# Patient Record
Sex: FEMALE | Race: WHITE | NOT HISPANIC OR LATINO | Employment: OTHER | URBAN - NONMETROPOLITAN AREA
[De-identification: names, ages, dates, MRNs, and addresses within clinical notes are randomized per-mention and may not be internally consistent; named-entity substitution may affect disease eponyms.]

---

## 2017-06-28 ENCOUNTER — HOSPITAL ENCOUNTER (OUTPATIENT)
Dept: GENERAL RADIOLOGY | Facility: HOSPITAL | Age: 57
Discharge: HOME | End: 2017-06-28
Admitting: NURSE PRACTITIONER

## 2017-06-28 ENCOUNTER — TRANSCRIBE ORDERS (OUTPATIENT)
Dept: GENERAL RADIOLOGY | Facility: HOSPITAL | Age: 57
End: 2017-06-28

## 2017-06-28 DIAGNOSIS — M25.552 LEFT HIP PAIN: Primary | ICD-10-CM

## 2017-06-28 PROCEDURE — 73502 X-RAY EXAM HIP UNI 2-3 VIEWS: CPT

## 2017-11-16 ENCOUNTER — TRANSCRIBE ORDERS (OUTPATIENT)
Dept: ADMINISTRATIVE | Facility: HOSPITAL | Age: 57
End: 2017-11-16

## 2017-11-16 DIAGNOSIS — M81.0 OSTEOPOROSIS, UNSPECIFIED OSTEOPOROSIS TYPE, UNSPECIFIED PATHOLOGICAL FRACTURE PRESENCE: Primary | ICD-10-CM

## 2017-11-16 DIAGNOSIS — Z12.31 ENCOUNTER FOR SCREENING MAMMOGRAM FOR MALIGNANT NEOPLASM OF BREAST: ICD-10-CM

## 2018-02-01 ENCOUNTER — HOSPITAL ENCOUNTER (EMERGENCY)
Facility: HOSPITAL | Age: 58
Discharge: HOME | End: 2018-02-01
Admitting: EMERGENCY MEDICINE

## 2018-02-01 VITALS
BODY MASS INDEX: 28.99 KG/M2 | RESPIRATION RATE: 16 BRPM | HEIGHT: 65 IN | OXYGEN SATURATION: 98 % | TEMPERATURE: 98.2 F | SYSTOLIC BLOOD PRESSURE: 135 MMHG | HEART RATE: 65 BPM | DIASTOLIC BLOOD PRESSURE: 88 MMHG | WEIGHT: 174 LBS

## 2018-02-01 DIAGNOSIS — I10 HYPERTENSION NOT AT GOAL: Primary | ICD-10-CM

## 2018-02-01 LAB
ALBUMIN SERPL-MCNC: 4.1 G/DL (ref 3.5–5)
ALBUMIN/GLOB SERPL: 1.4 G/DL (ref 1.1–2.5)
ALP SERPL-CCNC: 103 U/L (ref 24–120)
ALT SERPL W P-5'-P-CCNC: 23 U/L (ref 0–54)
ANION GAP SERPL CALCULATED.3IONS-SCNC: 13 MMOL/L (ref 4–13)
AST SERPL-CCNC: 38 U/L (ref 7–45)
BASOPHILS # BLD AUTO: 0.05 10*3/MM3 (ref 0–0.2)
BASOPHILS NFR BLD AUTO: 0.6 % (ref 0–2)
BILIRUB SERPL-MCNC: 1 MG/DL (ref 0.1–1)
BUN BLD-MCNC: 23 MG/DL (ref 5–21)
BUN/CREAT SERPL: 16 (ref 7–25)
CALCIUM SPEC-SCNC: 9 MG/DL (ref 8.4–10.4)
CHLORIDE SERPL-SCNC: 103 MMOL/L (ref 98–110)
CO2 SERPL-SCNC: 27 MMOL/L (ref 24–31)
CREAT BLD-MCNC: 1.44 MG/DL (ref 0.5–1.4)
DEPRECATED RDW RBC AUTO: 44.1 FL (ref 40–54)
EOSINOPHIL # BLD AUTO: 0.17 10*3/MM3 (ref 0–0.7)
EOSINOPHIL NFR BLD AUTO: 1.9 % (ref 0–4)
ERYTHROCYTE [DISTWIDTH] IN BLOOD BY AUTOMATED COUNT: 13.5 % (ref 12–15)
GFR SERPL CREATININE-BSD FRML MDRD: 38 ML/MIN/1.73
GLOBULIN UR ELPH-MCNC: 2.9 GM/DL
GLUCOSE BLD-MCNC: 81 MG/DL (ref 70–100)
HCT VFR BLD AUTO: 34.9 % (ref 37–47)
HGB BLD-MCNC: 11.4 G/DL (ref 12–16)
IMM GRANULOCYTES # BLD: 0.03 10*3/MM3 (ref 0–0.03)
IMM GRANULOCYTES NFR BLD: 0.3 % (ref 0–5)
LYMPHOCYTES # BLD AUTO: 2.2 10*3/MM3 (ref 0.72–4.86)
LYMPHOCYTES NFR BLD AUTO: 24.5 % (ref 15–45)
MCH RBC QN AUTO: 29.3 PG (ref 28–32)
MCHC RBC AUTO-ENTMCNC: 32.7 G/DL (ref 33–36)
MCV RBC AUTO: 89.7 FL (ref 82–98)
MONOCYTES # BLD AUTO: 0.61 10*3/MM3 (ref 0.19–1.3)
MONOCYTES NFR BLD AUTO: 6.8 % (ref 4–12)
NEUTROPHILS # BLD AUTO: 5.93 10*3/MM3 (ref 1.87–8.4)
NEUTROPHILS NFR BLD AUTO: 65.9 % (ref 39–78)
NRBC BLD MANUAL-RTO: 0 /100 WBC (ref 0–0)
PLATELET # BLD AUTO: 216 10*3/MM3 (ref 130–400)
PMV BLD AUTO: 10.8 FL (ref 6–12)
POTASSIUM BLD-SCNC: 3.1 MMOL/L (ref 3.5–5.3)
PROT SERPL-MCNC: 7 G/DL (ref 6.3–8.7)
RBC # BLD AUTO: 3.89 10*6/MM3 (ref 4.2–5.4)
SODIUM BLD-SCNC: 143 MMOL/L (ref 135–145)
WBC NRBC COR # BLD: 8.99 10*3/MM3 (ref 4.8–10.8)

## 2018-02-01 PROCEDURE — 96376 TX/PRO/DX INJ SAME DRUG ADON: CPT

## 2018-02-01 PROCEDURE — 96374 THER/PROPH/DIAG INJ IV PUSH: CPT

## 2018-02-01 PROCEDURE — 80053 COMPREHEN METABOLIC PANEL: CPT | Performed by: PHYSICIAN ASSISTANT

## 2018-02-01 PROCEDURE — 85025 COMPLETE CBC W/AUTO DIFF WBC: CPT | Performed by: PHYSICIAN ASSISTANT

## 2018-02-01 PROCEDURE — 99285 EMERGENCY DEPT VISIT HI MDM: CPT

## 2018-02-01 RX ORDER — CLONIDINE HYDROCHLORIDE 0.1 MG/1
0.1 TABLET ORAL ONCE
Status: COMPLETED | OUTPATIENT
Start: 2018-02-01 | End: 2018-02-01

## 2018-02-01 RX ORDER — ATENOLOL 50 MG/1
50 TABLET ORAL DAILY
COMMUNITY
End: 2019-09-09 | Stop reason: HOSPADM

## 2018-02-01 RX ORDER — LABETALOL HYDROCHLORIDE 5 MG/ML
10 INJECTION, SOLUTION INTRAVENOUS ONCE
Status: COMPLETED | OUTPATIENT
Start: 2018-02-01 | End: 2018-02-01

## 2018-02-01 RX ORDER — LABETALOL HYDROCHLORIDE 5 MG/ML
20 INJECTION, SOLUTION INTRAVENOUS ONCE
Status: COMPLETED | OUTPATIENT
Start: 2018-02-01 | End: 2018-02-01

## 2018-02-01 RX ADMIN — CLONIDINE HYDROCHLORIDE 0.1 MG: 0.1 TABLET ORAL at 20:20

## 2018-02-01 RX ADMIN — LABETALOL HYDROCHLORIDE 10 MG: 5 INJECTION, SOLUTION INTRAVENOUS at 19:09

## 2018-02-01 RX ADMIN — CLONIDINE HYDROCHLORIDE 0.1 MG: 0.1 TABLET ORAL at 19:50

## 2018-02-01 RX ADMIN — LABETALOL HYDROCHLORIDE 20 MG: 5 INJECTION, SOLUTION INTRAVENOUS at 20:56

## 2018-02-02 NOTE — ED PROVIDER NOTES
Subjective   History of Present Illness  57-year-old female presents with chief complaint of headache and left hip pain.  Patient reports the duration of her symptoms have been present for over one year.  She reports they come and go.  She sees a neurologist for her headaches as they are chronic, Dr. Hoyt.  She has an upcoming appointment with him.  At this time I am evaluating the patient, she reports she no longer has a headache but her blood pressure still elevated.  Denies chest pain nausea vomiting diarrhea fevers chills shortness of breath.  Review of Systems   All other systems reviewed and are negative.      Past Medical History:   Diagnosis Date   • Hypertension    • PTSD (post-traumatic stress disorder)        No Known Allergies    Past Surgical History:   Procedure Laterality Date   • EXTERNAL FIXATION WRIST FRACTURE     • LEG SURGERY     • TUBAL ABDOMINAL LIGATION         History reviewed. No pertinent family history.    Social History     Social History   • Marital status: Single     Spouse name: N/A   • Number of children: N/A   • Years of education: N/A     Social History Main Topics   • Smoking status: Current Every Day Smoker     Packs/day: 1.50   • Smokeless tobacco: None   • Alcohol use No   • Drug use: No   • Sexual activity: Defer     Other Topics Concern   • None     Social History Narrative   • None           Objective   Physical Exam   Constitutional: She is oriented to person, place, and time. She appears well-developed and well-nourished.   HENT:   Head: Normocephalic.   Eyes: EOM are normal. Pupils are equal, round, and reactive to light.   Neck: Normal range of motion.   Cardiovascular: Normal rate and regular rhythm.    Pulmonary/Chest: Effort normal.   Abdominal: Soft.   Musculoskeletal: Normal range of motion.   Lymphadenopathy:     She has no cervical adenopathy.   Neurological: She is alert and oriented to person, place, and time.   Skin: Skin is warm.   Psychiatric: She has a  normal mood and affect. Her behavior is normal.   Nursing note and vitals reviewed.      Procedures         ED Course  ED Course                  MDM  Number of Diagnoses or Management Options  Diagnosis management comments: We will discharge this patient is stable condition, her headache has improved and her blood pressure has improved.  She will return precautions       Amount and/or Complexity of Data Reviewed  Clinical lab tests: reviewed and ordered  Tests in the medicine section of CPT®: ordered and reviewed  Decide to obtain previous medical records or to obtain history from someone other than the patient: yes    Risk of Complications, Morbidity, and/or Mortality  Presenting problems: moderate  Diagnostic procedures: moderate  Management options: moderate    Patient Progress  Patient progress: stable      Final diagnoses:   Hypertension not at goal            Quinn Gilman PA-C  02/02/18 0025

## 2018-02-09 ENCOUNTER — HOSPITAL ENCOUNTER (OUTPATIENT)
Dept: MAMMOGRAPHY | Facility: HOSPITAL | Age: 58
Discharge: HOME | End: 2018-02-09
Attending: FAMILY MEDICINE | Admitting: FAMILY MEDICINE

## 2018-02-09 ENCOUNTER — HOSPITAL ENCOUNTER (OUTPATIENT)
Dept: BONE DENSITY | Facility: HOSPITAL | Age: 58
End: 2018-02-09
Attending: FAMILY MEDICINE

## 2018-02-09 DIAGNOSIS — Z12.31 ENCOUNTER FOR SCREENING MAMMOGRAM FOR MALIGNANT NEOPLASM OF BREAST: ICD-10-CM

## 2018-02-09 DIAGNOSIS — M81.0 OSTEOPOROSIS, UNSPECIFIED OSTEOPOROSIS TYPE, UNSPECIFIED PATHOLOGICAL FRACTURE PRESENCE: ICD-10-CM

## 2018-02-09 PROCEDURE — 77063 BREAST TOMOSYNTHESIS BI: CPT

## 2018-02-09 PROCEDURE — 77067 SCR MAMMO BI INCL CAD: CPT

## 2018-02-16 ENCOUNTER — TRANSCRIBE ORDERS (OUTPATIENT)
Dept: ADMINISTRATIVE | Facility: HOSPITAL | Age: 58
End: 2018-02-16

## 2018-02-16 DIAGNOSIS — R94.4 ABNORMAL RESULTS OF KIDNEY FUNCTION STUDIES: Primary | ICD-10-CM

## 2018-03-02 RX ORDER — AMITRIPTYLINE HYDROCHLORIDE 10 MG/1
10 TABLET, FILM COATED ORAL NIGHTLY
Status: ON HOLD | COMMUNITY
End: 2021-09-08

## 2018-03-02 RX ORDER — CETIRIZINE HYDROCHLORIDE, PSEUDOEPHEDRINE HYDROCHLORIDE 5; 120 MG/1; MG/1
1 TABLET, FILM COATED, EXTENDED RELEASE ORAL 2 TIMES DAILY
Status: ON HOLD | COMMUNITY
End: 2021-09-08

## 2018-03-02 RX ORDER — ERGOCALCIFEROL 1.25 MG/1
50000 CAPSULE ORAL WEEKLY
Status: ON HOLD | COMMUNITY
End: 2021-09-08

## 2018-03-02 RX ORDER — PROMETHAZINE HYDROCHLORIDE 25 MG/1
25 TABLET ORAL EVERY 6 HOURS PRN
Status: ON HOLD | COMMUNITY
End: 2021-09-08

## 2018-03-02 RX ORDER — CLONIDINE HYDROCHLORIDE 0.1 MG/1
0.1 TABLET ORAL 2 TIMES DAILY
Status: ON HOLD | COMMUNITY
End: 2021-09-08

## 2018-03-02 RX ORDER — ATENOLOL 50 MG/1
50 TABLET ORAL DAILY
Status: ON HOLD | COMMUNITY
End: 2021-09-08

## 2018-03-02 RX ORDER — AZELASTINE HYDROCHLORIDE 137 UG/1
SPRAY, METERED NASAL
Status: ON HOLD | COMMUNITY
End: 2021-09-08

## 2018-03-02 RX ORDER — FLUTICASONE PROPIONATE 50 MCG
2 SPRAY, SUSPENSION (ML) NASAL DAILY
COMMUNITY

## 2018-03-23 ENCOUNTER — TELEPHONE (OUTPATIENT)
Dept: GASTROENTEROLOGY | Age: 58
End: 2018-03-23

## 2018-04-05 ENCOUNTER — HOSPITAL ENCOUNTER (EMERGENCY)
Facility: HOSPITAL | Age: 58
Discharge: HOME | End: 2018-04-06
Attending: EMERGENCY MEDICINE | Admitting: EMERGENCY MEDICINE

## 2018-04-05 ENCOUNTER — APPOINTMENT (OUTPATIENT)
Dept: CT IMAGING | Facility: HOSPITAL | Age: 58
End: 2018-04-05

## 2018-04-05 ENCOUNTER — APPOINTMENT (OUTPATIENT)
Dept: GENERAL RADIOLOGY | Facility: HOSPITAL | Age: 58
End: 2018-04-05

## 2018-04-05 DIAGNOSIS — R19.7 DIARRHEA, UNSPECIFIED TYPE: ICD-10-CM

## 2018-04-05 DIAGNOSIS — I15.9 SECONDARY HYPERTENSION: Primary | ICD-10-CM

## 2018-04-05 PROCEDURE — 71046 X-RAY EXAM CHEST 2 VIEWS: CPT

## 2018-04-05 PROCEDURE — 70450 CT HEAD/BRAIN W/O DYE: CPT

## 2018-04-05 PROCEDURE — 99284 EMERGENCY DEPT VISIT MOD MDM: CPT

## 2018-04-05 PROCEDURE — 93005 ELECTROCARDIOGRAM TRACING: CPT | Performed by: EMERGENCY MEDICINE

## 2018-04-05 PROCEDURE — 93010 ELECTROCARDIOGRAM REPORT: CPT | Performed by: INTERNAL MEDICINE

## 2018-04-05 RX ORDER — CLONIDINE HYDROCHLORIDE 0.1 MG/1
0.1 TABLET ORAL DAILY PRN
COMMUNITY
End: 2020-11-03 | Stop reason: HOSPADM

## 2018-04-05 RX ORDER — CLONIDINE HYDROCHLORIDE 0.1 MG/1
0.2 TABLET ORAL ONCE
Status: COMPLETED | OUTPATIENT
Start: 2018-04-05 | End: 2018-04-05

## 2018-04-05 RX ADMIN — CLONIDINE HYDROCHLORIDE 0.2 MG: 0.1 TABLET ORAL at 23:13

## 2018-04-06 VITALS
BODY MASS INDEX: 29.99 KG/M2 | TEMPERATURE: 98.1 F | HEART RATE: 88 BPM | SYSTOLIC BLOOD PRESSURE: 168 MMHG | DIASTOLIC BLOOD PRESSURE: 106 MMHG | OXYGEN SATURATION: 98 % | WEIGHT: 180 LBS | HEIGHT: 65 IN | RESPIRATION RATE: 20 BRPM

## 2018-04-06 LAB
ANION GAP SERPL CALCULATED.3IONS-SCNC: 11 MMOL/L (ref 4–13)
APAP SERPL-MCNC: <10 MCG/ML (ref 10–30)
APTT PPP: 31 SECONDS (ref 24.1–34.8)
BASOPHILS # BLD AUTO: 0.04 10*3/MM3 (ref 0–0.2)
BASOPHILS NFR BLD AUTO: 0.6 % (ref 0–2)
BUN BLD-MCNC: 32 MG/DL (ref 5–21)
BUN/CREAT SERPL: 25.8 (ref 7–25)
CALCIUM SPEC-SCNC: 8.7 MG/DL (ref 8.4–10.4)
CHLORIDE SERPL-SCNC: 112 MMOL/L (ref 98–110)
CK MB SERPL-CCNC: 3.38 NG/ML (ref 0–5)
CO2 SERPL-SCNC: 25 MMOL/L (ref 24–31)
CREAT BLD-MCNC: 1.24 MG/DL (ref 0.5–1.4)
DEPRECATED RDW RBC AUTO: 44.4 FL (ref 40–54)
EOSINOPHIL # BLD AUTO: 0.21 10*3/MM3 (ref 0–0.7)
EOSINOPHIL NFR BLD AUTO: 3 % (ref 0–4)
ERYTHROCYTE [DISTWIDTH] IN BLOOD BY AUTOMATED COUNT: 13.6 % (ref 12–15)
ETHANOL UR QL: <0.01 %
GFR SERPL CREATININE-BSD FRML MDRD: 45 ML/MIN/1.73
GLUCOSE BLD-MCNC: 62 MG/DL (ref 70–100)
HCT VFR BLD AUTO: 33.7 % (ref 37–47)
HGB BLD-MCNC: 11 G/DL (ref 12–16)
IMM GRANULOCYTES # BLD: 0.02 10*3/MM3 (ref 0–0.03)
IMM GRANULOCYTES NFR BLD: 0.3 % (ref 0–5)
INR PPP: 0.95 (ref 0.91–1.09)
LYMPHOCYTES # BLD AUTO: 2.28 10*3/MM3 (ref 0.72–4.86)
LYMPHOCYTES NFR BLD AUTO: 32.3 % (ref 15–45)
MCH RBC QN AUTO: 29.6 PG (ref 28–32)
MCHC RBC AUTO-ENTMCNC: 32.6 G/DL (ref 33–36)
MCV RBC AUTO: 90.6 FL (ref 82–98)
MONOCYTES # BLD AUTO: 0.71 10*3/MM3 (ref 0.19–1.3)
MONOCYTES NFR BLD AUTO: 10.1 % (ref 4–12)
NEUTROPHILS # BLD AUTO: 3.8 10*3/MM3 (ref 1.87–8.4)
NEUTROPHILS NFR BLD AUTO: 53.7 % (ref 39–78)
NRBC BLD MANUAL-RTO: 0 /100 WBC (ref 0–0)
NT-PROBNP SERPL-MCNC: 1420 PG/ML (ref 0–900)
PLATELET # BLD AUTO: 174 10*3/MM3 (ref 130–400)
PMV BLD AUTO: 11.1 FL (ref 6–12)
POTASSIUM BLD-SCNC: 3.7 MMOL/L (ref 3.5–5.3)
PROTHROMBIN TIME: 13 SECONDS (ref 11.9–14.6)
RBC # BLD AUTO: 3.72 10*6/MM3 (ref 4.2–5.4)
SALICYLATES SERPL-MCNC: 6.4 MG/DL (ref 15–30)
SODIUM BLD-SCNC: 148 MMOL/L (ref 135–145)
TROPONIN I SERPL-MCNC: 0.01 NG/ML (ref 0–0.03)
WBC NRBC COR # BLD: 7.06 10*3/MM3 (ref 4.8–10.8)

## 2018-04-06 PROCEDURE — 85025 COMPLETE CBC W/AUTO DIFF WBC: CPT | Performed by: EMERGENCY MEDICINE

## 2018-04-06 PROCEDURE — 80307 DRUG TEST PRSMV CHEM ANLYZR: CPT | Performed by: EMERGENCY MEDICINE

## 2018-04-06 PROCEDURE — 85730 THROMBOPLASTIN TIME PARTIAL: CPT | Performed by: EMERGENCY MEDICINE

## 2018-04-06 PROCEDURE — 84484 ASSAY OF TROPONIN QUANT: CPT | Performed by: EMERGENCY MEDICINE

## 2018-04-06 PROCEDURE — 80048 BASIC METABOLIC PNL TOTAL CA: CPT | Performed by: EMERGENCY MEDICINE

## 2018-04-06 PROCEDURE — 83880 ASSAY OF NATRIURETIC PEPTIDE: CPT | Performed by: EMERGENCY MEDICINE

## 2018-04-06 PROCEDURE — 85610 PROTHROMBIN TIME: CPT | Performed by: EMERGENCY MEDICINE

## 2018-04-06 PROCEDURE — 82553 CREATINE MB FRACTION: CPT | Performed by: EMERGENCY MEDICINE

## 2018-04-06 NOTE — DISCHARGE INSTRUCTIONS
Ludivina,    Your blood pressure was rather high today. Please see your family doctor within 48 hours of discharge for repeat blood pressure and possible adjustment of your medications. Please return for worsening pain, headaches, numbness, tingling, loss of sensation, chest pain or other issues.

## 2018-04-06 NOTE — ED PROVIDER NOTES
"Subjective   58 y/o female presents for multiple issues. She notes diarrhea for 2-3 days, headache which has since resolved for 2-3 weeks and \"spots\" on her body. Patient while alert and oriented is hard to keep up with as she changes topics frequently, she points to spots on her body showing me them stating \"this wasn't here before.\" Most of the time I do not see what she is pointing at. She states her tongue is bleeding but on examination it is not. She states she has some spots under her tongue but, again, on examination I do not see any. She was difficult to actually even see as she was often out of the room when I went to see her. She denies all pain to me at this time, denies medication changes (did not take her bp medication prior to coming with repeat currently at 175). She denies blood per rectum, blood per urine, fevers, chills, cp, sob, nausea, vomiting, diarrhea, falls or trauma. She admits to history of etoh usage but denies this currently. She notes the diarrhea is non bloody and denies recent abx usage, admissions to the hosptial, trips, travels or other issues. She arrives in [facility].             Review of Systems   All other systems reviewed and are negative.      Past Medical History:   Diagnosis Date   • Hypertension    • PTSD (post-traumatic stress disorder)        Allergies   Allergen Reactions   • Codeine Nausea And Vomiting       Past Surgical History:   Procedure Laterality Date   • EXTERNAL FIXATION WRIST FRACTURE     • LEG SURGERY     • TUBAL ABDOMINAL LIGATION         History reviewed. No pertinent family history.    Social History     Social History   • Marital status: Single     Social History Main Topics   • Smoking status: Current Every Day Smoker     Packs/day: 1.00   • Alcohol use No   • Drug use: No   • Sexual activity: Defer     Other Topics Concern   • Not on file           Objective   Physical Exam   Constitutional: She is oriented to person, place, and time. She appears " well-developed and well-nourished.   HENT:   Head: Normocephalic.   Nose: Nose normal.   Eyes: Conjunctivae and EOM are normal. Pupils are equal, round, and reactive to light.   Neck: Normal range of motion. Neck supple.   Cardiovascular: Normal rate, regular rhythm, normal heart sounds and intact distal pulses.    Pulmonary/Chest: Effort normal and breath sounds normal.   Abdominal: Soft. Bowel sounds are normal. She exhibits no distension and no mass. There is no tenderness. There is no rebound and no guarding. No hernia.   Musculoskeletal: Normal range of motion.   Neurological: She is alert and oriented to person, place, and time. She has normal reflexes.   Skin: Skin is warm and dry. Capillary refill takes less than 2 seconds.   Psychiatric: She has a normal mood and affect. Her behavior is normal.   Vitals reviewed.      Procedures         ED Course  ED Course   Comment By Time   Went to see patient at least two times, not in room either time.  Son Randlal MD 04/06 0115      Labs Reviewed   BASIC METABOLIC PANEL - Abnormal; Notable for the following:        Result Value    Glucose 62 (*)     BUN 32 (*)     Sodium 148 (*)     Chloride 112 (*)     eGFR Non African Amer 45 (*)     BUN/Creatinine Ratio 25.8 (*)     All other components within normal limits    Narrative:     GFR Normal >60  Chronic Kidney Disease <60  Kidney Failure <15   SALICYLATE LEVEL - Abnormal; Notable for the following:     Salicylate 6.4 (*)     All other components within normal limits   ACETAMINOPHEN LEVEL - Abnormal; Notable for the following:     Acetaminophen <10.0 (*)     All other components within normal limits   BNP (IN-HOUSE) - Abnormal; Notable for the following:     proBNP 1,420.0 (*)     All other components within normal limits   CBC WITH AUTO DIFFERENTIAL - Abnormal; Notable for the following:     RBC 3.72 (*)     Hemoglobin 11.0 (*)     Hematocrit 33.7 (*)     MCHC 32.6 (*)     All other components within normal  limits   PROTIME-INR - Normal   APTT - Normal   TROPONIN (IN-HOUSE) - Normal   CK MB - Normal    Narrative:     CKMB Index not indicated   ETHANOL - Normal    Narrative:     Not for legal purposes. Chain of Custody not followed.    URINE DRUG SCREEN   URINALYSIS W/ MICROSCOPIC IF INDICATED   CBC AND DIFFERENTIAL    Narrative:     The following orders were created for panel order CBC & Differential.  Procedure                               Abnormality         Status                     ---------                               -----------         ------                     CBC Auto Differential[[id number]]        Abnormal            Final result                 Please view results for these tests on the individual orders.     XR Chest 2 View    (Results Pending)   CT Head Without Contrast    (Results Pending)     Sodium trends from 144-148  BUN: has been 32 in 2016, no ag    hgb trend has been 9.9-14.6 and this apars to be her average    Patient able to eat and drink without issue.    I have observed her walking down the simms way in no distress multiple times. She is alert and oriented x4 at this time. I am unsure of the etiology or cause of her symptoms as I do not see these spots she is talking about. Her labs are unchanged and unrevealing. She has no history of bleeding or dark/starry stools. She denies current pain at this time. Given all the above in this well appearing patient, will dc to home with follow up with pmd. States she has immodium for her diarrhea.               MDM    Final diagnoses:   Secondary hypertension   Diarrhea, unspecified type            Son Randall MD  04/06/18 5075

## 2018-04-24 ENCOUNTER — HOSPITAL ENCOUNTER (OUTPATIENT)
Age: 58
Setting detail: OUTPATIENT SURGERY
Discharge: HOME | End: 2018-04-24
Attending: INTERNAL MEDICINE | Admitting: INTERNAL MEDICINE

## 2018-04-24 ENCOUNTER — ANESTHESIA (OUTPATIENT)
Dept: OPERATING ROOM | Age: 58
End: 2018-04-24

## 2018-04-24 ENCOUNTER — ANESTHESIA EVENT (OUTPATIENT)
Dept: OPERATING ROOM | Age: 58
End: 2018-04-24

## 2018-04-24 VITALS
BODY MASS INDEX: 28.93 KG/M2 | SYSTOLIC BLOOD PRESSURE: 190 MMHG | WEIGHT: 180 LBS | HEIGHT: 66 IN | TEMPERATURE: 99.5 F | RESPIRATION RATE: 20 BRPM | HEART RATE: 64 BPM | DIASTOLIC BLOOD PRESSURE: 124 MMHG

## 2018-04-24 RX ORDER — SODIUM CHLORIDE 9 MG/ML
INJECTION, SOLUTION INTRAVENOUS CONTINUOUS
Status: DISCONTINUED | OUTPATIENT
Start: 2018-04-24 | End: 2018-04-24 | Stop reason: HOSPADM

## 2018-04-24 RX ORDER — METOPROLOL TARTRATE 5 MG/5ML
INJECTION INTRAVENOUS PRN
Status: SHIPPED | OUTPATIENT
Start: 2018-04-24

## 2018-04-24 RX ORDER — MIDAZOLAM HYDROCHLORIDE 1 MG/ML
INJECTION INTRAMUSCULAR; INTRAVENOUS PRN
Status: SHIPPED | OUTPATIENT
Start: 2018-04-24

## 2018-04-24 RX ORDER — LIDOCAINE HYDROCHLORIDE 10 MG/ML
1 INJECTION, SOLUTION EPIDURAL; INFILTRATION; INTRACAUDAL; PERINEURAL
Status: DISCONTINUED | OUTPATIENT
Start: 2018-04-24 | End: 2018-04-24 | Stop reason: HOSPADM

## 2018-04-24 RX ADMIN — METOPROLOL TARTRATE 10 MG: 5 INJECTION INTRAVENOUS at 10:45

## 2018-04-24 RX ADMIN — METOPROLOL TARTRATE 15 MG: 5 INJECTION INTRAVENOUS at 10:57

## 2018-04-24 RX ADMIN — MIDAZOLAM HYDROCHLORIDE 2 MG: 1 INJECTION INTRAMUSCULAR; INTRAVENOUS at 10:36

## 2018-04-24 RX ADMIN — SODIUM CHLORIDE: 9 INJECTION, SOLUTION INTRAVENOUS at 10:35

## 2018-04-24 ASSESSMENT — PAIN - FUNCTIONAL ASSESSMENT: PAIN_FUNCTIONAL_ASSESSMENT: 0-10

## 2018-09-12 ENCOUNTER — HOSPITAL ENCOUNTER (OUTPATIENT)
Dept: ULTRASOUND IMAGING | Age: 58
Discharge: HOME | End: 2018-09-12
Payer: MEDICARE

## 2018-09-12 DIAGNOSIS — N18.9 CHRONIC RENAL IMPAIRMENT, UNSPECIFIED CKD STAGE: ICD-10-CM

## 2018-09-12 PROCEDURE — 76770 US EXAM ABDO BACK WALL COMP: CPT

## 2019-08-30 ENCOUNTER — HOSPITAL ENCOUNTER (INPATIENT)
Facility: HOSPITAL | Age: 59
LOS: 10 days | Discharge: HOME | End: 2019-09-09
Attending: FAMILY MEDICINE | Admitting: FAMILY MEDICINE

## 2019-08-30 ENCOUNTER — APPOINTMENT (OUTPATIENT)
Dept: GENERAL RADIOLOGY | Facility: HOSPITAL | Age: 59
End: 2019-08-30

## 2019-08-30 DIAGNOSIS — R06.00 DYSPNEA, UNSPECIFIED TYPE: ICD-10-CM

## 2019-08-30 DIAGNOSIS — I16.0 HYPERTENSIVE URGENCY: Primary | ICD-10-CM

## 2019-08-30 DIAGNOSIS — R77.8 ELEVATED TROPONIN: ICD-10-CM

## 2019-08-30 DIAGNOSIS — I50.9 NEW ONSET OF CONGESTIVE HEART FAILURE (HCC): ICD-10-CM

## 2019-08-30 LAB
ALBUMIN SERPL-MCNC: 3.7 G/DL (ref 3.5–5)
ALBUMIN/GLOB SERPL: 1.1 G/DL (ref 1.1–2.5)
ALP SERPL-CCNC: 125 U/L (ref 24–120)
ALT SERPL W P-5'-P-CCNC: 87 U/L (ref 0–54)
ANION GAP SERPL CALCULATED.3IONS-SCNC: 4 MMOL/L (ref 4–13)
AST SERPL-CCNC: 81 U/L (ref 7–45)
BACTERIA UR QL AUTO: ABNORMAL /HPF
BASOPHILS # BLD AUTO: 0.04 10*3/MM3 (ref 0–0.2)
BASOPHILS NFR BLD AUTO: 0.7 % (ref 0–1.5)
BILIRUB SERPL-MCNC: 0.6 MG/DL (ref 0.1–1)
BILIRUB UR QL STRIP: NEGATIVE
BUN BLD-MCNC: 41 MG/DL (ref 5–21)
BUN/CREAT SERPL: 25.9 (ref 7–25)
CALCIUM SPEC-SCNC: 8.6 MG/DL (ref 8.4–10.4)
CHLORIDE SERPL-SCNC: 101 MMOL/L (ref 98–110)
CLARITY UR: CLEAR
CO2 SERPL-SCNC: 32 MMOL/L (ref 24–31)
COLOR UR: YELLOW
CREAT BLD-MCNC: 1.58 MG/DL (ref 0.5–1.4)
DEPRECATED RDW RBC AUTO: 49.7 FL (ref 37–54)
EOSINOPHIL # BLD AUTO: 0.13 10*3/MM3 (ref 0–0.4)
EOSINOPHIL NFR BLD AUTO: 2.2 % (ref 0.3–6.2)
ERYTHROCYTE [DISTWIDTH] IN BLOOD BY AUTOMATED COUNT: 16.3 % (ref 12.3–15.4)
GFR SERPL CREATININE-BSD FRML MDRD: 33 ML/MIN/1.73
GLOBULIN UR ELPH-MCNC: 3.3 GM/DL
GLUCOSE BLD-MCNC: 91 MG/DL (ref 70–100)
GLUCOSE UR STRIP-MCNC: NEGATIVE MG/DL
HCT VFR BLD AUTO: 32.6 % (ref 34–46.6)
HGB BLD-MCNC: 9.9 G/DL (ref 12–15.9)
HGB UR QL STRIP.AUTO: NEGATIVE
HYALINE CASTS UR QL AUTO: ABNORMAL /LPF
IMM GRANULOCYTES # BLD AUTO: 0.03 10*3/MM3 (ref 0–0.05)
IMM GRANULOCYTES NFR BLD AUTO: 0.5 % (ref 0–0.5)
KETONES UR QL STRIP: NEGATIVE
LEUKOCYTE ESTERASE UR QL STRIP.AUTO: NEGATIVE
LYMPHOCYTES # BLD AUTO: 1.13 10*3/MM3 (ref 0.7–3.1)
LYMPHOCYTES NFR BLD AUTO: 18.9 % (ref 19.6–45.3)
MAGNESIUM SERPL-MCNC: 2.4 MG/DL (ref 1.4–2.2)
MCH RBC QN AUTO: 25.5 PG (ref 26.6–33)
MCHC RBC AUTO-ENTMCNC: 30.4 G/DL (ref 31.5–35.7)
MCV RBC AUTO: 84 FL (ref 79–97)
MONOCYTES # BLD AUTO: 0.41 10*3/MM3 (ref 0.1–0.9)
MONOCYTES NFR BLD AUTO: 6.9 % (ref 5–12)
NEUTROPHILS # BLD AUTO: 4.23 10*3/MM3 (ref 1.7–7)
NEUTROPHILS NFR BLD AUTO: 70.8 % (ref 42.7–76)
NITRITE UR QL STRIP: NEGATIVE
NRBC BLD AUTO-RTO: 0 /100 WBC (ref 0–0.2)
NT-PROBNP SERPL-MCNC: ABNORMAL PG/ML (ref 0–900)
PH UR STRIP.AUTO: 6.5 [PH] (ref 5–8)
PLATELET # BLD AUTO: 249 10*3/MM3 (ref 140–450)
PMV BLD AUTO: 10.6 FL (ref 6–12)
POTASSIUM BLD-SCNC: 4.1 MMOL/L (ref 3.5–5.3)
PROT SERPL-MCNC: 7 G/DL (ref 6.3–8.7)
PROT UR QL STRIP: ABNORMAL
RBC # BLD AUTO: 3.88 10*6/MM3 (ref 3.77–5.28)
RBC # UR: ABNORMAL /HPF
REF LAB TEST METHOD: ABNORMAL
SODIUM BLD-SCNC: 137 MMOL/L (ref 135–145)
SP GR UR STRIP: 1.01 (ref 1–1.03)
SQUAMOUS #/AREA URNS HPF: ABNORMAL /HPF
TROPONIN I SERPL-MCNC: 0.02 NG/ML (ref 0–0.03)
TSH SERPL DL<=0.05 MIU/L-ACNC: 1.16 UIU/ML (ref 0.47–4.68)
UROBILINOGEN UR QL STRIP: ABNORMAL
WBC NRBC COR # BLD: 5.97 10*3/MM3 (ref 3.4–10.8)
WBC UR QL AUTO: ABNORMAL /HPF

## 2019-08-30 PROCEDURE — 25010000002 FUROSEMIDE PER 20 MG: Performed by: PHYSICIAN ASSISTANT

## 2019-08-30 PROCEDURE — 83880 ASSAY OF NATRIURETIC PEPTIDE: CPT | Performed by: PHYSICIAN ASSISTANT

## 2019-08-30 PROCEDURE — 85025 COMPLETE CBC W/AUTO DIFF WBC: CPT | Performed by: PHYSICIAN ASSISTANT

## 2019-08-30 PROCEDURE — 84443 ASSAY THYROID STIM HORMONE: CPT | Performed by: PHYSICIAN ASSISTANT

## 2019-08-30 PROCEDURE — 99284 EMERGENCY DEPT VISIT MOD MDM: CPT

## 2019-08-30 PROCEDURE — 80053 COMPREHEN METABOLIC PANEL: CPT | Performed by: PHYSICIAN ASSISTANT

## 2019-08-30 PROCEDURE — 83735 ASSAY OF MAGNESIUM: CPT | Performed by: PHYSICIAN ASSISTANT

## 2019-08-30 PROCEDURE — 81001 URINALYSIS AUTO W/SCOPE: CPT | Performed by: PHYSICIAN ASSISTANT

## 2019-08-30 PROCEDURE — 93010 ELECTROCARDIOGRAM REPORT: CPT | Performed by: INTERNAL MEDICINE

## 2019-08-30 PROCEDURE — 71045 X-RAY EXAM CHEST 1 VIEW: CPT

## 2019-08-30 PROCEDURE — 84484 ASSAY OF TROPONIN QUANT: CPT | Performed by: PHYSICIAN ASSISTANT

## 2019-08-30 PROCEDURE — 93005 ELECTROCARDIOGRAM TRACING: CPT | Performed by: PHYSICIAN ASSISTANT

## 2019-08-30 RX ORDER — NITROGLYCERIN 0.4 MG/1
0.4 TABLET SUBLINGUAL
Status: COMPLETED | OUTPATIENT
Start: 2019-08-30 | End: 2019-08-30

## 2019-08-30 RX ORDER — ACETAMINOPHEN 500 MG
1000 TABLET ORAL ONCE
Status: COMPLETED | OUTPATIENT
Start: 2019-08-30 | End: 2019-08-30

## 2019-08-30 RX ORDER — FUROSEMIDE 10 MG/ML
40 INJECTION INTRAMUSCULAR; INTRAVENOUS ONCE
Status: COMPLETED | OUTPATIENT
Start: 2019-08-30 | End: 2019-08-30

## 2019-08-30 RX ORDER — ACETAMINOPHEN 325 MG/1
650 TABLET ORAL EVERY 4 HOURS PRN
Status: DISCONTINUED | OUTPATIENT
Start: 2019-08-30 | End: 2019-09-09 | Stop reason: HOSPADM

## 2019-08-30 RX ORDER — ONDANSETRON 2 MG/ML
4 INJECTION INTRAMUSCULAR; INTRAVENOUS EVERY 6 HOURS PRN
Status: DISCONTINUED | OUTPATIENT
Start: 2019-08-30 | End: 2019-09-09 | Stop reason: HOSPADM

## 2019-08-30 RX ORDER — SODIUM CHLORIDE 0.9 % (FLUSH) 0.9 %
10 SYRINGE (ML) INJECTION EVERY 12 HOURS SCHEDULED
Status: DISCONTINUED | OUTPATIENT
Start: 2019-08-30 | End: 2019-09-09 | Stop reason: HOSPADM

## 2019-08-30 RX ORDER — SODIUM CHLORIDE 0.9 % (FLUSH) 0.9 %
10 SYRINGE (ML) INJECTION AS NEEDED
Status: DISCONTINUED | OUTPATIENT
Start: 2019-08-30 | End: 2019-09-09 | Stop reason: HOSPADM

## 2019-08-30 RX ORDER — ACETAMINOPHEN 650 MG/1
650 SUPPOSITORY RECTAL EVERY 4 HOURS PRN
Status: DISCONTINUED | OUTPATIENT
Start: 2019-08-30 | End: 2019-09-09 | Stop reason: HOSPADM

## 2019-08-30 RX ORDER — NITROGLYCERIN 20 MG/100ML
5-200 INJECTION INTRAVENOUS
Status: DISCONTINUED | OUTPATIENT
Start: 2019-08-30 | End: 2019-08-30

## 2019-08-30 RX ORDER — ACETAMINOPHEN 160 MG/5ML
650 SOLUTION ORAL EVERY 4 HOURS PRN
Status: DISCONTINUED | OUTPATIENT
Start: 2019-08-30 | End: 2019-09-09 | Stop reason: HOSPADM

## 2019-08-30 RX ADMIN — SODIUM CHLORIDE 5 MG/HR: 9 INJECTION, SOLUTION INTRAVENOUS at 21:27

## 2019-08-30 RX ADMIN — SODIUM CHLORIDE, PRESERVATIVE FREE 10 ML: 5 INJECTION INTRAVENOUS at 23:15

## 2019-08-30 RX ADMIN — ACETAMINOPHEN 1000 MG: 500 TABLET, FILM COATED ORAL at 20:09

## 2019-08-30 RX ADMIN — FUROSEMIDE 40 MG: 10 INJECTION, SOLUTION INTRAMUSCULAR; INTRAVENOUS at 18:31

## 2019-08-30 RX ADMIN — NITROGLYCERIN 0.4 MG: 0.4 TABLET SUBLINGUAL at 18:38

## 2019-08-30 RX ADMIN — NITROGLYCERIN 0.4 MG: 0.4 TABLET SUBLINGUAL at 19:17

## 2019-08-30 RX ADMIN — NITROGLYCERIN 5 MCG/MIN: 20 INJECTION INTRAVENOUS at 20:10

## 2019-08-30 RX ADMIN — NITROGLYCERIN 0.4 MG: 0.4 TABLET SUBLINGUAL at 19:14

## 2019-08-31 ENCOUNTER — APPOINTMENT (OUTPATIENT)
Dept: CARDIOLOGY | Facility: HOSPITAL | Age: 59
End: 2019-08-31

## 2019-08-31 PROBLEM — R77.8 ELEVATED TROPONIN: Status: ACTIVE | Noted: 2019-08-31

## 2019-08-31 PROBLEM — R79.89 ELEVATED TROPONIN: Status: ACTIVE | Noted: 2019-08-31

## 2019-08-31 PROBLEM — R79.89 ELEVATED SERUM CREATININE: Status: ACTIVE | Noted: 2019-08-31

## 2019-08-31 PROBLEM — D50.9 IRON DEFICIENCY ANEMIA: Status: ACTIVE | Noted: 2019-08-31

## 2019-08-31 PROBLEM — I50.9 ACUTE CONGESTIVE HEART FAILURE (HCC): Status: ACTIVE | Noted: 2019-08-31

## 2019-08-31 PROBLEM — F17.210 CIGARETTE SMOKER: Status: ACTIVE | Noted: 2019-08-31

## 2019-08-31 LAB
ALBUMIN SERPL-MCNC: 3.5 G/DL (ref 3.5–5)
ALBUMIN/GLOB SERPL: 1.2 G/DL (ref 1.1–2.5)
ALP SERPL-CCNC: 111 U/L (ref 24–120)
ALT SERPL W P-5'-P-CCNC: 74 U/L (ref 0–54)
ANION GAP SERPL CALCULATED.3IONS-SCNC: 8 MMOL/L (ref 4–13)
AST SERPL-CCNC: 55 U/L (ref 7–45)
AV VENA CONTRACTA: 0.3 CM
BASOPHILS # BLD AUTO: 0.03 10*3/MM3 (ref 0–0.2)
BASOPHILS NFR BLD AUTO: 0.5 % (ref 0–1.5)
BH CV ECHO MEAS - AI P1/2T: 490 MSEC
BH CV ECHO MEAS - AO MAX PG (FULL): 13.1 MMHG
BH CV ECHO MEAS - AO MAX PG: 26.6 MMHG
BH CV ECHO MEAS - AO MEAN PG (FULL): 4 MMHG
BH CV ECHO MEAS - AO MEAN PG: 11 MMHG
BH CV ECHO MEAS - AO ROOT AREA (BSA CORRECTED): 1.5
BH CV ECHO MEAS - AO ROOT AREA: 6.2 CM^2
BH CV ECHO MEAS - AO ROOT DIAM: 2.8 CM
BH CV ECHO MEAS - AO V2 MAX: 258 CM/SEC
BH CV ECHO MEAS - AO V2 MEAN: 146.5 CM/SEC
BH CV ECHO MEAS - AO V2 VTI: 47.6 CM
BH CV ECHO MEAS - AVA(I,A): 1.9 CM^2
BH CV ECHO MEAS - AVA(I,D): 1.9 CM^2
BH CV ECHO MEAS - AVA(V,A): 2 CM^2
BH CV ECHO MEAS - AVA(V,D): 2 CM^2
BH CV ECHO MEAS - BSA(HAYCOCK): 2 M^2
BH CV ECHO MEAS - BSA: 1.9 M^2
BH CV ECHO MEAS - BZI_BMI: 29.4 KILOGRAMS/M^2
BH CV ECHO MEAS - BZI_METRIC_HEIGHT: 167.6 CM
BH CV ECHO MEAS - BZI_METRIC_WEIGHT: 82.6 KG
BH CV ECHO MEAS - EDV(CUBED): 98.6 ML
BH CV ECHO MEAS - EDV(MOD-SP4): 61.1 ML
BH CV ECHO MEAS - EDV(TEICH): 98.3 ML
BH CV ECHO MEAS - EF(CUBED): 76.3 %
BH CV ECHO MEAS - EF(MOD-SP4): 63.8 %
BH CV ECHO MEAS - EF(TEICH): 68.3 %
BH CV ECHO MEAS - ESV(CUBED): 23.4 ML
BH CV ECHO MEAS - ESV(MOD-SP4): 22.1 ML
BH CV ECHO MEAS - ESV(TEICH): 31.1 ML
BH CV ECHO MEAS - FS: 38.1 %
BH CV ECHO MEAS - IVS/LVPW: 0.9
BH CV ECHO MEAS - IVSD: 1.2 CM
BH CV ECHO MEAS - LA DIMENSION: 4 CM
BH CV ECHO MEAS - LA/AO: 1.4
BH CV ECHO MEAS - LAT PEAK E' VEL: 5 CM/SEC
BH CV ECHO MEAS - LV DIASTOLIC VOL/BSA (35-75): 31.8 ML/M^2
BH CV ECHO MEAS - LV MASS(C)D: 158.7 GRAMS
BH CV ECHO MEAS - LV MASS(C)DI: 82.6 GRAMS/M^2
BH CV ECHO MEAS - LV MAX PG: 13.5 MMHG
BH CV ECHO MEAS - LV MEAN PG: 7 MMHG
BH CV ECHO MEAS - LV SYSTOLIC VOL/BSA (12-30): 11.5 ML/M^2
BH CV ECHO MEAS - LV V1 MAX: 184 CM/SEC
BH CV ECHO MEAS - LV V1 MEAN: 122 CM/SEC
BH CV ECHO MEAS - LV V1 VTI: 32.5 CM
BH CV ECHO MEAS - LVIDD: 4.6 CM
BH CV ECHO MEAS - LVIDS: 2.9 CM
BH CV ECHO MEAS - LVLD AP4: 6.2 CM
BH CV ECHO MEAS - LVLS AP4: 5.5 CM
BH CV ECHO MEAS - LVOT AREA (M): 2.8 CM^2
BH CV ECHO MEAS - LVOT AREA: 2.8 CM^2
BH CV ECHO MEAS - LVOT DIAM: 1.9 CM
BH CV ECHO MEAS - LVPWD: 1.3 CM
BH CV ECHO MEAS - MED PEAK E' VEL: 4.57 CM/SEC
BH CV ECHO MEAS - MR MAX PG: 89.3 MMHG
BH CV ECHO MEAS - MR MAX VEL: 470.8 CM/SEC
BH CV ECHO MEAS - MR MEAN PG: 56.5 MMHG
BH CV ECHO MEAS - MR MEAN VEL: 349.5 CM/SEC
BH CV ECHO MEAS - MR VTI: 157.5 CM
BH CV ECHO MEAS - MV A MAX VEL: 109 CM/SEC
BH CV ECHO MEAS - MV DEC TIME: 0.35 SEC
BH CV ECHO MEAS - MV E MAX VEL: 126 CM/SEC
BH CV ECHO MEAS - MV E/A: 1.2
BH CV ECHO MEAS - RAP SYSTOLE: 10 MMHG
BH CV ECHO MEAS - RVSP: 46 MMHG
BH CV ECHO MEAS - SI(AO): 152.5 ML/M^2
BH CV ECHO MEAS - SI(CUBED): 39.1 ML/M^2
BH CV ECHO MEAS - SI(LVOT): 48 ML/M^2
BH CV ECHO MEAS - SI(MOD-SP4): 20.3 ML/M^2
BH CV ECHO MEAS - SI(TEICH): 35 ML/M^2
BH CV ECHO MEAS - SV(AO): 293.1 ML
BH CV ECHO MEAS - SV(CUBED): 75.2 ML
BH CV ECHO MEAS - SV(LVOT): 92.1 ML
BH CV ECHO MEAS - SV(MOD-SP4): 39 ML
BH CV ECHO MEAS - SV(TEICH): 67.2 ML
BH CV ECHO MEAS - TR MAX VEL: 300 CM/SEC
BH CV ECHO MEASUREMENTS AVERAGE E/E' RATIO: 26.33
BILIRUB SERPL-MCNC: 0.5 MG/DL (ref 0.1–1)
BUN BLD-MCNC: 38 MG/DL (ref 5–21)
BUN/CREAT SERPL: 24.2 (ref 7–25)
CALCIUM SPEC-SCNC: 8.6 MG/DL (ref 8.4–10.4)
CHLORIDE SERPL-SCNC: 102 MMOL/L (ref 98–110)
CO2 SERPL-SCNC: 32 MMOL/L (ref 24–31)
CREAT BLD-MCNC: 1.57 MG/DL (ref 0.5–1.4)
DEPRECATED RDW RBC AUTO: 49.8 FL (ref 37–54)
EOSINOPHIL # BLD AUTO: 0.2 10*3/MM3 (ref 0–0.4)
EOSINOPHIL NFR BLD AUTO: 3.3 % (ref 0.3–6.2)
ERYTHROCYTE [DISTWIDTH] IN BLOOD BY AUTOMATED COUNT: 16.3 % (ref 12.3–15.4)
FERRITIN SERPL-MCNC: 19.3 NG/ML (ref 11.1–264)
FOLATE SERPL-MCNC: 14.8 NG/ML (ref 4.78–24.2)
GFR SERPL CREATININE-BSD FRML MDRD: 34 ML/MIN/1.73
GLOBULIN UR ELPH-MCNC: 3 GM/DL
GLUCOSE BLD-MCNC: 94 MG/DL (ref 70–100)
HBA1C MFR BLD: 5.7 % (ref 4.8–5.9)
HCT VFR BLD AUTO: 31.7 % (ref 34–46.6)
HGB BLD-MCNC: 9.6 G/DL (ref 12–15.9)
IMM GRANULOCYTES # BLD AUTO: 0.02 10*3/MM3 (ref 0–0.05)
IMM GRANULOCYTES NFR BLD AUTO: 0.3 % (ref 0–0.5)
IRON 24H UR-MRATE: 29 MCG/DL (ref 42–180)
IRON SATN MFR SERPL: 8 % (ref 20–45)
LEFT ATRIUM VOLUME INDEX: 35 ML/M2
LEFT ATRIUM VOLUME: 73.8 CM3
LYMPHOCYTES # BLD AUTO: 1.14 10*3/MM3 (ref 0.7–3.1)
LYMPHOCYTES NFR BLD AUTO: 18.6 % (ref 19.6–45.3)
MAGNESIUM SERPL-MCNC: 2.1 MG/DL (ref 1.4–2.2)
MAXIMAL PREDICTED HEART RATE: 161 BPM
MCH RBC QN AUTO: 25.1 PG (ref 26.6–33)
MCHC RBC AUTO-ENTMCNC: 30.3 G/DL (ref 31.5–35.7)
MCV RBC AUTO: 83 FL (ref 79–97)
MONOCYTES # BLD AUTO: 0.41 10*3/MM3 (ref 0.1–0.9)
MONOCYTES NFR BLD AUTO: 6.7 % (ref 5–12)
NEUTROPHILS # BLD AUTO: 4.34 10*3/MM3 (ref 1.7–7)
NEUTROPHILS NFR BLD AUTO: 70.6 % (ref 42.7–76)
NRBC BLD AUTO-RTO: 0 /100 WBC (ref 0–0.2)
PLATELET # BLD AUTO: 238 10*3/MM3 (ref 140–450)
PMV BLD AUTO: 11.2 FL (ref 6–12)
POTASSIUM BLD-SCNC: 3.4 MMOL/L (ref 3.5–5.3)
PROT SERPL-MCNC: 6.5 G/DL (ref 6.3–8.7)
RBC # BLD AUTO: 3.82 10*6/MM3 (ref 3.77–5.28)
SODIUM BLD-SCNC: 142 MMOL/L (ref 135–145)
STRESS TARGET HR: 137 BPM
T4 FREE SERPL-MCNC: 1.02 NG/DL (ref 0.78–2.19)
TIBC SERPL-MCNC: 350 MCG/DL (ref 225–420)
TROPONIN I SERPL-MCNC: 0.03 NG/ML (ref 0–0.03)
TROPONIN I SERPL-MCNC: 0.04 NG/ML (ref 0–0.03)
TSH SERPL DL<=0.05 MIU/L-ACNC: 1.05 UIU/ML (ref 0.47–4.68)
VIT B12 BLD-MCNC: 427 PG/ML (ref 239–931)
WBC NRBC COR # BLD: 6.14 10*3/MM3 (ref 3.4–10.8)

## 2019-08-31 PROCEDURE — 85025 COMPLETE CBC W/AUTO DIFF WBC: CPT | Performed by: INTERNAL MEDICINE

## 2019-08-31 PROCEDURE — 84484 ASSAY OF TROPONIN QUANT: CPT | Performed by: INTERNAL MEDICINE

## 2019-08-31 PROCEDURE — 82728 ASSAY OF FERRITIN: CPT | Performed by: INTERNAL MEDICINE

## 2019-08-31 PROCEDURE — 94760 N-INVAS EAR/PLS OXIMETRY 1: CPT

## 2019-08-31 PROCEDURE — 83735 ASSAY OF MAGNESIUM: CPT | Performed by: INTERNAL MEDICINE

## 2019-08-31 PROCEDURE — 93306 TTE W/DOPPLER COMPLETE: CPT

## 2019-08-31 PROCEDURE — 25010000002 IRON SUCROSE PER 1 MG: Performed by: INTERNAL MEDICINE

## 2019-08-31 PROCEDURE — 94799 UNLISTED PULMONARY SVC/PX: CPT

## 2019-08-31 PROCEDURE — 84439 ASSAY OF FREE THYROXINE: CPT | Performed by: INTERNAL MEDICINE

## 2019-08-31 PROCEDURE — 82607 VITAMIN B-12: CPT | Performed by: INTERNAL MEDICINE

## 2019-08-31 PROCEDURE — 80053 COMPREHEN METABOLIC PANEL: CPT | Performed by: INTERNAL MEDICINE

## 2019-08-31 PROCEDURE — 99223 1ST HOSP IP/OBS HIGH 75: CPT | Performed by: INTERNAL MEDICINE

## 2019-08-31 PROCEDURE — 83550 IRON BINDING TEST: CPT | Performed by: INTERNAL MEDICINE

## 2019-08-31 PROCEDURE — 93306 TTE W/DOPPLER COMPLETE: CPT | Performed by: INTERNAL MEDICINE

## 2019-08-31 PROCEDURE — 83540 ASSAY OF IRON: CPT | Performed by: INTERNAL MEDICINE

## 2019-08-31 PROCEDURE — 84443 ASSAY THYROID STIM HORMONE: CPT | Performed by: INTERNAL MEDICINE

## 2019-08-31 PROCEDURE — 82746 ASSAY OF FOLIC ACID SERUM: CPT | Performed by: INTERNAL MEDICINE

## 2019-08-31 PROCEDURE — 25010000002 FUROSEMIDE PER 20 MG: Performed by: INTERNAL MEDICINE

## 2019-08-31 PROCEDURE — 83036 HEMOGLOBIN GLYCOSYLATED A1C: CPT | Performed by: INTERNAL MEDICINE

## 2019-08-31 RX ORDER — CARVEDILOL 6.25 MG/1
6.25 TABLET ORAL 2 TIMES DAILY WITH MEALS
Status: DISCONTINUED | OUTPATIENT
Start: 2019-08-31 | End: 2019-09-01

## 2019-08-31 RX ORDER — BUTALBITAL, ACETAMINOPHEN AND CAFFEINE 50; 325; 40 MG/1; MG/1; MG/1
1 TABLET ORAL EVERY 6 HOURS PRN
Status: DISCONTINUED | OUTPATIENT
Start: 2019-08-31 | End: 2019-09-09 | Stop reason: HOSPADM

## 2019-08-31 RX ORDER — NICOTINE 21 MG/24HR
1 PATCH, TRANSDERMAL 24 HOURS TRANSDERMAL NIGHTLY
Status: DISCONTINUED | OUTPATIENT
Start: 2019-08-31 | End: 2019-09-09 | Stop reason: HOSPADM

## 2019-08-31 RX ORDER — ASPIRIN 81 MG/1
81 TABLET ORAL DAILY
Status: DISCONTINUED | OUTPATIENT
Start: 2019-08-31 | End: 2019-09-09 | Stop reason: HOSPADM

## 2019-08-31 RX ORDER — CLONIDINE HYDROCHLORIDE 0.1 MG/1
0.1 TABLET ORAL EVERY 6 HOURS PRN
Status: DISCONTINUED | OUTPATIENT
Start: 2019-08-31 | End: 2019-09-09 | Stop reason: HOSPADM

## 2019-08-31 RX ORDER — FUROSEMIDE 10 MG/ML
20 INJECTION INTRAMUSCULAR; INTRAVENOUS ONCE
Status: COMPLETED | OUTPATIENT
Start: 2019-08-31 | End: 2019-08-31

## 2019-08-31 RX ORDER — NICOTINE 21 MG/24HR
1 PATCH, TRANSDERMAL 24 HOURS TRANSDERMAL
Status: DISCONTINUED | OUTPATIENT
Start: 2019-09-01 | End: 2019-08-31

## 2019-08-31 RX ORDER — LISINOPRIL 10 MG/1
10 TABLET ORAL
Status: DISCONTINUED | OUTPATIENT
Start: 2019-08-31 | End: 2019-09-02

## 2019-08-31 RX ADMIN — SODIUM CHLORIDE 5 MG/HR: 9 INJECTION, SOLUTION INTRAVENOUS at 03:36

## 2019-08-31 RX ADMIN — CARVEDILOL 6.25 MG: 6.25 TABLET, FILM COATED ORAL at 17:21

## 2019-08-31 RX ADMIN — CARVEDILOL 6.25 MG: 6.25 TABLET, FILM COATED ORAL at 11:28

## 2019-08-31 RX ADMIN — SODIUM CHLORIDE 7.5 MG/HR: 9 INJECTION, SOLUTION INTRAVENOUS at 08:39

## 2019-08-31 RX ADMIN — CLONIDINE HYDROCHLORIDE 0.1 MG: 0.1 TABLET ORAL at 19:42

## 2019-08-31 RX ADMIN — SODIUM CHLORIDE, PRESERVATIVE FREE 10 ML: 5 INJECTION INTRAVENOUS at 08:23

## 2019-08-31 RX ADMIN — LISINOPRIL 10 MG: 10 TABLET ORAL at 18:26

## 2019-08-31 RX ADMIN — BUTALBITAL, ACETAMINOPHEN, AND CAFFEINE 1 TABLET: 50; 325; 40 TABLET ORAL at 11:17

## 2019-08-31 RX ADMIN — SODIUM CHLORIDE 5 MG/HR: 9 INJECTION, SOLUTION INTRAVENOUS at 17:20

## 2019-08-31 RX ADMIN — BUTALBITAL, ACETAMINOPHEN, AND CAFFEINE 1 TABLET: 50; 325; 40 TABLET ORAL at 19:44

## 2019-08-31 RX ADMIN — FUROSEMIDE 20 MG: 10 INJECTION, SOLUTION INTRAMUSCULAR; INTRAVENOUS at 12:02

## 2019-08-31 RX ADMIN — NICOTINE 1 PATCH: 14 PATCH TRANSDERMAL at 22:33

## 2019-08-31 RX ADMIN — IRON SUCROSE 200 MG: 20 INJECTION, SOLUTION INTRAVENOUS at 12:02

## 2019-08-31 RX ADMIN — ASPIRIN 81 MG: 81 TABLET ORAL at 11:28

## 2019-09-01 LAB
ANION GAP SERPL CALCULATED.3IONS-SCNC: 7 MMOL/L (ref 4–13)
ARTICHOKE IGE QN: 122 MG/DL (ref 0–99)
BASOPHILS # BLD AUTO: 0.07 10*3/MM3 (ref 0–0.2)
BASOPHILS NFR BLD AUTO: 1 % (ref 0–1.5)
BUN BLD-MCNC: 32 MG/DL (ref 5–21)
BUN/CREAT SERPL: 23.7 (ref 7–25)
CALCIUM SPEC-SCNC: 9.1 MG/DL (ref 8.4–10.4)
CHLORIDE SERPL-SCNC: 102 MMOL/L (ref 98–110)
CHOLEST SERPL-MCNC: 178 MG/DL (ref 130–200)
CO2 SERPL-SCNC: 32 MMOL/L (ref 24–31)
CREAT BLD-MCNC: 1.35 MG/DL (ref 0.5–1.4)
DEPRECATED RDW RBC AUTO: 49.1 FL (ref 37–54)
EOSINOPHIL # BLD AUTO: 0.22 10*3/MM3 (ref 0–0.4)
EOSINOPHIL NFR BLD AUTO: 3.1 % (ref 0.3–6.2)
ERYTHROCYTE [DISTWIDTH] IN BLOOD BY AUTOMATED COUNT: 16.4 % (ref 12.3–15.4)
GFR SERPL CREATININE-BSD FRML MDRD: 40 ML/MIN/1.73
GLUCOSE BLD-MCNC: 92 MG/DL (ref 70–100)
HCT VFR BLD AUTO: 33.7 % (ref 34–46.6)
HDLC SERPL-MCNC: 51 MG/DL
HGB BLD-MCNC: 10.4 G/DL (ref 12–15.9)
IMM GRANULOCYTES # BLD AUTO: 0.03 10*3/MM3 (ref 0–0.05)
IMM GRANULOCYTES NFR BLD AUTO: 0.4 % (ref 0–0.5)
LDLC/HDLC SERPL: 2.22 {RATIO}
LYMPHOCYTES # BLD AUTO: 1.39 10*3/MM3 (ref 0.7–3.1)
LYMPHOCYTES NFR BLD AUTO: 19.3 % (ref 19.6–45.3)
MCH RBC QN AUTO: 25.6 PG (ref 26.6–33)
MCHC RBC AUTO-ENTMCNC: 30.9 G/DL (ref 31.5–35.7)
MCV RBC AUTO: 83 FL (ref 79–97)
MONOCYTES # BLD AUTO: 0.55 10*3/MM3 (ref 0.1–0.9)
MONOCYTES NFR BLD AUTO: 7.6 % (ref 5–12)
NEUTROPHILS # BLD AUTO: 4.95 10*3/MM3 (ref 1.7–7)
NEUTROPHILS NFR BLD AUTO: 68.6 % (ref 42.7–76)
NRBC BLD AUTO-RTO: 0 /100 WBC (ref 0–0.2)
PLATELET # BLD AUTO: 289 10*3/MM3 (ref 140–450)
PMV BLD AUTO: 11.2 FL (ref 6–12)
POTASSIUM BLD-SCNC: 4.1 MMOL/L (ref 3.5–5.3)
RBC # BLD AUTO: 4.06 10*6/MM3 (ref 3.77–5.28)
SODIUM BLD-SCNC: 141 MMOL/L (ref 135–145)
TRIGL SERPL-MCNC: 70 MG/DL (ref 0–149)
WBC NRBC COR # BLD: 7.21 10*3/MM3 (ref 3.4–10.8)

## 2019-09-01 PROCEDURE — 94799 UNLISTED PULMONARY SVC/PX: CPT

## 2019-09-01 PROCEDURE — 85025 COMPLETE CBC W/AUTO DIFF WBC: CPT | Performed by: INTERNAL MEDICINE

## 2019-09-01 PROCEDURE — 94760 N-INVAS EAR/PLS OXIMETRY 1: CPT

## 2019-09-01 PROCEDURE — 80061 LIPID PANEL: CPT | Performed by: INTERNAL MEDICINE

## 2019-09-01 PROCEDURE — 25010000002 IRON SUCROSE PER 1 MG: Performed by: INTERNAL MEDICINE

## 2019-09-01 PROCEDURE — 80048 BASIC METABOLIC PNL TOTAL CA: CPT | Performed by: INTERNAL MEDICINE

## 2019-09-01 PROCEDURE — 99232 SBSQ HOSP IP/OBS MODERATE 35: CPT | Performed by: NURSE PRACTITIONER

## 2019-09-01 RX ORDER — LABETALOL HYDROCHLORIDE 5 MG/ML
20 INJECTION, SOLUTION INTRAVENOUS
Status: DISCONTINUED | OUTPATIENT
Start: 2019-09-01 | End: 2019-09-09 | Stop reason: HOSPADM

## 2019-09-01 RX ORDER — CARVEDILOL 6.25 MG/1
12.5 TABLET ORAL 2 TIMES DAILY WITH MEALS
Status: DISCONTINUED | OUTPATIENT
Start: 2019-09-01 | End: 2019-09-09 | Stop reason: HOSPADM

## 2019-09-01 RX ADMIN — ASPIRIN 81 MG: 81 TABLET ORAL at 09:27

## 2019-09-01 RX ADMIN — LISINOPRIL 10 MG: 10 TABLET ORAL at 09:26

## 2019-09-01 RX ADMIN — SODIUM CHLORIDE, PRESERVATIVE FREE 10 ML: 5 INJECTION INTRAVENOUS at 21:14

## 2019-09-01 RX ADMIN — SODIUM CHLORIDE, PRESERVATIVE FREE 10 ML: 5 INJECTION INTRAVENOUS at 09:27

## 2019-09-01 RX ADMIN — CARVEDILOL 12.5 MG: 6.25 TABLET, FILM COATED ORAL at 16:44

## 2019-09-01 RX ADMIN — CLONIDINE HYDROCHLORIDE 0.1 MG: 0.1 TABLET ORAL at 00:16

## 2019-09-01 RX ADMIN — CLONIDINE HYDROCHLORIDE 0.1 MG: 0.1 TABLET ORAL at 23:28

## 2019-09-01 RX ADMIN — CLONIDINE HYDROCHLORIDE 0.1 MG: 0.1 TABLET ORAL at 07:59

## 2019-09-01 RX ADMIN — NICOTINE 1 PATCH: 14 PATCH TRANSDERMAL at 21:11

## 2019-09-01 RX ADMIN — IRON SUCROSE 200 MG: 20 INJECTION, SOLUTION INTRAVENOUS at 11:46

## 2019-09-01 RX ADMIN — BUTALBITAL, ACETAMINOPHEN, AND CAFFEINE 1 TABLET: 50; 325; 40 TABLET ORAL at 16:45

## 2019-09-01 RX ADMIN — BUTALBITAL, ACETAMINOPHEN, AND CAFFEINE 1 TABLET: 50; 325; 40 TABLET ORAL at 23:28

## 2019-09-01 RX ADMIN — CLONIDINE HYDROCHLORIDE 0.1 MG: 0.1 TABLET ORAL at 16:45

## 2019-09-01 RX ADMIN — CARVEDILOL 12.5 MG: 6.25 TABLET, FILM COATED ORAL at 09:27

## 2019-09-01 RX ADMIN — LABETALOL HYDROCHLORIDE 20 MG: 5 INJECTION, SOLUTION INTRAVENOUS at 04:06

## 2019-09-01 RX ADMIN — BUTALBITAL, ACETAMINOPHEN, AND CAFFEINE 1 TABLET: 50; 325; 40 TABLET ORAL at 07:59

## 2019-09-01 NOTE — PLAN OF CARE
Problem: Patient Care Overview  Goal: Plan of Care Review  Outcome: Ongoing (interventions implemented as appropriate)   09/01/19 1620   Coping/Psychosocial   Plan of Care Reviewed With patient   Plan of Care Review   Progress improving   OTHER   Outcome Summary VSS MOST OF SHIFT. BP ELEVATED EARLIER THIS SHIFT @ 180 SYSTOLIC, PRN CATAPRES GIVEN. IV VENOFER GIVEN. COREG DOSE INCREASED, PATIENT HAS HAD BETTER BP CONTROL SINCE THEN.  POSSIBLE HOME TOMORROW. SAFETY MAINTAINED, NO FALLS. WILL CONT TO MONITOR.

## 2019-09-01 NOTE — PROGRESS NOTES
"[facility] -  Progress Note     LOS: 2 days   Patient Care Team:  Orville Weston MD as PCP - General (Family Medicine)  Juanpablo Rea MD as Consulting Physician (Gastroenterology)    Chief Complaint: F/U Elevated Troponin, HTN    Subjective   Resting. \"My BP still running high.\" No complaints of chest pain.       REVIEW OF SYSTEMS:  Constitutional: Denies fever or chills. Denies change in energy level or malaise.  HEENT: Headache when BP up. Denies difficulty swallowing or sore throat.  Respiratory: Denies cough or hoarseness. Denies shortness of breath.   Cardiovascular: Denies chest pain or pressure. Denies palpitations. Denies presyncope/syncope. Denies orthopnea/PND. Denies lower extremity edema.   Gastrointestinal: Denies abdominal pain. Denies nausea/vomiting. Denies change in bowel habits or history of recent GI tract blood loss.   Genitourinary: Denies urinary urgency or frequency. Denies dysuria, hematuria.  Musculoskeletal: Denies pain or swelling in joints.  Neurological: Denies paresthesias. Denies headache. Denies seizure or stroke symptoms.  Behavioral/Psych: Denies problems with anxiety or depression.    All other systems are negative except where stated above.      Objective     Vital Sign Min/Max for last 24 hours  Temp  Min: 97.7 °F (36.5 °C)  Max: 98.1 °F (36.7 °C)   BP  Min: 128/74  Max: 184/98   Pulse  Min: 58  Max: 74   Resp  Min: 16  Max: 18   SpO2  Min: 90 %  Max: 97 %   No Data Recorded   Weight  Min: 81.8 kg (180 lb 6.4 oz)  Max: 81.8 kg (180 lb 6.4 oz)         08/31/19 2120   Weight: 81.8 kg (180 lb 6.4 oz)         Intake/Output Summary (Last 24 hours) at 9/1/2019 1033  Last data filed at 9/1/2019 0926  Gross per 24 hour   Intake 978.55 ml   Output 1925 ml   Net -946.45 ml         Physical Exam:  General Appearance:    Alert, cooperative, in no acute distress   HEENT:    Normocephalic. Atraumatic. MARIPOSA.   Neck:   Supple, trachea midline, no JVD   Lungs:   "   Clear, diminished throughout to auscultation, no wheezes or rhonchi, respirations regular, even and unlabored    Heart:    Regular rhythm and normal rate, normal S1 and S2, no murmur, no gallop, no rub, no click   Abdomen:     Normal bowel sounds, no masses, no organomegaly, soft non-tender, non-distended, no guarding, no rebound tenderness   Extremities:   Moves all extremities well, no edema, no cyanosis, no redness   Pulses:   Pulses palpable and equal bilaterally   Skin:   No bleeding, bruising or rash   Neurologic:   Cranial nerves 2 - 12 grossly intact            Results Review:   Lab Results (last 72 hours)     Procedure Component Value Units Date/Time    Lipid Panel [[id number]]  (Abnormal) Collected:  09/01/19 0356    Specimen:  Blood Updated:  09/01/19 0513     Total Cholesterol 178 mg/dL      Triglycerides 70 mg/dL      HDL Cholesterol 51 mg/dL      LDL Cholesterol  122 mg/dL      LDL/HDL Ratio 2.22    Basic Metabolic Panel [[id number]]  (Abnormal) Collected:  09/01/19 0356    Specimen:  Blood Updated:  09/01/19 0502     Glucose 92 mg/dL      BUN 32 mg/dL      Creatinine 1.35 mg/dL      Sodium 141 mmol/L      Potassium 4.1 mmol/L      Chloride 102 mmol/L      CO2 32.0 mmol/L      Calcium 9.1 mg/dL      eGFR Non African Amer 40 mL/min/1.73      BUN/Creatinine Ratio 23.7     Anion Gap 7.0 mmol/L     Narrative:       GFR Normal >60  Chronic Kidney Disease <60  Kidney Failure <15    CBC & Differential [[id number]] Collected:  09/01/19 0356    Specimen:  Blood Updated:  09/01/19 0441    Narrative:       The following orders were created for panel order CBC & Differential.  Procedure                               Abnormality         Status                     ---------                               -----------         ------                     CBC Auto Differential[[id number]]        Abnormal            Final result                 Please view results for these tests on the individual orders.    CBC Auto  Differential [[id number]]  (Abnormal) Collected:  09/01/19 0356    Specimen:  Blood Updated:  09/01/19 0441     WBC 7.21 10*3/mm3      RBC 4.06 10*6/mm3      Hemoglobin 10.4 g/dL      Hematocrit 33.7 %      MCV 83.0 fL      MCH 25.6 pg      MCHC 30.9 g/dL      RDW 16.4 %      RDW-SD 49.1 fl      MPV 11.2 fL      Platelets 289 10*3/mm3      Neutrophil % 68.6 %      Lymphocyte % 19.3 %      Monocyte % 7.6 %      Eosinophil % 3.1 %      Basophil % 1.0 %      Immature Grans % 0.4 %      Neutrophils, Absolute 4.95 10*3/mm3      Lymphocytes, Absolute 1.39 10*3/mm3      Monocytes, Absolute 0.55 10*3/mm3      Eosinophils, Absolute 0.22 10*3/mm3      Basophils, Absolute 0.07 10*3/mm3      Immature Grans, Absolute 0.03 10*3/mm3      nRBC 0.0 /100 WBC     Troponin [[id number]]  (Normal) Collected:  08/31/19 1106    Specimen:  Blood Updated:  08/31/19 1137     Troponin I 0.026 ng/mL     Hemoglobin A1c [[id number]]  (Normal) Collected:  08/31/19 0258    Specimen:  Blood Updated:  08/31/19 0711     Hemoglobin A1C 5.7 %     Narrative:       Less than 6.0           Non-Diabetic Range  6.0-7.0                 ADA Therapeutic Target  Greater than 7.0        Action Suggested    Vitamin B12 [[id number]]  (Normal) Collected:  08/31/19 0258    Specimen:  Blood Updated:  08/31/19 0437     Vitamin B-12 427 pg/mL     Folate [[id number]]  (Normal) Collected:  08/31/19 0258    Specimen:  Blood Updated:  08/31/19 0437     Folate 14.80 ng/mL     Ferritin [[id number]]  (Normal) Collected:  08/31/19 0258    Specimen:  Blood Updated:  08/31/19 0406     Ferritin 19.30 ng/mL     TSH [[id number]]  (Normal) Collected:  08/31/19 0258    Specimen:  Blood Updated:  08/31/19 0402     TSH 1.050 uIU/mL     T4, Free [[id number]]  (Normal) Collected:  08/31/19 0258    Specimen:  Blood Updated:  08/31/19 0349     Free T4 1.02 ng/dL     Troponin [[id number]]  (Abnormal) Collected:  08/31/19 0258    Specimen:  Blood Updated:  08/31/19 0343     Troponin I 0.037 ng/mL      Iron Profile [[id number]]  (Abnormal) Collected:  08/31/19 0258    Specimen:  Blood Updated:  08/31/19 0341     Iron 29 mcg/dL      TIBC 350 mcg/dL      Iron Saturation 8 %     Comprehensive Metabolic Panel [[id number]]  (Abnormal) Collected:  08/31/19 0258    Specimen:  Blood Updated:  08/31/19 0332     Glucose 94 mg/dL      BUN 38 mg/dL      Creatinine 1.57 mg/dL      Sodium 142 mmol/L      Potassium 3.4 mmol/L      Chloride 102 mmol/L      CO2 32.0 mmol/L      Calcium 8.6 mg/dL      Total Protein 6.5 g/dL      Albumin 3.50 g/dL      ALT (SGPT) 74 U/L      AST (SGOT) 55 U/L      Alkaline Phosphatase 111 U/L      Total Bilirubin 0.5 mg/dL      eGFR Non African Amer 34 mL/min/1.73      Globulin 3.0 gm/dL      A/G Ratio 1.2 g/dL      BUN/Creatinine Ratio 24.2     Anion Gap 8.0 mmol/L     Narrative:       GFR Normal >60  Chronic Kidney Disease <60  Kidney Failure <15    Magnesium [[id number]]  (Normal) Collected:  08/31/19 0258    Specimen:  Blood Updated:  08/31/19 0332     Magnesium 2.1 mg/dL     CBC Auto Differential [[id number]]  (Abnormal) Collected:  08/31/19 0258    Specimen:  Blood Updated:  08/31/19 0318     WBC 6.14 10*3/mm3      RBC 3.82 10*6/mm3      Hemoglobin 9.6 g/dL      Hematocrit 31.7 %      MCV 83.0 fL      MCH 25.1 pg      MCHC 30.3 g/dL      RDW 16.3 %      RDW-SD 49.8 fl      MPV 11.2 fL      Platelets 238 10*3/mm3      Neutrophil % 70.6 %      Lymphocyte % 18.6 %      Monocyte % 6.7 %      Eosinophil % 3.3 %      Basophil % 0.5 %      Immature Grans % 0.3 %      Neutrophils, Absolute 4.34 10*3/mm3      Lymphocytes, Absolute 1.14 10*3/mm3      Monocytes, Absolute 0.41 10*3/mm3      Eosinophils, Absolute 0.20 10*3/mm3      Basophils, Absolute 0.03 10*3/mm3      Immature Grans, Absolute 0.02 10*3/mm3      nRBC 0.0 /100 WBC     TSH [[id number]]  (Normal) Collected:  08/30/19 1819    Specimen:  Blood Updated:  08/30/19 1912     TSH 1.160 uIU/mL     Urinalysis, Microscopic Only - Urine, Clean Catch  [[id number]]  (Abnormal) Collected:  08/30/19 1859    Specimen:  Urine, Clean Catch Updated:  08/30/19 1911     RBC, UA 3-5 /HPF      WBC, UA 0-2 /HPF      Bacteria, UA None Seen /HPF      Squamous Epithelial Cells, UA 0-2 /HPF      Hyaline Casts, UA None Seen /LPF      Methodology Automated Microscopy    Urinalysis With Culture If Indicated - Urine, Clean Catch [[id number]]  (Abnormal) Collected:  08/30/19 1859    Specimen:  Urine, Clean Catch Updated:  08/30/19 1911     Color, UA Yellow     Appearance, UA Clear     pH, UA 6.5     Specific Gravity, UA 1.014     Glucose, UA Negative     Ketones, UA Negative     Bilirubin, UA Negative     Blood, UA Negative     Protein, UA 30 mg/dL (1+)     Leuk Esterase, UA Negative     Nitrite, UA Negative     Urobilinogen, UA 0.2 E.U./dL    BNP [[id number]]  (Abnormal) Collected:  08/30/19 1819    Specimen:  Blood Updated:  08/30/19 1853     proBNP 11,300.0 pg/mL     Troponin [[id number]]  (Normal) Collected:  08/30/19 1819    Specimen:  Blood Updated:  08/30/19 1853     Troponin I 0.022 ng/mL     Comprehensive Metabolic Panel [[id number]]  (Abnormal) Collected:  08/30/19 1819    Specimen:  Blood Updated:  08/30/19 1843     Glucose 91 mg/dL      BUN 41 mg/dL      Creatinine 1.58 mg/dL      Sodium 137 mmol/L      Potassium 4.1 mmol/L      Chloride 101 mmol/L      CO2 32.0 mmol/L      Calcium 8.6 mg/dL      Total Protein 7.0 g/dL      Albumin 3.70 g/dL      ALT (SGPT) 87 U/L      AST (SGOT) 81 U/L      Alkaline Phosphatase 125 U/L      Total Bilirubin 0.6 mg/dL      eGFR Non African Amer 33 mL/min/1.73      Globulin 3.3 gm/dL      A/G Ratio 1.1 g/dL      BUN/Creatinine Ratio 25.9     Anion Gap 4.0 mmol/L     Narrative:       GFR Normal >60  Chronic Kidney Disease <60  Kidney Failure <15    Magnesium [[id number]]  (Abnormal) Collected:  08/30/19 1819    Specimen:  Blood Updated:  08/30/19 1843     Magnesium 2.4 mg/dL     CBC & Differential [[id number]] Collected:  08/30/19 6731    Specimen:   Blood Updated:  08/30/19 1834    Narrative:       The following orders were created for panel order CBC & Differential.  Procedure                               Abnormality         Status                     ---------                               -----------         ------                     CBC Auto Differential[[id number]]        Abnormal            Final result                 Please view results for these tests on the individual orders.    CBC Auto Differential [[id number]]  (Abnormal) Collected:  08/30/19 1819    Specimen:  Blood Updated:  08/30/19 1834     WBC 5.97 10*3/mm3      RBC 3.88 10*6/mm3      Hemoglobin 9.9 g/dL      Hematocrit 32.6 %      MCV 84.0 fL      MCH 25.5 pg      MCHC 30.4 g/dL      RDW 16.3 %      RDW-SD 49.7 fl      MPV 10.6 fL      Platelets 249 10*3/mm3      Neutrophil % 70.8 %      Lymphocyte % 18.9 %      Monocyte % 6.9 %      Eosinophil % 2.2 %      Basophil % 0.7 %      Immature Grans % 0.5 %      Neutrophils, Absolute 4.23 10*3/mm3      Lymphocytes, Absolute 1.13 10*3/mm3      Monocytes, Absolute 0.41 10*3/mm3      Eosinophils, Absolute 0.13 10*3/mm3      Basophils, Absolute 0.04 10*3/mm3      Immature Grans, Absolute 0.03 10*3/mm3      nRBC 0.0 /100 WBC               2D Echocardiogram:  · Left ventricular systolic function is normal.  · Left ventricular wall thickness is consistent with mild concentric hypertrophy.  · Left ventricular diastolic dysfunction (grade II) consistent with pseudonormalization.  · Mild to moderate aortic valve regurgitation is present.  · Left atrial cavity size is moderately dilated.  · Normal size and function of the right ventricle.  · Estimated right ventricular systolic pressure from tricuspid regurgitation is moderately elevated (45-55 mmHg).          Medication Review: yes  Current Facility-Administered Medications   Medication Dose Route Frequency Provider Last Rate Last Dose   • acetaminophen (TYLENOL) tablet 650 mg  650 mg Oral Q4H PRN Magnus  Hakan Fitzgerald MD        Or   • acetaminophen (TYLENOL) 160 MG/5ML solution 650 mg  650 mg Oral Q4H PRN Hakan Agudelo MD        Or   • acetaminophen (TYLENOL) suppository 650 mg  650 mg Rectal Q4H PRN Hakan Agudelo MD       • aspirin EC tablet 81 mg  81 mg Oral Daily Hakan Agudelo MD   81 mg at 09/01/19 0927   • butalbital-acetaminophen-caffeine (FIORICET, ESGIC) -40 MG per tablet 1 tablet  1 tablet Oral Q6H PRN Hakan Agudelo MD   1 tablet at 09/01/19 0759   • carvedilol (COREG) tablet 12.5 mg  12.5 mg Oral BID With Meals Hakan Agudelo MD   12.5 mg at 09/01/19 0927   • CloNIDine (CATAPRES) tablet 0.1 mg  0.1 mg Oral Q6H PRN Hakan Agudelo MD   0.1 mg at 09/01/19 0759   • iron sucrose (VENOFER) 200 mg in sodium chloride 0.9 % 100 mL IVPB  200 mg Intravenous Q24H Hakan Agudelo MD   200 mg at 08/31/19 1202   • labetalol (NORMODYNE,TRANDATE) injection 20 mg  20 mg Intravenous Q2H PRN Hakan Agudelo MD   20 mg at 09/01/19 0406   • lisinopril (PRINIVIL,ZESTRIL) tablet 10 mg  10 mg Oral Q24H Hakan Agudelo MD   10 mg at 09/01/19 0926   • nicotine (NICODERM CQ) 14 MG/24HR patch 1 patch  1 patch Transdermal Nightly Hakan Agudelo MD   1 patch at 08/31/19 2233   • ondansetron (ZOFRAN) injection 4 mg  4 mg Intravenous Q6H PRN Hakan Agudelo MD       • sodium chloride 0.9 % flush 10 mL  10 mL Intravenous Q12H Hakan Agudelo MD   10 mL at 09/01/19 0927   • sodium chloride 0.9 % flush 10 mL  10 mL Intravenous PRN Hakan Agudelo MD             Assessment/Plan     Hypertensive urgency with Underlying Essential HTN - 2' to Non-compliance with Medications. May need renal US to R/O stenosis.     Acute congestive heart failure (CMS/HCC), Likely Diastolic 2' to Uncontrolled HTN - Stable. On BB, ACE-I, Diuretic    Elevated troponin, Likely Leak 2' to Hypertensive Urgency - Has Family CAD. Will need Outpatient Stress Testing    Elevated  serum creatinine, Likely MATYT 2' to Hypertensive Urgency - Normal CR     Iron deficiency anemia - Receiving Venofer    Cigarette smoker - Encouraged to Stop. Nicoderm Patch    Continue to monitor BP. Recommend outpatient stress test when BP controlled.       Soniya Chacon, APRN  09/01/19  10:33 AM

## 2019-09-01 NOTE — PROGRESS NOTES
Discharge Planning Assessment  [facility]     Patient Name: Ludivina Ramirez  MRN: [id number]  Today's Date: 9/1/2019    Admit Date: 8/30/2019    Discharge Needs Assessment     Row Name 09/01/19 1221       Living Environment    Lives With  other (see comments)    Name(s) of Who Lives With Patient  Mya, nidominique     Current Living Arrangements  home/apartment/condo    Primary Care Provided by  self    Provides Primary Care For  no one    Family Caregiver if Needed  other (see comments)    Family Caregiver Names  Niece    Quality of Family Relationships  helpful;involved;supportive    Able to Return to Prior Arrangements  yes       Resource/Environmental Concerns    Transportation Concerns  car, none       Transition Planning    Patient/Family Anticipates Transition to  home    Patient/Family Anticipated Services at Transition  none    Transportation Anticipated  family or friend will provide       Discharge Needs Assessment    Readmission Within the Last 30 Days  no previous admission in last 30 days    Concerns to be Addressed  no discharge needs identified;denies needs/concerns at this time    Equipment Currently Used at Home  none    Anticipated Changes Related to Illness  none    Equipment Needed After Discharge  none    Discharge Coordination/Progress  Pt has RX coverage and a PCP. Pt lives with her niece and plans on discharging home there as well when medically stable. Pt and Mya have no concerns or needs at this time. SW will follow and assist with any discharge needs that may arise.         Discharge Plan    No documentation.       Destination      No service coordination in this encounter.      Durable Medical Equipment      No service coordination in this encounter.      Dialysis/Infusion      No service coordination in this encounter.      Home Medical Care      No service coordination in this encounter.      Therapy      No service coordination in this encounter.      Community Resources      No service  coordination in this encounter.          Demographic Summary    No documentation.       Functional Status    No documentation.       Psychosocial    No documentation.       Abuse/Neglect    No documentation.       Legal    No documentation.       Substance Abuse    No documentation.       Patient Forms    No documentation.           Arabella León

## 2019-09-01 NOTE — NURSING NOTE
Called [facility] to see if they were ready for patient. Notified they were ready for me to bring her up. Report given to Arianna PRESTON on floor.

## 2019-09-01 NOTE — PROGRESS NOTES
Chief Complaint: Shortness of breath      Interval History:     She states her breathing is better.  Told nursing she is still short of breath with activity.  Not having any chest pain.  Blood pressure still very elevated earlier this morning.    Review of Systems:   General ROS: negative for - chills or fever  Respiratory ROS: no cough, shortness of breath, or wheezing  Cardiovascular ROS: no chest pain or dyspnea on exertion  Gastrointestinal ROS: negative for - abdominal pain, diarrhea or nausea/vomiting       Vital Signs  Temp:  [97.7 °F (36.5 °C)-98.1 °F (36.7 °C)] 98.1 °F (36.7 °C)  Heart Rate:  [58-74] 69  Resp:  [16-18] 16  BP: (123-184)/() 147/57    Intake/Output Summary (Last 24 hours) at 9/1/2019 1006  Last data filed at 9/1/2019 0926  Gross per 24 hour   Intake 978.55 ml   Output 1925 ml   Net -946.45 ml       Physical Exam:     General Appearance:    Alert, cooperative, in no acute distress   Head:    N/A   Throat:   N/A   Neck:   N/A   Lungs:     Clear to auscultation,respirations regular, even and                  unlabored    Heart:    Regular rhythm and normal rate, normal S1 and S2, no            murmur, no gallop, no rub   Abdomen:     Normal bowel sounds, no masses, no organomegaly, soft        non-tender, non-distended, no guarding, no rebound                tenderness   Extremities:   No edema, no cyanosis, no clubbing   Pulses:   N/A   Skin:   N/A   Lymph nodes:   N/A   Neurologic:   N/A          Lab Review:       Lab Results (last 24 hours)     Procedure Component Value Units Date/Time    Lipid Panel [[id number]]  (Abnormal) Collected:  09/01/19 0356    Specimen:  Blood Updated:  09/01/19 0513     Total Cholesterol 178 mg/dL      Triglycerides 70 mg/dL      HDL Cholesterol 51 mg/dL      LDL Cholesterol  122 mg/dL      LDL/HDL Ratio 2.22    Basic Metabolic Panel [[id number]]  (Abnormal) Collected:  09/01/19 0356    Specimen:  Blood Updated:  09/01/19 0502     Glucose 92 mg/dL      BUN  32 mg/dL      Creatinine 1.35 mg/dL      Sodium 141 mmol/L      Potassium 4.1 mmol/L      Chloride 102 mmol/L      CO2 32.0 mmol/L      Calcium 9.1 mg/dL      eGFR Non African Amer 40 mL/min/1.73      BUN/Creatinine Ratio 23.7     Anion Gap 7.0 mmol/L     Narrative:       GFR Normal >60  Chronic Kidney Disease <60  Kidney Failure <15    CBC & Differential [[id number]] Collected:  09/01/19 0356    Specimen:  Blood Updated:  09/01/19 0441    Narrative:       The following orders were created for panel order CBC & Differential.  Procedure                               Abnormality         Status                     ---------                               -----------         ------                     CBC Auto Differential[[id number]]        Abnormal            Final result                 Please view results for these tests on the individual orders.    CBC Auto Differential [[id number]]  (Abnormal) Collected:  09/01/19 0356    Specimen:  Blood Updated:  09/01/19 0441     WBC 7.21 10*3/mm3      RBC 4.06 10*6/mm3      Hemoglobin 10.4 g/dL      Hematocrit 33.7 %      MCV 83.0 fL      MCH 25.6 pg      MCHC 30.9 g/dL      RDW 16.4 %      RDW-SD 49.1 fl      MPV 11.2 fL      Platelets 289 10*3/mm3      Neutrophil % 68.6 %      Lymphocyte % 19.3 %      Monocyte % 7.6 %      Eosinophil % 3.1 %      Basophil % 1.0 %      Immature Grans % 0.4 %      Neutrophils, Absolute 4.95 10*3/mm3      Lymphocytes, Absolute 1.39 10*3/mm3      Monocytes, Absolute 0.55 10*3/mm3      Eosinophils, Absolute 0.22 10*3/mm3      Basophils, Absolute 0.07 10*3/mm3      Immature Grans, Absolute 0.03 10*3/mm3      nRBC 0.0 /100 WBC     Troponin [[id number]]  (Normal) Collected:  08/31/19 1106    Specimen:  Blood Updated:  08/31/19 1137     Troponin I 0.026 ng/mL         Cultures:         Radiology Review:  Imaging Results (last 24 hours)     ** No results found for the last 24 hours. **                   ASSESSMENT:      Hypertensive urgency    Acute  congestive heart failure (CMS/HCC)    Elevated troponin    Iron deficiency anemia    Elevated serum creatinine    Cigarette smoker      PLAN:    1.  Increase dose of carvedilol  2.  IV iron  3.  Possible discharge tomorrow if blood pressure controlled after her third dose of IV iron.  4.  Discussed the need for colonoscopy to evaluate iron deficiency anemia.  She states that she had a colonoscopy scheduled recently but blood pressure was too elevated to have the procedure and it was canceled.  This will need to be rescheduled.      Hakan Agudelo MD  09/01/19  10:06 AM        Part of this note may be an electronic transcription/translation of spoken language to printed text using the Dragon Dictation System.

## 2019-09-01 NOTE — PLAN OF CARE
Problem: Patient Care Overview  Goal: Plan of Care Review  Outcome: Ongoing (interventions implemented as appropriate)   09/01/19 0311   Coping/Psychosocial   Plan of Care Reviewed With patient   Plan of Care Review   Progress no change   OTHER   Outcome Summary BP elevated, better controlled after 2nd dose of clonidine. Received pt from ICU around 2130. Good UOP. Continue to diurese and monitor BP.      Goal: Individualization and Mutuality  Outcome: Ongoing (interventions implemented as appropriate)    Goal: Discharge Needs Assessment  Outcome: Ongoing (interventions implemented as appropriate)    Goal: Interprofessional Rounds/Family Conf  Outcome: Ongoing (interventions implemented as appropriate)      Problem: Pain, Chronic (Adult)  Goal: Identify Related Risk Factors and Signs and Symptoms  Outcome: Ongoing (interventions implemented as appropriate)    Goal: Acceptable Pain/Comfort Level and Functional Ability  Outcome: Ongoing (interventions implemented as appropriate)      Problem: Hypertensive Disease/Crisis (Arterial) (Adult)  Goal: Signs and Symptoms of Listed Potential Problems Will be Absent, Minimized or Managed (Hypertensive Disease/Crisis)  Outcome: Ongoing (interventions implemented as appropriate)      Problem: Anemia (Adult)  Goal: Identify Related Risk Factors and Signs and Symptoms  Outcome: Ongoing (interventions implemented as appropriate)    Goal: Symptom Improvement  Outcome: Ongoing (interventions implemented as appropriate)

## 2019-09-02 PROCEDURE — 25010000002 IRON SUCROSE PER 1 MG: Performed by: INTERNAL MEDICINE

## 2019-09-02 PROCEDURE — 94799 UNLISTED PULMONARY SVC/PX: CPT

## 2019-09-02 PROCEDURE — 99232 SBSQ HOSP IP/OBS MODERATE 35: CPT | Performed by: INTERNAL MEDICINE

## 2019-09-02 RX ORDER — CLONIDINE HYDROCHLORIDE 0.1 MG/1
0.1 TABLET ORAL ONCE
Status: COMPLETED | OUTPATIENT
Start: 2019-09-02 | End: 2019-09-02

## 2019-09-02 RX ORDER — LISINOPRIL 20 MG/1
20 TABLET ORAL
Status: DISCONTINUED | OUTPATIENT
Start: 2019-09-02 | End: 2019-09-03

## 2019-09-02 RX ORDER — HYDROCHLOROTHIAZIDE 25 MG/1
25 TABLET ORAL DAILY
Status: DISCONTINUED | OUTPATIENT
Start: 2019-09-02 | End: 2019-09-09 | Stop reason: HOSPADM

## 2019-09-02 RX ADMIN — LABETALOL HYDROCHLORIDE 20 MG: 5 INJECTION, SOLUTION INTRAVENOUS at 19:37

## 2019-09-02 RX ADMIN — SODIUM CHLORIDE 7.5 MG/HR: 9 INJECTION, SOLUTION INTRAVENOUS at 11:49

## 2019-09-02 RX ADMIN — SODIUM CHLORIDE 5 MG/HR: 9 INJECTION, SOLUTION INTRAVENOUS at 05:45

## 2019-09-02 RX ADMIN — ASPIRIN 81 MG: 81 TABLET ORAL at 09:06

## 2019-09-02 RX ADMIN — IRON SUCROSE 200 MG: 20 INJECTION, SOLUTION INTRAVENOUS at 12:24

## 2019-09-02 RX ADMIN — CARVEDILOL 12.5 MG: 6.25 TABLET, FILM COATED ORAL at 18:30

## 2019-09-02 RX ADMIN — BUTALBITAL, ACETAMINOPHEN, AND CAFFEINE 1 TABLET: 50; 325; 40 TABLET ORAL at 18:48

## 2019-09-02 RX ADMIN — SODIUM CHLORIDE, PRESERVATIVE FREE 10 ML: 5 INJECTION INTRAVENOUS at 21:53

## 2019-09-02 RX ADMIN — BUTALBITAL, ACETAMINOPHEN, AND CAFFEINE 1 TABLET: 50; 325; 40 TABLET ORAL at 13:25

## 2019-09-02 RX ADMIN — HYDROCHLOROTHIAZIDE 25 MG: 25 TABLET ORAL at 03:22

## 2019-09-02 RX ADMIN — LISINOPRIL 20 MG: 20 TABLET ORAL at 03:24

## 2019-09-02 RX ADMIN — CLONIDINE HYDROCHLORIDE 0.1 MG: 0.1 TABLET ORAL at 21:53

## 2019-09-02 RX ADMIN — CLONIDINE HYDROCHLORIDE 0.1 MG: 0.1 TABLET ORAL at 03:22

## 2019-09-02 RX ADMIN — BUTALBITAL, ACETAMINOPHEN, AND CAFFEINE 1 TABLET: 50; 325; 40 TABLET ORAL at 06:49

## 2019-09-02 RX ADMIN — LABETALOL HYDROCHLORIDE 20 MG: 5 INJECTION, SOLUTION INTRAVENOUS at 00:39

## 2019-09-02 RX ADMIN — CARVEDILOL 12.5 MG: 6.25 TABLET, FILM COATED ORAL at 08:35

## 2019-09-02 RX ADMIN — SODIUM CHLORIDE 10 MG/HR: 9 INJECTION, SOLUTION INTRAVENOUS at 09:03

## 2019-09-02 RX ADMIN — NICOTINE 1 PATCH: 14 PATCH TRANSDERMAL at 22:47

## 2019-09-02 RX ADMIN — SODIUM CHLORIDE 5 MG/HR: 9 INJECTION, SOLUTION INTRAVENOUS at 12:24

## 2019-09-02 NOTE — NURSING NOTE
Iv to right ac dc'd after flushed.  Patient complained of pain to site.  Iv restarted to left forearm.

## 2019-09-02 NOTE — PLAN OF CARE
Problem: Patient Care Overview  Goal: Plan of Care Review  Outcome: Ongoing (interventions implemented as appropriate)   09/02/19 6275   OTHER   Outcome Summary Received patient to room ccu 5. Alert and oriented. Cardene drip started. Will continue to monitor.

## 2019-09-02 NOTE — PLAN OF CARE
Problem: Patient Care Overview  Goal: Plan of Care Review  Outcome: Ongoing (interventions implemented as appropriate)   09/02/19 0417   Coping/Psychosocial   Plan of Care Reviewed With patient   Plan of Care Review   Progress no change   OTHER   Outcome Summary Patient c/o headache pain with increased BP. Increased lisinopril to 20 mg, given early. Started HCTZ 25 mg pt, given early. Gave extra dose of clonidine 0.1 mg po. PRN doses of metoprolol and clonidine did not appear to work on BP. Dr. Agudelo called and new orders received. Will continue to monitor.      Goal: Individualization and Mutuality  Outcome: Ongoing (interventions implemented as appropriate)    Goal: Discharge Needs Assessment  Outcome: Ongoing (interventions implemented as appropriate)    Goal: Interprofessional Rounds/Family Conf  Outcome: Ongoing (interventions implemented as appropriate)      Problem: Pain, Chronic (Adult)  Goal: Acceptable Pain/Comfort Level and Functional Ability  Outcome: Ongoing (interventions implemented as appropriate)      Problem: Hypertensive Disease/Crisis (Arterial) (Adult)  Goal: Signs and Symptoms of Listed Potential Problems Will be Absent, Minimized or Managed (Hypertensive Disease/Crisis)  Outcome: Ongoing (interventions implemented as appropriate)      Problem: Anemia (Adult)  Goal: Identify Related Risk Factors and Signs and Symptoms  Outcome: Ongoing (interventions implemented as appropriate)

## 2019-09-02 NOTE — PROGRESS NOTES
[facility] -  Progress Note     LOS: 3 days   Patient Care Team:  Orville Weston MD as PCP - General (Family Medicine)  Juanpablo Rea MD as Consulting Physician (Gastroenterology)    Chief Complaint: Follow-up hypertension    Subjective     Interval History: Patient's blood pressure spiked back up to 190/110 last night, with associated headache, so she was moved back to ICU and placed back on Cardene infusion.  Pressure currently on Cardene drip is well controlled, however she is complaining of headache that she states is unchanged.  She states she has headaches every day and typically takes Excedrin every day at home.  Otherwise, no new symptoms.  No associated chest discomfort at any point in time.  Does still have mild exertional dyspnea that is chronic and unchanged.    Review of Systems:  Review of Systems   Constitution: Negative for malaise/fatigue.   Cardiovascular: Positive for dyspnea on exertion. Negative for chest pain, claudication, leg swelling, near-syncope, orthopnea, palpitations, paroxysmal nocturnal dyspnea and syncope.   Respiratory: Negative for shortness of breath.    Hematologic/Lymphatic: Does not bruise/bleed easily.   Neurological: Positive for headaches.       Vital Sign Min/Max for last 24 hours  Temp  Min: 97.6 °F (36.4 °C)  Max: 98.5 °F (36.9 °C)   BP  Min: 141/93  Max: 193/110   Pulse  Min: 60  Max: 79   Resp  Min: 16  Max: 18   SpO2  Min: 92 %  Max: 99 %   No Data Recorded   Weight  Min: 81.9 kg (180 lb 8 oz)  Max: 81.9 kg (180 lb 8 oz)         09/01/19 1957   Weight: 81.9 kg (180 lb 8 oz)       Physical Exam:   Physical Exam   Constitutional: No distress.   Neck: No JVD present.   Cardiovascular: Normal rate, regular rhythm, S1 normal, S2 normal, intact distal pulses and normal pulses.   Murmur heard.   Harsh midsystolic murmur is present with a grade of 3/6 at the upper right sternal border radiating to the neck.  Pulmonary/Chest: Effort normal  and breath sounds normal.   Abdominal: Soft. There is no tenderness.   Neurological: She is alert and oriented to person, place, and time.   Skin: Skin is warm and dry.       Medication Review: yes  Current Facility-Administered Medications   Medication Dose Route Frequency Provider Last Rate Last Dose   • acetaminophen (TYLENOL) tablet 650 mg  650 mg Oral Q4H PRN Hakan Agudelo MD        Or   • acetaminophen (TYLENOL) 160 MG/5ML solution 650 mg  650 mg Oral Q4H PRN Hakan Agudelo MD        Or   • acetaminophen (TYLENOL) suppository 650 mg  650 mg Rectal Q4H PRN Hakan Agudelo MD       • aspirin EC tablet 81 mg  81 mg Oral Daily Hakan Agudelo MD   81 mg at 09/02/19 0906   • butalbital-acetaminophen-caffeine (FIORICET, ESGIC) -40 MG per tablet 1 tablet  1 tablet Oral Q6H PRN Hakan Agudelo MD   1 tablet at 09/02/19 0649   • carvedilol (COREG) tablet 12.5 mg  12.5 mg Oral BID With Meals Hakan Agudelo MD   12.5 mg at 09/02/19 0835   • CloNIDine (CATAPRES) tablet 0.1 mg  0.1 mg Oral Q6H PRN Hakan Agudelo MD   0.1 mg at 09/01/19 2328   • hydrochlorothiazide (HYDRODIURIL) tablet 25 mg  25 mg Oral Daily Hakan Agduelo MD   25 mg at 09/02/19 0322   • iron sucrose (VENOFER) 200 mg in sodium chloride 0.9 % 100 mL IVPB  200 mg Intravenous Q24H Hkaan Agudelo MD   200 mg at 09/01/19 1146   • labetalol (NORMODYNE,TRANDATE) injection 20 mg  20 mg Intravenous Q2H PRN Hakan Agudelo MD   20 mg at 09/02/19 0039   • lisinopril (PRINIVIL,ZESTRIL) tablet 20 mg  20 mg Oral Q24H Hakan Agudelo MD   20 mg at 09/02/19 0324   • niCARdipine (CARDENE) 25 mg/250 mL (0.1 mg/mL) 0.9% NS infusion  5-15 mg/hr Intravenous Titrated Hakan Agudelo  mL/hr at 09/02/19 0903 10 mg/hr at 09/02/19 0903   • nicotine (NICODERM CQ) 14 MG/24HR patch 1 patch  1 patch Transdermal Nightly Hakan Agudelo MD   1 patch at 09/01/19 2111   • ondansetron (ZOFRAN)  injection 4 mg  4 mg Intravenous Q6H PRN Hakan Agudelo MD       • sodium chloride 0.9 % flush 10 mL  10 mL Intravenous Q12H Hakan Agudelo MD   10 mL at 09/01/19 2114   • sodium chloride 0.9 % flush 10 mL  10 mL Intravenous PRN Hakan Agudelo MD             Results Review:   I have reviewed all laboratory data, with pertinent findings: , HDL 51.  Hemoglobin 10.4.  Creatinine 1.35.    I have reviewed telemetry, which shows no arrhythmias    Results for orders placed during the hospital encounter of 08/30/19   Transthoracic Echo Complete With Contrast if Necessary Per Protocol    Narrative · Left ventricular systolic function is normal.  · Left ventricular wall thickness is consistent with mild concentric   hypertrophy.  · Left ventricular diastolic dysfunction (grade II) consistent with   pseudonormalization.  · Mild to moderate aortic valve regurgitation is present.  · Left atrial cavity size is moderately dilated.  · Normal size and function of the right ventricle.  · Estimated right ventricular systolic pressure from tricuspid   regurgitation is moderately elevated (45-55 mmHg).            Assessment/Plan       Hypertensive urgency -still requiring Cardene infusion as oral antihypertensives have just been initiated and have yet to reach steady state    Acute congestive heart failure (CMS/HCC) -resolved    Elevated troponin -due to hypertensive urgency    Iron deficiency anemia    Elevated serum creatinine -improved    Cigarette smoker    Recommendations and plans  -Since patient had essentially been on no antihypertensives at home, and is now on moderate dose lisinopril, moderate dose Coreg, and hydrochlorthiazide, I would not recommend any up-titration of these oral agents at this time, for fear that once he treats that he states she may become hypotensive.  Continue Cardene infusion as needed to keep pressure down in the meantime.  Expect each drug to near steady state by  tomorrow.  -No plans for inpatient ischemic evaluation; will plan for outpatient stress test 2 to 4 weeks after discharge  - headache does not appear tied to blood pressure as she is still having the same headache this morning while normotensive.  Perhaps could be from medication overuse that she states she takes Excedrin every day.  Will defer any further work-up, consultation, or testing regarding this to primary team.    Enrico De La Fuente MD  09/02/19  9:28 AM

## 2019-09-02 NOTE — PLAN OF CARE
Problem: Patient Care Overview  Goal: Plan of Care Review  Outcome: Ongoing (interventions implemented as appropriate)    Goal: Individualization and Mutuality  Outcome: Ongoing (interventions implemented as appropriate)    Goal: Discharge Needs Assessment  Outcome: Ongoing (interventions implemented as appropriate)    Goal: Interprofessional Rounds/Family Conf  Outcome: Ongoing (interventions implemented as appropriate)      Problem: Pain, Chronic (Adult)  Goal: Identify Related Risk Factors and Signs and Symptoms  Outcome: Ongoing (interventions implemented as appropriate)    Goal: Acceptable Pain/Comfort Level and Functional Ability  Outcome: Ongoing (interventions implemented as appropriate)      Problem: Hypertensive Disease/Crisis (Arterial) (Adult)  Goal: Signs and Symptoms of Listed Potential Problems Will be Absent, Minimized or Managed (Hypertensive Disease/Crisis)  Outcome: Ongoing (interventions implemented as appropriate)      Problem: Anemia (Adult)  Goal: Identify Related Risk Factors and Signs and Symptoms  Outcome: Ongoing (interventions implemented as appropriate)    Goal: Symptom Improvement  Outcome: Ongoing (interventions implemented as appropriate)

## 2019-09-02 NOTE — PAYOR COMM NOTE
"[id number]  ADMIT INPT 8-30-19  UR  [phone]  CARDENE Ludivina Hare (59 y.o. Female)     Date of Birth Social Security Number Address Home Phone MRN    1960  [address] [place] [address] [phone] [id number]    Yarsanism Marital Status          Mormonism        Admission Date Admission Type Admitting Provider Attending Provider Department, Room/Bed    8/30/19 Emergency Orville Weston MD Martin, Aribbe Allen, MD [facility], C005/1    Discharge Date Discharge Disposition Discharge Destination                       Attending Provider:  Orville Weston MD    Allergies:  Codeine    Isolation:  None   Infection:  None   Code Status:  CPR    Ht:  166.4 cm (65.5\")   Wt:  81.9 kg (180 lb 8 oz)    Admission Cmt:  None   Principal Problem:  Hypertensive urgency [I16.0]                 Active Insurance as of 8/30/2019     Primary Coverage     Payor Plan Insurance Group Employer/Plan Group    ANTHEM MEDICARE REPLACEMENT ANTHEM MEDICARE ADVANTAGE [id number]     Payor Plan Address Payor Plan Phone Number Payor Plan Fax Number Effective Dates    [address] [phone]  1/1/2017 - None Entered    [facility] [id number]       Subscriber Name Subscriber Birth Date Member ID       LUDIVINA CLEMONS 1960 [id number]                 Emergency Contacts      (Rel.) Home Phone Work Phone Mobile Phone    YOGI AMAYA (Relative) -- -- [phone]               History & Physical      Hakan Agudelo MD at 8/31/2019 10:46 AM              Date of Admission: 8/30/2019  Primary Care Physician: Orville Weston MD    Subjective     Chief Complaint: Shortness of breath    History of Present Illness  Patient is a 59-year-old female with history of hypertension and smoking.  She presents to the ER with 2 days of shortness of breath.  She states shortness of breath was worse with lying down and better with setting up.  She did not have any " "cough.  She did have wheezing.  No fevers or chills.  She denies chest pain or chest pressure.  She noted a little bit of leg swelling.  She reports no prior history of cardiac disease.  She is never been diagnosed with any lung disease.  In the ER chest x-ray showed enlarged cardiac silhouette and BNP level was elevated.  She was given a dose of IV Lasix with good urine output overnight.  Breathing is much better today.  She denies current chest pain.        Review of Systems   No fever or chills.  No unintentional weight loss.  No chest pain.  No productive cough.  Otherwise complete ROS reviewed and negative except as mentioned in the HPI.      Past Medical History:   Past Medical History:   Diagnosis Date   • Hypertension    • PTSD (post-traumatic stress disorder)        Past Surgical History:  Past Surgical History:   Procedure Laterality Date   • EXTERNAL FIXATION WRIST FRACTURE     • LEG SURGERY     • TUBAL ABDOMINAL LIGATION         Social History:  reports that she has been smoking.  She has been smoking about 0.50 packs per day. She has never used smokeless tobacco. She reports that she does not drink alcohol or use drugs.    Family History: family history is not on file.       Allergies:  Allergies   Allergen Reactions   • Codeine Nausea And Vomiting       Medications:  Prior to Admission medications    Medication Sig Start Date End Date Taking? Authorizing Provider   atenolol (TENORMIN) 50 MG tablet Take 50 mg by mouth Daily.   Yes ProviderEdwin MD   CloNIDine (CATAPRES) 0.1 MG tablet Take 0.1 mg by mouth As Needed for High Blood Pressure.   Yes Edwin Wise MD       Objective     Vital Signs: BP (!) 134/106   Pulse 69   Temp 98 °F (36.7 °C) (Oral)   Resp 16   Ht 167.6 cm (66\")   Wt 82.6 kg (182 lb 3.2 oz)   SpO2 91%   BMI 29.41 kg/m²    Physical Exam   Constitutional: She is oriented to person, place, and time. She appears well-developed and well-nourished. No distress.   HENT: "   Head: Normocephalic and atraumatic.   Mouth/Throat: No oropharyngeal exudate.   Eyes: Pupils are equal, round, and reactive to light. No scleral icterus.   Neck: Normal range of motion. Neck supple.   Cardiovascular: Normal rate, regular rhythm and normal heart sounds.   No murmur heard.  Pulmonary/Chest: Effort normal. No respiratory distress. She has wheezes. She has rales.   Abdominal: Soft. Bowel sounds are normal. She exhibits no distension. There is no tenderness. There is no guarding.   Musculoskeletal: Normal range of motion. She exhibits no edema.   Lymphadenopathy:     She has no cervical adenopathy.   Neurological: She is alert and oriented to person, place, and time. No cranial nerve deficit.   Skin: Skin is warm and dry. No erythema.   Psychiatric: She has a normal mood and affect. Her behavior is normal.   Vitals reviewed.          Results Reviewed:  Lab Results (last 24 hours)     Procedure Component Value Units Date/Time    Hemoglobin A1c [[id number]]  (Normal) Collected:  08/31/19 0258    Specimen:  Blood Updated:  08/31/19 0711     Hemoglobin A1C 5.7 %     Narrative:       Less than 6.0           Non-Diabetic Range  6.0-7.0                 ADA Therapeutic Target  Greater than 7.0        Action Suggested    Vitamin B12 [[id number]]  (Normal) Collected:  08/31/19 0258    Specimen:  Blood Updated:  08/31/19 0437     Vitamin B-12 427 pg/mL     Folate [[id number]]  (Normal) Collected:  08/31/19 0258    Specimen:  Blood Updated:  08/31/19 0437     Folate 14.80 ng/mL     Ferritin [[id number]]  (Normal) Collected:  08/31/19 0258    Specimen:  Blood Updated:  08/31/19 0406     Ferritin 19.30 ng/mL     TSH [[id number]]  (Normal) Collected:  08/31/19 0258    Specimen:  Blood Updated:  08/31/19 0402     TSH 1.050 uIU/mL     T4, Free [[id number]]  (Normal) Collected:  08/31/19 0258    Specimen:  Blood Updated:  08/31/19 0349     Free T4 1.02 ng/dL     Troponin [[id number]]  (Abnormal) Collected:  08/31/19 0258     Specimen:  Blood Updated:  08/31/19 0343     Troponin I 0.037 ng/mL     Iron Profile [[id number]]  (Abnormal) Collected:  08/31/19 0258    Specimen:  Blood Updated:  08/31/19 0341     Iron 29 mcg/dL      TIBC 350 mcg/dL      Iron Saturation 8 %     Comprehensive Metabolic Panel [[id number]]  (Abnormal) Collected:  08/31/19 0258    Specimen:  Blood Updated:  08/31/19 0332     Glucose 94 mg/dL      BUN 38 mg/dL      Creatinine 1.57 mg/dL      Sodium 142 mmol/L      Potassium 3.4 mmol/L      Chloride 102 mmol/L      CO2 32.0 mmol/L      Calcium 8.6 mg/dL      Total Protein 6.5 g/dL      Albumin 3.50 g/dL      ALT (SGPT) 74 U/L      AST (SGOT) 55 U/L      Alkaline Phosphatase 111 U/L      Total Bilirubin 0.5 mg/dL      eGFR Non African Amer 34 mL/min/1.73      Globulin 3.0 gm/dL      A/G Ratio 1.2 g/dL      BUN/Creatinine Ratio 24.2     Anion Gap 8.0 mmol/L     Narrative:       GFR Normal >60  Chronic Kidney Disease <60  Kidney Failure <15    Magnesium [[id number]]  (Normal) Collected:  08/31/19 0258    Specimen:  Blood Updated:  08/31/19 0332     Magnesium 2.1 mg/dL     CBC Auto Differential [[id number]]  (Abnormal) Collected:  08/31/19 0258    Specimen:  Blood Updated:  08/31/19 0318     WBC 6.14 10*3/mm3      RBC 3.82 10*6/mm3      Hemoglobin 9.6 g/dL      Hematocrit 31.7 %      MCV 83.0 fL      MCH 25.1 pg      MCHC 30.3 g/dL      RDW 16.3 %      RDW-SD 49.8 fl      MPV 11.2 fL      Platelets 238 10*3/mm3      Neutrophil % 70.6 %      Lymphocyte % 18.6 %      Monocyte % 6.7 %      Eosinophil % 3.3 %      Basophil % 0.5 %      Immature Grans % 0.3 %      Neutrophils, Absolute 4.34 10*3/mm3      Lymphocytes, Absolute 1.14 10*3/mm3      Monocytes, Absolute 0.41 10*3/mm3      Eosinophils, Absolute 0.20 10*3/mm3      Basophils, Absolute 0.03 10*3/mm3      Immature Grans, Absolute 0.02 10*3/mm3      nRBC 0.0 /100 WBC     TSH [[id number]]  (Normal) Collected:  08/30/19 1819    Specimen:  Blood Updated:  08/30/19 1912     TSH  1.160 uIU/mL     Urinalysis, Microscopic Only - Urine, Clean Catch [[id number]]  (Abnormal) Collected:  08/30/19 1859    Specimen:  Urine, Clean Catch Updated:  08/30/19 1911     RBC, UA 3-5 /HPF      WBC, UA 0-2 /HPF      Bacteria, UA None Seen /HPF      Squamous Epithelial Cells, UA 0-2 /HPF      Hyaline Casts, UA None Seen /LPF      Methodology Automated Microscopy    Urinalysis With Culture If Indicated - Urine, Clean Catch [[id number]]  (Abnormal) Collected:  08/30/19 1859    Specimen:  Urine, Clean Catch Updated:  08/30/19 1911     Color, UA Yellow     Appearance, UA Clear     pH, UA 6.5     Specific Gravity, UA 1.014     Glucose, UA Negative     Ketones, UA Negative     Bilirubin, UA Negative     Blood, UA Negative     Protein, UA 30 mg/dL (1+)     Leuk Esterase, UA Negative     Nitrite, UA Negative     Urobilinogen, UA 0.2 E.U./dL    BNP [[id number]]  (Abnormal) Collected:  08/30/19 1819    Specimen:  Blood Updated:  08/30/19 1853     proBNP 11,300.0 pg/mL     Troponin [[id number]]  (Normal) Collected:  08/30/19 1819    Specimen:  Blood Updated:  08/30/19 1853     Troponin I 0.022 ng/mL     Comprehensive Metabolic Panel [[id number]]  (Abnormal) Collected:  08/30/19 1819    Specimen:  Blood Updated:  08/30/19 1843     Glucose 91 mg/dL      BUN 41 mg/dL      Creatinine 1.58 mg/dL      Sodium 137 mmol/L      Potassium 4.1 mmol/L      Chloride 101 mmol/L      CO2 32.0 mmol/L      Calcium 8.6 mg/dL      Total Protein 7.0 g/dL      Albumin 3.70 g/dL      ALT (SGPT) 87 U/L      AST (SGOT) 81 U/L      Alkaline Phosphatase 125 U/L      Total Bilirubin 0.6 mg/dL      eGFR Non African Amer 33 mL/min/1.73      Globulin 3.3 gm/dL      A/G Ratio 1.1 g/dL      BUN/Creatinine Ratio 25.9     Anion Gap 4.0 mmol/L     Narrative:       GFR Normal >60  Chronic Kidney Disease <60  Kidney Failure <15    Magnesium [[id number]]  (Abnormal) Collected:  08/30/19 1819    Specimen:  Blood Updated:  08/30/19 1843     Magnesium 2.4 mg/dL      CBC & Differential [[id number]] Collected:  08/30/19 1819    Specimen:  Blood Updated:  08/30/19 1834    Narrative:       The following orders were created for panel order CBC & Differential.  Procedure                               Abnormality         Status                     ---------                               -----------         ------                     CBC Auto Differential[[id number]]        Abnormal            Final result                 Please view results for these tests on the individual orders.    CBC Auto Differential [[id number]]  (Abnormal) Collected:  08/30/19 1819    Specimen:  Blood Updated:  08/30/19 1834     WBC 5.97 10*3/mm3      RBC 3.88 10*6/mm3      Hemoglobin 9.9 g/dL      Hematocrit 32.6 %      MCV 84.0 fL      MCH 25.5 pg      MCHC 30.4 g/dL      RDW 16.3 %      RDW-SD 49.7 fl      MPV 10.6 fL      Platelets 249 10*3/mm3      Neutrophil % 70.8 %      Lymphocyte % 18.9 %      Monocyte % 6.9 %      Eosinophil % 2.2 %      Basophil % 0.7 %      Immature Grans % 0.5 %      Neutrophils, Absolute 4.23 10*3/mm3      Lymphocytes, Absolute 1.13 10*3/mm3      Monocytes, Absolute 0.41 10*3/mm3      Eosinophils, Absolute 0.13 10*3/mm3      Basophils, Absolute 0.04 10*3/mm3      Immature Grans, Absolute 0.03 10*3/mm3      nRBC 0.0 /100 WBC         Imaging Results (last 24 hours)     Procedure Component Value Units Date/Time    XR Chest 1 View [[id number]] Collected:  08/30/19 1917     Updated:  08/30/19 1921    Narrative:       XR CHEST 1 VW- 8/30/2019 7:11 PM CDT     HISTORY: Shortness of air       COMPARISON: 04/05/2018.     FINDINGS:   There is a stable nodule in the RIGHT upper lobe. Mild atelectatic  changes are seen in the LEFT lung base. The cardiac silhouette is  enlarged compared to the previous study, but this may be due to  technique.      The osseous structures and surrounding soft tissues demonstrate no acute  abnormality.       Impression:       1. Enlarged cardiac silhouette since the  "previous exam. Findings could  be due to technique, pericardial effusion, or interval enlargement of  the heart.  This report was finalized on 08/30/2019 19:18 by Dr. Maximino Curiel MD.          I have personally reviewed and interpreted the radiology studies and ECG obtained at time of admission.     Assessment / Plan     Assessment & Plan  Active Hospital Problems    Diagnosis   • **Hypertensive urgency   • Acute congestive heart failure (CMS/HCC)   • Elevated troponin   • Iron deficiency anemia   • Elevated serum creatinine   • Cigarette smoker     She has acute congestive heart failure with markedly uncontrolled hypertension.  She is better after IV diuresis.  Chest x-ray shows an enlarged cardiac silhouette which raises the question of possible pericardial effusion.  Her second troponin levels mildly elevated.  She also has significant iron deficiency anemia.    1.  Repeat troponin  2.  Stat echo  3.  Cardiology consultation  4.  Start oral carvedilol and wean nifedipine as able  5.  Repeat IV Lasix now  6.  Venofer IV for 3 days  7.  Check stool for occult blood      Hakan Agudelo MD   08/31/19   10:46 AM    Electronically signed by Hakan Agudelo MD at 8/31/2019 10:49 AM          Emergency Department Notes      Quinn Gilman PA-C at 8/30/2019  9:45 PM     Attestation signed by Sachin Quiles MD at 8/31/2019 12:00 AM          For this patient encounter, I reviewed the NP or PA documentation, treatment plan, and medical decision making. Sachin Quiles MD 8/31/2019 12:00 AM                  Subjective   History of Present Illness  59-year-old female presents with a chief complaint of shortness of breath and dyspnea on exertion worse with lying flat as well for the past 48 hours.  The patient reports she has also had uncontrolled hypertension for months because she \"forgot to take her medication.\"  She has not gotten a refill on her medication either.  The patient reports no chest " pain fever nausea vomiting or diarrhea.  No lower extremity edema.  Review of Systems   All other systems reviewed and are negative.      Past Medical History:   Diagnosis Date   • Hypertension    • PTSD (post-traumatic stress disorder)        Allergies   Allergen Reactions   • Codeine Nausea And Vomiting       Past Surgical History:   Procedure Laterality Date   • EXTERNAL FIXATION WRIST FRACTURE     • LEG SURGERY     • TUBAL ABDOMINAL LIGATION         History reviewed. No pertinent family history.    Social History     Socioeconomic History   • Marital status:      Spouse name: Not on file   • Number of children: Not on file   • Years of education: Not on file   • Highest education level: Not on file   Tobacco Use   • Smoking status: Current Every Day Smoker     Packs/day: 0.50   • Smokeless tobacco: Never Used   Substance and Sexual Activity   • Alcohol use: No   • Drug use: No   • Sexual activity: Defer           Objective   Physical Exam   Constitutional: She is oriented to person, place, and time. She appears distressed.   HENT:   Head: Normocephalic and atraumatic.   Eyes: EOM are normal. Pupils are equal, round, and reactive to light.   Neck: Normal range of motion. Neck supple.   JVD 3 cm   Cardiovascular: Normal rate and regular rhythm.   Pulmonary/Chest: Effort normal. She has wheezes. She has rales.   Abdominal: Soft. Bowel sounds are normal.   Musculoskeletal: Normal range of motion.        Right lower leg: Normal.        Left lower leg: Normal.   Neurological: She is alert and oriented to person, place, and time.   Skin: Skin is warm and dry. Capillary refill takes less than 2 seconds.   Psychiatric: She has a normal mood and affect. Her behavior is normal.   Nursing note and vitals reviewed.      Procedures          ED Course      Labs Reviewed   COMPREHENSIVE METABOLIC PANEL - Abnormal; Notable for the following components:       Result Value    BUN 41 (*)     Creatinine 1.58 (*)     CO2 32.0  (*)     ALT (SGPT) 87 (*)     AST (SGOT) 81 (*)     Alkaline Phosphatase 125 (*)     eGFR Non African Amer 33 (*)     BUN/Creatinine Ratio 25.9 (*)     All other components within normal limits    Narrative:     GFR Normal >60  Chronic Kidney Disease <60  Kidney Failure <15   URINALYSIS W/ CULTURE IF INDICATED - Abnormal; Notable for the following components:    Protein, UA 30 mg/dL (1+) (*)     All other components within normal limits   BNP (IN-HOUSE) - Abnormal; Notable for the following components:    proBNP 11,300.0 (*)     All other components within normal limits   MAGNESIUM - Abnormal; Notable for the following components:    Magnesium 2.4 (*)     All other components within normal limits   CBC WITH AUTO DIFFERENTIAL - Abnormal; Notable for the following components:    Hemoglobin 9.9 (*)     Hematocrit 32.6 (*)     MCH 25.5 (*)     MCHC 30.4 (*)     RDW 16.3 (*)     Lymphocyte % 18.9 (*)     All other components within normal limits   URINALYSIS, MICROSCOPIC ONLY - Abnormal; Notable for the following components:    RBC, UA 3-5 (*)     WBC, UA 0-2 (*)     All other components within normal limits   TROPONIN (IN-HOUSE) - Normal   TSH - Normal   CBC AND DIFFERENTIAL    Narrative:     The following orders were created for panel order CBC & Differential.  Procedure                               Abnormality         Status                     ---------                               -----------         ------                     CBC Auto Differential[[id number]]        Abnormal            Final result                 Please view results for these tests on the individual orders.     XR Chest 1 View   Final Result   1. Enlarged cardiac silhouette since the previous exam. Findings could   be due to technique, pericardial effusion, or interval enlargement of   the heart.   This report was finalized on 08/30/2019 19:18 by Dr. Maximino Curiel MD.                    MDM  Number of Diagnoses or Management Options  Diagnosis  "management comments: Likely new onset chf, hypertensive urgency requiring cardene drip after failed nitro drip, admit to ccu        Amount and/or Complexity of Data Reviewed  Clinical lab tests: ordered and reviewed  Tests in the radiology section of CPT®:  ordered and reviewed  Tests in the medicine section of CPT®:  reviewed and ordered  Decide to obtain previous medical records or to obtain history from someone other than the patient: yes    Risk of Complications, Morbidity, and/or Mortality  Presenting problems: moderate  Diagnostic procedures: moderate  Management options: moderate    Patient Progress  Patient progress: stable        Final diagnoses:   Hypertensive urgency   Dyspnea, unspecified type   New onset of congestive heart failure (CMS/[facility])            Quinn Gilman PA-C  08/30/19 [id number]      Electronically signed by Sachin Quiles MD at 8/31/2019 12:00 AM       Hospital Medications (all)       Dose Frequency Start End    acetaminophen (TYLENOL) 160 MG/5ML solution 650 mg 650 mg Every 4 Hours PRN 8/30/2019     Sig - Route: Take 20.3 mL by mouth Every 4 (Four) Hours As Needed for Mild Pain . - Oral    Linked Group 1:  \"Or\" Linked Group Details        acetaminophen (TYLENOL) suppository 650 mg 650 mg Every 4 Hours PRN 8/30/2019     Sig - Route: Insert 1 suppository into the rectum Every 4 (Four) Hours As Needed for Mild Pain . - Rectal    Linked Group 1:  \"Or\" Linked Group Details        acetaminophen (TYLENOL) tablet 1,000 mg 1,000 mg Once 8/30/2019 8/30/2019    Sig - Route: Take 2 tablets by mouth 1 (One) Time. - Oral    Cosign for Ordering: Accepted by Sachin Quiles MD on 8/31/2019 12:01 AM    acetaminophen (TYLENOL) tablet 650 mg 650 mg Every 4 Hours PRN 8/30/2019     Sig - Route: Take 2 tablets by mouth Every 4 (Four) Hours As Needed for Mild Pain . - Oral    Linked Group 1:  \"Or\" Linked Group Details        aspirin EC tablet 81 mg 81 mg Daily 8/31/2019     Sig - Route: Take 1 " tablet by mouth Daily. - Oral    butalbital-acetaminophen-caffeine (FIORICET, ESGIC) -40 MG per tablet 1 tablet 1 tablet Every 6 Hours PRN 8/31/2019     Sig - Route: Take 1 tablet by mouth Every 6 (Six) Hours As Needed for Headache. - Oral    carvedilol (COREG) tablet 12.5 mg 12.5 mg 2 Times Daily With Meals 9/1/2019     Sig - Route: Take 2 tablets by mouth 2 (Two) Times a Day With Meals. - Oral    CloNIDine (CATAPRES) tablet 0.1 mg 0.1 mg Every 6 Hours PRN 8/31/2019     Sig - Route: Take 1 tablet by mouth Every 6 (Six) Hours As Needed for High Blood Pressure (For SBP over 160). - Oral    CloNIDine (CATAPRES) tablet 0.1 mg 0.1 mg Once 9/2/2019 9/2/2019    Sig - Route: Take 1 tablet by mouth 1 (One) Time. - Oral    furosemide (LASIX) injection 20 mg 20 mg Once 8/31/2019 8/31/2019    Sig - Route: Infuse 2 mL into a venous catheter 1 (One) Time. - Intravenous    furosemide (LASIX) injection 40 mg 40 mg Once 8/30/2019 8/30/2019    Sig - Route: Infuse 4 mL into a venous catheter 1 (One) Time. - Intravenous    Cosign for Ordering: Accepted by Devin Qureshi Jr., MD on 8/30/2019  6:34 PM    hydrochlorothiazide (HYDRODIURIL) tablet 25 mg 25 mg Daily 9/2/2019     Sig - Route: Take 1 tablet by mouth Daily. - Oral    iron sucrose (VENOFER) 200 mg in sodium chloride 0.9 % 100 mL IVPB 200 mg Every 24 Hours 8/31/2019 9/3/2019    Sig - Route: Infuse 200 mg into a venous catheter Daily. - Intravenous    labetalol (NORMODYNE,TRANDATE) injection 20 mg 20 mg Every 2 Hours PRN 9/1/2019     Sig - Route: Infuse 4 mL into a venous catheter Every 2 (Two) Hours As Needed for High Blood Pressure. - Intravenous    lisinopril (PRINIVIL,ZESTRIL) tablet 20 mg 20 mg Every 24 Hours Scheduled 9/2/2019     Sig - Route: Take 1 tablet by mouth Daily. - Oral    niCARdipine (CARDENE) 25 mg/250 mL (0.1 mg/mL) 0.9% NS infusion 5-15 mg/hr Titrated 9/2/2019     Sig - Route: Infuse 5-15 mg/hr into a venous catheter Dose Adjusted By Provider As  Needed. - Intravenous    nicotine (NICODERM CQ) 14 MG/24HR patch 1 patch 1 patch Nightly 8/31/2019     Sig - Route: Place 1 patch on the skin as directed by provider Every Night. - Transdermal    nitroglycerin (NITROSTAT) SL tablet 0.4 mg 0.4 mg Every 5 Minutes PRN 8/30/2019 8/30/2019    Sig - Route: Place 1 tablet under the tongue Every 5 (Five) Minutes As Needed for Chest Pain. - Sublingual    Cosign for Ordering: Accepted by Devin Qureshi Jr., MD on 8/30/2019  6:34 PM    ondansetron (ZOFRAN) injection 4 mg 4 mg Every 6 Hours PRN 8/30/2019     Sig - Route: Infuse 2 mL into a venous catheter Every 6 (Six) Hours As Needed for Nausea or Vomiting. - Intravenous    sodium chloride 0.9 % flush 10 mL 10 mL Every 12 Hours Scheduled 8/30/2019     Sig - Route: Infuse 10 mL into a venous catheter Every 12 (Twelve) Hours. - Intravenous    sodium chloride 0.9 % flush 10 mL 10 mL As Needed 8/30/2019     Sig - Route: Infuse 10 mL into a venous catheter As Needed for Line Care. - Intravenous    carvedilol (COREG) tablet 6.25 mg (Discontinued) 6.25 mg 2 Times Daily With Meals 8/31/2019 9/1/2019    Sig - Route: Take 1 tablet by mouth 2 (Two) Times a Day With Meals. - Oral    lisinopril (PRINIVIL,ZESTRIL) tablet 10 mg (Discontinued) 10 mg Every 24 Hours Scheduled 8/31/2019 9/2/2019    Sig - Route: Take 1 tablet by mouth Daily. - Oral    niCARdipine (CARDENE) 25 mg/250 mL (0.1 mg/mL) 0.9% NS infusion (Discontinued) 5-15 mg/hr Titrated 8/30/2019 8/31/2019    Sig - Route: Infuse 5-15 mg/hr into a venous catheter Dose Adjusted By Provider As Needed. - Intravenous    Cosign for Ordering: Accepted by aSchin Quiles MD on 8/31/2019 12:01 AM    nicotine (NICODERM CQ) 14 MG/24HR patch 1 patch (Discontinued) 1 patch Every 24 Hours Scheduled 9/1/2019 8/31/2019    Sig - Route: Place 1 patch on the skin as directed by provider Daily. - Transdermal    nitroglycerin 50 mg/250 mL (0.2 mg/mL) infusion (Discontinued) 5-200 mcg/min  "Titrated 8/30/2019 8/30/2019    Sig - Route: Infuse 5-200 mcg/min into a venous catheter Dose Adjusted By Provider As Needed. - Intravenous    Cosign for Ordering: Accepted by Sachin Quiles MD on 8/31/2019 12:01 AM          Ventilator/Non-Invasive Ventilation Settings (From admission, onward)    None           Physician Progress Notes (last 7 days) (Notes from 8/26/2019  8:02 AM through 9/2/2019  8:02 AM)      Soniya Chacon APRN at 9/1/2019 10:33 AM     Attestation signed by Enrico De La Fuente MD at 9/1/2019  6:43 PM    I have reviewed the documentation above and agree.                    [facility] -  Progress Note     LOS: 2 days   Patient Care Team:  Orville Weston MD as PCP - General (Family Medicine)  Juanpablo Rea MD as Consulting Physician (Gastroenterology)    Chief Complaint: F/U Elevated Troponin, HTN    Subjective   Resting. \"My BP still running high.\" No complaints of chest pain.       REVIEW OF SYSTEMS:  Constitutional: Denies fever or chills. Denies change in energy level or malaise.  HEENT: Headache when BP up. Denies difficulty swallowing or sore throat.  Respiratory: Denies cough or hoarseness. Denies shortness of breath.   Cardiovascular: Denies chest pain or pressure. Denies palpitations. Denies presyncope/syncope. Denies orthopnea/PND. Denies lower extremity edema.   Gastrointestinal: Denies abdominal pain. Denies nausea/vomiting. Denies change in bowel habits or history of recent GI tract blood loss.   Genitourinary: Denies urinary urgency or frequency. Denies dysuria, hematuria.  Musculoskeletal: Denies pain or swelling in joints.  Neurological: Denies paresthesias. Denies headache. Denies seizure or stroke symptoms.  Behavioral/Psych: Denies problems with anxiety or depression.    All other systems are negative except where stated above.      Objective     Vital Sign Min/Max for last 24 hours  Temp  Min: 97.7 °F (36.5 °C)  Max: 98.1 °F (36.7 °C) "   BP  Min: 128/74  Max: 184/98   Pulse  Min: 58  Max: 74   Resp  Min: 16  Max: 18   SpO2  Min: 90 %  Max: 97 %   No Data Recorded   Weight  Min: 81.8 kg (180 lb 6.4 oz)  Max: 81.8 kg (180 lb 6.4 oz)         08/31/19 2120   Weight: 81.8 kg (180 lb 6.4 oz)         Intake/Output Summary (Last 24 hours) at 9/1/2019 1033  Last data filed at 9/1/2019 0926  Gross per 24 hour   Intake 978.55 ml   Output 1925 ml   Net -946.45 ml         Physical Exam:  General Appearance:    Alert, cooperative, in no acute distress   HEENT:    Normocephalic . Atraumatic. MARIPOSA.   Neck:    Supple, trachea midline, no JVD   Lungs:     Clear, diminished throughout to auscultation, no wheezes or rhonchi, respirations regular, even and unlabored    Heart:    Regular rhythm and normal rate, normal S1 and S2, no murmur, no gallop, no rub, no click   Abdomen:     Normal bowel sounds, no masses, no organomegaly, soft non-tender, non-distended, no guarding, no rebound tenderness   Extremities:   Moves all extremities well, no edema, no cyanosis, no redness   Pulses:   Pulses palpable and equal bilaterally   Skin:   No bleeding, bruising or rash   Neurologic:   Cranial nerves 2 - 12 grossly intact            Results Review:   Lab Results (last 72 hours)     Procedure Component Value Units Date/Time    Lipid Panel [[id number]]  (Abnormal) Collected:  09/01/19 0356    Specimen:  Blood Updated:  09/01/19 0513     Total Cholesterol 178 mg/dL      Triglycerides 70 mg/dL      HDL Cholesterol 51 mg/dL      LDL Cholesterol  122 mg/dL      LDL/HDL Ratio 2.22    Basic Metabolic Panel [[id number]]  (Abnormal) Collected:  09/01/19 0356    Specimen:  Blood Updated:  09/01/19 0502     Glucose 92 mg/dL      BUN 32 mg/dL      Creatinine 1.35 mg/dL      Sodium 141 mmol/L      Potassium 4.1 mmol/L      Chloride 102 mmol/L      CO2 32.0 mmol/L      Calcium 9.1 mg/dL      eGFR Non African Amer 40 mL/min/1.73      BUN/Creatinine Ratio 23.7     Anion Gap 7.0 mmol/L      Narrative:       GFR Normal >60  Chronic Kidney Disease <60  Kidney Failure <15    CBC & Differential [[id number]] Collected:  09/01/19 0356    Specimen:  Blood Updated:  09/01/19 0441    Narrative:       The following orders were created for panel order CBC & Differential.  Procedure                               Abnormality         Status                     ---------                               -----------         ------                     CBC Auto Differential[[id number]]        Abnormal            Final result                 Please view results for these tests on the individual orders.    CBC Auto Differential [[id number]]  (Abnormal) Collected:  09/01/19 0356    Specimen:  Blood Updated:  09/01/19 0441     WBC 7.21 10*3/mm3      RBC 4.06 10*6/mm3      Hemoglobin 10.4 g/dL      Hematocrit 33.7 %      MCV 83.0 fL      MCH 25.6 pg      MCHC 30.9 g/dL      RDW 16.4 %      RDW-SD 49.1 fl      MPV 11.2 fL      Platelets 289 10*3/mm3      Neutrophil % 68.6 %      Lymphocyte % 19.3 %      Monocyte % 7.6 %      Eosinophil % 3.1 %      Basophil % 1.0 %      Immature Grans % 0.4 %      Neutrophils, Absolute 4.95 10*3/mm3      Lymphocytes, Absolute 1.39 10*3/mm3      Monocytes, Absolute 0.55 10*3/mm3      Eosinophils, Absolute 0.22 10*3/mm3      Basophils, Absolute 0.07 10*3/mm3      Immature Grans, Absolute 0.03 10*3/mm3      nRBC 0.0 /100 WBC     Troponin [[id number]]  (Normal) Collected:  08/31/19 1106    Specimen:  Blood Updated:  08/31/19 1137     Troponin I 0.026 ng/mL     Hemoglobin A1c [[id number]]  (Normal) Collected:  08/31/19 0258    Specimen:  Blood Updated:  08/31/19 0711     Hemoglobin A1C 5.7 %     Narrative:       Less than 6.0           Non-Diabetic Range  6.0-7.0                 ADA Therapeutic Target  Greater than 7.0        Action Suggested    Vitamin B12 [[id number]]  (Normal) Collected:  08/31/19 0258    Specimen:  Blood Updated:  08/31/19 0437     Vitamin B-12 427 pg/mL     Folate [[id number]]   (Normal) Collected:  08/31/19 0258    Specimen:  Blood Updated:  08/31/19 0437     Folate 14.80 ng/mL     Ferritin [[id number]]  (Normal) Collected:  08/31/19 0258    Specimen:  Blood Updated:  08/31/19 0406     Ferritin 19.30 ng/mL     TSH [[id number]]  (Normal) Collected:  08/31/19 0258    Specimen:  Blood Updated:  08/31/19 0402     TSH 1.050 uIU/mL     T4, Free [[id number]]  (Normal) Collected:  08/31/19 0258    Specimen:  Blood Updated:  08/31/19 0349     Free T4 1.02 ng/dL     Troponin [[id number]]  (Abnormal) Collected:  08/31/19 0258    Specimen:  Blood Updated:  08/31/19 0343     Troponin I 0.037 ng/mL     Iron Profile [[id number]]  (Abnormal) Collected:  08/31/19 0258    Specimen:  Blood Updated:  08/31/19 0341     Iron 29 mcg/dL      TIBC 350 mcg/dL      Iron Saturation 8 %     Comprehensive Metabolic Panel [[id number]]  (Abnormal) Collected:  08/31/19 0258    Specimen:  Blood Updated:  08/31/19 0332     Glucose 94 mg/dL      BUN 38 mg/dL      Creatinine 1.57 mg/dL      Sodium 142 mmol/L      Potassium 3.4 mmol/L      Chloride 102 mmol/L      CO2 32.0 mmol/L      Calcium 8.6 mg/dL      Total Protein 6.5 g/dL      Albumin 3.50 g/dL      ALT (SGPT) 74 U/L      AST (SGOT) 55 U/L      Alkaline Phosphatase 111 U/L      Total Bilirubin 0.5 mg/dL      eGFR Non African Amer 34 mL/min/1.73      Globulin 3.0 gm/dL      A/G Ratio 1.2 g/dL      BUN/Creatinine Ratio 24.2     Anion Gap 8.0 mmol/L     Narrative:       GFR Normal >60  Chronic Kidney Disease <60  Kidney Failure <15    Magnesium [[id number]]  (Normal) Collected:  08/31/19 0258    Specimen:  Blood Updated:  08/31/19 0332     Magnesium 2.1 mg/dL     CBC Auto Differential [[id number]]  (Abnormal) Collected:  08/31/19 0258    Specimen:  Blood Updated:  08/31/19 0318     WBC 6.14 10*3/mm3      RBC 3.82 10*6/mm3      Hemoglobin 9.6 g/dL      Hematocrit 31.7 %      MCV 83.0 fL      MCH 25.1 pg      MCHC 30.3 g/dL      RDW 16.3 %      RDW-SD 49.8 fl      MPV 11.2 fL       Platelets 238 10*3/mm3      Neutrophil % 70.6 %      Lymphocyte % 18.6 %      Monocyte % 6.7 %      Eosinophil % 3.3 %      Basophil % 0.5 %      Immature Grans % 0.3 %      Neutrophils, Absolute 4.34 10*3/mm3      Lymphocytes, Absolute 1.14 10*3/mm3      Monocytes, Absolute 0.41 10*3/mm3      Eosinophils, Absolute 0.20 10*3/mm3      Basophils, Absolute 0.03 10*3/mm3      Immature Grans, Absolute 0.02 10*3/mm3      nRBC 0.0 /100 WBC     TSH [[id number]]  (Normal) Collected:  08/30/19 1819    Specimen:  Blood Updated:  08/30/19 1912     TSH 1.160 uIU/mL     Urinalysis, Microscopic Only - Urine, Clean Catch [[id number]]  (Abnormal) Collected:  08/30/19 1859    Specimen:  Urine, Clean Catch Updated:  08/30/19 1911     RBC, UA 3-5 /HPF      WBC, UA 0-2 /HPF      Bacteria, UA None Seen /HPF      Squamous Epithelial Cells, UA 0-2 /HPF      Hyaline Casts, UA None Seen /LPF      Methodology Automated Microscopy    Urinalysis With Culture If Indicated - Urine, Clean Catch [[id number]]  (Abnormal) Collected:  08/30/19 1859    Specimen:  Urine, Clean Catch Updated:  08/30/19 1911     Color, UA Yellow     Appearance, UA Clear     pH, UA 6.5     Specific Gravity, UA 1.014     Glucose, UA Negative     Ketones, UA Negative     Bilirubin, UA Negative     Blood, UA Negative     Protein, UA 30 mg/dL (1+)     Leuk Esterase, UA Negative     Nitrite, UA Negative     Urobilinogen, UA 0.2 E.U./dL    BNP [[id number]]  (Abnormal) Collected:  08/30/19 1819    Specimen:  Blood Updated:  08/30/19 1853     proBNP 11,300.0 pg/mL     Troponin [[id number]]  (Normal) Collected:  08/30/19 1819    Specimen:  Blood Updated:  08/30/19 1853     Troponin I 0.022 ng/mL     Comprehensive Metabolic Panel [[id number]]  (Abnormal) Collected:  08/30/19 1819    Specimen:  Blood Updated:  08/30/19 1843     Glucose 91 mg/dL      BUN 41 mg/dL      Creatinine 1.58 mg/dL      Sodium 137 mmol/L      Potassium 4.1 mmol/L      Chloride 101 mmol/L      CO2 32.0 mmol/L       Calcium 8.6 mg/dL      Total Protein 7.0 g/dL      Albumin 3.70 g/dL      ALT (SGPT) 87 U/L      AST (SGOT) 81 U/L      Alkaline Phosphatase 125 U/L      Total Bilirubin 0.6 mg/dL      eGFR Non African Amer 33 mL/min/1.73      Globulin 3.3 gm/dL      A/G Ratio 1.1 g/dL      BUN/Creatinine Ratio 25.9     Anion Gap 4.0 mmol/L     Narrative:       GFR Normal >60  Chronic Kidney Disease <60  Kidney Failure <15    Magnesium [[id number]]  (Abnormal) Collected:  08/30/19 1819    Specimen:  Blood Updated:  08/30/19 1843     Magnesium 2.4 mg/dL     CBC & Differential [[id number]] Collected:  08/30/19 1819    Specimen:  Blood Updated:  08/30/19 1834    Narrative:       The following orders were created for panel order CBC & Differential.  Procedure                               Abnormality         Status                     ---------                               -----------         ------                     CBC Auto Differential[[id number]]        Abnormal            Final result                 Please view results for these tests on the individual orders.    CBC Auto Differential [[id number]]  (Abnormal) Collected:  08/30/19 1819    Specimen:  Blood Updated:  08/30/19 1834     WBC 5.97 10*3/mm3      RBC 3.88 10*6/mm3      Hemoglobin 9.9 g/dL      Hematocrit 32.6 %      MCV 84.0 fL      MCH 25.5 pg      MCHC 30.4 g/dL      RDW 16.3 %      RDW-SD 49.7 fl      MPV 10.6 fL      Platelets 249 10*3/mm3      Neutrophil % 70.8 %      Lymphocyte % 18.9 %      Monocyte % 6.9 %      Eosinophil % 2.2 %      Basophil % 0.7 %      Immature Grans % 0.5 %      Neutrophils, Absolute 4.23 10*3/mm3      Lymphocytes, Absolute 1.13 10*3/mm3      Monocytes, Absolute 0.41 10*3/mm3      Eosinophils, Absolute 0.13 10*3/mm3      Basophils, Absolute 0.04 10*3/mm3      Immature Grans, Absolute 0.03 10*3/mm3      nRBC 0.0 /100 WBC               2D Echocardiogram:  · Left ventricular systolic function is normal.  · Left ventricular wall thickness is  consistent with mild concentric hypertrophy.  · Left ventricular diastolic dysfunction (grade II) consistent with pseudonormalization.  · Mild to moderate aortic valve regurgitation is present.  · Left atrial cavity size is moderately dilated.  · Normal size and function of the right ventricle.  · Estimated right ventricular systolic pressure from tricuspid regurgitation is moderately elevated (45-55 mmHg).          Medication Review: yes  Current Facility-Administered Medications   Medication Dose Route Frequency Provider Last Rate Last Dose   • acetaminophen (TYLENOL) tablet 650 mg  650 mg Oral Q4H PRN Hakan Agudelo MD        Or   • acetaminophen (TYLENOL) 160 MG/5ML solution 650 mg  650 mg Oral Q4H PRN Hakan Agudelo MD        Or   • acetaminophen (TYLENOL) suppository 650 mg  650 mg Rectal Q4H PRN Hakan Agudelo MD       • aspirin EC tablet 81 mg  81 mg Oral Daily Hakan Agudelo MD   81 mg at 09/01/19 0927   • butalbital-acetaminophen-caffeine (FIORICET, ESGIC) -40 MG per tablet 1 tablet  1 tablet Oral Q6H PRN Hakan Agudelo MD   1 tablet at 09/01/19 0759   • carvedilol (COREG) tablet 12.5 mg  12.5 mg Oral BID With Meals Hakan Agudelo MD   12.5 mg at 09/01/19 0927   • CloNIDine (CATAPRES) tablet 0.1 mg  0.1 mg Oral Q6H PRN Hakan Agudelo MD   0.1 mg at 09/01/19 0759   • iron sucrose (VENOFER) 200 mg in sodium chloride 0.9 % 100 mL IVPB  200 mg Intravenous Q24H Hakan Agudelo MD   200 mg at 08/31/19 1202   • labetalol (NORMODYNE,TRANDATE) injection 20 mg  20 mg Intravenous Q2H PRN Hakan Agudelo MD   20 mg at 09/01/19 0406   • lisinopril (PRINIVIL,ZESTRIL) tablet 10 mg  10 mg Oral Q24H Hakan Agudelo MD   10 mg at 09/01/19 0926   • nicotine (NICODERM CQ) 14 MG/24HR patch 1 patch  1 patch Transdermal Nightly Hakan Agudelo MD   1 patch at 08/31/19 6054   • ondansetron (ZOFRAN) injection 4 mg  4 mg Intravenous Q6H PRN Hakan Agudelo  MD Amilcar       • sodium chloride 0.9 % flush 10 mL  10 mL Intravenous Q12H Hakan Agudelo MD   10 mL at 09/01/19 0927   • sodium chloride 0.9 % flush 10 mL  10 mL Intravenous PRN Hakan Agudelo MD             Assessment/Plan     Hypertensive urgency with Underlying Essential HTN - 2' to Non-compliance with Medications. May need renal US to R/O stenosis.     Acute congestive heart failure (CMS/HCC), Likely Diastolic 2' to Uncontrolled HTN - Stable. On BB, ACE-I, Diuretic    Elevated troponin, Likely Leak 2' to Hypertensive Urgency - Has Family CAD. Will need Outpatient Stress Testing    Elevated serum creatinine, Likely MATTY 2' to Hypertensive Urgency - Normal CR     Iron deficiency anemia - Receiving Venofer    Cigarette smoker - Encouraged to Stop. Nicoderm Patch    Continue to monitor BP. Recommend outpatient stress test when BP controlled.       Soniya Chacon, ISAIAH  09/01/19  10:33 AM              Electronically signed by Enrico De La Fuente MD at 9/1/2019  6:43 PM     Hakan Agudelo MD at 9/1/2019 10:06 AM              Chief Complaint: Shortness of breath      Interval History:     She states her breathing is better.  Told nursing she is still short of breath with activity.  Not having any chest pain.  Blood pressure still very elevated earlier this morning.    Review of Systems:   General ROS: negative for - chills or fever  Respiratory ROS: no cough, shortness of breath, or wheezing  Cardiovascular ROS: no chest pain or dyspnea on exertion  Gastrointestinal ROS: negative for - abdominal pain, diarrhea or nausea/vomiting       Vital Signs  Temp:  [97.7 °F (36.5 °C)-98.1 °F (36.7 °C)] 98.1 °F (36.7 °C)  Heart Rate:  [58-74] 69  Resp:  [16-18] 16  BP: (123-184)/() 147/57    Intake/Output Summary (Last 24 hours) at 9/1/2019 1006  Last data filed at 9/1/2019 0926  Gross per 24 hour   Intake 978.55 ml   Output 1925 ml   Net -946.45 ml       Physical Exam:     General Appearance:    Alert,  cooperative, in no acute distress   Head:    N/A   Throat:   N/A   Neck:   N/A   Lungs:     Clear to auscultation,respirations regular, even and                  unlabored    Heart:    Regular rhythm and normal rate, normal S1 and S2, no            murmur, no gallop, no rub   Abdomen:     Normal bowel sounds, no masses, no organomegaly, soft        non-tender, non-distended, no guarding, no rebound                tenderness   Extremities:   No edema, no cyanosis, no clubbing   Pulses:   N/A   Skin:   N/A   Lymph nodes:   N/A   Neurologic:   N/A          Lab Review:       Lab Results (last 24 hours)     Procedure Component Value Units Date/Time    Lipid Panel [[id number]]  (Abnormal) Collected:  09/01/19 0356    Specimen:  Blood Updated:  09/01/19 0513     Total Cholesterol 178 mg/dL      Triglycerides 70 mg/dL      HDL Cholesterol 51 mg/dL      LDL Cholesterol  122 mg/dL      LDL/HDL Ratio 2.22    Basic Metabolic Panel [[id number]]  (Abnormal) Collected:  09/01/19 0356    Specimen:  Blood Updated:  09/01/19 0502     Glucose 92 mg/dL      BUN 32 mg/dL      Creatinine 1.35 mg/dL      Sodium 141 mmol/L      Potassium 4.1 mmol/L      Chloride 102 mmol/L      CO2 32.0 mmol/L      Calcium 9.1 mg/dL      eGFR Non African Amer 40 mL/min/1.73      BUN/Creatinine Ratio 23.7     Anion Gap 7.0 mmol/L     Narrative:       GFR Normal >60  Chronic Kidney Disease <60  Kidney Failure <15    CBC & Differential [[id number]] Collected:  09/01/19 0356    Specimen:  Blood Updated:  09/01/19 0441    Narrative:       The following orders were created for panel order CBC & Differential.  Procedure                               Abnormality         Status                     ---------                               -----------         ------                     CBC Auto Differential[[id number]]        Abnormal            Final result                 Please view results for these tests on the individual orders.    CBC Auto Differential [[id number]]   (Abnormal) Collected:  09/01/19 0356    Specimen:  Blood Updated:  09/01/19 0441     WBC 7.21 10*3/mm3      RBC 4.06 10*6/mm3      Hemoglobin 10.4 g/dL      Hematocrit 33.7 %      MCV 83.0 fL      MCH 25.6 pg      MCHC 30.9 g/dL      RDW 16.4 %      RDW-SD 49.1 fl      MPV 11.2 fL      Platelets 289 10*3/mm3      Neutrophil % 68.6 %      Lymphocyte % 19.3 %      Monocyte % 7.6 %      Eosinophil % 3.1 %      Basophil % 1.0 %      Immature Grans % 0.4 %      Neutrophils, Absolute 4.95 10*3/mm3      Lymphocytes, Absolute 1.39 10*3/mm3      Monocytes, Absolute 0.55 10*3/mm3      Eosinophils, Absolute 0.22 10*3/mm3      Basophils, Absolute 0.07 10*3/mm3      Immature Grans, Absolute 0.03 10*3/mm3      nRBC 0.0 /100 WBC     Troponin [[id number]]  (Normal) Collected:  08/31/19 1106    Specimen:  Blood Updated:  08/31/19 1137     Troponin I 0.026 ng/mL         Cultures:         Radiology Review:  Imaging Results (last 24 hours)     ** No results found for the last 24 hours. **                   ASSESSMENT:      Hypertensive urgency    Acute congestive heart failure (CMS/HCC)    Elevated troponin    Iron deficiency anemia    Elevated serum creatinine    Cigarette smoker      PLAN:    1.  Increase dose of carvedilol  2.  IV iron  3.  Possible discharge tomorrow if blood pressure controlled after her third dose of IV iron.  4.  Discussed the need for colonoscopy to evaluate iron deficiency anemia.  She states that she had a colonoscopy scheduled recently but blood pressure was too elevated to have the procedure and it was canceled.  This will need to be rescheduled.      Hakan Agudelo MD  09/01/19  10:06 AM        Part of this note may be an electronic transcription/translation of spoken language to printed text using the Dragon Dictation System.    Electronically signed by Hakan Agudelo MD at 9/1/2019 10:07 AM          Consult Notes (last 7 days) (Notes from 08/26/19 through 09/02/19)      Soniya Chacon APRN  at 8/31/2019 12:17 PM      Consult Orders    1. Inpatient Cardiology Consult [[id number]] ordered by Hakan Agudelo MD at 08/31/19 1043           Attestation signed by Enrico De La Fuente MD at 8/31/2019  6:38 PM      I have seen and examined the patient. I have discussed the findings with the patient, their family and ISAIAH Noe.  I agree with her findings as documented above.    CC:  soa  Reason for consultation:  elevated troponin, chf  Subjective:  Patient is a 58yo smoker with uncontrolled HTN who presented to the ER with worsening shortness of breath.  Been describing exertional dyspnea with associated orthopnea for several days.  Aggravating factor: Uncontrolled blood pressure (had run out of and hypertensive several weeks ago).  She reports that her dyspnea would resolve within several minutes of rest, without any associated nausea or diaphoresis during this time..  No associated chest pain.  However, on a separate note, she has described a couple of episodes of exertional chest discomfort with associated nausea, but none as of late.  Shortness of breath has resolved back to baseline now after receiving IV Lasix.  She has no current complaints.  She is still requiring Cardene drip to maintain blood pressure control for the time being, his new oral medications are being initiated.    PFSH, ROS: as per ISAIAH Bee    Objective:  Vitals reviewed    GEN: NAD  LUNGS: normal effort, diffuse wheezes  CV: rrr, +II/VI USB systolic murmur  EXT: warm, no edema    DATA REVIEWED:   I have reviewed all laboratory data, with pertinent findings: TrI .022->.037->.026.  ProBNP 11,300.  Cr 1.57.  K 3.4, ALT 74, AST 55.  FeSat 8%    I have personally visualized the chest x-ray, with findings (my interpretation): diffuse increase in interstitial markings    I have reviewed telemetry, which shows no arrhythmias    I have visualized all the electrocardiograms performed, with my interpretations to follow: Sinus  bradycardia with first-degree AV block, left atrial enlargement, LVH    Results for orders placed during the hospital encounter of 08/30/19   Transthoracic Echo Complete With Contrast if Necessary Per Protocol    Narrative · Left ventricular systolic function is normal.  · Left ventricular wall thickness is consistent with mild concentric   hypertrophy.  · Left ventricular diastolic dysfunction (grade II) consistent with   pseudonormalization.  · Mild to moderate aortic valve regurgitation is present.  · Left atrial cavity size is moderately dilated.  · Normal size and function of the right ventricle.  · Estimated right ventricular systolic pressure from tricuspid   regurgitation is moderately elevated (45-55 mmHg).            Assessment and plans/recommendations:  I agree with the problems listed and plans provided by ISAIAH Noe.  This problem list and plans complement hers:    1) hypertensive urgency: Due to medication noncompliance.  Led to episode of acute diastolic heart failure.  -Continue Cardene infusion with initiation of oral antihypertensives  -Okay to transfer to floor from my standpoint once is safely off Cardene infusion    2) acute diastolic congestive heart failure: Symptoms now resolved after IV Lasix.  Precipitated by poorly controlled blood pressure.  Will not likely require daily diuretics at discharge so long as blood pressure remains well controlled.  We will need to  regarding daily weight assessment, sodium restriction, and compliance with an hypertensive medications.    3) atypical chest pain: None currently but does have multiple risk factors for CAD.  We will plan on outpatient stress echocardiogram within 1 to 2 weeks of discharge.    4) tobacco abuse: Encouraged cessation.    Thank you for the consultation.  Cardiology will gladly to continue to follow along with you.  I anticipate she may be ready for discharge home tomorrow.      Enrico De La Fuente MD                       [facility] CONSULT NOTE    Referring Provider: Orvilel Weston MD    Reason for Consultation: Elevated Troponin    Chief complaint: Sent from [facility] [place] due to HTN      History of present illness:   Ms. Ludivina Ramirez is a 59 y.o. female with history of HTN who presented to [facility] [place] today for complaints of shortness of breath. She was found to have significantly elevated BP and was referred to [facility] ER for further evaluation. Patient admitted to running out of her medications 2 weeks ago and has not had them filled.     Work-up in the ER included CXR that demonstrated enlarged cardiac silhouette. proBNP was elevated and repeat troponin was slightly elevated. Labs indicated MATTY and anemia. Iron panel revealed iron deficient anemia. Her blood pressure was recorded as 212/125 - she was started on Cardene drip and admitted to the ICU. She was also given IV diuretic and has responded well.     Cardiology consulted for further evaluation 2' to abnormal CXR, elevated troponin and proBNP.       History  Past Medical History:   Diagnosis Date   • Hypertension    • PTSD (post-traumatic stress disorder)    ,   Past Surgical History:   Procedure Laterality Date   • EXTERNAL FIXATION WRIST FRACTURE     • LEG SURGERY     • TUBAL ABDOMINAL LIGATION     ,   Family History   Problem Relation Age of Onset   • Coronary artery disease Mother    • Coronary artery disease Father    ,   Social History     Tobacco Use   • Smoking status: Current Every Day Smoker     Packs/day: 0.50   • Smokeless tobacco: Never Used   Substance Use Topics   • Alcohol use: No   • Drug use: No   ,     Medications  Current Facility-Administered Medications   Medication Dose Route Frequency Provider Last Rate Last Dose   • acetaminophen (TYLENOL) tablet 650 mg  650 mg Oral Q4H PRN Hakan Agudelo MD        Or   • acetaminophen (TYLENOL) 160 MG/5ML solution 650 mg  650 mg Oral Q4H PRN Hakan Agudelo MD         Or   • acetaminophen (TYLENOL) suppository 650 mg  650 mg Rectal Q4H PRN Hakan Agudelo MD       • aspirin EC tablet 81 mg  81 mg Oral Daily Hakan Agudelo MD   81 mg at 08/31/19 1128   • butalbital-acetaminophen-caffeine (FIORICET, ESGIC) -40 MG per tablet 1 tablet  1 tablet Oral Q6H PRN Hakan Agudelo MD   1 tablet at 08/31/19 1117   • carvedilol (COREG) tablet 6.25 mg  6.25 mg Oral BID With Meals Hakan Agudelo MD   6.25 mg at 08/31/19 1128   • iron sucrose (VENOFER) 200 mg in sodium chloride 0.9 % 100 mL IVPB  200 mg Intravenous Q24H Hakan Agudelo MD   200 mg at 08/31/19 1202   • niCARdipine (CARDENE) 25 mg/250 mL (0.1 mg/mL) 0.9% NS infusion  5-15 mg/hr Intravenous Titrated Quinn Gilman PA-C 50 mL/hr at 08/31/19 1033 5 mg/hr at 08/31/19 1033   • ondansetron (ZOFRAN) injection 4 mg  4 mg Intravenous Q6H PRN Hakan Agudelo MD       • sodium chloride 0.9 % flush 10 mL  10 mL Intravenous Q12H Hakan Agudelo MD   10 mL at 08/31/19 0823   • sodium chloride 0.9 % flush 10 mL  10 mL Intravenous PRN Hakan Agudelo MD           Allergies:  Codeine    REVIEW OF SYSTEMS:  Constitutional: Denies fever or chills. Denies change in energy level or malaise.  HEENT: Denies headaches or visual disturbances. Denies difficulty swallowing or sore throat.  Respiratory: Progressive shortness of breath.   Cardiovascular: Denies chest pain or pressure. Denies palpitations. Denies presyncope/syncope. Denies orthopnea/PND. Denies lower extremity edema.   Gastrointestinal: Denies abdominal pain. Denies nausea/vomiting. Denies change in bowel habits or history of recent GI tract blood loss.   Genitourinary: Denies urinary urgency or frequency. Denies dysuria, hematuria.  Musculoskeletal: Denies pain or swelling in joints.  Neurological: Denies paresthesias. Denies headache. Denies seizure or stroke symptoms.  Behavioral/Psych: Denies problems with anxiety or  "depression.      All other systems are negative except where stated above.        Objective     Physical Exam:    BP (!) 134/106   Pulse 69   Temp 98 °F (36.7 °C) (Oral)   Resp 16   Ht 167.6 cm (66\")   Wt 82.6 kg (182 lb 3.2 oz)   SpO2 91%   BMI 29.41 kg/m²         General Appearance:    Alert, cooperative, in no acute distress   Head:    Normocephalic, without obvious abnormality, atraumatic   Eyes:            Lids and lashes normal, conjunctivae and sclerae normal, no icterus, no pallor, corneas clear, PERRLA   Ears:    Ears appear intact with no abnormalities noted   Throat:   No oral lesions, no thrush, oral mucosa moist   Neck:   No adenopathy, supple, trachea midline, no thyromegaly, no carotid bruit, no JVD   Lungs:     Clear, diminished throughout with expiratory wheezes to auscultation, respirations regular, even and unlabored    Heart:    Regular rhythm and normal rate, normal S1 and S2, no murmur, no gallop, no rub, no click   Breast Exam:    Deferred   Abdomen:     Normal bowel sounds, no masses, no organomegaly, soft non-tender, non-distended, no guarding, no rebound tenderness   Extremities:   Moves all extremities well, no edema, no cyanosis, no redness   Pulses:   Pulses palpable and equal bilaterally   Skin:   No bleeding, bruising or rash   Lymph nodes:   No palpable adenopathy   Neurologic:   Cranial nerves 2 - 12 grossly intact               Results Review:   I reviewed the patient's new clinical results.    Lab Results (last 72 hours)     Procedure Component Value Units Date/Time    Troponin [[id number]]  (Normal) Collected:  08/31/19 1106    Specimen:  Blood Updated:  08/31/19 1137     Troponin I 0.026 ng/mL     Hemoglobin A1c [[id number]]  (Normal) Collected:  08/31/19 0258    Specimen:  Blood Updated:  08/31/19 0711     Hemoglobin A1C 5.7 %     Narrative:       Less than 6.0           Non-Diabetic Range  6.0-7.0                 ADA Therapeutic Target  Greater than 7.0        Action " Suggested    Vitamin B12 [[id number]]  (Normal) Collected:  08/31/19 0258    Specimen:  Blood Updated:  08/31/19 0437     Vitamin B-12 427 pg/mL     Folate [[id number]]  (Normal) Collected:  08/31/19 0258    Specimen:  Blood Updated:  08/31/19 0437     Folate 14.80 ng/mL     Ferritin [[id number]]  (Normal) Collected:  08/31/19 0258    Specimen:  Blood Updated:  08/31/19 0406     Ferritin 19.30 ng/mL     TSH [[id number]]  (Normal) Collected:  08/31/19 0258    Specimen:  Blood Updated:  08/31/19 0402     TSH 1.050 uIU/mL     T4, Free [[id number]]  (Normal) Collected:  08/31/19 0258    Specimen:  Blood Updated:  08/31/19 0349     Free T4 1.02 ng/dL     Troponin [[id number]]  (Abnormal) Collected:  08/31/19 0258    Specimen:  Blood Updated:  08/31/19 0343     Troponin I 0.037 ng/mL     Iron Profile [[id number]]  (Abnormal) Collected:  08/31/19 0258    Specimen:  Blood Updated:  08/31/19 0341     Iron 29 mcg/dL      TIBC 350 mcg/dL      Iron Saturation 8 %     Comprehensive Metabolic Panel [[id number]]  (Abnormal) Collected:  08/31/19 0258    Specimen:  Blood Updated:  08/31/19 0332     Glucose 94 mg/dL      BUN 38 mg/dL      Creatinine 1.57 mg/dL      Sodium 142 mmol/L      Potassium 3.4 mmol/L      Chloride 102 mmol/L      CO2 32.0 mmol/L      Calcium 8.6 mg/dL      Total Protein 6.5 g/dL      Albumin 3.50 g/dL      ALT (SGPT) 74 U/L      AST (SGOT) 55 U/L      Alkaline Phosphatase 111 U/L      Total Bilirubin 0.5 mg/dL      eGFR Non African Amer 34 mL/min/1.73      Globulin 3.0 gm/dL      A/G Ratio 1.2 g/dL      BUN/Creatinine Ratio 24.2     Anion Gap 8.0 mmol/L     Narrative:       GFR Normal >60  Chronic Kidney Disease <60  Kidney Failure <15    Magnesium [[id number]]  (Normal) Collected:  08/31/19 0258    Specimen:  Blood Updated:  08/31/19 0332     Magnesium 2.1 mg/dL     CBC Auto Differential [[id number]]  (Abnormal) Collected:  08/31/19 0258    Specimen:  Blood Updated:  08/31/19 0318     WBC 6.14 10*3/mm3      RBC 3.82  10*6/mm3      Hemoglobin 9.6 g/dL      Hematocrit 31.7 %      MCV 83.0 fL      MCH 25.1 pg      MCHC 30.3 g/dL      RDW 16.3 %      RDW-SD 49.8 fl      MPV 11.2 fL      Platelets 238 10*3/mm3      Neutrophil % 70.6 %      Lymphocyte % 18.6 %      Monocyte % 6.7 %      Eosinophil % 3.3 %      Basophil % 0.5 %      Immature Grans % 0.3 %      Neutrophils, Absolute 4.34 10*3/mm3      Lymphocytes, Absolute 1.14 10*3/mm3      Monocytes, Absolute 0.41 10*3/mm3      Eosinophils, Absolute 0.20 10*3/mm3      Basophils, Absolute 0.03 10*3/mm3      Immature Grans, Absolute 0.02 10*3/mm3      nRBC 0.0 /100 WBC     TSH [[id number]]  (Normal) Collected:  08/30/19 1819    Specimen:  Blood Updated:  08/30/19 1912     TSH 1.160 uIU/mL     Urinalysis, Microscopic Only - Urine, Clean Catch [[id number]]  (Abnormal) Collected:  08/30/19 1859    Specimen:  Urine, Clean Catch Updated:  08/30/19 1911     RBC, UA 3-5 /HPF      WBC, UA 0-2 /HPF      Bacteria, UA None Seen /HPF      Squamous Epithelial Cells, UA 0-2 /HPF      Hyaline Casts, UA None Seen /LPF      Methodology Automated Microscopy    Urinalysis With Culture If Indicated - Urine, Clean Catch [[id number]]  (Abnormal) Collected:  08/30/19 1859    Specimen:  Urine, Clean Catch Updated:  08/30/19 1911     Color, UA Yellow     Appearance, UA Clear     pH, UA 6.5     Specific Gravity, UA 1.014     Glucose, UA Negative     Ketones, UA Negative     Bilirubin, UA Negative     Blood, UA Negative     Protein, UA 30 mg/dL (1+)     Leuk Esterase, UA Negative     Nitrite, UA Negative     Urobilinogen, UA 0.2 E.U./dL    BNP [[id number]]  (Abnormal) Collected:  08/30/19 1819    Specimen:  Blood Updated:  08/30/19 1853     proBNP 11,300.0 pg/mL     Troponin [[id number]]  (Normal) Collected:  08/30/19 1819    Specimen:  Blood Updated:  08/30/19 1853     Troponin I 0.022 ng/mL     Comprehensive Metabolic Panel [[id number]]  (Abnormal) Collected:  08/30/19 1819    Specimen:  Blood Updated:  08/30/19 1843      Glucose 91 mg/dL      BUN 41 mg/dL      Creatinine 1.58 mg/dL      Sodium 137 mmol/L      Potassium 4.1 mmol/L      Chloride 101 mmol/L      CO2 32.0 mmol/L      Calcium 8.6 mg/dL      Total Protein 7.0 g/dL      Albumin 3.70 g/dL      ALT (SGPT) 87 U/L      AST (SGOT) 81 U/L      Alkaline Phosphatase 125 U/L      Total Bilirubin 0.6 mg/dL      eGFR Non African Amer 33 mL/min/1.73      Globulin 3.3 gm/dL      A/G Ratio 1.1 g/dL      BUN/Creatinine Ratio 25.9     Anion Gap 4.0 mmol/L     Narrative:       GFR Normal >60  Chronic Kidney Disease <60  Kidney Failure <15    Magnesium [[id number]]  (Abnormal) Collected:  08/30/19 1819    Specimen:  Blood Updated:  08/30/19 1843     Magnesium 2.4 mg/dL     CBC & Differential [[id number]] Collected:  08/30/19 1819    Specimen:  Blood Updated:  08/30/19 1834    Narrative:       The following orders were created for panel order CBC & Differential.  Procedure                               Abnormality         Status                     ---------                               -----------         ------                     CBC Auto Differential[[id number]]        Abnormal            Final result                 Please view results for these tests on the individual orders.    CBC Auto Differential [[id number]]  (Abnormal) Collected:  08/30/19 1819    Specimen:  Blood Updated:  08/30/19 1834     WBC 5.97 10*3/mm3      RBC 3.88 10*6/mm3      Hemoglobin 9.9 g/dL      Hematocrit 32.6 %      MCV 84.0 fL      MCH 25.5 pg      MCHC 30.4 g/dL      RDW 16.3 %      RDW-SD 49.7 fl      MPV 10.6 fL      Platelets 249 10*3/mm3      Neutrophil % 70.8 %      Lymphocyte % 18.9 %      Monocyte % 6.9 %      Eosinophil % 2.2 %      Basophil % 0.7 %      Immature Grans % 0.5 %      Neutrophils, Absolute 4.23 10*3/mm3      Lymphocytes, Absolute 1.13 10*3/mm3      Monocytes, Absolute 0.41 10*3/mm3      Eosinophils, Absolute 0.13 10*3/mm3      Basophils, Absolute 0.04 10*3/mm3      Immature Grans,  Absolute 0.03 10*3/mm3      nRBC 0.0 /100 WBC             Imaging Results (last 72 hours)     Procedure Component Value Units Date/Time    XR Chest 1 View [[id number]] Collected:  08/30/19 1917     Updated:  08/30/19 1921    Narrative:       XR CHEST 1 VW- 8/30/2019 7:11 PM CDT     HISTORY: Shortness of air       COMPARISON: 04/05/2018.     FINDINGS:   There is a stable nodule in the RIGHT upper lobe. Mild atelectatic  changes are seen in the LEFT lung base. The cardiac silhouette is  enlarged compared to the previous study, but this may be due to  technique.      The osseous structures and surrounding soft tissues demonstrate no acute  abnormality.       Impression:       1. Enlarged cardiac silhouette since the previous exam. Findings could  be due to technique, pericardial effusion, or interval enlargement of  the heart.  This report was finalized on 08/30/2019 19:18 by Dr. Maximino Curiel MD.                Assessment/Plan       Hypertensive urgency with Underlying Essential HTN - Improved with Cardene. May need renal US to R/O stenosis.     Acute congestive heart failure (CMS/HCC), Likely Diastolic 2' to Uncontrolled HTN - check 2D Echocardiogram    Elevated troponin, Likely Leak 2' to Hypertensive Urgency - Has Family CAD. Will need Outpatient Stress Testing    Elevated serum creatinine - Unknown CKD    Iron deficiency anemia - Receiving Venofer    Cigarette smoker - Encouraged to Stop      Check 2D Echocardiogram. Adjust antihypertensive medications. Outpatient stress testing, unless warrants inpatient evaluation.     Further orders per Dr. De La Fuente.    Thank you for asking us to follow this patient with you. Please note that this documentation resulted in a face-to-face encounter provided by me.       ISAIAH Daly        Electronically signed by Enrico De La Fuente MD at 8/31/2019  6:38 PM

## 2019-09-02 NOTE — PROGRESS NOTES
"Progress Note  Ludivina Ramirez  9/2/2019 12:35 PM  Subjective:   Admit Date:   8/30/2019      CC/ADMIT DX:       Interval History:   Reviewed overnight events and nursing notes. She has no c/o CP or SOA. No HA currently.   I have reviewed all labs/diagnostics from the last 24hrs.       ROS:   I have done a 10 point ROS and all are negative, except what is mentioned in the HPI.    Diet Regular; Cardiac    Medications:     niCARdipine 5-15 mg/hr Last Rate: 5 mg/hr (09/02/19 1224)       aspirin 81 mg Oral Daily   carvedilol 12.5 mg Oral BID With Meals   hydrochlorothiazide 25 mg Oral Daily   lisinopril 20 mg Oral Q24H   nicotine 1 patch Transdermal Nightly   sodium chloride 10 mL Intravenous Q12H           Objective:   Vitals: /89   Pulse 67   Temp 97.8 °F (36.6 °C) (Oral)   Resp 16   Ht 166.4 cm (65.5\")   Wt 81.9 kg (180 lb 8 oz)   SpO2 93%   BMI 29.58 kg/m²    Intake/Output Summary (Last 24 hours) at 9/2/2019 1235  Last data filed at 9/2/2019 1224  Gross per 24 hour   Intake 580 ml   Output --   Net 580 ml     General appearance: alert and cooperative with exam  Lungs: clear to auscultation bilaterally  Heart: regular rate and rhythm, S1, S2 normal, no murmur, click, rub or gallop  Abdomen: soft, non-tender; bowel sounds normal; no masses,  no organomegaly  Extremities: extremities normal, atraumatic, no cyanosis or edema  Neurologic:  No obvious focal neurologic deficits.    Assessment and Plan:     Hypertensive urgency    Acute congestive heart failure (CMS/HCC)    Elevated troponin    Iron deficiency anemia    Elevated serum creatinine    Cigarette smoker      Plan:  1.  Continue present medication(s)   2.  Follow with Cardiology  3.  Continue Cardene gtt as needed  4.  Increase activity      Discharge planning:   her home     Reviewed treatment plans with the patient and/or family.             Electronically signed by Orville Weston MD on 9/2/2019 at 12:35 PM  "

## 2019-09-02 NOTE — NURSING NOTE
23:24 bp 180/107  23:28 Clonidine 0.1 mg po  0032 191/106  0039 labetalol 20 IV  0145 184/100  Called Dr. Agudelo.    Received orders to give---   HCTZ daily (to start now)   clonidine 0.1 mg po (now)   Increase lisinopril to 20 mg daily (to start now).

## 2019-09-03 ENCOUNTER — APPOINTMENT (OUTPATIENT)
Dept: ULTRASOUND IMAGING | Facility: HOSPITAL | Age: 59
End: 2019-09-03

## 2019-09-03 LAB
ANION GAP SERPL CALCULATED.3IONS-SCNC: 8 MMOL/L (ref 4–13)
BUN BLD-MCNC: 29 MG/DL (ref 5–21)
BUN/CREAT SERPL: 22.1 (ref 7–25)
CALCIUM SPEC-SCNC: 9.1 MG/DL (ref 8.4–10.4)
CHLORIDE SERPL-SCNC: 106 MMOL/L (ref 98–110)
CO2 SERPL-SCNC: 28 MMOL/L (ref 24–31)
CREAT BLD-MCNC: 1.31 MG/DL (ref 0.5–1.4)
GFR SERPL CREATININE-BSD FRML MDRD: 42 ML/MIN/1.73
GLUCOSE BLD-MCNC: 92 MG/DL (ref 70–100)
POTASSIUM BLD-SCNC: 4.1 MMOL/L (ref 3.5–5.3)
SODIUM BLD-SCNC: 142 MMOL/L (ref 135–145)

## 2019-09-03 PROCEDURE — 93970 EXTREMITY STUDY: CPT | Performed by: SURGERY

## 2019-09-03 PROCEDURE — 80307 DRUG TEST PRSMV CHEM ANLYZR: CPT | Performed by: INTERNAL MEDICINE

## 2019-09-03 PROCEDURE — 99232 SBSQ HOSP IP/OBS MODERATE 35: CPT | Performed by: NURSE PRACTITIONER

## 2019-09-03 PROCEDURE — 94799 UNLISTED PULMONARY SVC/PX: CPT

## 2019-09-03 PROCEDURE — 80048 BASIC METABOLIC PNL TOTAL CA: CPT | Performed by: FAMILY MEDICINE

## 2019-09-03 PROCEDURE — 93970 EXTREMITY STUDY: CPT

## 2019-09-03 PROCEDURE — 76775 US EXAM ABDO BACK WALL LIM: CPT

## 2019-09-03 RX ORDER — FLUTICASONE PROPIONATE 50 MCG
2 SPRAY, SUSPENSION (ML) NASAL EVERY MORNING
COMMUNITY
End: 2022-10-26 | Stop reason: SDUPTHER

## 2019-09-03 RX ORDER — CETIRIZINE HYDROCHLORIDE 10 MG/1
5 TABLET ORAL DAILY
Status: DISCONTINUED | OUTPATIENT
Start: 2019-09-03 | End: 2019-09-09 | Stop reason: HOSPADM

## 2019-09-03 RX ORDER — LISINOPRIL 20 MG/1
20 TABLET ORAL EVERY 12 HOURS SCHEDULED
Status: DISCONTINUED | OUTPATIENT
Start: 2019-09-03 | End: 2019-09-08

## 2019-09-03 RX ORDER — FLUTICASONE PROPIONATE 50 MCG
2 SPRAY, SUSPENSION (ML) NASAL DAILY
Status: DISCONTINUED | OUTPATIENT
Start: 2019-09-03 | End: 2019-09-09 | Stop reason: HOSPADM

## 2019-09-03 RX ORDER — ACETAMINOPHEN, ASPIRIN AND CAFFEINE 250; 250; 65 MG/1; MG/1; MG/1
1 TABLET, FILM COATED ORAL EVERY 8 HOURS PRN
COMMUNITY
End: 2019-09-26 | Stop reason: HOSPADM

## 2019-09-03 RX ORDER — AZELASTINE 1 MG/ML
2 SPRAY, METERED NASAL 2 TIMES DAILY
COMMUNITY
End: 2022-10-26 | Stop reason: SDUPTHER

## 2019-09-03 RX ADMIN — SODIUM CHLORIDE 5 MG/HR: 9 INJECTION, SOLUTION INTRAVENOUS at 10:55

## 2019-09-03 RX ADMIN — CARVEDILOL 12.5 MG: 6.25 TABLET, FILM COATED ORAL at 17:17

## 2019-09-03 RX ADMIN — BUTALBITAL, ACETAMINOPHEN, AND CAFFEINE 1 TABLET: 50; 325; 40 TABLET ORAL at 00:44

## 2019-09-03 RX ADMIN — SODIUM CHLORIDE 7.5 MG/HR: 9 INJECTION, SOLUTION INTRAVENOUS at 21:04

## 2019-09-03 RX ADMIN — SODIUM CHLORIDE 5 MG/HR: 9 INJECTION, SOLUTION INTRAVENOUS at 00:39

## 2019-09-03 RX ADMIN — CETIRIZINE HYDROCHLORIDE 5 MG: 5 TABLET ORAL at 15:10

## 2019-09-03 RX ADMIN — NICOTINE 1 PATCH: 14 PATCH TRANSDERMAL at 20:19

## 2019-09-03 RX ADMIN — SODIUM CHLORIDE, PRESERVATIVE FREE 10 ML: 5 INJECTION INTRAVENOUS at 09:04

## 2019-09-03 RX ADMIN — CARVEDILOL 12.5 MG: 6.25 TABLET, FILM COATED ORAL at 09:03

## 2019-09-03 RX ADMIN — ACETAMINOPHEN 650 MG: 325 TABLET, FILM COATED ORAL at 03:37

## 2019-09-03 RX ADMIN — ACETAMINOPHEN 650 MG: 325 TABLET, FILM COATED ORAL at 20:30

## 2019-09-03 RX ADMIN — FLUTICASONE PROPIONATE 2 SPRAY: 50 SPRAY, METERED NASAL at 15:10

## 2019-09-03 RX ADMIN — HYDROCHLOROTHIAZIDE 25 MG: 25 TABLET ORAL at 09:03

## 2019-09-03 RX ADMIN — LISINOPRIL 20 MG: 20 TABLET ORAL at 09:04

## 2019-09-03 RX ADMIN — BUTALBITAL, ACETAMINOPHEN, AND CAFFEINE 1 TABLET: 50; 325; 40 TABLET ORAL at 09:03

## 2019-09-03 RX ADMIN — LISINOPRIL 20 MG: 20 TABLET ORAL at 20:17

## 2019-09-03 RX ADMIN — BUTALBITAL, ACETAMINOPHEN, AND CAFFEINE 1 TABLET: 50; 325; 40 TABLET ORAL at 15:58

## 2019-09-03 RX ADMIN — SODIUM CHLORIDE, PRESERVATIVE FREE 10 ML: 5 INJECTION INTRAVENOUS at 20:19

## 2019-09-03 RX ADMIN — ASPIRIN 81 MG: 81 TABLET ORAL at 09:03

## 2019-09-03 RX ADMIN — SODIUM CHLORIDE 5 MG/HR: 9 INJECTION, SOLUTION INTRAVENOUS at 15:08

## 2019-09-03 RX ADMIN — SODIUM CHLORIDE 5 MG/HR: 9 INJECTION, SOLUTION INTRAVENOUS at 05:25

## 2019-09-03 NOTE — PROGRESS NOTES
[facility] -  Progress Note     LOS: 4 days   Patient Care Team:  Orville Weston MD as PCP - General (Family Medicine)  Juanpablo Rea MD as Consulting Physician (Gastroenterology)    Chief Complaint:hypertension    Subjective     Interval History:   Patient was started back on cardene last night due to -200s. She received Clonidine and labetolol last night due to hypertension. She has received her morning BP medications and remains on Cardene at 5mg with SBP in the 150s. She complains of a sinus headache and a hypertension headache today. She denies chest pain, shortness of breath and palpitations.     Review of Systems:   Review of Systems   Constitutional: Negative for diaphoresis, fatigue and unexpected weight change.   Respiratory: Negative for chest tightness, shortness of breath and wheezing.    Cardiovascular: Negative for chest pain, palpitations and leg swelling.   Gastrointestinal: Positive for nausea. Negative for diarrhea and vomiting.   Neurological: Positive for headaches. Negative for dizziness, syncope and light-headedness.       Objective     Vital Sign Min/Max for last 24 hours  Temp  Min: 97.8 °F (36.6 °C)  Max: 98.4 °F (36.9 °C)   BP  Min: 106/64  Max: 204/120   Pulse  Min: 60  Max: 80   Resp  Min: 16  Max: 22   SpO2  Min: 90 %  Max: 98 %   No Data Recorded   No Data Recorded         09/01/19 1957   Weight: 81.9 kg (180 lb 8 oz)         Intake/Output Summary (Last 24 hours) at 9/3/2019 1121  Last data filed at 9/3/2019 1100  Gross per 24 hour   Intake 2355 ml   Output 4200 ml   Net -1845 ml         Physical Exam:  Physical Exam   Constitutional: She is oriented to person, place, and time. She appears well-developed and well-nourished. No distress.   HENT:   Head: Normocephalic.   Mouth/Throat: No oropharyngeal exudate.   Eyes: Conjunctivae and EOM are normal. Pupils are equal, round, and reactive to light. No scleral icterus.   Neck: Normal range of  motion. Neck supple.   Cardiovascular: Normal rate, regular rhythm and intact distal pulses.   Murmur heard.  Pulmonary/Chest: Effort normal and breath sounds normal. No respiratory distress. She has no wheezes. She has no rales. She exhibits no tenderness.   Abdominal: Soft. Bowel sounds are normal. She exhibits no distension. There is no tenderness.   Musculoskeletal: Normal range of motion. She exhibits no edema.   Neurological: She is alert and oriented to person, place, and time. She has normal reflexes.   Skin: Skin is warm and dry. No rash noted. She is not diaphoretic. No erythema. No pallor.   Psychiatric: She has a normal mood and affect. Her behavior is normal.   Vitals reviewed.       Results Review:   Lab Results (last 72 hours)     Procedure Component Value Units Date/Time    Basic Metabolic Panel [[id number]]  (Abnormal) Collected:  09/03/19 0309    Specimen:  Blood Updated:  09/03/19 0337     Glucose 92 mg/dL      BUN 29 mg/dL      Creatinine 1.31 mg/dL      Sodium 142 mmol/L      Potassium 4.1 mmol/L      Chloride 106 mmol/L      CO2 28.0 mmol/L      Calcium 9.1 mg/dL      eGFR Non African Amer 42 mL/min/1.73      BUN/Creatinine Ratio 22.1     Anion Gap 8.0 mmol/L     Narrative:       GFR Normal >60  Chronic Kidney Disease <60  Kidney Failure <15    Lipid Panel [[id number]]  (Abnormal) Collected:  09/01/19 0356    Specimen:  Blood Updated:  09/01/19 0513     Total Cholesterol 178 mg/dL      Triglycerides 70 mg/dL      HDL Cholesterol 51 mg/dL      LDL Cholesterol  122 mg/dL      LDL/HDL Ratio 2.22    Basic Metabolic Panel [[id number]]  (Abnormal) Collected:  09/01/19 0356    Specimen:  Blood Updated:  09/01/19 0502     Glucose 92 mg/dL      BUN 32 mg/dL      Creatinine 1.35 mg/dL      Sodium 141 mmol/L      Potassium 4.1 mmol/L      Chloride 102 mmol/L      CO2 32.0 mmol/L      Calcium 9.1 mg/dL      eGFR Non African Amer 40 mL/min/1.73      BUN/Creatinine Ratio 23.7     Anion Gap 7.0 mmol/L      Narrative:       GFR Normal >60  Chronic Kidney Disease <60  Kidney Failure <15    CBC & Differential [[id number]] Collected:  09/01/19 0356    Specimen:  Blood Updated:  09/01/19 0441    Narrative:       The following orders were created for panel order CBC & Differential.  Procedure                               Abnormality         Status                     ---------                               -----------         ------                     CBC Auto Differential[[id number]]        Abnormal            Final result                 Please view results for these tests on the individual orders.    CBC Auto Differential [[id number]]  (Abnormal) Collected:  09/01/19 0356    Specimen:  Blood Updated:  09/01/19 0441     WBC 7.21 10*3/mm3      RBC 4.06 10*6/mm3      Hemoglobin 10.4 g/dL      Hematocrit 33.7 %      MCV 83.0 fL      MCH 25.6 pg      MCHC 30.9 g/dL      RDW 16.4 %      RDW-SD 49.1 fl      MPV 11.2 fL      Platelets 289 10*3/mm3      Neutrophil % 68.6 %      Lymphocyte % 19.3 %      Monocyte % 7.6 %      Eosinophil % 3.1 %      Basophil % 1.0 %      Immature Grans % 0.4 %      Neutrophils, Absolute 4.95 10*3/mm3      Lymphocytes, Absolute 1.39 10*3/mm3      Monocytes, Absolute 0.55 10*3/mm3      Eosinophils, Absolute 0.22 10*3/mm3      Basophils, Absolute 0.07 10*3/mm3      Immature Grans, Absolute 0.03 10*3/mm3      nRBC 0.0 /100 WBC     Troponin [[id number]]  (Normal) Collected:  08/31/19 1106    Specimen:  Blood Updated:  08/31/19 1137     Troponin I 0.026 ng/mL           Medication Review: yes  Current Facility-Administered Medications   Medication Dose Route Frequency Provider Last Rate Last Dose   • acetaminophen (TYLENOL) tablet 650 mg  650 mg Oral Q4H PRN Hakan Agudelo MD   650 mg at 09/03/19 0337    Or   • acetaminophen (TYLENOL) 160 MG/5ML solution 650 mg  650 mg Oral Q4H PRN Hakan Agudelo MD        Or   • acetaminophen (TYLENOL) suppository 650 mg  650 mg Rectal Q4H PRN Hakan Agudelo  MD Amilcar       • aspirin EC tablet 81 mg  81 mg Oral Daily Hakan Agudelo MD   81 mg at 09/03/19 0903   • butalbital-acetaminophen-caffeine (FIORICET, ESGIC) -40 MG per tablet 1 tablet  1 tablet Oral Q6H PRN Hakan Agudelo MD   1 tablet at 09/03/19 0903   • carvedilol (COREG) tablet 12.5 mg  12.5 mg Oral BID With Meals Hakan Agudelo MD   12.5 mg at 09/03/19 0903   • CloNIDine (CATAPRES) tablet 0.1 mg  0.1 mg Oral Q6H PRN Hakan Agudelo MD   0.1 mg at 09/02/19 2153   • hydrochlorothiazide (HYDRODIURIL) tablet 25 mg  25 mg Oral Daily Hakan Agudelo MD   25 mg at 09/03/19 0903   • labetalol (NORMODYNE,TRANDATE) injection 20 mg  20 mg Intravenous Q2H PRN Hakan Agudelo MD   20 mg at 09/02/19 1937   • lisinopril (PRINIVIL,ZESTRIL) tablet 20 mg  20 mg Oral Q24H Hakan Agudelo MD   20 mg at 09/03/19 0904   • niCARdipine (CARDENE) 25 mg/250 mL (0.1 mg/mL) 0.9% NS infusion  5-15 mg/hr Intravenous Titrated Hakan Agudelo MD 50 mL/hr at 09/03/19 1055 5 mg/hr at 09/03/19 1055   • nicotine (NICODERM CQ) 14 MG/24HR patch 1 patch  1 patch Transdermal Nightly Hakan Agudelo MD   1 patch at 09/02/19 2247   • ondansetron (ZOFRAN) injection 4 mg  4 mg Intravenous Q6H PRN Hakan Agudelo MD       • sodium chloride 0.9 % flush 10 mL  10 mL Intravenous Q12H Hakan Agudelo MD   10 mL at 09/03/19 0904   • sodium chloride 0.9 % flush 10 mL  10 mL Intravenous PRN Hakan Agudelo MD         Assessment/Plan       Hypertensive urgency    Acute congestive heart failure (CMS/HCC)    Elevated troponin    Iron deficiency anemia    Elevated serum creatinine    Cigarette smoker    Plan:  -BP remains elevated today. Increase Lisinopril to 20mg BID. Starting tonight. Continue PRN Cardene.  -kidney function improved today. Will order Bilateral renal US.   -ischemic workup outpatient 2-4 weeks post-discharge.     Further recommendations per Dr. De La Fuente.     Mary BRIONES  ISAIAH Rodriguez  09/03/19  11:21 AM

## 2019-09-03 NOTE — PLAN OF CARE
Problem: Patient Care Overview  Goal: Plan of Care Review  Outcome: Ongoing (interventions implemented as appropriate)   09/03/19 7751   Coping/Psychosocial   Plan of Care Reviewed With patient   OTHER   Outcome Summary pt started back on cardene last night due to -200s, catapres given x1, labetolol given x1. Pt c/o pain in her arm where an iv infiltrated, and also c/o headache.      Goal: Individualization and Mutuality  Outcome: Ongoing (interventions implemented as appropriate)    Goal: Discharge Needs Assessment  Outcome: Ongoing (interventions implemented as appropriate)    Goal: Interprofessional Rounds/Family Conf  Outcome: Ongoing (interventions implemented as appropriate)      Problem: Pain, Chronic (Adult)  Goal: Identify Related Risk Factors and Signs and Symptoms  Outcome: Ongoing (interventions implemented as appropriate)    Goal: Acceptable Pain/Comfort Level and Functional Ability  Outcome: Ongoing (interventions implemented as appropriate)      Problem: Hypertensive Disease/Crisis (Arterial) (Adult)  Goal: Signs and Symptoms of Listed Potential Problems Will be Absent, Minimized or Managed (Hypertensive Disease/Crisis)  Outcome: Ongoing (interventions implemented as appropriate)      Problem: Anemia (Adult)  Goal: Identify Related Risk Factors and Signs and Symptoms  Outcome: Ongoing (interventions implemented as appropriate)    Goal: Symptom Improvement  Outcome: Ongoing (interventions implemented as appropriate)

## 2019-09-03 NOTE — PROGRESS NOTES
"Progress Note  Ludivina Ramirez  9/3/2019 1:00 PM  Subjective:   Admit Date:   8/30/2019      CC/ADMIT DX:       Interval History:   Reviewed overnight events and nursing notes. She has c/o sinus congestion and PND. No cough or SOA. No CP. She has pain in her right UE where her IV was located.   I have reviewed all labs/diagnostics from the last 24hrs.       ROS:   I have done a 10 point ROS and all are negative, except what is mentioned in the HPI.    Diet Regular; Cardiac    Medications:       niCARdipine 5-15 mg/hr Last Rate: 5 mg/hr (09/03/19 1055)       aspirin 81 mg Oral Daily   carvedilol 12.5 mg Oral BID With Meals   cetirizine 5 mg Oral Daily   fluticasone 2 spray Each Nare Daily   hydrochlorothiazide 25 mg Oral Daily   lisinopril 20 mg Oral Q12H   nicotine 1 patch Transdermal Nightly   sodium chloride 10 mL Intravenous Q12H           Objective:   Vitals: /86   Pulse 68   Temp 98.2 °F (36.8 °C) (Oral)   Resp 16   Ht 166.4 cm (65.5\")   Wt 81.9 kg (180 lb 8 oz)   SpO2 93%   BMI 29.58 kg/m²      Intake/Output Summary (Last 24 hours) at 9/3/2019 1300  Last data filed at 9/3/2019 1100  Gross per 24 hour   Intake 2015 ml   Output 4200 ml   Net -2185 ml     General appearance: alert and cooperative with exam  Lungs: clear to auscultation bilaterally  Heart: RRR  Abdomen: soft, non-tender; bowel sounds normal; no masses,  no organomegaly  Extremities: extremities normal, atraumatic, no cyanosis or edema, right UE, has redness and some edema at IV site, some pain with palpation  Neurologic:  No obvious focal neurologic deficits.    Assessment and Plan:     Hypertensive urgency    Acute congestive heart failure (CMS/HCC)    Elevated troponin    Iron deficiency anemia    Elevated serum creatinine    Cigarette smoker    Right UE Pain at IV Site    Plan:  1.  Continue present medication(s)   2.  Follow with Cardiology  3.  Continue Cardene gtt as needed  4.  Increase activity  5.  Treat sinus congestion  6.  " Venous US of right UE, supportive care      Discharge planning:   her home     Reviewed treatment plans with the patient and/or family.             Electronically signed by Orville Weston MD on 9/3/2019 at 1:00 PM

## 2019-09-04 PROBLEM — I50.9 ACUTE CONGESTIVE HEART FAILURE: Status: RESOLVED | Noted: 2019-08-31 | Resolved: 2019-09-04

## 2019-09-04 PROBLEM — I35.1 NONRHEUMATIC AORTIC VALVE INSUFFICIENCY: Status: ACTIVE | Noted: 2019-09-04

## 2019-09-04 LAB
AMPHET+METHAMPHET UR QL: NEGATIVE
BARBITURATES UR QL SCN: POSITIVE
BENZODIAZ UR QL SCN: NEGATIVE
CANNABINOIDS SERPL QL: NEGATIVE
COCAINE UR QL: NEGATIVE
METHADONE UR QL SCN: NEGATIVE
OPIATES UR QL: NEGATIVE
PCP UR QL SCN: NEGATIVE

## 2019-09-04 PROCEDURE — 99232 SBSQ HOSP IP/OBS MODERATE 35: CPT | Performed by: INTERNAL MEDICINE

## 2019-09-04 PROCEDURE — 94799 UNLISTED PULMONARY SVC/PX: CPT

## 2019-09-04 RX ADMIN — SODIUM CHLORIDE, PRESERVATIVE FREE 10 ML: 5 INJECTION INTRAVENOUS at 20:55

## 2019-09-04 RX ADMIN — BUTALBITAL, ACETAMINOPHEN, AND CAFFEINE 1 TABLET: 50; 325; 40 TABLET ORAL at 08:18

## 2019-09-04 RX ADMIN — LISINOPRIL 20 MG: 20 TABLET ORAL at 20:54

## 2019-09-04 RX ADMIN — NICOTINE 1 PATCH: 14 PATCH TRANSDERMAL at 20:53

## 2019-09-04 RX ADMIN — SODIUM CHLORIDE 7.5 MG/HR: 9 INJECTION, SOLUTION INTRAVENOUS at 00:37

## 2019-09-04 RX ADMIN — CLONIDINE HYDROCHLORIDE 0.1 MG: 0.1 TABLET ORAL at 15:19

## 2019-09-04 RX ADMIN — SODIUM CHLORIDE 5 MG/HR: 9 INJECTION, SOLUTION INTRAVENOUS at 23:15

## 2019-09-04 RX ADMIN — CETIRIZINE HYDROCHLORIDE 5 MG: 5 TABLET ORAL at 08:14

## 2019-09-04 RX ADMIN — LABETALOL HYDROCHLORIDE 20 MG: 5 INJECTION, SOLUTION INTRAVENOUS at 15:19

## 2019-09-04 RX ADMIN — ASPIRIN 81 MG: 81 TABLET ORAL at 08:14

## 2019-09-04 RX ADMIN — CARVEDILOL 12.5 MG: 6.25 TABLET, FILM COATED ORAL at 08:14

## 2019-09-04 RX ADMIN — BUTALBITAL, ACETAMINOPHEN, AND CAFFEINE 1 TABLET: 50; 325; 40 TABLET ORAL at 02:54

## 2019-09-04 RX ADMIN — HYDROCHLOROTHIAZIDE 25 MG: 25 TABLET ORAL at 08:14

## 2019-09-04 RX ADMIN — LISINOPRIL 20 MG: 20 TABLET ORAL at 08:14

## 2019-09-04 RX ADMIN — BUTALBITAL, ACETAMINOPHEN, AND CAFFEINE 1 TABLET: 50; 325; 40 TABLET ORAL at 15:16

## 2019-09-04 RX ADMIN — SODIUM CHLORIDE, PRESERVATIVE FREE 10 ML: 5 INJECTION INTRAVENOUS at 08:14

## 2019-09-04 RX ADMIN — SODIUM CHLORIDE 7.5 MG/HR: 9 INJECTION, SOLUTION INTRAVENOUS at 04:19

## 2019-09-04 RX ADMIN — CLONIDINE HYDROCHLORIDE 0.1 MG: 0.1 TABLET ORAL at 06:28

## 2019-09-04 RX ADMIN — FLUTICASONE PROPIONATE 2 SPRAY: 50 SPRAY, METERED NASAL at 08:14

## 2019-09-04 RX ADMIN — SODIUM CHLORIDE 5 MG/HR: 9 INJECTION, SOLUTION INTRAVENOUS at 08:20

## 2019-09-04 RX ADMIN — CARVEDILOL 12.5 MG: 6.25 TABLET, FILM COATED ORAL at 16:38

## 2019-09-04 NOTE — PROGRESS NOTES
[facility] -  Progress Note     LOS: 5 days   Patient Care Team:  Orville Weston MD as PCP - General (Family Medicine)  Juanpablo Rea MD as Consulting Physician (Gastroenterology)    Chief Complaint: Follow-up hypertensive crisis    Subjective     Interval History: Patient successfully weaned off Cardene infusion this morning.  So far, blood pressures remain controlled.  She is denying any angina reports some exertional dyspnea when ambulating from the bed to the chair.  No associated palpitations.    Review of Systems:  Review of Systems   Constitution: Negative for malaise/fatigue.   Cardiovascular: Negative for chest pain, claudication, dyspnea on exertion, leg swelling, near-syncope, orthopnea, palpitations, paroxysmal nocturnal dyspnea and syncope.   Respiratory: Negative for shortness of breath.    Hematologic/Lymphatic: Does not bruise/bleed easily.   Musculoskeletal:        Pain in the right antecubital fossa       Vital Sign Min/Max for last 24 hours  Temp  Min: 97.9 °F (36.6 °C)  Max: 98.6 °F (37 °C)   BP  Min: 114/69  Max: 179/105   Pulse  Min: 62  Max: 85   Resp  Min: 16  Max: 23   SpO2  Min: 90 %  Max: 98 %   No Data Recorded   Weight  Min: 82.1 kg (181 lb 1.6 oz)  Max: 82.1 kg (181 lb 1.6 oz)         09/04/19  0300   Weight: 82.1 kg (181 lb 1.6 oz)       Physical Exam:   Physical Exam   Constitutional: No distress.   Neck: No JVD present.   Cardiovascular: Normal rate, regular rhythm, S1 normal, S2 normal, normal heart sounds, intact distal pulses and normal pulses.   Pulmonary/Chest: Effort normal and breath sounds normal.   Abdominal: Soft. There is no tenderness.   Neurological: She is alert and oriented to person, place, and time.   Skin: Skin is warm and dry.       Medication Review: yes  Current Facility-Administered Medications   Medication Dose Route Frequency Provider Last Rate Last Dose   • acetaminophen (TYLENOL) tablet 650 mg  650 mg Oral Q4H PRN Magnus  Hakan Fitzgerald MD   650 mg at 09/03/19 2030    Or   • acetaminophen (TYLENOL) 160 MG/5ML solution 650 mg  650 mg Oral Q4H PRN Hakan Agudelo MD        Or   • acetaminophen (TYLENOL) suppository 650 mg  650 mg Rectal Q4H PRN Hakan Agudelo MD       • aspirin EC tablet 81 mg  81 mg Oral Daily Hakan Agudelo MD   81 mg at 09/04/19 0814   • butalbital-acetaminophen-caffeine (FIORICET, ESGIC) -40 MG per tablet 1 tablet  1 tablet Oral Q6H PRN Hakan Agudelo MD   1 tablet at 09/04/19 0818   • carvedilol (COREG) tablet 12.5 mg  12.5 mg Oral BID With Meals Hakan Agudelo MD   12.5 mg at 09/04/19 0814   • cetirizine (zyrTEC) tablet 5 mg  5 mg Oral Daily Orville Weston MD   5 mg at 09/04/19 0814   • CloNIDine (CATAPRES) tablet 0.1 mg  0.1 mg Oral Q6H PRN Hakan Agudelo MD   0.1 mg at 09/04/19 0628   • fluticasone (FLONASE) 50 MCG/ACT nasal spray 2 spray  2 spray Each Nare Daily Orville Weston MD   2 spray at 09/04/19 0814   • hydrochlorothiazide (HYDRODIURIL) tablet 25 mg  25 mg Oral Daily Hakan Agudelo MD   25 mg at 09/04/19 0814   • labetalol (NORMODYNE,TRANDATE) injection 20 mg  20 mg Intravenous Q2H PRN Hakan Agudelo MD   20 mg at 09/02/19 1937   • lisinopril (PRINIVIL,ZESTRIL) tablet 20 mg  20 mg Oral Q12H Mary Rodriguez APRN   20 mg at 09/04/19 0814   • niCARdipine (CARDENE) 25 mg/250 mL (0.1 mg/mL) 0.9% NS infusion  5-15 mg/hr Intravenous Titrated Hakan Agudelo MD   Stopped at 09/04/19 0923   • nicotine (NICODERM CQ) 14 MG/24HR patch 1 patch  1 patch Transdermal Nightly Hakan Agudelo MD   1 patch at 09/03/19 2019   • ondansetron (ZOFRAN) injection 4 mg  4 mg Intravenous Q6H PRN Hakan Agudelo MD       • sodium chloride 0.9 % flush 10 mL  10 mL Intravenous Q12H Hakan Agudelo MD   10 mL at 09/04/19 0814   • sodium chloride 0.9 % flush 10 mL  10 mL Intravenous PRN Hakan Agudelo MD             Results Review:    I have reviewed all laboratory data, with pertinent findings: UDS positive for barbiturates (is receiving Fioricet).    I have reviewed telemetry, which shows normal sinus rhythm with no arrhythmias    Results for orders placed during the hospital encounter of 08/30/19   Transthoracic Echo Complete With Contrast if Necessary Per Protocol    Narrative · Left ventricular systolic function is normal.  · Left ventricular wall thickness is consistent with mild concentric   hypertrophy.  · Left ventricular diastolic dysfunction (grade II) consistent with   pseudonormalization.  · Mild to moderate aortic valve regurgitation is present.  · Left atrial cavity size is moderately dilated.  · Normal size and function of the right ventricle.  · Estimated right ventricular systolic pressure from tricuspid   regurgitation is moderately elevated (45-55 mmHg).            Assessment/Plan       Hypertensive urgency -improved, now that new oral agents have had time to reach steady state.  Off Cardene infusion.  Continue Coreg 12.5 twice daily, HCTZ 25, and lisinopril 20 twice daily.  Would caution against any other additions or titrations for now.    Elevated troponin -secondary to the above.  We will plan for outpatient stress echocardiogram in 2 to 4 weeks once blood pressure has stabilized.    Iron deficiency anemia    Elevated serum creatinine -improved, back to normal.    Cigarette smoker    Nonrheumatic aortic valve insufficiency -mild to moderate.  Will follow with echocardiogram every other year.    Recommendations and plans: Appears okay to transfer to floor from my standpoint if blood pressure remains well controlled off Cardene infusion for another 2 hours.    Cardiology will sign off at this time.  I will place an order for an outpatient stress echocardiogram in 2 to 4 weeks, and have patient f/u in our [facility] clinic in 4 weeks.    Enrico De La Fuente MD  09/04/19  11:18 AM

## 2019-09-04 NOTE — PLAN OF CARE
Problem: Patient Care Overview  Goal: Plan of Care Review  Outcome: Ongoing (interventions implemented as appropriate)    Goal: Individualization and Mutuality  Outcome: Ongoing (interventions implemented as appropriate)    Goal: Discharge Needs Assessment  Outcome: Ongoing (interventions implemented as appropriate)    Goal: Interprofessional Rounds/Family Conf  Outcome: Ongoing (interventions implemented as appropriate)      Problem: Pain, Chronic (Adult)  Goal: Acceptable Pain/Comfort Level and Functional Ability  Outcome: Ongoing (interventions implemented as appropriate)      Problem: Hypertensive Disease/Crisis (Arterial) (Adult)  Goal: Signs and Symptoms of Listed Potential Problems Will be Absent, Minimized or Managed (Hypertensive Disease/Crisis)  Outcome: Ongoing (interventions implemented as appropriate)      Problem: Anemia (Adult)  Goal: Identify Related Risk Factors and Signs and Symptoms  Outcome: Ongoing (interventions implemented as appropriate)    Goal: Symptom Improvement  Outcome: Ongoing (interventions implemented as appropriate)

## 2019-09-04 NOTE — PLAN OF CARE
Problem: Patient Care Overview  Goal: Plan of Care Review  Outcome: Ongoing (interventions implemented as appropriate)   09/04/19 0540   Coping/Psychosocial   Plan of Care Reviewed With patient   Plan of Care Review   Progress improving   OTHER   Outcome Summary Pt. remains on Cardene gtt at 7.5 to maintain SBP <170. C/o pain througout night from headache and in right forearm from IV infiltration. Managed forearm pain with ice, tylenol, and elevation. States she has headaches every night and morning, given Fioricet that relieved it. Refuses to wear SCDs, educated pt. on importance of wearing them. Adequate urine output throughout shift, no BM. Stable on room air.       Problem: Pain, Chronic (Adult)  Goal: Identify Related Risk Factors and Signs and Symptoms  Outcome: Outcome(s) achieved Date Met: 09/04/19    Goal: Acceptable Pain/Comfort Level and Functional Ability  Outcome: Ongoing (interventions implemented as appropriate)      Problem: Hypertensive Disease/Crisis (Arterial) (Adult)  Goal: Signs and Symptoms of Listed Potential Problems Will be Absent, Minimized or Managed (Hypertensive Disease/Crisis)  Outcome: Ongoing (interventions implemented as appropriate)      Problem: Anemia (Adult)  Goal: Identify Related Risk Factors and Signs and Symptoms  Outcome: Ongoing (interventions implemented as appropriate)    Goal: Symptom Improvement  Outcome: Ongoing (interventions implemented as appropriate)

## 2019-09-04 NOTE — PROGRESS NOTES
"Progress Note  Ludivina Ramirez  9/4/2019 10:22 AM  Subjective:   Admit Date:   8/30/2019      CC/ADMIT DX:       Interval History:   Reviewed overnight events and nursing notes. She has had no CP or SOA. No new issues with chronic HA.  I have reviewed all labs/diagnostics from the last 24hrs.       ROS:   I have done a 10 point ROS and all are negative, except what is mentioned in the HPI.    Diet Regular; Cardiac    Medications:       niCARdipine 5-15 mg/hr Last Rate: Stopped (09/04/19 0923)       aspirin 81 mg Oral Daily   carvedilol 12.5 mg Oral BID With Meals   cetirizine 5 mg Oral Daily   fluticasone 2 spray Each Nare Daily   hydrochlorothiazide 25 mg Oral Daily   lisinopril 20 mg Oral Q12H   nicotine 1 patch Transdermal Nightly   sodium chloride 10 mL Intravenous Q12H           Objective:   Vitals: /62   Pulse 62   Temp 97.9 °F (36.6 °C) (Axillary)   Resp 19   Ht 166.4 cm (65.5\")   Wt 82.1 kg (181 lb 1.6 oz)   SpO2 92%   BMI 29.68 kg/m²      Intake/Output Summary (Last 24 hours) at 9/4/2019 1022  Last data filed at 9/4/2019 0800  Gross per 24 hour   Intake 3046.4 ml   Output 2100 ml   Net 946.4 ml     General appearance: alert and cooperative with exam  Lungs: clear to auscultation bilaterally  Heart: RRR  Abdomen: soft, non-tender; bowel sounds normal; no masses,  no organomegaly  Extremities: extremities normal, atraumatic, no cyanosis or edema, right UE, has redness and some edema at IV site, some pain with palpation  Neurologic:  No obvious focal neurologic deficits.    Assessment and Plan:     Hypertensive urgency    Acute congestive heart failure (CMS/HCC)    Elevated troponin    Iron deficiency anemia    Elevated serum creatinine    Cigarette smoker    Right UE Pain at IV Site    Plan:  1.  Continue present medication(s)   2.  Follow with Cardiology  3.  Hopefully to floor today  4.  Supportive treatment for UE pain      Discharge planning:   her home     Reviewed treatment plans with the " patient and/or family.             Electronically signed by Orville Weston MD on 9/4/2019 at 10:22 AM

## 2019-09-04 NOTE — PROGRESS NOTES
Continued Stay Note   [facility]     Patient Name: Ludivina Ramirez  MRN: [id number]  Today's Date: 9/4/2019    Admit Date: 8/30/2019    Discharge Plan     Row Name 09/04/19 0902       Plan    Plan  Home    Patient/Family in Agreement with Plan  yes    Plan Comments  Patient now in CCU.  Discharge plan remains the same: Pt has RX coverage and a PCP. Pt lives with her niece and plans on discharging home there as well when medically stable. SW will follow and assist with any discharge needs that may arise.         Discharge Codes    No documentation.             DERRICK Sandoval

## 2019-09-05 PROCEDURE — 94760 N-INVAS EAR/PLS OXIMETRY 1: CPT

## 2019-09-05 PROCEDURE — 94799 UNLISTED PULMONARY SVC/PX: CPT

## 2019-09-05 RX ADMIN — SODIUM CHLORIDE, PRESERVATIVE FREE 10 ML: 5 INJECTION INTRAVENOUS at 20:12

## 2019-09-05 RX ADMIN — FLUTICASONE PROPIONATE 2 SPRAY: 50 SPRAY, METERED NASAL at 08:34

## 2019-09-05 RX ADMIN — CLONIDINE HYDROCHLORIDE 0.1 MG: 0.1 TABLET ORAL at 16:18

## 2019-09-05 RX ADMIN — CLONIDINE HYDROCHLORIDE 0.1 MG: 0.1 TABLET ORAL at 02:52

## 2019-09-05 RX ADMIN — LISINOPRIL 20 MG: 20 TABLET ORAL at 20:11

## 2019-09-05 RX ADMIN — BUTALBITAL, ACETAMINOPHEN, AND CAFFEINE 1 TABLET: 50; 325; 40 TABLET ORAL at 20:11

## 2019-09-05 RX ADMIN — ASPIRIN 81 MG: 81 TABLET ORAL at 08:34

## 2019-09-05 RX ADMIN — CARVEDILOL 12.5 MG: 6.25 TABLET, FILM COATED ORAL at 08:34

## 2019-09-05 RX ADMIN — LISINOPRIL 20 MG: 20 TABLET ORAL at 08:34

## 2019-09-05 RX ADMIN — SODIUM CHLORIDE 5 MG/HR: 9 INJECTION, SOLUTION INTRAVENOUS at 04:06

## 2019-09-05 RX ADMIN — CLONIDINE HYDROCHLORIDE 0.1 MG: 0.1 TABLET ORAL at 23:07

## 2019-09-05 RX ADMIN — CARVEDILOL 12.5 MG: 6.25 TABLET, FILM COATED ORAL at 17:49

## 2019-09-05 RX ADMIN — HYDROCHLOROTHIAZIDE 25 MG: 25 TABLET ORAL at 08:34

## 2019-09-05 RX ADMIN — NICOTINE 1 PATCH: 14 PATCH TRANSDERMAL at 20:09

## 2019-09-05 RX ADMIN — SODIUM CHLORIDE, PRESERVATIVE FREE 10 ML: 5 INJECTION INTRAVENOUS at 08:34

## 2019-09-05 RX ADMIN — CETIRIZINE HYDROCHLORIDE 5 MG: 5 TABLET ORAL at 08:34

## 2019-09-05 RX ADMIN — BUTALBITAL, ACETAMINOPHEN, AND CAFFEINE 1 TABLET: 50; 325; 40 TABLET ORAL at 06:22

## 2019-09-05 NOTE — PROGRESS NOTES
"Progress Note  Ludivina Ramirez  9/5/2019 4:22 PM  Subjective:   Admit Date:   8/30/2019      CC/ADMIT DX:       Interval History:   Reviewed overnight events and nursing notes. She has no c/o HA or dizziness. No CP or SOA.   I have reviewed all labs/diagnostics from the last 24hrs.       ROS:   I have done a 10 point ROS and all are negative, except what is mentioned in the HPI.    Diet Regular; Cardiac    Medications:          aspirin 81 mg Oral Daily   carvedilol 12.5 mg Oral BID With Meals   cetirizine 5 mg Oral Daily   fluticasone 2 spray Each Nare Daily   hydrochlorothiazide 25 mg Oral Daily   lisinopril 20 mg Oral Q12H   nicotine 1 patch Transdermal Nightly   sodium chloride 10 mL Intravenous Q12H           Objective:   Vitals: /82 (BP Location: Left arm, Patient Position: Lying)   Pulse 69   Temp 98.5 °F (36.9 °C)   Resp 18   Ht 165.1 cm (65\")   Wt 81.6 kg (180 lb)   SpO2 96%   BMI 29.95 kg/m²      Intake/Output Summary (Last 24 hours) at 9/5/2019 1622  Last data filed at 9/5/2019 1610  Gross per 24 hour   Intake 1389.6 ml   Output 1750 ml   Net -360.4 ml     General appearance: alert and cooperative with exam  Lungs: clear to auscultation bilaterally  Heart: RRR  Abdomen: soft, non-tender; bowel sounds normal; no masses,  no organomegaly  Extremities: extremities normal, atraumatic, no cyanosis or edema, right UE, has redness and some edema at IV site, some pain with palpation  Neurologic:  No obvious focal neurologic deficits.    Assessment and Plan:     Hypertensive urgency    Elevated troponin    Iron deficiency anemia    Elevated serum creatinine    Cigarette smoker    Nonrheumatic aortic valve insufficiency    Right UE Pain at IV Site    Plan:  1.  Continue present medication(s)   2.  Follow with Cardiology  3.  [facility]  4.  Supportive treatment for UE pain  5.  D/C planning    Discharge planning:   her home     Reviewed treatment plans with the patient and/or family. "             Electronically signed by Orville Weston MD on 9/5/2019 at 4:22 PM

## 2019-09-05 NOTE — PLAN OF CARE
Problem: Patient Care Overview  Goal: Plan of Care Review  Outcome: Ongoing (interventions implemented as appropriate)   09/05/19 0614   Coping/Psychosocial   Plan of Care Reviewed With patient   Plan of Care Review   Progress improving   OTHER   Outcome Summary Cardene gtt turned back on at 1900 due to SBP >170. Remained on 5mcg throughout night due to persistent elevated BP. Pt. given PRN Clonidine one time as well. No c/o of a headache.        Problem: Pain, Chronic (Adult)  Goal: Acceptable Pain/Comfort Level and Functional Ability  Outcome: Ongoing (interventions implemented as appropriate)      Problem: Hypertensive Disease/Crisis (Arterial) (Adult)  Goal: Signs and Symptoms of Listed Potential Problems Will be Absent, Minimized or Managed (Hypertensive Disease/Crisis)  Outcome: Ongoing (interventions implemented as appropriate)      Problem: Anemia (Adult)  Goal: Identify Related Risk Factors and Signs and Symptoms  Outcome: Outcome(s) achieved Date Met: 09/05/19    Goal: Symptom Improvement  Outcome: Ongoing (interventions implemented as appropriate)

## 2019-09-06 PROCEDURE — 94760 N-INVAS EAR/PLS OXIMETRY 1: CPT

## 2019-09-06 PROCEDURE — 94799 UNLISTED PULMONARY SVC/PX: CPT

## 2019-09-06 RX ORDER — HYDRALAZINE HYDROCHLORIDE 25 MG/1
25 TABLET, FILM COATED ORAL EVERY 8 HOURS SCHEDULED
Status: DISCONTINUED | OUTPATIENT
Start: 2019-09-06 | End: 2019-09-07

## 2019-09-06 RX ADMIN — CETIRIZINE HYDROCHLORIDE 5 MG: 5 TABLET ORAL at 08:18

## 2019-09-06 RX ADMIN — BUTALBITAL, ACETAMINOPHEN, AND CAFFEINE 1 TABLET: 50; 325; 40 TABLET ORAL at 08:07

## 2019-09-06 RX ADMIN — NICOTINE 1 PATCH: 14 PATCH TRANSDERMAL at 21:05

## 2019-09-06 RX ADMIN — BUTALBITAL, ACETAMINOPHEN, AND CAFFEINE 1 TABLET: 50; 325; 40 TABLET ORAL at 16:57

## 2019-09-06 RX ADMIN — SODIUM CHLORIDE, PRESERVATIVE FREE 10 ML: 5 INJECTION INTRAVENOUS at 08:19

## 2019-09-06 RX ADMIN — LISINOPRIL 20 MG: 20 TABLET ORAL at 08:19

## 2019-09-06 RX ADMIN — CARVEDILOL 12.5 MG: 6.25 TABLET, FILM COATED ORAL at 18:20

## 2019-09-06 RX ADMIN — ASPIRIN 81 MG: 81 TABLET ORAL at 08:18

## 2019-09-06 RX ADMIN — HYDRALAZINE HYDROCHLORIDE 25 MG: 25 TABLET, FILM COATED ORAL at 21:04

## 2019-09-06 RX ADMIN — LABETALOL HYDROCHLORIDE 20 MG: 5 INJECTION, SOLUTION INTRAVENOUS at 00:13

## 2019-09-06 RX ADMIN — FLUTICASONE PROPIONATE 2 SPRAY: 50 SPRAY, METERED NASAL at 08:19

## 2019-09-06 RX ADMIN — CLONIDINE HYDROCHLORIDE 0.1 MG: 0.1 TABLET ORAL at 08:07

## 2019-09-06 RX ADMIN — HYDRALAZINE HYDROCHLORIDE 25 MG: 25 TABLET, FILM COATED ORAL at 14:30

## 2019-09-06 RX ADMIN — CARVEDILOL 12.5 MG: 6.25 TABLET, FILM COATED ORAL at 08:18

## 2019-09-06 RX ADMIN — LISINOPRIL 20 MG: 20 TABLET ORAL at 21:04

## 2019-09-06 RX ADMIN — CLONIDINE HYDROCHLORIDE 0.1 MG: 0.1 TABLET ORAL at 14:30

## 2019-09-06 RX ADMIN — HYDROCHLOROTHIAZIDE 25 MG: 25 TABLET ORAL at 08:19

## 2019-09-06 NOTE — PROGRESS NOTES
PHARMACY   Continued Stay Note   [place]     Patient Name: Ludivina Ramirez  MRN: [id number]  Today's Date: 9/6/2019    Admit Date: 8/30/2019    Went to discuss medications and medication-related concerns patient may have. Discussed current anti-hypertensive medications and mechanisms of action and pathways of effect.    Patient's primary concerns: multiple medications - discussed how each is working differently. She asks if there is another option (discussed CCB, patient unsure if she has ever taken a CCB before)    Patient secondary concern: headaches. She desires a recommendation for a new medication that can help control her headaches as she was routinely using Excedrin at home. Discussed the ingredients of these and how there are many types of headache combo-meds on market, some contain Aspirin and caffeine which may not be appropriate. Patient asks about Triptan class (ex. Imitrex) and if something like this would be appropriate for her moving forward. Discussed other headache options and provided slip of paper with name of agents for patient to discuss with physician.     Additional pharmacy counseling is available and may be indicated based on discharge medication regimen. Please contact pharmacy or place consult order for follow-up visit if needed. Thank you.       Current Facility-Administered Medications:   •  acetaminophen (TYLENOL) tablet 650 mg, 650 mg, Oral, Q4H PRN, 650 mg at 09/03/19 2030 **OR** acetaminophen (TYLENOL) 160 MG/5ML solution 650 mg, 650 mg, Oral, Q4H PRN **OR** acetaminophen (TYLENOL) suppository 650 mg, 650 mg, Rectal, Q4H PRN, Hakan Agudelo MD  •  aspirin EC tablet 81 mg, 81 mg, Oral, Daily, Hakan Agudelo MD, 81 mg at 09/06/19 0818  •  butalbital-acetaminophen-caffeine (FIORICET, ESGIC) -40 MG per tablet 1 tablet, 1 tablet, Oral, Q6H PRN, Hakan Agudelo MD, 1 tablet at 09/06/19 1657  •  carvedilol (COREG) tablet 12.5 mg, 12.5 mg, Oral, BID With Meals,  Hakan Agudelo MD, 12.5 mg at 09/06/19 0818  •  cetirizine (zyrTEC) tablet 5 mg, 5 mg, Oral, Daily, Orville Weston MD, 5 mg at 09/06/19 0818  •  CloNIDine (CATAPRES) tablet 0.1 mg, 0.1 mg, Oral, Q6H PRN, Hakan Agudelo MD, 0.1 mg at 09/06/19 1430  •  fluticasone (FLONASE) 50 MCG/ACT nasal spray 2 spray, 2 spray, Each Nare, Daily, Orville Weston MD, 2 spray at 09/06/19 0819  •  hydrALAZINE (APRESOLINE) tablet 25 mg, 25 mg, Oral, Q8H, Orville Weston MD, 25 mg at 09/06/19 1430  •  hydrochlorothiazide (HYDRODIURIL) tablet 25 mg, 25 mg, Oral, Daily, Hakan Agudelo MD, 25 mg at 09/06/19 0819  •  labetalol (NORMODYNE,TRANDATE) injection 20 mg, 20 mg, Intravenous, Q2H PRN, Hakan Agudelo MD, 20 mg at 09/06/19 0013  •  lisinopril (PRINIVIL,ZESTRIL) tablet 20 mg, 20 mg, Oral, Q12H, Mary Rodriguez, APRN, 20 mg at 09/06/19 0819  •  nicotine (NICODERM CQ) 14 MG/24HR patch 1 patch, 1 patch, Transdermal, Nightly, Hakan Agudelo MD, 1 patch at 09/05/19 2009  •  ondansetron (ZOFRAN) injection 4 mg, 4 mg, Intravenous, Q6H PRN, Hakan Agudelo MD  •  sodium chloride 0.9 % flush 10 mL, 10 mL, Intravenous, Q12H, Hakan Agudelo MD, 10 mL at 09/06/19 0819  •  sodium chloride 0.9 % flush 10 mL, 10 mL, Intravenous, PRN, Hakan Agudelo MD Shawn A Kinsey, PharmD  5:59 PM  09/06/19

## 2019-09-06 NOTE — PROGRESS NOTES
Continued Stay Note   [facility]     Patient Name: Ludivina Ramirez  MRN: [id number]  Today's Date: 9/6/2019    Admit Date: 8/30/2019    Discharge Plan     Row Name 09/06/19 1228       Plan    Plan  HOME    Patient/Family in Agreement with Plan  yes    Plan Comments  REVIEWED CHART; NOTED PT'S IMPROVEMENT AND PLAN REMAINS TO D/C HOME WITH NO ANTICIPATED NEEDS.  WILL CONT. TO FOLLOW FOR ANY NEEDS THAT MAY ARISE.         Discharge Codes    No documentation.             YULIANA Clements

## 2019-09-06 NOTE — PROGRESS NOTES
"Progress Note  Ludivina Ramirez  9/6/2019 2:10 PM  Subjective:   Admit Date:   8/30/2019      CC/ADMIT DX:       Interval History:   Reviewed overnight events and nursing notes. She denies CP or SOA. No abdominal pain. No weakness.   I have reviewed all labs/diagnostics from the last 24hrs.       ROS:   I have done a 10 point ROS and all are negative, except what is mentioned in the HPI.    Diet Regular; Cardiac    Medications:          aspirin 81 mg Oral Daily   carvedilol 12.5 mg Oral BID With Meals   cetirizine 5 mg Oral Daily   fluticasone 2 spray Each Nare Daily   hydrALAZINE 25 mg Oral Q8H   hydrochlorothiazide 25 mg Oral Daily   lisinopril 20 mg Oral Q12H   nicotine 1 patch Transdermal Nightly   sodium chloride 10 mL Intravenous Q12H           Objective:   Vitals: /91 (BP Location: Left arm, Patient Position: Lying)   Pulse 63   Temp 98 °F (36.7 °C) (Oral)   Resp 18   Ht 165.1 cm (65\")   Wt 81.6 kg (180 lb)   SpO2 96%   BMI 29.95 kg/m²      Intake/Output Summary (Last 24 hours) at 9/6/2019 1410  Last data filed at 9/6/2019 0342  Gross per 24 hour   Intake --   Output 700 ml   Net -700 ml     General appearance: alert and cooperative with exam  Lungs: clear to auscultation bilaterally  Heart: RRR  Abdomen: soft, non-tender; bowel sounds normal; no masses,  no organomegaly  Extremities: extremities normal, atraumatic, no cyanosis or edema, right UE, has redness and some edema at IV site, some pain with palpation  Neurologic:  No obvious focal neurologic deficits.    Assessment and Plan:     Hypertensive urgency    Elevated troponin    Iron deficiency anemia    Elevated serum creatinine    Cigarette smoker    Nonrheumatic aortic valve insufficiency    Right UE Pain at IV Site    Plan:  1.  Continue present medication(s)   2.  Start Hydralazine for better BP control  3.  Supportive treatment for UE pain  4.  D/C when BP is stable    Discharge planning:   her home     Reviewed treatment plans with the " patient and/or family.             Electronically signed by Orville Weston MD on 9/6/2019 at 2:10 PM

## 2019-09-06 NOTE — NURSING NOTE
[facility] Note      Patient: Ludivina Ramirez  MRN: [id number] : 1960         Problem List:   Patient Active Problem List    Diagnosis   • *Hypertensive urgency [I16.0]   • Nonrheumatic aortic valve insufficiency [I35.1]   • Elevated troponin [R74.8]   • Iron deficiency anemia [D50.9]   • Elevated serum creatinine [R79.89]   • Cigarette smoker [F17.210]         Reason for Visit: Patient is an 59 y.o. female, being seen by [facility] for wounds on left arm.      Patient presents with wounds on left elbow.  Two areas are scabbed over and one area is a pustule with redness that extends about 2 cm from wound.  With light palpation, pustule drained yellow pus.  Cleaned with NS and applied antibiotic ointment. Patient states she is may be going home today.  Instructed patient to keep an eye on it and if she has any issues with it to go to the outpatient wound care center.        Recommendations:  Clean with NS, apply antibiotic ointment and cover with gauze daily.       )Kate Stout RN 2019

## 2019-09-06 NOTE — PLAN OF CARE
"Problem: Patient Care Overview  Goal: Plan of Care Review  Outcome: Ongoing (interventions implemented as appropriate)   09/06/19 1184   Coping/Psychosocial   Plan of Care Reviewed With patient   Plan of Care Review   Progress no change   OTHER   Outcome Summary Patient still with bouts of hypertention. Treated with scheduled and PRN BP meds. SBP currently 150's down from 170's. Patient has a wound nurse consult for a scab and another small spot to the left elbow for what patient reports as a \"bug bite that has caused bleeding from the bone marrow.\" The elbow is not draining and is KAYLEEN. RCA is edematous and tender from infiltrated IV site. PRN vasotec, clonidine, and Fioricet for a headache given. Patient wants to go home today. Cont. to monitor. Call for concerns.          "

## 2019-09-07 PROCEDURE — 94760 N-INVAS EAR/PLS OXIMETRY 1: CPT

## 2019-09-07 PROCEDURE — 94799 UNLISTED PULMONARY SVC/PX: CPT

## 2019-09-07 RX ORDER — HYDRALAZINE HYDROCHLORIDE 50 MG/1
50 TABLET, FILM COATED ORAL EVERY 8 HOURS SCHEDULED
Status: DISCONTINUED | OUTPATIENT
Start: 2019-09-07 | End: 2019-09-08

## 2019-09-07 RX ADMIN — ASPIRIN 81 MG: 81 TABLET ORAL at 08:41

## 2019-09-07 RX ADMIN — HYDROCHLOROTHIAZIDE 25 MG: 25 TABLET ORAL at 08:41

## 2019-09-07 RX ADMIN — CARVEDILOL 12.5 MG: 6.25 TABLET, FILM COATED ORAL at 08:41

## 2019-09-07 RX ADMIN — LISINOPRIL 20 MG: 20 TABLET ORAL at 20:18

## 2019-09-07 RX ADMIN — CLONIDINE HYDROCHLORIDE 0.1 MG: 0.1 TABLET ORAL at 17:00

## 2019-09-07 RX ADMIN — CLONIDINE HYDROCHLORIDE 0.1 MG: 0.1 TABLET ORAL at 23:25

## 2019-09-07 RX ADMIN — NICOTINE 1 PATCH: 14 PATCH TRANSDERMAL at 20:19

## 2019-09-07 RX ADMIN — HYDRALAZINE HYDROCHLORIDE 25 MG: 25 TABLET, FILM COATED ORAL at 05:36

## 2019-09-07 RX ADMIN — LISINOPRIL 20 MG: 20 TABLET ORAL at 08:41

## 2019-09-07 RX ADMIN — HYDRALAZINE HYDROCHLORIDE 25 MG: 25 TABLET, FILM COATED ORAL at 14:18

## 2019-09-07 RX ADMIN — BUTALBITAL, ACETAMINOPHEN, AND CAFFEINE 1 TABLET: 50; 325; 40 TABLET ORAL at 03:58

## 2019-09-07 RX ADMIN — CLONIDINE HYDROCHLORIDE 0.1 MG: 0.1 TABLET ORAL at 03:59

## 2019-09-07 RX ADMIN — FLUTICASONE PROPIONATE 2 SPRAY: 50 SPRAY, METERED NASAL at 08:41

## 2019-09-07 RX ADMIN — HYDRALAZINE HYDROCHLORIDE 50 MG: 50 TABLET ORAL at 20:19

## 2019-09-07 RX ADMIN — CARVEDILOL 12.5 MG: 6.25 TABLET, FILM COATED ORAL at 17:00

## 2019-09-07 RX ADMIN — BUTALBITAL, ACETAMINOPHEN, AND CAFFEINE 1 TABLET: 50; 325; 40 TABLET ORAL at 14:18

## 2019-09-07 RX ADMIN — CETIRIZINE HYDROCHLORIDE 5 MG: 5 TABLET ORAL at 08:41

## 2019-09-07 RX ADMIN — BUTALBITAL, ACETAMINOPHEN, AND CAFFEINE 1 TABLET: 50; 325; 40 TABLET ORAL at 20:18

## 2019-09-07 NOTE — PLAN OF CARE
Problem: Patient Care Overview  Goal: Plan of Care Review  Outcome: Ongoing (interventions implemented as appropriate)   09/07/19 2687   Coping/Psychosocial   Plan of Care Reviewed With patient   Plan of Care Review   Progress improving   OTHER   Outcome Summary Patient with multiple c/o headache. PRN med given. Patient with bouts of hyptertention. Scheduled and PRN BP meds give. RCA is still red and tender. Patient anxious to go home. Cont. to monitor. Call for concerns.

## 2019-09-07 NOTE — PLAN OF CARE
Problem: Patient Care Overview  Goal: Plan of Care Review  Outcome: Ongoing (interventions implemented as appropriate)   09/07/19 1625   Coping/Psychosocial   Plan of Care Reviewed With patient   Plan of Care Review   Progress improving   OTHER   Outcome Summary VSS. BP WNL THIS SHIFT. PT ANXIOUS TO GO HOME, REQUESTING CALLS TO BE MADE TO PHYSICIAN MUTLIPLE TIMES TODAY. S/SB 58-72 ON TELE. PRN FIORECET GIVEN X1. WAITING FOR MD TO SEE FOR POSSIBLE D/C. CONTINUE TO MONITOR.     Goal: Individualization and Mutuality  Outcome: Ongoing (interventions implemented as appropriate)   09/07/19 1625   Individualization   Patient Specific Goals (Include Timeframe) BP WNL, HOME SOON   Patient Specific Interventions SAFETY PRECAUTIONS   Mutuality/Individual Preferences   What Anxieties, Fears, Concerns, or Questions Do You Have About Your Care? NONE       Problem: Pain, Chronic (Adult)  Goal: Acceptable Pain/Comfort Level and Functional Ability  Outcome: Ongoing (interventions implemented as appropriate)   09/07/19 1625   Pain, Chronic (Adult)   Acceptable Pain/Comfort Level and Functional Ability making progress toward outcome       Problem: Hypertensive Disease/Crisis (Arterial) (Adult)  Goal: Signs and Symptoms of Listed Potential Problems Will be Absent, Minimized or Managed (Hypertensive Disease/Crisis)  Outcome: Ongoing (interventions implemented as appropriate)   09/07/19 1625   Goal/Outcome Evaluation   Problems Assessed (Hypertensive Disease/Crisis (Arterial)) all   Problems Present (Hypertensive Disease) situational response       Problem: Anemia (Adult)  Goal: Symptom Improvement  Outcome: Ongoing (interventions implemented as appropriate)   09/07/19 1625   Anemia (Adult)   Symptom Improvement making progress toward outcome

## 2019-09-08 RX ORDER — LISINOPRIL 20 MG/1
40 TABLET ORAL EVERY EVENING
Status: DISCONTINUED | OUTPATIENT
Start: 2019-09-08 | End: 2019-09-09 | Stop reason: HOSPADM

## 2019-09-08 RX ORDER — SUMATRIPTAN 50 MG/1
50 TABLET, FILM COATED ORAL
Status: DISCONTINUED | OUTPATIENT
Start: 2019-09-08 | End: 2019-09-09 | Stop reason: HOSPADM

## 2019-09-08 RX ADMIN — BUTALBITAL, ACETAMINOPHEN, AND CAFFEINE 1 TABLET: 50; 325; 40 TABLET ORAL at 21:16

## 2019-09-08 RX ADMIN — LABETALOL HYDROCHLORIDE 20 MG: 5 INJECTION, SOLUTION INTRAVENOUS at 12:51

## 2019-09-08 RX ADMIN — ASPIRIN 81 MG: 81 TABLET ORAL at 08:33

## 2019-09-08 RX ADMIN — LISINOPRIL 40 MG: 20 TABLET ORAL at 17:21

## 2019-09-08 RX ADMIN — BUTALBITAL, ACETAMINOPHEN, AND CAFFEINE 1 TABLET: 50; 325; 40 TABLET ORAL at 04:10

## 2019-09-08 RX ADMIN — SUMATRIPTAN SUCCINATE 50 MG: 50 TABLET ORAL at 11:23

## 2019-09-08 RX ADMIN — CETIRIZINE HYDROCHLORIDE 5 MG: 5 TABLET ORAL at 08:33

## 2019-09-08 RX ADMIN — LISINOPRIL 20 MG: 20 TABLET ORAL at 08:33

## 2019-09-08 RX ADMIN — CLONIDINE HYDROCHLORIDE 0.1 MG: 0.1 TABLET ORAL at 12:01

## 2019-09-08 RX ADMIN — NICOTINE 1 PATCH: 14 PATCH TRANSDERMAL at 21:09

## 2019-09-08 RX ADMIN — CARVEDILOL 12.5 MG: 6.25 TABLET, FILM COATED ORAL at 08:33

## 2019-09-08 RX ADMIN — BUTALBITAL, ACETAMINOPHEN, AND CAFFEINE 1 TABLET: 50; 325; 40 TABLET ORAL at 12:02

## 2019-09-08 RX ADMIN — LABETALOL HYDROCHLORIDE 20 MG: 5 INJECTION, SOLUTION INTRAVENOUS at 04:11

## 2019-09-08 RX ADMIN — HYDRALAZINE HYDROCHLORIDE 75 MG: 50 TABLET ORAL at 21:06

## 2019-09-08 RX ADMIN — CARVEDILOL 12.5 MG: 6.25 TABLET, FILM COATED ORAL at 17:21

## 2019-09-08 RX ADMIN — SODIUM CHLORIDE, PRESERVATIVE FREE 10 ML: 5 INJECTION INTRAVENOUS at 21:08

## 2019-09-08 RX ADMIN — HYDROCHLOROTHIAZIDE 25 MG: 25 TABLET ORAL at 08:33

## 2019-09-08 RX ADMIN — HYDRALAZINE HYDROCHLORIDE 50 MG: 50 TABLET ORAL at 06:04

## 2019-09-08 RX ADMIN — HYDRALAZINE HYDROCHLORIDE 75 MG: 50 TABLET ORAL at 13:04

## 2019-09-08 NOTE — PROGRESS NOTES
Medicine Progress Note 9/8/2019    Patient:  Ludivina Ramirez  YOB: 1960  MRN: [id number]     Acct: [id number]   Admit date: 8/30/2019    Patient Seen, Chart, Consults notes, Labs, Radiology studies reviewed.    Subjective:   No acute issues overnight.  No chest pain/shortness of breath.  No fever or chills.  Continues with episodes of elevated BP asso with HA.  No CP/SOB/vision changes.      Medications:   Scheduled Meds:    aspirin 81 mg Oral Daily   carvedilol 12.5 mg Oral BID With Meals   cetirizine 5 mg Oral Daily   fluticasone 2 spray Each Nare Daily   hydrALAZINE 50 mg Oral Q8H   hydrochlorothiazide 25 mg Oral Daily   lisinopril 20 mg Oral Q12H   nicotine 1 patch Transdermal Nightly   sodium chloride 10 mL Intravenous Q12H     Continuous Infusions:   PRN Meds:     acetaminophen 650 mg Q4H PRN   Or     acetaminophen 650 mg Q4H PRN   Or     acetaminophen 650 mg Q4H PRN   butalbital-acetaminophen-caffeine 1 tablet Q6H PRN   CloNIDine 0.1 mg Q6H PRN   labetalol 20 mg Q2H PRN   ondansetron 4 mg Q6H PRN   sodium chloride 10 mL PRN         Objective:   Vitals:   Patient Vitals for the past 24 hrs:   BP Temp Temp src Pulse Resp SpO2 Weight   09/08/19 0700 148/85 97.6 °F (36.4 °C) Oral 63 18 98 % --   09/08/19 0344 165/89 98 °F (36.7 °C) Oral 67 18 97 % --   09/07/19 2349 -- -- -- -- -- 96 % --   09/07/19 2320 (!) 173/107 97.9 °F (36.6 °C) Oral 73 18 96 % --   09/07/19 1948 151/83 97.3 °F (36.3 °C) Oral 64 18 92 % 82.4 kg (181 lb 9.6 oz)   09/07/19 1736 148/90 -- -- -- -- -- --   09/07/19 1644 168/98 98.2 °F (36.8 °C) Oral 70 18 96 % --   09/07/19 1100 129/88 97.9 °F (36.6 °C) Oral 54 18 98 % --     GEN: Awake, alert, no acute distress.  RESP: Full and symmetric respirations.  Chest clear to auscultation bilaterally, no wheezes, rales, or rhonchi.  CARD: Regular rate and rhythm.  No murmurs, rubs or gallops.  ABD: Normoactive bowel sounds.  Abdomen soft, nontender, and nondistended.  EXT: No cyanosis,  clubbing, or edema.      24 hour intake/output:    Intake/Output Summary (Last 24 hours) at 9/8/2019 0943  Last data filed at 9/7/2019 1833  Gross per 24 hour   Intake 480 ml   Output 1000 ml   Net -520 ml     Last 3 weights:  Wt Readings from Last 3 Encounters:   09/07/19 82.4 kg (181 lb 9.6 oz)   08/30/19 84.8 kg (187 lb)   04/05/18 81.6 kg (180 lb)       Labs:        Invalid input(s): NEUTOPHILPCT,  EOSPCT  Results from last 7 days   Lab Units 09/03/19  0309   SODIUM mmol/L 142   POTASSIUM mmol/L 4.1   CHLORIDE mmol/L 106   CO2 mmol/L 28.0   BUN mg/dL 29*   CREATININE mg/dL 1.31   CALCIUM mg/dL 9.1   GLUCOSE mg/dL 92         Lab Results   Component Value Date    GLUCOSE 92 09/03/2019    GLUCOSE 92 09/01/2019    GLUCOSE 94 08/31/2019    GLUCOSE 91 08/30/2019    GLUCOSE 62 (L) 04/06/2018    GLUCOSE 81 02/01/2018         Assessment/Plan:   Principal Problem: Hypertensive urgency  Active Hospital Problems    Diagnosis  POA   • **Hypertensive urgency [I16.0]  Yes   • Nonrheumatic aortic valve insufficiency [I35.1]  Unknown   • Elevated troponin [R74.8]  No   • Iron deficiency anemia [D50.9]  Yes   • Elevated serum creatinine [R79.89]  Yes   • Cigarette smoker [F17.210]  Yes      Resolved Hospital Problems    Diagnosis Date Resolved POA   • Acute congestive heart failure (CMS/HCC) [I50.9] 09/04/2019 Yes       Increase hydralazine.  Move lisinopril to 40mg in evening.  Continue other antihypertensives.  Sumatriptan for HA.  Encourage tobacco cessation.  Continue nicotine patch.  Chronic medical conditions otherwise stable.  Continue current medications & management.  Plan dc home once BP stable.    Electronically signed by Chele Sofia MD on 9/8/2019 at 9:43 AM

## 2019-09-08 NOTE — PLAN OF CARE
Problem: Patient Care Overview  Goal: Plan of Care Review  Outcome: Ongoing (interventions implemented as appropriate)   09/08/19 0337   Coping/Psychosocial   Plan of Care Reviewed With patient   Plan of Care Review   Progress no change   OTHER   Outcome Summary Bp elevated 173/107, increased hydralyzine and gave prn clonidine, c/o headache medicated per mar, if BP remains stable for 24 hours patient may be DC to home, will continue to monitor for changes.       Problem: Hypertensive Disease/Crisis (Arterial) (Adult)  Goal: Signs and Symptoms of Listed Potential Problems Will be Absent, Minimized or Managed (Hypertensive Disease/Crisis)  Outcome: Ongoing (interventions implemented as appropriate)   09/08/19 0337   Goal/Outcome Evaluation   Problems Assessed (Hypertensive Disease/Crisis (Arterial)) all   Problems Present (Hypertensive Disease) situational response

## 2019-09-08 NOTE — PROGRESS NOTES
Medicine Progress Note 9/7/2019    Patient:  Ludivina Ramirez  YOB: 1960  MRN: [id number]     Acct: [id number]   Admit date: 8/30/2019    Patient Seen, Chart, Consults notes, Labs, Radiology studies reviewed.    Subjective:   Patient incredibly eager for discharge.  I have been paged several times away from ICU/CCU to be notified that patient is ready for discharge.  However, patient has continued to have uncontrolled HTN.  Discussed with patient that we cannot let her go home with BP of 190s/110s.  Has HAs.  No CP/SOB/vision changes.      Medications:   Scheduled Meds:  aspirin 81 mg Oral Daily   carvedilol 12.5 mg Oral BID With Meals   cetirizine 5 mg Oral Daily   fluticasone 2 spray Each Nare Daily   hydrALAZINE 25 mg Oral Q8H   hydrochlorothiazide 25 mg Oral Daily   lisinopril 20 mg Oral Q12H   nicotine 1 patch Transdermal Nightly   sodium chloride 10 mL Intravenous Q12H     Continuous Infusions:   PRN Meds:   acetaminophen 650 mg Q4H PRN   Or     acetaminophen 650 mg Q4H PRN   Or     acetaminophen 650 mg Q4H PRN   butalbital-acetaminophen-caffeine 1 tablet Q6H PRN   CloNIDine 0.1 mg Q6H PRN   labetalol 20 mg Q2H PRN   ondansetron 4 mg Q6H PRN   sodium chloride 10 mL PRN         Objective:   Vitals:   Patient Vitals for the past 24 hrs:   BP Temp Temp src Pulse Resp SpO2 Weight   09/07/19 1736 148/90 -- -- -- -- -- --   09/07/19 1644 168/98 98.2 °F (36.8 °C) Oral 70 18 96 % --   09/07/19 1100 129/88 97.9 °F (36.6 °C) Oral 54 18 98 % --   09/07/19 0700 152/90 97.6 °F (36.4 °C) Oral 64 18 97 % --   09/07/19 0634 151/97 -- -- 63 -- -- --   09/07/19 0528 174/88 -- -- 64 -- -- --   09/07/19 0346 (!) 190/111 98.7 °F (37.1 °C) Oral 70 18 97 % --   09/06/19 2300 153/84 98.7 °F (37.1 °C) Oral 71 18 97 % --   09/06/19 2100 -- -- -- 68 -- 97 % --   09/06/19 2037 158/77 98.7 °F (37.1 °C) Oral 73 18 96 % 80.7 kg (178 lb)     GEN: Awake, alert, no acute distress.  RESP: Full and symmetric respirations.  Chest  clear to auscultation bilaterally, no wheezes, rales, or rhonchi.  CARD: Regular rate and rhythm.  No murmurs, rubs or gallops.  ABD: Normoactive bowel sounds.  Abdomen soft, nontender, and nondistended.  EXT: No cyanosis, clubbing, or edema.      24 hour intake/output:    Intake/Output Summary (Last 24 hours) at 9/7/2019 1948  Last data filed at 9/7/2019 1833  Gross per 24 hour   Intake 840 ml   Output 1000 ml   Net -160 ml     Last 3 weights:  Wt Readings from Last 3 Encounters:   09/06/19 80.7 kg (178 lb)   08/30/19 84.8 kg (187 lb)   04/05/18 81.6 kg (180 lb)       Labs:  Results from last 7 days   Lab Units 09/01/19  0356   WBC 10*3/mm3 7.21   HEMOGLOBIN g/dL 10.4*   HEMATOCRIT % 33.7*   PLATELETS 10*3/mm3 289     Results from last 7 days   Lab Units 09/03/19  0309 09/01/19  0356   SODIUM mmol/L 142 141   POTASSIUM mmol/L 4.1 4.1   CHLORIDE mmol/L 106 102   CO2 mmol/L 28.0 32.0*   BUN mg/dL 29* 32*   CREATININE mg/dL 1.31 1.35   CALCIUM mg/dL 9.1 9.1   GLUCOSE mg/dL 92 92         Lab Results   Component Value Date    GLUCOSE 92 09/03/2019    GLUCOSE 92 09/01/2019    GLUCOSE 94 08/31/2019    GLUCOSE 91 08/30/2019    GLUCOSE 62 (L) 04/06/2018    GLUCOSE 81 02/01/2018         Assessment/Plan:   Principal Problem: Hypertensive urgency  Active Hospital Problems    Diagnosis  POA   • **Hypertensive urgency [I16.0]  Yes   • Nonrheumatic aortic valve insufficiency [I35.1]  Unknown   • Elevated troponin [R74.8]  No   • Iron deficiency anemia [D50.9]  Yes   • Elevated serum creatinine [R79.89]  Yes   • Cigarette smoker [F17.210]  Yes      Resolved Hospital Problems    Diagnosis Date Resolved POA   • Acute congestive heart failure (CMS/HCC) [I50.9] 09/04/2019 Yes       Increase hydralazine.  Continue other antihypertensives.  Stressed that may be dc when BP is stable.  Encourage tobacco cessation.  Continue nicotine patch.  Chronic medical conditions otherwise stable.  Continue current medications &  management.      Electronically signed by Chele Sofia MD on 9/7/2019 at 7:48 PM

## 2019-09-08 NOTE — PLAN OF CARE
Problem: Patient Care Overview  Goal: Plan of Care Review  Outcome: Ongoing (interventions implemented as appropriate)   09/08/19 1613   Coping/Psychosocial   Plan of Care Reviewed With patient   Plan of Care Review   Progress no change   OTHER   Outcome Summary BP STILL ELEVATED AT TIMES. PRN CLONIDINE/LABETALOL GIVEN X1. HYDRALAZINE/LISINOPRIL DOSES ADJUSTED. PT CONTINUES TO C/O HA, PRN IMITREX ADDED. NSR 60-72 ON TELE. CONTINUE TO MONITOR.       Problem: Pain, Chronic (Adult)  Goal: Identify Related Risk Factors and Signs and Symptoms  Outcome: Ongoing (interventions implemented as appropriate)   09/04/19 0540   Pain, Chronic (Adult)   Related Risk Factors (Chronic Pain) pain control inadequate   Signs and Symptoms (Chronic Pain) verbalization of pain/discomfort for a prolonged time period;verbalization of pain descriptors     Goal: Acceptable Pain/Comfort Level and Functional Ability  Outcome: Ongoing (interventions implemented as appropriate)   09/08/19 1613   Pain, Chronic (Adult)   Acceptable Pain/Comfort Level and Functional Ability making progress toward outcome       Problem: Hypertensive Disease/Crisis (Arterial) (Adult)  Goal: Signs and Symptoms of Listed Potential Problems Will be Absent, Minimized or Managed (Hypertensive Disease/Crisis)  Outcome: Ongoing (interventions implemented as appropriate)   09/08/19 1613   Goal/Outcome Evaluation   Problems Assessed (Hypertensive Disease/Crisis (Arterial)) all   Problems Present (Hypertensive Disease) severe hypertension/hypertensive crisis;situational response

## 2019-09-09 VITALS
BODY MASS INDEX: 29.7 KG/M2 | RESPIRATION RATE: 18 BRPM | WEIGHT: 178.25 LBS | DIASTOLIC BLOOD PRESSURE: 89 MMHG | HEIGHT: 65 IN | HEART RATE: 70 BPM | SYSTOLIC BLOOD PRESSURE: 147 MMHG | OXYGEN SATURATION: 97 % | TEMPERATURE: 98.1 F

## 2019-09-09 RX ORDER — HYDROCHLOROTHIAZIDE 25 MG/1
25 TABLET ORAL DAILY
Qty: 30 TABLET | Refills: 3 | Status: SHIPPED | OUTPATIENT
Start: 2019-09-10 | End: 2019-09-26 | Stop reason: HOSPADM

## 2019-09-09 RX ORDER — CARVEDILOL 12.5 MG/1
12.5 TABLET ORAL 2 TIMES DAILY WITH MEALS
Qty: 60 TABLET | Refills: 3 | Status: SHIPPED | OUTPATIENT
Start: 2019-09-09 | End: 2019-09-26 | Stop reason: HOSPADM

## 2019-09-09 RX ORDER — HYDRALAZINE HYDROCHLORIDE 25 MG/1
75 TABLET, FILM COATED ORAL EVERY 8 HOURS SCHEDULED
Qty: 270 TABLET | Refills: 3 | Status: ON HOLD | OUTPATIENT
Start: 2019-09-09 | End: 2021-07-23

## 2019-09-09 RX ORDER — LISINOPRIL 40 MG/1
40 TABLET ORAL EVERY EVENING
Qty: 30 TABLET | Refills: 3 | Status: SHIPPED | OUTPATIENT
Start: 2019-09-09 | End: 2020-10-17 | Stop reason: HOSPADM

## 2019-09-09 RX ADMIN — HYDROCHLOROTHIAZIDE 25 MG: 25 TABLET ORAL at 08:52

## 2019-09-09 RX ADMIN — CARVEDILOL 12.5 MG: 6.25 TABLET, FILM COATED ORAL at 08:51

## 2019-09-09 RX ADMIN — HYDRALAZINE HYDROCHLORIDE 75 MG: 50 TABLET ORAL at 06:17

## 2019-09-09 RX ADMIN — CETIRIZINE HYDROCHLORIDE 5 MG: 5 TABLET ORAL at 08:51

## 2019-09-09 RX ADMIN — BUTALBITAL, ACETAMINOPHEN, AND CAFFEINE 1 TABLET: 50; 325; 40 TABLET ORAL at 06:21

## 2019-09-09 RX ADMIN — FLUTICASONE PROPIONATE 2 SPRAY: 50 SPRAY, METERED NASAL at 08:53

## 2019-09-09 RX ADMIN — ASPIRIN 81 MG: 81 TABLET ORAL at 08:51

## 2019-09-09 RX ADMIN — SODIUM CHLORIDE, PRESERVATIVE FREE 10 ML: 5 INJECTION INTRAVENOUS at 08:52

## 2019-09-09 NOTE — PROGRESS NOTES
Continued Stay Note   [facility]     Patient Name: Ludivina Ramirez  MRN: [id number]  Today's Date: 9/9/2019    Admit Date: 8/30/2019    Discharge Plan     Row Name 09/09/19 0903       Plan    Plan Comments  Chart reviewed-- Pt still plans on discharging home when medically stable. SW will follow and assist with discharge needs.         Discharge Codes    No documentation.             Arabella León

## 2019-09-09 NOTE — PLAN OF CARE
Problem: Patient Care Overview  Goal: Plan of Care Review  Outcome: Ongoing (interventions implemented as appropriate)   09/09/19 8932   Coping/Psychosocial   Plan of Care Reviewed With patient   OTHER   Outcome Summary has not required PRN B/P meds tonight. for possible discharge today. NSR 65-73 on tele. medicated once with fioricet for H/A. states cant tolerate imitrex. no falls.

## 2019-09-10 ENCOUNTER — READMISSION MANAGEMENT (OUTPATIENT)
Dept: CALL CENTER | Facility: HOSPITAL | Age: 59
End: 2019-09-10

## 2019-09-10 NOTE — PAYOR COMM NOTE
"DC HOME 9-9-19    [id number]   [phone]  Ludivina Clemons (59 y.o. Female)     Date of Birth Social Security Number Address Home Phone MRN    1960  [address] [facility] [address] [phone] [id number]    [place] Marital Status          Hoah[employer]ism        Admission Date Admission Type Admitting Provider Attending Provider Department, Room/Bed    8/30/19 Emergency Orville Weston MD  [facility] 4B, 447/1    Discharge Date Discharge Disposition Discharge Destination        9/9/2019 Home or Self Care              Attending Provider:  (none)   Allergies:  Codeine    Isolation:  None   Infection:  None   Code Status:  Prior    Ht:  165.1 cm (65\")   Wt:  80.9 kg (178 lb 4 oz)    Admission Cmt:  None   Principal Problem:  Hypertensive urgency [I16.0]                 Active Insurance as of 8/30/2019     Primary Coverage     Payor Plan Insurance Group Employer/Plan Group    ANTH MEDICARE REPLACEMENT ANTH MEDICARE ADVANTAGE [id number]     Payor Plan Address Payor Plan Phone Number Payor Plan Fax Number Effective Dates    [address] [phone]  1/1/2017 - None Entered    [facility] [id number]       Subscriber Name Subscriber Birth Date Member ID       LUDIVINA CLEMONS 1960 [id number]                 Emergency Contacts      (Rel.) Home Phone Work Phone Mobile Phone    DAKOTAYOGI MARSHALL (Relative) -- -- [phone]            Discharge Summary     No notes of this type exist for this encounter.        "

## 2019-09-10 NOTE — OUTREACH NOTE
Prep Survey      Responses   Facility patient discharged from?  [facility]   Is patient eligible?  Yes   Discharge diagnosis  **Hypertensive urgency, Acute congestive heart failure    Does the patient have one of the following disease processes/diagnoses(primary or secondary)?  CHF   Does the patient have Home health ordered?  No   Is there a DME ordered?  No   Prep survey completed?  Yes          Haylie Townsend RN

## 2019-09-11 ENCOUNTER — READMISSION MANAGEMENT (OUTPATIENT)
Dept: CALL CENTER | Facility: HOSPITAL | Age: 59
End: 2019-09-11

## 2019-09-11 NOTE — OUTREACH NOTE
CHF Week 1 Survey      Responses   Facility patient discharged from?  [facility]   Does the patient have one of the following disease processes/diagnoses(primary or secondary)?  CHF   Is there a successful TCM telephone encounter documented?  No   CHF Week 1 attempt successful?  No   Unsuccessful attempts  Attempt 1          Mehreen Cain RN

## 2019-09-12 ENCOUNTER — READMISSION MANAGEMENT (OUTPATIENT)
Dept: CALL CENTER | Facility: HOSPITAL | Age: 59
End: 2019-09-12

## 2019-09-16 ENCOUNTER — HOSPITAL ENCOUNTER (OUTPATIENT)
Dept: GENERAL RADIOLOGY | Age: 59
Discharge: HOME | End: 2019-09-16
Payer: MEDICARE

## 2019-09-16 DIAGNOSIS — M25.522 LEFT ELBOW PAIN: ICD-10-CM

## 2019-09-16 PROCEDURE — 73070 X-RAY EXAM OF ELBOW: CPT

## 2019-09-18 ENCOUNTER — READMISSION MANAGEMENT (OUTPATIENT)
Dept: CALL CENTER | Facility: HOSPITAL | Age: 59
End: 2019-09-18

## 2019-09-19 ENCOUNTER — READMISSION MANAGEMENT (OUTPATIENT)
Dept: CALL CENTER | Facility: HOSPITAL | Age: 59
End: 2019-09-19

## 2019-09-19 NOTE — OUTREACH NOTE
CHF Week 2 Survey      Responses   Facility patient discharged from?  [facility]   Does the patient have one of the following disease processes/diagnoses(primary or secondary)?  CHF   Week 2 attempt successful?  Yes   Call start time  1330   Call end time  1338   Discharge diagnosis  Hypertensive urgency, Acute congestive heart failure    Is patient permission given to speak with other caregiver?  No   Meds reviewed with patient/caregiver?  Yes   Is the patient having any side effects they believe may be caused by any medication additions or changes?  No   Does the patient have all medications ordered at discharge?  Yes   Is the patient taking all medications as directed (includes completed medication regime)?  Yes   Does the patient have a primary care provider?   Yes   Has the patient kept scheduled appointments due by today?  Yes   Has home health visited the patient within 72 hours of discharge?  N/A   Psychosocial issues?  No   Did the patient receive a copy of their discharge instructions?  Yes   Nursing interventions  Reviewed instructions with patient   What is the patient's perception of their health status since discharge?  Same [Patient continues to be hypertensive. ]   Nursing interventions  Nurse provided patient education   Is the patient weighing daily?  No   Does the patient have scales?  Yes   Daily weight interventions  Education provided on importance of daily weight   Is the patient able to teach back Heart Failure diet management?  Yes   Is the patient able to teach back Heart Failure Zones?  -- [Unable to do zone teaching today. ]   Is the patient able to teach back signs and symptoms of worsening condition? (i.e. weight gain, shortness of air, etc.)  Yes   Is the patient/caregiver able to teach back the hierarchy of who to call/visit for symptoms/problems? PCP, Specialist, Home health nurse, Urgent Care, ED, 911  Yes   CHF Week 2 call completed?  Yes          Shannon Knox RN

## 2019-09-21 ENCOUNTER — APPOINTMENT (OUTPATIENT)
Dept: GENERAL RADIOLOGY | Facility: HOSPITAL | Age: 59
End: 2019-09-21

## 2019-09-21 ENCOUNTER — APPOINTMENT (OUTPATIENT)
Dept: CT IMAGING | Facility: HOSPITAL | Age: 59
End: 2019-09-21

## 2019-09-21 ENCOUNTER — HOSPITAL ENCOUNTER (INPATIENT)
Facility: HOSPITAL | Age: 59
LOS: 5 days | Discharge: HOME | End: 2019-09-26
Attending: FAMILY MEDICINE | Admitting: FAMILY MEDICINE

## 2019-09-21 DIAGNOSIS — I35.1 NONRHEUMATIC AORTIC VALVE INSUFFICIENCY: ICD-10-CM

## 2019-09-21 DIAGNOSIS — F17.210 CIGARETTE SMOKER: ICD-10-CM

## 2019-09-21 DIAGNOSIS — K62.5 RECTAL BLEEDING: ICD-10-CM

## 2019-09-21 DIAGNOSIS — R77.8 ELEVATED TROPONIN: ICD-10-CM

## 2019-09-21 DIAGNOSIS — K52.9 COLITIS: ICD-10-CM

## 2019-09-21 DIAGNOSIS — N17.9 ACUTE RENAL FAILURE, UNSPECIFIED ACUTE RENAL FAILURE TYPE (HCC): Primary | ICD-10-CM

## 2019-09-21 DIAGNOSIS — R94.31 ABNORMAL EKG: ICD-10-CM

## 2019-09-21 PROBLEM — K92.2 ACUTE GI BLEEDING: Status: ACTIVE | Noted: 2019-09-21

## 2019-09-21 PROBLEM — K55.9 COLITIS, ISCHEMIC (HCC): Status: ACTIVE | Noted: 2019-09-21

## 2019-09-21 PROBLEM — E87.20 METABOLIC ACIDOSIS: Status: ACTIVE | Noted: 2019-09-21

## 2019-09-21 PROBLEM — I50.31 DIASTOLIC CHF, ACUTE: Status: ACTIVE | Noted: 2019-09-21

## 2019-09-21 PROBLEM — I95.9 SYMPTOMATIC HYPOTENSION: Status: ACTIVE | Noted: 2019-09-21

## 2019-09-21 LAB
ALBUMIN SERPL-MCNC: 4.3 G/DL (ref 3.5–5.2)
ALBUMIN/GLOB SERPL: 1.2 G/DL
ALP SERPL-CCNC: 131 U/L (ref 39–117)
ALT SERPL W P-5'-P-CCNC: 26 U/L (ref 1–33)
AMPHET+METHAMPHET UR QL: POSITIVE
AMPHETAMINES UR QL: POSITIVE
ANION GAP SERPL CALCULATED.3IONS-SCNC: 19 MMOL/L (ref 5–15)
APAP SERPL-MCNC: <5 MCG/ML (ref 10–30)
ARTERIAL PATENCY WRIST A: ABNORMAL
AST SERPL-CCNC: 45 U/L (ref 1–32)
ATMOSPHERIC PRESS: 753 MMHG
BACTERIA UR QL AUTO: ABNORMAL /HPF
BARBITURATES UR QL SCN: NEGATIVE
BASE EXCESS BLDA CALC-SCNC: -7.2 MMOL/L (ref 0–2)
BASOPHILS # BLD AUTO: 0.05 10*3/MM3 (ref 0–0.2)
BASOPHILS NFR BLD AUTO: 0.4 % (ref 0–1.5)
BDY SITE: ABNORMAL
BENZODIAZ UR QL SCN: NEGATIVE
BILIRUB SERPL-MCNC: 0.2 MG/DL (ref 0.2–1.2)
BILIRUB UR QL STRIP: NEGATIVE
BODY TEMPERATURE: 37 C
BUN BLD-MCNC: 85 MG/DL (ref 6–20)
BUN/CREAT SERPL: 13.8 (ref 7–25)
BUPRENORPHINE SERPL-MCNC: NEGATIVE NG/ML
CALCIUM SPEC-SCNC: 8.2 MG/DL (ref 8.6–10.5)
CANNABINOIDS SERPL QL: NEGATIVE
CHLORIDE SERPL-SCNC: 101 MMOL/L (ref 98–107)
CK SERPL-CCNC: 1851 U/L (ref 20–180)
CLARITY UR: ABNORMAL
CO2 SERPL-SCNC: 20 MMOL/L (ref 22–29)
COCAINE UR QL: POSITIVE
COLOR UR: YELLOW
CREAT BLD-MCNC: 6.18 MG/DL (ref 0.57–1)
D-LACTATE SERPL-SCNC: 1.1 MMOL/L (ref 0.5–2)
DEPRECATED RDW RBC AUTO: 59.7 FL (ref 37–54)
DEVELOPER EXPIRATION DATE: ABNORMAL
DEVELOPER LOT NUMBER: 149
EOSINOPHIL # BLD AUTO: 0.14 10*3/MM3 (ref 0–0.4)
EOSINOPHIL NFR BLD AUTO: 1.2 % (ref 0.3–6.2)
ERYTHROCYTE [DISTWIDTH] IN BLOOD BY AUTOMATED COUNT: 18.6 % (ref 12.3–15.4)
ETHANOL UR QL: <0.01 %
EXPIRATION DATE: ABNORMAL
FECAL OCCULT BLOOD SCREEN, POC: POSITIVE
GFR SERPL CREATININE-BSD FRML MDRD: 7 ML/MIN/1.73
GFR SERPL CREATININE-BSD FRML MDRD: ABNORMAL ML/MIN/{1.73_M2}
GLOBULIN UR ELPH-MCNC: 3.6 GM/DL
GLUCOSE BLD-MCNC: 127 MG/DL (ref 65–99)
GLUCOSE UR STRIP-MCNC: NEGATIVE MG/DL
HCO3 BLDA-SCNC: 19.2 MMOL/L (ref 20–26)
HCT VFR BLD AUTO: 31.8 % (ref 34–46.6)
HCT VFR BLD AUTO: 35.7 % (ref 34–46.6)
HGB BLD-MCNC: 10 G/DL (ref 12–15.9)
HGB BLD-MCNC: 11 G/DL (ref 12–15.9)
HGB UR QL STRIP.AUTO: ABNORMAL
HOLD SPECIMEN: NORMAL
HOLD SPECIMEN: NORMAL
HOROWITZ INDEX BLD+IHG-RTO: 21 %
HYALINE CASTS UR QL AUTO: ABNORMAL /LPF
IMM GRANULOCYTES # BLD AUTO: 0.06 10*3/MM3 (ref 0–0.05)
IMM GRANULOCYTES NFR BLD AUTO: 0.5 % (ref 0–0.5)
INR PPP: 1.08 (ref 0.91–1.09)
KETONES UR QL STRIP: NEGATIVE
LEUKOCYTE ESTERASE UR QL STRIP.AUTO: NEGATIVE
LYMPHOCYTES # BLD AUTO: 0.96 10*3/MM3 (ref 0.7–3.1)
LYMPHOCYTES NFR BLD AUTO: 7.9 % (ref 19.6–45.3)
Lab: 149
Lab: ABNORMAL
MAGNESIUM SERPL-MCNC: 2.6 MG/DL (ref 1.6–2.6)
MCH RBC QN AUTO: 26.9 PG (ref 26.6–33)
MCHC RBC AUTO-ENTMCNC: 30.8 G/DL (ref 31.5–35.7)
MCV RBC AUTO: 87.3 FL (ref 79–97)
METHADONE UR QL SCN: NEGATIVE
MODALITY: ABNORMAL
MONOCYTES # BLD AUTO: 0.77 10*3/MM3 (ref 0.1–0.9)
MONOCYTES NFR BLD AUTO: 6.4 % (ref 5–12)
NEGATIVE CONTROL: NEGATIVE
NEUTROPHILS # BLD AUTO: 10.13 10*3/MM3 (ref 1.7–7)
NEUTROPHILS NFR BLD AUTO: 83.6 % (ref 42.7–76)
NITRITE UR QL STRIP: NEGATIVE
NRBC BLD AUTO-RTO: 0 /100 WBC (ref 0–0.2)
NT-PROBNP SERPL-MCNC: 5390 PG/ML (ref 5–900)
OPIATES UR QL: NEGATIVE
OSMOLALITY UR: 405 MOSM/KG (ref 601–850)
OXYCODONE UR QL SCN: NEGATIVE
PCO2 BLDA: 41.1 MM HG (ref 35–45)
PCP UR QL SCN: NEGATIVE
PH BLDA: 7.28 PH UNITS (ref 7.35–7.45)
PH UR STRIP.AUTO: <=5 [PH] (ref 5–8)
PHOSPHATE SERPL-MCNC: 8.2 MG/DL (ref 2.5–4.5)
PLATELET # BLD AUTO: 241 10*3/MM3 (ref 140–450)
PMV BLD AUTO: 10.7 FL (ref 6–12)
PO2 BLDA: 75.5 MM HG (ref 83–108)
POSITIVE CONTROL: POSITIVE
POTASSIUM BLD-SCNC: 4.5 MMOL/L (ref 3.5–5.2)
PROPOXYPH UR QL: NEGATIVE
PROT SERPL-MCNC: 7.9 G/DL (ref 6–8.5)
PROT UR QL STRIP: ABNORMAL
PROTHROMBIN TIME: 14.4 SECONDS (ref 11.9–14.6)
RBC # BLD AUTO: 4.09 10*6/MM3 (ref 3.77–5.28)
RBC # UR: ABNORMAL /HPF
REF LAB TEST METHOD: ABNORMAL
SALICYLATES SERPL-MCNC: 0.4 MG/DL
SAO2 % BLDCOA: 95 % (ref 94–99)
SODIUM BLD-SCNC: 140 MMOL/L (ref 136–145)
SODIUM UR-SCNC: 68 MMOL/L
SP GR UR STRIP: 1.02 (ref 1–1.03)
SQUAMOUS #/AREA URNS HPF: ABNORMAL /HPF
TRICYCLICS UR QL SCN: NEGATIVE
TROPONIN T SERPL-MCNC: 0.08 NG/ML (ref 0–0.03)
TROPONIN T SERPL-MCNC: 0.08 NG/ML (ref 0–0.03)
TROPONIN T SERPL-MCNC: 0.1 NG/ML (ref 0–0.03)
UROBILINOGEN UR QL STRIP: ABNORMAL
VENTILATOR MODE: ABNORMAL
WBC NRBC COR # BLD: 12.11 10*3/MM3 (ref 3.4–10.8)
WBC UR QL AUTO: ABNORMAL /HPF
WHOLE BLOOD HOLD SPECIMEN: NORMAL
WHOLE BLOOD HOLD SPECIMEN: NORMAL

## 2019-09-21 PROCEDURE — 85018 HEMOGLOBIN: CPT | Performed by: PHYSICIAN ASSISTANT

## 2019-09-21 PROCEDURE — 82550 ASSAY OF CK (CPK): CPT | Performed by: PHYSICIAN ASSISTANT

## 2019-09-21 PROCEDURE — 80053 COMPREHEN METABOLIC PANEL: CPT

## 2019-09-21 PROCEDURE — 74176 CT ABD & PELVIS W/O CONTRAST: CPT

## 2019-09-21 PROCEDURE — 87205 SMEAR GRAM STAIN: CPT | Performed by: CLINICAL NURSE SPECIALIST

## 2019-09-21 PROCEDURE — 99285 EMERGENCY DEPT VISIT HI MDM: CPT

## 2019-09-21 PROCEDURE — 81001 URINALYSIS AUTO W/SCOPE: CPT | Performed by: PHYSICIAN ASSISTANT

## 2019-09-21 PROCEDURE — 80307 DRUG TEST PRSMV CHEM ANLYZR: CPT | Performed by: PHYSICIAN ASSISTANT

## 2019-09-21 PROCEDURE — 85025 COMPLETE CBC W/AUTO DIFF WBC: CPT

## 2019-09-21 PROCEDURE — 83880 ASSAY OF NATRIURETIC PEPTIDE: CPT | Performed by: PHYSICIAN ASSISTANT

## 2019-09-21 PROCEDURE — 83935 ASSAY OF URINE OSMOLALITY: CPT | Performed by: CLINICAL NURSE SPECIALIST

## 2019-09-21 PROCEDURE — 84300 ASSAY OF URINE SODIUM: CPT | Performed by: CLINICAL NURSE SPECIALIST

## 2019-09-21 PROCEDURE — P9612 CATHETERIZE FOR URINE SPEC: HCPCS

## 2019-09-21 PROCEDURE — 93005 ELECTROCARDIOGRAM TRACING: CPT | Performed by: EMERGENCY MEDICINE

## 2019-09-21 PROCEDURE — 85610 PROTHROMBIN TIME: CPT | Performed by: PHYSICIAN ASSISTANT

## 2019-09-21 PROCEDURE — 83735 ASSAY OF MAGNESIUM: CPT | Performed by: PHYSICIAN ASSISTANT

## 2019-09-21 PROCEDURE — 85014 HEMATOCRIT: CPT | Performed by: PHYSICIAN ASSISTANT

## 2019-09-21 PROCEDURE — 36600 WITHDRAWAL OF ARTERIAL BLOOD: CPT

## 2019-09-21 PROCEDURE — 82270 OCCULT BLOOD FECES: CPT | Performed by: PHYSICIAN ASSISTANT

## 2019-09-21 PROCEDURE — 83605 ASSAY OF LACTIC ACID: CPT | Performed by: PHYSICIAN ASSISTANT

## 2019-09-21 PROCEDURE — 84100 ASSAY OF PHOSPHORUS: CPT | Performed by: CLINICAL NURSE SPECIALIST

## 2019-09-21 PROCEDURE — 80306 DRUG TEST PRSMV INSTRMNT: CPT | Performed by: PHYSICIAN ASSISTANT

## 2019-09-21 PROCEDURE — 84484 ASSAY OF TROPONIN QUANT: CPT | Performed by: PHYSICIAN ASSISTANT

## 2019-09-21 PROCEDURE — 71045 X-RAY EXAM CHEST 1 VIEW: CPT

## 2019-09-21 PROCEDURE — 36415 COLL VENOUS BLD VENIPUNCTURE: CPT

## 2019-09-21 PROCEDURE — 25010000002 LEVOFLOXACIN PER 250 MG: Performed by: PHYSICIAN ASSISTANT

## 2019-09-21 PROCEDURE — 25010000002 THIAMINE PER 100 MG: Performed by: PHYSICIAN ASSISTANT

## 2019-09-21 PROCEDURE — 36415 COLL VENOUS BLD VENIPUNCTURE: CPT | Performed by: PHYSICIAN ASSISTANT

## 2019-09-21 PROCEDURE — 87040 BLOOD CULTURE FOR BACTERIA: CPT | Performed by: PHYSICIAN ASSISTANT

## 2019-09-21 PROCEDURE — 82803 BLOOD GASES ANY COMBINATION: CPT

## 2019-09-21 PROCEDURE — 82306 VITAMIN D 25 HYDROXY: CPT | Performed by: CLINICAL NURSE SPECIALIST

## 2019-09-21 PROCEDURE — 82570 ASSAY OF URINE CREATININE: CPT | Performed by: CLINICAL NURSE SPECIALIST

## 2019-09-21 PROCEDURE — 84484 ASSAY OF TROPONIN QUANT: CPT

## 2019-09-21 PROCEDURE — 93010 ELECTROCARDIOGRAM REPORT: CPT | Performed by: INTERNAL MEDICINE

## 2019-09-21 RX ORDER — LORAZEPAM 2 MG/ML
2 INJECTION INTRAMUSCULAR EVERY 4 HOURS PRN
Status: DISCONTINUED | OUTPATIENT
Start: 2019-09-21 | End: 2019-09-26 | Stop reason: HOSPADM

## 2019-09-21 RX ORDER — ASPIRIN 81 MG/1
324 TABLET, CHEWABLE ORAL ONCE
Status: DISCONTINUED | OUTPATIENT
Start: 2019-09-21 | End: 2019-09-21

## 2019-09-21 RX ORDER — PANTOPRAZOLE SODIUM 40 MG/10ML
80 INJECTION, POWDER, LYOPHILIZED, FOR SOLUTION INTRAVENOUS ONCE
Status: COMPLETED | OUTPATIENT
Start: 2019-09-21 | End: 2019-09-21

## 2019-09-21 RX ORDER — LEVOFLOXACIN 5 MG/ML
250 INJECTION, SOLUTION INTRAVENOUS
Status: DISCONTINUED | OUTPATIENT
Start: 2019-09-23 | End: 2019-09-23

## 2019-09-21 RX ORDER — SODIUM CHLORIDE 0.9 % (FLUSH) 0.9 %
10 SYRINGE (ML) INJECTION AS NEEDED
Status: DISCONTINUED | OUTPATIENT
Start: 2019-09-21 | End: 2019-09-26 | Stop reason: HOSPADM

## 2019-09-21 RX ORDER — ACETAMINOPHEN 325 MG/1
650 TABLET ORAL EVERY 8 HOURS PRN
Status: DISCONTINUED | OUTPATIENT
Start: 2019-09-21 | End: 2019-09-26 | Stop reason: HOSPADM

## 2019-09-21 RX ORDER — LEVOFLOXACIN 5 MG/ML
500 INJECTION, SOLUTION INTRAVENOUS ONCE
Status: COMPLETED | OUTPATIENT
Start: 2019-09-21 | End: 2019-09-21

## 2019-09-21 RX ADMIN — METRONIDAZOLE 500 MG: 500 INJECTION, SOLUTION INTRAVENOUS at 19:03

## 2019-09-21 RX ADMIN — PANTOPRAZOLE SODIUM 8 MG/HR: 40 INJECTION, POWDER, FOR SOLUTION INTRAVENOUS at 18:27

## 2019-09-21 RX ADMIN — THIAMINE HYDROCHLORIDE 100 MG: 100 INJECTION, SOLUTION INTRAMUSCULAR; INTRAVENOUS at 23:26

## 2019-09-21 RX ADMIN — LEVOFLOXACIN 500 MG: 500 INJECTION, SOLUTION INTRAVENOUS at 19:03

## 2019-09-21 RX ADMIN — SODIUM CHLORIDE 500 ML: 9 INJECTION, SOLUTION INTRAVENOUS at 18:26

## 2019-09-21 RX ADMIN — PANTOPRAZOLE SODIUM 80 MG: 40 INJECTION, POWDER, FOR SOLUTION INTRAVENOUS at 18:30

## 2019-09-22 ENCOUNTER — APPOINTMENT (OUTPATIENT)
Dept: GENERAL RADIOLOGY | Facility: HOSPITAL | Age: 59
End: 2019-09-22

## 2019-09-22 ENCOUNTER — READMISSION MANAGEMENT (OUTPATIENT)
Dept: CALL CENTER | Facility: HOSPITAL | Age: 59
End: 2019-09-22

## 2019-09-22 LAB
25(OH)D3 SERPL-MCNC: 29 NG/ML (ref 30–100)
ALBUMIN SERPL-MCNC: 3.3 G/DL (ref 3.5–5.2)
ALBUMIN/GLOB SERPL: 1.1 G/DL
ALP SERPL-CCNC: 105 U/L (ref 39–117)
ALT SERPL W P-5'-P-CCNC: 20 U/L (ref 1–33)
ANION GAP SERPL CALCULATED.3IONS-SCNC: 16 MMOL/L (ref 5–15)
ARTERIAL PATENCY WRIST A: POSITIVE
AST SERPL-CCNC: 32 U/L (ref 1–32)
ATMOSPHERIC PRESS: 753 MMHG
BASE EXCESS BLDA CALC-SCNC: -8.1 MMOL/L (ref 0–2)
BASOPHILS # BLD AUTO: 0.04 10*3/MM3 (ref 0–0.2)
BASOPHILS NFR BLD AUTO: 0.4 % (ref 0–1.5)
BDY SITE: ABNORMAL
BILIRUB SERPL-MCNC: 0.2 MG/DL (ref 0.2–1.2)
BODY TEMPERATURE: 37 C
BUN BLD-MCNC: 78 MG/DL (ref 6–20)
BUN/CREAT SERPL: 17.7 (ref 7–25)
CALCIUM SPEC-SCNC: 7.5 MG/DL (ref 8.6–10.5)
CHLORIDE SERPL-SCNC: 107 MMOL/L (ref 98–107)
CK SERPL-CCNC: 1215 U/L (ref 20–180)
CO2 SERPL-SCNC: 18 MMOL/L (ref 22–29)
CREAT BLD-MCNC: 4.4 MG/DL (ref 0.57–1)
CREAT UR-MCNC: 78 MG/DL
D-LACTATE SERPL-SCNC: 0.4 MMOL/L (ref 0.5–2)
DEPRECATED RDW RBC AUTO: 58.5 FL (ref 37–54)
EOSINOPHIL # BLD AUTO: 0.13 10*3/MM3 (ref 0–0.4)
EOSINOPHIL NFR BLD AUTO: 1.3 % (ref 0.3–6.2)
EOSINOPHIL SPEC QL MICRO: 0 % EOS/100 CELLS (ref 0–0)
ERYTHROCYTE [DISTWIDTH] IN BLOOD BY AUTOMATED COUNT: 18.6 % (ref 12.3–15.4)
ERYTHROCYTE [SEDIMENTATION RATE] IN BLOOD: 21 MM/HR (ref 0–20)
GAS FLOW AIRWAY: 2 LPM
GFR SERPL CREATININE-BSD FRML MDRD: 10 ML/MIN/1.73
GFR SERPL CREATININE-BSD FRML MDRD: ABNORMAL ML/MIN/{1.73_M2}
GLOBULIN UR ELPH-MCNC: 3.1 GM/DL
GLUCOSE BLD-MCNC: 99 MG/DL (ref 65–99)
HCO3 BLDA-SCNC: 18.5 MMOL/L (ref 20–26)
HCT VFR BLD AUTO: 30.4 % (ref 34–46.6)
HGB BLD-MCNC: 9.5 G/DL (ref 12–15.9)
IMM GRANULOCYTES # BLD AUTO: 0.04 10*3/MM3 (ref 0–0.05)
IMM GRANULOCYTES NFR BLD AUTO: 0.4 % (ref 0–0.5)
LYMPHOCYTES # BLD AUTO: 1.44 10*3/MM3 (ref 0.7–3.1)
LYMPHOCYTES NFR BLD AUTO: 13.9 % (ref 19.6–45.3)
Lab: ABNORMAL
MAGNESIUM SERPL-MCNC: 2.4 MG/DL (ref 1.6–2.6)
MCH RBC QN AUTO: 26.8 PG (ref 26.6–33)
MCHC RBC AUTO-ENTMCNC: 31.3 G/DL (ref 31.5–35.7)
MCV RBC AUTO: 85.9 FL (ref 79–97)
MODALITY: ABNORMAL
MONOCYTES # BLD AUTO: 0.85 10*3/MM3 (ref 0.1–0.9)
MONOCYTES NFR BLD AUTO: 8.2 % (ref 5–12)
NEUTROPHILS # BLD AUTO: 7.88 10*3/MM3 (ref 1.7–7)
NEUTROPHILS NFR BLD AUTO: 75.8 % (ref 42.7–76)
NRBC BLD AUTO-RTO: 0 /100 WBC (ref 0–0.2)
PCO2 BLDA: 41.1 MM HG (ref 35–45)
PH BLDA: 7.26 PH UNITS (ref 7.35–7.45)
PLATELET # BLD AUTO: 198 10*3/MM3 (ref 140–450)
PMV BLD AUTO: 10.5 FL (ref 6–12)
PO2 BLDA: 101 MM HG (ref 83–108)
POTASSIUM BLD-SCNC: 4.4 MMOL/L (ref 3.5–5.2)
PROT SERPL-MCNC: 6.4 G/DL (ref 6–8.5)
RBC # BLD AUTO: 3.54 10*6/MM3 (ref 3.77–5.28)
SAO2 % BLDCOA: 97.8 % (ref 94–99)
SODIUM BLD-SCNC: 141 MMOL/L (ref 136–145)
TROPONIN T SERPL-MCNC: 0.14 NG/ML (ref 0–0.03)
TROPONIN T SERPL-MCNC: 0.2 NG/ML (ref 0–0.03)
TROPONIN T SERPL-MCNC: 0.2 NG/ML (ref 0–0.03)
VENTILATOR MODE: ABNORMAL
WBC NRBC COR # BLD: 10.38 10*3/MM3 (ref 3.4–10.8)

## 2019-09-22 PROCEDURE — 99222 1ST HOSP IP/OBS MODERATE 55: CPT | Performed by: NURSE PRACTITIONER

## 2019-09-22 PROCEDURE — 25010000002 ENOXAPARIN PER 10 MG: Performed by: SPECIALIST

## 2019-09-22 PROCEDURE — 82803 BLOOD GASES ANY COMBINATION: CPT

## 2019-09-22 PROCEDURE — 85025 COMPLETE CBC W/AUTO DIFF WBC: CPT | Performed by: PHYSICIAN ASSISTANT

## 2019-09-22 PROCEDURE — 94799 UNLISTED PULMONARY SVC/PX: CPT

## 2019-09-22 PROCEDURE — 36600 WITHDRAWAL OF ARTERIAL BLOOD: CPT

## 2019-09-22 PROCEDURE — 99222 1ST HOSP IP/OBS MODERATE 55: CPT | Performed by: INTERNAL MEDICINE

## 2019-09-22 PROCEDURE — 83605 ASSAY OF LACTIC ACID: CPT | Performed by: PHYSICIAN ASSISTANT

## 2019-09-22 PROCEDURE — 72050 X-RAY EXAM NECK SPINE 4/5VWS: CPT

## 2019-09-22 PROCEDURE — 85651 RBC SED RATE NONAUTOMATED: CPT | Performed by: SPECIALIST

## 2019-09-22 PROCEDURE — 84484 ASSAY OF TROPONIN QUANT: CPT | Performed by: PHYSICIAN ASSISTANT

## 2019-09-22 PROCEDURE — 80053 COMPREHEN METABOLIC PANEL: CPT | Performed by: CLINICAL NURSE SPECIALIST

## 2019-09-22 PROCEDURE — 82550 ASSAY OF CK (CPK): CPT | Performed by: PHYSICIAN ASSISTANT

## 2019-09-22 PROCEDURE — 83735 ASSAY OF MAGNESIUM: CPT | Performed by: CLINICAL NURSE SPECIALIST

## 2019-09-22 RX ORDER — MELATONIN
1000 DAILY
Status: DISCONTINUED | OUTPATIENT
Start: 2019-09-22 | End: 2019-09-26 | Stop reason: HOSPADM

## 2019-09-22 RX ORDER — TIZANIDINE 4 MG/1
4 TABLET ORAL EVERY 8 HOURS PRN
Status: DISCONTINUED | OUTPATIENT
Start: 2019-09-22 | End: 2019-09-26 | Stop reason: HOSPADM

## 2019-09-22 RX ORDER — SODIUM CHLORIDE 9 MG/ML
50 INJECTION, SOLUTION INTRAVENOUS CONTINUOUS
Status: DISCONTINUED | OUTPATIENT
Start: 2019-09-22 | End: 2019-09-26

## 2019-09-22 RX ADMIN — METRONIDAZOLE 500 MG: 500 INJECTION, SOLUTION INTRAVENOUS at 17:17

## 2019-09-22 RX ADMIN — PANTOPRAZOLE SODIUM 8 MG/HR: 40 INJECTION, POWDER, FOR SOLUTION INTRAVENOUS at 17:17

## 2019-09-22 RX ADMIN — ACETAMINOPHEN 650 MG: 325 TABLET, FILM COATED ORAL at 02:14

## 2019-09-22 RX ADMIN — PANTOPRAZOLE SODIUM 8 MG/HR: 40 INJECTION, POWDER, FOR SOLUTION INTRAVENOUS at 12:25

## 2019-09-22 RX ADMIN — ENOXAPARIN SODIUM 30 MG: 30 INJECTION SUBCUTANEOUS at 17:17

## 2019-09-22 RX ADMIN — ACETAMINOPHEN 650 MG: 325 TABLET, FILM COATED ORAL at 14:08

## 2019-09-22 RX ADMIN — METRONIDAZOLE 500 MG: 500 INJECTION, SOLUTION INTRAVENOUS at 11:37

## 2019-09-22 RX ADMIN — SODIUM CHLORIDE 75 ML/HR: 9 INJECTION, SOLUTION INTRAVENOUS at 19:16

## 2019-09-22 RX ADMIN — PANTOPRAZOLE SODIUM 8 MG/HR: 40 INJECTION, POWDER, FOR SOLUTION INTRAVENOUS at 22:04

## 2019-09-22 RX ADMIN — ACETAMINOPHEN 650 MG: 325 TABLET, FILM COATED ORAL at 22:03

## 2019-09-22 RX ADMIN — METRONIDAZOLE 500 MG: 500 INJECTION, SOLUTION INTRAVENOUS at 02:14

## 2019-09-22 RX ADMIN — PANTOPRAZOLE SODIUM 8 MG/HR: 40 INJECTION, POWDER, FOR SOLUTION INTRAVENOUS at 02:32

## 2019-09-22 RX ADMIN — VITAMIN D 1000 UNITS: 25 TAB ORAL at 18:30

## 2019-09-22 RX ADMIN — PANTOPRAZOLE SODIUM 8 MG/HR: 40 INJECTION, POWDER, FOR SOLUTION INTRAVENOUS at 08:14

## 2019-09-22 NOTE — OUTREACH NOTE
CHF Week 3 Survey      Responses   Facility patient discharged from?  [facility]   Does the patient have one of the following disease processes/diagnoses(primary or secondary)?  CHF   Week 3 attempt successful?  No   Revoke  Readmitted          Gita Jaeger RN

## 2019-09-23 ENCOUNTER — APPOINTMENT (OUTPATIENT)
Dept: ULTRASOUND IMAGING | Facility: HOSPITAL | Age: 59
End: 2019-09-23

## 2019-09-23 LAB
ALBUMIN SERPL-MCNC: 3.4 G/DL (ref 3.5–5.2)
ALBUMIN/GLOB SERPL: 1.2 G/DL
ALP SERPL-CCNC: 94 U/L (ref 39–117)
ALT SERPL W P-5'-P-CCNC: 16 U/L (ref 1–33)
ANION GAP SERPL CALCULATED.3IONS-SCNC: 10 MMOL/L (ref 5–15)
AST SERPL-CCNC: 21 U/L (ref 1–32)
BILIRUB SERPL-MCNC: 0.3 MG/DL (ref 0.2–1.2)
BUN BLD-MCNC: 50 MG/DL (ref 6–20)
BUN/CREAT SERPL: 19 (ref 7–25)
CALCIUM SPEC-SCNC: 8 MG/DL (ref 8.6–10.5)
CHLORIDE SERPL-SCNC: 110 MMOL/L (ref 98–107)
CO2 SERPL-SCNC: 21 MMOL/L (ref 22–29)
CREAT BLD-MCNC: 2.63 MG/DL (ref 0.57–1)
DEPRECATED RDW RBC AUTO: 59.5 FL (ref 37–54)
ERYTHROCYTE [DISTWIDTH] IN BLOOD BY AUTOMATED COUNT: 18.6 % (ref 12.3–15.4)
GFR SERPL CREATININE-BSD FRML MDRD: 19 ML/MIN/1.73
GLOBULIN UR ELPH-MCNC: 2.8 GM/DL
GLUCOSE BLD-MCNC: 101 MG/DL (ref 65–99)
HCT VFR BLD AUTO: 28.5 % (ref 34–46.6)
HGB BLD-MCNC: 8.9 G/DL (ref 12–15.9)
MCH RBC QN AUTO: 27.1 PG (ref 26.6–33)
MCHC RBC AUTO-ENTMCNC: 31.2 G/DL (ref 31.5–35.7)
MCV RBC AUTO: 86.6 FL (ref 79–97)
PLATELET # BLD AUTO: 171 10*3/MM3 (ref 140–450)
PMV BLD AUTO: 10.6 FL (ref 6–12)
POTASSIUM BLD-SCNC: 4.3 MMOL/L (ref 3.5–5.2)
PROT SERPL-MCNC: 6.2 G/DL (ref 6–8.5)
PTH-INTACT SERPL-MCNC: 94.7 PG/ML (ref 15–65)
RBC # BLD AUTO: 3.29 10*6/MM3 (ref 3.77–5.28)
SODIUM BLD-SCNC: 141 MMOL/L (ref 136–145)
WBC NRBC COR # BLD: 7.08 10*3/MM3 (ref 3.4–10.8)

## 2019-09-23 PROCEDURE — 76775 US EXAM ABDO BACK WALL LIM: CPT

## 2019-09-23 PROCEDURE — 85027 COMPLETE CBC AUTOMATED: CPT | Performed by: SPECIALIST

## 2019-09-23 PROCEDURE — 99232 SBSQ HOSP IP/OBS MODERATE 35: CPT | Performed by: INTERNAL MEDICINE

## 2019-09-23 PROCEDURE — 25010000002 THIAMINE PER 100 MG: Performed by: PHYSICIAN ASSISTANT

## 2019-09-23 PROCEDURE — 93975 VASCULAR STUDY: CPT

## 2019-09-23 PROCEDURE — 83970 ASSAY OF PARATHORMONE: CPT | Performed by: INTERNAL MEDICINE

## 2019-09-23 PROCEDURE — 25010000002 LEVOFLOXACIN PER 250 MG: Performed by: FAMILY MEDICINE

## 2019-09-23 PROCEDURE — 25010000002 ENOXAPARIN PER 10 MG: Performed by: SPECIALIST

## 2019-09-23 PROCEDURE — 99232 SBSQ HOSP IP/OBS MODERATE 35: CPT | Performed by: NURSE PRACTITIONER

## 2019-09-23 PROCEDURE — 80053 COMPREHEN METABOLIC PANEL: CPT | Performed by: SPECIALIST

## 2019-09-23 PROCEDURE — 94799 UNLISTED PULMONARY SVC/PX: CPT

## 2019-09-23 PROCEDURE — 94760 N-INVAS EAR/PLS OXIMETRY 1: CPT

## 2019-09-23 RX ORDER — HYDRALAZINE HYDROCHLORIDE 25 MG/1
25 TABLET, FILM COATED ORAL EVERY 8 HOURS SCHEDULED
Status: DISCONTINUED | OUTPATIENT
Start: 2019-09-23 | End: 2019-09-23

## 2019-09-23 RX ORDER — ASPIRIN 81 MG/1
81 TABLET ORAL DAILY
COMMUNITY
End: 2019-09-26 | Stop reason: HOSPADM

## 2019-09-23 RX ORDER — CARVEDILOL 6.25 MG/1
12.5 TABLET ORAL 2 TIMES DAILY WITH MEALS
Status: DISCONTINUED | OUTPATIENT
Start: 2019-09-23 | End: 2019-09-25

## 2019-09-23 RX ORDER — PANTOPRAZOLE SODIUM 40 MG/1
40 TABLET, DELAYED RELEASE ORAL
Status: DISCONTINUED | OUTPATIENT
Start: 2019-09-24 | End: 2019-09-26 | Stop reason: HOSPADM

## 2019-09-23 RX ORDER — CLONIDINE HYDROCHLORIDE 0.1 MG/1
0.1 TABLET ORAL ONCE
Status: COMPLETED | OUTPATIENT
Start: 2019-09-23 | End: 2019-09-23

## 2019-09-23 RX ORDER — CEPHALEXIN 500 MG/1
500 CAPSULE ORAL 2 TIMES DAILY
COMMUNITY
End: 2019-09-26 | Stop reason: HOSPADM

## 2019-09-23 RX ORDER — DOCOSANOL 100 MG/G
CREAM TOPICAL
Status: DISCONTINUED | OUTPATIENT
Start: 2019-09-23 | End: 2019-09-26 | Stop reason: HOSPADM

## 2019-09-23 RX ORDER — LEVOFLOXACIN 5 MG/ML
250 INJECTION, SOLUTION INTRAVENOUS EVERY 24 HOURS
Status: COMPLETED | OUTPATIENT
Start: 2019-09-23 | End: 2019-09-23

## 2019-09-23 RX ORDER — CLONIDINE HYDROCHLORIDE 0.1 MG/1
0.1 TABLET ORAL EVERY 4 HOURS PRN
Status: DISCONTINUED | OUTPATIENT
Start: 2019-09-23 | End: 2019-09-26 | Stop reason: HOSPADM

## 2019-09-23 RX ORDER — METOPROLOL TARTRATE 5 MG/5ML
5 INJECTION INTRAVENOUS EVERY 6 HOURS PRN
Status: DISCONTINUED | OUTPATIENT
Start: 2019-09-23 | End: 2019-09-26 | Stop reason: HOSPADM

## 2019-09-23 RX ADMIN — DOCOSANOL: 100 CREAM TOPICAL at 14:15

## 2019-09-23 RX ADMIN — METRONIDAZOLE 500 MG: 500 INJECTION, SOLUTION INTRAVENOUS at 11:04

## 2019-09-23 RX ADMIN — CLONIDINE HYDROCHLORIDE 0.1 MG: 0.1 TABLET ORAL at 19:35

## 2019-09-23 RX ADMIN — VITAMIN D 1000 UNITS: 25 TAB ORAL at 12:28

## 2019-09-23 RX ADMIN — DOCOSANOL: 100 CREAM TOPICAL at 17:56

## 2019-09-23 RX ADMIN — TIZANIDINE 4 MG: 4 TABLET ORAL at 00:52

## 2019-09-23 RX ADMIN — THIAMINE HYDROCHLORIDE 100 MG: 100 INJECTION, SOLUTION INTRAMUSCULAR; INTRAVENOUS at 08:32

## 2019-09-23 RX ADMIN — LEVOFLOXACIN 250 MG: 5 INJECTION, SOLUTION INTRAVENOUS at 08:32

## 2019-09-23 RX ADMIN — METRONIDAZOLE 500 MG: 500 INJECTION, SOLUTION INTRAVENOUS at 02:44

## 2019-09-23 RX ADMIN — ACETAMINOPHEN 650 MG: 325 TABLET, FILM COATED ORAL at 05:12

## 2019-09-23 RX ADMIN — PANTOPRAZOLE SODIUM 8 MG/HR: 40 INJECTION, POWDER, FOR SOLUTION INTRAVENOUS at 13:26

## 2019-09-23 RX ADMIN — METOPROLOL TARTRATE 5 MG: 5 INJECTION, SOLUTION INTRAVENOUS at 10:41

## 2019-09-23 RX ADMIN — DOCOSANOL: 100 CREAM TOPICAL at 20:51

## 2019-09-23 RX ADMIN — ENOXAPARIN SODIUM 30 MG: 30 INJECTION SUBCUTANEOUS at 06:24

## 2019-09-23 RX ADMIN — HYDRALAZINE HYDROCHLORIDE 75 MG: 50 TABLET ORAL at 20:51

## 2019-09-23 RX ADMIN — CLONIDINE HYDROCHLORIDE 0.1 MG: 0.1 TABLET ORAL at 23:50

## 2019-09-23 RX ADMIN — HYDRALAZINE HYDROCHLORIDE 75 MG: 50 TABLET ORAL at 14:22

## 2019-09-23 RX ADMIN — CARVEDILOL 12.5 MG: 6.25 TABLET, FILM COATED ORAL at 12:28

## 2019-09-23 RX ADMIN — TIZANIDINE 4 MG: 4 TABLET ORAL at 23:21

## 2019-09-23 RX ADMIN — METRONIDAZOLE 500 MG: 500 INJECTION, SOLUTION INTRAVENOUS at 17:55

## 2019-09-23 RX ADMIN — CLONIDINE HYDROCHLORIDE 0.1 MG: 0.1 TABLET ORAL at 12:28

## 2019-09-23 RX ADMIN — CARVEDILOL 12.5 MG: 6.25 TABLET, FILM COATED ORAL at 17:55

## 2019-09-23 RX ADMIN — SODIUM CHLORIDE 100 ML/HR: 9 INJECTION, SOLUTION INTRAVENOUS at 23:07

## 2019-09-23 RX ADMIN — PANTOPRAZOLE SODIUM 8 MG/HR: 40 INJECTION, POWDER, FOR SOLUTION INTRAVENOUS at 08:32

## 2019-09-23 RX ADMIN — PANTOPRAZOLE SODIUM 8 MG/HR: 40 INJECTION, POWDER, FOR SOLUTION INTRAVENOUS at 02:44

## 2019-09-23 RX ADMIN — METOPROLOL TARTRATE 5 MG: 5 INJECTION, SOLUTION INTRAVENOUS at 20:21

## 2019-09-24 LAB
ANION GAP SERPL CALCULATED.3IONS-SCNC: 9 MMOL/L (ref 5–15)
BUN BLD-MCNC: 34 MG/DL (ref 6–20)
BUN/CREAT SERPL: 19.4 (ref 7–25)
CALCIUM SPEC-SCNC: 8.1 MG/DL (ref 8.6–10.5)
CHLORIDE SERPL-SCNC: 110 MMOL/L (ref 98–107)
CO2 SERPL-SCNC: 23 MMOL/L (ref 22–29)
CREAT BLD-MCNC: 1.75 MG/DL (ref 0.57–1)
GFR SERPL CREATININE-BSD FRML MDRD: 30 ML/MIN/1.73
GLUCOSE BLD-MCNC: 115 MG/DL (ref 65–99)
POTASSIUM BLD-SCNC: 4.6 MMOL/L (ref 3.5–5.2)
SODIUM BLD-SCNC: 142 MMOL/L (ref 136–145)
URATE SERPL-MCNC: 7.3 MG/DL (ref 2.4–5.7)

## 2019-09-24 PROCEDURE — 25010000002 THIAMINE PER 100 MG: Performed by: PHYSICIAN ASSISTANT

## 2019-09-24 PROCEDURE — 94799 UNLISTED PULMONARY SVC/PX: CPT

## 2019-09-24 PROCEDURE — 80048 BASIC METABOLIC PNL TOTAL CA: CPT | Performed by: INTERNAL MEDICINE

## 2019-09-24 PROCEDURE — 25010000002 ENOXAPARIN PER 10 MG: Performed by: FAMILY MEDICINE

## 2019-09-24 PROCEDURE — 84550 ASSAY OF BLOOD/URIC ACID: CPT | Performed by: INTERNAL MEDICINE

## 2019-09-24 PROCEDURE — 99232 SBSQ HOSP IP/OBS MODERATE 35: CPT | Performed by: NURSE PRACTITIONER

## 2019-09-24 RX ORDER — LISINOPRIL 20 MG/1
20 TABLET ORAL
Status: DISCONTINUED | OUTPATIENT
Start: 2019-09-24 | End: 2019-09-25

## 2019-09-24 RX ADMIN — CARVEDILOL 12.5 MG: 6.25 TABLET, FILM COATED ORAL at 17:23

## 2019-09-24 RX ADMIN — HYDRALAZINE HYDROCHLORIDE 75 MG: 50 TABLET ORAL at 20:02

## 2019-09-24 RX ADMIN — METOPROLOL TARTRATE 5 MG: 5 INJECTION, SOLUTION INTRAVENOUS at 11:21

## 2019-09-24 RX ADMIN — HYDRALAZINE HYDROCHLORIDE 75 MG: 50 TABLET ORAL at 05:12

## 2019-09-24 RX ADMIN — ACETAMINOPHEN 650 MG: 325 TABLET, FILM COATED ORAL at 20:02

## 2019-09-24 RX ADMIN — CLONIDINE HYDROCHLORIDE 0.1 MG: 0.1 TABLET ORAL at 04:10

## 2019-09-24 RX ADMIN — THIAMINE HYDROCHLORIDE 100 MG: 100 INJECTION, SOLUTION INTRAMUSCULAR; INTRAVENOUS at 08:19

## 2019-09-24 RX ADMIN — DOCOSANOL: 100 CREAM TOPICAL at 20:01

## 2019-09-24 RX ADMIN — ENOXAPARIN SODIUM 30 MG: 30 INJECTION SUBCUTANEOUS at 07:47

## 2019-09-24 RX ADMIN — CLONIDINE HYDROCHLORIDE 0.1 MG: 0.1 TABLET ORAL at 20:02

## 2019-09-24 RX ADMIN — SODIUM CHLORIDE 50 ML/HR: 9 INJECTION, SOLUTION INTRAVENOUS at 14:22

## 2019-09-24 RX ADMIN — VITAMIN D 1000 UNITS: 25 TAB ORAL at 08:19

## 2019-09-24 RX ADMIN — CARVEDILOL 12.5 MG: 6.25 TABLET, FILM COATED ORAL at 08:19

## 2019-09-24 RX ADMIN — METRONIDAZOLE 500 MG: 500 INJECTION, SOLUTION INTRAVENOUS at 17:23

## 2019-09-24 RX ADMIN — DOCOSANOL: 100 CREAM TOPICAL at 07:47

## 2019-09-24 RX ADMIN — DOCOSANOL: 100 CREAM TOPICAL at 13:10

## 2019-09-24 RX ADMIN — TIZANIDINE 4 MG: 4 TABLET ORAL at 20:02

## 2019-09-24 RX ADMIN — METRONIDAZOLE 500 MG: 500 INJECTION, SOLUTION INTRAVENOUS at 01:13

## 2019-09-24 RX ADMIN — CLONIDINE HYDROCHLORIDE 0.1 MG: 0.1 TABLET ORAL at 12:40

## 2019-09-24 RX ADMIN — LISINOPRIL 20 MG: 20 TABLET ORAL at 13:08

## 2019-09-24 RX ADMIN — CLONIDINE HYDROCHLORIDE 0.1 MG: 0.1 TABLET ORAL at 08:19

## 2019-09-24 RX ADMIN — PANTOPRAZOLE SODIUM 40 MG: 40 TABLET, DELAYED RELEASE ORAL at 05:13

## 2019-09-24 RX ADMIN — METRONIDAZOLE 500 MG: 500 INJECTION, SOLUTION INTRAVENOUS at 09:08

## 2019-09-24 RX ADMIN — HYDRALAZINE HYDROCHLORIDE 75 MG: 50 TABLET ORAL at 13:08

## 2019-09-24 RX ADMIN — DOCOSANOL: 100 CREAM TOPICAL at 17:23

## 2019-09-25 ENCOUNTER — APPOINTMENT (OUTPATIENT)
Dept: CARDIOLOGY | Facility: HOSPITAL | Age: 59
End: 2019-09-25

## 2019-09-25 LAB
ANION GAP SERPL CALCULATED.3IONS-SCNC: 10 MMOL/L (ref 5–15)
BUN BLD-MCNC: 22 MG/DL (ref 6–20)
BUN/CREAT SERPL: 16.3 (ref 7–25)
CALCIUM SPEC-SCNC: 8.3 MG/DL (ref 8.6–10.5)
CHLORIDE SERPL-SCNC: 110 MMOL/L (ref 98–107)
CO2 SERPL-SCNC: 23 MMOL/L (ref 22–29)
CREAT BLD-MCNC: 1.35 MG/DL (ref 0.57–1)
GFR SERPL CREATININE-BSD FRML MDRD: 40 ML/MIN/1.73
GLUCOSE BLD-MCNC: 106 MG/DL (ref 65–99)
POTASSIUM BLD-SCNC: 4.3 MMOL/L (ref 3.5–5.2)
SODIUM BLD-SCNC: 143 MMOL/L (ref 136–145)

## 2019-09-25 PROCEDURE — 94799 UNLISTED PULMONARY SVC/PX: CPT

## 2019-09-25 PROCEDURE — 99231 SBSQ HOSP IP/OBS SF/LOW 25: CPT | Performed by: NURSE PRACTITIONER

## 2019-09-25 PROCEDURE — 94760 N-INVAS EAR/PLS OXIMETRY 1: CPT

## 2019-09-25 PROCEDURE — 80048 BASIC METABOLIC PNL TOTAL CA: CPT | Performed by: INTERNAL MEDICINE

## 2019-09-25 RX ORDER — METRONIDAZOLE 500 MG/1
500 TABLET ORAL EVERY 8 HOURS SCHEDULED
Status: DISCONTINUED | OUTPATIENT
Start: 2019-09-25 | End: 2019-09-26 | Stop reason: HOSPADM

## 2019-09-25 RX ORDER — CARVEDILOL 25 MG/1
25 TABLET ORAL 2 TIMES DAILY WITH MEALS
Status: DISCONTINUED | OUTPATIENT
Start: 2019-09-26 | End: 2019-09-26 | Stop reason: HOSPADM

## 2019-09-25 RX ORDER — LISINOPRIL 20 MG/1
40 TABLET ORAL
Status: DISCONTINUED | OUTPATIENT
Start: 2019-09-26 | End: 2019-09-26 | Stop reason: HOSPADM

## 2019-09-25 RX ORDER — LISINOPRIL 20 MG/1
20 TABLET ORAL ONCE
Status: COMPLETED | OUTPATIENT
Start: 2019-09-25 | End: 2019-09-25

## 2019-09-25 RX ADMIN — LISINOPRIL 20 MG: 20 TABLET ORAL at 09:42

## 2019-09-25 RX ADMIN — METRONIDAZOLE 500 MG: 500 INJECTION, SOLUTION INTRAVENOUS at 10:31

## 2019-09-25 RX ADMIN — DOCOSANOL: 100 CREAM TOPICAL at 17:38

## 2019-09-25 RX ADMIN — HYDRALAZINE HYDROCHLORIDE 75 MG: 50 TABLET ORAL at 14:44

## 2019-09-25 RX ADMIN — CLONIDINE HYDROCHLORIDE 0.1 MG: 0.1 TABLET ORAL at 04:15

## 2019-09-25 RX ADMIN — VITAMIN D 1000 UNITS: 25 TAB ORAL at 09:42

## 2019-09-25 RX ADMIN — TIZANIDINE 4 MG: 4 TABLET ORAL at 16:37

## 2019-09-25 RX ADMIN — HYDRALAZINE HYDROCHLORIDE 75 MG: 50 TABLET ORAL at 05:34

## 2019-09-25 RX ADMIN — CLONIDINE HYDROCHLORIDE 0.1 MG: 0.1 TABLET ORAL at 17:41

## 2019-09-25 RX ADMIN — CARVEDILOL 12.5 MG: 6.25 TABLET, FILM COATED ORAL at 17:37

## 2019-09-25 RX ADMIN — CLONIDINE HYDROCHLORIDE 0.1 MG: 0.1 TABLET ORAL at 21:31

## 2019-09-25 RX ADMIN — SODIUM CHLORIDE, PRESERVATIVE FREE 10 ML: 5 INJECTION INTRAVENOUS at 20:56

## 2019-09-25 RX ADMIN — LISINOPRIL 20 MG: 20 TABLET ORAL at 16:37

## 2019-09-25 RX ADMIN — CARVEDILOL 12.5 MG: 6.25 TABLET, FILM COATED ORAL at 09:42

## 2019-09-25 RX ADMIN — PANTOPRAZOLE SODIUM 40 MG: 40 TABLET, DELAYED RELEASE ORAL at 05:35

## 2019-09-25 RX ADMIN — ACETAMINOPHEN 650 MG: 325 TABLET, FILM COATED ORAL at 11:50

## 2019-09-25 RX ADMIN — METRONIDAZOLE 500 MG: 500 INJECTION, SOLUTION INTRAVENOUS at 01:38

## 2019-09-25 RX ADMIN — DOCOSANOL: 100 CREAM TOPICAL at 14:45

## 2019-09-25 RX ADMIN — METOPROLOL TARTRATE 5 MG: 5 INJECTION, SOLUTION INTRAVENOUS at 04:22

## 2019-09-25 RX ADMIN — DOCOSANOL 1 APPLICATION: 100 CREAM TOPICAL at 06:00

## 2019-09-25 RX ADMIN — CLONIDINE HYDROCHLORIDE 0.1 MG: 0.1 TABLET ORAL at 11:50

## 2019-09-25 RX ADMIN — METRONIDAZOLE 500 MG: 500 TABLET ORAL at 20:56

## 2019-09-25 RX ADMIN — METOPROLOL TARTRATE 5 MG: 5 INJECTION, SOLUTION INTRAVENOUS at 16:47

## 2019-09-25 RX ADMIN — DOCOSANOL: 100 CREAM TOPICAL at 10:32

## 2019-09-25 RX ADMIN — HYDRALAZINE HYDROCHLORIDE 75 MG: 50 TABLET ORAL at 20:56

## 2019-09-26 VITALS
BODY MASS INDEX: 28.41 KG/M2 | RESPIRATION RATE: 18 BRPM | OXYGEN SATURATION: 96 % | SYSTOLIC BLOOD PRESSURE: 168 MMHG | DIASTOLIC BLOOD PRESSURE: 102 MMHG | HEIGHT: 67 IN | TEMPERATURE: 98.2 F | WEIGHT: 181 LBS | HEART RATE: 62 BPM

## 2019-09-26 LAB
ANION GAP SERPL CALCULATED.3IONS-SCNC: 9 MMOL/L (ref 5–15)
BACTERIA SPEC AEROBE CULT: NORMAL
BACTERIA SPEC AEROBE CULT: NORMAL
BUN BLD-MCNC: 20 MG/DL (ref 6–20)
BUN/CREAT SERPL: 13.3 (ref 7–25)
CALCIUM SPEC-SCNC: 8.8 MG/DL (ref 8.6–10.5)
CHLORIDE SERPL-SCNC: 108 MMOL/L (ref 98–107)
CO2 SERPL-SCNC: 26 MMOL/L (ref 22–29)
CREAT BLD-MCNC: 1.5 MG/DL (ref 0.57–1)
GFR SERPL CREATININE-BSD FRML MDRD: 36 ML/MIN/1.73
GLUCOSE BLD-MCNC: 101 MG/DL (ref 65–99)
POTASSIUM BLD-SCNC: 4.3 MMOL/L (ref 3.5–5.2)
SODIUM BLD-SCNC: 143 MMOL/L (ref 136–145)

## 2019-09-26 PROCEDURE — 80048 BASIC METABOLIC PNL TOTAL CA: CPT | Performed by: INTERNAL MEDICINE

## 2019-09-26 RX ORDER — AMLODIPINE BESYLATE 5 MG/1
5 TABLET ORAL 2 TIMES DAILY
Qty: 60 TABLET | Refills: 1 | Status: SHIPPED | OUTPATIENT
Start: 2019-09-26 | End: 2020-10-17 | Stop reason: HOSPADM

## 2019-09-26 RX ORDER — AMLODIPINE BESYLATE 5 MG/1
5 TABLET ORAL
Status: DISCONTINUED | OUTPATIENT
Start: 2019-09-26 | End: 2019-09-26

## 2019-09-26 RX ORDER — PANTOPRAZOLE SODIUM 40 MG/1
40 TABLET, DELAYED RELEASE ORAL DAILY
Qty: 30 TABLET | Refills: 1 | Status: SHIPPED | OUTPATIENT
Start: 2019-09-26 | End: 2022-06-10 | Stop reason: HOSPADM

## 2019-09-26 RX ORDER — AMLODIPINE BESYLATE 5 MG/1
5 TABLET ORAL 2 TIMES DAILY
Status: DISCONTINUED | OUTPATIENT
Start: 2019-09-26 | End: 2019-09-26 | Stop reason: HOSPADM

## 2019-09-26 RX ORDER — TIZANIDINE 4 MG/1
4 TABLET ORAL EVERY 8 HOURS PRN
Qty: 30 TABLET | Refills: 0 | Status: ON HOLD | OUTPATIENT
Start: 2019-09-26 | End: 2020-11-02

## 2019-09-26 RX ORDER — CARVEDILOL 25 MG/1
25 TABLET ORAL 2 TIMES DAILY WITH MEALS
Qty: 6 TABLET | Refills: 1 | Status: ON HOLD | OUTPATIENT
Start: 2019-09-26 | End: 2020-10-17 | Stop reason: SDUPTHER

## 2019-09-26 RX ORDER — METRONIDAZOLE 500 MG/1
500 TABLET ORAL EVERY 8 HOURS SCHEDULED
Qty: 1 TABLET | Refills: 0 | Status: SHIPPED | OUTPATIENT
Start: 2019-09-26 | End: 2019-09-27

## 2019-09-26 RX ADMIN — DOCOSANOL: 100 CREAM TOPICAL at 12:05

## 2019-09-26 RX ADMIN — CLONIDINE HYDROCHLORIDE 0.1 MG: 0.1 TABLET ORAL at 16:34

## 2019-09-26 RX ADMIN — CLONIDINE HYDROCHLORIDE 0.1 MG: 0.1 TABLET ORAL at 08:31

## 2019-09-26 RX ADMIN — HYDRALAZINE HYDROCHLORIDE 75 MG: 50 TABLET ORAL at 06:21

## 2019-09-26 RX ADMIN — HYDRALAZINE HYDROCHLORIDE 75 MG: 50 TABLET ORAL at 14:16

## 2019-09-26 RX ADMIN — METRONIDAZOLE 500 MG: 500 TABLET ORAL at 14:16

## 2019-09-26 RX ADMIN — LISINOPRIL 40 MG: 20 TABLET ORAL at 08:31

## 2019-09-26 RX ADMIN — METRONIDAZOLE 500 MG: 500 TABLET ORAL at 06:21

## 2019-09-26 RX ADMIN — CARVEDILOL 25 MG: 25 TABLET, FILM COATED ORAL at 08:31

## 2019-09-26 RX ADMIN — TIZANIDINE 4 MG: 4 TABLET ORAL at 00:12

## 2019-09-26 RX ADMIN — PANTOPRAZOLE SODIUM 40 MG: 40 TABLET, DELAYED RELEASE ORAL at 06:21

## 2019-09-26 RX ADMIN — DOCOSANOL: 100 CREAM TOPICAL at 16:35

## 2019-09-26 RX ADMIN — VITAMIN D 1000 UNITS: 25 TAB ORAL at 08:31

## 2019-09-26 RX ADMIN — DOCOSANOL: 100 CREAM TOPICAL at 14:16

## 2019-09-26 RX ADMIN — METOPROLOL TARTRATE 5 MG: 5 INJECTION, SOLUTION INTRAVENOUS at 00:12

## 2019-09-26 RX ADMIN — METOPROLOL TARTRATE 5 MG: 5 INJECTION, SOLUTION INTRAVENOUS at 08:34

## 2019-09-26 RX ADMIN — AMLODIPINE BESYLATE 5 MG: 5 TABLET ORAL at 12:02

## 2019-09-26 RX ADMIN — CARVEDILOL 25 MG: 25 TABLET, FILM COATED ORAL at 16:35

## 2019-09-26 RX ADMIN — METOPROLOL TARTRATE 5 MG: 5 INJECTION, SOLUTION INTRAVENOUS at 16:34

## 2019-09-27 ENCOUNTER — READMISSION MANAGEMENT (OUTPATIENT)
Dept: CALL CENTER | Facility: HOSPITAL | Age: 59
End: 2019-09-27

## 2019-09-27 NOTE — OUTREACH NOTE
Prep Survey      Responses   Facility patient discharged from?  [facility]   Is patient eligible?  Yes   Discharge diagnosis  Acute renal failure, acute GI Bleeding, Diastolic CHF, acute, symptomatic hypotension, ischemic colitis, metabolic acidosis, HTN, Polysubstance abuse   Does the patient have one of the following disease processes/diagnoses(primary or secondary)?  CHF   Does the patient have Home health ordered?  No   Is there a DME ordered?  No   Comments regarding appointments  See AVS   Prep survey completed?  Yes          Shannon Gallardo RN

## 2019-09-30 ENCOUNTER — READMISSION MANAGEMENT (OUTPATIENT)
Dept: CALL CENTER | Facility: HOSPITAL | Age: 59
End: 2019-09-30

## 2019-09-30 NOTE — OUTREACH NOTE
CHF Week 1 Survey      Responses   Facility patient discharged from?  [facility]   Does the patient have one of the following disease processes/diagnoses(primary or secondary)?  CHF   Is there a successful TCM telephone encounter documented?  No   CHF Week 1 attempt successful?  Yes   Call start time  1239   Revoke  Decline to participate   Call end time  1239   Discharge diagnosis  Acute renal failure, acute GI Bleeding, Diastolic CHF, acute, symptomatic hypotension, ischemic colitis, metabolic acidosis, HTN, Polysubstance abuse          Donna Jorge, RN

## 2019-10-18 ENCOUNTER — APPOINTMENT (OUTPATIENT)
Dept: CARDIOLOGY | Facility: HOSPITAL | Age: 59
End: 2019-10-18

## 2019-10-25 ENCOUNTER — HOSPITAL ENCOUNTER (OUTPATIENT)
Dept: CARDIOLOGY | Facility: HOSPITAL | Age: 59
Discharge: HOME | End: 2019-10-25
Admitting: INTERNAL MEDICINE

## 2019-10-25 VITALS
SYSTOLIC BLOOD PRESSURE: 176 MMHG | HEART RATE: 61 BPM | DIASTOLIC BLOOD PRESSURE: 109 MMHG | BODY MASS INDEX: 28.41 KG/M2 | WEIGHT: 181 LBS | HEIGHT: 67 IN

## 2019-10-25 DIAGNOSIS — R06.00 DYSPNEA, UNSPECIFIED TYPE: ICD-10-CM

## 2019-10-25 DIAGNOSIS — R77.8 ELEVATED TROPONIN: ICD-10-CM

## 2019-10-25 LAB
BH CV STRESS BP STAGE 1: NORMAL
BH CV STRESS BP STAGE 2: NORMAL
BH CV STRESS BP STAGE 3: NORMAL
BH CV STRESS DURATION MIN STAGE 1: 3
BH CV STRESS DURATION MIN STAGE 2: 3
BH CV STRESS DURATION MIN STAGE 3: 4
BH CV STRESS DURATION SEC STAGE 1: 0
BH CV STRESS DURATION SEC STAGE 2: 0
BH CV STRESS DURATION SEC STAGE 3: 46
BH CV STRESS GRADE STAGE 1: 10
BH CV STRESS GRADE STAGE 2: 12
BH CV STRESS GRADE STAGE 3: 14
BH CV STRESS HR STAGE 1: 97
BH CV STRESS HR STAGE 2: 104
BH CV STRESS HR STAGE 3: 120
BH CV STRESS METS STAGE 1: 5
BH CV STRESS METS STAGE 2: 7.5
BH CV STRESS METS STAGE 3: 10
BH CV STRESS PROTOCOL 1: NORMAL
BH CV STRESS RECOVERY BP: NORMAL MMHG
BH CV STRESS RECOVERY HR: 89 BPM
BH CV STRESS SPEED STAGE 1: 1.7
BH CV STRESS SPEED STAGE 2: 2.5
BH CV STRESS SPEED STAGE 3: 3.4
BH CV STRESS STAGE 1: 1
BH CV STRESS STAGE 2: 2
BH CV STRESS STAGE 3: 3
MAXIMAL PREDICTED HEART RATE: 161 BPM
PERCENT MAX PREDICTED HR: 74.53 %
STRESS BASELINE BP: NORMAL MMHG
STRESS BASELINE HR: 68 BPM
STRESS PERCENT HR: 88 %
STRESS POST ESTIMATED WORKLOAD: 10 METS
STRESS POST EXERCISE DUR MIN: 10 MIN
STRESS POST EXERCISE DUR SEC: 46 SEC
STRESS POST PEAK BP: NORMAL MMHG
STRESS POST PEAK HR: 120 BPM
STRESS TARGET HR: 137 BPM

## 2019-10-25 PROCEDURE — 93017 CV STRESS TEST TRACING ONLY: CPT

## 2019-10-25 PROCEDURE — 93352 ADMIN ECG CONTRAST AGENT: CPT | Performed by: INTERNAL MEDICINE

## 2019-10-25 PROCEDURE — 93350 STRESS TTE ONLY: CPT

## 2019-10-25 PROCEDURE — 93350 STRESS TTE ONLY: CPT | Performed by: INTERNAL MEDICINE

## 2019-10-25 PROCEDURE — 93018 CV STRESS TEST I&R ONLY: CPT | Performed by: INTERNAL MEDICINE

## 2019-10-25 PROCEDURE — 25010000002 PERFLUTREN 6.52 MG/ML SUSPENSION: Performed by: INTERNAL MEDICINE

## 2019-10-25 RX ADMIN — PERFLUTREN 8.48 MG: 6.52 INJECTION, SUSPENSION INTRAVENOUS at 13:51

## 2019-12-03 ENCOUNTER — OFFICE VISIT (OUTPATIENT)
Dept: GASTROENTEROLOGY | Facility: CLINIC | Age: 59
End: 2019-12-03

## 2019-12-03 ENCOUNTER — TELEPHONE (OUTPATIENT)
Dept: GASTROENTEROLOGY | Facility: CLINIC | Age: 59
End: 2019-12-03

## 2019-12-03 VITALS
OXYGEN SATURATION: 96 % | SYSTOLIC BLOOD PRESSURE: 160 MMHG | HEART RATE: 80 BPM | DIASTOLIC BLOOD PRESSURE: 98 MMHG | WEIGHT: 179 LBS | BODY MASS INDEX: 28.77 KG/M2 | HEIGHT: 66 IN | TEMPERATURE: 96.8 F

## 2019-12-03 DIAGNOSIS — I10 ESSENTIAL HYPERTENSION: ICD-10-CM

## 2019-12-03 DIAGNOSIS — Z83.71 FAMILY HX COLONIC POLYPS: Primary | ICD-10-CM

## 2019-12-03 DIAGNOSIS — K92.2 GASTROINTESTINAL HEMORRHAGE, UNSPECIFIED GASTROINTESTINAL HEMORRHAGE TYPE: ICD-10-CM

## 2019-12-03 PROBLEM — Z83.719 FAMILY HX COLONIC POLYPS: Status: ACTIVE | Noted: 2019-12-03

## 2019-12-03 PROCEDURE — 99213 OFFICE O/P EST LOW 20 MIN: CPT | Performed by: NURSE PRACTITIONER

## 2019-12-03 NOTE — PROGRESS NOTES
[facility] GASTROENTEROLOGY - OFFICE NOTE    12/3/2019    Ludivina Ramirez   1960    Primary Physician: Orville Weston MD    Chief Complaint   Patient presents with   • Colonoscopy   Follow-up recent hospitalization for bright red blood per rectum, abnormal CT scan abdomen and pelvis, anemia, heme positive stool, black stool,      HISTORY OF PRESENT ILLNESS:    Ludivina Ramirez is a 59 y.o. female presents for follow-up from recent hospitalization September 2019.  She had GI bleed, bright red blood per rectum, abnormal CT scan abdomen pelvis with wall thickening along the descending and proximal sigmoid, anemia, heme positive stool, and black stool..  At that time her history and clinical findings were most consistent with ischemic colitis secondary to hypotension that was resolving.  She also had black stool.  Had never had an upper endoscopy or colonoscopy.  She is doing much better.  No signs of active GI bleeding.  No abdominal pain.  No nausea vomiting.  No fevers or chills.    She will be seeing Dr. Weston next week with labs.  Her blood pressure has been better.      No history of colonoscopy or upper endoscopy.  Her mother and father both have history of colon polyps.  No family history of colon cancer.    Past Medical History:   Diagnosis Date   • Acute CHF (CMS/HCC)    • Acute renal failure (ARF) (CMS/HCC)    • Anemia    • Asthma     childhood   • Hypertension    • Ischemic colitis (CMS/HCC)    • Metabolic acidosis    • Nonrheumatic aortic (valve) insufficiency    • PTSD (post-traumatic stress disorder)        Past Surgical History:   Procedure Laterality Date   • EXTERNAL FIXATION WRIST FRACTURE     • LEG SURGERY     • TUBAL ABDOMINAL LIGATION         Outpatient Medications Marked as Taking for the 12/3/19 encounter (Office Visit) with Hina Sandoval APRN   Medication Sig Dispense Refill   • amLODIPine (NORVASC) 5 MG tablet Take 1 tablet by mouth 2 (Two) Times a Day. 60 tablet 1   • azelastine  (ASTELIN) 0.1 % nasal spray 1 spray into the nostril(s) as directed by provider 2 (Two) Times a Day. Use in each nostril as directed     • carvedilol (COREG) 25 MG tablet Take 1 tablet by mouth 2 (Two) Times a Day With Meals. 6 tablet 1   • cholecalciferol 1000 units tablet Take 1,000 Units by mouth Daily. 30 tablet 1   • CloNIDine (CATAPRES) 0.1 MG tablet Take 0.1 mg by mouth Daily As Needed for High Blood Pressure (BP >180/100).     • fluticasone (FLONASE) 50 MCG/ACT nasal spray 2 sprays into the nostril(s) as directed by provider Daily As Needed for Rhinitis or Allergies.     • hydrALAZINE (APRESOLINE) 25 MG tablet Take 3 tablets by mouth Every 8 (Eight) Hours. 270 tablet 3   • lisinopril (PRINIVIL,ZESTRIL) 40 MG tablet Take 1 tablet by mouth Every Evening. 30 tablet 3   • pantoprazole (PROTONIX) 40 MG EC tablet Take 1 tablet by mouth Daily. 30 tablet 1   • tiZANidine (ZANAFLEX) 4 MG tablet Take 1 tablet by mouth Every 8 (Eight) Hours As Needed for Muscle Spasms. 30 tablet 0       Allergies   Allergen Reactions   • Codeine Nausea And Vomiting   • Imitrex [Sumatriptan] Other (See Comments)     LOPEZ       Social History     Socioeconomic History   • Marital status: Single     Spouse name: Not on file   • Number of children: Not on file   • Years of education: Not on file   • Highest education level: Not on file   Tobacco Use   • Smoking status: Current Every Day Smoker     Packs/day: 0.50   • Smokeless tobacco: Never Used   Substance and Sexual Activity   • Alcohol use: No   • Drug use: No   • Sexual activity: Defer       Family History   Problem Relation Age of Onset   • Coronary artery disease Mother    • Coronary artery disease Father        Review of Systems   Constitutional: Negative for chills, fever and unexpected weight change.   Respiratory: Negative for cough, shortness of breath and wheezing.    Cardiovascular: Negative for chest pain and palpitations.   Gastrointestinal: Negative for abdominal distention,  "abdominal pain, anal bleeding, blood in stool, constipation, diarrhea, nausea and vomiting.        Vitals:    12/03/19 1415   BP: 160/98   Pulse: 80   Temp: 96.8 °F (36 °C)   SpO2: 96%   Weight: 81.2 kg (179 lb)   Height: 167.6 cm (66\")      Body mass index is 28.89 kg/m².    Physical Exam   Constitutional: No distress.   Cardiovascular: Normal rate, regular rhythm and normal heart sounds.   Pulmonary/Chest: Effort normal and breath sounds normal.   Abdominal: Soft. Bowel sounds are normal. She exhibits no distension. There is no tenderness.   Musculoskeletal: She exhibits no edema.   Neurological: She is alert.   Skin: Skin is warm and dry.   Vitals reviewed.      Results for orders placed or performed during the hospital encounter of 10/25/19   Adult Stress Echo W/ Cont or Stress Agent if Necessary Per Protocol   Result Value Ref Range    BH CV STRESS PROTOCOL 1 Saad     Stage 1 1     HR Stage 1 97     BP Stage 1 169/112     Duration Min Stage 1 3     Duration Sec Stage 1 0     Grade Stage 1 10     Speed Stage 1 1.7     BH CV STRESS METS STAGE 1 5     Stage 2 2     HR Stage 2 104     BP Stage 2 181/113     Duration Min Stage 2 3     Duration Sec Stage 2 0     Grade Stage 2 12     Speed Stage 2 2.5     BH CV STRESS METS STAGE 2 7.5     Stage 3 3     HR Stage 3 120     BP Stage 3 197/117     Duration Min Stage 3 4     Duration Sec Stage 3 46     Grade Stage 3 14     Speed Stage 3 3.4     BH CV STRESS METS STAGE 3 10.0     Baseline HR 68 bpm    Baseline /100 mmHg    Peak  bpm    Percent Max Pred HR 74.53 %    Percent Target HR 88 %    Peak /117 mmHg    Recovery HR 89 bpm    Recovery /104 mmHg    Target HR (85%) 137 bpm    Max. Pred. HR (100%) 161 bpm    Exercise duration (min) 10 min    Exercise duration (sec) 46 sec    Estimated workload 10.0 METS           ASSESSMENT AND PLAN    Assessment/Plan     Ludivina was seen today for colonoscopy.    Diagnoses and all orders for this visit:    Family " hx colonic polyps  -     Case Request; Standing  -     Case Request    Gastrointestinal hemorrhage, unspecified gastrointestinal hemorrhage type  Comments:  recent GI bleed and was hospitalized September 2018, history and clinical findings were consistent with ischemic colitis secondary to hypotension.    Essential hypertension    Other orders  -     Follow Anesthesia Guidelines / Standing Orders; Future  -     Implement Anesthesia Orders Day of Procedure; Standing  -     Obtain Informed Consent; Standing  -     Obtain Informed Consent; Future  -     Sod Picosulfate-Mag Ox-Cit Acd (CLENPIQ) 10-3.5-12 MG-GM -GM/160ML solution; Take 2 bottles by mouth 1 (One) Time for 1 dose. Take as directed        She has had no further signs of active GI bleed.  She is asymptomatic from the GI perspective.  While hospitalized September 2019 it was felt that she had ischemic colitis secondary to hypotension.  She also had some episodes of black stools as well and had been using NSAIDs.  We discussed having upper endoscopy and colonoscopy and she is agreeable.  Recommend continue Protonix daily.    She is to see her PCP next week and will have labs obtained at that time.  I have asked her to have copy of her lab results faxed to us as well.    COLONOSCOPY WITH ANESTHESIA (N/A)   All risks, benefits, alternatives, and indications of colonoscopy procedure have been discussed with the patient. Risks to include perforation of the colon requiring possible surgery or colostomy, risk of bleeding from biopsies or removal of colon tissue, possibility of missing a colon polyp or cancer, or adverse drug reaction.  Benefits to include the diagnosis and management of disease of the colon and rectum. Alternatives to include barium enema, radiographic evaluation, lab testing or no intervention. Pt verbalizes understanding and agrees.     Risk, benefits, and alternatives of endoscopy were explained in full.  They understand that there is a risk of  bleeding, perforation, and infection.  The risk of perforation goes up with esophageal dilation.  Other options to evaluate UGI complaints could involve barium swallow or UGI series, but these would be diagnostic tests only.  Patient was given time to ask questions.  I answered them to their satisfaction and they are agreeable to proceeding             Body mass index is 28.89 kg/m².    Patient's Body mass index is 28.89 kg/m². BMI is above normal parameters. Recommendations include: no follow up ,recommend weight loss.             ISAIAH Fishman    EMR Dragon/transcription disclaimer:  Much of this encounter note is electronic transcription/translation of spoken language to printed text.  The electronic translation of spoken language may be erroneous, or at times, nonsensical words or phrases may be inadvertently transcribed.  Although I have reviewed the note for such errors, some may still exist.

## 2019-12-03 NOTE — TELEPHONE ENCOUNTER
Can you call and let her know that we can do upper endoscopy at same time as colonoscopy.  We had discussed that when she was in the hospital and if she is agreeable then I will modify the case request to add the upper endoscopy.

## 2019-12-04 ENCOUNTER — PREP FOR SURGERY (OUTPATIENT)
Dept: OTHER | Facility: HOSPITAL | Age: 59
End: 2019-12-04

## 2019-12-20 ENCOUNTER — TELEPHONE (OUTPATIENT)
Dept: GASTROENTEROLOGY | Facility: CLINIC | Age: 59
End: 2019-12-20

## 2019-12-20 NOTE — TELEPHONE ENCOUNTER
LVM for PT to call me to get her rescheduled.  PT didn't show up for her procedures today.  Had LVM for PT yesterday reminding her of procedures.

## 2020-01-03 ENCOUNTER — TELEPHONE (OUTPATIENT)
Dept: GASTROENTEROLOGY | Facility: CLINIC | Age: 60
End: 2020-01-03

## 2020-10-12 ENCOUNTER — APPOINTMENT (OUTPATIENT)
Dept: NUCLEAR MEDICINE | Facility: HOSPITAL | Age: 60
End: 2020-10-12

## 2020-10-12 ENCOUNTER — HOSPITAL ENCOUNTER (INPATIENT)
Facility: HOSPITAL | Age: 60
LOS: 5 days | Discharge: HOME | End: 2020-10-17
Attending: EMERGENCY MEDICINE | Admitting: FAMILY MEDICINE

## 2020-10-12 ENCOUNTER — APPOINTMENT (OUTPATIENT)
Dept: GENERAL RADIOLOGY | Facility: HOSPITAL | Age: 60
End: 2020-10-12

## 2020-10-12 DIAGNOSIS — I10 HYPERTENSION, UNSPECIFIED TYPE: ICD-10-CM

## 2020-10-12 DIAGNOSIS — I50.9 CONGESTIVE HEART FAILURE, UNSPECIFIED HF CHRONICITY, UNSPECIFIED HEART FAILURE TYPE (HCC): Primary | ICD-10-CM

## 2020-10-12 DIAGNOSIS — N17.9 AKI (ACUTE KIDNEY INJURY) (HCC): ICD-10-CM

## 2020-10-12 LAB
ANION GAP SERPL CALCULATED.3IONS-SCNC: 12 MMOL/L (ref 5–15)
APTT PPP: 31.3 SECONDS (ref 24.1–35)
ATMOSPHERIC PRESS: 750 MMHG
BASE EXCESS BLDV CALC-SCNC: 2.7 MMOL/L (ref 0–2)
BASOPHILS # BLD AUTO: 0.07 10*3/MM3 (ref 0–0.2)
BASOPHILS NFR BLD AUTO: 0.9 % (ref 0–1.5)
BDY SITE: ABNORMAL
BODY TEMPERATURE: 37 C
BUN SERPL-MCNC: 35 MG/DL (ref 8–23)
BUN/CREAT SERPL: 15.7 (ref 7–25)
CALCIUM SPEC-SCNC: 8.5 MG/DL (ref 8.6–10.5)
CHLORIDE SERPL-SCNC: 104 MMOL/L (ref 98–107)
CK SERPL-CCNC: 53 U/L (ref 20–180)
CO2 SERPL-SCNC: 26 MMOL/L (ref 22–29)
CREAT SERPL-MCNC: 2.23 MG/DL (ref 0.57–1)
D DIMER PPP FEU-MCNC: 1.19 MG/L (FEU) (ref 0–0.5)
DEPRECATED RDW RBC AUTO: 47.9 FL (ref 37–54)
EOSINOPHIL # BLD AUTO: 0.17 10*3/MM3 (ref 0–0.4)
EOSINOPHIL NFR BLD AUTO: 2.1 % (ref 0.3–6.2)
ERYTHROCYTE [DISTWIDTH] IN BLOOD BY AUTOMATED COUNT: 15 % (ref 12.3–15.4)
GFR SERPL CREATININE-BSD FRML MDRD: 22 ML/MIN/1.73
GLUCOSE SERPL-MCNC: 147 MG/DL (ref 65–99)
HCO3 BLDV-SCNC: 26.4 MMOL/L (ref 22–28)
HCT VFR BLD AUTO: 30 % (ref 34–46.6)
HGB BLD-MCNC: 9.8 G/DL (ref 12–15.9)
HOLD SPECIMEN: NORMAL
IMM GRANULOCYTES # BLD AUTO: 0.04 10*3/MM3 (ref 0–0.05)
IMM GRANULOCYTES NFR BLD AUTO: 0.5 % (ref 0–0.5)
INR PPP: 1.06 (ref 0.91–1.09)
LYMPHOCYTES # BLD AUTO: 1.02 10*3/MM3 (ref 0.7–3.1)
LYMPHOCYTES NFR BLD AUTO: 12.5 % (ref 19.6–45.3)
Lab: ABNORMAL
MCH RBC QN AUTO: 28.9 PG (ref 26.6–33)
MCHC RBC AUTO-ENTMCNC: 32.7 G/DL (ref 31.5–35.7)
MCV RBC AUTO: 88.5 FL (ref 79–97)
MODALITY: ABNORMAL
MONOCYTES # BLD AUTO: 0.48 10*3/MM3 (ref 0.1–0.9)
MONOCYTES NFR BLD AUTO: 5.9 % (ref 5–12)
NEUTROPHILS NFR BLD AUTO: 6.36 10*3/MM3 (ref 1.7–7)
NEUTROPHILS NFR BLD AUTO: 78.1 % (ref 42.7–76)
NRBC BLD AUTO-RTO: 0 /100 WBC (ref 0–0.2)
NT-PROBNP SERPL-MCNC: ABNORMAL PG/ML (ref 0–900)
PCO2 BLDV: 36.3 MM HG (ref 41–51)
PH BLDV: 7.47 PH UNITS (ref 7.32–7.42)
PLATELET # BLD AUTO: 226 10*3/MM3 (ref 140–450)
PMV BLD AUTO: 10.9 FL (ref 6–12)
PO2 BLDV: 70 MM HG (ref 27–53)
POTASSIUM SERPL-SCNC: 3 MMOL/L (ref 3.5–5.2)
PROTHROMBIN TIME: 13.4 SECONDS (ref 11.9–14.6)
RBC # BLD AUTO: 3.39 10*6/MM3 (ref 3.77–5.28)
SAO2 % BLDCOV: 96 % (ref 45–75)
SARS-COV-2 RDRP RESP QL NAA+PROBE: NOT DETECTED
SODIUM SERPL-SCNC: 142 MMOL/L (ref 136–145)
TROPONIN T SERPL-MCNC: 0.05 NG/ML (ref 0–0.03)
VENTILATOR MODE: ABNORMAL
WBC # BLD AUTO: 8.14 10*3/MM3 (ref 3.4–10.8)
WHOLE BLOOD HOLD SPECIMEN: NORMAL
WHOLE BLOOD HOLD SPECIMEN: NORMAL

## 2020-10-12 PROCEDURE — 25010000002 METHYLPREDNISOLONE PER 125 MG: Performed by: EMERGENCY MEDICINE

## 2020-10-12 PROCEDURE — 80048 BASIC METABOLIC PNL TOTAL CA: CPT | Performed by: EMERGENCY MEDICINE

## 2020-10-12 PROCEDURE — 84484 ASSAY OF TROPONIN QUANT: CPT | Performed by: EMERGENCY MEDICINE

## 2020-10-12 PROCEDURE — 99284 EMERGENCY DEPT VISIT MOD MDM: CPT

## 2020-10-12 PROCEDURE — 0 TECHNETIUM ALBUMIN AGGREGATED: Performed by: EMERGENCY MEDICINE

## 2020-10-12 PROCEDURE — 25010000002 ENOXAPARIN PER 10 MG: Performed by: EMERGENCY MEDICINE

## 2020-10-12 PROCEDURE — 85379 FIBRIN DEGRADATION QUANT: CPT | Performed by: EMERGENCY MEDICINE

## 2020-10-12 PROCEDURE — 94640 AIRWAY INHALATION TREATMENT: CPT

## 2020-10-12 PROCEDURE — 87635 SARS-COV-2 COVID-19 AMP PRB: CPT | Performed by: EMERGENCY MEDICINE

## 2020-10-12 PROCEDURE — 85610 PROTHROMBIN TIME: CPT | Performed by: EMERGENCY MEDICINE

## 2020-10-12 PROCEDURE — 82803 BLOOD GASES ANY COMBINATION: CPT

## 2020-10-12 PROCEDURE — 71045 X-RAY EXAM CHEST 1 VIEW: CPT

## 2020-10-12 PROCEDURE — A9540 TC99M MAA: HCPCS | Performed by: EMERGENCY MEDICINE

## 2020-10-12 PROCEDURE — 85025 COMPLETE CBC W/AUTO DIFF WBC: CPT | Performed by: EMERGENCY MEDICINE

## 2020-10-12 PROCEDURE — 82550 ASSAY OF CK (CPK): CPT | Performed by: EMERGENCY MEDICINE

## 2020-10-12 PROCEDURE — 93005 ELECTROCARDIOGRAM TRACING: CPT

## 2020-10-12 PROCEDURE — 85730 THROMBOPLASTIN TIME PARTIAL: CPT | Performed by: EMERGENCY MEDICINE

## 2020-10-12 PROCEDURE — 93010 ELECTROCARDIOGRAM REPORT: CPT | Performed by: INTERNAL MEDICINE

## 2020-10-12 PROCEDURE — 83880 ASSAY OF NATRIURETIC PEPTIDE: CPT | Performed by: EMERGENCY MEDICINE

## 2020-10-12 PROCEDURE — C9803 HOPD COVID-19 SPEC COLLECT: HCPCS | Performed by: EMERGENCY MEDICINE

## 2020-10-12 PROCEDURE — 78580 LUNG PERFUSION IMAGING: CPT

## 2020-10-12 PROCEDURE — 25010000002 FUROSEMIDE PER 20 MG: Performed by: EMERGENCY MEDICINE

## 2020-10-12 PROCEDURE — 25010000002 HYDRALAZINE PER 20 MG: Performed by: EMERGENCY MEDICINE

## 2020-10-12 RX ORDER — CLONIDINE HYDROCHLORIDE 0.1 MG/1
0.1 TABLET ORAL ONCE
Status: COMPLETED | OUTPATIENT
Start: 2020-10-12 | End: 2020-10-12

## 2020-10-12 RX ORDER — HYDRALAZINE HYDROCHLORIDE 20 MG/ML
20 INJECTION INTRAMUSCULAR; INTRAVENOUS ONCE
Status: DISCONTINUED | OUTPATIENT
Start: 2020-10-12 | End: 2020-10-12

## 2020-10-12 RX ORDER — IPRATROPIUM BROMIDE AND ALBUTEROL SULFATE 2.5; .5 MG/3ML; MG/3ML
3 SOLUTION RESPIRATORY (INHALATION) ONCE
Status: COMPLETED | OUTPATIENT
Start: 2020-10-12 | End: 2020-10-12

## 2020-10-12 RX ORDER — POTASSIUM CHLORIDE 750 MG/1
40 CAPSULE, EXTENDED RELEASE ORAL ONCE
Status: COMPLETED | OUTPATIENT
Start: 2020-10-12 | End: 2020-10-12

## 2020-10-12 RX ORDER — HYDRALAZINE HYDROCHLORIDE 20 MG/ML
20 INJECTION INTRAMUSCULAR; INTRAVENOUS ONCE
Status: COMPLETED | OUTPATIENT
Start: 2020-10-12 | End: 2020-10-12

## 2020-10-12 RX ORDER — CLONIDINE HYDROCHLORIDE 0.1 MG/1
0.2 TABLET ORAL ONCE
Status: COMPLETED | OUTPATIENT
Start: 2020-10-12 | End: 2020-10-12

## 2020-10-12 RX ORDER — METHYLPREDNISOLONE SODIUM SUCCINATE 125 MG/2ML
125 INJECTION, POWDER, LYOPHILIZED, FOR SOLUTION INTRAMUSCULAR; INTRAVENOUS ONCE
Status: COMPLETED | OUTPATIENT
Start: 2020-10-12 | End: 2020-10-12

## 2020-10-12 RX ORDER — LABETALOL HYDROCHLORIDE 5 MG/ML
20 INJECTION, SOLUTION INTRAVENOUS ONCE
Status: COMPLETED | OUTPATIENT
Start: 2020-10-12 | End: 2020-10-12

## 2020-10-12 RX ORDER — FUROSEMIDE 10 MG/ML
60 INJECTION INTRAMUSCULAR; INTRAVENOUS ONCE
Status: COMPLETED | OUTPATIENT
Start: 2020-10-12 | End: 2020-10-12

## 2020-10-12 RX ORDER — NITROGLYCERIN 0.4 MG/1
0.4 TABLET SUBLINGUAL
Status: DISCONTINUED | OUTPATIENT
Start: 2020-10-12 | End: 2020-10-17 | Stop reason: HOSPADM

## 2020-10-12 RX ADMIN — NITROGLYCERIN 0.4 MG: 0.4 TABLET SUBLINGUAL at 21:20

## 2020-10-12 RX ADMIN — ENOXAPARIN SODIUM 80 MG: 80 INJECTION SUBCUTANEOUS at 23:06

## 2020-10-12 RX ADMIN — HYDRALAZINE HYDROCHLORIDE 20 MG: 20 INJECTION INTRAMUSCULAR; INTRAVENOUS at 20:28

## 2020-10-12 RX ADMIN — IPRATROPIUM BROMIDE AND ALBUTEROL SULFATE 3 ML: 2.5; .5 SOLUTION RESPIRATORY (INHALATION) at 20:36

## 2020-10-12 RX ADMIN — METHYLPREDNISOLONE SODIUM SUCCINATE 125 MG: 125 INJECTION, POWDER, FOR SOLUTION INTRAMUSCULAR; INTRAVENOUS at 20:25

## 2020-10-12 RX ADMIN — CLONIDINE HYDROCHLORIDE 0.1 MG: 0.1 TABLET ORAL at 20:25

## 2020-10-12 RX ADMIN — KIT FOR THE PREPARATION OF TECHNETIUM TC 99M ALBUMIN AGGREGATED 1 DOSE: 2.5 INJECTION, POWDER, FOR SOLUTION INTRAVENOUS at 21:53

## 2020-10-12 RX ADMIN — LABETALOL HYDROCHLORIDE 20 MG: 5 INJECTION, SOLUTION INTRAVENOUS at 23:01

## 2020-10-12 RX ADMIN — POTASSIUM CHLORIDE 40 MEQ: 750 CAPSULE, EXTENDED RELEASE ORAL at 23:07

## 2020-10-12 RX ADMIN — CLONIDINE HYDROCHLORIDE 0.2 MG: 0.1 TABLET ORAL at 23:08

## 2020-10-12 RX ADMIN — FUROSEMIDE 60 MG: 10 INJECTION, SOLUTION INTRAMUSCULAR; INTRAVENOUS at 21:02

## 2020-10-13 ENCOUNTER — APPOINTMENT (OUTPATIENT)
Dept: ULTRASOUND IMAGING | Facility: HOSPITAL | Age: 60
End: 2020-10-13

## 2020-10-13 ENCOUNTER — APPOINTMENT (OUTPATIENT)
Dept: CARDIOLOGY | Facility: HOSPITAL | Age: 60
End: 2020-10-13

## 2020-10-13 PROBLEM — R06.02 SHORTNESS OF BREATH: Status: ACTIVE | Noted: 2020-10-13

## 2020-10-13 PROBLEM — N18.30 CKD (CHRONIC KIDNEY DISEASE) STAGE 3, GFR 30-59 ML/MIN (HCC): Status: ACTIVE | Noted: 2020-10-13

## 2020-10-13 PROBLEM — N18.4 CHRONIC KIDNEY DISEASE, STAGE 4 (SEVERE): Status: ACTIVE | Noted: 2020-10-13

## 2020-10-13 PROBLEM — Z91.199 NONCOMPLIANCE: Status: ACTIVE | Noted: 2020-10-13

## 2020-10-13 LAB
AMPHET+METHAMPHET UR QL: NEGATIVE
AMPHETAMINES UR QL: NEGATIVE
ANION GAP SERPL CALCULATED.3IONS-SCNC: 14 MMOL/L (ref 5–15)
BACTERIA UR QL AUTO: ABNORMAL /HPF
BARBITURATES UR QL SCN: NEGATIVE
BENZODIAZ UR QL SCN: NEGATIVE
BH CV ECHO MEAS - AO MAX PG (FULL): 14.2 MMHG
BH CV ECHO MEAS - AO MAX PG: 19.4 MMHG
BH CV ECHO MEAS - AO MEAN PG (FULL): 7 MMHG
BH CV ECHO MEAS - AO MEAN PG: 11 MMHG
BH CV ECHO MEAS - AO ROOT AREA (BSA CORRECTED): 1.6
BH CV ECHO MEAS - AO ROOT AREA: 7.1 CM^2
BH CV ECHO MEAS - AO ROOT DIAM: 3 CM
BH CV ECHO MEAS - AO V2 MAX: 220 CM/SEC
BH CV ECHO MEAS - AO V2 MEAN: 152.3 CM/SEC
BH CV ECHO MEAS - AO V2 VTI: 42.9 CM
BH CV ECHO MEAS - AVA(I,A): 1.8 CM^2
BH CV ECHO MEAS - AVA(I,D): 1.8 CM^2
BH CV ECHO MEAS - AVA(V,A): 1.6 CM^2
BH CV ECHO MEAS - AVA(V,D): 1.6 CM^2
BH CV ECHO MEAS - BSA(HAYCOCK): 2 M^2
BH CV ECHO MEAS - BSA: 1.9 M^2
BH CV ECHO MEAS - BZI_BMI: 31.6 KILOGRAMS/M^2
BH CV ECHO MEAS - BZI_METRIC_HEIGHT: 165.1 CM
BH CV ECHO MEAS - BZI_METRIC_WEIGHT: 86.2 KG
BH CV ECHO MEAS - EDV(CUBED): 69.4 ML
BH CV ECHO MEAS - EDV(MOD-SP4): 66 ML
BH CV ECHO MEAS - EDV(TEICH): 74.7 ML
BH CV ECHO MEAS - EF(CUBED): 64.5 %
BH CV ECHO MEAS - EF(MOD-SP4): 60.2 %
BH CV ECHO MEAS - EF(TEICH): 56.5 %
BH CV ECHO MEAS - ESV(CUBED): 24.6 ML
BH CV ECHO MEAS - ESV(MOD-SP4): 26.3 ML
BH CV ECHO MEAS - ESV(TEICH): 32.5 ML
BH CV ECHO MEAS - FS: 29.2 %
BH CV ECHO MEAS - IVS/LVPW: 1.4
BH CV ECHO MEAS - IVSD: 16 CM
BH CV ECHO MEAS - LA DIMENSION: 4.1 CM
BH CV ECHO MEAS - LA/AO: 1.4
BH CV ECHO MEAS - LAT PEAK E' VEL: 5.2 CM/SEC
BH CV ECHO MEAS - LV DIASTOLIC VOL/BSA (35-75): 34.1 ML/M^2
BH CV ECHO MEAS - LV MASS(C)D: 334.6 GRAMS
BH CV ECHO MEAS - LV MASS(C)DI: 172.9 GRAMS/M^2
BH CV ECHO MEAS - LV MAX PG: 5.2 MMHG
BH CV ECHO MEAS - LV MEAN PG: 4 MMHG
BH CV ECHO MEAS - LV SYSTOLIC VOL/BSA (12-30): 13.6 ML/M^2
BH CV ECHO MEAS - LV V1 MAX: 114 CM/SEC
BH CV ECHO MEAS - LV V1 MEAN: 92.8 CM/SEC
BH CV ECHO MEAS - LV V1 VTI: 24.4 CM
BH CV ECHO MEAS - LVIDD: 4.1 CM
BH CV ECHO MEAS - LVIDS: 2.9 CM
BH CV ECHO MEAS - LVLD AP4: 7.2 CM
BH CV ECHO MEAS - LVLS AP4: 5.6 CM
BH CV ECHO MEAS - LVOT AREA (M): 3.1 CM^2
BH CV ECHO MEAS - LVOT AREA: 3.1 CM^2
BH CV ECHO MEAS - LVOT DIAM: 2 CM
BH CV ECHO MEAS - LVPWD: 1.6 CM
BH CV ECHO MEAS - MED PEAK E' VEL: 5.4 CM/SEC
BH CV ECHO MEAS - MR MAX PG: 90.6 MMHG
BH CV ECHO MEAS - MR MAX VEL: 476 CM/SEC
BH CV ECHO MEAS - MR MEAN PG: 67 MMHG
BH CV ECHO MEAS - MR MEAN VEL: 394 CM/SEC
BH CV ECHO MEAS - MR VTI: 171 CM
BH CV ECHO MEAS - MV A MAX VEL: 107 CM/SEC
BH CV ECHO MEAS - MV DEC SLOPE: 405.5 CM/SEC^2
BH CV ECHO MEAS - MV DEC TIME: 0.27 SEC
BH CV ECHO MEAS - MV E MAX VEL: 106 CM/SEC
BH CV ECHO MEAS - MV E/A: 0.99
BH CV ECHO MEAS - MV P1/2T MAX VEL: 147 CM/SEC
BH CV ECHO MEAS - MV P1/2T: 106.2 MSEC
BH CV ECHO MEAS - MVA P1/2T LCG: 1.5 CM^2
BH CV ECHO MEAS - MVA(P1/2T): 2.1 CM^2
BH CV ECHO MEAS - RAP SYSTOLE: 10 MMHG
BH CV ECHO MEAS - RVDD: 3.3 CM
BH CV ECHO MEAS - RVSP: 35.4 MMHG
BH CV ECHO MEAS - SI(AO): 156.6 ML/M^2
BH CV ECHO MEAS - SI(CUBED): 23.1 ML/M^2
BH CV ECHO MEAS - SI(LVOT): 39.6 ML/M^2
BH CV ECHO MEAS - SI(MOD-SP4): 20.5 ML/M^2
BH CV ECHO MEAS - SI(TEICH): 21.8 ML/M^2
BH CV ECHO MEAS - SV(AO): 303 ML
BH CV ECHO MEAS - SV(CUBED): 44.8 ML
BH CV ECHO MEAS - SV(LVOT): 76.7 ML
BH CV ECHO MEAS - SV(MOD-SP4): 39.7 ML
BH CV ECHO MEAS - SV(TEICH): 42.2 ML
BH CV ECHO MEAS - TR MAX VEL: 252 CM/SEC
BH CV ECHO MEASUREMENTS AVERAGE E/E' RATIO: 20
BILIRUB UR QL STRIP: NEGATIVE
BUN SERPL-MCNC: 45 MG/DL (ref 8–23)
BUN/CREAT SERPL: 17.7 (ref 7–25)
BUPRENORPHINE SERPL-MCNC: NEGATIVE NG/ML
CALCIUM SPEC-SCNC: 8.7 MG/DL (ref 8.6–10.5)
CANNABINOIDS SERPL QL: NEGATIVE
CHLORIDE SERPL-SCNC: 103 MMOL/L (ref 98–107)
CLARITY UR: CLEAR
CO2 SERPL-SCNC: 24 MMOL/L (ref 22–29)
COCAINE UR QL: NEGATIVE
COLOR UR: YELLOW
CREAT SERPL-MCNC: 2.54 MG/DL (ref 0.57–1)
GFR SERPL CREATININE-BSD FRML MDRD: 19 ML/MIN/1.73
GLUCOSE SERPL-MCNC: 233 MG/DL (ref 65–99)
GLUCOSE UR STRIP-MCNC: NEGATIVE MG/DL
HGB UR QL STRIP.AUTO: NEGATIVE
HYALINE CASTS UR QL AUTO: ABNORMAL /LPF
KETONES UR QL STRIP: NEGATIVE
LEFT ATRIUM VOLUME INDEX: 44.3 ML/M2
LEFT ATRIUM VOLUME: 85.9 CM3
LEUKOCYTE ESTERASE UR QL STRIP.AUTO: ABNORMAL
METHADONE UR QL SCN: NEGATIVE
NITRITE UR QL STRIP: NEGATIVE
OPIATES UR QL: NEGATIVE
OXYCODONE UR QL SCN: NEGATIVE
PCP UR QL SCN: NEGATIVE
PH UR STRIP.AUTO: 6 [PH] (ref 5–8)
POTASSIUM SERPL-SCNC: 3.4 MMOL/L (ref 3.5–5.2)
PROPOXYPH UR QL: NEGATIVE
PROT UR QL STRIP: ABNORMAL
RBC # UR: ABNORMAL /HPF
REF LAB TEST METHOD: ABNORMAL
SODIUM SERPL-SCNC: 141 MMOL/L (ref 136–145)
SP GR UR STRIP: 1.01 (ref 1–1.03)
SQUAMOUS #/AREA URNS HPF: ABNORMAL /HPF
TRICYCLICS UR QL SCN: NEGATIVE
TROPONIN T SERPL-MCNC: 0.04 NG/ML (ref 0–0.03)
TROPONIN T SERPL-MCNC: 0.05 NG/ML (ref 0–0.03)
UROBILINOGEN UR QL STRIP: ABNORMAL
WBC UR QL AUTO: ABNORMAL /HPF

## 2020-10-13 PROCEDURE — 93306 TTE W/DOPPLER COMPLETE: CPT

## 2020-10-13 PROCEDURE — 25010000002 FUROSEMIDE PER 20 MG: Performed by: NURSE PRACTITIONER

## 2020-10-13 PROCEDURE — 93356 MYOCRD STRAIN IMG SPCKL TRCK: CPT

## 2020-10-13 PROCEDURE — 93356 MYOCRD STRAIN IMG SPCKL TRCK: CPT | Performed by: INTERNAL MEDICINE

## 2020-10-13 PROCEDURE — 87088 URINE BACTERIA CULTURE: CPT | Performed by: FAMILY MEDICINE

## 2020-10-13 PROCEDURE — 99222 1ST HOSP IP/OBS MODERATE 55: CPT | Performed by: INTERNAL MEDICINE

## 2020-10-13 PROCEDURE — 93306 TTE W/DOPPLER COMPLETE: CPT | Performed by: INTERNAL MEDICINE

## 2020-10-13 PROCEDURE — 81001 URINALYSIS AUTO W/SCOPE: CPT | Performed by: FAMILY MEDICINE

## 2020-10-13 PROCEDURE — 87147 CULTURE TYPE IMMUNOLOGIC: CPT | Performed by: FAMILY MEDICINE

## 2020-10-13 PROCEDURE — 80048 BASIC METABOLIC PNL TOTAL CA: CPT | Performed by: FAMILY MEDICINE

## 2020-10-13 PROCEDURE — 76775 US EXAM ABDO BACK WALL LIM: CPT

## 2020-10-13 PROCEDURE — 87086 URINE CULTURE/COLONY COUNT: CPT | Performed by: FAMILY MEDICINE

## 2020-10-13 PROCEDURE — 25010000002 ENOXAPARIN PER 10 MG: Performed by: FAMILY MEDICINE

## 2020-10-13 PROCEDURE — 80306 DRUG TEST PRSMV INSTRMNT: CPT | Performed by: FAMILY MEDICINE

## 2020-10-13 PROCEDURE — 84484 ASSAY OF TROPONIN QUANT: CPT | Performed by: FAMILY MEDICINE

## 2020-10-13 RX ORDER — ACETAMINOPHEN 325 MG/1
650 TABLET ORAL EVERY 6 HOURS PRN
Status: DISCONTINUED | OUTPATIENT
Start: 2020-10-13 | End: 2020-10-17 | Stop reason: HOSPADM

## 2020-10-13 RX ORDER — SPIRONOLACTONE 25 MG/1
25 TABLET ORAL DAILY
Status: DISCONTINUED | OUTPATIENT
Start: 2020-10-13 | End: 2020-10-17 | Stop reason: HOSPADM

## 2020-10-13 RX ORDER — PANTOPRAZOLE SODIUM 40 MG/1
40 TABLET, DELAYED RELEASE ORAL
Status: DISCONTINUED | OUTPATIENT
Start: 2020-10-13 | End: 2020-10-17 | Stop reason: HOSPADM

## 2020-10-13 RX ORDER — CARVEDILOL 25 MG/1
25 TABLET ORAL 2 TIMES DAILY WITH MEALS
Status: DISCONTINUED | OUTPATIENT
Start: 2020-10-13 | End: 2020-10-17 | Stop reason: HOSPADM

## 2020-10-13 RX ORDER — HYDRALAZINE HYDROCHLORIDE 50 MG/1
50 TABLET, FILM COATED ORAL EVERY 8 HOURS SCHEDULED
Status: DISCONTINUED | OUTPATIENT
Start: 2020-10-13 | End: 2020-10-15

## 2020-10-13 RX ORDER — AMLODIPINE BESYLATE 5 MG/1
5 TABLET ORAL 2 TIMES DAILY
Status: DISCONTINUED | OUTPATIENT
Start: 2020-10-13 | End: 2020-10-17

## 2020-10-13 RX ORDER — CLONIDINE HYDROCHLORIDE 0.1 MG/1
0.1 TABLET ORAL EVERY 4 HOURS PRN
Status: DISCONTINUED | OUTPATIENT
Start: 2020-10-13 | End: 2020-10-17 | Stop reason: HOSPADM

## 2020-10-13 RX ORDER — AZELASTINE 1 MG/ML
1 SPRAY, METERED NASAL 2 TIMES DAILY
Status: DISCONTINUED | OUTPATIENT
Start: 2020-10-13 | End: 2020-10-17 | Stop reason: HOSPADM

## 2020-10-13 RX ORDER — FUROSEMIDE 10 MG/ML
40 INJECTION INTRAMUSCULAR; INTRAVENOUS ONCE
Status: COMPLETED | OUTPATIENT
Start: 2020-10-13 | End: 2020-10-13

## 2020-10-13 RX ORDER — TIZANIDINE 4 MG/1
4 TABLET ORAL EVERY 8 HOURS PRN
Status: DISCONTINUED | OUTPATIENT
Start: 2020-10-13 | End: 2020-10-17 | Stop reason: HOSPADM

## 2020-10-13 RX ORDER — FLUTICASONE PROPIONATE 50 MCG
2 SPRAY, SUSPENSION (ML) NASAL DAILY PRN
Status: DISCONTINUED | OUTPATIENT
Start: 2020-10-13 | End: 2020-10-17 | Stop reason: HOSPADM

## 2020-10-13 RX ADMIN — HYDRALAZINE HYDROCHLORIDE 50 MG: 50 TABLET ORAL at 22:04

## 2020-10-13 RX ADMIN — HYDRALAZINE HYDROCHLORIDE 50 MG: 50 TABLET ORAL at 09:05

## 2020-10-13 RX ADMIN — ENOXAPARIN SODIUM 90 MG: 100 INJECTION SUBCUTANEOUS at 22:04

## 2020-10-13 RX ADMIN — SPIRONOLACTONE 25 MG: 25 TABLET ORAL at 20:56

## 2020-10-13 RX ADMIN — CARVEDILOL 25 MG: 25 TABLET, FILM COATED ORAL at 17:15

## 2020-10-13 RX ADMIN — AMLODIPINE BESYLATE 5 MG: 5 TABLET ORAL at 09:05

## 2020-10-13 RX ADMIN — AZELASTINE HYDROCHLORIDE 1 SPRAY: 137 SPRAY, METERED NASAL at 20:57

## 2020-10-13 RX ADMIN — PANTOPRAZOLE SODIUM 40 MG: 40 TABLET, DELAYED RELEASE ORAL at 09:05

## 2020-10-13 RX ADMIN — ACETAMINOPHEN 650 MG: 325 TABLET, FILM COATED ORAL at 14:57

## 2020-10-13 RX ADMIN — ACETAMINOPHEN 650 MG: 325 TABLET, FILM COATED ORAL at 09:05

## 2020-10-13 RX ADMIN — HYDRALAZINE HYDROCHLORIDE 50 MG: 50 TABLET ORAL at 14:28

## 2020-10-13 RX ADMIN — CLONIDINE HYDROCHLORIDE 0.1 MG: 0.1 TABLET ORAL at 03:51

## 2020-10-13 RX ADMIN — CARVEDILOL 25 MG: 25 TABLET, FILM COATED ORAL at 09:05

## 2020-10-13 RX ADMIN — AZELASTINE HYDROCHLORIDE 1 SPRAY: 137 SPRAY, METERED NASAL at 09:05

## 2020-10-13 RX ADMIN — AMLODIPINE BESYLATE 5 MG: 5 TABLET ORAL at 20:56

## 2020-10-13 RX ADMIN — FUROSEMIDE 40 MG: 10 INJECTION, SOLUTION INTRAMUSCULAR; INTRAVENOUS at 11:41

## 2020-10-13 NOTE — PLAN OF CARE
Goal Outcome Evaluation:  Plan of Care Reviewed With: patient  Progress: improving  Outcome Summary: Pt arrived from ED with SOA. BP elevated, prn clonidine 0.1mg given x1. SOA has improved. Normal sinus 69-84 with first degree block on tele. Up ad vern. Safety maintained. Continue to monitor.

## 2020-10-13 NOTE — PAYOR COMM NOTE
"10/13/20 [facility] [phone]  [phone]    PATIENT ADMITTED ON 10/12/20 INPATIENT ADMISSION.        Ludivina Clemons (60 y.o. Female)     Date of Birth Social Security Number Address Home Phone MRN    1960  [address] [place] [address] [phone] [id number]    Zoroastrian Marital Status          Bapt[employer] Single       Admission Date Admission Type Admitting Provider Attending Provider Department, Room/Bed    10/12/20 Emergency Orville Weston MD Martin, Aribbe Allen, MD [facility] 4B, 411/1    Discharge Date Discharge Disposition Discharge Destination                       Attending Provider: Orville Weston MD    Allergies: Codeine, Imitrex [Sumatriptan]    Isolation: None   Infection: None   Code Status: Prior    Ht: 165.1 cm (65\")   Wt: 86.5 kg (190 lb 12.8 oz)    Admission Cmt: None   Principal Problem: Shortness of breath [R06.02]                 Active Insurance as of 10/12/2020     Primary Coverage     Payor Plan Insurance Group Employer/Plan Group    ANTHEM MEDICARE REPLACEMENT ANTHEM MEDICARE ADVANTAGE [id number]     Payor Plan Address Payor Plan Phone Number Payor Plan Fax Number Effective Dates    [address] [phone]  1/1/2017 - None Entered    [facility] [id number]       Subscriber Name Subscriber Birth Date Member ID       LUDIVINA CLMEONS 1960 [id number]                 Emergency Contacts      (Rel.) Home Phone Work Phone Mobile Phone    YOGI AMAYA (Relative) -- -- [phone]    kenzie thompson (Sister) [phone] -- [phone]        Son Randall MD   Physician   Emergency Medicine   ED Provider Notes   Addendum   Date of Service:  10/12/20 2031   Creation Time:  10/12/20 2031            Expand All Collapse All      Show:Clear all  [x]Manual[x]Template[]Copied    Added by:  [x]Son Randall MD    []Hover for details     Subjective      59 y/o female with history of CHF and HTN " arrives for evaluation of SOB, SHAW and cough for several weeks. She has been unable to get to the hospital thus did not come to the hospital until today. She admits to not taking her BP medications as she is supposed to stating all she has left is her clonidine. She also admits to smoking but cannot smoke for a 1 week secondary to SOB. She denies any CP, fevers, or chills. She denies ill contacts, sore throat, contact with COVID, nausea, vomiting, diarrhea or other issues. She arrives in [facility].              Family, social and past history reviewed as below, prior documentation of H and Ps and other documentation are reviewed:     Past Medical History:  No date: Acute CHF (CMS/HCC)  No date: Acute renal failure (ARF) (CMS/HCC)  No date: Anemia  No date: Asthma      Comment:  childhood  No date: Hypertension  No date: Ischemic colitis (CMS/HCC)  No date: Metabolic acidosis  No date: Nonrheumatic aortic (valve) insufficiency  No date: PTSD (post-traumatic stress disorder)     Past Surgical History:  No date: EXTERNAL FIXATION WRIST FRACTURE  No date: LEG SURGERY  No date: TUBAL ABDOMINAL LIGATION     History        Past Medical History:   Diagnosis Date   • Acute CHF (CMS/HCC)     • Acute renal failure (ARF) (CMS/HCC)     • Anemia     • Asthma       childhood   • Hypertension     • Ischemic colitis (CMS/HCC)     • Metabolic acidosis     • Nonrheumatic aortic (valve) insufficiency     • PTSD (post-traumatic stress disorder)             Objective      Physical Exam  Vitals signs and nursing note reviewed.   Constitutional:       Appearance: She is well-developed.   HENT:      Head: Normocephalic.      Mouth/Throat:      Mouth: Mucous membranes are moist.   Eyes:      Pupils: Pupils are equal, round, and reactive to light.   Neck:      Musculoskeletal: Normal range of motion and neck supple.   Cardiovascular:      Rate and Rhythm: Normal rate and regular rhythm.   Pulmonary:      Effort: Tachypnea and respiratory distress  present.      Breath sounds: Wheezing and rhonchi present.   Abdominal:      Palpations: Abdomen is soft. There is no hepatomegaly, splenomegaly or mass.      Tenderness: There is no abdominal tenderness.   Musculoskeletal: Normal range of motion.      Right lower leg: No edema.      Left lower leg: No edema.   Skin:     General: Skin is warm and dry.      Capillary Refill: Capillary refill takes less than 2 seconds.   Neurological:      General: No focal deficit present.      Mental Status: She is alert and oriented to person, place, and time.   Psychiatric:         Mood and Affect: Mood normal.         Behavior: Behavior normal.          Objective      Physical Exam  Vitals signs and nursing note reviewed.   Constitutional:       Appearance: She is well-developed.   HENT:      Head: Normocephalic.      Mouth/Throat:      Mouth: Mucous membranes are moist.   Eyes:      Pupils: Pupils are equal, round, and reactive to light.   Neck:      Musculoskeletal: Normal range of motion and neck supple.   Cardiovascular:      Rate and Rhythm: Normal rate and regular rhythm.   Pulmonary:      Effort: Tachypnea and respiratory distress present.      Breath sounds: Wheezing and rhonchi present.   Abdominal:      Palpations: Abdomen is soft. There is no hepatomegaly, splenomegaly or mass.      Tenderness: There is no abdominal tenderness.   Musculoskeletal: Normal range of motion.      Right lower leg: No edema.      Left lower leg: No edema.   Skin:     General: Skin is warm and dry.      Capillary Refill: Capillary refill takes less than 2 seconds.   Neurological:      General: No focal deficit present.      Mental Status: She is alert and oriented to person, place, and time.   Psychiatric:         Mood and Affect: Mood normal.         Behavior: Behavior normal.       MDM     Final diagnoses:   Congestive heart failure, unspecified HF chronicity, unspecified heart failure type (CMS/HCC)   MATTY (acute kidney injury) (CMS/HCC)      Hypertension, unspecified type              Son Randall MD  10/12/20 2248        Son Randall MD  10/12/20 2257        Son Randall MD  10/12/20 3556    Preston Wiley APRN   Nurse Practitioner   Cardiology   Consults   Cosign Needed   Date of Service:  10/13/20 0846   Creation Time:  10/13/20 0846         Consult Orders   Inpatient Cardiology Consult [[id number]] ordered by Orville Weston MD at 10/13/20 0746          Cosign Needed        Expand All Collapse All      Show:Clear all  [x]Manual[x]Template[]Copied    Added by:  [x]Preston Wiley APRN    []Jen for details  [facility] CONSULT NOTE     Referring Provider: Orville Weston MD     Reason for Consultation: volume overload, chest pain      Chief complaint:       Chief Complaint   Patient presents with   • Shortness of Breath                         History of present illness:  Ludivina Ramirez is a 60 y.o. female with history of poorly controlled hypertension, diastolic congestive heart failure, chronic kidney disease, ischemic colitis, aortic valve regurgitation, chronic anemia and PTSD. Patient presents to the ER yesterday for shortness of breath and chest pain. Patient presented after a 1 week history of worsening shortness of breath. It is difficult to follow patient's timeline but she reports she needs a new mattress and had rearranged furniture in such a way that she could not get into her bedroom. She reports her blood pressure medications were in her bedroom so she could not reach them. Shortly after stopping her medications she developed shortness of breath. Shortness of breath got worse to the point she felt she needed to come to the hospital a few days ago. However she did not do this because she was not able to get to her shower to clean up and she felt she needed to get cleaned up before she came to the hospital. Eventually yesterday she felt bed enough she came to the hospital.  "She was found to be volume overloaded, hypertensive, elevated troponin and elevated BNP. EKG showed evidence of LVH- similar to previous EKG with no acute changes. She was treated with clonidine and IV Lasix- with much improvement. She remains short of breath- but states she is no longer \"guppy breathing.\" Her chief complaint at this time was a headache- she is requesting pain medications. She was asleep and difficult to awake upon my entry into the room. In review patient was seen by Dr. De La Fuente and Dr. Ramirez in consultation roughly 1 year ago on to separate admissions for chest pain, elevated troponin and hypertension urgency. At that time patient was also positive for methamphetamine and cocaine. Patient had an outpatient stress test which was low risk. She had an echo at that time which showed normal LV function, mild LVH, mild-moderate AI, grade II diastolic dysfunction with pseudonormalization, LA moderately dilated, normal size and function of RV. It seems the circumstances were similar during last evaluation with missing blood pressure medications.      History  Medical History        Past Medical History:   Diagnosis Date   • Acute CHF (CMS/HCC)     • Acute renal failure (ARF) (CMS/HCC)     • Anemia     • Asthma       childhood   • Hypertension     • Ischemic colitis (CMS/HCC)     • Metabolic acidosis     • Nonrheumatic aortic (valve) insufficiency     • PTSD (post-traumatic stress disorder)        ,   Surgical History         Past Surgical History:   Procedure Laterality Date   • EXTERNAL FIXATION WRIST FRACTURE       • LEG SURGERY       • TUBAL ABDOMINAL LIGATION          ,         Family History   Problem Relation Age of Onset   • Coronary artery disease Mother     • Coronary             Past Medical History:   Diagnosis Date   • Acute CHF (CMS/HCC)     • Acute renal failure (ARF) (CMS/HCC)     • Anemia     • Asthma       childhood   • Hypertension     • Ischemic colitis (CMS/HCC)     • Metabolic " "acidosis     • Nonrheumatic aortic (valve) insufficiency     • PTSD (post-traumatic stress disorder)        ,                     Systems   Constitution: Positive for malaise/fatigue. Negative for chills, decreased appetite, fever, weight gain and weight loss.   HENT: Negative for nosebleeds.    Eyes: Negative for visual disturbance.   Cardiovascular: Positive for chest pain, dyspnea on exertion and orthopnea. Negative for leg swelling, near-syncope, palpitations, paroxysmal nocturnal dyspnea and syncope.   Respiratory: Positive for shortness of breath. Negative for cough, hemoptysis and snoring.    Endocrine: Negative for cold intolerance and heat intolerance.   Hematologic/Lymphatic: Negative for bleeding problem. Does not bruise/bleed easily.   Skin: Negative for rash.   Musculoskeletal: Negative for back pain and falls.   Gastrointestinal: Negative for abdominal pain, constipation, diarrhea, heartburn, melena, nausea and vomiting.   Genitourinary: Negative for hematuria.   Neurological: Negative for dizziness, headaches and light-headedness.   Psychiatric/Behavioral: Negative for altered mental status.   Allergic/Immunologic: Negative for persistent infections.    Physical Exam:  Patient Vitals for the past 24 hrs:    BP Temp Temp src Pulse Resp SpO2 Height Weight   10/13/20 0850 176/98 97.7 °F (36.5 °C) Oral 79 16 91 % -- --   10/13/20 0439 174/95 -- -- 88 -- -- -- --   10/13/20 0343 (!) 182/98 98 °F (36.7 °C) Oral 82 16 91 % -- --   10/13/20 0011 (!) 179/109 98.3 °F (36.8 °C) Oral 71 20 94 % 165.1 cm (65\") 86.5 kg (190 lb 12.8 oz)   10/12/20 2351 -- 98.1 °F (36.7 °C) Oral -- -- -- -- --   10/12/20 2345 (!) 170/104 -- -- 70 -- 93 % -- --   10/12/20 2330 (!) 178/118 -- -- 73 18 93 % -- --   10/12/20 2315 (!) 164/116 -- -- 67 18 92 % -- --   10/12/20 2300 (!) 203/125 -- -- 78 19 95 % -- --   10/12/20 5574 (!)           Constitutional:       Appearance: Healthy appearance. Not in distress.   Eyes:      Pupils: " Pupils are equal, round, and reactive to light.   HENT:      Head: Normocephalic and atraumatic.      Nose: Nose normal.    Mouth/Throat:      Dentition: Normal.   Neck:      Musculoskeletal: Normal range of motion.   Pulmonary:      Effort: Pulmonary effort is normal.      Breath sounds: Examination of the right-lower field reveals rhonchi. Examination of the left-lower field reveals rhonchi. Rhonchi present.   Chest:      Chest wall: Not tender to palpatation.   Cardiovascular:      PMI at left midclavicular line. Normal rate. Regular rhythm.      Murmurs: There is a diastolic murmur.   Pulses:     Intact distal pulses.   Edema:     Peripheral edema absent.   Abdominal:      General: Bowel sounds are normal.      Palpations: Abdomen is soft.   Musculoskeletal: Normal range of motion.   Skin:     General: Skin is warm and dry.   Neurological:      Mental Status: Alert, oriented to person, place, and time and oriented to person, place and time.   Psychiatric:         Attention and Perception: Attention normal.         Mood and Affect: Mood normal.         Speech: Speech is rapid and pressured.         [id number]]  (Abnormal) Collected: 10/12/20 2040      Specimen: Venous Blood Updated: 10/12/20 2048       Site OTHER       pH, Venous 7.471 pH Units         pCO2, Venous 36.3 mm Hg         Comment: 84 Value below reference range          pO2, Venous 70.0 mm Hg         Comment: 83 Value above reference range          HCO3, Venous 26.4 mmol/L         Base Excess, Venous 2.7 mmol/L         Comment: 83 Value above reference range          O2 Saturation, Venous 96.0 %         Comment: 83 Value above reference range          Temperature 37.0 C         Barometric Pressure for Blood Gas 750 mmHg         Modality Room Air       Ventilator Mode NA       Collected by DAVE PRESTON       Comment: Meter: [id number]     :  [id number]        BNP [[id number]]  (Abnormal) Collected: 10/12/20 1905     Specimen: Blood  Updated: 10/12/20 2029       proBNP 49,304.0 pg/mL      decreased if patient taking Biotin.         Troponin [[id number]]  (Abnormal) Collected: 10/12/20 1905     Specimen: Blood Updated: 10/12/20 2024       Troponin T 0.054 ng/mL       Narrative:       Troponin T Reference Range:      Updated: 10/12/20 2014        Creatine Kinase 53 U/L       Basic Metabolic Panel [[id number]]  (Abnormal) Collected: 10/12/20 1905     Specimen: Blood Updated: 10/12/20 2012       Glucose 147 mg/dL         BUN 35 mg/dL         Creatinine 2.23 mg/dL         Sodium 142 mmol/L         Potassium 3.0 mmol/L         Chloride 104 mmol/L         CO2 26.0 mmol/L         Calcium 8.5 mg/dL         eGFR Non African Amer 22 mL/min/1.73         BUN/Creatinine Ratio 15.7       Anion Gap 12.0 mmol/L       Narrative:       GFR Normal >60      31.3 seconds         D-dimer, Quantitative [[id number]]  (Abnormal) Collected: 10/12/20 1905     Specimen: Blood Updated: 10/12/20 2011       D-Dimer, Quantitative 1.19 mg/L (FEU)       Narrative:       Reference Range is 0-0.50 mg/L FEU. However, results <0.50 mg/L FEU tends to rule out DVT or PE. Results >0.50 mg/L FEU are not useful in predicting absence or presence of DVT or PE.        CBC & Differential [[id number]]  (Abnormal)       10*3/mm3            RBC 3.39 10*6/mm3         Hemoglobin 9.8 g/dL         Hematocrit 30.0 %         MCV 88.5 fL         MCH 28.9 pg         MCHC 32.7 g/dL         RDW 15.0 %         RDW-SD 47.9 fl         MPV 10.9 fL         Platelets 226 10*3/mm3         Neutrophil % 78.1 %         Lymphocyte % 12.5 %         Monocyte % 5.9 %         Eosinophil % 2.1 %         Basophil % 0.9 %         Immature Grans % 0.5 %         Neutrophils, Absolute 6.36 10*3/mm3         Lymphocytes, Absolute 1.02 10*3/mm3         Monocytes, Absolute 0.48 10*3/mm3         Eosinophils, Absolute 0.17 10*3/mm3         Basophils, Absolute 0.07 10*3/mm3         Immature Grans, Absolute 0.04 10*3/mm3         nRBC 0.0  /100 WBC            ECHOEFEST              Imaging Results (Last 72 Hours)      Procedure Component Value Units Date/Time     NM Lung Scan Perfusion Particulate [[id number]] Collected: 10/13/20 0700       Updated: 10/13/20 0705     Narrative:       EXAMINATION: Perfusion lung scan 10/12/2020     HISTORY: Shortness of breath     FINDINGS: Following intravenous injection of 4.5 mCi of technetium 99m  MAA intravenously multiple planar images were obtained of the thorax.  The study demonstrates focal photopenia in the perihilar distribution of  the right lung anteriorly as well as within the right posterior basilar  segment. There is normal perfusion to the left lung. There is no  correlate on plain film radiography.        Impression:       1.. Subsegmental defects within the right middle and lower lobe. This is  an indeterminate result with pulmonary thromboembolic disease not  excluded. If clinically feasible follow-up with CT angiography of the  chest would be recommended. There is normal perfusion to the left lung.  This report was finalized on 10/13/2020 07:02 by Dr. Ye Montaño MD.       Narrative:        Exam:   XR CHEST 1 VW-       Date:  10/12/2020      History:  Female, age  60 years;sob     COMPARISON:  Chest x-ray dated 9/21/2020     Findings :     Mild cardiomegaly. Central pulmonary venous congestion/developing  interstitial edema. Small left pleural effusion and trace right pleural  effusion. Calcified granuloma in the right upper lobe redemonstrated.  The bones show no acute pathology.          Impression:       Impression:     Findings are most concerning for fluid overload.     This report was finalized on 10/12/2020 20:31 by Dr. Janet Geller MD.                Shortness of breath    Hypertensive urgency    Elevated troponin    Iron deficiency anemia    Nonrheumatic aortic valve insufficiency    Congestive heart failure (CMS/HCC)    Noncompliance    1. Shortness of Breath- likely from volume  overload secondary to hypertensive urgency due to missed meds. However VQ scan is inconclusive - defer to Dr. Weston for further work up. Will order an additional dose of IV Lasix. Monitor I&O.   2. Hypertensive Urgency- home medications have been resumed by Dr. Weston- but none have been given at time of my evaluation- will monitor for improvement once medications are resumed. Likely cause of elevated troponin (though we only have one with no trend) and cause of pulmonary edema in setting of diastolic dysfunction and AI). Echo pending.   3. Elevated Troponin- likely related to number 2. Only one drawn thus far. Will trend. If flat trend could consider stress- also could monitor for return of symptoms with blood pressure and volume status improved.   4. MORELIA- chronic anemia  5. AI- moderate on last echo. Echo pending  6. Diastolic Congestive Heart Failure- BNP significantly elevated. Will repeat dose of Lasix. Echo pending. Discussed importance of compliance with medications- though I do suspect she does not grasp the correlation or importance of missing her medications that leads to these episodes.   7. Noncompliance- history of noncompliance and multi substance abuse (UDS pending)     Further orders per Dr. Schwarz      Thank you for asking us to follow this patient with you.         Electronically signed by ISAIAH Sanchez, 10/13/20, 8:47 AM CDT.                  Physician   Medicine   H&P   Signed   Date of Service:  10/13/20 1056   Creation Time:  10/13/20 1056            Signed        Expand All Collapse All      Show:Clear all  [x]Manual[x]Template[]Copied    Added by:  [x]Orville Weston MD    []eJn for details   History and Physical        CHIEF COMPLAINT:  SOA     Reason for Admission:  Volume Overload, MATTY     History Obtained From:  Patient, chart     HISTORY OF PRESENT ILLNESS:       The patient is a 60 y.o. female who was admitted through ER with 1 week h/o SOA. No CP. She denies any cough,  "congestion. No edema. No GI complaints. She has had PND and orthopnea. She has not been taking her BP medicine for > 1 week.      Past Medical History:    Medical History        Past Medical History:   Diagnosis Date   • Acute CHF (CMS/HCC)     • Acute renal failure (ARF) (CMS/HCC)     • Anemia     • Asthma       childhood   • Hypertension     • Ischemic colitis (CMS/HCC)     • Metabolic acidosis     • Nonrheumatic aortic (valve) insufficiency     • PTSD (post-traumatic stress disorder)          Past Surgical History:    Surgical History         Past Surgical History:   Procedure Laterality Date   • EXTERNAL FIXATION WRIST FRACTURE       • LEG SURGERY       • TUBAL ABDOMINAL LIGATION                              Vitals:  /98 (BP Location: Left arm, Patient Position: Lying)   Pulse 79   Temp 97.7 °F (36.5 °C) (Oral)   Resp 16   Ht 165.1 cm (65\")   Wt 86.5 kg (190 lb 12.8 oz)   SpO2 91%   BMI 31.75 kg/m²      PHYSICAL EXAM:  Gen: NAD, alert, pleasant  HEENT: WNL  Lymph: no LAD  Neck: no JVD or masses  Chest: coarse  CV: RRR  Abdomen: NT/ND  Extrem: no C/C/E  Neuro: non focal  Skin: no rashes  Joints: no redness  DATA:  I have reviewed the admission labs and imaging tests.     ASSESSMENT AND PLAN:       Volume Overload----follow with Cardiology, ECHO ordered  MATTY---Nephrology consult, follow lab  Abnormal V/Q scan---continue Lovenox, CTA when renal function improves  HTN---resume home meds and follow        Orville Weston MD  10:57 CDT 10/13/2020               ED to Hosp-Admission (Current) on 10/12/2020    Contains abnormal data Basic Metabolic Panel  Order: [id number]  Status:  Final result   Visible to patient:  No (not released)   Next appt:  None  Specimen Information: Blood        Component   Ref Range & Units 09:24   Glucose   65 - 99 mg/dL 233High     BUN   8 - 23 mg/dL 45High     Creatinine   0.57 - 1.00 mg/dL 2.54High     Sodium   136 - 145 mmol/L 141    Potassium   3.5 - 5.2 mmol/L 3.4Low   "   Chloride   98 - 107 mmol/L 103    CO2   22.0 - 29.0 mmol/L 24.0    Calcium   8.6 - 10.5 mg/dL 8.7    eGFR Non African Amer   >60 mL/min/1.73 19Low     BUN/Creatinine Ratio   7.0 - 25.0 17.7    Anion Gap   5.0 - 15.0 mmol/L 14.0           Time Action Taken User Additional Information   10/13/20 1100 Release Orville Weston MD (auto-released) From Order: [id number]               Current Facility-Administered Medications   Medication Dose Route Frequency Provider Last Rate Last Dose   • acetaminophen (TYLENOL) tablet 650 mg  650 mg Oral Q6H PRN Orville Weston MD   650 mg at 10/13/20 0905   • amLODIPine (NORVASC) tablet 5 mg  5 mg Oral BID Orville Weston MD   5 mg at 10/13/20 0905   • azelastine (ASTELIN) nasal spray 1 spray  1 spray Each Nare BID Orville Weston MD   1 spray at 10/13/20 0905   • carvedilol (COREG) tablet 25 mg  25 mg Oral BID With Meals Orville eWston MD   25 mg at 10/13/20 0905   • cloNIDine (CATAPRES) tablet 0.1 mg  0.1 mg Oral Q4H PRN Orville Weston MD   0.1 mg at 10/13/20 0351   • enoxaparin (LOVENOX) syringe 90 mg  1 mg/kg Subcutaneous Q24H Orville Weston MD       • fluticasone (FLONASE) 50 MCG/ACT nasal spray 2 spray  2 spray Nasal Daily PRN Orville Weston MD       • furosemide (LASIX) injection 40 mg  40 mg Intravenous Once Preston Wiley, ISAIAH       • hydrALAZINE (APRESOLINE) tablet 50 mg  50 mg Oral Q8H Orville Weston MD   50 mg at 10/13/20 0905   • nitroglycerin (NITROSTAT) ointment 1 inch  1 inch Topical Once Son Randall MD       • nitroglycerin (NITROSTAT) SL tablet 0.4 mg  0.4 mg Sublingual Q5 Min PRN Son Randall MD   0.4 mg at 10/12/20 2120   • pantoprazole (PROTONIX) EC tablet 40 mg  40 mg Oral Q AM Orville Weston MD   40 mg at 10/13/20 0905   • tiZANidine (ZANAFLEX) tablet 4 mg  4 mg Oral Q8H PRN Orville Weston MD

## 2020-10-13 NOTE — ED NOTES
PT ADAMANTLY DECLINES ANY FURTHER NITROGLYCERIN DUE TO THE HEADACHE.  I ATTEMPTED TO INFORM THE PATIENT OF THE BENEFITS AND PURPOSE REGARDING THE NTG FOR HER FLUID OVERLOAD AND HYPERTENSIVE STATE, BUT THE PATIENT CONTINUED TO REFUSE STAFF.  THE PATIENT IS VERY HATEFUL WHEN SPEAKING WITH STAFF.   MD JIMENEZ AWARE.      Kenrick Deleon, RN  10/12/20 2137       Kenrick Deleon, RN  10/12/20 2151       Kenrick Deleon, RN  10/12/20 2151

## 2020-10-13 NOTE — PLAN OF CARE
Goal Outcome Evaluation:  Plan of Care Reviewed With: patient  Progress: improving   Pt A&O. Voiding. No c/o chest pain this shift. Wheezing hear upon breathing in and out. UA and drug screen neg. Renal US and Echo done. VSS. Safety maintained.

## 2020-10-13 NOTE — ED NOTES
PT CONTINUES TO ADAMANTLY REFUSE NTG  IN THE OINTMENT FORM AT THIS TIME STATING THAT HER HEAD STILL HURTS DESPITE MEDICATIONS ADMINISTERED.      Kenrick Deleon RN  10/12/20 5221

## 2020-10-13 NOTE — CONSULTS
"[facility] CONSULT NOTE    Referring Provider: Orville Weston MD    Reason for Consultation: volume overload, chest pain     Chief complaint:   Chief Complaint   Patient presents with   • Shortness of Breath       Subjective .     History of present illness:  Ludivina Ramirez is a 60 y.o. female with history of poorly controlled hypertension, diastolic congestive heart failure, chronic kidney disease, ischemic colitis, aortic valve regurgitation, chronic anemia and PTSD. Patient presents to the ER yesterday for shortness of breath and chest pain. Patient presented after a 1 week history of worsening shortness of breath. It is difficult to follow patient's timeline but she reports she needs a new mattress and had rearranged furniture in such a way that she could not get into her bedroom. She reports her blood pressure medications were in her bedroom so she could not reach them. Shortly after stopping her medications she developed shortness of breath. Shortness of breath got worse to the point she felt she needed to come to the hospital a few days ago. However she did not do this because she was not able to get to her shower to clean up and she felt she needed to get cleaned up before she came to the hospital. Eventually yesterday she felt bed enough she came to the hospital. She was found to be volume overloaded, hypertensive, elevated troponin and elevated BNP. EKG showed evidence of LVH- similar to previous EKG with no acute changes. She was treated with clonidine and IV Lasix- with much improvement. She remains short of breath- but states she is no longer \"guppy breathing.\" Her chief complaint at this time was a headache- she is requesting pain medications. She was asleep and difficult to awake upon my entry into the room. In review patient was seen by Dr. De La Fuente and Dr. Ramirez in consultation roughly 1 year ago on to separate admissions for chest pain, elevated troponin and hypertension " urgency. At that time patient was also positive for methamphetamine and cocaine. Patient had an outpatient stress test which was low risk. She had an echo at that time which showed normal LV function, mild LVH, mild-moderate AI, grade II diastolic dysfunction with pseudonormalization, LA moderately dilated, normal size and function of RV. It seems the circumstances were similar during last evaluation with missing blood pressure medications.     History  Past Medical History:   Diagnosis Date   • Acute CHF (CMS/HCC)    • Acute renal failure (ARF) (CMS/HCC)    • Anemia    • Asthma     childhood   • Hypertension    • Ischemic colitis (CMS/HCC)    • Metabolic acidosis    • Nonrheumatic aortic (valve) insufficiency    • PTSD (post-traumatic stress disorder)    ,   Past Surgical History:   Procedure Laterality Date   • EXTERNAL FIXATION WRIST FRACTURE     • LEG SURGERY     • TUBAL ABDOMINAL LIGATION     ,   Family History   Problem Relation Age of Onset   • Coronary artery disease Mother    • Coronary artery disease Father    ,   Social History     Tobacco Use   • Smoking status: Current Every Day Smoker     Packs/day: 0.50   • Smokeless tobacco: Never Used   Substance Use Topics   • Alcohol use: No   • Drug use: No   ,     Medications    Prior to Admission medications    Medication Sig Start Date End Date Taking? Authorizing Provider   amLODIPine (NORVASC) 5 MG tablet Take 1 tablet by mouth 2 (Two) Times a Day. 9/26/19  Yes Orville Weston MD   azelastine (ASTELIN) 0.1 % nasal spray 1 spray into the nostril(s) as directed by provider 2 (Two) Times a Day. Use in each nostril as directed   Yes Edwin Wise MD   carvedilol (COREG) 25 MG tablet Take 1 tablet by mouth 2 (Two) Times a Day With Meals. 9/26/19  Yes Orville Weston MD   CloNIDine (CATAPRES) 0.1 MG tablet Take 0.1 mg by mouth Daily As Needed for High Blood Pressure (BP >180/100).   Yes Edwin Wise MD   fluticasone (FLONASE) 50  MCG/ACT nasal spray 2 sprays into the nostril(s) as directed by provider Daily As Needed for Rhinitis or Allergies.   Yes Provider, MD Edwin   hydrALAZINE (APRESOLINE) 25 MG tablet Take 3 tablets by mouth Every 8 (Eight) Hours. 9/9/19  Yes Orville Weston MD   lisinopril (PRINIVIL,ZESTRIL) 40 MG tablet Take 1 tablet by mouth Every Evening. 9/9/19  Yes Orville Weston MD   pantoprazole (PROTONIX) 40 MG EC tablet Take 1 tablet by mouth Daily. 9/26/19  Yes Orville Weston MD   cholecalciferol 1000 units tablet Take 1,000 Units by mouth Daily. 9/27/19   Orville Weston MD   tiZANidine (ZANAFLEX) 4 MG tablet Take 1 tablet by mouth Every 8 (Eight) Hours As Needed for Muscle Spasms. 9/26/19   Orville Weston MD       Current Facility-Administered Medications   Medication Dose Route Frequency Provider Last Rate Last Dose   • acetaminophen (TYLENOL) tablet 650 mg  650 mg Oral Q6H PRN Orville Weston MD       • amLODIPine (NORVASC) tablet 5 mg  5 mg Oral BID Orville Weston MD       • azelastine (ASTELIN) nasal spray 1 spray  1 spray Each Nare BID Orville Weston MD       • carvedilol (COREG) tablet 25 mg  25 mg Oral BID With Meals Orville Weston MD       • cloNIDine (CATAPRES) tablet 0.1 mg  0.1 mg Oral Q4H PRN Orville Weston MD   0.1 mg at 10/13/20 0351   • enoxaparin (LOVENOX) syringe 90 mg  1 mg/kg Subcutaneous Q24H Orville Weston MD       • fluticasone (FLONASE) 50 MCG/ACT nasal spray 2 spray  2 spray Nasal Daily PRN Orville Weston MD       • hydrALAZINE (APRESOLINE) tablet 50 mg  50 mg Oral Q8H Orville Weston MD       • nitroglycerin (NITROSTAT) ointment 1 inch  1 inch Topical Once Son Randall MD       • nitroglycerin (NITROSTAT) SL tablet 0.4 mg  0.4 mg Sublingual Q5 Min PRN Son Randall MD   0.4 mg at 10/12/20 2120   • pantoprazole (PROTONIX) EC tablet 40 mg  40 mg Oral Q AM Orville Weston MD       •  "tiZANidine (ZANAFLEX) tablet 4 mg  4 mg Oral Q8H PRN Orville Weston MD           Allergies:  Codeine and Imitrex [sumatriptan]    Review of Systems  Review of Systems   Constitution: Positive for malaise/fatigue. Negative for chills, decreased appetite, fever, weight gain and weight loss.   HENT: Negative for nosebleeds.    Eyes: Negative for visual disturbance.   Cardiovascular: Positive for chest pain, dyspnea on exertion and orthopnea. Negative for leg swelling, near-syncope, palpitations, paroxysmal nocturnal dyspnea and syncope.   Respiratory: Positive for shortness of breath. Negative for cough, hemoptysis and snoring.    Endocrine: Negative for cold intolerance and heat intolerance.   Hematologic/Lymphatic: Negative for bleeding problem. Does not bruise/bleed easily.   Skin: Negative for rash.   Musculoskeletal: Negative for back pain and falls.   Gastrointestinal: Negative for abdominal pain, constipation, diarrhea, heartburn, melena, nausea and vomiting.   Genitourinary: Negative for hematuria.   Neurological: Negative for dizziness, headaches and light-headedness.   Psychiatric/Behavioral: Negative for altered mental status.   Allergic/Immunologic: Negative for persistent infections.       Objective     Physical Exam:  Patient Vitals for the past 24 hrs:   BP Temp Temp src Pulse Resp SpO2 Height Weight   10/13/20 0850 176/98 97.7 °F (36.5 °C) Oral 79 16 91 % -- --   10/13/20 0439 174/95 -- -- 88 -- -- -- --   10/13/20 0343 (!) 182/98 98 °F (36.7 °C) Oral 82 16 91 % -- --   10/13/20 0011 (!) 179/109 98.3 °F (36.8 °C) Oral 71 20 94 % 165.1 cm (65\") 86.5 kg (190 lb 12.8 oz)   10/12/20 2351 -- 98.1 °F (36.7 °C) Oral -- -- -- -- --   10/12/20 2345 (!) 170/104 -- -- 70 -- 93 % -- --   10/12/20 2330 (!) 178/118 -- -- 73 18 93 % -- --   10/12/20 2315 (!) 164/116 -- -- 67 18 92 % -- --   10/12/20 2300 (!) 203/125 -- -- 78 19 95 % -- --   10/12/20 2241 (!) 227/128 -- -- 97 19 94 % -- 81 kg (178 lb 9.2 oz) " "  10/12/20 2237 -- -- -- 95 22 93 % -- --   10/12/20 2130 (!) 186/124 -- -- 94 20 93 % -- --   10/12/20 2122 179/92 -- -- 87 24 95 % -- --   10/12/20 2114 -- -- -- 81 -- 96 % -- --   10/12/20 2045 (!) 215/117 -- -- 87 18 96 % -- --   10/12/20 2042 -- -- -- -- 20 -- -- --   10/12/20 2036 -- -- -- 89 20 95 % -- --   10/12/20 2030 (!) 214/155 -- -- 78 26 92 % -- --   10/12/20 1945 (!) 201/143 -- -- 86 (!) 34 94 % -- --   10/12/20 1844 (!) 170/128 -- -- 88 -- -- 167.6 cm (66\") --   10/12/20 1840 (!) 167/122 97.7 °F (36.5 °C) Oral 83 (!) 36 94 % -- --     Constitutional:       Appearance: Healthy appearance. Not in distress.   Eyes:      Pupils: Pupils are equal, round, and reactive to light.   HENT:      Head: Normocephalic and atraumatic.      Nose: Nose normal.    Mouth/Throat:      Dentition: Normal.   Neck:      Musculoskeletal: Normal range of motion.   Pulmonary:      Effort: Pulmonary effort is normal.      Breath sounds: Examination of the right-lower field reveals rhonchi. Examination of the left-lower field reveals rhonchi. Rhonchi present.   Chest:      Chest wall: Not tender to palpatation.   Cardiovascular:      PMI at left midclavicular line. Normal rate. Regular rhythm.      Murmurs: There is a diastolic murmur.   Pulses:     Intact distal pulses.   Edema:     Peripheral edema absent.   Abdominal:      General: Bowel sounds are normal.      Palpations: Abdomen is soft.   Musculoskeletal: Normal range of motion.   Skin:     General: Skin is warm and dry.   Neurological:      Mental Status: Alert, oriented to person, place, and time and oriented to person, place and time.   Psychiatric:         Attention and Perception: Attention normal.         Mood and Affect: Mood normal.         Speech: Speech is rapid and pressured.         Results Review:   I reviewed the patient's new clinical results.    Lab Results (last 72 hours)     Procedure Component Value Units Date/Time    COVID PRE-OP / PRE-PROCEDURE " SCREENING ORDER (NO ISOLATION) - Swab, Nasal Cavity [[id number]]  (Normal) Collected: 10/12/20 2028    Specimen: Swab from Nasal Cavity Updated: 10/12/20 2104    Narrative:      The following orders were created for panel order COVID PRE-OP / PRE-PROCEDURE SCREENING ORDER (NO ISOLATION) - Swab, Nasal Cavity.  Procedure                               Abnormality         Status                     ---------                               -----------         ------                     COVID-19, ABBOTT IN-HOUS...[[id number]]  Normal              Final result                 Please view results for these tests on the individual orders.    COVID-19, ABBOTT IN-HOUSE,NP Swab (NO TRANSPORT MEDIA) 2 HR TAT - Swab, Nasal Cavity [[id number]]  (Normal) Collected: 10/12/20 2028    Specimen: Swab from Nasal Cavity Updated: 10/12/20 2104     COVID19 Not Detected    Narrative:      Fact sheet for providers: https://www.fda.gov/media/149715/download     Fact sheet for patients: https://www.fda.gov/media/630162/download    Blood Gas, Venous [[id number]]  (Abnormal) Collected: 10/12/20 2040    Specimen: Venous Blood Updated: 10/12/20 2048     Site OTHER     pH, Venous 7.471 pH Units      pCO2, Venous 36.3 mm Hg      Comment: 84 Value below reference range        pO2, Venous 70.0 mm Hg      Comment: 83 Value above reference range        HCO3, Venous 26.4 mmol/L      Base Excess, Venous 2.7 mmol/L      Comment: 83 Value above reference range        O2 Saturation, Venous 96.0 %      Comment: 83 Value above reference range        Temperature 37.0 C      Barometric Pressure for Blood Gas 750 mmHg      Modality Room Air     Ventilator Mode NA     Collected by DAVE PRESTON     Comment: Meter: [id number]     :  [id number]       BNP [[id number]]  (Abnormal) Collected: 10/12/20 1905    Specimen: Blood Updated: 10/12/20 2029     proBNP 49,304.0 pg/mL     Narrative:      Among patients with dyspnea, NT-proBNP is highly sensitive for the  detection of acute congestive heart failure. In addition NT-proBNP of <300 pg/ml effectively rules out acute congestive heart failure with 99% negative predictive value.    Results may be falsely decreased if patient taking Biotin.      Troponin [[id number]]  (Abnormal) Collected: 10/12/20 1905    Specimen: Blood Updated: 10/12/20 2024     Troponin T 0.054 ng/mL     Narrative:      Troponin T Reference Range:  <= 0.03 ng/mL-   Negative for AMI  >0.03 ng/mL-     Abnormal for myocardial necrosis.  Clinicians would have to utilize clinical acumen, EKG, Troponin and serial changes to determine if it is an Acute Myocardial Infarction or myocardial injury due to an underlying chronic condition.       Results may be falsely decreased if patient taking Biotin.      [place] Draw [[id number]] Collected: 10/12/20 1905    Specimen: Blood Updated: 10/12/20 2015    Narrative:      The following orders were created for panel order [place] Draw.  Procedure                               Abnormality         Status                     ---------                               -----------         ------                     Light Blue Top[[id number]]                                   Final result               Green Top (Gel)[[id number]]                                  Final result               Lavender Top[[id number]]                                     Final result               Red Top[[id number]]                                          Final result               [place] Blood Culture Rene...[[id number]]                      Final result               Gray Top - Ice[[id number]]                                   Final result                 Please view results for these tests on the individual orders.    Light Blue Top [[id number]] Collected: 10/12/20 1905    Specimen: Blood Updated: 10/12/20 2015     Extra Tube hold for add-on     Comment: Auto resulted       Green Top (Gel) [[id number]] Collected: 10/12/20 1905    Specimen: Blood Updated:  10/12/20 2015     Extra Tube Hold for add-ons.     Comment: Auto resulted.       Lavender Top [[id number]] Collected: 10/12/20 1905    Specimen: Blood Updated: 10/12/20 2015     Extra Tube hold for add-on     Comment: Auto resulted       Red Top [[id number]] Collected: 10/12/20 1905    Specimen: Blood Updated: 10/12/20 2015     Extra Tube Hold for add-ons.     Comment: Auto resulted.       [place] Blood Culture Bottle Set [[id number]] Collected: 10/12/20 1905    Specimen: Blood from Arm, Right Updated: 10/12/20 2015     Extra Tube Hold for add-ons.     Comment: Auto resulted.       Gray Top - Ice [[id number]] Collected: 10/12/20 1905    Specimen: Blood Updated: 10/12/20 2015     Extra Tube Hold for add-ons.     Comment: Auto resulted.       CK [[id number]]  (Normal) Collected: 10/12/20 1905    Specimen: Blood Updated: 10/12/20 2014     Creatine Kinase 53 U/L     Basic Metabolic Panel [[id number]]  (Abnormal) Collected: 10/12/20 1905    Specimen: Blood Updated: 10/12/20 2012     Glucose 147 mg/dL      BUN 35 mg/dL      Creatinine 2.23 mg/dL      Sodium 142 mmol/L      Potassium 3.0 mmol/L      Chloride 104 mmol/L      CO2 26.0 mmol/L      Calcium 8.5 mg/dL      eGFR Non African Amer 22 mL/min/1.73      BUN/Creatinine Ratio 15.7     Anion Gap 12.0 mmol/L     Narrative:      GFR Normal >60  Chronic Kidney Disease <60  Kidney Failure <15      Protime-INR [[id number]]  (Normal) Collected: 10/12/20 1905    Specimen: Blood Updated: 10/12/20 2011     Protime 13.4 Seconds      INR 1.06    aPTT [[id number]]  (Normal) Collected: 10/12/20 1905    Specimen: Blood Updated: 10/12/20 2011     PTT 31.3 seconds     D-dimer, Quantitative [[id number]]  (Abnormal) Collected: 10/12/20 1905    Specimen: Blood Updated: 10/12/20 2011     D-Dimer, Quantitative 1.19 mg/L (FEU)     Narrative:      Reference Range is 0-0.50 mg/L FEU. However, results <0.50 mg/L FEU tends to rule out DVT or PE. Results >0.50 mg/L FEU are not useful in predicting absence  or presence of DVT or PE.      CBC & Differential [[id number]]  (Abnormal) Collected: 10/12/20 1905    Specimen: Blood Updated: 10/12/20 2007    Narrative:      The following orders were created for panel order CBC & Differential.  Procedure                               Abnormality         Status                     ---------                               -----------         ------                     CBC Auto Differential[[id number]]        Abnormal            Final result                 Please view results for these tests on the individual orders.    CBC Auto Differential [[id number]]  (Abnormal) Collected: 10/12/20 1905    Specimen: Blood Updated: 10/12/20 2007     WBC 8.14 10*3/mm3      RBC 3.39 10*6/mm3      Hemoglobin 9.8 g/dL      Hematocrit 30.0 %      MCV 88.5 fL      MCH 28.9 pg      MCHC 32.7 g/dL      RDW 15.0 %      RDW-SD 47.9 fl      MPV 10.9 fL      Platelets 226 10*3/mm3      Neutrophil % 78.1 %      Lymphocyte % 12.5 %      Monocyte % 5.9 %      Eosinophil % 2.1 %      Basophil % 0.9 %      Immature Grans % 0.5 %      Neutrophils, Absolute 6.36 10*3/mm3      Lymphocytes, Absolute 1.02 10*3/mm3      Monocytes, Absolute 0.48 10*3/mm3      Eosinophils, Absolute 0.17 10*3/mm3      Basophils, Absolute 0.07 10*3/mm3      Immature Grans, Absolute 0.04 10*3/mm3      nRBC 0.0 /100 WBC           No results found for: ECHOEFEST    Imaging Results (Last 72 Hours)     Procedure Component Value Units Date/Time    NM Lung Scan Perfusion Particulate [[id number]] Collected: 10/13/20 0700     Updated: 10/13/20 0705    Narrative:      EXAMINATION: Perfusion lung scan 10/12/2020     HISTORY: Shortness of breath     FINDINGS: Following intravenous injection of 4.5 mCi of technetium 99m  MAA intravenously multiple planar images were obtained of the thorax.  The study demonstrates focal photopenia in the perihilar distribution of  the right lung anteriorly as well as within the right posterior basilar  segment. There is  normal perfusion to the left lung. There is no  correlate on plain film radiography.       Impression:      1.. Subsegmental defects within the right middle and lower lobe. This is  an indeterminate result with pulmonary thromboembolic disease not  excluded. If clinically feasible follow-up with CT angiography of the  chest would be recommended. There is normal perfusion to the left lung.  This report was finalized on 10/13/2020 07:02 by Dr. Ye Montaño MD.    XR Chest 1 View [[id number]] Collected: 10/12/20 2026     Updated: 10/12/20 2035    Narrative:      Exam:   XR CHEST 1 VW-       Date:  10/12/2020      History:  Female, age  60 years;sob     COMPARISON:  Chest x-ray dated 9/21/2020     Findings :     Mild cardiomegaly. Central pulmonary venous congestion/developing  interstitial edema. Small left pleural effusion and trace right pleural  effusion. Calcified granuloma in the right upper lobe redemonstrated.  The bones show no acute pathology.         Impression:      Impression:     Findings are most concerning for fluid overload.     This report was finalized on 10/12/2020 20:31 by Dr. Janet Geller MD.        Assessment/Plan       Shortness of breath    Hypertensive urgency    Elevated troponin    Iron deficiency anemia    Nonrheumatic aortic valve insufficiency    Congestive heart failure (CMS/HCC)    Noncompliance    1. Shortness of Breath- likely from volume overload secondary to hypertensive urgency due to missed meds. However VQ scan is inconclusive - defer to Dr. Weston for further work up. Will order an additional dose of IV Lasix. Monitor I&O.   2. Hypertensive Urgency- home medications have been resumed by Dr. Weston- but none have been given at time of my evaluation- will monitor for improvement once medications are resumed. Likely cause of elevated troponin (though we only have one with no trend) and cause of pulmonary edema in setting of diastolic dysfunction and AI). Echo pending.   3.  Elevated Troponin- likely related to number 2. Only one drawn thus far. Will trend. If flat trend could consider stress- also could monitor for return of symptoms with blood pressure and volume status improved.   4. MORELIA- chronic anemia  5. AI- moderate on last echo. Echo pending  6. Diastolic Congestive Heart Failure- BNP significantly elevated. Will repeat dose of Lasix. Echo pending. Discussed importance of compliance with medications- though I do suspect she does not grasp the correlation or importance of missing her medications that leads to these episodes.   7. Noncompliance- history of noncompliance and multi substance abuse (UDS pending)    Further orders per Dr. Schwarz     Thank you for asking us to follow this patient with you.       Electronically signed by ISAIAH Sanchez, 10/13/20, 8:47 AM CDT.

## 2020-10-13 NOTE — SIGNIFICANT NOTE
VBG ordered.  RN joseph blood from IV.  Ran blood and results state it is an Arterial sample.  Notified RN.  Karen Ordonez, RRT

## 2020-10-13 NOTE — H&P
History and Physical      CHIEF COMPLAINT:  SOA    Reason for Admission:  Volume Overload, MATTY    History Obtained From:  Patient, chart    HISTORY OF PRESENT ILLNESS:      The patient is a 60 y.o. female who was admitted through ER with 1 week h/o SOA. No CP. She denies any cough, congestion. No edema. No GI complaints. She has had PND and orthopnea. She has not been taking her BP medicine for > 1 week.     Past Medical History:    Past Medical History:   Diagnosis Date   • Acute CHF (CMS/HCC)    • Acute renal failure (ARF) (CMS/HCC)    • Anemia    • Asthma     childhood   • Hypertension    • Ischemic colitis (CMS/HCC)    • Metabolic acidosis    • Nonrheumatic aortic (valve) insufficiency    • PTSD (post-traumatic stress disorder)      Past Surgical History:    Past Surgical History:   Procedure Laterality Date   • EXTERNAL FIXATION WRIST FRACTURE     • LEG SURGERY     • TUBAL ABDOMINAL LIGATION           Medications Prior to Admission:    Medications Prior to Admission   Medication Sig Dispense Refill Last Dose   • amLODIPine (NORVASC) 5 MG tablet Take 1 tablet by mouth 2 (Two) Times a Day. 60 tablet 1 10/12/2020 at Unknown time   • azelastine (ASTELIN) 0.1 % nasal spray 1 spray into the nostril(s) as directed by provider 2 (Two) Times a Day. Use in each nostril as directed   10/12/2020 at Unknown time   • carvedilol (COREG) 25 MG tablet Take 1 tablet by mouth 2 (Two) Times a Day With Meals. 6 tablet 1 10/12/2020 at Unknown time   • CloNIDine (CATAPRES) 0.1 MG tablet Take 0.1 mg by mouth Daily As Needed for High Blood Pressure (BP >180/100).   10/12/2020 at Unknown time   • fluticasone (FLONASE) 50 MCG/ACT nasal spray 2 sprays into the nostril(s) as directed by provider Daily As Needed for Rhinitis or Allergies.   10/12/2020 at Unknown time   • hydrALAZINE (APRESOLINE) 25 MG tablet Take 3 tablets by mouth Every 8 (Eight) Hours. 270 tablet 3 10/12/2020 at Unknown time   • lisinopril (PRINIVIL,ZESTRIL) 40 MG tablet  "Take 1 tablet by mouth Every Evening. 30 tablet 3 10/12/2020 at Unknown time   • pantoprazole (PROTONIX) 40 MG EC tablet Take 1 tablet by mouth Daily. 30 tablet 1 10/12/2020 at Unknown time   • cholecalciferol 1000 units tablet Take 1,000 Units by mouth Daily. 30 tablet 1 Unknown at Unknown time   • tiZANidine (ZANAFLEX) 4 MG tablet Take 1 tablet by mouth Every 8 (Eight) Hours As Needed for Muscle Spasms. 30 tablet 0 Unknown at Unknown time       Allergies:  Codeine and Imitrex [sumatriptan]    Social History:   TOBACCO:   reports that she has been smoking. She has been smoking about 0.50 packs per day. She has never used smokeless tobacco.  ETOH:   reports no history of alcohol use.  DRUGS:   reports no history of drug use.  MARITAL STATUS:  Not   OCCUPATION:  Not working  Patient currently lives alone      Family History:   Family History   Problem Relation Age of Onset   • Coronary artery disease Mother    • Coronary artery disease Father      REVIEW OF SYSTEMS:  Constitutional: weakness  CV: neg  Pulmonary: SOA  GI: neg  : neg  Psych: neg  Neuro: neg  Skin: neg  MusculoSkeletal: neg  HEENT: neg  Joints: neg  Vitals:  /98 (BP Location: Left arm, Patient Position: Lying)   Pulse 79   Temp 97.7 °F (36.5 °C) (Oral)   Resp 16   Ht 165.1 cm (65\")   Wt 86.5 kg (190 lb 12.8 oz)   SpO2 91%   BMI 31.75 kg/m²     PHYSICAL EXAM:  Gen: NAD, alert, pleasant  HEENT: WNL  Lymph: no LAD  Neck: no JVD or masses  Chest: coarse  CV: RRR  Abdomen: NT/ND  Extrem: no C/C/E  Neuro: non focal  Skin: no rashes  Joints: no redness  DATA:  I have reviewed the admission labs and imaging tests.    ASSESSMENT AND PLAN:      Volume Overload----follow with Cardiology, ECHO ordered  MATTY---Nephrology consult, follow lab  Abnormal V/Q scan---continue Lovenox, CTA when renal function improves  HTN---resume home meds and follow      Orville Weston MD  10:57 CDT 10/13/2020  "

## 2020-10-13 NOTE — PROGRESS NOTES
Discharge Planning Assessment  [facility]     Patient Name: Ludivina Ramirez  MRN: [id number]  Today's Date: 10/13/2020    Admit Date: 10/12/2020    Discharge Needs Assessment     Row Name 10/13/20 1003       Living Environment    Lives With  alone    Current Living Arrangements  home/apartment/condo    Primary Care Provided by  self    Quality of Family Relationships  involved;helpful;supportive    Able to Return to Prior Arrangements  yes       Resource/Environmental Concerns    Resource/Environmental Concerns  none       Transition Planning    Patient/Family Anticipates Transition to  home    Patient/Family Anticipated Services at Transition  none    Transportation Anticipated  family or friend will provide       Discharge Needs Assessment    Readmission Within the Last 30 Days  no previous admission in last 30 days    Equipment Currently Used at Home  cane, straight;other (see comments) Pt has a scale for home usage to monitor CHF.    Concerns to be Addressed  no discharge needs identified    Current Discharge Risk  chronically ill;lives alone    Discharge Coordination/Progress  Pt has PCP and RX coverage.  Pt can afford medications.  Pt is open to home health if MD feels necessary.        Discharge Plan    No documentation.       Continued Care and Services - Admitted Since 10/12/2020    Coordination has not been started for this encounter.         Demographic Summary    No documentation.       Functional Status    No documentation.       Psychosocial    No documentation.       Abuse/Neglect    No documentation.       Legal    No documentation.       Substance Abuse    No documentation.       Patient Forms    No documentation.           JAMAL JoséW

## 2020-10-13 NOTE — ED PROVIDER NOTES
Subjective   61 y/o female with history of CHF and HTN arrives for evaluation of SOB, SHAW and cough for several weeks. She has been unable to get to the hospital thus did not come to the hospital until today. She admits to not taking her BP medications as she is supposed to stating all she has left is her clonidine. She also admits to smoking but cannot smoke for a 1 week secondary to SOB. She denies any CP, fevers, or chills. She denies ill contacts, sore throat, contact with COVID, nausea, vomiting, diarrhea or other issues. She arrives in [facility].           Family, social and past history reviewed as below, prior documentation of H and Ps and other documentation are reviewed:    Past Medical History:  No date: Acute CHF (CMS/[facility])  No date: Acute renal failure (ARF) (CMS/[facility])  No date: Anemia  No date: Asthma      Comment:  childhood  No date: Hypertension  No date: Ischemic colitis (CMS/[facility])  No date: Metabolic acidosis  No date: Nonrheumatic aortic (valve) insufficiency  No date: PTSD (post-traumatic stress disorder)    Past Surgical History:  No date: EXTERNAL FIXATION WRIST FRACTURE  No date: LEG SURGERY  No date: TUBAL ABDOMINAL LIGATION    Social History    Socioeconomic History      Marital status: Single      Spouse name: Not on file      Number of children: Not on file      Years of education: Not on file      Highest education level: Not on file    Tobacco Use      Smoking status: Current Every Day Smoker        Packs/day: 0.50      Smokeless tobacco: Never Used    Substance and Sexual Activity      Alcohol use: No      Drug use: No      Sexual activity: Defer      Family history: reviewed and noncontributory               Review of Systems   All other systems reviewed and are negative.      Past Medical History:   Diagnosis Date   • Acute CHF (CMS/HCC)    • Acute renal failure (ARF) (CMS/[facility])    • Anemia    • Asthma     childhood   • Hypertension    • Ischemic colitis (CMS/HCC)    • Metabolic acidosis    •  Nonrheumatic aortic (valve) insufficiency    • PTSD (post-traumatic stress disorder)        Allergies   Allergen Reactions   • Codeine Nausea And Vomiting   • Imitrex [Sumatriptan] Other (See Comments)     HA       Past Surgical History:   Procedure Laterality Date   • EXTERNAL FIXATION WRIST FRACTURE     • LEG SURGERY     • TUBAL ABDOMINAL LIGATION         Family History   Problem Relation Age of Onset   • Coronary artery disease Mother    • Coronary artery disease Father        Social History     Socioeconomic History   • Marital status: Single     Spouse name: Not on file   • Number of children: Not on file   • Years of education: Not on file   • Highest education level: Not on file   Tobacco Use   • Smoking status: Current Every Day Smoker     Packs/day: 0.50   • Smokeless tobacco: Never Used   Substance and Sexual Activity   • Alcohol use: No   • Drug use: No   • Sexual activity: Defer           Objective   Physical Exam  Vitals signs and nursing note reviewed.   Constitutional:       Appearance: She is well-developed.   HENT:      Head: Normocephalic.      Mouth/Throat:      Mouth: Mucous membranes are moist.   Eyes:      Pupils: Pupils are equal, round, and reactive to light.   Neck:      Musculoskeletal: Normal range of motion and neck supple.   Cardiovascular:      Rate and Rhythm: Normal rate and regular rhythm.   Pulmonary:      Effort: Tachypnea and respiratory distress present.      Breath sounds: Wheezing and rhonchi present.   Abdominal:      Palpations: Abdomen is soft. There is no hepatomegaly, splenomegaly or mass.      Tenderness: There is no abdominal tenderness.   Musculoskeletal: Normal range of motion.      Right lower leg: No edema.      Left lower leg: No edema.   Skin:     General: Skin is warm and dry.      Capillary Refill: Capillary refill takes less than 2 seconds.   Neurological:      General: No focal deficit present.      Mental Status: She is alert and oriented to person, place, and  time.   Psychiatric:         Mood and Affect: Mood normal.         Behavior: Behavior normal.         Procedures           ED Course    EKG shows new t-wave inversions which may be secondary to LVH    ED Course as of Oct 12 2320   Mon Oct 12, 2020   2207 Delay in disposition is related to waiting on VQ scan.     []   2247 Intermediate prob for PE will treat. She will need admission given fluid overload and MATTY.     []   2247 Troponin seems unchanged.     []   2255 BP went back up. She is refusing further nitro. We will try more BP medications if does not help will place on cardene drip.     []   2319 BP has come down to 164 will admit upstairs.     []      ED Course User Index  [] Son Randall MD            XR Chest 1 View   Final Result   Impression:       Findings are most concerning for fluid overload.       This report was finalized on 10/12/2020 20:31 by Dr. Janet Geller MD.      NM Lung Scan Perfusion Particulate    (Results Pending)     Labs Reviewed   BASIC METABOLIC PANEL - Abnormal; Notable for the following components:       Result Value    Glucose 147 (*)     BUN 35 (*)     Creatinine 2.23 (*)     Potassium 3.0 (*)     Calcium 8.5 (*)     eGFR Non  Amer 22 (*)     All other components within normal limits    Narrative:     GFR Normal >60  Chronic Kidney Disease <60  Kidney Failure <15     D-DIMER, QUANTITATIVE - Abnormal; Notable for the following components:    D-Dimer, Quantitative 1.19 (*)     All other components within normal limits    Narrative:     Reference Range is 0-0.50 mg/L FEU. However, results <0.50 mg/L FEU tends to rule out DVT or PE. Results >0.50 mg/L FEU are not useful in predicting absence or presence of DVT or PE.     BNP (IN-HOUSE) - Abnormal; Notable for the following components:    proBNP 49,304.0 (*)     All other components within normal limits    Narrative:     Among patients with dyspnea, NT-proBNP is highly sensitive for the detection of acute  congestive heart failure. In addition NT-proBNP of <300 pg/ml effectively rules out acute congestive heart failure with 99% negative predictive value.    Results may be falsely decreased if patient taking Biotin.     TROPONIN (IN-HOUSE) - Abnormal; Notable for the following components:    Troponin T 0.054 (*)     All other components within normal limits    Narrative:     Troponin T Reference Range:  <= 0.03 ng/mL-   Negative for AMI  >0.03 ng/mL-     Abnormal for myocardial necrosis.  Clinicians would have to utilize clinical acumen, EKG, Troponin and serial changes to determine if it is an Acute Myocardial Infarction or myocardial injury due to an underlying chronic condition.       Results may be falsely decreased if patient taking Biotin.     CBC WITH AUTO DIFFERENTIAL - Abnormal; Notable for the following components:    RBC 3.39 (*)     Hemoglobin 9.8 (*)     Hematocrit 30.0 (*)     Neutrophil % 78.1 (*)     Lymphocyte % 12.5 (*)     All other components within normal limits   BLOOD GAS, VENOUS - Abnormal; Notable for the following components:    pH, Venous 7.471 (*)     pCO2, Venous 36.3 (*)     pO2, Venous 70.0 (*)     Base Excess, Venous 2.7 (*)     O2 Saturation, Venous 96.0 (*)     All other components within normal limits   COVID-19ABBOTT IN-HOUSE,NP SWAB (NO TRANSPORT MEDIA) 2 HR TAT - Normal    Narrative:     Fact sheet for providers: https://www.fda.gov/media/973989/download     Fact sheet for patients: https://www.fda.gov/media/258847/download   PROTIME-INR - Normal   APTT - Normal   CK - Normal   COVID PRE-OP / PRE-PROCEDURE SCREENING ORDER (NO ISOLATION)    Narrative:     The following orders were created for panel order COVID PRE-OP / PRE-PROCEDURE SCREENING ORDER (NO ISOLATION) - Swab, Nasal Cavity.  Procedure                               Abnormality         Status                     ---------                               -----------         ------                     COVID-19, ABBOTT  IN-HOUS...[[id number]]  Normal              Final result                 Please view results for these tests on the individual orders.   RAINBOW DRAW    Narrative:     The following orders were created for panel order [place] Draw.  Procedure                               Abnormality         Status                     ---------                               -----------         ------                     Light Blue Top[[id number]]                                   Final result               Green Top (Gel)[[id number]]                                  Final result               Lavender Top[[id number]]                                     Final result               Red Top[[id number]]                                          Final result               [place] Blood Culture Rene...[[id number]]                      Final result               Gray Top - Ice[[id number]]                                   Final result                 Please view results for these tests on the individual orders.   GRAY TOP - ICE   BLOOD GAS, VENOUS   LIGHT BLUE TOP   GREEN TOP   LAVENDER TOP   RED TOP   RAINBOW BLOOD CULTURE BOTTLES - 1 SET   CBC AND DIFFERENTIAL    Narrative:     The following orders were created for panel order CBC & Differential.  Procedure                               Abnormality         Status                     ---------                               -----------         ------                     CBC Auto Differential[[id number]]        Abnormal            Final result                 Please view results for these tests on the individual orders.                                       MDM    Final diagnoses:   Congestive heart failure, unspecified HF chronicity, unspecified heart failure type (CMS/[facility])   MATTY (acute kidney injury) (CMS/[facility])   Hypertension, unspecified type            Son Randall MD  10/12/20 2248       Son Randall MD  10/12/20 2257       Son Randall MD  10/12/20 9050

## 2020-10-14 LAB
25(OH)D3 SERPL-MCNC: 33.5 NG/ML (ref 30–100)
ANION GAP SERPL CALCULATED.3IONS-SCNC: 9 MMOL/L (ref 5–15)
BACTERIA SPEC AEROBE CULT: ABNORMAL
BACTERIA SPEC AEROBE CULT: ABNORMAL
BUN SERPL-MCNC: 55 MG/DL (ref 8–23)
BUN/CREAT SERPL: 16.7 (ref 7–25)
CALCIUM SPEC-SCNC: 8.4 MG/DL (ref 8.6–10.5)
CHLORIDE SERPL-SCNC: 104 MMOL/L (ref 98–107)
CO2 SERPL-SCNC: 28 MMOL/L (ref 22–29)
CREAT SERPL-MCNC: 3.29 MG/DL (ref 0.57–1)
GFR SERPL CREATININE-BSD FRML MDRD: 14 ML/MIN/1.73
GFR SERPL CREATININE-BSD FRML MDRD: ABNORMAL ML/MIN/{1.73_M2}
GLUCOSE SERPL-MCNC: 119 MG/DL (ref 65–99)
MAGNESIUM SERPL-MCNC: 2.2 MG/DL (ref 1.6–2.4)
PHOSPHATE SERPL-MCNC: 5.5 MG/DL (ref 2.5–4.5)
POTASSIUM SERPL-SCNC: 3.4 MMOL/L (ref 3.5–5.2)
PTH-INTACT SERPL-MCNC: 189 PG/ML (ref 15–65)
SODIUM SERPL-SCNC: 141 MMOL/L (ref 136–145)
STREP GROUPING: ABNORMAL
WHOLE BLOOD HOLD SPECIMEN: NORMAL

## 2020-10-14 PROCEDURE — 25010000002 ENOXAPARIN PER 10 MG: Performed by: FAMILY MEDICINE

## 2020-10-14 PROCEDURE — 80048 BASIC METABOLIC PNL TOTAL CA: CPT | Performed by: FAMILY MEDICINE

## 2020-10-14 PROCEDURE — 99232 SBSQ HOSP IP/OBS MODERATE 35: CPT | Performed by: INTERNAL MEDICINE

## 2020-10-14 PROCEDURE — 84100 ASSAY OF PHOSPHORUS: CPT | Performed by: INTERNAL MEDICINE

## 2020-10-14 PROCEDURE — 83970 ASSAY OF PARATHORMONE: CPT | Performed by: INTERNAL MEDICINE

## 2020-10-14 PROCEDURE — 83735 ASSAY OF MAGNESIUM: CPT | Performed by: INTERNAL MEDICINE

## 2020-10-14 PROCEDURE — 82306 VITAMIN D 25 HYDROXY: CPT | Performed by: INTERNAL MEDICINE

## 2020-10-14 RX ADMIN — HYDRALAZINE HYDROCHLORIDE 50 MG: 50 TABLET ORAL at 22:49

## 2020-10-14 RX ADMIN — HYDRALAZINE HYDROCHLORIDE 50 MG: 50 TABLET ORAL at 13:47

## 2020-10-14 RX ADMIN — PANTOPRAZOLE SODIUM 40 MG: 40 TABLET, DELAYED RELEASE ORAL at 06:53

## 2020-10-14 RX ADMIN — AMLODIPINE BESYLATE 5 MG: 5 TABLET ORAL at 20:31

## 2020-10-14 RX ADMIN — HYDRALAZINE HYDROCHLORIDE 50 MG: 50 TABLET ORAL at 06:53

## 2020-10-14 RX ADMIN — ENOXAPARIN SODIUM 90 MG: 100 INJECTION SUBCUTANEOUS at 22:49

## 2020-10-14 RX ADMIN — AZELASTINE HYDROCHLORIDE 1 SPRAY: 137 SPRAY, METERED NASAL at 20:31

## 2020-10-14 RX ADMIN — SPIRONOLACTONE 25 MG: 25 TABLET ORAL at 08:32

## 2020-10-14 RX ADMIN — AMLODIPINE BESYLATE 5 MG: 5 TABLET ORAL at 08:32

## 2020-10-14 RX ADMIN — AZELASTINE HYDROCHLORIDE 1 SPRAY: 137 SPRAY, METERED NASAL at 08:34

## 2020-10-14 RX ADMIN — CARVEDILOL 25 MG: 25 TABLET, FILM COATED ORAL at 17:41

## 2020-10-14 NOTE — PLAN OF CARE
Goal Outcome Evaluation:  Plan of Care Reviewed With: patient  Progress: improving  Outcome Summary: Pt has had no c/o pain. Normal sinus 65-74, pt did drop to 26, however rate came right back up, asymptomatic. Room air. Up ad vern. Safety  maintained. Continue to monitor.

## 2020-10-14 NOTE — PROGRESS NOTES
"  Chief Complaint   Patient presents with   • Shortness of Breath     S: The patient says that overall she feels improved.  She has some generalized weakness and is still short of breath.  She also notes a cough intermittently but nothing significant.  No fevers or sputum production.  No chest discomfort.  She denies current orthopnea, PND, edema.  Her blood pressure has been better controlled over the last 24 hours however renal function is worsened.  She says that she is still making a reasonable amount of urine.    Medications: Reviewed    Review of Systems: All pertinent negative and positives as noted above.  Otherwise, all systems reviewed and found to be negative.    O:  /99 (BP Location: Left arm, Patient Position: Lying) Comment: reported to nurse  Pulse 72   Temp 97.7 °F (36.5 °C) (Oral)   Resp 18   Ht 165.1 cm (65\")   Wt 88.5 kg (195 lb)   SpO2 92%   BMI 32.45 kg/m²   Temp:  [97.3 °F (36.3 °C)-98.2 °F (36.8 °C)] 97.7 °F (36.5 °C)  Heart Rate:  [44-72] 72  Resp:  [14-18] 18  BP: (122-175)/(70-99) 175/99    Intake/Output Summary (Last 24 hours) at 10/14/2020 1019  Last data filed at 10/14/2020 0853  Gross per 24 hour   Intake 1560 ml   Output 775 ml   Net 785 ml     Gen: NAD, sitting up in bed, AAOx3  CV: RRR, no mrg  Pulm: CTAB (coughing during exam)  GI: s, nt, nd, +bs  Ext: no edema, warm and well perfused    Diagnostic Data:    Lab Results   Component Value Date    WBC 8.14 10/12/2020    HGB 9.8 (L) 10/12/2020    HCT 30.0 (L) 10/12/2020    MCV 88.5 10/12/2020     10/12/2020     Lab Results   Component Value Date    GLUCOSE 119 (H) 10/14/2020    CALCIUM 8.4 (L) 10/14/2020     10/14/2020    K 3.4 (L) 10/14/2020    CO2 28.0 10/14/2020     10/14/2020    BUN 55 (H) 10/14/2020    CREATININE 3.29 (H) 10/14/2020    EGFRIFAFRI  10/14/2020      Comment:      <15 Indicative of kidney failure.    EGFRIFNONA 14 (L) 10/14/2020    BCR 16.7 10/14/2020    ANIONGAP 9.0 10/14/2020 "     Echocardiogram on 10/13/2020: Normal left ventricular systolic function, moderate LVH, mild biatrial enlargement, trace mitral valve regurgitation and mild aortic valve regurgitation.    ASSESSMENT/PLAN:    1.  Poorly controlled essential hypertension/hypertensive urgency on arrival  2.  Heart failure with preserved ejection fraction, acute on chronic worsening upon arrival, likely related to problem #1  3.  Aortic insufficiency  4.  Iron deficiency anemia  5.  Acute renal failure  6.  Intermediate probability VQ scan  7.  Elevated troponin, not consistent with myocardial infarction, likely a type II elevation secondary to problems #1 and 2    -Blood pressure overall improved yesterday although today's initial blood pressure reading is elevated.  Continue current antihypertensives and continue to monitor response.  -Renal function has worsened today with creatinine increasing from 2.54-3.29 today.  She was started on Aldactone yesterday however I would not necessarily expect her creatinine to increase to such a level after only 1 dose.  Nephrology is also involved in the patient's care.  Await further recommendations from nephrology.  -The patient remains on therapeutic Lovenox for possible pulmonary embolism given the intermediate probability VQ scan on arrival.  -The patient appears to be euvolemic on examination.  She is no longer receiving IV diuretics.  -We will continue to follow.

## 2020-10-14 NOTE — CONSULTS
Nephrology ([facility]) Consult Note      Patient:  Ludivina Ramirez  YOB: 1960  Date of Service: 10/13/2020  MRN: [id number]   Acct: [id number]   Primary Care Physician: Orville Weston MD  Advance Directive:   There are no questions and answers to display.     Admit Date: 10/12/2020       Hospital Day: 1  Referring Provider: Orville Weston MD      Patient personally seen and examined.  Complete chart including Consults, Notes, Operative Reports, Labs, Cardiology, and Radiology studies reviewed as able.        Subjective:  Ludivina Ramirez is a 60 y.o. female  whom we were consulted for acute kidney injury.  Patient was never seen us in the office but we had evaluated her as an inpatient approximately 1 year ago.  At that time she had an episode of acute acute injury that resolved to a creatinine of about 1.5 at discharge.  More recently she had had a week of progressive shortness of air and orthopnea.  Her blood pressure been increased that she had not been taking her blood pressure medications and continue to use tobacco.  Upon evaluation was found to have pleural effusion and worsening heart function on echocardiogram.  She denied dysuria, hematuria.  She is a frequent NSAID user of Excedrin.    Allergies:  Codeine and Imitrex [sumatriptan]    Home Meds:  Medications Prior to Admission   Medication Sig Dispense Refill Last Dose   • amLODIPine (NORVASC) 5 MG tablet Take 1 tablet by mouth 2 (Two) Times a Day. 60 tablet 1 10/12/2020 at Unknown time   • azelastine (ASTELIN) 0.1 % nasal spray 1 spray into the nostril(s) as directed by provider 2 (Two) Times a Day. Use in each nostril as directed   10/12/2020 at Unknown time   • carvedilol (COREG) 25 MG tablet Take 1 tablet by mouth 2 (Two) Times a Day With Meals. 6 tablet 1 10/12/2020 at Unknown time   • CloNIDine (CATAPRES) 0.1 MG tablet Take 0.1 mg by mouth Daily As Needed for High Blood Pressure (BP >180/100).    10/12/2020 at Unknown time   • fluticasone (FLONASE) 50 MCG/ACT nasal spray 2 sprays into the nostril(s) as directed by provider Daily As Needed for Rhinitis or Allergies.   10/12/2020 at Unknown time   • hydrALAZINE (APRESOLINE) 25 MG tablet Take 3 tablets by mouth Every 8 (Eight) Hours. 270 tablet 3 10/12/2020 at Unknown time   • lisinopril (PRINIVIL,ZESTRIL) 40 MG tablet Take 1 tablet by mouth Every Evening. 30 tablet 3 10/12/2020 at Unknown time   • pantoprazole (PROTONIX) 40 MG EC tablet Take 1 tablet by mouth Daily. 30 tablet 1 10/12/2020 at Unknown time   • cholecalciferol 1000 units tablet Take 1,000 Units by mouth Daily. 30 tablet 1 Unknown at Unknown time   • tiZANidine (ZANAFLEX) 4 MG tablet Take 1 tablet by mouth Every 8 (Eight) Hours As Needed for Muscle Spasms. 30 tablet 0 Unknown at Unknown time       Medicines:  Current Facility-Administered Medications   Medication Dose Route Frequency Provider Last Rate Last Dose   • acetaminophen (TYLENOL) tablet 650 mg  650 mg Oral Q6H PRN Orville Weston MD   650 mg at 10/13/20 1457   • amLODIPine (NORVASC) tablet 5 mg  5 mg Oral BID Orville Weston MD   5 mg at 10/13/20 0905   • azelastine (ASTELIN) nasal spray 1 spray  1 spray Each Nare BID Orville Weston MD   1 spray at 10/13/20 0905   • carvedilol (COREG) tablet 25 mg  25 mg Oral BID With Meals Orville Weston MD   25 mg at 10/13/20 1715   • cloNIDine (CATAPRES) tablet 0.1 mg  0.1 mg Oral Q4H PRN Orville Weston MD   0.1 mg at 10/13/20 0351   • enoxaparin (LOVENOX) syringe 90 mg  1 mg/kg Subcutaneous Q24H Orville Weston MD       • fluticasone (FLONASE) 50 MCG/ACT nasal spray 2 spray  2 spray Nasal Daily PRN Orville Weston MD       • hydrALAZINE (APRESOLINE) tablet 50 mg  50 mg Oral Q8H Orville Weston MD   50 mg at 10/13/20 1428   • nitroglycerin (NITROSTAT) ointment 1 inch  1 inch Topical Once HourSon denny MD       • nitroglycerin (NITROSTAT) SL  tablet 0.4 mg  0.4 mg Sublingual Q5 Min PRN Son Randall MD   0.4 mg at 10/12/20 2120   • pantoprazole (PROTONIX) EC tablet 40 mg  40 mg Oral Q AM Orville Weston MD   40 mg at 10/13/20 0905   • spironolactone (ALDACTONE) tablet 25 mg  25 mg Oral Daily Varun Schwarz MD       • tiZANidine (ZANAFLEX) tablet 4 mg  4 mg Oral Q8H PRN Orville Weston MD           Past Medical History:  Past Medical History:   Diagnosis Date   • Acute CHF (CMS/HCC)    • Acute renal failure (ARF) (CMS/HCC)    • Anemia    • Asthma     childhood   • Hypertension    • Ischemic colitis (CMS/HCC)    • Metabolic acidosis    • Nonrheumatic aortic (valve) insufficiency    • PTSD (post-traumatic stress disorder)        Past Surgical History:  Past Surgical History:   Procedure Laterality Date   • EXTERNAL FIXATION WRIST FRACTURE     • LEG SURGERY     • TUBAL ABDOMINAL LIGATION         Family History  Family History   Problem Relation Age of Onset   • Coronary artery disease Mother    • Coronary artery disease Father        Social History  Social History     Socioeconomic History   • Marital status: Single     Spouse name: Not on file   • Number of children: Not on file   • Years of education: Not on file   • Highest education level: Not on file   Tobacco Use   • Smoking status: Current Every Day Smoker     Packs/day: 0.50   • Smokeless tobacco: Never Used   Substance and Sexual Activity   • Alcohol use: No   • Drug use: No   • Sexual activity: Defer         Review of Systems:  History obtained from chart review and the patient  General ROS: No fever or chills  Respiratory ROS: + cough, shortness of breath  Cardiovascular ROS: No chest pain or palpitations  Gastrointestinal ROS: No abdominal pain or melena  Genito-Urinary ROS: No dysuria or hematuria  14 point ROS reviewed with the patient and negative except as noted above and in the HPI unless unable to obtain.    Objective:  Patient Vitals for the past 24 hrs:   BP  "Temp Temp src Pulse Resp SpO2 Height Weight   10/13/20 1538 147/93 97.3 °F (36.3 °C) Oral 71 16 93 % -- --   10/13/20 1338 -- -- -- -- -- -- 165.1 cm (65\") 86.5 kg (190 lb 11.2 oz)   10/13/20 1214 122/70 98 °F (36.7 °C) Oral 67 16 94 % -- --   10/13/20 0850 176/98 97.7 °F (36.5 °C) Oral 79 16 91 % -- --   10/13/20 0439 174/95 -- -- 88 -- -- -- --   10/13/20 0343 (!) 182/98 98 °F (36.7 °C) Oral 82 16 91 % -- --   10/13/20 0011 (!) 179/109 98.3 °F (36.8 °C) Oral 71 20 94 % 165.1 cm (65\") 86.5 kg (190 lb 12.8 oz)   10/12/20 2351 -- 98.1 °F (36.7 °C) Oral -- -- -- -- --   10/12/20 2345 (!) 170/104 -- -- 70 -- 93 % -- --   10/12/20 2330 (!) 178/118 -- -- 73 18 93 % -- --   10/12/20 2315 (!) 164/116 -- -- 67 18 92 % -- --   10/12/20 2300 (!) 203/125 -- -- 78 19 95 % -- --   10/12/20 2241 (!) 227/128 -- -- 97 19 94 % -- 81 kg (178 lb 9.2 oz)   10/12/20 2237 -- -- -- 95 22 93 % -- --   10/12/20 2130 (!) 186/124 -- -- 94 20 93 % -- --   10/12/20 2122 179/92 -- -- 87 24 95 % -- --   10/12/20 2114 -- -- -- 81 -- 96 % -- --   10/12/20 2045 (!) 215/117 -- -- 87 18 96 % -- --   10/12/20 2042 -- -- -- -- 20 -- -- --   10/12/20 2036 -- -- -- 89 20 95 % -- --   10/12/20 2030 (!) 214/155 -- -- 78 26 92 % -- --   10/12/20 1945 (!) 201/143 -- -- 86 (!) 34 94 % -- --       Intake/Output Summary (Last 24 hours) at 10/13/2020 1917  Last data filed at 10/13/2020 1749  Gross per 24 hour   Intake 1200 ml   Output 400 ml   Net 800 ml     General: awake/alert   Chest:  clear to auscultation bilaterally without respiratory distress  CVS: regular rate and rhythm  Abdominal: soft, nontender, positive bowel sounds  Extremities: no cyanosis or edema  Skin: warm and dry without rash      Labs:  Results from last 7 days   Lab Units 10/12/20  1905   WBC 10*3/mm3 8.14   HEMOGLOBIN g/dL 9.8*   HEMATOCRIT % 30.0*   PLATELETS 10*3/mm3 226         Results from last 7 days   Lab Units 10/13/20  0924 10/12/20  1905   SODIUM mmol/L 141 142   POTASSIUM mmol/L " 3.4* 3.0*   CHLORIDE mmol/L 103 104   CO2 mmol/L 24.0 26.0   BUN mg/dL 45* 35*   CREATININE mg/dL 2.54* 2.23*   CALCIUM mg/dL 8.7 8.5*   GLUCOSE mg/dL 233* 147*       Radiology:   Imaging Results (Last 72 Hours)     Procedure Component Value Units Date/Time    US Renal Bilateral [[id number]] Collected: 10/13/20 1500     Updated: 10/13/20 1504    Narrative:      RENAL ULTRASOUND COMPLETE 10/13/2020 1:00 PM CDT     REASON FOR EXAM: MATTY; I50.9-Heart failure, unspecified; N17.9-Acute  kidney failure, unspecified; I10-Essential (primary) hypertension       COMPARISON: 9/23/2019       TECHNIQUE: Multiple longitudinal and transverse realtime sonographic  images of the kidneys and urinary bladder are obtained.      FINDINGS:      RIGHT KIDNEY: 9.8 cm. Normal in size, shape, contour and position. No  solid or cystic masses. The central echo complex is compact with no  evidence for hydronephrosis. No nephrolithiasis or abnormal perinephric  fluid collections . No hydroureter.      LEFT KIDNEY: 8.4 cm. Normal in size, shape, contour and position. No  solid or cystic masses. The central echo complex is compact with no  evidence for hydronephrosis. No nephrolithiasis or abnormal perinephric  fluid collections . No hydroureter.      PELVIS: The bladder is mildly distended with anechoic urine and  demonstrates no significant wall thickening or internal echogenicities.  There is no surrounding ascites.     Incidental note is made of a left-sided pleural effusion.       Impression:      1. The kidneys are normal in sonographic appearance with no discrete  mass or hydronephrosis.  2. Left-sided pleural effusion..        This report was finalized on 10/13/2020 15:01 by Dr. Ye Montaño MD.    NM Lung Scan Perfusion Particulate [[id number]] Collected: 10/13/20 0700     Updated: 10/13/20 0705    Narrative:      EXAMINATION: Perfusion lung scan 10/12/2020     HISTORY: Shortness of breath     FINDINGS: Following intravenous injection  of 4.5 mCi of technetium 99m  MAA intravenously multiple planar images were obtained of the thorax.  The study demonstrates focal photopenia in the perihilar distribution of  the right lung anteriorly as well as within the right posterior basilar  segment. There is normal perfusion to the left lung. There is no  correlate on plain film radiography.       Impression:      1.. Subsegmental defects within the right middle and lower lobe. This is  an indeterminate result with pulmonary thromboembolic disease not  excluded. If clinically feasible follow-up with CT angiography of the  chest would be recommended. There is normal perfusion to the left lung.  This report was finalized on 10/13/2020 07:02 by Dr. Ye Montaño MD.    XR Chest 1 View [[id number]] Collected: 10/12/20 2026     Updated: 10/12/20 2035    Narrative:      Exam:   XR CHEST 1 VW-       Date:  10/12/2020      History:  Female, age  60 years;sob     COMPARISON:  Chest x-ray dated 9/21/2020     Findings :     Mild cardiomegaly. Central pulmonary venous congestion/developing  interstitial edema. Small left pleural effusion and trace right pleural  effusion. Calcified granuloma in the right upper lobe redemonstrated.  The bones show no acute pathology.         Impression:      Impression:     Findings are most concerning for fluid overload.     This report was finalized on 10/12/2020 20:31 by Dr. Janet Geller MD.          Culture:  No results found for: BLOODCX, URINECX, WOUNDCX, MRSACX, RESPCX, STOOLCX      Assessment   Acute renal failure  Hypertension  Intermediate probability VQ scan  Acute diastolic congestive heart failure    Plan:  Lungs are presently clear on examination  Continue current diuretic regimen  Monitor labs done and stressed importance of compliance with medications  Monitor blood pressure trends as much improved over the last 12 hours  We will need outpatient follow-up 1 week at discharge    Thank you for the consult, we  appreciate the opportunity to provide care to your patients.  Feel free to contact me if I can be of any further assistance.      Don Negrete MD  10/13/2020  19:17 CDT

## 2020-10-14 NOTE — PAYOR COMM NOTE
"10/13 CLINICAL UPDATE   [id number]  [id number]    Ludivina Clemons (60 y.o. Female)     Date of Birth Social Security Number Address Home Phone MRN    1960  [id number] [facility] [phone] [id number]    Sabianist Marital Status          Latter day Single       Admission Date Admission Type Admitting Provider Attending Provider Department, Room/Bed    10/12/20 Emergency Orville Weston MD Martin, Aribbe Allen, MD [facility] 4B, 411/1    Discharge Date Discharge Disposition Discharge Destination                       Attending Provider: Orville Weston MD    Allergies: Codeine, Imitrex [Sumatriptan]    Isolation: None   Infection: None   Code Status: Prior    Ht: 165.1 cm (65\")   Wt: 88.5 kg (195 lb)    Admission Cmt: None   Principal Problem: Shortness of breath [R06.02]                 Active Insurance as of 10/12/2020     Primary Coverage     Payor Plan Insurance Group Employer/Plan Group    ANTH MEDICARE REPLACEMENT ANTH MEDICARE ADVANTAGE [id number]     Payor Plan Address Payor Plan Phone Number Payor Plan Fax Number Effective Dates    [address] [phone]  1/1/2017 - None Entered    [facility] [id number]       Subscriber Name Subscriber Birth Date Member ID       LUDIVINA CLEMONS 1960 [id number]                 Emergency Contacts      (Rel.) Home Phone Work Phone Mobile Phone    YOGI AMAYA (Relative) -- -- [phone]    kenzie thompson (Sister) [phone] -- [phone]              Physician Progress Notes (last 48 hours) (Notes from 10/12/20 0729 through 10/14/20 0729)    No notes of this type exist for this encounter.          Consult Notes (last 48 hours) (Notes from 10/12/20 0729 through 10/14/20 0729)      Don Negrete MD at 10/13/20 2236      Consult Orders    1. Inpatient Nephrology Consult [[id number]] ordered by Orville Weston MD at 10/13/20 0746               Nephrology ([facility] " [facility]) Consult Note      Patient:  Ludivina Ramirez  YOB: 1960  Date of Service: 10/13/2020  MRN: [id number]   Acct: [id number]   Primary Care Physician: Orville Weston MD  Advance Directive:   There are no questions and answers to display.     Admit Date: 10/12/2020       Hospital Day: 1  Referring Provider: Orville Weston MD      Patient personally seen and examined.  Complete chart including Consults, Notes, Operative Reports, Labs, Cardiology, and Radiology studies reviewed as able.        Subjective:  Ludivina Ramirez is a 60 y.o. female  whom we were consulted for acute kidney injury.  Patient was never seen us in the office but we had evaluated her as an inpatient approximately 1 year ago.  At that time she had an episode of acute acute injury that resolved to a creatinine of about 1.5 at discharge.  More recently she had had a week of progressive shortness of air and orthopnea.  Her blood pressure been increased that she had not been taking her blood pressure medications and continue to use tobacco.  Upon evaluation was found to have pleural effusion and worsening heart function on echocardiogram.  She denied dysuria, hematuria.  She is a frequent NSAID user of Excedrin.    Allergies:  Codeine and Imitrex [sumatriptan]    Home Meds:  Medications Prior to Admission   Medication Sig Dispense Refill Last Dose   • amLODIPine (NORVASC) 5 MG tablet Take 1 tablet by mouth 2 (Two) Times a Day. 60 tablet 1 10/12/2020 at Unknown time   • azelastine (ASTELIN) 0.1 % nasal spray 1 spray into the nostril(s) as directed by provider 2 (Two) Times a Day. Use in each nostril as directed   10/12/2020 at Unknown time   • carvedilol (COREG) 25 MG tablet Take 1 tablet by mouth 2 (Two) Times a Day With Meals. 6 tablet 1 10/12/2020 at Unknown time   • CloNIDine (CATAPRES) 0.1 MG tablet Take 0.1 mg by mouth Daily As Needed for High Blood Pressure (BP >180/100).   10/12/2020 at Unknown time   • fluticasone  (FLONASE) 50 MCG/ACT nasal spray 2 sprays into the nostril(s) as directed by provider Daily As Needed for Rhinitis or Allergies.   10/12/2020 at Unknown time   • hydrALAZINE (APRESOLINE) 25 MG tablet Take 3 tablets by mouth Every 8 (Eight) Hours. 270 tablet 3 10/12/2020 at Unknown time   • lisinopril (PRINIVIL,ZESTRIL) 40 MG tablet Take 1 tablet by mouth Every Evening. 30 tablet 3 10/12/2020 at Unknown time   • pantoprazole (PROTONIX) 40 MG EC tablet Take 1 tablet by mouth Daily. 30 tablet 1 10/12/2020 at Unknown time   • cholecalciferol 1000 units tablet Take 1,000 Units by mouth Daily. 30 tablet 1 Unknown at Unknown time   • tiZANidine (ZANAFLEX) 4 MG tablet Take 1 tablet by mouth Every 8 (Eight) Hours As Needed for Muscle Spasms. 30 tablet 0 Unknown at Unknown time       Medicines:  Current Facility-Administered Medications   Medication Dose Route Frequency Provider Last Rate Last Dose   • acetaminophen (TYLENOL) tablet 650 mg  650 mg Oral Q6H PRN Orville Weston MD   650 mg at 10/13/20 1457   • amLODIPine (NORVASC) tablet 5 mg  5 mg Oral BID Orville Weston MD   5 mg at 10/13/20 0905   • azelastine (ASTELIN) nasal spray 1 spray  1 spray Each Nare BID Orville Weston MD   1 spray at 10/13/20 0905   • carvedilol (COREG) tablet 25 mg  25 mg Oral BID With Meals Orville Weston MD   25 mg at 10/13/20 1715   • cloNIDine (CATAPRES) tablet 0.1 mg  0.1 mg Oral Q4H PRN Orville Weston MD   0.1 mg at 10/13/20 0351   • enoxaparin (LOVENOX) syringe 90 mg  1 mg/kg Subcutaneous Q24H Orville Weston MD       • fluticasone (FLONASE) 50 MCG/ACT nasal spray 2 spray  2 spray Nasal Daily PRN Orville Weston MD       • hydrALAZINE (APRESOLINE) tablet 50 mg  50 mg Oral Q8H Orville Weston MD   50 mg at 10/13/20 1428   • nitroglycerin (NITROSTAT) ointment 1 inch  1 inch Topical Once HourSon denny MD       • nitroglycerin (NITROSTAT) SL tablet 0.4 mg  0.4 mg Sublingual Q5 Min PRN  Son Randall MD   0.4 mg at 10/12/20 2120   • pantoprazole (PROTONIX) EC tablet 40 mg  40 mg Oral Q AM Orville Weston MD   40 mg at 10/13/20 0905   • spironolactone (ALDACTONE) tablet 25 mg  25 mg Oral Daily Varun Schwarz MD       • tiZANidine (ZANAFLEX) tablet 4 mg  4 mg Oral Q8H PRN Orville Weston MD           Past Medical History:  Past Medical History:   Diagnosis Date   • Acute CHF (CMS/HCC)    • Acute renal failure (ARF) (CMS/HCC)    • Anemia    • Asthma     childhood   • Hypertension    • Ischemic colitis (CMS/HCC)    • Metabolic acidosis    • Nonrheumatic aortic (valve) insufficiency    • PTSD (post-traumatic stress disorder)        Past Surgical History:  Past Surgical History:   Procedure Laterality Date   • EXTERNAL FIXATION WRIST FRACTURE     • LEG SURGERY     • TUBAL ABDOMINAL LIGATION         Family History  Family History   Problem Relation Age of Onset   • Coronary artery disease Mother    • Coronary artery disease Father        Social History  Social History     Socioeconomic History   • Marital status: Single     Spouse name: Not on file   • Number of children: Not on file   • Years of education: Not on file   • Highest education level: Not on file   Tobacco Use   • Smoking status: Current Every Day Smoker     Packs/day: 0.50   • Smokeless tobacco: Never Used   Substance and Sexual Activity   • Alcohol use: No   • Drug use: No   • Sexual activity: Defer         Review of Systems:  History obtained from chart review and the patient  General ROS: No fever or chills  Respiratory ROS: + cough, shortness of breath  Cardiovascular ROS: No chest pain or palpitations  Gastrointestinal ROS: No abdominal pain or melena  Genito-Urinary ROS: No dysuria or hematuria  14 point ROS reviewed with the patient and negative except as noted above and in the HPI unless unable to obtain.    Objective:  Patient Vitals for the past 24 hrs:   BP Temp Temp src Pulse Resp SpO2 Height Weight   "  10/13/20 1538 147/93 97.3 °F (36.3 °C) Oral 71 16 93 % -- --   10/13/20 1338 -- -- -- -- -- -- 165.1 cm (65\") 86.5 kg (190 lb 11.2 oz)   10/13/20 1214 122/70 98 °F (36.7 °C) Oral 67 16 94 % -- --   10/13/20 0850 176/98 97.7 °F (36.5 °C) Oral 79 16 91 % -- --   10/13/20 0439 174/95 -- -- 88 -- -- -- --   10/13/20 0343 (!) 182/98 98 °F (36.7 °C) Oral 82 16 91 % -- --   10/13/20 0011 (!) 179/109 98.3 °F (36.8 °C) Oral 71 20 94 % 165.1 cm (65\") 86.5 kg (190 lb 12.8 oz)   10/12/20 2351 -- 98.1 °F (36.7 °C) Oral -- -- -- -- --   10/12/20 2345 (!) 170/104 -- -- 70 -- 93 % -- --   10/12/20 2330 (!) 178/118 -- -- 73 18 93 % -- --   10/12/20 2315 (!) 164/116 -- -- 67 18 92 % -- --   10/12/20 2300 (!) 203/125 -- -- 78 19 95 % -- --   10/12/20 2241 (!) 227/128 -- -- 97 19 94 % -- 81 kg (178 lb 9.2 oz)   10/12/20 2237 -- -- -- 95 22 93 % -- --   10/12/20 2130 (!) 186/124 -- -- 94 20 93 % -- --   10/12/20 2122 179/92 -- -- 87 24 95 % -- --   10/12/20 2114 -- -- -- 81 -- 96 % -- --   10/12/20 2045 (!) 215/117 -- -- 87 18 96 % -- --   10/12/20 2042 -- -- -- -- 20 -- -- --   10/12/20 2036 -- -- -- 89 20 95 % -- --   10/12/20 2030 (!) 214/155 -- -- 78 26 92 % -- --   10/12/20 1945 (!) 201/143 -- -- 86 (!) 34 94 % -- --       Intake/Output Summary (Last 24 hours) at 10/13/2020 1917  Last data filed at 10/13/2020 1749  Gross per 24 hour   Intake 1200 ml   Output 400 ml   Net 800 ml     General: awake/alert   Chest:  clear to auscultation bilaterally without respiratory distress  CVS: regular rate and rhythm  Abdominal: soft, nontender, positive bowel sounds  Extremities: no cyanosis or edema  Skin: warm and dry without rash      Labs:  Results from last 7 days   Lab Units 10/12/20  1905   WBC 10*3/mm3 8.14   HEMOGLOBIN g/dL 9.8*   HEMATOCRIT % 30.0*   PLATELETS 10*3/mm3 226         Results from last 7 days   Lab Units 10/13/20  0924 10/12/20  1905   SODIUM mmol/L 141 142   POTASSIUM mmol/L 3.4* 3.0*   CHLORIDE mmol/L 103 104   CO2 " mmol/L 24.0 26.0   BUN mg/dL 45* 35*   CREATININE mg/dL 2.54* 2.23*   CALCIUM mg/dL 8.7 8.5*   GLUCOSE mg/dL 233* 147*       Radiology:   Imaging Results (Last 72 Hours)     Procedure Component Value Units Date/Time    US Renal Bilateral [[id number]] Collected: 10/13/20 1500     Updated: 10/13/20 1504    Narrative:      RENAL ULTRASOUND COMPLETE 10/13/2020 1:00 PM CDT     REASON FOR EXAM: MATTY; I50.9-Heart failure, unspecified; N17.9-Acute  kidney failure, unspecified; I10-Essential (primary) hypertension       COMPARISON: 9/23/2019       TECHNIQUE: Multiple longitudinal and transverse realtime sonographic  images of the kidneys and urinary bladder are obtained.      FINDINGS:      RIGHT KIDNEY: 9.8 cm. Normal in size, shape, contour and position. No  solid or cystic masses. The central echo complex is compact with no  evidence for hydronephrosis. No nephrolithiasis or abnormal perinephric  fluid collections . No hydroureter.      LEFT KIDNEY: 8.4 cm. Normal in size, shape, contour and position. No  solid or cystic masses. The central echo complex is compact with no  evidence for hydronephrosis. No nephrolithiasis or abnormal perinephric  fluid collections . No hydroureter.      PELVIS: The bladder is mildly distended with anechoic urine and  demonstrates no significant wall thickening or internal echogenicities.  There is no surrounding ascites.     Incidental note is made of a left-sided pleural effusion.       Impression:      1. The kidneys are normal in sonographic appearance with no discrete  mass or hydronephrosis.  2. Left-sided pleural effusion..        This report was finalized on 10/13/2020 15:01 by Dr. Ye Montaño MD.    NM Lung Scan Perfusion Particulate [[id number]] Collected: 10/13/20 0700     Updated: 10/13/20 0705    Narrative:      EXAMINATION: Perfusion lung scan 10/12/2020     HISTORY: Shortness of breath     FINDINGS: Following intravenous injection of 4.5 mCi of technetium 99m  MAA  intravenously multiple planar images were obtained of the thorax.  The study demonstrates focal photopenia in the perihilar distribution of  the right lung anteriorly as well as within the right posterior basilar  segment. There is normal perfusion to the left lung. There is no  correlate on plain film radiography.       Impression:      1.. Subsegmental defects within the right middle and lower lobe. This is  an indeterminate result with pulmonary thromboembolic disease not  excluded. If clinically feasible follow-up with CT angiography of the  chest would be recommended. There is normal perfusion to the left lung.  This report was finalized on 10/13/2020 07:02 by Dr. Ye Montaño MD.    XR Chest 1 View [[id number]] Collected: 10/12/20 2026     Updated: 10/12/20 2035    Narrative:      Exam:   XR CHEST 1 VW-       Date:  10/12/2020      History:  Female, age  60 years;sob     COMPARISON:  Chest x-ray dated 9/21/2020     Findings :     Mild cardiomegaly. Central pulmonary venous congestion/developing  interstitial edema. Small left pleural effusion and trace right pleural  effusion. Calcified granuloma in the right upper lobe redemonstrated.  The bones show no acute pathology.         Impression:      Impression:     Findings are most concerning for fluid overload.     This report was finalized on 10/12/2020 20:31 by Dr. Janet Geller MD.          Culture:  No results found for: BLOODCX, URINECX, WOUNDCX, MRSACX, RESPCX, STOOLCX      Assessment   Acute renal failure  Hypertension  Intermediate probability VQ scan  Acute diastolic congestive heart failure    Plan:  Lungs are presently clear on examination  Continue current diuretic regimen  Monitor labs done and stressed importance of compliance with medications  Monitor blood pressure trends as much improved over the last 12 hours  We will need outpatient follow-up 1 week at discharge    Thank you for the consult, we appreciate the opportunity to provide care  to your patients.  Feel free to contact me if I can be of any further assistance.      Don Negrete MD  10/13/2020  19:17 CDT      Electronically signed by Don Negrete MD at 10/13/20 2858     Preston Wiley APRN at 10/13/20 0819      Consult Orders    1. Inpatient Cardiology Consult [[id number]] ordered by Orville Weston MD at 10/13/20 0746          Attestation signed by Varun Schwarz MD at 10/13/20 1841 (Updated)    I have seen and examined the patient. I have discussed the findings with the patient and the mid-level providers.    Subjective:  Patient relates progressive weakness and soa. Is feeling much better since receiving IVP diuretic    Objective:    LUNGS: clear  CV: RRR. 3/6 ann at [facility]  EXT: no c,c,e    ECHO - normal lv and rv systolic functioin. Marked lv thickening with lvdd      A/P:   POORLY CONTROLLED HTN WITH HTN HEART DISEASE  HFpEF - improved volume status  IMPROVING BP - continue to adjust meds - target <130/80. Add aldactone  POSS PTE's by VQ - agree with lovenox    THANKS - WILL FOLLOW    Varun Schwarz MD                      [facility] CONSULT NOTE    Referring Provider: Orville Weston MD    Reason for Consultation: volume overload, chest pain     Chief complaint:   Chief Complaint   Patient presents with   • Shortness of Breath       Subjective .     History of present illness:  Ludivina Ramirez is a 60 y.o. female with history of poorly controlled hypertension, diastolic congestive heart failure, chronic kidney disease, ischemic colitis, aortic valve regurgitation, chronic anemia and PTSD. Patient presents to the ER yesterday for shortness of breath and chest pain. Patient presented after a 1 week history of worsening shortness of breath. It is difficult to follow patient's timeline but she reports she needs a new mattress and had rearranged furniture in such a way that she could not get into her bedroom. She  "reports her blood pressure medications were in her bedroom so she could not reach them. Shortly after stopping her medications she developed shortness of breath. Shortness of breath got worse to the point she felt she needed to come to the hospital a few days ago. However she did not do this because she was not able to get to her shower to clean up and she felt she needed to get cleaned up before she came to the hospital. Eventually yesterday she felt bed enough she came to the hospital. She was found to be volume overloaded, hypertensive, elevated troponin and elevated BNP. EKG showed evidence of LVH- similar to previous EKG with no acute changes. She was treated with clonidine and IV Lasix- with much improvement. She remains short of breath- but states she is no longer \"guppy breathing.\" Her chief complaint at this time was a headache- she is requesting pain medications. She was asleep and difficult to awake upon my entry into the room. In review patient was seen by Dr. De La Fuente and Dr. Ramirez in consultation roughly 1 year ago on to separate admissions for chest pain, elevated troponin and hypertension urgency. At that time patient was also positive for methamphetamine and cocaine. Patient had an outpatient stress test which was low risk. She had an echo at that time which showed normal LV function, mild LVH, mild-moderate AI, grade II diastolic dysfunction with pseudonormalization, LA moderately dilated, normal size and function of RV. It seems the circumstances were similar during last evaluation with missing blood pressure medications.     History  Past Medical History:   Diagnosis Date   • Acute CHF (CMS/HCC)    • Acute renal failure (ARF) (CMS/HCC)    • Anemia    • Asthma     childhood   • Hypertension    • Ischemic colitis (CMS/HCC)    • Metabolic acidosis    • Nonrheumatic aortic (valve) insufficiency    • PTSD (post-traumatic stress disorder)    ,   Past Surgical History:   Procedure Laterality Date   • " EXTERNAL FIXATION WRIST FRACTURE     • LEG SURGERY     • TUBAL ABDOMINAL LIGATION     ,   Family History   Problem Relation Age of Onset   • Coronary artery disease Mother    • Coronary artery disease Father    ,   Social History     Tobacco Use   • Smoking status: Current Every Day Smoker     Packs/day: 0.50   • Smokeless tobacco: Never Used   Substance Use Topics   • Alcohol use: No   • Drug use: No   ,     Medications    Prior to Admission medications    Medication Sig Start Date End Date Taking? Authorizing Provider   amLODIPine (NORVASC) 5 MG tablet Take 1 tablet by mouth 2 (Two) Times a Day. 9/26/19  Yes Orville Weston MD   azelastine (ASTELIN) 0.1 % nasal spray 1 spray into the nostril(s) as directed by provider 2 (Two) Times a Day. Use in each nostril as directed   Yes Edwin Wise MD   carvedilol (COREG) 25 MG tablet Take 1 tablet by mouth 2 (Two) Times a Day With Meals. 9/26/19  Yes Orville Weston MD   CloNIDine (CATAPRES) 0.1 MG tablet Take 0.1 mg by mouth Daily As Needed for High Blood Pressure (BP >180/100).   Yes Edwin Wise MD   fluticasone (FLONASE) 50 MCG/ACT nasal spray 2 sprays into the nostril(s) as directed by provider Daily As Needed for Rhinitis or Allergies.   Yes Edwin Wise MD   hydrALAZINE (APRESOLINE) 25 MG tablet Take 3 tablets by mouth Every 8 (Eight) Hours. 9/9/19  Yes Orville Weston MD   lisinopril (PRINIVIL,ZESTRIL) 40 MG tablet Take 1 tablet by mouth Every Evening. 9/9/19  Yes Orville Weston MD   pantoprazole (PROTONIX) 40 MG EC tablet Take 1 tablet by mouth Daily. 9/26/19  Yes Orville Weston MD   cholecalciferol 1000 units tablet Take 1,000 Units by mouth Daily. 9/27/19   Orville Weston MD   tiZANidine (ZANAFLEX) 4 MG tablet Take 1 tablet by mouth Every 8 (Eight) Hours As Needed for Muscle Spasms. 9/26/19   Orville Weston MD       Current Facility-Administered Medications   Medication Dose Route Frequency  Provider Last Rate Last Dose   • acetaminophen (TYLENOL) tablet 650 mg  650 mg Oral Q6H PRN Orville Weston MD       • amLODIPine (NORVASC) tablet 5 mg  5 mg Oral BID Orville Weston MD       • azelastine (ASTELIN) nasal spray 1 spray  1 spray Each Nare BID Orville Weston MD       • carvedilol (COREG) tablet 25 mg  25 mg Oral BID With Meals Orville Weston MD       • cloNIDine (CATAPRES) tablet 0.1 mg  0.1 mg Oral Q4H PRN Orville Weston MD   0.1 mg at 10/13/20 0351   • enoxaparin (LOVENOX) syringe 90 mg  1 mg/kg Subcutaneous Q24H Orville Weston MD       • fluticasone (FLONASE) 50 MCG/ACT nasal spray 2 spray  2 spray Nasal Daily PRN Orville Weston MD       • hydrALAZINE (APRESOLINE) tablet 50 mg  50 mg Oral Q8H Orville Weston MD       • nitroglycerin (NITROSTAT) ointment 1 inch  1 inch Topical Once Son Randall MD       • nitroglycerin (NITROSTAT) SL tablet 0.4 mg  0.4 mg Sublingual Q5 Min PRN Son Randall MD   0.4 mg at 10/12/20 2120   • pantoprazole (PROTONIX) EC tablet 40 mg  40 mg Oral Q AM Orville Weston MD       • tiZANidine (ZANAFLEX) tablet 4 mg  4 mg Oral Q8H PRN Orville Weston MD           Allergies:  Codeine and Imitrex [sumatriptan]    Review of Systems  Review of Systems   Constitution: Positive for malaise/fatigue. Negative for chills, decreased appetite, fever, weight gain and weight loss.   HENT: Negative for nosebleeds.    Eyes: Negative for visual disturbance.   Cardiovascular: Positive for chest pain, dyspnea on exertion and orthopnea. Negative for leg swelling, near-syncope, palpitations, paroxysmal nocturnal dyspnea and syncope.   Respiratory: Positive for shortness of breath. Negative for cough, hemoptysis and snoring.    Endocrine: Negative for cold intolerance and heat intolerance.   Hematologic/Lymphatic: Negative for bleeding problem. Does not bruise/bleed easily.   Skin: Negative for rash.  "  Musculoskeletal: Negative for back pain and falls.   Gastrointestinal: Negative for abdominal pain, constipation, diarrhea, heartburn, melena, nausea and vomiting.   Genitourinary: Negative for hematuria.   Neurological: Negative for dizziness, headaches and light-headedness.   Psychiatric/Behavioral: Negative for altered mental status.   Allergic/Immunologic: Negative for persistent infections.       Objective     Physical Exam:  Patient Vitals for the past 24 hrs:   BP Temp Temp src Pulse Resp SpO2 Height Weight   10/13/20 0850 176/98 97.7 °F (36.5 °C) Oral 79 16 91 % -- --   10/13/20 0439 174/95 -- -- 88 -- -- -- --   10/13/20 0343 (!) 182/98 98 °F (36.7 °C) Oral 82 16 91 % -- --   10/13/20 0011 (!) 179/109 98.3 °F (36.8 °C) Oral 71 20 94 % 165.1 cm (65\") 86.5 kg (190 lb 12.8 oz)   10/12/20 2351 -- 98.1 °F (36.7 °C) Oral -- -- -- -- --   10/12/20 2345 (!) 170/104 -- -- 70 -- 93 % -- --   10/12/20 2330 (!) 178/118 -- -- 73 18 93 % -- --   10/12/20 2315 (!) 164/116 -- -- 67 18 92 % -- --   10/12/20 2300 (!) 203/125 -- -- 78 19 95 % -- --   10/12/20 2241 (!) 227/128 -- -- 97 19 94 % -- 81 kg (178 lb 9.2 oz)   10/12/20 2237 -- -- -- 95 22 93 % -- --   10/12/20 2130 (!) 186/124 -- -- 94 20 93 % -- --   10/12/20 2122 179/92 -- -- 87 24 95 % -- --   10/12/20 2114 -- -- -- 81 -- 96 % -- --   10/12/20 2045 (!) 215/117 -- -- 87 18 96 % -- --   10/12/20 2042 -- -- -- -- 20 -- -- --   10/12/20 2036 -- -- -- 89 20 95 % -- --   10/12/20 2030 (!) 214/155 -- -- 78 26 92 % -- --   10/12/20 1945 (!) 201/143 -- -- 86 (!) 34 94 % -- --   10/12/20 1844 (!) 170/128 -- -- 88 -- -- 167.6 cm (66\") --   10/12/20 1840 (!) 167/122 97.7 °F (36.5 °C) Oral 83 (!) 36 94 % -- --     Constitutional:       Appearance: Healthy appearance. Not in distress.   Eyes:      Pupils: Pupils are equal, round, and reactive to light.   HENT:      Head: Normocephalic and atraumatic.      Nose: Nose normal.    Mouth/Throat:      Dentition: Normal.   Neck:     "  Musculoskeletal: Normal range of motion.   Pulmonary:      Effort: Pulmonary effort is normal.      Breath sounds: Examination of the right-lower field reveals rhonchi. Examination of the left-lower field reveals rhonchi. Rhonchi present.   Chest:      Chest wall: Not tender to palpatation.   Cardiovascular:      PMI at left midclavicular line. Normal rate. Regular rhythm.      Murmurs: There is a diastolic murmur.   Pulses:     Intact distal pulses.   Edema:     Peripheral edema absent.   Abdominal:      General: Bowel sounds are normal.      Palpations: Abdomen is soft.   Musculoskeletal: Normal range of motion.   Skin:     General: Skin is warm and dry.   Neurological:      Mental Status: Alert, oriented to person, place, and time and oriented to person, place and time.   Psychiatric:         Attention and Perception: Attention normal.         Mood and Affect: Mood normal.         Speech: Speech is rapid and pressured.         Results Review:   I reviewed the patient's new clinical results.    Lab Results (last 72 hours)     Procedure Component Value Units Date/Time    COVID PRE-OP / PRE-PROCEDURE SCREENING ORDER (NO ISOLATION) - Swab, Nasal Cavity [[id number]]  (Normal) Collected: 10/12/20 2028    Specimen: Swab from Nasal Cavity Updated: 10/12/20 2104    Narrative:      The following orders were created for panel order COVID PRE-OP / PRE-PROCEDURE SCREENING ORDER (NO ISOLATION) - Swab, Nasal Cavity.  Procedure                               Abnormality         Status                     ---------                               -----------         ------                     COVID-19 [facility] IN-HOUS...[[id number]]  Normal              Final result                 Please view results for these tests on the individual orders.    COVID-19 [facility] IN-HOUSE,NP Swab (NO TRANSPORT MEDIA) 2 HR TAT - Swab, Nasal Cavity [[id number]]  (Normal) Collected: 10/12/20 2028    Specimen: Swab from Nasal Cavity Updated: 10/12/20 2104      COVID19 Not Detected    Narrative:      Fact sheet for providers: https://www.fda.gov/media/101230/download     Fact sheet for patients: https://www.fda.gov/media/723189/download    Blood Gas, Venous [[id number]]  (Abnormal) Collected: 10/12/20 2040    Specimen: Venous Blood Updated: 10/12/20 2048     Site OTHER     pH, Venous 7.471 pH Units      pCO2, Venous 36.3 mm Hg      Comment: 84 Value below reference range        pO2, Venous 70.0 mm Hg      Comment: 83 Value above reference range        HCO3, Venous 26.4 mmol/L      Base Excess, Venous 2.7 mmol/L      Comment: 83 Value above reference range        O2 Saturation, Venous 96.0 %      Comment: 83 Value above reference range        Temperature 37.0 C      Barometric Pressure for Blood Gas 750 mmHg      Modality Room Air     Ventilator Mode NA     Collected by DAVE PRESTON     Comment: Meter: [id number]     :  [id number]       BNP [[id number]]  (Abnormal) Collected: 10/12/20 1905    Specimen: Blood Updated: 10/12/20 2029     proBNP 49,304.0 pg/mL     Narrative:      Among patients with dyspnea, NT-proBNP is highly sensitive for the detection of acute congestive heart failure. In addition NT-proBNP of <300 pg/ml effectively rules out acute congestive heart failure with 99% negative predictive value.    Results may be falsely decreased if patient taking Biotin.      Troponin [[id number]]  (Abnormal) Collected: 10/12/20 1905    Specimen: Blood Updated: 10/12/20 2024     Troponin T 0.054 ng/mL     Narrative:      Troponin T Reference Range:  <= 0.03 ng/mL-   Negative for AMI  >0.03 ng/mL-     Abnormal for myocardial necrosis.  Clinicians would have to utilize clinical acumen, EKG, Troponin and serial changes to determine if it is an Acute Myocardial Infarction or myocardial injury due to an underlying chronic condition.       Results may be falsely decreased if patient taking Biotin.      [place] Draw [[id number]] Collected: 10/12/20 1905    Specimen:  Blood Updated: 10/12/20 2015    Narrative:      The following orders were created for panel order [facility] Draw.  Procedure                               Abnormality         Status                     ---------                               -----------         ------                     Light Blue Top[[id number]]                                   Final result               Green Top (Gel)[[id number]]                                  Final result               Lavender Top[[id number]]                                     Final result               Red Top[[id number]]                                          Final result               [facility] Blood Culture Rene...[[id number]]                      Final result               Gray Top - Ice[[id number]]                                   Final result                 Please view results for these tests on the individual orders.    Light Blue Top [[id number]] Collected: 10/12/20 1905    Specimen: Blood Updated: 10/12/20 2015     Extra Tube hold for add-on     Comment: Auto resulted       Green Top (Gel) [[id number]] Collected: 10/12/20 1905    Specimen: Blood Updated: 10/12/20 2015     Extra Tube Hold for add-ons.     Comment: Auto resulted.       Lavender Top [[id number]] Collected: 10/12/20 1905    Specimen: Blood Updated: 10/12/20 2015     Extra Tube hold for add-on     Comment: Auto resulted       Red Top [[id number]] Collected: 10/12/20 1905    Specimen: Blood Updated: 10/12/20 2015     Extra Tube Hold for add-ons.     Comment: Auto resulted.       [facility] Blood Culture Bottle Set [[id number]] Collected: 10/12/20 1905    Specimen: Blood from Arm, Right Updated: 10/12/20 2015     Extra Tube Hold for add-ons.     Comment: Auto resulted.       Gray Top - Ice [[id number]] Collected: 10/12/20 1905    Specimen: Blood Updated: 10/12/20 2015     Extra Tube Hold for add-ons.     Comment: Auto resulted.       CK [[id number]]  (Normal) Collected: 10/12/20 1905    Specimen: Blood Updated: 10/12/20  2014     Creatine Kinase 53 U/L     Basic Metabolic Panel [[id number]]  (Abnormal) Collected: 10/12/20 1905    Specimen: Blood Updated: 10/12/20 2012     Glucose 147 mg/dL      BUN 35 mg/dL      Creatinine 2.23 mg/dL      Sodium 142 mmol/L      Potassium 3.0 mmol/L      Chloride 104 mmol/L      CO2 26.0 mmol/L      Calcium 8.5 mg/dL      eGFR Non African Amer 22 mL/min/1.73      BUN/Creatinine Ratio 15.7     Anion Gap 12.0 mmol/L     Narrative:      GFR Normal >60  Chronic Kidney Disease <60  Kidney Failure <15      Protime-INR [[id number]]  (Normal) Collected: 10/12/20 1905    Specimen: Blood Updated: 10/12/20 2011     Protime 13.4 Seconds      INR 1.06    aPTT [[id number]]  (Normal) Collected: 10/12/20 1905    Specimen: Blood Updated: 10/12/20 2011     PTT 31.3 seconds     D-dimer, Quantitative [[id number]]  (Abnormal) Collected: 10/12/20 1905    Specimen: Blood Updated: 10/12/20 2011     D-Dimer, Quantitative 1.19 mg/L (FEU)     Narrative:      Reference Range is 0-0.50 mg/L FEU. However, results <0.50 mg/L FEU tends to rule out DVT or PE. Results >0.50 mg/L FEU are not useful in predicting absence or presence of DVT or PE.      CBC & Differential [[id number]]  (Abnormal) Collected: 10/12/20 1905    Specimen: Blood Updated: 10/12/20 2007    Narrative:      The following orders were created for panel order CBC & Differential.  Procedure                               Abnormality         Status                     ---------                               -----------         ------                     CBC Auto Differential[[id number]]        Abnormal            Final result                 Please view results for these tests on the individual orders.    CBC Auto Differential [[id number]]  (Abnormal) Collected: 10/12/20 1905    Specimen: Blood Updated: 10/12/20 2007     WBC 8.14 10*3/mm3      RBC 3.39 10*6/mm3      Hemoglobin 9.8 g/dL      Hematocrit 30.0 %      MCV 88.5 fL      MCH 28.9 pg      MCHC 32.7 g/dL      RDW 15.0 %       RDW-SD 47.9 fl      MPV 10.9 fL      Platelets 226 10*3/mm3      Neutrophil % 78.1 %      Lymphocyte % 12.5 %      Monocyte % 5.9 %      Eosinophil % 2.1 %      Basophil % 0.9 %      Immature Grans % 0.5 %      Neutrophils, Absolute 6.36 10*3/mm3      Lymphocytes, Absolute 1.02 10*3/mm3      Monocytes, Absolute 0.48 10*3/mm3      Eosinophils, Absolute 0.17 10*3/mm3      Basophils, Absolute 0.07 10*3/mm3      Immature Grans, Absolute 0.04 10*3/mm3      nRBC 0.0 /100 WBC           No results found for: ECHOEFEST    Imaging Results (Last 72 Hours)     Procedure Component Value Units Date/Time    NM Lung Scan Perfusion Particulate [[id number]] Collected: 10/13/20 0700     Updated: 10/13/20 0705    Narrative:      EXAMINATION: Perfusion lung scan 10/12/2020     HISTORY: Shortness of breath     FINDINGS: Following intravenous injection of 4.5 mCi of technetium 99m  MAA intravenously multiple planar images were obtained of the thorax.  The study demonstrates focal photopenia in the perihilar distribution of  the right lung anteriorly as well as within the right posterior basilar  segment. There is normal perfusion to the left lung. There is no  correlate on plain film radiography.       Impression:      1.. Subsegmental defects within the right middle and lower lobe. This is  an indeterminate result with pulmonary thromboembolic disease not  excluded. If clinically feasible follow-up with CT angiography of the  chest would be recommended. There is normal perfusion to the left lung.  This report was finalized on 10/13/2020 07:02 by Dr. Ye Montaño MD.    XR Chest 1 View [[id number]] Collected: 10/12/20 2026     Updated: 10/12/20 2035    Narrative:      Exam:   XR CHEST 1 VW-       Date:  10/12/2020      History:  Female, age  60 years;sob     COMPARISON:  Chest x-ray dated 9/21/2020     Findings :     Mild cardiomegaly. Central pulmonary venous congestion/developing  interstitial edema. Small left pleural effusion  and trace right pleural  effusion. Calcified granuloma in the right upper lobe redemonstrated.  The bones show no acute pathology.         Impression:      Impression:     Findings are most concerning for fluid overload.     This report was finalized on 10/12/2020 20:31 by Dr. Janet Geller MD.        Assessment/Plan       Shortness of breath    Hypertensive urgency    Elevated troponin    Iron deficiency anemia    Nonrheumatic aortic valve insufficiency    Congestive heart failure (CMS/HCC)    Noncompliance    1. Shortness of Breath- likely from volume overload secondary to hypertensive urgency due to missed meds. However VQ scan is inconclusive - defer to Dr. Weston for further work up. Will order an additional dose of IV Lasix. Monitor I&O.   2. Hypertensive Urgency- home medications have been resumed by Dr. Weston- but none have been given at time of my evaluation- will monitor for improvement once medications are resumed. Likely cause of elevated troponin (though we only have one with no trend) and cause of pulmonary edema in setting of diastolic dysfunction and AI). Echo pending.   3. Elevated Troponin- likely related to number 2. Only one drawn thus far. Will trend. If flat trend could consider stress- also could monitor for return of symptoms with blood pressure and volume status improved.   4. MORELIA- chronic anemia  5. AI- moderate on last echo. Echo pending  6. Diastolic Congestive Heart Failure- BNP significantly elevated. Will repeat dose of Lasix. Echo pending. Discussed importance of compliance with medications- though I do suspect she does not grasp the correlation or importance of missing her medications that leads to these episodes.   7. Noncompliance- history of noncompliance and multi substance abuse (UDS pending)    Further orders per Dr. Schwarz     Thank you for asking us to follow this patient with you.       Electronically signed by ISAIAH Sanchez, 10/13/20, 8:47 AM CDT.      Electronically signed by Varun Schwarz MD at 10/13/20 1921

## 2020-10-14 NOTE — PROGRESS NOTES
"Progress Note  Ludivina Ramirez  10/14/2020 13:59 CDT  Subjective:   Admit Date:   10/12/2020      CC/ADMIT DX:       Interval History:   Reviewed overnight events and nursing notes.  No new complaints . Her SOA is improved. No CP.       I have reviewed all labs/diagnostics from the last 24hrs.       ROS:   I have done a 10 point ROS and all are negative, except what is mentioned in the HPI.    Diet Regular; Cardiac    Medications:      amLODIPine, 5 mg, Oral, BID  azelastine, 1 spray, Each Nare, BID  carvedilol, 25 mg, Oral, BID With Meals  enoxaparin, 1 mg/kg, Subcutaneous, Q24H  hydrALAZINE, 50 mg, Oral, Q8H  nitroglycerin, 1 inch, Topical, Once  pantoprazole, 40 mg, Oral, Q AM  spironolactone, 25 mg, Oral, Daily            Objective:   Vitals: /95 (BP Location: Left arm, Patient Position: Lying) Comment: reported to nurse  Pulse 64   Temp 98.3 °F (36.8 °C) (Oral)   Resp 18   Ht 165.1 cm (65\")   Wt 88.5 kg (195 lb)   SpO2 94%   BMI 32.45 kg/m²      Intake/Output Summary (Last 24 hours) at 10/14/2020 1359  Last data filed at 10/14/2020 1300  Gross per 24 hour   Intake 1800 ml   Output 975 ml   Net 825 ml     General appearance: alert and cooperative with exam  Lungs: clear to auscultation bilaterally  Heart: RRR  Abdomen: soft, non-tender; bowel sounds normal; no masses,  no organomegaly  Extremities: extremities normal, atraumatic, no cyanosis or edema  Neurologic:  No obvious focal neurologic deficits.    Assessment and Plan:     Shortness of breath    Hypertensive urgency    Elevated troponin    Iron deficiency anemia    Nonrheumatic aortic valve insufficiency    Congestive heart failure (CMS/HCC)    Noncompliance   MATTY    Plan:  1.  Continue present medication(s)   2.  Follow with Cardiology and Nephrology  3.  Follow labs closely  4.  Follow for recommendations on increase in renal function from Nephrology  5.  Follow BP      Discharge planning:   her home     Reviewed treatment plans with the " patient and/or family.             Electronically signed by Orville Weston MD on 10/14/2020 at 13:59 CDT

## 2020-10-14 NOTE — PLAN OF CARE
Goal Outcome Evaluation:  Plan of Care Reviewed With: patient  Progress: no change   Pt A&O. SOA more than yesterday and Cardiology, Nephrology, and Dr. Weston aware. Dr. Ramirez called in regards to HR dropping to 26 last night before giving coreg 25mg this pm and was ok w/ it bc pt was nonsymptomatic. Voided more today than last night. VSS. Safety maintained.

## 2020-10-15 LAB
ANION GAP SERPL CALCULATED.3IONS-SCNC: 11 MMOL/L (ref 5–15)
BUN SERPL-MCNC: 61 MG/DL (ref 8–23)
BUN/CREAT SERPL: 20.6 (ref 7–25)
CALCIUM SPEC-SCNC: 8.2 MG/DL (ref 8.6–10.5)
CHLORIDE SERPL-SCNC: 105 MMOL/L (ref 98–107)
CO2 SERPL-SCNC: 25 MMOL/L (ref 22–29)
CREAT SERPL-MCNC: 2.96 MG/DL (ref 0.57–1)
GFR SERPL CREATININE-BSD FRML MDRD: 16 ML/MIN/1.73
GLUCOSE SERPL-MCNC: 119 MG/DL (ref 65–99)
POTASSIUM SERPL-SCNC: 3.8 MMOL/L (ref 3.5–5.2)
SODIUM SERPL-SCNC: 141 MMOL/L (ref 136–145)
WHOLE BLOOD HOLD SPECIMEN: NORMAL

## 2020-10-15 PROCEDURE — 80048 BASIC METABOLIC PNL TOTAL CA: CPT | Performed by: FAMILY MEDICINE

## 2020-10-15 PROCEDURE — 99232 SBSQ HOSP IP/OBS MODERATE 35: CPT | Performed by: NURSE PRACTITIONER

## 2020-10-15 PROCEDURE — 25010000002 ENOXAPARIN PER 10 MG: Performed by: FAMILY MEDICINE

## 2020-10-15 RX ADMIN — ENOXAPARIN SODIUM 90 MG: 100 INJECTION SUBCUTANEOUS at 20:38

## 2020-10-15 RX ADMIN — CARVEDILOL 25 MG: 25 TABLET, FILM COATED ORAL at 08:37

## 2020-10-15 RX ADMIN — CLONIDINE HYDROCHLORIDE 0.1 MG: 0.1 TABLET ORAL at 08:45

## 2020-10-15 RX ADMIN — HYDRALAZINE HYDROCHLORIDE 75 MG: 50 TABLET ORAL at 15:07

## 2020-10-15 RX ADMIN — SPIRONOLACTONE 25 MG: 25 TABLET ORAL at 08:36

## 2020-10-15 RX ADMIN — HYDRALAZINE HYDROCHLORIDE 50 MG: 50 TABLET ORAL at 06:21

## 2020-10-15 RX ADMIN — PANTOPRAZOLE SODIUM 40 MG: 40 TABLET, DELAYED RELEASE ORAL at 06:21

## 2020-10-15 RX ADMIN — ACETAMINOPHEN 650 MG: 325 TABLET, FILM COATED ORAL at 00:15

## 2020-10-15 RX ADMIN — AMLODIPINE BESYLATE 5 MG: 5 TABLET ORAL at 20:37

## 2020-10-15 RX ADMIN — HYDRALAZINE HYDROCHLORIDE 75 MG: 50 TABLET ORAL at 20:38

## 2020-10-15 RX ADMIN — CARVEDILOL 25 MG: 25 TABLET, FILM COATED ORAL at 17:25

## 2020-10-15 RX ADMIN — ACETAMINOPHEN 650 MG: 325 TABLET, FILM COATED ORAL at 12:13

## 2020-10-15 RX ADMIN — AMLODIPINE BESYLATE 5 MG: 5 TABLET ORAL at 08:37

## 2020-10-15 RX ADMIN — AZELASTINE HYDROCHLORIDE 1 SPRAY: 137 SPRAY, METERED NASAL at 08:37

## 2020-10-15 RX ADMIN — AZELASTINE HYDROCHLORIDE 1 SPRAY: 137 SPRAY, METERED NASAL at 21:50

## 2020-10-15 NOTE — PROGRESS NOTES
[facility] -  Progress Note     LOS: 3 days   Patient Care Team:  Orville Weston MD as PCP - General (Family Medicine)  Juanpablo Rea MD as Consulting Physician (Gastroenterology)  Nickolas Alegria MD as Consulting Physician (Gastroenterology)    Chief Complaint: Shortness of breath    Subjective     Interval History: Blood pressure has improved since her admission however the patient's chief complaint of shortness of breath continues to bother her.  She denies any PND or orthopnea.  She denies any lower extremity swelling.  She denies any abdominal bloating.      Review of Systems:     Review of Systems   Constitutional: Positive for fatigue.   Respiratory: Positive for cough and shortness of breath. Negative for wheezing.    Cardiovascular: Negative for chest pain, palpitations and leg swelling.   Genitourinary: Negative for difficulty urinating.   Neurological: Positive for weakness.     Objective     Vital Sign Min/Max for last 24 hours  Temp  Min: 97.5 °F (36.4 °C)  Max: 98.5 °F (36.9 °C)   BP  Min: 147/87  Max: 183/103   Pulse  Min: 61  Max: 77   Resp  Min: 16  Max: 20   SpO2  Min: 91 %  Max: 98 %   No data recorded   Weight  Min: 89.9 kg (198 lb 4 oz)  Max: 89.9 kg (198 lb 4 oz)         10/14/20  2017   Weight: 89.9 kg (198 lb 4 oz)       Physical Exam:    Vitals signs reviewed.   Constitutional:       Appearance: Well-developed and well-groomed. Obese. Chronically ill-appearing.   Eyes:      Conjunctiva/sclera: Conjunctivae normal.      Pupils: Pupils are equal, round, and reactive to light.   Neck:      Musculoskeletal: Normal range of motion and neck supple.   Pulmonary:      Breath sounds: No wheezing. No rhonchi. No rales.   Cardiovascular:      Normal rate. Regular rhythm.      Murmurs: There is no murmur.      No gallop. No rub.   Edema:     Peripheral edema absent.   Abdominal:      General: Bowel sounds are normal.      Palpations: Abdomen is soft.    Musculoskeletal: Normal range of motion.   Skin:     General: Skin is warm and dry.   Neurological:      Mental Status: Alert and oriented to person, place and time.   Psychiatric:         Attention and Perception: Attention normal.         Mood and Affect: Mood normal.         Cognition and Memory: Cognition normal.       Results Review:   Lab Results (last 72 hours)     Procedure Component Value Units Date/Time    Basic Metabolic Panel [[id number]]  (Abnormal) Collected: 10/15/20 0331    Specimen: Blood Updated: 10/15/20 0413     Glucose 119 mg/dL      BUN 61 mg/dL      Creatinine 2.96 mg/dL      Sodium 141 mmol/L      Potassium 3.8 mmol/L      Chloride 105 mmol/L      CO2 25.0 mmol/L      Calcium 8.2 mg/dL      eGFR Non African Amer 16 mL/min/1.73      BUN/Creatinine Ratio 20.6     Anion Gap 11.0 mmol/L     Narrative:      GFR Normal >60  Chronic Kidney Disease <60  Kidney Failure <15      Vitamin D 25 Hydroxy [[id number]]  (Normal) Collected: 10/14/20 0457    Specimen: Blood Updated: 10/14/20 1216     25 Hydroxy, Vitamin D 33.5 ng/ml     Narrative:      Reference Range for Total Vitamin D 25(OH)     Deficiency <20.0 ng/mL   Insufficiency 21-29 ng/mL   Sufficiency  ng/mL  Toxicity >100 ng/ml    Results may be falsely increased if patient taking Biotin.      Urine Culture - Urine, Urine, Clean Catch [[id number]]  (Abnormal) Collected: 10/13/20 1458    Specimen: Urine, Clean Catch Updated: 10/14/20 1210     Urine Culture 50,000 CFU/mL Streptococcus agalactiae (Group B)     Comment: This organism is considered to be universally susceptible to penicillin.  No further antibiotic testing will be performed.        STREP GROUPING B     Urine Culture 50,000 CFU/mL Mixed Fernanda Isolated    Narrative:      Specimen contains mixed organisms of questionable pathogenicity which indicates contamination with commensal fernanda.       PTH, Intact [[id number]]  (Abnormal) Collected: 10/14/20 0457    Specimen: Blood Updated:  10/14/20 0558     PTH, Intact 189.0 pg/mL     Narrative:      Results may be falsely decreased if patient taking Biotin.      Basic Metabolic Panel [[id number]]  (Abnormal) Collected: 10/14/20 0457    Specimen: Blood Updated: 10/14/20 0547     Glucose 119 mg/dL      BUN 55 mg/dL      Creatinine 3.29 mg/dL      Sodium 141 mmol/L      Potassium 3.4 mmol/L      Chloride 104 mmol/L      CO2 28.0 mmol/L      Calcium 8.4 mg/dL      eGFR   Amer --     Comment: <15 Indicative of kidney failure.        eGFR Non African Amer 14 mL/min/1.73      Comment: <15 Indicative of kidney failure.        BUN/Creatinine Ratio 16.7     Anion Gap 9.0 mmol/L     Narrative:      GFR Normal >60  Chronic Kidney Disease <60  Kidney Failure <15      Magnesium [[id number]]  (Normal) Collected: 10/14/20 0457    Specimen: Blood Updated: 10/14/20 0547     Magnesium 2.2 mg/dL     Phosphorus [[id number]]  (Abnormal) Collected: 10/14/20 0457    Specimen: Blood Updated: 10/14/20 0547     Phosphorus 5.5 mg/dL     Troponin [[id number]]  (Abnormal) Collected: 10/13/20 1507    Specimen: Blood Updated: 10/13/20 1550     Troponin T 0.046 ng/mL     Narrative:      Troponin T Reference Range:  <= 0.03 ng/mL-   Negative for AMI  >0.03 ng/mL-     Abnormal for myocardial necrosis.  Clinicians would have to utilize clinical acumen, EKG, Troponin and serial changes to determine if it is an Acute Myocardial Infarction or myocardial injury due to an underlying chronic condition.       Results may be falsely decreased if patient taking Biotin.      Urine Drug Screen - Urine, Clean Catch [[id number]]  (Normal) Collected: 10/13/20 1458    Specimen: Urine, Clean Catch Updated: 10/13/20 1518     THC, Screen, Urine Negative     Phencyclidine (PCP), Urine Negative     Cocaine Screen, Urine Negative     Methamphetamine, Ur Negative     Opiate Screen Negative     Amphetamine Screen, Urine Negative     Benzodiazepine Screen, Urine Negative     Tricyclic Antidepressants Screen  Negative     Methadone Screen, Urine Negative     Barbiturates Screen, Urine Negative     Oxycodone Screen, Urine Negative     Propoxyphene Screen Negative     Buprenorphine, Screen, Urine Negative    Narrative:      Cutoff For Drugs Screened:    Amphetamines               500 ng/ml  Barbiturates               200 ng/ml  Benzodiazepines            150 ng/ml  Cocaine                    150 ng/ml  Methadone                  200 ng/ml  Opiates                    100 ng/ml  Phencyclidine               25 ng/ml  THC                            50 ng/ml  Methamphetamine            500 ng/ml  Tricyclic Antidepressants  300 ng/ml  Oxycodone                  100 ng/ml  Propoxyphene               300 ng/ml  Buprenorphine               10 ng/ml    The normal value for all drugs tested is negative. This report includes unconfirmed screening results, with the cutoff values listed, to be used for medical treatment purposes only.  Unconfirmed results must not be used for non-medical purposes such as employment or legal testing.  Clinical consideration should be applied to any drug of abuse test, particularly when unconfirmed results are used.      Urinalysis, Microscopic Only - Urine, Clean Catch [[id number]]  (Abnormal) Collected: 10/13/20 1458    Specimen: Urine, Clean Catch Updated: 10/13/20 1516     RBC, UA 0-2 /HPF      WBC, UA 21-30 /HPF      Bacteria, UA None Seen /HPF      Squamous Epithelial Cells, UA 3-6 /HPF      Hyaline Casts, UA 3-6 /LPF      Methodology Automated Microscopy    Urinalysis With Culture If Indicated - Urine, Clean Catch [[id number]]  (Abnormal) Collected: 10/13/20 1458    Specimen: Urine, Clean Catch Updated: 10/13/20 1516     Color, UA Yellow     Appearance, UA Clear     pH, UA 6.0     Specific Gravity, UA 1.009     Glucose, UA Negative     Ketones, UA Negative     Bilirubin, UA Negative     Blood, UA Negative     Protein, UA 30 mg/dL (1+)     Leuk Esterase, UA Moderate (2+)     Nitrite, UA Negative      Urobilinogen, UA 0.2 E.U./dL    Troponin [[id number]]  (Abnormal) Collected: 10/13/20 0924    Specimen: Blood Updated: 10/13/20 0955     Troponin T 0.035 ng/mL     Narrative:      Troponin T Reference Range:  <= 0.03 ng/mL-   Negative for AMI  >0.03 ng/mL-     Abnormal for myocardial necrosis.  Clinicians would have to utilize clinical acumen, EKG, Troponin and serial changes to determine if it is an Acute Myocardial Infarction or myocardial injury due to an underlying chronic condition.       Results may be falsely decreased if patient taking Biotin.      Basic Metabolic Panel [[id number]]  (Abnormal) Collected: 10/13/20 0924    Specimen: Blood Updated: 10/13/20 0954     Glucose 233 mg/dL      BUN 45 mg/dL      Creatinine 2.54 mg/dL      Sodium 141 mmol/L      Potassium 3.4 mmol/L      Chloride 103 mmol/L      CO2 24.0 mmol/L      Calcium 8.7 mg/dL      eGFR Non African Amer 19 mL/min/1.73      BUN/Creatinine Ratio 17.7     Anion Gap 14.0 mmol/L     Narrative:      GFR Normal >60  Chronic Kidney Disease <60  Kidney Failure <15      COVID PRE-OP / PRE-PROCEDURE SCREENING ORDER (NO ISOLATION) - Swab, Nasal Cavity [[id number]]  (Normal) Collected: 10/12/20 2028    Specimen: Swab from Nasal Cavity Updated: 10/12/20 2104    Narrative:      The following orders were created for panel order COVID PRE-OP / PRE-PROCEDURE SCREENING ORDER (NO ISOLATION) - Swab, Nasal Cavity.  Procedure                               Abnormality         Status                     ---------                               -----------         ------                     COVID-19, ABBOTT IN-HOUS...[[id number]]  Normal              Final result                 Please view results for these tests on the individual orders.    COVID-19, ABBOTT IN-HOUSE,NP Swab (NO TRANSPORT MEDIA) 2 HR TAT - Swab, Nasal Cavity [[id number]]  (Normal) Collected: 10/12/20 2028    Specimen: Swab from Nasal Cavity Updated: 10/12/20 2104     COVID19 Not Detected    Narrative:       Fact sheet for providers: https://www.fda.gov/media/078720/download     Fact sheet for patients: https://www.fda.gov/media/583463/download    Blood Gas, Venous [[id number]]  (Abnormal) Collected: 10/12/20 2040    Specimen: Venous Blood Updated: 10/12/20 2048     Site OTHER     pH, Venous 7.471 pH Units      pCO2, Venous 36.3 mm Hg      Comment: 84 Value below reference range        pO2, Venous 70.0 mm Hg      Comment: 83 Value above reference range        HCO3, Venous 26.4 mmol/L      Base Excess, Venous 2.7 mmol/L      Comment: 83 Value above reference range        O2 Saturation, Venous 96.0 %      Comment: 83 Value above reference range        Temperature 37.0 C      Barometric Pressure for Blood Gas 750 mmHg      Modality Room Air     Ventilator Mode NA     Collected by DAVE PRESTON     Comment: Meter: [id number]     :  [id number]       BNP [[id number]]  (Abnormal) Collected: 10/12/20 1905    Specimen: Blood Updated: 10/12/20 2029     proBNP 49,304.0 pg/mL     Narrative:      Among patients with dyspnea, NT-proBNP is highly sensitive for the detection of acute congestive heart failure. In addition NT-proBNP of <300 pg/ml effectively rules out acute congestive heart failure with 99% negative predictive value.    Results may be falsely decreased if patient taking Biotin.      Troponin [[id number]]  (Abnormal) Collected: 10/12/20 1905    Specimen: Blood Updated: 10/12/20 2024     Troponin T 0.054 ng/mL     Narrative:      Troponin T Reference Range:  <= 0.03 ng/mL-   Negative for AMI  >0.03 ng/mL-     Abnormal for myocardial necrosis.  Clinicians would have to utilize clinical acumen, EKG, Troponin and serial changes to determine if it is an Acute Myocardial Infarction or myocardial injury due to an underlying chronic condition.       Results may be falsely decreased if patient taking Biotin.      CK [[id number]]  (Normal) Collected: 10/12/20 1905    Specimen: Blood Updated: 10/12/20 2014     Creatine  Kinase 53 U/L     Basic Metabolic Panel [[id number]]  (Abnormal) Collected: 10/12/20 1905    Specimen: Blood Updated: 10/12/20 2012     Glucose 147 mg/dL      BUN 35 mg/dL      Creatinine 2.23 mg/dL      Sodium 142 mmol/L      Potassium 3.0 mmol/L      Chloride 104 mmol/L      CO2 26.0 mmol/L      Calcium 8.5 mg/dL      eGFR Non African Amer 22 mL/min/1.73      BUN/Creatinine Ratio 15.7     Anion Gap 12.0 mmol/L     Narrative:      GFR Normal >60  Chronic Kidney Disease <60  Kidney Failure <15      Protime-INR [[id number]]  (Normal) Collected: 10/12/20 1905    Specimen: Blood Updated: 10/12/20 2011     Protime 13.4 Seconds      INR 1.06    aPTT [[id number]]  (Normal) Collected: 10/12/20 1905    Specimen: Blood Updated: 10/12/20 2011     PTT 31.3 seconds     D-dimer, Quantitative [[id number]]  (Abnormal) Collected: 10/12/20 1905    Specimen: Blood Updated: 10/12/20 2011     D-Dimer, Quantitative 1.19 mg/L (FEU)     Narrative:      Reference Range is 0-0.50 mg/L FEU. However, results <0.50 mg/L FEU tends to rule out DVT or PE. Results >0.50 mg/L FEU are not useful in predicting absence or presence of DVT or PE.      CBC & Differential [[id number]]  (Abnormal) Collected: 10/12/20 1905    Specimen: Blood Updated: 10/12/20 2007    Narrative:      The following orders were created for panel order CBC & Differential.  Procedure                               Abnormality         Status                     ---------                               -----------         ------                     CBC Auto Differential[[id number]]        Abnormal            Final result                 Please view results for these tests on the individual orders.    CBC Auto Differential [[id number]]  (Abnormal) Collected: 10/12/20 1905    Specimen: Blood Updated: 10/12/20 2007     WBC 8.14 10*3/mm3      RBC 3.39 10*6/mm3      Hemoglobin 9.8 g/dL      Hematocrit 30.0 %      MCV 88.5 fL      MCH 28.9 pg      MCHC 32.7 g/dL      RDW 15.0 %      RDW-SD 47.9  fl      MPV 10.9 fL      Platelets 226 10*3/mm3      Neutrophil % 78.1 %      Lymphocyte % 12.5 %      Monocyte % 5.9 %      Eosinophil % 2.1 %      Basophil % 0.9 %      Immature Grans % 0.5 %      Neutrophils, Absolute 6.36 10*3/mm3      Lymphocytes, Absolute 1.02 10*3/mm3      Monocytes, Absolute 0.48 10*3/mm3      Eosinophils, Absolute 0.17 10*3/mm3      Basophils, Absolute 0.07 10*3/mm3      Immature Grans, Absolute 0.04 10*3/mm3      nRBC 0.0 /100 WBC             Medication Review: yes  Current Facility-Administered Medications   Medication Dose Route Frequency Provider Last Rate Last Dose   • acetaminophen (TYLENOL) tablet 650 mg  650 mg Oral Q6H PRN Orville Weston MD   650 mg at 10/15/20 0015   • amLODIPine (NORVASC) tablet 5 mg  5 mg Oral BID Orville Weston MD   5 mg at 10/15/20 0837   • azelastine (ASTELIN) nasal spray 1 spray  1 spray Each Nare BID Orville Weston MD   1 spray at 10/15/20 0837   • carvedilol (COREG) tablet 25 mg  25 mg Oral BID With Meals Orville Weston MD   25 mg at 10/15/20 0837   • cloNIDine (CATAPRES) tablet 0.1 mg  0.1 mg Oral Q4H PRN Orville Weston MD   0.1 mg at 10/15/20 0845   • enoxaparin (LOVENOX) syringe 90 mg  1 mg/kg Subcutaneous Q24H Orville Weston MD   90 mg at 10/14/20 2249   • fluticasone (FLONASE) 50 MCG/ACT nasal spray 2 spray  2 spray Nasal Daily PRN Orville Weston MD       • hydrALAZINE (APRESOLINE) tablet 50 mg  50 mg Oral Q8H Orville Weston MD   50 mg at 10/15/20 0621   • nitroglycerin (NITROSTAT) ointment 1 inch  1 inch Topical Once Son Randall MD       • nitroglycerin (NITROSTAT) SL tablet 0.4 mg  0.4 mg Sublingual Q5 Min PRN Son Randall MD   0.4 mg at 10/12/20 2120   • pantoprazole (PROTONIX) EC tablet 40 mg  40 mg Oral Q AM Orville Weston MD   40 mg at 10/15/20 0621   • spironolactone (ALDACTONE) tablet 25 mg  25 mg Oral Daily Varun Schwarz MD   25 mg at 10/15/20 0854    • tiZANidine (ZANAFLEX) tablet 4 mg  4 mg Oral Q8H PRN Orville Weston MD         Results for orders placed during the hospital encounter of 10/12/20   Adult Transthoracic Echo Complete W/ Cont if Necessary Per Protocol    Narrative · Left ventricular wall thickness is consistent with moderate concentric   hypertrophy.  · Mild aortic valve regurgitation is present.  · Estimated right ventricular systolic pressure from tricuspid   regurgitation is mildly elevated (35-45 mmHg).  · Mild mitral annular calcification is present.  · Left ventricular diastolic function is consistent with (grade Ia w/high   LAP) impaired relaxation.  · Trace mitral valve regurgitation is present.  · The left atrial cavity is mildly dilated.  · The right atrial cavity is mildly dilated.  · Left ventricular ejection fraction appears to be 56 - 60%. Left   ventricular systolic function is normal.     EVIDENCE FOR HYPERTENSIVE HEART DISEASE         Assessment/Plan     Assessment    1.  Hypertensive emergency  2.  Shortness of breath  3.  Elevated troponin: Not consistent with myocardial infarction and likely a type II elevation in the setting of hypertensive emergency and acute renal failure   4.  Elevated D-dimer: Intermediate probability VQ scan  5.  Acute renal insufficiency: Improving, nephrology is following  6.  Valvular heart disease: Mild AI and trace MR noted on echocardiogram  7.  Sleeping Bradycardia: asymptomatic, noted on tele  8.  Tobacco abuse      Plan    - Blood pressure is much improved since her arrival.  She is back on her home medications and has had some adjustments to those as well as the addition of spironolactone.  Increase hydralazine to 75 mg 3 times a day.  Continue use of as needed clonidine.  - Volume status stable  - recommend overnight oximetry due to her sleeping bradycardia and intermittent asymptomatic pauses noted on tele.   - advise smoking cessation  - elevated troponin not consistent with MI,  secondary to her renal function, CHF, and BP on presentation  - no further cardiac testing. Defer further work up of her shortness of breath and intermediate result VQ scan to primary    Cardiology will sign off. Please recall if needed.       Electronically signed by ISAIAH Sequeira, 10/15/20, 11:23 AM CDT.

## 2020-10-15 NOTE — PROGRESS NOTES
Discharge Planning Assessment  [facility]     Patient Name: Ludivina Ramirez  MRN: [id number]  Today's Date: 10/15/2020    Admit Date: 10/12/2020    Discharge Needs Assessment    No documentation.       Discharge Plan     Row Name 10/15/20 1525       Plan    Plan  Instructed on daily weight for CHF management. Patient wants PDHD and would be agreeable to home health with health monitor if MD will order. Consent obtained  for [facility] and referral faxed to [facility].        Continued Care and Services - Admitted Since 10/12/2020    Coordination has not been started for this encounter.         Demographic Summary    No documentation.       Functional Status    No documentation.       Psychosocial    No documentation.       Abuse/Neglect    No documentation.       Legal    No documentation.       Substance Abuse    No documentation.       Patient Forms    No documentation.           Merlina A Fletcher, RN

## 2020-10-15 NOTE — PLAN OF CARE
Problem: Adult Inpatient Plan of Care  Goal: Plan of Care Review  Outcome: Ongoing, Progressing  Flowsheets (Taken 10/15/2020 0437)  Progress: improving  Plan of Care Reviewed With: patient  Outcome Summary: Pt c/o headche, given prn tylenol with relief. Room air. SOA with exertion. Normal sinus 64-77 on tele, pt did drop to 24 but came right back up to 60-70s and asymptomatic. No fall. Safety maintained. Continue to monitor.

## 2020-10-15 NOTE — PROGRESS NOTES
Nephrology ([facility]) Progress Note      Patient:  Ludivina Ramirez  YOB: 1960  Date of Service: 10/14/2020  MRN: [id number]   Acct: [id number]   Primary Care Physician: Orville Weston MD  Advance Directive:   There are no questions and answers to display.     Admit Date: 10/12/2020       Hospital Day: 2  Referring Provider: Orville Weston MD      Patient personally seen and examined.  Complete chart including Consults, Notes, Operative Reports, Labs, Cardiology, and Radiology studies reviewed as able.        Subjective:  Ludivina Ramirez is a 60 y.o. female  whom we were consulted for acute kidney injury.  Patient was never seen us in the office but we had evaluated her as an inpatient approximately 1 year ago.  At that time she had an episode of acute acute injury that resolved to a creatinine of about 1.5 at discharge.  More recently she had had a week of progressive shortness of air and orthopnea.  Her blood pressure been increased that she had not been taking her blood pressure medications and continue to use tobacco.  Upon evaluation was found to have pleural effusion and worsening heart function on echocardiogram.  She denied dysuria, hematuria.  She is a frequent NSAID user of Excedrin.      Today, she reports more shortness of air but was not on any oxygen at time of examination.  Periods of bradycardia overnight noted and Coreg was held.  She denied current dysuria, hematuria, hemoptysis, rash, chest pain.    Allergies:  Codeine and Imitrex [sumatriptan]    Home Meds:  Medications Prior to Admission   Medication Sig Dispense Refill Last Dose   • amLODIPine (NORVASC) 5 MG tablet Take 1 tablet by mouth 2 (Two) Times a Day. 60 tablet 1 10/12/2020 at Unknown time   • azelastine (ASTELIN) 0.1 % nasal spray 1 spray into the nostril(s) as directed by provider 2 (Two) Times a Day. Use in each nostril as directed   10/12/2020 at Unknown time   • carvedilol (COREG) 25  MG tablet Take 1 tablet by mouth 2 (Two) Times a Day With Meals. 6 tablet 1 10/12/2020 at Unknown time   • CloNIDine (CATAPRES) 0.1 MG tablet Take 0.1 mg by mouth Daily As Needed for High Blood Pressure (BP >180/100).   10/12/2020 at Unknown time   • fluticasone (FLONASE) 50 MCG/ACT nasal spray 2 sprays into the nostril(s) as directed by provider Daily As Needed for Rhinitis or Allergies.   10/12/2020 at Unknown time   • hydrALAZINE (APRESOLINE) 25 MG tablet Take 3 tablets by mouth Every 8 (Eight) Hours. 270 tablet 3 10/12/2020 at Unknown time   • lisinopril (PRINIVIL,ZESTRIL) 40 MG tablet Take 1 tablet by mouth Every Evening. 30 tablet 3 10/12/2020 at Unknown time   • pantoprazole (PROTONIX) 40 MG EC tablet Take 1 tablet by mouth Daily. 30 tablet 1 10/12/2020 at Unknown time   • cholecalciferol 1000 units tablet Take 1,000 Units by mouth Daily. 30 tablet 1 Unknown at Unknown time   • tiZANidine (ZANAFLEX) 4 MG tablet Take 1 tablet by mouth Every 8 (Eight) Hours As Needed for Muscle Spasms. 30 tablet 0 Unknown at Unknown time       Medicines:  Current Facility-Administered Medications   Medication Dose Route Frequency Provider Last Rate Last Dose   • acetaminophen (TYLENOL) tablet 650 mg  650 mg Oral Q6H PRN Orville Weston MD   650 mg at 10/13/20 1457   • amLODIPine (NORVASC) tablet 5 mg  5 mg Oral BID Orville Weston MD   5 mg at 10/14/20 2031   • azelastine (ASTELIN) nasal spray 1 spray  1 spray Each Nare BID Orville Weston MD   1 spray at 10/14/20 2031   • carvedilol (COREG) tablet 25 mg  25 mg Oral BID With Meals Orville Weston MD   25 mg at 10/14/20 1741   • cloNIDine (CATAPRES) tablet 0.1 mg  0.1 mg Oral Q4H PRN Orville Weston MD   0.1 mg at 10/13/20 0351   • enoxaparin (LOVENOX) syringe 90 mg  1 mg/kg Subcutaneous Q24H Orville Weston MD   90 mg at 10/13/20 2202   • fluticasone (FLONASE) 50 MCG/ACT nasal spray 2 spray  2 spray Nasal Daily PRN Orville Weston MD        • hydrALAZINE (APRESOLINE) tablet 50 mg  50 mg Oral Q8H Orville Weston MD   50 mg at 10/14/20 1347   • nitroglycerin (NITROSTAT) ointment 1 inch  1 inch Topical Once Son Randall MD       • nitroglycerin (NITROSTAT) SL tablet 0.4 mg  0.4 mg Sublingual Q5 Min PRN Son Randall MD   0.4 mg at 10/12/20 2120   • pantoprazole (PROTONIX) EC tablet 40 mg  40 mg Oral Q AM Orville Weston MD   40 mg at 10/14/20 0653   • spironolactone (ALDACTONE) tablet 25 mg  25 mg Oral Daily Varun Schwarz MD   25 mg at 10/14/20 0832   • tiZANidine (ZANAFLEX) tablet 4 mg  4 mg Oral Q8H PRN Orville Weston MD           Past Medical History:  Past Medical History:   Diagnosis Date   • Acute CHF (CMS/HCC)    • Acute renal failure (ARF) (CMS/HCC)    • Anemia    • Asthma     childhood   • Hypertension    • Ischemic colitis (CMS/HCC)    • Metabolic acidosis    • Nonrheumatic aortic (valve) insufficiency    • PTSD (post-traumatic stress disorder)        Past Surgical History:  Past Surgical History:   Procedure Laterality Date   • EXTERNAL FIXATION WRIST FRACTURE     • LEG SURGERY     • TUBAL ABDOMINAL LIGATION         Family History  Family History   Problem Relation Age of Onset   • Coronary artery disease Mother    • Coronary artery disease Father        Social History  Social History     Socioeconomic History   • Marital status: Single     Spouse name: Not on file   • Number of children: Not on file   • Years of education: Not on file   • Highest education level: Not on file   Tobacco Use   • Smoking status: Current Every Day Smoker     Packs/day: 0.50   • Smokeless tobacco: Never Used   Substance and Sexual Activity   • Alcohol use: No   • Drug use: No   • Sexual activity: Defer         Review of Systems:  History obtained from chart review and the patient  General ROS: No fever or chills  Respiratory ROS: + cough, shortness of breath  Cardiovascular ROS: No chest pain or  palpitations  Gastrointestinal ROS: No abdominal pain or melena  Genito-Urinary ROS: No dysuria or hematuria    Objective:  Patient Vitals for the past 24 hrs:   BP Temp Temp src Pulse Resp SpO2 Weight   10/14/20 2017 156/95 97.6 °F (36.4 °C) Oral 61 18 97 % 89.9 kg (198 lb 4 oz)   10/14/20 1741 -- -- -- 77 -- -- --   10/14/20 1500 159/98 97.6 °F (36.4 °C) Oral 67 18 98 % --   10/14/20 1100 164/95 98.3 °F (36.8 °C) Oral 64 18 94 % --   10/14/20 0808 175/99 97.7 °F (36.5 °C) Oral 72 18 92 % --   10/14/20 0711 -- -- -- (!) 44 -- -- --   10/14/20 0333 157/94 98.2 °F (36.8 °C) Oral 67 16 93 % --   10/13/20 2341 134/83 97.7 °F (36.5 °C) Oral 70 14 93 % --       Intake/Output Summary (Last 24 hours) at 10/14/2020 2103  Last data filed at 10/14/2020 2017  Gross per 24 hour   Intake 1440 ml   Output 1175 ml   Net 265 ml     General: awake/alert   Chest:  Few basilar crackles  CVS: regular rate and rhythm  Abdominal: soft, nontender, positive bowel sounds  Extremities: no cyanosis or edema  Skin: warm and dry without rash      Labs:  Results from last 7 days   Lab Units 10/12/20  1905   WBC 10*3/mm3 8.14   HEMOGLOBIN g/dL 9.8*   HEMATOCRIT % 30.0*   PLATELETS 10*3/mm3 226         Results from last 7 days   Lab Units 10/14/20  0457 10/13/20  0924 10/12/20  1905   SODIUM mmol/L 141 141 142   POTASSIUM mmol/L 3.4* 3.4* 3.0*   CHLORIDE mmol/L 104 103 104   CO2 mmol/L 28.0 24.0 26.0   BUN mg/dL 55* 45* 35*   CREATININE mg/dL 3.29* 2.54* 2.23*   CALCIUM mg/dL 8.4* 8.7 8.5*   GLUCOSE mg/dL 119* 233* 147*       Radiology:   Imaging Results (Last 72 Hours)     Procedure Component Value Units Date/Time    US Renal Bilateral [[id number]] Collected: 10/13/20 1500     Updated: 10/13/20 1504    Narrative:      RENAL ULTRASOUND COMPLETE 10/13/2020 1:00 PM CDT     REASON FOR EXAM: MATTY; I50.9-Heart failure, unspecified; N17.9-Acute  kidney failure, unspecified; I10-Essential (primary) hypertension       COMPARISON: 9/23/2019       TECHNIQUE:  Multiple longitudinal and transverse realtime sonographic  images of the kidneys and urinary bladder are obtained.      FINDINGS:      RIGHT KIDNEY: 9.8 cm. Normal in size, shape, contour and position. No  solid or cystic masses. The central echo complex is compact with no  evidence for hydronephrosis. No nephrolithiasis or abnormal perinephric  fluid collections . No hydroureter.      LEFT KIDNEY: 8.4 cm. Normal in size, shape, contour and position. No  solid or cystic masses. The central echo complex is compact with no  evidence for hydronephrosis. No nephrolithiasis or abnormal perinephric  fluid collections . No hydroureter.      PELVIS: The bladder is mildly distended with anechoic urine and  demonstrates no significant wall thickening or internal echogenicities.  There is no surrounding ascites.     Incidental note is made of a left-sided pleural effusion.       Impression:      1. The kidneys are normal in sonographic appearance with no discrete  mass or hydronephrosis.  2. Left-sided pleural effusion..        This report was finalized on 10/13/2020 15:01 by Dr. Ye Montaño MD.    NM Lung Scan Perfusion Particulate [[id number]] Collected: 10/13/20 0700     Updated: 10/13/20 0705    Narrative:      EXAMINATION: Perfusion lung scan 10/12/2020     HISTORY: Shortness of breath     FINDINGS: Following intravenous injection of 4.5 mCi of technetium 99m  MAA intravenously multiple planar images were obtained of the thorax.  The study demonstrates focal photopenia in the perihilar distribution of  the right lung anteriorly as well as within the right posterior basilar  segment. There is normal perfusion to the left lung. There is no  correlate on plain film radiography.       Impression:      1.. Subsegmental defects within the right middle and lower lobe. This is  an indeterminate result with pulmonary thromboembolic disease not  excluded. If clinically feasible follow-up with CT angiography of the  chest would  be recommended. There is normal perfusion to the left lung.  This report was finalized on 10/13/2020 07:02 by Dr. Ye Montaño MD.    XR Chest 1 View [[id number]] Collected: 10/12/20 2026     Updated: 10/12/20 2035    Narrative:      Exam:   XR CHEST 1 VW-       Date:  10/12/2020      History:  Female, age  60 years;sob     COMPARISON:  Chest x-ray dated 9/21/2020     Findings :     Mild cardiomegaly. Central pulmonary venous congestion/developing  interstitial edema. Small left pleural effusion and trace right pleural  effusion. Calcified granuloma in the right upper lobe redemonstrated.  The bones show no acute pathology.         Impression:      Impression:     Findings are most concerning for fluid overload.     This report was finalized on 10/12/2020 20:31 by Dr. Janet Geller MD.          Culture:  Urine Culture   Date Value Ref Range Status   10/13/2020 50,000 CFU/mL Streptococcus agalactiae (Group B) (A)  Final     Comment:     This organism is considered to be universally susceptible to penicillin.  No further antibiotic testing will be performed.   10/13/2020 50,000 CFU/mL Mixed Duyen Isolated  Final         Assessment   Acute renal failure  Hypertension  Intermediate probability VQ scan  Acute diastolic congestive heart failure  Anemia with iron deficiency  Bradycardia  Secondary hyperparathyroidism    Plan:  Hold Coreg if needed for bradycardia  Holding loop diuretics  Can continue spironolactone at this point for blood pressure optimization  Continue to monitor  Vitamin D levels were adequate  If creatinine worsens tomorrow may give 500 mL bolus    Don Negrete MD  10/14/2020  21:03 CDT

## 2020-10-15 NOTE — PROGRESS NOTES
"Progress Note  Ludivina Ramirez  10/15/2020 13:32 CDT  Subjective:   Admit Date:   10/12/2020      CC/ADMIT DX:       Interval History:   Reviewed overnight events and nursing notes.  She has no c/o CP. Her SOA is stable.       I have reviewed all labs/diagnostics from the last 24hrs.       ROS:   I have done a 10 point ROS and all are negative, except what is mentioned in the HPI.    Diet Regular; Cardiac    Medications:      amLODIPine, 5 mg, Oral, BID  azelastine, 1 spray, Each Nare, BID  carvedilol, 25 mg, Oral, BID With Meals  enoxaparin, 1 mg/kg, Subcutaneous, Q24H  hydrALAZINE, 75 mg, Oral, Q8H  nitroglycerin, 1 inch, Topical, Once  pantoprazole, 40 mg, Oral, Q AM  spironolactone, 25 mg, Oral, Daily            Objective:   Vitals: /99 (BP Location: Left arm, Patient Position: Lying) Comment: Reported to Emiliana PRESTON   Pulse 64   Temp 97.7 °F (36.5 °C) (Oral)   Resp 20   Ht 165.1 cm (65\")   Wt 89.9 kg (198 lb 4 oz)   SpO2 94%   BMI 32.99 kg/m²      Intake/Output Summary (Last 24 hours) at 10/15/2020 1332  Last data filed at 10/15/2020 0914  Gross per 24 hour   Intake 1200 ml   Output 1550 ml   Net -350 ml     General appearance: alert and cooperative with exam  Lungs: clear to auscultation bilaterally  Heart: RRR  Abdomen: soft, non-tender; bowel sounds normal; no masses,  no organomegaly  Extremities: extremities normal, atraumatic, no cyanosis or edema  Neurologic:  No obvious focal neurologic deficits.    Assessment and Plan:     Shortness of breath    Hypertensive urgency    Elevated troponin    Iron deficiency anemia    Nonrheumatic aortic valve insufficiency    Congestive heart failure (CMS/HCC)    Noncompliance   MATTY    Plan:  1.  Continue present medication(s)   2.  Follow with Cardiology and Nephrology  3.  Labs are improved, follow  4.  Monitor BP  5.  Continue anticoagulant for inderm. V/Q. Ultimately would like to do CTA if renal function recovers.       Discharge planning:   her home "     Reviewed treatment plans with the patient and/or family.             Electronically signed by Orville Weston MD on 10/15/2020 at 13:32 CDT

## 2020-10-15 NOTE — ACP (ADVANCE CARE PLANNING)
Date of [facility] ACP interview: 10/15/2020  Location of interview: Pt's room  [facility] ACP Facilitator: Gay Oates RN  Referral Source: nurse  Present for facilitation: Patient    SUMMARY OF ADVANCE CARE PLANNING DISCUSSION:  Ludivina visited for consult for [facility] facilitation. We reviewed purpose and goals for advance care planning.    I reviewed with Ludivina qualities to consider when choosing a healthcare agent. Ludivina had selected  her healthcare agent. I encouraged Ludivina to discuss her preferences for future care with healthcare agents and others close to her.      Ludivina describes cultural, Buddhism, spiritual or personal practices/beliefs that are important to her or might help her choose the care wanted, or not wanted: none    Goals of medical care for severe, permanent brain injury were explored: Yes     Education materials were provided on: Advance Care Planning    Each section of the advance care/living will document was reviewed and discussed.    Advance care/living will document finished during this facilitation? yes    Time spent on preparation, facilitation and documentation was 31-60 minutes.    RECOMMENDATIONS/FOLLOW-UP:  Copy to HIM, copies provide to patient for surrogates and PCP    CONSULT/NOTE ROUTED  yes    Gay Oates, RN

## 2020-10-15 NOTE — PLAN OF CARE
Goal Outcome Evaluation:  Plan of Care Reviewed With: patient  Progress: improving  Outcome Summary: Patient denies pain.Blood Pressurse more stable as shift progressed today. Advance care/living will with  this shift.Up to bathroom by self.Safety maintained,vss, will follow.

## 2020-10-16 LAB
ANION GAP SERPL CALCULATED.3IONS-SCNC: 9 MMOL/L (ref 5–15)
BUN SERPL-MCNC: 56 MG/DL (ref 8–23)
BUN/CREAT SERPL: 21.8 (ref 7–25)
CALCIUM SPEC-SCNC: 8.2 MG/DL (ref 8.6–10.5)
CHLORIDE SERPL-SCNC: 106 MMOL/L (ref 98–107)
CO2 SERPL-SCNC: 26 MMOL/L (ref 22–29)
CREAT SERPL-MCNC: 2.57 MG/DL (ref 0.57–1)
GFR SERPL CREATININE-BSD FRML MDRD: 19 ML/MIN/1.73
GLUCOSE SERPL-MCNC: 119 MG/DL (ref 65–99)
POTASSIUM SERPL-SCNC: 4.1 MMOL/L (ref 3.5–5.2)
SODIUM SERPL-SCNC: 141 MMOL/L (ref 136–145)

## 2020-10-16 PROCEDURE — 25010000002 ENOXAPARIN PER 10 MG: Performed by: FAMILY MEDICINE

## 2020-10-16 PROCEDURE — 80048 BASIC METABOLIC PNL TOTAL CA: CPT | Performed by: FAMILY MEDICINE

## 2020-10-16 RX ADMIN — CARVEDILOL 25 MG: 25 TABLET, FILM COATED ORAL at 08:41

## 2020-10-16 RX ADMIN — HYDRALAZINE HYDROCHLORIDE 75 MG: 50 TABLET ORAL at 21:16

## 2020-10-16 RX ADMIN — HYDRALAZINE HYDROCHLORIDE 75 MG: 50 TABLET ORAL at 06:15

## 2020-10-16 RX ADMIN — CARVEDILOL 25 MG: 25 TABLET, FILM COATED ORAL at 17:11

## 2020-10-16 RX ADMIN — AMLODIPINE BESYLATE 5 MG: 5 TABLET ORAL at 08:41

## 2020-10-16 RX ADMIN — SPIRONOLACTONE 25 MG: 25 TABLET ORAL at 08:41

## 2020-10-16 RX ADMIN — AMLODIPINE BESYLATE 5 MG: 5 TABLET ORAL at 21:16

## 2020-10-16 RX ADMIN — ENOXAPARIN SODIUM 90 MG: 100 INJECTION SUBCUTANEOUS at 22:22

## 2020-10-16 RX ADMIN — AZELASTINE HYDROCHLORIDE 1 SPRAY: 137 SPRAY, METERED NASAL at 08:42

## 2020-10-16 RX ADMIN — AZELASTINE HYDROCHLORIDE 1 SPRAY: 137 SPRAY, METERED NASAL at 21:18

## 2020-10-16 RX ADMIN — HYDRALAZINE HYDROCHLORIDE 75 MG: 50 TABLET ORAL at 13:43

## 2020-10-16 RX ADMIN — ACETAMINOPHEN 650 MG: 325 TABLET, FILM COATED ORAL at 22:22

## 2020-10-16 RX ADMIN — PANTOPRAZOLE SODIUM 40 MG: 40 TABLET, DELAYED RELEASE ORAL at 06:15

## 2020-10-16 NOTE — PROGRESS NOTES
"Progress Note  Ludivina Ramirez  10/16/2020 13:56 CDT  Subjective:   Admit Date:   10/12/2020      CC/ADMIT DX:       Interval History:   Reviewed overnight events and nursing notes.  She has no new issues.       I have reviewed all labs/diagnostics from the last 24hrs.       ROS:   I have done a 10 point ROS and all are negative, except what is mentioned in the HPI.    Diet Regular; Cardiac    Medications:      amLODIPine, 5 mg, Oral, BID  azelastine, 1 spray, Each Nare, BID  carvedilol, 25 mg, Oral, BID With Meals  enoxaparin, 1 mg/kg, Subcutaneous, Q24H  hydrALAZINE, 75 mg, Oral, Q8H  nitroglycerin, 1 inch, Topical, Once  pantoprazole, 40 mg, Oral, Q AM  spironolactone, 25 mg, Oral, Daily            Objective:   Vitals: /91 (BP Location: Left arm, Patient Position: Lying)   Pulse 57   Temp 98 °F (36.7 °C) (Oral)   Resp 16   Ht 165.1 cm (65\")   Wt 89.5 kg (197 lb 6 oz)   SpO2 96%   BMI 32.84 kg/m²      Intake/Output Summary (Last 24 hours) at 10/16/2020 1356  Last data filed at 10/16/2020 1300  Gross per 24 hour   Intake 1560 ml   Output 1900 ml   Net -340 ml     General appearance: alert and cooperative with exam  Lungs: clear to auscultation bilaterally  Heart: RRR  Abdomen: soft, non-tender; bowel sounds normal; no masses,  no organomegaly  Extremities: extremities normal, atraumatic, no cyanosis or edema  Neurologic:  No obvious focal neurologic deficits.    Assessment and Plan:     Shortness of breath    Hypertensive urgency    Elevated troponin    Iron deficiency anemia    Nonrheumatic aortic valve insufficiency    Congestive heart failure (CMS/HCC)    Noncompliance   MATTY    Indeterminate Prob V/Q scan    Plan:  1.  Continue present medication(s)   2.  Follow with Cardiology and Nephrology  3.  Labs are improving, follow   4.  Monitor BP  5.  Continue anticoagulant (Lovenox) for indeterminate V/Q. Ultimately would like to do CTA if renal function recovers.       Discharge planning:   her home "     Reviewed treatment plans with the patient and/or family.             Electronically signed by Orville Weston MD on 10/16/2020 at 13:56 CDT

## 2020-10-16 NOTE — PROGRESS NOTES
Nephrology ([facility]) Progress Note      Patient:  Ludivina Ramirez  YOB: 1960  Date of Service: 10/16/2020  MRN: [id number]   Acct: [id number]   Primary Care Physician: Orville Weston MD  Advance Directive:   There are no questions and answers to display.     Admit Date: 10/12/2020       Hospital Day: 4  Referring Provider: Orville Weston MD      Patient personally seen and examined.  Complete chart including Consults, Notes, Operative Reports, Labs, Cardiology, and Radiology studies reviewed as able.        Subjective:  Ludivina Ramirez is a 60 y.o. female  whom we were consulted for acute kidney injury.  We have seen patient in hospital previously but she has never been seen on our office.  Estimated CKD 3b with baseline creatinine 1.3-1.7. she recently has had progressive dyspnea and orthopnea. Blood pressure has been elevated as she has not been taking her medications. She was found to have pleural effusion and worsening heart function. Denies dysuria or hematuria. Frequent NSAID use.    Today feeling better, no new overnight issues. Anxious to go home    Allergies:  Codeine and Imitrex [sumatriptan]    Home Meds:  Medications Prior to Admission   Medication Sig Dispense Refill Last Dose   • amLODIPine (NORVASC) 5 MG tablet Take 1 tablet by mouth 2 (Two) Times a Day. 60 tablet 1 10/12/2020 at Unknown time   • azelastine (ASTELIN) 0.1 % nasal spray 1 spray into the nostril(s) as directed by provider 2 (Two) Times a Day. Use in each nostril as directed   10/12/2020 at Unknown time   • carvedilol (COREG) 25 MG tablet Take 1 tablet by mouth 2 (Two) Times a Day With Meals. 6 tablet 1 10/12/2020 at Unknown time   • CloNIDine (CATAPRES) 0.1 MG tablet Take 0.1 mg by mouth Daily As Needed for High Blood Pressure (BP >180/100).   10/12/2020 at Unknown time   • fluticasone (FLONASE) 50 MCG/ACT nasal spray 2 sprays into the nostril(s) as directed by provider Daily As Needed  for Rhinitis or Allergies.   10/12/2020 at Unknown time   • hydrALAZINE (APRESOLINE) 25 MG tablet Take 3 tablets by mouth Every 8 (Eight) Hours. 270 tablet 3 10/12/2020 at Unknown time   • lisinopril (PRINIVIL,ZESTRIL) 40 MG tablet Take 1 tablet by mouth Every Evening. 30 tablet 3 10/12/2020 at Unknown time   • pantoprazole (PROTONIX) 40 MG EC tablet Take 1 tablet by mouth Daily. 30 tablet 1 10/12/2020 at Unknown time   • cholecalciferol 1000 units tablet Take 1,000 Units by mouth Daily. 30 tablet 1 Unknown at Unknown time   • tiZANidine (ZANAFLEX) 4 MG tablet Take 1 tablet by mouth Every 8 (Eight) Hours As Needed for Muscle Spasms. 30 tablet 0 Unknown at Unknown time       Medicines:  Current Facility-Administered Medications   Medication Dose Route Frequency Provider Last Rate Last Dose   • acetaminophen (TYLENOL) tablet 650 mg  650 mg Oral Q6H PRN Orville Weston MD   650 mg at 10/15/20 1213   • amLODIPine (NORVASC) tablet 5 mg  5 mg Oral BID Orville Weston MD   5 mg at 10/16/20 0841   • azelastine (ASTELIN) nasal spray 1 spray  1 spray Each Nare BID Orville Weston MD   1 spray at 10/16/20 0842   • carvedilol (COREG) tablet 25 mg  25 mg Oral BID With Meals Orville Weston MD   25 mg at 10/16/20 0841   • cloNIDine (CATAPRES) tablet 0.1 mg  0.1 mg Oral Q4H PRN Orville Weston MD   0.1 mg at 10/15/20 0845   • enoxaparin (LOVENOX) syringe 90 mg  1 mg/kg Subcutaneous Q24H Orville Weston MD   90 mg at 10/15/20 2038   • fluticasone (FLONASE) 50 MCG/ACT nasal spray 2 spray  2 spray Nasal Daily PRN Orville Weston MD       • hydrALAZINE (APRESOLINE) tablet 75 mg  75 mg Oral Q8H Brittani Santana, APRN   75 mg at 10/16/20 0615   • nitroglycerin (NITROSTAT) ointment 1 inch  1 inch Topical Once Son Randall MD       • nitroglycerin (NITROSTAT) SL tablet 0.4 mg  0.4 mg Sublingual Q5 Min PRN Son Randall MD   0.4 mg at 10/12/20 2120   • pantoprazole (PROTONIX) EC  tablet 40 mg  40 mg Oral Q AM Orville Weston MD   40 mg at 10/16/20 0615   • spironolactone (ALDACTONE) tablet 25 mg  25 mg Oral Daily Varun Schwarz MD   25 mg at 10/16/20 0841   • tiZANidine (ZANAFLEX) tablet 4 mg  4 mg Oral Q8H PRN Orville Weston MD           Past Medical History:  Past Medical History:   Diagnosis Date   • Acute CHF (CMS/HCC)    • Acute renal failure (ARF) (CMS/HCC)    • Anemia    • Asthma     childhood   • Hypertension    • Ischemic colitis (CMS/HCC)    • Metabolic acidosis    • Nonrheumatic aortic (valve) insufficiency    • PTSD (post-traumatic stress disorder)        Past Surgical History:  Past Surgical History:   Procedure Laterality Date   • EXTERNAL FIXATION WRIST FRACTURE     • LEG SURGERY     • TUBAL ABDOMINAL LIGATION         Family History  Family History   Problem Relation Age of Onset   • Coronary artery disease Mother    • Coronary artery disease Father        Social History  Social History     Socioeconomic History   • Marital status: Single     Spouse name: Not on file   • Number of children: Not on file   • Years of education: Not on file   • Highest education level: Not on file   Tobacco Use   • Smoking status: Current Every Day Smoker     Packs/day: 0.50   • Smokeless tobacco: Never Used   Substance and Sexual Activity   • Alcohol use: No   • Drug use: No   • Sexual activity: Defer         Review of Systems:  History obtained from chart review and the patient  General ROS: No fever or chills  Respiratory ROS: No cough, shortness of breath, wheezing  Cardiovascular ROS: No chest pain or palpitations  Gastrointestinal ROS: No abdominal pain or melena  Genito-Urinary ROS: No dysuria or hematuria  Neurological ROS: no headache or dizziness    Objective:  Patient Vitals for the past 24 hrs:   BP Temp Temp src Pulse Resp SpO2 Weight   10/16/20 0825 168/91 97.9 °F (36.6 °C) Oral 68 16 94 % --   10/16/20 0348 169/86 98.1 °F (36.7 °C) Oral 67 16 93 % --    10/15/20 2024 148/87 98.2 °F (36.8 °C) Oral 62 18 94 % 89.5 kg (197 lb 6 oz)   10/15/20 1625 153/94 97.5 °F (36.4 °C) Oral 67 20 94 % --       Intake/Output Summary (Last 24 hours) at 10/16/2020 1049  Last data filed at 10/16/2020 0825  Gross per 24 hour   Intake 1200 ml   Output 2200 ml   Net -1000 ml     General: awake/alert    Neck: supple, no JVD  Chest:  clear to auscultation bilaterally without respiratory distress  CVS: regular rate and rhythm  Abdominal: soft, nontender, positive bowel sounds  Extremities: no cyanosis or edema  Skin: warm and dry without rash  Neuro: no focal motor deficits    Labs:  Results from last 7 days   Lab Units 10/12/20  1905   WBC 10*3/mm3 8.14   HEMOGLOBIN g/dL 9.8*   HEMATOCRIT % 30.0*   PLATELETS 10*3/mm3 226         Results from last 7 days   Lab Units 10/16/20  0221 10/15/20  0331 10/14/20  0457   SODIUM mmol/L 141 141 141   POTASSIUM mmol/L 4.1 3.8 3.4*   CHLORIDE mmol/L 106 105 104   CO2 mmol/L 26.0 25.0 28.0   BUN mg/dL 56* 61* 55*   CREATININE mg/dL 2.57* 2.96* 3.29*   CALCIUM mg/dL 8.2* 8.2* 8.4*   GLUCOSE mg/dL 119* 119* 119*       Radiology:   Imaging Results (Last 72 Hours)     Procedure Component Value Units Date/Time    US Renal Bilateral [[id number]] Collected: 10/13/20 1500     Updated: 10/13/20 1504    Narrative:      RENAL ULTRASOUND COMPLETE 10/13/2020 1:00 PM CDT     REASON FOR EXAM: MATTY; I50.9-Heart failure, unspecified; N17.9-Acute  kidney failure, unspecified; I10-Essential (primary) hypertension       COMPARISON: 9/23/2019       TECHNIQUE: Multiple longitudinal and transverse realtime sonographic  images of the kidneys and urinary bladder are obtained.      FINDINGS:      RIGHT KIDNEY: 9.8 cm. Normal in size, shape, contour and position. No  solid or cystic masses. The central echo complex is compact with no  evidence for hydronephrosis. No nephrolithiasis or abnormal perinephric  fluid collections . No hydroureter.      LEFT KIDNEY: 8.4 cm. Normal in  size, shape, contour and position. No  solid or cystic masses. The central echo complex is compact with no  evidence for hydronephrosis. No nephrolithiasis or abnormal perinephric  fluid collections . No hydroureter.      PELVIS: The bladder is mildly distended with anechoic urine and  demonstrates no significant wall thickening or internal echogenicities.  There is no surrounding ascites.     Incidental note is made of a left-sided pleural effusion.       Impression:      1. The kidneys are normal in sonographic appearance with no discrete  mass or hydronephrosis.  2. Left-sided pleural effusion..        This report was finalized on 10/13/2020 15:01 by Dr. Ye Montaño MD.          Culture:  Urine Culture   Date Value Ref Range Status   10/13/2020 50,000 CFU/mL Streptococcus agalactiae (Group B) (A)  Final     Comment:     This organism is considered to be universally susceptible to penicillin.  No further antibiotic testing will be performed.   10/13/2020 50,000 CFU/mL Mixed Duyen Isolated  Final         Assessment   1.  Acute kidney injury--improving  2.  Baseline chronic kidney disease stage 3b  3.  Hypertension  4. Acute diastolic congestive heart failure  5.  Bradycardia  6.  Anemia with iron deficiency  7.  Intermediate probability VQ scan  8.  Secondary hyperparathyroidism    Plan:  1.  Renal function is improving  2.  Monitor labs      Quinn Rodarte, ISAIAH  10/16/2020  10:49 CDT

## 2020-10-16 NOTE — PLAN OF CARE
Problem: Adult Inpatient Plan of Care  Goal: Plan of Care Review  Outcome: Ongoing, Progressing  Flowsheets (Taken 10/16/2020 0458)  Progress: improving  Plan of Care Reviewed With: patient  Outcome Summary: Patient has no c/o pain. Up ad vern. NSR 62-68 on tele. Dropped as low as 22 per telemetry room. VSS. Safety maintained. Will continue to monitor.

## 2020-10-16 NOTE — PLAN OF CARE
Goal Outcome Evaluation:  Plan of Care Reviewed With: patient  Progress: improving  Outcome Summary: Patietn denies pain.Anxious to go home.Repeat labs in the morning.Blood pressures trending better.Up adlib to bathroom and room.Room air.Safety maintained, will follow.

## 2020-10-17 VITALS
HEART RATE: 65 BPM | WEIGHT: 196 LBS | OXYGEN SATURATION: 93 % | HEIGHT: 65 IN | DIASTOLIC BLOOD PRESSURE: 95 MMHG | BODY MASS INDEX: 32.65 KG/M2 | TEMPERATURE: 97.7 F | SYSTOLIC BLOOD PRESSURE: 176 MMHG | RESPIRATION RATE: 18 BRPM

## 2020-10-17 LAB
ANION GAP SERPL CALCULATED.3IONS-SCNC: 10 MMOL/L (ref 5–15)
BUN SERPL-MCNC: 47 MG/DL (ref 8–23)
BUN/CREAT SERPL: 20.5 (ref 7–25)
CALCIUM SPEC-SCNC: 8.4 MG/DL (ref 8.6–10.5)
CHLORIDE SERPL-SCNC: 106 MMOL/L (ref 98–107)
CO2 SERPL-SCNC: 24 MMOL/L (ref 22–29)
CREAT SERPL-MCNC: 2.29 MG/DL (ref 0.57–1)
GFR SERPL CREATININE-BSD FRML MDRD: 22 ML/MIN/1.73
GLUCOSE SERPL-MCNC: 161 MG/DL (ref 65–99)
POTASSIUM SERPL-SCNC: 4.4 MMOL/L (ref 3.5–5.2)
SODIUM SERPL-SCNC: 140 MMOL/L (ref 136–145)

## 2020-10-17 PROCEDURE — 80048 BASIC METABOLIC PNL TOTAL CA: CPT | Performed by: FAMILY MEDICINE

## 2020-10-17 RX ORDER — CARVEDILOL 25 MG/1
12.5 TABLET ORAL 2 TIMES DAILY WITH MEALS
Qty: 6 TABLET | Refills: 1 | Status: ON HOLD | OUTPATIENT
Start: 2020-10-17 | End: 2020-11-02

## 2020-10-17 RX ORDER — NIFEDIPINE 60 MG/1
60 TABLET, FILM COATED, EXTENDED RELEASE ORAL
Qty: 30 TABLET | Refills: 1 | Status: ON HOLD | OUTPATIENT
Start: 2020-10-17 | End: 2020-11-02

## 2020-10-17 RX ORDER — NIFEDIPINE 60 MG/1
60 TABLET, EXTENDED RELEASE ORAL
Status: DISCONTINUED | OUTPATIENT
Start: 2020-10-17 | End: 2020-10-17 | Stop reason: HOSPADM

## 2020-10-17 RX ORDER — SPIRONOLACTONE 25 MG/1
25 TABLET ORAL DAILY
Qty: 30 TABLET | Refills: 1 | Status: SHIPPED | OUTPATIENT
Start: 2020-10-17 | End: 2020-11-10 | Stop reason: HOSPADM

## 2020-10-17 RX ADMIN — SPIRONOLACTONE 25 MG: 25 TABLET ORAL at 08:47

## 2020-10-17 RX ADMIN — FLUTICASONE PROPIONATE 2 SPRAY: 50 SPRAY, METERED NASAL at 08:48

## 2020-10-17 RX ADMIN — PANTOPRAZOLE SODIUM 40 MG: 40 TABLET, DELAYED RELEASE ORAL at 05:53

## 2020-10-17 RX ADMIN — CLONIDINE HYDROCHLORIDE 0.1 MG: 0.1 TABLET ORAL at 03:47

## 2020-10-17 RX ADMIN — HYDRALAZINE HYDROCHLORIDE 75 MG: 50 TABLET ORAL at 06:03

## 2020-10-17 RX ADMIN — CARVEDILOL 25 MG: 25 TABLET, FILM COATED ORAL at 08:47

## 2020-10-17 RX ADMIN — NIFEDIPINE 60 MG: 60 TABLET, FILM COATED, EXTENDED RELEASE ORAL at 08:47

## 2020-10-17 NOTE — PLAN OF CARE
Goal Outcome Evaluation:  Plan of Care Reviewed With: patient  Progress: improving   Pt c/o headache during shift. Treated with PRN meds relief was achieved. BP systolic >180 prn med given with decreased BP. Pt anxious to go home. Pt heart rate drops to 20's cardiology is aware. Safety maintained. Continue to monitor.

## 2020-10-17 NOTE — PROGRESS NOTES
Nephrology ([facility]) Progress Note      Patient:  Ludivina Ramirez  YOB: 1960  Date of Service: 10/16/2020  MRN: [id number]   Acct: [id number]   Primary Care Physician: Orville Weston MD  Advance Directive:   There are no questions and answers to display.     Admit Date: 10/12/2020       Hospital Day: 4  Referring Provider: Orville Weston MD      Patient personally seen and examined.  Complete chart including Consults, Notes, Operative Reports, Labs, Cardiology, and Radiology studies reviewed as able.        Subjective:  Ludivina Ramirez is a 60 y.o. female  whom we were consulted for acute kidney injury.  Patient was never seen us in the office but we had evaluated her as an inpatient approximately 1 year ago.  At that time she had an episode of acute acute injury that resolved to a creatinine of about 1.5 at discharge.  More recently she had had a week of progressive shortness of air and orthopnea.  Her blood pressure been increased that she had not been taking her blood pressure medications and continue to use tobacco.  Upon evaluation was found to have pleural effusion and worsening heart function on echocardiogram.  She denied dysuria, hematuria.  She is a frequent NSAID user of Excedrin.      Today, she is beginning to feel better.  No f/c/n/v.  Tolerating diet.      Allergies:  Codeine and Imitrex [sumatriptan]    Home Meds:  Medications Prior to Admission   Medication Sig Dispense Refill Last Dose   • amLODIPine (NORVASC) 5 MG tablet Take 1 tablet by mouth 2 (Two) Times a Day. 60 tablet 1 10/12/2020 at Unknown time   • azelastine (ASTELIN) 0.1 % nasal spray 1 spray into the nostril(s) as directed by provider 2 (Two) Times a Day. Use in each nostril as directed   10/12/2020 at Unknown time   • carvedilol (COREG) 25 MG tablet Take 1 tablet by mouth 2 (Two) Times a Day With Meals. 6 tablet 1 10/12/2020 at Unknown time   • CloNIDine (CATAPRES) 0.1 MG tablet Take  0.1 mg by mouth Daily As Needed for High Blood Pressure (BP >180/100).   10/12/2020 at Unknown time   • fluticasone (FLONASE) 50 MCG/ACT nasal spray 2 sprays into the nostril(s) as directed by provider Daily As Needed for Rhinitis or Allergies.   10/12/2020 at Unknown time   • hydrALAZINE (APRESOLINE) 25 MG tablet Take 3 tablets by mouth Every 8 (Eight) Hours. 270 tablet 3 10/12/2020 at Unknown time   • lisinopril (PRINIVIL,ZESTRIL) 40 MG tablet Take 1 tablet by mouth Every Evening. 30 tablet 3 10/12/2020 at Unknown time   • pantoprazole (PROTONIX) 40 MG EC tablet Take 1 tablet by mouth Daily. 30 tablet 1 10/12/2020 at Unknown time   • cholecalciferol 1000 units tablet Take 1,000 Units by mouth Daily. 30 tablet 1 Unknown at Unknown time   • tiZANidine (ZANAFLEX) 4 MG tablet Take 1 tablet by mouth Every 8 (Eight) Hours As Needed for Muscle Spasms. 30 tablet 0 Unknown at Unknown time       Medicines:  Current Facility-Administered Medications   Medication Dose Route Frequency Provider Last Rate Last Dose   • acetaminophen (TYLENOL) tablet 650 mg  650 mg Oral Q6H PRN Orville Weston MD   650 mg at 10/15/20 1213   • amLODIPine (NORVASC) tablet 5 mg  5 mg Oral BID Orville Weston MD   5 mg at 10/16/20 2116   • azelastine (ASTELIN) nasal spray 1 spray  1 spray Each Nare BID Orville Weston MD   1 spray at 10/16/20 2118   • carvedilol (COREG) tablet 25 mg  25 mg Oral BID With Meals Orville Weston MD   25 mg at 10/16/20 1711   • cloNIDine (CATAPRES) tablet 0.1 mg  0.1 mg Oral Q4H PRN Orville Weston MD   0.1 mg at 10/15/20 0845   • enoxaparin (LOVENOX) syringe 90 mg  1 mg/kg Subcutaneous Q24H Orville Weston MD   90 mg at 10/15/20 2038   • fluticasone (FLONASE) 50 MCG/ACT nasal spray 2 spray  2 spray Nasal Daily PRN Orville Weston MD       • hydrALAZINE (APRESOLINE) tablet 75 mg  75 mg Oral Q8H Brittani Santana, APRN   75 mg at 10/16/20 2116   • nitroglycerin (NITROSTAT) ointment 1  inch  1 inch Topical Once Son Randall MD       • nitroglycerin (NITROSTAT) SL tablet 0.4 mg  0.4 mg Sublingual Q5 Min PRN Son Randall MD   0.4 mg at 10/12/20 2120   • pantoprazole (PROTONIX) EC tablet 40 mg  40 mg Oral Q AM Orville Weston MD   40 mg at 10/16/20 0615   • spironolactone (ALDACTONE) tablet 25 mg  25 mg Oral Daily Varun Schwarz MD   25 mg at 10/16/20 0841   • tiZANidine (ZANAFLEX) tablet 4 mg  4 mg Oral Q8H PRN Orville Weston MD           Past Medical History:  Past Medical History:   Diagnosis Date   • Acute CHF (CMS/HCC)    • Acute renal failure (ARF) (CMS/HCC)    • Anemia    • Asthma     childhood   • Hypertension    • Ischemic colitis (CMS/HCC)    • Metabolic acidosis    • Nonrheumatic aortic (valve) insufficiency    • PTSD (post-traumatic stress disorder)        Past Surgical History:  Past Surgical History:   Procedure Laterality Date   • EXTERNAL FIXATION WRIST FRACTURE     • LEG SURGERY     • TUBAL ABDOMINAL LIGATION         Family History  Family History   Problem Relation Age of Onset   • Coronary artery disease Mother    • Coronary artery disease Father        Social History  Social History     Socioeconomic History   • Marital status: Single     Spouse name: Not on file   • Number of children: Not on file   • Years of education: Not on file   • Highest education level: Not on file   Tobacco Use   • Smoking status: Current Every Day Smoker     Packs/day: 0.50   • Smokeless tobacco: Never Used   Substance and Sexual Activity   • Alcohol use: No   • Drug use: No   • Sexual activity: Defer         Review of Systems:  History obtained from chart review and the patient  General ROS: No fever or chills  Respiratory ROS: + cough, shortness of breath  Cardiovascular ROS: No chest pain or palpitations  Gastrointestinal ROS: No abdominal pain or melena  Genito-Urinary ROS: No dysuria or hematuria    Objective:  Patient Vitals for the past 24 hrs:   BP Temp  Temp src Pulse Resp SpO2   10/16/20 1947 176/98 98.5 °F (36.9 °C) Oral 64 18 96 %   10/16/20 1625 176/89 98.1 °F (36.7 °C) Oral 65 18 97 %   10/16/20 1100 139/91 98 °F (36.7 °C) Oral 57 16 96 %   10/16/20 0825 168/91 97.9 °F (36.6 °C) Oral 68 16 94 %   10/16/20 0348 169/86 98.1 °F (36.7 °C) Oral 67 16 93 %       Intake/Output Summary (Last 24 hours) at 10/16/2020 2152  Last data filed at 10/16/2020 1947  Gross per 24 hour   Intake 1200 ml   Output 1900 ml   Net -700 ml     General: awake/alert   Chest:  Few basilar crackles  CVS: regular rate and rhythm  Abdominal: soft, nontender, positive bowel sounds  Extremities: no cyanosis or edema  Skin: warm and dry without rash      Labs:  Results from last 7 days   Lab Units 10/12/20  1905   WBC 10*3/mm3 8.14   HEMOGLOBIN g/dL 9.8*   HEMATOCRIT % 30.0*   PLATELETS 10*3/mm3 226         Results from last 7 days   Lab Units 10/16/20  0221 10/15/20  0331 10/14/20  0457   SODIUM mmol/L 141 141 141   POTASSIUM mmol/L 4.1 3.8 3.4*   CHLORIDE mmol/L 106 105 104   CO2 mmol/L 26.0 25.0 28.0   BUN mg/dL 56* 61* 55*   CREATININE mg/dL 2.57* 2.96* 3.29*   CALCIUM mg/dL 8.2* 8.2* 8.4*   GLUCOSE mg/dL 119* 119* 119*       Radiology:   Imaging Results (Last 72 Hours)     ** No results found for the last 72 hours. **          Culture:  Urine Culture   Date Value Ref Range Status   10/13/2020 50,000 CFU/mL Streptococcus agalactiae (Group B) (A)  Final     Comment:     This organism is considered to be universally susceptible to penicillin.  No further antibiotic testing will be performed.   10/13/2020 50,000 CFU/mL Mixed Duyen Isolated  Final         Assessment   Acute renal failure  Hypertension  Intermediate probability VQ scan  Acute diastolic congestive heart failure  Anemia with iron deficiency  Bradycardia  Secondary hyperparathyroidism    Plan:  Hold Coreg if needed for bradycardia  Holding loop diuretics  Can continue spironolactone at this point for blood pressure  optimization  Continue to monitor  Vitamin D levels were adequate  Creat began to improve, continue to monitor    Don Negrete MD  10/16/2020  21:52 CDT

## 2020-10-17 NOTE — NURSING NOTE
Pt HR dropped to 20's throughout night. This AM it dropped to 20's for about a minute long. When waking the pt up and talking the HR goes back up.

## 2020-10-17 NOTE — DISCHARGE SUMMARY
Hospital Discharge Summary    Ludivina Ramirez  :  1960  MRN:  [id number]    Admit date:  10/12/2020  Discharge date:      Admitting Physician:    Orville Weston MD    Discharge Diagnoses:      Shortness of breath    Hypertensive urgency    Elevated troponin    Iron deficiency anemia    Nonrheumatic aortic valve insufficiency    Congestive heart failure (CMS/HCC)    Noncompliance      Hospital Course:   The patient was admitted for the above surgical/medical indication.  Please see admission H&P for further details concerning the admission.  The patient was seen daily and progress noted via daily updates in the progress notes.  The patient improved throughout her stay.  They reached maximum medical improvement and were considered stable for discharge home.  They understand the importance of follow-up concerning any abnormal lab values/x-rays.  All questions were answered to the best of my ability prior to their discharge home.    The patient is a 60-year-old  female who was admitted to Dr. Weston for shortness of breath.  On admission she complained of PND and orthopnea.  She stated she had not been taking her blood pressure medication for greater than 1 week.  She has had profound bradycardia during her hospitalization.  Cardiology was consulted as well as nephrology for her chronic kidney disease.  She was found to have an elevated troponin that is secondary to her renal disease.  Patient was not found to be having any acute coronary event.  Patient had medications adjusted to optimize blood pressure.  Patient is doing much better today and is eager to be discharged home.  Blood pressure still running a little on the high side.  Norvasc was discontinued and nifedipine was started.  Also her Coreg was decreased slightly due to bradycardia.  Patient is to have close outpatient follow-up with Dr. Weston next week as well as nephrology.    Discharge Medications:         Discharge Medications       New Medications      Instructions Start Date   NIFEdipine CC 60 MG 24 hr tablet  Commonly known as: ADALAT CC   60 mg, Oral, Every 24 Hours Scheduled      spironolactone 25 MG tablet  Commonly known as: ALDACTONE   25 mg, Oral, Daily         Changes to Medications      Instructions Start Date   carvedilol 25 MG tablet  Commonly known as: COREG  What changed: how much to take   12.5 mg, Oral, 2 Times Daily With Meals         Continue These Medications      Instructions Start Date   azelastine 0.1 % nasal spray  Commonly known as: ASTELIN   1 spray, Nasal, 2 Times Daily, Use in each nostril as directed      Cholecalciferol 25 MCG (1000 UT) tablet   1,000 Units, Oral, Daily      cloNIDine 0.1 MG tablet  Commonly known as: CATAPRES   0.1 mg, Oral, Daily PRN      fluticasone 50 MCG/ACT nasal spray  Commonly known as: FLONASE   2 sprays, Nasal, Daily PRN      hydrALAZINE 25 MG tablet  Commonly known as: APRESOLINE   75 mg, Oral, Every 8 Hours Scheduled      pantoprazole 40 MG EC tablet  Commonly known as: PROTONIX   40 mg, Oral, Daily      tiZANidine 4 MG tablet  Commonly known as: ZANAFLEX   4 mg, Oral, Every 8 Hours PRN         Stop These Medications    amLODIPine 5 MG tablet  Commonly known as: NORVASC     lisinopril 40 MG tablet  Commonly known as: PRINIVIL,ZESTRIL            Consults:   1.  Nephrology   2.  Cardiology     Significant Diagnostic Studies:    1.  VQ scan - low risk for PE  2.  Renal u/s     EKG: normal EKG, normal sinus rhythm, unchanged from previous tracings.      Treatments:   IV fluids, diuretics, antihypertensives     Disposition:   Stable  Follow up with Orville Weston MD in 1 week.    Signed:  ISAIAH Mcneill   10/17/2020, 07:43 CDT  Patient stable for discharge home.  Close follow-up with Dr. Weston in 1 week.    I have discussed the care of Ludivina Ramirez, including pertinent history and exam findings with the ARNP/PA.  I have seen and examined the patient and the key elements of  all parts of the encounter have been performed by me. I agree with the assessment and plan as outlined by the ARNP/PA. Please refer to my comments for complete documentation.     Electronically signed by Sudhir Levy MD on 10/17/2020 at 13:34 CDT

## 2020-10-18 ENCOUNTER — READMISSION MANAGEMENT (OUTPATIENT)
Dept: CALL CENTER | Facility: HOSPITAL | Age: 60
End: 2020-10-18

## 2020-10-18 NOTE — PAYOR COMM NOTE
"UT HOME 10-17-20  [id number]    Ludivina Clemons (60 y.o. Female)     Date of Birth Social Security Number Address Home Phone MRN    1960  [address] [facility] [phone] [id number]    Religious Marital Status          Muslim Single       Admission Date Admission Type Admitting Provider Attending Provider Department, Room/Bed    10/12/20 Emergency Orville Weston MD  [facility] 4B, 411/1    Discharge Date Discharge Disposition Discharge Destination        10/17/2020 Home or Self Care              Attending Provider: (none)   Allergies: Codeine, Imitrex [Sumatriptan]    Isolation: None   Infection: None   Code Status: Prior    Ht: 165.1 cm (65\")   Wt: 88.9 kg (196 lb)    Admission Cmt: None   Principal Problem: Shortness of breath [R06.02]                 Active Insurance as of 10/12/2020     Primary Coverage     Payor Plan Insurance Group Employer/Plan Group    ANTHEM MEDICARE REPLACEMENT ANTHEM MEDICARE ADVANTAGE [id number]     Payor Plan Address Payor Plan Phone Number Payor Plan Fax Number Effective Dates    [address] [phone]  2017 - None Entered    [facility] [id number]       Subscriber Name Subscriber Birth Date Member ID       LUDIVINA CLEMONS 1960 [id number]                 Emergency Contacts      (Rel.) Home Phone Work Phone Mobile Phone    YOGI AMAYA (Relative) -- -- [phone]    kenzie thompson (Sister) [phone] -- [phone]               Discharge Summary      Sudhir Levy MD at 10/17/20 0743          Hospital Discharge Summary    Ludivina Clemons  :  1960  MRN:  [id number]    Admit date:  10/12/2020  Discharge date:      Admitting Physician:    Orville Weston MD    Discharge Diagnoses:      Shortness of breath    Hypertensive urgency    Elevated troponin    Iron deficiency anemia    Nonrheumatic aortic valve insufficiency    Congestive heart failure (CMS/HCC)    Noncompliance      Hospital Course:   " The patient was admitted for the above surgical/medical indication.  Please see admission H&P for further details concerning the admission.  The patient was seen daily and progress noted via daily updates in the progress notes.  The patient improved throughout her stay.  They reached maximum medical improvement and were considered stable for discharge home.  They understand the importance of follow-up concerning any abnormal lab values/x-rays.  All questions were answered to the best of my ability prior to their discharge home.    The patient is a 60-year-old  female who was admitted to Dr. Weston for shortness of breath.  On admission she complained of PND and orthopnea.  She stated she had not been taking her blood pressure medication for greater than 1 week.  She has had profound bradycardia during her hospitalization.  Cardiology was consulted as well as nephrology for her chronic kidney disease.  She was found to have an elevated troponin that is secondary to her renal disease.  Patient was not found to be having any acute coronary event.  Patient had medications adjusted to optimize blood pressure.  Patient is doing much better today and is eager to be discharged home.  Blood pressure still running a little on the high side.  Norvasc was discontinued and nifedipine was started.  Also her Coreg was decreased slightly due to bradycardia.  Patient is to have close outpatient follow-up with Dr. Weston next week as well as nephrology.    Discharge Medications:         Discharge Medications      New Medications      Instructions Start Date   NIFEdipine CC 60 MG 24 hr tablet  Commonly known as: ADALAT CC   60 mg, Oral, Every 24 Hours Scheduled      spironolactone 25 MG tablet  Commonly known as: ALDACTONE   25 mg, Oral, Daily         Changes to Medications      Instructions Start Date   carvedilol 25 MG tablet  Commonly known as: COREG  What changed: how much to take   12.5 mg, Oral, 2 Times Daily With  Meals         Continue These Medications      Instructions Start Date   azelastine 0.1 % nasal spray  Commonly known as: ASTELIN   1 spray, Nasal, 2 Times Daily, Use in each nostril as directed      Cholecalciferol 25 MCG (1000 UT) tablet   1,000 Units, Oral, Daily      cloNIDine 0.1 MG tablet  Commonly known as: CATAPRES   0.1 mg, Oral, Daily PRN      fluticasone 50 MCG/ACT nasal spray  Commonly known as: FLONASE   2 sprays, Nasal, Daily PRN      hydrALAZINE 25 MG tablet  Commonly known as: APRESOLINE   75 mg, Oral, Every 8 Hours Scheduled      pantoprazole 40 MG EC tablet  Commonly known as: PROTONIX   40 mg, Oral, Daily      tiZANidine 4 MG tablet  Commonly known as: ZANAFLEX   4 mg, Oral, Every 8 Hours PRN         Stop These Medications    amLODIPine 5 MG tablet  Commonly known as: NORVASC     lisinopril 40 MG tablet  Commonly known as: PRINIVILZESTRIL            Consults:   1.  Nephrology   2.  Cardiology     Significant Diagnostic Studies:    1.  VQ scan - low risk for PE  2.  Renal u/s     EKG: normal EKG, normal sinus rhythm, unchanged from previous tracings.      Treatments:   IV fluids, diuretics, antihypertensives     Disposition:   Stable  Follow up with Orville Weston MD in 1 week.    Signed:  ISAIAH Mcneill   10/17/2020, 07:43 CDT  Patient stable for discharge home.  Close follow-up with Dr. Weston in 1 week.    I have discussed the care of Ludivina Ramirez, including pertinent history and exam findings with the ARNP/PA.  I have seen and examined the patient and the key elements of all parts of the encounter have been performed by me. I agree with the assessment and plan as outlined by the ARNP/PA. Please refer to my comments for complete documentation.     Electronically signed by Sudhir Levy MD on 10/17/2020 at 13:34 CDT      Electronically signed by Sudhir Levy MD at 10/17/20 2039

## 2020-10-18 NOTE — OUTREACH NOTE
Prep Survey      Responses   [facility] facility patient discharged from?  [place]   Is LACE score < 7 ?  No   Eligibility  Readm Mgmt   Discharge diagnosis  Shortness of breath, hypertensive urgency, elevated troponin, CHF   Does the patient have one of the following disease processes/diagnoses(primary or secondary)?  Other   Does the patient have Home health ordered?  No   Is there a DME ordered?  No   Comments regarding appointments  Needs f/u scheduled   Prep survey completed?  Yes          Luba Duran RN

## 2020-10-22 ENCOUNTER — READMISSION MANAGEMENT (OUTPATIENT)
Dept: CALL CENTER | Facility: HOSPITAL | Age: 60
End: 2020-10-22

## 2020-10-22 NOTE — OUTREACH NOTE
Medical Week 1 Survey      Responses   [facility] patient discharged from?  [place]   Does the patient have one of the following disease processes/diagnoses(primary or secondary)?  Other   Week 1 attempt successful?  Yes   Call start time  1459   Call end time  1506   Discharge diagnosis  Shortness of breath, hypertensive urgency, elevated troponin, CHF   Meds reviewed with patient/caregiver?  Yes   Is the patient having any side effects they believe may be caused by any medication additions or changes?  No   Does the patient have all medications ordered at discharge?  Yes   Is the patient taking all medications as directed (includes completed medication regime)?  Yes   Does the patient have a primary care provider?   Yes   Does the patient have an appointment with their PCP within 7 days of discharge?  Greater than 7 days   Comments regarding PCP  Honorio reports she has an appt with Dr Weston next week.    What is preventing the patient from scheduling follow up appointments within 7 days of discharge?  Earlier appointment not available   Nursing Interventions  Verified appointment date/time/provider, Educated patient on importance of making appointment, Advised patient to make appointment   Has the patient kept scheduled appointments due by today?  N/A   Has home health visited the patient within 72 hours of discharge?  N/A   Psychosocial issues?  No   Did the patient receive a copy of their discharge instructions?  Yes   Nursing interventions  Reviewed instructions with patient   What is the patient's perception of their health status since discharge?  Improving   Is the patient/caregiver able to teach back signs and symptoms related to disease process for when to call PCP?  Yes   Is the patient/caregiver able to teach back signs and symptoms related to disease process for when to call 911?  Yes   Is the patient/caregiver able to teach back the hierarchy of who to call/visit for symptoms/problems? PCP,  Specialist, Home health nurse, Urgent Care, ED, 911  Yes   Additional teach back comments  Encouraged patient to monitor daily weights. Heart failure zones reviewed with patient.    Week 1 call completed?  Yes          Chencho Nichols RN

## 2020-10-26 ENCOUNTER — APPOINTMENT (OUTPATIENT)
Dept: GENERAL RADIOLOGY | Facility: HOSPITAL | Age: 60
End: 2020-10-26

## 2020-10-26 ENCOUNTER — HOSPITAL ENCOUNTER (INPATIENT)
Facility: HOSPITAL | Age: 60
LOS: 8 days | Discharge: HOME | End: 2020-11-03
Attending: EMERGENCY MEDICINE | Admitting: FAMILY MEDICINE

## 2020-10-26 DIAGNOSIS — N18.4 CHRONIC KIDNEY DISEASE, STAGE 4 (SEVERE) (HCC): ICD-10-CM

## 2020-10-26 DIAGNOSIS — R77.8 TROPONIN I ABOVE REFERENCE RANGE: ICD-10-CM

## 2020-10-26 DIAGNOSIS — E87.20 METABOLIC ACIDOSIS: ICD-10-CM

## 2020-10-26 DIAGNOSIS — J96.01 ACUTE RESPIRATORY FAILURE WITH HYPOXIA (HCC): Primary | ICD-10-CM

## 2020-10-26 DIAGNOSIS — I50.41 ACUTE COMBINED SYSTOLIC AND DIASTOLIC CONGESTIVE HEART FAILURE (HCC): ICD-10-CM

## 2020-10-26 PROBLEM — I16.1 HYPERTENSIVE EMERGENCY: Status: ACTIVE | Noted: 2020-10-26

## 2020-10-26 LAB
A-A DO2: ABNORMAL
ABO GROUP BLD: NORMAL
ALBUMIN SERPL-MCNC: 3.9 G/DL (ref 3.5–5.2)
ALBUMIN/GLOB SERPL: 1.3 G/DL
ALP SERPL-CCNC: 116 U/L (ref 39–117)
ALT SERPL W P-5'-P-CCNC: 18 U/L (ref 1–33)
ANION GAP SERPL CALCULATED.3IONS-SCNC: 9 MMOL/L (ref 5–15)
ARTERIAL PATENCY WRIST A: ABNORMAL
ARTERIAL PATENCY WRIST A: POSITIVE
ARTERIAL PATENCY WRIST A: POSITIVE
AST SERPL-CCNC: 18 U/L (ref 1–32)
ATMOSPHERIC PRESS: 754 MMHG
ATMOSPHERIC PRESS: 755 MMHG
ATMOSPHERIC PRESS: 756 MMHG
B PARAPERT DNA SPEC QL NAA+PROBE: NOT DETECTED
B PERT DNA SPEC QL NAA+PROBE: NOT DETECTED
BACTERIA UR QL AUTO: ABNORMAL /HPF
BASE EXCESS BLDA CALC-SCNC: -2.5 MMOL/L (ref 0–2)
BASE EXCESS BLDA CALC-SCNC: -2.7 MMOL/L (ref 0–2)
BASE EXCESS BLDA CALC-SCNC: -5.4 MMOL/L (ref 0–2)
BASOPHILS # BLD AUTO: 0.1 10*3/MM3 (ref 0–0.2)
BASOPHILS NFR BLD AUTO: 0.7 % (ref 0–1.5)
BDY SITE: ABNORMAL
BILIRUB SERPL-MCNC: 0.2 MG/DL (ref 0–1.2)
BILIRUB UR QL STRIP: NEGATIVE
BLD GP AB SCN SERPL QL: NEGATIVE
BODY TEMPERATURE: 37 C
BUN SERPL-MCNC: 40 MG/DL (ref 8–23)
BUN/CREAT SERPL: 19.7 (ref 7–25)
C PNEUM DNA NPH QL NAA+NON-PROBE: NOT DETECTED
CALCIUM SPEC-SCNC: 8.8 MG/DL (ref 8.6–10.5)
CHLORIDE SERPL-SCNC: 108 MMOL/L (ref 98–107)
CLARITY UR: CLEAR
CO2 SERPL-SCNC: 24 MMOL/L (ref 22–29)
COHGB MFR BLD: 1.4 % (ref 0–5)
COLOR UR: YELLOW
CREAT SERPL-MCNC: 2.03 MG/DL (ref 0.57–1)
CRP SERPL-MCNC: 4.21 MG/DL (ref 0–0.5)
D DIMER PPP FEU-MCNC: 1.7 MG/L (FEU) (ref 0–0.5)
D-LACTATE SERPL-SCNC: 0.5 MMOL/L (ref 0.5–2)
DEPRECATED RDW RBC AUTO: 49.8 FL (ref 37–54)
EOSINOPHIL # BLD AUTO: 0.17 10*3/MM3 (ref 0–0.4)
EOSINOPHIL NFR BLD AUTO: 1.1 % (ref 0.3–6.2)
ERYTHROCYTE [DISTWIDTH] IN BLOOD BY AUTOMATED COUNT: 14.9 % (ref 12.3–15.4)
FERRITIN SERPL-MCNC: 67.13 NG/ML (ref 13–150)
FLUAV H1 2009 PAND RNA NPH QL NAA+PROBE: NOT DETECTED
FLUAV H1 HA GENE NPH QL NAA+PROBE: NOT DETECTED
FLUAV H3 RNA NPH QL NAA+PROBE: NOT DETECTED
FLUAV SUBTYP SPEC NAA+PROBE: NOT DETECTED
FLUBV RNA ISLT QL NAA+PROBE: NOT DETECTED
GAS FLOW AIRWAY: 15 LPM
GFR SERPL CREATININE-BSD FRML MDRD: 25 ML/MIN/1.73
GLOBULIN UR ELPH-MCNC: 3.1 GM/DL
GLUCOSE SERPL-MCNC: 121 MG/DL (ref 65–99)
GLUCOSE UR STRIP-MCNC: NEGATIVE MG/DL
HADV DNA SPEC NAA+PROBE: NOT DETECTED
HCO3 BLDA-SCNC: 25.3 MMOL/L (ref 20–26)
HCO3 BLDA-SCNC: 25.4 MMOL/L (ref 20–26)
HCO3 BLDA-SCNC: 26.9 MMOL/L (ref 20–26)
HCOV 229E RNA SPEC QL NAA+PROBE: NOT DETECTED
HCOV HKU1 RNA SPEC QL NAA+PROBE: NOT DETECTED
HCOV NL63 RNA SPEC QL NAA+PROBE: NOT DETECTED
HCOV OC43 RNA SPEC QL NAA+PROBE: NOT DETECTED
HCT VFR BLD AUTO: 32.4 % (ref 34–46.6)
HCT VFR BLD CALC: 31.5 % (ref 38–51)
HGB BLD-MCNC: 9.7 G/DL (ref 12–15.9)
HGB BLDA-MCNC: 10.3 G/DL (ref 12–16)
HGB UR QL STRIP.AUTO: NEGATIVE
HMPV RNA NPH QL NAA+NON-PROBE: NOT DETECTED
HPIV1 RNA SPEC QL NAA+PROBE: NOT DETECTED
HPIV2 RNA SPEC QL NAA+PROBE: NOT DETECTED
HPIV3 RNA NPH QL NAA+PROBE: NOT DETECTED
HPIV4 P GENE NPH QL NAA+PROBE: NOT DETECTED
HYALINE CASTS UR QL AUTO: ABNORMAL /LPF
IMM GRANULOCYTES # BLD AUTO: 0.06 10*3/MM3 (ref 0–0.05)
IMM GRANULOCYTES NFR BLD AUTO: 0.4 % (ref 0–0.5)
INHALED O2 CONCENTRATION: 100 %
INHALED O2 CONCENTRATION: 60 %
INHALED O2 CONCENTRATION: 80 %
INR PPP: 1.02 (ref 0.91–1.09)
KETONES UR QL STRIP: NEGATIVE
LDH SERPL-CCNC: 227 U/L (ref 135–214)
LEUKOCYTE ESTERASE UR QL STRIP.AUTO: NEGATIVE
LYMPHOCYTES # BLD AUTO: 0.8 10*3/MM3 (ref 0.7–3.1)
LYMPHOCYTES NFR BLD AUTO: 5.3 % (ref 19.6–45.3)
Lab: ABNORMAL
M PNEUMO IGG SER IA-ACNC: NOT DETECTED
MCH RBC QN AUTO: 27.1 PG (ref 26.6–33)
MCHC RBC AUTO-ENTMCNC: 29.9 G/DL (ref 31.5–35.7)
MCV RBC AUTO: 90.5 FL (ref 79–97)
METHGB BLD QL: 0.4 % (ref 0–3)
MODALITY: ABNORMAL
MONOCYTES # BLD AUTO: 0.85 10*3/MM3 (ref 0.1–0.9)
MONOCYTES NFR BLD AUTO: 5.6 % (ref 5–12)
NEUTROPHILS NFR BLD AUTO: 13.08 10*3/MM3 (ref 1.7–7)
NEUTROPHILS NFR BLD AUTO: 86.9 % (ref 42.7–76)
NITRITE UR QL STRIP: NEGATIVE
NOTE: ABNORMAL
NOTIFIED BY: ABNORMAL
NOTIFIED BY: ABNORMAL
NOTIFIED WHO: ABNORMAL
NOTIFIED WHO: ABNORMAL
NRBC BLD AUTO-RTO: 0 /100 WBC (ref 0–0.2)
NT-PROBNP SERPL-MCNC: ABNORMAL PG/ML (ref 0–900)
OXYHGB MFR BLDV: 96 % (ref 94–99)
PCO2 BLDA: 59.1 MM HG (ref 35–45)
PCO2 BLDA: 62 MM HG (ref 35–45)
PCO2 BLDA: >102 MM HG (ref 35–45)
PCO2 TEMP ADJ BLD: 59.1 MM HG (ref 35–45)
PCO2 TEMP ADJ BLD: 62 MM HG (ref 35–45)
PCO2 TEMP ADJ BLD: >102 MM HG (ref 35–45)
PEEP RESPIRATORY: 5 CM[H2O]
PEEP RESPIRATORY: 5 CM[H2O]
PH BLDA: 7.03 PH UNITS (ref 7.35–7.45)
PH BLDA: 7.22 PH UNITS (ref 7.35–7.45)
PH BLDA: 7.24 PH UNITS (ref 7.35–7.45)
PH UR STRIP.AUTO: 6.5 [PH] (ref 5–8)
PH, TEMP CORRECTED: 7.03 PH UNITS (ref 7.35–7.45)
PH, TEMP CORRECTED: 7.22 PH UNITS (ref 7.35–7.45)
PH, TEMP CORRECTED: 7.24 PH UNITS (ref 7.35–7.45)
PLATELET # BLD AUTO: 295 10*3/MM3 (ref 140–450)
PMV BLD AUTO: 10.6 FL (ref 6–12)
PO2 BLDA: 130 MM HG (ref 83–108)
PO2 BLDA: 132 MM HG (ref 83–108)
PO2 BLDA: 145 MM HG (ref 83–108)
PO2 TEMP ADJ BLD: 130 MM HG (ref 83–108)
PO2 TEMP ADJ BLD: 132 MM HG (ref 83–108)
PO2 TEMP ADJ BLD: 145 MM HG (ref 83–108)
POTASSIUM BLDA-SCNC: 4.5 MMOL/L (ref 3.5–5.2)
POTASSIUM SERPL-SCNC: 4.6 MMOL/L (ref 3.5–5.2)
PROCALCITONIN SERPL-MCNC: 0.06 NG/ML (ref 0–0.25)
PROT SERPL-MCNC: 7 G/DL (ref 6–8.5)
PROT UR QL STRIP: ABNORMAL
PROTHROMBIN TIME: 13 SECONDS (ref 11.9–14.6)
RBC # BLD AUTO: 3.58 10*6/MM3 (ref 3.77–5.28)
RBC # UR: ABNORMAL /HPF
REF LAB TEST METHOD: ABNORMAL
RH BLD: POSITIVE
RHINOVIRUS RNA SPEC NAA+PROBE: NOT DETECTED
RSV RNA NPH QL NAA+NON-PROBE: NOT DETECTED
S PNEUM AG SPEC QL LA: NEGATIVE
SAO2 % BLDCOA: 97.8 % (ref 94–99)
SAO2 % BLDCOA: 99.1 % (ref 94–99)
SAO2 % BLDCOA: 99.9 % (ref 94–99)
SARS-COV-2 RNA NPH QL NAA+NON-PROBE: NOT DETECTED
SET MECH RESP RATE: 12
SET MECH RESP RATE: 16
SODIUM BLDA-SCNC: 147 MMOL/L (ref 136–145)
SODIUM SERPL-SCNC: 141 MMOL/L (ref 136–145)
SP GR UR STRIP: 1.01 (ref 1–1.03)
SQUAMOUS #/AREA URNS HPF: ABNORMAL /HPF
T&S EXPIRATION DATE: NORMAL
TROPONIN T SERPL-MCNC: 0.35 NG/ML (ref 0–0.03)
TROPONIN T SERPL-MCNC: 0.36 NG/ML (ref 0–0.03)
UROBILINOGEN UR QL STRIP: ABNORMAL
VENTILATOR MODE: ABNORMAL
VENTILATOR MODE: AC
VENTILATOR MODE: AC
VT ON VENT VENT: 500 ML
VT ON VENT VENT: 500 ML
WBC # BLD AUTO: 15.06 10*3/MM3 (ref 3.4–10.8)
WBC UR QL AUTO: ABNORMAL /HPF

## 2020-10-26 PROCEDURE — 94799 UNLISTED PULMONARY SVC/PX: CPT

## 2020-10-26 PROCEDURE — 25010000002 PROPOFOL 10 MG/ML EMULSION: Performed by: EMERGENCY MEDICINE

## 2020-10-26 PROCEDURE — 0202U NFCT DS 22 TRGT SARS-COV-2: CPT | Performed by: NURSE PRACTITIONER

## 2020-10-26 PROCEDURE — 25010000002 MEROPENEM PER 100 MG: Performed by: NURSE PRACTITIONER

## 2020-10-26 PROCEDURE — 99285 EMERGENCY DEPT VISIT HI MDM: CPT

## 2020-10-26 PROCEDURE — 82805 BLOOD GASES W/O2 SATURATION: CPT

## 2020-10-26 PROCEDURE — 25010000002 MORPHINE SULFATE (PF) 2 MG/ML SOLUTION: Performed by: EMERGENCY MEDICINE

## 2020-10-26 PROCEDURE — 93005 ELECTROCARDIOGRAM TRACING: CPT | Performed by: EMERGENCY MEDICINE

## 2020-10-26 PROCEDURE — 94770: CPT

## 2020-10-26 PROCEDURE — 84484 ASSAY OF TROPONIN QUANT: CPT | Performed by: FAMILY MEDICINE

## 2020-10-26 PROCEDURE — 86900 BLOOD TYPING SEROLOGIC ABO: CPT | Performed by: NURSE PRACTITIONER

## 2020-10-26 PROCEDURE — 36600 WITHDRAWAL OF ARTERIAL BLOOD: CPT

## 2020-10-26 PROCEDURE — 83615 LACTATE (LD) (LDH) ENZYME: CPT | Performed by: NURSE PRACTITIONER

## 2020-10-26 PROCEDURE — 71045 X-RAY EXAM CHEST 1 VIEW: CPT

## 2020-10-26 PROCEDURE — 84145 PROCALCITONIN (PCT): CPT | Performed by: NURSE PRACTITIONER

## 2020-10-26 PROCEDURE — 25010000002 FUROSEMIDE PER 20 MG: Performed by: EMERGENCY MEDICINE

## 2020-10-26 PROCEDURE — 83605 ASSAY OF LACTIC ACID: CPT | Performed by: NURSE PRACTITIONER

## 2020-10-26 PROCEDURE — 94002 VENT MGMT INPAT INIT DAY: CPT

## 2020-10-26 PROCEDURE — 84484 ASSAY OF TROPONIN QUANT: CPT | Performed by: NURSE PRACTITIONER

## 2020-10-26 PROCEDURE — 99223 1ST HOSP IP/OBS HIGH 75: CPT | Performed by: INTERNAL MEDICINE

## 2020-10-26 PROCEDURE — 25010000002 ENOXAPARIN PER 10 MG: Performed by: FAMILY MEDICINE

## 2020-10-26 PROCEDURE — 82803 BLOOD GASES ANY COMBINATION: CPT

## 2020-10-26 PROCEDURE — 82375 ASSAY CARBOXYHB QUANT: CPT

## 2020-10-26 PROCEDURE — 86140 C-REACTIVE PROTEIN: CPT | Performed by: NURSE PRACTITIONER

## 2020-10-26 PROCEDURE — 74018 RADEX ABDOMEN 1 VIEW: CPT

## 2020-10-26 PROCEDURE — 25010000002 SUCCINYLCHOLINE PER 20 MG: Performed by: EMERGENCY MEDICINE

## 2020-10-26 PROCEDURE — 87899 AGENT NOS ASSAY W/OPTIC: CPT | Performed by: NURSE PRACTITIONER

## 2020-10-26 PROCEDURE — 87040 BLOOD CULTURE FOR BACTERIA: CPT | Performed by: EMERGENCY MEDICINE

## 2020-10-26 PROCEDURE — 25010000002 ONDANSETRON PER 1 MG: Performed by: EMERGENCY MEDICINE

## 2020-10-26 PROCEDURE — 25010000002 VANCOMYCIN PER 500 MG: Performed by: NURSE PRACTITIONER

## 2020-10-26 PROCEDURE — 25010000002 HYDRALAZINE PER 20 MG: Performed by: FAMILY MEDICINE

## 2020-10-26 PROCEDURE — 85379 FIBRIN DEGRADATION QUANT: CPT | Performed by: NURSE PRACTITIONER

## 2020-10-26 PROCEDURE — 25010000002 AZITHROMYCIN PER 500 MG: Performed by: EMERGENCY MEDICINE

## 2020-10-26 PROCEDURE — 86850 RBC ANTIBODY SCREEN: CPT | Performed by: NURSE PRACTITIONER

## 2020-10-26 PROCEDURE — 85610 PROTHROMBIN TIME: CPT | Performed by: NURSE PRACTITIONER

## 2020-10-26 PROCEDURE — 25010000002 METHYLPREDNISOLONE PER 40 MG: Performed by: INTERNAL MEDICINE

## 2020-10-26 PROCEDURE — 25010000002 MIDAZOLAM HCL (PF) 5 MG/5ML SOLUTION: Performed by: EMERGENCY MEDICINE

## 2020-10-26 PROCEDURE — 94640 AIRWAY INHALATION TREATMENT: CPT

## 2020-10-26 PROCEDURE — 80053 COMPREHEN METABOLIC PANEL: CPT | Performed by: NURSE PRACTITIONER

## 2020-10-26 PROCEDURE — 86901 BLOOD TYPING SEROLOGIC RH(D): CPT | Performed by: NURSE PRACTITIONER

## 2020-10-26 PROCEDURE — 25010000002 FUROSEMIDE PER 20 MG: Performed by: INTERNAL MEDICINE

## 2020-10-26 PROCEDURE — 0BH17EZ INSERTION OF ENDOTRACHEAL AIRWAY INTO TRACHEA, VIA NATURAL OR ARTIFICIAL OPENING: ICD-10-PCS | Performed by: EMERGENCY MEDICINE

## 2020-10-26 PROCEDURE — 25010000002 ENOXAPARIN PER 10 MG: Performed by: EMERGENCY MEDICINE

## 2020-10-26 PROCEDURE — 85025 COMPLETE CBC W/AUTO DIFF WBC: CPT | Performed by: NURSE PRACTITIONER

## 2020-10-26 PROCEDURE — 25010000002 CEFTRIAXONE PER 250 MG: Performed by: EMERGENCY MEDICINE

## 2020-10-26 PROCEDURE — 83050 HGB METHEMOGLOBIN QUAN: CPT

## 2020-10-26 PROCEDURE — 81001 URINALYSIS AUTO W/SCOPE: CPT | Performed by: NURSE PRACTITIONER

## 2020-10-26 PROCEDURE — 5A1955Z RESPIRATORY VENTILATION, GREATER THAN 96 CONSECUTIVE HOURS: ICD-10-PCS | Performed by: INTERNAL MEDICINE

## 2020-10-26 PROCEDURE — 82728 ASSAY OF FERRITIN: CPT | Performed by: NURSE PRACTITIONER

## 2020-10-26 PROCEDURE — 31500 INSERT EMERGENCY AIRWAY: CPT

## 2020-10-26 PROCEDURE — 83880 ASSAY OF NATRIURETIC PEPTIDE: CPT | Performed by: NURSE PRACTITIONER

## 2020-10-26 PROCEDURE — 25010000002 LEVOFLOXACIN PER 250 MG: Performed by: NURSE PRACTITIONER

## 2020-10-26 RX ORDER — LEVOFLOXACIN 5 MG/ML
750 INJECTION, SOLUTION INTRAVENOUS
Status: DISCONTINUED | OUTPATIENT
Start: 2020-10-26 | End: 2020-11-01

## 2020-10-26 RX ORDER — BUDESONIDE 0.25 MG/2ML
0.5 INHALANT ORAL
Status: DISCONTINUED | OUTPATIENT
Start: 2020-10-26 | End: 2020-11-03 | Stop reason: HOSPADM

## 2020-10-26 RX ORDER — ROCURONIUM BROMIDE 10 MG/ML
40 INJECTION, SOLUTION INTRAVENOUS ONCE
Status: COMPLETED | OUTPATIENT
Start: 2020-10-26 | End: 2020-10-26

## 2020-10-26 RX ORDER — SODIUM CHLORIDE 9 MG/ML
75 INJECTION, SOLUTION INTRAVENOUS CONTINUOUS
Status: DISCONTINUED | OUTPATIENT
Start: 2020-10-26 | End: 2020-10-29

## 2020-10-26 RX ORDER — FUROSEMIDE 10 MG/ML
40 INJECTION INTRAMUSCULAR; INTRAVENOUS ONCE
Status: COMPLETED | OUTPATIENT
Start: 2020-10-26 | End: 2020-10-26

## 2020-10-26 RX ORDER — FAMOTIDINE 10 MG/ML
20 INJECTION, SOLUTION INTRAVENOUS DAILY
Status: DISCONTINUED | OUTPATIENT
Start: 2020-10-27 | End: 2020-11-01

## 2020-10-26 RX ORDER — MIDAZOLAM HYDROCHLORIDE 1 MG/ML
2 INJECTION INTRAMUSCULAR; INTRAVENOUS ONCE
Status: DISCONTINUED | OUTPATIENT
Start: 2020-10-26 | End: 2020-10-26

## 2020-10-26 RX ORDER — LABETALOL HYDROCHLORIDE 5 MG/ML
10 INJECTION, SOLUTION INTRAVENOUS EVERY 6 HOURS PRN
Status: DISCONTINUED | OUTPATIENT
Start: 2020-10-26 | End: 2020-10-26

## 2020-10-26 RX ORDER — HYDRALAZINE HYDROCHLORIDE 20 MG/ML
10 INJECTION INTRAMUSCULAR; INTRAVENOUS EVERY 6 HOURS PRN
Status: DISCONTINUED | OUTPATIENT
Start: 2020-10-26 | End: 2020-11-03 | Stop reason: HOSPADM

## 2020-10-26 RX ORDER — METHYLPREDNISOLONE SODIUM SUCCINATE 40 MG/ML
40 INJECTION, POWDER, LYOPHILIZED, FOR SOLUTION INTRAMUSCULAR; INTRAVENOUS EVERY 8 HOURS SCHEDULED
Status: DISCONTINUED | OUTPATIENT
Start: 2020-10-26 | End: 2020-10-27

## 2020-10-26 RX ORDER — IPRATROPIUM BROMIDE AND ALBUTEROL SULFATE 2.5; .5 MG/3ML; MG/3ML
3 SOLUTION RESPIRATORY (INHALATION)
Status: DISCONTINUED | OUTPATIENT
Start: 2020-10-26 | End: 2020-11-03 | Stop reason: HOSPADM

## 2020-10-26 RX ORDER — DEXMEDETOMIDINE HYDROCHLORIDE 4 UG/ML
.2-1.5 INJECTION, SOLUTION INTRAVENOUS
Status: DISCONTINUED | OUTPATIENT
Start: 2020-10-26 | End: 2020-10-26 | Stop reason: SDUPTHER

## 2020-10-26 RX ORDER — FAMOTIDINE 10 MG/ML
20 INJECTION, SOLUTION INTRAVENOUS EVERY 12 HOURS SCHEDULED
Status: DISCONTINUED | OUTPATIENT
Start: 2020-10-26 | End: 2020-10-26

## 2020-10-26 RX ORDER — NITROGLYCERIN 20 MG/100ML
5-200 INJECTION INTRAVENOUS
Status: DISCONTINUED | OUTPATIENT
Start: 2020-10-26 | End: 2020-11-01

## 2020-10-26 RX ORDER — VANCOMYCIN HYDROCHLORIDE 1 G/200ML
1000 INJECTION, SOLUTION INTRAVENOUS EVERY 24 HOURS
Status: DISCONTINUED | OUTPATIENT
Start: 2020-10-26 | End: 2020-10-28

## 2020-10-26 RX ORDER — SUCCINYLCHOLINE CHLORIDE 20 MG/ML
200 INJECTION INTRAMUSCULAR; INTRAVENOUS ONCE
Status: COMPLETED | OUTPATIENT
Start: 2020-10-26 | End: 2020-10-26

## 2020-10-26 RX ORDER — MIDAZOLAM HYDROCHLORIDE 1 MG/ML
2 INJECTION, SOLUTION INTRAMUSCULAR; INTRAVENOUS ONCE
Status: COMPLETED | OUTPATIENT
Start: 2020-10-26 | End: 2020-10-26

## 2020-10-26 RX ORDER — ETOMIDATE 2 MG/ML
20 INJECTION INTRAVENOUS ONCE
Status: COMPLETED | OUTPATIENT
Start: 2020-10-26 | End: 2020-10-26

## 2020-10-26 RX ORDER — ONDANSETRON 2 MG/ML
4 INJECTION INTRAMUSCULAR; INTRAVENOUS ONCE
Status: COMPLETED | OUTPATIENT
Start: 2020-10-26 | End: 2020-10-26

## 2020-10-26 RX ORDER — MORPHINE SULFATE 2 MG/ML
2 INJECTION, SOLUTION INTRAMUSCULAR; INTRAVENOUS ONCE
Status: COMPLETED | OUTPATIENT
Start: 2020-10-26 | End: 2020-10-26

## 2020-10-26 RX ADMIN — IPRATROPIUM BROMIDE AND ALBUTEROL SULFATE 3 ML: 2.5; .5 SOLUTION RESPIRATORY (INHALATION) at 13:39

## 2020-10-26 RX ADMIN — DEXMEDETOMIDINE HYDROCHLORIDE 0.2 MCG/KG/HR: 100 INJECTION, SOLUTION INTRAVENOUS at 10:33

## 2020-10-26 RX ADMIN — PROPOFOL 50 MCG/KG/MIN: 10 INJECTION, EMULSION INTRAVENOUS at 17:00

## 2020-10-26 RX ADMIN — ONDANSETRON HYDROCHLORIDE 4 MG: 2 SOLUTION INTRAMUSCULAR; INTRAVENOUS at 09:06

## 2020-10-26 RX ADMIN — DEXMEDETOMIDINE HYDROCHLORIDE 0.7 MCG/KG/HR: 100 INJECTION, SOLUTION INTRAVENOUS at 20:30

## 2020-10-26 RX ADMIN — MIDAZOLAM HYDROCHLORIDE 2 MG: 1 INJECTION, SOLUTION INTRAMUSCULAR; INTRAVENOUS at 11:14

## 2020-10-26 RX ADMIN — FUROSEMIDE 40 MG: 10 INJECTION, SOLUTION INTRAMUSCULAR; INTRAVENOUS at 16:23

## 2020-10-26 RX ADMIN — SODIUM CHLORIDE 1000 ML: 9 INJECTION, SOLUTION INTRAVENOUS at 09:06

## 2020-10-26 RX ADMIN — PROPOFOL 50 MCG/KG/MIN: 10 INJECTION, EMULSION INTRAVENOUS at 20:35

## 2020-10-26 RX ADMIN — SODIUM CHLORIDE 40 ML/HR: 9 INJECTION, SOLUTION INTRAVENOUS at 16:25

## 2020-10-26 RX ADMIN — PROPOFOL 50 MCG/KG/MIN: 10 INJECTION, EMULSION INTRAVENOUS at 14:15

## 2020-10-26 RX ADMIN — PROPOFOL 5 MCG/KG/MIN: 10 INJECTION, EMULSION INTRAVENOUS at 10:43

## 2020-10-26 RX ADMIN — ENOXAPARIN SODIUM 90 MG: 100 INJECTION SUBCUTANEOUS at 09:50

## 2020-10-26 RX ADMIN — MORPHINE SULFATE 2 MG: 2 INJECTION, SOLUTION INTRAMUSCULAR; INTRAVENOUS at 09:06

## 2020-10-26 RX ADMIN — HYDRALAZINE HYDROCHLORIDE 10 MG: 20 INJECTION INTRAMUSCULAR; INTRAVENOUS at 23:57

## 2020-10-26 RX ADMIN — BUDESONIDE 0.5 MG: 0.25 INHALANT RESPIRATORY (INHALATION) at 19:44

## 2020-10-26 RX ADMIN — AZITHROMYCIN 500 MG: 500 INJECTION, POWDER, LYOPHILIZED, FOR SOLUTION INTRAVENOUS at 10:47

## 2020-10-26 RX ADMIN — ENOXAPARIN SODIUM 90 MG: 100 INJECTION SUBCUTANEOUS at 21:28

## 2020-10-26 RX ADMIN — ETOMIDATE 20 MG: 20 INJECTION, SOLUTION INTRAVENOUS at 10:18

## 2020-10-26 RX ADMIN — FAMOTIDINE 20 MG: 10 INJECTION, SOLUTION INTRAVENOUS at 13:42

## 2020-10-26 RX ADMIN — METHYLPREDNISOLONE SODIUM SUCCINATE 40 MG: 40 INJECTION, POWDER, FOR SOLUTION INTRAMUSCULAR; INTRAVENOUS at 13:42

## 2020-10-26 RX ADMIN — SUCCINYLCHOLINE CHLORIDE 200 MG: 20 INJECTION, SOLUTION INTRAMUSCULAR; INTRAVENOUS at 10:19

## 2020-10-26 RX ADMIN — DEXMEDETOMIDINE HYDROCHLORIDE 0.9 MCG/KG/HR: 100 INJECTION, SOLUTION INTRAVENOUS at 15:25

## 2020-10-26 RX ADMIN — MEROPENEM 1 G: 1 INJECTION, POWDER, FOR SOLUTION INTRAVENOUS at 14:53

## 2020-10-26 RX ADMIN — VANCOMYCIN HYDROCHLORIDE 1000 MG: 1 INJECTION, SOLUTION INTRAVENOUS at 15:03

## 2020-10-26 RX ADMIN — LEVOFLOXACIN 750 MG: 5 INJECTION, SOLUTION INTRAVENOUS at 14:56

## 2020-10-26 RX ADMIN — NITROGLYCERIN 10 MCG/MIN: 20 INJECTION INTRAVENOUS at 09:06

## 2020-10-26 RX ADMIN — ROCURONIUM BROMIDE 40 MG: 10 INJECTION INTRAVENOUS at 10:35

## 2020-10-26 RX ADMIN — CEFTRIAXONE SODIUM 1 G: 1 INJECTION, POWDER, FOR SOLUTION INTRAMUSCULAR; INTRAVENOUS at 10:29

## 2020-10-26 RX ADMIN — FUROSEMIDE 40 MG: 10 INJECTION, SOLUTION INTRAMUSCULAR; INTRAVENOUS at 09:06

## 2020-10-26 RX ADMIN — IPRATROPIUM BROMIDE AND ALBUTEROL SULFATE 3 ML: 2.5; .5 SOLUTION RESPIRATORY (INHALATION) at 19:44

## 2020-10-26 RX ADMIN — METHYLPREDNISOLONE SODIUM SUCCINATE 40 MG: 40 INJECTION, POWDER, FOR SOLUTION INTRAMUSCULAR; INTRAVENOUS at 21:27

## 2020-10-27 ENCOUNTER — READMISSION MANAGEMENT (OUTPATIENT)
Dept: CALL CENTER | Facility: HOSPITAL | Age: 60
End: 2020-10-27

## 2020-10-27 ENCOUNTER — APPOINTMENT (OUTPATIENT)
Dept: GENERAL RADIOLOGY | Facility: HOSPITAL | Age: 60
End: 2020-10-27

## 2020-10-27 LAB
ANION GAP SERPL CALCULATED.3IONS-SCNC: 10 MMOL/L (ref 5–15)
ARTERIAL PATENCY WRIST A: POSITIVE
ATMOSPHERIC PRESS: 755 MMHG
BASE EXCESS BLDA CALC-SCNC: -0.2 MMOL/L (ref 0–2)
BASOPHILS # BLD AUTO: 0 10*3/MM3 (ref 0–0.2)
BASOPHILS NFR BLD AUTO: 0 % (ref 0–1.5)
BDY SITE: ABNORMAL
BODY TEMPERATURE: 37 C
BUN SERPL-MCNC: 43 MG/DL (ref 8–23)
BUN/CREAT SERPL: 21.5 (ref 7–25)
CALCIUM SPEC-SCNC: 8.9 MG/DL (ref 8.6–10.5)
CHLORIDE SERPL-SCNC: 105 MMOL/L (ref 98–107)
CK SERPL-CCNC: 219 U/L (ref 20–180)
CO2 SERPL-SCNC: 25 MMOL/L (ref 22–29)
CREAT SERPL-MCNC: 2 MG/DL (ref 0.57–1)
DEPRECATED RDW RBC AUTO: 46.7 FL (ref 37–54)
EOSINOPHIL # BLD AUTO: 0 10*3/MM3 (ref 0–0.4)
EOSINOPHIL NFR BLD AUTO: 0 % (ref 0.3–6.2)
ERYTHROCYTE [DISTWIDTH] IN BLOOD BY AUTOMATED COUNT: 14.6 % (ref 12.3–15.4)
GFR SERPL CREATININE-BSD FRML MDRD: 25 ML/MIN/1.73
GLUCOSE SERPL-MCNC: 154 MG/DL (ref 65–99)
HCO3 BLDA-SCNC: 25.6 MMOL/L (ref 20–26)
HCT VFR BLD AUTO: 29.1 % (ref 34–46.6)
HGB BLD-MCNC: 9 G/DL (ref 12–15.9)
IMM GRANULOCYTES # BLD AUTO: 0.02 10*3/MM3 (ref 0–0.05)
IMM GRANULOCYTES NFR BLD AUTO: 0.3 % (ref 0–0.5)
INHALED O2 CONCENTRATION: 50 %
LYMPHOCYTES # BLD AUTO: 0.36 10*3/MM3 (ref 0.7–3.1)
LYMPHOCYTES NFR BLD AUTO: 5.7 % (ref 19.6–45.3)
Lab: ABNORMAL
MCH RBC QN AUTO: 27.3 PG (ref 26.6–33)
MCHC RBC AUTO-ENTMCNC: 30.9 G/DL (ref 31.5–35.7)
MCV RBC AUTO: 88.2 FL (ref 79–97)
MODALITY: ABNORMAL
MONOCYTES # BLD AUTO: 0.05 10*3/MM3 (ref 0.1–0.9)
MONOCYTES NFR BLD AUTO: 0.8 % (ref 5–12)
NEUTROPHILS NFR BLD AUTO: 5.94 10*3/MM3 (ref 1.7–7)
NEUTROPHILS NFR BLD AUTO: 93.2 % (ref 42.7–76)
NRBC BLD AUTO-RTO: 0 /100 WBC (ref 0–0.2)
PCO2 BLDA: 46 MM HG (ref 35–45)
PCO2 TEMP ADJ BLD: 46 MM HG (ref 35–45)
PEEP RESPIRATORY: 5 CM[H2O]
PH BLDA: 7.35 PH UNITS (ref 7.35–7.45)
PH, TEMP CORRECTED: 7.35 PH UNITS (ref 7.35–7.45)
PLATELET # BLD AUTO: 229 10*3/MM3 (ref 140–450)
PMV BLD AUTO: 11.5 FL (ref 6–12)
PO2 BLDA: 86.5 MM HG (ref 83–108)
PO2 TEMP ADJ BLD: 86.5 MM HG (ref 83–108)
POTASSIUM SERPL-SCNC: 4.1 MMOL/L (ref 3.5–5.2)
RBC # BLD AUTO: 3.3 10*6/MM3 (ref 3.77–5.28)
SAO2 % BLDCOA: 97.4 % (ref 94–99)
SET MECH RESP RATE: 16
SODIUM SERPL-SCNC: 140 MMOL/L (ref 136–145)
URATE SERPL-MCNC: 6.8 MG/DL (ref 2.4–5.7)
VENTILATOR MODE: AC
VT ON VENT VENT: 500 ML
WBC # BLD AUTO: 6.37 10*3/MM3 (ref 3.4–10.8)

## 2020-10-27 PROCEDURE — 94003 VENT MGMT INPAT SUBQ DAY: CPT

## 2020-10-27 PROCEDURE — 80048 BASIC METABOLIC PNL TOTAL CA: CPT | Performed by: INTERNAL MEDICINE

## 2020-10-27 PROCEDURE — 25010000002 METHYLPREDNISOLONE PER 40 MG: Performed by: NURSE PRACTITIONER

## 2020-10-27 PROCEDURE — 82803 BLOOD GASES ANY COMBINATION: CPT

## 2020-10-27 PROCEDURE — 87070 CULTURE OTHR SPECIMN AEROBIC: CPT | Performed by: FAMILY MEDICINE

## 2020-10-27 PROCEDURE — 25010000002 LORAZEPAM PER 2 MG: Performed by: INTERNAL MEDICINE

## 2020-10-27 PROCEDURE — 94799 UNLISTED PULMONARY SVC/PX: CPT

## 2020-10-27 PROCEDURE — 25010000002 ENOXAPARIN PER 10 MG: Performed by: FAMILY MEDICINE

## 2020-10-27 PROCEDURE — 25010000002 PROPOFOL 10 MG/ML EMULSION: Performed by: EMERGENCY MEDICINE

## 2020-10-27 PROCEDURE — 25010000002 HYDRALAZINE PER 20 MG: Performed by: FAMILY MEDICINE

## 2020-10-27 PROCEDURE — 36600 WITHDRAWAL OF ARTERIAL BLOOD: CPT

## 2020-10-27 PROCEDURE — 84550 ASSAY OF BLOOD/URIC ACID: CPT | Performed by: INTERNAL MEDICINE

## 2020-10-27 PROCEDURE — 94770: CPT

## 2020-10-27 PROCEDURE — 25010000002 METHYLPREDNISOLONE PER 40 MG: Performed by: INTERNAL MEDICINE

## 2020-10-27 PROCEDURE — 25010000002 MEROPENEM PER 100 MG: Performed by: NURSE PRACTITIONER

## 2020-10-27 PROCEDURE — 71045 X-RAY EXAM CHEST 1 VIEW: CPT

## 2020-10-27 PROCEDURE — 25010000002 VANCOMYCIN PER 500 MG: Performed by: NURSE PRACTITIONER

## 2020-10-27 PROCEDURE — 82550 ASSAY OF CK (CPK): CPT | Performed by: INTERNAL MEDICINE

## 2020-10-27 PROCEDURE — 99233 SBSQ HOSP IP/OBS HIGH 50: CPT | Performed by: INTERNAL MEDICINE

## 2020-10-27 PROCEDURE — 85025 COMPLETE CBC W/AUTO DIFF WBC: CPT | Performed by: NURSE PRACTITIONER

## 2020-10-27 PROCEDURE — 99232 SBSQ HOSP IP/OBS MODERATE 35: CPT | Performed by: INTERNAL MEDICINE

## 2020-10-27 PROCEDURE — 87205 SMEAR GRAM STAIN: CPT | Performed by: FAMILY MEDICINE

## 2020-10-27 RX ORDER — LORAZEPAM 2 MG/ML
1 INJECTION INTRAMUSCULAR EVERY 4 HOURS PRN
Status: DISCONTINUED | OUTPATIENT
Start: 2020-10-27 | End: 2020-11-02

## 2020-10-27 RX ORDER — METHYLPREDNISOLONE SODIUM SUCCINATE 40 MG/ML
40 INJECTION, POWDER, LYOPHILIZED, FOR SOLUTION INTRAMUSCULAR; INTRAVENOUS EVERY 12 HOURS
Status: DISCONTINUED | OUTPATIENT
Start: 2020-10-27 | End: 2020-10-29

## 2020-10-27 RX ADMIN — PROPOFOL 50 MCG/KG/MIN: 10 INJECTION, EMULSION INTRAVENOUS at 18:27

## 2020-10-27 RX ADMIN — PROPOFOL 50 MCG/KG/MIN: 10 INJECTION, EMULSION INTRAVENOUS at 12:34

## 2020-10-27 RX ADMIN — IPRATROPIUM BROMIDE AND ALBUTEROL SULFATE 3 ML: 2.5; .5 SOLUTION RESPIRATORY (INHALATION) at 19:31

## 2020-10-27 RX ADMIN — PROPOFOL 50 MCG/KG/MIN: 10 INJECTION, EMULSION INTRAVENOUS at 03:56

## 2020-10-27 RX ADMIN — METHYLPREDNISOLONE SODIUM SUCCINATE 40 MG: 40 INJECTION, POWDER, FOR SOLUTION INTRAMUSCULAR; INTRAVENOUS at 06:24

## 2020-10-27 RX ADMIN — PROPOFOL 50 MCG/KG/MIN: 10 INJECTION, EMULSION INTRAVENOUS at 22:04

## 2020-10-27 RX ADMIN — IPRATROPIUM BROMIDE AND ALBUTEROL SULFATE 3 ML: 2.5; .5 SOLUTION RESPIRATORY (INHALATION) at 14:27

## 2020-10-27 RX ADMIN — BUDESONIDE 0.5 MG: 0.25 INHALANT RESPIRATORY (INHALATION) at 19:31

## 2020-10-27 RX ADMIN — HYDRALAZINE HYDROCHLORIDE 10 MG: 20 INJECTION INTRAMUSCULAR; INTRAVENOUS at 09:25

## 2020-10-27 RX ADMIN — DEXMEDETOMIDINE HYDROCHLORIDE 0.7 MCG/KG/HR: 100 INJECTION, SOLUTION INTRAVENOUS at 03:11

## 2020-10-27 RX ADMIN — SODIUM CHLORIDE 40 ML/HR: 9 INJECTION, SOLUTION INTRAVENOUS at 17:44

## 2020-10-27 RX ADMIN — IPRATROPIUM BROMIDE AND ALBUTEROL SULFATE 3 ML: 2.5; .5 SOLUTION RESPIRATORY (INHALATION) at 10:24

## 2020-10-27 RX ADMIN — LORAZEPAM 1 MG: 2 INJECTION INTRAMUSCULAR; INTRAVENOUS at 12:32

## 2020-10-27 RX ADMIN — DEXMEDETOMIDINE HYDROCHLORIDE 0.7 MCG/KG/HR: 100 INJECTION, SOLUTION INTRAVENOUS at 15:58

## 2020-10-27 RX ADMIN — FAMOTIDINE 20 MG: 10 INJECTION INTRAVENOUS at 08:00

## 2020-10-27 RX ADMIN — IPRATROPIUM BROMIDE AND ALBUTEROL SULFATE 3 ML: 2.5; .5 SOLUTION RESPIRATORY (INHALATION) at 07:00

## 2020-10-27 RX ADMIN — PROPOFOL 50 MCG/KG/MIN: 10 INJECTION, EMULSION INTRAVENOUS at 15:04

## 2020-10-27 RX ADMIN — ENOXAPARIN SODIUM 90 MG: 100 INJECTION SUBCUTANEOUS at 08:00

## 2020-10-27 RX ADMIN — DEXMEDETOMIDINE HYDROCHLORIDE 0.9 MCG/KG/HR: 100 INJECTION, SOLUTION INTRAVENOUS at 23:22

## 2020-10-27 RX ADMIN — BUDESONIDE 0.5 MG: 0.25 INHALANT RESPIRATORY (INHALATION) at 07:00

## 2020-10-27 RX ADMIN — PROPOFOL 50 MCG/KG/MIN: 10 INJECTION, EMULSION INTRAVENOUS at 07:57

## 2020-10-27 RX ADMIN — METHYLPREDNISOLONE SODIUM SUCCINATE 40 MG: 40 INJECTION, POWDER, FOR SOLUTION INTRAMUSCULAR; INTRAVENOUS at 17:44

## 2020-10-27 RX ADMIN — MEROPENEM 1 G: 1 INJECTION, POWDER, FOR SOLUTION INTRAVENOUS at 13:54

## 2020-10-27 RX ADMIN — DEXMEDETOMIDINE HYDROCHLORIDE 0.7 MCG/KG/HR: 100 INJECTION, SOLUTION INTRAVENOUS at 10:18

## 2020-10-27 RX ADMIN — VANCOMYCIN HYDROCHLORIDE 1000 MG: 1 INJECTION, SOLUTION INTRAVENOUS at 15:58

## 2020-10-27 RX ADMIN — MEROPENEM 1 G: 1 INJECTION, POWDER, FOR SOLUTION INTRAVENOUS at 03:15

## 2020-10-27 RX ADMIN — PROPOFOL 50 MCG/KG/MIN: 10 INJECTION, EMULSION INTRAVENOUS at 00:18

## 2020-10-27 NOTE — OUTREACH NOTE
Medical Week 2 Survey      Responses   [facility] patient discharged from?  [place]   Does the patient have one of the following disease processes/diagnoses(primary or secondary)?  Other   Week 2 attempt successful?  No   Revoke  Readmitted          Lila Cooper RN

## 2020-10-28 LAB
AMPHET+METHAMPHET UR QL: NEGATIVE
AMPHETAMINES UR QL: NEGATIVE
ANION GAP SERPL CALCULATED.3IONS-SCNC: 11 MMOL/L (ref 5–15)
ARTERIAL PATENCY WRIST A: ABNORMAL
ATMOSPHERIC PRESS: 754 MMHG
BARBITURATES UR QL SCN: NEGATIVE
BASE EXCESS BLDA CALC-SCNC: -0.4 MMOL/L (ref 0–2)
BASOPHILS # BLD AUTO: 0.01 10*3/MM3 (ref 0–0.2)
BASOPHILS NFR BLD AUTO: 0.1 % (ref 0–1.5)
BDY SITE: ABNORMAL
BENZODIAZ UR QL SCN: POSITIVE
BODY TEMPERATURE: 37 C
BUN SERPL-MCNC: 51 MG/DL (ref 8–23)
BUN/CREAT SERPL: 21.1 (ref 7–25)
BUPRENORPHINE SERPL-MCNC: NEGATIVE NG/ML
CALCIUM SPEC-SCNC: 8.4 MG/DL (ref 8.6–10.5)
CANNABINOIDS SERPL QL: NEGATIVE
CHLORIDE SERPL-SCNC: 109 MMOL/L (ref 98–107)
CO2 SERPL-SCNC: 24 MMOL/L (ref 22–29)
COCAINE UR QL: NEGATIVE
CREAT SERPL-MCNC: 2.42 MG/DL (ref 0.57–1)
DEPRECATED RDW RBC AUTO: 49.2 FL (ref 37–54)
EOSINOPHIL # BLD AUTO: 0 10*3/MM3 (ref 0–0.4)
EOSINOPHIL NFR BLD AUTO: 0 % (ref 0.3–6.2)
ERYTHROCYTE [DISTWIDTH] IN BLOOD BY AUTOMATED COUNT: 15 % (ref 12.3–15.4)
GFR SERPL CREATININE-BSD FRML MDRD: 20 ML/MIN/1.73
GLUCOSE SERPL-MCNC: 148 MG/DL (ref 65–99)
HCO3 BLDA-SCNC: 24.8 MMOL/L (ref 20–26)
HCT VFR BLD AUTO: 28.3 % (ref 34–46.6)
HGB BLD-MCNC: 8.5 G/DL (ref 12–15.9)
IMM GRANULOCYTES # BLD AUTO: 0.06 10*3/MM3 (ref 0–0.05)
IMM GRANULOCYTES NFR BLD AUTO: 0.5 % (ref 0–0.5)
INHALED O2 CONCENTRATION: 30 %
LYMPHOCYTES # BLD AUTO: 0.66 10*3/MM3 (ref 0.7–3.1)
LYMPHOCYTES NFR BLD AUTO: 5.9 % (ref 19.6–45.3)
Lab: ABNORMAL
MCH RBC QN AUTO: 26.6 PG (ref 26.6–33)
MCHC RBC AUTO-ENTMCNC: 30 G/DL (ref 31.5–35.7)
MCV RBC AUTO: 88.4 FL (ref 79–97)
METHADONE UR QL SCN: NEGATIVE
MODALITY: ABNORMAL
MONOCYTES # BLD AUTO: 0.58 10*3/MM3 (ref 0.1–0.9)
MONOCYTES NFR BLD AUTO: 5.2 % (ref 5–12)
NEUTROPHILS NFR BLD AUTO: 88.3 % (ref 42.7–76)
NEUTROPHILS NFR BLD AUTO: 9.93 10*3/MM3 (ref 1.7–7)
NRBC BLD AUTO-RTO: 0 /100 WBC (ref 0–0.2)
OPIATES UR QL: POSITIVE
OXYCODONE UR QL SCN: NEGATIVE
PCO2 BLDA: 42.1 MM HG (ref 35–45)
PCO2 TEMP ADJ BLD: 42.1 MM HG (ref 35–45)
PCP UR QL SCN: NEGATIVE
PEEP RESPIRATORY: 5 CM[H2O]
PH BLDA: 7.38 PH UNITS (ref 7.35–7.45)
PH, TEMP CORRECTED: 7.38 PH UNITS (ref 7.35–7.45)
PLATELET # BLD AUTO: 236 10*3/MM3 (ref 140–450)
PMV BLD AUTO: 11.5 FL (ref 6–12)
PO2 BLDA: 101 MM HG (ref 83–108)
PO2 TEMP ADJ BLD: 101 MM HG (ref 83–108)
POTASSIUM SERPL-SCNC: 4.5 MMOL/L (ref 3.5–5.2)
PROPOXYPH UR QL: NEGATIVE
QT INTERVAL: 356 MS
QTC INTERVAL: 447 MS
RBC # BLD AUTO: 3.2 10*6/MM3 (ref 3.77–5.28)
SAO2 % BLDCOA: 98.4 % (ref 94–99)
SET MECH RESP RATE: 16
SODIUM SERPL-SCNC: 144 MMOL/L (ref 136–145)
TRICYCLICS UR QL SCN: NEGATIVE
VANCOMYCIN TROUGH SERPL-MCNC: 16.9 MCG/ML (ref 5–20)
VENTILATOR MODE: AC
VT ON VENT VENT: 500 ML
WBC # BLD AUTO: 11.24 10*3/MM3 (ref 3.4–10.8)

## 2020-10-28 PROCEDURE — 80202 ASSAY OF VANCOMYCIN: CPT | Performed by: FAMILY MEDICINE

## 2020-10-28 PROCEDURE — 94799 UNLISTED PULMONARY SVC/PX: CPT

## 2020-10-28 PROCEDURE — 25010000002 HYDRALAZINE PER 20 MG: Performed by: FAMILY MEDICINE

## 2020-10-28 PROCEDURE — 94770: CPT

## 2020-10-28 PROCEDURE — 99233 SBSQ HOSP IP/OBS HIGH 50: CPT | Performed by: INTERNAL MEDICINE

## 2020-10-28 PROCEDURE — 25010000002 ENOXAPARIN PER 10 MG: Performed by: FAMILY MEDICINE

## 2020-10-28 PROCEDURE — 80306 DRUG TEST PRSMV INSTRMNT: CPT | Performed by: INTERNAL MEDICINE

## 2020-10-28 PROCEDURE — 25010000002 VANCOMYCIN PER 500 MG: Performed by: NURSE PRACTITIONER

## 2020-10-28 PROCEDURE — 25010000002 PROPOFOL 10 MG/ML EMULSION: Performed by: EMERGENCY MEDICINE

## 2020-10-28 PROCEDURE — 36600 WITHDRAWAL OF ARTERIAL BLOOD: CPT

## 2020-10-28 PROCEDURE — 25010000002 METHYLPREDNISOLONE PER 40 MG: Performed by: NURSE PRACTITIONER

## 2020-10-28 PROCEDURE — 82803 BLOOD GASES ANY COMBINATION: CPT

## 2020-10-28 PROCEDURE — 25010000002 MEROPENEM PER 100 MG: Performed by: NURSE PRACTITIONER

## 2020-10-28 PROCEDURE — 25010000002 LEVOFLOXACIN PER 250 MG: Performed by: NURSE PRACTITIONER

## 2020-10-28 PROCEDURE — 85025 COMPLETE CBC W/AUTO DIFF WBC: CPT | Performed by: FAMILY MEDICINE

## 2020-10-28 PROCEDURE — 80048 BASIC METABOLIC PNL TOTAL CA: CPT | Performed by: NURSE PRACTITIONER

## 2020-10-28 RX ORDER — OLANZAPINE 5 MG/1
5 TABLET ORAL 2 TIMES DAILY
Status: DISCONTINUED | OUTPATIENT
Start: 2020-10-28 | End: 2020-11-01

## 2020-10-28 RX ORDER — CARVEDILOL 3.12 MG/1
3.12 TABLET ORAL 2 TIMES DAILY WITH MEALS
Status: DISCONTINUED | OUTPATIENT
Start: 2020-10-28 | End: 2020-10-29

## 2020-10-28 RX ADMIN — ENOXAPARIN SODIUM 30 MG: 30 INJECTION SUBCUTANEOUS at 09:06

## 2020-10-28 RX ADMIN — BUDESONIDE 0.5 MG: 0.25 INHALANT RESPIRATORY (INHALATION) at 06:55

## 2020-10-28 RX ADMIN — DEXMEDETOMIDINE HYDROCHLORIDE 0.9 MCG/KG/HR: 100 INJECTION, SOLUTION INTRAVENOUS at 10:16

## 2020-10-28 RX ADMIN — METHYLPREDNISOLONE SODIUM SUCCINATE 40 MG: 40 INJECTION, POWDER, FOR SOLUTION INTRAMUSCULAR; INTRAVENOUS at 05:42

## 2020-10-28 RX ADMIN — OLANZAPINE 5 MG: 5 TABLET, FILM COATED ORAL at 12:11

## 2020-10-28 RX ADMIN — MEROPENEM 1 G: 1 INJECTION, POWDER, FOR SOLUTION INTRAVENOUS at 03:20

## 2020-10-28 RX ADMIN — OLANZAPINE 5 MG: 5 TABLET, FILM COATED ORAL at 20:18

## 2020-10-28 RX ADMIN — PROPOFOL 50 MCG/KG/MIN: 10 INJECTION, EMULSION INTRAVENOUS at 01:31

## 2020-10-28 RX ADMIN — CARVEDILOL 3.12 MG: 3.12 TABLET, FILM COATED ORAL at 12:11

## 2020-10-28 RX ADMIN — PROPOFOL 50 MCG/KG/MIN: 10 INJECTION, EMULSION INTRAVENOUS at 05:42

## 2020-10-28 RX ADMIN — IPRATROPIUM BROMIDE AND ALBUTEROL SULFATE 3 ML: 2.5; .5 SOLUTION RESPIRATORY (INHALATION) at 06:55

## 2020-10-28 RX ADMIN — PROPOFOL 35 MCG/KG/MIN: 10 INJECTION, EMULSION INTRAVENOUS at 19:59

## 2020-10-28 RX ADMIN — SODIUM CHLORIDE 75 ML/HR: 9 INJECTION, SOLUTION INTRAVENOUS at 20:05

## 2020-10-28 RX ADMIN — METHYLPREDNISOLONE SODIUM SUCCINATE 40 MG: 40 INJECTION, POWDER, FOR SOLUTION INTRAMUSCULAR; INTRAVENOUS at 20:04

## 2020-10-28 RX ADMIN — CARVEDILOL 3.12 MG: 3.12 TABLET, FILM COATED ORAL at 18:11

## 2020-10-28 RX ADMIN — IPRATROPIUM BROMIDE AND ALBUTEROL SULFATE 3 ML: 2.5; .5 SOLUTION RESPIRATORY (INHALATION) at 10:23

## 2020-10-28 RX ADMIN — VANCOMYCIN HYDROCHLORIDE 1000 MG: 1 INJECTION, SOLUTION INTRAVENOUS at 15:27

## 2020-10-28 RX ADMIN — PROPOFOL 45 MCG/KG/MIN: 10 INJECTION, EMULSION INTRAVENOUS at 10:08

## 2020-10-28 RX ADMIN — PROPOFOL 35 MCG/KG/MIN: 10 INJECTION, EMULSION INTRAVENOUS at 14:05

## 2020-10-28 RX ADMIN — IPRATROPIUM BROMIDE AND ALBUTEROL SULFATE 3 ML: 2.5; .5 SOLUTION RESPIRATORY (INHALATION) at 21:26

## 2020-10-28 RX ADMIN — IPRATROPIUM BROMIDE AND ALBUTEROL SULFATE 3 ML: 2.5; .5 SOLUTION RESPIRATORY (INHALATION) at 14:19

## 2020-10-28 RX ADMIN — BUDESONIDE 0.5 MG: 0.25 INHALANT RESPIRATORY (INHALATION) at 21:26

## 2020-10-28 RX ADMIN — HYDRALAZINE HYDROCHLORIDE 10 MG: 20 INJECTION INTRAMUSCULAR; INTRAVENOUS at 05:42

## 2020-10-28 RX ADMIN — DEXMEDETOMIDINE HYDROCHLORIDE 0.9 MCG/KG/HR: 100 INJECTION, SOLUTION INTRAVENOUS at 15:29

## 2020-10-28 RX ADMIN — DEXMEDETOMIDINE HYDROCHLORIDE 0.9 MCG/KG/HR: 100 INJECTION, SOLUTION INTRAVENOUS at 04:51

## 2020-10-28 RX ADMIN — LEVOFLOXACIN 750 MG: 5 INJECTION, SOLUTION INTRAVENOUS at 14:21

## 2020-10-28 RX ADMIN — DEXMEDETOMIDINE HYDROCHLORIDE 0.9 MCG/KG/HR: 100 INJECTION, SOLUTION INTRAVENOUS at 20:05

## 2020-10-28 RX ADMIN — HYDRALAZINE HYDROCHLORIDE 10 MG: 20 INJECTION INTRAMUSCULAR; INTRAVENOUS at 20:54

## 2020-10-28 RX ADMIN — MEROPENEM 1 G: 1 INJECTION, POWDER, FOR SOLUTION INTRAVENOUS at 13:40

## 2020-10-28 RX ADMIN — FAMOTIDINE 20 MG: 10 INJECTION INTRAVENOUS at 08:57

## 2020-10-29 ENCOUNTER — APPOINTMENT (OUTPATIENT)
Dept: GENERAL RADIOLOGY | Facility: HOSPITAL | Age: 60
End: 2020-10-29

## 2020-10-29 LAB
ALBUMIN SERPL-MCNC: 3.4 G/DL (ref 3.5–5.2)
ALBUMIN/GLOB SERPL: 1.5 G/DL
ALP SERPL-CCNC: 83 U/L (ref 39–117)
ALT SERPL W P-5'-P-CCNC: 14 U/L (ref 1–33)
ANION GAP SERPL CALCULATED.3IONS-SCNC: 11 MMOL/L (ref 5–15)
ARTERIAL PATENCY WRIST A: POSITIVE
AST SERPL-CCNC: 17 U/L (ref 1–32)
ATMOSPHERIC PRESS: 739 MMHG
BACTERIA SPEC RESP CULT: NO GROWTH
BASE EXCESS BLDA CALC-SCNC: -2.3 MMOL/L (ref 0–2)
BASOPHILS # BLD AUTO: 0.01 10*3/MM3 (ref 0–0.2)
BASOPHILS NFR BLD AUTO: 0.1 % (ref 0–1.5)
BDY SITE: ABNORMAL
BILIRUB SERPL-MCNC: 0.3 MG/DL (ref 0–1.2)
BODY TEMPERATURE: 37 C
BUN SERPL-MCNC: 48 MG/DL (ref 8–23)
BUN/CREAT SERPL: 23.2 (ref 7–25)
CALCIUM SPEC-SCNC: 8.4 MG/DL (ref 8.6–10.5)
CHLORIDE SERPL-SCNC: 113 MMOL/L (ref 98–107)
CO2 SERPL-SCNC: 22 MMOL/L (ref 22–29)
CREAT SERPL-MCNC: 2.07 MG/DL (ref 0.57–1)
DEPRECATED RDW RBC AUTO: 51.7 FL (ref 37–54)
EOSINOPHIL # BLD AUTO: 0 10*3/MM3 (ref 0–0.4)
EOSINOPHIL NFR BLD AUTO: 0 % (ref 0.3–6.2)
ERYTHROCYTE [DISTWIDTH] IN BLOOD BY AUTOMATED COUNT: 15.6 % (ref 12.3–15.4)
GFR SERPL CREATININE-BSD FRML MDRD: 24 ML/MIN/1.73
GLOBULIN UR ELPH-MCNC: 2.2 GM/DL
GLUCOSE SERPL-MCNC: 135 MG/DL (ref 65–99)
GRAM STN SPEC: NORMAL
HCO3 BLDA-SCNC: 23.4 MMOL/L (ref 20–26)
HCT VFR BLD AUTO: 28.5 % (ref 34–46.6)
HGB BLD-MCNC: 8.6 G/DL (ref 12–15.9)
IMM GRANULOCYTES # BLD AUTO: 0.13 10*3/MM3 (ref 0–0.05)
IMM GRANULOCYTES NFR BLD AUTO: 1.4 % (ref 0–0.5)
INHALED O2 CONCENTRATION: 30 %
LYMPHOCYTES # BLD AUTO: 0.56 10*3/MM3 (ref 0.7–3.1)
LYMPHOCYTES NFR BLD AUTO: 6.2 % (ref 19.6–45.3)
Lab: ABNORMAL
MCH RBC QN AUTO: 27.4 PG (ref 26.6–33)
MCHC RBC AUTO-ENTMCNC: 30.2 G/DL (ref 31.5–35.7)
MCV RBC AUTO: 90.8 FL (ref 79–97)
MODALITY: ABNORMAL
MONOCYTES # BLD AUTO: 0.35 10*3/MM3 (ref 0.1–0.9)
MONOCYTES NFR BLD AUTO: 3.9 % (ref 5–12)
NEUTROPHILS NFR BLD AUTO: 7.93 10*3/MM3 (ref 1.7–7)
NEUTROPHILS NFR BLD AUTO: 88.4 % (ref 42.7–76)
NRBC BLD AUTO-RTO: 0 /100 WBC (ref 0–0.2)
NT-PROBNP SERPL-MCNC: 4203 PG/ML (ref 0–900)
PCO2 BLDA: 43.6 MM HG (ref 35–45)
PCO2 TEMP ADJ BLD: 43.6 MM HG (ref 35–45)
PEEP RESPIRATORY: 5 CM[H2O]
PH BLDA: 7.34 PH UNITS (ref 7.35–7.45)
PH, TEMP CORRECTED: 7.34 PH UNITS (ref 7.35–7.45)
PLATELET # BLD AUTO: 210 10*3/MM3 (ref 140–450)
PMV BLD AUTO: 11.7 FL (ref 6–12)
PO2 BLDA: 109 MM HG (ref 83–108)
PO2 TEMP ADJ BLD: 109 MM HG (ref 83–108)
POTASSIUM SERPL-SCNC: 4.9 MMOL/L (ref 3.5–5.2)
PROT SERPL-MCNC: 5.6 G/DL (ref 6–8.5)
RBC # BLD AUTO: 3.14 10*6/MM3 (ref 3.77–5.28)
SAO2 % BLDCOA: 98.5 % (ref 94–99)
SET MECH RESP RATE: 16
SODIUM SERPL-SCNC: 146 MMOL/L (ref 136–145)
VENTILATOR MODE: AC
VT ON VENT VENT: 500 ML
WBC # BLD AUTO: 8.98 10*3/MM3 (ref 3.4–10.8)

## 2020-10-29 PROCEDURE — 25010000002 FUROSEMIDE PER 20 MG: Performed by: INTERNAL MEDICINE

## 2020-10-29 PROCEDURE — 85025 COMPLETE CBC W/AUTO DIFF WBC: CPT | Performed by: NURSE PRACTITIONER

## 2020-10-29 PROCEDURE — 94799 UNLISTED PULMONARY SVC/PX: CPT

## 2020-10-29 PROCEDURE — 80053 COMPREHEN METABOLIC PANEL: CPT | Performed by: NURSE PRACTITIONER

## 2020-10-29 PROCEDURE — 25010000002 MEROPENEM PER 100 MG: Performed by: NURSE PRACTITIONER

## 2020-10-29 PROCEDURE — 25010000002 METHYLPREDNISOLONE PER 40 MG: Performed by: NURSE PRACTITIONER

## 2020-10-29 PROCEDURE — 83880 ASSAY OF NATRIURETIC PEPTIDE: CPT | Performed by: NURSE PRACTITIONER

## 2020-10-29 PROCEDURE — 94003 VENT MGMT INPAT SUBQ DAY: CPT

## 2020-10-29 PROCEDURE — 71045 X-RAY EXAM CHEST 1 VIEW: CPT

## 2020-10-29 PROCEDURE — 82803 BLOOD GASES ANY COMBINATION: CPT

## 2020-10-29 PROCEDURE — 36600 WITHDRAWAL OF ARTERIAL BLOOD: CPT

## 2020-10-29 PROCEDURE — 25010000002 ENOXAPARIN PER 10 MG: Performed by: FAMILY MEDICINE

## 2020-10-29 PROCEDURE — 25010000002 HYDRALAZINE PER 20 MG: Performed by: FAMILY MEDICINE

## 2020-10-29 PROCEDURE — 25010000002 PROPOFOL 10 MG/ML EMULSION: Performed by: EMERGENCY MEDICINE

## 2020-10-29 PROCEDURE — 94770: CPT

## 2020-10-29 PROCEDURE — 99233 SBSQ HOSP IP/OBS HIGH 50: CPT | Performed by: INTERNAL MEDICINE

## 2020-10-29 RX ORDER — SODIUM CHLORIDE 450 MG/100ML
50 INJECTION, SOLUTION INTRAVENOUS CONTINUOUS
Status: DISCONTINUED | OUTPATIENT
Start: 2020-10-29 | End: 2020-11-01

## 2020-10-29 RX ORDER — NIFEDIPINE 10 MG/1
10 CAPSULE ORAL EVERY 6 HOURS SCHEDULED
Status: DISCONTINUED | OUTPATIENT
Start: 2020-10-29 | End: 2020-11-01

## 2020-10-29 RX ORDER — CARVEDILOL 6.25 MG/1
12.5 TABLET ORAL 2 TIMES DAILY WITH MEALS
Status: DISCONTINUED | OUTPATIENT
Start: 2020-10-29 | End: 2020-11-01

## 2020-10-29 RX ORDER — METHYLPREDNISOLONE SODIUM SUCCINATE 40 MG/ML
20 INJECTION, POWDER, LYOPHILIZED, FOR SOLUTION INTRAMUSCULAR; INTRAVENOUS EVERY 12 HOURS
Status: DISCONTINUED | OUTPATIENT
Start: 2020-10-29 | End: 2020-11-01

## 2020-10-29 RX ORDER — HYDRALAZINE HYDROCHLORIDE 25 MG/1
25 TABLET, FILM COATED ORAL EVERY 8 HOURS SCHEDULED
Status: DISCONTINUED | OUTPATIENT
Start: 2020-10-29 | End: 2020-10-29

## 2020-10-29 RX ORDER — CARVEDILOL 6.25 MG/1
6.25 TABLET ORAL 2 TIMES DAILY WITH MEALS
Status: DISCONTINUED | OUTPATIENT
Start: 2020-10-29 | End: 2020-10-29

## 2020-10-29 RX ORDER — FUROSEMIDE 10 MG/ML
40 INJECTION INTRAMUSCULAR; INTRAVENOUS ONCE
Status: COMPLETED | OUTPATIENT
Start: 2020-10-29 | End: 2020-10-29

## 2020-10-29 RX ADMIN — IPRATROPIUM BROMIDE AND ALBUTEROL SULFATE 3 ML: 2.5; .5 SOLUTION RESPIRATORY (INHALATION) at 10:07

## 2020-10-29 RX ADMIN — BUDESONIDE 0.5 MG: 0.25 INHALANT RESPIRATORY (INHALATION) at 19:46

## 2020-10-29 RX ADMIN — DEXMEDETOMIDINE HYDROCHLORIDE 1.4 MCG/KG/HR: 100 INJECTION, SOLUTION INTRAVENOUS at 22:56

## 2020-10-29 RX ADMIN — IPRATROPIUM BROMIDE AND ALBUTEROL SULFATE 3 ML: 2.5; .5 SOLUTION RESPIRATORY (INHALATION) at 06:16

## 2020-10-29 RX ADMIN — HYDRALAZINE HYDROCHLORIDE 10 MG: 20 INJECTION INTRAMUSCULAR; INTRAVENOUS at 18:01

## 2020-10-29 RX ADMIN — ENOXAPARIN SODIUM 30 MG: 30 INJECTION SUBCUTANEOUS at 09:14

## 2020-10-29 RX ADMIN — HYDRALAZINE HYDROCHLORIDE 10 MG: 20 INJECTION INTRAMUSCULAR; INTRAVENOUS at 12:29

## 2020-10-29 RX ADMIN — OLANZAPINE 5 MG: 5 TABLET, FILM COATED ORAL at 09:13

## 2020-10-29 RX ADMIN — FUROSEMIDE 40 MG: 10 INJECTION, SOLUTION INTRAMUSCULAR; INTRAVENOUS at 15:58

## 2020-10-29 RX ADMIN — CARVEDILOL 6.25 MG: 3.12 TABLET, FILM COATED ORAL at 09:13

## 2020-10-29 RX ADMIN — DEXMEDETOMIDINE HYDROCHLORIDE 1.1 MCG/KG/HR: 100 INJECTION, SOLUTION INTRAVENOUS at 05:52

## 2020-10-29 RX ADMIN — SODIUM CHLORIDE 100 ML/HR: 4.5 INJECTION, SOLUTION INTRAVENOUS at 11:38

## 2020-10-29 RX ADMIN — MEROPENEM 1 G: 1 INJECTION, POWDER, FOR SOLUTION INTRAVENOUS at 14:09

## 2020-10-29 RX ADMIN — FAMOTIDINE 20 MG: 10 INJECTION INTRAVENOUS at 09:14

## 2020-10-29 RX ADMIN — DEXMEDETOMIDINE HYDROCHLORIDE 0.9 MCG/KG/HR: 100 INJECTION, SOLUTION INTRAVENOUS at 01:55

## 2020-10-29 RX ADMIN — DEXMEDETOMIDINE HYDROCHLORIDE 1.4 MCG/KG/HR: 100 INJECTION, SOLUTION INTRAVENOUS at 16:07

## 2020-10-29 RX ADMIN — PROPOFOL 30 MCG/KG/MIN: 10 INJECTION, EMULSION INTRAVENOUS at 22:09

## 2020-10-29 RX ADMIN — METHYLPREDNISOLONE SODIUM SUCCINATE 40 MG: 40 INJECTION, POWDER, FOR SOLUTION INTRAMUSCULAR; INTRAVENOUS at 06:51

## 2020-10-29 RX ADMIN — DEXMEDETOMIDINE HYDROCHLORIDE 1.4 MCG/KG/HR: 100 INJECTION, SOLUTION INTRAVENOUS at 09:48

## 2020-10-29 RX ADMIN — HYDRALAZINE HYDROCHLORIDE 10 MG: 20 INJECTION INTRAMUSCULAR; INTRAVENOUS at 06:54

## 2020-10-29 RX ADMIN — MEROPENEM 1 G: 1 INJECTION, POWDER, FOR SOLUTION INTRAVENOUS at 01:55

## 2020-10-29 RX ADMIN — METHYLPREDNISOLONE SODIUM SUCCINATE 20 MG: 40 INJECTION, POWDER, FOR SOLUTION INTRAMUSCULAR; INTRAVENOUS at 17:50

## 2020-10-29 RX ADMIN — IPRATROPIUM BROMIDE AND ALBUTEROL SULFATE 3 ML: 2.5; .5 SOLUTION RESPIRATORY (INHALATION) at 14:10

## 2020-10-29 RX ADMIN — OLANZAPINE 5 MG: 5 TABLET, FILM COATED ORAL at 20:14

## 2020-10-29 RX ADMIN — DEXMEDETOMIDINE HYDROCHLORIDE 1.4 MCG/KG/HR: 100 INJECTION, SOLUTION INTRAVENOUS at 13:06

## 2020-10-29 RX ADMIN — PROPOFOL 30 MCG/KG/MIN: 10 INJECTION, EMULSION INTRAVENOUS at 18:20

## 2020-10-29 RX ADMIN — SODIUM CHLORIDE 75 ML/HR: 9 INJECTION, SOLUTION INTRAVENOUS at 09:23

## 2020-10-29 RX ADMIN — IPRATROPIUM BROMIDE AND ALBUTEROL SULFATE 3 ML: 2.5; .5 SOLUTION RESPIRATORY (INHALATION) at 19:46

## 2020-10-29 RX ADMIN — BUDESONIDE 0.5 MG: 0.25 INHALANT RESPIRATORY (INHALATION) at 06:24

## 2020-10-29 RX ADMIN — NIFEDIPINE 10 MG: 10 CAPSULE ORAL at 20:10

## 2020-10-29 RX ADMIN — DEXMEDETOMIDINE HYDROCHLORIDE 1.4 MCG/KG/HR: 100 INJECTION, SOLUTION INTRAVENOUS at 19:56

## 2020-10-29 RX ADMIN — PROPOFOL 35 MCG/KG/MIN: 10 INJECTION, EMULSION INTRAVENOUS at 04:46

## 2020-10-29 RX ADMIN — PROPOFOL 35 MCG/KG/MIN: 10 INJECTION, EMULSION INTRAVENOUS at 01:05

## 2020-10-29 RX ADMIN — SODIUM CHLORIDE 100 ML/HR: 4.5 INJECTION, SOLUTION INTRAVENOUS at 21:12

## 2020-10-29 RX ADMIN — PROPOFOL 30 MCG/KG/MIN: 10 INJECTION, EMULSION INTRAVENOUS at 11:30

## 2020-10-30 ENCOUNTER — APPOINTMENT (OUTPATIENT)
Dept: GENERAL RADIOLOGY | Facility: HOSPITAL | Age: 60
End: 2020-10-30

## 2020-10-30 LAB
ALBUMIN SERPL-MCNC: 3.4 G/DL (ref 3.5–5.2)
ALBUMIN/GLOB SERPL: 1.4 G/DL
ALP SERPL-CCNC: 81 U/L (ref 39–117)
ALT SERPL W P-5'-P-CCNC: 13 U/L (ref 1–33)
ANION GAP SERPL CALCULATED.3IONS-SCNC: 11 MMOL/L (ref 5–15)
ARTERIAL PATENCY WRIST A: POSITIVE
ARTERIAL PATENCY WRIST A: POSITIVE
AST SERPL-CCNC: 16 U/L (ref 1–32)
ATMOSPHERIC PRESS: 754 MMHG
ATMOSPHERIC PRESS: 759 MMHG
BASE EXCESS BLDA CALC-SCNC: -1.4 MMOL/L (ref 0–2)
BASE EXCESS BLDA CALC-SCNC: 0.2 MMOL/L (ref 0–2)
BASOPHILS # BLD AUTO: 0.01 10*3/MM3 (ref 0–0.2)
BASOPHILS NFR BLD AUTO: 0.2 % (ref 0–1.5)
BDY SITE: ABNORMAL
BDY SITE: ABNORMAL
BILIRUB SERPL-MCNC: 0.3 MG/DL (ref 0–1.2)
BODY TEMPERATURE: 37 C
BODY TEMPERATURE: 37 C
BUN SERPL-MCNC: 45 MG/DL (ref 8–23)
BUN/CREAT SERPL: 25.3 (ref 7–25)
CALCIUM SPEC-SCNC: 8.7 MG/DL (ref 8.6–10.5)
CHLORIDE SERPL-SCNC: 110 MMOL/L (ref 98–107)
CO2 SERPL-SCNC: 24 MMOL/L (ref 22–29)
CREAT SERPL-MCNC: 1.78 MG/DL (ref 0.57–1)
DEPRECATED RDW RBC AUTO: 48.7 FL (ref 37–54)
EOSINOPHIL # BLD AUTO: 0 10*3/MM3 (ref 0–0.4)
EOSINOPHIL NFR BLD AUTO: 0 % (ref 0.3–6.2)
ERYTHROCYTE [DISTWIDTH] IN BLOOD BY AUTOMATED COUNT: 15.2 % (ref 12.3–15.4)
GFR SERPL CREATININE-BSD FRML MDRD: 29 ML/MIN/1.73
GLOBULIN UR ELPH-MCNC: 2.5 GM/DL
GLUCOSE SERPL-MCNC: 133 MG/DL (ref 65–99)
HCO3 BLDA-SCNC: 25.2 MMOL/L (ref 20–26)
HCO3 BLDA-SCNC: 25.4 MMOL/L (ref 20–26)
HCT VFR BLD AUTO: 31.2 % (ref 34–46.6)
HGB BLD-MCNC: 9.6 G/DL (ref 12–15.9)
IMM GRANULOCYTES # BLD AUTO: 0.07 10*3/MM3 (ref 0–0.05)
IMM GRANULOCYTES NFR BLD AUTO: 1.1 % (ref 0–0.5)
INHALED O2 CONCENTRATION: 30 %
INHALED O2 CONCENTRATION: 30 %
LYMPHOCYTES # BLD AUTO: 0.59 10*3/MM3 (ref 0.7–3.1)
LYMPHOCYTES NFR BLD AUTO: 9.2 % (ref 19.6–45.3)
Lab: ABNORMAL
Lab: ABNORMAL
MCH RBC QN AUTO: 26.9 PG (ref 26.6–33)
MCHC RBC AUTO-ENTMCNC: 30.8 G/DL (ref 31.5–35.7)
MCV RBC AUTO: 87.4 FL (ref 79–97)
MODALITY: ABNORMAL
MODALITY: ABNORMAL
MONOCYTES # BLD AUTO: 0.35 10*3/MM3 (ref 0.1–0.9)
MONOCYTES NFR BLD AUTO: 5.5 % (ref 5–12)
NEUTROPHILS NFR BLD AUTO: 5.38 10*3/MM3 (ref 1.7–7)
NEUTROPHILS NFR BLD AUTO: 84 % (ref 42.7–76)
NRBC BLD AUTO-RTO: 0 /100 WBC (ref 0–0.2)
PCO2 BLDA: 41.4 MM HG (ref 35–45)
PCO2 BLDA: 51.9 MM HG (ref 35–45)
PCO2 TEMP ADJ BLD: 41.4 MM HG (ref 35–45)
PCO2 TEMP ADJ BLD: 51.9 MM HG (ref 35–45)
PEEP RESPIRATORY: 5 CM[H2O]
PEEP RESPIRATORY: 5 CM[H2O]
PH BLDA: 7.3 PH UNITS (ref 7.35–7.45)
PH BLDA: 7.39 PH UNITS (ref 7.35–7.45)
PH, TEMP CORRECTED: 7.3 PH UNITS (ref 7.35–7.45)
PH, TEMP CORRECTED: 7.39 PH UNITS (ref 7.35–7.45)
PLATELET # BLD AUTO: 214 10*3/MM3 (ref 140–450)
PMV BLD AUTO: 11.2 FL (ref 6–12)
PO2 BLDA: 124 MM HG (ref 83–108)
PO2 BLDA: 89 MM HG (ref 83–108)
PO2 TEMP ADJ BLD: 124 MM HG (ref 83–108)
PO2 TEMP ADJ BLD: 89 MM HG (ref 83–108)
POTASSIUM SERPL-SCNC: 4 MMOL/L (ref 3.5–5.2)
PROT SERPL-MCNC: 5.9 G/DL (ref 6–8.5)
PSV: 5 CMH2O
RBC # BLD AUTO: 3.57 10*6/MM3 (ref 3.77–5.28)
SAO2 % BLDCOA: 96.3 % (ref 94–99)
SAO2 % BLDCOA: >100.1 % (ref 94–99)
SET MECH RESP RATE: 16
SODIUM SERPL-SCNC: 145 MMOL/L (ref 136–145)
VENTILATOR MODE: ABNORMAL
VENTILATOR MODE: AC
VT ON VENT VENT: 500 ML
WBC # BLD AUTO: 6.4 10*3/MM3 (ref 3.4–10.8)

## 2020-10-30 PROCEDURE — 25010000002 HYDRALAZINE PER 20 MG: Performed by: FAMILY MEDICINE

## 2020-10-30 PROCEDURE — 94640 AIRWAY INHALATION TREATMENT: CPT

## 2020-10-30 PROCEDURE — 25010000002 PROPOFOL 10 MG/ML EMULSION: Performed by: EMERGENCY MEDICINE

## 2020-10-30 PROCEDURE — 25010000002 METHYLPREDNISOLONE PER 40 MG: Performed by: NURSE PRACTITIONER

## 2020-10-30 PROCEDURE — 25010000002 LEVOFLOXACIN PER 250 MG: Performed by: NURSE PRACTITIONER

## 2020-10-30 PROCEDURE — 94799 UNLISTED PULMONARY SVC/PX: CPT

## 2020-10-30 PROCEDURE — 85025 COMPLETE CBC W/AUTO DIFF WBC: CPT | Performed by: INTERNAL MEDICINE

## 2020-10-30 PROCEDURE — 82803 BLOOD GASES ANY COMBINATION: CPT

## 2020-10-30 PROCEDURE — 94003 VENT MGMT INPAT SUBQ DAY: CPT

## 2020-10-30 PROCEDURE — 25010000002 LORAZEPAM PER 2 MG: Performed by: INTERNAL MEDICINE

## 2020-10-30 PROCEDURE — 94770: CPT

## 2020-10-30 PROCEDURE — 36600 WITHDRAWAL OF ARTERIAL BLOOD: CPT

## 2020-10-30 PROCEDURE — 25010000002 ENOXAPARIN PER 10 MG: Performed by: FAMILY MEDICINE

## 2020-10-30 PROCEDURE — 80053 COMPREHEN METABOLIC PANEL: CPT | Performed by: INTERNAL MEDICINE

## 2020-10-30 PROCEDURE — 71045 X-RAY EXAM CHEST 1 VIEW: CPT

## 2020-10-30 PROCEDURE — 99233 SBSQ HOSP IP/OBS HIGH 50: CPT | Performed by: INTERNAL MEDICINE

## 2020-10-30 PROCEDURE — 25010000002 MEROPENEM PER 100 MG: Performed by: NURSE PRACTITIONER

## 2020-10-30 RX ADMIN — HYDRALAZINE HYDROCHLORIDE 10 MG: 20 INJECTION INTRAMUSCULAR; INTRAVENOUS at 05:09

## 2020-10-30 RX ADMIN — NIFEDIPINE 10 MG: 10 CAPSULE ORAL at 17:13

## 2020-10-30 RX ADMIN — NIFEDIPINE 10 MG: 10 CAPSULE ORAL at 00:25

## 2020-10-30 RX ADMIN — DEXMEDETOMIDINE HYDROCHLORIDE 1.4 MCG/KG/HR: 100 INJECTION, SOLUTION INTRAVENOUS at 11:25

## 2020-10-30 RX ADMIN — BUDESONIDE 0.25 MG: 0.25 INHALANT RESPIRATORY (INHALATION) at 21:42

## 2020-10-30 RX ADMIN — OLANZAPINE 5 MG: 5 TABLET, FILM COATED ORAL at 08:01

## 2020-10-30 RX ADMIN — NIFEDIPINE 10 MG: 10 CAPSULE ORAL at 11:33

## 2020-10-30 RX ADMIN — METHYLPREDNISOLONE SODIUM SUCCINATE 20 MG: 40 INJECTION, POWDER, FOR SOLUTION INTRAMUSCULAR; INTRAVENOUS at 17:33

## 2020-10-30 RX ADMIN — PROPOFOL 20 MCG/KG/MIN: 10 INJECTION, EMULSION INTRAVENOUS at 19:19

## 2020-10-30 RX ADMIN — SODIUM CHLORIDE 100 ML/HR: 4.5 INJECTION, SOLUTION INTRAVENOUS at 06:40

## 2020-10-30 RX ADMIN — HYDRALAZINE HYDROCHLORIDE 10 MG: 20 INJECTION INTRAMUSCULAR; INTRAVENOUS at 10:51

## 2020-10-30 RX ADMIN — DEXMEDETOMIDINE HYDROCHLORIDE 1.4 MCG/KG/HR: 100 INJECTION, SOLUTION INTRAVENOUS at 08:22

## 2020-10-30 RX ADMIN — SODIUM CHLORIDE 100 ML/HR: 4.5 INJECTION, SOLUTION INTRAVENOUS at 15:23

## 2020-10-30 RX ADMIN — MEROPENEM 1 G: 1 INJECTION, POWDER, FOR SOLUTION INTRAVENOUS at 15:15

## 2020-10-30 RX ADMIN — NIFEDIPINE 10 MG: 10 CAPSULE ORAL at 23:51

## 2020-10-30 RX ADMIN — DEXMEDETOMIDINE HYDROCHLORIDE 1.4 MCG/KG/HR: 100 INJECTION, SOLUTION INTRAVENOUS at 04:39

## 2020-10-30 RX ADMIN — DEXMEDETOMIDINE HYDROCHLORIDE 1.4 MCG/KG/HR: 100 INJECTION, SOLUTION INTRAVENOUS at 19:15

## 2020-10-30 RX ADMIN — DEXMEDETOMIDINE HYDROCHLORIDE 1.4 MCG/KG/HR: 100 INJECTION, SOLUTION INTRAVENOUS at 01:48

## 2020-10-30 RX ADMIN — IPRATROPIUM BROMIDE AND ALBUTEROL SULFATE 3 ML: 2.5; .5 SOLUTION RESPIRATORY (INHALATION) at 07:03

## 2020-10-30 RX ADMIN — PROPOFOL 20 MCG/KG/MIN: 10 INJECTION, EMULSION INTRAVENOUS at 12:14

## 2020-10-30 RX ADMIN — IPRATROPIUM BROMIDE AND ALBUTEROL SULFATE 3 ML: 2.5; .5 SOLUTION RESPIRATORY (INHALATION) at 11:07

## 2020-10-30 RX ADMIN — LORAZEPAM 1 MG: 2 INJECTION INTRAMUSCULAR; INTRAVENOUS at 07:59

## 2020-10-30 RX ADMIN — DEXMEDETOMIDINE HYDROCHLORIDE 1.4 MCG/KG/HR: 100 INJECTION, SOLUTION INTRAVENOUS at 22:14

## 2020-10-30 RX ADMIN — ENOXAPARIN SODIUM 30 MG: 30 INJECTION SUBCUTANEOUS at 10:15

## 2020-10-30 RX ADMIN — LEVOFLOXACIN 750 MG: 5 INJECTION, SOLUTION INTRAVENOUS at 15:19

## 2020-10-30 RX ADMIN — METHYLPREDNISOLONE SODIUM SUCCINATE 20 MG: 40 INJECTION, POWDER, FOR SOLUTION INTRAMUSCULAR; INTRAVENOUS at 06:36

## 2020-10-30 RX ADMIN — IPRATROPIUM BROMIDE AND ALBUTEROL SULFATE 3 ML: 2.5; .5 SOLUTION RESPIRATORY (INHALATION) at 14:55

## 2020-10-30 RX ADMIN — BUDESONIDE 0.5 MG: 0.25 INHALANT RESPIRATORY (INHALATION) at 07:03

## 2020-10-30 RX ADMIN — MEROPENEM 1 G: 1 INJECTION, POWDER, FOR SOLUTION INTRAVENOUS at 02:51

## 2020-10-30 RX ADMIN — NIFEDIPINE 10 MG: 10 CAPSULE ORAL at 06:37

## 2020-10-30 RX ADMIN — IPRATROPIUM BROMIDE AND ALBUTEROL SULFATE 3 ML: 2.5; .5 SOLUTION RESPIRATORY (INHALATION) at 21:42

## 2020-10-30 RX ADMIN — FAMOTIDINE 20 MG: 10 INJECTION INTRAVENOUS at 08:01

## 2020-10-30 RX ADMIN — DEXMEDETOMIDINE HYDROCHLORIDE 1.4 MCG/KG/HR: 100 INJECTION, SOLUTION INTRAVENOUS at 16:00

## 2020-10-30 RX ADMIN — OLANZAPINE 5 MG: 5 TABLET, FILM COATED ORAL at 20:13

## 2020-10-30 RX ADMIN — PROPOFOL 30 MCG/KG/MIN: 10 INJECTION, EMULSION INTRAVENOUS at 02:52

## 2020-10-31 ENCOUNTER — APPOINTMENT (OUTPATIENT)
Dept: GENERAL RADIOLOGY | Facility: HOSPITAL | Age: 60
End: 2020-10-31

## 2020-10-31 LAB
ALBUMIN SERPL-MCNC: 3.1 G/DL (ref 3.5–5.2)
ALBUMIN/GLOB SERPL: 1.5 G/DL
ALP SERPL-CCNC: 77 U/L (ref 39–117)
ALT SERPL W P-5'-P-CCNC: 15 U/L (ref 1–33)
ANION GAP SERPL CALCULATED.3IONS-SCNC: 7 MMOL/L (ref 5–15)
ARTERIAL PATENCY WRIST A: POSITIVE
AST SERPL-CCNC: 15 U/L (ref 1–32)
ATMOSPHERIC PRESS: 758 MMHG
BACTERIA SPEC AEROBE CULT: NORMAL
BACTERIA SPEC AEROBE CULT: NORMAL
BASE EXCESS BLDA CALC-SCNC: -0.7 MMOL/L (ref 0–2)
BASOPHILS # BLD AUTO: 0.02 10*3/MM3 (ref 0–0.2)
BASOPHILS NFR BLD AUTO: 0.3 % (ref 0–1.5)
BDY SITE: ABNORMAL
BILIRUB SERPL-MCNC: 0.2 MG/DL (ref 0–1.2)
BODY TEMPERATURE: 37 C
BUN SERPL-MCNC: 41 MG/DL (ref 8–23)
BUN/CREAT SERPL: 25.6 (ref 7–25)
CALCIUM SPEC-SCNC: 8.3 MG/DL (ref 8.6–10.5)
CHLORIDE SERPL-SCNC: 113 MMOL/L (ref 98–107)
CO2 SERPL-SCNC: 23 MMOL/L (ref 22–29)
CREAT SERPL-MCNC: 1.6 MG/DL (ref 0.57–1)
DEPRECATED RDW RBC AUTO: 50.6 FL (ref 37–54)
EOSINOPHIL # BLD AUTO: 0.07 10*3/MM3 (ref 0–0.4)
EOSINOPHIL NFR BLD AUTO: 1.2 % (ref 0.3–6.2)
ERYTHROCYTE [DISTWIDTH] IN BLOOD BY AUTOMATED COUNT: 15.3 % (ref 12.3–15.4)
GFR SERPL CREATININE-BSD FRML MDRD: 33 ML/MIN/1.73
GLOBULIN UR ELPH-MCNC: 2.1 GM/DL
GLUCOSE SERPL-MCNC: 115 MG/DL (ref 65–99)
HCO3 BLDA-SCNC: 25.1 MMOL/L (ref 20–26)
HCT VFR BLD AUTO: 28.3 % (ref 34–46.6)
HGB BLD-MCNC: 8.5 G/DL (ref 12–15.9)
IMM GRANULOCYTES # BLD AUTO: 0.03 10*3/MM3 (ref 0–0.05)
IMM GRANULOCYTES NFR BLD AUTO: 0.5 % (ref 0–0.5)
INHALED O2 CONCENTRATION: 30 %
IRON 24H UR-MRATE: 19 MCG/DL (ref 37–145)
IRON SATN MFR SERPL: 7 % (ref 20–50)
LYMPHOCYTES # BLD AUTO: 0.59 10*3/MM3 (ref 0.7–3.1)
LYMPHOCYTES NFR BLD AUTO: 10 % (ref 19.6–45.3)
Lab: ABNORMAL
MCH RBC QN AUTO: 26.9 PG (ref 26.6–33)
MCHC RBC AUTO-ENTMCNC: 30 G/DL (ref 31.5–35.7)
MCV RBC AUTO: 89.6 FL (ref 79–97)
MODALITY: ABNORMAL
MONOCYTES # BLD AUTO: 0.38 10*3/MM3 (ref 0.1–0.9)
MONOCYTES NFR BLD AUTO: 6.4 % (ref 5–12)
NEUTROPHILS NFR BLD AUTO: 4.81 10*3/MM3 (ref 1.7–7)
NEUTROPHILS NFR BLD AUTO: 81.6 % (ref 42.7–76)
NRBC BLD AUTO-RTO: 0 /100 WBC (ref 0–0.2)
PCO2 BLDA: 45.7 MM HG (ref 35–45)
PCO2 TEMP ADJ BLD: 45.7 MM HG (ref 35–45)
PEEP RESPIRATORY: 5 CM[H2O]
PH BLDA: 7.35 PH UNITS (ref 7.35–7.45)
PH, TEMP CORRECTED: 7.35 PH UNITS (ref 7.35–7.45)
PLATELET # BLD AUTO: 198 10*3/MM3 (ref 140–450)
PMV BLD AUTO: 11.1 FL (ref 6–12)
PO2 BLDA: 124 MM HG (ref 83–108)
PO2 TEMP ADJ BLD: 124 MM HG (ref 83–108)
POTASSIUM SERPL-SCNC: 4.3 MMOL/L (ref 3.5–5.2)
PROT SERPL-MCNC: 5.2 G/DL (ref 6–8.5)
RBC # BLD AUTO: 3.16 10*6/MM3 (ref 3.77–5.28)
SAO2 % BLDCOA: 99.4 % (ref 94–99)
SET MECH RESP RATE: 12
SODIUM SERPL-SCNC: 143 MMOL/L (ref 136–145)
TIBC SERPL-MCNC: 271 MCG/DL (ref 298–536)
TRANSFERRIN SERPL-MCNC: 182 MG/DL (ref 200–360)
VENTILATOR MODE: AC
VT ON VENT VENT: 500 ML
WBC # BLD AUTO: 5.9 10*3/MM3 (ref 3.4–10.8)

## 2020-10-31 PROCEDURE — 94799 UNLISTED PULMONARY SVC/PX: CPT

## 2020-10-31 PROCEDURE — 25010000002 MEROPENEM PER 100 MG: Performed by: NURSE PRACTITIONER

## 2020-10-31 PROCEDURE — 85025 COMPLETE CBC W/AUTO DIFF WBC: CPT | Performed by: INTERNAL MEDICINE

## 2020-10-31 PROCEDURE — 94003 VENT MGMT INPAT SUBQ DAY: CPT

## 2020-10-31 PROCEDURE — 71045 X-RAY EXAM CHEST 1 VIEW: CPT

## 2020-10-31 PROCEDURE — 82803 BLOOD GASES ANY COMBINATION: CPT

## 2020-10-31 PROCEDURE — 25010000002 ENOXAPARIN PER 10 MG: Performed by: FAMILY MEDICINE

## 2020-10-31 PROCEDURE — 36600 WITHDRAWAL OF ARTERIAL BLOOD: CPT

## 2020-10-31 PROCEDURE — 80053 COMPREHEN METABOLIC PANEL: CPT | Performed by: INTERNAL MEDICINE

## 2020-10-31 PROCEDURE — 25010000002 HYDRALAZINE PER 20 MG: Performed by: FAMILY MEDICINE

## 2020-10-31 PROCEDURE — 25010000002 PROPOFOL 10 MG/ML EMULSION: Performed by: EMERGENCY MEDICINE

## 2020-10-31 PROCEDURE — 84466 ASSAY OF TRANSFERRIN: CPT | Performed by: INTERNAL MEDICINE

## 2020-10-31 PROCEDURE — 94770: CPT

## 2020-10-31 PROCEDURE — 25010000002 METHYLPREDNISOLONE PER 40 MG: Performed by: NURSE PRACTITIONER

## 2020-10-31 PROCEDURE — 83540 ASSAY OF IRON: CPT | Performed by: INTERNAL MEDICINE

## 2020-10-31 RX ORDER — ACETAMINOPHEN 325 MG/1
650 TABLET ORAL EVERY 4 HOURS PRN
Status: DISCONTINUED | OUTPATIENT
Start: 2020-10-31 | End: 2020-11-03 | Stop reason: HOSPADM

## 2020-10-31 RX ADMIN — ENOXAPARIN SODIUM 40 MG: 40 INJECTION SUBCUTANEOUS at 09:22

## 2020-10-31 RX ADMIN — PROPOFOL 30 MCG/KG/MIN: 10 INJECTION, EMULSION INTRAVENOUS at 01:29

## 2020-10-31 RX ADMIN — HYDRALAZINE HYDROCHLORIDE 10 MG: 20 INJECTION INTRAMUSCULAR; INTRAVENOUS at 08:07

## 2020-10-31 RX ADMIN — NIFEDIPINE 10 MG: 10 CAPSULE ORAL at 17:13

## 2020-10-31 RX ADMIN — OLANZAPINE 5 MG: 5 TABLET, FILM COATED ORAL at 20:43

## 2020-10-31 RX ADMIN — DEXMEDETOMIDINE HYDROCHLORIDE 1.4 MCG/KG/HR: 100 INJECTION, SOLUTION INTRAVENOUS at 10:16

## 2020-10-31 RX ADMIN — SODIUM CHLORIDE 100 ML/HR: 4.5 INJECTION, SOLUTION INTRAVENOUS at 13:06

## 2020-10-31 RX ADMIN — METHYLPREDNISOLONE SODIUM SUCCINATE 20 MG: 40 INJECTION, POWDER, FOR SOLUTION INTRAMUSCULAR; INTRAVENOUS at 18:04

## 2020-10-31 RX ADMIN — NIFEDIPINE 10 MG: 10 CAPSULE ORAL at 11:04

## 2020-10-31 RX ADMIN — BUDESONIDE 0.5 MG: 0.25 INHALANT RESPIRATORY (INHALATION) at 20:01

## 2020-10-31 RX ADMIN — IPRATROPIUM BROMIDE AND ALBUTEROL SULFATE 3 ML: 2.5; .5 SOLUTION RESPIRATORY (INHALATION) at 07:35

## 2020-10-31 RX ADMIN — IPRATROPIUM BROMIDE AND ALBUTEROL SULFATE 3 ML: 2.5; .5 SOLUTION RESPIRATORY (INHALATION) at 14:49

## 2020-10-31 RX ADMIN — CARVEDILOL 12.5 MG: 6.25 TABLET, FILM COATED ORAL at 17:15

## 2020-10-31 RX ADMIN — IPRATROPIUM BROMIDE AND ALBUTEROL SULFATE 3 ML: 2.5; .5 SOLUTION RESPIRATORY (INHALATION) at 10:53

## 2020-10-31 RX ADMIN — DEXMEDETOMIDINE HYDROCHLORIDE 1.4 MCG/KG/HR: 100 INJECTION, SOLUTION INTRAVENOUS at 06:58

## 2020-10-31 RX ADMIN — SODIUM CHLORIDE 100 ML/HR: 4.5 INJECTION, SOLUTION INTRAVENOUS at 02:29

## 2020-10-31 RX ADMIN — DEXMEDETOMIDINE HYDROCHLORIDE 1.4 MCG/KG/HR: 100 INJECTION, SOLUTION INTRAVENOUS at 01:29

## 2020-10-31 RX ADMIN — NIFEDIPINE 10 MG: 10 CAPSULE ORAL at 23:15

## 2020-10-31 RX ADMIN — METHYLPREDNISOLONE SODIUM SUCCINATE 20 MG: 40 INJECTION, POWDER, FOR SOLUTION INTRAMUSCULAR; INTRAVENOUS at 06:17

## 2020-10-31 RX ADMIN — IPRATROPIUM BROMIDE AND ALBUTEROL SULFATE 3 ML: 2.5; .5 SOLUTION RESPIRATORY (INHALATION) at 20:00

## 2020-10-31 RX ADMIN — OLANZAPINE 5 MG: 5 TABLET, FILM COATED ORAL at 08:04

## 2020-10-31 RX ADMIN — DEXMEDETOMIDINE HYDROCHLORIDE 1.4 MCG/KG/HR: 100 INJECTION, SOLUTION INTRAVENOUS at 04:06

## 2020-10-31 RX ADMIN — MEROPENEM 1 G: 1 INJECTION, POWDER, FOR SOLUTION INTRAVENOUS at 14:45

## 2020-10-31 RX ADMIN — BUDESONIDE 0.5 MG: 0.25 INHALANT RESPIRATORY (INHALATION) at 07:34

## 2020-10-31 RX ADMIN — PROPOFOL 25 MCG/KG/MIN: 10 INJECTION, EMULSION INTRAVENOUS at 06:16

## 2020-10-31 RX ADMIN — NIFEDIPINE 10 MG: 10 CAPSULE ORAL at 06:16

## 2020-10-31 RX ADMIN — MEROPENEM 1 G: 1 INJECTION, POWDER, FOR SOLUTION INTRAVENOUS at 01:33

## 2020-10-31 RX ADMIN — ACETAMINOPHEN 650 MG: 325 TABLET, FILM COATED ORAL at 23:15

## 2020-10-31 RX ADMIN — HYDRALAZINE HYDROCHLORIDE 10 MG: 20 INJECTION INTRAMUSCULAR; INTRAVENOUS at 22:12

## 2020-10-31 RX ADMIN — FAMOTIDINE 20 MG: 10 INJECTION INTRAVENOUS at 08:02

## 2020-11-01 ENCOUNTER — APPOINTMENT (OUTPATIENT)
Dept: GENERAL RADIOLOGY | Facility: HOSPITAL | Age: 60
End: 2020-11-01

## 2020-11-01 LAB
ALBUMIN SERPL-MCNC: 3.5 G/DL (ref 3.5–5.2)
ALBUMIN/GLOB SERPL: 1.4 G/DL
ALP SERPL-CCNC: 90 U/L (ref 39–117)
ALT SERPL W P-5'-P-CCNC: 19 U/L (ref 1–33)
ANION GAP SERPL CALCULATED.3IONS-SCNC: 10 MMOL/L (ref 5–15)
AST SERPL-CCNC: 26 U/L (ref 1–32)
BASOPHILS # BLD AUTO: 0.05 10*3/MM3 (ref 0–0.2)
BASOPHILS NFR BLD AUTO: 0.5 % (ref 0–1.5)
BILIRUB SERPL-MCNC: 0.3 MG/DL (ref 0–1.2)
BUN SERPL-MCNC: 42 MG/DL (ref 8–23)
BUN/CREAT SERPL: 25.9 (ref 7–25)
CALCIUM SPEC-SCNC: 8.5 MG/DL (ref 8.6–10.5)
CHLORIDE SERPL-SCNC: 111 MMOL/L (ref 98–107)
CO2 SERPL-SCNC: 22 MMOL/L (ref 22–29)
CREAT SERPL-MCNC: 1.62 MG/DL (ref 0.57–1)
DEPRECATED RDW RBC AUTO: 49.7 FL (ref 37–54)
EOSINOPHIL # BLD AUTO: 0.23 10*3/MM3 (ref 0–0.4)
EOSINOPHIL NFR BLD AUTO: 2.4 % (ref 0.3–6.2)
ERYTHROCYTE [DISTWIDTH] IN BLOOD BY AUTOMATED COUNT: 15.4 % (ref 12.3–15.4)
GFR SERPL CREATININE-BSD FRML MDRD: 32 ML/MIN/1.73
GLOBULIN UR ELPH-MCNC: 2.5 GM/DL
GLUCOSE SERPL-MCNC: 110 MG/DL (ref 65–99)
HCT VFR BLD AUTO: 31.6 % (ref 34–46.6)
HGB BLD-MCNC: 9.5 G/DL (ref 12–15.9)
IMM GRANULOCYTES # BLD AUTO: 0.17 10*3/MM3 (ref 0–0.05)
IMM GRANULOCYTES NFR BLD AUTO: 1.8 % (ref 0–0.5)
LYMPHOCYTES # BLD AUTO: 0.78 10*3/MM3 (ref 0.7–3.1)
LYMPHOCYTES NFR BLD AUTO: 8.1 % (ref 19.6–45.3)
MCH RBC QN AUTO: 26.5 PG (ref 26.6–33)
MCHC RBC AUTO-ENTMCNC: 30.1 G/DL (ref 31.5–35.7)
MCV RBC AUTO: 88.3 FL (ref 79–97)
MONOCYTES # BLD AUTO: 0.76 10*3/MM3 (ref 0.1–0.9)
MONOCYTES NFR BLD AUTO: 7.9 % (ref 5–12)
NEUTROPHILS NFR BLD AUTO: 7.63 10*3/MM3 (ref 1.7–7)
NEUTROPHILS NFR BLD AUTO: 79.3 % (ref 42.7–76)
NRBC BLD AUTO-RTO: 0 /100 WBC (ref 0–0.2)
PLATELET # BLD AUTO: 259 10*3/MM3 (ref 140–450)
PMV BLD AUTO: 11.4 FL (ref 6–12)
POTASSIUM SERPL-SCNC: 3.8 MMOL/L (ref 3.5–5.2)
PROT SERPL-MCNC: 6 G/DL (ref 6–8.5)
RBC # BLD AUTO: 3.58 10*6/MM3 (ref 3.77–5.28)
SODIUM SERPL-SCNC: 143 MMOL/L (ref 136–145)
WBC # BLD AUTO: 9.62 10*3/MM3 (ref 3.4–10.8)

## 2020-11-01 PROCEDURE — 80053 COMPREHEN METABOLIC PANEL: CPT | Performed by: FAMILY MEDICINE

## 2020-11-01 PROCEDURE — 25010000002 MEROPENEM PER 100 MG: Performed by: FAMILY MEDICINE

## 2020-11-01 PROCEDURE — 94799 UNLISTED PULMONARY SVC/PX: CPT

## 2020-11-01 PROCEDURE — 25010000002 ENOXAPARIN PER 10 MG: Performed by: FAMILY MEDICINE

## 2020-11-01 PROCEDURE — 25010000002 LORAZEPAM PER 2 MG: Performed by: FAMILY MEDICINE

## 2020-11-01 PROCEDURE — 63710000001 PREDNISONE PER 1 MG: Performed by: FAMILY MEDICINE

## 2020-11-01 PROCEDURE — 85025 COMPLETE CBC W/AUTO DIFF WBC: CPT | Performed by: FAMILY MEDICINE

## 2020-11-01 PROCEDURE — 25010000002 METHYLPREDNISOLONE PER 40 MG: Performed by: NURSE PRACTITIONER

## 2020-11-01 PROCEDURE — 25010000002 LORAZEPAM PER 2 MG: Performed by: INTERNAL MEDICINE

## 2020-11-01 PROCEDURE — 25010000002 MEROPENEM PER 100 MG: Performed by: NURSE PRACTITIONER

## 2020-11-01 RX ORDER — LEVOFLOXACIN 750 MG/1
750 TABLET ORAL EVERY OTHER DAY
Status: COMPLETED | OUTPATIENT
Start: 2020-11-01 | End: 2020-11-01

## 2020-11-01 RX ORDER — PANTOPRAZOLE SODIUM 40 MG/1
40 TABLET, DELAYED RELEASE ORAL
Status: DISCONTINUED | OUTPATIENT
Start: 2020-11-01 | End: 2020-11-03 | Stop reason: HOSPADM

## 2020-11-01 RX ORDER — NIFEDIPINE 10 MG/1
10 CAPSULE ORAL EVERY 6 HOURS SCHEDULED
Status: DISCONTINUED | OUTPATIENT
Start: 2020-11-01 | End: 2020-11-01

## 2020-11-01 RX ORDER — FLUTICASONE PROPIONATE 50 MCG
2 SPRAY, SUSPENSION (ML) NASAL DAILY PRN
Status: DISCONTINUED | OUTPATIENT
Start: 2020-11-01 | End: 2020-11-03 | Stop reason: HOSPADM

## 2020-11-01 RX ORDER — NIFEDIPINE 60 MG/1
60 TABLET, EXTENDED RELEASE ORAL
Status: DISCONTINUED | OUTPATIENT
Start: 2020-11-01 | End: 2020-11-01

## 2020-11-01 RX ORDER — SPIRONOLACTONE 25 MG/1
25 TABLET ORAL DAILY
Status: DISCONTINUED | OUTPATIENT
Start: 2020-11-01 | End: 2020-11-01

## 2020-11-01 RX ORDER — PREDNISONE 20 MG/1
20 TABLET ORAL
Status: DISCONTINUED | OUTPATIENT
Start: 2020-11-01 | End: 2020-11-03 | Stop reason: HOSPADM

## 2020-11-01 RX ORDER — CARVEDILOL 25 MG/1
25 TABLET ORAL 2 TIMES DAILY WITH MEALS
Status: DISCONTINUED | OUTPATIENT
Start: 2020-11-01 | End: 2020-11-03 | Stop reason: HOSPADM

## 2020-11-01 RX ORDER — NIFEDIPINE 60 MG/1
60 TABLET, EXTENDED RELEASE ORAL 2 TIMES DAILY
Status: DISCONTINUED | OUTPATIENT
Start: 2020-11-01 | End: 2020-11-03 | Stop reason: HOSPADM

## 2020-11-01 RX ORDER — FERROUS SULFATE 325(65) MG
325 TABLET ORAL 2 TIMES DAILY WITH MEALS
Status: DISCONTINUED | OUTPATIENT
Start: 2020-11-01 | End: 2020-11-03 | Stop reason: HOSPADM

## 2020-11-01 RX ORDER — LOSARTAN POTASSIUM 50 MG/1
50 TABLET ORAL
Status: DISCONTINUED | OUTPATIENT
Start: 2020-11-01 | End: 2020-11-03 | Stop reason: HOSPADM

## 2020-11-01 RX ORDER — OLANZAPINE 5 MG/1
5 TABLET ORAL 2 TIMES DAILY
Status: DISCONTINUED | OUTPATIENT
Start: 2020-11-01 | End: 2020-11-03 | Stop reason: HOSPADM

## 2020-11-01 RX ORDER — CLONIDINE HYDROCHLORIDE 0.1 MG/1
0.1 TABLET ORAL DAILY PRN
Status: DISCONTINUED | OUTPATIENT
Start: 2020-11-01 | End: 2020-11-02

## 2020-11-01 RX ADMIN — LORAZEPAM 1 MG: 2 INJECTION INTRAMUSCULAR; INTRAVENOUS at 04:35

## 2020-11-01 RX ADMIN — MEROPENEM 1 G: 1 INJECTION, POWDER, FOR SOLUTION INTRAVENOUS at 02:45

## 2020-11-01 RX ADMIN — LOSARTAN POTASSIUM 50 MG: 50 TABLET, FILM COATED ORAL at 14:20

## 2020-11-01 RX ADMIN — CLONIDINE HYDROCHLORIDE 0.1 MG: 0.1 TABLET ORAL at 11:05

## 2020-11-01 RX ADMIN — NIFEDIPINE 60 MG: 60 TABLET, FILM COATED, EXTENDED RELEASE ORAL at 21:11

## 2020-11-01 RX ADMIN — PREDNISONE 20 MG: 20 TABLET ORAL at 08:58

## 2020-11-01 RX ADMIN — SODIUM CHLORIDE 50 ML/HR: 4.5 INJECTION, SOLUTION INTRAVENOUS at 02:45

## 2020-11-01 RX ADMIN — OLANZAPINE 5 MG: 5 TABLET, FILM COATED ORAL at 08:58

## 2020-11-01 RX ADMIN — HYDRALAZINE HYDROCHLORIDE 75 MG: 50 TABLET ORAL at 08:58

## 2020-11-01 RX ADMIN — NIFEDIPINE 10 MG: 10 CAPSULE ORAL at 05:31

## 2020-11-01 RX ADMIN — LEVOFLOXACIN 750 MG: 750 TABLET, FILM COATED ORAL at 16:19

## 2020-11-01 RX ADMIN — IPRATROPIUM BROMIDE AND ALBUTEROL SULFATE 3 ML: 2.5; .5 SOLUTION RESPIRATORY (INHALATION) at 10:10

## 2020-11-01 RX ADMIN — HYDRALAZINE HYDROCHLORIDE 75 MG: 50 TABLET ORAL at 21:11

## 2020-11-01 RX ADMIN — NIFEDIPINE 60 MG: 60 TABLET, FILM COATED, EXTENDED RELEASE ORAL at 08:57

## 2020-11-01 RX ADMIN — ACETAMINOPHEN 650 MG: 325 TABLET, FILM COATED ORAL at 06:11

## 2020-11-01 RX ADMIN — ACETAMINOPHEN 650 MG: 325 TABLET, FILM COATED ORAL at 11:08

## 2020-11-01 RX ADMIN — BUDESONIDE 0.5 MG: 0.25 INHALANT RESPIRATORY (INHALATION) at 21:43

## 2020-11-01 RX ADMIN — IPRATROPIUM BROMIDE AND ALBUTEROL SULFATE 3 ML: 2.5; .5 SOLUTION RESPIRATORY (INHALATION) at 14:25

## 2020-11-01 RX ADMIN — LORAZEPAM 1 MG: 2 INJECTION INTRAMUSCULAR; INTRAVENOUS at 14:27

## 2020-11-01 RX ADMIN — LORAZEPAM 1 MG: 2 INJECTION INTRAMUSCULAR; INTRAVENOUS at 08:57

## 2020-11-01 RX ADMIN — LORAZEPAM 1 MG: 2 INJECTION INTRAMUSCULAR; INTRAVENOUS at 00:35

## 2020-11-01 RX ADMIN — ENOXAPARIN SODIUM 40 MG: 40 INJECTION SUBCUTANEOUS at 08:56

## 2020-11-01 RX ADMIN — METHYLPREDNISOLONE SODIUM SUCCINATE 20 MG: 40 INJECTION, POWDER, FOR SOLUTION INTRAMUSCULAR; INTRAVENOUS at 05:31

## 2020-11-01 RX ADMIN — IPRATROPIUM BROMIDE AND ALBUTEROL SULFATE 3 ML: 2.5; .5 SOLUTION RESPIRATORY (INHALATION) at 21:43

## 2020-11-01 RX ADMIN — FERROUS SULFATE TAB 325 MG (65 MG ELEMENTAL FE) 325 MG: 325 (65 FE) TAB at 17:12

## 2020-11-01 RX ADMIN — MEROPENEM 1 G: 1 INJECTION, POWDER, FOR SOLUTION INTRAVENOUS at 14:20

## 2020-11-01 RX ADMIN — PANTOPRAZOLE SODIUM 40 MG: 40 TABLET, DELAYED RELEASE ORAL at 08:57

## 2020-11-01 RX ADMIN — OLANZAPINE 5 MG: 5 TABLET, FILM COATED ORAL at 21:11

## 2020-11-01 RX ADMIN — CARVEDILOL 25 MG: 25 TABLET, FILM COATED ORAL at 17:12

## 2020-11-01 RX ADMIN — BUDESONIDE 0.5 MG: 0.25 INHALANT RESPIRATORY (INHALATION) at 06:47

## 2020-11-01 RX ADMIN — IPRATROPIUM BROMIDE AND ALBUTEROL SULFATE 3 ML: 2.5; .5 SOLUTION RESPIRATORY (INHALATION) at 06:47

## 2020-11-01 RX ADMIN — CARVEDILOL 25 MG: 25 TABLET, FILM COATED ORAL at 08:57

## 2020-11-01 RX ADMIN — SPIRONOLACTONE 25 MG: 25 TABLET ORAL at 08:58

## 2020-11-01 RX ADMIN — LORAZEPAM 1 MG: 2 INJECTION INTRAMUSCULAR; INTRAVENOUS at 21:11

## 2020-11-01 RX ADMIN — CLONIDINE HYDROCHLORIDE 0.1 MG: 0.1 TABLET ORAL at 23:45

## 2020-11-01 RX ADMIN — HYDRALAZINE HYDROCHLORIDE 75 MG: 50 TABLET ORAL at 14:19

## 2020-11-01 NOTE — PROGRESS NOTES
Nephrology ([facility]) Progress Note      Patient:  Ludivina Ramirez  YOB: 1960  Date of Service: 11/1/2020  MRN: [id number]   Acct: [id number]   Primary Care Physician: Orville Weston MD  Advance Directive:   Code Status and Medical Interventions:   Ordered at: 10/27/20 1728     Code Status:    CPR     Medical Interventions (Level of Support Prior to Arrest):    Full     Admit Date: 10/26/2020       Hospital Day: 6  Referring Provider: Orville Weston MD      Patient personally seen and examined.  Complete chart including Consults, Notes, Operative Reports, Labs, Cardiology, and Radiology studies reviewed as able.    Chief complaint: Abnormal labs.    Subjective:  Ludivina Ramirez is a 60 y.o. female  whom we were consulted for acute kidney injury.  Baseline chronic kidney disease stage 3b (creatinine 1.5 in September 2019).  Recent prior admission for hypertensive urgency and MATTY. Discharged on 10/17 with creatinine 2.29.  Presented this time to emergency department with acute onset dyspnea. Required intubation shortly after arrival to ER.  On admission her creatinine was 2.0 which is improved from her prior hospitalization but not yet back to her prior baseline.  Hospital course remarkable for worsening of renal function which is slowly improving now.  She is approaching to baseline serum creatinine slowly.  She is now successfully extubated as well.    She was initially admitted to ICU now back to regular floor.  She is hoping to go home soon.    Allergies:  Codeine and Imitrex [sumatriptan]    Home Meds:  Medications Prior to Admission   Medication Sig Dispense Refill Last Dose   • azelastine (ASTELIN) 0.1 % nasal spray 1 spray into the nostril(s) as directed by provider 2 (Two) Times a Day. Use in each nostril as directed      • carvedilol (COREG) 25 MG tablet Take 0.5 tablets by mouth 2 (Two) Times a Day With Meals. 6 tablet 1    • cholecalciferol 1000 units  tablet Take 1,000 Units by mouth Daily. 30 tablet 1    • CloNIDine (CATAPRES) 0.1 MG tablet Take 0.1 mg by mouth Daily As Needed for High Blood Pressure (BP >180/100).      • fluticasone (FLONASE) 50 MCG/ACT nasal spray 2 sprays into the nostril(s) as directed by provider Daily As Needed for Rhinitis or Allergies.      • hydrALAZINE (APRESOLINE) 25 MG tablet Take 3 tablets by mouth Every 8 (Eight) Hours. 270 tablet 3    • NIFEdipine XL (ADALAT CC) 60 MG 24 hr tablet Take 1 tablet by mouth Daily. 30 tablet 1    • pantoprazole (PROTONIX) 40 MG EC tablet Take 1 tablet by mouth Daily. 30 tablet 1    • spironolactone (ALDACTONE) 25 MG tablet Take 1 tablet by mouth Daily. 30 tablet 1    • tiZANidine (ZANAFLEX) 4 MG tablet Take 1 tablet by mouth Every 8 (Eight) Hours As Needed for Muscle Spasms. 30 tablet 0        Medicines:  Current Facility-Administered Medications   Medication Dose Route Frequency Provider Last Rate Last Dose   • acetaminophen (TYLENOL) tablet 650 mg  650 mg Oral Q4H PRN Ren Santo MD   650 mg at 11/01/20 1108   • budesonide (PULMICORT) nebulizer solution 0.5 mg  0.5 mg Nebulization BID - RT Ren Santo MD   0.5 mg at 11/01/20 0647   • carvedilol (COREG) tablet 25 mg  25 mg Oral BID With Meals Rne Santo MD   25 mg at 11/01/20 0857   • cloNIDine (CATAPRES) tablet 0.1 mg  0.1 mg Oral Daily PRN Ren Santo MD   0.1 mg at 11/01/20 1105   • enoxaparin (LOVENOX) syringe 40 mg  40 mg Subcutaneous Q24H Ren Santo MD   40 mg at 11/01/20 0856   • fluticasone (FLONASE) 50 MCG/ACT nasal spray 2 spray  2 spray Nasal Daily PRN Ren Santo MD       • hydrALAZINE (APRESOLINE) injection 10 mg  10 mg Intravenous Q6H PRN Ren Santo MD   10 mg at 10/31/20 2212   • hydrALAZINE (APRESOLINE) tablet 75 mg  75 mg Oral Q8H Ren Santo MD   75 mg at 11/01/20 0858   • ipratropium-albuterol (DUO-NEB) nebulizer solution 3 mL  3 mL Nebulization 4x  Daily - RT Ren Santo MD   3 mL at 11/01/20 1010   • levoFLOXacin (LEVAQUIN) 750 mg/150 mL D5W (premix) (LEVAQUIN) 750 mg  750 mg Intravenous Q48H Ren Santo MD   750 mg at 10/30/20 1519   • LORazepam (ATIVAN) injection 1 mg  1 mg Intravenous Q4H PRN Ren Santo MD   1 mg at 11/01/20 0857   • meropenem (MERREM) 1 g/100 mL 0.9% NS VTB (mbp)  1 g Intravenous Q12H Ren Santo MD 0 mL/hr at 10/29/20 0450 1 g at 11/01/20 0245   • NIFEdipine XL (PROCARDIA XL) 24 hr tablet 60 mg  60 mg Oral Q24H Ren Santo MD   60 mg at 11/01/20 0857   • OLANZapine (zyPREXA) tablet 5 mg  5 mg Oral BID Ren Santo MD   5 mg at 11/01/20 0858   • pantoprazole (PROTONIX) EC tablet 40 mg  40 mg Oral Q AM Ren Santo MD   40 mg at 11/01/20 0857   • Pharmacy to Dose LevoFLOXacin (LEVAQUIN)   Does not apply Continuous PRN Ren Santo MD       • predniSONE (DELTASONE) tablet 20 mg  20 mg Oral Daily With Breakfast Ren Santo MD   20 mg at 11/01/20 0858   • spironolactone (ALDACTONE) tablet 25 mg  25 mg Oral Daily Ren Santo MD   25 mg at 11/01/20 0858       Past Medical History:  Past Medical History:   Diagnosis Date   • Acute CHF (CMS/HCC)    • Acute renal failure (ARF) (CMS/HCC)    • Anemia    • Asthma     childhood   • Hypertension    • Ischemic colitis (CMS/HCC)    • Metabolic acidosis    • Nonrheumatic aortic (valve) insufficiency    • PTSD (post-traumatic stress disorder)        Past Surgical History:  Past Surgical History:   Procedure Laterality Date   • EXTERNAL FIXATION WRIST FRACTURE     • LEG SURGERY     • TUBAL ABDOMINAL LIGATION         Family History  Family History   Problem Relation Age of Onset   • Coronary artery disease Mother    • Coronary artery disease Father        Social History  Social History     Socioeconomic History   • Marital status: Single     Spouse name: Not on file   • Number of children: Not on file   • Years  "of education: Not on file   • Highest education level: Not on file   Tobacco Use   • Smoking status: Current Every Day Smoker     Packs/day: 0.50   • Smokeless tobacco: Never Used   Substance and Sexual Activity   • Alcohol use: No   • Drug use: No   • Sexual activity: Defer         Review of Systems:  General: Alert and awake/weak  Cardiac: Denies any chest pain chest pressure  Pulmonary: Denies any shortness of breath  Gastrointestinal: Denies any nausea vomiting  : Has good urine output.      Objective:  Patient Vitals for the past 24 hrs:   BP Temp Temp src Pulse Resp SpO2 Height Weight   11/01/20 1245 137/70 -- -- -- -- -- -- --   11/01/20 1056 (!) 198/103 -- -- -- -- -- -- --   11/01/20 1053 (!) 212/111 98.2 °F (36.8 °C) Oral 99 22 97 % -- --   11/01/20 1042 -- -- -- -- -- -- 165.1 cm (65\") 88.9 kg (196 lb)   11/01/20 1016 -- -- -- -- 27 -- -- --   11/01/20 1010 -- -- -- 93 22 95 % -- --   11/01/20 1000 (!) 187/103 -- -- 93 19 96 % -- --   11/01/20 0900 (!) 186/106 -- -- 99 20 95 % -- --   11/01/20 0800 (!) 194/119 98.1 °F (36.7 °C) Oral 99 21 94 % -- --   11/01/20 0700 (!) 177/101 -- -- 96 20 96 % -- --   11/01/20 0657 -- -- -- -- 22 -- -- --   11/01/20 0647 -- -- -- 95 13 95 % -- --   11/01/20 0531 173/88 -- -- 96 -- -- -- --   11/01/20 0530 173/88 -- -- 96 -- 96 % -- --   11/01/20 0500 178/96 -- -- 105 -- 94 % -- --   11/01/20 0430 166/80 -- -- 98 -- 95 % -- --   11/01/20 0400 150/91 98.6 °F (37 °C) Oral 94 22 96 % -- --   11/01/20 0330 139/94 -- -- 92 -- 96 % -- --   11/01/20 0300 109/89 -- -- 96 -- 94 % -- --   11/01/20 0245 -- -- -- 96 -- 97 % -- --   11/01/20 0230 154/85 -- -- 98 -- 96 % -- --   11/01/20 0200 147/80 -- -- 97 -- 97 % -- --   11/01/20 0130 157/84 -- -- 102 -- 97 % -- --   11/01/20 0115 -- -- -- 102 -- 97 % -- --   11/01/20 0100 154/93 -- -- 102 -- 98 % -- --   11/01/20 0045 -- -- -- 104 -- 97 % -- --   11/01/20 0030 134/78 -- -- 101 -- 96 % -- --   11/01/20 0015 -- -- -- 106 -- 95 % -- " --   11/01/20 0000 158/83 99 °F (37.2 °C) Oral 106 20 95 % -- --   10/31/20 2330 152/85 -- -- 107 -- (!) 72 % -- --   10/31/20 2315 156/78 -- -- 105 -- 100 % -- --   10/31/20 2300 159/81 -- -- 102 -- 98 % -- --   10/31/20 2245 155/85 -- -- 103 -- 94 % -- --   10/31/20 2230 159/97 -- -- 100 -- 93 % -- --   10/31/20 2215 146/90 -- -- 96 -- 97 % -- --   10/31/20 2212 166/82 -- -- 97 -- -- -- --   10/31/20 2145 165/92 -- -- 93 -- 100 % -- --   10/31/20 2130 168/83 -- -- 90 -- 100 % -- --   10/31/20 2115 155/87 -- -- 87 -- 100 % -- --   10/31/20 2100 175/81 -- -- 85 -- 100 % -- --   10/31/20 2045 162/79 -- -- 85 -- 100 % -- --   10/31/20 2030 -- -- -- 86 -- 98 % -- --   10/31/20 2015 -- -- -- 80 -- 98 % -- --   10/31/20 2008 -- -- -- 80 -- 100 % -- --   10/31/20 2000 151/87 98.3 °F (36.8 °C) Oral 81 24 97 % -- --   10/31/20 1945 162/99 -- -- 79 -- 97 % -- --   10/31/20 1930 141/87 -- -- 77 -- 97 % -- --   10/31/20 1915 142/90 -- -- 79 -- -- -- --   10/31/20 1800 153/89 -- -- 101 23 92 % -- --   10/31/20 1715 136/91 -- -- 87 -- -- -- --   10/31/20 1713 (!) 162/123 -- -- 88 -- -- -- --   10/31/20 1700 (!) 162/123 -- -- (!) 42 22 (!) 67 % -- --   10/31/20 1600 157/82 98.1 °F (36.7 °C) Infrared 92 -- 98 % -- --   10/31/20 1500 130/76 -- -- 84 24 100 % -- --   10/31/20 1457 -- -- -- -- 22 -- -- --   10/31/20 1449 -- -- -- 76 22 96 % -- --   10/31/20 1400 102/68 -- -- 79 22 100 % -- --       Intake/Output Summary (Last 24 hours) at 11/1/2020 1352  Last data filed at 11/1/2020 0800  Gross per 24 hour   Intake 1731 ml   Output 695 ml   Net 1036 ml     General: Alert and awake x3  HEENT: Normocephalic atraumatic head.  Neck: supple, no JVD  Chest:  clear to auscultation bilaterally without respiratory distress  CVS: sinus indira without ectopy  Abdominal: soft, nontender, positive bowel sounds  Extremities: no cyanosis or edema  Skin: warm and dry without rash      Labs:  Results from last 7 days   Lab Units 11/01/20  1777  10/31/20  0246 10/30/20  0206   WBC 10*3/mm3 9.62 5.90 6.40   HEMOGLOBIN g/dL 9.5* 8.5* 9.6*   HEMATOCRIT % 31.6* 28.3* 31.2*   PLATELETS 10*3/mm3 259 198 214         Results from last 7 days   Lab Units 11/01/20  0255 10/31/20  0246 10/30/20  0206   SODIUM mmol/L 143 143 145   POTASSIUM mmol/L 3.8 4.3 4.0   CHLORIDE mmol/L 111* 113* 110*   CO2 mmol/L 22.0 23.0 24.0   BUN mg/dL 42* 41* 45*   CREATININE mg/dL 1.62* 1.60* 1.78*   CALCIUM mg/dL 8.5* 8.3* 8.7   BILIRUBIN mg/dL 0.3 0.2 0.3   ALK PHOS U/L 90 77 81   ALT (SGPT) U/L 19 15 13   AST (SGOT) U/L 26 15 16   GLUCOSE mg/dL 110* 115* 133*       Radiology:   Imaging Results (Last 72 Hours)     Procedure Component Value Units Date/Time    XR Chest 1 View [[id number]] Collected: 10/31/20 0702     Updated: 10/31/20 0706    Narrative:      Exam: XR CHEST 1 VW-     Indication: vent; J96.01-Acute respiratory failure with hypoxia;  E87.2-Acidosis; I50.41-Acute combined systolic (congestive) and  diastolic (congestive) heart failure; R77.8-Other specified  abnormalities of plasma proteins     Comparison: Prior day     Findings:     Endotracheal tube is 1.5 cm above the sarah. Enteric tube terminates  below the diaphragm out of the field of view. Cardiac silhouette is  enlarged but stable. No change in LEFT lower lobe atelectasis. No new  airspace opacity. No large pleural effusion or visible pneumothorax.       Impression:      Impression:     No change in appearance of the chest.  This report was finalized on 10/31/2020 07:03 by Dr. Zac Schwarz MD.    XR Chest 1 View [[id number]] Collected: 10/30/20 0734     Updated: 10/30/20 0738    Narrative:      Frontal upright radiograph of the chest 10/30/2020 3:50 AM CDT     COMPARISON: October 29, 2020.     FINDINGS:   Right lung is clear. Left diaphragms indistinct consistent atelectasis  left lower lobe. Cardiac silhouettes upper limits of normal. Nasogastric  tube and endotracheal tube are satisfactorily position..      The  osseous structures and surrounding soft tissues demonstrate no acute  abnormality.       Impression:      1. Atelectasis left lower lobe..        This report was finalized on 10/30/2020 07:35 by Dr. Cj Cruz MD.          Culture:  No results found for: BLOODCX, URINECX, WOUNDCX, MRSACX, RESPCX, STOOLCX      Assessment   1.  Acute kidney injury, ATN--improving  2.  Stage IIIb chronic kidney disease baseline.  3.  Acute hypoxic respiratory failure--intubated  4.  Bilateral pneumonia  5.  Hypertension  6.  Anemia   7.  Hypernatremia--improving  8.  Hypertensive renal disease.  9.  Poorly controlled hypertension.    Plan:  1.  Discontinue IV fluid  2.  Adjust BP medication.  3.  P.o. ferrous sulfate.      Obed Lazo MD  11/1/2020  13:52 CST

## 2020-11-01 NOTE — PLAN OF CARE
Goal Outcome Evaluation:  Plan of Care Reviewed With: patient  Progress: improving  Outcome Summary: Pt transferred from CCU to 4B today. BP elevated. PRN Clonidine given. BP much improved after. IV Merrem given. Levaquin changed to PO. Pt on room air, sats WNL. Pt anxious to go home. Safety maintained, cont to monitor.

## 2020-11-01 NOTE — PROGRESS NOTES
Family Medicine Progress Note    Patient:  Ludivina Ramirez  YOB: 1960    MRN: [id number]     Acct: [id number]     Admit date: 10/26/2020    Patient Seen, Chart, Consults notes, Labs, Radiology studies reviewed.    Subjective: Day 6 of stay with respiratory failure and renal failure and most recent (in last 24 hours) has had successful extubation.  Patient reports feeling okay.  States she is looking forward to going home soon hopefully.  Denies current dyspnea.    Past, Family, Social History unchanged from admission.    Diet: Diet Regular    Medications:  Scheduled Meds:budesonide, 0.5 mg, Nebulization, BID - RT  carvedilol, 25 mg, Oral, BID With Meals  enoxaparin, 40 mg, Subcutaneous, Q24H  hydrALAZINE, 75 mg, Oral, Q8H  ipratropium-albuterol, 3 mL, Nebulization, 4x Daily - RT  levoFLOXacin, 750 mg, Intravenous, Q48H  meropenem, 1 g, Intravenous, Q12H  NIFEdipine CC, 60 mg, Oral, Q24H  OLANZapine, 5 mg, Oral, BID  pantoprazole, 40 mg, Oral, Daily  predniSONE, 20 mg, Oral, Daily With Breakfast  spironolactone, 25 mg, Oral, Daily      Continuous Infusions:dexmedetomidine, 0.2-1.5 mcg/kg/hr, Last Rate: Stopped (10/31/20 1235)  nitroglycerin, 5-200 mcg/min, Last Rate: Stopped (10/26/20 1248)  Pharmacy to Dose LevoFLOXacin (LEVAQUIN),   propofol, 5-50 mcg/kg/min, Last Rate: Stopped (10/31/20 1230)  sodium chloride, 50 mL/hr, Last Rate: 50 mL/hr (11/01/20 0245)      PRN Meds:•  acetaminophen  •  cloNIDine  •  fluticasone  •  hydrALAZINE  •  LORazepam  •  Pharmacy to Dose LevoFLOXacin (LEVAQUIN)    Objective:    Lab Results (last 24 hours)     Procedure Component Value Units Date/Time    CBC & Differential [[id number]]  (Abnormal) Collected: 11/01/20 0255    Specimen: Blood Updated: 11/01/20 6614    Narrative:      The following orders were created for panel order CBC & Differential.  Procedure                               Abnormality         Status                     ---------                                -----------         ------                     CBC Auto Differential[[id number]]        Abnormal            Final result                 Please view results for these tests on the individual orders.    CBC Auto Differential [[id number]]  (Abnormal) Collected: 11/01/20 0255    Specimen: Blood Updated: 11/01/20 0433     WBC 9.62 10*3/mm3      RBC 3.58 10*6/mm3      Hemoglobin 9.5 g/dL      Hematocrit 31.6 %      MCV 88.3 fL      MCH 26.5 pg      MCHC 30.1 g/dL      RDW 15.4 %      RDW-SD 49.7 fl      MPV 11.4 fL      Platelets 259 10*3/mm3      Neutrophil % 79.3 %      Lymphocyte % 8.1 %      Monocyte % 7.9 %      Eosinophil % 2.4 %      Basophil % 0.5 %      Immature Grans % 1.8 %      Neutrophils, Absolute 7.63 10*3/mm3      Lymphocytes, Absolute 0.78 10*3/mm3      Monocytes, Absolute 0.76 10*3/mm3      Eosinophils, Absolute 0.23 10*3/mm3      Basophils, Absolute 0.05 10*3/mm3      Immature Grans, Absolute 0.17 10*3/mm3      nRBC 0.0 /100 WBC     Comprehensive Metabolic Panel [[id number]]  (Abnormal) Collected: 11/01/20 0255    Specimen: Blood Updated: 11/01/20 0406     Glucose 110 mg/dL      BUN 42 mg/dL      Creatinine 1.62 mg/dL      Sodium 143 mmol/L      Potassium 3.8 mmol/L      Chloride 111 mmol/L      CO2 22.0 mmol/L      Calcium 8.5 mg/dL      Total Protein 6.0 g/dL      Albumin 3.50 g/dL      ALT (SGPT) 19 U/L      AST (SGOT) 26 U/L      Alkaline Phosphatase 90 U/L      Total Bilirubin 0.3 mg/dL      eGFR Non African Amer 32 mL/min/1.73      Globulin 2.5 gm/dL      A/G Ratio 1.4 g/dL      BUN/Creatinine Ratio 25.9     Anion Gap 10.0 mmol/L     Narrative:      GFR Normal >60  Chronic Kidney Disease <60  Kidney Failure <15      Iron Profile [[id number]]  (Abnormal) Collected: 10/31/20 0246    Specimen: Blood Updated: 10/31/20 1357     Iron 19 mcg/dL      Iron Saturation 7 %      Transferrin 182 mg/dL      TIBC 271 mcg/dL     Blood Culture - Blood, Arm, Right [[id number]] Collected: 10/26/20 0937    Specimen:  "Blood from Arm, Right Updated: 10/31/20 0945     Blood Culture No growth at 5 days    Blood Culture - Blood, Hand, Right [[id number]] Collected: 10/26/20 0920    Specimen: Blood from Hand, Right Updated: 10/31/20 0945     Blood Culture No growth at 5 days           Imaging Results (Last 72 Hours)     Procedure Component Value Units Date/Time    XR Chest 1 View [[id number]] Collected: 10/31/20 0702     Updated: 10/31/20 0706    Narrative:      Exam: XR CHEST 1 VW-     Indication: vent; J96.01-Acute respiratory failure with hypoxia;  E87.2-Acidosis; I50.41-Acute combined systolic (congestive) and  diastolic (congestive) heart failure; R77.8-Other specified  abnormalities of plasma proteins     Comparison: Prior day     Findings:     Endotracheal tube is 1.5 cm above the sarah. Enteric tube terminates  below the diaphragm out of the field of view. Cardiac silhouette is  enlarged but stable. No change in LEFT lower lobe atelectasis. No new  airspace opacity. No large pleural effusion or visible pneumothorax.       Impression:      Impression:     No change in appearance of the chest.  This report was finalized on 10/31/2020 07:03 by Dr. Zac Schwarz MD.    XR Chest 1 View [[id number]] Collected: 10/30/20 0734     Updated: 10/30/20 0738    Narrative:      Frontal upright radiograph of the chest 10/30/2020 3:50 AM CDT     COMPARISON: October 29, 2020.     FINDINGS:   Right lung is clear. Left diaphragms indistinct consistent atelectasis  left lower lobe. Cardiac silhouettes upper limits of normal. Nasogastric  tube and endotracheal tube are satisfactorily position..      The osseous structures and surrounding soft tissues demonstrate no acute  abnormality.       Impression:      1. Atelectasis left lower lobe..        This report was finalized on 10/30/2020 07:35 by Dr. Cj Cruz MD.           Physical Exam:    Vitals: /88   Pulse 95   Temp 98.6 °F (37 °C) (Oral)   Resp 22   Ht 157.5 cm (62\")   Wt 89.8 kg " (197 lb 15.6 oz)   SpO2 95%   BMI 36.21 kg/m²   24 hour intake/output:    Intake/Output Summary (Last 24 hours) at 11/1/2020 0744  Last data filed at 11/1/2020 0400  Gross per 24 hour   Intake 2732 ml   Output 1925 ml   Net 807 ml     Last 3 weights:  Wt Readings from Last 3 Encounters:   10/31/20 89.8 kg (197 lb 15.6 oz)   10/16/20 88.9 kg (196 lb)   12/03/19 81.2 kg (179 lb)       General Appearance alert, cooperative and Appears older than stated age  Head normocephalic, without obvious abnormality and atraumatic  Eyes lids and lashes normal, conjunctivae and sclerae normal and no icterus  Neck supple and trachea midline  Lungs respirations regular, respirations even and respirations unlabored, Diminished breath sounds throughout with scattered adventitious sounds  Heart regular rhythm & normal rate and normal S1, S2  Abdomen normal bowel sounds and soft non-tender  Extremities no cyanosis and no redness  Skin turgor normal and color normal  Neurologic Mental Status orientated to person, place, time and situation, Cranial Nerves cranial nerves 2 - 12 grossly intact as examined        Assessment:      Acute respiratory failure with hypoxia (CMS/HCC)    Iron deficiency anemia    Nonrheumatic aortic valve insufficiency    Acute renal failure (CMS/HCC)    Diastolic CHF, acute (CMS/HCC)    Colitis, ischemic (CMS/HCC)    Congestive heart failure (CMS/HCC)    Chronic kidney disease, stage 4 (severe) (CMS/HCC)    Noncompliance    Hypertensive emergency          Plan:  Currently still on medications ordered either IV or through the NG tube.  I will restart all of her home oral medications particular for blood pressure given it is been high.  Otherwise appears medically stable enough to move to the floor.  Finish complete course of antibiotics.  Hopefully on the way to home soon.    Due to the transfer orders and all other issues being addressed patient is medically stable however note today require greater than 30  minutes of time to coordinate transfer out of the ICU.    Electronically signed by Ren Santo MD on 11/1/2020 at 07:44 CST

## 2020-11-01 NOTE — PROGRESS NOTES
"      [facility]         Dr Schmitt Sayied   LOS: 6 days   Patient Care Team:  Orville Weston MD as PCP - General (Family Medicine)  Juanpablo Rea MD as Consulting Physician (Gastroenterology)  Nickolas Alegria MD as Consulting Physician (Gastroenterology)    Chief Complaint: Acute respiratory failure with bilateral lung infiltrates acute kidney injury    Interval History: Doing well no complaints currently on room air..    REVIEW OF SYSTEMS:   CARDIOVASCULAR: No chest pain, chest pressure or chest discomfort. No palpitations or edema.   RESPIRATORY: No shortness of breath, cough or sputum.   GASTROINTESTINAL: No anorexia, nausea, vomiting or diarrhea. No abdominal pain or blood.   HEMATOLOGIC: No bleeding or bruising.     Ventilator/Non-Invasive Ventilation Settings (From admission, onward)   Nasal cannula 2 L oxygen      Vital Signs  Temp:  [98.1 °F (36.7 °C)-99 °F (37.2 °C)] 98.2 °F (36.8 °C)  Heart Rate:  [] 99  Resp:  [13-27] 18  BP: (109-212)/() 137/70    Intake/Output Summary (Last 24 hours) at 11/1/2020 1457  Last data filed at 11/1/2020 0800  Gross per 24 hour   Intake 1631 ml   Output 695 ml   Net 936 ml     Flowsheet Rows      First Filed Value   Admission Height  165.1 cm (65\") Documented at 10/26/2020 0849   Admission Weight  88.9 kg (196 lb) Documented at 10/26/2020 0849          Physical Exam:  Patient is examined using the personal protective equipment as per guidelines from infection control for this particular patient as enacted.  Hand hygiene was performed before and after patient interaction.   General Appearance:   Extubated awake following simple commands  Neck midline trachea no thyromegaly   Lungs:    Diminished breath sounds rhonchi on the basis    Heart:    Regular rhythm and normal rate, normal S1 and S2, no            murmur, no gallop, no rub, no click   Chest Wall:    No abnormalities observed   Abdomen:     Normal bowel sounds, no masses, no " organomegaly, soft        non-tender, non-distended, no guarding, no rebound                tenderness   Extremities:   Moves all extremities well, trace to 1+ edema, no cyanosis, no             redness  CNS no focal neurological deficits normal sensory exam  Skin no rashes no nodules  Musculoskeletal no cyanosis no clubbing normal range of motion     Results Review:        Results from last 7 days   Lab Units 11/01/20  0255 10/31/20  0246 10/30/20  0206   SODIUM mmol/L 143 143 145   POTASSIUM mmol/L 3.8 4.3 4.0   CHLORIDE mmol/L 111* 113* 110*   CO2 mmol/L 22.0 23.0 24.0   BUN mg/dL 42* 41* 45*   CREATININE mg/dL 1.62* 1.60* 1.78*   GLUCOSE mg/dL 110* 115* 133*   CALCIUM mg/dL 8.5* 8.3* 8.7     Results from last 7 days   Lab Units 10/27/20  0314 10/26/20  1714 10/26/20  0859   CK TOTAL U/L 219*  --   --    TROPONIN T ng/mL  --  0.346* 0.357*     Results from last 7 days   Lab Units 11/01/20  0255 10/31/20  0246 10/30/20  0206   WBC 10*3/mm3 9.62 5.90 6.40   HEMOGLOBIN g/dL 9.5* 8.5* 9.6*   HEMATOCRIT % 31.6* 28.3* 31.2*   PLATELETS 10*3/mm3 259 198 214     Results from last 7 days   Lab Units 10/26/20  0859   INR  1.02                 Results from last 7 days   Lab Units 10/31/20  0327   PH, ARTERIAL pH units 7.347*   PO2 ART mm Hg 124.0*   PCO2, ARTERIAL mm Hg 45.7*   HCO3 ART mmol/L 25.1       I reviewed the patient's new clinical results.  I personally viewed and interpreted the patient's CXR        Medication Review:   budesonide, 0.5 mg, Nebulization, BID - RT  carvedilol, 25 mg, Oral, BID With Meals  enoxaparin, 40 mg, Subcutaneous, Q24H  ferrous sulfate, 325 mg, Oral, BID With Meals  hydrALAZINE, 75 mg, Oral, Q8H  ipratropium-albuterol, 3 mL, Nebulization, 4x Daily - RT  levoFLOXacin, 750 mg, Oral, Every Other Day  losartan, 50 mg, Oral, Q24H  meropenem, 1 g, Intravenous, Q12H  NIFEdipine CC, 60 mg, Oral, BID  OLANZapine, 5 mg, Oral, BID  pantoprazole, 40 mg, Oral, Q AM  predniSONE, 20 mg, Oral, Daily With  Breakfast             ASSESSMENT:   1. Acute on chronic hypoxemic and hypercapnic respiratory failure  2. Bilateral lung infiltrates  3. Renal impairment  4. Elevated BNP  5. Leukocytosis  6. Anemia    PLAN:  Currently on room air tolerating well.  She has bilateral pulmonary infiltrate abnormal chest x-ray.  Will recommend after completion of antibiotic follow-up with pulmonary as outpatient with CT chest in 6 to 8 weeks.  May switch to oral antibiotics  Creatinine stable and improving  May switch to oral antibiotics based on cultures  Nothing to add pulmonary wise we will sign off        Oskar Her MD  11/01/20  14:57 CST

## 2020-11-02 ENCOUNTER — APPOINTMENT (OUTPATIENT)
Dept: GENERAL RADIOLOGY | Facility: HOSPITAL | Age: 60
End: 2020-11-02

## 2020-11-02 LAB
ALBUMIN SERPL-MCNC: 3.3 G/DL (ref 3.5–5.2)
ALBUMIN/GLOB SERPL: 1.3 G/DL
ALP SERPL-CCNC: 80 U/L (ref 39–117)
ALT SERPL W P-5'-P-CCNC: 19 U/L (ref 1–33)
ANION GAP SERPL CALCULATED.3IONS-SCNC: 10 MMOL/L (ref 5–15)
AST SERPL-CCNC: 20 U/L (ref 1–32)
BASOPHILS # BLD AUTO: 0.05 10*3/MM3 (ref 0–0.2)
BASOPHILS NFR BLD AUTO: 0.6 % (ref 0–1.5)
BILIRUB SERPL-MCNC: 0.2 MG/DL (ref 0–1.2)
BUN SERPL-MCNC: 35 MG/DL (ref 8–23)
BUN/CREAT SERPL: 19 (ref 7–25)
CALCIUM SPEC-SCNC: 8.5 MG/DL (ref 8.6–10.5)
CHLORIDE SERPL-SCNC: 109 MMOL/L (ref 98–107)
CO2 SERPL-SCNC: 24 MMOL/L (ref 22–29)
CREAT SERPL-MCNC: 1.84 MG/DL (ref 0.57–1)
DEPRECATED RDW RBC AUTO: 47.7 FL (ref 37–54)
EOSINOPHIL # BLD AUTO: 0.19 10*3/MM3 (ref 0–0.4)
EOSINOPHIL NFR BLD AUTO: 2.4 % (ref 0.3–6.2)
ERYTHROCYTE [DISTWIDTH] IN BLOOD BY AUTOMATED COUNT: 15.1 % (ref 12.3–15.4)
GFR SERPL CREATININE-BSD FRML MDRD: 28 ML/MIN/1.73
GLOBULIN UR ELPH-MCNC: 2.5 GM/DL
GLUCOSE SERPL-MCNC: 108 MG/DL (ref 65–99)
HCT VFR BLD AUTO: 27.5 % (ref 34–46.6)
HGB BLD-MCNC: 8.6 G/DL (ref 12–15.9)
IMM GRANULOCYTES # BLD AUTO: 0.29 10*3/MM3 (ref 0–0.05)
IMM GRANULOCYTES NFR BLD AUTO: 3.6 % (ref 0–0.5)
LYMPHOCYTES # BLD AUTO: 1.6 10*3/MM3 (ref 0.7–3.1)
LYMPHOCYTES NFR BLD AUTO: 19.9 % (ref 19.6–45.3)
MCH RBC QN AUTO: 26.9 PG (ref 26.6–33)
MCHC RBC AUTO-ENTMCNC: 31.3 G/DL (ref 31.5–35.7)
MCV RBC AUTO: 85.9 FL (ref 79–97)
MONOCYTES # BLD AUTO: 1.02 10*3/MM3 (ref 0.1–0.9)
MONOCYTES NFR BLD AUTO: 12.7 % (ref 5–12)
NEUTROPHILS NFR BLD AUTO: 4.9 10*3/MM3 (ref 1.7–7)
NEUTROPHILS NFR BLD AUTO: 60.8 % (ref 42.7–76)
NRBC BLD AUTO-RTO: 0 /100 WBC (ref 0–0.2)
PLATELET # BLD AUTO: 226 10*3/MM3 (ref 140–450)
PMV BLD AUTO: 11.1 FL (ref 6–12)
POTASSIUM SERPL-SCNC: 4.2 MMOL/L (ref 3.5–5.2)
PROT SERPL-MCNC: 5.8 G/DL (ref 6–8.5)
RBC # BLD AUTO: 3.2 10*6/MM3 (ref 3.77–5.28)
SODIUM SERPL-SCNC: 143 MMOL/L (ref 136–145)
WBC # BLD AUTO: 8.05 10*3/MM3 (ref 3.4–10.8)

## 2020-11-02 PROCEDURE — 85025 COMPLETE CBC W/AUTO DIFF WBC: CPT | Performed by: INTERNAL MEDICINE

## 2020-11-02 PROCEDURE — 94799 UNLISTED PULMONARY SVC/PX: CPT

## 2020-11-02 PROCEDURE — 25010000002 ENOXAPARIN PER 10 MG: Performed by: FAMILY MEDICINE

## 2020-11-02 PROCEDURE — 25010000002 LORAZEPAM PER 2 MG: Performed by: FAMILY MEDICINE

## 2020-11-02 PROCEDURE — 63710000001 PREDNISONE PER 1 MG: Performed by: FAMILY MEDICINE

## 2020-11-02 PROCEDURE — 25010000002 MEROPENEM PER 100 MG: Performed by: FAMILY MEDICINE

## 2020-11-02 PROCEDURE — 80053 COMPREHEN METABOLIC PANEL: CPT | Performed by: INTERNAL MEDICINE

## 2020-11-02 RX ORDER — LEVOFLOXACIN 750 MG/1
750 TABLET ORAL EVERY OTHER DAY
Status: DISCONTINUED | OUTPATIENT
Start: 2020-11-03 | End: 2020-11-03 | Stop reason: HOSPADM

## 2020-11-02 RX ORDER — CLONIDINE HYDROCHLORIDE 0.1 MG/1
0.1 TABLET ORAL EVERY 6 HOURS PRN
Status: DISCONTINUED | OUTPATIENT
Start: 2020-11-02 | End: 2020-11-03 | Stop reason: HOSPADM

## 2020-11-02 RX ORDER — CARVEDILOL 25 MG/1
25 TABLET ORAL 2 TIMES DAILY WITH MEALS
COMMUNITY
End: 2021-07-25 | Stop reason: HOSPADM

## 2020-11-02 RX ORDER — NIFEDIPINE 30 MG/1
30 TABLET, EXTENDED RELEASE ORAL DAILY
COMMUNITY
End: 2020-11-03 | Stop reason: HOSPADM

## 2020-11-02 RX ORDER — SPIRONOLACTONE 25 MG/1
25 TABLET ORAL DAILY
Status: DISCONTINUED | OUTPATIENT
Start: 2020-11-03 | End: 2020-11-03 | Stop reason: HOSPADM

## 2020-11-02 RX ADMIN — PREDNISONE 20 MG: 20 TABLET ORAL at 08:14

## 2020-11-02 RX ADMIN — HYDRALAZINE HYDROCHLORIDE 75 MG: 50 TABLET ORAL at 20:39

## 2020-11-02 RX ADMIN — MEROPENEM 1 G: 1 INJECTION, POWDER, FOR SOLUTION INTRAVENOUS at 13:56

## 2020-11-02 RX ADMIN — IPRATROPIUM BROMIDE AND ALBUTEROL SULFATE 3 ML: 2.5; .5 SOLUTION RESPIRATORY (INHALATION) at 07:20

## 2020-11-02 RX ADMIN — CARVEDILOL 25 MG: 25 TABLET, FILM COATED ORAL at 17:37

## 2020-11-02 RX ADMIN — ACETAMINOPHEN 650 MG: 325 TABLET, FILM COATED ORAL at 17:39

## 2020-11-02 RX ADMIN — PANTOPRAZOLE SODIUM 40 MG: 40 TABLET, DELAYED RELEASE ORAL at 05:47

## 2020-11-02 RX ADMIN — IPRATROPIUM BROMIDE AND ALBUTEROL SULFATE 3 ML: 2.5; .5 SOLUTION RESPIRATORY (INHALATION) at 14:32

## 2020-11-02 RX ADMIN — NIFEDIPINE 60 MG: 60 TABLET, FILM COATED, EXTENDED RELEASE ORAL at 20:40

## 2020-11-02 RX ADMIN — MEROPENEM 1 G: 1 INJECTION, POWDER, FOR SOLUTION INTRAVENOUS at 02:11

## 2020-11-02 RX ADMIN — HYDRALAZINE HYDROCHLORIDE 75 MG: 50 TABLET ORAL at 05:47

## 2020-11-02 RX ADMIN — LORAZEPAM 1 MG: 2 INJECTION INTRAMUSCULAR; INTRAVENOUS at 02:33

## 2020-11-02 RX ADMIN — IPRATROPIUM BROMIDE AND ALBUTEROL SULFATE 3 ML: 2.5; .5 SOLUTION RESPIRATORY (INHALATION) at 19:35

## 2020-11-02 RX ADMIN — CLONIDINE HYDROCHLORIDE 0.1 MG: 0.1 TABLET ORAL at 22:40

## 2020-11-02 RX ADMIN — BUDESONIDE 0.25 MG: 0.25 INHALANT RESPIRATORY (INHALATION) at 19:35

## 2020-11-02 RX ADMIN — CARVEDILOL 25 MG: 25 TABLET, FILM COATED ORAL at 08:14

## 2020-11-02 RX ADMIN — FERROUS SULFATE TAB 325 MG (65 MG ELEMENTAL FE) 325 MG: 325 (65 FE) TAB at 08:14

## 2020-11-02 RX ADMIN — OLANZAPINE 5 MG: 5 TABLET, FILM COATED ORAL at 08:14

## 2020-11-02 RX ADMIN — LOSARTAN POTASSIUM 50 MG: 50 TABLET, FILM COATED ORAL at 08:14

## 2020-11-02 RX ADMIN — NIFEDIPINE 60 MG: 60 TABLET, FILM COATED, EXTENDED RELEASE ORAL at 08:14

## 2020-11-02 RX ADMIN — ENOXAPARIN SODIUM 40 MG: 40 INJECTION SUBCUTANEOUS at 08:14

## 2020-11-02 RX ADMIN — HYDRALAZINE HYDROCHLORIDE 75 MG: 50 TABLET ORAL at 13:56

## 2020-11-02 RX ADMIN — OLANZAPINE 5 MG: 5 TABLET, FILM COATED ORAL at 20:40

## 2020-11-02 RX ADMIN — BUDESONIDE 0.5 MG: 0.25 INHALANT RESPIRATORY (INHALATION) at 07:20

## 2020-11-02 RX ADMIN — FERROUS SULFATE TAB 325 MG (65 MG ELEMENTAL FE) 325 MG: 325 (65 FE) TAB at 17:37

## 2020-11-02 RX ADMIN — IPRATROPIUM BROMIDE AND ALBUTEROL SULFATE 3 ML: 2.5; .5 SOLUTION RESPIRATORY (INHALATION) at 10:37

## 2020-11-02 NOTE — PLAN OF CARE
Goal Outcome Evaluation:  Plan of Care Reviewed With: patient  Progress: improving  Outcome Summary: Patient is very anxious all day today. she wanted to go home, Md said to be safe to be discharge home tomorrow if bp will better controlled. BP this morning was elevated and was better this afternoon. Patient still on merem IV, and started on levaquin po. S/ST  per tele. Continue to monitor patient and labs.

## 2020-11-02 NOTE — PLAN OF CARE
Problem: Adult Inpatient Plan of Care  Goal: Plan of Care Review  Outcome: Ongoing, Progressing  Flowsheets (Taken 11/2/2020 7234)  Progress: improving  Outcome Summary: Pt was started on a PO diet on 10/31. Her appetite is good, 100% x1 meal. She is very anxious to discharge. Per nursing note, plan is for hopeful discharge tomorrow if BP is better controlled. Will continue to follow for nutrition needs.

## 2020-11-02 NOTE — PLAN OF CARE
Goal Outcome Evaluation:  Plan of Care Reviewed With: patient  Progress: improving  Outcome Summary: BP elevated overnight requiring PRN clonidine, IV abx given, @ 0330 pt found in hallway demanding to go home, states her phone will not charge and asked for her niece's phone number from the computer, no number was listed, pt adamant about being DC this morning as soon as possible, pt was told we had the number for her sister and son but she didn't want their number. Continue to monitor for changes.

## 2020-11-02 NOTE — PROGRESS NOTES
"Progress Note  Ludivina Ramirez  11/2/2020 11:30 CST  Subjective:   Admit Date:   10/26/2020      CC/ADMIT DX:       Interval History:   Reviewed overnight events and nursing notes.  She has no c/o CP or SOA. No GI issues.       I have reviewed all labs/diagnostics from the last 24hrs.       ROS:   I have done a 10 point ROS and all are negative, except what is mentioned in the HPI.    Diet Regular    Medications:      budesonide, 0.5 mg, Nebulization, BID - RT  carvedilol, 25 mg, Oral, BID With Meals  enoxaparin, 40 mg, Subcutaneous, Q24H  ferrous sulfate, 325 mg, Oral, BID With Meals  hydrALAZINE, 75 mg, Oral, Q8H  ipratropium-albuterol, 3 mL, Nebulization, 4x Daily - RT  [START ON 11/3/2020] levoFLOXacin, 750 mg, Oral, Every Other Day  losartan, 50 mg, Oral, Q24H  meropenem, 1 g, Intravenous, Q12H  NIFEdipine CC, 60 mg, Oral, BID  OLANZapine, 5 mg, Oral, BID  pantoprazole, 40 mg, Oral, Q AM  predniSONE, 20 mg, Oral, Daily With Breakfast            Objective:   Vitals: /88 (BP Location: Left arm, Patient Position: Lying)   Pulse 85   Temp 98.3 °F (36.8 °C) (Oral)   Resp 20   Ht 165.1 cm (65\")   Wt 88.9 kg (196 lb)   SpO2 96%   BMI 32.62 kg/m²      Intake/Output Summary (Last 24 hours) at 11/2/2020 1130  Last data filed at 11/2/2020 0739  Gross per 24 hour   Intake --   Output 1200 ml   Net -1200 ml     General appearance: on vent  Lungs: coarse  Heart: RRR  Abdomen: soft, non-tender; bowel sounds normal; no masses,  no organomegaly  Extremities: extremities normal, atraumatic, no cyanosis or edema  Neurologic:  No obvious focal neurologic deficits.    Assessment and Plan:     Acute respiratory failure with hypoxia (CMS/HCC)    Iron deficiency anemia    Nonrheumatic aortic valve insufficiency    Acute renal failure (CMS/HCC)    Diastolic CHF, acute (CMS/HCC)    Colitis, ischemic (CMS/HCC)    Congestive heart failure (CMS/HCC)    Chronic kidney disease, stage 4 (severe) (CMS/HCC)    Noncompliance    " Hypertensive emergency        Plan:  1.  Continue present medication(s)   2.  Follow with others  3.  Follow with labs  4.  Follow BP after resuming all PO meds      Discharge planning:   her home     Reviewed treatment plans with the patient and/or family.             Electronically signed by Orville Weston MD on 11/2/2020 at 11:30 CST

## 2020-11-02 NOTE — PROGRESS NOTES
Continued Stay Note  NGUYEN Espinoza     Patient Name: Ludivina Ramirez  MRN: [id number]  Today's Date: 11/2/2020    Admit Date: 10/26/2020    Discharge Plan     Row Name 11/02/20 1139       Plan    Plan  HOME    Patient/Family in Agreement with Plan  yes    Plan Comments  REVIEWED CHART; NOTED PT'S IMPROVEMENT.  PT. DENIES ANY IDENTIFIED D/C PLANNING NEEDS AT PRESENT.  WILL CONT. TO FOLLOW FOR ANY THAT MAY ARISE.        Discharge Codes    No documentation.             YULIANA Clements

## 2020-11-03 VITALS
BODY MASS INDEX: 32.65 KG/M2 | HEART RATE: 81 BPM | DIASTOLIC BLOOD PRESSURE: 87 MMHG | RESPIRATION RATE: 18 BRPM | SYSTOLIC BLOOD PRESSURE: 138 MMHG | WEIGHT: 196 LBS | HEIGHT: 65 IN | TEMPERATURE: 98.1 F | OXYGEN SATURATION: 96 %

## 2020-11-03 LAB
ANION GAP SERPL CALCULATED.3IONS-SCNC: 9 MMOL/L (ref 5–15)
BUN SERPL-MCNC: 35 MG/DL (ref 8–23)
BUN/CREAT SERPL: 20.3 (ref 7–25)
CALCIUM SPEC-SCNC: 8.8 MG/DL (ref 8.6–10.5)
CHLORIDE SERPL-SCNC: 109 MMOL/L (ref 98–107)
CO2 SERPL-SCNC: 25 MMOL/L (ref 22–29)
CREAT SERPL-MCNC: 1.72 MG/DL (ref 0.57–1)
GFR SERPL CREATININE-BSD FRML MDRD: 30 ML/MIN/1.73
GLUCOSE SERPL-MCNC: 101 MG/DL (ref 65–99)
POTASSIUM SERPL-SCNC: 4.2 MMOL/L (ref 3.5–5.2)
SODIUM SERPL-SCNC: 143 MMOL/L (ref 136–145)

## 2020-11-03 PROCEDURE — 63710000001 PREDNISONE PER 1 MG: Performed by: FAMILY MEDICINE

## 2020-11-03 PROCEDURE — 94799 UNLISTED PULMONARY SVC/PX: CPT

## 2020-11-03 PROCEDURE — 80048 BASIC METABOLIC PNL TOTAL CA: CPT | Performed by: FAMILY MEDICINE

## 2020-11-03 PROCEDURE — 25010000002 ENOXAPARIN PER 10 MG: Performed by: FAMILY MEDICINE

## 2020-11-03 RX ORDER — FERROUS SULFATE 325(65) MG
325 TABLET ORAL 2 TIMES DAILY WITH MEALS
Qty: 60 TABLET | Refills: 0 | Status: SHIPPED | OUTPATIENT
Start: 2020-11-03 | End: 2022-06-10 | Stop reason: HOSPADM

## 2020-11-03 RX ORDER — CLONIDINE HYDROCHLORIDE 0.1 MG/1
0.1 TABLET ORAL EVERY 6 HOURS PRN
Qty: 30 TABLET | Refills: 1 | Status: SHIPPED | OUTPATIENT
Start: 2020-11-03 | End: 2021-09-01 | Stop reason: HOSPADM

## 2020-11-03 RX ORDER — METHYLPREDNISOLONE 4 MG/1
TABLET ORAL
Qty: 1 EACH | Refills: 0 | Status: ON HOLD | OUTPATIENT
Start: 2020-11-03 | End: 2020-11-09

## 2020-11-03 RX ORDER — LOSARTAN POTASSIUM 50 MG/1
50 TABLET ORAL
Qty: 30 TABLET | Refills: 1 | Status: SHIPPED | OUTPATIENT
Start: 2020-11-04 | End: 2020-11-24 | Stop reason: SDUPTHER

## 2020-11-03 RX ORDER — NIFEDIPINE 60 MG/1
60 TABLET, FILM COATED, EXTENDED RELEASE ORAL 2 TIMES DAILY
Qty: 60 TABLET | Refills: 1 | Status: SHIPPED | OUTPATIENT
Start: 2020-11-03 | End: 2021-10-18 | Stop reason: SDUPTHER

## 2020-11-03 RX ORDER — LEVOFLOXACIN 750 MG/1
750 TABLET ORAL EVERY OTHER DAY
Qty: 1 TABLET | Refills: 0 | Status: SHIPPED | OUTPATIENT
Start: 2020-11-05 | End: 2020-11-10

## 2020-11-03 RX ORDER — OLANZAPINE 5 MG/1
5 TABLET ORAL 2 TIMES DAILY
Qty: 60 TABLET | Refills: 0 | Status: ON HOLD | OUTPATIENT
Start: 2020-11-03 | End: 2021-12-01 | Stop reason: SDDI

## 2020-11-03 RX ADMIN — LEVOFLOXACIN 750 MG: 750 TABLET, FILM COATED ORAL at 08:11

## 2020-11-03 RX ADMIN — FERROUS SULFATE TAB 325 MG (65 MG ELEMENTAL FE) 325 MG: 325 (65 FE) TAB at 08:11

## 2020-11-03 RX ADMIN — HYDRALAZINE HYDROCHLORIDE 75 MG: 50 TABLET ORAL at 06:33

## 2020-11-03 RX ADMIN — CARVEDILOL 25 MG: 25 TABLET, FILM COATED ORAL at 08:11

## 2020-11-03 RX ADMIN — NIFEDIPINE 60 MG: 60 TABLET, FILM COATED, EXTENDED RELEASE ORAL at 08:10

## 2020-11-03 RX ADMIN — IPRATROPIUM BROMIDE AND ALBUTEROL SULFATE 3 ML: 2.5; .5 SOLUTION RESPIRATORY (INHALATION) at 07:43

## 2020-11-03 RX ADMIN — PANTOPRAZOLE SODIUM 40 MG: 40 TABLET, DELAYED RELEASE ORAL at 06:33

## 2020-11-03 RX ADMIN — OLANZAPINE 5 MG: 5 TABLET, FILM COATED ORAL at 08:11

## 2020-11-03 RX ADMIN — CLONIDINE HYDROCHLORIDE 0.1 MG: 0.1 TABLET ORAL at 04:34

## 2020-11-03 RX ADMIN — BUDESONIDE 0.5 MG: 0.25 INHALANT RESPIRATORY (INHALATION) at 07:43

## 2020-11-03 RX ADMIN — SPIRONOLACTONE 25 MG: 25 TABLET ORAL at 08:11

## 2020-11-03 RX ADMIN — PREDNISONE 20 MG: 20 TABLET ORAL at 08:11

## 2020-11-03 RX ADMIN — ENOXAPARIN SODIUM 40 MG: 40 INJECTION SUBCUTANEOUS at 08:10

## 2020-11-03 RX ADMIN — LOSARTAN POTASSIUM 50 MG: 50 TABLET, FILM COATED ORAL at 08:11

## 2020-11-03 NOTE — DISCHARGE SUMMARY
Hospital Discharge Summary    Ludivina Ramirez  :  1960  MRN:  [id number]    Admit date:  10/26/2020  Discharge date:      Admitting Physician:    Orville Weston MD    Discharge Diagnoses:      Acute respiratory failure with hypoxia (CMS/[facility])    Iron deficiency anemia    Nonrheumatic aortic valve insufficiency    Acute renal failure (CMS/HCC)    Diastolic CHF, acute (CMS/[facility])    Colitis, ischemic (CMS/[facility])    Congestive heart failure (CMS/[facility])    Chronic kidney disease, stage 4 (severe) (CMS/[facility])    Noncompliance    Hypertensive emergency      Hospital Course:   She was admitted with ARF. SHe was treated in ICU with antibiotics, vent care. SHe was seen by other Specialists who helped with her care. She slowly improved, and was extubated. She has done well, on PO medicine, Her BP has been improved with resuming her PO medicine. SHe is eating and ambulating.     Discharge Medications:         Discharge Medications      New Medications      Instructions Start Date   ferrous sulfate 325 (65 FE) MG tablet   325 mg, Oral, 2 Times Daily With Meals      levoFLOXacin 750 MG tablet  Commonly known as: LEVAQUIN   750 mg, Oral, Every Other Day   Start Date: 2020     losartan 50 MG tablet  Commonly known as: COZAAR   50 mg, Oral, Every 24 Hours Scheduled   Start Date: 2020     methylPREDNISolone 4 MG dose pack  Commonly known as: MEDROL   Take as directed on package instructions.      OLANZapine 5 MG tablet  Commonly known as: zyPREXA   5 mg, Oral, 2 times daily         Changes to Medications      Instructions Start Date   cloNIDine 0.1 MG tablet  Commonly known as: CATAPRES  What changed: when to take this   0.1 mg, Oral, Every 6 Hours PRN      NIFEdipine CC 60 MG 24 hr tablet  Commonly known as: ADALAT CC  What changed:   · when to take this  · Another medication with the same name was removed. Continue taking this medication, and follow the directions you see here.   60 mg, Oral, 2 times  daily         Continue These Medications      Instructions Start Date   azelastine 0.1 % nasal spray  Commonly known as: ASTELIN   2 sprays, Nasal, 2 Times Daily, Use in each nostril as directed      carvedilol 25 MG tablet  Commonly known as: COREG   25 mg, Oral, 2 Times Daily With Meals      Cholecalciferol 25 MCG (1000 UT) tablet   1,000 Units, Oral, Daily      fluticasone 50 MCG/ACT nasal spray  Commonly known as: FLONASE   2 sprays, Nasal, Daily PRN      hydrALAZINE 25 MG tablet  Commonly known as: APRESOLINE   75 mg, Oral, Every 8 Hours Scheduled      pantoprazole 40 MG EC tablet  Commonly known as: PROTONIX   40 mg, Oral, Daily      spironolactone 25 MG tablet  Commonly known as: ALDACTONE   25 mg, Oral, Daily             Consults:  Pulm, Nephrology, Cardiology    Significant Diagnostic Studies:  See complete admission record      Disposition:   Home in stable condition  Follow up with Orville Weston MD in 1-2 weeks. F/U with Pulm in 6 weeks. F/U with Nephrology and Cardiology    Diet: as tolerated    Activity: as tolertated    Special Instructions: set up Home Health on d/c if pt would like, monitor and record BP on d/c and bring to f/u      The patient or family are to call or return if there are any problems, questions, concerns or change in her condition.     Signed:  Orville Weston MD MD  11/3/2020, 09:29 CST

## 2020-11-03 NOTE — PROGRESS NOTES
Nephrology ([facility]) Progress Note      Patient:  Ludivina Ramirez  YOB: 1960  Date of Service: 11/2/2020  MRN: [id number]   Acct: [id number]   Primary Care Physician: Orville Weston MD  Advance Directive:   Code Status and Medical Interventions:   Ordered at: 10/27/20 1728     Code Status:    CPR     Medical Interventions (Level of Support Prior to Arrest):    Full     Admit Date: 10/26/2020       Hospital Day: 7  Referring Provider: Orville Weston MD      Patient personally seen and examined.  Complete chart including Consults, Notes, Operative Reports, Labs, Cardiology, and Radiology studies reviewed as able.        Subjective:  Ludivina Ramirez is a 60 y.o. female  whom we were consulted for acute kidney injury.  Baseline chronic kidney disease stage 3b (creatinine 1.5 in September 2019).  Recent prior admission for hypertensive urgency and MATTY. Discharged on 10/17 with creatinine 2.29.  Presented this time to emergency department with acute onset dyspnea. Required intubation shortly after arrival to ER.  On admission her creatinine was 2.0 which is improved from her prior hospitalization but not yet back to her prior baseline.  Hospital course remarkable for worsening of renal function which is slowly improving now.  She is approaching to baseline serum creatinine slowly.  She is now successfully extubated as well.     Today, no new events.  Notes pruritis/dry skin.  Tolerating diet.  Hopeful for d/c. No f/c/n/v/d/cp/soa.       Allergies:  Codeine and Imitrex [sumatriptan]    Home Meds:  Medications Prior to Admission   Medication Sig Dispense Refill Last Dose   • azelastine (ASTELIN) 0.1 % nasal spray 2 sprays into the nostril(s) as directed by provider 2 (Two) Times a Day. Use in each nostril as directed    10/23/2020 at Unknown time   • carvedilol (COREG) 25 MG tablet Take 25 mg by mouth 2 (Two) Times a Day With Meals.   10/25/2020 at Unknown time   •  cholecalciferol 1000 units tablet Take 1,000 Units by mouth Daily. 30 tablet 1 10/25/2020 at Unknown time   • CloNIDine (CATAPRES) 0.1 MG tablet Take 0.1 mg by mouth Daily As Needed for High Blood Pressure (BP >180/100).   Past Month at Unknown time   • fluticasone (FLONASE) 50 MCG/ACT nasal spray 2 sprays into the nostril(s) as directed by provider Daily As Needed for Rhinitis or Allergies.   10/23/2020 at Unknown time   • hydrALAZINE (APRESOLINE) 25 MG tablet Take 3 tablets by mouth Every 8 (Eight) Hours. 270 tablet 3 10/25/2020 at Unknown time   • NIFEdipine XL (PROCARDIA XL) 30 MG 24 hr tablet Take 30 mg by mouth Daily.   10/25/2020 at Unknown time   • pantoprazole (PROTONIX) 40 MG EC tablet Take 1 tablet by mouth Daily. 30 tablet 1 10/25/2020 at Unknown time   • spironolactone (ALDACTONE) 25 MG tablet Take 1 tablet by mouth Daily. 30 tablet 1 10/25/2020 at Unknown time       Medicines:  Current Facility-Administered Medications   Medication Dose Route Frequency Provider Last Rate Last Dose   • acetaminophen (TYLENOL) tablet 650 mg  650 mg Oral Q4H PRN Ren Santo MD   650 mg at 11/02/20 1739   • budesonide (PULMICORT) nebulizer solution 0.5 mg  0.5 mg Nebulization BID - RT Ren Santo MD   0.25 mg at 11/02/20 1935   • carvedilol (COREG) tablet 25 mg  25 mg Oral BID With Meals Ren Santo MD   25 mg at 11/02/20 1737   • cloNIDine (CATAPRES) tablet 0.1 mg  0.1 mg Oral Q6H PRN Orville Weston MD       • enoxaparin (LOVENOX) syringe 40 mg  40 mg Subcutaneous Q24H Ren Santo MD   40 mg at 11/02/20 0814   • ferrous sulfate tablet 325 mg  325 mg Oral BID With Meals Obed Lazo MD   325 mg at 11/02/20 1737   • fluticasone (FLONASE) 50 MCG/ACT nasal spray 2 spray  2 spray Nasal Daily PRN Ren Santo MD       • hydrALAZINE (APRESOLINE) injection 10 mg  10 mg Intravenous Q6H PRN Ren Santo MD   10 mg at 10/31/20 2214   • hydrALAZINE (APRESOLINE) tablet  75 mg  75 mg Oral Q8H Ren Santo MD   75 mg at 11/02/20 2039   • ipratropium-albuterol (DUO-NEB) nebulizer solution 3 mL  3 mL Nebulization 4x Daily - RT Ren Santo MD   3 mL at 11/02/20 1935   • [START ON 11/3/2020] levoFLOXacin (LEVAQUIN) tablet 750 mg  750 mg Oral Every Other Day Orville Weston MD       • losartan (COZAAR) tablet 50 mg  50 mg Oral Q24H Obed Lazo MD   50 mg at 11/02/20 0814   • NIFEdipine XL (PROCARDIA XL) 24 hr tablet 60 mg  60 mg Oral BID Obed Lazo MD   60 mg at 11/02/20 2040   • OLANZapine (zyPREXA) tablet 5 mg  5 mg Oral BID Ren Santo MD   5 mg at 11/02/20 2040   • pantoprazole (PROTONIX) EC tablet 40 mg  40 mg Oral Q AM Ren Santo MD   40 mg at 11/02/20 0547   • predniSONE (DELTASONE) tablet 20 mg  20 mg Oral Daily With Breakfast Ren Santo MD   20 mg at 11/02/20 0814       Past Medical History:  Past Medical History:   Diagnosis Date   • Acute CHF (CMS/HCC)    • Acute renal failure (ARF) (CMS/HCC)    • Anemia    • Asthma     childhood   • Hypertension    • Ischemic colitis (CMS/HCC)    • Metabolic acidosis    • Nonrheumatic aortic (valve) insufficiency    • PTSD (post-traumatic stress disorder)        Past Surgical History:  Past Surgical History:   Procedure Laterality Date   • EXTERNAL FIXATION WRIST FRACTURE     • LEG SURGERY     • TUBAL ABDOMINAL LIGATION         Family History  Family History   Problem Relation Age of Onset   • Coronary artery disease Mother    • Coronary artery disease Father        Social History  Social History     Socioeconomic History   • Marital status: Single     Spouse name: Not on file   • Number of children: Not on file   • Years of education: Not on file   • Highest education level: Not on file   Tobacco Use   • Smoking status: Current Every Day Smoker     Packs/day: 0.50   • Smokeless tobacco: Never Used   Substance and Sexual Activity   • Alcohol use: No   • Drug use: No   • Sexual  activity: Defer         Review of Systems:  History obtained from chart review and the patient  General ROS: No fever or chills  Respiratory ROS: No cough, shortness of breath, wheezing  Cardiovascular ROS: No chest pain or palpitations  Gastrointestinal ROS: No abdominal pain or melena  Genito-Urinary ROS: No dysuria or hematuria    Objective:  Patient Vitals for the past 24 hrs:   BP Temp Temp src Pulse Resp SpO2   11/02/20 2150 176/98 -- -- -- -- --   11/02/20 2021 (!) 184/100 98.5 °F (36.9 °C) Oral 94 18 94 %   11/02/20 1943 -- -- -- 93 -- 95 %   11/02/20 1935 -- -- -- 92 20 95 %   11/02/20 1610 150/86 98.2 °F (36.8 °C) Oral 90 20 94 %   11/02/20 1441 -- -- -- 85 -- --   11/02/20 1432 -- -- -- 85 20 94 %   11/02/20 1132 159/85 98.3 °F (36.8 °C) Oral 83 20 96 %   11/02/20 1045 -- -- -- 85 -- --   11/02/20 1037 -- -- -- 86 20 96 %   11/02/20 0958 148/88 -- -- 81 -- --   11/02/20 0800 176/90 -- -- 87 -- --   11/02/20 0739 (!) 178/113 98.3 °F (36.8 °C) Oral 81 -- 94 %   11/02/20 0728 -- -- -- 85 -- --   11/02/20 0720 -- -- -- 83 -- 96 %   11/02/20 0247 157/85 -- -- 93 -- --   11/01/20 2340 (!) 181/93 98 °F (36.7 °C) Oral 81 21 96 %   11/01/20 2200 -- -- -- 82 -- --       Intake/Output Summary (Last 24 hours) at 11/2/2020 2154  Last data filed at 11/2/2020 1700  Gross per 24 hour   Intake 1440 ml   Output 2500 ml   Net -1060 ml     General: awake/alert   Chest:  clear to auscultation bilaterally without respiratory distress  CVS: regular rate and rhythm  Abdominal: soft, nontender, positive bowel sounds  Extremities: no cyanosis or edema  Skin: warm and dry without rash      Labs:  Results from last 7 days   Lab Units 11/02/20  0400 11/01/20 0255 10/31/20  0246   WBC 10*3/mm3 8.05 9.62 5.90   HEMOGLOBIN g/dL 8.6* 9.5* 8.5*   HEMATOCRIT % 27.5* 31.6* 28.3*   PLATELETS 10*3/mm3 226 259 198         Results from last 7 days   Lab Units 11/02/20  0400 11/01/20  0255 10/31/20  0246   SODIUM mmol/L 143 143 143   POTASSIUM  mmol/L 4.2 3.8 4.3   CHLORIDE mmol/L 109* 111* 113*   CO2 mmol/L 24.0 22.0 23.0   BUN mg/dL 35* 42* 41*   CREATININE mg/dL 1.84* 1.62* 1.60*   CALCIUM mg/dL 8.5* 8.5* 8.3*   BILIRUBIN mg/dL 0.2 0.3 0.2   ALK PHOS U/L 80 90 77   ALT (SGPT) U/L 19 19 15   AST (SGOT) U/L 20 26 15   GLUCOSE mg/dL 108* 110* 115*       Radiology:   Imaging Results (Last 72 Hours)     Procedure Component Value Units Date/Time    XR Chest 1 View [[id number]] Collected: 10/31/20 0702     Updated: 10/31/20 0706    Narrative:      Exam: XR CHEST 1 VW-     Indication: vent; J96.01-Acute respiratory failure with hypoxia;  E87.2-Acidosis; I50.41-Acute combined systolic (congestive) and  diastolic (congestive) heart failure; R77.8-Other specified  abnormalities of plasma proteins     Comparison: Prior day     Findings:     Endotracheal tube is 1.5 cm above the sarah. Enteric tube terminates  below the diaphragm out of the field of view. Cardiac silhouette is  enlarged but stable. No change in LEFT lower lobe atelectasis. No new  airspace opacity. No large pleural effusion or visible pneumothorax.       Impression:      Impression:     No change in appearance of the chest.  This report was finalized on 10/31/2020 07:03 by Dr. Zac Schwarz MD.          Culture:  Respiratory Culture   Date Value Ref Range Status   10/27/2020 No growth  Final         Assessment   Acute kidney injury / ATN  Stage IIIb chronic kidney disease  Acute hypoxic respiratory failure  Bilateral pneumonia  Hypertension  Anemia   Hypernatremia  Hypertensive renal disease  Poorly controlled hypertension     Plan:  Adjust bp meds  monitor labs      Don Negrete MD  11/2/2020  21:54 CST

## 2020-11-03 NOTE — PLAN OF CARE
Goal Outcome Evaluation:  Plan of Care Reviewed With: patient  Progress: no change  Outcome Summary: pt c/o mild aching in shoulders for short time at beginning of shift; otherwise pain free; BP elevated; prn meds given; other VSS; pt rested fair; safety maintained; will continue to monitor

## 2020-11-03 NOTE — NURSING NOTE
Discharge Planning Assessment  [facility]     Patient Name: Ludivina Ramirez  MRN: [id number]  Today's Date: 11/3/2020    Admit Date: 10/26/2020    Discharge Needs Assessment    No documentation.       Discharge Plan     Row Name 11/03/20 1028       Plan    Plan  Readmit with LACE score 14. [facility].        Continued Care and Services - Admitted Since 10/26/2020    Coordination has not been started for this encounter.       Expected Discharge Date and Time     Expected Discharge Date Expected Discharge Time    Nov 3, 2020         Demographic Summary    No documentation.       Functional Status    No documentation.       Psychosocial    No documentation.       Abuse/Neglect    No documentation.       Legal    No documentation.       Substance Abuse    No documentation.       Patient Forms    No documentation.           Merlina A Fletcher, RN

## 2020-11-04 ENCOUNTER — READMISSION MANAGEMENT (OUTPATIENT)
Dept: CALL CENTER | Facility: HOSPITAL | Age: 60
End: 2020-11-04

## 2020-11-04 NOTE — OUTREACH NOTE
Prep Survey      Responses   [facility] facility patient discharged from?  [facility]   Is LACE score < 7 ?  No   Eligibility  Readm Mgmt   Discharge diagnosis   Acute respiratory failure with hypoxia,   Diastolic CHF, acute    Does the patient have one of the following disease processes/diagnoses(primary or secondary)?  CHF   Does the patient have Home health ordered?  No   Is there a DME ordered?  No   Prep survey completed?  Yes          Haylie Townsend RN

## 2020-11-07 ENCOUNTER — APPOINTMENT (OUTPATIENT)
Dept: GENERAL RADIOLOGY | Facility: HOSPITAL | Age: 60
End: 2020-11-07

## 2020-11-07 ENCOUNTER — HOSPITAL ENCOUNTER (INPATIENT)
Facility: HOSPITAL | Age: 60
LOS: 2 days | Discharge: HOME | End: 2020-11-10
Attending: EMERGENCY MEDICINE | Admitting: INTERNAL MEDICINE

## 2020-11-07 ENCOUNTER — APPOINTMENT (OUTPATIENT)
Dept: CT IMAGING | Facility: HOSPITAL | Age: 60
End: 2020-11-07

## 2020-11-07 DIAGNOSIS — N18.30 STAGE 3 CHRONIC KIDNEY DISEASE, UNSPECIFIED WHETHER STAGE 3A OR 3B CKD (HCC): ICD-10-CM

## 2020-11-07 DIAGNOSIS — I21.02 ST ELEVATION MYOCARDIAL INFARCTION INVOLVING LEFT ANTERIOR DESCENDING (LAD) CORONARY ARTERY (HCC): ICD-10-CM

## 2020-11-07 DIAGNOSIS — I21.3 ST ELEVATION MYOCARDIAL INFARCTION (STEMI), UNSPECIFIED ARTERY (HCC): Primary | ICD-10-CM

## 2020-11-07 LAB
ALBUMIN SERPL-MCNC: 3.6 G/DL (ref 3.5–5.2)
ALBUMIN/GLOB SERPL: 1.3 G/DL
ALP SERPL-CCNC: 85 U/L (ref 39–117)
ALT SERPL W P-5'-P-CCNC: 23 U/L (ref 1–33)
ANION GAP SERPL CALCULATED.3IONS-SCNC: 10 MMOL/L (ref 5–15)
APTT PPP: 29.5 SECONDS (ref 24.1–35)
AST SERPL-CCNC: 70 U/L (ref 1–32)
BASOPHILS # BLD AUTO: 0.05 10*3/MM3 (ref 0–0.2)
BASOPHILS NFR BLD AUTO: 0.3 % (ref 0–1.5)
BILIRUB SERPL-MCNC: 0.3 MG/DL (ref 0–1.2)
BUN SERPL-MCNC: 21 MG/DL (ref 8–23)
BUN/CREAT SERPL: 11.7 (ref 7–25)
CALCIUM SPEC-SCNC: 8.3 MG/DL (ref 8.6–10.5)
CHLORIDE SERPL-SCNC: 107 MMOL/L (ref 98–107)
CO2 SERPL-SCNC: 24 MMOL/L (ref 22–29)
CREAT SERPL-MCNC: 1.8 MG/DL (ref 0.57–1)
D DIMER PPP FEU-MCNC: 0.91 MG/L (FEU) (ref 0–0.5)
D-LACTATE SERPL-SCNC: 1.6 MMOL/L (ref 0.5–2)
DEPRECATED RDW RBC AUTO: 50.1 FL (ref 37–54)
EOSINOPHIL # BLD AUTO: 0.09 10*3/MM3 (ref 0–0.4)
EOSINOPHIL NFR BLD AUTO: 0.5 % (ref 0.3–6.2)
ERYTHROCYTE [DISTWIDTH] IN BLOOD BY AUTOMATED COUNT: 15.9 % (ref 12.3–15.4)
GFR SERPL CREATININE-BSD FRML MDRD: 29 ML/MIN/1.73
GLOBULIN UR ELPH-MCNC: 2.8 GM/DL
GLUCOSE SERPL-MCNC: 152 MG/DL (ref 65–99)
HCT VFR BLD AUTO: 29.6 % (ref 34–46.6)
HGB BLD-MCNC: 9.3 G/DL (ref 12–15.9)
HOLD SPECIMEN: NORMAL
HOLD SPECIMEN: NORMAL
IMM GRANULOCYTES # BLD AUTO: 0.11 10*3/MM3 (ref 0–0.05)
IMM GRANULOCYTES NFR BLD AUTO: 0.7 % (ref 0–0.5)
INR PPP: 1.07 (ref 0.91–1.09)
LIPASE SERPL-CCNC: 61 U/L (ref 13–60)
LYMPHOCYTES # BLD AUTO: 0.65 10*3/MM3 (ref 0.7–3.1)
LYMPHOCYTES NFR BLD AUTO: 4 % (ref 19.6–45.3)
MCH RBC QN AUTO: 27.4 PG (ref 26.6–33)
MCHC RBC AUTO-ENTMCNC: 31.4 G/DL (ref 31.5–35.7)
MCV RBC AUTO: 87.1 FL (ref 79–97)
MONOCYTES # BLD AUTO: 0.58 10*3/MM3 (ref 0.1–0.9)
MONOCYTES NFR BLD AUTO: 3.5 % (ref 5–12)
NEUTROPHILS NFR BLD AUTO: 14.97 10*3/MM3 (ref 1.7–7)
NEUTROPHILS NFR BLD AUTO: 91 % (ref 42.7–76)
NRBC BLD AUTO-RTO: 0 /100 WBC (ref 0–0.2)
NT-PROBNP SERPL-MCNC: ABNORMAL PG/ML (ref 0–900)
PLATELET # BLD AUTO: 247 10*3/MM3 (ref 140–450)
PMV BLD AUTO: 10.9 FL (ref 6–12)
POTASSIUM SERPL-SCNC: 4.3 MMOL/L (ref 3.5–5.2)
PROCALCITONIN SERPL-MCNC: 0.07 NG/ML (ref 0–0.25)
PROT SERPL-MCNC: 6.4 G/DL (ref 6–8.5)
PROTHROMBIN TIME: 13.5 SECONDS (ref 11.9–14.6)
RBC # BLD AUTO: 3.4 10*6/MM3 (ref 3.77–5.28)
SARS-COV-2 RNA PNL SPEC NAA+PROBE: NOT DETECTED
SODIUM SERPL-SCNC: 141 MMOL/L (ref 136–145)
TROPONIN T SERPL-MCNC: 0.61 NG/ML (ref 0–0.03)
WBC # BLD AUTO: 16.45 10*3/MM3 (ref 3.4–10.8)
WHOLE BLOOD HOLD SPECIMEN: NORMAL
WHOLE BLOOD HOLD SPECIMEN: NORMAL

## 2020-11-07 PROCEDURE — 85610 PROTHROMBIN TIME: CPT | Performed by: EMERGENCY MEDICINE

## 2020-11-07 PROCEDURE — 83605 ASSAY OF LACTIC ACID: CPT | Performed by: EMERGENCY MEDICINE

## 2020-11-07 PROCEDURE — 25010000002 PIPERACILLIN SOD-TAZOBACTAM PER 1 G: Performed by: EMERGENCY MEDICINE

## 2020-11-07 PROCEDURE — 25010000002 FUROSEMIDE PER 20 MG: Performed by: EMERGENCY MEDICINE

## 2020-11-07 PROCEDURE — 85730 THROMBOPLASTIN TIME PARTIAL: CPT | Performed by: EMERGENCY MEDICINE

## 2020-11-07 PROCEDURE — 25010000002 ONDANSETRON PER 1 MG: Performed by: EMERGENCY MEDICINE

## 2020-11-07 PROCEDURE — 80053 COMPREHEN METABOLIC PANEL: CPT | Performed by: EMERGENCY MEDICINE

## 2020-11-07 PROCEDURE — 85025 COMPLETE CBC W/AUTO DIFF WBC: CPT | Performed by: EMERGENCY MEDICINE

## 2020-11-07 PROCEDURE — 94799 UNLISTED PULMONARY SVC/PX: CPT

## 2020-11-07 PROCEDURE — 93005 ELECTROCARDIOGRAM TRACING: CPT | Performed by: EMERGENCY MEDICINE

## 2020-11-07 PROCEDURE — 71250 CT THORAX DX C-: CPT

## 2020-11-07 PROCEDURE — 93010 ELECTROCARDIOGRAM REPORT: CPT | Performed by: INTERNAL MEDICINE

## 2020-11-07 PROCEDURE — 84484 ASSAY OF TROPONIN QUANT: CPT | Performed by: EMERGENCY MEDICINE

## 2020-11-07 PROCEDURE — 87635 SARS-COV-2 COVID-19 AMP PRB: CPT | Performed by: EMERGENCY MEDICINE

## 2020-11-07 PROCEDURE — 83690 ASSAY OF LIPASE: CPT | Performed by: EMERGENCY MEDICINE

## 2020-11-07 PROCEDURE — 36415 COLL VENOUS BLD VENIPUNCTURE: CPT

## 2020-11-07 PROCEDURE — 83880 ASSAY OF NATRIURETIC PEPTIDE: CPT | Performed by: EMERGENCY MEDICINE

## 2020-11-07 PROCEDURE — 25010000003 HYDROMORPHONE 1 MG/ML SOLUTION: Performed by: EMERGENCY MEDICINE

## 2020-11-07 PROCEDURE — 71045 X-RAY EXAM CHEST 1 VIEW: CPT

## 2020-11-07 PROCEDURE — 99285 EMERGENCY DEPT VISIT HI MDM: CPT

## 2020-11-07 PROCEDURE — 84145 PROCALCITONIN (PCT): CPT | Performed by: EMERGENCY MEDICINE

## 2020-11-07 PROCEDURE — 87040 BLOOD CULTURE FOR BACTERIA: CPT | Performed by: EMERGENCY MEDICINE

## 2020-11-07 PROCEDURE — 25010000002 METHYLPREDNISOLONE PER 125 MG: Performed by: EMERGENCY MEDICINE

## 2020-11-07 PROCEDURE — 93005 ELECTROCARDIOGRAM TRACING: CPT

## 2020-11-07 PROCEDURE — 85379 FIBRIN DEGRADATION QUANT: CPT | Performed by: EMERGENCY MEDICINE

## 2020-11-07 RX ORDER — ONDANSETRON 2 MG/ML
4 INJECTION INTRAMUSCULAR; INTRAVENOUS ONCE
Status: COMPLETED | OUTPATIENT
Start: 2020-11-07 | End: 2020-11-07

## 2020-11-07 RX ORDER — METHYLPREDNISOLONE SODIUM SUCCINATE 125 MG/2ML
125 INJECTION, POWDER, LYOPHILIZED, FOR SOLUTION INTRAMUSCULAR; INTRAVENOUS ONCE
Status: COMPLETED | OUTPATIENT
Start: 2020-11-07 | End: 2020-11-07

## 2020-11-07 RX ORDER — LIDOCAINE HYDROCHLORIDE 20 MG/ML
15 SOLUTION OROPHARYNGEAL ONCE
Status: COMPLETED | OUTPATIENT
Start: 2020-11-07 | End: 2020-11-07

## 2020-11-07 RX ORDER — FAMOTIDINE 10 MG/ML
20 INJECTION, SOLUTION INTRAVENOUS ONCE
Status: COMPLETED | OUTPATIENT
Start: 2020-11-07 | End: 2020-11-07

## 2020-11-07 RX ORDER — LORAZEPAM 2 MG/ML
1 INJECTION INTRAMUSCULAR ONCE
Status: DISCONTINUED | OUTPATIENT
Start: 2020-11-07 | End: 2020-11-07

## 2020-11-07 RX ORDER — FUROSEMIDE 10 MG/ML
40 INJECTION INTRAMUSCULAR; INTRAVENOUS ONCE
Status: COMPLETED | OUTPATIENT
Start: 2020-11-07 | End: 2020-11-07

## 2020-11-07 RX ORDER — ALUMINA, MAGNESIA, AND SIMETHICONE 2400; 2400; 240 MG/30ML; MG/30ML; MG/30ML
15 SUSPENSION ORAL ONCE
Status: COMPLETED | OUTPATIENT
Start: 2020-11-07 | End: 2020-11-07

## 2020-11-07 RX ORDER — IPRATROPIUM BROMIDE AND ALBUTEROL SULFATE 2.5; .5 MG/3ML; MG/3ML
3 SOLUTION RESPIRATORY (INHALATION) ONCE
Status: COMPLETED | OUTPATIENT
Start: 2020-11-07 | End: 2020-11-07

## 2020-11-07 RX ADMIN — FUROSEMIDE 40 MG: 10 INJECTION, SOLUTION INTRAMUSCULAR; INTRAVENOUS at 22:51

## 2020-11-07 RX ADMIN — ALUMINUM HYDROXIDE, MAGNESIUM HYDROXIDE, AND DIMETHICONE 15 ML: 400; 400; 40 SUSPENSION ORAL at 21:54

## 2020-11-07 RX ADMIN — PIPERACILLIN SODIUM AND TAZOBACTAM SODIUM 3.38 G: 3; .375 INJECTION, POWDER, LYOPHILIZED, FOR SOLUTION INTRAVENOUS at 22:53

## 2020-11-07 RX ADMIN — IPRATROPIUM BROMIDE AND ALBUTEROL SULFATE 3 ML: 2.5; .5 SOLUTION RESPIRATORY (INHALATION) at 22:27

## 2020-11-07 RX ADMIN — METHYLPREDNISOLONE SODIUM SUCCINATE 125 MG: 125 INJECTION, POWDER, FOR SOLUTION INTRAMUSCULAR; INTRAVENOUS at 22:21

## 2020-11-07 RX ADMIN — LIDOCAINE HYDROCHLORIDE 15 ML: 20 SOLUTION ORAL; TOPICAL at 21:54

## 2020-11-07 RX ADMIN — HYDROMORPHONE HYDROCHLORIDE 1 MG: 1 INJECTION, SOLUTION INTRAMUSCULAR; INTRAVENOUS; SUBCUTANEOUS at 23:34

## 2020-11-07 RX ADMIN — ONDANSETRON HYDROCHLORIDE 4 MG: 2 SOLUTION INTRAMUSCULAR; INTRAVENOUS at 23:36

## 2020-11-07 RX ADMIN — FAMOTIDINE 20 MG: 10 INJECTION INTRAVENOUS at 21:54

## 2020-11-08 ENCOUNTER — APPOINTMENT (OUTPATIENT)
Dept: NUCLEAR MEDICINE | Facility: HOSPITAL | Age: 60
End: 2020-11-08

## 2020-11-08 ENCOUNTER — APPOINTMENT (OUTPATIENT)
Dept: CARDIOLOGY | Facility: HOSPITAL | Age: 60
End: 2020-11-08

## 2020-11-08 PROBLEM — D50.9 IRON DEFICIENCY ANEMIA: Chronic | Status: ACTIVE | Noted: 2019-08-31

## 2020-11-08 PROBLEM — K92.2 ACUTE GI BLEEDING: Status: RESOLVED | Noted: 2019-09-21 | Resolved: 2020-11-08

## 2020-11-08 PROBLEM — J96.01 ACUTE RESPIRATORY FAILURE WITH HYPOXIA: Status: RESOLVED | Noted: 2020-10-26 | Resolved: 2020-11-08

## 2020-11-08 PROBLEM — I50.32 CHRONIC DIASTOLIC HEART FAILURE: Chronic | Status: ACTIVE | Noted: 2020-11-08

## 2020-11-08 PROBLEM — R79.89 ELEVATED BRAIN NATRIURETIC PEPTIDE (BNP) LEVEL: Status: ACTIVE | Noted: 2020-11-08

## 2020-11-08 PROBLEM — I21.3 ST ELEVATION MYOCARDIAL INFARCTION (STEMI) (HCC): Status: ACTIVE | Noted: 2020-11-08

## 2020-11-08 PROBLEM — Z87.01 HISTORY OF RECENT PNEUMONIA: Status: ACTIVE | Noted: 2020-11-08

## 2020-11-08 PROBLEM — I16.0 HYPERTENSIVE URGENCY: Status: RESOLVED | Noted: 2019-08-30 | Resolved: 2020-11-08

## 2020-11-08 PROBLEM — F17.210 CIGARETTE SMOKER: Chronic | Status: ACTIVE | Noted: 2019-08-31

## 2020-11-08 PROBLEM — I10 SEVERE UNCONTROLLED HYPERTENSION: Chronic | Status: ACTIVE | Noted: 2020-11-08

## 2020-11-08 PROBLEM — I16.1 HYPERTENSIVE EMERGENCY: Status: RESOLVED | Noted: 2020-10-26 | Resolved: 2020-11-08

## 2020-11-08 PROBLEM — I95.9 SYMPTOMATIC HYPOTENSION: Status: RESOLVED | Noted: 2019-09-21 | Resolved: 2020-11-08

## 2020-11-08 PROBLEM — N18.4 CHRONIC KIDNEY DISEASE, STAGE 4 (SEVERE): Chronic | Status: ACTIVE | Noted: 2020-10-13

## 2020-11-08 PROBLEM — E87.20 METABOLIC ACIDOSIS: Status: RESOLVED | Noted: 2019-09-21 | Resolved: 2020-11-08

## 2020-11-08 PROBLEM — N17.9 ACUTE RENAL FAILURE (HCC): Status: RESOLVED | Noted: 2019-09-21 | Resolved: 2020-11-08

## 2020-11-08 PROBLEM — I50.31 DIASTOLIC CHF, ACUTE (HCC): Status: RESOLVED | Noted: 2019-09-21 | Resolved: 2020-11-08

## 2020-11-08 LAB
AMPHET+METHAMPHET UR QL: POSITIVE
AMPHETAMINES UR QL: POSITIVE
ANION GAP SERPL CALCULATED.3IONS-SCNC: 8 MMOL/L (ref 5–15)
BARBITURATES UR QL SCN: NEGATIVE
BENZODIAZ UR QL SCN: NEGATIVE
BH CV ECHO MEAS - AO MAX PG (FULL): 6 MMHG
BH CV ECHO MEAS - AO MAX PG: 15.4 MMHG
BH CV ECHO MEAS - AO MEAN PG (FULL): 3 MMHG
BH CV ECHO MEAS - AO MEAN PG: 8 MMHG
BH CV ECHO MEAS - AO V2 MAX: 196 CM/SEC
BH CV ECHO MEAS - AO V2 MEAN: 126 CM/SEC
BH CV ECHO MEAS - AO V2 VTI: 31.6 CM
BH CV ECHO MEAS - BSA(HAYCOCK): 2 M^2
BH CV ECHO MEAS - BSA: 2 M^2
BH CV ECHO MEAS - BZI_BMI: 30.5 KILOGRAMS/M^2
BH CV ECHO MEAS - BZI_METRIC_HEIGHT: 167.6 CM
BH CV ECHO MEAS - BZI_METRIC_WEIGHT: 85.7 KG
BH CV ECHO MEAS - EDV(CUBED): 119.1 ML
BH CV ECHO MEAS - EDV(MOD-SP4): 172 ML
BH CV ECHO MEAS - EDV(TEICH): 113.9 ML
BH CV ECHO MEAS - EF(CUBED): 66.1 %
BH CV ECHO MEAS - EF(MOD-SP4): 55.9 %
BH CV ECHO MEAS - EF(TEICH): 57.5 %
BH CV ECHO MEAS - ESV(CUBED): 40.4 ML
BH CV ECHO MEAS - ESV(MOD-SP4): 75.9 ML
BH CV ECHO MEAS - ESV(TEICH): 48.5 ML
BH CV ECHO MEAS - FS: 30.3 %
BH CV ECHO MEAS - IVS/LVPW: 1.2
BH CV ECHO MEAS - IVSD: 1.3 CM
BH CV ECHO MEAS - LV DIASTOLIC VOL/BSA (35-75): 88.1 ML/M^2
BH CV ECHO MEAS - LV MASS(C)D: 234.6 GRAMS
BH CV ECHO MEAS - LV MASS(C)DI: 120.1 GRAMS/M^2
BH CV ECHO MEAS - LV MAX PG: 9.4 MMHG
BH CV ECHO MEAS - LV MEAN PG: 5 MMHG
BH CV ECHO MEAS - LV SYSTOLIC VOL/BSA (12-30): 38.9 ML/M^2
BH CV ECHO MEAS - LV V1 MAX: 153 CM/SEC
BH CV ECHO MEAS - LV V1 MEAN: 108 CM/SEC
BH CV ECHO MEAS - LV V1 VTI: 26.6 CM
BH CV ECHO MEAS - LVIDD: 4.9 CM
BH CV ECHO MEAS - LVIDS: 3.4 CM
BH CV ECHO MEAS - LVLD AP4: 9.6 CM
BH CV ECHO MEAS - LVLS AP4: 8 CM
BH CV ECHO MEAS - LVPWD: 1.1 CM
BH CV ECHO MEAS - RAP SYSTOLE: 5 MMHG
BH CV ECHO MEAS - RVSP: 61.9 MMHG
BH CV ECHO MEAS - SI(CUBED): 40.3 ML/M^2
BH CV ECHO MEAS - SI(MOD-SP4): 49.2 ML/M^2
BH CV ECHO MEAS - SI(TEICH): 33.5 ML/M^2
BH CV ECHO MEAS - SV(CUBED): 78.7 ML
BH CV ECHO MEAS - SV(MOD-SP4): 96.1 ML
BH CV ECHO MEAS - SV(TEICH): 65.4 ML
BH CV ECHO MEAS - TR MAX VEL: 377 CM/SEC
BUN SERPL-MCNC: 20 MG/DL (ref 8–23)
BUN/CREAT SERPL: 11.5 (ref 7–25)
BUPRENORPHINE SERPL-MCNC: NEGATIVE NG/ML
CALCIUM SPEC-SCNC: 8.2 MG/DL (ref 8.6–10.5)
CANNABINOIDS SERPL QL: NEGATIVE
CHLORIDE SERPL-SCNC: 106 MMOL/L (ref 98–107)
CHOLEST SERPL-MCNC: 196 MG/DL (ref 0–200)
CO2 SERPL-SCNC: 26 MMOL/L (ref 22–29)
COCAINE UR QL: POSITIVE
CREAT SERPL-MCNC: 1.74 MG/DL (ref 0.57–1)
DEPRECATED RDW RBC AUTO: 50.8 FL (ref 37–54)
ERYTHROCYTE [DISTWIDTH] IN BLOOD BY AUTOMATED COUNT: 15.8 % (ref 12.3–15.4)
GFR SERPL CREATININE-BSD FRML MDRD: 30 ML/MIN/1.73
GLUCOSE SERPL-MCNC: 215 MG/DL (ref 65–99)
HBA1C MFR BLD: 5.2 % (ref 4.8–5.6)
HCT VFR BLD AUTO: 31.5 % (ref 34–46.6)
HDLC SERPL-MCNC: 52 MG/DL (ref 40–60)
HGB BLD-MCNC: 9.7 G/DL (ref 12–15.9)
LDLC SERPL CALC-MCNC: 132 MG/DL (ref 0–100)
LDLC/HDLC SERPL: 2.51 {RATIO}
MAXIMAL PREDICTED HEART RATE: 160 BPM
MCH RBC QN AUTO: 27.4 PG (ref 26.6–33)
MCHC RBC AUTO-ENTMCNC: 30.8 G/DL (ref 31.5–35.7)
MCV RBC AUTO: 89 FL (ref 79–97)
METHADONE UR QL SCN: NEGATIVE
OPIATES UR QL: NEGATIVE
OXYCODONE UR QL SCN: NEGATIVE
PCP UR QL SCN: NEGATIVE
PLATELET # BLD AUTO: 221 10*3/MM3 (ref 140–450)
PMV BLD AUTO: 11 FL (ref 6–12)
POTASSIUM SERPL-SCNC: 4.1 MMOL/L (ref 3.5–5.2)
PROPOXYPH UR QL: NEGATIVE
QT INTERVAL: 314 MS
QT INTERVAL: 346 MS
QT INTERVAL: 350 MS
QT INTERVAL: 352 MS
QT INTERVAL: 388 MS
QTC INTERVAL: 394 MS
QTC INTERVAL: 408 MS
QTC INTERVAL: 415 MS
QTC INTERVAL: 421 MS
QTC INTERVAL: 423 MS
RBC # BLD AUTO: 3.54 10*6/MM3 (ref 3.77–5.28)
SODIUM SERPL-SCNC: 140 MMOL/L (ref 136–145)
STRESS TARGET HR: 136 BPM
TRICYCLICS UR QL SCN: NEGATIVE
TRIGL SERPL-MCNC: 68 MG/DL (ref 0–150)
TROPONIN T SERPL-MCNC: 0.52 NG/ML (ref 0–0.03)
TROPONIN T SERPL-MCNC: 15.56 NG/ML (ref 0–0.03)
TROPONIN T SERPL-MCNC: 29.64 NG/ML (ref 0–0.03)
VLDLC SERPL-MCNC: 12 MG/DL (ref 5–40)
WBC # BLD AUTO: 14.88 10*3/MM3 (ref 3.4–10.8)

## 2020-11-08 PROCEDURE — 83036 HEMOGLOBIN GLYCOSYLATED A1C: CPT | Performed by: INTERNAL MEDICINE

## 2020-11-08 PROCEDURE — C1725 CATH, TRANSLUMIN NON-LASER: HCPCS | Performed by: INTERNAL MEDICINE

## 2020-11-08 PROCEDURE — 93308 TTE F-UP OR LMTD: CPT

## 2020-11-08 PROCEDURE — 93308 TTE F-UP OR LMTD: CPT | Performed by: INTERNAL MEDICINE

## 2020-11-08 PROCEDURE — B2111ZZ FLUOROSCOPY OF MULTIPLE CORONARY ARTERIES USING LOW OSMOLAR CONTRAST: ICD-10-PCS | Performed by: INTERNAL MEDICINE

## 2020-11-08 PROCEDURE — 99223 1ST HOSP IP/OBS HIGH 75: CPT | Performed by: INTERNAL MEDICINE

## 2020-11-08 PROCEDURE — 25010000002 VANCOMYCIN: Performed by: EMERGENCY MEDICINE

## 2020-11-08 PROCEDURE — 99152 MOD SED SAME PHYS/QHP 5/>YRS: CPT | Performed by: INTERNAL MEDICINE

## 2020-11-08 PROCEDURE — 99153 MOD SED SAME PHYS/QHP EA: CPT | Performed by: INTERNAL MEDICINE

## 2020-11-08 PROCEDURE — C1887 CATHETER, GUIDING: HCPCS | Performed by: INTERNAL MEDICINE

## 2020-11-08 PROCEDURE — A9540 TC99M MAA: HCPCS | Performed by: INTERNAL MEDICINE

## 2020-11-08 PROCEDURE — 36415 COLL VENOUS BLD VENIPUNCTURE: CPT | Performed by: INTERNAL MEDICINE

## 2020-11-08 PROCEDURE — 93005 ELECTROCARDIOGRAM TRACING: CPT | Performed by: EMERGENCY MEDICINE

## 2020-11-08 PROCEDURE — 80061 LIPID PANEL: CPT | Performed by: INTERNAL MEDICINE

## 2020-11-08 PROCEDURE — 25010000002 HEPARIN (PORCINE): Performed by: INTERNAL MEDICINE

## 2020-11-08 PROCEDURE — C1769 GUIDE WIRE: HCPCS | Performed by: INTERNAL MEDICINE

## 2020-11-08 PROCEDURE — 80306 DRUG TEST PRSMV INSTRMNT: CPT | Performed by: NURSE PRACTITIONER

## 2020-11-08 PROCEDURE — 93325 DOPPLER ECHO COLOR FLOW MAPG: CPT

## 2020-11-08 PROCEDURE — 63710000001 METHYLPREDNISOLONE PER 4 MG: Performed by: INTERNAL MEDICINE

## 2020-11-08 PROCEDURE — C1894 INTRO/SHEATH, NON-LASER: HCPCS | Performed by: INTERNAL MEDICINE

## 2020-11-08 PROCEDURE — 93458 L HRT ARTERY/VENTRICLE ANGIO: CPT | Performed by: INTERNAL MEDICINE

## 2020-11-08 PROCEDURE — 25010000002 DIPHENHYDRAMINE PER 50 MG: Performed by: INTERNAL MEDICINE

## 2020-11-08 PROCEDURE — 93005 ELECTROCARDIOGRAM TRACING: CPT | Performed by: INTERNAL MEDICINE

## 2020-11-08 PROCEDURE — 93356 MYOCRD STRAIN IMG SPCKL TRCK: CPT | Performed by: INTERNAL MEDICINE

## 2020-11-08 PROCEDURE — 25010000002 IOPAMIDOL 61 % SOLUTION: Performed by: INTERNAL MEDICINE

## 2020-11-08 PROCEDURE — 80048 BASIC METABOLIC PNL TOTAL CA: CPT | Performed by: INTERNAL MEDICINE

## 2020-11-08 PROCEDURE — C1874 STENT, COATED/COV W/DEL SYS: HCPCS | Performed by: INTERNAL MEDICINE

## 2020-11-08 PROCEDURE — 84484 ASSAY OF TROPONIN QUANT: CPT | Performed by: EMERGENCY MEDICINE

## 2020-11-08 PROCEDURE — 4A023N7 MEASUREMENT OF CARDIAC SAMPLING AND PRESSURE, LEFT HEART, PERCUTANEOUS APPROACH: ICD-10-PCS | Performed by: INTERNAL MEDICINE

## 2020-11-08 PROCEDURE — 78580 LUNG PERFUSION IMAGING: CPT

## 2020-11-08 PROCEDURE — C1760 CLOSURE DEV, VASC: HCPCS | Performed by: INTERNAL MEDICINE

## 2020-11-08 PROCEDURE — 25010000002 ENOXAPARIN PER 10 MG: Performed by: EMERGENCY MEDICINE

## 2020-11-08 PROCEDURE — 027034Z DILATION OF CORONARY ARTERY, ONE ARTERY WITH DRUG-ELUTING INTRALUMINAL DEVICE, PERCUTANEOUS APPROACH: ICD-10-PCS | Performed by: INTERNAL MEDICINE

## 2020-11-08 PROCEDURE — 94799 UNLISTED PULMONARY SVC/PX: CPT

## 2020-11-08 PROCEDURE — 93325 DOPPLER ECHO COLOR FLOW MAPG: CPT | Performed by: INTERNAL MEDICINE

## 2020-11-08 PROCEDURE — 92941 PRQ TRLML REVSC TOT OCCL AMI: CPT | Performed by: INTERNAL MEDICINE

## 2020-11-08 PROCEDURE — C9606 PERC D-E COR REVASC W AMI S: HCPCS | Performed by: INTERNAL MEDICINE

## 2020-11-08 PROCEDURE — 85027 COMPLETE CBC AUTOMATED: CPT | Performed by: INTERNAL MEDICINE

## 2020-11-08 PROCEDURE — 93321 DOPPLER ECHO F-UP/LMTD STD: CPT

## 2020-11-08 PROCEDURE — 93356 MYOCRD STRAIN IMG SPCKL TRCK: CPT

## 2020-11-08 PROCEDURE — 0 TECHNETIUM ALBUMIN AGGREGATED: Performed by: INTERNAL MEDICINE

## 2020-11-08 PROCEDURE — 93321 DOPPLER ECHO F-UP/LMTD STD: CPT | Performed by: INTERNAL MEDICINE

## 2020-11-08 PROCEDURE — 25010000002 PERFLUTREN 6.52 MG/ML SUSPENSION: Performed by: INTERNAL MEDICINE

## 2020-11-08 PROCEDURE — 25010000002 BIVALIRUDIN TRIFLUOROACETATE 250 MG RECONSTITUTED SOLUTION: Performed by: INTERNAL MEDICINE

## 2020-11-08 DEVICE — XIENCE SIERRA™ EVEROLIMUS ELUTING CORONARY STENT SYSTEM 2.75 MM X 28 MM / RAPID-EXCHANGE
Type: IMPLANTABLE DEVICE | Status: FUNCTIONAL
Brand: XIENCE SIERRA™

## 2020-11-08 RX ORDER — NITROGLYCERIN 20 MG/100ML
10-50 INJECTION INTRAVENOUS
Status: DISCONTINUED | OUTPATIENT
Start: 2020-11-08 | End: 2020-11-09

## 2020-11-08 RX ORDER — DIPHENHYDRAMINE HYDROCHLORIDE 50 MG/ML
INJECTION INTRAMUSCULAR; INTRAVENOUS AS NEEDED
Status: DISCONTINUED | OUTPATIENT
Start: 2020-11-08 | End: 2020-11-08 | Stop reason: HOSPADM

## 2020-11-08 RX ORDER — LABETALOL HYDROCHLORIDE 5 MG/ML
INJECTION, SOLUTION INTRAVENOUS AS NEEDED
Status: DISCONTINUED | OUTPATIENT
Start: 2020-11-08 | End: 2020-11-08 | Stop reason: HOSPADM

## 2020-11-08 RX ORDER — METHYLPREDNISOLONE 4 MG/1
4 TABLET ORAL DAILY
Status: DISCONTINUED | OUTPATIENT
Start: 2020-11-08 | End: 2020-11-08

## 2020-11-08 RX ORDER — ASPIRIN 81 MG/1
324 TABLET, CHEWABLE ORAL ONCE
Status: COMPLETED | OUTPATIENT
Start: 2020-11-08 | End: 2020-11-08

## 2020-11-08 RX ORDER — ROSUVASTATIN CALCIUM 20 MG/1
20 TABLET, COATED ORAL NIGHTLY
Status: DISCONTINUED | OUTPATIENT
Start: 2020-11-08 | End: 2020-11-10 | Stop reason: HOSPADM

## 2020-11-08 RX ORDER — LOSARTAN POTASSIUM 50 MG/1
50 TABLET ORAL
Status: DISCONTINUED | OUTPATIENT
Start: 2020-11-08 | End: 2020-11-10 | Stop reason: HOSPADM

## 2020-11-08 RX ORDER — METHYLPREDNISOLONE 4 MG/1
4 TABLET ORAL
Status: COMPLETED | OUTPATIENT
Start: 2020-11-08 | End: 2020-11-08

## 2020-11-08 RX ORDER — NITROGLYCERIN 0.4 MG/1
0.4 TABLET SUBLINGUAL
Status: DISCONTINUED | OUTPATIENT
Start: 2020-11-08 | End: 2020-11-10 | Stop reason: HOSPADM

## 2020-11-08 RX ORDER — ACETAMINOPHEN 325 MG/1
650 TABLET ORAL EVERY 4 HOURS PRN
Status: DISCONTINUED | OUTPATIENT
Start: 2020-11-08 | End: 2020-11-10 | Stop reason: HOSPADM

## 2020-11-08 RX ORDER — CARVEDILOL 25 MG/1
25 TABLET ORAL 2 TIMES DAILY WITH MEALS
Status: DISCONTINUED | OUTPATIENT
Start: 2020-11-08 | End: 2020-11-10 | Stop reason: HOSPADM

## 2020-11-08 RX ORDER — SODIUM CHLORIDE 9 MG/ML
100 INJECTION, SOLUTION INTRAVENOUS CONTINUOUS
Status: DISCONTINUED | OUTPATIENT
Start: 2020-11-08 | End: 2020-11-09

## 2020-11-08 RX ORDER — FERROUS SULFATE 325(65) MG
325 TABLET ORAL 2 TIMES DAILY WITH MEALS
Status: DISCONTINUED | OUTPATIENT
Start: 2020-11-08 | End: 2020-11-10 | Stop reason: HOSPADM

## 2020-11-08 RX ORDER — FUROSEMIDE 10 MG/ML
60 INJECTION INTRAMUSCULAR; INTRAVENOUS ONCE
Status: DISCONTINUED | OUTPATIENT
Start: 2020-11-08 | End: 2020-11-08

## 2020-11-08 RX ORDER — OLANZAPINE 5 MG/1
5 TABLET ORAL 2 TIMES DAILY
Status: DISCONTINUED | OUTPATIENT
Start: 2020-11-08 | End: 2020-11-10 | Stop reason: HOSPADM

## 2020-11-08 RX ORDER — FLUTICASONE PROPIONATE 50 MCG
2 SPRAY, SUSPENSION (ML) NASAL DAILY PRN
Status: DISCONTINUED | OUTPATIENT
Start: 2020-11-08 | End: 2020-11-10 | Stop reason: HOSPADM

## 2020-11-08 RX ORDER — LEVOFLOXACIN 750 MG/1
750 TABLET ORAL EVERY OTHER DAY
Status: DISCONTINUED | OUTPATIENT
Start: 2020-11-09 | End: 2020-11-10 | Stop reason: HOSPADM

## 2020-11-08 RX ORDER — CLONIDINE HYDROCHLORIDE 0.1 MG/1
0.1 TABLET ORAL EVERY 6 HOURS PRN
Status: DISCONTINUED | OUTPATIENT
Start: 2020-11-08 | End: 2020-11-10 | Stop reason: HOSPADM

## 2020-11-08 RX ORDER — NIFEDIPINE 60 MG/1
60 TABLET, EXTENDED RELEASE ORAL 2 TIMES DAILY
Status: DISCONTINUED | OUTPATIENT
Start: 2020-11-08 | End: 2020-11-10 | Stop reason: HOSPADM

## 2020-11-08 RX ORDER — ASPIRIN 81 MG/1
81 TABLET ORAL DAILY
Status: DISCONTINUED | OUTPATIENT
Start: 2020-11-08 | End: 2020-11-10 | Stop reason: HOSPADM

## 2020-11-08 RX ORDER — PANTOPRAZOLE SODIUM 40 MG/1
40 TABLET, DELAYED RELEASE ORAL DAILY
Status: DISCONTINUED | OUTPATIENT
Start: 2020-11-08 | End: 2020-11-10 | Stop reason: HOSPADM

## 2020-11-08 RX ADMIN — VANCOMYCIN HYDROCHLORIDE 1750 MG: 1 INJECTION, POWDER, LYOPHILIZED, FOR SOLUTION INTRAVENOUS at 00:49

## 2020-11-08 RX ADMIN — NITROGLYCERIN 10 MCG/MIN: 20 INJECTION INTRAVENOUS at 00:44

## 2020-11-08 RX ADMIN — NIFEDIPINE 60 MG: 60 TABLET, FILM COATED, EXTENDED RELEASE ORAL at 09:11

## 2020-11-08 RX ADMIN — FERROUS SULFATE TAB 325 MG (65 MG ELEMENTAL FE) 325 MG: 325 (65 FE) TAB at 09:11

## 2020-11-08 RX ADMIN — PANTOPRAZOLE SODIUM 40 MG: 40 TABLET, DELAYED RELEASE ORAL at 09:11

## 2020-11-08 RX ADMIN — TICAGRELOR 90 MG: 90 TABLET ORAL at 21:19

## 2020-11-08 RX ADMIN — METHYLPREDNISOLONE 4 MG: 4 TABLET ORAL at 10:01

## 2020-11-08 RX ADMIN — ENOXAPARIN SODIUM 90 MG: 100 INJECTION SUBCUTANEOUS at 01:10

## 2020-11-08 RX ADMIN — LOSARTAN POTASSIUM 50 MG: 50 TABLET, FILM COATED ORAL at 10:01

## 2020-11-08 RX ADMIN — ROSUVASTATIN CALCIUM 20 MG: 20 TABLET, FILM COATED ORAL at 21:19

## 2020-11-08 RX ADMIN — CARVEDILOL 25 MG: 25 TABLET, FILM COATED ORAL at 17:00

## 2020-11-08 RX ADMIN — PERFLUTREN 8.48 MG: 6.52 INJECTION, SUSPENSION INTRAVENOUS at 09:29

## 2020-11-08 RX ADMIN — HYDRALAZINE HYDROCHLORIDE 75 MG: 50 TABLET ORAL at 05:25

## 2020-11-08 RX ADMIN — OLANZAPINE 5 MG: 5 TABLET, FILM COATED ORAL at 09:12

## 2020-11-08 RX ADMIN — OLANZAPINE 5 MG: 5 TABLET, FILM COATED ORAL at 21:19

## 2020-11-08 RX ADMIN — ASPIRIN 324 MG: 81 TABLET, CHEWABLE ORAL at 00:58

## 2020-11-08 RX ADMIN — ASPIRIN 81 MG: 81 TABLET ORAL at 09:12

## 2020-11-08 RX ADMIN — TICAGRELOR 90 MG: 90 TABLET ORAL at 15:26

## 2020-11-08 RX ADMIN — KIT FOR THE PREPARATION OF TECHNETIUM TC 99M ALBUMIN AGGREGATED 1 DOSE: 2.5 INJECTION, POWDER, FOR SOLUTION INTRAVENOUS at 09:30

## 2020-11-08 RX ADMIN — SODIUM CHLORIDE 100 ML/HR: 9 INJECTION, SOLUTION INTRAVENOUS at 03:22

## 2020-11-08 RX ADMIN — CARVEDILOL 25 MG: 25 TABLET, FILM COATED ORAL at 09:11

## 2020-11-08 RX ADMIN — FERROUS SULFATE TAB 325 MG (65 MG ELEMENTAL FE) 325 MG: 325 (65 FE) TAB at 17:00

## 2020-11-08 RX ADMIN — SODIUM CHLORIDE 100 ML/HR: 9 INJECTION, SOLUTION INTRAVENOUS at 05:26

## 2020-11-08 NOTE — PLAN OF CARE
Goal Outcome Evaluation:  Patient slept throughout day. Has been up to bathroom and ate during meals otherwise slept.  Vital signs stable, nsr.   Cath site clean dry intact, no signs of bleeding or hematoma.    Plan of Care Reviewed With: patient, son

## 2020-11-08 NOTE — SIGNIFICANT NOTE
Spoke with  regarding post-cath EKG upon arrival to the unit. Notified of remaining ST elevation in leads V3-V5, and ST segment appears wider in these leads. Pt reporting she is pain free. MD aware, states these changes can remain for several hours. No new orders. Will continue to monitor.

## 2020-11-08 NOTE — H&P
LOS: 0 days   Patient Care Team:  Orville Weston MD as PCP - General (Family Medicine)  Juanpablo Rea MD as Consulting Physician (Gastroenterology)  Nickolas Alegria MD as Consulting Physician (Gastroenterology)    Chief Complaint:      Jefferson Ramirez is a 60 y.o. female who presented to the emergency room with complaints of substernal chest pain  Chest pain is pressure-like  Chest pain waxes and wanes  Currently moderate in severity  Recent episode of pneumonia and acute respiratory failure  Has acute on chronic renal failure  Is on oral steroids  White count is elevated  Denies any fever or chills  Has nausea  Denies any diarrhea  No presyncope  No syncope  No orthopnea  No paroxysmal nocturnal dyspnea  EKG shows acute anterior lateral ST elevation consistent with ST elevation myocardial infarction  Due to persistence of chest pain and EKG changes will be taken emergently for coronary angiography  Covid test is negative    Telemetry: no malignant arrhythmia. No significant pauses.    Review of Systems   Constitutional: No chills   Has fatigue   No fever.   HENT: Negative.    Eyes: Negative.    Respiratory: Negative for cough,   No chest wall soreness,   Shortness of breath,   no wheezing, no stridor.    Cardiovascular: As above  Gastrointestinal: Negative for abdominal distention,  No abdominal pain,   No blood in stool,   No constipation,   No diarrhea,   No nausea   No vomiting.   Endocrine: Negative.    Genitourinary: Negative for difficulty urinating, dysuria, flank pain and hematuria.   Musculoskeletal: Negative.    Skin: Negative for rash and wound.   Allergic/Immunologic: Negative.    Neurological: Negative for dizziness, syncope, weakness,   No light-headedness  No  headaches.   Hematological: Does not bruise/bleed easily.   Psychiatric/Behavioral: Negative for agitation or behavioral problems,   No confusion,   the patient is  nervous/anxious.       History:   Past Medical  History:   Diagnosis Date   • Acute CHF (CMS/HCC)    • Acute renal failure (ARF) (CMS/HCC)    • Anemia    • Asthma     childhood   • Hypertension    • Ischemic colitis (CMS/HCC)    • Metabolic acidosis    • Nonrheumatic aortic (valve) insufficiency    • PTSD (post-traumatic stress disorder)      Past Surgical History:   Procedure Laterality Date   • EXTERNAL FIXATION WRIST FRACTURE     • LEG SURGERY     • TUBAL ABDOMINAL LIGATION       Social History     Socioeconomic History   • Marital status: Single     Spouse name: Not on file   • Number of children: Not on file   • Years of education: Not on file   • Highest education level: Not on file   Tobacco Use   • Smoking status: Current Every Day Smoker     Packs/day: 0.50   • Smokeless tobacco: Never Used   Substance and Sexual Activity   • Alcohol use: No   • Drug use: No   • Sexual activity: Defer     Family History   Problem Relation Age of Onset   • Coronary artery disease Mother    • Coronary artery disease Father        Labs:  WBC WBC   Date Value Ref Range Status   11/07/2020 16.45 (H) 3.40 - 10.80 10*3/mm3 Final      HGB Hemoglobin   Date Value Ref Range Status   11/07/2020 9.3 (L) 12.0 - 15.9 g/dL Final      HCT Hematocrit   Date Value Ref Range Status   11/07/2020 29.6 (L) 34.0 - 46.6 % Final      Platelets Platelets   Date Value Ref Range Status   11/07/2020 247 140 - 450 10*3/mm3 Final      MCV MCV   Date Value Ref Range Status   11/07/2020 87.1 79.0 - 97.0 fL Final        Results from last 7 days   Lab Units 11/07/20  2119 11/03/20  0441 11/02/20  0400 11/01/20  0255   SODIUM mmol/L 141 143 143 143   POTASSIUM mmol/L 4.3 4.2 4.2 3.8   CHLORIDE mmol/L 107 109* 109* 111*   CO2 mmol/L 24.0 25.0 24.0 22.0   BUN mg/dL 21 35* 35* 42*   CREATININE mg/dL 1.80* 1.72* 1.84* 1.62*   CALCIUM mg/dL 8.3* 8.8 8.5* 8.5*   BILIRUBIN mg/dL 0.3  --  0.2 0.3   ALK PHOS U/L 85  --  80 90   ALT (SGPT) U/L 23  --  19 19   AST (SGOT) U/L 70*  --  20 26   GLUCOSE mg/dL 152* 101*  108* 110*     Lab Results   Component Value Date    CKTOTAL 219 (H) 10/27/2020    CKMB 3.38 04/06/2018    TROPONINI 0.026 08/31/2019    TROPONINT 0.522 (C) 11/08/2020     PT/INR:    Protime   Date Value Ref Range Status   11/07/2020 13.5 11.9 - 14.6 Seconds Final   /  INR   Date Value Ref Range Status   11/07/2020 1.07 0.91 - 1.09 Final       Imaging Results (Last 72 Hours)     Procedure Component Value Units Date/Time    CT Chest Without Contrast [[id number]] Resulted: 11/07/20 2359     Updated: 11/08/20 0025    XR Chest 1 View [[id number]] Collected: 11/07/20 2159     Updated: 11/07/20 2203    Narrative:      EXAMINATION: XR CHEST 1 VW-     11/7/2020 9:55 PM     HISTORY: Chest pain.     1 view chest x-ray compared with 10/31/2020.     Cardiomegaly.  Aortic arch calcification.     Chronic interstitial lung disease.     Interstitial prominence is slightly increased.  There is no focal infiltrate.  No pneumothorax.     Summary:  1. Cardiomegaly and diffuse interstitial lung disease.     This report was finalized on 11/07/2020 22:00 by Dr. Felice Reddy MD.          Objective     Allergies   Allergen Reactions   • Codeine Nausea And Vomiting   • Imitrex [Sumatriptan] Other (See Comments)     HA       Medication Review: Performed  Current Facility-Administered Medications   Medication Dose Route Frequency Provider Last Rate Last Dose   • nitroglycerin (NITROSTAT) SL tablet 0.4 mg  0.4 mg Sublingual Q5 Min PRN Son Randall MD       • nitroglycerin (TRIDIL) 200 mcg/ml infusion  10-50 mcg/min Intravenous Titrated Son Randall MD 4.5 mL/hr at 11/08/20 0100 15 mcg/min at 11/08/20 0100     Current Outpatient Medications   Medication Sig Dispense Refill   • azelastine (ASTELIN) 0.1 % nasal spray 2 sprays into the nostril(s) as directed by provider 2 (Two) Times a Day. Use in each nostril as directed      • carvedilol (COREG) 25 MG tablet Take 25 mg by mouth 2 (Two) Times a Day With Meals.     • cholecalciferol  "1000 units tablet Take 1,000 Units by mouth Daily. 30 tablet 1   • cloNIDine (CATAPRES) 0.1 MG tablet Take 1 tablet by mouth Every 6 (Six) Hours As Needed for High Blood Pressure (BP >180/100). 30 tablet 1   • ferrous sulfate 325 (65 FE) MG tablet Take 1 tablet by mouth 2 (Two) Times a Day With Meals. 60 tablet 0   • fluticasone (FLONASE) 50 MCG/ACT nasal spray 2 sprays into the nostril(s) as directed by provider Daily As Needed for Rhinitis or Allergies.     • hydrALAZINE (APRESOLINE) 25 MG tablet Take 3 tablets by mouth Every 8 (Eight) Hours. 270 tablet 3   • losartan (COZAAR) 50 MG tablet Take 1 tablet by mouth Daily. 30 tablet 1   • methylPREDNISolone (MEDROL) 4 MG dose pack Take as directed on package instructions. 1 each 0   • NIFEdipine XL (ADALAT CC) 60 MG 24 hr tablet Take 1 tablet by mouth 2 (two) times a day. 60 tablet 1   • OLANZapine (zyPREXA) 5 MG tablet Take 1 tablet by mouth 2 (two) times a day. 60 tablet 0   • pantoprazole (PROTONIX) 40 MG EC tablet Take 1 tablet by mouth Daily. 30 tablet 1   • spironolactone (ALDACTONE) 25 MG tablet Take 1 tablet by mouth Daily. 30 tablet 1       Vital Sign Min/Max for last 24 hours  Temp  Min: 98.2 °F (36.8 °C)  Max: 98.2 °F (36.8 °C)   BP  Min: 148/96  Max: 170/101   Pulse  Min: 86  Max: 95   Resp  Min: 18  Max: 23   SpO2  Min: 91 %  Max: 95 %   No data recorded   Weight  Min: 88.9 kg (196 lb)  Max: 88.9 kg (196 lb)     Flowsheet Rows      First Filed Value   Admission Height  167.6 cm (66\") Documented at 11/07/2020 2119   Admission Weight  88.9 kg (196 lb) Documented at 11/07/2020 2119          Results for orders placed during the hospital encounter of 10/12/20   Adult Transthoracic Echo Complete W/ Cont if Necessary Per Protocol    Narrative · Left ventricular wall thickness is consistent with moderate concentric   hypertrophy.  · Mild aortic valve regurgitation is present.  · Estimated right ventricular systolic pressure from tricuspid   regurgitation is " mildly elevated (35-45 mmHg).  · Mild mitral annular calcification is present.  · Left ventricular diastolic function is consistent with (grade Ia w/high   LAP) impaired relaxation.  · Trace mitral valve regurgitation is present.  · The left atrial cavity is mildly dilated.  · The right atrial cavity is mildly dilated.  · Left ventricular ejection fraction appears to be 56 - 60%. Left   ventricular systolic function is normal.     EVIDENCE FOR HYPERTENSIVE HEART DISEASE         Physical Exam:    General Appearance: Awake, alert, in no acute distress  Eyes: Pupils equal and reactive    Ears: Appear intact with no abnormalities noted  Nose: Nares normal, no drainage  Neck: supple, trachea midline, no carotid bruit and no JVD  Back: no kyphosis present,    Lungs: respirations regular, respirations even and respirations unlabored  Heart: normal S1, S2, no significant murmurs   No gallops or rubs  no rub and no click  Abdomen: normal bowel sounds, no tenderness   Skin: no bleeding, bruising or rash  Extremities: no cyanosis  Psychiatric/Behavioral: Negative for agitation, behavioral problems, confusion, the patient does  appear to be nervous/anxious.       Results Review:   I reviewed the patient's new clinical results.  I reviewed the patient's new imaging results and agree with the interpretation.  I reviewed the patient's other test results and agree with the interpretation  I personally viewed and interpreted the patient's EKG/Telemetry data    Discussed with patient  Updated patient regarding any new or relevant abnormalities on review of records or any new findings on physical exam.   Mentioned to patient about purpose of visit and desirable health short and long term goals and objectives.     Reviewed available prior notes, consults, prior visits, laboratory findings, radiology and cardiology relevant reports.   Updated chart as applicable.   I have reviewed the patient's medical history in detail and updated the  computerized patient record as relevant.          Assessment/Plan     Acute anterior lateral ST elevation myocardial infarction  Chronic kidney disease  Recent acute renal failure  Hypertensive heart disease  Moderate left ventricular hypertrophy  Smoker  Recent respiratory arrest  History of recent chest infection  Leukocytosis possibly due to ongoing steroid use      Plan      Recommend cardiac catheterization, selective coronary angiography, left ventriculography and percutaneous coronary intervention with application of arteriotomy hemostatic closure device.    I discussed cardiac catheterization, the procedure, risks (including bleeding, infection, vascular damage [including minor oozing, bruising, bleeding, and up to and including but not limited to the need for vascular surgery, emergency cardiothoracic surgery, contrast reaction, renal failure, respiratory failure, heart attack, stroke, arrhythmia and even death), benefits, and alternatives and the patient has voiced understanding and is willing to proceed.    Adequate pre-hydration and post cardiac catheterization hydration.  Premedications as required and indicated for cardiac catheterization.    No contraindication to drug eluting stent placement if required  Further recommendations pending results of cardiac catheterization      Telemetry  Deep vein thrombosis prophylaxis/precautions  Appropriate diet, fluid, sodium, caffeine, stimulants intake   Questions were encouraged, asked and answered to the patient's  understanding and satisfaction.  Compliance to diet and medications       Pierce Buchanan MD  11/08/20  01:12 CST    EMR Dragon/Transcription was used to dictate part of this note

## 2020-11-08 NOTE — ED PROVIDER NOTES
"Subjective   61 y/o female arrives for evaluation of anterior chest pain (cannot describe) that does radiate to her back that she noted 3 hours ago CONSTANT that occurred after eating she notes feels like acid reflux. She does note some wheezing as well. She denies nausea, vomiting, diarrhea, fevers or chills, falls or trauma. Of note she was recently here with ARF and VDRF extubated and discharged on 11/4. She does note smoking but tells me she only \"smokes one a day.\" She arrives in [facility].        Family, social and past history reviewed as below, prior documentation of H and Ps and other documentation are reviewed:    Past Medical History:  No date: Acute CHF (CMS/[facility])  No date: Acute renal failure (ARF) (CMS/[facility])  No date: Anemia  No date: Asthma      Comment:  childhood  No date: Hypertension  No date: Ischemic colitis (CMS/[facility])  No date: Metabolic acidosis  No date: Nonrheumatic aortic (valve) insufficiency  No date: PTSD (post-traumatic stress disorder)    Past Surgical History:  No date: EXTERNAL FIXATION WRIST FRACTURE  No date: LEG SURGERY  No date: TUBAL ABDOMINAL LIGATION    Social History    Socioeconomic History      Marital status: Single      Spouse name: Not on file      Number of children: Not on file      Years of education: Not on file      Highest education level: Not on file    Tobacco Use      Smoking status: Current Every Day Smoker        Packs/day: 0.50      Smokeless tobacco: Never Used    Substance and Sexual Activity      Alcohol use: No      Drug use: No      Sexual activity: Defer      Family history: reviewed and noncontributory           Review of Systems   All other systems reviewed and are negative.      Past Medical History:   Diagnosis Date   • Acute CHF (CMS/HCC)    • Acute renal failure (ARF) (CMS/[facility])    • Anemia    • Asthma     childhood   • Hypertension    • Ischemic colitis (CMS/HCC)    • Metabolic acidosis    • Nonrheumatic aortic (valve) insufficiency    • PTSD " (post-traumatic stress disorder)        Allergies   Allergen Reactions   • Codeine Nausea And Vomiting   • Imitrex [Sumatriptan] Other (See Comments)     HA       Past Surgical History:   Procedure Laterality Date   • EXTERNAL FIXATION WRIST FRACTURE     • LEG SURGERY     • TUBAL ABDOMINAL LIGATION         Family History   Problem Relation Age of Onset   • Coronary artery disease Mother    • Coronary artery disease Father        Social History     Socioeconomic History   • Marital status: Single     Spouse name: Not on file   • Number of children: Not on file   • Years of education: Not on file   • Highest education level: Not on file   Tobacco Use   • Smoking status: Current Every Day Smoker     Packs/day: 0.50   • Smokeless tobacco: Never Used   Substance and Sexual Activity   • Alcohol use: No   • Drug use: No   • Sexual activity: Defer           Objective   Physical Exam  Vitals signs and nursing note reviewed.   Constitutional:       Appearance: She is well-developed.   HENT:      Head: Normocephalic.   Eyes:      Pupils: Pupils are equal, round, and reactive to light.   Neck:      Musculoskeletal: Normal range of motion and neck supple.   Cardiovascular:      Rate and Rhythm: Normal rate and regular rhythm.   Pulmonary:      Breath sounds: Wheezing present.   Chest:      Chest wall: No mass or tenderness.   Abdominal:      General: Bowel sounds are normal. There is no abdominal bruit.      Palpations: Abdomen is soft. There is no fluid wave, hepatomegaly or splenomegaly.   Musculoskeletal: Normal range of motion.   Skin:     General: Skin is warm and dry.      Capillary Refill: Capillary refill takes less than 2 seconds.   Neurological:      General: No focal deficit present.      Mental Status: She is alert and oriented to person, place, and time.   Psychiatric:         Mood and Affect: Mood normal.         Behavior: Behavior normal.         ECG 12 Lead      Date/Time: 11/8/2020 9:00 PM  Performed by:  Son Randall MD  Authorized by: Son Randall MD   Interpreted by physician  Rhythm: sinus rhythm  Rate: normal  BPM: 84  QRS axis: normal  Conduction: 1st degree  Comments: Lead V4 does have some elevation but not 1 mm and not STEMI criteira     ECG 12 Lead      Date/Time: 11/8/2020 12:29 AM  Performed by: Son Randall MD  Authorized by: Son Randall MD   Interpreted by physician  Rhythm: sinus rhythm  Rate: normal  QRS axis: normal  ST Elevation: V6, V5 and V4      ECG 12 Lead      Date/Time: 11/8/2020 12:46 AM  Performed by: Son Randall MD  Authorized by: Son Randall MD   Interpreted by physician  Rhythm: sinus rhythm  Rate: normal  BPM: 87  QRS axis: normal  ST Elevation: V6, V5, V4 and V3                   ED Course  ED Course as of Nov 08 0112   Sun Nov 08, 2020   0036 Patient was CP free but then developed chest pain again. Repeat EKG shows STEMI laterally thus alert was called.     []   0040 Dr. Buchanan aware of patient states to start nitro and repeat EKG    []   0107 Please note, patient had atypical symptoms starting after eating. Her pain was relieved with the dilaudid and then returned when she got back from CT. This is why the repeat EKG was done which showed the STEMI. Thus alert was called. ABX were given secondary to leukocytosis.     [JH]   0108 CT shows CHF    [JH]   0111 Dr. Buchanan will take to Cath    [JH]   0111 Her chest pain is resolved at this time.     [JH]   0111 Please note. She was just here with elevated tropoinins to 0.3 previously (see last discharge and trend of troponins).     []      ED Course User Index  [] Son Randall MD        ·   Left ventricular wall thickness is consistent with moderate concentric hypertrophy.  · Mild aortic valve regurgitation is present.  · Estimated right ventricular systolic pressure from tricuspid regurgitation is mildly elevated (35-45 mmHg).  · Mild mitral annular calcification is  present.  · Left ventricular diastolic function is consistent with (grade Ia w/high LAP) impaired relaxation.  · Trace mitral valve regurgitation is present.  · The left atrial cavity is mildly dilated.  · The right atrial cavity is mildly dilated.  · Left ventricular ejection fraction appears to be 56 - 60%. Left ventricular systolic function is normal.  Labs Reviewed   COMPREHENSIVE METABOLIC PANEL - Abnormal; Notable for the following components:       Result Value    Glucose 152 (*)     Creatinine 1.80 (*)     Calcium 8.3 (*)     AST (SGOT) 70 (*)     eGFR Non  Amer 29 (*)     All other components within normal limits    Narrative:     GFR Normal >60  Chronic Kidney Disease <60  Kidney Failure <15     LIPASE - Abnormal; Notable for the following components:    Lipase 61 (*)     All other components within normal limits   BNP (IN-HOUSE) - Abnormal; Notable for the following components:    proBNP 33,566.0 (*)     All other components within normal limits    Narrative:     Among patients with dyspnea, NT-proBNP is highly sensitive for the detection of acute congestive heart failure. In addition NT-proBNP of <300 pg/ml effectively rules out acute congestive heart failure with 99% negative predictive value.    Results may be falsely decreased if patient taking Biotin.     D-DIMER, QUANTITATIVE - Abnormal; Notable for the following components:    D-Dimer, Quantitative 0.91 (*)     All other components within normal limits    Narrative:     Reference Range is 0-0.50 mg/L FEU. However, results <0.50 mg/L FEU tends to rule out DVT or PE. Results >0.50 mg/L FEU are not useful in predicting absence or presence of DVT or PE.     TROPONIN (IN-HOUSE) - Abnormal; Notable for the following components:    Troponin T 0.606 (*)     All other components within normal limits    Narrative:     Troponin T Reference Range:  <= 0.03 ng/mL-   Negative for AMI  >0.03 ng/mL-     Abnormal for myocardial necrosis.  Clinicians would  have to utilize clinical acumen, EKG, Troponin and serial changes to determine if it is an Acute Myocardial Infarction or myocardial injury due to an underlying chronic condition.       Results may be falsely decreased if patient taking Biotin.     TROPONIN (IN-HOUSE) - Abnormal; Notable for the following components:    Troponin T 0.522 (*)     All other components within normal limits    Narrative:     Troponin T Reference Range:  <= 0.03 ng/mL-   Negative for AMI  >0.03 ng/mL-     Abnormal for myocardial necrosis.  Clinicians would have to utilize clinical acumen, EKG, Troponin and serial changes to determine if it is an Acute Myocardial Infarction or myocardial injury due to an underlying chronic condition.       Results may be falsely decreased if patient taking Biotin.     CBC WITH AUTO DIFFERENTIAL - Abnormal; Notable for the following components:    WBC 16.45 (*)     RBC 3.40 (*)     Hemoglobin 9.3 (*)     Hematocrit 29.6 (*)     MCHC 31.4 (*)     RDW 15.9 (*)     Neutrophil % 91.0 (*)     Lymphocyte % 4.0 (*)     Monocyte % 3.5 (*)     Immature Grans % 0.7 (*)     Neutrophils, Absolute 14.97 (*)     Lymphocytes, Absolute 0.65 (*)     Immature Grans, Absolute 0.11 (*)     All other components within normal limits   COVID-19,[facility] IN-HOUSE,NASAL SWAB NO TRANSPORT MEDIA 2 HR TAT - Normal    Narrative:     Fact sheet for providers: https://www.fda.gov/media/871931/download     Fact sheet for patients: https://www.fda.gov/media/856377/download   APTT - Normal   PROTIME-INR - Normal   LACTIC ACID, PLASMA - Normal   PROCALCITONIN - Normal    Narrative:     As a Marker for Sepsis (Non-Neonates):   1. <0.5 ng/mL represents a low risk of severe sepsis and/or septic shock.  1. >2 ng/mL represents a high risk of severe sepsis and/or septic shock.    As a Marker for Lower Respiratory Tract Infections that require antibiotic therapy:  PCT on Admission     Antibiotic Therapy             6-12 Hrs later  > 0.5              "   Strongly Recommended            >0.25 - <0.5         Recommended  0.1 - 0.25           Discouraged                   Remeasure/reassess PCT  <0.1                 Strongly Discouraged          Remeasure/reassess PCT      As 28 day mortality risk marker: \"Change in Procalcitonin Result\" (> 80 % or <=80 %) if Day 0 (or Day 1) and Day 4 values are available. Refer to http://www.[facility]sa[facility] Reportpct-calculator.com/   Change in PCT <=80 %   A decrease of PCT levels below or equal to 80 % defines a positive change in PCT test result representing a higher risk for 28-day all-cause mortality of patients diagnosed with severe sepsis or septic shock.  Change in PCT > 80 %   A decrease of PCT levels of more than 80 % defines a negative change in PCT result representing a lower risk for 28-day all-cause mortality of patients diagnosed with severe sepsis or septic shock.                Results may be falsely decreased if patient taking Biotin.    COVID PRE-OP / PRE-PROCEDURE SCREENING ORDER (NO ISOLATION)    Narrative:     The following orders were created for panel order COVID PRE-OP / PRE-PROCEDURE SCREENING ORDER (NO ISOLATION) - Swab, Nasal Cavity.  Procedure                               Abnormality         Status                     ---------                               -----------         ------                     COVID-19,[facility]...[[id number]]  Normal              Final result                 Please view results for these tests on the individual orders.   BLOOD CULTURE   BLOOD CULTURE   RAINBOW DRAW    Narrative:     The following orders were created for panel order [place] Draw.  Procedure                               Abnormality         Status                     ---------                               -----------         ------                     Light Blue Top[[id number]]                                   Final result               Green Top (Gel)[[id number]]                                  Final result           "     Lavender Top[[id number]]                                     Final result               Red Top[[id number]]                                          Final result                 Please view results for these tests on the individual orders.   TROPONIN (IN-HOUSE)   TROPONIN (IN-HOUSE)   TROPONIN (IN-HOUSE)   LIGHT BLUE TOP   GREEN TOP   LAVENDER TOP   RED TOP   CBC AND DIFFERENTIAL    Narrative:     The following orders were created for panel order CBC & Differential.  Procedure                               Abnormality         Status                     ---------                               -----------         ------                     CBC Auto Differential[[id number]]        Abnormal            Final result                 Please view results for these tests on the individual orders.     XR Chest 1 View   Final Result      NM Lung Scan Perfusion Particulate    (Results Pending)   CT Chest Without Contrast    (Results Pending)                                       MDM    Final diagnoses:   ST elevation myocardial infarction (STEMI), unspecified artery (CMS/[facility])            Son Randall MD  11/08/20 0112       Son Randall MD  11/08/20 0124

## 2020-11-08 NOTE — PLAN OF CARE
Goal Outcome Evaluation:  Plan of Care Reviewed With: patient, son      A&Ox4. Alert to voice. Very drowsy, drifts off to sleep during conversation. Morning EKG still shows some ST elevation in leads V3-V5 but improving. No complaints of pain. Right groin site CDI and soft to palpation. Bilateral pedal pulses 2+ doppler. NSR. Echo scheduled for today. 35% on venti mask. Has apneic periods during sleep, desats to 58-70%, would benefit from CPAP. Reported to RT. Will continue to monitor.

## 2020-11-08 NOTE — ED NOTES
PATIENT UP TO BSC, APPROXIMANT 300ML CLEAR YELLOW URINE NOTED.      Vero Del Valle, RN  11/08/20 0056

## 2020-11-08 NOTE — PROGRESS NOTES
[facility] -  Progress Note     LOS: 0 days   Patient Care Team:  Orville Weston MD as PCP - General (Family Medicine)  Juanpablo Rea MD as Consulting Physician (Gastroenterology)  Nickolas Alegria MD as Consulting Physician (Gastroenterology)    Chief Complaint: F/U Anterior MI    Subjective   Patient very sleepy, but will awaken for a short time with tactile stimuli.       REVIEW OF SYSTEMS:  Constitutional: Denies fever or chills. Denies change in energy level or malaise.  HEENT: Denies headaches or visual disturbances. Denies difficulty swallowing or sore throat.  Respiratory: Denies cough or hoarseness. Denies shortness of breath.   Cardiovascular: Denies chest pain or pressure. Denies palpitations. Denies presyncope/syncope. Denies orthopnea/PND. Denies lower extremity edema.   Gastrointestinal: Denies abdominal pain. Denies nausea/vomiting. Denies change in bowel habits or history of recent GI tract blood loss.   Genitourinary: Denies urinary urgency or frequency. Denies dysuria, hematuria.  Musculoskeletal: Denies pain or swelling in joints.  Neurological: Denies paresthesias. Denies headache. Denies seizure or stroke symptoms.  Behavioral/Psych: Denies problems with anxiety or depression.    All other systems are negative except where stated above.      Objective     Vital Sign Min/Max for last 24 hours  Temp  Min: 97.8 °F (36.6 °C)  Max: 98.2 °F (36.8 °C)   BP  Min: 125/79  Max: 170/101   Pulse  Min: 66  Max: 95   Resp  Min: 15  Max: 23   SpO2  Min: 90 %  Max: 99 %   No data recorded   Weight  Min: 85.9 kg (189 lb 6 oz)  Max: 88.9 kg (196 lb)         11/08/20  0600   Weight: 85.9 kg (189 lb 6 oz)         Intake/Output Summary (Last 24 hours) at 11/8/2020 0726  Last data filed at 11/8/2020 0245  Gross per 24 hour   Intake 600 ml   Output 250 ml   Net 350 ml         Physical Exam:  General Appearance: Drowsy, oriented. No acute distress.  HEENT: Normocephalic. Atraumatic.  MARIPOSA.   Lungs: Coarse, diminished with scattered rhonchi. No wheezes.   Heart: Normal HT with RRR. 2/6 systolic murmur. No gallops or rubs.   Abdomen: Soft, non-tender with active bowel sounds.   Extremities: No clubbing or cyanosis. No edema. Pedal pulses palpated.   Neurologic: Drowsy 2' to sedation.     Results Review:   Lab Results (last 72 hours)     Procedure Component Value Units Date/Time    Troponin [[id number]]  (Abnormal) Collected: 11/08/20 0635    Specimen: Blood Updated: 11/08/20 0726     Troponin T 29.640 ng/mL     Narrative:      Troponin T Reference Range:  <= 0.03 ng/mL-   Negative for AMI  >0.03 ng/mL-     Abnormal for myocardial necrosis.  Clinicians would have to utilize clinical acumen, EKG, Troponin and serial changes to determine if it is an Acute Myocardial Infarction or myocardial injury due to an underlying chronic condition.       Results may be falsely decreased if patient taking Biotin.      Hemoglobin A1c [[id number]]  (Normal) Collected: 11/08/20 0231    Specimen: Blood Updated: 11/08/20 0312     Hemoglobin A1C 5.20 %     Narrative:      Hemoglobin A1C Ranges:    Increased Risk for Diabetes  5.7% to 6.4%  Diabetes                     >= 6.5%  Diabetic Goal                < 7.0%    Lipid Panel [[id number]]  (Abnormal) Collected: 11/08/20 0231    Specimen: Blood Updated: 11/08/20 0309     Total Cholesterol 196 mg/dL      Triglycerides 68 mg/dL      HDL Cholesterol 52 mg/dL      LDL Cholesterol  132 mg/dL      VLDL Cholesterol 12 mg/dL      LDL/HDL Ratio 2.51    Narrative:      Cholesterol Reference Ranges  (U.S. Department of Health and Human Services ATP III Classifications)    Desirable          <200 mg/dL  Borderline High    200-239 mg/dL  High Risk          >240 mg/dL      Triglyceride Reference Ranges  (U.S. Department of Health and Human Services ATP III Classifications)    Normal           <150 mg/dL  Borderline High  150-199 mg/dL  High             200-499 mg/dL  Very High         >500 mg/dL    HDL Reference Ranges  (U.S. Department of Health and Human Services ATP III Classifcations)    Low     <40 mg/dl (major risk factor for CHD)  High    >60 mg/dl ('negative' risk factor for CHD)        LDL Reference Ranges  (U.S. Department of Health and Human Services ATP III Classifcations)    Optimal          <100 mg/dL  Near Optimal     100-129 mg/dL  Borderline High  130-159 mg/dL  High             160-189 mg/dL  Very High        >189 mg/dL    Troponin [[id number]]  (Abnormal) Collected: 11/08/20 0231    Specimen: Blood Updated: 11/08/20 0307     Troponin T 15.560 ng/mL     Narrative:      Troponin T Reference Range:  <= 0.03 ng/mL-   Negative for AMI  >0.03 ng/mL-     Abnormal for myocardial necrosis.  Clinicians would have to utilize clinical acumen, EKG, Troponin and serial changes to determine if it is an Acute Myocardial Infarction or myocardial injury due to an underlying chronic condition.       Results may be falsely decreased if patient taking Biotin.      Basic Metabolic Panel [[id number]]  (Abnormal) Collected: 11/08/20 0231    Specimen: Blood Updated: 11/08/20 0304     Glucose 215 mg/dL      BUN 20 mg/dL      Creatinine 1.74 mg/dL      Sodium 140 mmol/L      Potassium 4.1 mmol/L      Chloride 106 mmol/L      CO2 26.0 mmol/L      Calcium 8.2 mg/dL      eGFR Non African Amer 30 mL/min/1.73      BUN/Creatinine Ratio 11.5     Anion Gap 8.0 mmol/L     Narrative:      GFR Normal >60  Chronic Kidney Disease <60  Kidney Failure <15      CBC (No Diff) [[id number]]  (Abnormal) Collected: 11/08/20 0231    Specimen: Blood Updated: 11/08/20 0247     WBC 14.88 10*3/mm3      RBC 3.54 10*6/mm3      Hemoglobin 9.7 g/dL      Hematocrit 31.5 %      MCV 89.0 fL      MCH 27.4 pg      MCHC 30.8 g/dL      RDW 15.8 %      RDW-SD 50.8 fl      MPV 11.0 fL      Platelets 221 10*3/mm3     Troponin [[id number]]  (Abnormal) Collected: 11/08/20 0042    Specimen: Blood Updated: 11/08/20 0109     Troponin T 0.522 ng/mL      "Narrative:      Troponin T Reference Range:  <= 0.03 ng/mL-   Negative for AMI  >0.03 ng/mL-     Abnormal for myocardial necrosis.  Clinicians would have to utilize clinical acumen, EKG, Troponin and serial changes to determine if it is an Acute Myocardial Infarction or myocardial injury due to an underlying chronic condition.       Results may be falsely decreased if patient taking Biotin.      Procalcitonin [[id number]]  (Normal) Collected: 11/07/20 2119    Specimen: Blood Updated: 11/07/20 2337     Procalcitonin 0.07 ng/mL     Narrative:      As a Marker for Sepsis (Non-Neonates):   1. <0.5 ng/mL represents a low risk of severe sepsis and/or septic shock.  1. >2 ng/mL represents a high risk of severe sepsis and/or septic shock.    As a Marker for Lower Respiratory Tract Infections that require antibiotic therapy:  PCT on Admission     Antibiotic Therapy             6-12 Hrs later  > 0.5                Strongly Recommended            >0.25 - <0.5         Recommended  0.1 - 0.25           Discouraged                   Remeasure/reassess PCT  <0.1                 Strongly Discouraged          Remeasure/reassess PCT      As 28 day mortality risk marker: \"Change in Procalcitonin Result\" (> 80 % or <=80 %) if Day 0 (or Day 1) and Day 4 values are available. Refer to http://www.[employer]-pct-calculator.com/   Change in PCT <=80 %   A decrease of PCT levels below or equal to 80 % defines a positive change in PCT test result representing a higher risk for 28-day all-cause mortality of patients diagnosed with severe sepsis or septic shock.  Change in PCT > 80 %   A decrease of PCT levels of more than 80 % defines a negative change in PCT result representing a lower risk for 28-day all-cause mortality of patients diagnosed with severe sepsis or septic shock.                Results may be falsely decreased if patient taking Biotin.     COVID PRE-OP / PRE-PROCEDURE SCREENING ORDER (NO ISOLATION) - Swab, Nasal Cavity [[id number]]  " (Normal) Collected: 11/07/20 2234    Specimen: Swab from Nasal Cavity Updated: 11/07/20 2317    Narrative:      The following orders were created for panel order COVID PRE-OP / PRE-PROCEDURE SCREENING ORDER (NO ISOLATION) - Swab, Nasal Cavity.  Procedure                               Abnormality         Status                     ---------                               -----------         ------                     COVID-19,[facility] IN-LOTTIE...[[id number]]  Normal              Final result                 Please view results for these tests on the individual orders.    COVID-19,[facility] [employer] IN-HOUSE,Nasal Swab No Transport Media 3-4 HR TAT - Swab, Nasal Cavity [[id number]]  (Normal) Collected: 11/07/20 2234    Specimen: Swab from Nasal Cavity Updated: 11/07/20 2317     COVID19 Not Detected    Narrative:      Fact sheet for providers: https://www.fda.gov/media/281167/download     Fact sheet for patients: https://www.fda.gov/media/526927/download    Lactic Acid, Plasma [[id number]]  (Normal) Collected: 11/07/20 2249    Specimen: Blood from Arm, Left Updated: 11/07/20 2311     Lactate 1.6 mmol/L     Blood Culture - Blood, Arm, Right [[id number]] Collected: 11/07/20 2250    Specimen: Blood from Arm, Right Updated: 11/07/20 2255    Blood Culture - Blood, Hand, Left [[id number]] Collected: 11/07/20 2240    Specimen: Blood from Hand, Left Updated: 11/07/20 2255    [place] Draw [[id number]] Collected: 11/07/20 2119    Specimen: Blood Updated: 11/07/20 2230    Narrative:      The following orders were created for panel order [place] Draw.  Procedure                               Abnormality         Status                     ---------                               -----------         ------                     Light Blue Top[[id number]]                                   Final result               Green Top (Gel)[[id number]]                                  Final result               Lavender Top[[id number]]                                      Final result               Red Top[[id number]]                                          Final result                 Please view results for these tests on the individual orders.    Green Top (Gel) [[id number]] Collected: 11/07/20 2119    Specimen: Blood Updated: 11/07/20 2230     Extra Tube Hold for add-ons.     Comment: Auto resulted.       Lavender Top [[id number]] Collected: 11/07/20 2119    Specimen: Blood Updated: 11/07/20 2230     Extra Tube hold for add-on     Comment: Auto resulted       Red Top [[id number]] Collected: 11/07/20 2119    Specimen: Blood Updated: 11/07/20 2230     Extra Tube Hold for add-ons.     Comment: Auto resulted.       Light Blue Top [[id number]] Collected: 11/07/20 2119    Specimen: Blood Updated: 11/07/20 2230     Extra Tube hold for add-on     Comment: Auto resulted       BNP [[id number]]  (Abnormal) Collected: 11/07/20 2119    Specimen: Blood Updated: 11/07/20 2217     proBNP 33,566.0 pg/mL     Narrative:      Among patients with dyspnea, NT-proBNP is highly sensitive for the detection of acute congestive heart failure. In addition NT-proBNP of <300 pg/ml effectively rules out acute congestive heart failure with 99% negative predictive value.    Results may be falsely decreased if patient taking Biotin.      Comprehensive Metabolic Panel [[id number]]  (Abnormal) Collected: 11/07/20 2119    Specimen: Blood Updated: 11/07/20 2210     Glucose 152 mg/dL      BUN 21 mg/dL      Creatinine 1.80 mg/dL      Sodium 141 mmol/L      Potassium 4.3 mmol/L      Comment: Slight hemolysis detected by analyzer. Results may be affected.        Chloride 107 mmol/L      CO2 24.0 mmol/L      Calcium 8.3 mg/dL      Total Protein 6.4 g/dL      Albumin 3.60 g/dL      ALT (SGPT) 23 U/L      AST (SGOT) 70 U/L      Comment: Slight hemolysis detected by analyzer. Results may be affected.        Alkaline Phosphatase 85 U/L      Total Bilirubin 0.3 mg/dL      eGFR Non African Amer 29 mL/min/1.73      Globulin 2.8  gm/dL      A/G Ratio 1.3 g/dL      BUN/Creatinine Ratio 11.7     Anion Gap 10.0 mmol/L     Narrative:      GFR Normal >60  Chronic Kidney Disease <60  Kidney Failure <15      Troponin [[id number]]  (Abnormal) Collected: 11/07/20 2119    Specimen: Blood Updated: 11/07/20 2210     Troponin T 0.606 ng/mL     Narrative:      Troponin T Reference Range:  <= 0.03 ng/mL-   Negative for AMI  >0.03 ng/mL-     Abnormal for myocardial necrosis.  Clinicians would have to utilize clinical acumen, EKG, Troponin and serial changes to determine if it is an Acute Myocardial Infarction or myocardial injury due to an underlying chronic condition.       Results may be falsely decreased if patient taking Biotin.      D-dimer, Quantitative [[id number]]  (Abnormal) Collected: 11/07/20 2119    Specimen: Blood Updated: 11/07/20 2203     D-Dimer, Quantitative 0.91 mg/L (FEU)     Narrative:      Reference Range is 0-0.50 mg/L FEU. However, results <0.50 mg/L FEU tends to rule out DVT or PE. Results >0.50 mg/L FEU are not useful in predicting absence or presence of DVT or PE.      aPTT [[id number]]  (Normal) Collected: 11/07/20 2119    Specimen: Blood Updated: 11/07/20 2203     PTT 29.5 seconds     Protime-INR [[id number]]  (Normal) Collected: 11/07/20 2119    Specimen: Blood Updated: 11/07/20 2203     Protime 13.5 Seconds      INR 1.07    Lipase [[id number]]  (Abnormal) Collected: 11/07/20 2119    Specimen: Blood Updated: 11/07/20 2203     Lipase 61 U/L     CBC & Differential [[id number]]  (Abnormal) Collected: 11/07/20 2119    Specimen: Blood Updated: 11/07/20 2153    Narrative:      The following orders were created for panel order CBC & Differential.  Procedure                               Abnormality         Status                     ---------                               -----------         ------                     CBC Auto Differential[[id number]]        Abnormal            Final result                 Please view results for these tests  on the individual orders.    CBC Auto Differential [[id number]]  (Abnormal) Collected: 11/07/20 2119    Specimen: Blood Updated: 11/07/20 2153     WBC 16.45 10*3/mm3      RBC 3.40 10*6/mm3      Hemoglobin 9.3 g/dL      Hematocrit 29.6 %      MCV 87.1 fL      MCH 27.4 pg      MCHC 31.4 g/dL      RDW 15.9 %      RDW-SD 50.1 fl      MPV 10.9 fL      Platelets 247 10*3/mm3      Neutrophil % 91.0 %      Lymphocyte % 4.0 %      Monocyte % 3.5 %      Eosinophil % 0.5 %      Basophil % 0.3 %      Immature Grans % 0.7 %      Neutrophils, Absolute 14.97 10*3/mm3      Lymphocytes, Absolute 0.65 10*3/mm3      Monocytes, Absolute 0.58 10*3/mm3      Eosinophils, Absolute 0.09 10*3/mm3      Basophils, Absolute 0.05 10*3/mm3      Immature Grans, Absolute 0.11 10*3/mm3      nRBC 0.0 /100 WBC             Medication Review: yes  Current Facility-Administered Medications   Medication Dose Route Frequency Provider Last Rate Last Dose   • acetaminophen (TYLENOL) tablet 650 mg  650 mg Oral Q4H PRN Pierce Buchanan MD       • aspirin EC tablet 81 mg  81 mg Oral Daily Pierce Buchanan MD       • carvedilol (COREG) tablet 25 mg  25 mg Oral BID With Meals Pierce Buchanan MD       • cloNIDine (CATAPRES) tablet 0.1 mg  0.1 mg Oral Q6H PRN Pierce Buchanan MD       • [START ON 11/9/2020] enoxaparin (LOVENOX) syringe 30 mg  30 mg Subcutaneous Q12H Pierce Buchanan MD       • ferrous sulfate tablet 325 mg  325 mg Oral BID With Meals Pierce Buchanan MD       • fluticasone (FLONASE) 50 MCG/ACT nasal spray 2 spray  2 spray Nasal Daily PRN Pierce Buchanan MD       • hydrALAZINE (APRESOLINE) tablet 75 mg  75 mg Oral Q8H Pierce Buchanan MD   75 mg at 11/08/20 0525   • [START ON 11/9/2020] levoFLOXacin (LEVAQUIN) tablet 750 mg  750 mg Oral Every Other Day Pierce Buchanan MD       • losartan (COZAAR) tablet 50 mg  50 mg Oral Q24H Pierce Buchanan MD       • methylPREDNISolone (MEDROL) tablet 4 mg  4 mg Oral Before Breakfast Pierce Buchanan MD       • NIFEdipine XL (PROCARDIA XL) 24  hr tablet 60 mg  60 mg Oral BID Pierce Buchanan MD       • nitroglycerin (NITROSTAT) SL tablet 0.4 mg  0.4 mg Sublingual Q5 Min PRN Pierce Buchanan MD       • nitroglycerin (TRIDIL) 200 mcg/ml infusion  10-50 mcg/min Intravenous Titrated Pierce Buchanan MD 4.5 mL/hr at 11/08/20 0100 15 mcg/min at 11/08/20 0100   • OLANZapine (zyPREXA) tablet 5 mg  5 mg Oral BID Pierce Buchanan MD       • pantoprazole (PROTONIX) EC tablet 40 mg  40 mg Oral Daily Pierce Buchanan MD       • rosuvastatin (CRESTOR) tablet 20 mg  20 mg Oral Nightly Pierce Buchanan MD       • sodium chloride 0.9 % infusion  100 mL/hr Intravenous Continuous Pierce Buchanan  mL/hr at 11/08/20 0526 100 mL/hr at 11/08/20 0526   • ticagrelor (BRILINTA) tablet 90 mg  90 mg Oral BID Pierce Buchanan MD             Assessment/Plan       ST elevation myocardial infarction (STEMI) (CMS/HCC) 2' to Occluded LAD - S/P PCI/CORA to LAD    Chronic diastolic heart failure (CMS/HCC) with Elevated brain natriuretic peptide (BNP) level 2' to STEMI. No CHF on CXR.    MATTY with Underlying Chronic kidney disease, stage 3 (moderate) (CMS/HCC) - Monitor CR. Has seen nephrology in the past.     Severe uncontrolled hypertension - Controlled at present. On BB, Hydralazine, ARB    Iron deficiency anemia - Hgb/Hct at baseline. Continue to monitor.     Cigarette smoker - Encouraged to Stop    History of recent pneumonia - Continue antibiotic and tapering steroids.     History of Substance Abuse - Check UDS    Check 2D Echocardiogram today.       Soniya Chacon, APRN  11/08/20  07:42 CST

## 2020-11-09 ENCOUNTER — READMISSION MANAGEMENT (OUTPATIENT)
Dept: CALL CENTER | Facility: HOSPITAL | Age: 60
End: 2020-11-09

## 2020-11-09 PROBLEM — R79.89 ELEVATED BRAIN NATRIURETIC PEPTIDE (BNP) LEVEL: Status: RESOLVED | Noted: 2020-11-08 | Resolved: 2020-11-09

## 2020-11-09 PROBLEM — Z87.01 HISTORY OF RECENT PNEUMONIA: Status: RESOLVED | Noted: 2020-11-08 | Resolved: 2020-11-09

## 2020-11-09 LAB
ANION GAP SERPL CALCULATED.3IONS-SCNC: 6 MMOL/L (ref 5–15)
BUN SERPL-MCNC: 26 MG/DL (ref 8–23)
BUN/CREAT SERPL: 12.9 (ref 7–25)
CALCIUM SPEC-SCNC: 8 MG/DL (ref 8.6–10.5)
CHLORIDE SERPL-SCNC: 106 MMOL/L (ref 98–107)
CO2 SERPL-SCNC: 30 MMOL/L (ref 22–29)
CREAT SERPL-MCNC: 2.01 MG/DL (ref 0.57–1)
DEPRECATED RDW RBC AUTO: 52.3 FL (ref 37–54)
ERYTHROCYTE [DISTWIDTH] IN BLOOD BY AUTOMATED COUNT: 16.3 % (ref 12.3–15.4)
GFR SERPL CREATININE-BSD FRML MDRD: 25 ML/MIN/1.73
GLUCOSE SERPL-MCNC: 109 MG/DL (ref 65–99)
HCT VFR BLD AUTO: 26.1 % (ref 34–46.6)
HGB BLD-MCNC: 7.8 G/DL (ref 12–15.9)
MCH RBC QN AUTO: 27.1 PG (ref 26.6–33)
MCHC RBC AUTO-ENTMCNC: 29.9 G/DL (ref 31.5–35.7)
MCV RBC AUTO: 90.6 FL (ref 79–97)
PLATELET # BLD AUTO: 166 10*3/MM3 (ref 140–450)
PMV BLD AUTO: 10.6 FL (ref 6–12)
POTASSIUM SERPL-SCNC: 3.7 MMOL/L (ref 3.5–5.2)
RBC # BLD AUTO: 2.88 10*6/MM3 (ref 3.77–5.28)
SODIUM SERPL-SCNC: 142 MMOL/L (ref 136–145)
WBC # BLD AUTO: 11.17 10*3/MM3 (ref 3.4–10.8)

## 2020-11-09 PROCEDURE — 99232 SBSQ HOSP IP/OBS MODERATE 35: CPT | Performed by: INTERNAL MEDICINE

## 2020-11-09 PROCEDURE — 93005 ELECTROCARDIOGRAM TRACING: CPT | Performed by: INTERNAL MEDICINE

## 2020-11-09 PROCEDURE — 94799 UNLISTED PULMONARY SVC/PX: CPT

## 2020-11-09 PROCEDURE — 25010000002 ENOXAPARIN PER 10 MG: Performed by: INTERNAL MEDICINE

## 2020-11-09 PROCEDURE — 85027 COMPLETE CBC AUTOMATED: CPT | Performed by: INTERNAL MEDICINE

## 2020-11-09 PROCEDURE — 80048 BASIC METABOLIC PNL TOTAL CA: CPT | Performed by: INTERNAL MEDICINE

## 2020-11-09 RX ADMIN — TICAGRELOR 90 MG: 90 TABLET ORAL at 21:13

## 2020-11-09 RX ADMIN — LEVOFLOXACIN 750 MG: 750 TABLET, FILM COATED ORAL at 08:38

## 2020-11-09 RX ADMIN — ASPIRIN 81 MG: 81 TABLET ORAL at 08:37

## 2020-11-09 RX ADMIN — TICAGRELOR 90 MG: 90 TABLET ORAL at 08:37

## 2020-11-09 RX ADMIN — ROSUVASTATIN CALCIUM 20 MG: 20 TABLET, FILM COATED ORAL at 21:13

## 2020-11-09 RX ADMIN — CARVEDILOL 25 MG: 25 TABLET, FILM COATED ORAL at 17:26

## 2020-11-09 RX ADMIN — NIFEDIPINE 60 MG: 60 TABLET, FILM COATED, EXTENDED RELEASE ORAL at 08:37

## 2020-11-09 RX ADMIN — ENOXAPARIN SODIUM 30 MG: 30 INJECTION SUBCUTANEOUS at 17:26

## 2020-11-09 RX ADMIN — CARVEDILOL 25 MG: 25 TABLET, FILM COATED ORAL at 08:38

## 2020-11-09 RX ADMIN — HYDRALAZINE HYDROCHLORIDE 75 MG: 50 TABLET ORAL at 05:33

## 2020-11-09 RX ADMIN — HYDRALAZINE HYDROCHLORIDE 75 MG: 50 TABLET ORAL at 21:13

## 2020-11-09 RX ADMIN — OLANZAPINE 5 MG: 5 TABLET, FILM COATED ORAL at 21:13

## 2020-11-09 RX ADMIN — NIFEDIPINE 60 MG: 60 TABLET, FILM COATED, EXTENDED RELEASE ORAL at 21:12

## 2020-11-09 RX ADMIN — OLANZAPINE 5 MG: 5 TABLET, FILM COATED ORAL at 08:37

## 2020-11-09 RX ADMIN — FERROUS SULFATE TAB 325 MG (65 MG ELEMENTAL FE) 325 MG: 325 (65 FE) TAB at 17:26

## 2020-11-09 RX ADMIN — ENOXAPARIN SODIUM 30 MG: 30 INJECTION SUBCUTANEOUS at 05:34

## 2020-11-09 RX ADMIN — SODIUM CHLORIDE 100 ML/HR: 9 INJECTION, SOLUTION INTRAVENOUS at 04:05

## 2020-11-09 RX ADMIN — PANTOPRAZOLE SODIUM 40 MG: 40 TABLET, DELAYED RELEASE ORAL at 08:38

## 2020-11-09 RX ADMIN — LOSARTAN POTASSIUM 50 MG: 50 TABLET, FILM COATED ORAL at 08:38

## 2020-11-09 RX ADMIN — FERROUS SULFATE TAB 325 MG (65 MG ELEMENTAL FE) 325 MG: 325 (65 FE) TAB at 08:38

## 2020-11-09 NOTE — PROGRESS NOTES
Continued Stay Note   [facility]     Patient Name: Ludivina Ramirez  MRN: [id number]  Today's Date: 11/9/2020    Admit Date: 11/7/2020    Discharge Plan     Row Name 11/09/20 1012       Plan    Plan Comments  Both numbers listed on contacts are unavailable.  Will attempt to speak with pt when out of unit today. Will follow.        Discharge Codes    No documentation.             YULIANA Chery

## 2020-11-09 NOTE — OUTREACH NOTE
CHF Week 1 Survey      Responses   [facility] patient discharged from?  [place]   Does the patient have one of the following disease processes/diagnoses(primary or secondary)?  CHF   CHF Week 1 attempt successful?  No   Revoke  Readmitted          Cinda Deyr RN

## 2020-11-09 NOTE — PLAN OF CARE
Goal Outcome Evaluation:  Plan of Care Reviewed With: patient     PATIENT TRANSFERRED TO [facility] FROM CCU. SATS WNL ON 2 LITERS, SOA WITH EXERTION. NO C/O PAIN. RIGHT GROIN CATH SITE DRSG C/D/I, PPP.

## 2020-11-09 NOTE — PROGRESS NOTES
"  Chief Complaint   Patient presents with   • Chest Pain   • Shortness of Breath     S: No acute events noted overnight.  The patient says that her chest pain has completely resolved.  No significant shortness of breath.  No palpitations.  No problems with cardiac catheterization access site.    Medications: Reviewed    Review of Systems: All pertinent negative and positives as noted above.  Otherwise, all systems reviewed and found to be negative.    Telemetry: Sinus rhythm, sinus bradycardia    O:  /70 (BP Location: Left arm, Patient Position: Lying)   Pulse 87   Temp 98.3 °F (36.8 °C) (Oral)   Resp 20   Ht 167.6 cm (65.98\")   Wt 85.9 kg (189 lb 6 oz)   SpO2 95%   BMI 30.58 kg/m²   Temp:  [97.6 °F (36.4 °C)-98.3 °F (36.8 °C)] 98.3 °F (36.8 °C)  Heart Rate:  [51-94] 87  Resp:  [14-21] 20  BP: ()/(56-90) 124/70    Gen: NAD, lying in bed  CV: RRR, murmur noted  Pulm: Coarse BS B  GI: s, nt, nd, +bs  Ext: right femoral access site c/d/i, no hematoma, mild tenderness, 2+ pulses, no edema    Diagnostic Data:    Lab Results   Component Value Date    WBC 11.17 (H) 11/09/2020    HGB 7.8 (L) 11/09/2020    HCT 26.1 (L) 11/09/2020    MCV 90.6 11/09/2020     11/09/2020     Lab Results   Component Value Date    GLUCOSE 109 (H) 11/09/2020    CALCIUM 8.0 (L) 11/09/2020     11/09/2020    K 3.7 11/09/2020    CO2 30.0 (H) 11/09/2020     11/09/2020    BUN 26 (H) 11/09/2020    CREATININE 2.01 (H) 11/09/2020    EGFRIFAFRI  10/14/2020      Comment:      <15 Indicative of kidney failure.    EGFRIFNONA 25 (L) 11/09/2020    BCR 12.9 11/09/2020    ANIONGAP 6.0 11/09/2020     UDS: Positive for cocaine and methamphetamine    ECG today: Normal sinus rhythm, 67 bpm, first-degree AV block, persistent ST elevation in V3, V4, V5, V6    Echo:  · Left ventricular systolic function is low normal. Left ventricular ejection fraction appears to be 51 - 55%.  · The following left ventricular wall segments are " hypokinetic: apical anterior, apical lateral, apical inferior, apical septal and apex hypokinetic.  · Left ventricular wall thickness is consistent with mild concentric hypertrophy.  · Mild aortic valve regurgitation is present.  · Estimated right ventricular systolic pressure from tricuspid regurgitation is markedly elevated (>55 mmHg).    VQ scan - low risk for PE    ASSESSMENT/PLAN:    1.  Acute anterior STEMI, status post PCI to the LAD  2.  Polysubstance abuse  3.  Essential hypertension  4.  Stage III chronic kidney disease  4.  Chronic diastolic heart failure  5.  Nonrheumatic aortic regurgitation  6.  Iron deficiency anemia  7.  Leukocytosis    -The patient is clinically and hemodynamically stable at this time.  Plan to transfer to telemetry today.  -Continue dual antiplatelet therapies.  Patient's hemoglobin has dropped, likely somewhat dilutional given her IV fluids, no active bleeding in her cardiac catheterization access site shows no evidence of a hematoma.  Check CBC again tomorrow morning.  -I did  the patient about her polysubstance abuse today.  -Continue Coreg, losartan as well as hydralazine and Procardia for blood pressure control.  -Continue high intensity statin therapy.  -Lovenox for DVT prophylaxis  -Full code  -Potentially home tomorrow as long as the patient remains stable today.

## 2020-11-09 NOTE — PLAN OF CARE
Goal Outcome Evaluation:  Plan of Care Reviewed With: patient, son      A&Ox4. Still very drowsy, arouses to voice, quickly drifts back off to sleep. When awake pt appears anxious, rocking back and forth, increased RR.  No observed apneic periods tonight. Frequent episodes of bradycardia throughout night, dropping to 40s, as low as 35. HR resting in 60-70s. Held hydralazine and Procardia XL at beginning of shift for hypotension 90/60s and bradycardic episodes, see MAR. VSS otherwise. 2 L NC. Right groin site CDI, soft to palpation. Pedal pulses 2+ doppler. Follow up EKG this AM improving from previous. No C/O pain. Hemoglobin trending down- 9.7 yesterday, 7.8 this AM. Creat increasing- 2.01, Ca- 8. See results tab. Ambulated to bathroom 2x. Will continue to monitor.

## 2020-11-09 NOTE — PAYOR COMM NOTE
"11/9/20 [facility] [phone]  [phone]    PATIENT ER ADMISSION TO CCU ON 11/7/20 . INPATIENT ADMT.    [id number]            Ludivina Clemons (60 y.o. Female)     Date of Birth Social Security Number Address Home Phone MRN    1960  [address] [facility] [phone] [id number]    Quaker Marital Status          Taoist Single       Admission Date Admission Type Admitting Provider Attending Provider Department, Room/Bed    11/7/20 Emergency Pierce Buchanan MD Faulkner, Michael Wade, MD [facility] CARDIAC CARE, C011/1    Discharge Date Discharge Disposition Discharge Destination                       Attending Provider: Valentino Ramirez MD    Allergies: Codeine, Imitrex [Sumatriptan]    Isolation: None   Infection: None   Code Status: CPR    Ht: 167.6 cm (65.98\")   Wt: 85.9 kg (189 lb 6 oz)    Admission Cmt: None   Principal Problem: ST elevation myocardial infarction (STEMI) (CMS/[facility]) [I21.3]                 Active Insurance as of 11/7/2020     Primary Coverage     Payor Plan Insurance Group Employer/Plan Group    ANTHEM MEDICARE REPLACEMENT ANTHEM MEDICARE ADVANTAGE [id number]     Payor Plan Address Payor Plan Phone Number Payor Plan Fax Number Effective Dates    [address] [phone]  1/1/2017 - None Entered    [facility] [id number]       Subscriber Name Subscriber Birth Date Member ID       LUDIVINA CLEMONS 1960 [id number]                 Emergency Contacts      (Rel.) Home Phone Work Phone Mobile Phone    kenzie thompson (Sister) [phone] -- [phone]    Joshua Clemons () (Son) -- -- [phone]        Encounter Information     Department Encounter #   11/7/2020  9:06 PM [facility] Cardiac Care [id number]      Pierce Buchanan MD   Physician   Cardiology   H&P   Signed   Date of Service:  11/08/20 0112   Creation Time:  11/08/20 0112            Signed        Expand All Collapse All      Show:Clear " all  [x]Manual[x]Template[]Copied    Added by:  [x]Pierce Buchanan MD    []Jen for details      LOS: 0 days   Patient Care Team:  Orville Weston MD as PCP - General (Family Medicine)  Juanpablo Rea MD as Consulting Physician (Gastroenterology)  Nickolas Alegria MD as Consulting Physician (Gastroenterology)     Chief Complaint:      Jefferson Ramirez is a 60 y.o. female who presented to the emergency room with complaints of substernal chest pain  Chest pain is pressure-like  Chest pain waxes and wanes  Currently moderate in severity  Recent episode of pneumonia and acute respiratory failure  Has acute on chronic renal failure  Is on oral steroids  White count is elevated  Denies any fever or chills  Has nausea  Denies any diarrhea  No presyncope  No syncope  No orthopnea  No paroxysmal nocturnal dyspnea  EKG shows acute anterior lateral ST elevation consistent with ST elevation myocardial infarction  Due to persistence of chest pain and EKG changes will be taken emergently for coronary angiography  Covid test is negative                   Telemetry: no malignant arrhythmia. No significant pauses.     Review of Systems   Constitutional: No chills   Has fatigue   No fever.   HENT: Negative.    Eyes: Negative.    Respiratory: Negative for cough,   No chest wall soreness,   Shortness of breath,   no wheezing, no stridor.    Cardiovascular: As above  Gastrointestinal: Negative for abdominal distention,  No abdominal pain,   No blood in stool,   No constipation,   No diarrhea,   No nausea   No vomiting.   Endocrine: Negative.    Genitourinary: Negative for difficulty urinating, dysuria, flank pain and hematuria.   Musculoskeletal: Negative.    Skin: Negative for rash and wound.   Allergic/Immunologic: Negative.    Neurological: Negative for dizziness, syncope, weakness,   No light-headedness  No  headaches.   Hematological: Does not bruise/bleed easily.   Psychiatric/Behavioral: Negative for agitation  or behavioral problems,   No confusion,   the patient is  nervous/anxious.        History:   Medical History:   Diagnosis Date   • Acute CHF (CMS/HCC)     • Acute renal failure (ARF) (CMS/HCC)     • Anemia     • Asthma       childhood   • Hypertension     • Ischemic colitis (CMS/HCC)     • Metabolic acidosis     • Nonrheumatic aortic (valve) insufficiency     • PTSD (post-traumatic stress disorder)          Surgical History         Past Surgical History:   Procedure Laterality Date   • EXTERNAL FIXATION WRIST FRACTURE       • LEG SURGERY       • TUBAL ABDOMINAL LIGATION            Social History       History   Problem Relation Age of Onset   • Coronary artery disease Mother     • Coronary artery disease Father          Labs:  WBC       WBC   Date Value Ref Range Status   11/07/2020 16.45 (H) 3.40 - 10.80 10*3/mm3 Final      HGB       Hemoglobin   Date Value Ref Range Status   11/07/2020 9.3 (L) 12.0 - 15.9 g/dL Final      HCT       Hematocrit   Date Value Ref Range Status   11/07/2020 29.6 (L) 34.0 - 46.6 % Final      Platelets       Platelets   Date Value Ref Range Status   11/07/2020 247 140 - 450 10*3/mm3 Final      MCV       MCV   Date Value Ref Range Status   11/07/2020 87.1 79.0 - 97.0 fL Final         79.0 - 97.0 fL Final                 Results from last 7 days   Lab Units 11/07/20  2119 11/03/20  0441 11/02/20  0400 11/01/20  0255   SODIUM mmol/L 141 143 143 143   POTASSIUM mmol/L 4.3 4.2 4.2 3.8   CHLORIDE mmol/L 107 109* 109* 111*   CO2 mmol/L 24.0 25.0 24.0 22.0   BUN mg/dL 21 35* 35* 42*   CREATININE mg/dL 1.80* 1.72* 1.84* 1.62*   CALCIUM mg/dL 8.3* 8.8 8.5* 8.5*   BILIRUBIN mg/dL 0.3  --  0.2 0.3   ALK PHOS U/L 85  --  80 90   ALT (SGPT) U/L 23  --  19 19   AST (SGOT) U/L 70*  --  20 26   GLUCOSE mg/dL 152* 101* 108* 110*           Lab Results   Component Value Date     CKTOTAL 219 (H) 10/27/2020     CKMB 3.38 04/06/2018     TROPONINI 0.026 08/31/2019     TROPONINT 0.522 (C) 11/08/2020     PT/INR:     Chest Without Contrast [[id number]] Resulted: 11/07/20 2359        Updated: 11/08/20 0025     XR Chest 1 View [[id number]] Collected: 11/07/20 2159       Updated: 11/07/20 2203     Narrative:       EXAMINATION: XR CHEST 1 VW-     11/7/2020 9:55 PM     HISTORY: Chest pain.     1 view chest x-ray compared with 10/31/2020.     Cardiomegaly.  Aortic arch calcification.     Chronic interstitial lung disease.     Interstitial prominence is slightly increased.  There is no focal infiltrate.  No pneumothorax.     Summary:  1. Cardiomegaly and diffuse interstitial lung disease.     This report was finalized on 11/07/2020 22:00 by Dr. Felice Reddy MD.            Vital Sign Min/Max for last 24 hours  Temp  Min: 98.2 °F (36.8 °C)  Max: 98.2 °F (36.8 °C)   BP  Min: 148/96  Max: 170/101   Pulse  Min: 86  Max: 95   Resp  Min: 18  Max: 23   SpO2  Min: 91 %  Max: 95 %   No data recorded   Weight  Min: 88.9 kg (196 lb)  Max: 88.9 kg (196 lb)        Weight  88.9 kg (196 lb) Documented at 11/07/2020 2119                  Results for orders placed during the hospital encounter of 10/12/20   Adult Transthoracic Echo Complete W/ Cont if Necessary Per Protocol     Narrative · Left ventricular wall thickness is consistent with moderate concentric   hypertrophy.  · Mild aortic valve regurgitation is present.  · Estimated right ventricular systolic pressure from tricuspid   regurgitation is mildly elevated (35-45 mmHg).  · Mild mitral annular calcification is present.  · Left ventricular diastolic function is consistent with (grade Ia w/high   LAP) impaired relaxation.  · Trace mitral valve regurgitation is present.  · The left atrial cavity is mildly dilated.  · The right atrial cavity is mildly dilated.  · Left ventricular ejection fraction appears to be 56 - 60%. Left   ventricular systolic function is normal.     EVIDENCE FOR HYPERTENSIVE HEART DISEASE        Physical Exam:     General Appearance: Awake, alert, in no acute  distress  Eyes: Pupils equal and reactive    Ears: Appear intact with no abnormalities noted  Nose: Nares normal, no drainage  Neck: supple, trachea midline, no carotid bruit and no JVD  Back: no kyphosis present,    Lungs: respirations regular, respirations even and respirations unlabored  Heart: normal S1, S2, no significant murmurs   No gallops or rubs  no rub and no click  Abdomen: normal bowel sounds, no tenderness   Skin: no bleeding, bruising or rash  Extremities: no cyanosis  Psychiatric/Behavioral: Negative for agitation, behavioral problems, confusion, the patient does  appear to be nervous/anxious.        Results Review:   I reviewed the patient's new clinical results.  I reviewed the patient's new imaging results and agree with the interpretation.  I reviewed the patient's other test results and agree with the interpretation  I personally viewed and interpreted the patient's EKG/Telemetry data     Discussed with patient  Updated patient regarding any new or relevant abnormalities on review of records or any new findings on physical exam.   Mentioned to patient about purpose of visit and desirable health short and long term goals and objectives.      Reviewed available prior notes, consults, prior visits, laboratory findings, radiology and cardiology relevant reports.   Updated chart as applicable.   I have reviewed the patient's medical history in detail and updated the computerized patient record as relevant.             Assessment/Plan         Acute anterior lateral ST elevation myocardial infarction  Chronic kidney disease  Recent acute renal failure  Hypertensive heart disease  Moderate left ventricular hypertrophy  Smoker  Recent respiratory arrest  History of recent chest infection  Leukocytosis possibly due to ongoing steroid use      Recommend cardiac catheterization, selective coronary angiography, left ventriculography and percutaneous coronary intervention with application of arteriotomy  hemostatic closure device.     I discussed cardiac catheterization, the procedure, risks (including bleeding, infection, vascular damage [including minor oozing, bruising, bleeding, and up to and including but not limited to the need for vascular surgery, emergency cardiothoracic surgery, contrast reaction, renal failure, respiratory failure, heart attack, stroke, arrhythmia and even death), benefits, and alternatives and the patient has voiced understanding and is willing to proceed.     Adequate pre-hydration and post cardiac catheterization hydration.  Premedications as required and indicated for cardiac catheterization.     No contraindication to drug eluting stent placement if required  Further recommendations pending results of cardiac catheterization       Telemetry  Deep vein thrombosis prophylaxis/precautions  Appropriate diet, fluid, sodium, caffeine, stimulants intake   Questions were encouraged, asked and answered to the patient's  understanding and satisfaction.  Compliance to diet and medications         Pierce Buchanan MD  11/08/20  01:12 CST     EMR Dragon/Transcription was used to dictate part of this note       /2020  9:06 PM [facility] Cardiac Care [id number]   Soniya Chacon APRN   Nurse Practitioner   Cardiology   Progress Notes   Attested   Date of Service:  11/08/20 0741   Creation Time:  11/08/20 0741            Attested        Attestation signed by Valentino Ramirez MD at 11/08/20 0948   I have reviewed this documentation and agree.  I did see and evaluate the patient personally today.  She is very sleepy but does awaken.  She has a oxygen mask in place.  Vital signs are stable.  I did review her EKGs from overnight showing serial changes of an anterior myocardial infarction.  She denies any chest pain or any other symptoms at this time.  She does have a slight murmur on exam, however is known to have aortic insufficiency.  Otherwise, cardiac catheterization access site is stable.   Notable that urine drug screen is positive for amphetamine, cocaine, methamphetamine.  Echocardiogram pending at this time.  Continue current therapies, including dual antiplatelet therapies, beta-blocker, angiotensin receptor blocker, high intensity statin therapies.  Given this patient's early morning presentation today, likely continue unit level care today with potential transfer to telemetry tomorrow         Expand All Collapse All      Show:Clear all  [x]Manual[x]Template[]Copied    Added by:  [x]Soniya Chacon APRN    []Jen for details  [facility] -  Progress Note      LOS: 0 days   Patient Care Team:  Orville Weston MD as PCP - General (Family Medicine)  Juanpablo Rea MD as Consulting Physician (Gastroenterology)  Nickolas Alegria MD as Consulting Physician (Gastroenterology)     Chief Complaint: F/U Anterior MI            Patient very sleepy, but will awaken for a short time with tactile stimuli.         REVIEW OF SYSTEMS:  Constitutional: Denies fever or chills. Denies change in energy level or malaise.  HEENT: Denies headaches or visual disturbances. Denies difficulty swallowing or sore throat.  Respiratory: Denies cough or hoarseness. Denies shortness of breath.   Cardiovascular: Denies chest pain or pressure. Denies palpitations. Denies presyncope/syncope. Denies orthopnea/PND. Denies lower extremity edema.   Gastrointestinal: Denies abdominal pain. Denies nausea/vomiting. Denies change in bowel habits or history of recent GI tract blood loss.   Genitourinary: Denies urinary urgency or frequency. Denies dysuria, hematuria.  Musculoskeletal: Denies pain or swelling in joints.  Neurological: Denies paresthesias. Denies headache. Denies seizure or stroke symptoms.  Behavioral/Psych: Denies problems with anxiety or depression.         ST elevation myocardial infarction (STEMI) (CMS/formerly [facility]) 2' to Occluded LAD - S/P PCI/CORA to LAD    Chronic diastolic heart failure  (CMS/HCC) with Elevated brain natriuretic peptide (BNP) level 2' to STEMI. No CHF on CXR.    MATTY with Underlying Chronic kidney disease, stage 3 (moderate) (CMS/HCC) - Monitor CR. Has seen nephrology in the past.     Severe uncontrolled hypertension - Controlled at present. On BB, Hydralazine, ARB    Iron deficiency anemia - Hgb/Hct at baseline. Continue to monitor.     Cigarette smoker - Encouraged to Stop    History of recent pneumonia - Continue antibiotic and tapering steroids.     History of Substance Abuse - Check UDS     Check 2D Echocardiogram today.         Soniya Chacon, ISAIAH  11/08/20  07:42 CST                              Cosigned by: Valentino Ramirez MD at 11/08/20 0948       Contains critical data Troponin  Order: [id number]  Status:  Final result   Visible to patient:  No (not released) Next appt:  12/15/2020 at 11:30 AM in Pulmonology (Neeta Chavez MD)  Specimen Information: Blood        Component   Ref Range & Units 1d ago  (11/8/20) 1d ago  (11/8/20) 1d ago  (11/8/20) 2d ago  (11/7/20) 2wk ago  (10/26/20)   Troponin T   0.000 - 0.030 ng/mL 29.640High Critical   15.560High Critical   0.522High Critical   0.606High Critical   0.346High Critical     Resulting Agency  [facility]  [facility]  [facility]  [facility]  [facility]      Narrative  Performed by:  [facility]  Troponin T Reference Range:   <= 0.03 ng/mL-   Negative for AMI   >0.03 ng/mL-     Abnormal for myocardial necrosis.  Clinicians would have to utilize clinical acumen, EKG, Troponin and serial changes to determine if it is an Acute Myocardial Infarction or myocardial injury due to an underlying chronic condition.                [facility] CATH LAB  [address] [place] [id number] [phone]             Ludivina Ramirez  Cardiac Catheterization/Vascular Study  Order# [id number]  Reading physician: Pierce Buchanan MD Ordering physician: Pierce Buchanan MD Study date: 11/8/20    Procedures    Left Heart Cath       Patient Information    Patient Name   Ludivina Ramirez MRN   [id number] Sex   Female  (Age)   1960 (60 y.o.)   Race Ethnicity Encounter Category   White or  Not  or  Emergency   PACS Images     Show images for Cardiac Catheterization/Vascular Study    Printable Vessel Diagram    Printable Vessel Diagram      Physicians    Panel Physicians Referring Physician Case Authorizing Physician   Pierce Buchanan MD (Primary)     Pre Procedure Diagnosis    STEMI       Conclusion       · RPDA lesion is 55% stenosed.  · Prox LAD to Mid LAD lesion is 100% stenosed.  · Left ventricular end diastolic pressure: 39 mmHg     Cardiac Catheterization Operative Report     Ludivina Ramirez  [id number]  2020     Patient was referred for cardiac catheterization      Indications for the procedure include: Symptoms suggestive of angina with high suspicion for significant obstructive coronary artery disease         Procedure performed  Left heart cath  Coronary angiography  Right femoral arteriography  Insertion of 7 Fr Mynx hemostatic closure device with effective hemostasis and preserved right lower extremity pulses  Left ventriculography     Supervision of the administration of moderate sedation        Procedure Details  The risks, benefits, complications, treatment options, and expected outcomes were discussed with the patient. The patient and/or family concurred with the proposed plan, giving informed consent.      Patient was brought to the cath lab after IV hydration was begun and oral premedication was given. He was further sedated with fentanyl and midazolam.T     The skin overlying the patient's right femoral artery was prepped and draped in the usual sterile fashion.      Using the modified Seldinger access technique, a 6f Kiswahili sheath was placed in the femoral artery.        Cardiac Catheterization Operative Report        Patient was referred for cardiac catheterization .       Procedure  Details     Diagnostic coronary angiography was performed with 5 Samoan JL4 and JR 4 coronary catheters.       Pigtail catheter for left ventriculography and left heart pressure measurements     Coronary angiogram were performed in Swedish and FLYNN projection to evaluate the coronary arterial systems.          Before all coronary angiograms were obtained, a femoral angiogram was performed and the arteriotomy was assessed for suitability for a closure device.       A 6/7 Fr Mynx closure device was used to achieve hemostasis.  The patient tolerated the procedure well, and there were no immediate complications.     ____________________________________________________________________________________________________________________________________________   Selective coronary angiography:     Left main coronary artery:  The left main coronary artery arises from the left coronary cusp and bifurcates into the  left anterior descending coronary artery  and left circumflex arteries.       Left main coronary artery is normal     Left anterior descending artery:  The  left anterior descending coronary artery   arises normally from the left main coronary artery and courses in the anterior interventricular groove and terminates at the apex.  Occluded proximal left anterior descending coronary artery which was the culprit vessel and was treated with angioplasty and stent placement as below     Left circumflex:  The left circumflex arises form the left man artery and supplies obtuse marginal branches.Left circumflex artery  is normal.  Overall large distribution with moderate tortuosity of obtuse marginal vessels without any obstructive or significant coronary artery disease     Right coronary artery:  The RCA arises normally from the right coronary cusp and is dominant for the posterior circulation.  The RCA is dominant   Right posterior descending coronary artery has approximately a 50 to 60% stenosis in the midportion         Left  heart cath: LVEDP   39 mmHg  mm Hg with no gradient across aortic valve on pullback.      LV Gram: Not performed to save contrast as has acute on chronic kidney disease     Interventions: JL4 7 Occitan guide was used advanced a coronary wire into the distal left anterior descending coronary artery did angioplasty using a 2.0 mm balloon  Then implanted a 2.75 x 28 mm drug-eluting stent and postdilated with a 3.0 mm balloon.  Initial stenosis 100% with MELISSA 0 flow post procedure 0% stenosis with MELISSA-3 flow with complete resolution of chest pain prior to departure from Cath Lab  ____________________________________________________________________________________________________________________________________________  Estimated Blood Loss:  Minimal         Complications:  None; patient tolerated the procedure well.     Specimens: None           Disposition: Cardiovascular observation unit              Condition: stable            I supervised the administration of conscious sedation by nursing staff throughout the case.       Immediately prior to the procedure the patient was re-examined and subsequently the  first dose was given at   0126  Hours  and the end of my face-to-face encounter was at 0202   Hours.          During the case, continuous pulse oximetry, heart rate, blood pressure, and patient status were monitored.         ____________________________________________________________________________________________________________________________________________     Conclusion of cardiac catheterization    11/8/2020     Severely elevated left ventricular end-diastolic pressure 39 mmHg  50 to 60% stenosis of midportion of posterior descending coronary artery  Occluded proximal left anterior descending coronary artery treated with PTCA and then implantation of 2.75 x 28 mm Xience drug-eluting stent postdilated to 3.0 mm diameter  Left ventriculogram not performed to save contrast as has chronic with acute kidney  injury, prior echo shows preserved left ventricular ejection fraction     ____________________________________________________________________________________________________________________________________________     Plan after cardiac catheterization        Dual antiplatelet therapy minimum of 1 year preferably longer  Statin ACE inhibitor is and beta-blockers  Aspirin for the rest of her life  Her leukocytosis is likely due to recent infection and ongoing steroid use  No evidence of any underlying infection or sepsis.  Intensive risk factor modifications for both primary and secondary prevention if applicable  Hydration   Observation                  Recommendations    •     Aspirin therapy.  •     Dual antiplatelet therapy.   Procedure Narrative    Risks, benefits and alternatives were discussed with the patient and/or family.  Plan is for moderate sedation and patient is in agreement.  An immediate assessment was done prior to the administration of moderate sedation.  Less than 20 mL of estimated blood loss during the case. No specimen was collected during the case.   Time Under Physician Observation    Name Panel   Pierce Buchanan MD Panel 1   Role: Primary   Time Period: 11/8/2020 0126 - 11/8/2020 0202    Total Sedation Time    Moderate sedation event time was not documented.      Coronary Findings    Diagnostic  Dominance: Right  Number of lesions: 2    Left Anterior Descending   Prox LAD to Mid LAD lesion is 100% stenosed. MELISSA flow is 0. The stenosis was measured by a visual reading.   Right Coronary Artery   Right Posterior Descending Artery   RPDA lesion is 55% stenosed.   Intervention  Number of interventions: 2    Prox LAD to Mid LAD lesion   Angioplasty   Stent   Stent was successfully placed.   Post-Intervention Lesion Assessment   There is a 0% residual stenosis post intervention.   Vessel Access    Micropuncture not used. Sheath insertion not delayed. Hemostasis started on the right femoral artery.  "Mynx was used in achieving hemostasis. Closure device deployed in the vessel. Hemostasis achieved successfully.    Stent Inflated     Event Details User   1:40 AM Stent Inflated Stnt Xience Maryann Everolimus Javier [id number] - First balloon inflation max pressure = 12 yuval. First balloon inflation duration = 25 seconds.  LS   Balloon Inflated     Event Details User   1:36 AM Balloon Inflated Baln Trek Mini Rx 2x15mm - First balloon inflation max pressure = 8 yuval. First balloon inflation duration = 20 seconds.  LS   1:46 AM Balloon Inflated Baln Trek Rx 3x20mm - First balloon inflation max pressure = 12 yuval. First balloon inflation duration = 25 seconds.  LS   Hemodynamics    Left ventricular systolic pressure: 140 mmHg  Left ventricular end diastolic pressure: 39 mmHg  Left ventricular end diastolic pressure: 39 mmHg   Complications    Complications documented before study signed (11/8/2020  2:20 AM)     No complications were associated with this study.   Documented by Pierce Buchanan MD - 11/8/2020  2:20 AM       Radiation     Event   1:58 AM Radiation Tracking   Details: Panel 1: Pierce Buchanan MD   Cumulative Air Kerma (mGy) = 281.000; Fluoro Time (min) = 4.5; DAP (mGy-cm2) = 187.270   User: LS      Total Contrast    Medication Name Total Dose   iopamidol (ISOVUE-300) 61 % injection 124 mL      Patient Hx Of Height, Weight, and Vitals    Height Weight BSA (Calculated - sq m) BMI (kg/m2) Pulse BP   167.6 cm (66\") 85.9 kg (189 lb 6 oz) 1.95 sq meters 30.63 84 137/100   Admission Information    Admission Date/Time Discharge Date/Time Room/Bed   11/07/20  09:06 PM  C011/1   Medications  (Filter: Administrations occurring from 11/08/20 0000 to 11/08/20 0220)  (important)  Continuous medications are totaled by the amount administered until 11/08/20 0220.   Medication Rate/Dose/Volume Action  Date Time    heparin infusion 2 units/mL in 0.9% NaCl (mL) 500 mL Given 11/08/20 0124    Total dose as of 11/08/20 0220 1,000 mL Given " 0124    1,500 mL         lidocaine (cardiac) (XYLOCAINE) injection (mL) 10 mL Given 11/08/20 0128    Total dose as of 11/08/20 0220         10 mL         diphenhydrAMINE (BENADRYL) injection (mg) 50 mg Given 11/08/20 0131    Total dose as of 11/08/20 0220         50 mg         labetalol (NORMODYNE,TRANDATE) injection (mg) 10 mg Given 11/08/20 0131    Total dose as of 11/08/20 0220         10 mg         bivalirudin (ANGIOMAX) bolus from bag (mg/kg) 66.675 mg Given 11/08/20 0136    Dosing weight:  88.9 kg         Total dose as of 11/08/20 0220         66.675 mg         bivalirudin (ANGIOMAX) 250 mg in 50 mL NS infusion (mg/kg/hr) 1.75 mg/kg/hr - 31.1 mL/hr New Bag 11/08/20 0139    Dosing weight:  88.9 kg         Total dose as of 11/08/20 0220         Cannot be calculated         iopamidol (ISOVUE-300) 61 % injection (mL) 124 mL Given 11/08/20 0155    Total dose as of 11/08/20 0220         124 mL         ticagrelor (BRILINTA) tablet (mg) 180 mg Given 11/08/20 0200    Total dose as of 11/08/20 0220         180 mg         nitroglycerin (NITROSTAT) SL tablet 0.4 mg (mg) *Not included in total MAR Hold 11/08/20 0117    Dosing weight:  88.9 kg         Total dose as of 11/08/20 0220         Cannot be calculated         nitroglycerin (TRIDIL) 200 mcg/ml infusion (mcg/min) 10 mcg/min - 3 mL/hr New Bag 11/08/20 0044    Dosing weight:  88.9 kg 15 mcg/min - 4.5 mL/hr Rate/Dose Change 0100    Total dose as of 11/08/20 0220         1,360 mcg         Supplies    Name ID Temporary Type Charge Code Description Charge Code Quantity   KT CONTRST INJ ISOL/MANFLD W/TRANSDCR 641 No Supply HC OR SUPPLY SHELL 270/NO CPT 779751 1   SOL IRR NACL 0.9PCT BT 1000ML 650 No Supply   1   DEV TORQ GW HOT/PINK 3509 No Supply HC OR SUPPLY SHELL 272/ 581230 1   TOOL INSRT GW MTL OR PLSTC 4281 No Supply HC OR SUPPLY SHELL 272/NO CPT 753551 1   KT CONTRL HND ACIST CVI MILLIE HIPRESS 65 4239 No Supply   1   SOLIDIFIER LIQUI LOC PLUS 2000CC 4458 No  Supply   1   CATH DIAG IMPULSE M/  5FR 4555 No Diagnostic Catheter HC OR SUPPLY SHELL 272/NO CPT 782519 1   RAMYA CO2/O2 NASL A/ 5117 No Supply HC OR SUPPLY SHELL 270/NO CPT 512171 1   SLIPPER HE DBL TRD LG NAVY 39472 No Supply   1   GW HITORQ/POWERTURN HC FLX STR .014 190CM 13628 No Guidewire HC OR SUPPLY SHELL 272/ 903052 1   VLV CONTRL HEMO BLEEDBACK COPILOT 24279 No Supply HC OR SUPPLY SHELL 272/NO CPT 507335 1   BALN TREK MINI RX 2X15MM 72087 No Balloon HC OR SUPPLY SHELL 278/ 860673 1   BALN TREK RX 3X20MM 06613 No Balloon HC OR SUPPLY SHELL 278/ 028851 1   DEV CLS VASC MYNX  6 TO 7F 75417 No Hemostasis Device HC OR SUPPLY SHELL 278/ 002000 1   CATH GUIDE [place] JL4 7F .078 100 LT 20648 No Guide Catheter HC OR SUPPLY SHELL 278/ 896958 1   SHEATH INTRO PINN CORNRY .038 7F10CM 66890 No Supply HC OR SUPPLY SHELL 272/ 178952 1   ELECTRD PAD DEFIB A/ 05628 No Supply HC OR SUPPLY SHELL 272/NO CPT 420710 1   DEV INFL ENCORE 32956 No Supply HC OR SUPPLY SHELL 272/NO CPT 850611 1   GW STARTER FXD CORE J .035 4C111CS 3MM 98961 No Guidewire HC OR SUPPLY SHELL 272/ 015599 1   Signed    Electronically signed by Pierce Buchanan MD on 11/8/20 at 0220 CST    Link to Procedure Log    Procedure Log              Current Facility-Administered Medications   Medication Dose Route Frequency Provider Last Rate Last Dose   • acetaminophen (TYLENOL) tablet 650 mg  650 mg Oral Q4H PRN Valentino Ramirez MD       • aspirin EC tablet 81 mg  81 mg Oral Daily Valentino Ramirez MD   81 mg at 11/09/20 0837   • carvedilol (COREG) tablet 25 mg  25 mg Oral BID With Meals Valentino Ramirez MD   25 mg at 11/09/20 0838   • cloNIDine (CATAPRES) tablet 0.1 mg  0.1 mg Oral Q6H PRN Valentino Ramirez MD       • enoxaparin (LOVENOX) syringe 30 mg  30 mg Subcutaneous Q12H Valentino Ramirez MD   30 mg at 11/09/20 0534   • ferrous sulfate tablet 325 mg  325 mg Oral BID With Meals  Valentino Ramirez MD   325 mg at 11/09/20 0838   • fluticasone (FLONASE) 50 MCG/ACT nasal spray 2 spray  2 spray Nasal Daily PRN Valentino Ramirez MD       • hydrALAZINE (APRESOLINE) tablet 75 mg  75 mg Oral Q8H Valentino Ramirez MD   75 mg at 11/09/20 0533   • levoFLOXacin (LEVAQUIN) tablet 750 mg  750 mg Oral Every Other Day Valentino Ramirez MD   750 mg at 11/09/20 0838   • losartan (COZAAR) tablet 50 mg  50 mg Oral Q24H Valentino Ramirez MD   50 mg at 11/09/20 0838   • NIFEdipine XL (PROCARDIA XL) 24 hr tablet 60 mg  60 mg Oral BID Valentino Ramirez MD   60 mg at 11/09/20 0837   • nitroglycerin (NITROSTAT) SL tablet 0.4 mg  0.4 mg Sublingual Q5 Min PRN Valentino Ramirez MD       • OLANZapine (zyPREXA) tablet 5 mg  5 mg Oral BID Valentino Ramirez MD   5 mg at 11/09/20 0837   • pantoprazole (PROTONIX) EC tablet 40 mg  40 mg Oral Daily Valentino Ramirez MD   40 mg at 11/09/20 0838   • rosuvastatin (CRESTOR) tablet 20 mg  20 mg Oral Nightly Valentino Ramirez MD   20 mg at 11/08/20 2119   • ticagrelor (BRILINTA) tablet 90 mg  90 mg Oral BID Valentino Ramirez MD   90 mg at 11/09/20 0837

## 2020-11-10 VITALS
HEART RATE: 76 BPM | SYSTOLIC BLOOD PRESSURE: 147 MMHG | RESPIRATION RATE: 22 BRPM | DIASTOLIC BLOOD PRESSURE: 92 MMHG | TEMPERATURE: 98.3 F | HEIGHT: 66 IN | OXYGEN SATURATION: 95 % | BODY MASS INDEX: 31.57 KG/M2 | WEIGHT: 196.4 LBS

## 2020-11-10 DIAGNOSIS — Z95.5 S/P DRUG ELUTING CORONARY STENT PLACEMENT: Primary | ICD-10-CM

## 2020-11-10 LAB
ANION GAP SERPL CALCULATED.3IONS-SCNC: 8 MMOL/L (ref 5–15)
BUN SERPL-MCNC: 30 MG/DL (ref 8–23)
BUN/CREAT SERPL: 15.1 (ref 7–25)
CALCIUM SPEC-SCNC: 8.3 MG/DL (ref 8.6–10.5)
CHLORIDE SERPL-SCNC: 105 MMOL/L (ref 98–107)
CO2 SERPL-SCNC: 27 MMOL/L (ref 22–29)
CREAT SERPL-MCNC: 1.99 MG/DL (ref 0.57–1)
DEPRECATED RDW RBC AUTO: 51.5 FL (ref 37–54)
ERYTHROCYTE [DISTWIDTH] IN BLOOD BY AUTOMATED COUNT: 16.4 % (ref 12.3–15.4)
GFR SERPL CREATININE-BSD FRML MDRD: 26 ML/MIN/1.73
GLUCOSE SERPL-MCNC: 157 MG/DL (ref 65–99)
HCT VFR BLD AUTO: 27.7 % (ref 34–46.6)
HGB BLD-MCNC: 8.7 G/DL (ref 12–15.9)
MCH RBC QN AUTO: 27.4 PG (ref 26.6–33)
MCHC RBC AUTO-ENTMCNC: 31.4 G/DL (ref 31.5–35.7)
MCV RBC AUTO: 87.4 FL (ref 79–97)
PLATELET # BLD AUTO: 192 10*3/MM3 (ref 140–450)
PMV BLD AUTO: 11.1 FL (ref 6–12)
POTASSIUM SERPL-SCNC: 3.8 MMOL/L (ref 3.5–5.2)
RBC # BLD AUTO: 3.17 10*6/MM3 (ref 3.77–5.28)
SODIUM SERPL-SCNC: 140 MMOL/L (ref 136–145)
WBC # BLD AUTO: 13.29 10*3/MM3 (ref 3.4–10.8)

## 2020-11-10 PROCEDURE — 85027 COMPLETE CBC AUTOMATED: CPT | Performed by: INTERNAL MEDICINE

## 2020-11-10 PROCEDURE — 25010000002 ENOXAPARIN PER 10 MG: Performed by: INTERNAL MEDICINE

## 2020-11-10 PROCEDURE — 99239 HOSP IP/OBS DSCHRG MGMT >30: CPT | Performed by: NURSE PRACTITIONER

## 2020-11-10 PROCEDURE — 80048 BASIC METABOLIC PNL TOTAL CA: CPT | Performed by: INTERNAL MEDICINE

## 2020-11-10 RX ORDER — LEVOFLOXACIN 750 MG/1
750 TABLET ORAL EVERY OTHER DAY
Qty: 7 TABLET | Refills: 0 | Status: SHIPPED | OUTPATIENT
Start: 2020-11-10 | End: 2020-11-24

## 2020-11-10 RX ORDER — NITROGLYCERIN 0.4 MG/1
0.4 TABLET SUBLINGUAL
Qty: 25 TABLET | Refills: 0 | Status: SHIPPED | OUTPATIENT
Start: 2020-11-10 | End: 2022-02-07 | Stop reason: SDUPTHER

## 2020-11-10 RX ORDER — ROSUVASTATIN CALCIUM 20 MG/1
20 TABLET, COATED ORAL NIGHTLY
Qty: 30 TABLET | Refills: 0 | Status: SHIPPED | OUTPATIENT
Start: 2020-11-10 | End: 2020-11-24 | Stop reason: SDUPTHER

## 2020-11-10 RX ORDER — ROSUVASTATIN CALCIUM 20 MG/1
20 TABLET, COATED ORAL DAILY
Qty: 90 TABLET | Refills: 3 | Status: SHIPPED | OUTPATIENT
Start: 2020-12-08 | End: 2022-02-07 | Stop reason: SDUPTHER

## 2020-11-10 RX ORDER — ASPIRIN 81 MG/1
81 TABLET ORAL DAILY
Start: 2020-11-11 | End: 2022-02-07 | Stop reason: SDUPTHER

## 2020-11-10 RX ADMIN — ASPIRIN 81 MG: 81 TABLET ORAL at 09:00

## 2020-11-10 RX ADMIN — CARVEDILOL 25 MG: 25 TABLET, FILM COATED ORAL at 09:00

## 2020-11-10 RX ADMIN — TICAGRELOR 90 MG: 90 TABLET ORAL at 09:02

## 2020-11-10 RX ADMIN — ENOXAPARIN SODIUM 30 MG: 30 INJECTION SUBCUTANEOUS at 05:20

## 2020-11-10 RX ADMIN — OLANZAPINE 5 MG: 5 TABLET, FILM COATED ORAL at 09:01

## 2020-11-10 RX ADMIN — HYDRALAZINE HYDROCHLORIDE 75 MG: 50 TABLET ORAL at 05:20

## 2020-11-10 RX ADMIN — FERROUS SULFATE TAB 325 MG (65 MG ELEMENTAL FE) 325 MG: 325 (65 FE) TAB at 09:00

## 2020-11-10 RX ADMIN — PANTOPRAZOLE SODIUM 40 MG: 40 TABLET, DELAYED RELEASE ORAL at 09:03

## 2020-11-10 RX ADMIN — LOSARTAN POTASSIUM 50 MG: 50 TABLET, FILM COATED ORAL at 09:00

## 2020-11-10 RX ADMIN — NIFEDIPINE 60 MG: 60 TABLET, FILM COATED, EXTENDED RELEASE ORAL at 09:01

## 2020-11-10 NOTE — DISCHARGE SUMMARY
[facility] HEART GROUP DISCHARGE    Date of Discharge:  11/10/2020    Discharge Diagnosis:   ST elevation myocardial infarction (STEMI) (CMS/[facility])    Iron deficiency anemia    Cigarette smoker    Nonrheumatic aortic valve insufficiency    Stage 3 chronic kidney disease    Severe uncontrolled hypertension    Chronic diastolic heart failure (CMS/HCC)      Presenting Problem/History of Present Illness  ST elevation myocardial infarction (STEMI), unspecified artery (CMS/HCC) [I21.3]    Hospital Course  Patient is a 60 y.o. female presented to the emergency room with complaints of substernal chest pain that was a pressure-like. Patient was recently hospitalized for pneumonia in October 2020. EKG revealed acute anterior lateral ST elevation consistent with ST elevation myocardial infarction. Due to persistence of chest pain and EKG changes patient was taken to cardiac cath lab. Cath revealed RPDA lesion of 55% stenosis, proximal LAD to med LAD lesion is 100% stenosed successful PCI with 2.75 mm x 28 mm Xience Maryann. Echo was done on 11/8/2020 that revealed a LVEF to be 51-55% with mild aortic valve regurgitation. UDS positive for cocaine and methamphetamine. Patient also had a VQ scan that was low risk for PE. Patient tolerated procedure well and has had no further chest pain. She reports some fatigue and shortness of breath on exertion that have been present since previous admission with pneumonia. Patient has had some bradycardia on Telemetry that has occurred during the night when she has been sleeping. She has been asymptomatic due to being asleep and nurses report that heart rate returns to 50-60's when she is aroused. Patient reports that this is not new for her and she has been recommended to have an outpatient sleep study for possible sleep apnea. This was also documented during last admission.   Cath site this am is clean, dry intact with no bruising or evidence of hematoma. Hgb this am improved to  8.7, Cr improved to 1.99 on labs today.       Procedures Performed  Procedure(s):  Left Heart Cath       Consults:   Consults     Date and Time Order Name Status Description    10/26/2020 1316 Inpatient Cardiology Consult Completed     10/26/2020 1315 Inpatient Nephrology Consult Completed     10/26/2020 1151 Inpatient Pulmonology Consult Completed     10/13/2020 0746 Inpatient Cardiology Consult Completed     10/13/2020 0746 Inpatient Nephrology Consult Completed           Pertinent Test Results:     Ejection Fraction  No results found for: EF    Echo EF Estimated  No results found for: ECHOEFEST    Nuclear Stress Ejection Fraction  No components found for: NUCEF    Cath Ejection Fraction Quantitative  No results found for: CATHEF    Condition on Discharge:  Stable    Physical Exam at Discharge    Vital Signs  Temp:  [97.9 °F (36.6 °C)-98.9 °F (37.2 °C)] 98.3 °F (36.8 °C)  Heart Rate:  [66-80] 76  Resp:  [16-27] 22  BP: (104-150)/() 147/92    Physical Exam:   Vitals signs reviewed.   Constitutional:       General: Not in acute distress.     Appearance: Normal appearance. Well-developed.   Eyes:      Pupils: Pupils are equal, round, and reactive to light.   HENT:      Head: Normocephalic and atraumatic.      Nose: Nose normal.   Neck:      Musculoskeletal: Normal range of motion and neck supple.      Vascular: No carotid bruit.   Pulmonary:      Effort: Pulmonary effort is normal. No respiratory distress.      Breath sounds: Wheezing present. No rales.   Cardiovascular:      Normal rate. Regular rhythm.      Murmurs: There is a systolic murmur.   Edema:     Peripheral edema absent.   Abdominal:      General: There is no distension.      Palpations: Abdomen is soft.   Musculoskeletal: Normal range of motion.   Skin:     General: Skin is warm.      Findings: Bruising (to lower right ab from lovenox injection) present. No erythema or rash.        Neurological:      Mental Status: Alert and oriented to person,  place, and time.   Psychiatric:         Attention and Perception: Attention normal.         Mood and Affect: Mood normal.         Speech: Speech normal.         Behavior: Behavior normal.         Thought Content: Thought content normal.         Judgment: Judgment normal.       Discharge Disposition: Home  Home or Self Care    Discharge Medications     Discharge Medications      New Medications      Instructions Start Date   aspirin 81 MG EC tablet   81 mg, Oral, Daily   Start Date: November 11, 2020     nitroglycerin 0.4 MG SL tablet  Commonly known as: NITROSTAT   0.4 mg, Sublingual, Every 5 Minutes PRN, Take no more than 3 doses in 15 minutes.      rosuvastatin 20 MG tablet  Commonly known as: CRESTOR   20 mg, Oral, Nightly      ticagrelor 90 MG tablet tablet  Commonly known as: BRILINTA   90 mg, Oral, 2 Times Daily         Continue These Medications      Instructions Start Date   azelastine 0.1 % nasal spray  Commonly known as: ASTELIN   2 sprays, Nasal, 2 Times Daily, Use in each nostril as directed      carvedilol 25 MG tablet  Commonly known as: COREG   25 mg, Oral, 2 Times Daily With Meals      Cholecalciferol 25 MCG (1000 UT) tablet   1,000 Units, Oral, Daily      cloNIDine 0.1 MG tablet  Commonly known as: CATAPRES   0.1 mg, Oral, Every 6 Hours PRN      ferrous sulfate 325 (65 FE) MG tablet   325 mg, Oral, 2 Times Daily With Meals      fluticasone 50 MCG/ACT nasal spray  Commonly known as: FLONASE   2 sprays, Nasal, Daily PRN      hydrALAZINE 25 MG tablet  Commonly known as: APRESOLINE   75 mg, Oral, Every 8 Hours Scheduled      levoFLOXacin 750 MG tablet  Commonly known as: LEVAQUIN   750 mg, Oral, Every Other Day      losartan 50 MG tablet  Commonly known as: COZAAR   50 mg, Oral, Every 24 Hours Scheduled      NIFEdipine CC 60 MG 24 hr tablet  Commonly known as: ADALAT CC   60 mg, Oral, 2 times daily      OLANZapine 5 MG tablet  Commonly known as: zyPREXA   5 mg, Oral, 2 times daily      pantoprazole 40  MG EC tablet  Commonly known as: PROTONIX   40 mg, Oral, Daily         Stop These Medications    spironolactone 25 MG tablet  Commonly known as: ALDACTONE            Discharge Diet: Cardiac diet    Activity at Discharge:    No heavy lifting for 5-7 days greater than 10 pounds.  No heavy or strenuous pushing or pulling.  No tub baths, hot tubs, swimming pools, or submerging groin underwater for 1 week.  Wash groin site daily with antibacterial soap and water. Rinse and keep clean and dry.  No lotions, powders, or topical ointments to site. Keep open to air and dry.  Call our office with any concerns or if you notice redness, swelling, drainage, warmth, or pain at the site.      Plan:   -Discharge home today  -Discussed importance of compliance with medications, with an emphasis placed on Brilinta and Aspirin for a minimum of one year with no missed doses.  -Short term scripts for Brilinta, Crestor and Nitroglycerin sent to [facility]--patient leaving with medication in hand. Long-term scripts sent to [facility].   -Discussed with patient need for oupatient sleep study; she is to follow up with Dr Weston her PCP in 1 week for follow up and discuss getting this ordered.   - Patient has been on Aldactone 25 mg daily; which has been held this admission due to acute kidney injury and treatment with Levaquin for pneumonia. Will consider restarting at follow up in 2 weeks.   -patient is to continue levaquin every other day for previous pneumonia until follow up with PCP.   -Cardiac rehab to start in 2 weeks.   -Discussed cessation of drug use with patient.   -No heavy lifting for 5-7 days greater than 10 pounds.  -No heavy or strenuous pushing or pulling.  -No tub baths, hot tubs, swimming pools, or submerging groin underwater for 1 week.  -Groin care discussed: Wash groin site daily with antibacterial soap and water. Rinse and keep clean and dry.  -No lotions, powders, or topical ointments to site. Keep open to air and  dry.  -Call our office with any concerns or if you notice redness, swelling, drainage, warmth, or pain at the site.  -Follow up with PCP, Dr Weston in 1 week  -Follow up with Destini COLON in 2-4 weeks  -Follow up with Dr Buchanan in 3 months      Follow-up Appointments  Future Appointments   Date Time Provider Department [place]   12/15/2020 11:30 AM Neeta Chavez MD [facility] None       Test Results Pending at Discharge  Pending Labs     Order Current Status    Blood Culture - Blood, Arm, Right Preliminary result    Blood Culture - Blood, Hand, Left Preliminary result           Electronically signed by ISAIAH Larson, 11/10/20, 8:44 AM CST.      Time spent on discharge: 40 minutes

## 2020-11-10 NOTE — PLAN OF CARE
Goal Outcome Evaluation:  Plan of Care Reviewed With: patient  Progress: no change  No c/o pain. Pt. Wore 2L O2 most of night while sleeping d/t O2 sats dropping in 80's. Still states she feels SOA with exertion but no c/o CP. Did have several bradycardic episodes last night dropping down to 28 at one point with some pauses. NSR. Cath site remains C/D/I. Possible d/c today??

## 2020-11-10 NOTE — PAYOR COMM NOTE
"REF:  [id number]    [facility]  DARRELL  [phone]  OR  FAX  [phone]     Ludivina Clemons (60 y.o. Female)     Date of Birth Social Security Number Address Home Phone MRN    1960  [id number] [facility] [phone] [id number]    Voodoo Marital Status          Voodoo Single       Admission Date Admission Type Admitting Provider Attending Provider Department, Room/Bed    11/7/20 Emergency Pierce Buchanan MD  [facility] 4B, 406/1    Discharge Date Discharge Disposition Discharge Destination        11/10/2020 Home or Self Care              Attending Provider: (none)   Allergies: Codeine, Imitrex [Sumatriptan]    Isolation: None   Infection: None   Code Status: Prior    Ht: 167.6 cm (65.98\")   Wt: 89.1 kg (196 lb 6.4 oz)    Admission Cmt: None   Principal Problem: ST elevation myocardial infarction (STEMI) (CMS/[facility]) [I21.3]                 Active Insurance as of 11/7/2020     Primary Coverage     Payor Plan Insurance Group Employer/Plan Group    ANTHEM MEDICARE REPLACEMENT ANTHEM MEDICARE ADVANTAGE [id number]     Payor Plan Address Payor Plan Phone Number Payor Plan Fax Number Effective Dates    [address] [phone]  1/1/2017 - None Entered    [facility] [id number]       Subscriber Name Subscriber Birth Date Member ID       LUDIVINA CLEMONS 1960 [id number]                 Emergency Contacts      (Rel.) Home Phone Work Phone Mobile Phone    kenzie thompson (Sister) [phone] -- [phone]    Joshua Clemons () (Son) -- -- [phone]               Discharge Summary      Jer Cuevas APRN at 11/10/20 0899     Attestation signed by Valentino Ramirez MD at 11/10/20 130    I agree with the documentation as outlined above in the discharge summary.                  [facility] DISCHARGE    Date of Discharge:  11/10/2020    Discharge Diagnosis:   ST elevation myocardial infarction (STEMI) (CMS/HCC)    " Iron deficiency anemia    Cigarette smoker    Nonrheumatic aortic valve insufficiency    Stage 3 chronic kidney disease    Severe uncontrolled hypertension    Chronic diastolic heart failure (CMS/HCC)      Presenting Problem/History of Present Illness  ST elevation myocardial infarction (STEMI), unspecified artery (CMS/HCC) [I21.3]    Hospital Course  Patient is a 60 y.o. female presented to the emergency room with complaints of substernal chest pain that was a pressure-like. Patient was recently hospitalized for pneumonia in October 2020. EKG revealed acute anterior lateral ST elevation consistent with ST elevation myocardial infarction. Due to persistence of chest pain and EKG changes patient was taken to cardiac cath lab. Cath revealed RPDA lesion of 55% stenosis, proximal LAD to med LAD lesion is 100% stenosed successful PCI with 2.75 mm x 28 mm Xience Maryann. Echo was done on 11/8/2020 that revealed a LVEF to be 51-55% with mild aortic valve regurgitation. UDS positive for cocaine and methamphetamine. Patient also had a VQ scan that was low risk for PE. Patient tolerated procedure well and has had no further chest pain. She reports some fatigue and shortness of breath on exertion that have been present since previous admission with pneumonia. Patient has had some bradycardia on Telemetry that has occurred during the night when she has been sleeping. She has been asymptomatic due to being asleep and nurses report that heart rate returns to 50-60's when she is aroused. Patient reports that this is not new for her and she has been recommended to have an outpatient sleep study for possible sleep apnea. This was also documented during last admission.   Cath site this am is clean, dry intact with no bruising or evidence of hematoma. Hgb this am improved to 8.7, Cr improved to 1.99 on labs today.       Procedures Performed  Procedure(s):  Left Heart Cath       Consults:   Consults     Date and Time Order Name Status  Description    10/26/2020 1316 Inpatient Cardiology Consult Completed     10/26/2020 1315 Inpatient Nephrology Consult Completed     10/26/2020 1151 Inpatient Pulmonology Consult Completed     10/13/2020 0746 Inpatient Cardiology Consult Completed     10/13/2020 0746 Inpatient Nephrology Consult Completed           Pertinent Test Results:     Ejection Fraction  No results found for: EF    Echo EF Estimated  No results found for: ECHOEFEST    Nuclear Stress Ejection Fraction  No components found for: NUCEF    Cath Ejection Fraction Quantitative  No results found for: CATHEF    Condition on Discharge:  Stable    Physical Exam at Discharge    Vital Signs  Temp:  [97.9 °F (36.6 °C)-98.9 °F (37.2 °C)] 98.3 °F (36.8 °C)  Heart Rate:  [66-80] 76  Resp:  [16-27] 22  BP: (104-150)/() 147/92    Physical Exam:   Vitals signs reviewed.   Constitutional:       General: Not in acute distress.     Appearance: Normal appearance. Well-developed.   Eyes:      Pupils: Pupils are equal, round, and reactive to light.   HENT:      Head: Normocephalic and atraumatic.      Nose: Nose normal.   Neck:      Musculoskeletal: Normal range of motion and neck supple.      Vascular: No carotid bruit.   Pulmonary:      Effort: Pulmonary effort is normal. No respiratory distress.      Breath sounds: Wheezing present. No rales.   Cardiovascular:      Normal rate. Regular rhythm.      Murmurs: There is a systolic murmur.   Edema:     Peripheral edema absent.   Abdominal:      General: There is no distension.      Palpations: Abdomen is soft.   Musculoskeletal: Normal range of motion.   Skin:     General: Skin is warm.      Findings: Bruising (to lower right ab from lovenox injection) present. No erythema or rash.        Neurological:      Mental Status: Alert and oriented to person, place, and time.   Psychiatric:         Attention and Perception: Attention normal.         Mood and Affect: Mood normal.         Speech: Speech normal.          Behavior: Behavior normal.         Thought Content: Thought content normal.         Judgment: Judgment normal.       Discharge Disposition: Home  Home or Self Care    Discharge Medications     Discharge Medications      New Medications      Instructions Start Date   aspirin 81 MG EC tablet   81 mg, Oral, Daily   Start Date: November 11, 2020     nitroglycerin 0.4 MG SL tablet  Commonly known as: NITROSTAT   0.4 mg, Sublingual, Every 5 Minutes PRN, Take no more than 3 doses in 15 minutes.      rosuvastatin 20 MG tablet  Commonly known as: CRESTOR   20 mg, Oral, Nightly      ticagrelor 90 MG tablet tablet  Commonly known as: BRILINTA   90 mg, Oral, 2 Times Daily         Continue These Medications      Instructions Start Date   azelastine 0.1 % nasal spray  Commonly known as: ASTELIN   2 sprays, Nasal, 2 Times Daily, Use in each nostril as directed      carvedilol 25 MG tablet  Commonly known as: COREG   25 mg, Oral, 2 Times Daily With Meals      Cholecalciferol 25 MCG (1000 UT) tablet   1,000 Units, Oral, Daily      cloNIDine 0.1 MG tablet  Commonly known as: CATAPRES   0.1 mg, Oral, Every 6 Hours PRN      ferrous sulfate 325 (65 FE) MG tablet   325 mg, Oral, 2 Times Daily With Meals      fluticasone 50 MCG/ACT nasal spray  Commonly known as: FLONASE   2 sprays, Nasal, Daily PRN      hydrALAZINE 25 MG tablet  Commonly known as: APRESOLINE   75 mg, Oral, Every 8 Hours Scheduled      levoFLOXacin 750 MG tablet  Commonly known as: LEVAQUIN   750 mg, Oral, Every Other Day      losartan 50 MG tablet  Commonly known as: COZAAR   50 mg, Oral, Every 24 Hours Scheduled      NIFEdipine CC 60 MG 24 hr tablet  Commonly known as: ADALAT CC   60 mg, Oral, 2 times daily      OLANZapine 5 MG tablet  Commonly known as: zyPREXA   5 mg, Oral, 2 times daily      pantoprazole 40 MG EC tablet  Commonly known as: PROTONIX   40 mg, Oral, Daily         Stop These Medications    spironolactone 25 MG tablet  Commonly known as: ALDACTONE             Discharge Diet: Cardiac diet    Activity at Discharge:    No heavy lifting for 5-7 days greater than 10 pounds.  No heavy or strenuous pushing or pulling.  No tub baths, hot tubs, swimming pools, or submerging groin underwater for 1 week.  Wash groin site daily with antibacterial soap and water. Rinse and keep clean and dry.  No lotions, powders, or topical ointments to site. Keep open to air and dry.  Call our office with any concerns or if you notice redness, swelling, drainage, warmth, or pain at the site.      Plan:   -Discharge home today  -Discussed importance of compliance with medications, with an emphasis placed on Brilinta and Aspirin for a minimum of one year with no missed doses.  -Short term scripts for Brilinta, Crestor and Nitroglycerin sent to [facility] to Beds--patient leaving with medication in hand. Long-term scripts sent to [facility].   -Discussed with patient need for oupatient sleep study; she is to follow up with Dr Weston her PCP in 1 week for follow up and discuss getting this ordered.   - Patient has been on Aldactone 25 mg daily; which has been held this admission due to acute kidney injury and treatment with Levaquin for pneumonia. Will consider restarting at follow up in 2 weeks.   -patient is to continue levaquin every other day for previous pneumonia until follow up with PCP.   -Cardiac rehab to start in 2 weeks.   -Discussed cessation of drug use with patient.   -No heavy lifting for 5-7 days greater than 10 pounds.  -No heavy or strenuous pushing or pulling.  -No tub baths, hot tubs, swimming pools, or submerging groin underwater for 1 week.  -Groin care discussed: Wash groin site daily with antibacterial soap and water. Rinse and keep clean and dry.  -No lotions, powders, or topical ointments to site. Keep open to air and dry.  -Call our office with any concerns or if you notice redness, swelling, drainage, warmth, or pain at the site.  -Follow up with PCP, Dr Weston in 1  week  -Follow up with Destini COLON in 2-4 weeks  -Follow up with Dr Buchanan in 3 months      Follow-up Appointments  Future Appointments   Date Time Provider Department Center   12/15/2020 11:30 AM Neeta Chavez MD [facility] None       Test Results Pending at Discharge  Pending Labs     Order Current Status    Blood Culture - Blood, Arm, Right Preliminary result    Blood Culture - Blood, Hand, Left Preliminary result           Electronically signed by ISAIAH Larson, 11/10/20, 8:44 AM CST.      Time spent on discharge: 40 minutes            Electronically signed by Valentino Ramirez MD at 11/10/20 4066

## 2020-11-10 NOTE — PROGRESS NOTES
Discharge Planning Assessment  [facility]     Patient Name: Ludivina Ramirez  MRN: [id number]  Today's Date: 11/10/2020    Admit Date: 11/7/2020    Discharge Needs Assessment     Row Name 11/10/20 0906       Living Environment    Lives With  alone  (Pended)     Current Living Arrangements  home/apartment/condo  (Pended)     Primary Care Provided by  self  (Pended)     Provides Primary Care For  no one  (Pended)     Family Caregiver if Needed  none  (Pended)     Quality of Family Relationships  non-existent  (Pended)        Transition Planning    Patient/Family Anticipates Transition to  home  (Pended)        Discharge Needs Assessment    Equipment Currently Used at Home  none  (Pended)     Current Discharge Risk  lives alone  (Pended)         Discharge Plan     Row Name 11/10/20 0906       Plan    Plan  Home  (Pended)     Patient/Family in Agreement with Plan  yes  (Pended)     Plan Comments  Called and spoke with pt. Pt states that she lives alone and was pretty independent prior to hospitalization. Pt is requesting some sort of help at home such as meals on wheels to help her when she's discharged. SW gave pt number to [employer] as well as [employer] [place]. Pt has PCP/RX coverage. Will follow.  (Pended)         Continued Care and Services - Admitted Since 11/7/2020    Coordination has not been started for this encounter.         Demographic Summary    No documentation.       Functional Status    No documentation.       Psychosocial    No documentation.       Abuse/Neglect    No documentation.       Legal    No documentation.       Substance Abuse    No documentation.       Patient Forms    No documentation.           Cameron Fleming

## 2020-11-11 ENCOUNTER — READMISSION MANAGEMENT (OUTPATIENT)
Dept: CALL CENTER | Facility: HOSPITAL | Age: 60
End: 2020-11-11

## 2020-11-11 LAB
QT INTERVAL: 352 MS
QT INTERVAL: 356 MS
QTC INTERVAL: 371 MS
QTC INTERVAL: 381 MS

## 2020-11-11 NOTE — OUTREACH NOTE
Prep Survey      Responses   [facility] facility patient discharged from?  [place]   Is LACE score < 7 ?  No   Eligibility  Readm Mgmt   Discharge diagnosis  STEMI   Does the patient have one of the following disease processes/diagnoses(primary or secondary)?  Acute MI (STEMI,NSTEMI)   Does the patient have Home health ordered?  No   Is there a DME ordered?  No   Comments regarding appointments  Scheduled per AVS   Medication alerts for this patient  started on aspirin and brilinta   Prep survey completed?  Yes          Luba Duran RN

## 2020-11-12 LAB
BACTERIA SPEC AEROBE CULT: NORMAL
BACTERIA SPEC AEROBE CULT: NORMAL

## 2020-11-16 ENCOUNTER — READMISSION MANAGEMENT (OUTPATIENT)
Dept: CALL CENTER | Facility: HOSPITAL | Age: 60
End: 2020-11-16

## 2020-11-16 NOTE — OUTREACH NOTE
AMI Week 1 Survey      Responses   [facility] patient discharged from?  [place]   Does the patient have one of the following disease processes/diagnoses(primary or secondary)?  Acute MI (STEMI,NSTEMI)   Week 1 attempt successful?  Yes   Call start time  1046   Call end time  1052   Discharge diagnosis  STEMI   Medication alerts for this patient  started on aspirin and brilinta   Meds reviewed with patient/caregiver?  Yes   Is the patient having any side effects they believe may be caused by any medication additions or changes?  No   Does the patient have all prescriptions related to this admission filled (includes statins,anticoagulants,HTN meds,anti-arrhythmia meds)  Yes   Is the patient taking all medications as directed (includes completed medication regime)?  Yes   Does the patient have a primary care provider?   Yes   Does the patient have an appointment with their PCP,cardiologist,or clinic within 7 days of discharge?  Yes   Has the patient kept scheduled appointments due by today?  N/A   Has home health visited the patient within 72 hours of discharge?  N/A   Psychosocial issues?  No   Did the patient receive a copy of their discharge instructions?  Yes   Nursing interventions  Reviewed instructions with patient   What is the patient's perception of their health status since discharge?  Improving   Nursing interventions  Nurse provided patient education   Is the patient/caregiver able to teach back signs and symptoms of when to call for help immediately:  Sudden chest discomfort, Sudden discomfort in arms, back, neck or jaw, Shortness of breath at any time, Sudden sweating or clammy skin, Nausea or vomiting   Nursing interventions  Nurse provided patient education   Is the pateint /caregiver able to teach back the importance of cardiac rehab?  Yes   Nursing interventions  Provided education on importance of cardiac rehab   Is the patient/caregiver able to teach back lifestyle changes to help prevent MIs   Quit smoking, Heart healthy diet, Reducing stress   Is the patient/caregiver able to teach back ways to prevent a second heart attack:  Take medications, Follow up with MD, Participate in Cardiac Rehab, Manage risk factors   If the patient is a current smoker, are they able to teach back resources for cessation?  Smoking cessation medications, [id number]   Is the patient/caregiver able to teach back the hierarchy of who to call/visit for symptoms/problems? PCP, Specialist, Home health nurse, Urgent Care, ED, 911  Yes   Additional teach back comments  Pt trying to cut back on smoking and I refrred her to 1-800-QUIT-NOW for patches if she can get them. Groin site intact.    Week 1 call completed?  Yes          Samira Long RN

## 2020-11-23 PROBLEM — I21.02 ST ELEVATION MYOCARDIAL INFARCTION INVOLVING LEFT ANTERIOR DESCENDING (LAD) CORONARY ARTERY: Status: ACTIVE | Noted: 2020-11-08

## 2020-11-23 PROBLEM — I10 ESSENTIAL HYPERTENSION: Status: ACTIVE | Noted: 2020-11-08

## 2020-11-23 PROBLEM — I25.10 CORONARY ARTERY DISEASE INVOLVING NATIVE CORONARY ARTERY OF NATIVE HEART WITHOUT ANGINA PECTORIS: Status: ACTIVE | Noted: 2020-11-23

## 2020-11-23 PROBLEM — E66.811 CLASS 1 OBESITY DUE TO EXCESS CALORIES WITH SERIOUS COMORBIDITY AND BODY MASS INDEX (BMI) OF 31.0 TO 31.9 IN ADULT: Status: ACTIVE | Noted: 2020-11-23

## 2020-11-23 PROBLEM — E78.5 HYPERLIPIDEMIA LDL GOAL <70: Status: ACTIVE | Noted: 2020-11-23

## 2020-11-23 PROBLEM — E66.09 CLASS 1 OBESITY DUE TO EXCESS CALORIES WITH SERIOUS COMORBIDITY AND BODY MASS INDEX (BMI) OF 31.0 TO 31.9 IN ADULT: Status: ACTIVE | Noted: 2020-11-23

## 2020-11-23 PROBLEM — Z95.5 STATUS POST INSERTION OF DRUG-ELUTING STENT INTO LEFT ANTERIOR DESCENDING (LAD) ARTERY FOR CORONARY ARTERY DISEASE: Status: ACTIVE | Noted: 2020-11-23

## 2020-11-23 NOTE — PROGRESS NOTES
Subjective:     Encounter Date:11/24/2020      Patient ID: Ludivina Ramirez is a 60 y.o. female   HPI: This patient presents today for routine follow-up of hospital discharge on 11/10/2020 for ST elevation myocardial infarction with subsequent 2.75 x 28 mm Xience drug-eluting stent to the proximal to mid left anterior descending artery on 11/8/2020.  Postprocedure 2D echo revealed normal left ventricular systolic function with ejection fraction of 51 to 55%, mild concentric hypertrophy, mild aortic valve regurgitation and markedly elevated right ventricular systolic pressure of greater than 55 mmHg.  She has a history of hypertension, ischemic colitis, nonrheumatic aortic valve insufficiency, stage 3 chronic kidney disease, obesity, tobacco use and PTSD.  She reports two episodes of exertional, non-radiating, left sided chest pain since hospital discharge. The pain was associated with shortness of breath and relieved with nitroglycerin. She states that the pain was similar to what she had prior to her STEMI. She also complains of increased dyspnea with exertion and fatigue. Patient denies palpitations, dizziness, syncope, orthopnea, PND or edema.  Patient denies any signs of bleeding.    Chief Complaint: Routine follow-up  Coronary Artery Disease  Presents for follow-up visit. Symptoms include chest pain. Pertinent negatives include no chest pressure, chest tightness, dizziness, leg swelling, muscle weakness, palpitations, shortness of breath or weight gain. Risk factors include hyperlipidemia and obesity. The symptoms have been stable. Compliance with diet is variable. Compliance with exercise is variable. Compliance with medications is good.   Hypertension  This is a chronic problem. The current episode started more than 1 year ago. The problem is controlled. Associated symptoms include chest pain and malaise/fatigue. Pertinent negatives include no anxiety, blurred vision, headaches, neck pain, orthopnea,  palpitations, peripheral edema, PND, shortness of breath or sweats. Risk factors for coronary artery disease include obesity and dyslipidemia. Current antihypertension treatment includes beta blockers, central alpha agonists, direct vasodilators, angiotensin blockers and calcium channel blockers. The current treatment provides significant improvement. Hypertensive end-organ damage includes CAD/MI.   Hyperlipidemia  This is a chronic problem. The current episode started more than 1 year ago. Exacerbating diseases include obesity. Associated symptoms include chest pain. Pertinent negatives include no shortness of breath. Current antihyperlipidemic treatment includes statins. Risk factors for coronary artery disease include hypertension, obesity, post-menopausal and dyslipidemia.       Previous Cardiac Testing:  Results for orders placed during the hospital encounter of 11/07/20   Adult Transthoracic Echo Limited W/ Cont if Necessary Per Protocol    Narrative · Left ventricular systolic function is low normal. Left ventricular   ejection fraction appears to be 51 - 55%.  · The following left ventricular wall segments are hypokinetic: apical   anterior, apical lateral, apical inferior, apical septal and apex   hypokinetic.  · Left ventricular wall thickness is consistent with mild concentric   hypertrophy.  · Mild aortic valve regurgitation is present.  · Estimated right ventricular systolic pressure from tricuspid   regurgitation is markedly elevated (>55 mmHg).        Results for orders placed during the hospital encounter of 11/07/20   Cardiac Catheterization/Vascular Study    Narrative · RPDA lesion is 55% stenosed.  · Prox LAD to Mid LAD lesion is 100% stenosed.  · Left ventricular end diastolic pressure: 39 mmHg     Cardiac Catheterization Operative Report    Ludivina Ramirez  [id number]  11/8/2020    Patient was referred for cardiac catheterization      Indications for the procedure include: Symptoms suggestive of  angina with   high suspicion for significant obstructive coronary artery disease       Procedure performed  Left heart cath  Coronary angiography  Right femoral arteriography  Insertion of 7 Fr Mynx hemostatic closure device with effective hemostasis   and preserved right lower extremity pulses  Left ventriculography    Supervision of the administration of moderate sedation      Procedure Details  The risks, benefits, complications, treatment options, and expected   outcomes were discussed with the patient. The patient and/or family   concurred with the proposed plan, giving informed consent.     Patient was brought to the cath lab after IV hydration was begun and oral   premedication was given. He was further sedated with fentanyl and   midazolam.T    The skin overlying the patient's right femoral artery was prepped and   draped in the usual sterile fashion.     Using the modified Seldinger access technique, a 6f [place]ian sheath was   placed in the femoral artery.      Cardiac Catheterization Operative Report      Patient was referred for cardiac catheterization .      Procedure Details    Diagnostic coronary angiography was performed with 5 [place]ian JL4 and JR 4   coronary catheters.      Pigtail catheter for left ventriculography and left heart pressure   measurements    Coronary angiogram were performed in DONNY and FLYNN projection to evaluate   the coronary arterial systems.        Before all coronary angiograms were obtained, a femoral angiogram was   performed and the arteriotomy was assessed for suitability for a closure   device.      A 6/7 Fr Mynx closure device was used to achieve hemostasis.  The patient   tolerated the procedure well, and there were no immediate complications.    ___________________________________________________________________________  _________________________________________________________________   Selective coronary angiography:    Left main coronary artery:  The left main coronary  artery arises from the   left coronary cusp and bifurcates into the  left anterior descending   coronary artery  and left circumflex arteries.      Left main coronary artery is normal    Left anterior descending artery:  The  left anterior descending coronary   artery   arises normally from the left main coronary artery and courses in   the anterior interventricular groove and terminates at the apex.  Occluded   proximal left anterior descending coronary artery which was the culprit   vessel and was treated with angioplasty and stent placement as below    Left circumflex:  The left circumflex arises form the left man artery and   supplies obtuse marginal branches.Left circumflex artery  is normal.    Overall large distribution with moderate tortuosity of obtuse marginal   vessels without any obstructive or significant coronary artery disease    Right coronary artery:  The RCA arises normally from the right coronary   cusp and is dominant for the posterior circulation.  The RCA is dominant   Right posterior descending coronary artery has approximately a 50 to 60%   stenosis in the midportion       Left heart cath: LVEDP   39 mmHg  mm Hg with no gradient across aortic   valve on pullback.     LV Gram: Not performed to save contrast as has acute on chronic kidney   disease    Interventions: JL4 7 Icelandic guide was used advanced a coronary wire into   the distal left anterior descending coronary artery did angioplasty using   a 2.0 mm balloon  Then implanted a 2.75 x 28 mm drug-eluting stent and postdilated with a   3.0 mm balloon.  Initial stenosis 100% with MELISSA 0 flow post procedure 0%   stenosis with MELISSA-3 flow with complete resolution of chest pain prior to   departure from Cath Lab  ___________________________________________________________________________  _________________________________________________________________  Estimated Blood Loss:  Minimal         Complications:  None; patient tolerated the  procedure well.    Specimens: None           Disposition: Cardiovascular observation unit             Condition: stable          I supervised the administration of conscious sedation by nursing staff   throughout the case.      Immediately prior to the procedure the patient was re-examined and   subsequently the  first dose was given at   0126  Hours  and the end of my face-to-face   encounter was at 0202   Hours.        During the case, continuous pulse oximetry, heart rate, blood pressure,   and patient status were monitored.       ___________________________________________________________________________  _________________________________________________________________    Conclusion of cardiac catheterization    11/8/2020    Severely elevated left ventricular end-diastolic pressure 39 mmHg  50 to 60% stenosis of midportion of posterior descending coronary artery  Occluded proximal left anterior descending coronary artery treated with   PTCA and then implantation of 2.75 x 28 mm Xience drug-eluting stent   postdilated to 3.0 mm diameter  Left ventriculogram not performed to save contrast as has chronic with   acute kidney injury, prior echo shows preserved left ventricular ejection   fraction    ___________________________________________________________________________  _________________________________________________________________    Plan after cardiac catheterization      Dual antiplatelet therapy minimum of 1 year preferably longer  Statin ACE inhibitor is and beta-blockers  Aspirin for the rest of her life  Her leukocytosis is likely due to recent infection and ongoing steroid use  No evidence of any underlying infection or sepsis.  Intensive risk factor modifications for both primary and secondary   prevention if applicable  Hydration   Observation                The following portions of the patient's history were reviewed and updated as appropriate: allergies, current medications, past family history,  past medical history, past social history, past surgical history and problem list.    Allergies   Allergen Reactions   • Codeine Nausea And Vomiting   • Imitrex [Sumatriptan] Other (See Comments)     LOPEZ       Current Outpatient Medications:   •  amLODIPine (NORVASC) 5 MG tablet, Take 5 mg by mouth Daily., Disp: , Rfl:   •  aspirin 81 MG EC tablet, Take 1 tablet by mouth Daily., Disp:  , Rfl:   •  azelastine (ASTELIN) 0.1 % nasal spray, 2 sprays into the nostril(s) as directed by provider 2 (Two) Times a Day. Use in each nostril as directed , Disp: , Rfl:   •  carvedilol (COREG) 25 MG tablet, Take 25 mg by mouth 2 (Two) Times a Day With Meals., Disp: , Rfl:   •  cloNIDine (CATAPRES) 0.1 MG tablet, Take 1 tablet by mouth Every 6 (Six) Hours As Needed for High Blood Pressure (BP >180/100)., Disp: 30 tablet, Rfl: 1  •  ferrous sulfate 325 (65 FE) MG tablet, Take 1 tablet by mouth 2 (Two) Times a Day With Meals., Disp: 60 tablet, Rfl: 0  •  fluticasone (FLONASE) 50 MCG/ACT nasal spray, 2 sprays into the nostril(s) as directed by provider Daily As Needed for Rhinitis or Allergies., Disp: , Rfl:   •  hydrALAZINE (APRESOLINE) 25 MG tablet, Take 3 tablets by mouth Every 8 (Eight) Hours., Disp: 270 tablet, Rfl: 3  •  losartan (COZAAR) 100 MG tablet, Take 1 tablet by mouth Daily., Disp: 90 tablet, Rfl: 3  •  NIFEdipine XL (ADALAT CC) 60 MG 24 hr tablet, Take 1 tablet by mouth 2 (two) times a day., Disp: 60 tablet, Rfl: 1  •  nitroglycerin (NITROSTAT) 0.4 MG SL tablet, Place 1 tablet under the tongue Every 5 (Five) Minutes As Needed for Chest Pain for up to 3 doses. Take no more than 3 doses in 15 minutes., Disp: 25 tablet, Rfl: 0  •  OLANZapine (zyPREXA) 5 MG tablet, Take 1 tablet by mouth 2 (two) times a day., Disp: 60 tablet, Rfl: 0  •  pantoprazole (PROTONIX) 40 MG EC tablet, Take 1 tablet by mouth Daily., Disp: 30 tablet, Rfl: 1  •  [START ON 12/8/2020] rosuvastatin (CRESTOR) 20 MG tablet, Take 1 tablet by mouth Daily.,  Disp: 90 tablet, Rfl: 3  •  [START ON 12/8/2020] ticagrelor (BRILINTA) 90 MG tablet tablet, Take 1 tablet by mouth 2 (Two) Times a Day., Disp: 60 tablet, Rfl: 11  •  cholecalciferol 1000 units tablet, Take 1,000 Units by mouth Daily., Disp: 30 tablet, Rfl: 1  Past Medical History:   Diagnosis Date   • Acute CHF (CMS/HCC)    • Acute renal failure (ARF) (CMS/HCC)    • Anemia    • Asthma     childhood   • Hypertension    • Ischemic colitis (CMS/HCC)    • Metabolic acidosis    • Nonrheumatic aortic (valve) insufficiency    • PTSD (post-traumatic stress disorder)      Past Surgical History:   Procedure Laterality Date   • CARDIAC CATHETERIZATION N/A 11/8/2020    Procedure: Left Heart Cath;  Surgeon: Pierce Buchanan MD;  Location: [facility] CATH INVASIVE LOCATION;  Service: Cardiovascular;  Laterality: N/A;   • EXTERNAL FIXATION WRIST FRACTURE     • LEG SURGERY     • TUBAL ABDOMINAL LIGATION       Family History   Problem Relation Age of Onset   • Coronary artery disease Mother    • Coronary artery disease Father      Social History     Socioeconomic History   • Marital status: Single     Spouse name: Not on file   • Number of children: Not on file   • Years of education: Not on file   • Highest education level: Not on file   Tobacco Use   • Smoking status: Current Every Day Smoker     Packs/day: 0.50   • Smokeless tobacco: Never Used   Substance and Sexual Activity   • Alcohol use: No   • Drug use: No   • Sexual activity: Defer       Review of Systems   Constitution: Positive for malaise/fatigue. Negative for chills, decreased appetite, fever, night sweats, weight gain and weight loss.   HENT: Negative for nosebleeds.    Eyes: Negative for blurred vision and visual disturbance.   Cardiovascular: Positive for chest pain and dyspnea on exertion. Negative for leg swelling, near-syncope, orthopnea, palpitations, paroxysmal nocturnal dyspnea and syncope.   Respiratory: Negative for chest tightness, cough, hemoptysis, shortness of  breath, snoring and wheezing.    Endocrine: Negative for cold intolerance and heat intolerance.   Hematologic/Lymphatic: Does not bruise/bleed easily.   Skin: Negative for rash.   Musculoskeletal: Negative for back pain, falls, muscle weakness and neck pain.   Gastrointestinal: Negative for abdominal pain, change in bowel habit, constipation, diarrhea, dysphagia, heartburn, nausea and vomiting.   Genitourinary: Negative for hematuria.   Neurological: Negative for dizziness, headaches, light-headedness and weakness.   Psychiatric/Behavioral: Negative for altered mental status.   Allergic/Immunologic: Negative for persistent infections.              Objective:     Vitals signs and nursing note reviewed.   Constitutional:       General: Not in acute distress.     Appearance: Normal and healthy appearance. Well-developed, normal weight and not in distress. Not diaphoretic.   Eyes:      General: Lids are normal.         Right eye: No discharge.         Left eye: No discharge.      Conjunctiva/sclera: Conjunctivae normal.      Pupils: Pupils are equal, round, and reactive to light.   HENT:      Head: Normocephalic and atraumatic.      Jaw: There is normal jaw occlusion.      Right Ear: External ear normal.      Left Ear: External ear normal.      Nose: Nose normal.   Neck:      Musculoskeletal: Normal range of motion and neck supple.      Thyroid: No thyromegaly.      Vascular: No carotid bruit, JVD or JVR. JVD normal.      Trachea: Trachea normal. No tracheal deviation.   Pulmonary:      Effort: Pulmonary effort is normal. No respiratory distress.      Breath sounds: Normal breath sounds. No decreased breath sounds. No wheezing. No rhonchi. No rales.   Chest:      Chest wall: Not tender to palpatation.   Cardiovascular:      PMI at left midclavicular line. Normal rate. Regular rhythm. Normal S1. Normal S2.      Murmurs: There is a grade 2/6 high frequency blowing holosystolic murmur at the apex. There is a grade 2/4 high  "frequency blowing decrescendo, early diastolic murmur at the URSB, radiating to the apex.      No gallop. No click. No rub.   Pulses:     Intact distal pulses. No decreased pulses.   Edema:     Peripheral edema absent.   Abdominal:      General: Bowel sounds are normal. There is no distension.      Palpations: Abdomen is soft.      Tenderness: There is no abdominal tenderness.   Musculoskeletal: Normal range of motion.         General: No tenderness or deformity.   Skin:     General: Skin is warm and dry.      Coloration: Skin is not pale.      Findings: No erythema or rash.   Neurological:      General: No focal deficit present.      Mental Status: Alert, oriented to person, place, and time and oriented to person, place and time.   Psychiatric:         Attention and Perception: Attention and perception normal.         Mood and Affect: Mood and affect normal.         Speech: Speech normal.         Behavior: Behavior normal.         Thought Content: Thought content normal.         Cognition and Memory: Cognition and memory normal.         Judgment: Judgment normal.             ECG 12 Lead    Date/Time: 11/24/2020 11:26 AM  Performed by: Destini Haque APRN  Authorized by: Destini Haque APRN   Comparison: compared with previous ECG from 11/9/2020  Rhythm: sinus rhythm  Rate: normal  BPM: 83  Conduction: 1st degree AV block  Q waves: II, III, aVF, V3, V4, V5 and V6    T inversion: I and aVL    Clinical impression: abnormal EKG          /90   Pulse 83   Ht 165.1 cm (65\")   Wt 87.1 kg (192 lb)   SpO2 97%   BMI 31.95 kg/m²   Lab Review:   Lab Results   Component Value Date    WBC 13.29 (H) 11/10/2020    HGB 8.7 (L) 11/10/2020    HCT 27.7 (L) 11/10/2020    MCV 87.4 11/10/2020     11/10/2020     Lab Results   Component Value Date    GLUCOSE 157 (H) 11/10/2020    BUN 30 (H) 11/10/2020    CREATININE 1.99 (H) 11/10/2020    EGFRIFNONA 26 (L) 11/10/2020    EGFRIFAFRI  10/14/2020      Comment:      <15 " Indicative of kidney failure.    BCR 15.1 11/10/2020    K 3.8 11/10/2020    CO2 27.0 11/10/2020    CALCIUM 8.3 (L) 11/10/2020    ALBUMIN 3.60 11/07/2020    AST 70 (H) 11/07/2020    ALT 23 11/07/2020     Lab Results   Component Value Date    CHOL 196 11/08/2020    CHOL 178 09/01/2019    CHLPL 164 11/18/2015     Lab Results   Component Value Date    TRIG 68 11/08/2020    TRIG 70 09/01/2019    TRIG 104 11/18/2015     Lab Results   Component Value Date    HDL 52 11/08/2020    HDL 51 09/01/2019    HDL 53 11/18/2015     Lab Results   Component Value Date     (H) 11/08/2020     (H) 09/01/2019    LDL 91 11/18/2015       I have reviewed the most recent lab results.       Assessment:          Diagnosis Plan   1. Coronary artery disease involving native coronary artery of native heart with unstable angina pectoris (CMS/[facility])  Stress Test With Myocardial Perfusion (1 Day)   2. ST elevation myocardial infarction involving left anterior descending (LAD) coronary artery (CMS/[facility])  11/8/20   3. Status post insertion of drug-eluting stent into left anterior descending (LAD) artery for coronary artery disease  2.75 x 28 mm Xience drug-eluting stent to the proximal to mid left anterior descending artery. Continues on aspirin and Brilinta. Denies bleeding.    4. Essential hypertension  Borderline in office today. Increase losartan to 100 mg daily.    5. Hyperlipidemia LDL goal <70  Recently started on high intensity statin. Will need repeat lipid panel in three months.    6. Nonrheumatic aortic valve insufficiency  Mild AI on echo 11/8/20.    7. Stage 3a chronic kidney disease  Stable.    8. Colitis, ischemic (CMS/[facility])     9. Cigarette smoker  Ludivina Ramirez  reports that she has been smoking. She has been smoking about 0.50 packs per day. She has never used smokeless tobacco.. I have educated her on the risk of diseases from using tobacco products such as cancer, COPD and heart disease.     I advised her to quit and she is  not willing to quit.    I spent 3  minutes counseling the patient.          10. Class 1 obesity due to excess calories with serious comorbidity and body mass index (BMI) of 31.0 to 31.9 in adult  Patient's Body mass index is 31.95 kg/m². BMI is above normal parameters. Recommendations include: educational material.            Plan:         1. Increase losartan to 100 mg daily. Continue other medications as previously prescribed.  2. Report any worsening symptoms.  3. Report any signs of bleeding.  4. Continue heart healthy diet and regular exercise as tolerated.   5. Follow up with PCP for blood pressure and cholesterol management and routine lab work.  6. Follow up in one month, or sooner if needed.   7. Lexiscan.     Current outpatient and discharge medications have been reconciled for the patient.  Reviewed by: ISAIAH Bray

## 2020-11-23 NOTE — PATIENT INSTRUCTIONS
Steps to Quit Smoking  Smoking tobacco is the leading cause of preventable death. It can affect almost every organ in the body. Smoking puts you and people around you at risk for many serious, long-lasting (chronic) diseases. Quitting smoking can be hard, but it is one of the best things that you can do for your health. It is never too late to quit.  How do I get ready to quit?  When you decide to quit smoking, make a plan to help you succeed. Before you quit:  · Pick a date to quit. Set a date within the next 2 weeks to give you time to prepare.  · Write down the reasons why you are quitting. Keep this list in places where you will see it often.  · Tell your family, friends, and co-workers that you are quitting. Their support is important.  · Talk with your doctor about the choices that may help you quit.  · Find out if your health insurance will pay for these treatments.  · Know the people, places, things, and activities that make you want to smoke (triggers). Avoid them.  What first steps can I take to quit smoking?  · Throw away all cigarettes at home, at work, and in your car.  · Throw away the things that you use when you smoke, such as ashtrays and lighters.  · Clean your car. Make sure to empty the ashtray.  · Clean your home, including curtains and carpets.  What can I do to help me quit smoking?  Talk with your doctor about taking medicines and seeing a counselor at the same time. You are more likely to succeed when you do both.  · If you are pregnant or breastfeeding, talk with your doctor about counseling or other ways to quit smoking. Do not take medicine to help you quit smoking unless your doctor tells you to do so.  To quit smoking:  Quit right away  · Quit smoking totally, instead of slowly cutting back on how much you smoke over a period of time.  · Go to counseling. You are more likely to quit if you go to counseling sessions regularly.  Take medicine  You may take medicines to help you quit. Some  medicines need a prescription, and some you can buy over-the-counter. Some medicines may contain a drug called nicotine to replace the nicotine in cigarettes. Medicines may:  · Help you to stop having the desire to smoke (cravings).  · Help to stop the problems that come when you stop smoking (withdrawal symptoms).  Your doctor may ask you to use:  · Nicotine patches, gum, or lozenges.  · Nicotine inhalers or sprays.  · Non-nicotine medicine that is taken by mouth.  Find resources  Find resources and other ways to help you quit smoking and remain smoke-free after you quit. These resources are most helpful when you use them often. They include:  · Online chats with a counselor.  · Phone quitlines.  · Printed self-help materials.  · Support groups or group counseling.  · Text messaging programs.  · Mobile phone apps. Use apps on your mobile phone or tablet that can help you stick to your quit plan. There are many free apps for mobile phones and tablets as well as websites. Examples include Quit Guide from the CDC and smokefree.gov    What things can I do to make it easier to quit?    · Talk to your family and friends. Ask them to support and encourage you.  · Call a phone quitline (6-604-QUITNOW), reach out to support groups, or work with a counselor.  · Ask people who smoke to not smoke around you.  · Avoid places that make you want to smoke, such as:  ? Bars.  ? Parties.  ? Smoke-break areas at work.  · Spend time with people who do not smoke.  · Lower the stress in your life. Stress can make you want to smoke. Try these things to help your stress:  ? Getting regular exercise.  ? Doing deep-breathing exercises.  ? Doing yoga.  ? Meditating.  ? Doing a body scan. To do this, close your eyes, focus on one area of your body at a time from head to toe. Notice which parts of your body are tense. Try to relax the muscles in those areas.  How will I feel when I quit smoking?  Day 1 to 3 weeks  Within the first 24 hours,  you may start to have some problems that come from quitting tobacco. These problems are very bad 2-3 days after you quit, but they do not often last for more than 2-3 weeks. You may get these symptoms:  · Mood swings.  · Feeling restless, nervous, angry, or annoyed.  · Trouble concentrating.  · Dizziness.  · Strong desire for high-sugar foods and nicotine.  · Weight gain.  · Trouble pooping (constipation).  · Feeling like you may vomit (nausea).  · Coughing or a sore throat.  · Changes in how the medicines that you take for other issues work in your body.  · Depression.  · Trouble sleeping (insomnia).  Week 3 and afterward  After the first 2-3 weeks of quitting, you may start to notice more positive results, such as:  · Better sense of smell and taste.  · Less coughing and sore throat.  · Slower heart rate.  · Lower blood pressure.  · Clearer skin.  · Better breathing.  · Fewer sick days.  Quitting smoking can be hard. Do not give up if you fail the first time. Some people need to try a few times before they succeed. Do your best to stick to your quit plan, and talk with your doctor if you have any questions or concerns.  Summary  · Smoking tobacco is the leading cause of preventable death. Quitting smoking can be hard, but it is one of the best things that you can do for your health.  · When you decide to quit smoking, make a plan to help you succeed.  · Quit smoking right away, not slowly over a period of time.  · When you start quitting, seek help from your doctor, family, or friends.  This information is not intended to replace advice given to you by your health care provider. Make sure you discuss any questions you have with your health care provider.  Document Released: 10/14/2010 Document Revised: 09/11/2020 Document Reviewed: 03/07/2020  Elsevier Patient Education © 2020 [employer].  Health Risks of Smoking  Smoking cigarettes is very bad for your health. Tobacco smoke has over 200 known poisons in it. It  contains the poisonous gases nitrogen oxide and carbon monoxide. There are over 60 chemicals in tobacco smoke that cause cancer.  Smoking is difficult to quit because a chemical in tobacco, called nicotine, causes addiction or dependence. When you smoke and inhale, nicotine is absorbed rapidly into the bloodstream through your lungs. Both inhaled and non-inhaled nicotine may be addictive.  What are the risks of cigarette smoke?  Cigarette smokers have an increased risk of many serious medical problems, including:  · Lung cancer.  · Lung disease, such as pneumonia, bronchitis, and emphysema.  · Chest pain (angina) and heart attack because the heart is not getting enough oxygen.  · Heart disease and peripheral blood vessel disease.  · High blood pressure (hypertension).  · Stroke.  · Oral cancer, including cancer of the lip, mouth, or voice box.  · Bladder cancer.  · Pancreatic cancer.  · Cervical cancer.  · Pregnancy complications, including premature birth.  · Stillbirths and smaller  babies, birth defects, and genetic damage to sperm.  · Early menopause.  · Lower estrogen level for women.  · Infertility.  · Facial wrinkles.  · Blindness.  · Increased risk of broken bones (fractures).  · Senile dementia.  · Stomach ulcers and internal bleeding.  · Delayed wound healing and increased risk of complications during surgery.  · Even smoking lightly shortens your life expectancy by several years.  Because of secondhand smoke exposure, children of smokers have an increased risk of the following:  · Sudden infant death syndrome (SIDS).  · Respiratory infections.  · Lung cancer.  · Heart disease.  · Ear infections.  What are the benefits of quitting?  There are many health benefits of quitting smoking. Here are some of them:  · Within days of quitting smoking, your risk of having a heart attack decreases, your blood flow improves, and your lung capacity improves. Blood pressure, pulse rate, and breathing patterns  start returning to normal soon after quitting.  · Within months, your lungs may clear up completely.  · Quitting for 10 years reduces your risk of developing lung cancer and heart disease to almost that of a nonsmoker.  · People who quit may see an improvement in their overall quality of life.  How do I quit smoking?         Smoking is an addiction with both physical and psychological effects, and longtime habits can be hard to change. Your health care provider can recommend:  · Programs and community resources, which may include group support, education, or talk therapy.  · Prescription medicines to help reduce cravings.  · Nicotine replacement products, such as patches, gum, and nasal sprays. Use these products only as directed. Do not replace cigarette smoking with electronic cigarettes, which are commonly called e-cigarettes. The safety of e-cigarettes is not known, and some may contain harmful chemicals.  · A combination of two or more of these methods.  Where to find more information  · American Lung Association: www.lung.org  · American Cancer Society: www.cancer.org  Summary  · Smoking cigarettes is very bad for your health. Cigarette smokers have an increased risk of many serious medical problems, including several cancers, heart disease, and stroke.  · Smoking is an addiction with both physical and psychological effects, and longtime habits can be hard to change.  · By stopping right away, you can greatly reduce the risk of medical problems for you and your family.  · To help you quit smoking, your health care provider can recommend programs, community resources, prescription medicines, and nicotine replacement products such as patches, gum, and nasal sprays.  This information is not intended to replace advice given to you by your health care provider. Make sure you discuss any questions you have with your health care provider.  Document Released: 01/25/2006 Document Revised: 03/21/2019 Document Reviewed:  12/22/2017  [employer] Patient Education © 2020 [employer]  Exercising to Lose Weight  Exercise is structured, repetitive physical activity to improve fitness and health. Getting regular exercise is important for everyone. It is especially important if you are overweight. Being overweight increases your risk of heart disease, stroke, diabetes, high blood pressure, and several types of cancer. Reducing your calorie intake and exercising can help you lose weight.  Exercise is usually categorized as moderate or vigorous intensity. To lose weight, most people need to do a certain amount of moderate-intensity or vigorous-intensity exercise each week.  Moderate-intensity exercise    Moderate-intensity exercise is any activity that gets you moving enough to burn at least three times more energy (calories) than if you were sitting.  Examples of moderate exercise include:  · Walking a mile in 15 minutes.  · Doing light yard work.  · Biking at an easy pace.  Most people should get at least 150 minutes (2 hours and 30 minutes) a week of moderate-intensity exercise to maintain their body weight.  Vigorous-intensity exercise  Vigorous-intensity exercise is any activity that gets you moving enough to burn at least six times more calories than if you were sitting. When you exercise at this intensity, you should be working hard enough that you are not able to carry on a conversation.  Examples of vigorous exercise include:  · Running.  · Playing a team sport, such as football, basketball, and soccer.  · Jumping rope.  Most people should get at least 75 minutes (1 hour and 15 minutes) a week of vigorous-intensity exercise to maintain their body weight.  How can exercise affect me?  When you exercise enough to burn more calories than you eat, you lose weight. Exercise also reduces body fat and builds muscle. The more muscle you have, the more calories you burn. Exercise also:  · Improves mood.  · Reduces stress and  tension.  · Improves your overall fitness, flexibility, and endurance.  · Increases bone strength.  The amount of exercise you need to lose weight depends on:  · Your age.  · The type of exercise.  · Any health conditions you have.  · Your overall physical ability.  Talk to your health care provider about how much exercise you need and what types of activities are safe for you.  What actions can I take to lose weight?  Nutrition    · Make changes to your diet as told by your health care provider or diet and nutrition specialist (dietitian). This may include:  ? Eating fewer calories.  ? Eating more protein.  ? Eating less unhealthy fats.  ? Eating a diet that includes fresh fruits and vegetables, whole grains, low-fat dairy products, and lean protein.  ? Avoiding foods with added fat, salt, and sugar.  · Drink plenty of water while you exercise to prevent dehydration or heat stroke.  Activity  · Choose an activity that you enjoy and set realistic goals. Your health care provider can help you make an exercise plan that works for you.  · Exercise at a moderate or vigorous intensity most days of the week.  ? The intensity of exercise may vary from person to person. You can tell how intense a workout is for you by paying attention to your breathing and heartbeat. Most people will notice their breathing and heartbeat get faster with more intense exercise.  · Do resistance training twice each week, such as:  ? Push-ups.  ? Sit-ups.  ? Lifting weights.  ? Using resistance bands.  · Getting short amounts of exercise can be just as helpful as long structured periods of exercise. If you have trouble finding time to exercise, try to include exercise in your daily routine.  ? Get up, stretch, and walk around every 30 minutes throughout the day.  ? Go for a walk during your lunch break.  ? Park your car farther away from your destination.  ? If you take public transportation, get off one stop early and walk the rest of the  way.  ? Make phone calls while standing up and walking around.  ? Take the stairs instead of elevators or escalators.  · Wear comfortable clothes and shoes with good support.  · Do not exercise so much that you hurt yourself, feel dizzy, or get very short of breath.  Where to find more information  · [place] Department of Health and Human Services: www.hhs.gov  · Centers for Disease Control and Prevention (CDC): www.cdc.gov  Contact a health care provider:  · Before starting a new exercise program.  · If you have questions or concerns about your weight.  · If you have a medical problem that keeps you from exercising.  Get help right away if you have any of the following while exercising:  · Injury.  · Dizziness.  · Difficulty breathing or shortness of breath that does not go away when you stop exercising.  · Chest pain.  · Rapid heartbeat.  Summary  · Being overweight increases your risk of heart disease, stroke, diabetes, high blood pressure, and several types of cancer.  · Losing weight happens when you burn more calories than you eat.  · Reducing the amount of calories you eat in addition to getting regular moderate or vigorous exercise each week helps you lose weight.  This information is not intended to replace advice given to you by your health care provider. Make sure you discuss any questions you have with your health care provider.  Document Released: 01/20/2012 Document Revised: 12/31/2018 Document Reviewed: 12/31/2018  [employer] Patient Education © 2020 [employer]  Heart-Healthy Eating Plan  Heart-healthy meal planning includes:  · Eating less unhealthy fats.  · Eating more healthy fats.  · Making other changes in your diet.  Talk with your doctor or a diet specialist (dietitian) to create an eating plan that is right for you.  What is my plan?  Your doctor may recommend an eating plan that includes:  · Total fat: ______% or less of total calories a day.  · Saturated fat: ______% or less of total calories  a day.  · Cholesterol: less than _________mg a day.  What are tips for following this plan?  Cooking  Avoid frying your food. Try to bake, boil, grill, or broil it instead. You can also reduce fat by:  · Removing the skin from poultry.  · Removing all visible fats from meats.  · Steaming vegetables in water or broth.  Meal planning    · At meals, divide your plate into four equal parts:  ? Fill one-half of your plate with vegetables and green salads.  ? Fill one-fourth of your plate with whole grains.  ? Fill one-fourth of your plate with lean protein foods.  · Eat 4-5 servings of vegetables per day. A serving of vegetables is:  ? 1 cup of raw or cooked vegetables.  ? 2 cups of raw leafy greens.  · Eat 4-5 servings of fruit per day. A serving of fruit is:  ? 1 medium whole fruit.  ? ¼ cup of dried fruit.  ? ½ cup of fresh, frozen, or canned fruit.  ? ½ cup of 100% fruit juice.  · Eat more foods that have soluble fiber. These are apples, broccoli, carrots, beans, peas, and barley. Try to get 20-30 g of fiber per day.  · Eat 4-5 servings of nuts, legumes, and seeds per week:  ? 1 serving of dried beans or legumes equals ½ cup after being cooked.  ? 1 serving of nuts is ¼ cup.  ? 1 serving of seeds equals 1 tablespoon.  General information  · Eat more home-cooked food. Eat less restaurant, buffet, and fast food.  · Limit or avoid alcohol.  · Limit foods that are high in starch and sugar.  · Avoid fried foods.  · Lose weight if you are overweight.  · Keep track of how much salt (sodium) you eat. This is important if you have high blood pressure. Ask your doctor to tell you more about this.  · Try to add vegetarian meals each week.  Fats  · Choose healthy fats. These include olive oil and canola oil, flaxseeds, walnuts, almonds, and seeds.  · Eat more omega-3 fats. These include salmon, mackerel, sardines, tuna, flaxseed oil, and ground flaxseeds. Try to eat fish at least 2 times each week.  · Check food labels. Avoid  foods with trans fats or high amounts of saturated fat.  · Limit saturated fats.  ? These are often found in animal products, such as meats, butter, and cream.  ? These are also found in plant foods, such as palm oil, palm kernel oil, and coconut oil.  · Avoid foods with partially hydrogenated oils in them. These have trans fats. Examples are stick margarine, some tub margarines, cookies, crackers, and other baked goods.  What foods can I eat?  Fruits  All fresh, canned (in natural juice), or frozen fruits.  Vegetables  Fresh or frozen vegetables (raw, steamed, roasted, or grilled). Green salads.  Grains  Most grains. Choose whole wheat and whole grains most of the time. Rice and pasta, including brown rice and pastas made with whole wheat.  Meats and other proteins  Lean, well-trimmed beef, veal, pork, and lamb. Chicken and turkey without skin. All fish and shellfish. Wild duck, rabbit, pheasant, and venison. Egg whites or low-cholesterol egg substitutes. Dried beans, peas, lentils, and tofu. Seeds and most nuts.  Dairy  Low-fat or nonfat cheeses, including ricotta and mozzarella. Skim or 1% milk that is liquid, powdered, or evaporated. Buttermilk that is made with low-fat milk. Nonfat or low-fat yogurt.  Fats and oils  Non-hydrogenated (trans-free) margarines. Vegetable oils, including soybean, sesame, sunflower, olive, peanut, safflower, corn, canola, and cottonseed. Salad dressings or mayonnaise made with a vegetable oil.  Beverages  Mineral water. Coffee and tea. Diet carbonated beverages.  Sweets and desserts  Sherbet, gelatin, and fruit ice. Small amounts of dark chocolate.  Limit all sweets and desserts.  Seasonings and condiments  All seasonings and condiments.  The items listed above may not be a complete list of foods and drinks you can eat. Contact a dietitian for more options.  What foods should I avoid?  Fruits  Canned fruit in heavy syrup. Fruit in cream or butter sauce. Fried fruit. Limit  coconut.  Vegetables  Vegetables cooked in cheese, cream, or butter sauce. Fried vegetables.  Grains  Breads that are made with saturated or trans fats, oils, or whole milk. Croissants. Sweet rolls. Donuts. High-fat crackers, such as cheese crackers.  Meats and other proteins  Fatty meats, such as hot dogs, ribs, sausage, nolen, rib-eye roast or steak. High-fat deli meats, such as salami and bologna. Caviar. Domestic duck and goose. Organ meats, such as liver.  Dairy  Cream, sour cream, cream cheese, and creamed cottage cheese. Whole-milk cheeses. Whole or 2% milk that is liquid, evaporated, or condensed. Whole buttermilk. Cream sauce or high-fat cheese sauce. Yogurt that is made from whole milk.  Fats and oils  Meat fat, or shortening. Cocoa butter, hydrogenated oils, palm oil, coconut oil, palm kernel oil. Solid fats and shortenings, including nolen fat, salt pork, lard, and butter. Nondairy cream substitutes. Salad dressings with cheese or sour cream.  Beverages  Regular sodas and juice drinks with added sugar.  Sweets and desserts  Frosting. Pudding. Cookies. Cakes. Pies. Milk chocolate or white chocolate. Buttered syrups. Full-fat ice cream or ice cream drinks.  The items listed above may not be a complete list of foods and drinks to avoid. Contact a dietitian for more information.  Summary  · Heart-healthy meal planning includes eating less unhealthy fats, eating more healthy fats, and making other changes in your diet.  · Eat a balanced diet. This includes fruits and vegetables, low-fat or nonfat dairy, lean protein, nuts and legumes, whole grains, and heart-healthy oils and fats.  This information is not intended to replace advice given to you by your health care provider. Make sure you discuss any questions you have with your health care provider.  Document Released: 06/18/2013 Document Revised: 02/21/2019 Document Reviewed: 01/25/2019  [employer] Patient Education © 2020 [employer].  Obesity,  Adult  Obesity is having too much body fat. Being obese means that your weight is more than what is healthy for you.  BMI is a number that explains how much body fat you have. If you have a BMI of 30 or more, you are obese. Obesity is often caused by eating or drinking more calories than your body uses. Changing your lifestyle can help you lose weight.  Obesity can cause serious health problems, such as:  · Stroke.  · Coronary artery disease (CAD).  · Type 2 diabetes.  · Some types of cancer, including cancers of the colon, breast, uterus, and gallbladder.  · Osteoarthritis.  · High blood pressure (hypertension).  · High cholesterol.  · Sleep apnea.  · Gallbladder stones.  · Infertility problems.  What are the causes?  · Eating meals each day that are high in calories, sugar, and fat.  · Being born with genes that may make you more likely to become obese.  · Having a medical condition that causes obesity.  · Taking certain medicines.  · Sitting a lot (having a sedentary lifestyle).  · Not getting enough sleep.  · Drinking a lot of drinks that have sugar in them.  What increases the risk?  · Having a family history of obesity.  · Being an  woman.  · Being a  man.  · Living in an area with limited access to:  ? Beach, recreation centers, or sidewalks.  ? Healthy food choices, such as grocery stores and [employer].  What are the signs or symptoms?  The main sign is having too much body fat.  How is this treated?  · Treatment for this condition often includes changing your lifestyle. Treatment may include:  ? Changing your diet. This may include making a healthy meal plan.  ? Exercise. This may include activity that causes your heart to beat faster (aerobic exercise) and strength training. Work with your doctor to design a program that works for you.  ? Medicine to help you lose weight. This may be used if you are not able to lose 1 pound a week after 6 weeks of healthy eating and more  exercise.  ? Treating conditions that cause the obesity.  ? Surgery. Options may include gastric banding and gastric bypass. This may be done if:  § Other treatments have not helped to improve your condition.  § You have a BMI of 40 or higher.  § You have life-threatening health problems related to obesity.  Follow these instructions at home:  Eating and drinking    · Follow advice from your doctor about what to eat and drink. Your doctor may tell you to:  ? Limit fast food, sweets, and processed snack foods.  ? Choose low-fat options. For example, choose low-fat milk instead of whole milk.  ? Eat 5 or more servings of fruits or vegetables each day.  ? Eat at home more often. This gives you more control over what you eat.  ? Choose healthy foods when you eat out.  ? Learn to read food labels. This will help you learn how much food is in 1 serving.  ? Keep low-fat snacks available.  ? Avoid drinks that have a lot of sugar in them. These include soda, fruit juice, iced tea with sugar, and flavored milk.  · Drink enough water to keep your pee (urine) pale yellow.  · Do not go on fad diets.  Physical activity  · Exercise often, as told by your doctor. Most adults should get up to 150 minutes of moderate-intensity exercise every week.Ask your doctor:  ? What types of exercise are safe for you.  ? How often you should exercise.  · Warm up and stretch before being active.  · Do slow stretching after being active (cool down).  · Rest between times of being active.  Lifestyle  · Work with your doctor and a food expert (dietitian) to set a weight-loss goal that is best for you.  · Limit your screen time.  · Find ways to reward yourself that do not involve food.  · Do not drink alcohol if:  ? Your doctor tells you not to drink.  ? You are pregnant, may be pregnant, or are planning to become pregnant.  · If you drink alcohol:  ? Limit how much you use to:  § 0-1 drink a day for women.  § 0-2 drinks a day for men.  ? Be aware of  how much alcohol is in your drink. In the [place] one drink equals one 12 oz bottle of beer (355 mL), one 5 oz glass of wine (148 mL), or one 1½ oz glass of hard liquor (44 mL).  General instructions  · Keep a weight-loss journal. This can help you keep track of:  ? The food that you eat.  ? How much exercise you get.  · Take over-the-counter and prescription medicines only as told by your doctor.  · Take vitamins and supplements only as told by your doctor.  · Think about joining a support group.  · Keep all follow-up visits as told by your doctor. This is important.  Contact a doctor if:  · You cannot meet your weight loss goal after you have changed your diet and lifestyle for 6 weeks.  Get help right away if you:  · Are having trouble breathing.  · Are having thoughts of harming yourself.  Summary  · Obesity is having too much body fat.  · Being obese means that your weight is more than what is healthy for you.  · Work with your doctor to set a weight-loss goal.  · Get regular exercise as told by your doctor.  This information is not intended to replace advice given to you by your health care provider. Make sure you discuss any questions you have with your health care provider.  Document Released: 03/11/2013 Document Revised: 08/22/2019 Document Reviewed: 08/22/2019  Elsevier Patient Education © 2020 [employer]

## 2020-11-24 ENCOUNTER — OFFICE VISIT (OUTPATIENT)
Dept: CARDIOLOGY | Facility: CLINIC | Age: 60
End: 2020-11-24

## 2020-11-24 ENCOUNTER — READMISSION MANAGEMENT (OUTPATIENT)
Dept: CALL CENTER | Facility: HOSPITAL | Age: 60
End: 2020-11-24

## 2020-11-24 VITALS
WEIGHT: 192 LBS | HEIGHT: 65 IN | BODY MASS INDEX: 31.99 KG/M2 | SYSTOLIC BLOOD PRESSURE: 140 MMHG | DIASTOLIC BLOOD PRESSURE: 90 MMHG | HEART RATE: 83 BPM | OXYGEN SATURATION: 97 %

## 2020-11-24 DIAGNOSIS — I25.110 CORONARY ARTERY DISEASE INVOLVING NATIVE CORONARY ARTERY OF NATIVE HEART WITH UNSTABLE ANGINA PECTORIS (HCC): Primary | ICD-10-CM

## 2020-11-24 DIAGNOSIS — I21.02 ST ELEVATION MYOCARDIAL INFARCTION INVOLVING LEFT ANTERIOR DESCENDING (LAD) CORONARY ARTERY (HCC): ICD-10-CM

## 2020-11-24 DIAGNOSIS — E66.09 CLASS 1 OBESITY DUE TO EXCESS CALORIES WITH SERIOUS COMORBIDITY AND BODY MASS INDEX (BMI) OF 31.0 TO 31.9 IN ADULT: ICD-10-CM

## 2020-11-24 DIAGNOSIS — Z95.5 STATUS POST INSERTION OF DRUG-ELUTING STENT INTO LEFT ANTERIOR DESCENDING (LAD) ARTERY FOR CORONARY ARTERY DISEASE: ICD-10-CM

## 2020-11-24 DIAGNOSIS — I10 ESSENTIAL HYPERTENSION: ICD-10-CM

## 2020-11-24 DIAGNOSIS — E78.5 HYPERLIPIDEMIA LDL GOAL <70: ICD-10-CM

## 2020-11-24 DIAGNOSIS — I35.1 NONRHEUMATIC AORTIC VALVE INSUFFICIENCY: ICD-10-CM

## 2020-11-24 DIAGNOSIS — K55.9 COLITIS, ISCHEMIC (HCC): ICD-10-CM

## 2020-11-24 DIAGNOSIS — N18.31 STAGE 3A CHRONIC KIDNEY DISEASE (HCC): ICD-10-CM

## 2020-11-24 DIAGNOSIS — F17.210 CIGARETTE SMOKER: Chronic | ICD-10-CM

## 2020-11-24 PROCEDURE — 99214 OFFICE O/P EST MOD 30 MIN: CPT | Performed by: NURSE PRACTITIONER

## 2020-11-24 PROCEDURE — 93000 ELECTROCARDIOGRAM COMPLETE: CPT | Performed by: NURSE PRACTITIONER

## 2020-11-24 RX ORDER — AMLODIPINE BESYLATE 5 MG/1
5 TABLET ORAL DAILY
COMMUNITY
End: 2021-07-22

## 2020-11-24 RX ORDER — LOSARTAN POTASSIUM 100 MG/1
100 TABLET ORAL
Qty: 90 TABLET | Refills: 3 | Status: SHIPPED | OUTPATIENT
Start: 2020-11-24 | End: 2021-09-01 | Stop reason: HOSPADM

## 2020-11-24 NOTE — OUTREACH NOTE
AMI Week 2 Survey      Responses   [facility] patient discharged from?  [place]   Does the patient have one of the following disease processes/diagnoses(primary or secondary)?  Acute MI (STEMI,NSTEMI)   Week 2 attempt successful?  No   Unsuccessful attempts  Attempt 1          Fawn Marina RN

## 2020-12-10 ENCOUNTER — APPOINTMENT (OUTPATIENT)
Dept: CARDIOLOGY | Facility: HOSPITAL | Age: 60
End: 2020-12-10

## 2021-01-11 ENCOUNTER — TRANSCRIBE ORDERS (OUTPATIENT)
Dept: ADMINISTRATIVE | Facility: HOSPITAL | Age: 61
End: 2021-01-11

## 2021-01-11 DIAGNOSIS — Z12.31 ENCOUNTER FOR SCREENING MAMMOGRAM FOR MALIGNANT NEOPLASM OF BREAST: Primary | ICD-10-CM

## 2021-01-14 ENCOUNTER — APPOINTMENT (OUTPATIENT)
Dept: MAMMOGRAPHY | Facility: HOSPITAL | Age: 61
End: 2021-01-14

## 2021-01-19 ENCOUNTER — APPOINTMENT (OUTPATIENT)
Dept: CARDIAC REHAB | Facility: HOSPITAL | Age: 61
End: 2021-01-19

## 2021-01-21 ENCOUNTER — APPOINTMENT (OUTPATIENT)
Dept: CARDIAC REHAB | Facility: HOSPITAL | Age: 61
End: 2021-01-21

## 2021-01-26 ENCOUNTER — APPOINTMENT (OUTPATIENT)
Dept: CARDIAC REHAB | Facility: HOSPITAL | Age: 61
End: 2021-01-26

## 2021-01-28 ENCOUNTER — APPOINTMENT (OUTPATIENT)
Dept: CARDIAC REHAB | Facility: HOSPITAL | Age: 61
End: 2021-01-28

## 2021-02-02 ENCOUNTER — APPOINTMENT (OUTPATIENT)
Dept: CARDIAC REHAB | Facility: HOSPITAL | Age: 61
End: 2021-02-02

## 2021-02-04 ENCOUNTER — APPOINTMENT (OUTPATIENT)
Dept: CARDIAC REHAB | Facility: HOSPITAL | Age: 61
End: 2021-02-04

## 2021-02-09 ENCOUNTER — APPOINTMENT (OUTPATIENT)
Dept: CARDIAC REHAB | Facility: HOSPITAL | Age: 61
End: 2021-02-09

## 2021-02-11 ENCOUNTER — APPOINTMENT (OUTPATIENT)
Dept: CARDIAC REHAB | Facility: HOSPITAL | Age: 61
End: 2021-02-11

## 2021-02-16 ENCOUNTER — APPOINTMENT (OUTPATIENT)
Dept: CARDIAC REHAB | Facility: HOSPITAL | Age: 61
End: 2021-02-16

## 2021-02-18 ENCOUNTER — APPOINTMENT (OUTPATIENT)
Dept: CARDIAC REHAB | Facility: HOSPITAL | Age: 61
End: 2021-02-18

## 2021-02-23 ENCOUNTER — APPOINTMENT (OUTPATIENT)
Dept: CARDIAC REHAB | Facility: HOSPITAL | Age: 61
End: 2021-02-23

## 2021-02-25 ENCOUNTER — APPOINTMENT (OUTPATIENT)
Dept: CARDIAC REHAB | Facility: HOSPITAL | Age: 61
End: 2021-02-25

## 2021-03-11 ENCOUNTER — BULK ORDERING (OUTPATIENT)
Dept: CASE MANAGEMENT | Facility: OTHER | Age: 61
End: 2021-03-11

## 2021-03-11 DIAGNOSIS — Z23 IMMUNIZATION DUE: ICD-10-CM

## 2021-05-13 ENCOUNTER — TRANSCRIBE ORDERS (OUTPATIENT)
Dept: ADMINISTRATIVE | Facility: HOSPITAL | Age: 61
End: 2021-05-13

## 2021-05-13 DIAGNOSIS — Z12.31 ENCOUNTER FOR SCREENING MAMMOGRAM FOR MALIGNANT NEOPLASM OF BREAST: Primary | ICD-10-CM

## 2021-07-21 ENCOUNTER — HOSPITAL ENCOUNTER (INPATIENT)
Facility: HOSPITAL | Age: 61
LOS: 4 days | Discharge: HOME | End: 2021-07-25
Attending: FAMILY MEDICINE | Admitting: FAMILY MEDICINE

## 2021-07-21 ENCOUNTER — APPOINTMENT (OUTPATIENT)
Dept: CT IMAGING | Facility: HOSPITAL | Age: 61
End: 2021-07-21

## 2021-07-21 ENCOUNTER — APPOINTMENT (OUTPATIENT)
Dept: GENERAL RADIOLOGY | Facility: HOSPITAL | Age: 61
End: 2021-07-21

## 2021-07-21 ENCOUNTER — APPOINTMENT (OUTPATIENT)
Dept: NUCLEAR MEDICINE | Facility: HOSPITAL | Age: 61
End: 2021-07-21

## 2021-07-21 DIAGNOSIS — R00.1 BRADYCARDIA: Primary | ICD-10-CM

## 2021-07-21 DIAGNOSIS — I50.9 CONGESTIVE HEART FAILURE, UNSPECIFIED HF CHRONICITY, UNSPECIFIED HEART FAILURE TYPE (HCC): ICD-10-CM

## 2021-07-21 LAB
ALBUMIN SERPL-MCNC: 4.1 G/DL (ref 3.5–5.2)
ALBUMIN/GLOB SERPL: 1.4 G/DL
ALP SERPL-CCNC: 104 U/L (ref 39–117)
ALT SERPL W P-5'-P-CCNC: 18 U/L (ref 1–33)
ANION GAP SERPL CALCULATED.3IONS-SCNC: 10 MMOL/L (ref 5–15)
APTT PPP: 32.3 SECONDS (ref 24.1–35)
ARTERIAL PATENCY WRIST A: POSITIVE
AST SERPL-CCNC: 15 U/L (ref 1–32)
ATMOSPHERIC PRESS: 752 MMHG
BASE EXCESS BLDA CALC-SCNC: 0.9 MMOL/L (ref 0–2)
BASOPHILS # BLD AUTO: 0.06 10*3/MM3 (ref 0–0.2)
BASOPHILS NFR BLD AUTO: 0.6 % (ref 0–1.5)
BDY SITE: ABNORMAL
BILIRUB SERPL-MCNC: 0.7 MG/DL (ref 0–1.2)
BODY TEMPERATURE: 37 C
BUN SERPL-MCNC: 33 MG/DL (ref 8–23)
BUN/CREAT SERPL: 20.6 (ref 7–25)
CALCIUM SPEC-SCNC: 8.9 MG/DL (ref 8.6–10.5)
CHLORIDE SERPL-SCNC: 108 MMOL/L (ref 98–107)
CO2 SERPL-SCNC: 24 MMOL/L (ref 22–29)
CREAT SERPL-MCNC: 1.6 MG/DL (ref 0.57–1)
D DIMER PPP FEU-MCNC: 1.27 MG/L (FEU) (ref 0–0.5)
DEPRECATED RDW RBC AUTO: 49.8 FL (ref 37–54)
EOSINOPHIL # BLD AUTO: 0.18 10*3/MM3 (ref 0–0.4)
EOSINOPHIL NFR BLD AUTO: 1.9 % (ref 0.3–6.2)
ERYTHROCYTE [DISTWIDTH] IN BLOOD BY AUTOMATED COUNT: 14.8 % (ref 12.3–15.4)
GAS FLOW AIRWAY: 2 LPM
GFR SERPL CREATININE-BSD FRML MDRD: 33 ML/MIN/1.73
GLOBULIN UR ELPH-MCNC: 2.9 GM/DL
GLUCOSE SERPL-MCNC: 101 MG/DL (ref 65–99)
HCO3 BLDA-SCNC: 25.7 MMOL/L (ref 20–26)
HCT VFR BLD AUTO: 34.2 % (ref 34–46.6)
HGB BLD-MCNC: 10.8 G/DL (ref 12–15.9)
HOLD SPECIMEN: NORMAL
HOLD SPECIMEN: NORMAL
IMM GRANULOCYTES # BLD AUTO: 0.05 10*3/MM3 (ref 0–0.05)
IMM GRANULOCYTES NFR BLD AUTO: 0.5 % (ref 0–0.5)
INR PPP: 1.07 (ref 0.91–1.09)
LYMPHOCYTES # BLD AUTO: 0.88 10*3/MM3 (ref 0.7–3.1)
LYMPHOCYTES NFR BLD AUTO: 9.5 % (ref 19.6–45.3)
Lab: ABNORMAL
MCH RBC QN AUTO: 29.5 PG (ref 26.6–33)
MCHC RBC AUTO-ENTMCNC: 31.6 G/DL (ref 31.5–35.7)
MCV RBC AUTO: 93.4 FL (ref 79–97)
MODALITY: ABNORMAL
MONOCYTES # BLD AUTO: 0.54 10*3/MM3 (ref 0.1–0.9)
MONOCYTES NFR BLD AUTO: 5.8 % (ref 5–12)
NEUTROPHILS NFR BLD AUTO: 7.6 10*3/MM3 (ref 1.7–7)
NEUTROPHILS NFR BLD AUTO: 81.7 % (ref 42.7–76)
NRBC BLD AUTO-RTO: 0 /100 WBC (ref 0–0.2)
NT-PROBNP SERPL-MCNC: ABNORMAL PG/ML (ref 0–900)
PCO2 BLDA: 41.2 MM HG (ref 35–45)
PCO2 TEMP ADJ BLD: 41.2 MM HG (ref 35–45)
PH BLDA: 7.4 PH UNITS (ref 7.35–7.45)
PH, TEMP CORRECTED: 7.4 PH UNITS (ref 7.35–7.45)
PLATELET # BLD AUTO: 212 10*3/MM3 (ref 140–450)
PMV BLD AUTO: 11.3 FL (ref 6–12)
PO2 BLDA: 76.4 MM HG (ref 83–108)
PO2 TEMP ADJ BLD: 76.4 MM HG (ref 83–108)
POTASSIUM SERPL-SCNC: 3.7 MMOL/L (ref 3.5–5.2)
PROT SERPL-MCNC: 7 G/DL (ref 6–8.5)
PROTHROMBIN TIME: 13.1 SECONDS (ref 11.5–13.4)
RBC # BLD AUTO: 3.66 10*6/MM3 (ref 3.77–5.28)
SAO2 % BLDCOA: 96.7 % (ref 94–99)
SARS-COV-2 RNA PNL SPEC NAA+PROBE: NOT DETECTED
SODIUM SERPL-SCNC: 142 MMOL/L (ref 136–145)
TROPONIN T SERPL-MCNC: 0.01 NG/ML (ref 0–0.03)
VENTILATOR MODE: ABNORMAL
WBC # BLD AUTO: 9.31 10*3/MM3 (ref 3.4–10.8)
WHOLE BLOOD HOLD SPECIMEN: NORMAL

## 2021-07-21 PROCEDURE — 94799 UNLISTED PULMONARY SVC/PX: CPT

## 2021-07-21 PROCEDURE — 85730 THROMBOPLASTIN TIME PARTIAL: CPT | Performed by: NURSE PRACTITIONER

## 2021-07-21 PROCEDURE — 87635 SARS-COV-2 COVID-19 AMP PRB: CPT | Performed by: NURSE PRACTITIONER

## 2021-07-21 PROCEDURE — 83880 ASSAY OF NATRIURETIC PEPTIDE: CPT | Performed by: EMERGENCY MEDICINE

## 2021-07-21 PROCEDURE — 84484 ASSAY OF TROPONIN QUANT: CPT | Performed by: EMERGENCY MEDICINE

## 2021-07-21 PROCEDURE — 80053 COMPREHEN METABOLIC PANEL: CPT | Performed by: EMERGENCY MEDICINE

## 2021-07-21 PROCEDURE — 0 TECHNETIUM ALBUMIN AGGREGATED: Performed by: FAMILY MEDICINE

## 2021-07-21 PROCEDURE — 94660 CPAP INITIATION&MGMT: CPT

## 2021-07-21 PROCEDURE — 82803 BLOOD GASES ANY COMBINATION: CPT

## 2021-07-21 PROCEDURE — 25010000002 ENOXAPARIN PER 10 MG: Performed by: NURSE PRACTITIONER

## 2021-07-21 PROCEDURE — 93005 ELECTROCARDIOGRAM TRACING: CPT | Performed by: NURSE PRACTITIONER

## 2021-07-21 PROCEDURE — 93005 ELECTROCARDIOGRAM TRACING: CPT | Performed by: FAMILY MEDICINE

## 2021-07-21 PROCEDURE — 87040 BLOOD CULTURE FOR BACTERIA: CPT | Performed by: NURSE PRACTITIONER

## 2021-07-21 PROCEDURE — 85610 PROTHROMBIN TIME: CPT | Performed by: EMERGENCY MEDICINE

## 2021-07-21 PROCEDURE — 94640 AIRWAY INHALATION TREATMENT: CPT

## 2021-07-21 PROCEDURE — 93010 ELECTROCARDIOGRAM REPORT: CPT | Performed by: INTERNAL MEDICINE

## 2021-07-21 PROCEDURE — 99285 EMERGENCY DEPT VISIT HI MDM: CPT

## 2021-07-21 PROCEDURE — A9540 TC99M MAA: HCPCS | Performed by: FAMILY MEDICINE

## 2021-07-21 PROCEDURE — 78582 LUNG VENTILAT&PERFUS IMAGING: CPT

## 2021-07-21 PROCEDURE — 71045 X-RAY EXAM CHEST 1 VIEW: CPT

## 2021-07-21 PROCEDURE — 85379 FIBRIN DEGRADATION QUANT: CPT | Performed by: EMERGENCY MEDICINE

## 2021-07-21 PROCEDURE — 93005 ELECTROCARDIOGRAM TRACING: CPT

## 2021-07-21 PROCEDURE — 36600 WITHDRAWAL OF ARTERIAL BLOOD: CPT

## 2021-07-21 PROCEDURE — 25010000002 FUROSEMIDE PER 20 MG: Performed by: NURSE PRACTITIONER

## 2021-07-21 PROCEDURE — 85025 COMPLETE CBC W/AUTO DIFF WBC: CPT | Performed by: EMERGENCY MEDICINE

## 2021-07-21 RX ORDER — FUROSEMIDE 10 MG/ML
40 INJECTION INTRAMUSCULAR; INTRAVENOUS ONCE
Status: COMPLETED | OUTPATIENT
Start: 2021-07-21 | End: 2021-07-21

## 2021-07-21 RX ORDER — AMLODIPINE BESYLATE 5 MG/1
5 TABLET ORAL
Status: DISCONTINUED | OUTPATIENT
Start: 2021-07-21 | End: 2021-07-22

## 2021-07-21 RX ORDER — IPRATROPIUM BROMIDE AND ALBUTEROL SULFATE 2.5; .5 MG/3ML; MG/3ML
3 SOLUTION RESPIRATORY (INHALATION) ONCE
Status: COMPLETED | OUTPATIENT
Start: 2021-07-21 | End: 2021-07-21

## 2021-07-21 RX ORDER — SODIUM CHLORIDE 0.9 % (FLUSH) 0.9 %
10 SYRINGE (ML) INJECTION AS NEEDED
Status: DISCONTINUED | OUTPATIENT
Start: 2021-07-21 | End: 2021-07-25 | Stop reason: HOSPADM

## 2021-07-21 RX ADMIN — IPRATROPIUM BROMIDE AND ALBUTEROL SULFATE 3 ML: 2.5; .5 SOLUTION RESPIRATORY (INHALATION) at 16:23

## 2021-07-21 RX ADMIN — KIT FOR THE PREPARATION OF TECHNETIUM TC 99M ALBUMIN AGGREGATED 1 DOSE: 2.5 INJECTION, POWDER, FOR SOLUTION INTRAVENOUS at 18:31

## 2021-07-21 RX ADMIN — FUROSEMIDE 40 MG: 10 INJECTION, SOLUTION INTRAMUSCULAR; INTRAVENOUS at 17:25

## 2021-07-21 RX ADMIN — ENOXAPARIN SODIUM 90 MG: 100 INJECTION SUBCUTANEOUS at 19:06

## 2021-07-21 RX ADMIN — AMLODIPINE BESYLATE 5 MG: 5 TABLET ORAL at 23:49

## 2021-07-21 NOTE — ED NOTES
Pt returned from CT, Bipap hooked back up; pt has no complaints     Ara Hernández, RN  07/21/21 9899

## 2021-07-21 NOTE — ED NOTES
PT heart rate decreased to 41; monitor room called charge RN. Dr Peguero and dee COLON notified. EKG completed and shown to ED physician. PT denies cp and states she feels fine.     Ara Hernández, RN  07/21/21 4902

## 2021-07-21 NOTE — ED PROVIDER NOTES
Subjective   Patient is a 60-year-old female that presents to the ER today with complaints of shortness of breath and chest pain.  The patient reports that this began several days ago.  States that she has been feeling more short of breath.  She does have a history of COPD but states that she does not take any medications for this.  She does not normally wear oxygen.  She states that she smokes half a pack to pack of cigarettes daily.  The patient reports that she feels like she cannot breathe.  She has history of CHF.  She states that she is not on diuretics.  The patient reports she is also having chest pain earlier today.  She presents here today for further evaluation.      History provided by:  Patient   used: No    Shortness of Breath  Severity:  Moderate  Onset quality:  Sudden  Duration:  2 days  Timing:  Constant  Progression:  Worsening  Chronicity:  New  Context: not activity, not animal exposure, not emotional upset, not fumes, not known allergens, not occupational exposure, not pollens, not smoke exposure, not strong odors, not URI and not weather changes    Relieved by:  Nothing  Worsened by:  Nothing  Ineffective treatments:  None tried  Associated symptoms: cough    Associated symptoms: no abdominal pain, no chest pain, no claudication, no diaphoresis, no ear pain, no fever, no headaches, no hemoptysis, no neck pain, no PND, no rash, no sore throat, no sputum production, no syncope, no swollen glands, no vomiting and no wheezing    Risk factors: tobacco use    Risk factors: no recent alcohol use, no family hx of DVT, no hx of cancer, no hx of PE/DVT, no obesity, no oral contraceptive use, no prolonged immobilization and no recent surgery        Review of Systems   Constitutional: Negative for diaphoresis and fever.   HENT: Negative for ear pain and sore throat.    Respiratory: Positive for cough and shortness of breath. Negative for hemoptysis, sputum production and wheezing.     Cardiovascular: Negative for chest pain, claudication, syncope and PND.   Gastrointestinal: Negative for abdominal pain and vomiting.   Musculoskeletal: Negative for neck pain.   Skin: Negative for rash.   Neurological: Negative for headaches.   All other systems reviewed and are negative.      Past Medical History:   Diagnosis Date   • Acute CHF (CMS/HCC)    • Acute renal failure (ARF) (CMS/HCC)    • Anemia    • Asthma     childhood   • Hypertension    • Ischemic colitis (CMS/HCC)    • Metabolic acidosis    • Nonrheumatic aortic (valve) insufficiency    • PTSD (post-traumatic stress disorder)        Allergies   Allergen Reactions   • Codeine Nausea And Vomiting   • Imitrex [Sumatriptan] Other (See Comments)     HA       Past Surgical History:   Procedure Laterality Date   • CARDIAC CATHETERIZATION N/A 11/8/2020    Procedure: Left Heart Cath;  Surgeon: Pierce Buchanan MD;  Location: [facility] CATH INVASIVE LOCATION;  Service: Cardiovascular;  Laterality: N/A;   • EXTERNAL FIXATION WRIST FRACTURE     • LEG SURGERY     • TUBAL ABDOMINAL LIGATION         Family History   Problem Relation Age of Onset   • Coronary artery disease Mother    • Coronary artery disease Father        Social History     Socioeconomic History   • Marital status: Single     Spouse name: Not on file   • Number of children: Not on file   • Years of education: Not on file   • Highest education level: Not on file   Tobacco Use   • Smoking status: Current Every Day Smoker     Packs/day: 0.50   • Smokeless tobacco: Never Used   Vaping Use   • Vaping Use: Never used   Substance and Sexual Activity   • Alcohol use: No   • Drug use: No   • Sexual activity: Defer           Objective   Physical Exam  Vitals and nursing note reviewed.   Constitutional:       Appearance: Normal appearance.   HENT:      Head: Normocephalic and atraumatic.   Eyes:      Conjunctiva/sclera: Conjunctivae normal.   Cardiovascular:      Rate and Rhythm: Normal rate and regular rhythm.    Pulmonary:      Effort: Tachypnea and accessory muscle usage present.      Breath sounds: Normal breath sounds.   Abdominal:      General: Bowel sounds are normal.      Palpations: Abdomen is soft.   Skin:     General: Skin is warm and dry.      Capillary Refill: Capillary refill takes less than 2 seconds.   Neurological:      General: No focal deficit present.      Mental Status: She is alert.   Psychiatric:         Mood and Affect: Mood normal.         Procedures           ED Course  ED Course as of Jul 21 1907   Wed Jul 21, 2021   1624  I'm concerned that she is in an acute CHF exacerbation. Pt will be placed on Bipap per RRT.     [LF]   1904 The patient's dimer was elevated.  I ordered a VQ scan as her GFR is only 33.  Her BNP was 35,000.  Chest x-ray shows volume overload.    [LF]   1904 Patient troponin  0.012.    [LF]   1905 At one point the patient's heart rate did drop down to the 20s.  An EKG was performed and the patient was back up in the 60s.  She was warm dry and pink.  She had no symptoms of this.  I discussed the patient's case with her primary care provider Dr. Weston.  Patient to be admitted to the ICU at this time.  I have also discussed the case with Dr. Hannah who agrees with plan of care.  Patient is aware of her findings.  She feels much better now after being on the BiPAP.  She will be admitted to the hospital at this time in stable condition.    [LF]      ED Course User Index  [LF] Mickie Collins, APRN                                   NM Lung Ventilation Perfusion   Final Result   Low probability perfusion only lung scintigraphy for   significant pulmonary emboli.   This report was finalized on 07/21/2021 18:47 by Dr. Sudhir Hair MD.      XR Chest 1 View   Final Result   1. Generous heart size with chronic lung changes. Superimposed acute   interstitial infiltration from infectious/inflammatory changes as well   as pulmonary edema not excluded.       This report was finalized on  07/21/2021 16:29 by Dr. Nick Busch MD.        Labs Reviewed   COMPREHENSIVE METABOLIC PANEL - Abnormal; Notable for the following components:       Result Value    Glucose 101 (*)     BUN 33 (*)     Creatinine 1.60 (*)     Chloride 108 (*)     eGFR Non  Amer 33 (*)     All other components within normal limits    Narrative:     GFR Normal >60  Chronic Kidney Disease <60  Kidney Failure <15     BNP (IN-HOUSE) - Abnormal; Notable for the following components:    proBNP 35,120.0 (*)     All other components within normal limits    Narrative:     Among patients with dyspnea, NT-proBNP is highly sensitive for the detection of acute congestive heart failure. In addition NT-proBNP of <300 pg/ml effectively rules out acute congestive heart failure with 99% negative predictive value.    Results may be falsely decreased if patient taking Biotin.     D-DIMER, QUANTITATIVE - Abnormal; Notable for the following components:    D-Dimer, Quantitative 1.27 (*)     All other components within normal limits    Narrative:     Reference Range is 0-0.50 mg/L FEU. However, results <0.50 mg/L FEU tends to rule out DVT or PE. Results >0.50 mg/L FEU are not useful in predicting absence or presence of DVT or PE.     CBC WITH AUTO DIFFERENTIAL - Abnormal; Notable for the following components:    RBC 3.66 (*)     Hemoglobin 10.8 (*)     Neutrophil % 81.7 (*)     Lymphocyte % 9.5 (*)     Neutrophils, Absolute 7.60 (*)     All other components within normal limits   BLOOD GAS, ARTERIAL - Abnormal; Notable for the following components:    pO2, Arterial 76.4 (*)     pO2, Temperature Corrected 76.4 (*)     All other components within normal limits   COVID-19,[facility] IN-HOUSE,[facility] SWAB NO TRANSPORT MEDIA 2 HR TAT - Normal    Narrative:     Fact sheet for providers: https://www.fda.gov/media/444485/download     Fact sheet for patients: https://www.fda.gov/media/522498/download    Test performed by PCR.    Consider negative results in  combination with clinical observations, patient history, and epidemiological information.   PROTIME-INR - Normal   TROPONIN (IN-HOUSE) - Normal    Narrative:     Troponin T Reference Range:  <= 0.03 ng/mL-   Negative for AMI  >0.03 ng/mL-     Abnormal for myocardial necrosis.  Clinicians would have to utilize clinical acumen, EKG, Troponin and serial changes to determine if it is an Acute Myocardial Infarction or myocardial injury due to an underlying chronic condition.       Results may be falsely decreased if patient taking Biotin.     APTT - Normal   BLOOD CULTURE   BLOOD CULTURE   RAINBOW DRAW    Narrative:     The following orders were created for panel order [place] Draw.  Procedure                               Abnormality         Status                     ---------                               -----------         ------                     Green Top (Gel)[[id number]]                                  Final result               Lavender Top[[id number]]                                     Final result               Red Top[[id number]]                                          Final result                 Please view results for these tests on the individual orders.   BLOOD GAS, ARTERIAL   CBC AND DIFFERENTIAL    Narrative:     The following orders were created for panel order CBC & Differential.  Procedure                               Abnormality         Status                     ---------                               -----------         ------                     CBC Auto Differential[[id number]]        Abnormal            Final result                 Please view results for these tests on the individual orders.   GREEN TOP   LAVENDER TOP   RED TOP             MDM  Number of Diagnoses or Management Options  Bradycardia: new and requires workup  Congestive heart failure, unspecified HF chronicity, unspecified heart failure type (CMS/HCC): new and requires workup     Amount and/or Complexity of Data  Reviewed  Clinical lab tests: ordered and reviewed  Tests in the radiology section of CPT®: ordered and reviewed  Tests in the medicine section of CPT®: ordered and reviewed  Decide to obtain previous medical records or to obtain history from someone other than the patient: yes  Discuss the patient with other providers: yes    Patient Progress  Patient progress: stable      Final diagnoses:   Bradycardia   Congestive heart failure, unspecified HF chronicity, unspecified heart failure type (CMS/[facility])       ED Disposition  ED Disposition     ED Disposition Condition Comment    Decision to Admit  Level of Care: Critical Care [6]   Diagnosis: Bradycardia [[id number]]   Admitting Physician: JESSE MEIER [[id number]]   Attending Physician: JESSE MEIER [[id number]]   Isolate for COVID?: No [0]   Certification: I Certify That Inpatient Hospital Services Are Medically Necessary For Greater Than 2 Midnights            No follow-up provider specified.       Medication List      No changes were made to your prescriptions during this visit.          Mickie Collins, APRN  07/21/21 1907

## 2021-07-22 PROBLEM — I50.33 ACUTE ON CHRONIC DIASTOLIC HEART FAILURE (HCC): Status: ACTIVE | Noted: 2020-11-08

## 2021-07-22 PROBLEM — I25.118 CORONARY ARTERY DISEASE OF NATIVE ARTERY OF NATIVE HEART WITH STABLE ANGINA PECTORIS (HCC): Status: ACTIVE | Noted: 2020-11-23

## 2021-07-22 LAB
ANION GAP SERPL CALCULATED.3IONS-SCNC: 11 MMOL/L (ref 5–15)
BUN SERPL-MCNC: 32 MG/DL (ref 8–23)
BUN/CREAT SERPL: 17.6 (ref 7–25)
CALCIUM SPEC-SCNC: 9.1 MG/DL (ref 8.6–10.5)
CHLORIDE SERPL-SCNC: 101 MMOL/L (ref 98–107)
CO2 SERPL-SCNC: 30 MMOL/L (ref 22–29)
CREAT SERPL-MCNC: 1.82 MG/DL (ref 0.57–1)
GFR SERPL CREATININE-BSD FRML MDRD: 28 ML/MIN/1.73
GLUCOSE BLDC GLUCOMTR-MCNC: 158 MG/DL (ref 70–130)
GLUCOSE BLDC GLUCOMTR-MCNC: 185 MG/DL (ref 70–130)
GLUCOSE SERPL-MCNC: 104 MG/DL (ref 65–99)
POTASSIUM SERPL-SCNC: 3.7 MMOL/L (ref 3.5–5.2)
SODIUM SERPL-SCNC: 142 MMOL/L (ref 136–145)
TROPONIN T SERPL-MCNC: 0.01 NG/ML (ref 0–0.03)
TROPONIN T SERPL-MCNC: <0.01 NG/ML (ref 0–0.03)

## 2021-07-22 PROCEDURE — 94799 UNLISTED PULMONARY SVC/PX: CPT

## 2021-07-22 PROCEDURE — 82962 GLUCOSE BLOOD TEST: CPT

## 2021-07-22 PROCEDURE — 99222 1ST HOSP IP/OBS MODERATE 55: CPT | Performed by: NURSE PRACTITIONER

## 2021-07-22 PROCEDURE — 94660 CPAP INITIATION&MGMT: CPT

## 2021-07-22 PROCEDURE — 80048 BASIC METABOLIC PNL TOTAL CA: CPT | Performed by: FAMILY MEDICINE

## 2021-07-22 PROCEDURE — 25010000002 FUROSEMIDE PER 20 MG: Performed by: FAMILY MEDICINE

## 2021-07-22 PROCEDURE — 25010000002 ENOXAPARIN PER 10 MG: Performed by: FAMILY MEDICINE

## 2021-07-22 PROCEDURE — 25010000002 METHYLPREDNISOLONE PER 40 MG: Performed by: FAMILY MEDICINE

## 2021-07-22 PROCEDURE — 36415 COLL VENOUS BLD VENIPUNCTURE: CPT | Performed by: FAMILY MEDICINE

## 2021-07-22 PROCEDURE — 84484 ASSAY OF TROPONIN QUANT: CPT | Performed by: FAMILY MEDICINE

## 2021-07-22 RX ORDER — HYDRALAZINE HYDROCHLORIDE 25 MG/1
25 TABLET, FILM COATED ORAL EVERY 8 HOURS SCHEDULED
Status: DISCONTINUED | OUTPATIENT
Start: 2021-07-22 | End: 2021-07-25 | Stop reason: HOSPADM

## 2021-07-22 RX ORDER — AMLODIPINE BESYLATE 5 MG/1
5 TABLET ORAL DAILY
Status: CANCELLED | OUTPATIENT
Start: 2021-07-22

## 2021-07-22 RX ORDER — PANTOPRAZOLE SODIUM 40 MG/1
40 TABLET, DELAYED RELEASE ORAL DAILY
Status: DISCONTINUED | OUTPATIENT
Start: 2021-07-22 | End: 2021-07-24

## 2021-07-22 RX ORDER — NIFEDIPINE 60 MG/1
60 TABLET, EXTENDED RELEASE ORAL 2 TIMES DAILY
Status: DISCONTINUED | OUTPATIENT
Start: 2021-07-22 | End: 2021-07-25 | Stop reason: HOSPADM

## 2021-07-22 RX ORDER — FUROSEMIDE 10 MG/ML
40 INJECTION INTRAMUSCULAR; INTRAVENOUS EVERY 12 HOURS
Status: DISCONTINUED | OUTPATIENT
Start: 2021-07-22 | End: 2021-07-23

## 2021-07-22 RX ORDER — METHYLPREDNISOLONE SODIUM SUCCINATE 40 MG/ML
40 INJECTION, POWDER, LYOPHILIZED, FOR SOLUTION INTRAMUSCULAR; INTRAVENOUS EVERY 8 HOURS
Status: DISCONTINUED | OUTPATIENT
Start: 2021-07-22 | End: 2021-07-25 | Stop reason: HOSPADM

## 2021-07-22 RX ORDER — CARVEDILOL 6.25 MG/1
12.5 TABLET ORAL 2 TIMES DAILY WITH MEALS
Status: DISCONTINUED | OUTPATIENT
Start: 2021-07-22 | End: 2021-07-25 | Stop reason: HOSPADM

## 2021-07-22 RX ORDER — LOSARTAN POTASSIUM 50 MG/1
100 TABLET ORAL
Status: DISCONTINUED | OUTPATIENT
Start: 2021-07-22 | End: 2021-07-24

## 2021-07-22 RX ORDER — IPRATROPIUM BROMIDE AND ALBUTEROL SULFATE 2.5; .5 MG/3ML; MG/3ML
3 SOLUTION RESPIRATORY (INHALATION)
Status: DISCONTINUED | OUTPATIENT
Start: 2021-07-22 | End: 2021-07-25 | Stop reason: HOSPADM

## 2021-07-22 RX ORDER — CLONIDINE HYDROCHLORIDE 0.1 MG/1
0.1 TABLET ORAL EVERY 6 HOURS PRN
Status: DISCONTINUED | OUTPATIENT
Start: 2021-07-22 | End: 2021-07-25 | Stop reason: HOSPADM

## 2021-07-22 RX ORDER — OLANZAPINE 5 MG/1
5 TABLET ORAL DAILY
Status: DISCONTINUED | OUTPATIENT
Start: 2021-07-22 | End: 2021-07-25 | Stop reason: HOSPADM

## 2021-07-22 RX ORDER — ROSUVASTATIN CALCIUM 20 MG/1
20 TABLET, COATED ORAL NIGHTLY
Status: DISCONTINUED | OUTPATIENT
Start: 2021-07-22 | End: 2021-07-24

## 2021-07-22 RX ORDER — FLUTICASONE PROPIONATE 50 MCG
2 SPRAY, SUSPENSION (ML) NASAL DAILY PRN
Status: DISCONTINUED | OUTPATIENT
Start: 2021-07-22 | End: 2021-07-25 | Stop reason: HOSPADM

## 2021-07-22 RX ORDER — ASPIRIN 81 MG/1
81 TABLET ORAL DAILY
Status: DISCONTINUED | OUTPATIENT
Start: 2021-07-22 | End: 2021-07-25 | Stop reason: HOSPADM

## 2021-07-22 RX ADMIN — CARVEDILOL 12.5 MG: 6.25 TABLET, FILM COATED ORAL at 12:45

## 2021-07-22 RX ADMIN — FUROSEMIDE 40 MG: 10 INJECTION, SOLUTION INTRAMUSCULAR; INTRAVENOUS at 11:31

## 2021-07-22 RX ADMIN — METHYLPREDNISOLONE SODIUM SUCCINATE 40 MG: 40 INJECTION, POWDER, FOR SOLUTION INTRAMUSCULAR; INTRAVENOUS at 11:31

## 2021-07-22 RX ADMIN — IPRATROPIUM BROMIDE AND ALBUTEROL SULFATE 3 ML: 2.5; .5 SOLUTION RESPIRATORY (INHALATION) at 14:22

## 2021-07-22 RX ADMIN — DOXYCYCLINE 100 MG: 100 INJECTION, POWDER, LYOPHILIZED, FOR SOLUTION INTRAVENOUS at 23:48

## 2021-07-22 RX ADMIN — OLANZAPINE 5 MG: 5 TABLET, FILM COATED ORAL at 15:16

## 2021-07-22 RX ADMIN — DOXYCYCLINE 100 MG: 100 INJECTION, POWDER, LYOPHILIZED, FOR SOLUTION INTRAVENOUS at 11:30

## 2021-07-22 RX ADMIN — HYDRALAZINE HYDROCHLORIDE 25 MG: 25 TABLET, FILM COATED ORAL at 23:49

## 2021-07-22 RX ADMIN — NIFEDIPINE 60 MG: 60 TABLET, FILM COATED, EXTENDED RELEASE ORAL at 15:16

## 2021-07-22 RX ADMIN — CARVEDILOL 12.5 MG: 6.25 TABLET, FILM COATED ORAL at 18:13

## 2021-07-22 RX ADMIN — LOSARTAN POTASSIUM 100 MG: 50 TABLET, FILM COATED ORAL at 12:44

## 2021-07-22 RX ADMIN — TICAGRELOR 90 MG: 90 TABLET ORAL at 23:51

## 2021-07-22 RX ADMIN — NIFEDIPINE 60 MG: 60 TABLET, FILM COATED, EXTENDED RELEASE ORAL at 23:50

## 2021-07-22 RX ADMIN — IPRATROPIUM BROMIDE AND ALBUTEROL SULFATE 3 ML: 2.5; .5 SOLUTION RESPIRATORY (INHALATION) at 20:30

## 2021-07-22 RX ADMIN — IPRATROPIUM BROMIDE AND ALBUTEROL SULFATE 3 ML: 2.5; .5 SOLUTION RESPIRATORY (INHALATION) at 09:34

## 2021-07-22 RX ADMIN — METHYLPREDNISOLONE SODIUM SUCCINATE 40 MG: 40 INJECTION, POWDER, FOR SOLUTION INTRAMUSCULAR; INTRAVENOUS at 18:13

## 2021-07-22 RX ADMIN — PANTOPRAZOLE SODIUM 40 MG: 40 TABLET, DELAYED RELEASE ORAL at 12:57

## 2021-07-22 RX ADMIN — ASPIRIN 81 MG: 81 TABLET, COATED ORAL at 12:44

## 2021-07-22 RX ADMIN — FUROSEMIDE 40 MG: 10 INJECTION, SOLUTION INTRAMUSCULAR; INTRAVENOUS at 23:48

## 2021-07-22 RX ADMIN — ENOXAPARIN SODIUM 40 MG: 40 INJECTION SUBCUTANEOUS at 18:13

## 2021-07-22 RX ADMIN — HYDRALAZINE HYDROCHLORIDE 25 MG: 25 TABLET, FILM COATED ORAL at 15:16

## 2021-07-22 NOTE — PLAN OF CARE
Goal Outcome Evaluation:  Plan of Care Reviewed With: patient        Progress: no change  Outcome Summary: pt admitted from ER to 4B this shift. vitals stable. SR 60s per monitor. safety maintained.

## 2021-07-22 NOTE — PAYOR COMM NOTE
"REF:   [id number]    Psychiatric  DARRELL,   [phone]  OR  FAX   [phone]    Ludivina Clemons (60 y.o. Female)     Date of Birth Social Security Number Address Home Phone MRN    1960  [address] [facility] [address] [phone] [id number]    Sabianist Marital Status          Samaritan Single       Admission Date Admission Type Admitting Provider Attending Provider Department, Room/Bed    7/21/21 Emergency Orville Weston MD  [facility] Emergency Department, 40/40    Discharge Date Discharge Disposition Discharge Destination                       Attending Provider: (none)   Allergies: Codeine, Imitrex [Sumatriptan]    Isolation: None   Infection: None   Code Status: Prior    Ht: 167.6 cm (66\")   Wt: 90.7 kg (200 lb)    Admission Cmt: None   Principal Problem: None                Active Insurance as of 7/21/2021     Primary Coverage     Payor Plan Insurance Group Employer/Plan Group    ANTH MEDICARE REPLACEMENT ANTH MEDICARE ADVANTAGE [id number]     Payor Plan Address Payor Plan Phone Number Payor Plan Fax Number Effective Dates    [address] [phone]  1/1/2017 - None Entered    [facility] [id number]       Subscriber Name Subscriber Birth Date Member ID       LUDIVINA CLEMONS 1960 [id number]                 Emergency Contacts      (Rel.) Home Phone Work Phone Mobile Phone    YOGI LOPEZ (Relative) [phone] -- --    kenzie thompson (Sister) [phone] -- [phone]    Joshua Clemons () (Son) -- -- [phone]            7/21/2021 Event Details User   14:53 Patient arrival  Sachin Aldrich RN   14:53:58 Arrival Complaint soa     difficult breathing     14:54 Vital Signs Vital Signs  Temp: 97.7 °F (36.5 °C)  Temp src: Oral  Heart Rate: 84  Heart Rate Source: Monitor  Resp: 26  Resp Rate Source: Visual  BP: 174/128Abnormal  (pt reports that b/p correct. states she needs a clonidine)  BMI (Calculated): 32.3  Oxygen " "Therapy  SpO2: 94 %  Pulse Oximetry Type: Intermittent  Device (Oxygen Therapy): room air  Vitals Timer  Restart Vitals Timer: Yes  Height and Weight  Height: 167.6 cm (66\")  Height Method: Stated  Weight: 90.7 kg (200 lb)  Weight Method: Stated  Other flowsheet entries  Ideal Body Weight k.3 Sachin Aldrich RN     15:02 HPI HPI (Adult)  Stated Reason for Visit: pt c/o worsening soa onset x 3 days. pt has hx CHF. pt in w/c breathing moderate accessory muscle use. VSS at this time.  History Obtained From: patient     16:00 Shortness of Breath Assessments (Adult) Respiratory WDL  Respiratory WDL: WDL except; all  Rhythm/Pattern, Respiratory: tachypneic; shortness of breath  Expansion/Accessory Muscles/Retractions: no use of accessory muscles; expansion symmetric; no retractions  Breath Sounds  Breath Sounds: All Fields  All Lung Fields Breath Sounds: diminished; wheezes, expiratory  Cardiac WDL  Cardiac WDL: WDL except; chest pain  Peripheral/Neurovascular WDL  Peripheral Neurovascular WDL: WDL  Cognitive/Neuro/Behavioral WDL  Level of Consciousness: Alert  Cognitive/Neuro/Behavioral WDL: WDL; orientation  Orientation: oriented x 4  Chest Pain Assessment  Chest Pain Location: midsternal  Chest Pain Intervention: cardiac biomarkers drawn; cardiac monitor placed; 12-lead ECG obtained Ara Hernández RN     16:00 CPAP/BiPAP CPAP/BiPAP Settings  $ NIV Initiation and Mgmt: yes  $ NIV Pt/System Assessment Charge: yes  Mode of Delivery: BiPAP  Equipment Type: V-60  Equipment ID: 2  IPAP (cm H2O): 16  EPAP (cm H2O): 8  Set Rate (Breaths/Min): 18 breaths/min  Respiratory Rate Total (Breaths/Min): 30 breaths/min  Oxygen Concentration (%): 24  Timed Inspiration (Sec): 0.8 sec  IPAP Rise Time (Sec): 2  Tidal Volume (mL): 477 mL  Minute Ventilation (L/Min): 13.2  Peak Inspiratory Pressure (cm H2O): 17  TiTOT (%): 26  Total Leak (L/Min): 19  Humidifier: not applicable  Skin Integrity: intact  Device Skin Pressure " Protection: skin-to-device areas padded Sneha Walsh, RRT     16:23 Medication Given ipratropium-albuterol (DUO-NEB) nebulizer solution 3 mL - Dose: 3 mL ; Route: Nebulization ; Scheduled Time: 1618 Sneha Walsh, RRT   16:23 Data Other flowsheet entries  $ Mini Neb Initial: yes Sneha Walsh, RRT   16:23 Respiratory Therapy Flowsheet Aerosol Therapy (SVN)  Respiratory Treatment Status (SVN): given  Treatment Route (SVN): mouthpiece  Patient Position (SVN): HOB elevated Sneha Walsh, RRT     16:24 Free Text  I'm concerned that she is in an acute CHF exacerbation. Pt will be placed on Bipap per RRT.  Mickie Collins APRN     16:49:52 BNP Resulted Abnormal Result   Collected: 7/21/2021 16:00   Last updated: 7/21/2021 16:49   Status: Final result   proBNP: 35,120.0 pg/mLHigh  [Ref Range: 0.0 - 900.0]  Lab, Background User     17:49 External Urinary Catheter Placed Placement Date/Time: 07/21/21 1749   Hand Hygiene Completed: Yes Ara Hernández RN   17:49:59 External Urinary Catheter Assessment Collection Container: Wall suction  Application/Removal: external catheter applied  Wall suction (mmHG): 100 mmHG  Securement Method: Securing device  Site Assessment: Clean; Skin intact Ara Hernández RN   17:51 ED Notes PT heart rate decreased to 41; monitor room called charge RN. Dr Peguero and mickie COLON notified. EKG completed and shown to ED physician. PT denies cp and states she feels fine.     Ara Hernández RN  07/21/21 1823    Ara Hernández RN     18:00:30 Vital Signs Vital Signs  Temp: 98 °F (36.7 °C)  Temp src: Oral  Heart Rate: 67  Resp: 20  Resp Rate Source: Visual  BP: 154/104Abnormal  (Device Time: 18:00:30)  Noninvasive MAP (mmHg): 119 (Device Time: 18:00:30)  Oxygen Therapy  SpO2: 97 %  Vitals Timer  Restart Vitals Timer: Yes Ara Hernández RN     18:50:33 External Urinary Catheter Assessment Output (mL): 1000 mL Ara Hernández RN     19:04 Free Text The patient's dimer was  elevated.  I ordered a VQ scan as her GFR is only 33.  Her BNP was 35,000.  Chest x-ray shows volume overload. Mickie Collins, APRN   19:04 Free Text Patient troponin  0.012. CollinsMickie jackman, APRN   19:05 Free Text At one point the patient's heart rate did drop down to the 20s.  An EKG was performed and the patient was back up in the 60s.  She was warm dry and pink.  She had no symptoms of this.  I discussed the patient's case with her primary care provider Dr. Weston.  Patient to be admitted to the ICU at this time.  I have also discussed the case with Dr. Hannah who agrees with plan of care.  Patient is aware of her findings.  She feels much better now after being on the BiPAP.  She will be admitted to the hospital at this time in stable condition. CollinsMickie jackman, APRN   19:06 Medication Given enoxaparin (LOVENOX) syringe 90 mg - Dose: 90 mg ; Route: Subcutaneous ; Site: Right Lower Abdomen ; Scheduled Time: 1816 Ara Hernández RN       20:49 CPAP/BiPAP CPAP/BiPAP Settings  $ NIV Pt/System Assessment Charge: yes  Mode of Delivery: BiPAP; standby  Equipment Type: V-60 Ange Beltre, MENG   20:49 Respiratory Therapy Flowsheet $ Oximetry Charges  $ O2 Pt/System Assessment: yes  Oxygen Therapy  SpO2: 94 %  Pulse Oximetry Type: Continuous  Device (Oxygen Therapy): nasal cannula  Flow (L/min): 2  Vital Signs  Heart Rate: 70  Heart Rate Source: Monitor  Resp: 18  Resp Rate Source: Visual  Other RT Charges  $ Other RT Charges: $ monitor during transport (FROM ER 24 TO 40 VIA 2 LPM O2) Ange Beltre, RRT     21:09 Device Vitals Device Data  Heart Rate: 25Abnormal  (Device Time: 21:09:00) Kenzie Leyva RN   21:20 Sepsis Predictive Model Early Detection of Sepsis PA score  Early Detection of Sepsis PA score: 2 Inpatient, Batch Job   21:23 ED Notes RN to bedside pt alert and does not have any complaints at this time.      Kenzie Leyva RN  07/22/21 0225    Kenzie Leyva RN   21:23 Device Vitals Device  Data  Heart Rate: 33Abnormal  (Device Time: 21:23:00) Petrona Leyva RN   21:37:55 ED Quick Updates Quick Updates  Quick Updates - Free Text: monitor room called and notified this rn of hr dropping into 20s and 30s, petrona mtz primany nurse notiifed Chaitanya Zapata RN   21:40 Sepsis Predictive Model Early Detection of Sepsis PA score  Early Detection of Sepsis PA score: 2 Inpatient, Batch Job   21:45 ED Notes MD rai aware of pt indira episodes and states to call cardiology consult in the morning.     Petrona Leyva RN  07/22/21 0021    Petrona Leyva RN     22:17:20 Vital Signs Vital Signs  Heart Rate: 45Abnormal  Chaitanya Zapata RN     23:45 Device Vitals Device Data  Heart Rate: 67 (Device Time: 23:45:00)  SpO2: 93 % (Device Time: 23:45:00)  BP: 175/103Abnormal  (Device Time: 23:45:30) Petrona Leyva RN   23:49 Medication Given amLODIPine (NORVASC) tablet 5 mg - Dose: 5 mg ; Route: Oral ; Scheduled Time: 2313 Chaitnaya Zapata RN   23:59:15 ED Quick Updates Quick Updates  Quick Updates - Free Text: pt given coffee. water and 2 turkey sandwiches Chaitanya Zapata RN       7/22/2021 Event Details User   00:00 Sepsis Predictive Model Early Detection of Sepsis PA score  Early Detection of Sepsis PA score: 2 Inpatient, Batch Job     03:13 CPAP/BiPAP CPAP/BiPAP Settings  $ NIV Pt/System Assessment Charge: yes  Mode of Delivery: BiPAP  Equipment Type: V-60  Equipment ID: 2  IPAP (cm H2O): 16  EPAP (cm H2O): 8  Pressure Support (cm H20): 8 cm H2O  Set Rate (Breaths/Min): 18 breaths/min  Respiratory Rate Total (Breaths/Min): 18 breaths/min  Oxygen Concentration (%): 330  Timed Inspiration (Sec): 0.8 sec  IPAP Rise Time (Sec): 2  Tidal Volume (mL): 424 mL  Minute Ventilation (L/Min): 9  Peak Inspiratory Pressure (cm H2O): 16  TiTOT (%): 31  Total Leak (L/Min): 11  Device Skin Pressure Protection: skin-to-skin areas padded Ange Beltre, RRT     09:34 Medication Given ipratropium-albuterol (DUO-NEB) nebulizer solution  3 mL - Dose: 3 mL ; Route: Nebulization ; Scheduled Time: 1030 Brittani Whitney RRT   09:34 Data Other flowsheet entries  $ Mini Neb Subsequent: yes Brittani Whitney RRT   09:34 Respiratory Therapy Flowsheet $ Oximetry Charges  $ O2 Pt/System Assessment: yes  Oxygen Therapy  SpO2: 97 %  Pulse Oximetry Type: Continuous  Device (Oxygen Therapy): nasal cannula  Flow (L/min): 4  Vital Signs  Heart Rate: 70  Heart Rate Source: Monitor  Resp: 20  Resp Rate Source: Visual  Assessment  Respiratory WDL: WDL except; breath sounds  Breath Sounds  Breath Sounds: All Fields  All Lung Fields Breath Sounds: Anterior:; diminished  Aerosol Therapy (SVN)  Respiratory Treatment Status (SVN): given  Treatment Route (SVN): mouthpiece; oxygen  Patient Position (SVN): HOB elevated Brittani Whitney RRT     EXAMINATION: NM LUNG VENTILATION PERFUSION- 7/21/2021 6:45 PM CDT     HISTORY: elevated dimer, shortness of breath; R00.1-Bradycardia,  unspecified; I50.9-Heart failure, unspecified.     Dose: 5.6 mCi technetium 99m MAA intravenously.     REPORT: Perfusion only scintigraphic images of the lungs were obtained  in anterior, posterior and oblique dimensions.     COMPARISON: Perfusion scintigraphy performed on 11/8/2020.     COMPARISON: Chest x-rays on 7/21/2021.     Distribution of activity within the lungs is homogeneous and there are  no focal segmental or subsegmental defects to indicate the presence of  significant pulmonary embolic disease.     IMPRESSION:  Low probability perfusion only lung scintigraphy for  significant pulmonary emboli.  This report was finalized on 07/21/2021 18:47 by Dr. Sudhir Hair MD.       CHEST, ONE VIEW:     HISTORY: Shortness of air     COMPARISON: 11/7/2020, 10/31/2020 and 10/30/2020 and 10/12/2020     A single frontal chest radiograph was obtained.     FINDINGS:     There is are chronic interstitial lung changes identified with diffuse  interstitial prominence observed similarly on the 11/7/2020  examination,  mild chronic changes suspected. The heart is generous Superimposed acute  interstitial infectious or inflammatory process, as well as mild  pulmonary edema not excluded..     The bony structures are intact with degenerative spurring noted in the  thoracic spine.                                   IMPRESSION:  1. Generous heart size with chronic lung changes. Superimposed acute  interstitial infiltration from infectious/inflammatory changes as well  as pulmonary edema not excluded.     This report was finalized on 07/21/2021 16:29 by Dr. Nick Busch MD.             History & Physical      Orville Weston MD at 07/22/21 0916           History and Physical      CHIEF COMPLAINT:  SOA    Reason for Admission:  Volume Overload    History Obtained From:  Patient, chart    HISTORY OF PRESENT ILLNESS:      The patient is a 60 y.o. female who was admitted with SOA for 3 days. She also has c/o CP with exertion. She has had some increased LE edema. No GI complaints. She has had nasal congestion. No cough. No fever. No dysuria. She has had bradycardia with sleeping, but no symptoms, or change in BP.     Past Medical History:    Past Medical History:   Diagnosis Date   • Acute CHF (CMS/HCC)    • Acute renal failure (ARF) (CMS/HCC)    • Anemia    • Asthma     childhood   • Hypertension    • Ischemic colitis (CMS/HCC)    • Metabolic acidosis    • Nonrheumatic aortic (valve) insufficiency    • PTSD (post-traumatic stress disorder)      Past Surgical History:    Past Surgical History:   Procedure Laterality Date   • CARDIAC CATHETERIZATION N/A 11/8/2020    Procedure: Left Heart Cath;  Surgeon: Pierce Buchanan MD;  Location:  [facility] CATH INVASIVE LOCATION;  Service: Cardiovascular;  Laterality: N/A;   • EXTERNAL FIXATION WRIST FRACTURE     • LEG SURGERY     • TUBAL ABDOMINAL LIGATION           Medications Prior to Admission:    (Not in a hospital admission)      Allergies:  Codeine and Imitrex [sumatriptan]    Social  "History:   TOBACCO:   reports that she has been smoking. She has been smoking about 0.50 packs per day. She has never used smokeless tobacco.  ETOH:   reports no history of alcohol use.  DRUGS:   reports no history of drug use.        Family History:   Family History   Problem Relation Age of Onset   • Coronary artery disease Mother    • Coronary artery disease Father      REVIEW OF SYSTEMS:  Constitutional: neg  CV: CP  Pulmonary: SOA  GI: neg  : neg  Psych: neg  Neuro: neg  Skin: neg  MusculoSkeletal: neg  HEENT: neg  Joints: neg  Vitals:  /99   Pulse 70   Temp 98 °F (36.7 °C) (Oral)   Resp 22   Ht 167.6 cm (66\")   Wt 90.7 kg (200 lb)   SpO2 95%   BMI 32.28 kg/m²     PHYSICAL EXAM:  Gen: NAD, alert, pleasant  HEENT: WNL  Lymph: no LAD  Neck: no JVD or masses  Chest: coarse  CV: RRR  Abdomen: NT/ND  Extrem: no C/C/trace LE edema  Neuro: non focal  Skin: no rashes  Joints: no redness  DATA:  I have reviewed the admission labs and imaging tests.    ASSESSMENT AND PLAN:      Volume Overload----continue diuresis, BP control, consult Cardiology  Bradycardia---hold B Blocker at this time, monitor on telemetry, Cardiology to see pt  CP  COPD, tobacco abuse----start nebs, steroids and antibiotics        Orville Weston MD  09:16 CDT 7/22/2021    Electronically signed by Orville Weston MD at 07/22/21 0920          Emergency Department Notes      Chelsea Junior RN at 07/21/21 1602        Pt up to br     Chelsea Junior RN  07/21/21 1603      Electronically signed by Chelsea Junior RN at 07/21/21 1603     Ara Hernández RN at 07/21/21 1751        PT heart rate decreased to 41; monitor room called charge RN. Dr Peguero and mickie COLON notified. EKG completed and shown to ED physician. PT denies cp and states she feels fine.     Ara Hernández RN  07/21/21 1823      Electronically signed by Ara Hernández RN at 07/21/21 1823     Mickie Collins APRN at 07/21/21 1812     Attestation " signed by Cristhian Peguero MD at 07/22/21 1020          For this patient encounter, I reviewed the NP or PA documentation, treatment plan, and medical decision making. Cristhian Peguero MD 7/22/2021 10:20 CDT                  Subjective   Patient is a 60-year-old female that presents to the ER today with complaints of shortness of breath and chest pain.  The patient reports that this began several days ago.  States that she has been feeling more short of breath.  She does have a history of COPD but states that she does not take any medications for this.  She does not normally wear oxygen.  She states that she smokes half a pack to pack of cigarettes daily.  The patient reports that she feels like she cannot breathe.  She has history of CHF.  She states that she is not on diuretics.  The patient reports she is also having chest pain earlier today.  She presents here today for further evaluation.      History provided by:  Patient   used: No    Shortness of Breath  Severity:  Moderate  Onset quality:  Sudden  Duration:  2 days  Timing:  Constant  Progression:  Worsening  Chronicity:  New  Context: not activity, not animal exposure, not emotional upset, not fumes, not known allergens, not occupational exposure, not pollens, not smoke exposure, not strong odors, not URI and not weather changes    Relieved by:  Nothing  Worsened by:  Nothing  Ineffective treatments:  None tried  Associated symptoms: cough    Associated symptoms: no abdominal pain, no chest pain, no claudication, no diaphoresis, no ear pain, no fever, no headaches, no hemoptysis, no neck pain, no PND, no rash, no sore throat, no sputum production, no syncope, no swollen glands, no vomiting and no wheezing    Risk factors: tobacco use    Risk factors: no recent alcohol use, no family hx of DVT, no hx of cancer, no hx of PE/DVT, no obesity, no oral contraceptive use, no prolonged immobilization and no recent surgery        Review of Systems    Constitutional: Negative for diaphoresis and fever.   HENT: Negative for ear pain and sore throat.    Respiratory: Positive for cough and shortness of breath. Negative for hemoptysis, sputum production and wheezing.    Cardiovascular: Negative for chest pain, claudication, syncope and PND.   Gastrointestinal: Negative for abdominal pain and vomiting.   Musculoskeletal: Negative for neck pain.   Skin: Negative for rash.   Neurological: Negative for headaches.   All other systems reviewed and are negative.      Past Medical History:   Diagnosis Date   • Acute CHF (CMS/HCC)    • Acute renal failure (ARF) (CMS/HCC)    • Anemia    • Asthma     childhood   • Hypertension    • Ischemic colitis (CMS/HCC)    • Metabolic acidosis    • Nonrheumatic aortic (valve) insufficiency    • PTSD (post-traumatic stress disorder)        Allergies   Allergen Reactions   • Codeine Nausea And Vomiting   • Imitrex [Sumatriptan] Other (See Comments)     HA       Past Surgical History:   Procedure Laterality Date   • CARDIAC CATHETERIZATION N/A 11/8/2020    Procedure: Left Heart Cath;  Surgeon: Pierce Buchanan MD;  Location: [facility] CATH INVASIVE LOCATION;  Service: Cardiovascular;  Laterality: N/A;   • EXTERNAL FIXATION WRIST FRACTURE     • LEG SURGERY     • TUBAL ABDOMINAL LIGATION         Family History   Problem Relation Age of Onset   • Coronary artery disease Mother    • Coronary artery disease Father        Social History     Socioeconomic History   • Marital status: Single     Spouse name: Not on file   • Number of children: Not on file   • Years of education: Not on file   • Highest education level: Not on file   Tobacco Use   • Smoking status: Current Every Day Smoker     Packs/day: 0.50   • Smokeless tobacco: Never Used   Vaping Use   • Vaping Use: Never used   Substance and Sexual Activity   • Alcohol use: No   • Drug use: No   • Sexual activity: Defer           Objective   Physical Exam  Vitals and nursing note reviewed.    Constitutional:       Appearance: Normal appearance.   HENT:      Head: Normocephalic and atraumatic.   Eyes:      Conjunctiva/sclera: Conjunctivae normal.   Cardiovascular:      Rate and Rhythm: Normal rate and regular rhythm.   Pulmonary:      Effort: Tachypnea and accessory muscle usage present.      Breath sounds: Normal breath sounds.   Abdominal:      General: Bowel sounds are normal.      Palpations: Abdomen is soft.   Skin:     General: Skin is warm and dry.      Capillary Refill: Capillary refill takes less than 2 seconds.   Neurological:      General: No focal deficit present.      Mental Status: She is alert.   Psychiatric:         Mood and Affect: Mood normal.         Procedures          ED Course  ED Course as of Jul 21 1907   Wed Jul 21, 2021   1624  I'm concerned that she is in an acute CHF exacerbation. Pt will be placed on Bipap per RRT.     [LF]   1904 The patient's dimer was elevated.  I ordered a VQ scan as her GFR is only 33.  Her BNP was 35,000.  Chest x-ray shows volume overload.    [LF]   1904 Patient troponin  0.012.    [LF]   1905 At one point the patient's heart rate did drop down to the 20s.  An EKG was performed and the patient was back up in the 60s.  She was warm dry and pink.  She had no symptoms of this.  I discussed the patient's case with her primary care provider Dr. Weston.  Patient to be admitted to the ICU at this time.  I have also discussed the case with Dr. Hannah who agrees with plan of care.  Patient is aware of her findings.  She feels much better now after being on the BiPAP.  She will be admitted to the hospital at this time in stable condition.    [LF]      ED Course User Index  [LF] Mickie Collins, APRN                                   NM Lung Ventilation Perfusion   Final Result   Low probability perfusion only lung scintigraphy for   significant pulmonary emboli.   This report was finalized on 07/21/2021 18:47 by Dr. Sudhir Hair MD.      XR Chest 1 View    Final Result   1. Generous heart size with chronic lung changes. Superimposed acute   interstitial infiltration from infectious/inflammatory changes as well   as pulmonary edema not excluded.       This report was finalized on 07/21/2021 16:29 by Dr. Nick Busch MD.        Labs Reviewed   COMPREHENSIVE METABOLIC PANEL - Abnormal; Notable for the following components:       Result Value    Glucose 101 (*)     BUN 33 (*)     Creatinine 1.60 (*)     Chloride 108 (*)     eGFR Non  Amer 33 (*)     All other components within normal limits    Narrative:     GFR Normal >60  Chronic Kidney Disease <60  Kidney Failure <15     BNP (IN-HOUSE) - Abnormal; Notable for the following components:    proBNP 35,120.0 (*)     All other components within normal limits    Narrative:     Among patients with dyspnea, NT-proBNP is highly sensitive for the detection of acute congestive heart failure. In addition NT-proBNP of <300 pg/ml effectively rules out acute congestive heart failure with 99% negative predictive value.    Results may be falsely decreased if patient taking Biotin.     D-DIMER, QUANTITATIVE - Abnormal; Notable for the following components:    D-Dimer, Quantitative 1.27 (*)     All other components within normal limits    Narrative:     Reference Range is 0-0.50 mg/L FEU. However, results <0.50 mg/L FEU tends to rule out DVT or PE. Results >0.50 mg/L FEU are not useful in predicting absence or presence of DVT or PE.     CBC WITH AUTO DIFFERENTIAL - Abnormal; Notable for the following components:    RBC 3.66 (*)     Hemoglobin 10.8 (*)     Neutrophil % 81.7 (*)     Lymphocyte % 9.5 (*)     Neutrophils, Absolute 7.60 (*)     All other components within normal limits   BLOOD GAS, ARTERIAL - Abnormal; Notable for the following components:    pO2, Arterial 76.4 (*)     pO2, Temperature Corrected 76.4 (*)     All other components within normal limits   COVID-19,[facility] IN-HOUSE,[facility] SWAB NO TRANSPORT MEDIA 2 HR TAT  - Normal    Narrative:     Fact sheet for providers: https://www.fda.gov/media/067832/download     Fact sheet for patients: https://www.fda.gov/media/306122/download    Test performed by PCR.    Consider negative results in combination with clinical observations, patient history, and epidemiological information.   PROTIME-INR - Normal   TROPONIN (IN-HOUSE) - Normal    Narrative:     Troponin T Reference Range:  <= 0.03 ng/mL-   Negative for AMI  >0.03 ng/mL-     Abnormal for myocardial necrosis.  Clinicians would have to utilize clinical acumen, EKG, Troponin and serial changes to determine if it is an Acute Myocardial Infarction or myocardial injury due to an underlying chronic condition.       Results may be falsely decreased if patient taking Biotin.     APTT - Normal   BLOOD CULTURE   BLOOD CULTURE   RAINBOW DRAW    Narrative:     The following orders were created for panel order [facility] Draw.  Procedure                               Abnormality         Status                     ---------                               -----------         ------                     Green Top (Gel)[[id number]]                                  Final result               Lavender Top[[id number]]                                     Final result               Red Top[[id number]]                                          Final result                 Please view results for these tests on the individual orders.   BLOOD GAS, ARTERIAL   CBC AND DIFFERENTIAL    Narrative:     The following orders were created for panel order CBC & Differential.  Procedure                               Abnormality         Status                     ---------                               -----------         ------                     CBC Auto Differential[[id number]]        Abnormal            Final result                 Please view results for these tests on the individual orders.   GREEN TOP   LAVENDER TOP   RED TOP             MDM  Number of Diagnoses or  Management Options  Bradycardia: new and requires workup  Congestive heart failure, unspecified HF chronicity, unspecified heart failure type (CMS/HCC): new and requires workup     Amount and/or Complexity of Data Reviewed  Clinical lab tests: ordered and reviewed  Tests in the radiology section of CPT®: ordered and reviewed  Tests in the medicine section of CPT®: ordered and reviewed  Decide to obtain previous medical records or to obtain history from someone other than the patient: yes  Discuss the patient with other providers: yes    Patient Progress  Patient progress: stable      Final diagnoses:   Bradycardia   Congestive heart failure, unspecified HF chronicity, unspecified heart failure type (CMS/HCC)       ED Disposition  ED Disposition     ED Disposition Condition Comment    Decision to Admit  Level of Care: Critical Care [6]   Diagnosis: Bradycardia [745309]   Admitting Physician: JESSE MEIER [[id number]]   Attending Physician: JESSE MEIER [[id number]]   Isolate for COVID?: No [0]   Certification: I Certify That Inpatient Hospital Services Are Medically Necessary For Greater Than 2 Midnights            No follow-up provider specified.       Medication List      No changes were made to your prescriptions during this visit.          Mickie Collins, APRN  07/21/21 1907      Electronically signed by Cristhian Peguero MD at 07/22/21 1020     Ara Hernández, RN at 07/21/21 1814        j       Ara Hernández, KARY  07/21/21 1822      Electronically signed by Ara Hernández, KARY at 07/21/21 1822     Ara Hernández, RN at 07/21/21 1848        Pt returned from CT, Bipap hooked back up; pt has no complaints     rAa Hernández RN  07/21/21 1849      Electronically signed by Ara Hernández, RN at 07/21/21 1849     Kenzie Leyva, KARY at 07/21/21 2109        RN to bedside pt alert and does not have any complaints at this time.      Kenzie Leyva, KARY  07/22/21 0224      Electronically signed by Kenzie Leyva, KARY at  07/22/21 0224     Kenzie Leyva RN at 07/21/21 2123        RN to bedside pt alert and does not have any complaints at this time.      Kenzie Leyva RN  07/22/21 0225      Electronically signed by Kenzie Leyva RN at 07/22/21 0225     Kenzie Leyva RN at 07/21/21 2145        MD weston aware of pt indira episodes and states to call cardiology consult in the morning.     Kenzie Leyva RN  07/22/21 0021      Electronically signed by Kenzie Leyva RN at 07/22/21 0021     Kenzie Leyva RN at 07/21/21 2305        RN to bedside pt alert and does not have any complaints at this time.      Kenzie Leyva RN  07/22/21 0225      Electronically signed by Kenzie Leyva RN at 07/22/21 0225     Kenzie Leyva RN at 07/21/21 2312        Pt able to to eat and drink at this per Kenzie Zhou MD, RN  07/21/21 2313      Electronically signed by Kenzie Leyva RN at 07/21/21 2313     Kenzie Leyva RN at 07/22/21 0123        RN to bedside pt alert and does not have any complaints at this time.      Kenzie Leyva RN  07/22/21 0223      Electronically signed by Kenzie Leyva RN at 07/22/21 0223         Facility-Administered Medications as of 7/21/2021   Medication Dose Route Frequency Provider Last Rate Last Admin   • amLODIPine (NORVASC) tablet 5 mg  5 mg Oral Q24H Orville Weston MD   5 mg at 07/21/21 2349   • doxycycline (VIBRAMYCIN) 100 mg/100 mL 0.9% NS MBP  100 mg Intravenous Q12H Orville Weston MD       • enoxaparin (LOVENOX) syringe 40 mg  40 mg Subcutaneous Q24H Orville Weston MD       • [COMPLETED] enoxaparin (LOVENOX) syringe 90 mg  1 mg/kg Subcutaneous Once Mickie Collins APRN   90 mg at 07/21/21 1906   • [COMPLETED] furosemide (LASIX) injection 40 mg  40 mg Intravenous Once Mickie Collins APRN   40 mg at 07/21/21 1725   • furosemide (LASIX) injection 40 mg  40 mg Intravenous Q12H Orville Weston MD       • [COMPLETED] ipratropium-albuterol  (DUO-NEB) nebulizer solution 3 mL  3 mL Nebulization Once Mickie Collins APRN   3 mL at 07/21/21 1623   • ipratropium-albuterol (DUO-NEB) nebulizer solution 3 mL  3 mL Nebulization 4x Daily - RT Orville Weston MD   3 mL at 07/22/21 0934   • methylPREDNISolone sodium succinate (SOLU-Medrol) injection 40 mg  40 mg Intravenous Q8H Orville Weston MD       • sodium chloride 0.9 % flush 10 mL  10 mL Intravenous PRN Mickie Collins APRN       • [COMPLETED] technetium albumin aggregated (MAA) solution 1 dose  1 dose Intravenous Once in imaging Javier Avendaño MD   1 dose at 07/21/21 1831     Orders (last 72 hrs)      Start     Ordered    07/22/21 1900  enoxaparin (LOVENOX) syringe 40 mg  Every 24 Hours      07/22/21 0909    07/22/21 1800  POC Glucose Finger BID  2 Times Daily      07/22/21 0913    07/22/21 1030  ipratropium-albuterol (DUO-NEB) nebulizer solution 3 mL  4 Times Daily - RT      07/22/21 0913    07/22/21 1002  NPO Diet  Diet Effective Now      07/22/21 1003    07/22/21 0917  doxycycline (VIBRAMYCIN) 100 mg/100 mL 0.9% NS MBP  Every 12 Hours      07/22/21 0916    07/22/21 0915  methylPREDNISolone sodium succinate (SOLU-Medrol) injection 40 mg  Every 8 Hours      07/22/21 0913    07/22/21 0912  Troponin  Now Then Every 6 Hours      07/22/21 0911    07/22/21 0910  Diet Regular; Cardiac  Diet Effective Now,   Status:  Canceled      07/22/21 0909    07/22/21 0909  OK for pt to admit to 4B  Nursing Communication  Once     Comments: OK for pt to admit to 4B    07/22/21 0909    07/22/21 0906  furosemide (LASIX) injection 40 mg  Every 12 Hours      07/22/21 0904    07/22/21 0905  Basic Metabolic Panel  Daily      07/22/21 0904    07/22/21 0727  Inpatient Cardiology Consult  STAT     Specialty:  Cardiology  Provider:  Sudhir Delgado MD    07/22/21 0727 07/22/21 0726  Diet Regular  Diet Effective Now,   Status:  Canceled      07/22/21 0725 07/21/21 4069  amLODIPine (NORVASC) tablet 5 mg  Every  24 Hours Scheduled      07/21/21 2311    07/21/21 1901  Inpatient Admission  Once      07/21/21 1901    07/21/21 1828  technetium albumin aggregated (MAA) solution 1 dose  Once in Imaging      07/21/21 1826    07/21/21 1816  enoxaparin (LOVENOX) syringe 90 mg  Once      07/21/21 1814    07/21/21 1810  Inpatient Admission  Once,   Status:  Canceled      07/21/21 1810    07/21/21 1809  NM Lung Ventilation Perfusion  1 Time Imaging      07/21/21 1808    07/21/21 1741  CT Angiogram Chest  1 Time Imaging,   Status:  Canceled      07/21/21 1740    07/21/21 1659  furosemide (LASIX) injection 40 mg  Once      07/21/21 1657    07/21/21 1653  NIPPV (CPAP or BIPAP)  Until Discontinued     Comments: RT to manage    07/21/21 1653    07/21/21 1621  Blood Gas, Arterial -  Once      07/21/21 1621    07/21/21 1618  ipratropium-albuterol (DUO-NEB) nebulizer solution 3 mL  Once      07/21/21 1616    07/21/21 1616  Protime-INR  Once      07/21/21 1543    07/21/21 1616  D-dimer, Quantitative  Once      07/21/21 1543    07/21/21 1616  COVID-19,[facility] IN-HOUSE,Nasal Swab No Transport Media 3-4 HR TAT - Swab, Nasal Cavity  Once      07/21/21 1616    07/21/21 1616  aPTT  Once      07/21/21 1616    07/21/21 1616  Blood Gas, Arterial -  Once      07/21/21 1616    07/21/21 1616  Blood Culture - Blood, Arm, Right  Once      07/21/21 1616    07/21/21 1616  Blood Culture - Blood, Arm, Left  Once      07/21/21 1616    07/21/21 1616  ECG 12 Lead  Once      07/21/21 1616    07/21/21 1616  Cardiac Monitoring  Once      07/21/21 1616    07/21/21 1616  Pulse Oximetry, Continuous  Continuous      07/21/21 1616    07/21/21 1616  NPO Diet  Diet Effective Now,   Status:  Canceled      07/21/21 1616    07/21/21 1616  Insert peripheral IV  Once      07/21/21 1616    07/21/21 1615  sodium chloride 0.9 % flush 10 mL  As Needed      07/21/21 1616    07/21/21 1543  CBC & Differential  Once      07/21/21 1543    07/21/21 1543  Comprehensive Metabolic Panel   Once      07/21/21 1543    07/21/21 1543  Red Level Draw  Once      07/21/21 1543    07/21/21 1543  BNP  Once      07/21/21 1543    07/21/21 1543  Troponin  Once      07/21/21 1543    07/21/21 1543  XR Chest 1 View  1 Time Imaging      07/21/21 1543    07/21/21 1543  CBC Auto Differential  PROCEDURE ONCE      07/21/21 1543    07/21/21 1543  Green Top (Gel)  PROCEDURE ONCE      07/21/21 1543    07/21/21 1543  Lavender Top  PROCEDURE ONCE      07/21/21 1543    07/21/21 1543  Red Top  PROCEDURE ONCE      07/21/21 1543    07/21/21 1500  ECG 12 Lead  Once      07/21/21 1500    Unscheduled  Oxygen Therapy- Nasal Cannula; 2 LPM; Titrate for SPO2: equal to or greater than, 92%  Continuous PRN      07/21/21 1616    Signed and Held  amLODIPine (NORVASC) tablet 5 mg  Daily      Signed and Held    Signed and Held  aspirin EC tablet 81 mg  Daily      Signed and Held    Signed and Held  fluticasone (FLONASE) 50 MCG/ACT nasal spray 2 spray  Daily PRN      Signed and Held    Signed and Held  hydrALAZINE (APRESOLINE) tablet 25 mg  Every 8 Hours Scheduled      Signed and Held    Signed and Held  losartan (COZAAR) tablet 50 mg  Every 24 Hours Scheduled      Signed and Held    Signed and Held  OLANZapine (zyPREXA) tablet 5 mg  Daily      Signed and Held    Signed and Held  pantoprazole (PROTONIX) EC tablet 40 mg  Daily      Signed and Held    Signed and Held  rosuvastatin (CRESTOR) tablet 20 mg  Daily      Signed and Held              Vital Signs (last 2 days)    Date/Time  Temp  Pulse  Resp  BP  Device (Oxygen Therapy)  Flow (L/min)  Oxygen Concentration (%)  SpO2   07/22/21 0934  --  70  20  --  nasal cannula  4  --  97   07/22/21 0645  --  70  --  161/99  --  --  --  95   07/22/21 0515  --  62  --  152/88  --  --  --  95   07/22/21 0430  --  71  --  161/93  --  --  --  96   07/22/21 0315  --  75  --  156/93  --  --  --  95   07/22/21 0313  --  --  --  --  --  --  330  --   07/22/21 0215  --  71  --  140/95  --  --  --  93   07/22/21  0200  --  73  --  151/89  NPPV/NIV  --  --  93 07/22/21 0130  --  74  --  --  --  --  --  94 07/22/21 0123  --  35Abnormal   --  --  --  --  --  --   07/22/21 0115  --  61  --  --  --  --  --  94 07/22/21 0100  --  69  22  154/96  --  --  --  94 07/22/21 0045  --  72  --  --  --  --  --  94 07/22/21 0030  --  72  --  --  --  --  --  94 07/22/21 0015  --  68  --  --  --  --  --  94 07/22/21 0000  --  85  --  --  --  --  --  93 07/21/21 2345  --  67  --  175/103Abnormal   --  --  --  93 07/21/21 2340  --  66  --  --  --  --  --  93 07/21/21 2335  --  66  --  --  --  --  --  93 07/21/21 2330  --  70  --  181/91Abnormal   --  --  --  93 07/21/21 2320  --  76  --  --  --  --  --  93 07/21/21 2315  --  73  --  --  --  --  --  93 07/21/21 2305  --  40Abnormal   --  --  --  --  --  --   07/21/21 2255  --  62  --  --  --  --  --  97 07/21/21 2250  --  68  --  --  --  --  --  95 07/21/21 22:17:20  --  45Abnormal   --  --  --  --  --  --   07/21/21 2123  --  33Abnormal   --  --  --  --  --  --   07/21/21 2109  --  25Abnormal   --  --  --  --  --  --   07/21/21 2100  --  71  --  176/117Abnormal   --  --  --  95 07/21/21 2049  --  70  18  --  nasal cannula  2  --  94   07/21/21 1945  --  70  --  159/96  --  --  --  96   07/21/21 1900  --  67  20  148/97  --  --  --  96   07/21/21 18:00:30  98 (36.7)  67  20  154/104Abnormal   --  --  --  97   07/21/21 1600  --  --  --  --  --  --  24  --   07/21/21 1454  97.7 (36.5)  84  26  174/128Abnormal    room air  --  --  94   BP: pt reports that b/p correct.  states she needs a clonidine at 07/21/21 5536

## 2021-07-22 NOTE — CONSULTS
[facility] HEART GROUP CONSULT NOTE    Referring Provider: Orville Weston MD    Reason for Consultation: CHF, Bradycardia    Chief complaint:   Chief Complaint   Patient presents with   • Shortness of Breath       Subjective .     History of present illness:  Ludivina Ramirez is a 60 y.o. female who presented to the ER yesterday with complaints of chest pain and dyspnea. She has a history of CAD s/p CORA to LAD following a STEMI in 11/2020, uncontrolled HTN resulting in acute DHF, mild AI, COPD, noncompliance and drug abuse. Patient is well known to our group. She has presented to the ER on multiple occasions with complaints of shortness of breath. She was seen in consult on 10/13/2020 and again on 10/26/2020 after she presented for the same symptoms. Each time she was noted to be hypertensive. Her medications were changed during her stay on 10/13 to minimize bradycardia and improve BP control. Her norvasc was changed to nifedipine and coreg was reduced to 12.5mg. She was also started on Aldactone and instructed to continue her hydralazine. Her most recent d/c summary from her PCP in 11/2020 revealed appropriate dose of Nifedipine and Coreg 25mg BID. Her 2 admissions in October, she was noted to be in acute diastolic CHF secondary to hypertensive urgency. she was admitted in November following a STEMI  She had an admission in Nov following her STEMI secondary to an occluded LAD which was treated with a CORA. LVEDP was also noted to be severely elevated at the time of cath. 2D echo was obtained at that time which revealed a normal LVEF, mild AI, and elevated RV systolic pressure. Her Aldactone was stopped at discharge due to MATTY secondary to levaquin use for treatment of PNA.   She reports she has had substernal chest pain for the last week. She notes associated SHAW that progressed to dyspnea without activity, orthopnea and PND. She also notes edema. She was previously noted to be positive for cocaine and  reports she has not used in several months. BP on arrival was elevated 174/128 with HR 84. Initial troponin was drawn yesterday at 1600 and was negative. Daily labs were ordered this morning and have not been obtained. EKG is unchanged from previous.       History  Past Medical History:   Diagnosis Date   • Acute CHF (CMS/HCC)    • Acute renal failure (ARF) (CMS/HCC)    • Anemia    • Asthma     childhood   • Hypertension    • Ischemic colitis (CMS/HCC)    • Metabolic acidosis    • Nonrheumatic aortic (valve) insufficiency    • PTSD (post-traumatic stress disorder)    ,   Past Surgical History:   Procedure Laterality Date   • CARDIAC CATHETERIZATION N/A 11/8/2020    Procedure: Left Heart Cath;  Surgeon: Pierce Buchanan MD;  Location: [facility] CATH INVASIVE LOCATION;  Service: Cardiovascular;  Laterality: N/A;   • EXTERNAL FIXATION WRIST FRACTURE     • LEG SURGERY     • TUBAL ABDOMINAL LIGATION     ,   Family History   Problem Relation Age of Onset   • Coronary artery disease Mother    • Coronary artery disease Father    ,   Social History     Tobacco Use   • Smoking status: Current Every Day Smoker     Packs/day: 0.50   • Smokeless tobacco: Never Used   Vaping Use   • Vaping Use: Never used   Substance Use Topics   • Alcohol use: No   • Drug use: No       Medications    Prior to Admission medications    Medication Sig Start Date End Date Taking? Authorizing Provider   acyclovir (Zovirax) 5 % ointment Apply  topically to the appropriate area as directed 3 (Three) Times a Day. 3/25/21   Ginna Leung APRN   amLODIPine (NORVASC) 5 MG tablet Take 5 mg by mouth Daily.    Provider, MD Edwin   aspirin 81 MG EC tablet Take 1 tablet by mouth Daily. 11/11/20   Jer Cuevas APRN   azelastine (ASTELIN) 0.1 % nasal spray 2 sprays into the nostril(s) as directed by provider 2 (Two) Times a Day. Use in each nostril as directed     ProviderEdwin MD   carvedilol (COREG) 25 MG tablet Take 25 mg by mouth 2 (Two) Times  a Day With Meals.    Provider, MD Edwin   cholecalciferol 1000 units tablet Take 1,000 Units by mouth Daily. 9/27/19   Orville Weston MD   cloNIDine (CATAPRES) 0.1 MG tablet Take 1 tablet by mouth Every 6 (Six) Hours As Needed for High Blood Pressure (BP >180/100). 11/3/20   Orville Weston MD   ferrous sulfate 325 (65 FE) MG tablet Take 1 tablet by mouth 2 (Two) Times a Day With Meals. 11/3/20   Orville Weston MD   fluticasone (FLONASE) 50 MCG/ACT nasal spray 2 sprays into the nostril(s) as directed by provider Daily As Needed for Rhinitis or Allergies.    Provider, MD Edwin   hydrALAZINE (APRESOLINE) 25 MG tablet Take 3 tablets by mouth Every 8 (Eight) Hours. 9/9/19   Orville Weston MD   losartan (COZAAR) 100 MG tablet Take 1 tablet by mouth Daily. 11/24/20   Destini Gaitan APRN   NIFEdipine XL (ADALAT CC) 60 MG 24 hr tablet Take 1 tablet by mouth 2 (two) times a day. 11/3/20   Orville Weston MD   nitroglycerin (NITROSTAT) 0.4 MG SL tablet Place 1 tablet under the tongue Every 5 (Five) Minutes As Needed for Chest Pain for up to 3 doses. Take no more than 3 doses in 15 minutes. 11/10/20   Jer Cuevas APRN   OLANZapine (zyPREXA) 5 MG tablet Take 1 tablet by mouth 2 (two) times a day. 11/3/20   Orville Weston MD   pantoprazole (PROTONIX) 40 MG EC tablet Take 1 tablet by mouth Daily. 9/26/19   Orville Weston MD   rosuvastatin (CRESTOR) 20 MG tablet Take 1 tablet by mouth Daily. 12/8/20   Jer Cuevas APRN   ticagrelor (BRILINTA) 90 MG tablet tablet Take 1 tablet by mouth 2 (Two) Times a Day. 12/8/20   Jer Cuevas APRN       Current Facility-Administered Medications   Medication Dose Route Frequency Provider Last Rate Last Admin   • aspirin EC tablet 81 mg  81 mg Oral Daily Orville Weston MD       • carvedilol (COREG) tablet 12.5 mg  12.5 mg Oral BID With Meals Mary Rodriguez APRN       • cloNIDine (CATAPRES) tablet 0.1 mg  0.1 mg  Oral Q6H PRN Mary Rodriguez APRN       • doxycycline (VIBRAMYCIN) 100 mg/100 mL 0.9% NS MBP  100 mg Intravenous Q12H Orville Weston MD   100 mg at 07/22/21 1130   • enoxaparin (LOVENOX) syringe 40 mg  40 mg Subcutaneous Q24H Orville Weston MD       • fluticasone (FLONASE) 50 MCG/ACT nasal spray 2 spray  2 spray Nasal Daily PRN Orville Weston MD       • furosemide (LASIX) injection 40 mg  40 mg Intravenous Q12H Orville Weston MD   40 mg at 07/22/21 1131   • hydrALAZINE (APRESOLINE) tablet 25 mg  25 mg Oral Q8H Orville Weston MD       • ipratropium-albuterol (DUO-NEB) nebulizer solution 3 mL  3 mL Nebulization 4x Daily - RT Orville Weston MD   3 mL at 07/22/21 0934   • losartan (COZAAR) tablet 100 mg  100 mg Oral Q24H Mary Rodriguez APRN       • methylPREDNISolone sodium succinate (SOLU-Medrol) injection 40 mg  40 mg Intravenous Q8H Orville Weston MD   40 mg at 07/22/21 1131   • NIFEdipine XL (PROCARDIA XL) 24 hr tablet 60 mg  60 mg Oral BID Mary Rodriguez APRN       • OLANZapine (zyPREXA) tablet 5 mg  5 mg Oral Daily Orville Weston MD       • pantoprazole (PROTONIX) EC tablet 40 mg  40 mg Oral Daily Orville Weston MD       • rosuvastatin (CRESTOR) tablet 20 mg  20 mg Oral Nightly Orville Weston MD       • sodium chloride 0.9 % flush 10 mL  10 mL Intravenous PRN Mickie Collins APRN       • ticagrelor (BRILINTA) tablet 90 mg  90 mg Oral BID Mary Rodriguez APRN         Current Outpatient Medications   Medication Sig Dispense Refill   • ticagrelor (BRILINTA) 90 MG tablet tablet Take 1 tablet by mouth 2 (Two) Times a Day. 60 tablet 11   • acyclovir (Zovirax) 5 % ointment Apply  topically to the appropriate area as directed 3 (Three) Times a Day. 30 g 0   • aspirin 81 MG EC tablet Take 1 tablet by mouth Daily.     • azelastine (ASTELIN) 0.1 % nasal spray 2 sprays into the nostril(s) as directed by provider 2 (Two) Times a Day. Use in each  nostril as directed      • carvedilol (COREG) 12.5 MG tablet Take 12.5 mg by mouth 2 (Two) Times a Day With Meals.     • cholecalciferol 1000 units tablet Take 1,000 Units by mouth Daily. 30 tablet 1   • cloNIDine (CATAPRES) 0.1 MG tablet Take 1 tablet by mouth Every 6 (Six) Hours As Needed for High Blood Pressure (BP >180/100). 30 tablet 1   • ferrous sulfate 325 (65 FE) MG tablet Take 1 tablet by mouth 2 (Two) Times a Day With Meals. 60 tablet 0   • fluticasone (FLONASE) 50 MCG/ACT nasal spray 2 sprays into the nostril(s) as directed by provider Daily As Needed for Rhinitis or Allergies.     • hydrALAZINE (APRESOLINE) 25 MG tablet Take 3 tablets by mouth Every 8 (Eight) Hours. 270 tablet 3   • losartan (COZAAR) 100 MG tablet Take 1 tablet by mouth Daily. 90 tablet 3   • NIFEdipine XL (ADALAT CC) 60 MG 24 hr tablet Take 1 tablet by mouth 2 (two) times a day. 60 tablet 1   • nitroglycerin (NITROSTAT) 0.4 MG SL tablet Place 1 tablet under the tongue Every 5 (Five) Minutes As Needed for Chest Pain for up to 3 doses. Take no more than 3 doses in 15 minutes. 25 tablet 0   • OLANZapine (zyPREXA) 5 MG tablet Take 1 tablet by mouth 2 (two) times a day. 60 tablet 0   • pantoprazole (PROTONIX) 40 MG EC tablet Take 1 tablet by mouth Daily. 30 tablet 1   • rosuvastatin (CRESTOR) 20 MG tablet Take 1 tablet by mouth Daily. 90 tablet 3       Allergies:  Codeine and Imitrex [sumatriptan]    Review of Systems  Review of Systems   Constitutional: Positive for fatigue. Negative for diaphoresis and unexpected weight change.   Respiratory: Positive for shortness of breath. Negative for chest tightness and wheezing.    Cardiovascular: Positive for chest pain and leg swelling. Negative for palpitations.   Gastrointestinal: Negative for diarrhea, nausea and vomiting.   Neurological: Negative for dizziness, syncope and light-headedness.   All other systems reviewed and are negative.      Objective     Physical Exam:  Patient Vitals for the  "past 24 hrs:   BP Temp Temp src Pulse Resp SpO2 Height Weight   07/22/21 0934 -- -- -- 70 20 97 % -- --   07/22/21 0645 161/99 -- -- 70 -- 95 % -- --   07/22/21 0515 152/88 -- -- 62 -- 95 % -- --   07/22/21 0430 161/93 -- -- 71 -- 96 % -- --   07/22/21 0315 156/93 -- -- 75 -- 95 % -- --   07/22/21 0215 140/95 -- -- 71 -- 93 % -- --   07/22/21 0200 151/89 -- -- 73 -- 93 % -- --   07/22/21 0130 -- -- -- 74 -- 94 % -- --   07/22/21 0123 -- -- -- (!) 35 -- -- -- --   07/22/21 0115 -- -- -- 61 -- 94 % -- --   07/22/21 0100 154/96 -- -- 69 22 94 % -- --   07/22/21 0045 -- -- -- 72 -- 94 % -- --   07/22/21 0030 -- -- -- 72 -- 94 % -- --   07/22/21 0015 -- -- -- 68 -- 94 % -- --   07/22/21 0000 -- -- -- 85 -- 93 % -- --   07/21/21 2345 (!) 175/103 -- -- 67 -- 93 % -- --   07/21/21 2340 -- -- -- 66 -- 93 % -- --   07/21/21 2335 -- -- -- 66 -- 93 % -- --   07/21/21 2330 (!) 181/91 -- -- 70 -- 93 % -- --   07/21/21 2320 -- -- -- 76 -- 93 % -- --   07/21/21 2315 -- -- -- 73 -- 93 % -- --   07/21/21 2305 -- -- -- (!) 40 -- -- -- --   07/21/21 2255 -- -- -- 62 -- 97 % -- --   07/21/21 2250 -- -- -- 68 -- 95 % -- --   07/21/21 2217 -- -- -- (!) 45 -- -- -- --   07/21/21 2123 -- -- -- (!) 33 -- -- -- --   07/21/21 2109 -- -- -- (!) 25 -- -- -- --   07/21/21 2100 (!) 176/117 -- -- 71 -- 95 % -- --   07/21/21 2049 -- -- -- 70 18 94 % -- --   07/21/21 1945 159/96 -- -- 70 -- 96 % -- --   07/21/21 1900 148/97 -- -- 67 20 96 % -- --   07/21/21 1800 (!) 154/104 98 °F (36.7 °C) Oral 67 20 97 % -- --   07/21/21 1454 (!) 174/128 97.7 °F (36.5 °C) Oral 84 26 94 % 167.6 cm (66\") 90.7 kg (200 lb)     Vitals reviewed.   Constitutional:       General: Not in acute distress.     Appearance: Healthy appearance. Well-developed. Not diaphoretic.   Eyes:      General: No scleral icterus.     Conjunctiva/sclera: Conjunctivae normal.      Pupils: Pupils are equal, round, and reactive to light.   HENT:      Head: Normocephalic.    Mouth/Throat:      " Pharynx: No oropharyngeal exudate.   Neck:      Vascular: JVD present. No JVR.   Pulmonary:      Effort: Pulmonary effort is normal. No respiratory distress.      Breath sounds: Examination of the right-lower field reveals rales. Examination of the left-lower field reveals rales. No wheezing. No rhonchi. Rales present.   Chest:      Chest wall: Not tender to palpatation.   Cardiovascular:      Normal rate. Regular rhythm.   Pulses:     Intact distal pulses.   Edema:     Ankle: bilateral 2+ pitting edema of the ankle.     Feet: bilateral 2+ pitting edema of the feet.  Abdominal:      General: Bowel sounds are normal. There is no distension.      Palpations: Abdomen is soft.      Tenderness: There is no abdominal tenderness.   Musculoskeletal: Normal range of motion.      Cervical back: Normal range of motion and neck supple. Skin:     General: Skin is warm and dry.      Coloration: Skin is not pale.      Findings: No erythema or rash.   Neurological:      Mental Status: Alert, oriented to person, place, and time and oriented to person, place and time.      Deep Tendon Reflexes: Reflexes are normal and symmetric.   Psychiatric:         Behavior: Behavior normal.         Results Review:   I reviewed the patient's new clinical results.    Lab Results (last 72 hours)     Procedure Component Value Units Date/Time    Blood Culture - Blood, Arm, Right [[id number]] Collected: 07/21/21 1700    Specimen: Blood from Arm, Right Updated: 07/21/21 1739    Blood Culture - Blood, Arm, Left [[id number]] Collected: 07/21/21 1713    Specimen: Blood from Arm, Left Updated: 07/21/21 1739    D-dimer, Quantitative [[id number]]  (Abnormal) Collected: 07/21/21 1713    Specimen: Blood Updated: 07/21/21 1734     D-Dimer, Quantitative 1.27 mg/L (FEU)     Narrative:      Reference Range is 0-0.50 mg/L FEU. However, results <0.50 mg/L FEU tends to rule out DVT or PE. Results >0.50 mg/L FEU are not useful in predicting absence or presence of DVT or  PE.      Protime-INR [[id number]]  (Normal) Collected: 07/21/21 1713    Specimen: Blood Updated: 07/21/21 1734     Protime 13.1 Seconds      INR 1.07    aPTT [[id number]]  (Normal) Collected: 07/21/21 1713    Specimen: Blood Updated: 07/21/21 1734     PTT 32.3 seconds     COVID-19,[facility]-HOUSE,Nasal Swab No Transport Media 3-4 HR TAT - Swab, Nasal Cavity [[id number]]  (Normal) Collected: 07/21/21 1626    Specimen: Swab from Nasal Cavity Updated: 07/21/21 1724     COVID19 Not Detected    Narrative:      Fact sheet for providers: https://www.fda.gov/media/443621/download     Fact sheet for patients: https://www.fda.gov/media/968219/download    Test performed by PCR.    Consider negative results in combination with clinical observations, patient history, and epidemiological information.    [place] Draw [[id number]] Collected: 07/21/21 1600    Specimen: Blood Updated: 07/21/21 1715    Narrative:      The following orders were created for panel order [place] Draw.  Procedure                               Abnormality         Status                     ---------                               -----------         ------                     Green Top (Gel)[[id number]]                                  Final result               Lavender Top[[id number]]                                     Final result               Red Top[[id number]]                                          Final result                 Please view results for these tests on the individual orders.    Green Top (Gel) [[id number]] Collected: 07/21/21 1600    Specimen: Blood Updated: 07/21/21 1715     Extra Tube Hold for add-ons.     Comment: Auto resulted.       Red Top [[id number]] Collected: 07/21/21 1600    Specimen: Blood Updated: 07/21/21 1715     Extra Tube Hold for add-ons.     Comment: Auto resulted.       Lavender Top [[id number]] Collected: 07/21/21 1600    Specimen: Blood Updated: 07/21/21 1715     Extra Tube hold for add-on     Comment: Auto resulted        BNP [[id number]]  (Abnormal) Collected: 07/21/21 1600    Specimen: Blood Updated: 07/21/21 1649     proBNP 35,120.0 pg/mL     Narrative:      Among patients with dyspnea, NT-proBNP is highly sensitive for the detection of acute congestive heart failure. In addition NT-proBNP of <300 pg/ml effectively rules out acute congestive heart failure with 99% negative predictive value.    Results may be falsely decreased if patient taking Biotin.      Comprehensive Metabolic Panel [[id number]]  (Abnormal) Collected: 07/21/21 1600    Specimen: Blood Updated: 07/21/21 1638     Glucose 101 mg/dL      BUN 33 mg/dL      Creatinine 1.60 mg/dL      Sodium 142 mmol/L      Potassium 3.7 mmol/L      Chloride 108 mmol/L      CO2 24.0 mmol/L      Calcium 8.9 mg/dL      Total Protein 7.0 g/dL      Albumin 4.10 g/dL      ALT (SGPT) 18 U/L      AST (SGOT) 15 U/L      Alkaline Phosphatase 104 U/L      Total Bilirubin 0.7 mg/dL      eGFR Non African Amer 33 mL/min/1.73      Globulin 2.9 gm/dL      A/G Ratio 1.4 g/dL      BUN/Creatinine Ratio 20.6     Anion Gap 10.0 mmol/L     Narrative:      GFR Normal >60  Chronic Kidney Disease <60  Kidney Failure <15      Troponin [[id number]]  (Normal) Collected: 07/21/21 1600    Specimen: Blood Updated: 07/21/21 1636     Troponin T 0.012 ng/mL     Narrative:      Troponin T Reference Range:  <= 0.03 ng/mL-   Negative for AMI  >0.03 ng/mL-     Abnormal for myocardial necrosis.  Clinicians would have to utilize clinical acumen, EKG, Troponin and serial changes to determine if it is an Acute Myocardial Infarction or myocardial injury due to an underlying chronic condition.       Results may be falsely decreased if patient taking Biotin.      Blood Gas, Arterial - [[id number]]  (Abnormal) Collected: 07/21/21 1621    Specimen: Arterial Blood Updated: 07/21/21 1620     Site Right Radial     Guido's Test Positive     pH, Arterial 7.404 pH units      pCO2, Arterial 41.2 mm Hg      pO2, Arterial 76.4 mm Hg       Comment: 84 Value below reference range        HCO3, Arterial 25.7 mmol/L      Base Excess, Arterial 0.9 mmol/L      O2 Saturation, Arterial 96.7 %      Temperature 37.0 C      Barometric Pressure for Blood Gas 752 mmHg      Modality Nasal Cannula     Flow Rate 2.0 lpm      Ventilator Mode NA     Collected by [id number]     Comment: Meter: [id number]     :  [id number]        pCO2, Temperature Corrected 41.2 mm Hg      pH, Temp Corrected 7.404 pH Units      pO2, Temperature Corrected 76.4 mm Hg     CBC & Differential [[id number]]  (Abnormal) Collected: 07/21/21 1600    Specimen: Blood Updated: 07/21/21 1610    Narrative:      The following orders were created for panel order CBC & Differential.  Procedure                               Abnormality         Status                     ---------                               -----------         ------                     CBC Auto Differential[[id number]]        Abnormal            Final result                 Please view results for these tests on the individual orders.    CBC Auto Differential [[id number]]  (Abnormal) Collected: 07/21/21 1600    Specimen: Blood Updated: 07/21/21 1610     WBC 9.31 10*3/mm3      RBC 3.66 10*6/mm3      Hemoglobin 10.8 g/dL      Hematocrit 34.2 %      MCV 93.4 fL      MCH 29.5 pg      MCHC 31.6 g/dL      RDW 14.8 %      RDW-SD 49.8 fl      MPV 11.3 fL      Platelets 212 10*3/mm3      Neutrophil % 81.7 %      Lymphocyte % 9.5 %      Monocyte % 5.8 %      Eosinophil % 1.9 %      Basophil % 0.6 %      Immature Grans % 0.5 %      Neutrophils, Absolute 7.60 10*3/mm3      Lymphocytes, Absolute 0.88 10*3/mm3      Monocytes, Absolute 0.54 10*3/mm3      Eosinophils, Absolute 0.18 10*3/mm3      Basophils, Absolute 0.06 10*3/mm3      Immature Grans, Absolute 0.05 10*3/mm3      nRBC 0.0 /100 WBC           No results found for: ECHOEFEST    Imaging Results (Last 72 Hours)     Procedure Component Value Units Date/Time    NM Lung Ventilation Perfusion  [[id number]] Collected: 07/21/21 1845     Updated: 07/21/21 1850    Narrative:      EXAMINATION: NM LUNG VENTILATION PERFUSION- 7/21/2021 6:45 PM CDT     HISTORY: elevated dimer, shortness of breath; R00.1-Bradycardia,  unspecified; I50.9-Heart failure, unspecified.     Dose: 5.6 mCi technetium 99m MAA intravenously.     REPORT: Perfusion only scintigraphic images of the lungs were obtained  in anterior, posterior and oblique dimensions.     COMPARISON: Perfusion scintigraphy performed on 11/8/2020.     COMPARISON: Chest x-rays on 7/21/2021.     Distribution of activity within the lungs is homogeneous and there are  no focal segmental or subsegmental defects to indicate the presence of  significant pulmonary embolic disease.       Impression:      Low probability perfusion only lung scintigraphy for  significant pulmonary emboli.  This report was finalized on 07/21/2021 18:47 by Dr. Sudhir Hair MD.    XR Chest 1 View [[id number]] Collected: 07/21/21 1626     Updated: 07/21/21 1632    Narrative:      EXAMINATION: XR CHEST 1 VW-. 7/21/2021 4:26 PM CDT     CHEST, ONE VIEW:     HISTORY: Shortness of air     COMPARISON: 11/7/2020, 10/31/2020 and 10/30/2020 and 10/12/2020     A single frontal chest radiograph was obtained.     FINDINGS:     There is are chronic interstitial lung changes identified with diffuse  interstitial prominence observed similarly on the 11/7/2020 examination,  mild chronic changes suspected. The heart is generous Superimposed acute  interstitial infectious or inflammatory process, as well as mild  pulmonary edema not excluded..     The bony structures are intact with degenerative spurring noted in the  thoracic spine.                                     Impression:      1. Generous heart size with chronic lung changes. Superimposed acute  interstitial infiltration from infectious/inflammatory changes as well  as pulmonary edema not excluded.     This report was finalized on 07/21/2021 16:29 by  Dr. Nick Busch MD.        Assessment/Plan       Hypertensive urgency    Stage 3 chronic kidney disease (CMS/[facility])    Acute on chronic diastolic heart failure (CMS/[facility])    Coronary artery disease of native artery of native heart with stable angina pectoris (CMS/[facility])    Bradycardia      Plan:   Bradycardia: has been a persistent issue and medications were previously adjusted per cardiology in October 2020 for the came issue. It was recommended she remain on Coreg 12.5mg BID to prevent bradycardia, yet her last 2 d/c summaries have included Coreg 25mg BID. Will resume and reduce back to 12.5mg BID. Bradycardia also occurs during sleeping. Will place orders for overnight oximetry while inpatient.     HTN urgency: has been a persistent issue as she is noncompliant with her medications. Norvasc was resumed during this hospitalization which is an inaccurate medication and was stopped in 10/2020. I have spoke with KARY Sampson to obtain an accurate medication list so her BP medications may be resumed. This is likely an issue of medication noncompliance as it has been in the past. I have placed orders for her to resume Coreg 12.5mg BID, Nifedipine 60mg BID, and hydralazine 25mg TID    CP: initial troponin is negative. Awaiting repeat. Likely secondary to HTN urgency. Recommend better BP control and treatment of acute on chronic diastolic HF prior to ischemic testing. EKGs unchanged from previous.     DHF: occurs in the setting of HTN urgency. Elevated BNP, presence of possible pulmonary edema on CXR, swelling, rales and JVD. IV lasix has been started per attending. Creatinine stable. Will plan to resume aldactone once acute volume overload is resolved. Daily weights, strict I&O, daily BMP, fluid restriction and low salt diet.     CAD: previous stent to LAD in 11/2020 following STEMI. Continue ASA, Brilinta, statin     CKD: noted. Will follow labs.     Further orders per Dr. Delgado    Thank you for asking us to follow this patient  with you.     Electronically signed by ISAIAH Leger, 07/22/21, 9:40 AM CDT.    Please note this cardiology consultation note is the result of a face to face consultation with the patient, in addition to reviewing medical records at length by myself, ISAIAH Styles      Time: approximately 60 minutes

## 2021-07-22 NOTE — ED NOTES
MD rai aware of pt indira episodes and states to call cardiology consult in the morning.     Kenzie Leyva, RN  07/22/21 0021

## 2021-07-22 NOTE — ED NOTES
RN to bedside pt alert and does not have any complaints at this time.      Kenzie Leyva, RN  07/22/21 0221

## 2021-07-22 NOTE — ED NOTES
RN to bedside pt alert and does not have any complaints at this time.      Kenzie Leyva, RN  07/22/21 0223

## 2021-07-22 NOTE — H&P
" History and Physical      CHIEF COMPLAINT:  SOA    Reason for Admission:  Volume Overload    History Obtained From:  Patient, chart    HISTORY OF PRESENT ILLNESS:      The patient is a 60 y.o. female who was admitted with SOA for 3 days. She also has c/o CP with exertion. She has had some increased LE edema. No GI complaints. She has had nasal congestion. No cough. No fever. No dysuria. She has had bradycardia with sleeping, but no symptoms, or change in BP.     Past Medical History:    Past Medical History:   Diagnosis Date   • Acute CHF (CMS/HCC)    • Acute renal failure (ARF) (CMS/HCC)    • Anemia    • Asthma     childhood   • Hypertension    • Ischemic colitis (CMS/HCC)    • Metabolic acidosis    • Nonrheumatic aortic (valve) insufficiency    • PTSD (post-traumatic stress disorder)      Past Surgical History:    Past Surgical History:   Procedure Laterality Date   • CARDIAC CATHETERIZATION N/A 11/8/2020    Procedure: Left Heart Cath;  Surgeon: Pierce Buchanan MD;  Location: [facility] CATH INVASIVE LOCATION;  Service: Cardiovascular;  Laterality: N/A;   • EXTERNAL FIXATION WRIST FRACTURE     • LEG SURGERY     • TUBAL ABDOMINAL LIGATION           Medications Prior to Admission:    (Not in a hospital admission)      Allergies:  Codeine and Imitrex [sumatriptan]    Social History:   TOBACCO:   reports that she has been smoking. She has been smoking about 0.50 packs per day. She has never used smokeless tobacco.  ETOH:   reports no history of alcohol use.  DRUGS:   reports no history of drug use.        Family History:   Family History   Problem Relation Age of Onset   • Coronary artery disease Mother    • Coronary artery disease Father      REVIEW OF SYSTEMS:  Constitutional: neg  CV: CP  Pulmonary: SOA  GI: neg  : neg  Psych: neg  Neuro: neg  Skin: neg  MusculoSkeletal: neg  HEENT: neg  Joints: neg  Vitals:  /99   Pulse 70   Temp 98 °F (36.7 °C) (Oral)   Resp 22   Ht 167.6 cm (66\")   Wt 90.7 kg (200 lb)   " SpO2 95%   BMI 32.28 kg/m²     PHYSICAL EXAM:  Gen: NAD, alert, pleasant  HEENT: WNL  Lymph: no LAD  Neck: no JVD or masses  Chest: coarse  CV: RRR  Abdomen: NT/ND  Extrem: no C/C/trace LE edema  Neuro: non focal  Skin: no rashes  Joints: no redness  DATA:  I have reviewed the admission labs and imaging tests.    ASSESSMENT AND PLAN:      Volume Overload----continue diuresis, BP control, consult Cardiology  Bradycardia---hold B Blocker at this time, monitor on telemetry, Cardiology to see pt  CP  COPD, tobacco abuse----start nebs, steroids and antibiotics        Orville Weston MD  09:16 CDT 7/22/2021

## 2021-07-22 NOTE — ED NOTES
RN to bedside pt alert and does not have any complaints at this time.      Kenzie Leyva, KARY  07/22/21 022

## 2021-07-22 NOTE — ED NOTES
RN to bedside pt alert and does not have any complaints at this time.      Kenzie Leyva, KARY  07/22/21 0222

## 2021-07-23 LAB
ANION GAP SERPL CALCULATED.3IONS-SCNC: 15 MMOL/L (ref 5–15)
BUN SERPL-MCNC: 42 MG/DL (ref 8–23)
BUN/CREAT SERPL: 19 (ref 7–25)
CALCIUM SPEC-SCNC: 9.4 MG/DL (ref 8.6–10.5)
CHLORIDE SERPL-SCNC: 100 MMOL/L (ref 98–107)
CO2 SERPL-SCNC: 26 MMOL/L (ref 22–29)
CREAT SERPL-MCNC: 2.21 MG/DL (ref 0.57–1)
GFR SERPL CREATININE-BSD FRML MDRD: 23 ML/MIN/1.73
GLUCOSE BLDC GLUCOMTR-MCNC: 111 MG/DL (ref 70–130)
GLUCOSE BLDC GLUCOMTR-MCNC: 139 MG/DL (ref 70–130)
GLUCOSE BLDC GLUCOMTR-MCNC: 145 MG/DL (ref 70–130)
GLUCOSE BLDC GLUCOMTR-MCNC: 320 MG/DL (ref 70–130)
GLUCOSE SERPL-MCNC: 151 MG/DL (ref 65–99)
POTASSIUM SERPL-SCNC: 3.7 MMOL/L (ref 3.5–5.2)
QT INTERVAL: 434 MS
QTC INTERVAL: 458 MS
SODIUM SERPL-SCNC: 141 MMOL/L (ref 136–145)

## 2021-07-23 PROCEDURE — 25010000002 ENOXAPARIN PER 10 MG: Performed by: FAMILY MEDICINE

## 2021-07-23 PROCEDURE — 36415 COLL VENOUS BLD VENIPUNCTURE: CPT | Performed by: FAMILY MEDICINE

## 2021-07-23 PROCEDURE — 94799 UNLISTED PULMONARY SVC/PX: CPT

## 2021-07-23 PROCEDURE — 94762 N-INVAS EAR/PLS OXIMTRY CONT: CPT

## 2021-07-23 PROCEDURE — 82962 GLUCOSE BLOOD TEST: CPT

## 2021-07-23 PROCEDURE — 25010000002 METHYLPREDNISOLONE PER 40 MG: Performed by: FAMILY MEDICINE

## 2021-07-23 PROCEDURE — 80048 BASIC METABOLIC PNL TOTAL CA: CPT | Performed by: FAMILY MEDICINE

## 2021-07-23 PROCEDURE — 99232 SBSQ HOSP IP/OBS MODERATE 35: CPT | Performed by: INTERNAL MEDICINE

## 2021-07-23 RX ORDER — HYDRALAZINE HYDROCHLORIDE 50 MG/1
75 TABLET, FILM COATED ORAL EVERY 8 HOURS
COMMUNITY
End: 2021-07-25 | Stop reason: HOSPADM

## 2021-07-23 RX ORDER — CHOLECALCIFEROL (VITAMIN D3) 125 MCG
1000 TABLET ORAL DAILY
COMMUNITY
End: 2021-08-13 | Stop reason: HOSPADM

## 2021-07-23 RX ADMIN — ASPIRIN 81 MG: 81 TABLET, COATED ORAL at 09:09

## 2021-07-23 RX ADMIN — NIFEDIPINE 60 MG: 60 TABLET, FILM COATED, EXTENDED RELEASE ORAL at 09:03

## 2021-07-23 RX ADMIN — DOXYCYCLINE 100 MG: 100 INJECTION, POWDER, LYOPHILIZED, FOR SOLUTION INTRAVENOUS at 22:50

## 2021-07-23 RX ADMIN — SODIUM CHLORIDE, PRESERVATIVE FREE 10 ML: 5 INJECTION INTRAVENOUS at 08:56

## 2021-07-23 RX ADMIN — LOSARTAN POTASSIUM 100 MG: 50 TABLET, FILM COATED ORAL at 09:02

## 2021-07-23 RX ADMIN — HYDRALAZINE HYDROCHLORIDE 25 MG: 25 TABLET, FILM COATED ORAL at 14:11

## 2021-07-23 RX ADMIN — TICAGRELOR 90 MG: 90 TABLET ORAL at 22:50

## 2021-07-23 RX ADMIN — CARVEDILOL 12.5 MG: 6.25 TABLET, FILM COATED ORAL at 17:18

## 2021-07-23 RX ADMIN — ENOXAPARIN SODIUM 40 MG: 40 INJECTION SUBCUTANEOUS at 18:41

## 2021-07-23 RX ADMIN — METHYLPREDNISOLONE SODIUM SUCCINATE 40 MG: 40 INJECTION, POWDER, FOR SOLUTION INTRAMUSCULAR; INTRAVENOUS at 16:16

## 2021-07-23 RX ADMIN — PANTOPRAZOLE SODIUM 40 MG: 40 TABLET, DELAYED RELEASE ORAL at 09:01

## 2021-07-23 RX ADMIN — IPRATROPIUM BROMIDE AND ALBUTEROL SULFATE 3 ML: 2.5; .5 SOLUTION RESPIRATORY (INHALATION) at 15:28

## 2021-07-23 RX ADMIN — HYDRALAZINE HYDROCHLORIDE 25 MG: 25 TABLET, FILM COATED ORAL at 05:27

## 2021-07-23 RX ADMIN — IPRATROPIUM BROMIDE AND ALBUTEROL SULFATE 3 ML: 2.5; .5 SOLUTION RESPIRATORY (INHALATION) at 10:11

## 2021-07-23 RX ADMIN — HYDRALAZINE HYDROCHLORIDE 25 MG: 25 TABLET, FILM COATED ORAL at 23:05

## 2021-07-23 RX ADMIN — NIFEDIPINE 60 MG: 60 TABLET, FILM COATED, EXTENDED RELEASE ORAL at 22:50

## 2021-07-23 RX ADMIN — OLANZAPINE 5 MG: 5 TABLET, FILM COATED ORAL at 09:01

## 2021-07-23 RX ADMIN — IPRATROPIUM BROMIDE AND ALBUTEROL SULFATE 3 ML: 2.5; .5 SOLUTION RESPIRATORY (INHALATION) at 06:33

## 2021-07-23 RX ADMIN — CARVEDILOL 12.5 MG: 6.25 TABLET, FILM COATED ORAL at 09:02

## 2021-07-23 RX ADMIN — METHYLPREDNISOLONE SODIUM SUCCINATE 40 MG: 40 INJECTION, POWDER, FOR SOLUTION INTRAMUSCULAR; INTRAVENOUS at 09:03

## 2021-07-23 RX ADMIN — DOXYCYCLINE 100 MG: 100 INJECTION, POWDER, LYOPHILIZED, FOR SOLUTION INTRAVENOUS at 08:55

## 2021-07-23 RX ADMIN — TICAGRELOR 90 MG: 90 TABLET ORAL at 09:01

## 2021-07-23 RX ADMIN — ROSUVASTATIN CALCIUM 20 MG: 20 TABLET, FILM COATED ORAL at 23:05

## 2021-07-23 RX ADMIN — IPRATROPIUM BROMIDE AND ALBUTEROL SULFATE 3 ML: 2.5; .5 SOLUTION RESPIRATORY (INHALATION) at 19:30

## 2021-07-23 RX ADMIN — METHYLPREDNISOLONE SODIUM SUCCINATE 40 MG: 40 INJECTION, POWDER, FOR SOLUTION INTRAMUSCULAR; INTRAVENOUS at 01:10

## 2021-07-23 RX ADMIN — ROSUVASTATIN CALCIUM 20 MG: 20 TABLET, FILM COATED ORAL at 01:10

## 2021-07-23 NOTE — PROGRESS NOTES
[facility] -  Progress Note     LOS: 2 days   Patient Care Team:  Orville Weston MD as PCP - General (Family Medicine)  Juanpablo Rea MD as Consulting Physician (Gastroenterology)  Nickolas Alegria MD as Consulting Physician (Gastroenterology)    Chief Complaint:bradycardia, CHF f/u    Subjective     Interval History:   Feeling better. Creatinine up today. Had 2 episodes of bradycardia early this morning while asleep (0220 and 0550).         Review of Systems:   Review of Systems   Constitutional: Negative for diaphoresis, fatigue and unexpected weight change.   Respiratory: Negative for chest tightness, shortness of breath and wheezing.    Cardiovascular: Negative for chest pain, palpitations and leg swelling.   Gastrointestinal: Negative for diarrhea, nausea and vomiting.   Neurological: Negative for dizziness, syncope and light-headedness.   All other systems reviewed and are negative.      Objective     Vital Sign Min/Max for last 24 hours  Temp  Min: 98 °F (36.7 °C)  Max: 98.6 °F (37 °C)   BP  Min: 124/78  Max: 156/71   Pulse  Min: 68  Max: 84   Resp  Min: 16  Max: 20   SpO2  Min: 93 %  Max: 98 %   Flow (L/min)  Min: 4  Max: 4   No data recorded         07/22/21  1326   Weight: 95.5 kg (210 lb 9.6 oz)         Intake/Output Summary (Last 24 hours) at 7/23/2021 1330  Last data filed at 7/23/2021 1058  Gross per 24 hour   Intake 1060 ml   Output 1350 ml   Net -290 ml         Physical Exam:  Vitals reviewed.   Constitutional:       General: Not in acute distress.     Appearance: Healthy appearance. Well-developed. Not diaphoretic.   Eyes:      General: No scleral icterus.     Conjunctiva/sclera: Conjunctivae normal.      Pupils: Pupils are equal, round, and reactive to light.   HENT:      Head: Normocephalic.    Mouth/Throat:      Pharynx: No oropharyngeal exudate.   Neck:      Vascular: No JVR.   Pulmonary:      Effort: Pulmonary effort is normal. No respiratory distress.       Breath sounds: Normal breath sounds. No wheezing. No rhonchi. No rales.   Chest:      Chest wall: Not tender to palpatation.   Cardiovascular:      Normal rate. Regular rhythm.   Pulses:     Intact distal pulses.   Edema:     Peripheral edema absent.   Abdominal:      General: Bowel sounds are normal. There is no distension.      Palpations: Abdomen is soft.      Tenderness: There is no abdominal tenderness.   Musculoskeletal: Normal range of motion.      Cervical back: Normal range of motion and neck supple. Skin:     General: Skin is warm and dry.      Coloration: Skin is not pale.      Findings: No erythema or rash.   Neurological:      Mental Status: Alert, oriented to person, place, and time and oriented to person, place and time.      Deep Tendon Reflexes: Reflexes are normal and symmetric.   Psychiatric:         Behavior: Behavior normal.          Results Review:   Lab Results (last 72 hours)     Procedure Component Value Units Date/Time    POC Glucose Once [[id number]]  (Normal) Collected: 07/23/21 1144    Specimen: Blood Updated: 07/23/21 1145     Glucose 111 mg/dL      Comment: : [id number] Ana AnnMeter ID: [id number]       POC Glucose Once [[id number]]  (Abnormal) Collected: 07/23/21 0719    Specimen: Blood Updated: 07/23/21 0814     Glucose 145 mg/dL      Comment: : [id number] Derick LadonnaMeter ID: [id number]       Basic Metabolic Panel [[id number]]  (Abnormal) Collected: 07/23/21 0436    Specimen: Blood Updated: 07/23/21 0622     Glucose 151 mg/dL      BUN 42 mg/dL      Creatinine 2.21 mg/dL      Sodium 141 mmol/L      Potassium 3.7 mmol/L      Chloride 100 mmol/L      CO2 26.0 mmol/L      Calcium 9.4 mg/dL      eGFR Non African Amer 23 mL/min/1.73      BUN/Creatinine Ratio 19.0     Anion Gap 15.0 mmol/L     Narrative:      GFR Normal >60  Chronic Kidney Disease <60  Kidney Failure <15      POC Glucose Once [[id number]]  (Abnormal) Collected: 07/22/21 2050    Specimen: Blood Updated: 07/22/21  2101     Glucose 158 mg/dL      Comment: : [id number] Zacarias Lamberteter ID: [id number]       Blood Culture - Blood, Arm, Right [[id number]] Collected: 07/21/21 1700    Specimen: Blood from Arm, Right Updated: 07/22/21 1745     Blood Culture No growth at 24 hours    Blood Culture - Blood, Arm, Left [[id number]] Collected: 07/21/21 1713    Specimen: Blood from Arm, Left Updated: 07/22/21 1745     Blood Culture No growth at 24 hours    POC Glucose Once [[id number]]  (Abnormal) Collected: 07/22/21 1609    Specimen: Blood Updated: 07/22/21 1706     Glucose 185 mg/dL      Comment: : [id number] Pavan Garridoer ID: [id number]       Troponin [[id number]]  (Normal) Collected: 07/22/21 1440    Specimen: Blood Updated: 07/22/21 1514     Troponin T <0.010 ng/mL     Narrative:      Troponin T Reference Range:  <= 0.03 ng/mL-   Negative for AMI  >0.03 ng/mL-     Abnormal for myocardial necrosis.  Clinicians would have to utilize clinical acumen, EKG, Troponin and serial changes to determine if it is an Acute Myocardial Infarction or myocardial injury due to an underlying chronic condition.       Results may be falsely decreased if patient taking Biotin.      Basic Metabolic Panel [[id number]]  (Abnormal) Collected: 07/22/21 1254    Specimen: Blood Updated: 07/22/21 1401     Glucose 104 mg/dL      BUN 32 mg/dL      Creatinine 1.82 mg/dL      Sodium 142 mmol/L      Potassium 3.7 mmol/L      Chloride 101 mmol/L      CO2 30.0 mmol/L      Calcium 9.1 mg/dL      eGFR Non African Amer 28 mL/min/1.73      BUN/Creatinine Ratio 17.6     Anion Gap 11.0 mmol/L     Narrative:      GFR Normal >60  Chronic Kidney Disease <60  Kidney Failure <15      Troponin [[id number]]  (Normal) Collected: 07/22/21 1254    Specimen: Blood Updated: 07/22/21 1358     Troponin T 0.011 ng/mL     Narrative:      Troponin T Reference Range:  <= 0.03 ng/mL-   Negative for AMI  >0.03 ng/mL-     Abnormal for myocardial necrosis.  Clinicians would have to  utilize clinical acumen, EKG, Troponin and serial changes to determine if it is an Acute Myocardial Infarction or myocardial injury due to an underlying chronic condition.       Results may be falsely decreased if patient taking Biotin.      D-dimer, Quantitative [[id number]]  (Abnormal) Collected: 07/21/21 1713    Specimen: Blood Updated: 07/21/21 1734     D-Dimer, Quantitative 1.27 mg/L (FEU)     Narrative:      Reference Range is 0-0.50 mg/L FEU. However, results <0.50 mg/L FEU tends to rule out DVT or PE. Results >0.50 mg/L FEU are not useful in predicting absence or presence of DVT or PE.      Protime-INR [[id number]]  (Normal) Collected: 07/21/21 1713    Specimen: Blood Updated: 07/21/21 1734     Protime 13.1 Seconds      INR 1.07    aPTT [[id number]]  (Normal) Collected: 07/21/21 1713    Specimen: Blood Updated: 07/21/21 1734     PTT 32.3 seconds     COVID-19,[facility] IN-HOUSE,Nasal Swab No Transport Media 3-4 HR TAT - Swab, Nasal Cavity [[id number]]  (Normal) Collected: 07/21/21 1626    Specimen: Swab from Nasal Cavity Updated: 07/21/21 1724     COVID19 Not Detected    Narrative:      Fact sheet for providers: https://www.fda.gov/media/174530/download     Fact sheet for patients: https://www.fda.gov/media/856815/download    Test performed by PCR.    Consider negative results in combination with clinical observations, patient history, and epidemiological information.    [place] Draw [[id number]] Collected: 07/21/21 1600    Specimen: Blood Updated: 07/21/21 1715    Narrative:      The following orders were created for panel order [place] Draw.  Procedure                               Abnormality         Status                     ---------                               -----------         ------                     Green Top (Gel)[[id number]]                                  Final result               Lavender Top[[id number]]                                     Final result               Red Top[[id number]]                                           Final result                 Please view results for these tests on the individual orders.    Green Top (Gel) [[id number]] Collected: 07/21/21 1600    Specimen: Blood Updated: 07/21/21 1715     Extra Tube Hold for add-ons.     Comment: Auto resulted.       Red Top [[id number]] Collected: 07/21/21 1600    Specimen: Blood Updated: 07/21/21 1715     Extra Tube Hold for add-ons.     Comment: Auto resulted.       Lavender Top [[id number]] Collected: 07/21/21 1600    Specimen: Blood Updated: 07/21/21 1715     Extra Tube hold for add-on     Comment: Auto resulted       BNP [[id number]]  (Abnormal) Collected: 07/21/21 1600    Specimen: Blood Updated: 07/21/21 1649     proBNP 35,120.0 pg/mL     Narrative:      Among patients with dyspnea, NT-proBNP is highly sensitive for the detection of acute congestive heart failure. In addition NT-proBNP of <300 pg/ml effectively rules out acute congestive heart failure with 99% negative predictive value.    Results may be falsely decreased if patient taking Biotin.      Comprehensive Metabolic Panel [[id number]]  (Abnormal) Collected: 07/21/21 1600    Specimen: Blood Updated: 07/21/21 1638     Glucose 101 mg/dL      BUN 33 mg/dL      Creatinine 1.60 mg/dL      Sodium 142 mmol/L      Potassium 3.7 mmol/L      Chloride 108 mmol/L      CO2 24.0 mmol/L      Calcium 8.9 mg/dL      Total Protein 7.0 g/dL      Albumin 4.10 g/dL      ALT (SGPT) 18 U/L      AST (SGOT) 15 U/L      Alkaline Phosphatase 104 U/L      Total Bilirubin 0.7 mg/dL      eGFR Non African Amer 33 mL/min/1.73      Globulin 2.9 gm/dL      A/G Ratio 1.4 g/dL      BUN/Creatinine Ratio 20.6     Anion Gap 10.0 mmol/L     Narrative:      GFR Normal >60  Chronic Kidney Disease <60  Kidney Failure <15      Troponin [[id number]]  (Normal) Collected: 07/21/21 1600    Specimen: Blood Updated: 07/21/21 1636     Troponin T 0.012 ng/mL     Narrative:      Troponin T Reference Range:  <= 0.03 ng/mL-   Negative for  AMI  >0.03 ng/mL-     Abnormal for myocardial necrosis.  Clinicians would have to utilize clinical acumen, EKG, Troponin and serial changes to determine if it is an Acute Myocardial Infarction or myocardial injury due to an underlying chronic condition.       Results may be falsely decreased if patient taking Biotin.      Blood Gas, Arterial - [[id number]]  (Abnormal) Collected: 07/21/21 1621    Specimen: Arterial Blood Updated: 07/21/21 1620     Site Right Radial     Guido's Test Positive     pH, Arterial 7.404 pH units      pCO2, Arterial 41.2 mm Hg      pO2, Arterial 76.4 mm Hg      Comment: 84 Value below reference range        HCO3, Arterial 25.7 mmol/L      Base Excess, Arterial 0.9 mmol/L      O2 Saturation, Arterial 96.7 %      Temperature 37.0 C      Barometric Pressure for Blood Gas 752 mmHg      Modality Nasal Cannula     Flow Rate 2.0 lpm      Ventilator Mode NA     Collected by [id number]     Comment: Meter: [id number]     :  [id number]        pCO2, Temperature Corrected 41.2 mm Hg      pH, Temp Corrected 7.404 pH Units      pO2, Temperature Corrected 76.4 mm Hg     CBC & Differential [[id number]]  (Abnormal) Collected: 07/21/21 1600    Specimen: Blood Updated: 07/21/21 1610    Narrative:      The following orders were created for panel order CBC & Differential.  Procedure                               Abnormality         Status                     ---------                               -----------         ------                     CBC Auto Differential[[id number]]        Abnormal            Final result                 Please view results for these tests on the individual orders.    CBC Auto Differential [[id number]]  (Abnormal) Collected: 07/21/21 1600    Specimen: Blood Updated: 07/21/21 1610     WBC 9.31 10*3/mm3      RBC 3.66 10*6/mm3      Hemoglobin 10.8 g/dL      Hematocrit 34.2 %      MCV 93.4 fL      MCH 29.5 pg      MCHC 31.6 g/dL      RDW 14.8 %      RDW-SD 49.8 fl      MPV 11.3 fL       Platelets 212 10*3/mm3      Neutrophil % 81.7 %      Lymphocyte % 9.5 %      Monocyte % 5.8 %      Eosinophil % 1.9 %      Basophil % 0.6 %      Immature Grans % 0.5 %      Neutrophils, Absolute 7.60 10*3/mm3      Lymphocytes, Absolute 0.88 10*3/mm3      Monocytes, Absolute 0.54 10*3/mm3      Eosinophils, Absolute 0.18 10*3/mm3      Basophils, Absolute 0.06 10*3/mm3      Immature Grans, Absolute 0.05 10*3/mm3      nRBC 0.0 /100 WBC               Echo EF Estimated  No results found for: ECHOEFEST      Cath Ejection Fraction Quantitative  No results found for: CATHEF        Medication Review: yes  Current Facility-Administered Medications   Medication Dose Route Frequency Provider Last Rate Last Admin   • aspirin EC tablet 81 mg  81 mg Oral Daily Orville Weston MD   81 mg at 07/23/21 0909   • carvedilol (COREG) tablet 12.5 mg  12.5 mg Oral BID With Meals Mary Rodriguez APRN   12.5 mg at 07/23/21 0902   • cloNIDine (CATAPRES) tablet 0.1 mg  0.1 mg Oral Q6H PRN Mary Rodriguez APRN       • doxycycline (VIBRAMYCIN) 100 mg/100 mL 0.9% NS MBP  100 mg Intravenous Q12H Orville Weston MD   100 mg at 07/23/21 0855   • enoxaparin (LOVENOX) syringe 40 mg  40 mg Subcutaneous Q24H Orville Weston MD   40 mg at 07/22/21 1813   • fluticasone (FLONASE) 50 MCG/ACT nasal spray 2 spray  2 spray Nasal Daily PRN Orville Weston MD       • hydrALAZINE (APRESOLINE) tablet 25 mg  25 mg Oral Q8H Orville Weston MD   25 mg at 07/23/21 0527   • ipratropium-albuterol (DUO-NEB) nebulizer solution 3 mL  3 mL Nebulization 4x Daily - RT Orville Weston MD   3 mL at 07/23/21 1011   • losartan (COZAAR) tablet 100 mg  100 mg Oral Q24H Mary Rodriguez APRN   100 mg at 07/23/21 0902   • methylPREDNISolone sodium succinate (SOLU-Medrol) injection 40 mg  40 mg Intravenous Q8H rOville Weston MD   40 mg at 07/23/21 0903   • NIFEdipine XL (PROCARDIA XL) 24 hr tablet 60 mg  60 mg Oral BID Mary Rodriguez, APRN    60 mg at 07/23/21 0903   • OLANZapine (zyPREXA) tablet 5 mg  5 mg Oral Daily Orville Weston MD   5 mg at 07/23/21 0901   • pantoprazole (PROTONIX) EC tablet 40 mg  40 mg Oral Daily Orville Weston MD   40 mg at 07/23/21 0901   • rosuvastatin (CRESTOR) tablet 20 mg  20 mg Oral Nightly Orville Weston MD   20 mg at 07/23/21 0110   • sodium chloride 0.9 % flush 10 mL  10 mL Intravenous PRN Mickie Collins APRN   10 mL at 07/23/21 0856   • ticagrelor (BRILINTA) tablet 90 mg  90 mg Oral BID Mary Rodriguez APRN   90 mg at 07/23/21 0901     Assessment/Plan       Hypertensive urgency    Stage 3 chronic kidney disease (CMS/HCC)    Acute on chronic diastolic heart failure (CMS/HCC)    Coronary artery disease of native artery of native heart with stable angina pectoris (CMS/[facility])    Bradycardia    Plan:   Bradycardia: bradycardia noted while asleep. Continue with Coreg 12.5mg BID. She was placed on bipap last night while asleep, resulting in no overnight bradycardia. overnight oximetry tonight.     HTN urgency: improved after resuming her home BP medications. Continue Coreg 12.5mg BID, Nifedipine 60mg BID, and hydralazine 25mg TID     CP: serial troponins negative. No further CP. Resolved once BP controlled.      DHF: occurs in the setting of HTN urgency. Appears to be euvolemic. IV lasix stopped today per attending. Creatinine elevated from yesterday. Will assess restarting aldactone outpatient.. Daily weights, strict I&O, daily BMP, fluid restriction and low salt diet.      CAD: previous stent to LAD in 11/2020 following STEMI. Continue ASA, Brilinta, statin      CKD: noted. Slight increase in creatinine.     Cardiology will sign off at this time. Please let us know if we can be of further assistance.     Patient will need a follow up with ISAIAH Jain in 2-4  weeks after d/c    Further recommendations per Dr. Schwarz     Electronically signed by ISAIAH Leger, 07/23/21, 12:04 PM  CDT.

## 2021-07-23 NOTE — CASE MANAGEMENT/SOCIAL WORK
Discharge Planning Assessment  [facility]     Patient Name: Ludivina Ramirez  MRN: [id number]  Today's Date: 7/23/2021    Admit Date: 7/21/2021    Discharge Needs Assessment     Row Name 07/23/21 0917       Living Environment    Lives With  alone  (Pended)     Current Living Arrangements  home/apartment/condo  (Pended)     Primary Care Provided by  self  (Pended)     Provides Primary Care For  no one  (Pended)     Family Caregiver if Needed  other relative(s)  (Pended)     Family Caregiver Names  Mya  (Pended)     Quality of Family Relationships  involved;helpful;supportive  (Pended)     Able to Return to Prior Arrangements  yes  (Pended)        Transition Planning    Patient/Family Anticipates Transition to  home  (Pended)        Discharge Needs Assessment    Readmission Within the Last 30 Days  no previous admission in last 30 days  (Pended)     Equipment Currently Used at Home  none  (Pended)     Current Discharge Risk  lives alone  (Pended)         Discharge Plan     Row Name 07/23/21 0918       Plan    Plan  Home  (Pended)     Patient/Family in Agreement with Plan  yes  (Pended)     Plan Comments  Called and spoke with pt's niece Mya [phone]. Mya states that pt lived independently prior to hospitalization but needs assistance. She states that if pt needed help at d/c she could help to an extent but has other obligations. Pt family interested in HH but will need orders for referral. Pt does not have HH/DME. Pt has PCP/RX coverage. Will continue to follow as needs arise.  (Pended)         Continued Care and Services - Admitted Since 7/21/2021    Coordination has not been started for this encounter.         Demographic Summary    No documentation.       Functional Status    No documentation.       Psychosocial    No documentation.       Abuse/Neglect    No documentation.       Legal    No documentation.       Substance Abuse    No documentation.       Patient Forms    No documentation.           Cameron  Bernadette

## 2021-07-23 NOTE — PLAN OF CARE
Goal Outcome Evaluation:  Plan of Care Reviewed With: patient           Outcome Summary: VSS, no c/o of pain, sats mid 90's on RA, wears bipap at HS, to have overnight pulse ox, bun/creat elevated, lasix dc'jared per md, safety maintained, HR NSR 71-89

## 2021-07-24 ENCOUNTER — APPOINTMENT (OUTPATIENT)
Dept: ULTRASOUND IMAGING | Facility: HOSPITAL | Age: 61
End: 2021-07-24

## 2021-07-24 PROBLEM — N18.9 ACUTE KIDNEY INJURY SUPERIMPOSED ON CKD (HCC): Status: ACTIVE | Noted: 2019-09-21

## 2021-07-24 LAB
25(OH)D3 SERPL-MCNC: 42 NG/ML (ref 30–100)
ANION GAP SERPL CALCULATED.3IONS-SCNC: 11 MMOL/L (ref 5–15)
BACTERIA UR QL AUTO: ABNORMAL /HPF
BILIRUB UR QL STRIP: NEGATIVE
BUN SERPL-MCNC: 52 MG/DL (ref 8–23)
BUN/CREAT SERPL: 21.7 (ref 7–25)
CALCIUM SPEC-SCNC: 8.7 MG/DL (ref 8.6–10.5)
CHLORIDE SERPL-SCNC: 102 MMOL/L (ref 98–107)
CLARITY UR: CLEAR
CO2 SERPL-SCNC: 27 MMOL/L (ref 22–29)
COLOR UR: YELLOW
CREAT SERPL-MCNC: 2.4 MG/DL (ref 0.57–1)
CREAT UR-MCNC: 67.2 MG/DL
DEPRECATED RDW RBC AUTO: 49.9 FL (ref 37–54)
ERYTHROCYTE [DISTWIDTH] IN BLOOD BY AUTOMATED COUNT: 15 % (ref 12.3–15.4)
GFR SERPL CREATININE-BSD FRML MDRD: 21 ML/MIN/1.73
GLUCOSE BLDC GLUCOMTR-MCNC: 141 MG/DL (ref 70–130)
GLUCOSE BLDC GLUCOMTR-MCNC: 151 MG/DL (ref 70–130)
GLUCOSE BLDC GLUCOMTR-MCNC: 151 MG/DL (ref 70–130)
GLUCOSE BLDC GLUCOMTR-MCNC: 231 MG/DL (ref 70–130)
GLUCOSE SERPL-MCNC: 146 MG/DL (ref 65–99)
GLUCOSE UR STRIP-MCNC: NEGATIVE MG/DL
HCT VFR BLD AUTO: 35.6 % (ref 34–46.6)
HGB BLD-MCNC: 11.3 G/DL (ref 12–15.9)
HGB UR QL STRIP.AUTO: NEGATIVE
HYALINE CASTS UR QL AUTO: ABNORMAL /LPF
KETONES UR QL STRIP: NEGATIVE
LEUKOCYTE ESTERASE UR QL STRIP.AUTO: ABNORMAL
MAGNESIUM SERPL-MCNC: 1.9 MG/DL (ref 1.6–2.4)
MCH RBC QN AUTO: 29.4 PG (ref 26.6–33)
MCHC RBC AUTO-ENTMCNC: 31.7 G/DL (ref 31.5–35.7)
MCV RBC AUTO: 92.5 FL (ref 79–97)
NITRITE UR QL STRIP: NEGATIVE
PH UR STRIP.AUTO: 6.5 [PH] (ref 5–8)
PHOSPHATE SERPL-MCNC: 4.3 MG/DL (ref 2.5–4.5)
PLATELET # BLD AUTO: 267 10*3/MM3 (ref 140–450)
PMV BLD AUTO: 11.3 FL (ref 6–12)
POTASSIUM SERPL-SCNC: 4.1 MMOL/L (ref 3.5–5.2)
PROT UR QL STRIP: ABNORMAL
PROT UR-MCNC: 50.3 MG/DL
PTH-INTACT SERPL-MCNC: 132.2 PG/ML (ref 15–65)
RBC # BLD AUTO: 3.85 10*6/MM3 (ref 3.77–5.28)
RBC # UR: ABNORMAL /HPF
REF LAB TEST METHOD: ABNORMAL
SODIUM SERPL-SCNC: 140 MMOL/L (ref 136–145)
SODIUM UR-SCNC: 52 MMOL/L
SP GR UR STRIP: 1.02 (ref 1–1.03)
SQUAMOUS #/AREA URNS HPF: ABNORMAL /HPF
URATE SERPL-MCNC: 7.1 MG/DL (ref 2.4–5.7)
UROBILINOGEN UR QL STRIP: ABNORMAL
WBC # BLD AUTO: 16.59 10*3/MM3 (ref 3.4–10.8)
WBC UR QL AUTO: ABNORMAL /HPF

## 2021-07-24 PROCEDURE — 83735 ASSAY OF MAGNESIUM: CPT | Performed by: FAMILY MEDICINE

## 2021-07-24 PROCEDURE — 80048 BASIC METABOLIC PNL TOTAL CA: CPT | Performed by: FAMILY MEDICINE

## 2021-07-24 PROCEDURE — 85027 COMPLETE CBC AUTOMATED: CPT | Performed by: NURSE PRACTITIONER

## 2021-07-24 PROCEDURE — 81001 URINALYSIS AUTO W/SCOPE: CPT | Performed by: INTERNAL MEDICINE

## 2021-07-24 PROCEDURE — 84300 ASSAY OF URINE SODIUM: CPT | Performed by: NURSE PRACTITIONER

## 2021-07-24 PROCEDURE — 94799 UNLISTED PULMONARY SVC/PX: CPT

## 2021-07-24 PROCEDURE — 82962 GLUCOSE BLOOD TEST: CPT

## 2021-07-24 PROCEDURE — 84156 ASSAY OF PROTEIN URINE: CPT | Performed by: NURSE PRACTITIONER

## 2021-07-24 PROCEDURE — 25010000002 METHYLPREDNISOLONE PER 40 MG: Performed by: FAMILY MEDICINE

## 2021-07-24 PROCEDURE — 82570 ASSAY OF URINE CREATININE: CPT | Performed by: NURSE PRACTITIONER

## 2021-07-24 PROCEDURE — 83970 ASSAY OF PARATHORMONE: CPT | Performed by: FAMILY MEDICINE

## 2021-07-24 PROCEDURE — 84100 ASSAY OF PHOSPHORUS: CPT | Performed by: FAMILY MEDICINE

## 2021-07-24 PROCEDURE — 25010000002 ENOXAPARIN PER 10 MG: Performed by: FAMILY MEDICINE

## 2021-07-24 PROCEDURE — 84550 ASSAY OF BLOOD/URIC ACID: CPT | Performed by: NURSE PRACTITIONER

## 2021-07-24 PROCEDURE — 82306 VITAMIN D 25 HYDROXY: CPT | Performed by: FAMILY MEDICINE

## 2021-07-24 PROCEDURE — 76775 US EXAM ABDO BACK WALL LIM: CPT

## 2021-07-24 RX ORDER — FAMOTIDINE 20 MG/1
20 TABLET, FILM COATED ORAL DAILY
Status: DISCONTINUED | OUTPATIENT
Start: 2021-07-25 | End: 2021-07-25 | Stop reason: HOSPADM

## 2021-07-24 RX ORDER — FAMOTIDINE 20 MG/1
20 TABLET, FILM COATED ORAL
Status: DISCONTINUED | OUTPATIENT
Start: 2021-07-24 | End: 2021-07-24

## 2021-07-24 RX ORDER — ROSUVASTATIN CALCIUM 10 MG/1
10 TABLET, COATED ORAL NIGHTLY
Status: DISCONTINUED | OUTPATIENT
Start: 2021-07-24 | End: 2021-07-25 | Stop reason: HOSPADM

## 2021-07-24 RX ORDER — CALCITRIOL 0.25 UG/1
0.25 CAPSULE, LIQUID FILLED ORAL DAILY
Status: DISCONTINUED | OUTPATIENT
Start: 2021-07-24 | End: 2021-07-25 | Stop reason: HOSPADM

## 2021-07-24 RX ADMIN — SODIUM CHLORIDE 500 ML: 9 INJECTION, SOLUTION INTRAVENOUS at 15:25

## 2021-07-24 RX ADMIN — NIFEDIPINE 60 MG: 60 TABLET, FILM COATED, EXTENDED RELEASE ORAL at 21:48

## 2021-07-24 RX ADMIN — ROSUVASTATIN CALCIUM 10 MG: 10 TABLET, FILM COATED ORAL at 21:48

## 2021-07-24 RX ADMIN — CARVEDILOL 12.5 MG: 6.25 TABLET, FILM COATED ORAL at 09:05

## 2021-07-24 RX ADMIN — HYDRALAZINE HYDROCHLORIDE 25 MG: 25 TABLET, FILM COATED ORAL at 06:30

## 2021-07-24 RX ADMIN — FAMOTIDINE 20 MG: 20 TABLET, FILM COATED ORAL at 09:11

## 2021-07-24 RX ADMIN — METHYLPREDNISOLONE SODIUM SUCCINATE 40 MG: 40 INJECTION, POWDER, FOR SOLUTION INTRAMUSCULAR; INTRAVENOUS at 09:03

## 2021-07-24 RX ADMIN — TICAGRELOR 90 MG: 90 TABLET ORAL at 21:49

## 2021-07-24 RX ADMIN — IPRATROPIUM BROMIDE AND ALBUTEROL SULFATE 3 ML: 2.5; .5 SOLUTION RESPIRATORY (INHALATION) at 14:22

## 2021-07-24 RX ADMIN — OLANZAPINE 5 MG: 5 TABLET, FILM COATED ORAL at 09:06

## 2021-07-24 RX ADMIN — IPRATROPIUM BROMIDE AND ALBUTEROL SULFATE 3 ML: 2.5; .5 SOLUTION RESPIRATORY (INHALATION) at 20:06

## 2021-07-24 RX ADMIN — CARVEDILOL 12.5 MG: 6.25 TABLET, FILM COATED ORAL at 17:20

## 2021-07-24 RX ADMIN — HYDRALAZINE HYDROCHLORIDE 25 MG: 25 TABLET, FILM COATED ORAL at 21:49

## 2021-07-24 RX ADMIN — HYDRALAZINE HYDROCHLORIDE 25 MG: 25 TABLET, FILM COATED ORAL at 13:11

## 2021-07-24 RX ADMIN — CALCITRIOL 0.25 MCG: 0.25 CAPSULE ORAL at 16:04

## 2021-07-24 RX ADMIN — METHYLPREDNISOLONE SODIUM SUCCINATE 40 MG: 40 INJECTION, POWDER, FOR SOLUTION INTRAMUSCULAR; INTRAVENOUS at 00:46

## 2021-07-24 RX ADMIN — TICAGRELOR 90 MG: 90 TABLET ORAL at 09:06

## 2021-07-24 RX ADMIN — ENOXAPARIN SODIUM 40 MG: 40 INJECTION SUBCUTANEOUS at 18:01

## 2021-07-24 RX ADMIN — METHYLPREDNISOLONE SODIUM SUCCINATE 40 MG: 40 INJECTION, POWDER, FOR SOLUTION INTRAMUSCULAR; INTRAVENOUS at 16:20

## 2021-07-24 RX ADMIN — ASPIRIN 81 MG: 81 TABLET, COATED ORAL at 09:07

## 2021-07-24 RX ADMIN — IPRATROPIUM BROMIDE AND ALBUTEROL SULFATE 3 ML: 2.5; .5 SOLUTION RESPIRATORY (INHALATION) at 09:55

## 2021-07-24 RX ADMIN — DOXYCYCLINE 100 MG: 100 INJECTION, POWDER, LYOPHILIZED, FOR SOLUTION INTRAVENOUS at 09:01

## 2021-07-24 RX ADMIN — NIFEDIPINE 60 MG: 60 TABLET, FILM COATED, EXTENDED RELEASE ORAL at 09:06

## 2021-07-24 RX ADMIN — DOXYCYCLINE 100 MG: 100 INJECTION, POWDER, LYOPHILIZED, FOR SOLUTION INTRAVENOUS at 21:48

## 2021-07-24 NOTE — SIGNIFICANT NOTE
C/o hematoma to right posterior hand post blood draw.  Large hematoma noted.  Patient denies pain or discomfort.  Applied pressure dressing 2x2 and large bandage with firm pressure.

## 2021-07-24 NOTE — PROGRESS NOTES
"Pharmacy Renal Dose Conversion    Ludivina Ramirez is a 60 y.o. year old female  167.6 cm (66\") 95.8 kg (211 lb 3.2 oz)    Estimated Creatinine Clearance: 29.1 mL/min (A) (by C-G formula based on SCr of 2.4 mg/dL (H)).   Creatinine   Date Value Ref Range Status   07/24/2021 2.40 (H) 0.57 - 1.00 mg/dL Final   07/23/2021 2.21 (H) 0.57 - 1.00 mg/dL Final   07/22/2021 1.82 (H) 0.57 - 1.00 mg/dL Final       PLAN  Based on prescribing information provided by the drug , Famotidine 20mg po BIDac and Crestor 20mg po nightly, have been changed to Famotidine 20mg po daily and Crestor 10mg po nightly.    Adjusted per the directives and guidelines established by [facility] and [facility] Executive Committee.  Pharmacy will continue to monitor daily and make further adjustment(s) accordingly.    Zac Vázquez, PharmD  07/24/2117:00 CDT    "

## 2021-07-24 NOTE — PROGRESS NOTES
Ludivina Ramirez is a 60 y.o. female patient.    Less episodes of bradycardia especially while wearing oxygen/CPAP  Cardiology note reviewed.  Creatinine worsening.  Hematoma after blood draw this morning.        Current Facility-Administered Medications   Medication Dose Route Frequency Provider Last Rate Last Admin   • aspirin EC tablet 81 mg  81 mg Oral Daily Orville Weston MD   81 mg at 07/23/21 0909   • carvedilol (COREG) tablet 12.5 mg  12.5 mg Oral BID With Meals Mary Rodriguez APRN   12.5 mg at 07/23/21 1718   • cloNIDine (CATAPRES) tablet 0.1 mg  0.1 mg Oral Q6H PRN Mary Rodriguez APRN       • doxycycline (VIBRAMYCIN) 100 mg/100 mL 0.9% NS MBP  100 mg Intravenous Q12H Orville Weston MD   100 mg at 07/23/21 2250   • enoxaparin (LOVENOX) syringe 40 mg  40 mg Subcutaneous Q24H Orville Weston MD   40 mg at 07/23/21 1841   • fluticasone (FLONASE) 50 MCG/ACT nasal spray 2 spray  2 spray Nasal Daily PRN Orville Weston MD       • hydrALAZINE (APRESOLINE) tablet 25 mg  25 mg Oral Q8H Orville Weston MD   25 mg at 07/24/21 0630   • ipratropium-albuterol (DUO-NEB) nebulizer solution 3 mL  3 mL Nebulization 4x Daily - RT Orville Weston MD   3 mL at 07/23/21 1930   • losartan (COZAAR) tablet 100 mg  100 mg Oral Q24H Mary Rodriguez APRN   100 mg at 07/23/21 0902   • methylPREDNISolone sodium succinate (SOLU-Medrol) injection 40 mg  40 mg Intravenous Q8H Orville Weston MD   40 mg at 07/24/21 0046   • NIFEdipine XL (PROCARDIA XL) 24 hr tablet 60 mg  60 mg Oral BID Mary Rodriguez APRN   60 mg at 07/23/21 2250   • OLANZapine (zyPREXA) tablet 5 mg  5 mg Oral Daily Orville Weston MD   5 mg at 07/23/21 0901   • pantoprazole (PROTONIX) EC tablet 40 mg  40 mg Oral Daily Orville Weston MD   40 mg at 07/23/21 0901   • rosuvastatin (CRESTOR) tablet 20 mg  20 mg Oral Nightly Orville Weston MD   20 mg at 07/23/21 0295   • sodium chloride 0.9 % flush 10 mL  10 mL  "Intravenous PRN Mickie Collins APRN   10 mL at 07/23/21 0856   • ticagrelor (BRILINTA) tablet 90 mg  90 mg Oral BID MichaelMary ANSELMO APRN   90 mg at 07/23/21 2250     ALLERGIES:    Allergies   Allergen Reactions   • Codeine Nausea And Vomiting   • Imitrex [Sumatriptan] Other (See Comments)     HA       Hypertensive urgency    Acute kidney injury superimposed on CKD (CMS/[facility])    Stage 3 chronic kidney disease (CMS/[facility])    Acute on chronic diastolic heart failure (CMS/[facility])    Coronary artery disease of native artery of native heart with stable angina pectoris (CMS/[facility])    Bradycardia    Blood pressure 155/90, pulse 79, temperature 98.2 °F (36.8 °C), temperature source Oral, resp. rate 16, height 167.6 cm (66\"), weight 95.8 kg (211 lb 3.2 oz), SpO2 92 %, not currently breastfeeding.      Subjective:  Symptoms:  Stable.    Diet:  Poor intake.    Activity level: Impaired due to weakness.    Pain:  She reports no pain.      Review of Systems  Objective:  General Appearance:  Comfortable and well-appearing.    Vital signs: (most recent): Blood pressure 155/90, pulse 79, temperature 98.2 °F (36.8 °C), temperature source Oral, resp. rate 16, height 167.6 cm (66\"), weight 95.8 kg (211 lb 3.2 oz), SpO2 92 %, not currently breastfeeding.  Vital signs are normal.  (Blood pressure better controlled).    Output: Producing urine and minimal stool output.    HEENT: Normal HEENT exam.    Lungs:  Normal effort and normal respiratory rate.    Heart: Normal rate.  Regular rhythm.    Abdomen: Abdomen is soft.  Bowel sounds are normal.   There is no abdominal tenderness.     Extremities: Normal range of motion.    Neurological: Patient is alert.    Skin:  Warm and dry.              Labs:  Lab Results (last 72 hours)     Procedure Component Value Units Date/Time    Basic Metabolic Panel [[id number]]  (Abnormal) Collected: 07/24/21 0308    Specimen: Blood Updated: 07/24/21 0358     Glucose 146 mg/dL      BUN 52 mg/dL      Creatinine " 2.40 mg/dL      Sodium 140 mmol/L      Potassium 4.1 mmol/L      Comment: Slight hemolysis detected by analyzer. Results may be affected.        Chloride 102 mmol/L      CO2 27.0 mmol/L      Calcium 8.7 mg/dL      eGFR Non African Amer 21 mL/min/1.73      BUN/Creatinine Ratio 21.7     Anion Gap 11.0 mmol/L     Narrative:      GFR Normal >60  Chronic Kidney Disease <60  Kidney Failure <15      POC Glucose Once [[id number]]  (Abnormal) Collected: 07/23/21 2047    Specimen: Blood Updated: 07/23/21 2058     Glucose 320 mg/dL      Comment: : [id number] Zacarias Humphries ID: [id number]       Blood Culture - Blood, Arm, Right [[id number]] Collected: 07/21/21 1700    Specimen: Blood from Arm, Right Updated: 07/23/21 1745     Blood Culture No growth at 2 days    Blood Culture - Blood, Arm, Left [[id number]] Collected: 07/21/21 1713    Specimen: Blood from Arm, Left Updated: 07/23/21 1745     Blood Culture No growth at 2 days    POC Glucose Once [[id number]]  (Abnormal) Collected: 07/23/21 1539    Specimen: Blood Updated: 07/23/21 1549     Glucose 139 mg/dL      Comment: : [id number] Derick MazariegosaMeter ID: [id number]       POC Glucose Once [[id number]]  (Normal) Collected: 07/23/21 1144    Specimen: Blood Updated: 07/23/21 1145     Glucose 111 mg/dL      Comment: : [id number] Ana Joeler ID: [id number]       POC Glucose Once [[id number]]  (Abnormal) Collected: 07/23/21 0719    Specimen: Blood Updated: 07/23/21 0814     Glucose 145 mg/dL      Comment: : [id number] Derick MazariegosaMeter ID: [id number]       Basic Metabolic Panel [[id number]]  (Abnormal) Collected: 07/23/21 0436    Specimen: Blood Updated: 07/23/21 0622     Glucose 151 mg/dL      BUN 42 mg/dL      Creatinine 2.21 mg/dL      Sodium 141 mmol/L      Potassium 3.7 mmol/L      Chloride 100 mmol/L      CO2 26.0 mmol/L      Calcium 9.4 mg/dL      eGFR Non African Amer 23 mL/min/1.73      BUN/Creatinine Ratio 19.0     Anion Gap 15.0 mmol/L      Narrative:      GFR Normal >60  Chronic Kidney Disease <60  Kidney Failure <15      POC Glucose Once [[id number]]  (Abnormal) Collected: 07/22/21 2050    Specimen: Blood Updated: 07/22/21 2101     Glucose 158 mg/dL      Comment: : [id number] Zacarias Humphries ID: [id number]       POC Glucose Once [[id number]]  (Abnormal) Collected: 07/22/21 1609    Specimen: Blood Updated: 07/22/21 1706     Glucose 185 mg/dL      Comment: : [id number] Pavan Ferguson ID: [id number]       Troponin [[id number]]  (Normal) Collected: 07/22/21 1440    Specimen: Blood Updated: 07/22/21 1514     Troponin T <0.010 ng/mL     Narrative:      Troponin T Reference Range:  <= 0.03 ng/mL-   Negative for AMI  >0.03 ng/mL-     Abnormal for myocardial necrosis.  Clinicians would have to utilize clinical acumen, EKG, Troponin and serial changes to determine if it is an Acute Myocardial Infarction or myocardial injury due to an underlying chronic condition.       Results may be falsely decreased if patient taking Biotin.      Basic Metabolic Panel [[id number]]  (Abnormal) Collected: 07/22/21 1254    Specimen: Blood Updated: 07/22/21 1401     Glucose 104 mg/dL      BUN 32 mg/dL      Creatinine 1.82 mg/dL      Sodium 142 mmol/L      Potassium 3.7 mmol/L      Chloride 101 mmol/L      CO2 30.0 mmol/L      Calcium 9.1 mg/dL      eGFR Non African Amer 28 mL/min/1.73      BUN/Creatinine Ratio 17.6     Anion Gap 11.0 mmol/L     Narrative:      GFR Normal >60  Chronic Kidney Disease <60  Kidney Failure <15      Troponin [[id number]]  (Normal) Collected: 07/22/21 1254    Specimen: Blood Updated: 07/22/21 1358     Troponin T 0.011 ng/mL     Narrative:      Troponin T Reference Range:  <= 0.03 ng/mL-   Negative for AMI  >0.03 ng/mL-     Abnormal for myocardial necrosis.  Clinicians would have to utilize clinical acumen, EKG, Troponin and serial changes to determine if it is an Acute Myocardial Infarction or myocardial injury due to an underlying chronic  condition.       Results may be falsely decreased if patient taking Biotin.      D-dimer, Quantitative [[id number]]  (Abnormal) Collected: 07/21/21 1713    Specimen: Blood Updated: 07/21/21 1734     D-Dimer, Quantitative 1.27 mg/L (FEU)     Narrative:      Reference Range is 0-0.50 mg/L FEU. However, results <0.50 mg/L FEU tends to rule out DVT or PE. Results >0.50 mg/L FEU are not useful in predicting absence or presence of DVT or PE.      Protime-INR [[id number]]  (Normal) Collected: 07/21/21 1713    Specimen: Blood Updated: 07/21/21 1734     Protime 13.1 Seconds      INR 1.07    aPTT [[id number]]  (Normal) Collected: 07/21/21 1713    Specimen: Blood Updated: 07/21/21 1734     PTT 32.3 seconds     COVID-19,[facility],Nasal Swab No Transport Media 3-4 HR TAT - Swab, Nasal Cavity [[id number]]  (Normal) Collected: 07/21/21 1626    Specimen: Swab from Nasal Cavity Updated: 07/21/21 1724     COVID19 Not Detected    Narrative:      Fact sheet for providers: https://www.fda.gov/media/863151/download     Fact sheet for patients: https://www.fda.gov/media/526652/download    Test performed by PCR.    Consider negative results in combination with clinical observations, patient history, and epidemiological information.    [place] Draw [[id number]] Collected: 07/21/21 1600    Specimen: Blood Updated: 07/21/21 1715    Narrative:      The following orders were created for panel order [facility] Draw.  Procedure                               Abnormality         Status                     ---------                               -----------         ------                     Green Top (Gel)[[id number]]                                  Final result               Lavender Top[[id number]]                                     Final result               Red Top[[id number]]                                          Final result                 Please view results for these tests on the individual orders.    Green Top (Gel) [[id number]]  Collected: 07/21/21 1600    Specimen: Blood Updated: 07/21/21 1715     Extra Tube Hold for add-ons.     Comment: Auto resulted.       Red Top [[id number]] Collected: 07/21/21 1600    Specimen: Blood Updated: 07/21/21 1715     Extra Tube Hold for add-ons.     Comment: Auto resulted.       Lavender Top [[id number]] Collected: 07/21/21 1600    Specimen: Blood Updated: 07/21/21 1715     Extra Tube hold for add-on     Comment: Auto resulted       BNP [[id number]]  (Abnormal) Collected: 07/21/21 1600    Specimen: Blood Updated: 07/21/21 1649     proBNP 35,120.0 pg/mL     Narrative:      Among patients with dyspnea, NT-proBNP is highly sensitive for the detection of acute congestive heart failure. In addition NT-proBNP of <300 pg/ml effectively rules out acute congestive heart failure with 99% negative predictive value.    Results may be falsely decreased if patient taking Biotin.      Comprehensive Metabolic Panel [[id number]]  (Abnormal) Collected: 07/21/21 1600    Specimen: Blood Updated: 07/21/21 1638     Glucose 101 mg/dL      BUN 33 mg/dL      Creatinine 1.60 mg/dL      Sodium 142 mmol/L      Potassium 3.7 mmol/L      Chloride 108 mmol/L      CO2 24.0 mmol/L      Calcium 8.9 mg/dL      Total Protein 7.0 g/dL      Albumin 4.10 g/dL      ALT (SGPT) 18 U/L      AST (SGOT) 15 U/L      Alkaline Phosphatase 104 U/L      Total Bilirubin 0.7 mg/dL      eGFR Non African Amer 33 mL/min/1.73      Globulin 2.9 gm/dL      A/G Ratio 1.4 g/dL      BUN/Creatinine Ratio 20.6     Anion Gap 10.0 mmol/L     Narrative:      GFR Normal >60  Chronic Kidney Disease <60  Kidney Failure <15      Troponin [[id number]]  (Normal) Collected: 07/21/21 1600    Specimen: Blood Updated: 07/21/21 1636     Troponin T 0.012 ng/mL     Narrative:      Troponin T Reference Range:  <= 0.03 ng/mL-   Negative for AMI  >0.03 ng/mL-     Abnormal for myocardial necrosis.  Clinicians would have to utilize clinical acumen, EKG, Troponin and serial changes to determine  if it is an Acute Myocardial Infarction or myocardial injury due to an underlying chronic condition.       Results may be falsely decreased if patient taking Biotin.      Blood Gas, Arterial - [[id number]]  (Abnormal) Collected: 07/21/21 1621    Specimen: Arterial Blood Updated: 07/21/21 1620     Site Right Radial     Guido's Test Positive     pH, Arterial 7.404 pH units      pCO2, Arterial 41.2 mm Hg      pO2, Arterial 76.4 mm Hg      Comment: 84 Value below reference range        HCO3, Arterial 25.7 mmol/L      Base Excess, Arterial 0.9 mmol/L      O2 Saturation, Arterial 96.7 %      Temperature 37.0 C      Barometric Pressure for Blood Gas 752 mmHg      Modality Nasal Cannula     Flow Rate 2.0 lpm      Ventilator Mode NA     Collected by [id number]     Comment: Meter: [id number]     :  [id number]        pCO2, Temperature Corrected 41.2 mm Hg      pH, Temp Corrected 7.404 pH Units      pO2, Temperature Corrected 76.4 mm Hg     CBC & Differential [[id number]]  (Abnormal) Collected: 07/21/21 1600    Specimen: Blood Updated: 07/21/21 1610    Narrative:      The following orders were created for panel order CBC & Differential.  Procedure                               Abnormality         Status                     ---------                               -----------         ------                     CBC Auto Differential[[id number]]        Abnormal            Final result                 Please view results for these tests on the individual orders.    CBC Auto Differential [[id number]]  (Abnormal) Collected: 07/21/21 1600    Specimen: Blood Updated: 07/21/21 1610     WBC 9.31 10*3/mm3      RBC 3.66 10*6/mm3      Hemoglobin 10.8 g/dL      Hematocrit 34.2 %      MCV 93.4 fL      MCH 29.5 pg      MCHC 31.6 g/dL      RDW 14.8 %      RDW-SD 49.8 fl      MPV 11.3 fL      Platelets 212 10*3/mm3      Neutrophil % 81.7 %      Lymphocyte % 9.5 %      Monocyte % 5.8 %      Eosinophil % 1.9 %      Basophil % 0.6 %       Immature Grans % 0.5 %      Neutrophils, Absolute 7.60 10*3/mm3      Lymphocytes, Absolute 0.88 10*3/mm3      Monocytes, Absolute 0.54 10*3/mm3      Eosinophils, Absolute 0.18 10*3/mm3      Basophils, Absolute 0.06 10*3/mm3      Immature Grans, Absolute 0.05 10*3/mm3      nRBC 0.0 /100 WBC           Imaging Results (Last 72 Hours)     Procedure Component Value Units Date/Time    NM Lung Ventilation Perfusion [[id number]] Collected: 07/21/21 1845     Updated: 07/21/21 1850    Narrative:      EXAMINATION: NM LUNG VENTILATION PERFUSION- 7/21/2021 6:45 PM CDT     HISTORY: elevated dimer, shortness of breath; R00.1-Bradycardia,  unspecified; I50.9-Heart failure, unspecified.     Dose: 5.6 mCi technetium 99m MAA intravenously.     REPORT: Perfusion only scintigraphic images of the lungs were obtained  in anterior, posterior and oblique dimensions.     COMPARISON: Perfusion scintigraphy performed on 11/8/2020.     COMPARISON: Chest x-rays on 7/21/2021.     Distribution of activity within the lungs is homogeneous and there are  no focal segmental or subsegmental defects to indicate the presence of  significant pulmonary embolic disease.       Impression:      Low probability perfusion only lung scintigraphy for  significant pulmonary emboli.  This report was finalized on 07/21/2021 18:47 by Dr. Sudhir Hair MD.    XR Chest 1 View [[id number]] Collected: 07/21/21 1626     Updated: 07/21/21 1632    Narrative:      EXAMINATION: XR CHEST 1 VW-. 7/21/2021 4:26 PM CDT     CHEST, ONE VIEW:     HISTORY: Shortness of air     COMPARISON: 11/7/2020, 10/31/2020 and 10/30/2020 and 10/12/2020     A single frontal chest radiograph was obtained.     FINDINGS:     There is are chronic interstitial lung changes identified with diffuse  interstitial prominence observed similarly on the 11/7/2020 examination,  mild chronic changes suspected. The heart is generous Superimposed acute  interstitial infectious or inflammatory process, as well as  mild  pulmonary edema not excluded..     The bony structures are intact with degenerative spurring noted in the  thoracic spine.                                     Impression:      1. Generous heart size with chronic lung changes. Superimposed acute  interstitial infiltration from infectious/inflammatory changes as well  as pulmonary edema not excluded.     This report was finalized on 07/21/2021 16:29 by Dr. Nick Busch MD.                Assessment:    Condition: In stable condition.  Improving.   (    Acute kidney injury with chronic kidney disease-will check last ultrasound of kidneys if not up-to-date will order, check parathyroid, vitamin D, mag and phos.  Avoid nephrotoxic medication, will hold losartan for now and add clonidine as a as needed medication.    Hypertensive urgency-blood pressure better controlled suspect most of the kidney issues resolve around hypertension.    Bradycardia-better with lower dose of Coreg and CPAP.    Recheck labs in a.m.).     Problem List:     Hypertensive urgency    Acute kidney injury superimposed on CKD (CMS/[facility])    Stage 3 chronic kidney disease (CMS/[facility])    Acute on chronic diastolic heart failure (CMS/[facility])    Coronary artery disease of native artery of native heart with stable angina pectoris (CMS/[facility])    Bradycardia    Sudhir Levy MD  7/24/2021

## 2021-07-24 NOTE — PLAN OF CARE
Goal Outcome Evaluation:  Plan of Care Reviewed With: patient        Progress: improving  Outcome Summary: VSS, 3 episodes of bradycardia, but HR recovered quickly to normal.  No c/o pain voiced.  No s/s of rhythm issues.  Overnight pulse ox done without bipap.  Doxycycline continued IV, steroids continued.  Lovenox brilinta continued.  BUN and CR elevating, today 2.4 and 52.  Patient able to call for asst when needed.  No falls noted, up ad vern to BR.

## 2021-07-24 NOTE — PLAN OF CARE
Goal Outcome Evaluation:  Plan of Care Reviewed With: patient           Outcome Summary: SBP in 150's, on RA, no c/o of pain, nephrology consulted, 500 ml bolus infusing, urine specimens sent down, renal US completed, up ad vern, HR NSR 68-86

## 2021-07-24 NOTE — CONSULTS
Nephrology ([facility]) Consult Note      Patient:  Ludivina Ramirez  YOB: 1960  Date of Service: 7/24/2021  MRN: [id number]   Acct: [id number]   Primary Care Physician: Orville Weston MD  Advance Directive:   There are no questions and answers to display.     Admit Date: 7/21/2021       Hospital Day: 3  Referring Provider: Orville Weston MD      Patient Seen, Chart, Consults, Notes, Labs, Radiology studies reviewed.      Subjective:  Ludivina Ramirez is a 60 y.o. female  whom we were consulted for acute kidney injury.  Patient has a history of hypertension, COPD, hyperglycemia, and chronic disease stage IIIb.  She does not follow with nephrology at this stage.  He also has a history of coronary artery disease.  She had an MRI in November 2020.  She is also diagnosed with congestive heart failure.  She was having increasing dyspnea along with leg edema.  She also admitted some vague chest pain.  She has previously used cocaine.  Patient complains of overactive bladder.  She has urinary frequency but usually goes in small amounts.  Her serum creatinine is usually around 1.6 and it has risen to 1.8 and then to 2.2 and 2.4.  She denies changes to her urine habits.  Patient occasionally uses NSAIDs.  Since admission, patient was managed with diuretics and her urine output has improved some as well as her leg edema.    Allergies:  Codeine and Imitrex [sumatriptan]    Home Meds:  Medications Prior to Admission   Medication Sig Dispense Refill Last Dose   • aspirin 81 MG EC tablet Take 1 tablet by mouth Daily.   7/21/2021 at Unknown time   • azelastine (ASTELIN) 0.1 % nasal spray 2 sprays into the nostril(s) as directed by provider 2 (Two) Times a Day. Use in each nostril as directed    7/21/2021 at Unknown time   • carvedilol (COREG) 25 MG tablet Take 25 mg by mouth 2 (Two) Times a Day With Meals.   7/21/2021 at Unknown time   • cloNIDine (CATAPRES) 0.1 MG tablet Take 1 tablet  by mouth Every 6 (Six) Hours As Needed for High Blood Pressure (BP >180/100). 30 tablet 1 Past Week at Unknown time   • Ergocalciferol (Vitamin D2) 50 MCG (2000 UT) tablet Take 1,000 Units by mouth Daily.   7/21/2021 at Unknown time   • ferrous sulfate 325 (65 FE) MG tablet Take 1 tablet by mouth 2 (Two) Times a Day With Meals. 60 tablet 0 7/21/2021 at Unknown time   • fluticasone (FLONASE) 50 MCG/ACT nasal spray 2 sprays into the nostril(s) as directed by provider Daily As Needed for Rhinitis or Allergies.   7/21/2021 at Unknown time   • hydrALAZINE (APRESOLINE) 50 MG tablet Take 75 mg by mouth Every 8 (Eight) Hours.   7/21/2021 at Unknown time   • losartan (COZAAR) 100 MG tablet Take 1 tablet by mouth Daily. 90 tablet 3 7/21/2021 at Unknown time   • NIFEdipine XL (ADALAT CC) 60 MG 24 hr tablet Take 1 tablet by mouth 2 (two) times a day. 60 tablet 1 7/21/2021 at Unknown time   • OLANZapine (zyPREXA) 5 MG tablet Take 1 tablet by mouth 2 (two) times a day. 60 tablet 0 7/21/2021 at Unknown time   • pantoprazole (PROTONIX) 40 MG EC tablet Take 1 tablet by mouth Daily. 30 tablet 1 7/21/2021 at Unknown time   • rosuvastatin (CRESTOR) 20 MG tablet Take 1 tablet by mouth Daily. 90 tablet 3 7/21/2021 at Unknown time   • ticagrelor (BRILINTA) 90 MG tablet tablet Take 1 tablet by mouth 2 (Two) Times a Day. 60 tablet 11 7/21/2021 at Unknown time   • nitroglycerin (NITROSTAT) 0.4 MG SL tablet Place 1 tablet under the tongue Every 5 (Five) Minutes As Needed for Chest Pain for up to 3 doses. Take no more than 3 doses in 15 minutes. 25 tablet 0        Medicines:  Current Facility-Administered Medications   Medication Dose Route Frequency Provider Last Rate Last Admin   • aspirin EC tablet 81 mg  81 mg Oral Daily Orville Weston MD   81 mg at 07/24/21 0907   • carvedilol (COREG) tablet 12.5 mg  12.5 mg Oral BID With Meals Mary Rodriguez APRN   12.5 mg at 07/24/21 0905   • cloNIDine (CATAPRES) tablet 0.1 mg  0.1 mg Oral Q6H  PRN Mary Rodriguez APRN       • doxycycline (VIBRAMYCIN) 100 mg/100 mL 0.9% NS MBP  100 mg Intravenous Q12H Orville Weston MD   Stopped at 07/24/21 1001   • enoxaparin (LOVENOX) syringe 40 mg  40 mg Subcutaneous Q24H Orville Weston MD   40 mg at 07/23/21 1841   • famotidine (PEPCID) tablet 20 mg  20 mg Oral BID AC Sudhir Levy MD   20 mg at 07/24/21 0911   • fluticasone (FLONASE) 50 MCG/ACT nasal spray 2 spray  2 spray Nasal Daily PRN Orville Weston MD       • hydrALAZINE (APRESOLINE) tablet 25 mg  25 mg Oral Q8H Orville Weston MD   25 mg at 07/24/21 1311   • ipratropium-albuterol (DUO-NEB) nebulizer solution 3 mL  3 mL Nebulization 4x Daily - RT Orville Weston MD   3 mL at 07/24/21 1422   • methylPREDNISolone sodium succinate (SOLU-Medrol) injection 40 mg  40 mg Intravenous Q8H Orville Weston MD   40 mg at 07/24/21 0903   • NIFEdipine XL (PROCARDIA XL) 24 hr tablet 60 mg  60 mg Oral BID Mary Rodriguez APRN   60 mg at 07/24/21 0906   • OLANZapine (zyPREXA) tablet 5 mg  5 mg Oral Daily Orville Weston MD   5 mg at 07/24/21 0906   • rosuvastatin (CRESTOR) tablet 20 mg  20 mg Oral Nightly Orville Weston MD   20 mg at 07/23/21 2305   • sodium chloride 0.9 % flush 10 mL  10 mL Intravenous PRN Mickie Collins APRN   10 mL at 07/23/21 0856   • ticagrelor (BRILINTA) tablet 90 mg  90 mg Oral BID Mary Rodriguez APRN   90 mg at 07/24/21 0906       Past Medical History:  Past Medical History:   Diagnosis Date   • Acute CHF (CMS/HCC)    • Acute renal failure (ARF) (CMS/HCC)    • Anemia    • Asthma     childhood   • Hypertension    • Ischemic colitis (CMS/HCC)    • Metabolic acidosis    • Nonrheumatic aortic (valve) insufficiency    • PTSD (post-traumatic stress disorder)        Past Surgical History:  Past Surgical History:   Procedure Laterality Date   • CARDIAC CATHETERIZATION N/A 11/8/2020    Procedure: Left Heart Cath;  Surgeon: Pierce Buchanan MD;   "Location:  [facility];  Service: Cardiovascular;  Laterality: N/A;   • EXTERNAL FIXATION WRIST FRACTURE     • LEG SURGERY     • TUBAL ABDOMINAL LIGATION         Family History  Family History   Problem Relation Age of Onset   • Coronary artery disease Mother    • Coronary artery disease Father        Social History  Social History     Socioeconomic History   • Marital status: Single     Spouse name: Not on file   • Number of children: Not on file   • Years of education: Not on file   • Highest education level: Not on file   Tobacco Use   • Smoking status: Current Every Day Smoker     Packs/day: 0.50   • Smokeless tobacco: Never Used   Vaping Use   • Vaping Use: Never used   Substance and Sexual Activity   • Alcohol use: No   • Drug use: No   • Sexual activity: Defer         Review of Systems:  History obtained from chart review and the patient  General ROS: No fever or chills  Respiratory ROS: No cough,+ shortness of breath,- wheezing  Cardiovascular ROS: no chest pain but dyspnea on exertion  Gastrointestinal ROS: No abdominal pain or melena  Genito-Urinary ROS: No dysuria or hematuria  14 point ROS reviewed with the patient and negative except as noted above and in the HPI unless unable to obtain.    Objective:  /95 (BP Location: Right arm, Patient Position: Sitting)   Pulse 74   Temp 98.6 °F (37 °C) (Oral)   Resp 16   Ht 167.6 cm (66\")   Wt 95.8 kg (211 lb 3.2 oz)   SpO2 93%   BMI 34.09 kg/m²     Intake/Output Summary (Last 24 hours) at 7/24/2021 1428  Last data filed at 7/24/2021 1159  Gross per 24 hour   Intake 700 ml   Output 500 ml   Net 200 ml     General: awake/alert    Head: atraumatic, normocephalic  Neck: No masses, no jugular venous distention  Chest:  clear to auscultation bilaterally without respiratory distress  CVS: regular rate and rhythm  Abdominal: soft, nontender, normal bowel sounds  Extremities: No cyanosis but trace leg edema  Skin: warm and dry without " rash  Neuro: No focal motor deficits  Musculoskeletal: No obvious joint effusions    Labs:  Lab Results (last 72 hours)     Procedure Component Value Units Date/Time    Sodium, Urine, Random - Urine, Clean Catch [[id number]] Collected: 07/24/21 1159    Specimen: Urine, Clean Catch Updated: 07/24/21 1311     Sodium, Urine 52 mmol/L     Narrative:      Reference intervals for random urine have not been established.  Clinical usage is dependent upon physician's interpretation in combination with other laboratory tests.       Protein, Urine, Random - Urine, Clean Catch [[id number]] Collected: 07/24/21 1159    Specimen: Urine, Clean Catch Updated: 07/24/21 1311     Total Protein, Urine 50.3 mg/dL     Narrative:      Reference intervals for random urine have not been established.  Clinical usage is dependent upon physician's interpretation in combination with other laboratory tests.       Creatinine, Urine, Random - Urine, Clean Catch [[id number]] Collected: 07/24/21 1159    Specimen: Urine, Clean Catch Updated: 07/24/21 1245    POC Glucose Once [[id number]]  (Abnormal) Collected: 07/24/21 1143    Specimen: Blood Updated: 07/24/21 1219     Glucose 141 mg/dL      Comment: : [id number] [facility] [id number]       Uric Acid [[id number]]  (Abnormal) Collected: 07/24/21 1112    Specimen: Blood Updated: 07/24/21 1206     Uric Acid 7.1 mg/dL     CBC (No Diff) [[id number]]  (Abnormal) Collected: 07/24/21 1112    Specimen: Blood Updated: 07/24/21 1145     WBC 16.59 10*3/mm3      RBC 3.85 10*6/mm3      Hemoglobin 11.3 g/dL      Hematocrit 35.6 %      MCV 92.5 fL      MCH 29.4 pg      MCHC 31.7 g/dL      RDW 15.0 %      RDW-SD 49.9 fl      MPV 11.3 fL      Platelets 267 10*3/mm3     PTH, Intact [[id number]]  (Abnormal) Collected: 07/24/21 0711    Specimen: Blood Updated: 07/24/21 0913     PTH, Intact 132.2 pg/mL     Narrative:      Results may be falsely decreased if patient taking Biotin.      POC Glucose Once  [[id number]]  (Abnormal) Collected: 07/24/21 0735    Specimen: Blood Updated: 07/24/21 0823     Glucose 231 mg/dL      Comment: : [id number] Derick Dorseyter ID: [id number]       Phosphorus [[id number]]  (Normal) Collected: 07/24/21 0711    Specimen: Blood Updated: 07/24/21 0751     Phosphorus 4.3 mg/dL     Magnesium [[id number]]  (Normal) Collected: 07/24/21 0711    Specimen: Blood Updated: 07/24/21 0751     Magnesium 1.9 mg/dL     Vitamin D 25 Hydroxy [[id number]] Collected: 07/24/21 0711    Specimen: Blood Updated: 07/24/21 0727    Basic Metabolic Panel [[id number]]  (Abnormal) Collected: 07/24/21 0308    Specimen: Blood Updated: 07/24/21 0358     Glucose 146 mg/dL      BUN 52 mg/dL      Creatinine 2.40 mg/dL      Sodium 140 mmol/L      Potassium 4.1 mmol/L      Comment: Slight hemolysis detected by analyzer. Results may be affected.        Chloride 102 mmol/L      CO2 27.0 mmol/L      Calcium 8.7 mg/dL      eGFR Non African Amer 21 mL/min/1.73      BUN/Creatinine Ratio 21.7     Anion Gap 11.0 mmol/L     Narrative:      GFR Normal >60  Chronic Kidney Disease <60  Kidney Failure <15      POC Glucose Once [[id number]]  (Abnormal) Collected: 07/23/21 2047    Specimen: Blood Updated: 07/23/21 2058     Glucose 320 mg/dL      Comment: : [id number] Zacarias Humphries ID: [id number]       Blood Culture - Blood, Arm, Right [[id number]] Collected: 07/21/21 1700    Specimen: Blood from Arm, Right Updated: 07/23/21 1745     Blood Culture No growth at 2 days    Blood Culture - Blood, Arm, Left [[id number]] Collected: 07/21/21 1713    Specimen: Blood from Arm, Left Updated: 07/23/21 1745     Blood Culture No growth at 2 days    POC Glucose Once [[id number]]  (Abnormal) Collected: 07/23/21 1539    Specimen: Blood Updated: 07/23/21 1549     Glucose 139 mg/dL      Comment: : [id number] Derick Vidales ID: [id number]       POC Glucose Once [[id number]]  (Normal) Collected: 07/23/21 1144    Specimen: Blood Updated:  07/23/21 1145     Glucose 111 mg/dL      Comment: : [id number] Ana LongoriaMeter ID: [id number]       POC Glucose Once [[id number]]  (Abnormal) Collected: 07/23/21 0719    Specimen: Blood Updated: 07/23/21 0814     Glucose 145 mg/dL      Comment: : [id number] Derick MazariegosaMeter ID: [id number]       Basic Metabolic Panel [[id number]]  (Abnormal) Collected: 07/23/21 0436    Specimen: Blood Updated: 07/23/21 0622     Glucose 151 mg/dL      BUN 42 mg/dL      Creatinine 2.21 mg/dL      Sodium 141 mmol/L      Potassium 3.7 mmol/L      Chloride 100 mmol/L      CO2 26.0 mmol/L      Calcium 9.4 mg/dL      eGFR Non African Amer 23 mL/min/1.73      BUN/Creatinine Ratio 19.0     Anion Gap 15.0 mmol/L     Narrative:      GFR Normal >60  Chronic Kidney Disease <60  Kidney Failure <15      POC Glucose Once [[id number]]  (Abnormal) Collected: 07/22/21 2050    Specimen: Blood Updated: 07/22/21 2101     Glucose 158 mg/dL      Comment: : [id number] Zacarias AkersandraMeter ID: [id number]       POC Glucose Once [[id number]]  (Abnormal) Collected: 07/22/21 1609    Specimen: Blood Updated: 07/22/21 1706     Glucose 185 mg/dL      Comment: : [id number] Pavan RiverMeter ID: [id number]       Troponin [[id number]]  (Normal) Collected: 07/22/21 1440    Specimen: Blood Updated: 07/22/21 1514     Troponin T <0.010 ng/mL     Narrative:      Troponin T Reference Range:  <= 0.03 ng/mL-   Negative for AMI  >0.03 ng/mL-     Abnormal for myocardial necrosis.  Clinicians would have to utilize clinical acumen, EKG, Troponin and serial changes to determine if it is an Acute Myocardial Infarction or myocardial injury due to an underlying chronic condition.       Results may be falsely decreased if patient taking Biotin.      Basic Metabolic Panel [[id number]]  (Abnormal) Collected: 07/22/21 1254    Specimen: Blood Updated: 07/22/21 1401     Glucose 104 mg/dL      BUN 32 mg/dL      Creatinine 1.82 mg/dL      Sodium 142 mmol/L      Potassium 3.7  mmol/L      Chloride 101 mmol/L      CO2 30.0 mmol/L      Calcium 9.1 mg/dL      eGFR Non African Amer 28 mL/min/1.73      BUN/Creatinine Ratio 17.6     Anion Gap 11.0 mmol/L     Narrative:      GFR Normal >60  Chronic Kidney Disease <60  Kidney Failure <15      Troponin [[id number]]  (Normal) Collected: 07/22/21 1254    Specimen: Blood Updated: 07/22/21 1358     Troponin T 0.011 ng/mL     Narrative:      Troponin T Reference Range:  <= 0.03 ng/mL-   Negative for AMI  >0.03 ng/mL-     Abnormal for myocardial necrosis.  Clinicians would have to utilize clinical acumen, EKG, Troponin and serial changes to determine if it is an Acute Myocardial Infarction or myocardial injury due to an underlying chronic condition.       Results may be falsely decreased if patient taking Biotin.            Radiology:   Imaging Results (Last 72 Hours)     Procedure Component Value Units Date/Time    US Renal Bilateral [[id number]] Collected: 07/24/21 0917     Updated: 07/24/21 0921    Narrative:      RENAL ULTRASOUND COMPLETE 7/24/2021 8:57 AM CDT     REASON FOR EXAM: Chronic kidney disease with acute kidney injury;  R00.1-Bradycardia, unspecified; I50.9-Heart failure, unspecified       COMPARISON: RENAL ULTRASOUND COMPLETE 7/24/2021 8:57 AM CDT     REASON FOR EXAM: Chronic kidney disease with acute kidney injury;  R00.1-Bradycardia, unspecified; I50.9-Heart failure, unspecified       COMPARISON: None       TECHNIQUE: Multiple longitudinal and transverse realtime sonographic  images of the kidneys and urinary bladder are obtained.      FINDINGS:      RIGHT KIDNEY: 4.6 x 4.8 x 9.6 cm. Normal in size, shape, contour and  position. No solid or cystic masses. The central echo complex is compact  with no evidence for hydronephrosis. No nephrolithiasis or abnormal  perinephric fluid collections . No hydroureter.      LEFT KIDNEY: 4.3 x 3.7 x 9.9 cm. Normal in size, shape, contour and  position. No solid or cystic masses. The central echo  complex is compact  with no evidence for hydronephrosis. No nephrolithiasis or abnormal  perinephric fluid collections . No hydroureter.      PELVIS: The bladder is mildly distended with anechoic urine and  demonstrates no significant wall thickening or internal echogenicities.  There is no surrounding ascites.        Impression:      1. Negative renal ultrasound.           TECHNIQUE: Multiple longitudinal and transverse realtime sonographic  images of the kidneys and urinary bladder are obtained.      FINDINGS:      RIGHT KIDNEY: cm. Normal in size, shape, contour and position. No solid  or cystic masses. The central echo complex is compact with no evidence  for hydronephrosis. No nephrolithiasis or abnormal perinephric fluid  collections . No hydroureter.      LEFT KIDNEY: cm. Normal in size, shape, contour and position. No solid  or cystic masses. The central echo complex is compact with no evidence  for hydronephrosis. No nephrolithiasis or abnormal perinephric fluid  collections . No hydroureter.      PELVIS: The bladder is mildly distended with anechoic urine and  demonstrates no significant wall thickening or internal echogenicities.  There is no surrounding ascites.      IMPRESSION:   1. Unremarkable renal ultrasound.        This report was finalized on 07/24/2021 09:18 by Dr. Cj Cruz MD.          Culture:  No components found for: WOUNDCUL, 3  No components found for: CSFCUL, 3  No components found for: BC, 3  No components found for: URINECUL, 3      Assessment   -Acute kidney injury-prerenal azotemia versus ATN  -Chronic kidney disease stage III-b  -Hypertension-uncontrolled  -Hyperglycemia-possible type 2 diabetes  -Acute on chronic diastolic congestive heart failure  -Leg edema  -Secondary hyperparathyroidism    Plan:  Patient is off diuretics for now.  I will give a bolus of fluids.  We will also obtain a work-up and get urine studies.  Renal ultrasound was reviewed.  We will add oral  calcitriol.  Avoid hypotension nephrotoxins.      Thank you for the consult, we appreciate the opportunity to provide care to your patients.  Feel free to contact me if I can be of any further assistance.      Parker Whitfield MD  7/24/2021  14:28 CDT

## 2021-07-25 VITALS
HEIGHT: 66 IN | BODY MASS INDEX: 33.94 KG/M2 | OXYGEN SATURATION: 97 % | DIASTOLIC BLOOD PRESSURE: 94 MMHG | SYSTOLIC BLOOD PRESSURE: 162 MMHG | TEMPERATURE: 98.2 F | WEIGHT: 211.2 LBS | HEART RATE: 77 BPM | RESPIRATION RATE: 20 BRPM

## 2021-07-25 LAB
ANION GAP SERPL CALCULATED.3IONS-SCNC: 11 MMOL/L (ref 5–15)
BUN SERPL-MCNC: 53 MG/DL (ref 8–23)
BUN/CREAT SERPL: 24.5 (ref 7–25)
CALCIUM SPEC-SCNC: 8.8 MG/DL (ref 8.6–10.5)
CHLORIDE SERPL-SCNC: 105 MMOL/L (ref 98–107)
CO2 SERPL-SCNC: 25 MMOL/L (ref 22–29)
CREAT SERPL-MCNC: 2.16 MG/DL (ref 0.57–1)
GFR SERPL CREATININE-BSD FRML MDRD: 23 ML/MIN/1.73
GLUCOSE BLDC GLUCOMTR-MCNC: 194 MG/DL (ref 70–130)
GLUCOSE SERPL-MCNC: 126 MG/DL (ref 65–99)
POTASSIUM SERPL-SCNC: 4.2 MMOL/L (ref 3.5–5.2)
SODIUM SERPL-SCNC: 141 MMOL/L (ref 136–145)

## 2021-07-25 PROCEDURE — 36415 COLL VENOUS BLD VENIPUNCTURE: CPT | Performed by: FAMILY MEDICINE

## 2021-07-25 PROCEDURE — 94799 UNLISTED PULMONARY SVC/PX: CPT

## 2021-07-25 PROCEDURE — 82962 GLUCOSE BLOOD TEST: CPT

## 2021-07-25 PROCEDURE — 25010000002 METHYLPREDNISOLONE PER 40 MG: Performed by: FAMILY MEDICINE

## 2021-07-25 PROCEDURE — 80048 BASIC METABOLIC PNL TOTAL CA: CPT | Performed by: FAMILY MEDICINE

## 2021-07-25 RX ORDER — CARVEDILOL 12.5 MG/1
12.5 TABLET ORAL 2 TIMES DAILY WITH MEALS
Qty: 60 TABLET | Refills: 1 | Status: ON HOLD | OUTPATIENT
Start: 2021-07-25 | End: 2021-08-09

## 2021-07-25 RX ORDER — HYDRALAZINE HYDROCHLORIDE 25 MG/1
25 TABLET, FILM COATED ORAL EVERY 8 HOURS SCHEDULED
Qty: 90 TABLET | Refills: 1 | Status: ON HOLD | OUTPATIENT
Start: 2021-07-25 | End: 2021-08-09

## 2021-07-25 RX ORDER — PREDNISONE 10 MG/1
10 TABLET ORAL DAILY
Qty: 18 TABLET | Refills: 0 | Status: ON HOLD | OUTPATIENT
Start: 2021-07-25 | End: 2021-08-09

## 2021-07-25 RX ORDER — DOXYCYCLINE HYCLATE 100 MG/1
100 CAPSULE ORAL 2 TIMES DAILY
Qty: 14 CAPSULE | Refills: 0 | Status: SHIPPED | OUTPATIENT
Start: 2021-07-25 | End: 2021-08-01

## 2021-07-25 RX ADMIN — NIFEDIPINE 60 MG: 60 TABLET, FILM COATED, EXTENDED RELEASE ORAL at 08:01

## 2021-07-25 RX ADMIN — OLANZAPINE 5 MG: 5 TABLET, FILM COATED ORAL at 08:01

## 2021-07-25 RX ADMIN — METHYLPREDNISOLONE SODIUM SUCCINATE 40 MG: 40 INJECTION, POWDER, FOR SOLUTION INTRAMUSCULAR; INTRAVENOUS at 08:19

## 2021-07-25 RX ADMIN — CALCITRIOL 0.25 MCG: 0.25 CAPSULE ORAL at 08:01

## 2021-07-25 RX ADMIN — HYDRALAZINE HYDROCHLORIDE 25 MG: 25 TABLET, FILM COATED ORAL at 05:30

## 2021-07-25 RX ADMIN — TICAGRELOR 90 MG: 90 TABLET ORAL at 08:02

## 2021-07-25 RX ADMIN — CARVEDILOL 12.5 MG: 6.25 TABLET, FILM COATED ORAL at 08:01

## 2021-07-25 RX ADMIN — DOXYCYCLINE 100 MG: 100 INJECTION, POWDER, LYOPHILIZED, FOR SOLUTION INTRAVENOUS at 08:19

## 2021-07-25 RX ADMIN — METHYLPREDNISOLONE SODIUM SUCCINATE 40 MG: 40 INJECTION, POWDER, FOR SOLUTION INTRAMUSCULAR; INTRAVENOUS at 01:53

## 2021-07-25 RX ADMIN — IPRATROPIUM BROMIDE AND ALBUTEROL SULFATE 3 ML: 2.5; .5 SOLUTION RESPIRATORY (INHALATION) at 06:43

## 2021-07-25 RX ADMIN — ASPIRIN 81 MG: 81 TABLET, COATED ORAL at 08:01

## 2021-07-25 RX ADMIN — FAMOTIDINE 20 MG: 20 TABLET, FILM COATED ORAL at 08:01

## 2021-07-25 NOTE — PROGRESS NOTES
Ludivina Ramirez is a 60 y.o. female patient.    Bradycardia rebounds quickly.   Creatinine better.   Patient stable, no new complaints.     Current Facility-Administered Medications   Medication Dose Route Frequency Provider Last Rate Last Admin   • aspirin EC tablet 81 mg  81 mg Oral Daily Orville Weston MD   81 mg at 07/25/21 0801   • calcitriol (ROCALTROL) capsule 0.25 mcg  0.25 mcg Oral Daily Parker Whitfield MD   0.25 mcg at 07/25/21 0801   • carvedilol (COREG) tablet 12.5 mg  12.5 mg Oral BID With Meals Mary Rodriguez APRN   12.5 mg at 07/25/21 0801   • cloNIDine (CATAPRES) tablet 0.1 mg  0.1 mg Oral Q6H PRN Mary Rodriguez APRN       • doxycycline (VIBRAMYCIN) 100 mg/100 mL 0.9% NS MBP  100 mg Intravenous Q12H Orville Weston MD 0 mL/hr at 07/24/21 2255 100 mg at 07/25/21 0819   • enoxaparin (LOVENOX) syringe 40 mg  40 mg Subcutaneous Q24H Orville Weston MD   40 mg at 07/24/21 1801   • famotidine (PEPCID) tablet 20 mg  20 mg Oral Daily Orville Weston MD   20 mg at 07/25/21 0801   • fluticasone (FLONASE) 50 MCG/ACT nasal spray 2 spray  2 spray Nasal Daily PRN Orville Weston MD       • hydrALAZINE (APRESOLINE) tablet 25 mg  25 mg Oral Q8H Orville Weston MD   25 mg at 07/25/21 0530   • ipratropium-albuterol (DUO-NEB) nebulizer solution 3 mL  3 mL Nebulization 4x Daily - RT Orville Weston MD   3 mL at 07/25/21 0643   • methylPREDNISolone sodium succinate (SOLU-Medrol) injection 40 mg  40 mg Intravenous Q8H Orville Weston MD   40 mg at 07/25/21 0819   • NIFEdipine XL (PROCARDIA XL) 24 hr tablet 60 mg  60 mg Oral BID Mary Rodriguez APRN   60 mg at 07/25/21 0801   • OLANZapine (zyPREXA) tablet 5 mg  5 mg Oral Daily Orville Weston MD   5 mg at 07/25/21 0801   • rosuvastatin (CRESTOR) tablet 10 mg  10 mg Oral Nightly Orville Weston MD   10 mg at 07/24/21 3006   • sodium chloride 0.9 % flush 10 mL  10 mL Intravenous PRN Mickie Collins  "APRN   10 mL at 07/23/21 0856   • ticagrelor (BRILINTA) tablet 90 mg  90 mg Oral BID Mary Rodriguez, APRN   90 mg at 07/25/21 0802     ALLERGIES:    Allergies   Allergen Reactions   • Codeine Nausea And Vomiting   • Imitrex [Sumatriptan] Other (See Comments)     HA       Hypertensive urgency    Acute kidney injury superimposed on CKD (CMS/HCC)    Stage 3 chronic kidney disease (CMS/HCC)    Acute on chronic diastolic heart failure (CMS/[facility])    Coronary artery disease of native artery of native heart with stable angina pectoris (CMS/[facility])    Bradycardia    Blood pressure 162/94, pulse 81, temperature 98 °F (36.7 °C), temperature source Axillary, resp. rate 18, height 167.6 cm (66\"), weight 95.8 kg (211 lb 3.2 oz), SpO2 98 %, not currently breastfeeding.      Subjective:  Symptoms:  Stable.    Diet:  Poor intake.    Activity level: Impaired due to weakness.    Pain:  She reports no pain.      Review of Systems  Objective:  General Appearance:  Comfortable and well-appearing.    Vital signs: (most recent): Blood pressure 162/94, pulse 81, temperature 98 °F (36.7 °C), temperature source Axillary, resp. rate 18, height 167.6 cm (66\"), weight 95.8 kg (211 lb 3.2 oz), SpO2 98 %, not currently breastfeeding.  Vital signs are normal.  (Blood pressure better controlled).    Output: Producing urine and minimal stool output.    HEENT: Normal HEENT exam.    Lungs:  Normal effort and normal respiratory rate.    Heart: Normal rate.  Regular rhythm.    Abdomen: Abdomen is soft.  Bowel sounds are normal.   There is no abdominal tenderness.     Extremities: Normal range of motion.    Neurological: Patient is alert.    Skin:  Warm and dry.              Labs:  Lab Results (last 72 hours)     Procedure Component Value Units Date/Time    Basic Metabolic Panel [[id number]]  (Abnormal) Collected: 07/24/21 0308    Specimen: Blood Updated: 07/24/21 0358     Glucose 146 mg/dL      BUN 52 mg/dL      Creatinine 2.40 mg/dL      Sodium 140 mmol/L  "     Potassium 4.1 mmol/L      Comment: Slight hemolysis detected by analyzer. Results may be affected.        Chloride 102 mmol/L      CO2 27.0 mmol/L      Calcium 8.7 mg/dL      eGFR Non African Amer 21 mL/min/1.73      BUN/Creatinine Ratio 21.7     Anion Gap 11.0 mmol/L     Narrative:      GFR Normal >60  Chronic Kidney Disease <60  Kidney Failure <15      POC Glucose Once [[id number]]  (Abnormal) Collected: 07/23/21 2047    Specimen: Blood Updated: 07/23/21 2058     Glucose 320 mg/dL      Comment: : [id number] Zacarias Humphries ID: [id number]       Blood Culture - Blood, Arm, Right [[id number]] Collected: 07/21/21 1700    Specimen: Blood from Arm, Right Updated: 07/23/21 1745     Blood Culture No growth at 2 days    Blood Culture - Blood, Arm, Left [[id number]] Collected: 07/21/21 1713    Specimen: Blood from Arm, Left Updated: 07/23/21 1745     Blood Culture No growth at 2 days    POC Glucose Once [[id number]]  (Abnormal) Collected: 07/23/21 1539    Specimen: Blood Updated: 07/23/21 1549     Glucose 139 mg/dL      Comment: : [id number] Derick MazariegosaMeter ID: [id number]       POC Glucose Once [[id number]]  (Normal) Collected: 07/23/21 1144    Specimen: Blood Updated: 07/23/21 1145     Glucose 111 mg/dL      Comment: : [id number] Ana SwatiMeter ID: [id number]       POC Glucose Once [[id number]]  (Abnormal) Collected: 07/23/21 0719    Specimen: Blood Updated: 07/23/21 0814     Glucose 145 mg/dL      Comment: : [id number] Derick ValenzuelaonnaMeter ID: [id number]       Basic Metabolic Panel [[id number]]  (Abnormal) Collected: 07/23/21 0436    Specimen: Blood Updated: 07/23/21 0622     Glucose 151 mg/dL      BUN 42 mg/dL      Creatinine 2.21 mg/dL      Sodium 141 mmol/L      Potassium 3.7 mmol/L      Chloride 100 mmol/L      CO2 26.0 mmol/L      Calcium 9.4 mg/dL      eGFR Non African Amer 23 mL/min/1.73      BUN/Creatinine Ratio 19.0     Anion Gap 15.0 mmol/L     Narrative:      GFR Normal >60  Chronic  Kidney Disease <60  Kidney Failure <15      POC Glucose Once [[id number]]  (Abnormal) Collected: 07/22/21 2050    Specimen: Blood Updated: 07/22/21 2101     Glucose 158 mg/dL      Comment: : [id number] Zacarias Humphries ID: [id number]       POC Glucose Once [[id number]]  (Abnormal) Collected: 07/22/21 1609    Specimen: Blood Updated: 07/22/21 1706     Glucose 185 mg/dL      Comment: : [id number] Pavan Ferguson ID: [id number]       Troponin [[id number]]  (Normal) Collected: 07/22/21 1440    Specimen: Blood Updated: 07/22/21 1514     Troponin T <0.010 ng/mL     Narrative:      Troponin T Reference Range:  <= 0.03 ng/mL-   Negative for AMI  >0.03 ng/mL-     Abnormal for myocardial necrosis.  Clinicians would have to utilize clinical acumen, EKG, Troponin and serial changes to determine if it is an Acute Myocardial Infarction or myocardial injury due to an underlying chronic condition.       Results may be falsely decreased if patient taking Biotin.      Basic Metabolic Panel [[id number]]  (Abnormal) Collected: 07/22/21 1254    Specimen: Blood Updated: 07/22/21 1401     Glucose 104 mg/dL      BUN 32 mg/dL      Creatinine 1.82 mg/dL      Sodium 142 mmol/L      Potassium 3.7 mmol/L      Chloride 101 mmol/L      CO2 30.0 mmol/L      Calcium 9.1 mg/dL      eGFR Non African Amer 28 mL/min/1.73      BUN/Creatinine Ratio 17.6     Anion Gap 11.0 mmol/L     Narrative:      GFR Normal >60  Chronic Kidney Disease <60  Kidney Failure <15      Troponin [[id number]]  (Normal) Collected: 07/22/21 1254    Specimen: Blood Updated: 07/22/21 1358     Troponin T 0.011 ng/mL     Narrative:      Troponin T Reference Range:  <= 0.03 ng/mL-   Negative for AMI  >0.03 ng/mL-     Abnormal for myocardial necrosis.  Clinicians would have to utilize clinical acumen, EKG, Troponin and serial changes to determine if it is an Acute Myocardial Infarction or myocardial injury due to an underlying chronic condition.       Results may be falsely  decreased if patient taking Biotin.      D-dimer, Quantitative [[id number]]  (Abnormal) Collected: 07/21/21 1713    Specimen: Blood Updated: 07/21/21 1734     D-Dimer, Quantitative 1.27 mg/L (FEU)     Narrative:      Reference Range is 0-0.50 mg/L FEU. However, results <0.50 mg/L FEU tends to rule out DVT or PE. Results >0.50 mg/L FEU are not useful in predicting absence or presence of DVT or PE.      Protime-INR [[id number]]  (Normal) Collected: 07/21/21 1713    Specimen: Blood Updated: 07/21/21 1734     Protime 13.1 Seconds      INR 1.07    aPTT [[id number]]  (Normal) Collected: 07/21/21 1713    Specimen: Blood Updated: 07/21/21 1734     PTT 32.3 seconds     COVID-19,[facility]-HOU[facility],Nasal Swab No Transport Media 3-4 HR TAT - Swab, Nasal Cavity [[id number]]  (Normal) Collected: 07/21/21 1626    Specimen: Swab from Nasal Cavity Updated: 07/21/21 1724     COVID19 Not Detected    Narrative:      Fact sheet for providers: https://www.fda.gov/media/682223/download     Fact sheet for patients: https://www.fda.gov/media/475954/download    Test performed by PCR.    Consider negative results in combination with clinical observations, patient history, and epidemiological information.    [place] Draw [[id number]] Collected: 07/21/21 1600    Specimen: Blood Updated: 07/21/21 1715    Narrative:      The following orders were created for panel order [place] Draw.  Procedure                               Abnormality         Status                     ---------                               -----------         ------                     Green Top (Gel)[[id number]]                                  Final result               Lavender Top[[id number]]                                     Final result               Red Top[[id number]]                                          Final result                 Please view results for these tests on the individual orders.    Green Top (Gel) [[id number]] Collected: 07/21/21 1600    Specimen: Blood  Updated: 07/21/21 1715     Extra Tube Hold for add-ons.     Comment: Auto resulted.       Red Top [[id number]] Collected: 07/21/21 1600    Specimen: Blood Updated: 07/21/21 1715     Extra Tube Hold for add-ons.     Comment: Auto resulted.       Lavender Top [[id number]] Collected: 07/21/21 1600    Specimen: Blood Updated: 07/21/21 1715     Extra Tube hold for add-on     Comment: Auto resulted       BNP [[id number]]  (Abnormal) Collected: 07/21/21 1600    Specimen: Blood Updated: 07/21/21 1649     proBNP 35,120.0 pg/mL     Narrative:      Among patients with dyspnea, NT-proBNP is highly sensitive for the detection of acute congestive heart failure. In addition NT-proBNP of <300 pg/ml effectively rules out acute congestive heart failure with 99% negative predictive value.    Results may be falsely decreased if patient taking Biotin.      Comprehensive Metabolic Panel [[id number]]  (Abnormal) Collected: 07/21/21 1600    Specimen: Blood Updated: 07/21/21 1638     Glucose 101 mg/dL      BUN 33 mg/dL      Creatinine 1.60 mg/dL      Sodium 142 mmol/L      Potassium 3.7 mmol/L      Chloride 108 mmol/L      CO2 24.0 mmol/L      Calcium 8.9 mg/dL      Total Protein 7.0 g/dL      Albumin 4.10 g/dL      ALT (SGPT) 18 U/L      AST (SGOT) 15 U/L      Alkaline Phosphatase 104 U/L      Total Bilirubin 0.7 mg/dL      eGFR Non African Amer 33 mL/min/1.73      Globulin 2.9 gm/dL      A/G Ratio 1.4 g/dL      BUN/Creatinine Ratio 20.6     Anion Gap 10.0 mmol/L     Narrative:      GFR Normal >60  Chronic Kidney Disease <60  Kidney Failure <15      Troponin [[id number]]  (Normal) Collected: 07/21/21 1600    Specimen: Blood Updated: 07/21/21 1636     Troponin T 0.012 ng/mL     Narrative:      Troponin T Reference Range:  <= 0.03 ng/mL-   Negative for AMI  >0.03 ng/mL-     Abnormal for myocardial necrosis.  Clinicians would have to utilize clinical acumen, EKG, Troponin and serial changes to determine if it is an Acute Myocardial Infarction or  myocardial injury due to an underlying chronic condition.       Results may be falsely decreased if patient taking Biotin.      Blood Gas, Arterial - [[id number]]  (Abnormal) Collected: 07/21/21 1621    Specimen: Arterial Blood Updated: 07/21/21 1620     Site Right Radial     Guido's Test Positive     pH, Arterial 7.404 pH units      pCO2, Arterial 41.2 mm Hg      pO2, Arterial 76.4 mm Hg      Comment: 84 Value below reference range        HCO3, Arterial 25.7 mmol/L      Base Excess, Arterial 0.9 mmol/L      O2 Saturation, Arterial 96.7 %      Temperature 37.0 C      Barometric Pressure for Blood Gas 752 mmHg      Modality Nasal Cannula     Flow Rate 2.0 lpm      Ventilator Mode NA     Collected by [id number]     Comment: Meter: [id number]     :  [id number]        pCO2, Temperature Corrected 41.2 mm Hg      pH, Temp Corrected 7.404 pH Units      pO2, Temperature Corrected 76.4 mm Hg     CBC & Differential [[id number]]  (Abnormal) Collected: 07/21/21 1600    Specimen: Blood Updated: 07/21/21 1610    Narrative:      The following orders were created for panel order CBC & Differential.  Procedure                               Abnormality         Status                     ---------                               -----------         ------                     CBC Auto Differential[[id number]]        Abnormal            Final result                 Please view results for these tests on the individual orders.    CBC Auto Differential [[id number]]  (Abnormal) Collected: 07/21/21 1600    Specimen: Blood Updated: 07/21/21 1610     WBC 9.31 10*3/mm3      RBC 3.66 10*6/mm3      Hemoglobin 10.8 g/dL      Hematocrit 34.2 %      MCV 93.4 fL      MCH 29.5 pg      MCHC 31.6 g/dL      RDW 14.8 %      RDW-SD 49.8 fl      MPV 11.3 fL      Platelets 212 10*3/mm3      Neutrophil % 81.7 %      Lymphocyte % 9.5 %      Monocyte % 5.8 %      Eosinophil % 1.9 %      Basophil % 0.6 %      Immature Grans % 0.5 %      Neutrophils, Absolute  7.60 10*3/mm3      Lymphocytes, Absolute 0.88 10*3/mm3      Monocytes, Absolute 0.54 10*3/mm3      Eosinophils, Absolute 0.18 10*3/mm3      Basophils, Absolute 0.06 10*3/mm3      Immature Grans, Absolute 0.05 10*3/mm3      nRBC 0.0 /100 WBC           Imaging Results (Last 72 Hours)     Procedure Component Value Units Date/Time    US Renal Bilateral [[id number]] Collected: 07/24/21 0917     Updated: 07/24/21 0921    Narrative:      RENAL ULTRASOUND COMPLETE 7/24/2021 8:57 AM CDT     REASON FOR EXAM: Chronic kidney disease with acute kidney injury;  R00.1-Bradycardia, unspecified; I50.9-Heart failure, unspecified       COMPARISON: RENAL ULTRASOUND COMPLETE 7/24/2021 8:57 AM CDT     REASON FOR EXAM: Chronic kidney disease with acute kidney injury;  R00.1-Bradycardia, unspecified; I50.9-Heart failure, unspecified       COMPARISON: None       TECHNIQUE: Multiple longitudinal and transverse realtime sonographic  images of the kidneys and urinary bladder are obtained.      FINDINGS:      RIGHT KIDNEY: 4.6 x 4.8 x 9.6 cm. Normal in size, shape, contour and  position. No solid or cystic masses. The central echo complex is compact  with no evidence for hydronephrosis. No nephrolithiasis or abnormal  perinephric fluid collections . No hydroureter.      LEFT KIDNEY: 4.3 x 3.7 x 9.9 cm. Normal in size, shape, contour and  position. No solid or cystic masses. The central echo complex is compact  with no evidence for hydronephrosis. No nephrolithiasis or abnormal  perinephric fluid collections . No hydroureter.      PELVIS: The bladder is mildly distended with anechoic urine and  demonstrates no significant wall thickening or internal echogenicities.  There is no surrounding ascites.        Impression:      1. Negative renal ultrasound.           TECHNIQUE: Multiple longitudinal and transverse realtime sonographic  images of the kidneys and urinary bladder are obtained.      FINDINGS:      RIGHT KIDNEY: cm. Normal in size, shape,  contour and position. No solid  or cystic masses. The central echo complex is compact with no evidence  for hydronephrosis. No nephrolithiasis or abnormal perinephric fluid  collections . No hydroureter.      LEFT KIDNEY: cm. Normal in size, shape, contour and position. No solid  or cystic masses. The central echo complex is compact with no evidence  for hydronephrosis. No nephrolithiasis or abnormal perinephric fluid  collections . No hydroureter.      PELVIS: The bladder is mildly distended with anechoic urine and  demonstrates no significant wall thickening or internal echogenicities.  There is no surrounding ascites.      IMPRESSION:   1. Unremarkable renal ultrasound.        This report was finalized on 07/24/2021 09:18 by Dr. Cj Cruz MD.                Assessment:    Condition: In stable condition.  Improving.   (    Acute kidney injury with chronic kidney disease- Renal u/s normal, PTH hihg calcitrol given.  Nephrology following.  Continue IV fluids.  Avoid nephrotoxic medication, will hold losartan for now and add clonidine as a as needed medication.    Hypertensive urgency-blood pressure better controlled suspect most of the kidney issues revolve around hypertension.    Bradycardia-better with lower dose of Coreg and CPAP.    Recheck labs in a.m.).     Problem List:     Hypertensive urgency    Acute kidney injury superimposed on CKD (CMS/HCC)    Stage 3 chronic kidney disease (CMS/HCC)    Acute on chronic diastolic heart failure (CMS/HCC)    Coronary artery disease of native artery of native heart with stable angina pectoris (CMS/HCC)    Bradycardia    Hermann Todd, ISAIAH  7/25/2021        Patient requests discharge home.  Follow-up next week with Dr. Weston    I have discussed the care of Ludivina Ramirez, including pertinent history and exam findings with the ARNP/PA.  I have seen and examined the patient and the key elements of all parts of the encounter have been performed by me. I agree with the  assessment and plan as outlined by the ARNP/PA. Please refer to my comments for complete documentation.     Electronically signed by Sudhir Levy MD on 7/25/2021 at 08:41 CDT

## 2021-07-25 NOTE — DISCHARGE SUMMARY
Hospital Discharge Summary    Ludivina Ramirez  :  1960  MRN:  [id number]    Admit date:  2021  Discharge date:  2021    Admitting Physician:    Orville Weston MD    Discharge Diagnoses:      Hypertensive urgency    Acute kidney injury superimposed on CKD (CMS/HCC)    Stage 3 chronic kidney disease (CMS/HCC)    Acute on chronic diastolic heart failure (CMS/HCC)    Coronary artery disease of native artery of native heart with stable angina pectoris (CMS/[facility])    Bradycardia      Hospital Course:   The patient was admitted for the above surgical/medical indication.  Please see admission H&P for further details concerning the admission.  The patient was seen daily and progress noted via daily updates in the progress notes.  The patient improved throughout her stay.  They reached maximum medical improvement and were considered stable for discharge home.  They understand the importance of follow-up concerning any abnormal lab values/x-rays.  All questions were answered to the best of my ability prior to their discharge home.    Pleasant 60-year-old white female with known COPD emphysema and stage III kidney disease that presents with increasing shortness of air.  Patient was given IV steroids IV antibiotics and improved throughout her stay.  Her creatinine returned to baseline.  On the morning of  requesting discharge home.  Follow-up next week with Dr. Weston to recheck creatinine.      Discharge Medications:         Discharge Medications      New Medications      Instructions Start Date   doxycycline 100 MG capsule  Commonly known as: VIBRAMYCIN   100 mg, Oral, 2 Times Daily      predniSONE 10 MG tablet  Commonly known as: DELTASONE   10 mg, Oral, Daily, 3 times daily x3 days, twice daily x3 days then daily x3 days         Changes to Medications      Instructions Start Date   carvedilol 12.5 MG tablet  Commonly known as: COREG  What changed:   · medication strength  · how much to take   12.5  mg, Oral, 2 Times Daily With Meals      hydrALAZINE 25 MG tablet  Commonly known as: APRESOLINE  What changed:   · how much to take  · Another medication with the same name was removed. Continue taking this medication, and follow the directions you see here.   25 mg, Oral, Every 8 Hours Scheduled         Continue These Medications      Instructions Start Date   aspirin 81 MG EC tablet   81 mg, Oral, Daily      azelastine 0.1 % nasal spray  Commonly known as: ASTELIN   2 sprays, Nasal, 2 Times Daily, Use in each nostril as directed      cloNIDine 0.1 MG tablet  Commonly known as: CATAPRES   0.1 mg, Oral, Every 6 Hours PRN      ferrous sulfate 325 (65 FE) MG tablet   325 mg, Oral, 2 Times Daily With Meals      fluticasone 50 MCG/ACT nasal spray  Commonly known as: FLONASE   2 sprays, Nasal, Daily PRN      losartan 100 MG tablet  Commonly known as: COZAAR   100 mg, Oral, Every 24 Hours Scheduled      NIFEdipine CC 60 MG 24 hr tablet  Commonly known as: ADALAT CC   60 mg, Oral, 2 times daily      nitroglycerin 0.4 MG SL tablet  Commonly known as: NITROSTAT   0.4 mg, Sublingual, Every 5 Minutes PRN, Take no more than 3 doses in 15 minutes.      OLANZapine 5 MG tablet  Commonly known as: zyPREXA   5 mg, Oral, 2 times daily      pantoprazole 40 MG EC tablet  Commonly known as: PROTONIX   40 mg, Oral, Daily      rosuvastatin 20 MG tablet  Commonly known as: CRESTOR   20 mg, Oral, Daily      ticagrelor 90 MG tablet tablet  Commonly known as: BRILINTA   90 mg, Oral, 2 Times Daily      Vitamin D2 50 MCG (2000 UT) tablet   1,000 Units, Oral, Daily             Consults: None    Significant Diagnostic Studies:      EKG: normal EKG, normal sinus rhythm, unchanged from previous tracings.      Treatments:   IV antibiotics IV steroids pulmonary toilet    Disposition:   Home  Follow up with Orville Weston MD in early next week.    Signed:  Sudhir Levy MD   7/25/2021, 08:46 CDT

## 2021-07-25 NOTE — PLAN OF CARE
Goal Outcome Evaluation:  Plan of Care Reviewed With: patient        Progress: no change  Outcome Summary: SBP remains in the 150s.  Multiple times in sinus indira down to 32 while sleeping, then rebounds quickly to 70-80s.  Normal sinus rhythm.  Remained on room air throughout night.  NS bolus and doxycycline given.  Cardiology and nephrology following.  Safety maintained.

## 2021-07-26 ENCOUNTER — READMISSION MANAGEMENT (OUTPATIENT)
Dept: CALL CENTER | Facility: HOSPITAL | Age: 61
End: 2021-07-26

## 2021-07-26 LAB
BACTERIA SPEC AEROBE CULT: NORMAL
BACTERIA SPEC AEROBE CULT: NORMAL

## 2021-07-26 NOTE — OUTREACH NOTE
Prep Survey      Responses   [facility] facility patient discharged from?  [place]   Is LACE score < 7 ?  No   Emergency Room discharge w/ pulse ox?  No   Eligibility  Readm Mgmt   Discharge diagnosis  Hypertensive urgency,  Acute on chronic diastolic heart failure,  MATTY   Does the patient have one of the following disease processes/diagnoses(primary or secondary)?  CHF   Does the patient have Home health ordered?  No   Is there a DME ordered?  No   Prep survey completed?  Yes          Emiliana Samayoa RN

## 2021-07-26 NOTE — PAYOR COMM NOTE
"[place] HOME 7-25-21  [id number]   [phone]    Ludivina Clemons (60 y.o. Female)     Date of Birth Social Security Number Address Home Phone MRN    1960  [address] [facility] [phone] [id number]    Christian Marital Status          Shinto Single       Admission Date Admission Type Admitting Provider Attending Provider Department, Room/Bed    7/21/21 Emergency Orville Weston MD  [facility] 4B, 409/1    Discharge Date Discharge Disposition Discharge Destination        7/25/2021 Home or Self Care              Attending Provider: (none)   Allergies: Codeine, Imitrex [Sumatriptan]    Isolation: None   Infection: None   Code Status: Prior    Ht: 167.6 cm (66\")   Wt: 95.8 kg (211 lb 3.2 oz)    Admission Cmt: None   Principal Problem: None                Active Insurance as of 7/21/2021     Primary Coverage     Payor Plan Insurance Group Employer/Plan Group    ANTH MEDICARE REPLACEMENT ANTHEM MEDICARE ADVANTAGE [id number]     Payor Plan Address Payor Plan Phone Number Payor Plan Fax Number Effective Dates    [address] [phone]  1/1/2017 - None Entered    [facility] [address]       Subscriber Name Subscriber Birth Date Member ID       LUDIVINA CLEMONS 1960 [id number]                 Emergency Contacts      (Rel.) Home Phone Work Phone Mobile Phone    YOGI LOPEZ (Relative) [phone] -- --    kenzie thompson (Sister) [phone] -- [phone]    Joshua Clemons () (Son) -- -- [phone]               Physician Progress Notes (last 48 hours) (Notes from 07/24/21 0858 through 07/26/21 0858)      Sudhir Levy MD at 07/25/21 0832          Ludivina Clemons is a 60 y.o. female patient.    Bradycardia rebounds quickly.   Creatinine better.   Patient stable, no new complaints.     Current Facility-Administered Medications   Medication Dose Route Frequency Provider Last Rate Last Admin   • aspirin EC tablet 81 mg  81 mg Oral Daily Enrico" Orville Lora MD   81 mg at 07/25/21 0801   • calcitriol (ROCALTROL) capsule 0.25 mcg  0.25 mcg Oral Daily Parker Whitfield MD   0.25 mcg at 07/25/21 0801   • carvedilol (COREG) tablet 12.5 mg  12.5 mg Oral BID With Meals Mary Rodriguez APRN   12.5 mg at 07/25/21 0801   • cloNIDine (CATAPRES) tablet 0.1 mg  0.1 mg Oral Q6H PRN Mary Rodriguez APRN       • doxycycline (VIBRAMYCIN) 100 mg/100 mL 0.9% NS MBP  100 mg Intravenous Q12H Orville Weston MD 0 mL/hr at 07/24/21 2255 100 mg at 07/25/21 0819   • enoxaparin (LOVENOX) syringe 40 mg  40 mg Subcutaneous Q24H Orville Weston MD   40 mg at 07/24/21 1801   • famotidine (PEPCID) tablet 20 mg  20 mg Oral Daily Orville Weston MD   20 mg at 07/25/21 0801   • fluticasone (FLONASE) 50 MCG/ACT nasal spray 2 spray  2 spray Nasal Daily PRN Orville Weston MD       • hydrALAZINE (APRESOLINE) tablet 25 mg  25 mg Oral Q8H Orville Weston MD   25 mg at 07/25/21 0530   • ipratropium-albuterol (DUO-NEB) nebulizer solution 3 mL  3 mL Nebulization 4x Daily - RT Orville Weston MD   3 mL at 07/25/21 0643   • methylPREDNISolone sodium succinate (SOLU-Medrol) injection 40 mg  40 mg Intravenous Q8H Orville Weston MD   40 mg at 07/25/21 0819   • NIFEdipine XL (PROCARDIA XL) 24 hr tablet 60 mg  60 mg Oral BID Mary Rodriguez APRN   60 mg at 07/25/21 0801   • OLANZapine (zyPREXA) tablet 5 mg  5 mg Oral Daily Orville Weston MD   5 mg at 07/25/21 0801   • rosuvastatin (CRESTOR) tablet 10 mg  10 mg Oral Nightly Orville Weston MD   10 mg at 07/24/21 2148   • sodium chloride 0.9 % flush 10 mL  10 mL Intravenous PRN Mickie Collins APRN   10 mL at 07/23/21 0856   • ticagrelor (BRILINTA) tablet 90 mg  90 mg Oral BID Mary Rodriguez APRN   90 mg at 07/25/21 0802     ALLERGIES:    Allergies   Allergen Reactions   • Codeine Nausea And Vomiting   • Imitrex [Sumatriptan] Other (See Comments)     HA       Hypertensive urgency     "Acute kidney injury superimposed on CKD (CMS/HCC)    Stage 3 chronic kidney disease (CMS/HCC)    Acute on chronic diastolic heart failure (CMS/HCC)    Coronary artery disease of native artery of native heart with stable angina pectoris (CMS/HCC)    Bradycardia    Blood pressure 162/94, pulse 81, temperature 98 °F (36.7 °C), temperature source Axillary, resp. rate 18, height 167.6 cm (66\"), weight 95.8 kg (211 lb 3.2 oz), SpO2 98 %, not currently breastfeeding.      Subjective:  Symptoms:  Stable.    Diet:  Poor intake.    Activity level: Impaired due to weakness.    Pain:  She reports no pain.      Review of Systems  Objective:  General Appearance:  Comfortable and well-appearing.    Vital signs: (most recent): Blood pressure 162/94, pulse 81, temperature 98 °F (36.7 °C), temperature source Axillary, resp. rate 18, height 167.6 cm (66\"), weight 95.8 kg (211 lb 3.2 oz), SpO2 98 %, not currently breastfeeding.  Vital signs are normal.  (Blood pressure better controlled).    Output: Producing urine and minimal stool output.    HEENT: Normal HEENT exam.    Lungs:  Normal effort and normal respiratory rate.    Heart: Normal rate.  Regular rhythm.    Abdomen: Abdomen is soft.  Bowel sounds are normal.   There is no abdominal tenderness.     Extremities: Normal range of motion.    Neurological: Patient is alert.    Skin:  Warm and dry.              Labs:  Lab Results (last 72 hours)     Procedure Component Value Units Date/Time    Basic Metabolic Panel [[id number]]  (Abnormal) Collected: 07/24/21 0308    Specimen: Blood Updated: 07/24/21 0358     Glucose 146 mg/dL      BUN 52 mg/dL      Creatinine 2.40 mg/dL      Sodium 140 mmol/L      Potassium 4.1 mmol/L      Comment: Slight hemolysis detected by analyzer. Results may be affected.        Chloride 102 mmol/L      CO2 27.0 mmol/L      Calcium 8.7 mg/dL      eGFR Non African Amer 21 mL/min/1.73      BUN/Creatinine Ratio 21.7     Anion Gap 11.0 mmol/L     Narrative:      GFR " Normal >60  Chronic Kidney Disease <60  Kidney Failure <15      POC Glucose Once [[id number]]  (Abnormal) Collected: 07/23/21 2047    Specimen: Blood Updated: 07/23/21 2058     Glucose 320 mg/dL      Comment: : [id number] Zacarias Humphries ID: [id number]       Blood Culture - Blood, Arm, Right [[id number]] Collected: 07/21/21 1700    Specimen: Blood from Arm, Right Updated: 07/23/21 1745     Blood Culture No growth at 2 days    Blood Culture - Blood, Arm, Left [[id number]] Collected: 07/21/21 1713    Specimen: Blood from Arm, Left Updated: 07/23/21 1745     Blood Culture No growth at 2 days    POC Glucose Once [[id number]]  (Abnormal) Collected: 07/23/21 1539    Specimen: Blood Updated: 07/23/21 1549     Glucose 139 mg/dL      Comment: : [id number] Derick MazariegosaMeter ID: [id number]       POC Glucose Once [[id number]]  (Normal) Collected: 07/23/21 1144    Specimen: Blood Updated: 07/23/21 1145     Glucose 111 mg/dL      Comment: : [id number] Ana LongoriaMeter ID: [id number]       POC Glucose Once [[id number]]  (Abnormal) Collected: 07/23/21 0719    Specimen: Blood Updated: 07/23/21 0814     Glucose 145 mg/dL      Comment: : [id number] Derick ValenzuelaonnaMeter ID: [id number]       Basic Metabolic Panel [[id number]]  (Abnormal) Collected: 07/23/21 0436    Specimen: Blood Updated: 07/23/21 0622     Glucose 151 mg/dL      BUN 42 mg/dL      Creatinine 2.21 mg/dL      Sodium 141 mmol/L      Potassium 3.7 mmol/L      Chloride 100 mmol/L      CO2 26.0 mmol/L      Calcium 9.4 mg/dL      eGFR Non African Amer 23 mL/min/1.73      BUN/Creatinine Ratio 19.0     Anion Gap 15.0 mmol/L     Narrative:      GFR Normal >60  Chronic Kidney Disease <60  Kidney Failure <15      POC Glucose Once [[id number]]  (Abnormal) Collected: 07/22/21 2050    Specimen: Blood Updated: 07/22/21 2101     Glucose 158 mg/dL      Comment: : [id number] Zacarias Humphries ID: [id number]       POC Glucose Once [[id number]]  (Abnormal)  Collected: 07/22/21 1609    Specimen: Blood Updated: 07/22/21 1706     Glucose 185 mg/dL      Comment: : [id number] Pavan Ferguson ID: [id number]       Troponin [[id number]]  (Normal) Collected: 07/22/21 1440    Specimen: Blood Updated: 07/22/21 1514     Troponin T <0.010 ng/mL     Narrative:      Troponin T Reference Range:  <= 0.03 ng/mL-   Negative for AMI  >0.03 ng/mL-     Abnormal for myocardial necrosis.  Clinicians would have to utilize clinical acumen, EKG, Troponin and serial changes to determine if it is an Acute Myocardial Infarction or myocardial injury due to an underlying chronic condition.       Results may be falsely decreased if patient taking Biotin.      Basic Metabolic Panel [[id number]]  (Abnormal) Collected: 07/22/21 1254    Specimen: Blood Updated: 07/22/21 1401     Glucose 104 mg/dL      BUN 32 mg/dL      Creatinine 1.82 mg/dL      Sodium 142 mmol/L      Potassium 3.7 mmol/L      Chloride 101 mmol/L      CO2 30.0 mmol/L      Calcium 9.1 mg/dL      eGFR Non African Amer 28 mL/min/1.73      BUN/Creatinine Ratio 17.6     Anion Gap 11.0 mmol/L     Narrative:      GFR Normal >60  Chronic Kidney Disease <60  Kidney Failure <15      Troponin [[id number]]  (Normal) Collected: 07/22/21 1254    Specimen: Blood Updated: 07/22/21 1358     Troponin T 0.011 ng/mL     Narrative:      Troponin T Reference Range:  <= 0.03 ng/mL-   Negative for AMI  >0.03 ng/mL-     Abnormal for myocardial necrosis.  Clinicians would have to utilize clinical acumen, EKG, Troponin and serial changes to determine if it is an Acute Myocardial Infarction or myocardial injury due to an underlying chronic condition.       Results may be falsely decreased if patient taking Biotin.      D-dimer, Quantitative [[id number]]  (Abnormal) Collected: 07/21/21 1713    Specimen: Blood Updated: 07/21/21 1734     D-Dimer, Quantitative 1.27 mg/L (FEU)     Narrative:      Reference Range is 0-0.50 mg/L FEU. However, results <0.50 mg/L FEU tends  to rule out DVT or PE. Results >0.50 mg/L FEU are not useful in predicting absence or presence of DVT or PE.      Protime-INR [[id number]]  (Normal) Collected: 07/21/21 1713    Specimen: Blood Updated: 07/21/21 1734     Protime 13.1 Seconds      INR 1.07    aPTT [[id number]]  (Normal) Collected: 07/21/21 1713    Specimen: Blood Updated: 07/21/21 1734     PTT 32.3 seconds     COVID-19,[facility] IN-HOUSE,Nasal Swab No Transport Media 3-4 HR TAT - Swab, Nasal Cavity [[id number]]  (Normal) Collected: 07/21/21 1626    Specimen: Swab from Nasal Cavity Updated: 07/21/21 1724     COVID19 Not Detected    Narrative:      Fact sheet for providers: https://www.fda.gov/media/414629/download     Fact sheet for patients: https://www.fda.gov/media/711446/download    Test performed by PCR.    Consider negative results in combination with clinical observations, patient history, and epidemiological information.    [facility] Draw [[id number]] Collected: 07/21/21 1600    Specimen: Blood Updated: 07/21/21 1715    Narrative:      The following orders were created for panel order [facility] Draw.  Procedure                               Abnormality         Status                     ---------                               -----------         ------                     Green Top (Gel)[[id number]]                                  Final result               Lavender Top[[id number]]                                     Final result               Red Top[[id number]]                                          Final result                 Please view results for these tests on the individual orders.    Green Top (Gel) [[id number]] Collected: 07/21/21 1600    Specimen: Blood Updated: 07/21/21 1715     Extra Tube Hold for add-ons.     Comment: Auto resulted.       Red Top [[id number]] Collected: 07/21/21 1600    Specimen: Blood Updated: 07/21/21 1715     Extra Tube Hold for add-ons.     Comment: Auto resulted.       Lavender Top [[id number]] Collected: 07/21/21 1600     Specimen: Blood Updated: 07/21/21 1715     Extra Tube hold for add-on     Comment: Auto resulted       BNP [[id number]]  (Abnormal) Collected: 07/21/21 1600    Specimen: Blood Updated: 07/21/21 1649     proBNP 35,120.0 pg/mL     Narrative:      Among patients with dyspnea, NT-proBNP is highly sensitive for the detection of acute congestive heart failure. In addition NT-proBNP of <300 pg/ml effectively rules out acute congestive heart failure with 99% negative predictive value.    Results may be falsely decreased if patient taking Biotin.      Comprehensive Metabolic Panel [[id number]]  (Abnormal) Collected: 07/21/21 1600    Specimen: Blood Updated: 07/21/21 1638     Glucose 101 mg/dL      BUN 33 mg/dL      Creatinine 1.60 mg/dL      Sodium 142 mmol/L      Potassium 3.7 mmol/L      Chloride 108 mmol/L      CO2 24.0 mmol/L      Calcium 8.9 mg/dL      Total Protein 7.0 g/dL      Albumin 4.10 g/dL      ALT (SGPT) 18 U/L      AST (SGOT) 15 U/L      Alkaline Phosphatase 104 U/L      Total Bilirubin 0.7 mg/dL      eGFR Non African Amer 33 mL/min/1.73      Globulin 2.9 gm/dL      A/G Ratio 1.4 g/dL      BUN/Creatinine Ratio 20.6     Anion Gap 10.0 mmol/L     Narrative:      GFR Normal >60  Chronic Kidney Disease <60  Kidney Failure <15      Troponin [[id number]]  (Normal) Collected: 07/21/21 1600    Specimen: Blood Updated: 07/21/21 1636     Troponin T 0.012 ng/mL     Narrative:      Troponin T Reference Range:  <= 0.03 ng/mL-   Negative for AMI  >0.03 ng/mL-     Abnormal for myocardial necrosis.  Clinicians would have to utilize clinical acumen, EKG, Troponin and serial changes to determine if it is an Acute Myocardial Infarction or myocardial injury due to an underlying chronic condition.       Results may be falsely decreased if patient taking Biotin.      Blood Gas, Arterial - [[id number]]  (Abnormal) Collected: 07/21/21 1621    Specimen: Arterial Blood Updated: 07/21/21 1620     Site Right Radial     Guido's Test  Positive     pH, Arterial 7.404 pH units      pCO2, Arterial 41.2 mm Hg      pO2, Arterial 76.4 mm Hg      Comment: 84 Value below reference range        HCO3, Arterial 25.7 mmol/L      Base Excess, Arterial 0.9 mmol/L      O2 Saturation, Arterial 96.7 %      Temperature 37.0 C      Barometric Pressure for Blood Gas 752 mmHg      Modality Nasal Cannula     Flow Rate 2.0 lpm      Ventilator Mode NA     Collected by [id number]     Comment: Meter: [id number]     :  [id number]        pCO2, Temperature Corrected 41.2 mm Hg      pH, Temp Corrected 7.404 pH Units      pO2, Temperature Corrected 76.4 mm Hg     CBC & Differential [[id number]]  (Abnormal) Collected: 07/21/21 1600    Specimen: Blood Updated: 07/21/21 1610    Narrative:      The following orders were created for panel order CBC & Differential.  Procedure                               Abnormality         Status                     ---------                               -----------         ------                     CBC Auto Differential[[id number]]        Abnormal            Final result                 Please view results for these tests on the individual orders.    CBC Auto Differential [[id number]]  (Abnormal) Collected: 07/21/21 1600    Specimen: Blood Updated: 07/21/21 1610     WBC 9.31 10*3/mm3      RBC 3.66 10*6/mm3      Hemoglobin 10.8 g/dL      Hematocrit 34.2 %      MCV 93.4 fL      MCH 29.5 pg      MCHC 31.6 g/dL      RDW 14.8 %      RDW-SD 49.8 fl      MPV 11.3 fL      Platelets 212 10*3/mm3      Neutrophil % 81.7 %      Lymphocyte % 9.5 %      Monocyte % 5.8 %      Eosinophil % 1.9 %      Basophil % 0.6 %      Immature Grans % 0.5 %      Neutrophils, Absolute 7.60 10*3/mm3      Lymphocytes, Absolute 0.88 10*3/mm3      Monocytes, Absolute 0.54 10*3/mm3      Eosinophils, Absolute 0.18 10*3/mm3      Basophils, Absolute 0.06 10*3/mm3      Immature Grans, Absolute 0.05 10*3/mm3      nRBC 0.0 /100 WBC           Imaging Results (Last 72 Hours)      Procedure Component Value Units Date/Time    US Renal Bilateral [[id number]] Collected: 07/24/21 0917     Updated: 07/24/21 0921    Narrative:      RENAL ULTRASOUND COMPLETE 7/24/2021 8:57 AM CDT     REASON FOR EXAM: Chronic kidney disease with acute kidney injury;  R00.1-Bradycardia, unspecified; I50.9-Heart failure, unspecified       COMPARISON: RENAL ULTRASOUND COMPLETE 7/24/2021 8:57 AM CDT     REASON FOR EXAM: Chronic kidney disease with acute kidney injury;  R00.1-Bradycardia, unspecified; I50.9-Heart failure, unspecified       COMPARISON: None       TECHNIQUE: Multiple longitudinal and transverse realtime sonographic  images of the kidneys and urinary bladder are obtained.      FINDINGS:      RIGHT KIDNEY: 4.6 x 4.8 x 9.6 cm. Normal in size, shape, contour and  position. No solid or cystic masses. The central echo complex is compact  with no evidence for hydronephrosis. No nephrolithiasis or abnormal  perinephric fluid collections . No hydroureter.      LEFT KIDNEY: 4.3 x 3.7 x 9.9 cm. Normal in size, shape, contour and  position. No solid or cystic masses. The central echo complex is compact  with no evidence for hydronephrosis. No nephrolithiasis or abnormal  perinephric fluid collections . No hydroureter.      PELVIS: The bladder is mildly distended with anechoic urine and  demonstrates no significant wall thickening or internal echogenicities.  There is no surrounding ascites.        Impression:      1. Negative renal ultrasound.           TECHNIQUE: Multiple longitudinal and transverse realtime sonographic  images of the kidneys and urinary bladder are obtained.      FINDINGS:      RIGHT KIDNEY: cm. Normal in size, shape, contour and position. No solid  or cystic masses. The central echo complex is compact with no evidence  for hydronephrosis. No nephrolithiasis or abnormal perinephric fluid  collections . No hydroureter.      LEFT KIDNEY: cm. Normal in size, shape, contour and position. No solid  or  cystic masses. The central echo complex is compact with no evidence  for hydronephrosis. No nephrolithiasis or abnormal perinephric fluid  collections . No hydroureter.      PELVIS: The bladder is mildly distended with anechoic urine and  demonstrates no significant wall thickening or internal echogenicities.  There is no surrounding ascites.      IMPRESSION:   1. Unremarkable renal ultrasound.        This report was finalized on 07/24/2021 09:18 by Dr. jC Cruz MD.                Assessment:    Condition: In stable condition.  Improving.   (    Acute kidney injury with chronic kidney disease- Renal u/s normal, PTH hihg calcitrol given.  Nephrology following.  Continue IV fluids.  Avoid nephrotoxic medication, will hold losartan for now and add clonidine as a as needed medication.    Hypertensive urgency-blood pressure better controlled suspect most of the kidney issues revolve around hypertension.    Bradycardia-better with lower dose of Coreg and CPAP.    Recheck labs in a.m.).     Problem List:     Hypertensive urgency    Acute kidney injury superimposed on CKD (CMS/HCC)    Stage 3 chronic kidney disease (CMS/HCC)    Acute on chronic diastolic heart failure (CMS/HCC)    Coronary artery disease of native artery of native heart with stable angina pectoris (CMS/HCC)    Bradycardia    Hermann Todd, ISAIAH  7/25/2021        Patient requests discharge home.  Follow-up next week with Dr. Weston    I have discussed the care of Ludivina Ramirez, including pertinent history and exam findings with the ARNP/PA.  I have seen and examined the patient and the key elements of all parts of the encounter have been performed by me. I agree with the assessment and plan as outlined by the ARNP/PA. Please refer to my comments for complete documentation.     Electronically signed by Sudhir Levy MD on 7/25/2021 at 08:41 CDT                                            Electronically signed by Sudhir Levy MD at  21 0841          Discharge Summary      Sudhir Levy MD at 21 0846          Hospital Discharge Summary    Ludivina Ramirez  :  1960  MRN:  [id number]    Admit date:  2021  Discharge date:  2021    Admitting Physician:    Orville Weston MD    Discharge Diagnoses:      Hypertensive urgency    Acute kidney injury superimposed on CKD (CMS/HCC)    Stage 3 chronic kidney disease (CMS/HCC)    Acute on chronic diastolic heart failure (CMS/HCC)    Coronary artery disease of native artery of native heart with stable angina pectoris (CMS/HCC)    Bradycardia      Hospital Course:   The patient was admitted for the above surgical/medical indication.  Please see admission H&P for further details concerning the admission.  The patient was seen daily and progress noted via daily updates in the progress notes.  The patient improved throughout her stay.  They reached maximum medical improvement and were considered stable for discharge home.  They understand the importance of follow-up concerning any abnormal lab values/x-rays.  All questions were answered to the best of my ability prior to their discharge home.    Pleasant 60-year-old white female with known COPD emphysema and stage III kidney disease that presents with increasing shortness of air.  Patient was given IV steroids IV antibiotics and improved throughout her stay.  Her creatinine returned to baseline.  On the morning of  requesting discharge home.  Follow-up next week with Dr. Weston to recheck creatinine.      Discharge Medications:         Discharge Medications      New Medications      Instructions Start Date   doxycycline 100 MG capsule  Commonly known as: VIBRAMYCIN   100 mg, Oral, 2 Times Daily      predniSONE 10 MG tablet  Commonly known as: DELTASONE   10 mg, Oral, Daily, 3 times daily x3 days, twice daily x3 days then daily x3 days         Changes to Medications      Instructions Start Date   carvedilol 12.5 MG  tablet  Commonly known as: COREG  What changed:   · medication strength  · how much to take   12.5 mg, Oral, 2 Times Daily With Meals      hydrALAZINE 25 MG tablet  Commonly known as: APRESOLINE  What changed:   · how much to take  · Another medication with the same name was removed. Continue taking this medication, and follow the directions you see here.   25 mg, Oral, Every 8 Hours Scheduled         Continue These Medications      Instructions Start Date   aspirin 81 MG EC tablet   81 mg, Oral, Daily      azelastine 0.1 % nasal spray  Commonly known as: ASTELIN   2 sprays, Nasal, 2 Times Daily, Use in each nostril as directed      cloNIDine 0.1 MG tablet  Commonly known as: CATAPRES   0.1 mg, Oral, Every 6 Hours PRN      ferrous sulfate 325 (65 FE) MG tablet   325 mg, Oral, 2 Times Daily With Meals      fluticasone 50 MCG/ACT nasal spray  Commonly known as: FLONASE   2 sprays, Nasal, Daily PRN      losartan 100 MG tablet  Commonly known as: COZAAR   100 mg, Oral, Every 24 Hours Scheduled      NIFEdipine CC 60 MG 24 hr tablet  Commonly known as: ADALAT CC   60 mg, Oral, 2 times daily      nitroglycerin 0.4 MG SL tablet  Commonly known as: NITROSTAT   0.4 mg, Sublingual, Every 5 Minutes PRN, Take no more than 3 doses in 15 minutes.      OLANZapine 5 MG tablet  Commonly known as: zyPREXA   5 mg, Oral, 2 times daily      pantoprazole 40 MG EC tablet  Commonly known as: PROTONIX   40 mg, Oral, Daily      rosuvastatin 20 MG tablet  Commonly known as: CRESTOR   20 mg, Oral, Daily      ticagrelor 90 MG tablet tablet  Commonly known as: BRILINTA   90 mg, Oral, 2 Times Daily      Vitamin D2 50 MCG (2000 UT) tablet   1,000 Units, Oral, Daily             Consults: None    Significant Diagnostic Studies:      EKG: normal EKG, normal sinus rhythm, unchanged from previous tracings.      Treatments:   IV antibiotics IV steroids pulmonary toilet    Disposition:   Home  Follow up with Orville Weston MD in early next  week.    Signed:  Sudhir Levy MD   7/25/2021, 08:46 CDT    Electronically signed by Sudhir Levy MD at 07/25/21 0874

## 2021-07-29 ENCOUNTER — READMISSION MANAGEMENT (OUTPATIENT)
Dept: CALL CENTER | Facility: HOSPITAL | Age: 61
End: 2021-07-29

## 2021-07-29 NOTE — OUTREACH NOTE
CHF Week 1 Survey      Responses   [facility] patient discharged from?  [place]   Does the patient have one of the following disease processes/diagnoses(primary or secondary)?  CHF   CHF Week 1 attempt successful?  Yes   Call start time  1024   Call end time  1045   General alerts for this patient  Patient likes to be called after 11 am.    Discharge diagnosis  Hypertensive urgency,  Acute on chronic diastolic heart failure,  MATTY   Meds reviewed with patient/caregiver?  Yes   Is the patient having any side effects they believe may be caused by any medication additions or changes?  No   Does the patient have all medications ordered at discharge?  Yes   Is the patient taking all medications as directed (includes completed medication regime)?  Yes   Does the patient have a primary care provider?   Yes   Does the patient have an appointment with their PCP within 7 days of discharge?  Greater than 7 days   Comments regarding PCP  Patient reports she has upcoming appt   What is preventing the patient from scheduling follow up appointments within 7 days of discharge?  Earlier appointment not available   Nursing Interventions  Verified appointment date/time/provider   Has the patient kept scheduled appointments due by today?  N/A   Has home health visited the patient within 72 hours of discharge?  N/A   Psychosocial issues?  No   Did the patient receive a copy of their discharge instructions?  Yes   What is the patient's perception of their health status since discharge?  Improving   Nursing interventions  Nurse provided patient education   Is the patient weighing daily?  No   Does the patient have scales?  Yes   Daily weight interventions  Education provided on importance of daily weight   Is the patient able to teach back Heart Failure diet management?  Yes   Is the patient able to teach back Heart Failure Zones?  Yes   Is the patient able to teach back signs and symptoms of worsening condition? (i.e. weight gain,  shortness of air, etc.)  Yes   Is the patient/caregiver able to teach back the hierarchy of who to call/visit for symptoms/problems? PCP, Specialist, Home health nurse, Urgent Care, ED, 911  Yes    CHF Week 1 call completed?  Yes          Chencho Nichols RN

## 2021-08-08 ENCOUNTER — APPOINTMENT (OUTPATIENT)
Dept: GENERAL RADIOLOGY | Facility: HOSPITAL | Age: 61
End: 2021-08-08

## 2021-08-08 ENCOUNTER — HOSPITAL ENCOUNTER (INPATIENT)
Facility: HOSPITAL | Age: 61
LOS: 5 days | Discharge: HOME HEALTH | End: 2021-08-13
Attending: EMERGENCY MEDICINE | Admitting: FAMILY MEDICINE

## 2021-08-08 DIAGNOSIS — R09.02 HYPOXIA: ICD-10-CM

## 2021-08-08 DIAGNOSIS — I50.33 ACUTE ON CHRONIC DIASTOLIC HEART FAILURE (HCC): ICD-10-CM

## 2021-08-08 DIAGNOSIS — I50.33 ACUTE ON CHRONIC DIASTOLIC CONGESTIVE HEART FAILURE (HCC): Primary | ICD-10-CM

## 2021-08-08 LAB
ALBUMIN SERPL-MCNC: 3.4 G/DL (ref 3.5–5.2)
ALBUMIN/GLOB SERPL: 1.3 G/DL
ALP SERPL-CCNC: 93 U/L (ref 39–117)
ALT SERPL W P-5'-P-CCNC: 21 U/L (ref 1–33)
ANION GAP SERPL CALCULATED.3IONS-SCNC: 11 MMOL/L (ref 5–15)
ARTERIAL PATENCY WRIST A: ABNORMAL
AST SERPL-CCNC: 18 U/L (ref 1–32)
ATMOSPHERIC PRESS: 750 MMHG
BASE EXCESS BLDA CALC-SCNC: 4 MMOL/L (ref 0–2)
BASOPHILS # BLD AUTO: 0.05 10*3/MM3 (ref 0–0.2)
BASOPHILS NFR BLD AUTO: 0.5 % (ref 0–1.5)
BDY SITE: ABNORMAL
BILIRUB SERPL-MCNC: 0.8 MG/DL (ref 0–1.2)
BODY TEMPERATURE: 37 C
BUN SERPL-MCNC: 32 MG/DL (ref 8–23)
BUN/CREAT SERPL: 17.5 (ref 7–25)
CALCIUM SPEC-SCNC: 8.7 MG/DL (ref 8.6–10.5)
CHLORIDE SERPL-SCNC: 107 MMOL/L (ref 98–107)
CO2 SERPL-SCNC: 25 MMOL/L (ref 22–29)
CREAT SERPL-MCNC: 1.83 MG/DL (ref 0.57–1)
CRP SERPL-MCNC: 1.71 MG/DL (ref 0–0.5)
D DIMER PPP FEU-MCNC: 1.09 MG/L (FEU) (ref 0–0.5)
D-LACTATE SERPL-SCNC: 0.6 MMOL/L (ref 0.5–2)
DEPRECATED RDW RBC AUTO: 54.4 FL (ref 37–54)
EOSINOPHIL # BLD AUTO: 0.1 10*3/MM3 (ref 0–0.4)
EOSINOPHIL NFR BLD AUTO: 1 % (ref 0.3–6.2)
ERYTHROCYTE [DISTWIDTH] IN BLOOD BY AUTOMATED COUNT: 15.9 % (ref 12.3–15.4)
GAS FLOW AIRWAY: 6 LPM
GFR SERPL CREATININE-BSD FRML MDRD: 28 ML/MIN/1.73
GLOBULIN UR ELPH-MCNC: 2.7 GM/DL
GLUCOSE SERPL-MCNC: 122 MG/DL (ref 65–99)
HCO3 BLDA-SCNC: 29.3 MMOL/L (ref 20–26)
HCT VFR BLD AUTO: 32.6 % (ref 34–46.6)
HGB BLD-MCNC: 10.4 G/DL (ref 12–15.9)
INR PPP: 1.06 (ref 0.91–1.09)
LYMPHOCYTES # BLD AUTO: 0.67 10*3/MM3 (ref 0.7–3.1)
LYMPHOCYTES NFR BLD AUTO: 7 % (ref 19.6–45.3)
Lab: ABNORMAL
MCH RBC QN AUTO: 30.1 PG (ref 26.6–33)
MCHC RBC AUTO-ENTMCNC: 31.9 G/DL (ref 31.5–35.7)
MCV RBC AUTO: 94.5 FL (ref 79–97)
MODALITY: ABNORMAL
MONOCYTES # BLD AUTO: 0.59 10*3/MM3 (ref 0.1–0.9)
MONOCYTES NFR BLD AUTO: 6.2 % (ref 5–12)
NEUTROPHILS NFR BLD AUTO: 8.12 10*3/MM3 (ref 1.7–7)
NEUTROPHILS NFR BLD AUTO: 84.7 % (ref 42.7–76)
NT-PROBNP SERPL-MCNC: ABNORMAL PG/ML (ref 0–900)
PCO2 BLDA: 46.5 MM HG (ref 35–45)
PCO2 TEMP ADJ BLD: 46.5 MM HG (ref 35–45)
PH BLDA: 7.41 PH UNITS (ref 7.35–7.45)
PH, TEMP CORRECTED: 7.41 PH UNITS (ref 7.35–7.45)
PLATELET # BLD AUTO: 130 10*3/MM3 (ref 140–450)
PMV BLD AUTO: 11.6 FL (ref 6–12)
PO2 BLDA: 99.1 MM HG (ref 83–108)
PO2 TEMP ADJ BLD: 99.1 MM HG (ref 83–108)
POTASSIUM SERPL-SCNC: 4 MMOL/L (ref 3.5–5.2)
PROCALCITONIN SERPL-MCNC: 0.09 NG/ML (ref 0–0.25)
PROT SERPL-MCNC: 6.1 G/DL (ref 6–8.5)
PROTHROMBIN TIME: 13 SECONDS (ref 11.5–13.4)
RBC # BLD AUTO: 3.45 10*6/MM3 (ref 3.77–5.28)
SAO2 % BLDCOA: 98.5 % (ref 94–99)
SARS-COV-2 RNA PNL SPEC NAA+PROBE: NOT DETECTED
SODIUM SERPL-SCNC: 143 MMOL/L (ref 136–145)
TROPONIN T SERPL-MCNC: 0.03 NG/ML (ref 0–0.03)
TROPONIN T SERPL-MCNC: 0.03 NG/ML (ref 0–0.03)
VENTILATOR MODE: ABNORMAL
WBC # BLD AUTO: 9.59 10*3/MM3 (ref 3.4–10.8)

## 2021-08-08 PROCEDURE — 94799 UNLISTED PULMONARY SVC/PX: CPT

## 2021-08-08 PROCEDURE — 82803 BLOOD GASES ANY COMBINATION: CPT

## 2021-08-08 PROCEDURE — 36600 WITHDRAWAL OF ARTERIAL BLOOD: CPT

## 2021-08-08 PROCEDURE — 94640 AIRWAY INHALATION TREATMENT: CPT

## 2021-08-08 PROCEDURE — 99285 EMERGENCY DEPT VISIT HI MDM: CPT

## 2021-08-08 PROCEDURE — 87040 BLOOD CULTURE FOR BACTERIA: CPT | Performed by: EMERGENCY MEDICINE

## 2021-08-08 PROCEDURE — 86140 C-REACTIVE PROTEIN: CPT | Performed by: EMERGENCY MEDICINE

## 2021-08-08 PROCEDURE — 63710000001 PREDNISONE PER 5 MG: Performed by: FAMILY MEDICINE

## 2021-08-08 PROCEDURE — 84145 PROCALCITONIN (PCT): CPT | Performed by: EMERGENCY MEDICINE

## 2021-08-08 PROCEDURE — 25010000002 ENOXAPARIN PER 10 MG: Performed by: FAMILY MEDICINE

## 2021-08-08 PROCEDURE — 25010000002 FUROSEMIDE PER 20 MG: Performed by: EMERGENCY MEDICINE

## 2021-08-08 PROCEDURE — 5A09357 ASSISTANCE WITH RESPIRATORY VENTILATION, LESS THAN 24 CONSECUTIVE HOURS, CONTINUOUS POSITIVE AIRWAY PRESSURE: ICD-10-PCS | Performed by: FAMILY MEDICINE

## 2021-08-08 PROCEDURE — 85379 FIBRIN DEGRADATION QUANT: CPT | Performed by: EMERGENCY MEDICINE

## 2021-08-08 PROCEDURE — 83880 ASSAY OF NATRIURETIC PEPTIDE: CPT | Performed by: EMERGENCY MEDICINE

## 2021-08-08 PROCEDURE — 84484 ASSAY OF TROPONIN QUANT: CPT | Performed by: EMERGENCY MEDICINE

## 2021-08-08 PROCEDURE — 87635 SARS-COV-2 COVID-19 AMP PRB: CPT | Performed by: EMERGENCY MEDICINE

## 2021-08-08 PROCEDURE — 93010 ELECTROCARDIOGRAM REPORT: CPT | Performed by: INTERNAL MEDICINE

## 2021-08-08 PROCEDURE — 83605 ASSAY OF LACTIC ACID: CPT | Performed by: EMERGENCY MEDICINE

## 2021-08-08 PROCEDURE — 94660 CPAP INITIATION&MGMT: CPT

## 2021-08-08 PROCEDURE — 85610 PROTHROMBIN TIME: CPT | Performed by: EMERGENCY MEDICINE

## 2021-08-08 PROCEDURE — 80053 COMPREHEN METABOLIC PANEL: CPT | Performed by: EMERGENCY MEDICINE

## 2021-08-08 PROCEDURE — 93005 ELECTROCARDIOGRAM TRACING: CPT | Performed by: EMERGENCY MEDICINE

## 2021-08-08 PROCEDURE — 85025 COMPLETE CBC W/AUTO DIFF WBC: CPT | Performed by: EMERGENCY MEDICINE

## 2021-08-08 PROCEDURE — 71045 X-RAY EXAM CHEST 1 VIEW: CPT

## 2021-08-08 RX ORDER — CLONIDINE HYDROCHLORIDE 0.1 MG/1
0.1 TABLET ORAL EVERY 6 HOURS PRN
Status: DISCONTINUED | OUTPATIENT
Start: 2021-08-08 | End: 2021-08-13 | Stop reason: HOSPADM

## 2021-08-08 RX ORDER — LOSARTAN POTASSIUM 50 MG/1
100 TABLET ORAL
Status: DISCONTINUED | OUTPATIENT
Start: 2021-08-08 | End: 2021-08-13 | Stop reason: HOSPADM

## 2021-08-08 RX ORDER — SODIUM CHLORIDE 0.9 % (FLUSH) 0.9 %
10 SYRINGE (ML) INJECTION AS NEEDED
Status: DISCONTINUED | OUTPATIENT
Start: 2021-08-08 | End: 2021-08-13 | Stop reason: HOSPADM

## 2021-08-08 RX ORDER — AZELASTINE 1 MG/ML
2 SPRAY, METERED NASAL 2 TIMES DAILY
Status: DISCONTINUED | OUTPATIENT
Start: 2021-08-08 | End: 2021-08-13 | Stop reason: HOSPADM

## 2021-08-08 RX ORDER — FUROSEMIDE 10 MG/ML
40 INJECTION INTRAMUSCULAR; INTRAVENOUS ONCE
Status: COMPLETED | OUTPATIENT
Start: 2021-08-08 | End: 2021-08-08

## 2021-08-08 RX ORDER — IPRATROPIUM BROMIDE AND ALBUTEROL SULFATE 2.5; .5 MG/3ML; MG/3ML
3 SOLUTION RESPIRATORY (INHALATION) ONCE
Status: COMPLETED | OUTPATIENT
Start: 2021-08-08 | End: 2021-08-08

## 2021-08-08 RX ORDER — FERROUS SULFATE 325(65) MG
325 TABLET ORAL 2 TIMES DAILY WITH MEALS
Status: DISCONTINUED | OUTPATIENT
Start: 2021-08-08 | End: 2021-08-13 | Stop reason: HOSPADM

## 2021-08-08 RX ORDER — PREDNISONE 10 MG/1
10 TABLET ORAL DAILY
Status: DISCONTINUED | OUTPATIENT
Start: 2021-08-08 | End: 2021-08-13 | Stop reason: HOSPADM

## 2021-08-08 RX ORDER — HYDRALAZINE HYDROCHLORIDE 25 MG/1
25 TABLET, FILM COATED ORAL EVERY 8 HOURS SCHEDULED
Status: DISCONTINUED | OUTPATIENT
Start: 2021-08-08 | End: 2021-08-13 | Stop reason: HOSPADM

## 2021-08-08 RX ORDER — ASPIRIN 81 MG/1
81 TABLET ORAL DAILY
Status: DISCONTINUED | OUTPATIENT
Start: 2021-08-08 | End: 2021-08-13 | Stop reason: HOSPADM

## 2021-08-08 RX ORDER — OLANZAPINE 5 MG/1
5 TABLET ORAL NIGHTLY
Status: DISCONTINUED | OUTPATIENT
Start: 2021-08-08 | End: 2021-08-13 | Stop reason: HOSPADM

## 2021-08-08 RX ORDER — FLUTICASONE PROPIONATE 50 MCG
2 SPRAY, SUSPENSION (ML) NASAL DAILY PRN
Status: DISCONTINUED | OUTPATIENT
Start: 2021-08-08 | End: 2021-08-13 | Stop reason: HOSPADM

## 2021-08-08 RX ORDER — ROSUVASTATIN CALCIUM 20 MG/1
20 TABLET, COATED ORAL DAILY
Status: DISCONTINUED | OUTPATIENT
Start: 2021-08-08 | End: 2021-08-13 | Stop reason: HOSPADM

## 2021-08-08 RX ORDER — NIFEDIPINE 60 MG/1
60 TABLET, EXTENDED RELEASE ORAL
Status: DISCONTINUED | OUTPATIENT
Start: 2021-08-08 | End: 2021-08-13 | Stop reason: HOSPADM

## 2021-08-08 RX ORDER — CARVEDILOL 6.25 MG/1
12.5 TABLET ORAL 2 TIMES DAILY WITH MEALS
Status: DISCONTINUED | OUTPATIENT
Start: 2021-08-08 | End: 2021-08-13 | Stop reason: HOSPADM

## 2021-08-08 RX ADMIN — LOSARTAN POTASSIUM 100 MG: 50 TABLET, FILM COATED ORAL at 21:27

## 2021-08-08 RX ADMIN — TICAGRELOR 90 MG: 90 TABLET ORAL at 21:27

## 2021-08-08 RX ADMIN — IPRATROPIUM BROMIDE AND ALBUTEROL SULFATE 3 ML: 2.5; .5 SOLUTION RESPIRATORY (INHALATION) at 14:37

## 2021-08-08 RX ADMIN — ENOXAPARIN SODIUM 40 MG: 40 INJECTION SUBCUTANEOUS at 21:26

## 2021-08-08 RX ADMIN — FERROUS SULFATE TAB 325 MG (65 MG ELEMENTAL FE) 325 MG: 325 (65 FE) TAB at 21:28

## 2021-08-08 RX ADMIN — NIFEDIPINE 60 MG: 60 TABLET, FILM COATED, EXTENDED RELEASE ORAL at 21:27

## 2021-08-08 RX ADMIN — HYDRALAZINE HYDROCHLORIDE 25 MG: 25 TABLET, FILM COATED ORAL at 21:27

## 2021-08-08 RX ADMIN — FUROSEMIDE 40 MG: 10 INJECTION, SOLUTION INTRAVENOUS at 19:00

## 2021-08-08 RX ADMIN — ROSUVASTATIN CALCIUM 20 MG: 20 TABLET, FILM COATED ORAL at 21:27

## 2021-08-08 RX ADMIN — CARVEDILOL 12.5 MG: 6.25 TABLET, FILM COATED ORAL at 21:28

## 2021-08-08 RX ADMIN — ASPIRIN 81 MG: 81 TABLET ORAL at 21:26

## 2021-08-08 RX ADMIN — PREDNISONE 10 MG: 10 TABLET ORAL at 21:27

## 2021-08-08 RX ADMIN — OLANZAPINE 5 MG: 5 TABLET, FILM COATED ORAL at 21:27

## 2021-08-08 RX ADMIN — AZELASTINE HYDROCHLORIDE 2 SPRAY: 137 SPRAY, METERED NASAL at 21:28

## 2021-08-08 NOTE — ED PROVIDER NOTES
Subjective   60-year-old female presents to the emergency department with complaint of shortness of breath and wheezing.  She has a history of COPD but not on home oxygen, diastolic heart failure with recent admission for volume overload and hypertensive emergency, coronary disease status post stent to LAD 11/20, chronic kidney disease, hypertension.  Patient reports 2 to 3-day history of worsening shortness of breath and difficulty breathing.  She has had wheezing.  Denies cough and congestion, fever, sore throat, runny nose.  No significant lower extremity edema.  Rates her symptoms as moderate to severe with no aggravating alleviating factors.      History provided by:  Patient      Review of Systems   All other systems reviewed and are negative.      Past Medical History:   Diagnosis Date   • Acute CHF (CMS/HCC)    • Acute renal failure (ARF) (CMS/HCC)    • Anemia    • Asthma     childhood   • Hypertension    • Ischemic colitis (CMS/HCC)    • Metabolic acidosis    • Nonrheumatic aortic (valve) insufficiency    • PTSD (post-traumatic stress disorder)        Allergies   Allergen Reactions   • Codeine Nausea And Vomiting   • Imitrex [Sumatriptan] Other (See Comments)     HA       Past Surgical History:   Procedure Laterality Date   • CARDIAC CATHETERIZATION N/A 11/8/2020    Procedure: Left Heart Cath;  Surgeon: Pierce Buchanan MD;  Location: [facility] CATH INVASIVE LOCATION;  Service: Cardiovascular;  Laterality: N/A;   • EXTERNAL FIXATION WRIST FRACTURE     • LEG SURGERY     • TUBAL ABDOMINAL LIGATION         Family History   Problem Relation Age of Onset   • Coronary artery disease Mother    • Coronary artery disease Father        Social History     Socioeconomic History   • Marital status: Single     Spouse name: Not on file   • Number of children: Not on file   • Years of education: Not on file   • Highest education level: Not on file   Tobacco Use   • Smoking status: Current Every Day Smoker     Packs/day: 0.50   •  Smokeless tobacco: Never Used   Vaping Use   • Vaping Use: Never used   Substance and Sexual Activity   • Alcohol use: No   • Drug use: No   • Sexual activity: Defer           Objective   Physical Exam  Vitals and nursing note reviewed.   Constitutional:       General: She is in acute distress.      Appearance: Normal appearance. She is normal weight. She is ill-appearing.      Comments: Patient appears to be in mild respiratory distress   HENT:      Head: Normocephalic and atraumatic.      Nose: Nose normal.      Mouth/Throat:      Mouth: Mucous membranes are moist.      Pharynx: Oropharynx is clear. No oropharyngeal exudate or posterior oropharyngeal erythema.   Eyes:      Extraocular Movements: Extraocular movements intact.      Conjunctiva/sclera: Conjunctivae normal.      Pupils: Pupils are equal, round, and reactive to light.   Cardiovascular:      Rate and Rhythm: Normal rate and regular rhythm.      Pulses: Normal pulses.      Heart sounds: Normal heart sounds.   Pulmonary:      Effort: Tachypnea, accessory muscle usage and respiratory distress present.      Breath sounds: Wheezing and rales present. No rhonchi.   Abdominal:      General: Abdomen is flat. Bowel sounds are normal. There is no distension.      Palpations: Abdomen is soft.      Tenderness: There is no abdominal tenderness.   Musculoskeletal:         General: No tenderness. Normal range of motion.      Cervical back: Normal range of motion and neck supple. No rigidity. No muscular tenderness.      Right lower leg: No edema.      Left lower leg: No edema.   Skin:     General: Skin is warm and dry.      Capillary Refill: Capillary refill takes less than 2 seconds.      Findings: No rash.   Neurological:      General: No focal deficit present.      Mental Status: She is alert and oriented to person, place, and time. Mental status is at baseline.      Cranial Nerves: No cranial nerve deficit.      Sensory: No sensory deficit.      Motor: No  weakness.   Psychiatric:         Mood and Affect: Mood normal.         Behavior: Behavior normal.         Thought Content: Thought content normal.         ECG 12 Lead      Date/Time: 8/8/2021 2:03 PM  Performed by: Zackary Gutierrez MD  Authorized by: Zackary Gutierrez MD   Comments: Normal sinus rhythm, rate 79, possible LAE, nonspecific ST changes V3 through V5 which is unchanged from EKG 7/21/2021             Lab Results (last 24 hours)     Procedure Component Value Units Date/Time    Blood Gas, Arterial - [[id number]]  (Abnormal) Collected: 08/08/21 1350    Specimen: Arterial Blood Updated: 08/08/21 1404     Site Right Brachial     Guido's Test N/A     pH, Arterial 7.408 pH units      pCO2, Arterial 46.5 mm Hg      Comment: 83 Value above reference range        pO2, Arterial 99.1 mm Hg      HCO3, Arterial 29.3 mmol/L      Comment: 83 Value above reference range        Base Excess, Arterial 4.0 mmol/L      Comment: 83 Value above reference range        O2 Saturation, Arterial 98.5 %      Temperature 37.0 C      Barometric Pressure for Blood Gas 750 mmHg      Modality Nasal Cannula     Flow Rate 6.0 lpm      Ventilator Mode NA     Collected by [id number]     Comment: Meter: [id number]     :  [id number]        pCO2, Temperature Corrected 46.5 mm Hg      pH, Temp Corrected 7.408 pH Units      pO2, Temperature Corrected 99.1 mm Hg     CBC & Differential [[id number]]  (Abnormal) Collected: 08/08/21 1403    Specimen: Blood Updated: 08/08/21 2433    Narrative:      The following orders were created for panel order CBC & Differential.  Procedure                               Abnormality         Status                     ---------                               -----------         ------                     CBC Auto Differential[[id number]]        Abnormal            Final result                 Please view results for these tests on the individual orders.    Comprehensive Metabolic Panel [[id number]]   (Abnormal) Collected: 08/08/21 1403    Specimen: Blood Updated: 08/08/21 1457     Glucose 122 mg/dL      BUN 32 mg/dL      Creatinine 1.83 mg/dL      Sodium 143 mmol/L      Potassium 4.0 mmol/L      Chloride 107 mmol/L      CO2 25.0 mmol/L      Calcium 8.7 mg/dL      Total Protein 6.1 g/dL      Albumin 3.40 g/dL      ALT (SGPT) 21 U/L      AST (SGOT) 18 U/L      Alkaline Phosphatase 93 U/L      Total Bilirubin 0.8 mg/dL      eGFR Non African Amer 28 mL/min/1.73      Globulin 2.7 gm/dL      A/G Ratio 1.3 g/dL      BUN/Creatinine Ratio 17.5     Anion Gap 11.0 mmol/L     Narrative:      GFR Normal >60  Chronic Kidney Disease <60  Kidney Failure <15      Protime-INR [[id number]]  (Normal) Collected: 08/08/21 1403    Specimen: Blood Updated: 08/08/21 1444     Protime 13.0 Seconds      INR 1.06    BNP [[id number]]  (Abnormal) Collected: 08/08/21 1403    Specimen: Blood Updated: 08/08/21 1509     proBNP >70,000.0 pg/mL     Narrative:      Among patients with dyspnea, NT-proBNP is highly sensitive for the detection of acute congestive heart failure. In addition NT-proBNP of <300 pg/ml effectively rules out acute congestive heart failure with 99% negative predictive value.    Results may be falsely decreased if patient taking Biotin.      Troponin [[id number]]  (Abnormal) Collected: 08/08/21 1403    Specimen: Blood Updated: 08/08/21 1455     Troponin T 0.034 ng/mL     Narrative:      Troponin T Reference Range:  <= 0.03 ng/mL-   Negative for AMI  >0.03 ng/mL-     Abnormal for myocardial necrosis.  Clinicians would have to utilize clinical acumen, EKG, Troponin and serial changes to determine if it is an Acute Myocardial Infarction or myocardial injury due to an underlying chronic condition.       Results may be falsely decreased if patient taking Biotin.      D-dimer, Quantitative [[id number]]  (Abnormal) Collected: 08/08/21 1403    Specimen: Blood Updated: 08/08/21 1444     D-Dimer, Quantitative 1.09 mg/L (FEU)     Narrative:    "   Reference Range is 0-0.50 mg/L FEU. However, results <0.50 mg/L FEU tends to rule out DVT or PE. Results >0.50 mg/L FEU are not useful in predicting absence or presence of DVT or PE.      Lactic Acid, Plasma [[id number]]  (Normal) Collected: 08/08/21 1403    Specimen: Blood Updated: 08/08/21 1449     Lactate 0.6 mmol/L     Procalcitonin [[id number]]  (Normal) Collected: 08/08/21 1403    Specimen: Blood Updated: 08/08/21 1502     Procalcitonin 0.09 ng/mL     Narrative:      As a Marker for Sepsis (Non-Neonates):     1. <0.5 ng/mL represents a low risk of severe sepsis and/or septic shock.  2. >2 ng/mL represents a high risk of severe sepsis and/or septic shock.    As a Marker for Lower Respiratory Tract Infections that require antibiotic therapy:  PCT on Admission     Antibiotic Therapy             6-12 Hrs later  >0.5                          Strongly Recommended            >0.25 - <0.5             Recommended  0.1 - 0.25                  Discouraged                       Remeasure/reassess PCT  <0.1                         Strongly Discouraged         Remeasure/reassess PCT      As 28 day mortality risk marker: \"Change in Procalcitonin Result\" (>80% or <=80%) if Day 0 (or Day 1) and Day 4 values are available. Refer to http://www.MySupportAssistant-pct-calculator.com/    Change in PCT <=80 %   A decrease of PCT levels below or equal to 80% defines a positive change in PCT test result representing a higher risk for 28-day all-cause mortality of patients diagnosed with severe sepsis or septic shock.    Change in PCT >80 %   A decrease of PCT levels of more than 80% defines a negative change in PCT result representing a lower risk for 28-day all-cause mortality of patients diagnosed with severe sepsis or septic shock.                C-reactive Protein [[id number]]  (Abnormal) Collected: 08/08/21 1403    Specimen: Blood Updated: 08/08/21 1456     C-Reactive Protein 1.71 mg/dL     Blood Culture - Blood, Arm, Left [[id number]] " Collected: 08/08/21 1403    Specimen: Blood from Arm, Left Updated: 08/08/21 1430    Blood Culture - Blood, Arm, Left [[id number]] Collected: 08/08/21 1403    Specimen: Blood from Arm, Left Updated: 08/08/21 1430    CBC Auto Differential [[id number]]  (Abnormal) Collected: 08/08/21 1403    Specimen: Blood Updated: 08/08/21 1433     WBC 9.59 10*3/mm3      RBC 3.45 10*6/mm3      Hemoglobin 10.4 g/dL      Hematocrit 32.6 %      MCV 94.5 fL      MCH 30.1 pg      MCHC 31.9 g/dL      RDW 15.9 %      RDW-SD 54.4 fl      MPV 11.6 fL      Platelets 130 10*3/mm3      Neutrophil % 84.7 %      Lymphocyte % 7.0 %      Monocyte % 6.2 %      Eosinophil % 1.0 %      Basophil % 0.5 %      Neutrophils, Absolute 8.12 10*3/mm3      Lymphocytes, Absolute 0.67 10*3/mm3      Monocytes, Absolute 0.59 10*3/mm3      Eosinophils, Absolute 0.10 10*3/mm3      Basophils, Absolute 0.05 10*3/mm3     COVID-19,[facility] IN-HOUSE,Nasal Swab No Transport Media 3-4 HR TAT - Swab, Nasal Cavity [[id number]]  (Normal) Collected: 08/08/21 1404    Specimen: Swab from Nasal Cavity Updated: 08/08/21 1529     COVID19 Not Detected    Narrative:      Fact sheet for providers: https://www.fda.gov/media/284789/download     Fact sheet for patients: https://www.fda.gov/media/536767/download    Test performed by PCR.    Consider negative results in combination with clinical observations, patient history, and epidemiological information.    Troponin [[id number]]  (Abnormal) Collected: 08/08/21 1604    Specimen: Blood Updated: 08/08/21 1636     Troponin T 0.031 ng/mL     Narrative:      Troponin T Reference Range:  <= 0.03 ng/mL-   Negative for AMI  >0.03 ng/mL-     Abnormal for myocardial necrosis.  Clinicians would have to utilize clinical acumen, EKG, Troponin and serial changes to determine if it is an Acute Myocardial Infarction or myocardial injury due to an underlying chronic condition.       Results may be falsely decreased if patient taking Biotin.        XR Chest  1 View    Result Date: 8/8/2021  Exam:   XR CHEST 1 VW-   Date:  8/8/2021  History:  Female, age  60 years;dyspnea  COMPARISON:  Chest x-ray dated 7/21/2021.  Findings :  Borderline size of the cardiomediastinal silhouette, unchanged. Mild prominence of central pleural vasculature. Chronic interstitial lung changes redemonstrated. Linear opacity in the left midlung zone, may reflect atelectasis versus scarring. No measurable pneumothorax. No pleural effusion.  The bones show no acute pathology.       Impression:  Borderline size of the cardiomediastinal silhouette with mild prominence central pulmonary vasculature. Consider mild/early fluid overload.  This report was finalized on 08/08/2021 14:20 by Dr. Janet Geller MD.      ED Course  ED Course as of Aug 08 1733   Sun Aug 08, 2021   1727 68-year-old female with history of diastolic heart failure with recent admission for volume overload, COPD but not on home oxygen, chronic kidney disease and hypertension presents to the ED with 2 to 3-day history of worsening shortness of breath and difficulty breathing.  On arrival to the emergency department patient was in mild respiratory distress, was tachypneic and had increased work of breathing.  We placed her on BiPAP and she had near immediate improvement in her symptoms.  Did have some rales on exam.  Chest x-ray consistent with mild pulmonary edema.  BNP has increased from 35,000 to greater than 70,000.  Renal functions actually improved.  Patient will need admission for gentle diuresis and oxygen supplementation, may need be discharged home with oxygen.  Discussed case with Dr. Manzanares who will admit the patient for Dr. Weston.    [AW]      ED Course User Index  [AW] Zackary Gutierrez MD                                           MDM  Number of Diagnoses or Management Options  Acute on chronic diastolic congestive heart failure (CMS/HCC): established and worsening  Hypoxia: new and requires workup     Amount  and/or Complexity of Data Reviewed  Clinical lab tests: reviewed  Tests in the radiology section of CPT®: reviewed  Tests in the medicine section of CPT®: reviewed  Decide to obtain previous medical records or to obtain history from someone other than the patient: yes    Risk of Complications, Morbidity, and/or Mortality  Presenting problems: high  Diagnostic procedures: high  Management options: high    Patient Progress  Patient progress: stable    .  Final diagnoses:   Acute on chronic diastolic congestive heart failure (CMS/HCC)   Hypoxia       ED Disposition  ED Disposition     ED Disposition Condition Comment    Decision to Admit  Level of Care: Remote Telemetry [26]   Diagnosis: Acute on chronic diastolic congestive heart failure (CMS/HCC) [125364]   Admitting Physician: JESSE MEIER [[id number]]   Attending Physician: JESSE MEIER [[id number]]   Certification: I Certify That Inpatient Hospital Services Are Medically Necessary For Greater Than 2 Midnights            Jesse Meier MD  [address] [place] [address] [address] [place] [address]  [phone]               Medication List      No changes were made to your prescriptions during this visit.          Zackary Gutierrez MD  08/08/21 8636

## 2021-08-09 ENCOUNTER — READMISSION MANAGEMENT (OUTPATIENT)
Dept: CALL CENTER | Facility: HOSPITAL | Age: 61
End: 2021-08-09

## 2021-08-09 PROCEDURE — 94799 UNLISTED PULMONARY SVC/PX: CPT

## 2021-08-09 PROCEDURE — 25010000002 FUROSEMIDE PER 20 MG: Performed by: FAMILY MEDICINE

## 2021-08-09 PROCEDURE — 25010000002 ENOXAPARIN PER 10 MG: Performed by: FAMILY MEDICINE

## 2021-08-09 PROCEDURE — 63710000001 PREDNISONE PER 5 MG: Performed by: FAMILY MEDICINE

## 2021-08-09 RX ORDER — HYDRALAZINE HYDROCHLORIDE 50 MG/1
75 TABLET, FILM COATED ORAL EVERY 8 HOURS
COMMUNITY
End: 2022-06-10 | Stop reason: HOSPADM

## 2021-08-09 RX ORDER — FUROSEMIDE 10 MG/ML
20 INJECTION INTRAMUSCULAR; INTRAVENOUS EVERY 12 HOURS
Status: DISCONTINUED | OUTPATIENT
Start: 2021-08-09 | End: 2021-08-10

## 2021-08-09 RX ORDER — CARVEDILOL 25 MG/1
25 TABLET ORAL 2 TIMES DAILY WITH MEALS
COMMUNITY

## 2021-08-09 RX ADMIN — ROSUVASTATIN CALCIUM 20 MG: 20 TABLET, FILM COATED ORAL at 09:20

## 2021-08-09 RX ADMIN — AZELASTINE HYDROCHLORIDE 2 SPRAY: 137 SPRAY, METERED NASAL at 20:42

## 2021-08-09 RX ADMIN — ASPIRIN 81 MG: 81 TABLET ORAL at 09:20

## 2021-08-09 RX ADMIN — FERROUS SULFATE TAB 325 MG (65 MG ELEMENTAL FE) 325 MG: 325 (65 FE) TAB at 17:38

## 2021-08-09 RX ADMIN — HYDRALAZINE HYDROCHLORIDE 25 MG: 25 TABLET, FILM COATED ORAL at 15:05

## 2021-08-09 RX ADMIN — LOSARTAN POTASSIUM 100 MG: 50 TABLET, FILM COATED ORAL at 09:20

## 2021-08-09 RX ADMIN — FUROSEMIDE 20 MG: 10 INJECTION, SOLUTION INTRAVENOUS at 17:38

## 2021-08-09 RX ADMIN — OLANZAPINE 5 MG: 5 TABLET, FILM COATED ORAL at 20:41

## 2021-08-09 RX ADMIN — FERROUS SULFATE TAB 325 MG (65 MG ELEMENTAL FE) 325 MG: 325 (65 FE) TAB at 09:20

## 2021-08-09 RX ADMIN — TICAGRELOR 90 MG: 90 TABLET ORAL at 09:20

## 2021-08-09 RX ADMIN — PREDNISONE 10 MG: 10 TABLET ORAL at 09:20

## 2021-08-09 RX ADMIN — TICAGRELOR 90 MG: 90 TABLET ORAL at 20:41

## 2021-08-09 RX ADMIN — CARVEDILOL 12.5 MG: 6.25 TABLET, FILM COATED ORAL at 17:38

## 2021-08-09 RX ADMIN — NIFEDIPINE 60 MG: 60 TABLET, FILM COATED, EXTENDED RELEASE ORAL at 09:20

## 2021-08-09 RX ADMIN — HYDRALAZINE HYDROCHLORIDE 25 MG: 25 TABLET, FILM COATED ORAL at 06:56

## 2021-08-09 RX ADMIN — ENOXAPARIN SODIUM 40 MG: 40 INJECTION SUBCUTANEOUS at 20:41

## 2021-08-09 RX ADMIN — CARVEDILOL 12.5 MG: 6.25 TABLET, FILM COATED ORAL at 09:20

## 2021-08-09 RX ADMIN — HYDRALAZINE HYDROCHLORIDE 25 MG: 25 TABLET, FILM COATED ORAL at 21:31

## 2021-08-09 NOTE — H&P
History and Physical      CHIEF COMPLAINT:  SOA    Reason for Admission:  Volume Overload    History Obtained From:  patient    HISTORY OF PRESENT ILLNESS:      The patient is a 60 y.o. female who was admitted from ER with 2 day h/o SOA. SHe has had a recent admission with volume overload. She denies any CP. She denies any worsening edema. No fever, cough.     Past Medical History:    Past Medical History:   Diagnosis Date   • Acute CHF (CMS/HCC)    • Acute renal failure (ARF) (CMS/HCC)    • Anemia    • Asthma     childhood   • Hypertension    • Ischemic colitis (CMS/HCC)    • Metabolic acidosis    • Nonrheumatic aortic (valve) insufficiency    • PTSD (post-traumatic stress disorder)      Past Surgical History:    Past Surgical History:   Procedure Laterality Date   • CARDIAC CATHETERIZATION N/A 11/8/2020    Procedure: Left Heart Cath;  Surgeon: Pierce Buchanan MD;  Location:  [facility] CATH INVASIVE LOCATION;  Service: Cardiovascular;  Laterality: N/A;   • EXTERNAL FIXATION WRIST FRACTURE     • LEG SURGERY     • TUBAL ABDOMINAL LIGATION           Medications Prior to Admission:    Medications Prior to Admission   Medication Sig Dispense Refill Last Dose   • carvedilol (COREG) 25 MG tablet Take 25 mg by mouth 2 (Two) Times a Day With Meals.      • hydrALAZINE (APRESOLINE) 50 MG tablet Take 75 mg by mouth Every 8 (Eight) Hours.      • aspirin 81 MG EC tablet Take 1 tablet by mouth Daily.      • azelastine (ASTELIN) 0.1 % nasal spray 2 sprays into the nostril(s) as directed by provider 2 (Two) Times a Day. Use in each nostril as directed       • cloNIDine (CATAPRES) 0.1 MG tablet Take 1 tablet by mouth Every 6 (Six) Hours As Needed for High Blood Pressure (BP >180/100). 30 tablet 1    • Ergocalciferol (Vitamin D2) 50 MCG (2000 UT) tablet Take 1,000 Units by mouth Daily.      • ferrous sulfate 325 (65 FE) MG tablet Take 1 tablet by mouth 2 (Two) Times a Day With Meals. 60 tablet 0    • fluticasone (FLONASE) 50 MCG/ACT nasal  "spray 1 spray into the nostril(s) as directed by provider Every Morning. In each nostril      • losartan (COZAAR) 100 MG tablet Take 1 tablet by mouth Daily. 90 tablet 3    • NIFEdipine XL (ADALAT CC) 60 MG 24 hr tablet Take 1 tablet by mouth 2 (two) times a day. 60 tablet 1    • nitroglycerin (NITROSTAT) 0.4 MG SL tablet Place 1 tablet under the tongue Every 5 (Five) Minutes As Needed for Chest Pain for up to 3 doses. Take no more than 3 doses in 15 minutes. 25 tablet 0    • OLANZapine (zyPREXA) 5 MG tablet Take 1 tablet by mouth 2 (two) times a day. 60 tablet 0    • pantoprazole (PROTONIX) 40 MG EC tablet Take 1 tablet by mouth Daily. 30 tablet 1    • rosuvastatin (CRESTOR) 20 MG tablet Take 1 tablet by mouth Daily. 90 tablet 3    • ticagrelor (BRILINTA) 90 MG tablet tablet Take 1 tablet by mouth 2 (Two) Times a Day. 60 tablet 11        Allergies:  Codeine and Imitrex [sumatriptan]    Social History:   TOBACCO:   reports that she has been smoking. She has been smoking about 0.50 packs per day. She has never used smokeless tobacco.  ETOH:   reports no history of alcohol use.  DRUGS:   reports no history of drug use.  MARITAL STATUS:  Not   OCCUPATION:  Not working      Family History:   Family History   Problem Relation Age of Onset   • Coronary artery disease Mother    • Coronary artery disease Father      REVIEW OF SYSTEMS:  Constitutional: neg  CV: neg  Pulmonary: SOA  GI: neg  : neg  Psych: neg  Neuro: neg  Skin: neg  MusculoSkeletal: neg  HEENT: neg  Joints: neg  Vitals:  /79 (BP Location: Left arm, Patient Position: Sitting)   Pulse 65   Temp 97.4 °F (36.3 °C) (Oral)   Resp 18   Ht 165.1 cm (65\")   Wt 104 kg (228 lb 12.8 oz)   SpO2 94%   BMI 38.07 kg/m²     PHYSICAL EXAM:  Gen: NAD, alert, pleasant  HEENT: WNL  Lymph: no LAD  Neck: no JVD or masses  Chest: CTA bilat  CV: RRR  Abdomen: NT/ND  Extrem: no C/C/E  Neuro: non focal  Skin: no rashes  Joints: no redness  DATA:  I have reviewed " the admission labs and imaging tests.    ASSESSMENT AND PLAN:    Volume Overload-----follow with treatment, ask Cardiology to follow with care  Elevated Troponin----she has no CP, suspect related to her CKD, volume overload  CKD----follow renal function  HTN---follow with BP      Orville Weston MD  16:36 CDT 8/9/2021

## 2021-08-09 NOTE — PAYOR COMM NOTE
" [facility] [phone]  [phone]      ER ADMISSION TO INPATIENT ON 8/8/21.    PENDING AUTH [id number]          Ludivina Clemons (60 y.o. Female)     Date of Birth Social Security Number Address Home Phone MRN    1960  [facility] [address] [phone] [id number]    Buddhist Marital Status          Restorationism Single       Admission Date Admission Type Admitting Provider Attending Provider Department, Room/Bed    8/8/21 Emergency Orville Weston MD Martin, Aribbe Allen, MD [address], 431/1    Discharge Date Discharge Disposition Discharge Destination                       Attending Provider: Orville Weston MD    Allergies: Codeine, Imitrex [Sumatriptan]    Isolation: None   Infection: None   Code Status: CPR    Ht: 165.1 cm (65\")   Wt: 104 kg (228 lb 12.8 oz)    Admission Cmt: None   Principal Problem: None                Active Insurance as of 8/8/2021     Primary Coverage     Payor Plan Insurance Group Employer/Plan Group    ANTHEM MEDICARE REPLACEMENT ANTHEM MEDICARE ADVANTAGE [id number]     Payor Plan Address Payor Plan Phone Number Payor Plan Fax Number Effective Dates    [address] [phone]  1/1/2017 - None Entered    [facility] [id number]       Subscriber Name Subscriber Birth Date Member ID       LUDIVINA CLEMONS 1960 [id number]                 Emergency Contacts      (Rel.) Home Phone Work Phone Mobile Phone    CHAUFATOU HOLMANISE (Relative) [phone] -- --    kenzie thompson (Sister) [phone] -- [phone]    Joshua Clemons () (Son) -- -- [phone]        [facility] Encounter Date/Time: 8/8/2021 1344   Hospital Account: [id number]    MRN: [id number]   Patient:  Ludivina Clemons   Contact Serial #: [id number]   SSN:          ENCOUNTER             Patient Class: Inpatient   Unit: [address] Service: Medicine     Bed: 431/1   Admitting Provider: Orville Weston MD   " Referring Physician: Orville Weston   Attending Provider: Orville Weston MD   Adm Diagnosis: Acute on chronic diastol*               PATIENT          Name: Ludivina Clemons : 1960 (60 yrs)   Address: [address] [place] [address] Sex: Female   City: [place] [address]   County: [facility]   Marital Status: SINGLE Ethnicity: NOT                                                                              Race: WHITE   Primary Care Provider: Orville Weston MD Patients Phone: Home Phone: [phone]     Mobile Phone: [phone]   EMERGENCY CONTACT   Contact Name Legal Guardian? Relationship to Patient Home Phone Work Phone   1. YOGI LOPEZ  2. kenzie thompson      Relative  Sister [phone]           GUARANTOR            Guarantor: Ludivina Clemons     : 1960   Address: [address] [place] [address] Sex: Female     [place] [address]     Relation to Patient: Self       Home Phone: [phone]   Guarantor ID: [id number]       Work Phone:     GUARANTOR EMPLOYER   Employer:           Status: NOT EMPLO*   COVERAGE          PRIMARY INSURANCE   Payor: [employer] MEDICARE REPLACEMENT Plan: [employer] MEDICARE ADVANTAGE   Group Number: [id number] Insurance Type: INDEMNITY   Subscriber Name: LUDIVINA CLEMONS Subscriber : 1960   Subscriber ID: [id number] Coverage Address: [facility] [address]  [place] [address]   Pat. Rel. to Subscriber: Self Coverage Phone: [phone]   SECONDARY INSURANCE   Payor: N/A Plan: N/A   Group Number:   Insurance Type:     Subscriber Name:   Subscriber :     Subscriber ID:   Coverage Address:     Pat. Rel. to Subscriber:   Coverage Phone:        Contact Serial # ([id number])  `       2021    Chart ID ([id number])             Department Encounter #   2021  1:44 PM  Pad 4b [id number]   Zackary Gutierrez MD   Physician   Emergency Medicine   ED Provider Notes      Signed   Date of Service:  21 140   Creation Time:  21          Procedure Orders   ECG 12 Lead [[id number]] ordered by Zackary Gutierrez MD          Signed        Expand AllCollapse All      Show:Clear all  [x]Manual[x]Template[]Copied    Added by:  [x]Zackary Gutierrez MD    []Jen for details     Subjective      60-year-old female presents to the emergency department with complaint of shortness of breath and wheezing.  She has a history of COPD but not on home oxygen, diastolic heart failure with recent admission for volume overload and hypertensive emergency, coronary disease status post stent to LAD 11/20, chronic kidney disease, hypertension.  Patient reports 2 to 3-day history of worsening shortness of breath and difficulty breathing.  She has had wheezing.  Denies cough and congestion, fever, sore throat, runny nose.  No significant lower extremity edema.  Rates her symptoms as moderate to severe with no aggravating alleviating factors.        History provided by:  Patient        Review of Systems   All other systems reviewed and are negative.        Medical History        Past Medical History:   Diagnosis Date   • Acute CHF (CMS/HCC)     • Acute renal failure (ARF) (CMS/HCC)     • Anemia     • Asthma       childhood   • Hypertension     • Ischemic colitis (CMS/HCC)     • Metabolic acidosis     • Nonrheumatic aortic (valve) insufficiency     • PTSD (post-traumatic stress disorder)                            Procedure Component Value Units Date/Time     Blood Gas, Arterial - [[id number]]  (Abnormal) Collected: 08/08/21 1350     Specimen: Arterial Blood Updated: 08/08/21 1404       Site Right Brachial       Guido's Test N/A       pH, Arterial 7.408 pH units         pCO2, Arterial 46.5 mm Hg         Comment: 83 Value above reference range          pO2, Arterial 99.1 mm Hg         HCO3, Arterial 29.3 mmol/L         Comment: 83 Value above reference range          Base Excess, Arterial 4.0 mmol/L         Comment: 83 Value above reference range          O2  Saturation, Arterial 98.5 %         Temperature 37.0 C         Barometric Pressure for Blood Gas 750 mmHg         Modality Nasal Cannula       Flow Rate 6.0 lpm         Ventilator Mode NA       Collected by [id number]       Comment: Meter: [id number]     :  [id number]          pCO2, Temperature Corrected 46.5 mm Hg         pH, Temp Corrected 7.408 pH Units         pO2, Temperature Corrected 99.1 mm Hg       CBC & Differential [[id number]]  (Abnormal) Collected: 08/08/21 1403     Specimen: Blood      individual orders.      Comprehensive Metabolic Panel [[id number]]  (Abnormal) Collected: 08/08/21 1403     Specimen: Blood Updated: 08/08/21 1457       Glucose 122 mg/dL         BUN 32 mg/dL         Creatinine 1.83 mg/dL         Sodium 143 mmol/L         Potassium 4.0 mmol/L         Chloride 107 mmol/L         CO2 25.0 mmol/L         Calcium 8.7 mg/dL         Total Protein 6.1 g/dL         Albumin 3.40 g/dL         ALT (SGPT) 21 U/L         AST (SGOT) 18 U/L         Alkaline Phosphatase 93 U/L         Total Bilirubin 0.8 mg/dL         eGFR Non African Amer 28 mL/min/1.73         Globulin 2.7 gm/dL         A/G Ratio 1.3 g/dL         BUN/Creatinine Ratio 17.5       Anion Gap 11.0 mmol/L       Narrative:       GFR Normal >60  Chronic Kidney Disease <60  Kidney Failure <15        Protime-INR [[id number]]  (Normal) Collected: 08/08/21 1403     Specimen: Blood Updated: 08/08/21 1444       Protime 13.0 Seconds         INR 1.06     BNP [[id number]]  (Abnormal) Collected: 08/08/21 1403     Specimen: Blood Updated: 08/08/21 1509       proBNP >70,000.0 pg/mL       Narrative:       taking Biotin.         Troponin [[id number]]  (Abnormal) Collected: 08/08/21 1403     Specimen: Blood Updated: 08/08/21 1455       Troponin T 0.034 ng/mL       Narrative:       Troponin T Reference Range:       Abnormal) Collected: 08/08/21 1403      Specimen: Blood Updated: 08/08/21 1456       C-Reactive Protein 1.71 mg/dL       Blood Culture -  Blood, Arm, Left [[id number]] Collected: 08/08/21 1403     Specimen: Blood from Arm, Left Updated: 08/08/21 1430     Blood Culture - Blood, Arm, Left [[id number]] Collected: 08/08/21 1403     Specimen: Blood from Arm, Left Updated: 08/08/21 1430     CBC Auto Differential [[id number]]  (Abnormal) Collected: 08/08/21 1403     Specimen: Blood Updated: 08/08/21 1433       WBC 9.59 10*3/mm3         RBC 3.45 10*6/mm3         Hemoglobin 10.4 g/dL         Hematocrit 32.6 %         MCV 94.5 fL         MCH 30.1 pg         MCHC 31.9 g/dL         RDW 15.9 %         RDW-SD 54.4 fl         MPV 11.6 fL         Platelets 130 10*3/mm3         Neutrophil % 84.7 %         Lymphocyte % 7.0 %         Monocyte % 6.2 %         Eosinophil % 1.0 %         Basophil % 0.5 %         Neutrophils, Absolute 8.12 10*3/mm3         Lymphocytes, Absolute 0.67 10*3/mm3         Monocytes, Absolute 0.59 10*3/mm3         Eosinophils, Absolute 0.10 10*3/mm3         Basophils, Absolute 0.05 10*3/mm3       COVID-19,[facility] IN-HOUSE,Nasal Swab No Transport Media 3-4 HR TAT - Swab, Nasal Cavity [[id number]]  (Normal) Collected: 08/08/21 1404     Specimen: Swab from Nasal Cavity Updated: 08/08/21 1529       COVID19 Not Detected     Narrative:       Fact sheet for providers: https://www.fda.gov/media/872858/download      Fact sheet for patients: https://www.fda.gov/media/004690/download     Test performed by PCR.     Consider negative results in combination with clinical observations, patient history, and epidemiological information.     Troponin [[id number]]  (Abnormal) Collected: 08/08/21 1604     Specimen: Blood Updated: 08/08/21 1636       Troponin T 0.031 ng/mL       Narrative:         Borderline size of the cardiomediastinal silhouette with mild prominence central pulmonary vasculature. Consider mild/early fluid overload.  This report was finalized on 08/08/2021 14:20 by Dr. Janet Geller MD.        ED Course      ED Course as of Aug 08 1733   Sun Aug 08,  2021 1727 68-year-old female with history of diastolic heart failure with recent admission for volume overload, COPD but not on home oxygen, chronic kidney disease and hypertension presents to the ED with 2 to 3-day history of worsening shortness of breath and difficulty breathing.  On arrival to the emergency department patient was in mild respiratory distress, was tachypneic and had increased work of breathing.  We placed her on BiPAP and she had near immediate improvement in her symptoms.  Did have some rales on exam.  Chest x-ray consistent with mild pulmonary edema.  BNP has increased from 35,000 to greater than 70,000.  Renal functions actually improved.  Patient will need admission for gentle diuresis and oxygen supplementation, may need be discharged home with oxygen.  Discussed case with Dr. Manzanares who will admit the patient for Dr. Weston.    [AW]       ED Course User Index  [AW] Zackary Gutierrez MD      Chest 1 View [[id number]] (Order [id number])  Order  Status: Final result   Patient Location    Patient Class Location   Inpatient [facility] 4B, 431, 1     [phone]   Appointment Information    PACS Images     Radiology Images   Study Result    Narrative & Impression   Exam:   XR CHEST 1 VW-       Date:  8/8/2021      History:  Female, age  60 years;dyspnea     COMPARISON:  Chest x-ray dated 7/21/2021.     Findings :     Borderline size of the cardiomediastinal silhouette, unchanged. Mild  prominence of central pleural vasculature. Chronic interstitial lung  changes redemonstrated. Linear opacity in the left midlung zone, may  reflect atelectasis versus scarring. No measurable pneumothorax. No  pleural effusion.  The bones show no acute pathology.       IMPRESSION:  Impression:     Borderline size of the cardiomediastinal silhouette with mild prominence  central pulmonary vasculature. Consider mild/early fluid overload.     This report was finalized on 08/08/2021 14:20 by Dr. Janet Geller MD.      Contains critical data Troponin  Order: [id number]  Status:  Final result   Visible to patient:  Yes (Shalinit)   Next appt:  08/26/2021 at 01:30 PM in Cardiology (Destini Gaitan, ISAIAH)  Specimen Information: Blood         0 Result Notes  Component   Ref Range & Units 1 d ago   Troponin T   0.000 - 0.030 ng/mL 0.031High Critical             BNP  Order: [id number]  Status:  Final result   Visible to patient:  Yes (MyChart)   Next appt:  08/26/2021 at 01:30 PM in Cardiology (ISAIAH Miranda)  Specimen Information: Blood         0 Result Notes  Component   Ref Range & Units 1 d ago   proBNP   0.0 - 900.0 pg/mL >70,000.0High                 0 Result Notes  Component   Ref Range & Units 1 d ago   C-Reactive Protein   0.00 - 0.50 mg/dL 1.71High                           Current Facility-Administered Medications   Medication Dose Route Frequency Provider Last Rate Last Admin   • aspirin EC tablet 81 mg  81 mg Oral Daily Ren Santo MD   81 mg at 08/09/21 0920   • azelastine (ASTELIN) nasal spray 2 spray  2 spray Each Nare BID Ren Santo MD   2 spray at 08/08/21 2128   • carvedilol (COREG) tablet 12.5 mg  12.5 mg Oral BID With Meals Rne Santo MD   12.5 mg at 08/09/21 0920   • cloNIDine (CATAPRES) tablet 0.1 mg  0.1 mg Oral Q6H PRN Ren Santo MD       • enoxaparin (LOVENOX) syringe 40 mg  40 mg Subcutaneous Q24H Ren Santo MD   40 mg at 08/08/21 2126   • ferrous sulfate tablet 325 mg  325 mg Oral BID With Meals Ren Santo MD   325 mg at 08/09/21 0920   • fluticasone (FLONASE) 50 MCG/ACT nasal spray 2 spray  2 spray Nasal Daily PRN Ren Santo MD       • hydrALAZINE (APRESOLINE) tablet 25 mg  25 mg Oral Q8H Ren Santo MD   25 mg at 08/09/21 0656   • losartan (COZAAR) tablet 100 mg  100 mg Oral Q24H Ren Santo MD   100 mg at 08/09/21 0920   • NIFEdipine XL (PROCARDIA XL) 24 hr tablet 60 mg  60 mg Oral Q24H Ren Santo  MD Bernard   60 mg at 08/09/21 0920   • OLANZapine (zyPREXA) tablet 5 mg  5 mg Oral Nightly Ren Santo MD   5 mg at 08/08/21 2127   • predniSONE (DELTASONE) tablet 10 mg  10 mg Oral Daily Ren Santo MD   10 mg at 08/09/21 0920   • rosuvastatin (CRESTOR) tablet 20 mg  20 mg Oral Daily Ren aSnto MD   20 mg at 08/09/21 0920   • sodium chloride 0.9 % flush 10 mL  10 mL Intravenous PRN Zackary Gutierrez MD       • ticagrelor (BRILINTA) tablet 90 mg  90 mg Oral BID Ren Santo MD   90 mg at 08/09/21 0920

## 2021-08-09 NOTE — OUTREACH NOTE
CHF Week 2 Survey      Responses   [facility] patient discharged from?  [place]   Does the patient have one of the following disease processes/diagnoses(primary or secondary)?  CHF   Week 2 attempt successful?  No   Unsuccessful attempts  Attempt 1   Revoke  Readmitted          Cinda Dyer RN

## 2021-08-09 NOTE — CASE MANAGEMENT/SOCIAL WORK
Continued Stay Note   [facility]     Patient Name: Ludivina Ramirez  MRN: [id number]  Today's Date: 8/9/2021    Admit Date: 8/8/2021    Discharge Plan     Row Name 08/09/21 1114       Plan    Plan Comments  Received order stating pt will need home oxygen arranged at dc. Will need respiratory to complete a walking oximetry 24 hours prior to dc to see if pt qualifies for home O2. If pt qualifies  will arrange home O2.        Discharge Codes    No documentation.             NAOMI Live

## 2021-08-09 NOTE — PLAN OF CARE
Goal Outcome Evaluation:  Plan of Care Reviewed With: patient        Progress: no change  Outcome Summary: Patient still c/o SOA with exertion, states she doesn't feel like she is getting any air. O2 sats mid 90s on RA, wearing CPAP when sleeping, states that this helps. SB/S 52-80, dropped to 28, with 2nd degree type 2. No c/o pain. Continue to monitor.

## 2021-08-10 ENCOUNTER — APPOINTMENT (OUTPATIENT)
Dept: GENERAL RADIOLOGY | Facility: HOSPITAL | Age: 61
End: 2021-08-10

## 2021-08-10 LAB
ANION GAP SERPL CALCULATED.3IONS-SCNC: 11 MMOL/L (ref 5–15)
BUN SERPL-MCNC: 46 MG/DL (ref 8–23)
BUN/CREAT SERPL: 17.3 (ref 7–25)
CALCIUM SPEC-SCNC: 8 MG/DL (ref 8.6–10.5)
CHLORIDE SERPL-SCNC: 105 MMOL/L (ref 98–107)
CO2 SERPL-SCNC: 28 MMOL/L (ref 22–29)
CREAT SERPL-MCNC: 2.66 MG/DL (ref 0.57–1)
GFR SERPL CREATININE-BSD FRML MDRD: 18 ML/MIN/1.73
GLUCOSE SERPL-MCNC: 131 MG/DL (ref 65–99)
POTASSIUM SERPL-SCNC: 3.6 MMOL/L (ref 3.5–5.2)
QT INTERVAL: 434 MS
QTC INTERVAL: 497 MS
SODIUM SERPL-SCNC: 144 MMOL/L (ref 136–145)

## 2021-08-10 PROCEDURE — 80048 BASIC METABOLIC PNL TOTAL CA: CPT | Performed by: FAMILY MEDICINE

## 2021-08-10 PROCEDURE — 99222 1ST HOSP IP/OBS MODERATE 55: CPT | Performed by: INTERNAL MEDICINE

## 2021-08-10 PROCEDURE — 94799 UNLISTED PULMONARY SVC/PX: CPT

## 2021-08-10 PROCEDURE — 84550 ASSAY OF BLOOD/URIC ACID: CPT | Performed by: INTERNAL MEDICINE

## 2021-08-10 PROCEDURE — 94660 CPAP INITIATION&MGMT: CPT

## 2021-08-10 PROCEDURE — 25010000002 ENOXAPARIN PER 10 MG: Performed by: INTERNAL MEDICINE

## 2021-08-10 PROCEDURE — 25010000002 FUROSEMIDE PER 20 MG: Performed by: FAMILY MEDICINE

## 2021-08-10 PROCEDURE — 25010000002 METHYLPREDNISOLONE PER 125 MG: Performed by: NURSE PRACTITIONER

## 2021-08-10 PROCEDURE — 71045 X-RAY EXAM CHEST 1 VIEW: CPT

## 2021-08-10 PROCEDURE — 85027 COMPLETE CBC AUTOMATED: CPT | Performed by: INTERNAL MEDICINE

## 2021-08-10 PROCEDURE — 63710000001 PREDNISONE PER 5 MG: Performed by: FAMILY MEDICINE

## 2021-08-10 PROCEDURE — 83970 ASSAY OF PARATHORMONE: CPT | Performed by: INTERNAL MEDICINE

## 2021-08-10 RX ORDER — CETIRIZINE HYDROCHLORIDE 10 MG/1
10 TABLET ORAL DAILY
Status: DISCONTINUED | OUTPATIENT
Start: 2021-08-10 | End: 2021-08-13 | Stop reason: HOSPADM

## 2021-08-10 RX ORDER — IPRATROPIUM BROMIDE AND ALBUTEROL SULFATE 2.5; .5 MG/3ML; MG/3ML
3 SOLUTION RESPIRATORY (INHALATION)
Status: DISCONTINUED | OUTPATIENT
Start: 2021-08-10 | End: 2021-08-13 | Stop reason: HOSPADM

## 2021-08-10 RX ORDER — METHYLPREDNISOLONE SODIUM SUCCINATE 125 MG/2ML
125 INJECTION, POWDER, LYOPHILIZED, FOR SOLUTION INTRAMUSCULAR; INTRAVENOUS ONCE
Status: COMPLETED | OUTPATIENT
Start: 2021-08-10 | End: 2021-08-10

## 2021-08-10 RX ORDER — ISOSORBIDE DINITRATE 10 MG/1
10 TABLET ORAL
Status: DISCONTINUED | OUTPATIENT
Start: 2021-08-10 | End: 2021-08-13 | Stop reason: HOSPADM

## 2021-08-10 RX ORDER — GUAIFENESIN 600 MG/1
1200 TABLET, EXTENDED RELEASE ORAL EVERY 12 HOURS SCHEDULED
Status: DISCONTINUED | OUTPATIENT
Start: 2021-08-10 | End: 2021-08-13 | Stop reason: HOSPADM

## 2021-08-10 RX ADMIN — AZELASTINE HYDROCHLORIDE 2 SPRAY: 137 SPRAY, METERED NASAL at 09:10

## 2021-08-10 RX ADMIN — FLUTICASONE PROPIONATE 2 SPRAY: 50 SPRAY, METERED NASAL at 20:06

## 2021-08-10 RX ADMIN — IPRATROPIUM BROMIDE AND ALBUTEROL SULFATE 3 ML: 2.5; .5 SOLUTION RESPIRATORY (INHALATION) at 11:20

## 2021-08-10 RX ADMIN — HYDRALAZINE HYDROCHLORIDE 25 MG: 25 TABLET, FILM COATED ORAL at 05:39

## 2021-08-10 RX ADMIN — AZELASTINE HYDROCHLORIDE 2 SPRAY: 137 SPRAY, METERED NASAL at 20:06

## 2021-08-10 RX ADMIN — TICAGRELOR 90 MG: 90 TABLET ORAL at 09:09

## 2021-08-10 RX ADMIN — PREDNISONE 10 MG: 10 TABLET ORAL at 09:09

## 2021-08-10 RX ADMIN — NIFEDIPINE 60 MG: 60 TABLET, FILM COATED, EXTENDED RELEASE ORAL at 09:09

## 2021-08-10 RX ADMIN — HYDRALAZINE HYDROCHLORIDE 25 MG: 25 TABLET, FILM COATED ORAL at 21:45

## 2021-08-10 RX ADMIN — ENOXAPARIN SODIUM 30 MG: 30 INJECTION SUBCUTANEOUS at 20:06

## 2021-08-10 RX ADMIN — FUROSEMIDE 20 MG: 10 INJECTION, SOLUTION INTRAVENOUS at 05:39

## 2021-08-10 RX ADMIN — CETIRIZINE HYDROCHLORIDE 10 MG: 10 TABLET ORAL at 15:09

## 2021-08-10 RX ADMIN — ISOSORBIDE DINITRATE 10 MG: 10 TABLET ORAL at 12:14

## 2021-08-10 RX ADMIN — TICAGRELOR 90 MG: 90 TABLET ORAL at 20:05

## 2021-08-10 RX ADMIN — LOSARTAN POTASSIUM 100 MG: 50 TABLET, FILM COATED ORAL at 09:09

## 2021-08-10 RX ADMIN — IPRATROPIUM BROMIDE AND ALBUTEROL SULFATE 3 ML: 2.5; .5 SOLUTION RESPIRATORY (INHALATION) at 22:21

## 2021-08-10 RX ADMIN — ASPIRIN 81 MG: 81 TABLET ORAL at 09:09

## 2021-08-10 RX ADMIN — OLANZAPINE 5 MG: 5 TABLET, FILM COATED ORAL at 20:06

## 2021-08-10 RX ADMIN — GUAIFENESIN 1200 MG: 600 TABLET, EXTENDED RELEASE ORAL at 20:05

## 2021-08-10 RX ADMIN — ROSUVASTATIN CALCIUM 20 MG: 20 TABLET, FILM COATED ORAL at 09:09

## 2021-08-10 RX ADMIN — ISOSORBIDE DINITRATE 10 MG: 10 TABLET ORAL at 17:29

## 2021-08-10 RX ADMIN — GUAIFENESIN 1200 MG: 600 TABLET, EXTENDED RELEASE ORAL at 12:14

## 2021-08-10 RX ADMIN — METHYLPREDNISOLONE SODIUM SUCCINATE 125 MG: 125 INJECTION, POWDER, FOR SOLUTION INTRAMUSCULAR; INTRAVENOUS at 12:14

## 2021-08-10 RX ADMIN — FERROUS SULFATE TAB 325 MG (65 MG ELEMENTAL FE) 325 MG: 325 (65 FE) TAB at 09:09

## 2021-08-10 RX ADMIN — FERROUS SULFATE TAB 325 MG (65 MG ELEMENTAL FE) 325 MG: 325 (65 FE) TAB at 17:29

## 2021-08-10 RX ADMIN — CARVEDILOL 12.5 MG: 6.25 TABLET, FILM COATED ORAL at 09:09

## 2021-08-10 RX ADMIN — HYDRALAZINE HYDROCHLORIDE 25 MG: 25 TABLET, FILM COATED ORAL at 15:09

## 2021-08-10 RX ADMIN — IPRATROPIUM BROMIDE AND ALBUTEROL SULFATE 3 ML: 2.5; .5 SOLUTION RESPIRATORY (INHALATION) at 14:33

## 2021-08-10 RX ADMIN — CARVEDILOL 12.5 MG: 6.25 TABLET, FILM COATED ORAL at 17:29

## 2021-08-10 NOTE — CONSULTS
"      PULMONARY & CRITICAL CARE CONSULT - [facility]    08/10/21, 11:54 CDT  Patient Care Team:  Orville Weston MD as PCP - General (Family Medicine)  Juanpablo Rea MD as Consulting Physician (Gastroenterology)  Nickolas Alegria MD as Consulting Physician (Gastroenterology)  Name: Ludivina Ramirez  : 1960  MRN: [id number]  Contact Serial Number [id number]    Chief complaint: SOA, Wheezing    HPI:  We have been consulted by Orville Weston MD to see this 60 y.o. female born on 1960.  Patient complains of dyspnea and wheezing in the chest for a few days. Severity: moderate.  Aggravating factors: walking.   Alleviating factors: medication(s) (albuterol and atrovent) Associated symptoms: chest pain. Sputum is mucoid.  Patient currently is not on home oxygen therapy.. Respiratory history: no history of pneumonia or bronchitis patient was just discharged from this facility on  for similar complaints.  She returned through the emergency room on  with complaints of shortness of breath wheezing and chest discomfort.  She denies any fever, chills or night sweats.  She is coughing some mucus.  She has been a longtime smoker until recently per her reports.  She denies any reflux or choking issues.  Some edema in her abdomen and lower extremities.  Positive for orthopnea.  Documented history of COPD although there are no PFTs on file for review.  We have encountered this patient on previous hospitalizations for respiratory failure requiring mechanical ventilation.  Patient improved with oxygenation and diuresis.  She denies having any inhaler therapy, O2 therapy or BiPAP therapy in the outpatient setting.  She denies having to take antibiotics or steroids frequently for her breathing.  Currently she is breathing comfortably on BiPAP 16/6 and FiO2 0.3.  She reports her shortness of breath and wheezing has improved since admission and just \"has not gone away entirely\".  She " "was just given a breathing treatment.  She thinks it may have helped \"some\".    Past Medical History:   has a past medical history of Acute CHF (CMS/[facility]), Acute renal failure (ARF) (CMS/[facility]), Anemia, Asthma, Hypertension, Ischemic colitis (CMS/HCC), Metabolic acidosis, Nonrheumatic aortic (valve) insufficiency, and PTSD (post-traumatic stress disorder).   has a past surgical history that includes Tubal ligation; External fixation wrist fracture; Leg Surgery; and Cardiac catheterization (N/A, 11/8/2020).  Allergies   Allergen Reactions   • Codeine Nausea And Vomiting   • Imitrex [Sumatriptan] Other (See Comments)     HA     Medications:  aspirin, 81 mg, Oral, Daily  azelastine, 2 spray, Each Nare, BID  carvedilol, 12.5 mg, Oral, BID With Meals  enoxaparin, 40 mg, Subcutaneous, Q24H  ferrous sulfate, 325 mg, Oral, BID With Meals  guaiFENesin, 1,200 mg, Oral, Q12H  hydrALAZINE, 25 mg, Oral, Q8H  ipratropium-albuterol, 3 mL, Nebulization, 4x Daily - RT  isosorbide dinitrate, 10 mg, Oral, TID - Nitrates  losartan, 100 mg, Oral, Q24H  methylPREDNISolone sodium succinate, 125 mg, Intravenous, Once  NIFEdipine CC, 60 mg, Oral, Q24H  OLANZapine, 5 mg, Oral, Nightly  predniSONE, 10 mg, Oral, Daily  rosuvastatin, 20 mg, Oral, Daily  ticagrelor, 90 mg, Oral, BID         Family History:  Family History   Problem Relation Age of Onset   • Coronary artery disease Mother    • Coronary artery disease Father      Social History:   reports that she has been smoking. She has been smoking about 0.50 packs per day. She has never used smokeless tobacco. She reports that she does not drink alcohol and does not use drugs.  Review of Systems:  Review of Systems   Constitutional: Positive for activity change, fatigue and unexpected weight change. Negative for chills and fever.   HENT: Positive for congestion. Negative for nosebleeds, postnasal drip, rhinorrhea and trouble swallowing.    Eyes: Negative for pain, discharge and itching. "   Respiratory: Positive for cough, chest tightness, shortness of breath and wheezing.    Cardiovascular: Positive for chest pain and leg swelling. Negative for palpitations.   Gastrointestinal: Negative for abdominal distention, constipation, diarrhea, nausea and vomiting.   Endocrine: Negative for polydipsia, polyphagia and polyuria.   Genitourinary: Negative for dysuria, frequency and urgency.   Musculoskeletal: Negative for arthralgias, back pain and myalgias.   Skin: Negative for color change, pallor and rash.   Allergic/Immunologic: Negative for environmental allergies, food allergies and immunocompromised state.   Neurological: Negative for dizziness, speech difficulty, weakness and light-headedness.   Hematological: Negative for adenopathy. Does not bruise/bleed easily.   Psychiatric/Behavioral: Negative for agitation and hallucinations. The patient is not nervous/anxious and is not hyperactive.       Physical Exam:  Temp:  [97.3 °F (36.3 °C)-98.5 °F (36.9 °C)] 98.5 °F (36.9 °C)  Heart Rate:  [65-87] 87  Resp:  [18-24] 20  BP: (133-158)/(75-90) 140/80    Intake/Output Summary (Last 24 hours) at 8/10/2021 1154  Last data filed at 8/10/2021 1028  Gross per 24 hour   Intake 600 ml   Output 1000 ml   Net -400 ml         08/08/21  1346 08/08/21  1841 08/09/21  1943   Weight: 99.8 kg (220 lb) 104 kg (228 lb 12.8 oz) 104 kg (229 lb 1.6 oz)     SpO2 Percentage    08/10/21 0724 08/10/21 1120 08/10/21 1152   SpO2: 93% 97% 96%     Physical Exam  Constitutional:       Appearance: She is obese. She is ill-appearing.      Interventions: Face mask in place.   HENT:      Head: Normocephalic and atraumatic.      Left Ear: External ear normal.   Eyes:      General:         Right eye: No discharge.         Left eye: No discharge.      Conjunctiva/sclera: Conjunctivae normal.      Pupils: Pupils are equal, round, and reactive to light.   Cardiovascular:      Rate and Rhythm: Normal rate and regular rhythm.      Heart sounds: No  murmur heard.     Pulmonary:      Effort: No tachypnea, accessory muscle usage or respiratory distress.      Comments: Faint expiratory wheeze on the right  Abdominal:      General: Abdomen is flat. Bowel sounds are normal. There is distension.      Palpations: Abdomen is soft.   Musculoskeletal:      Cervical back: Normal range of motion and neck supple. No rigidity.      Right lower leg: Edema present.      Left lower leg: Edema present.      Comments: Trace   Skin:     General: Skin is warm and dry.      Coloration: Skin is not jaundiced or pale.   Neurological:      General: No focal deficit present.      Mental Status: She is alert and oriented to person, place, and time. Mental status is at baseline.   Psychiatric:         Mood and Affect: Mood normal.         Behavior: Behavior normal.         Thought Content: Thought content normal.       Results from last 7 days   Lab Units 08/08/21  1403   WBC 10*3/mm3 9.59   HEMOGLOBIN g/dL 10.4*   PLATELETS 10*3/mm3 130*     Results from last 7 days   Lab Units 08/10/21  0609 08/08/21  1403   SODIUM mmol/L 144 143   POTASSIUM mmol/L 3.6 4.0   CO2 mmol/L 28.0 25.0   BUN mg/dL 46* 32*   CREATININE mg/dL 2.66* 1.83*   GLUCOSE mg/dL 131* 122*     Results from last 7 days   Lab Units 08/08/21  1350   PH, ARTERIAL pH units 7.408   PCO2, ARTERIAL mm Hg 46.5*   PO2 ART mm Hg 99.1     Lab Results   Component Value Date    PROBNP >70,000.0 (H) 08/08/2021     Blood Culture   Date Value Ref Range Status   08/08/2021 No growth at 24 hours  Preliminary   08/08/2021 No growth at 24 hours  Preliminary     Recent radiology:   Imaging Results (Last 72 Hours)     Procedure Component Value Units Date/Time    XR Chest 1 View [[id number]] Collected: 08/08/21 1417     Updated: 08/08/21 1423    Narrative:      Exam:   XR CHEST 1 VW-       Date:  8/8/2021      History:  Female, age  60 years;dyspnea     COMPARISON:  Chest x-ray dated 7/21/2021.     Findings :     Borderline size of the  cardiomediastinal silhouette, unchanged. Mild  prominence of central pleural vasculature. Chronic interstitial lung  changes redemonstrated. Linear opacity in the left midlung zone, may  reflect atelectasis versus scarring. No measurable pneumothorax. No  pleural effusion.  The bones show no acute pathology.         Impression:      Impression:     Borderline size of the cardiomediastinal silhouette with mild prominence  central pulmonary vasculature. Consider mild/early fluid overload.     This report was finalized on 08/08/2021 14:20 by Dr. Janet Geller MD.        My radiograph interpretation/independent review of imaging: Chest x-ray from 2 days ago showing pulmonary vascular congestion, chronic interstitial changes and some atelectasis versus scarring on the left    Other test results (not lab or imaging): Results for orders placed during the hospital encounter of 11/07/20    Adult Transthoracic Echo Limited W/ Cont if Necessary Per Protocol    Interpretation Summary  · Left ventricular systolic function is low normal. Left ventricular ejection fraction appears to be 51 - 55%.  · The following left ventricular wall segments are hypokinetic: apical anterior, apical lateral, apical inferior, apical septal and apex hypokinetic.  · Left ventricular wall thickness is consistent with mild concentric hypertrophy.  · Mild aortic valve regurgitation is present.  · Estimated right ventricular systolic pressure from tricuspid regurgitation is markedly elevated (>55 mmHg).  Ejection fraction 51 to 55%, hypertrophy on the left, pulmonary hypertension    Independent review of ekg: Normal sinus, rate 79, nonspecific ST and T wave changes, QT interval 497    Problem List as identified by Epic (may contain historical, inactive problems)  Patient Active Problem List   Diagnosis   • Hypertensive urgency   • Iron deficiency anemia   • Cigarette smoker   • Nonrheumatic aortic valve insufficiency   • Acute kidney injury  superimposed on CKD (CMS/[facility])   • Colitis, ischemic (CMS/[facility])   • Family hx colonic polyps   • Stage 3 chronic kidney disease (CMS/[facility])   • Shortness of breath   • Noncompliance   • ST elevation myocardial infarction involving left anterior descending (LAD) coronary artery (CMS/[facility])   • Essential hypertension   • Acute on chronic diastolic heart failure (CMS/[facility])   • Coronary artery disease of native artery of native heart with stable angina pectoris (CMS/[facility])   • Status post insertion of drug-eluting stent into left anterior descending (LAD) artery for coronary artery disease   • Hyperlipidemia LDL goal <70   • Class 1 obesity due to excess calories with serious comorbidity and body mass index (BMI) of 31.0 to 31.9 in adult   • Bradycardia   • Acute on chronic diastolic congestive heart failure (CMS/[facility])     Pulmonary Assessment:    New problem (to me), with additional workup planned: Acute hypoxemic respiratory failure    New problem (to me), no additional workup planned: Chest pain, defer to cardiology    Other problems either stable, failing to improve or worsenin. Chronic tobacco use  2. Acute on chronic kidney disease  3. Acute on chronic diastolic heart failure  4. Obesity  5. CAD    Recommend/plan:     · Multiple hospitalizations in the past for similar issues related to kidney disease and heart failure.  She responds well to diuresis typically.  Some benefit from bronchodilators today which we can continue.  · Supplemental oxygen for saturation above 90  · Continue BiPAP with sleep and as needed for respiratory fatigue, lethargy or desaturation.  Currently on 16/ and FiO2 0.3  · Continue bronchodilators and mucolytic's, currently on ipratropium and albuterol along with Mucinex  · Currently on prednisone, continue, one-time dose of Solu-Medrol  · Update imaging of the chest, ordered for today  · DVT prophylaxis, Lovenox 40 mg daily  · Defer management of coronary and chest pain issues to cardiology  who is already following  · Strong suspicion for COPD although she has never had PFTs.  Recommend obtaining these in the outpatient setting    Thank you for this consult.  We will follow along.  Electronically signed by ISAIAH Bryant, 08/10/21, 11:54 AM CDT.    ATTESTATION OF CLINICAL NOTE:  I have reviewed the notes, assessments, and/or procedures performed by ISAIAH Leal, I concur with her/his documentation of Ludivina Ramirez.  Patient was seen as inpatient pulmonary consult from Dr. Weston today she is also getting followed by Dr. Pierce Buchanan and is waiting to see nephrology Dr. Whitfield.    She is a 60 years old middle-aged  female who had long history of tobacco abuse and she told me she started smoking at age of 30 and continues to smoke till a few weeks ago.  She started having shortness of breath dyspnea and wheezing a few days ago and was treated on outpatient with doxycycline and Medrol Dosepak and eventually needed hospitalization for failure to improve.  She had been tested negative for Covid and she is not vaccinated for Covid.  She is not on home oxygen or any regular inhalers or nebulizers at home and apparently had multiple hospitalizations with respiratory failure and at one point needed mechanical ventilation.  She failed to follow-up in the pulmonary clinic and missed the scheduled appointments.  She this time is again having the similar problem and was seen by cardiology and was treated for heart failure with IV diuretic after which her shortness of breath improved.  In addition she did need some oxygen in the beginning but currently her oxygen saturation 97% on room air but she used BiPAP at night with 16/6 with 30% FiO2.  She was sitting comfortably at the time of my visit.she gets short of breath easily.  She reported  she also has allergy symptoms with persistent postnasal drainage and cough but she was not on any allergy medication at home.  She reported she gained  weight and had sleep disturbance and snoring but never tested for sleep apnea.  In addition to her cardiac issues and the lung problems she developed renal failure after 1 dose of Lasix and significant rise in BUN and creatinine and nephrology has been consulted but they have not seen the patient yet.  Patient is not on any other nephrotoxic medications.  She told me she has extensive cardiac history and had coronary disease and had a stent placement done and follows up with Dr. Buchanan for cardiac issues and also had a history of congestive diastolic heart failure.    Physical examination patient is a middle-aged  female sitting comfortably in the bed.  HEENT: Atraumatic normocephalic.  Neck: Supple no mass no jugular venous distention no lymphadenopathy.  Heart: Sounds normal rate and rhythm regular no murmur.  Lungs bibasilar crackles diminished air entry with a few wheezing.  Abdomen: Soft nondistended nontender.  Extremities: No edema normal is normal color.  Neurologic: Grossly intact.  Skin: No breakdown.  Psych: Mood and affect normal.    Patient most definitely has COPD this x-ray shows some chronic scarring granulomatous changes and some atelectasis and possible underlying COPD.  History of tobacco abuse for many years.  She will be started on Symbicort and already getting DuoNeb treatment.  I will start her on some steroid as well she received 125 mg of Solu-Medrol and continuing of prednisone which will be continued for few more days.  Her shortness of breath was probably multifactorial from COPD exacerbation and also from heart failure and she is currently getting treated with a diuretic and cardiology is following.  However after diuresis her renal function has deteriorated and nephrology is involved now.  She will need to continue oxygen as needed and will be encouraged to incentive spirometry to improve pulmonary compliance.  She will also need the BiPAP in the current settings at night and  will definitely need home oxygen evaluation prior to discharge and possibly will need outpatient sleep study in addition to the pulmonary functions and follow-up in the pulmonary clinic as well.  For her nasal allergy she is already on Astelin nasal spray and I have started her on Zyrtec. For her renal failure the Lovenox dose has been decreased as well. Her arterial blood gas done at the time of admission did not show any significant hypercapnia CO2 retention. She will need PT OT. DVT and stress ulcer prophylaxis and pain anxiety control. He told me she did not need a nicotine patch and she does already quit smoking. Since she has lots of noncompliance issues cardiac issues are addressed by Dr. Buchanan and she is waiting to see nephrology. Discussed care plan with RN and RT. Pulmonary team will continue following and make further recommendations. We appreciate the consult and like to thank the referring physician. Total time spent in seeing this patient was 45 minutes.    I have seen and examined patient personally, performing a face-to-face diagnostic evaluation with plan of care reviewed and developed with APRN and nursing staff. I have addended and/or modified the above history of present illness, physical examination, and assessment and plan to reflect my findings and impressions. Essential elements of the care plan were discussed with APRN above.  Agree with findings and assessment/plan as documented above.    Neeta Chavez MD  Pulmonologist/Intensivist  8/10/2021 15:31 CDT

## 2021-08-10 NOTE — PLAN OF CARE
Goal Outcome Evaluation:  Plan of Care Reviewed With: patient        Progress: no change  Outcome Summary: Pulm and nephro consulted today. Breathing txs, mucinex, and zyrtec started, 1 time dose of solumedrol given. Isordil added per cards. Wearing CPAP when sleeping or SOA, o2 sats WNL. SB/S 55-68 on tele. Echo ordered. Continue to monitor.

## 2021-08-10 NOTE — CASE MANAGEMENT/SOCIAL WORK
Discharge Planning Assessment  [facility]     Patient Name: Ludivina Ramirez  MRN: [id number]  Today's Date: 8/10/2021    Admit Date: 8/8/2021    Discharge Needs Assessment    No documentation.       Discharge Plan     Row Name 08/10/21 1359       Plan    Plan  Patient is a readmit with Lace score of 13. She has agreed to the [facility] program for CHF management. Information faxed to [facility].        Continued Care and Services - Admitted Since 8/8/2021    Coordination has not been started for this encounter.         Demographic Summary    No documentation.       Functional Status    No documentation.       Psychosocial    No documentation.       Abuse/Neglect    No documentation.       Legal    No documentation.       Substance Abuse    No documentation.       Patient Forms    No documentation.           Merlina A Fletcher, RN

## 2021-08-10 NOTE — CONSULTS
LOS: 2 days   Patient Care Team:  Orville Weston MD as PCP - General (Family Medicine)  Juanpablo Rea MD as Consulting Physician (Gastroenterology)  Nickolas Alegria MD as Consulting Physician (Gastroenterology)    Chief Complaint: Shortness of breath     Subjective    Ludivina Ramirez is a 60 y.o. female who is being seen in consultation.  Patient has presented with complaints of increasing shortness of breath  Has moderate orthopnea  No paroxysmal nocturnal dyspnea  Complains of cough and wheezing  1 episode of 20 minutes of chest pain yesterday  Chest pain subsequently resolved  No recurrence of chest pain  Chest pain was associated with some diaphoresis  Chest pain associated with nausea  Radiated towards her back  D-dimer was elevated  VQ scan is low probability for embolism  Currently completely chest pain-free  Known coronary artery disease with prior drug-eluting stent placement to left anterior descending coronary artery  She has taken nitroglycerin with episode of chest pain and this helped  Now has worsening renal failure  Nephrology has seen patient  Denies any palpitations  No presyncope  No syncope  Compliant to prescribed medications  Taking aspirin and Brilinta  On high-dose statin therapy  Labs reviewed  Creatinine has increased now to 2.7 up from 1.8  Baseline creatinine up approximately 2.2      Telemetry: no malignant arrhythmia. No significant pauses.    Review of Systems   Constitutional: No chills   Has fatigue   No fever.   HENT: Negative.    Eyes: Negative.    Respiratory: Negative for cough,   No chest wall soreness,   Shortness of breath,   no wheezing, no stridor.    Cardiovascular: As above  Gastrointestinal: Negative for abdominal distention,  No abdominal pain,   No blood in stool,   No constipation,   No diarrhea,   No nausea   No vomiting.   Endocrine: Negative.    Genitourinary: Negative for difficulty urinating, dysuria, flank pain and hematuria.   Musculoskeletal:  Negative.    Skin: Negative for rash and wound.   Allergic/Immunologic: Negative.    Neurological: Negative for dizziness, syncope, weakness,   No light-headedness  No  headaches.   Hematological: Does not bruise/bleed easily.   Psychiatric/Behavioral: Negative for agitation or behavioral problems,   No confusion,   the patient is  nervous/anxious.       History:   Past Medical History:   Diagnosis Date   • Acute CHF (CMS/HCC)    • Acute renal failure (ARF) (CMS/HCC)    • Anemia    • Asthma     childhood   • Hypertension    • Ischemic colitis (CMS/HCC)    • Metabolic acidosis    • Nonrheumatic aortic (valve) insufficiency    • PTSD (post-traumatic stress disorder)      Past Surgical History:   Procedure Laterality Date   • CARDIAC CATHETERIZATION N/A 11/8/2020    Procedure: Left Heart Cath;  Surgeon: Pierce Buchanan MD;  Location: [facility] CATH INVASIVE LOCATION;  Service: Cardiovascular;  Laterality: N/A;   • EXTERNAL FIXATION WRIST FRACTURE     • LEG SURGERY     • TUBAL ABDOMINAL LIGATION       Social History     Socioeconomic History   • Marital status: Single     Spouse name: Not on file   • Number of children: Not on file   • Years of education: Not on file   • Highest education level: Not on file   Tobacco Use   • Smoking status: Current Every Day Smoker     Packs/day: 0.50   • Smokeless tobacco: Never Used   Vaping Use   • Vaping Use: Never used   Substance and Sexual Activity   • Alcohol use: No   • Drug use: No   • Sexual activity: Defer     Family History   Problem Relation Age of Onset   • Coronary artery disease Mother    • Coronary artery disease Father        Labs:  WBC WBC   Date Value Ref Range Status   08/08/2021 9.59 3.40 - 10.80 10*3/mm3 Final      HGB Hemoglobin   Date Value Ref Range Status   08/08/2021 10.4 (L) 12.0 - 15.9 g/dL Final      HCT Hematocrit   Date Value Ref Range Status   08/08/2021 32.6 (L) 34.0 - 46.6 % Final      Platelets Platelets   Date Value Ref Range Status   08/08/2021 130 (L)  140 - 450 10*3/mm3 Final      MCV MCV   Date Value Ref Range Status   08/08/2021 94.5 79.0 - 97.0 fL Final        Results from last 7 days   Lab Units 08/10/21  0609 08/08/21  1403   SODIUM mmol/L 144 143   POTASSIUM mmol/L 3.6 4.0   CHLORIDE mmol/L 105 107   CO2 mmol/L 28.0 25.0   BUN mg/dL 46* 32*   CREATININE mg/dL 2.66* 1.83*   CALCIUM mg/dL 8.0* 8.7   BILIRUBIN mg/dL  --  0.8   ALK PHOS U/L  --  93   ALT (SGPT) U/L  --  21   AST (SGOT) U/L  --  18   GLUCOSE mg/dL 131* 122*     Lab Results   Component Value Date    CKTOTAL 219 (H) 10/27/2020    CKMB 3.38 04/06/2018    TROPONINI 0.026 08/31/2019    TROPONINT 0.031 (C) 08/08/2021     PT/INR:    Protime   Date Value Ref Range Status   08/08/2021 13.0 11.5 - 13.4 Seconds Final   /  INR   Date Value Ref Range Status   08/08/2021 1.06 0.91 - 1.09 Final       Imaging Results (Last 72 Hours)     Procedure Component Value Units Date/Time    XR Chest 1 View [[id number]] Collected: 08/08/21 1417     Updated: 08/08/21 1423    Narrative:      Exam:   XR CHEST 1 VW-       Date:  8/8/2021      History:  Female, age  60 years;dyspnea     COMPARISON:  Chest x-ray dated 7/21/2021.     Findings :     Borderline size of the cardiomediastinal silhouette, unchanged. Mild  prominence of central pleural vasculature. Chronic interstitial lung  changes redemonstrated. Linear opacity in the left midlung zone, may  reflect atelectasis versus scarring. No measurable pneumothorax. No  pleural effusion.  The bones show no acute pathology.         Impression:      Impression:     Borderline size of the cardiomediastinal silhouette with mild prominence  central pulmonary vasculature. Consider mild/early fluid overload.     This report was finalized on 08/08/2021 14:20 by Dr. Janet Geller MD.          Objective     Allergies   Allergen Reactions   • Codeine Nausea And Vomiting   • Imitrex [Sumatriptan] Other (See Comments)     HA       Medication Review: Performed  Current  Facility-Administered Medications   Medication Dose Route Frequency Provider Last Rate Last Admin   • aspirin EC tablet 81 mg  81 mg Oral Daily Ren Santo MD   81 mg at 08/10/21 0909   • azelastine (ASTELIN) nasal spray 2 spray  2 spray Each Nare BID Ren Santo MD   2 spray at 08/10/21 0910   • carvedilol (COREG) tablet 12.5 mg  12.5 mg Oral BID With Meals Ren Santo MD   12.5 mg at 08/10/21 0909   • cloNIDine (CATAPRES) tablet 0.1 mg  0.1 mg Oral Q6H PRN Ren Santo MD       • enoxaparin (LOVENOX) syringe 40 mg  40 mg Subcutaneous Q24H Ren Santo MD   40 mg at 08/09/21 2041   • ferrous sulfate tablet 325 mg  325 mg Oral BID With Meals Ren Santo MD   325 mg at 08/10/21 0909   • fluticasone (FLONASE) 50 MCG/ACT nasal spray 2 spray  2 spray Nasal Daily PRN Ren Santo MD       • furosemide (LASIX) injection 20 mg  20 mg Intravenous Q12H Orville Weston MD   20 mg at 08/10/21 0539   • hydrALAZINE (APRESOLINE) tablet 25 mg  25 mg Oral Q8H Ren Santo MD   25 mg at 08/10/21 0539   • losartan (COZAAR) tablet 100 mg  100 mg Oral Q24H Ren Santo MD   100 mg at 08/10/21 0909   • NIFEdipine XL (PROCARDIA XL) 24 hr tablet 60 mg  60 mg Oral Q24H Ren Santo MD   60 mg at 08/10/21 0909   • OLANZapine (zyPREXA) tablet 5 mg  5 mg Oral Nightly Ren Santo MD   5 mg at 08/09/21 2041   • predniSONE (DELTASONE) tablet 10 mg  10 mg Oral Daily Ren Santo MD   10 mg at 08/10/21 0909   • rosuvastatin (CRESTOR) tablet 20 mg  20 mg Oral Daily Ren Santo MD   20 mg at 08/10/21 0909   • sodium chloride 0.9 % flush 10 mL  10 mL Intravenous PRN Zackary Gutierrez MD       • ticagrelor (BRILINTA) tablet 90 mg  90 mg Oral BID Ren Santo MD   90 mg at 08/10/21 0909       Vital Sign Min/Max for last 24 hours  Temp  Min: 97.3 °F (36.3 °C)  Max: 98.3 °F (36.8 °C)   BP  Min: 118/81  Max: 158/90  "  Pulse  Min: 65  Max: 70   Resp  Min: 18  Max: 20   SpO2  Min: 93 %  Max: 98 %   No data recorded   Weight  Min: 104 kg (229 lb 1.6 oz)  Max: 104 kg (229 lb 1.6 oz)     Flowsheet Rows      First Filed Value   Admission Height  165.1 cm (65\") Documented at 08/08/2021 1346   Admission Weight  99.8 kg (220 lb) Documented at 08/08/2021 1346          Results for orders placed during the hospital encounter of 11/07/20    Adult Transthoracic Echo Limited W/ Cont if Necessary Per Protocol    Interpretation Summary  · Left ventricular systolic function is low normal. Left ventricular ejection fraction appears to be 51 - 55%.  · The following left ventricular wall segments are hypokinetic: apical anterior, apical lateral, apical inferior, apical septal and apex hypokinetic.  · Left ventricular wall thickness is consistent with mild concentric hypertrophy.  · Mild aortic valve regurgitation is present.  · Estimated right ventricular systolic pressure from tricuspid regurgitation is markedly elevated (>55 mmHg).      Physical Exam:    General Appearance: Awake, alert, in no acute distress  Eyes: Pupils equal and reactive    Ears: Appear intact with no abnormalities noted  Nose: Nares normal, no drainage  Neck: supple, trachea midline, no carotid bruit and no JVD  Back: no kyphosis present,    Lungs: respirations regular, prolonged with bilateral expiratory rhonchi with basilar crackles  Heart: normal S1, S2, no significant murmurs   No gallops or rubs  no rub and no click  Abdomen: normal bowel sounds, no tenderness   Skin: no bleeding, bruising or rash  Extremities: no cyanosis  Psychiatric/Behavioral: Negative for agitation, behavioral problems, confusion, the patient does  appear to be nervous/anxious.       Results Review:   I reviewed the patient's new clinical results.  I reviewed the patient's new imaging results and agree with the interpretation.  I reviewed the patient's other test results and agree with the " interpretation  I personally viewed and interpreted the patient's EKG/Telemetry data    Discussed with patient  Updated patient regarding any new or relevant abnormalities on review of records or any new findings on physical exam.   Mentioned to patient about purpose of visit and desirable health short and long term goals and objectives.     Reviewed available prior notes, consults, prior visits, laboratory findings, radiology and cardiology relevant reports.   Updated chart as applicable.   I have reviewed the patient's medical history in detail and updated the computerized patient record as relevant.          Assessment/Plan       Acute on chronic diastolic congestive heart failure (CMS/HCC)  Coronary artery disease  Prior drug-eluting stent placement to left anterior descending coronary artery  Acute not chronic renal failure  Elevated troponin with flat trend      Plan      Due to worsening renal failure cannot diurese aggressively  Recommend pulmonary evaluation  Likely has component of COPD  Recommend bronchodilator therapy  Repeat complete transthoracic echocardiogram with myocardial strain  Combination of hydralazine and long-acting nitrates for now  Will require ischemia work-up in near future    Telemetry  Deep vein thrombosis prophylaxis/precautions  Appropriate diet, fluid, sodium, caffeine, stimulants intake   Questions were encouraged, asked and answered to the patient's  understanding and satisfaction.  Compliance to diet and medications       Pierce Buchanan MD  08/10/21  09:36 CDT    EMR Dragon/Transcription was used to dictate part of this note

## 2021-08-10 NOTE — PLAN OF CARE
Goal Outcome Evaluation:      Wore cpap through out the night and states it helps her breathe better. Continues to complain of SOA with exertion. O2 sat stays in mid 90's. Will continue to monitor.     Tele: SR [id number] low 34

## 2021-08-10 NOTE — PROGRESS NOTES
"Progress Note  Ludivina Ramirez  8/10/2021 10:39 CDT  Subjective:   Admit Date:   8/8/2021      CC/ADMIT DX:       Interval History:   Reviewed overnight events and nursing notes. Her SOA is stable. No CP.   I have reviewed all labs/diagnostics from the last 24hrs.       ROS:   I have done a 10 point ROS and all are negative, except what is mentioned in the HPI.    Diet Regular; Cardiac    Medications:      aspirin, 81 mg, Oral, Daily  azelastine, 2 spray, Each Nare, BID  carvedilol, 12.5 mg, Oral, BID With Meals  enoxaparin, 40 mg, Subcutaneous, Q24H  ferrous sulfate, 325 mg, Oral, BID With Meals  hydrALAZINE, 25 mg, Oral, Q8H  isosorbide dinitrate, 10 mg, Oral, TID - Nitrates  losartan, 100 mg, Oral, Q24H  NIFEdipine CC, 60 mg, Oral, Q24H  OLANZapine, 5 mg, Oral, Nightly  predniSONE, 10 mg, Oral, Daily  rosuvastatin, 20 mg, Oral, Daily  ticagrelor, 90 mg, Oral, BID            Objective:   Vitals: /75 (BP Location: Right arm, Patient Position: Lying)   Pulse 66   Temp 97.3 °F (36.3 °C) (Oral)   Resp 18   Ht 165.1 cm (65\")   Wt 104 kg (229 lb 1.6 oz)   SpO2 93%   BMI 38.12 kg/m²      Intake/Output Summary (Last 24 hours) at 8/10/2021 1039  Last data filed at 8/10/2021 1028  Gross per 24 hour   Intake 600 ml   Output 1000 ml   Net -400 ml     General appearance: alert and cooperative with exam  Lungs: coarse  Heart: RRR  Abdomen: soft, non-tender; bowel sounds normal; no masses,  no organomegaly  Extremities: extremities normal, atraumatic, no cyanosis or edema  Neurologic:  No obvious focal neurologic deficits.    Assessment and Plan:     Acute on chronic diastolic congestive heart failure (CMS/HCC)    Acute on Chronic Kidney disease    CAD    HTN    COPD    Plan:  1.  Continue present medication(s)   2.  Consult Pulm, Consult Nephrology  3.   Appreciate Cardiology assessment  4.   Stop Lasix with increase in renal function and ask Nephrology to assess  5.  Continue treatment of HTN      Discharge planning: "   her home     Reviewed treatment plans with the patient and/or family.             Electronically signed by Orville Weston MD on 8/10/2021 at 10:39 CDT

## 2021-08-10 NOTE — CONSULTS
Nephrology ([facility]) Consult Note      Patient:  Ludivina Ramirez  YOB: 1960  Date of Service: 8/10/2021  MRN: [id number]   Acct: [id number]   Primary Care Physician: Orville Weston MD  Advance Directive:   Code Status and Medical Interventions:   Ordered at: 08/08/21 1956     Code Status:    CPR     Medical Interventions (Level of Support Prior to Arrest):    Full     Admit Date: 8/8/2021       Hospital Day: 2  Referring Provider: Orville Weston MD      Patient Seen, Chart, Consults, Notes, Labs, Radiology studies reviewed.      Subjective:  Ludivina Ramirez is a 60 y.o. female  whom we were consulted for acute kidney injury.  Patient has a history of asthma, COPD and congestive heart failure.  She is also obese and has obstructive sleep apnea.  She has recurrent episodes of admission for acute respiratory failure and congestive heart failure exacerbation.  Patient is admitted at this time with worsening dyspnea.  She received the Lasix for diuresis.  She continues to be dyspneic and has been using his CPAP.  Her serum creatinine ranges between 2.1-2.4.  On admission on August 8 her serum creatinine was 1.8.  It has since risen to 2.6 after diuretics.  Patient's Lasix was stopped.  He is currently receiving hydration by mouth.  Denies any change to her urine habits.  Needed occasional use of NSAIDs.  She has no dysuria.    Allergies:  Codeine and Imitrex [sumatriptan]    Home Meds:  Medications Prior to Admission   Medication Sig Dispense Refill Last Dose   • carvedilol (COREG) 25 MG tablet Take 25 mg by mouth 2 (Two) Times a Day With Meals.      • hydrALAZINE (APRESOLINE) 50 MG tablet Take 75 mg by mouth Every 8 (Eight) Hours.      • aspirin 81 MG EC tablet Take 1 tablet by mouth Daily.      • azelastine (ASTELIN) 0.1 % nasal spray 2 sprays into the nostril(s) as directed by provider 2 (Two) Times a Day. Use in each nostril as directed       • cloNIDine (CATAPRES)  0.1 MG tablet Take 1 tablet by mouth Every 6 (Six) Hours As Needed for High Blood Pressure (BP >180/100). 30 tablet 1    • Ergocalciferol (Vitamin D2) 50 MCG (2000 UT) tablet Take 1,000 Units by mouth Daily.      • ferrous sulfate 325 (65 FE) MG tablet Take 1 tablet by mouth 2 (Two) Times a Day With Meals. 60 tablet 0    • fluticasone (FLONASE) 50 MCG/ACT nasal spray 1 spray into the nostril(s) as directed by provider Every Morning. In each nostril      • losartan (COZAAR) 100 MG tablet Take 1 tablet by mouth Daily. 90 tablet 3    • NIFEdipine XL (ADALAT CC) 60 MG 24 hr tablet Take 1 tablet by mouth 2 (two) times a day. 60 tablet 1    • nitroglycerin (NITROSTAT) 0.4 MG SL tablet Place 1 tablet under the tongue Every 5 (Five) Minutes As Needed for Chest Pain for up to 3 doses. Take no more than 3 doses in 15 minutes. 25 tablet 0    • OLANZapine (zyPREXA) 5 MG tablet Take 1 tablet by mouth 2 (two) times a day. 60 tablet 0    • pantoprazole (PROTONIX) 40 MG EC tablet Take 1 tablet by mouth Daily. 30 tablet 1    • rosuvastatin (CRESTOR) 20 MG tablet Take 1 tablet by mouth Daily. 90 tablet 3    • ticagrelor (BRILINTA) 90 MG tablet tablet Take 1 tablet by mouth 2 (Two) Times a Day. 60 tablet 11        Medicines:  Current Facility-Administered Medications   Medication Dose Route Frequency Provider Last Rate Last Admin   • aspirin EC tablet 81 mg  81 mg Oral Daily Ren Santo MD   81 mg at 08/10/21 0909   • azelastine (ASTELIN) nasal spray 2 spray  2 spray Each Nare BID Ren Santo MD   2 spray at 08/10/21 0910   • carvedilol (COREG) tablet 12.5 mg  12.5 mg Oral BID With Meals Ren Santo MD   12.5 mg at 08/10/21 1729   • cetirizine (zyrTEC) tablet 10 mg  10 mg Oral Daily Neeta Chavez MD   10 mg at 08/10/21 1509   • cloNIDine (CATAPRES) tablet 0.1 mg  0.1 mg Oral Q6H PRN Ren Santo MD       • enoxaparin (LOVENOX) syringe 30 mg  30 mg Subcutaneous Q24H Neeta Chavez MD        • ferrous sulfate tablet 325 mg  325 mg Oral BID With Meals Ren Santo MD   325 mg at 08/10/21 1729   • fluticasone (FLONASE) 50 MCG/ACT nasal spray 2 spray  2 spray Nasal Daily PRN Ren Santo MD       • guaiFENesin (MUCINEX) 12 hr tablet 1,200 mg  1,200 mg Oral Q12H Orville Weston MD   1,200 mg at 08/10/21 1214   • hydrALAZINE (APRESOLINE) tablet 25 mg  25 mg Oral Q8H Ren Santo MD   25 mg at 08/10/21 1509   • ipratropium-albuterol (DUO-NEB) nebulizer solution 3 mL  3 mL Nebulization 4x Daily - RT Orville Weston MD   3 mL at 08/10/21 1433   • isosorbide dinitrate (ISORDIL) tablet 10 mg  10 mg Oral TID - Nitrates Pierce Buchanan MD   10 mg at 08/10/21 1729   • losartan (COZAAR) tablet 100 mg  100 mg Oral Q24H Ren Santo MD   100 mg at 08/10/21 0909   • NIFEdipine XL (PROCARDIA XL) 24 hr tablet 60 mg  60 mg Oral Q24H Ren Santo MD   60 mg at 08/10/21 0909   • OLANZapine (zyPREXA) tablet 5 mg  5 mg Oral Nightly Ren Santo MD   5 mg at 08/09/21 2041   • predniSONE (DELTASONE) tablet 10 mg  10 mg Oral Daily Ren Santo MD   10 mg at 08/10/21 0909   • rosuvastatin (CRESTOR) tablet 20 mg  20 mg Oral Daily Ren Santo MD   20 mg at 08/10/21 0909   • sodium chloride 0.9 % flush 10 mL  10 mL Intravenous PRN Zackary Gutierrez MD       • ticagrelor (BRILINTA) tablet 90 mg  90 mg Oral BID Ren Santo MD   90 mg at 08/10/21 0909       Past Medical History:  Past Medical History:   Diagnosis Date   • Acute CHF (CMS/HCC)    • Acute renal failure (ARF) (CMS/HCC)    • Anemia    • Asthma     childhood   • Hypertension    • Ischemic colitis (CMS/HCC)    • Metabolic acidosis    • Nonrheumatic aortic (valve) insufficiency    • PTSD (post-traumatic stress disorder)        Past Surgical History:  Past Surgical History:   Procedure Laterality Date   • CARDIAC CATHETERIZATION N/A 11/8/2020    Procedure: Left Heart Cath;   "Surgeon: Pierce Buchanan MD;  Location:  [facility];  Service: Cardiovascular;  Laterality: N/A;   • EXTERNAL FIXATION WRIST FRACTURE     • LEG SURGERY     • TUBAL ABDOMINAL LIGATION         Family History  Family History   Problem Relation Age of Onset   • Coronary artery disease Mother    • Coronary artery disease Father        Social History  Social History     Socioeconomic History   • Marital status: Single     Spouse name: Not on file   • Number of children: Not on file   • Years of education: Not on file   • Highest education level: Not on file   Tobacco Use   • Smoking status: Current Every Day Smoker     Packs/day: 0.50   • Smokeless tobacco: Never Used   Vaping Use   • Vaping Use: Never used   Substance and Sexual Activity   • Alcohol use: No   • Drug use: No   • Sexual activity: Defer         Review of Systems:  History obtained from chart review and the patient  General ROS: No fever or chills  Respiratory ROS: No cough, +shortness of breath, +wheezing  Cardiovascular ROS: no chest pain but dyspnea on exertion  Gastrointestinal ROS: No abdominal pain or melena  Genito-Urinary ROS: No dysuria or hematuria  14 point ROS reviewed with the patient and negative except as noted above and in the HPI unless unable to obtain.    Objective:  /93 (BP Location: Left arm, Patient Position: Sitting) Comment: RN notified  Pulse 68   Temp 98.6 °F (37 °C) (Axillary)   Resp 20   Ht 165.1 cm (65\")   Wt 104 kg (229 lb 1.6 oz)   SpO2 99%   BMI 38.12 kg/m²     Intake/Output Summary (Last 24 hours) at 8/10/2021 1731  Last data filed at 8/10/2021 1730  Gross per 24 hour   Intake 600 ml   Output 300 ml   Net 300 ml     General: awake/alert    Head: Atraumatic, normocephalic  Neck: No masses, no jugular venous distention  Chest:  Bilateral air entry with scattered rales and decreased breath sounds at the bases  CVS: regular rate and rhythm  Abdominal: soft, nontender, normal bowel sounds  Extremities: " No cyanosis, no peripheral edema  Skin: warm and dry without rash  Neuro: No focal motor deficits  Musculoskeletal: No obvious joint effusions    Labs:  Lab Results (last 72 hours)     Procedure Component Value Units Date/Time    Blood Culture - Blood, Arm, Left [[id number]] Collected: 08/08/21 1403    Specimen: Blood from Arm, Left Updated: 08/10/21 1445     Blood Culture No growth at 2 days    Blood Culture - Blood, Arm, Left [[id number]] Collected: 08/08/21 1403    Specimen: Blood from Arm, Left Updated: 08/10/21 1445     Blood Culture No growth at 2 days    Basic Metabolic Panel [[id number]]  (Abnormal) Collected: 08/10/21 0609    Specimen: Blood Updated: 08/10/21 0704     Glucose 131 mg/dL      BUN 46 mg/dL      Creatinine 2.66 mg/dL      Sodium 144 mmol/L      Potassium 3.6 mmol/L      Chloride 105 mmol/L      CO2 28.0 mmol/L      Calcium 8.0 mg/dL      eGFR Non African Amer 18 mL/min/1.73      BUN/Creatinine Ratio 17.3     Anion Gap 11.0 mmol/L     Narrative:      GFR Normal >60  Chronic Kidney Disease <60  Kidney Failure <15      Troponin [[id number]]  (Abnormal) Collected: 08/08/21 1604    Specimen: Blood Updated: 08/08/21 1636     Troponin T 0.031 ng/mL     Narrative:      Troponin T Reference Range:  <= 0.03 ng/mL-   Negative for AMI  >0.03 ng/mL-     Abnormal for myocardial necrosis.  Clinicians would have to utilize clinical acumen, EKG, Troponin and serial changes to determine if it is an Acute Myocardial Infarction or myocardial injury due to an underlying chronic condition.       Results may be falsely decreased if patient taking Biotin.      COVID-19,[facility] IN-HOUSE,Nasal Swab No Transport Media 3-4 HR TAT - Swab, Nasal Cavity [[id number]]  (Normal) Collected: 08/08/21 1404    Specimen: Swab from Nasal Cavity Updated: 08/08/21 1529     COVID19 Not Detected    Narrative:      Fact sheet for providers: https://www.fda.gov/media/715907/download     Fact sheet for patients:  "https://www.fda.gov/media/359110/download    Test performed by PCR.    Consider negative results in combination with clinical observations, patient history, and epidemiological information.    BNP [[id number]]  (Abnormal) Collected: 08/08/21 1403    Specimen: Blood Updated: 08/08/21 1509     proBNP >70,000.0 pg/mL     Narrative:      Among patients with dyspnea, NT-proBNP is highly sensitive for the detection of acute congestive heart failure. In addition NT-proBNP of <300 pg/ml effectively rules out acute congestive heart failure with 99% negative predictive value.    Results may be falsely decreased if patient taking Biotin.      Procalcitonin [[id number]]  (Normal) Collected: 08/08/21 1403    Specimen: Blood Updated: 08/08/21 1502     Procalcitonin 0.09 ng/mL     Narrative:      As a Marker for Sepsis (Non-Neonates):     1. <0.5 ng/mL represents a low risk of severe sepsis and/or septic shock.  2. >2 ng/mL represents a high risk of severe sepsis and/or septic shock.    As a Marker for Lower Respiratory Tract Infections that require antibiotic therapy:  PCT on Admission     Antibiotic Therapy             6-12 Hrs later  >0.5                          Strongly Recommended            >0.25 - <0.5             Recommended  0.1 - 0.25                  Discouraged                       Remeasure/reassess PCT  <0.1                         Strongly Discouraged         Remeasure/reassess PCT      As 28 day mortality risk marker: \"Change in Procalcitonin Result\" (>80% or <=80%) if Day 0 (or Day 1) and Day 4 values are available. Refer to http://www.[employer]-pct-calculator.com/    Change in PCT <=80 %   A decrease of PCT levels below or equal to 80% defines a positive change in PCT test result representing a higher risk for 28-day all-cause mortality of patients diagnosed with severe sepsis or septic shock.    Change in PCT >80 %   A decrease of PCT levels of more than 80% defines a negative change in PCT result representing a " lower risk for 28-day all-cause mortality of patients diagnosed with severe sepsis or septic shock.                Comprehensive Metabolic Panel [[id number]]  (Abnormal) Collected: 08/08/21 1403    Specimen: Blood Updated: 08/08/21 1457     Glucose 122 mg/dL      BUN 32 mg/dL      Creatinine 1.83 mg/dL      Sodium 143 mmol/L      Potassium 4.0 mmol/L      Chloride 107 mmol/L      CO2 25.0 mmol/L      Calcium 8.7 mg/dL      Total Protein 6.1 g/dL      Albumin 3.40 g/dL      ALT (SGPT) 21 U/L      AST (SGOT) 18 U/L      Alkaline Phosphatase 93 U/L      Total Bilirubin 0.8 mg/dL      eGFR Non African Amer 28 mL/min/1.73      Globulin 2.7 gm/dL      A/G Ratio 1.3 g/dL      BUN/Creatinine Ratio 17.5     Anion Gap 11.0 mmol/L     Narrative:      GFR Normal >60  Chronic Kidney Disease <60  Kidney Failure <15      C-reactive Protein [[id number]]  (Abnormal) Collected: 08/08/21 1403    Specimen: Blood Updated: 08/08/21 1456     C-Reactive Protein 1.71 mg/dL     Troponin [[id number]]  (Abnormal) Collected: 08/08/21 1403    Specimen: Blood Updated: 08/08/21 1455     Troponin T 0.034 ng/mL     Narrative:      Troponin T Reference Range:  <= 0.03 ng/mL-   Negative for AMI  >0.03 ng/mL-     Abnormal for myocardial necrosis.  Clinicians would have to utilize clinical acumen, EKG, Troponin and serial changes to determine if it is an Acute Myocardial Infarction or myocardial injury due to an underlying chronic condition.       Results may be falsely decreased if patient taking Biotin.      Lactic Acid, Plasma [[id number]]  (Normal) Collected: 08/08/21 1403    Specimen: Blood Updated: 08/08/21 1449     Lactate 0.6 mmol/L     Protime-INR [[id number]]  (Normal) Collected: 08/08/21 1403    Specimen: Blood Updated: 08/08/21 1444     Protime 13.0 Seconds      INR 1.06    D-dimer, Quantitative [[id number]]  (Abnormal) Collected: 08/08/21 1403    Specimen: Blood Updated: 08/08/21 1444     D-Dimer, Quantitative 1.09 mg/L (FEU)     Narrative:       Reference Range is 0-0.50 mg/L FEU. However, results <0.50 mg/L FEU tends to rule out DVT or PE. Results >0.50 mg/L FEU are not useful in predicting absence or presence of DVT or PE.      CBC & Differential [[id number]]  (Abnormal) Collected: 08/08/21 1403    Specimen: Blood Updated: 08/08/21 1433    Narrative:      The following orders were created for panel order CBC & Differential.  Procedure                               Abnormality         Status                     ---------                               -----------         ------                     CBC Auto Differential[[id number]]        Abnormal            Final result                 Please view results for these tests on the individual orders.    CBC Auto Differential [[id number]]  (Abnormal) Collected: 08/08/21 1403    Specimen: Blood Updated: 08/08/21 1433     WBC 9.59 10*3/mm3      RBC 3.45 10*6/mm3      Hemoglobin 10.4 g/dL      Hematocrit 32.6 %      MCV 94.5 fL      MCH 30.1 pg      MCHC 31.9 g/dL      RDW 15.9 %      RDW-SD 54.4 fl      MPV 11.6 fL      Platelets 130 10*3/mm3      Neutrophil % 84.7 %      Lymphocyte % 7.0 %      Monocyte % 6.2 %      Eosinophil % 1.0 %      Basophil % 0.5 %      Neutrophils, Absolute 8.12 10*3/mm3      Lymphocytes, Absolute 0.67 10*3/mm3      Monocytes, Absolute 0.59 10*3/mm3      Eosinophils, Absolute 0.10 10*3/mm3      Basophils, Absolute 0.05 10*3/mm3     Blood Gas, Arterial - [[id number]]  (Abnormal) Collected: 08/08/21 1350    Specimen: Arterial Blood Updated: 08/08/21 1404     Site Right Brachial     Guido's Test N/A     pH, Arterial 7.408 pH units      pCO2, Arterial 46.5 mm Hg      Comment: 83 Value above reference range        pO2, Arterial 99.1 mm Hg      HCO3, Arterial 29.3 mmol/L      Comment: 83 Value above reference range        Base Excess, Arterial 4.0 mmol/L      Comment: 83 Value above reference range        O2 Saturation, Arterial 98.5 %      Temperature 37.0 C      Barometric Pressure for Blood Gas  750 mmHg      Modality Nasal Cannula     Flow Rate 6.0 lpm      Ventilator Mode NA     Collected by [id number]     Comment: Meter: [id number]     :  [id number]        pCO2, Temperature Corrected 46.5 mm Hg      pH, Temp Corrected 7.408 pH Units      pO2, Temperature Corrected 99.1 mm Hg           Radiology:   Imaging Results (Last 72 Hours)     Procedure Component Value Units Date/Time    XR Chest 1 View [[id number]] Collected: 08/10/21 1334     Updated: 08/10/21 1339    Narrative:      EXAM: XR CHEST 1 VW-     INDICATION: Worsening shortness of breath, congestive heart failure     COMPARISON: The eighth 2021     FINDINGS:     Cardiac silhouette is mildly enlarged but stable. No change in hazy  opacity in the LEFT lung base and linear LEFT midlung zone opacity.  Diffuse interstitial opacity/coarsening is unchanged. No new airspace  opacity. No visible pneumothorax. Faint granulomatous calcification in  the RIGHT upper lung is unchanged. No large pleural effusion. No acute  osseous finding.       Impression:         No significant change in appearance of the chest.  This report was finalized on 08/10/2021 13:36 by Dr. Zac Schwarz MD.    XR Chest 1 View [[id number]] Collected: 08/08/21 1417     Updated: 08/08/21 1423    Narrative:      Exam:   XR CHEST 1 VW-       Date:  8/8/2021      History:  Female, age  60 years;dyspnea     COMPARISON:  Chest x-ray dated 7/21/2021.     Findings :     Borderline size of the cardiomediastinal silhouette, unchanged. Mild  prominence of central pleural vasculature. Chronic interstitial lung  changes redemonstrated. Linear opacity in the left midlung zone, may  reflect atelectasis versus scarring. No measurable pneumothorax. No  pleural effusion.  The bones show no acute pathology.         Impression:      Impression:     Borderline size of the cardiomediastinal silhouette with mild prominence  central pulmonary vasculature. Consider mild/early fluid overload.     This report  was finalized on 08/08/2021 14:20 by Dr. Janet Geller MD.          Culture:  No components found for: WOUNDCUL, 3  No components found for: CSFCUL, 3  No components found for: BC, 3  No components found for: URINECUL, 3      Assessment   -Acute kidney injury-likely prerenal azotemia versus ATN  -Chronic kidney disease stage IIIb  -Hypertension with nephropathy  -Congestive heart failure  -Asthma/COPD  -Morbid obesity    Plan:  At this time, I agree with oral hydration and holding diuretics.  Avoid hypotension and nephrotoxins.  Patient had a recent renal ultrasound in July.  If the renal function fails to improve, may consider IV fluids.  Continue respiratory management.  Will check uric acid level and PTH.      Thank you for the consult, we appreciate the opportunity to provide care to your patients.  Feel free to contact me if I can be of any further assistance.      Parker Whitfield MD  8/10/2021  17:31 CDT

## 2021-08-11 ENCOUNTER — APPOINTMENT (OUTPATIENT)
Dept: CARDIOLOGY | Facility: HOSPITAL | Age: 61
End: 2021-08-11

## 2021-08-11 LAB
ANION GAP SERPL CALCULATED.3IONS-SCNC: 9 MMOL/L (ref 5–15)
BH CV ECHO MEAS - AO MAX PG (FULL): 16.3 MMHG
BH CV ECHO MEAS - AO MAX PG: 20.1 MMHG
BH CV ECHO MEAS - AO MEAN PG (FULL): 7 MMHG
BH CV ECHO MEAS - AO MEAN PG: 10 MMHG
BH CV ECHO MEAS - AO ROOT AREA (BSA CORRECTED): 1.8
BH CV ECHO MEAS - AO ROOT AREA: 10.8 CM^2
BH CV ECHO MEAS - AO ROOT DIAM: 3.7 CM
BH CV ECHO MEAS - AO V2 MAX: 224 CM/SEC
BH CV ECHO MEAS - AO V2 MEAN: 147 CM/SEC
BH CV ECHO MEAS - AO V2 VTI: 48.2 CM
BH CV ECHO MEAS - AVA(I,A): 1.4 CM^2
BH CV ECHO MEAS - AVA(I,D): 1.4 CM^2
BH CV ECHO MEAS - AVA(V,A): 1.4 CM^2
BH CV ECHO MEAS - AVA(V,D): 1.4 CM^2
BH CV ECHO MEAS - BSA(HAYCOCK): 2.2 M^2
BH CV ECHO MEAS - BSA: 2.1 M^2
BH CV ECHO MEAS - BZI_BMI: 36.8 KILOGRAMS/M^2
BH CV ECHO MEAS - BZI_METRIC_HEIGHT: 165.1 CM
BH CV ECHO MEAS - BZI_METRIC_WEIGHT: 100.2 KG
BH CV ECHO MEAS - EDV(CUBED): 171.9 ML
BH CV ECHO MEAS - EDV(MOD-SP2): 136 ML
BH CV ECHO MEAS - EDV(TEICH): 151.2 ML
BH CV ECHO MEAS - EF(CUBED): 77.5 %
BH CV ECHO MEAS - EF(MOD-SP2): 59.6 %
BH CV ECHO MEAS - EF(TEICH): 69.1 %
BH CV ECHO MEAS - ESV(CUBED): 38.6 ML
BH CV ECHO MEAS - ESV(MOD-SP2): 55 ML
BH CV ECHO MEAS - ESV(TEICH): 46.8 ML
BH CV ECHO MEAS - FS: 39.2 %
BH CV ECHO MEAS - IVS/LVPW: 0.91
BH CV ECHO MEAS - IVSD: 1.1 CM
BH CV ECHO MEAS - LA DIMENSION: 4.4 CM
BH CV ECHO MEAS - LA/AO: 1.2
BH CV ECHO MEAS - LAT PEAK E' VEL: 5.4 CM/SEC
BH CV ECHO MEAS - LV MASS(C)D: 246.4 GRAMS
BH CV ECHO MEAS - LV MASS(C)DI: 119.4 GRAMS/M^2
BH CV ECHO MEAS - LV MAX PG: 3.8 MMHG
BH CV ECHO MEAS - LV MEAN PG: 3 MMHG
BH CV ECHO MEAS - LV V1 MAX: 97.5 CM/SEC
BH CV ECHO MEAS - LV V1 MEAN: 73.4 CM/SEC
BH CV ECHO MEAS - LV V1 VTI: 21.7 CM
BH CV ECHO MEAS - LVIDD: 5.6 CM
BH CV ECHO MEAS - LVIDS: 3.4 CM
BH CV ECHO MEAS - LVLD AP2: 10.5 CM
BH CV ECHO MEAS - LVLS AP2: 9.4 CM
BH CV ECHO MEAS - LVOT AREA (M): 3.1 CM^2
BH CV ECHO MEAS - LVOT AREA: 3.1 CM^2
BH CV ECHO MEAS - LVOT DIAM: 2 CM
BH CV ECHO MEAS - LVPWD: 1.2 CM
BH CV ECHO MEAS - MED PEAK E' VEL: 4.9 CM/SEC
BH CV ECHO MEAS - MR MAX PG: 164.4 MMHG
BH CV ECHO MEAS - MR MAX VEL: 641 CM/SEC
BH CV ECHO MEAS - MR MEAN PG: 96 MMHG
BH CV ECHO MEAS - MR MEAN VEL: 462 CM/SEC
BH CV ECHO MEAS - MR VTI: 209 CM
BH CV ECHO MEAS - MV A MAX VEL: 127 CM/SEC
BH CV ECHO MEAS - MV DEC SLOPE: 794 CM/SEC^2
BH CV ECHO MEAS - MV DEC TIME: 0.16 SEC
BH CV ECHO MEAS - MV E MAX VEL: 129 CM/SEC
BH CV ECHO MEAS - MV E/A: 1
BH CV ECHO MEAS - MV P1/2T MAX VEL: 157 CM/SEC
BH CV ECHO MEAS - MV P1/2T: 57.9 MSEC
BH CV ECHO MEAS - MVA P1/2T LCG: 1.4 CM^2
BH CV ECHO MEAS - MVA(P1/2T): 3.8 CM^2
BH CV ECHO MEAS - PA MAX PG: 11.4 MMHG
BH CV ECHO MEAS - PA V2 MAX: 169 CM/SEC
BH CV ECHO MEAS - PI END-D VEL: 192 CM/SEC
BH CV ECHO MEAS - RAP SYSTOLE: 5 MMHG
BH CV ECHO MEAS - RVSP: 40.8 MMHG
BH CV ECHO MEAS - SI(AO): 251.1 ML/M^2
BH CV ECHO MEAS - SI(CUBED): 64.6 ML/M^2
BH CV ECHO MEAS - SI(LVOT): 33 ML/M^2
BH CV ECHO MEAS - SI(MOD-SP2): 39.2 ML/M^2
BH CV ECHO MEAS - SI(TEICH): 50.6 ML/M^2
BH CV ECHO MEAS - SV(AO): 518.3 ML
BH CV ECHO MEAS - SV(CUBED): 133.3 ML
BH CV ECHO MEAS - SV(LVOT): 68.2 ML
BH CV ECHO MEAS - SV(MOD-SP2): 81 ML
BH CV ECHO MEAS - SV(TEICH): 104.4 ML
BH CV ECHO MEAS - TR MAX VEL: 299 CM/SEC
BH CV ECHO MEASUREMENTS AVERAGE E/E' RATIO: 25.05
BUN SERPL-MCNC: 48 MG/DL (ref 8–23)
BUN/CREAT SERPL: 18.2 (ref 7–25)
CALCIUM SPEC-SCNC: 8 MG/DL (ref 8.6–10.5)
CHLORIDE SERPL-SCNC: 105 MMOL/L (ref 98–107)
CO2 SERPL-SCNC: 27 MMOL/L (ref 22–29)
CREAT SERPL-MCNC: 2.64 MG/DL (ref 0.57–1)
DEPRECATED RDW RBC AUTO: 54.6 FL (ref 37–54)
ERYTHROCYTE [DISTWIDTH] IN BLOOD BY AUTOMATED COUNT: 15.5 % (ref 12.3–15.4)
GFR SERPL CREATININE-BSD FRML MDRD: 18 ML/MIN/1.73
GLUCOSE SERPL-MCNC: 166 MG/DL (ref 65–99)
HCT VFR BLD AUTO: 32.1 % (ref 34–46.6)
HGB BLD-MCNC: 9.8 G/DL (ref 12–15.9)
LEFT ATRIUM VOLUME INDEX: 74 ML/M2
MAXIMAL PREDICTED HEART RATE: 160 BPM
MCH RBC QN AUTO: 29.8 PG (ref 26.6–33)
MCHC RBC AUTO-ENTMCNC: 30.5 G/DL (ref 31.5–35.7)
MCV RBC AUTO: 97.6 FL (ref 79–97)
PLATELET # BLD AUTO: 145 10*3/MM3 (ref 140–450)
PMV BLD AUTO: 11.5 FL (ref 6–12)
POTASSIUM SERPL-SCNC: 4.2 MMOL/L (ref 3.5–5.2)
PTH-INTACT SERPL-MCNC: 193.9 PG/ML (ref 15–65)
RBC # BLD AUTO: 3.29 10*6/MM3 (ref 3.77–5.28)
SODIUM SERPL-SCNC: 141 MMOL/L (ref 136–145)
STRESS TARGET HR: 136 BPM
URATE SERPL-MCNC: 7.1 MG/DL (ref 2.4–5.7)
WBC # BLD AUTO: 7.38 10*3/MM3 (ref 3.4–10.8)

## 2021-08-11 PROCEDURE — 99232 SBSQ HOSP IP/OBS MODERATE 35: CPT | Performed by: NURSE PRACTITIONER

## 2021-08-11 PROCEDURE — 63710000001 PREDNISONE PER 5 MG: Performed by: FAMILY MEDICINE

## 2021-08-11 PROCEDURE — 93356 MYOCRD STRAIN IMG SPCKL TRCK: CPT

## 2021-08-11 PROCEDURE — 94799 UNLISTED PULMONARY SVC/PX: CPT

## 2021-08-11 PROCEDURE — 93306 TTE W/DOPPLER COMPLETE: CPT | Performed by: INTERNAL MEDICINE

## 2021-08-11 PROCEDURE — 93356 MYOCRD STRAIN IMG SPCKL TRCK: CPT | Performed by: INTERNAL MEDICINE

## 2021-08-11 PROCEDURE — 25010000002 ENOXAPARIN PER 10 MG: Performed by: INTERNAL MEDICINE

## 2021-08-11 PROCEDURE — 93306 TTE W/DOPPLER COMPLETE: CPT

## 2021-08-11 PROCEDURE — 99232 SBSQ HOSP IP/OBS MODERATE 35: CPT | Performed by: INTERNAL MEDICINE

## 2021-08-11 RX ORDER — ACETAMINOPHEN 325 MG/1
650 TABLET ORAL EVERY 6 HOURS PRN
Status: DISCONTINUED | OUTPATIENT
Start: 2021-08-11 | End: 2021-08-13 | Stop reason: HOSPADM

## 2021-08-11 RX ORDER — CALCITRIOL 0.25 UG/1
0.25 CAPSULE, LIQUID FILLED ORAL DAILY
Status: DISCONTINUED | OUTPATIENT
Start: 2021-08-11 | End: 2021-08-13 | Stop reason: HOSPADM

## 2021-08-11 RX ADMIN — ISOSORBIDE DINITRATE 10 MG: 10 TABLET ORAL at 13:46

## 2021-08-11 RX ADMIN — ROSUVASTATIN CALCIUM 20 MG: 20 TABLET, FILM COATED ORAL at 09:22

## 2021-08-11 RX ADMIN — GUAIFENESIN 1200 MG: 600 TABLET, EXTENDED RELEASE ORAL at 09:22

## 2021-08-11 RX ADMIN — ASPIRIN 81 MG: 81 TABLET ORAL at 09:22

## 2021-08-11 RX ADMIN — TICAGRELOR 90 MG: 90 TABLET ORAL at 09:23

## 2021-08-11 RX ADMIN — OLANZAPINE 5 MG: 5 TABLET, FILM COATED ORAL at 20:16

## 2021-08-11 RX ADMIN — CARVEDILOL 12.5 MG: 6.25 TABLET, FILM COATED ORAL at 09:22

## 2021-08-11 RX ADMIN — IPRATROPIUM BROMIDE AND ALBUTEROL SULFATE 3 ML: 2.5; .5 SOLUTION RESPIRATORY (INHALATION) at 10:34

## 2021-08-11 RX ADMIN — FERROUS SULFATE TAB 325 MG (65 MG ELEMENTAL FE) 325 MG: 325 (65 FE) TAB at 17:18

## 2021-08-11 RX ADMIN — TICAGRELOR 90 MG: 90 TABLET ORAL at 20:16

## 2021-08-11 RX ADMIN — HYDRALAZINE HYDROCHLORIDE 25 MG: 25 TABLET, FILM COATED ORAL at 22:38

## 2021-08-11 RX ADMIN — LOSARTAN POTASSIUM 100 MG: 50 TABLET, FILM COATED ORAL at 09:21

## 2021-08-11 RX ADMIN — CALCITRIOL 0.25 MCG: 0.25 CAPSULE ORAL at 17:18

## 2021-08-11 RX ADMIN — FERROUS SULFATE TAB 325 MG (65 MG ELEMENTAL FE) 325 MG: 325 (65 FE) TAB at 09:22

## 2021-08-11 RX ADMIN — ISOSORBIDE DINITRATE 10 MG: 10 TABLET ORAL at 09:21

## 2021-08-11 RX ADMIN — ISOSORBIDE DINITRATE 10 MG: 10 TABLET ORAL at 17:18

## 2021-08-11 RX ADMIN — AZELASTINE HYDROCHLORIDE 2 SPRAY: 137 SPRAY, METERED NASAL at 20:16

## 2021-08-11 RX ADMIN — HYDRALAZINE HYDROCHLORIDE 25 MG: 25 TABLET, FILM COATED ORAL at 13:46

## 2021-08-11 RX ADMIN — NIFEDIPINE 60 MG: 60 TABLET, FILM COATED, EXTENDED RELEASE ORAL at 09:21

## 2021-08-11 RX ADMIN — AZELASTINE HYDROCHLORIDE 2 SPRAY: 137 SPRAY, METERED NASAL at 09:23

## 2021-08-11 RX ADMIN — IPRATROPIUM BROMIDE AND ALBUTEROL SULFATE 3 ML: 2.5; .5 SOLUTION RESPIRATORY (INHALATION) at 18:17

## 2021-08-11 RX ADMIN — FLUTICASONE PROPIONATE 2 SPRAY: 50 SPRAY, METERED NASAL at 20:16

## 2021-08-11 RX ADMIN — GUAIFENESIN 1200 MG: 600 TABLET, EXTENDED RELEASE ORAL at 20:16

## 2021-08-11 RX ADMIN — ACETAMINOPHEN 650 MG: 325 TABLET, FILM COATED ORAL at 13:46

## 2021-08-11 RX ADMIN — CARVEDILOL 12.5 MG: 6.25 TABLET, FILM COATED ORAL at 17:18

## 2021-08-11 RX ADMIN — HYDRALAZINE HYDROCHLORIDE 25 MG: 25 TABLET, FILM COATED ORAL at 09:23

## 2021-08-11 RX ADMIN — ENOXAPARIN SODIUM 30 MG: 30 INJECTION SUBCUTANEOUS at 20:16

## 2021-08-11 RX ADMIN — CETIRIZINE HYDROCHLORIDE 10 MG: 10 TABLET ORAL at 09:21

## 2021-08-11 RX ADMIN — PREDNISONE 10 MG: 10 TABLET ORAL at 09:22

## 2021-08-11 NOTE — PAYOR COMM NOTE
"[id number]   [phone]  8/10 CLINICAL UPDATE      Ludivina Clemons (60 y.o. Female)     Date of Birth Social Security Number Address Home Phone MRN    1960  [id number] [facility] [phone] [id number]    [employer] Marital Status          Mosque Single       Admission Date Admission Type Admitting Provider Attending Provider Department, Room/Bed    8/8/21 Emergency Orville Weston MD Martin, Aribbe Allen, MD [facility] 4B, 431/1    Discharge Date Discharge Disposition Discharge Destination                       Attending Provider: Orville Weston MD    Allergies: Codeine, Imitrex [Sumatriptan]    Isolation: None   Infection: None   Code Status: CPR    Ht: 165.1 cm (65\")   Wt: 100 kg (221 lb 3.2 oz)    Admission Cmt: None   Principal Problem: None                Active Insurance as of 8/8/2021     Primary Coverage     Payor Plan Insurance Group Employer/Plan Group    ANTH MEDICARE REPLACEMENT ANTHEM MEDICARE ADVANTAGE [id number]     Payor Plan Address Payor Plan Phone Number Payor Plan Fax Number Effective Dates    [address] [phone]  1/1/2017 - None Entered    [facility] [id number]       Subscriber Name Subscriber Birth Date Member ID       LUDIVINA CLEMONS 1960 [id number]                 Emergency Contacts      (Rel.) Home Phone Work Phone Mobile Phone    YOGI LOPEZ (Relative) [phone] -- --    kenzie thompson (Sister) [phone] -- [phone]    Joshua Clemons () (Son) -- -- [phone]              Current Facility-Administered Medications   Medication Dose Route Frequency Provider Last Rate Last Admin   • aspirin EC tablet 81 mg  81 mg Oral Daily Ren Santo MD   81 mg at 08/10/21 0909   • azelastine (ASTELIN) nasal spray 2 spray  2 spray Each Nare BID Ren Santo MD   2 spray at 08/10/21 2006   • carvedilol (COREG) tablet 12.5 mg  12.5 mg Oral BID With Meals Ren Santo MD   12.5 mg " at 08/10/21 1729   • cetirizine (zyrTEC) tablet 10 mg  10 mg Oral Daily Neeta Chavez MD   10 mg at 08/10/21 1509   • cloNIDine (CATAPRES) tablet 0.1 mg  0.1 mg Oral Q6H PRN Ren Santo MD       • enoxaparin (LOVENOX) syringe 30 mg  30 mg Subcutaneous Q24H Neeta Chavez MD   30 mg at 08/10/21 2006   • ferrous sulfate tablet 325 mg  325 mg Oral BID With Meals Ren Santo MD   325 mg at 08/10/21 1729   • fluticasone (FLONASE) 50 MCG/ACT nasal spray 2 spray  2 spray Nasal Daily PRN Ren Santo MD   2 spray at 08/10/21 2006   • guaiFENesin (MUCINEX) 12 hr tablet 1,200 mg  1,200 mg Oral Q12H Orville Weston MD   1,200 mg at 08/10/21 2005   • hydrALAZINE (APRESOLINE) tablet 25 mg  25 mg Oral Q8H Ren Santo MD   25 mg at 08/10/21 2145   • ipratropium-albuterol (DUO-NEB) nebulizer solution 3 mL  3 mL Nebulization 4x Daily - RT Orville Weston MD   3 mL at 08/10/21 2221   • isosorbide dinitrate (ISORDIL) tablet 10 mg  10 mg Oral TID - Nitrates Pierce Buchanan MD   10 mg at 08/10/21 1729   • losartan (COZAAR) tablet 100 mg  100 mg Oral Q24H Ren Santo MD   100 mg at 08/10/21 0909   • NIFEdipine XL (PROCARDIA XL) 24 hr tablet 60 mg  60 mg Oral Q24H Ren Santo MD   60 mg at 08/10/21 0909   • OLANZapine (zyPREXA) tablet 5 mg  5 mg Oral Nightly Ren Santo MD   5 mg at 08/10/21 2006   • predniSONE (DELTASONE) tablet 10 mg  10 mg Oral Daily Ren Santo MD   10 mg at 08/10/21 0909   • rosuvastatin (CRESTOR) tablet 20 mg  20 mg Oral Daily Ren Santo MD   20 mg at 08/10/21 0909   • sodium chloride 0.9 % flush 10 mL  10 mL Intravenous PRN Zackary Gutierrez MD       • ticagrelor (BRILINTA) tablet 90 mg  90 mg Oral BID Ren Santo MD   90 mg at 08/10/21 2005     Orders (last 24 hrs)      Start     Ordered    08/11/21 0600  Basic Metabolic Panel  Morning Draw      08/10/21 1038    08/11/21 0600  Uric Acid   Morning Draw      08/10/21 1746    08/11/21 0600  CBC (No Diff)  Morning Draw      08/10/21 1746    08/11/21 0000  PTH, Intact  Once      08/10/21 1746    08/10/21 2100  enoxaparin (LOVENOX) syringe 30 mg  Every 24 Hours      08/10/21 1437    08/10/21 1530  cetirizine (zyrTEC) tablet 10 mg  Daily      08/10/21 1437    08/10/21 1300  isosorbide dinitrate (ISORDIL) tablet 10 mg  3 Times Daily (Nitrates)      08/10/21 0943    08/10/21 1245  methylPREDNISolone sodium succinate (SOLU-Medrol) injection 125 mg  Once      08/10/21 1154    08/10/21 1154  XR Chest 1 View  1 Time Imaging      08/10/21 1153    08/10/21 1130  ipratropium-albuterol (DUO-NEB) nebulizer solution 3 mL  4 Times Daily - RT      08/10/21 1041    08/10/21 1130  guaiFENesin (MUCINEX) 12 hr tablet 1,200 mg  Every 12 Hours Scheduled      08/10/21 1041    08/10/21 1042  Inpatient Pulmonology Consult  Once     Specialty:  Pulmonary Disease  Provider:  Neeta Chavez MD    08/10/21 1041    08/10/21 1039  Inpatient Nephrology Consult  Once     Specialty:  Nephrology  Provider:  Parker Whitfield MD    08/10/21 1038    08/10/21 0944  Adult Transthoracic Echo Complete W/ Cont if Necessary Per Protocol  Once     Comments: Myocardial strain to be performed during echocardiogram as long as technically feasible    08/10/21 0943    08/09/21 1730  furosemide (LASIX) injection 20 mg  Every 12 Hours,   Status:  Discontinued      08/09/21 1639    08/08/21 2200  hydrALAZINE (APRESOLINE) tablet 25 mg  Every 8 Hours Scheduled      08/08/21 1957 08/08/21 2100  azelastine (ASTELIN) nasal spray 2 spray  2 Times Daily      08/08/21 1957 08/08/21 2100  OLANZapine (zyPREXA) tablet 5 mg  Nightly      08/08/21 1958 08/08/21 2100  ticagrelor (BRILINTA) tablet 90 mg  2 Times Daily      08/08/21 1958 08/08/21 2100  enoxaparin (LOVENOX) syringe 40 mg  Every 24 Hours,   Status:  Discontinued      08/08/21 1958 08/08/21 2045  aspirin EC tablet 81 mg  Daily       08/08/21 1957 08/08/21 2045  carvedilol (COREG) tablet 12.5 mg  2 Times Daily With Meals      08/08/21 1957 08/08/21 2045  ferrous sulfate tablet 325 mg  2 Times Daily With Meals      08/08/21 1957 08/08/21 2045  losartan (COZAAR) tablet 100 mg  Every 24 Hours Scheduled      08/08/21 1957 08/08/21 2045  NIFEdipine XL (PROCARDIA XL) 24 hr tablet 60 mg  Every 24 Hours Scheduled      08/08/21 1957 08/08/21 2045  predniSONE (DELTASONE) tablet 10 mg  Daily      08/08/21 1958 08/08/21 2045  rosuvastatin (CRESTOR) tablet 20 mg  Daily      08/08/21 1958 08/08/21 1957  fluticasone (FLONASE) 50 MCG/ACT nasal spray 2 spray  Daily PRN      08/08/21 1957 08/08/21 1956  cloNIDine (CATAPRES) tablet 0.1 mg  Every 6 Hours PRN      08/08/21 1957 08/08/21 1345  sodium chloride 0.9 % flush 10 mL  As Needed      08/08/21 1346    --  SCANNED - TELEMETRY        08/08/21 0000    --  SCANNED - TELEMETRY        08/08/21 0000    --  SCANNED - TELEMETRY        08/08/21 0000    --  SCANNED - TELEMETRY        08/08/21 0000    --  SCANNED - TELEMETRY        08/08/21 0000    --  SCANNED - TELEMETRY        08/08/21 0000    --  SCANNED - TELEMETRY        08/08/21 0000    --  SCANNED - TELEMETRY        08/08/21 0000    --  SCANNED EKG      08/08/21 0000    --  SCANNED - TELEMETRY        08/08/21 0000    --  SCANNED - TELEMETRY        08/08/21 0000    --  SCANNED - TELEMETRY        08/08/21 0000    --  SCANNED - TELEMETRY        08/08/21 0000    --  SCANNED - TELEMETRY        08/08/21 0000    --  SCANNED - TELEMETRY        08/08/21 0000    --  hydrALAZINE (APRESOLINE) 50 MG tablet  Every 8 Hours      08/09/21 1433    --  carvedilol (COREG) 25 MG tablet  2 Times Daily With Meals      08/09/21 1433    --  SCANNED - TELEMETRY        08/08/21 0000    --  SCANNED - TELEMETRY        08/08/21 0000    --  SCANNED - TELEMETRY        08/08/21 0000    --  SCANNED - TELEMETRY        08/08/21 0000    --  SCANNED - TELEMETRY         "08/08/21 0000                   Physician Progress Notes (last 48 hours) (Notes from 08/09/21 0804 through 08/11/21 0804)      Orville Weston MD at 08/10/21 1039          Progress Note  Ludivina Ramirez  8/10/2021 10:39 CDT  Subjective:   Admit Date:   8/8/2021      CC/ADMIT DX:       Interval History:   Reviewed overnight events and nursing notes. Her SOA is stable. No CP.   I have reviewed all labs/diagnostics from the last 24hrs.       ROS:   I have done a 10 point ROS and all are negative, except what is mentioned in the HPI.    Diet Regular; Cardiac    Medications:      aspirin, 81 mg, Oral, Daily  azelastine, 2 spray, Each Nare, BID  carvedilol, 12.5 mg, Oral, BID With Meals  enoxaparin, 40 mg, Subcutaneous, Q24H  ferrous sulfate, 325 mg, Oral, BID With Meals  hydrALAZINE, 25 mg, Oral, Q8H  isosorbide dinitrate, 10 mg, Oral, TID - Nitrates  losartan, 100 mg, Oral, Q24H  NIFEdipine CC, 60 mg, Oral, Q24H  OLANZapine, 5 mg, Oral, Nightly  predniSONE, 10 mg, Oral, Daily  rosuvastatin, 20 mg, Oral, Daily  ticagrelor, 90 mg, Oral, BID            Objective:   Vitals: /75 (BP Location: Right arm, Patient Position: Lying)   Pulse 66   Temp 97.3 °F (36.3 °C) (Oral)   Resp 18   Ht 165.1 cm (65\")   Wt 104 kg (229 lb 1.6 oz)   SpO2 93%   BMI 38.12 kg/m²      Intake/Output Summary (Last 24 hours) at 8/10/2021 1039  Last data filed at 8/10/2021 1028  Gross per 24 hour   Intake 600 ml   Output 1000 ml   Net -400 ml     General appearance: alert and cooperative with exam  Lungs: coarse  Heart: RRR  Abdomen: soft, non-tender; bowel sounds normal; no masses,  no organomegaly  Extremities: extremities normal, atraumatic, no cyanosis or edema  Neurologic:  No obvious focal neurologic deficits.    Assessment and Plan:     Acute on chronic diastolic congestive heart failure (CMS/HCC)    Acute on Chronic Kidney disease    CAD    HTN    COPD    Plan:  1.  Continue present medication(s)   2.  Consult Pulm, Consult " Nephrology  3.   Appreciate Cardiology assessment  4.   Stop Lasix with increase in renal function and ask Nephrology to assess  5.  Continue treatment of HTN      Discharge planning:   her home     Reviewed treatment plans with the patient and/or family.             Electronically signed by Orville Weston MD on 8/10/2021 at 10:39 CDT    Electronically signed by Orville Weston MD at 08/10/21 1040          Consult Notes (last 48 hours) (Notes from 08/09/21 0804 through 08/11/21 0804)      Parker Whitfield MD at 08/10/21 1730      Consult Orders    1. Inpatient Nephrology Consult [[id number]] ordered by Orville Weston MD at 08/10/21 1038               Nephrology ([facility]) Consult Note      Patient:  Ludivina Ramirez  YOB: 1960  Date of Service: 8/10/2021  MRN: [id number]   Acct: [id number]   Primary Care Physician: Orville Weston MD  Advance Directive:   Code Status and Medical Interventions:   Ordered at: 08/08/21 1956     Code Status:    CPR     Medical Interventions (Level of Support Prior to Arrest):    Full     Admit Date: 8/8/2021       Hospital Day: 2  Referring Provider: Orville Weston MD      Patient Seen, Chart, Consults, Notes, Labs, Radiology studies reviewed.      Subjective:  Ludivina Ramirez is a 60 y.o. female  whom we were consulted for acute kidney injury.  Patient has a history of asthma, COPD and congestive heart failure.  She is also obese and has obstructive sleep apnea.  She has recurrent episodes of admission for acute respiratory failure and congestive heart failure exacerbation.  Patient is admitted at this time with worsening dyspnea.  She received the Lasix for diuresis.  She continues to be dyspneic and has been using his CPAP.  Her serum creatinine ranges between 2.1-2.4.  On admission on August 8 her serum creatinine was 1.8.  It has since risen to 2.6 after diuretics.  Patient's Lasix was stopped.  He is  currently receiving hydration by mouth.  Denies any change to her urine habits.  Needed occasional use of NSAIDs.  She has no dysuria.    Allergies:  Codeine and Imitrex [sumatriptan]    Home Meds:  Medications Prior to Admission   Medication Sig Dispense Refill Last Dose   • carvedilol (COREG) 25 MG tablet Take 25 mg by mouth 2 (Two) Times a Day With Meals.      • hydrALAZINE (APRESOLINE) 50 MG tablet Take 75 mg by mouth Every 8 (Eight) Hours.      • aspirin 81 MG EC tablet Take 1 tablet by mouth Daily.      • azelastine (ASTELIN) 0.1 % nasal spray 2 sprays into the nostril(s) as directed by provider 2 (Two) Times a Day. Use in each nostril as directed       • cloNIDine (CATAPRES) 0.1 MG tablet Take 1 tablet by mouth Every 6 (Six) Hours As Needed for High Blood Pressure (BP >180/100). 30 tablet 1    • Ergocalciferol (Vitamin D2) 50 MCG (2000 UT) tablet Take 1,000 Units by mouth Daily.      • ferrous sulfate 325 (65 FE) MG tablet Take 1 tablet by mouth 2 (Two) Times a Day With Meals. 60 tablet 0    • fluticasone (FLONASE) 50 MCG/ACT nasal spray 1 spray into the nostril(s) as directed by provider Every Morning. In each nostril      • losartan (COZAAR) 100 MG tablet Take 1 tablet by mouth Daily. 90 tablet 3    • NIFEdipine XL (ADALAT CC) 60 MG 24 hr tablet Take 1 tablet by mouth 2 (two) times a day. 60 tablet 1    • nitroglycerin (NITROSTAT) 0.4 MG SL tablet Place 1 tablet under the tongue Every 5 (Five) Minutes As Needed for Chest Pain for up to 3 doses. Take no more than 3 doses in 15 minutes. 25 tablet 0    • OLANZapine (zyPREXA) 5 MG tablet Take 1 tablet by mouth 2 (two) times a day. 60 tablet 0    • pantoprazole (PROTONIX) 40 MG EC tablet Take 1 tablet by mouth Daily. 30 tablet 1    • rosuvastatin (CRESTOR) 20 MG tablet Take 1 tablet by mouth Daily. 90 tablet 3    • ticagrelor (BRILINTA) 90 MG tablet tablet Take 1 tablet by mouth 2 (Two) Times a Day. 60 tablet 11        Medicines:  Current Facility-Administered  Medications   Medication Dose Route Frequency Provider Last Rate Last Admin   • aspirin EC tablet 81 mg  81 mg Oral Daily Ren Santo MD   81 mg at 08/10/21 0909   • azelastine (ASTELIN) nasal spray 2 spray  2 spray Each Nare BID Ren Santo MD   2 spray at 08/10/21 0910   • carvedilol (COREG) tablet 12.5 mg  12.5 mg Oral BID With Meals Ren Santo MD   12.5 mg at 08/10/21 1729   • cetirizine (zyrTEC) tablet 10 mg  10 mg Oral Daily Neeta Chavez MD   10 mg at 08/10/21 1509   • cloNIDine (CATAPRES) tablet 0.1 mg  0.1 mg Oral Q6H PRN Ren Santo MD       • enoxaparin (LOVENOX) syringe 30 mg  30 mg Subcutaneous Q24H Neeta Chavez MD       • ferrous sulfate tablet 325 mg  325 mg Oral BID With Meals Ren Santo MD   325 mg at 08/10/21 1729   • fluticasone (FLONASE) 50 MCG/ACT nasal spray 2 spray  2 spray Nasal Daily PRN Ren Santo MD       • guaiFENesin (MUCINEX) 12 hr tablet 1,200 mg  1,200 mg Oral Q12H Orville Weston MD   1,200 mg at 08/10/21 1214   • hydrALAZINE (APRESOLINE) tablet 25 mg  25 mg Oral Q8H Ren Santo MD   25 mg at 08/10/21 1509   • ipratropium-albuterol (DUO-NEB) nebulizer solution 3 mL  3 mL Nebulization 4x Daily - RT Orville Weston MD   3 mL at 08/10/21 1433   • isosorbide dinitrate (ISORDIL) tablet 10 mg  10 mg Oral TID - Nitrates Pierce Buchanan MD   10 mg at 08/10/21 1729   • losartan (COZAAR) tablet 100 mg  100 mg Oral Q24H Ren Santo MD   100 mg at 08/10/21 0909   • NIFEdipine XL (PROCARDIA XL) 24 hr tablet 60 mg  60 mg Oral Q24H Ren Santo MD   60 mg at 08/10/21 0909   • OLANZapine (zyPREXA) tablet 5 mg  5 mg Oral Nightly Ren Santo MD   5 mg at 08/09/21 2041   • predniSONE (DELTASONE) tablet 10 mg  10 mg Oral Daily Ren Santo MD   10 mg at 08/10/21 0909   • rosuvastatin (CRESTOR) tablet 20 mg  20 mg Oral Daily Ren Santo MD   20 mg at 08/10/21 0909    • sodium chloride 0.9 % flush 10 mL  10 mL Intravenous PRN Zackary Gutierrez MD       • ticagrelor (BRILINTA) tablet 90 mg  90 mg Oral BID Ren Santo MD   90 mg at 08/10/21 0909       Past Medical History:  Past Medical History:   Diagnosis Date   • Acute CHF (CMS/HCC)    • Acute renal failure (ARF) (CMS/HCC)    • Anemia    • Asthma     childhood   • Hypertension    • Ischemic colitis (CMS/HCC)    • Metabolic acidosis    • Nonrheumatic aortic (valve) insufficiency    • PTSD (post-traumatic stress disorder)        Past Surgical History:  Past Surgical History:   Procedure Laterality Date   • CARDIAC CATHETERIZATION N/A 11/8/2020    Procedure: Left Heart Cath;  Surgeon: Pierce Buchanan MD;  Location: [facility] CATH INVASIVE LOCATION;  Service: Cardiovascular;  Laterality: N/A;   • EXTERNAL FIXATION WRIST FRACTURE     • LEG SURGERY     • TUBAL ABDOMINAL LIGATION         Family History  Family History   Problem Relation Age of Onset   • Coronary artery disease Mother    • Coronary artery disease Father        Social History  Social History     Socioeconomic History   • Marital status: Single     Spouse name: Not on file   • Number of children: Not on file   • Years of education: Not on file   • Highest education level: Not on file   Tobacco Use   • Smoking status: Current Every Day Smoker     Packs/day: 0.50   • Smokeless tobacco: Never Used   Vaping Use   • Vaping Use: Never used   Substance and Sexual Activity   • Alcohol use: No   • Drug use: No   • Sexual activity: Defer         Review of Systems:  History obtained from chart review and the patient  General ROS: No fever or chills  Respiratory ROS: No cough, +shortness of breath, +wheezing  Cardiovascular ROS: no chest pain but dyspnea on exertion  Gastrointestinal ROS: No abdominal pain or melena  Genito-Urinary ROS: No dysuria or hematuria  14 point ROS reviewed with the patient and negative except as noted above and in the HPI unless unable to  "obtain.    Objective:  /93 (BP Location: Left arm, Patient Position: Sitting) Comment: RN notified  Pulse 68   Temp 98.6 °F (37 °C) (Axillary)   Resp 20   Ht 165.1 cm (65\")   Wt 104 kg (229 lb 1.6 oz)   SpO2 99%   BMI 38.12 kg/m²     Intake/Output Summary (Last 24 hours) at 8/10/2021 1731  Last data filed at 8/10/2021 1730  Gross per 24 hour   Intake 600 ml   Output 300 ml   Net 300 ml     General: awake/alert    Head: Atraumatic, normocephalic  Neck: No masses, no jugular venous distention  Chest:  Bilateral air entry with scattered rales and decreased breath sounds at the bases  CVS: regular rate and rhythm  Abdominal: soft, nontender, normal bowel sounds  Extremities: No cyanosis, no peripheral edema  Skin: warm and dry without rash  Neuro: No focal motor deficits  Musculoskeletal: No obvious joint effusions    Labs:  Lab Results (last 72 hours)     Procedure Component Value Units Date/Time    Blood Culture - Blood, Arm, Left [[id number]] Collected: 08/08/21 1403    Specimen: Blood from Arm, Left Updated: 08/10/21 1445     Blood Culture No growth at 2 days    Blood Culture - Blood, Arm, Left [[id number]] Collected: 08/08/21 1403    Specimen: Blood from Arm, Left Updated: 08/10/21 1445     Blood Culture No growth at 2 days    Basic Metabolic Panel [[id number]]  (Abnormal) Collected: 08/10/21 0609    Specimen: Blood Updated: 08/10/21 0704     Glucose 131 mg/dL      BUN 46 mg/dL      Creatinine 2.66 mg/dL      Sodium 144 mmol/L      Potassium 3.6 mmol/L      Chloride 105 mmol/L      CO2 28.0 mmol/L      Calcium 8.0 mg/dL      eGFR Non African Amer 18 mL/min/1.73      BUN/Creatinine Ratio 17.3     Anion Gap 11.0 mmol/L     Narrative:      GFR Normal >60  Chronic Kidney Disease <60  Kidney Failure <15      Troponin [[id number]]  (Abnormal) Collected: 08/08/21 1604    Specimen: Blood Updated: 08/08/21 1636     Troponin T 0.031 ng/mL     Narrative:      Troponin T Reference Range:  <= 0.03 ng/mL-   Negative for " AMI  >0.03 ng/mL-     Abnormal for myocardial necrosis.  Clinicians would have to utilize clinical acumen, EKG, Troponin and serial changes to determine if it is an Acute Myocardial Infarction or myocardial injury due to an underlying chronic condition.       Results may be falsely decreased if patient taking Biotin.      COVID-19,[facility] [employer] IN-HOUSE,Nasal Swab No Transport Media 3-4 HR TAT - Swab, Nasal Cavity [[id number]]  (Normal) Collected: 08/08/21 1404    Specimen: Swab from Nasal Cavity Updated: 08/08/21 1529     COVID19 Not Detected    Narrative:      Fact sheet for providers: https://www.fda.gov/media/079850/download     Fact sheet for patients: https://www.fda.gov/media/614258/download    Test performed by PCR.    Consider negative results in combination with clinical observations, patient history, and epidemiological information.    BNP [[id number]]  (Abnormal) Collected: 08/08/21 1403    Specimen: Blood Updated: 08/08/21 1509     proBNP >70,000.0 pg/mL     Narrative:      Among patients with dyspnea, NT-proBNP is highly sensitive for the detection of acute congestive heart failure. In addition NT-proBNP of <300 pg/ml effectively rules out acute congestive heart failure with 99% negative predictive value.    Results may be falsely decreased if patient taking Biotin.      Procalcitonin [[id number]]  (Normal) Collected: 08/08/21 1403    Specimen: Blood Updated: 08/08/21 1502     Procalcitonin 0.09 ng/mL     Narrative:      As a Marker for Sepsis (Non-Neonates):     1. <0.5 ng/mL represents a low risk of severe sepsis and/or septic shock.  2. >2 ng/mL represents a high risk of severe sepsis and/or septic shock.    As a Marker for Lower Respiratory Tract Infections that require antibiotic therapy:  PCT on Admission     Antibiotic Therapy             6-12 Hrs later  >0.5                          Strongly Recommended            >0.25 - <0.5             Recommended  0.1 - 0.25                  Discouraged              "          Remeasure/reassess PCT  <0.1                         Strongly Discouraged         Remeasure/reassess PCT      As 28 day mortality risk marker: \"Change in Procalcitonin Result\" (>80% or <=80%) if Day 0 (or Day 1) and Day 4 values are available. Refer to http://www.Protein BarCleveland Area Hospital – ClevelandReal Time Translationpct-calculator.com/    Change in PCT <=80 %   A decrease of PCT levels below or equal to 80% defines a positive change in PCT test result representing a higher risk for 28-day all-cause mortality of patients diagnosed with severe sepsis or septic shock.    Change in PCT >80 %   A decrease of PCT levels of more than 80% defines a negative change in PCT result representing a lower risk for 28-day all-cause mortality of patients diagnosed with severe sepsis or septic shock.                Comprehensive Metabolic Panel [[id number]]  (Abnormal) Collected: 08/08/21 1403    Specimen: Blood Updated: 08/08/21 1457     Glucose 122 mg/dL      BUN 32 mg/dL      Creatinine 1.83 mg/dL      Sodium 143 mmol/L      Potassium 4.0 mmol/L      Chloride 107 mmol/L      CO2 25.0 mmol/L      Calcium 8.7 mg/dL      Total Protein 6.1 g/dL      Albumin 3.40 g/dL      ALT (SGPT) 21 U/L      AST (SGOT) 18 U/L      Alkaline Phosphatase 93 U/L      Total Bilirubin 0.8 mg/dL      eGFR Non African Amer 28 mL/min/1.73      Globulin 2.7 gm/dL      A/G Ratio 1.3 g/dL      BUN/Creatinine Ratio 17.5     Anion Gap 11.0 mmol/L     Narrative:      GFR Normal >60  Chronic Kidney Disease <60  Kidney Failure <15      C-reactive Protein [[id number]]  (Abnormal) Collected: 08/08/21 1403    Specimen: Blood Updated: 08/08/21 1456     C-Reactive Protein 1.71 mg/dL     Troponin [[id number]]  (Abnormal) Collected: 08/08/21 1403    Specimen: Blood Updated: 08/08/21 1455     Troponin T 0.034 ng/mL     Narrative:      Troponin T Reference Range:  <= 0.03 ng/mL-   Negative for AMI  >0.03 ng/mL-     Abnormal for myocardial necrosis.  Clinicians would have to utilize clinical acumen, EKG, Troponin " and serial changes to determine if it is an Acute Myocardial Infarction or myocardial injury due to an underlying chronic condition.       Results may be falsely decreased if patient taking Biotin.      Lactic Acid, Plasma [[id number]]  (Normal) Collected: 08/08/21 1403    Specimen: Blood Updated: 08/08/21 1449     Lactate 0.6 mmol/L     Protime-INR [[id number]]  (Normal) Collected: 08/08/21 1403    Specimen: Blood Updated: 08/08/21 1444     Protime 13.0 Seconds      INR 1.06    D-dimer, Quantitative [[id number]]  (Abnormal) Collected: 08/08/21 1403    Specimen: Blood Updated: 08/08/21 1444     D-Dimer, Quantitative 1.09 mg/L (FEU)     Narrative:      Reference Range is 0-0.50 mg/L FEU. However, results <0.50 mg/L FEU tends to rule out DVT or PE. Results >0.50 mg/L FEU are not useful in predicting absence or presence of DVT or PE.      CBC & Differential [[id number]]  (Abnormal) Collected: 08/08/21 1403    Specimen: Blood Updated: 08/08/21 1433    Narrative:      The following orders were created for panel order CBC & Differential.  Procedure                               Abnormality         Status                     ---------                               -----------         ------                     CBC Auto Differential[[id number]]        Abnormal            Final result                 Please view results for these tests on the individual orders.    CBC Auto Differential [[id number]]  (Abnormal) Collected: 08/08/21 1403    Specimen: Blood Updated: 08/08/21 1433     WBC 9.59 10*3/mm3      RBC 3.45 10*6/mm3      Hemoglobin 10.4 g/dL      Hematocrit 32.6 %      MCV 94.5 fL      MCH 30.1 pg      MCHC 31.9 g/dL      RDW 15.9 %      RDW-SD 54.4 fl      MPV 11.6 fL      Platelets 130 10*3/mm3      Neutrophil % 84.7 %      Lymphocyte % 7.0 %      Monocyte % 6.2 %      Eosinophil % 1.0 %      Basophil % 0.5 %      Neutrophils, Absolute 8.12 10*3/mm3      Lymphocytes, Absolute 0.67 10*3/mm3      Monocytes, Absolute 0.59  10*3/mm3      Eosinophils, Absolute 0.10 10*3/mm3      Basophils, Absolute 0.05 10*3/mm3     Blood Gas, Arterial - [[id number]]  (Abnormal) Collected: 08/08/21 1350    Specimen: Arterial Blood Updated: 08/08/21 1404     Site Right Brachial     Guido's Test N/A     pH, Arterial 7.408 pH units      pCO2, Arterial 46.5 mm Hg      Comment: 83 Value above reference range        pO2, Arterial 99.1 mm Hg      HCO3, Arterial 29.3 mmol/L      Comment: 83 Value above reference range        Base Excess, Arterial 4.0 mmol/L      Comment: 83 Value above reference range        O2 Saturation, Arterial 98.5 %      Temperature 37.0 C      Barometric Pressure for Blood Gas 750 mmHg      Modality Nasal Cannula     Flow Rate 6.0 lpm      Ventilator Mode NA     Collected by [id number]     Comment: Meter: [id number]     :  [id number]        pCO2, Temperature Corrected 46.5 mm Hg      pH, Temp Corrected 7.408 pH Units      pO2, Temperature Corrected 99.1 mm Hg           Radiology:   Imaging Results (Last 72 Hours)     Procedure Component Value Units Date/Time    XR Chest 1 View [[id number]] Collected: 08/10/21 1334     Updated: 08/10/21 1339    Narrative:      EXAM: XR CHEST 1 VW-     INDICATION: Worsening shortness of breath, congestive heart failure     COMPARISON: The eighth 2021     FINDINGS:     Cardiac silhouette is mildly enlarged but stable. No change in hazy  opacity in the LEFT lung base and linear LEFT midlung zone opacity.  Diffuse interstitial opacity/coarsening is unchanged. No new airspace  opacity. No visible pneumothorax. Faint granulomatous calcification in  the RIGHT upper lung is unchanged. No large pleural effusion. No acute  osseous finding.       Impression:         No significant change in appearance of the chest.  This report was finalized on 08/10/2021 13:36 by Dr. Zac Schwarz MD.    XR Chest 1 View [[id number]] Collected: 08/08/21 1417     Updated: 08/08/21 1423    Narrative:      Exam:   XR CHEST 1 VW-        Date:  8/8/2021      History:  Female, age  60 years;dyspnea     COMPARISON:  Chest x-ray dated 7/21/2021.     Findings :     Borderline size of the cardiomediastinal silhouette, unchanged. Mild  prominence of central pleural vasculature. Chronic interstitial lung  changes redemonstrated. Linear opacity in the left midlung zone, may  reflect atelectasis versus scarring. No measurable pneumothorax. No  pleural effusion.  The bones show no acute pathology.         Impression:      Impression:     Borderline size of the cardiomediastinal silhouette with mild prominence  central pulmonary vasculature. Consider mild/early fluid overload.     This report was finalized on 08/08/2021 14:20 by Dr. Janet Geller MD.          Culture:  No components found for: WOUNDCUL, 3  No components found for: CSFCUL, 3  No components found for: BC, 3  No components found for: URINECUL, 3      Assessment   -Acute kidney injury-likely prerenal azotemia versus ATN  -Chronic kidney disease stage IIIb  -Hypertension with nephropathy  -Congestive heart failure  -Asthma/COPD  -Morbid obesity    Plan:  At this time, I agree with oral hydration and holding diuretics.  Avoid hypotension and nephrotoxins.  Patient had a recent renal ultrasound in July.  If the renal function fails to improve, may consider IV fluids.  Continue respiratory management.  Will check uric acid level and PTH.      Thank you for the consult, we appreciate the opportunity to provide care to your patients.  Feel free to contact me if I can be of any further assistance.      Parker Whitfield MD  8/10/2021  17:31 CDT    Electronically signed by Parker Whitfield MD at 08/10/21 1742     Neeta Chavez MD at 08/10/21 1154      Consult Orders    1. Inpatient Pulmonology Consult [[id number]] ordered by Orville Weston MD at 08/10/21 1041                     PULMONARY & CRITICAL CARE CONSULT - [facility]    08/10/21, 11:54 CDT  Patient Care  "Team:  Orville Weston MD as PCP - General (Family Medicine)  Juanpablo Rea MD as Consulting Physician (Gastroenterology)  Nickolas Alegria MD as Consulting Physician (Gastroenterology)  Name: Ludivina Ramirez  : 1960  MRN: [id number]  Contact Serial Number [id number]    Chief complaint: SOA, Wheezing    HPI:  We have been consulted by Orville Weston MD to see this 60 y.o. female born on 1960.  Patient complains of dyspnea and wheezing in the chest for a few days. Severity: moderate.  Aggravating factors: walking.   Alleviating factors: medication(s) (albuterol and atrovent) Associated symptoms: chest pain. Sputum is mucoid.  Patient currently is not on home oxygen therapy.. Respiratory history: no history of pneumonia or bronchitis patient was just discharged from this facility on  for similar complaints.  She returned through the emergency room on  with complaints of shortness of breath wheezing and chest discomfort.  She denies any fever, chills or night sweats.  She is coughing some mucus.  She has been a longtime smoker until recently per her reports.  She denies any reflux or choking issues.  Some edema in her abdomen and lower extremities.  Positive for orthopnea.  Documented history of COPD although there are no PFTs on file for review.  We have encountered this patient on previous hospitalizations for respiratory failure requiring mechanical ventilation.  Patient improved with oxygenation and diuresis.  She denies having any inhaler therapy, O2 therapy or BiPAP therapy in the outpatient setting.  She denies having to take antibiotics or steroids frequently for her breathing.  Currently she is breathing comfortably on BiPAP 16/6 and FiO2 0.3.  She reports her shortness of breath and wheezing has improved since admission and just \"has not gone away entirely\".  She was just given a breathing treatment.  She thinks it may have helped \"some\".    Past Medical History:   " has a past medical history of Acute CHF (CMS/[facility]), Acute renal failure (ARF) (CMS/HCC), Anemia, Asthma, Hypertension, Ischemic colitis (CMS/HCC), Metabolic acidosis, Nonrheumatic aortic (valve) insufficiency, and PTSD (post-traumatic stress disorder).   has a past surgical history that includes Tubal ligation; External fixation wrist fracture; Leg Surgery; and Cardiac catheterization (N/A, 11/8/2020).  Allergies   Allergen Reactions   • Codeine Nausea And Vomiting   • Imitrex [Sumatriptan] Other (See Comments)     HA     Medications:  aspirin, 81 mg, Oral, Daily  azelastine, 2 spray, Each Nare, BID  carvedilol, 12.5 mg, Oral, BID With Meals  enoxaparin, 40 mg, Subcutaneous, Q24H  ferrous sulfate, 325 mg, Oral, BID With Meals  guaiFENesin, 1,200 mg, Oral, Q12H  hydrALAZINE, 25 mg, Oral, Q8H  ipratropium-albuterol, 3 mL, Nebulization, 4x Daily - RT  isosorbide dinitrate, 10 mg, Oral, TID - Nitrates  losartan, 100 mg, Oral, Q24H  methylPREDNISolone sodium succinate, 125 mg, Intravenous, Once  NIFEdipine CC, 60 mg, Oral, Q24H  OLANZapine, 5 mg, Oral, Nightly  predniSONE, 10 mg, Oral, Daily  rosuvastatin, 20 mg, Oral, Daily  ticagrelor, 90 mg, Oral, BID         Family History:  Family History   Problem Relation Age of Onset   • Coronary artery disease Mother    • Coronary artery disease Father      Social History:   reports that she has been smoking. She has been smoking about 0.50 packs per day. She has never used smokeless tobacco. She reports that she does not drink alcohol and does not use drugs.  Review of Systems:  Review of Systems   Constitutional: Positive for activity change, fatigue and unexpected weight change. Negative for chills and fever.   HENT: Positive for congestion. Negative for nosebleeds, postnasal drip, rhinorrhea and trouble swallowing.    Eyes: Negative for pain, discharge and itching.   Respiratory: Positive for cough, chest tightness, shortness of breath and wheezing.    Cardiovascular:  Positive for chest pain and leg swelling. Negative for palpitations.   Gastrointestinal: Negative for abdominal distention, constipation, diarrhea, nausea and vomiting.   Endocrine: Negative for polydipsia, polyphagia and polyuria.   Genitourinary: Negative for dysuria, frequency and urgency.   Musculoskeletal: Negative for arthralgias, back pain and myalgias.   Skin: Negative for color change, pallor and rash.   Allergic/Immunologic: Negative for environmental allergies, food allergies and immunocompromised state.   Neurological: Negative for dizziness, speech difficulty, weakness and light-headedness.   Hematological: Negative for adenopathy. Does not bruise/bleed easily.   Psychiatric/Behavioral: Negative for agitation and hallucinations. The patient is not nervous/anxious and is not hyperactive.       Physical Exam:  Temp:  [97.3 °F (36.3 °C)-98.5 °F (36.9 °C)] 98.5 °F (36.9 °C)  Heart Rate:  [65-87] 87  Resp:  [18-24] 20  BP: (133-158)/(75-90) 140/80    Intake/Output Summary (Last 24 hours) at 8/10/2021 1154  Last data filed at 8/10/2021 1028  Gross per 24 hour   Intake 600 ml   Output 1000 ml   Net -400 ml         08/08/21  1346 08/08/21  1841 08/09/21  1943   Weight: 99.8 kg (220 lb) 104 kg (228 lb 12.8 oz) 104 kg (229 lb 1.6 oz)     SpO2 Percentage    08/10/21 0724 08/10/21 1120 08/10/21 1152   SpO2: 93% 97% 96%     Physical Exam  Constitutional:       Appearance: She is obese. She is ill-appearing.      Interventions: Face mask in place.   HENT:      Head: Normocephalic and atraumatic.      Left Ear: External ear normal.   Eyes:      General:         Right eye: No discharge.         Left eye: No discharge.      Conjunctiva/sclera: Conjunctivae normal.      Pupils: Pupils are equal, round, and reactive to light.   Cardiovascular:      Rate and Rhythm: Normal rate and regular rhythm.      Heart sounds: No murmur heard.     Pulmonary:      Effort: No tachypnea, accessory muscle usage or respiratory distress.       Comments: Faint expiratory wheeze on the right  Abdominal:      General: Abdomen is flat. Bowel sounds are normal. There is distension.      Palpations: Abdomen is soft.   Musculoskeletal:      Cervical back: Normal range of motion and neck supple. No rigidity.      Right lower leg: Edema present.      Left lower leg: Edema present.      Comments: Trace   Skin:     General: Skin is warm and dry.      Coloration: Skin is not jaundiced or pale.   Neurological:      General: No focal deficit present.      Mental Status: She is alert and oriented to person, place, and time. Mental status is at baseline.   Psychiatric:         Mood and Affect: Mood normal.         Behavior: Behavior normal.         Thought Content: Thought content normal.       Results from last 7 days   Lab Units 08/08/21  1403   WBC 10*3/mm3 9.59   HEMOGLOBIN g/dL 10.4*   PLATELETS 10*3/mm3 130*     Results from last 7 days   Lab Units 08/10/21  0609 08/08/21  1403   SODIUM mmol/L 144 143   POTASSIUM mmol/L 3.6 4.0   CO2 mmol/L 28.0 25.0   BUN mg/dL 46* 32*   CREATININE mg/dL 2.66* 1.83*   GLUCOSE mg/dL 131* 122*     Results from last 7 days   Lab Units 08/08/21  1350   PH, ARTERIAL pH units 7.408   PCO2, ARTERIAL mm Hg 46.5*   PO2 ART mm Hg 99.1     Lab Results   Component Value Date    PROBNP >70,000.0 (H) 08/08/2021     Blood Culture   Date Value Ref Range Status   08/08/2021 No growth at 24 hours  Preliminary   08/08/2021 No growth at 24 hours  Preliminary     Recent radiology:   Imaging Results (Last 72 Hours)     Procedure Component Value Units Date/Time    XR Chest 1 View [[id number]] Collected: 08/08/21 1417     Updated: 08/08/21 1423    Narrative:      Exam:   XR CHEST 1 VW-       Date:  8/8/2021      History:  Female, age  60 years;dyspnea     COMPARISON:  Chest x-ray dated 7/21/2021.     Findings :     Borderline size of the cardiomediastinal silhouette, unchanged. Mild  prominence of central pleural vasculature. Chronic interstitial  lung  changes redemonstrated. Linear opacity in the left midlung zone, may  reflect atelectasis versus scarring. No measurable pneumothorax. No  pleural effusion.  The bones show no acute pathology.         Impression:      Impression:     Borderline size of the cardiomediastinal silhouette with mild prominence  central pulmonary vasculature. Consider mild/early fluid overload.     This report was finalized on 08/08/2021 14:20 by Dr. Janet Geller MD.        My radiograph interpretation/independent review of imaging: Chest x-ray from 2 days ago showing pulmonary vascular congestion, chronic interstitial changes and some atelectasis versus scarring on the left    Other test results (not lab or imaging): Results for orders placed during the hospital encounter of 11/07/20    Adult Transthoracic Echo Limited W/ Cont if Necessary Per Protocol    Interpretation Summary  · Left ventricular systolic function is low normal. Left ventricular ejection fraction appears to be 51 - 55%.  · The following left ventricular wall segments are hypokinetic: apical anterior, apical lateral, apical inferior, apical septal and apex hypokinetic.  · Left ventricular wall thickness is consistent with mild concentric hypertrophy.  · Mild aortic valve regurgitation is present.  · Estimated right ventricular systolic pressure from tricuspid regurgitation is markedly elevated (>55 mmHg).  Ejection fraction 51 to 55%, hypertrophy on the left, pulmonary hypertension    Independent review of ekg: Normal sinus, rate 79, nonspecific ST and T wave changes, QT interval 497    Problem List as identified by Epic (may contain historical, inactive problems)  Patient Active Problem List   Diagnosis   • Hypertensive urgency   • Iron deficiency anemia   • Cigarette smoker   • Nonrheumatic aortic valve insufficiency   • Acute kidney injury superimposed on CKD (CMS/HCC)   • Colitis, ischemic (CMS/HCC)   • Family hx colonic polyps   • Stage 3 chronic kidney  disease (CMS/formerly [facility])   • Shortness of breath   • Noncompliance   • ST elevation myocardial infarction involving left anterior descending (LAD) coronary artery (CMS/formerly [facility])   • Essential hypertension   • Acute on chronic diastolic heart failure (CMS/formerly [facility])   • Coronary artery disease of native artery of native heart with stable angina pectoris (CMS/formerly [facility])   • Status post insertion of drug-eluting stent into left anterior descending (LAD) artery for coronary artery disease   • Hyperlipidemia LDL goal <70   • Class 1 obesity due to excess calories with serious comorbidity and body mass index (BMI) of 31.0 to 31.9 in adult   • Bradycardia   • Acute on chronic diastolic congestive heart failure (CMS/formerly [facility])     Pulmonary Assessment:    New problem (to me), with additional workup planned: Acute hypoxemic respiratory failure    New problem (to me), no additional workup planned: Chest pain, defer to cardiology    Other problems either stable, failing to improve or worsenin. Chronic tobacco use  2. Acute on chronic kidney disease  3. Acute on chronic diastolic heart failure  4. Obesity  5. CAD    Recommend/plan:     · Multiple hospitalizations in the past for similar issues related to kidney disease and heart failure.  She responds well to diuresis typically.  Some benefit from bronchodilators today which we can continue.  · Supplemental oxygen for saturation above 90  · Continue BiPAP with sleep and as needed for respiratory fatigue, lethargy or desaturation.  Currently on 16/6 and FiO2 0.3  · Continue bronchodilators and mucolytic's, currently on ipratropium and albuterol along with Mucinex  · Currently on prednisone, continue, one-time dose of Solu-Medrol  · Update imaging of the chest, ordered for today  · DVT prophylaxis, Lovenox 40 mg daily  · Defer management of coronary and chest pain issues to cardiology who is already following  · Strong suspicion for COPD although she has never had PFTs.  Recommend obtaining these in  the outpatient setting    Thank you for this consult.  We will follow along.  Electronically signed by ISAIAH Bryant, 08/10/21, 11:54 AM CDT.    ATTESTATION OF CLINICAL NOTE:  I have reviewed the notes, assessments, and/or procedures performed by ISAIAH Leal, I concur with her/his documentation of Ludivina Ramirez.  Patient was seen as inpatient pulmonary consult from Dr. Weston today she is also getting followed by Dr. Pierce Buchanan and is waiting to see nephrology Dr. Whitfield.    She is a 60 years old middle-aged  female who had long history of tobacco abuse and she told me she started smoking at age of 30 and continues to smoke till a few weeks ago.  She started having shortness of breath dyspnea and wheezing a few days ago and was treated on outpatient with doxycycline and Medrol Dosepak and eventually needed hospitalization for failure to improve.  She had been tested negative for Covid and she is not vaccinated for Covid.  She is not on home oxygen or any regular inhalers or nebulizers at home and apparently had multiple hospitalizations with respiratory failure and at one point needed mechanical ventilation.  She failed to follow-up in the pulmonary clinic and missed the scheduled appointments.  She this time is again having the similar problem and was seen by cardiology and was treated for heart failure with IV diuretic after which her shortness of breath improved.  In addition she did need some oxygen in the beginning but currently her oxygen saturation 97% on room air but she used BiPAP at night with 16/6 with 30% FiO2.  She was sitting comfortably at the time of my visit.she gets short of breath easily.  She reported  she also has allergy symptoms with persistent postnasal drainage and cough but she was not on any allergy medication at home.  She reported she gained weight and had sleep disturbance and snoring but never tested for sleep apnea.  In addition to her cardiac issues and  the lung problems she developed renal failure after 1 dose of Lasix and significant rise in BUN and creatinine and nephrology has been consulted but they have not seen the patient yet.  Patient is not on any other nephrotoxic medications.  She told me she has extensive cardiac history and had coronary disease and had a stent placement done and follows up with Dr. Buchanan for cardiac issues and also had a history of congestive diastolic heart failure.    Physical examination patient is a middle-aged  female sitting comfortably in the bed.  HEENT: Atraumatic normocephalic.  Neck: Supple no mass no jugular venous distention no lymphadenopathy.  Heart: Sounds normal rate and rhythm regular no murmur.  Lungs bibasilar crackles diminished air entry with a few wheezing.  Abdomen: Soft nondistended nontender.  Extremities: No edema normal is normal color.  Neurologic: Grossly intact.  Skin: No breakdown.  Psych: Mood and affect normal.    Patient most definitely has COPD this x-ray shows some chronic scarring granulomatous changes and some atelectasis and possible underlying COPD.  History of tobacco abuse for many years.  She will be started on Symbicort and already getting DuoNeb treatment.  I will start her on some steroid as well she received 125 mg of Solu-Medrol and continuing of prednisone which will be continued for few more days.  Her shortness of breath was probably multifactorial from COPD exacerbation and also from heart failure and she is currently getting treated with a diuretic and cardiology is following.  However after diuresis her renal function has deteriorated and nephrology is involved now.  She will need to continue oxygen as needed and will be encouraged to incentive spirometry to improve pulmonary compliance.  She will also need the BiPAP in the current settings at night and will definitely need home oxygen evaluation prior to discharge and possibly will need outpatient sleep study in addition  to the pulmonary functions and follow-up in the pulmonary clinic as well.  For her nasal allergy she is already on Astelin nasal spray and I have started her on Zyrtec. For her renal failure the Lovenox dose has been decreased as well. Her arterial blood gas done at the time of admission did not show any significant hypercapnia CO2 retention. She will need PT OT. DVT and stress ulcer prophylaxis and pain anxiety control. He told me she did not need a nicotine patch and she does already quit smoking. Since she has lots of noncompliance issues cardiac issues are addressed by Dr. Buchanan and she is waiting to see nephrology. Discussed care plan with RN and RT. Pulmonary team will continue following and make further recommendations. We appreciate the consult and like to thank the referring physician. Total time spent in seeing this patient was 45 minutes.    I have seen and examined patient personally, performing a face-to-face diagnostic evaluation with plan of care reviewed and developed with APRN and nursing staff. I have addended and/or modified the above history of present illness, physical examination, and assessment and plan to reflect my findings and impressions. Essential elements of the care plan were discussed with APRN above.  Agree with findings and assessment/plan as documented above.    Neeta Chavez MD  Pulmonologist/Intensivist  8/10/2021 15:31 CDT     Electronically signed by Neeta Chavez MD at 08/10/21 1554     Pierce Buchanan MD at 08/10/21 0936      Consult Orders    1. Inpatient Cardiology Consult [[id number]] ordered by Orville Weston MD at 08/09/21 1640                  LOS: 2 days   Patient Care Team:  Orville Weston MD as PCP - General (Family Medicine)  Juanpablo Rea MD as Consulting Physician (Gastroenterology)  Nickolas Alegria MD as Consulting Physician (Gastroenterology)    Chief Complaint: Shortness of breath     Subjective    Ludivina Ramirez is a 60 y.o. female who is  being seen in consultation.  Patient has presented with complaints of increasing shortness of breath  Has moderate orthopnea  No paroxysmal nocturnal dyspnea  Complains of cough and wheezing  1 episode of 20 minutes of chest pain yesterday  Chest pain subsequently resolved  No recurrence of chest pain  Chest pain was associated with some diaphoresis  Chest pain associated with nausea  Radiated towards her back  D-dimer was elevated  VQ scan is low probability for embolism  Currently completely chest pain-free  Known coronary artery disease with prior drug-eluting stent placement to left anterior descending coronary artery  She has taken nitroglycerin with episode of chest pain and this helped  Now has worsening renal failure  Nephrology has seen patient  Denies any palpitations  No presyncope  No syncope  Compliant to prescribed medications  Taking aspirin and Brilinta  On high-dose statin therapy  Labs reviewed  Creatinine has increased now to 2.7 up from 1.8  Baseline creatinine up approximately 2.2      Telemetry: no malignant arrhythmia. No significant pauses.    Review of Systems   Constitutional: No chills   Has fatigue   No fever.   HENT: Negative.    Eyes: Negative.    Respiratory: Negative for cough,   No chest wall soreness,   Shortness of breath,   no wheezing, no stridor.    Cardiovascular: As above  Gastrointestinal: Negative for abdominal distention,  No abdominal pain,   No blood in stool,   No constipation,   No diarrhea,   No nausea   No vomiting.   Endocrine: Negative.    Genitourinary: Negative for difficulty urinating, dysuria, flank pain and hematuria.   Musculoskeletal: Negative.    Skin: Negative for rash and wound.   Allergic/Immunologic: Negative.    Neurological: Negative for dizziness, syncope, weakness,   No light-headedness  No  headaches.   Hematological: Does not bruise/bleed easily.   Psychiatric/Behavioral: Negative for agitation or behavioral problems,   No confusion,   the patient  is  nervous/anxious.       History:   Past Medical History:   Diagnosis Date   • Acute CHF (CMS/HCC)    • Acute renal failure (ARF) (CMS/HCC)    • Anemia    • Asthma     childhood   • Hypertension    • Ischemic colitis (CMS/HCC)    • Metabolic acidosis    • Nonrheumatic aortic (valve) insufficiency    • PTSD (post-traumatic stress disorder)      Past Surgical History:   Procedure Laterality Date   • CARDIAC CATHETERIZATION N/A 11/8/2020    Procedure: Left Heart Cath;  Surgeon: Pierce Buchanan MD;  Location: [facility] CATH INVASIVE LOC[facility];  Service: Cardiovascular;  Laterality: N/A;   • EXTERNAL FIXATION WRIST FRACTURE     • LEG SURGERY     • TUBAL ABDOMINAL LIGATION       Social History     Socioeconomic History   • Marital status: Single     Spouse name: Not on file   • Number of children: Not on file   • Years of education: Not on file   • Highest education level: Not on file   Tobacco Use   • Smoking status: Current Every Day Smoker     Packs/day: 0.50   • Smokeless tobacco: Never Used   Vaping Use   • Vaping Use: Never used   Substance and Sexual Activity   • Alcohol use: No   • Drug use: No   • Sexual activity: Defer     Family History   Problem Relation Age of Onset   • Coronary artery disease Mother    • Coronary artery disease Father        Labs:  WBC WBC   Date Value Ref Range Status   08/08/2021 9.59 3.40 - 10.80 10*3/mm3 Final      HGB Hemoglobin   Date Value Ref Range Status   08/08/2021 10.4 (L) 12.0 - 15.9 g/dL Final      HCT Hematocrit   Date Value Ref Range Status   08/08/2021 32.6 (L) 34.0 - 46.6 % Final      Platelets Platelets   Date Value Ref Range Status   08/08/2021 130 (L) 140 - 450 10*3/mm3 Final      MCV MCV   Date Value Ref Range Status   08/08/2021 94.5 79.0 - 97.0 fL Final        Results from last 7 days   Lab Units 08/10/21  0609 08/08/21  1403   SODIUM mmol/L 144 143   POTASSIUM mmol/L 3.6 4.0   CHLORIDE mmol/L 105 107   CO2 mmol/L 28.0 25.0   BUN mg/dL 46* 32*   CREATININE mg/dL 2.66*  1.83*   CALCIUM mg/dL 8.0* 8.7   BILIRUBIN mg/dL  --  0.8   ALK PHOS U/L  --  93   ALT (SGPT) U/L  --  21   AST (SGOT) U/L  --  18   GLUCOSE mg/dL 131* 122*     Lab Results   Component Value Date    CKTOTAL 219 (H) 10/27/2020    CKMB 3.38 04/06/2018    TROPONINI 0.026 08/31/2019    TROPONINT 0.031 (C) 08/08/2021     PT/INR:    Protime   Date Value Ref Range Status   08/08/2021 13.0 11.5 - 13.4 Seconds Final   /  INR   Date Value Ref Range Status   08/08/2021 1.06 0.91 - 1.09 Final       Imaging Results (Last 72 Hours)     Procedure Component Value Units Date/Time    XR Chest 1 View [[id number]] Collected: 08/08/21 1417     Updated: 08/08/21 1423    Narrative:      Exam:   XR CHEST 1 VW-       Date:  8/8/2021      History:  Female, age  60 years;dyspnea     COMPARISON:  Chest x-ray dated 7/21/2021.     Findings :     Borderline size of the cardiomediastinal silhouette, unchanged. Mild  prominence of central pleural vasculature. Chronic interstitial lung  changes redemonstrated. Linear opacity in the left midlung zone, may  reflect atelectasis versus scarring. No measurable pneumothorax. No  pleural effusion.  The bones show no acute pathology.         Impression:      Impression:     Borderline size of the cardiomediastinal silhouette with mild prominence  central pulmonary vasculature. Consider mild/early fluid overload.     This report was finalized on 08/08/2021 14:20 by Dr. Janet Geller MD.          Objective     Allergies   Allergen Reactions   • Codeine Nausea And Vomiting   • Imitrex [Sumatriptan] Other (See Comments)     HA       Medication Review: Performed  Current Facility-Administered Medications   Medication Dose Route Frequency Provider Last Rate Last Admin   • aspirin EC tablet 81 mg  81 mg Oral Daily Ren Santo MD   81 mg at 08/10/21 0909   • azelastine (ASTELIN) nasal spray 2 spray  2 spray Each Nare BID Ren Santo MD   2 spray at 08/10/21 0910   • carvedilol (COREG) tablet  "12.5 mg  12.5 mg Oral BID With Meals Ren Santo MD   12.5 mg at 08/10/21 0909   • cloNIDine (CATAPRES) tablet 0.1 mg  0.1 mg Oral Q6H PRN Ren Santo MD       • enoxaparin (LOVENOX) syringe 40 mg  40 mg Subcutaneous Q24H Ren Santo MD   40 mg at 08/09/21 2041   • ferrous sulfate tablet 325 mg  325 mg Oral BID With Meals Ren Santo MD   325 mg at 08/10/21 0909   • fluticasone (FLONASE) 50 MCG/ACT nasal spray 2 spray  2 spray Nasal Daily PRN Ren Santo MD       • furosemide (LASIX) injection 20 mg  20 mg Intravenous Q12H Orville Weston MD   20 mg at 08/10/21 0539   • hydrALAZINE (APRESOLINE) tablet 25 mg  25 mg Oral Q8H Ren Santo MD   25 mg at 08/10/21 0539   • losartan (COZAAR) tablet 100 mg  100 mg Oral Q24H Ren Santo MD   100 mg at 08/10/21 0909   • NIFEdipine XL (PROCARDIA XL) 24 hr tablet 60 mg  60 mg Oral Q24H Ren Santo MD   60 mg at 08/10/21 0909   • OLANZapine (zyPREXA) tablet 5 mg  5 mg Oral Nightly Ren Santo MD   5 mg at 08/09/21 2041   • predniSONE (DELTASONE) tablet 10 mg  10 mg Oral Daily Ren Santo MD   10 mg at 08/10/21 0909   • rosuvastatin (CRESTOR) tablet 20 mg  20 mg Oral Daily Ren Santo MD   20 mg at 08/10/21 0909   • sodium chloride 0.9 % flush 10 mL  10 mL Intravenous PRN Zackary Gutierrez MD       • ticagrelor (BRILINTA) tablet 90 mg  90 mg Oral BID Ren Santo MD   90 mg at 08/10/21 0909       Vital Sign Min/Max for last 24 hours  Temp  Min: 97.3 °F (36.3 °C)  Max: 98.3 °F (36.8 °C)   BP  Min: 118/81  Max: 158/90   Pulse  Min: 65  Max: 70   Resp  Min: 18  Max: 20   SpO2  Min: 93 %  Max: 98 %   No data recorded   Weight  Min: 104 kg (229 lb 1.6 oz)  Max: 104 kg (229 lb 1.6 oz)     Flowsheet Rows      First Filed Value   Admission Height  165.1 cm (65\") Documented at 08/08/2021 1346   Admission Weight  99.8 kg (220 lb) Documented at 08/08/2021 1346    "       Results for orders placed during the hospital encounter of 11/07/20    Adult Transthoracic Echo Limited W/ Cont if Necessary Per Protocol    Interpretation Summary  · Left ventricular systolic function is low normal. Left ventricular ejection fraction appears to be 51 - 55%.  · The following left ventricular wall segments are hypokinetic: apical anterior, apical lateral, apical inferior, apical septal and apex hypokinetic.  · Left ventricular wall thickness is consistent with mild concentric hypertrophy.  · Mild aortic valve regurgitation is present.  · Estimated right ventricular systolic pressure from tricuspid regurgitation is markedly elevated (>55 mmHg).      Physical Exam:    General Appearance: Awake, alert, in no acute distress  Eyes: Pupils equal and reactive    Ears: Appear intact with no abnormalities noted  Nose: Nares normal, no drainage  Neck: supple, trachea midline, no carotid bruit and no JVD  Back: no kyphosis present,    Lungs: respirations regular, prolonged with bilateral expiratory rhonchi with basilar crackles  Heart: normal S1, S2, no significant murmurs   No gallops or rubs  no rub and no click  Abdomen: normal bowel sounds, no tenderness   Skin: no bleeding, bruising or rash  Extremities: no cyanosis  Psychiatric/Behavioral: Negative for agitation, behavioral problems, confusion, the patient does  appear to be nervous/anxious.       Results Review:   I reviewed the patient's new clinical results.  I reviewed the patient's new imaging results and agree with the interpretation.  I reviewed the patient's other test results and agree with the interpretation  I personally viewed and interpreted the patient's EKG/Telemetry data    Discussed with patient  Updated patient regarding any new or relevant abnormalities on review of records or any new findings on physical exam.   Mentioned to patient about purpose of visit and desirable health short and long term goals and objectives.     Reviewed  available prior notes, consults, prior visits, laboratory findings, radiology and cardiology relevant reports.   Updated chart as applicable.   I have reviewed the patient's medical history in detail and updated the computerized patient record as relevant.          Assessment/Plan       Acute on chronic diastolic congestive heart failure (CMS/HCC)  Coronary artery disease  Prior drug-eluting stent placement to left anterior descending coronary artery  Acute not chronic renal failure  Elevated troponin with flat trend      Plan      Due to worsening renal failure cannot diurese aggressively  Recommend pulmonary evaluation  Likely has component of COPD  Recommend bronchodilator therapy  Repeat complete transthoracic echocardiogram with myocardial strain  Combination of hydralazine and long-acting nitrates for now  Will require ischemia work-up in near future    Telemetry  Deep vein thrombosis prophylaxis/precautions  Appropriate diet, fluid, sodium, caffeine, stimulants intake   Questions were encouraged, asked and answered to the patient's  understanding and satisfaction.  Compliance to diet and medications       Pierce Buchanan MD  08/10/21  09:36 CDT    EMR Dragon/Transcription was used to dictate part of this note     Electronically signed by Pierce Buchanan MD at 08/10/21 3651

## 2021-08-11 NOTE — PROGRESS NOTES
"Progress Note  Ludivnia Ramirez  8/11/2021 10:01 CDT  Subjective:   Admit Date:   8/8/2021      CC/ADMIT DX:       Interval History:   Reviewed overnight events and nursing notes. She has no CP. Her SOA is stable. No GI complaints.   I have reviewed all labs/diagnostics from the last 24hrs.       ROS:   I have done a 10 point ROS and all are negative, except what is mentioned in the HPI.    Diet Regular; Cardiac    Medications:      aspirin, 81 mg, Oral, Daily  azelastine, 2 spray, Each Nare, BID  carvedilol, 12.5 mg, Oral, BID With Meals  cetirizine, 10 mg, Oral, Daily  enoxaparin, 30 mg, Subcutaneous, Q24H  ferrous sulfate, 325 mg, Oral, BID With Meals  guaiFENesin, 1,200 mg, Oral, Q12H  hydrALAZINE, 25 mg, Oral, Q8H  ipratropium-albuterol, 3 mL, Nebulization, 4x Daily - RT  isosorbide dinitrate, 10 mg, Oral, TID - Nitrates  losartan, 100 mg, Oral, Q24H  NIFEdipine CC, 60 mg, Oral, Q24H  OLANZapine, 5 mg, Oral, Nightly  predniSONE, 10 mg, Oral, Daily  rosuvastatin, 20 mg, Oral, Daily  ticagrelor, 90 mg, Oral, BID            Objective:   Vitals: /87   Pulse 68   Temp 98.1 °F (36.7 °C) (Oral)   Resp 18   Ht 165.1 cm (65\")   Wt 100 kg (221 lb)   SpO2 94%   BMI 36.78 kg/m²      Intake/Output Summary (Last 24 hours) at 8/11/2021 1001  Last data filed at 8/11/2021 0900  Gross per 24 hour   Intake 1080 ml   Output 1200 ml   Net -120 ml     General appearance: alert and cooperative with exam  Lungs: coarse  Heart: RRR  Abdomen: soft, non-tender; bowel sounds normal; no masses,  no organomegaly  Extremities: extremities normal, atraumatic, no cyanosis or edema  Neurologic:  No obvious focal neurologic deficits.    Assessment and Plan:     Acute on chronic diastolic congestive heart failure (CMS/HCC)    Acute on Chronic Kidney disease    CAD    HTN    COPD    Plan:  1.  Continue present medication(s)   2.  Follow with others  3.   Monitor labs  4.  Continue treatment of HTN      Discharge planning:   her home "     Reviewed treatment plans with the patient and/or family.             Electronically signed by Orville Weston MD on 8/11/2021 at 10:01 CDT

## 2021-08-11 NOTE — PROGRESS NOTES
PROGRESS NOTE.      Patient:  Ludivina Ramirez  YOB: 1960  Date of Service: 8/11/2021  MRN: [id number]   Acct: [id number]   Primary Care Physician: Orville Weston MD  Advance Directive:   Code Status and Medical Interventions:   Ordered at: 08/08/21 1956     Code Status:    CPR     Medical Interventions (Level of Support Prior to Arrest):    Full     Admit Date: 8/8/2021       Hospital Day: 3  Referring Provider: Orville Weston MD      Patient Seen, Chart, Consults, Notes, Labs, Radiology studies reviewed.      Subjective:  Ludivina Ramirez is a 60 y.o. female  whom we were consulted for acute kidney injury.  Patient has a history of asthma, COPD and congestive heart failure.  She is also obese and has obstructive sleep apnea.  She has recurrent episodes of admission for acute respiratory failure and congestive heart failure exacerbation.  Patient is admitted at this time with worsening dyspnea.  She received the Lasix for diuresis.  She continues to be dyspneic and has been using his CPAP.  Her serum creatinine ranges between 2.1-2.4.  On admission on August 8 her serum creatinine was 1.8.  It has since risen to 2.6 after diuretics.  Patient's Lasix was stopped.  He is currently receiving hydration by mouth.  Denies any change to her urine habits.  Needed occasional use of NSAIDs.  She has no dysuria.  Today, patient appears to be less dyspneic. She has good urine output.    Allergies:  Codeine and Imitrex [sumatriptan]    Home Meds:  Medications Prior to Admission   Medication Sig Dispense Refill Last Dose   • carvedilol (COREG) 25 MG tablet Take 25 mg by mouth 2 (Two) Times a Day With Meals.      • hydrALAZINE (APRESOLINE) 50 MG tablet Take 75 mg by mouth Every 8 (Eight) Hours.      • aspirin 81 MG EC tablet Take 1 tablet by mouth Daily.      • azelastine (ASTELIN) 0.1 % nasal spray 2 sprays into the nostril(s) as directed by provider 2 (Two) Times a Day. Use in each nostril as directed        • cloNIDine (CATAPRES) 0.1 MG tablet Take 1 tablet by mouth Every 6 (Six) Hours As Needed for High Blood Pressure (BP >180/100). 30 tablet 1    • Ergocalciferol (Vitamin D2) 50 MCG (2000 UT) tablet Take 1,000 Units by mouth Daily.      • ferrous sulfate 325 (65 FE) MG tablet Take 1 tablet by mouth 2 (Two) Times a Day With Meals. 60 tablet 0    • fluticasone (FLONASE) 50 MCG/ACT nasal spray 1 spray into the nostril(s) as directed by provider Every Morning. In each nostril      • losartan (COZAAR) 100 MG tablet Take 1 tablet by mouth Daily. 90 tablet 3    • NIFEdipine XL (ADALAT CC) 60 MG 24 hr tablet Take 1 tablet by mouth 2 (two) times a day. 60 tablet 1    • nitroglycerin (NITROSTAT) 0.4 MG SL tablet Place 1 tablet under the tongue Every 5 (Five) Minutes As Needed for Chest Pain for up to 3 doses. Take no more than 3 doses in 15 minutes. 25 tablet 0    • OLANZapine (zyPREXA) 5 MG tablet Take 1 tablet by mouth 2 (two) times a day. 60 tablet 0    • pantoprazole (PROTONIX) 40 MG EC tablet Take 1 tablet by mouth Daily. 30 tablet 1    • rosuvastatin (CRESTOR) 20 MG tablet Take 1 tablet by mouth Daily. 90 tablet 3    • ticagrelor (BRILINTA) 90 MG tablet tablet Take 1 tablet by mouth 2 (Two) Times a Day. 60 tablet 11        Medicines:  Current Facility-Administered Medications   Medication Dose Route Frequency Provider Last Rate Last Admin   • acetaminophen (TYLENOL) tablet 650 mg  650 mg Oral Q6H PRN Orville Weston MD   650 mg at 08/11/21 1346   • aspirin EC tablet 81 mg  81 mg Oral Daily Ren Santo MD   81 mg at 08/11/21 0922   • azelastine (ASTELIN) nasal spray 2 spray  2 spray Each Nare BID Ren Santo MD   2 spray at 08/11/21 0923   • carvedilol (COREG) tablet 12.5 mg  12.5 mg Oral BID With Meals Ren Santo MD   12.5 mg at 08/11/21 0922   • cetirizine (zyrTEC) tablet 10 mg  10 mg Oral Daily Neeta Chavez MD   10 mg at 08/11/21 0921   • cloNIDine (CATAPRES) tablet 0.1 mg   0.1 mg Oral Q6H PRN Ren Santo MD       • enoxaparin (LOVENOX) syringe 30 mg  30 mg Subcutaneous Q24H Neeta Chavez MD   30 mg at 08/10/21 2006   • ferrous sulfate tablet 325 mg  325 mg Oral BID With Meals Ren Santo MD   325 mg at 08/11/21 0922   • fluticasone (FLONASE) 50 MCG/ACT nasal spray 2 spray  2 spray Nasal Daily PRN Ren Santo MD   2 spray at 08/10/21 2006   • guaiFENesin (MUCINEX) 12 hr tablet 1,200 mg  1,200 mg Oral Q12H Orville Weston MD   1,200 mg at 08/11/21 0922   • hydrALAZINE (APRESOLINE) tablet 25 mg  25 mg Oral Q8H Ren Santo MD   25 mg at 08/11/21 1346   • ipratropium-albuterol (DUO-NEB) nebulizer solution 3 mL  3 mL Nebulization 4x Daily - RT Orville Weston MD   3 mL at 08/11/21 1034   • isosorbide dinitrate (ISORDIL) tablet 10 mg  10 mg Oral TID - Nitrates Pierce Buchanan MD   10 mg at 08/11/21 1346   • losartan (COZAAR) tablet 100 mg  100 mg Oral Q24H Ren Santo MD   100 mg at 08/11/21 0921   • NIFEdipine XL (PROCARDIA XL) 24 hr tablet 60 mg  60 mg Oral Q24H Ren Santo MD   60 mg at 08/11/21 0921   • OLANZapine (zyPREXA) tablet 5 mg  5 mg Oral Nightly Ren Santo MD   5 mg at 08/10/21 2006   • predniSONE (DELTASONE) tablet 10 mg  10 mg Oral Daily Ren Santo MD   10 mg at 08/11/21 0922   • rosuvastatin (CRESTOR) tablet 20 mg  20 mg Oral Daily Ren Santo MD   20 mg at 08/11/21 0922   • sodium chloride 0.9 % flush 10 mL  10 mL Intravenous PRN Zackary Gutierrez MD       • ticagrelor (BRILINTA) tablet 90 mg  90 mg Oral BID Ren Santo MD   90 mg at 08/11/21 0969       Past Medical History:  Past Medical History:   Diagnosis Date   • Acute CHF (CMS/HCC)    • Acute renal failure (ARF) (CMS/HCC)    • Anemia    • Asthma     childhood   • Hypertension    • Ischemic colitis (CMS/HCC)    • Metabolic acidosis    • Nonrheumatic aortic (valve) insufficiency    • PTSD  "(post-traumatic stress disorder)        Past Surgical History:  Past Surgical History:   Procedure Laterality Date   • CARDIAC CATHETERIZATION N/A 11/8/2020    Procedure: Left Heart Cath;  Surgeon: Pierce Buchanan MD;  Location:  [facility] CATH INVASIVE LOCATION;  Service: Cardiovascular;  Laterality: N/A;   • EXTERNAL FIXATION WRIST FRACTURE     • LEG SURGERY     • TUBAL ABDOMINAL LIGATION         Family History  Family History   Problem Relation Age of Onset   • Coronary artery disease Mother    • Coronary artery disease Father        Social History  Social History     Socioeconomic History   • Marital status: Single     Spouse name: Not on file   • Number of children: Not on file   • Years of education: Not on file   • Highest education level: Not on file   Tobacco Use   • Smoking status: Current Every Day Smoker     Packs/day: 0.50   • Smokeless tobacco: Never Used   Vaping Use   • Vaping Use: Never used   Substance and Sexual Activity   • Alcohol use: No   • Drug use: No   • Sexual activity: Defer       Review of Systems:  History obtained from chart review and the patient  General ROS: No fever or chills  Respiratory ROS: No cough, +shortness of breath,- wheezing  Cardiovascular ROS: no chest pain, + BS dyspnea on exertion  Gastrointestinal ROS: No abdominal pain or melena  Genito-Urinary ROS: No dysuria or hematuria  Musculoskeletal: negative  Skin: negative    Objective:  /74 (BP Location: Left arm, Patient Position: Lying)   Pulse 83   Temp 98 °F (36.7 °C) (Oral)   Resp 20   Ht 165.1 cm (65\")   Wt 100 kg (221 lb)   SpO2 96%   BMI 36.78 kg/m²     Intake/Output Summary (Last 24 hours) at 8/11/2021 1621  Last data filed at 8/11/2021 1300  Gross per 24 hour   Intake 1680 ml   Output 1200 ml   Net 480 ml       Physical examination:  General: awake/alert   Chest:  clear to auscultation bilaterally without respiratory distress  CVS: regular rate and rhythm  Abdominal: soft, nontender, normal bowel " sounds  Extremities: no cyanosis or edema  Skin: warm and dry without rash  Neuro: No focal motor deficits    Labs:  Lab Results (last 24 hours)     Procedure Component Value Units Date/Time    Blood Culture - Blood, Arm, Left [[id number]] Collected: 08/08/21 1403    Specimen: Blood from Arm, Left Updated: 08/11/21 1445     Blood Culture No growth at 3 days    Blood Culture - Blood, Arm, Left [[id number]] Collected: 08/08/21 1403    Specimen: Blood from Arm, Left Updated: 08/11/21 1445     Blood Culture No growth at 3 days    PTH, Intact [[id number]]  (Abnormal) Collected: 08/10/21 2351    Specimen: Blood Updated: 08/11/21 0044     PTH, Intact 193.9 pg/mL     Narrative:      Results may be falsely decreased if patient taking Biotin.      Basic Metabolic Panel [[id number]]  (Abnormal) Collected: 08/10/21 2351    Specimen: Blood Updated: 08/11/21 0043     Glucose 166 mg/dL      BUN 48 mg/dL      Creatinine 2.64 mg/dL      Sodium 141 mmol/L      Potassium 4.2 mmol/L      Chloride 105 mmol/L      CO2 27.0 mmol/L      Calcium 8.0 mg/dL      eGFR Non African Amer 18 mL/min/1.73      BUN/Creatinine Ratio 18.2     Anion Gap 9.0 mmol/L     Narrative:      GFR Normal >60  Chronic Kidney Disease <60  Kidney Failure <15      Uric Acid [[id number]]  (Abnormal) Collected: 08/10/21 2351    Specimen: Blood Updated: 08/11/21 0043     Uric Acid 7.1 mg/dL     CBC (No Diff) [[id number]]  (Abnormal) Collected: 08/10/21 2351    Specimen: Blood Updated: 08/11/21 0019     WBC 7.38 10*3/mm3      RBC 3.29 10*6/mm3      Hemoglobin 9.8 g/dL      Hematocrit 32.1 %      MCV 97.6 fL      MCH 29.8 pg      MCHC 30.5 g/dL      RDW 15.5 %      RDW-SD 54.6 fl      MPV 11.5 fL      Platelets 145 10*3/mm3           Radiology:   Imaging Results (Last 24 Hours)     ** No results found for the last 24 hours. **              Assessment   -Acute kidney injury-likely prerenal azotemia versus ATN  -Chronic kidney disease stage IIIb  -Hypertension with  nephropathy  -Congestive heart failure  -Asthma/COPD  -Morbid obesity  -Secondary hyperparathyroidism    Plan:  Patient was encouraged to continue hydrating by mouth. I will also add oral calcitriol. Will obtain repeat labs in a.m.      Parker Whitfield MD  8/11/2021  16:21 CDT

## 2021-08-11 NOTE — PROGRESS NOTES
PULMONARY AND CRITICAL CARE PROGRESS NOTE - [facility]    Patient: Ludivina Ramirez  1960   MR# [id number]   Acct# [id number]  08/11/21   11:30 CDT  Referring Provider: Orville Weston MD    Chief Complaint: Shortness of breath    Interval history: Patient is doing better.  She is getting treated for heart failure and is currently on room air.  She is also using BiPAP at night 12/6 with rate of 20 and FiO2 40% and tolerating it well.  She is hoping to be discharged home soon.  No other acute events overnight.  Meds:  aspirin, 81 mg, Oral, Daily  azelastine, 2 spray, Each Nare, BID  carvedilol, 12.5 mg, Oral, BID With Meals  cetirizine, 10 mg, Oral, Daily  enoxaparin, 30 mg, Subcutaneous, Q24H  ferrous sulfate, 325 mg, Oral, BID With Meals  guaiFENesin, 1,200 mg, Oral, Q12H  hydrALAZINE, 25 mg, Oral, Q8H  ipratropium-albuterol, 3 mL, Nebulization, 4x Daily - RT  isosorbide dinitrate, 10 mg, Oral, TID - Nitrates  losartan, 100 mg, Oral, Q24H  NIFEdipine CC, 60 mg, Oral, Q24H  OLANZapine, 5 mg, Oral, Nightly  predniSONE, 10 mg, Oral, Daily  rosuvastatin, 20 mg, Oral, Daily  ticagrelor, 90 mg, Oral, BID         Review of Systems:   Review of Systems   All other systems reviewed and are negative.  Shortness of breath has improved.  Complaining of some headache and congestion but otherwise doing better.    Physical Exam:  SpO2 Percentage    08/11/21 0353 08/11/21 0637 08/11/21 1034   SpO2: 94% 94% 98%     Temp:  [98.1 °F (36.7 °C)-98.6 °F (37 °C)] 98.1 °F (36.7 °C)  Heart Rate:  [64-87] 68  Resp:  [18-22] 18  BP: (129-154)/(71-93) 152/87    Intake/Output Summary (Last 24 hours) at 8/11/2021 1130  Last data filed at 8/11/2021 0900  Gross per 24 hour   Intake 1080 ml   Output 900 ml   Net 180 ml     Physical Exam     Constitutional:       Appearance: She is obese. She is ill-appearing.  Overall appears comfortable.     Interventions: Face mask in place.   HENT:      Head: Normocephalic and  atraumatic.      Left Ear: External ear normal.   Eyes:      General:         Right eye: No discharge.         Left eye: No discharge.      Conjunctiva/sclera: Conjunctivae normal.      Pupils: Pupils are equal, round, and reactive to light.   Cardiovascular:      Rate and Rhythm: Normal rate and regular rhythm.      Heart sounds: No murmur heard.     Pulmonary:      Effort: No tachypnea, accessory muscle usage or respiratory distress.      Comments: Faint expiratory wheeze on the right  Abdominal:      General: Abdomen is flat. Bowel sounds are normal. There is distension.      Palpations: Abdomen is soft.   Musculoskeletal:      Cervical back: Normal range of motion and neck supple. No rigidity.      Right lower leg: Edema present.      Left lower leg: Edema present.      Comments: Trace   Skin:     General: Skin is warm and dry.      Coloration: Skin is not jaundiced or pale.   Neurological:      General: No focal deficit present.      Mental Status: She is alert and oriented to person, place, and time. Mental status is at baseline.   Psychiatric:         Mood and Affect: Mood normal.         Behavior: Behavior normal.         Thought Content: Thought content normal.      Laboratory Data:  Results from last 7 days   Lab Units 08/10/21  2351 08/08/21  1403   WBC 10*3/mm3 7.38 9.59   HEMOGLOBIN g/dL 9.8* 10.4*   PLATELETS 10*3/mm3 145 130*     Results from last 7 days   Lab Units 08/10/21  2351 08/10/21  0609 08/08/21  1403   SODIUM mmol/L 141 144 143   POTASSIUM mmol/L 4.2 3.6 4.0   BUN mg/dL 48* 46* 32*   CREATININE mg/dL 2.64* 2.66* 1.83*   INR   --   --  1.06     Results from last 7 days   Lab Units 08/08/21  1350   PH, ARTERIAL pH units 7.408   PCO2, ARTERIAL mm Hg 46.5*   PO2 ART mm Hg 99.1     Blood Culture   Date Value Ref Range Status   08/08/2021 No growth at 2 days  Preliminary   08/08/2021 No growth at 2 days  Preliminary     Recent films:  XR Chest 1 View    Result Date: 8/10/2021   No significant  change in appearance of the chest. This report was finalized on 08/10/2021 13:36 by Dr. Zac Schwarz MD.    Films reviewed personally by me.  My interpretation: Reviewed and agree with current plan.  Pulmonary Assessment:  1. Acute on chronic respiratory failure.  2. Acute worsening of congestive diastolic heart failure  3. Chronic obstructive pulmonary disease  4. Tobacco abuse  5. Obesity  6. Ced artery disease  7. Anxiety  8. Noncompliance with treatment  9. Allergic rhinitis    Recommend:   · Patient is doing little better.  Continue on BiPAP at night and use oxygen during the day if needed to keep oxygen more than 92%.  · Incentive spirometry to be encouraged to improve pulmonary compliance.  · Continue Symbicort and DuoNeb for now and use albuterol rescue inhaler with Symbicort at the time of discharge.  · Optimize treatment for heart failure.  Cardiology is following.  · Plan for discharge home on BiPAP at night or at least home oxygen and outpatient sleep study and follow-up in the pulmonary clinic.  · Reviewed the chart it appears patient had similar presentation and was seen by me in October 2020 but failed to show up for the clinic appointment.  I talked her about her noncompliance and she agreed to proceed for follow-up visit.  · Repeat labs and imaging studies as needed.  Nephrology is also following for renal insufficiency likely from diuresis.  · Her respiratory issues were addressed.  · Continue allergy medications with Astelin and cetirizine.  · Acetaminophen or Tylenol for headache.  · PT/OT.  Plan for discharge home when stable.  · Please schedule appointment with me after discharge in a month time for follow-up visit with a PFT and a sleep study as an outpatient.  · Home oxygen evaluation prior to the discharge.  · CODE STATUS: Full.  Overall process: Guarded.  · As she is stable pulmonary will sign off but will be happy to see her in the office and if called again.    Electronically signed  by     eNeta Chavez MD,\  Pulmonologist/intensivist   08/11/21, 11:30 CDT

## 2021-08-11 NOTE — PLAN OF CARE
Goal Outcome Evaluation:      Pt wore cpap during the night. On/off oxygen 2L as tolerated. Plan for echo today. PFT is to be done outpatient. Bun/creatinine still elevated. Will continue to monitor.     Tele:  38-22 low

## 2021-08-11 NOTE — PLAN OF CARE
Goal Outcome Evaluation:  Plan of Care Reviewed With: patient           Outcome Summary: patient on room air, sleeping some during the day, c/o HA, gave tylenol per order, pulm signed off, kidney function slowly improving, home in 1-2 days

## 2021-08-11 NOTE — PROGRESS NOTES
[facility] -  Progress Note     LOS: 3 days   Patient Care Team:  Orville Weston MD as PCP - General (Family Medicine)  Juanpablo Rea MD as Consulting Physician (Gastroenterology)  Nickolas Alegria MD as Consulting Physician (Gastroenterology)    Chief Complaint: follow up chest pain x 1, diastolic CHF     Subjective     Interval History:     Patient Complaints: The patient reports her shortness of breath continues to improve.  She has been weaned off of oxygen during the day.  She has no shortness of breath at rest.  She denies orthopnea, PND.  She still has shortness of breath with exertion, but it has improved.  Wheezing has improved.  She denies edema.  She denies chest pain outside of the one isolated episode previously described in notes by Dr. Buchanan.    She tells me she quit smoking 2 weeks ago.    Telemetry reveals sinus rhythm/sinus bradycardia with heart rates in the 50s to 80s.    She received a total of 3 doses of IV Lasix-40 mg, 20 mg, 20 mg before this was discontinued yesterday due to acute kidney injury.    She does not take any diuretics at home.    Isordil has been added to her medical therapy this admission per Dr. Buchanan.    Review of Systems:     Review of Systems   Constitutional: Positive for fatigue. Negative for diaphoresis, fever and unexpected weight change.   HENT: Negative for nosebleeds.    Respiratory: Positive for shortness of breath. Negative for apnea, cough, chest tightness and wheezing.    Cardiovascular: Negative for chest pain, palpitations and leg swelling.   Gastrointestinal: Negative for abdominal distention, nausea and vomiting.   Genitourinary: Negative for hematuria.   Musculoskeletal: Negative for gait problem.   Skin: Negative for color change.   Neurological: Negative for dizziness, syncope, weakness and light-headedness.     Objective     Vital Sign Min/Max for last 24 hours  Temp  Min: 98 °F (36.7 °C)  Max: 98.6 °F (37 °C)   BP  Min:  129/71  Max: 154/93   Pulse  Min: 64  Max: 83   Resp  Min: 18  Max: 22   SpO2  Min: 92 %  Max: 99 %   No data recorded   Weight  Min: 100 kg (221 lb)  Max: 100 kg (221 lb 3.2 oz)         08/11/21 0826   Weight: 100 kg (221 lb)       Physical Exam:    Vitals and nursing note reviewed.   Constitutional:       General: Not in acute distress.     Appearance: Well-developed and not in distress. Not diaphoretic.   Neck:      Vascular: No JVD.   Pulmonary:      Effort: Pulmonary effort is normal. No respiratory distress.      Breath sounds: Normal breath sounds.   Cardiovascular:      Normal rate. Regular rhythm.      Murmurs: There is a grade 3/6 systolic murmur.   Edema:     Peripheral edema absent.   Abdominal:      Tenderness: There is no abdominal tenderness.   Skin:     General: Skin is warm and dry.   Neurological:      Mental Status: Alert and oriented to person, place, and time.       Results Review:   Lab Results (last 72 hours)     Procedure Component Value Units Date/Time    PTH, Intact [[id number]]  (Abnormal) Collected: 08/10/21 2351    Specimen: Blood Updated: 08/11/21 0044     PTH, Intact 193.9 pg/mL     Narrative:      Results may be falsely decreased if patient taking Biotin.      Basic Metabolic Panel [[id number]]  (Abnormal) Collected: 08/10/21 2351    Specimen: Blood Updated: 08/11/21 0043     Glucose 166 mg/dL      BUN 48 mg/dL      Creatinine 2.64 mg/dL      Sodium 141 mmol/L      Potassium 4.2 mmol/L      Chloride 105 mmol/L      CO2 27.0 mmol/L      Calcium 8.0 mg/dL      eGFR Non African Amer 18 mL/min/1.73      BUN/Creatinine Ratio 18.2     Anion Gap 9.0 mmol/L     Narrative:      GFR Normal >60  Chronic Kidney Disease <60  Kidney Failure <15      Uric Acid [[id number]]  (Abnormal) Collected: 08/10/21 2351    Specimen: Blood Updated: 08/11/21 0043     Uric Acid 7.1 mg/dL     CBC (No Diff) [[id number]]  (Abnormal) Collected: 08/10/21 2351    Specimen: Blood Updated: 08/11/21 0019     WBC 7.38  10*3/mm3      RBC 3.29 10*6/mm3      Hemoglobin 9.8 g/dL      Hematocrit 32.1 %      MCV 97.6 fL      MCH 29.8 pg      MCHC 30.5 g/dL      RDW 15.5 %      RDW-SD 54.6 fl      MPV 11.5 fL      Platelets 145 10*3/mm3     Blood Culture - Blood, Arm, Left [[id number]] Collected: 08/08/21 1403    Specimen: Blood from Arm, Left Updated: 08/10/21 1445     Blood Culture No growth at 2 days    Blood Culture - Blood, Arm, Left [[id number]] Collected: 08/08/21 1403    Specimen: Blood from Arm, Left Updated: 08/10/21 1445     Blood Culture No growth at 2 days    Basic Metabolic Panel [[id number]]  (Abnormal) Collected: 08/10/21 0609    Specimen: Blood Updated: 08/10/21 0704     Glucose 131 mg/dL      BUN 46 mg/dL      Creatinine 2.66 mg/dL      Sodium 144 mmol/L      Potassium 3.6 mmol/L      Chloride 105 mmol/L      CO2 28.0 mmol/L      Calcium 8.0 mg/dL      eGFR Non African Amer 18 mL/min/1.73      BUN/Creatinine Ratio 17.3     Anion Gap 11.0 mmol/L     Narrative:      GFR Normal >60  Chronic Kidney Disease <60  Kidney Failure <15                Echo EF Estimated  No results found for: ECHOEFEST      Cath Ejection Fraction Quantitative  No results found for: CATHEF        Medication Review: yes  Current Facility-Administered Medications   Medication Dose Route Frequency Provider Last Rate Last Admin   • aspirin EC tablet 81 mg  81 mg Oral Daily Ren Santo MD   81 mg at 08/11/21 0922   • azelastine (ASTELIN) nasal spray 2 spray  2 spray Each Nare BID Ren Santo MD   2 spray at 08/11/21 0923   • carvedilol (COREG) tablet 12.5 mg  12.5 mg Oral BID With Meals Ren Santo MD   12.5 mg at 08/11/21 0922   • cetirizine (zyrTEC) tablet 10 mg  10 mg Oral Daily Neeta Chavez MD   10 mg at 08/11/21 0921   • cloNIDine (CATAPRES) tablet 0.1 mg  0.1 mg Oral Q6H PRN Ren Santo MD       • enoxaparin (LOVENOX) syringe 30 mg  30 mg Subcutaneous Q24H Neeta Chavez MD   30 mg at 08/10/21 2006   •  ferrous sulfate tablet 325 mg  325 mg Oral BID With Meals Ren Santo MD   325 mg at 08/11/21 0922   • fluticasone (FLONASE) 50 MCG/ACT nasal spray 2 spray  2 spray Nasal Daily PRN Ren Santo MD   2 spray at 08/10/21 2006   • guaiFENesin (MUCINEX) 12 hr tablet 1,200 mg  1,200 mg Oral Q12H Orville Weston MD   1,200 mg at 08/11/21 0922   • hydrALAZINE (APRESOLINE) tablet 25 mg  25 mg Oral Q8H Ren Santo MD   25 mg at 08/11/21 0923   • ipratropium-albuterol (DUO-NEB) nebulizer solution 3 mL  3 mL Nebulization 4x Daily - RT Orville Weston MD   3 mL at 08/11/21 1034   • isosorbide dinitrate (ISORDIL) tablet 10 mg  10 mg Oral TID - Nitrates Pierce Buchanan MD   10 mg at 08/11/21 0921   • losartan (COZAAR) tablet 100 mg  100 mg Oral Q24H Ren Santo MD   100 mg at 08/11/21 0921   • NIFEdipine XL (PROCARDIA XL) 24 hr tablet 60 mg  60 mg Oral Q24H Ren Santo MD   60 mg at 08/11/21 0921   • OLANZapine (zyPREXA) tablet 5 mg  5 mg Oral Nightly Ren Santo MD   5 mg at 08/10/21 2006   • predniSONE (DELTASONE) tablet 10 mg  10 mg Oral Daily Ren Santo MD   10 mg at 08/11/21 0922   • rosuvastatin (CRESTOR) tablet 20 mg  20 mg Oral Daily Ren Santo MD   20 mg at 08/11/21 0922   • sodium chloride 0.9 % flush 10 mL  10 mL Intravenous PRN Zackary Gutierrez MD       • ticagrelor (BRILINTA) tablet 90 mg  90 mg Oral BID Ren Santo MD   90 mg at 08/11/21 0923     · Left ventricular systolic function is low normal. Left ventricular ejection fraction appears to be 51 - 55%.  · The following left ventricular wall segments are hypokinetic: apical anterior, apical lateral, apical inferior, apical septal and apex hypokinetic.  · Left ventricular wall thickness is consistent with mild concentric hypertrophy.  · Mild aortic valve regurgitation is present.  · Estimated right ventricular systolic pressure from tricuspid regurgitation is  markedly elevated (>55 mmHg).    Assessment/Plan     1.  Acute respiratory failure-improved.  With a combination of oxygen, inhalers, diuretics, and steroid.  She is now on room air.  She is wearing BiPAP at night.  Does not wear oxygen at home.    2.  Acute on chronic diastolic congestive heart failure: She now appears euvolemic on exam after total of 3 doses of IV Lasix this admission.  She does not take diuretics at home.  Diuretics currently on hold due to acute kidney injury.  See echo this admission above.  -Continue to monitor volume status and renal function closely  -Recommend prescribing Lasix 40 mg to be used on an as-needed basis only at discharge for increased dyspnea/orthopnea/PND, significant peripheral edema, or weight gain of greater than 2 pounds overnight or 5 pounds in 2 days.  -Healthy low-sodium diet  -Daily weights  -Intake and output documentation indicates that she is actually net positive around 600 cc this admission. ?accuracy     3.  Recent tobacco abuse-she states she quit 3 weeks ago  4.  Probable COPD-suspect COPD exacerbation as a component of her dyspnea as well.  She reports her wheezing has now resolved.  Pulmonology following  5.  Acute kidney injury on CKD stage IIIb-diuretics on hold due to acute kidney injury.  Nephrology following.  Creatinine currently 2.64, with baseline around 2.2.  6.  Hypertension-continue Coreg, hydralazine, losartan, nifedipine and Isordil (Isordil added this admission-consider transition to equivalent dose of Imdur 30 mg daily at discharge to help with compliance), with dose titrations as warranted for low pressure control.  Previous notes indicate Coreg dose was decreased in the recent past due to asymptomatic nocturnal bradycardia.  7.  Obesity  8.  Coronary artery disease: She received 1 drug-eluting stent to the proximal LAD in November 2021 for a STEMI.    -Continue aspirin, Brilinta, high intensity statin  -She had one isolated episode of chest  pain this admission with flat trending troponins.  She denies recurrent chest pain.  Per my previous discussion with Dr. Buchanan, ischemic evaluation will be considered outpatient if she has recurrent chest pain.    Follow-up with ISAIAH Jain in 2 weeks.  Cardiology will sign off, recall as needed.      ISAIAH Rodriguez  08/11/21  13:35 CDT

## 2021-08-12 LAB
ANION GAP SERPL CALCULATED.3IONS-SCNC: 8 MMOL/L (ref 5–15)
BUN SERPL-MCNC: 43 MG/DL (ref 8–23)
BUN/CREAT SERPL: 18.1 (ref 7–25)
CALCIUM SPEC-SCNC: 8 MG/DL (ref 8.6–10.5)
CHLORIDE SERPL-SCNC: 106 MMOL/L (ref 98–107)
CO2 SERPL-SCNC: 27 MMOL/L (ref 22–29)
CREAT SERPL-MCNC: 2.38 MG/DL (ref 0.57–1)
GFR SERPL CREATININE-BSD FRML MDRD: 21 ML/MIN/1.73
GLUCOSE SERPL-MCNC: 146 MG/DL (ref 65–99)
POTASSIUM SERPL-SCNC: 4 MMOL/L (ref 3.5–5.2)
SODIUM SERPL-SCNC: 141 MMOL/L (ref 136–145)

## 2021-08-12 PROCEDURE — 94660 CPAP INITIATION&MGMT: CPT

## 2021-08-12 PROCEDURE — 80048 BASIC METABOLIC PNL TOTAL CA: CPT | Performed by: NURSE PRACTITIONER

## 2021-08-12 PROCEDURE — 94799 UNLISTED PULMONARY SVC/PX: CPT

## 2021-08-12 PROCEDURE — 63710000001 PREDNISONE PER 5 MG: Performed by: FAMILY MEDICINE

## 2021-08-12 PROCEDURE — 25010000002 ENOXAPARIN PER 10 MG: Performed by: INTERNAL MEDICINE

## 2021-08-12 RX ADMIN — HYDRALAZINE HYDROCHLORIDE 25 MG: 25 TABLET, FILM COATED ORAL at 21:11

## 2021-08-12 RX ADMIN — FERROUS SULFATE TAB 325 MG (65 MG ELEMENTAL FE) 325 MG: 325 (65 FE) TAB at 17:39

## 2021-08-12 RX ADMIN — ISOSORBIDE DINITRATE 10 MG: 10 TABLET ORAL at 17:39

## 2021-08-12 RX ADMIN — TICAGRELOR 90 MG: 90 TABLET ORAL at 21:11

## 2021-08-12 RX ADMIN — AZELASTINE HYDROCHLORIDE 2 SPRAY: 137 SPRAY, METERED NASAL at 21:11

## 2021-08-12 RX ADMIN — FERROUS SULFATE TAB 325 MG (65 MG ELEMENTAL FE) 325 MG: 325 (65 FE) TAB at 08:29

## 2021-08-12 RX ADMIN — AZELASTINE HYDROCHLORIDE 2 SPRAY: 137 SPRAY, METERED NASAL at 08:30

## 2021-08-12 RX ADMIN — TICAGRELOR 90 MG: 90 TABLET ORAL at 08:29

## 2021-08-12 RX ADMIN — CARVEDILOL 12.5 MG: 6.25 TABLET, FILM COATED ORAL at 08:29

## 2021-08-12 RX ADMIN — ISOSORBIDE DINITRATE 10 MG: 10 TABLET ORAL at 13:32

## 2021-08-12 RX ADMIN — CARVEDILOL 12.5 MG: 6.25 TABLET, FILM COATED ORAL at 17:39

## 2021-08-12 RX ADMIN — HYDRALAZINE HYDROCHLORIDE 25 MG: 25 TABLET, FILM COATED ORAL at 13:32

## 2021-08-12 RX ADMIN — CETIRIZINE HYDROCHLORIDE 10 MG: 10 TABLET ORAL at 08:29

## 2021-08-12 RX ADMIN — LOSARTAN POTASSIUM 100 MG: 50 TABLET, FILM COATED ORAL at 08:29

## 2021-08-12 RX ADMIN — ISOSORBIDE DINITRATE 10 MG: 10 TABLET ORAL at 08:29

## 2021-08-12 RX ADMIN — OLANZAPINE 5 MG: 5 TABLET, FILM COATED ORAL at 21:11

## 2021-08-12 RX ADMIN — ASPIRIN 81 MG: 81 TABLET ORAL at 08:29

## 2021-08-12 RX ADMIN — NIFEDIPINE 60 MG: 60 TABLET, FILM COATED, EXTENDED RELEASE ORAL at 08:29

## 2021-08-12 RX ADMIN — HYDRALAZINE HYDROCHLORIDE 25 MG: 25 TABLET, FILM COATED ORAL at 08:30

## 2021-08-12 RX ADMIN — PREDNISONE 10 MG: 10 TABLET ORAL at 08:29

## 2021-08-12 RX ADMIN — CALCITRIOL 0.25 MCG: 0.25 CAPSULE ORAL at 08:29

## 2021-08-12 RX ADMIN — ROSUVASTATIN CALCIUM 20 MG: 20 TABLET, FILM COATED ORAL at 08:29

## 2021-08-12 RX ADMIN — IPRATROPIUM BROMIDE AND ALBUTEROL SULFATE 3 ML: 2.5; .5 SOLUTION RESPIRATORY (INHALATION) at 10:00

## 2021-08-12 RX ADMIN — IPRATROPIUM BROMIDE AND ALBUTEROL SULFATE 3 ML: 2.5; .5 SOLUTION RESPIRATORY (INHALATION) at 15:26

## 2021-08-12 RX ADMIN — GUAIFENESIN 1200 MG: 600 TABLET, EXTENDED RELEASE ORAL at 21:11

## 2021-08-12 RX ADMIN — GUAIFENESIN 1200 MG: 600 TABLET, EXTENDED RELEASE ORAL at 08:29

## 2021-08-12 RX ADMIN — ENOXAPARIN SODIUM 30 MG: 30 INJECTION SUBCUTANEOUS at 21:11

## 2021-08-12 RX ADMIN — IPRATROPIUM BROMIDE AND ALBUTEROL SULFATE 3 ML: 2.5; .5 SOLUTION RESPIRATORY (INHALATION) at 19:32

## 2021-08-12 NOTE — CASE MANAGEMENT/SOCIAL WORK
Continued Stay Note   [facility]     Patient Name: Ludivina Ramirez  MRN: [id number]  Today's Date: 8/12/2021    Admit Date: 8/8/2021    Discharge Plan     Row Name 08/12/21 1541       Plan    Plan  HOME    Patient/Family in Agreement with Plan  yes    Plan Comments  REVIEWED CHART; EVENTS NOTED.  PT. STILL PLANS TO D/C HOME AND DOES NOT IDENTIFY ANY D/C PLANNING NEEDS AT PRESENT, HOWEVER WILL CONT. TO FOLLOW FOR ANY THAT MAY ARISE.        Discharge Codes    No documentation.             YULIANA Clements

## 2021-08-12 NOTE — PLAN OF CARE
Goal Outcome Evaluation:      Pt off oxygen most of shift and didn't use bipap during sleep. O2 sat wnl. Will continue oral hydration. Labs to be drawn in the morning. Will continue to monitor.    Tele: S 34-19 low 35

## 2021-08-12 NOTE — PROGRESS NOTES
PROGRESS NOTE.      Patient:  Ludivina Ramirez  YOB: 1960  Date of Service: 8/12/2021  MRN: [id number]   Acct: [id number]   Primary Care Physician: Orville Weston MD  Advance Directive:   Code Status and Medical Interventions:   Ordered at: 08/08/21 1956     Code Status:    CPR     Medical Interventions (Level of Support Prior to Arrest):    Full     Admit Date: 8/8/2021       Hospital Day: 4  Referring Provider: Orville Weston MD      Patient Seen, Chart, Consults, Notes, Labs, Radiology studies reviewed.      Subjective:  Ludivina Ramirez is a 60 y.o. female  whom we were consulted for acute kidney injury.  Patient has a history of asthma, COPD and congestive heart failure.  She is also obese and has obstructive sleep apnea.  She has recurrent episodes of admission for acute respiratory failure and congestive heart failure exacerbation.  Patient is admitted at this time with worsening dyspnea.  She received the Lasix for diuresis.  She continues to be dyspneic and has been using his CPAP.  Her serum creatinine ranges between 2.1-2.4.  On admission on August 8 her serum creatinine was 1.8.  It has since risen to 2.6 after diuretics.  Patient's Lasix was stopped.  He is currently receiving hydration by mouth.  Denies any change to her urine habits.  Needed occasional use of NSAIDs.  She has no dysuria.  Today, patient appears tless dyspneic. She had no new complaints.  She had good urine output.    Allergies:  Codeine and Imitrex [sumatriptan]    Home Meds:  Medications Prior to Admission   Medication Sig Dispense Refill Last Dose   • carvedilol (COREG) 25 MG tablet Take 25 mg by mouth 2 (Two) Times a Day With Meals.      • hydrALAZINE (APRESOLINE) 50 MG tablet Take 75 mg by mouth Every 8 (Eight) Hours.      • aspirin 81 MG EC tablet Take 1 tablet by mouth Daily.      • azelastine (ASTELIN) 0.1 % nasal spray 2 sprays into the nostril(s) as directed by provider 2 (Two) Times a Day. Use in each  nostril as directed       • cloNIDine (CATAPRES) 0.1 MG tablet Take 1 tablet by mouth Every 6 (Six) Hours As Needed for High Blood Pressure (BP >180/100). 30 tablet 1    • Ergocalciferol (Vitamin D2) 50 MCG (2000 UT) tablet Take 1,000 Units by mouth Daily.      • ferrous sulfate 325 (65 FE) MG tablet Take 1 tablet by mouth 2 (Two) Times a Day With Meals. 60 tablet 0    • fluticasone (FLONASE) 50 MCG/ACT nasal spray 1 spray into the nostril(s) as directed by provider Every Morning. In each nostril      • losartan (COZAAR) 100 MG tablet Take 1 tablet by mouth Daily. 90 tablet 3    • NIFEdipine XL (ADALAT CC) 60 MG 24 hr tablet Take 1 tablet by mouth 2 (two) times a day. 60 tablet 1    • nitroglycerin (NITROSTAT) 0.4 MG SL tablet Place 1 tablet under the tongue Every 5 (Five) Minutes As Needed for Chest Pain for up to 3 doses. Take no more than 3 doses in 15 minutes. 25 tablet 0    • OLANZapine (zyPREXA) 5 MG tablet Take 1 tablet by mouth 2 (two) times a day. 60 tablet 0    • pantoprazole (PROTONIX) 40 MG EC tablet Take 1 tablet by mouth Daily. 30 tablet 1    • rosuvastatin (CRESTOR) 20 MG tablet Take 1 tablet by mouth Daily. 90 tablet 3    • ticagrelor (BRILINTA) 90 MG tablet tablet Take 1 tablet by mouth 2 (Two) Times a Day. 60 tablet 11        Medicines:  Current Facility-Administered Medications   Medication Dose Route Frequency Provider Last Rate Last Admin   • acetaminophen (TYLENOL) tablet 650 mg  650 mg Oral Q6H PRN Orville Weston MD   650 mg at 08/11/21 1346   • aspirin EC tablet 81 mg  81 mg Oral Daily Ren Santo MD   81 mg at 08/12/21 0829   • azelastine (ASTELIN) nasal spray 2 spray  2 spray Each Nare BID Ren Santo MD   2 spray at 08/12/21 0830   • calcitriol (ROCALTROL) capsule 0.25 mcg  0.25 mcg Oral Daily Parker Whitfield MD   0.25 mcg at 08/12/21 0829   • carvedilol (COREG) tablet 12.5 mg  12.5 mg Oral BID With Meals Ren Santo MD   12.5 mg at 08/12/21  0829   • cetirizine (zyrTEC) tablet 10 mg  10 mg Oral Daily Neeat Chavez MD   10 mg at 08/12/21 0829   • cloNIDine (CATAPRES) tablet 0.1 mg  0.1 mg Oral Q6H PRN Ren Santo MD       • enoxaparin (LOVENOX) syringe 30 mg  30 mg Subcutaneous Q24H Neeta Chavez MD   30 mg at 08/11/21 2016   • ferrous sulfate tablet 325 mg  325 mg Oral BID With Meals Ren Santo MD   325 mg at 08/12/21 0829   • fluticasone (FLONASE) 50 MCG/ACT nasal spray 2 spray  2 spray Nasal Daily PRN Ren Santo MD   2 spray at 08/11/21 2016   • guaiFENesin (MUCINEX) 12 hr tablet 1,200 mg  1,200 mg Oral Q12H Orville Weston MD   1,200 mg at 08/12/21 0829   • hydrALAZINE (APRESOLINE) tablet 25 mg  25 mg Oral Q8H Ren Santo MD   25 mg at 08/12/21 1332   • ipratropium-albuterol (DUO-NEB) nebulizer solution 3 mL  3 mL Nebulization 4x Daily - RT Orville Weston MD   3 mL at 08/12/21 1526   • isosorbide dinitrate (ISORDIL) tablet 10 mg  10 mg Oral TID - Nitrates Pierce Buchanan MD   10 mg at 08/12/21 1332   • losartan (COZAAR) tablet 100 mg  100 mg Oral Q24H Ren Santo MD   100 mg at 08/12/21 0829   • NIFEdipine XL (PROCARDIA XL) 24 hr tablet 60 mg  60 mg Oral Q24H Ren Santo MD   60 mg at 08/12/21 0829   • OLANZapine (zyPREXA) tablet 5 mg  5 mg Oral Nightly Ren Santo MD   5 mg at 08/11/21 2016   • predniSONE (DELTASONE) tablet 10 mg  10 mg Oral Daily Ren Santo MD   10 mg at 08/12/21 0829   • rosuvastatin (CRESTOR) tablet 20 mg  20 mg Oral Daily Ren Santo MD   20 mg at 08/12/21 0829   • sodium chloride 0.9 % flush 10 mL  10 mL Intravenous PRN Zackary Gutierrez MD       • ticagrelor (BRILINTA) tablet 90 mg  90 mg Oral BID Ren Santo MD   90 mg at 08/12/21 0829       Past Medical History:  Past Medical History:   Diagnosis Date   • Acute CHF (CMS/HCC)    • Acute renal failure (ARF) (CMS/HCC)    • Anemia    • Asthma      "childhood   • Hypertension    • Ischemic colitis (CMS/HCC)    • Metabolic acidosis    • Nonrheumatic aortic (valve) insufficiency    • PTSD (post-traumatic stress disorder)        Past Surgical History:  Past Surgical History:   Procedure Laterality Date   • CARDIAC CATHETERIZATION N/A 11/8/2020    Procedure: Left Heart Cath;  Surgeon: Pierce Buchanan MD;  Location:  [facility];  Service: Cardiovascular;  Laterality: N/A;   • EXTERNAL FIXATION WRIST FRACTURE     • LEG SURGERY     • TUBAL ABDOMINAL LIGATION         Family History  Family History   Problem Relation Age of Onset   • Coronary artery disease Mother    • Coronary artery disease Father        Social History  Social History     Socioeconomic History   • Marital status: Single     Spouse name: Not on file   • Number of children: Not on file   • Years of education: Not on file   • Highest education level: Not on file   Tobacco Use   • Smoking status: Current Every Day Smoker     Packs/day: 0.50   • Smokeless tobacco: Never Used   Vaping Use   • Vaping Use: Never used   Substance and Sexual Activity   • Alcohol use: No   • Drug use: No   • Sexual activity: Defer       Review of Systems:  History obtained from chart review and the patient  General ROS: No fever or chills  Respiratory ROS: No cough, +shortness of breath,- wheezing  Cardiovascular ROS: no chest pain, + BS dyspnea on exertion  Gastrointestinal ROS: No abdominal pain or melena  Genito-Urinary ROS: No dysuria or hematuria  Musculoskeletal: negative  Skin: negative    Objective:  /79 (BP Location: Right arm, Patient Position: Lying)   Pulse 64   Temp 96.8 °F (36 °C) (Axillary)   Resp 18   Ht 165.1 cm (65\")   Wt 107 kg (235 lb)   SpO2 98%   BMI 39.11 kg/m²     Intake/Output Summary (Last 24 hours) at 8/12/2021 1529  Last data filed at 8/12/2021 1321  Gross per 24 hour   Intake 720 ml   Output 300 ml   Net 420 ml       Physical examination:  General: awake/alert   Chest: "  clear to auscultation bilaterally without respiratory distress  CVS: regular rate and rhythm  Abdominal: soft, nontender, normal bowel sounds  Extremities: no cyanosis or edema  Skin: warm and dry without rash  Neuro: No focal motor deficits    Labs:  Lab Results (last 24 hours)     Procedure Component Value Units Date/Time    Blood Culture - Blood, Arm, Left [[id number]] Collected: 08/08/21 1403    Specimen: Blood from Arm, Left Updated: 08/12/21 1431     Blood Culture No growth at 4 days    Blood Culture - Blood, Arm, Left [[id number]] Collected: 08/08/21 1403    Specimen: Blood from Arm, Left Updated: 08/12/21 1431     Blood Culture No growth at 4 days    Basic Metabolic Panel [[id number]]  (Abnormal) Collected: 08/12/21 0445    Specimen: Blood Updated: 08/12/21 0534     Glucose 146 mg/dL      BUN 43 mg/dL      Creatinine 2.38 mg/dL      Sodium 141 mmol/L      Potassium 4.0 mmol/L      Chloride 106 mmol/L      CO2 27.0 mmol/L      Calcium 8.0 mg/dL      eGFR Non African Amer 21 mL/min/1.73      BUN/Creatinine Ratio 18.1     Anion Gap 8.0 mmol/L     Narrative:      GFR Normal >60  Chronic Kidney Disease <60  Kidney Failure <15            Radiology:   Imaging Results (Last 24 Hours)     ** No results found for the last 24 hours. **              Assessment   -Acute kidney injury- ATN-renal function slightly better  -Chronic kidney disease stage IIIb  -Hypertension with nephropathy  -Congestive heart failure  -Asthma/COPD  -Morbid obesity  -Secondary hyperparathyroidism    Plan:  Patient was encouraged to continue hydrating by mouth.  Continue calcitriol.  Follow-up labs.      Parker Whitfield MD  8/12/2021  15:29 CDT

## 2021-08-12 NOTE — PLAN OF CARE
Problem: Adult Inpatient Plan of Care  Goal: Plan of Care Review  Outcome: Ongoing, Progressing  Flowsheets (Taken 8/12/2021 1613)  Progress: no change  Plan of Care Reviewed With: patient  Outcome Summary: Pt rested between care wearing BiPAP while asleep, otherwise on room air sats WNL. No complaints of pain, VSS. NSR/SB 54-74 on tele. Safety maintained, will update MD as needed

## 2021-08-13 VITALS
OXYGEN SATURATION: 95 % | RESPIRATION RATE: 18 BRPM | HEIGHT: 65 IN | TEMPERATURE: 97.9 F | WEIGHT: 235 LBS | SYSTOLIC BLOOD PRESSURE: 158 MMHG | DIASTOLIC BLOOD PRESSURE: 92 MMHG | HEART RATE: 69 BPM | BODY MASS INDEX: 39.15 KG/M2

## 2021-08-13 LAB
ANION GAP SERPL CALCULATED.3IONS-SCNC: 7 MMOL/L (ref 5–15)
BACTERIA SPEC AEROBE CULT: NORMAL
BACTERIA SPEC AEROBE CULT: NORMAL
BUN SERPL-MCNC: 42 MG/DL (ref 8–23)
BUN/CREAT SERPL: 19.3 (ref 7–25)
CALCIUM SPEC-SCNC: 8.1 MG/DL (ref 8.6–10.5)
CHLORIDE SERPL-SCNC: 108 MMOL/L (ref 98–107)
CO2 SERPL-SCNC: 28 MMOL/L (ref 22–29)
CREAT SERPL-MCNC: 2.18 MG/DL (ref 0.57–1)
GFR SERPL CREATININE-BSD FRML MDRD: 23 ML/MIN/1.73
GLUCOSE SERPL-MCNC: 94 MG/DL (ref 65–99)
POTASSIUM SERPL-SCNC: 4.4 MMOL/L (ref 3.5–5.2)
SODIUM SERPL-SCNC: 143 MMOL/L (ref 136–145)

## 2021-08-13 PROCEDURE — 94799 UNLISTED PULMONARY SVC/PX: CPT

## 2021-08-13 PROCEDURE — 94660 CPAP INITIATION&MGMT: CPT

## 2021-08-13 PROCEDURE — 63710000001 PREDNISONE PER 5 MG: Performed by: FAMILY MEDICINE

## 2021-08-13 PROCEDURE — 80048 BASIC METABOLIC PNL TOTAL CA: CPT | Performed by: FAMILY MEDICINE

## 2021-08-13 PROCEDURE — 94618 PULMONARY STRESS TESTING: CPT

## 2021-08-13 RX ORDER — FUROSEMIDE 40 MG/1
40 TABLET ORAL DAILY PRN
Qty: 30 TABLET | Refills: 0 | Status: SHIPPED | OUTPATIENT
Start: 2021-08-13 | End: 2022-06-10 | Stop reason: HOSPADM

## 2021-08-13 RX ORDER — CALCITRIOL 0.25 UG/1
0.25 CAPSULE, LIQUID FILLED ORAL DAILY
Qty: 30 CAPSULE | Refills: 3 | Status: SHIPPED | OUTPATIENT
Start: 2021-08-14 | End: 2021-08-13

## 2021-08-13 RX ORDER — IPRATROPIUM BROMIDE AND ALBUTEROL SULFATE 2.5; .5 MG/3ML; MG/3ML
3 SOLUTION RESPIRATORY (INHALATION)
Qty: 360 ML | Refills: 0 | Status: SHIPPED | OUTPATIENT
Start: 2021-08-13 | End: 2021-08-13

## 2021-08-13 RX ORDER — CALCITRIOL 0.25 UG/1
0.25 CAPSULE, LIQUID FILLED ORAL DAILY
Qty: 30 CAPSULE | Refills: 3 | Status: SHIPPED | OUTPATIENT
Start: 2021-08-14

## 2021-08-13 RX ORDER — ISOSORBIDE DINITRATE 10 MG/1
10 TABLET ORAL
Qty: 90 TABLET | Refills: 3 | Status: SHIPPED | OUTPATIENT
Start: 2021-08-13 | End: 2021-08-13

## 2021-08-13 RX ORDER — FUROSEMIDE 40 MG/1
40 TABLET ORAL DAILY PRN
Qty: 30 TABLET | Refills: 0 | Status: SHIPPED | OUTPATIENT
Start: 2021-08-13 | End: 2021-08-13 | Stop reason: SDUPTHER

## 2021-08-13 RX ORDER — IPRATROPIUM BROMIDE AND ALBUTEROL SULFATE 2.5; .5 MG/3ML; MG/3ML
3 SOLUTION RESPIRATORY (INHALATION)
Qty: 360 ML | Refills: 0 | Status: ON HOLD | OUTPATIENT
Start: 2021-08-13 | End: 2021-08-24

## 2021-08-13 RX ORDER — ISOSORBIDE DINITRATE 10 MG/1
10 TABLET ORAL
Qty: 90 TABLET | Refills: 3 | Status: SHIPPED | OUTPATIENT
Start: 2021-08-13 | End: 2021-10-18 | Stop reason: SDUPTHER

## 2021-08-13 RX ORDER — GUAIFENESIN 600 MG/1
1200 TABLET, EXTENDED RELEASE ORAL EVERY 12 HOURS SCHEDULED
Qty: 60 TABLET | Refills: 3 | Status: ON HOLD | OUTPATIENT
Start: 2021-08-13 | End: 2022-06-04

## 2021-08-13 RX ORDER — GUAIFENESIN 600 MG/1
1200 TABLET, EXTENDED RELEASE ORAL EVERY 12 HOURS SCHEDULED
Qty: 60 TABLET | Refills: 3 | Status: SHIPPED | OUTPATIENT
Start: 2021-08-13 | End: 2021-08-13

## 2021-08-13 RX ADMIN — CARVEDILOL 12.5 MG: 6.25 TABLET, FILM COATED ORAL at 08:13

## 2021-08-13 RX ADMIN — ISOSORBIDE DINITRATE 10 MG: 10 TABLET ORAL at 13:01

## 2021-08-13 RX ADMIN — LOSARTAN POTASSIUM 100 MG: 50 TABLET, FILM COATED ORAL at 08:13

## 2021-08-13 RX ADMIN — ISOSORBIDE DINITRATE 10 MG: 10 TABLET ORAL at 08:12

## 2021-08-13 RX ADMIN — HYDRALAZINE HYDROCHLORIDE 25 MG: 25 TABLET, FILM COATED ORAL at 07:27

## 2021-08-13 RX ADMIN — IPRATROPIUM BROMIDE AND ALBUTEROL SULFATE 3 ML: 2.5; .5 SOLUTION RESPIRATORY (INHALATION) at 10:27

## 2021-08-13 RX ADMIN — NIFEDIPINE 60 MG: 60 TABLET, FILM COATED, EXTENDED RELEASE ORAL at 08:13

## 2021-08-13 RX ADMIN — HYDRALAZINE HYDROCHLORIDE 25 MG: 25 TABLET, FILM COATED ORAL at 13:01

## 2021-08-13 RX ADMIN — TICAGRELOR 90 MG: 90 TABLET ORAL at 08:12

## 2021-08-13 RX ADMIN — IPRATROPIUM BROMIDE AND ALBUTEROL SULFATE 3 ML: 2.5; .5 SOLUTION RESPIRATORY (INHALATION) at 06:26

## 2021-08-13 RX ADMIN — FERROUS SULFATE TAB 325 MG (65 MG ELEMENTAL FE) 325 MG: 325 (65 FE) TAB at 08:13

## 2021-08-13 RX ADMIN — CETIRIZINE HYDROCHLORIDE 10 MG: 10 TABLET ORAL at 08:12

## 2021-08-13 RX ADMIN — CALCITRIOL 0.25 MCG: 0.25 CAPSULE ORAL at 08:12

## 2021-08-13 RX ADMIN — AZELASTINE HYDROCHLORIDE 2 SPRAY: 137 SPRAY, METERED NASAL at 08:14

## 2021-08-13 RX ADMIN — PREDNISONE 10 MG: 10 TABLET ORAL at 08:12

## 2021-08-13 RX ADMIN — ROSUVASTATIN CALCIUM 20 MG: 20 TABLET, FILM COATED ORAL at 08:12

## 2021-08-13 RX ADMIN — ASPIRIN 81 MG: 81 TABLET ORAL at 08:12

## 2021-08-13 RX ADMIN — GUAIFENESIN 1200 MG: 600 TABLET, EXTENDED RELEASE ORAL at 08:12

## 2021-08-13 NOTE — PROGRESS NOTES
PROGRESS NOTE.      Patient:  Ludivina Ramirez  YOB: 1960  Date of Service: 8/13/2021  MRN: [id number]   Acct: [id number]   Primary Care Physician: Orville Weston MD  Advance Directive:   Code Status and Medical Interventions:   Ordered at: 08/08/21 1956     Code Status:    CPR     Medical Interventions (Level of Support Prior to Arrest):    Full     Admit Date: 8/8/2021       Hospital Day: 5  Referring Provider: Orville Weston MD      Patient Seen, Chart, Consults, Notes, Labs, Radiology studies reviewed.      Subjective:  Ludivina Ramirez is a 60 y.o. female  whom we were consulted for acute kidney injury.  Patient has a history of asthma, COPD and congestive heart failure.  She is also obese and has obstructive sleep apnea.  She has recurrent episodes of admission for acute respiratory failure and congestive heart failure exacerbation.  Patient is admitted at this time with worsening dyspnea.  She received the Lasix for diuresis.  She continues to be dyspneic and has been using his CPAP.  Her serum creatinine ranges between 2.1-2.4.  On admission on August 8 her serum creatinine was 1.8.  It has since risen to 2.6 after diuretics.  Patient's Lasix was stopped.  He is currently receiving hydration by mouth.  Denies any change to her urine habits.  Needed occasional use of NSAIDs.  She has no dysuria.  Today, patient is doing better with less dyspnea.  She has good output.    Allergies:  Codeine and Imitrex [sumatriptan]    Home Meds:  Medications Prior to Admission   Medication Sig Dispense Refill Last Dose   • carvedilol (COREG) 25 MG tablet Take 25 mg by mouth 2 (Two) Times a Day With Meals.      • hydrALAZINE (APRESOLINE) 50 MG tablet Take 75 mg by mouth Every 8 (Eight) Hours.      • aspirin 81 MG EC tablet Take 1 tablet by mouth Daily.      • azelastine (ASTELIN) 0.1 % nasal spray 2 sprays into the nostril(s) as directed by provider 2 (Two) Times a Day. Use in each nostril as directed        • cloNIDine (CATAPRES) 0.1 MG tablet Take 1 tablet by mouth Every 6 (Six) Hours As Needed for High Blood Pressure (BP >180/100). 30 tablet 1    • Ergocalciferol (Vitamin D2) 50 MCG (2000 UT) tablet Take 1,000 Units by mouth Daily.      • ferrous sulfate 325 (65 FE) MG tablet Take 1 tablet by mouth 2 (Two) Times a Day With Meals. 60 tablet 0    • fluticasone (FLONASE) 50 MCG/ACT nasal spray 1 spray into the nostril(s) as directed by provider Every Morning. In each nostril      • losartan (COZAAR) 100 MG tablet Take 1 tablet by mouth Daily. 90 tablet 3    • NIFEdipine XL (ADALAT CC) 60 MG 24 hr tablet Take 1 tablet by mouth 2 (two) times a day. 60 tablet 1    • nitroglycerin (NITROSTAT) 0.4 MG SL tablet Place 1 tablet under the tongue Every 5 (Five) Minutes As Needed for Chest Pain for up to 3 doses. Take no more than 3 doses in 15 minutes. 25 tablet 0    • OLANZapine (zyPREXA) 5 MG tablet Take 1 tablet by mouth 2 (two) times a day. 60 tablet 0    • pantoprazole (PROTONIX) 40 MG EC tablet Take 1 tablet by mouth Daily. 30 tablet 1    • rosuvastatin (CRESTOR) 20 MG tablet Take 1 tablet by mouth Daily. 90 tablet 3    • ticagrelor (BRILINTA) 90 MG tablet tablet Take 1 tablet by mouth 2 (Two) Times a Day. 60 tablet 11        Medicines:  Current Facility-Administered Medications   Medication Dose Route Frequency Provider Last Rate Last Admin   • acetaminophen (TYLENOL) tablet 650 mg  650 mg Oral Q6H PRN Orville Weston MD   650 mg at 08/11/21 1346   • aspirin EC tablet 81 mg  81 mg Oral Daily Ren Santo MD   81 mg at 08/13/21 0812   • azelastine (ASTELIN) nasal spray 2 spray  2 spray Each Nare BID Ren Santo MD   2 spray at 08/13/21 0814   • calcitriol (ROCALTROL) capsule 0.25 mcg  0.25 mcg Oral Daily Parker Whitfield MD   0.25 mcg at 08/13/21 0812   • carvedilol (COREG) tablet 12.5 mg  12.5 mg Oral BID With Meals Ren Santo MD   12.5 mg at 08/13/21 0813   • cetirizine  (zyrTEC) tablet 10 mg  10 mg Oral Daily Neeta Chavez MD   10 mg at 08/13/21 0812   • cloNIDine (CATAPRES) tablet 0.1 mg  0.1 mg Oral Q6H PRN Ren Santo MD       • enoxaparin (LOVENOX) syringe 30 mg  30 mg Subcutaneous Q24H Neeta Chavez MD   30 mg at 08/12/21 2111   • ferrous sulfate tablet 325 mg  325 mg Oral BID With Meals Ren Santo MD   325 mg at 08/13/21 0813   • fluticasone (FLONASE) 50 MCG/ACT nasal spray 2 spray  2 spray Nasal Daily PRN Ren Santo MD   2 spray at 08/11/21 2016   • guaiFENesin (MUCINEX) 12 hr tablet 1,200 mg  1,200 mg Oral Q12H Orville Weston MD   1,200 mg at 08/13/21 0812   • hydrALAZINE (APRESOLINE) tablet 25 mg  25 mg Oral Q8H eRn Santo MD   25 mg at 08/13/21 1301   • ipratropium-albuterol (DUO-NEB) nebulizer solution 3 mL  3 mL Nebulization 4x Daily - RT Orville Weston MD   3 mL at 08/13/21 1027   • isosorbide dinitrate (ISORDIL) tablet 10 mg  10 mg Oral TID - Nitrates Pierce Buchanan MD   10 mg at 08/13/21 1301   • losartan (COZAAR) tablet 100 mg  100 mg Oral Q24H Ren Santo MD   100 mg at 08/13/21 0813   • NIFEdipine XL (PROCARDIA XL) 24 hr tablet 60 mg  60 mg Oral Q24H Ren Santo MD   60 mg at 08/13/21 0813   • OLANZapine (zyPREXA) tablet 5 mg  5 mg Oral Nightly Ren Santo MD   5 mg at 08/12/21 2111   • predniSONE (DELTASONE) tablet 10 mg  10 mg Oral Daily Ren Santo MD   10 mg at 08/13/21 0812   • rosuvastatin (CRESTOR) tablet 20 mg  20 mg Oral Daily Ren Santo MD   20 mg at 08/13/21 0812   • sodium chloride 0.9 % flush 10 mL  10 mL Intravenous PRN Zackary Gutierrez MD       • ticagrelor (BRILINTA) tablet 90 mg  90 mg Oral BID Ren Santo MD   90 mg at 08/13/21 0812       Past Medical History:  Past Medical History:   Diagnosis Date   • Acute CHF (CMS/HCC)    • Acute renal failure (ARF) (CMS/HCC)    • Anemia    • Asthma     childhood   • Hypertension   "  • Ischemic colitis (CMS/HCC)    • Metabolic acidosis    • Nonrheumatic aortic (valve) insufficiency    • PTSD (post-traumatic stress disorder)        Past Surgical History:  Past Surgical History:   Procedure Laterality Date   • CARDIAC CATHETERIZATION N/A 11/8/2020    Procedure: Left Heart Cath;  Surgeon: Pierce Buchanan MD;  Location:  [facility];  Service: Cardiovascular;  Laterality: N/A;   • EXTERNAL FIXATION WRIST FRACTURE     • LEG SURGERY     • TUBAL ABDOMINAL LIGATION         Family History  Family History   Problem Relation Age of Onset   • Coronary artery disease Mother    • Coronary artery disease Father        Social History  Social History     Socioeconomic History   • Marital status: Single     Spouse name: Not on file   • Number of children: Not on file   • Years of education: Not on file   • Highest education level: Not on file   Tobacco Use   • Smoking status: Current Every Day Smoker     Packs/day: 0.50   • Smokeless tobacco: Never Used   Vaping Use   • Vaping Use: Never used   Substance and Sexual Activity   • Alcohol use: No   • Drug use: No   • Sexual activity: Defer       Review of Systems:  History obtained from chart review and the patient  General ROS: No fever or chills  Respiratory ROS: No cough, +shortness of breath,- wheezing  Cardiovascular ROS: no chest pain, + BS dyspnea on exertion  Gastrointestinal ROS: No abdominal pain or melena  Genito-Urinary ROS: No dysuria or hematuria  Musculoskeletal: negative  Skin: negative    Objective:  /92 (BP Location: Right arm, Patient Position: Lying)   Pulse 69   Temp 97.9 °F (36.6 °C) (Axillary)   Resp 18   Ht 165.1 cm (65\")   Wt 107 kg (235 lb)   SpO2 95%   BMI 39.11 kg/m²     Intake/Output Summary (Last 24 hours) at 8/13/2021 1542  Last data filed at 8/13/2021 1300  Gross per 24 hour   Intake 840 ml   Output 600 ml   Net 240 ml       Physical examination:  General: awake/alert   Chest:  clear to auscultation " bilaterally without respiratory distress  CVS: regular rate and rhythm  Abdominal: soft, nontender, normal bowel sounds  Extremities: no cyanosis or edema  Skin: warm and dry without rash  Neuro: No focal motor deficits    Labs:  Lab Results (last 24 hours)     Procedure Component Value Units Date/Time    Blood Culture - Blood, Arm, Left [[id number]] Collected: 08/08/21 1403    Specimen: Blood from Arm, Left Updated: 08/13/21 1445     Blood Culture No growth at 5 days    Blood Culture - Blood, Arm, Left [[id number]] Collected: 08/08/21 1403    Specimen: Blood from Arm, Left Updated: 08/13/21 1445     Blood Culture No growth at 5 days    Basic Metabolic Panel [[id number]]  (Abnormal) Collected: 08/13/21 0553    Specimen: Blood Updated: 08/13/21 0713     Glucose 94 mg/dL      BUN 42 mg/dL      Creatinine 2.18 mg/dL      Sodium 143 mmol/L      Potassium 4.4 mmol/L      Chloride 108 mmol/L      CO2 28.0 mmol/L      Calcium 8.1 mg/dL      eGFR Non African Amer 23 mL/min/1.73      BUN/Creatinine Ratio 19.3     Anion Gap 7.0 mmol/L     Narrative:      GFR Normal >60  Chronic Kidney Disease <60  Kidney Failure <15            Radiology:   Imaging Results (Last 24 Hours)     ** No results found for the last 24 hours. **              Assessment   -Acute kidney injury- ATN-renal function slightly better  -Chronic kidney disease stage IIIb  -Hypertension with nephropathy  -Congestive heart failure  -Asthma/COPD  -Morbid obesity  -Secondary hyperparathyroidism    Plan:  Patient was encouraged to continue hydrating by mouth.  Avoid NSAIDs. Continue calcitriol.  Follow-up labs.  Okay to discharge from renal standpoint.  Follow-up with the renal clinic within 2 to 3 weeks.      Parker Whitfield MD  8/13/2021  15:42 CDT

## 2021-08-13 NOTE — PROGRESS NOTES
Exercise Oximetry    Patient Name:Ludivina Ramirez   MRN: [id number]   Date: 08/13/21             ROOM AIR BASELINE   SpO2% 93   Heart Rate 72   Blood Pressure      EXERCISE ON ROOM AIR SpO2% EXERCISE ON O2 @2  LPM SpO2%   1 MINUTE  1 MINUTE    2 MINUTES  2 MINUTES    3 MINUTES 87 3 MINUTES    4 MINUTES  4 MINUTES    5 MINUTES  5 MINUTES    6 MINUTES  6 MINUTES 93              Distance Walked  200 ft. Distance Walked 120 ft.   Dyspnea (Hanh Scale)  3 Dyspnea (Hanh Scale) 1   Fatigue (Hanh Scale)   Fatigue (Hanh Scale)   SpO2% Post Exercise   SpO2% Post Exercise 93   HR Post Exercise   HR Post Exercise 88   Time to Recovery   Time to Recovery      Comments:

## 2021-08-13 NOTE — CASE MANAGEMENT/SOCIAL WORK
Case Management Discharge Note      Final Note: PT. CHOSE [facility] HOME CARE FROM A LIST OF PROVIDERS; REFERRAL MADE TO [facility].  PT. CHOSE [facility] FROM A LIST OF PROVIDERS.  REFERRAL CALLED AND FAXED TO [facility].  PORTABLE TANK WILL BE DELIVERED TO PT'S ROOM PRIOR TO D/C.    Provided Post Acute Provider List?: Yes  Post Acute Provider List: Home Health  Provided Post Acute Provider Quality & Resource List?: Yes  Post Acute Provider Quality and Resource List: Home Health  Delivered To: Patient  Method of Delivery: In person    Selected Continued Care - Admitted Since 8/8/2021     Destination    No services have been selected for the patient.              Durable Medical Equipment    No services have been selected for the patient.              Dialysis/Infusion    No services have been selected for the patient.              Home Medical Care    No services have been selected for the patient.              Therapy    No services have been selected for the patient.              Community Resources    No services have been selected for the patient.              Community & DME    No services have been selected for the patient.                       Final Discharge Disposition Code: 06 - home with home health care

## 2021-08-13 NOTE — PLAN OF CARE
Goal Outcome Evaluation:              Outcome Summary: Initial RDN eval for LOS. Pt on Cardiac diet with PO intake 97%/9meal. Will rescreen weekly or follow up as needed per cnslt.

## 2021-08-13 NOTE — PLAN OF CARE
Goal Outcome Evaluation:  Plan of Care Reviewed With: patient        Progress: improving  Outcome Summary: VSS. no c/o pain. Rested well through night with BiPAP worn. Probable discharge home today??

## 2021-08-14 ENCOUNTER — READMISSION MANAGEMENT (OUTPATIENT)
Dept: CALL CENTER | Facility: HOSPITAL | Age: 61
End: 2021-08-14

## 2021-08-14 NOTE — DISCHARGE SUMMARY
Hospital Discharge Summary    Ludivina Ramirez  :  1960  MRN:  [id number]    Admit date:  2021  Discharge date:  2021    Admitting Physician:    Orville Weston MD    Discharge Diagnoses:      Acute on chronic diastolic congestive heart failure (CMS/HCC)    COPD    CRF    HTN    CAD    Hospital Course:   She was admitted with ARF, volume overload. She was treated with diuresis and is now euvolemic. She will be d/c on prn Lasix, along with PO BP medicine. She will have a f/u and sleep study with Pulm. She will be evaluated for home O2 and Home Health will follow on d/c. She wants to go home. Her VS are stable. She is eating and ambulating.     Discharge Medications:         Discharge Medications      New Medications      Instructions Start Date   calcitriol 0.25 MCG capsule  Commonly known as: ROCALTROL   0.25 mcg, Oral, Daily      furosemide 40 MG tablet  Commonly known as: Lasix   40 mg, Oral, Daily PRN      ipratropium-albuterol 0.5-2.5 mg/3 ml nebulizer  Commonly known as: DUO-NEB   3 mL, Nebulization, 4 Times Daily - RT      isosorbide dinitrate 10 MG tablet  Commonly known as: ISORDIL   10 mg, Oral, 3 Times Daily (Nitrates)      Mucinex 600 MG 12 hr tablet  Generic drug: guaiFENesin   1,200 mg, Oral, Every 12 Hours Scheduled         Continue These Medications      Instructions Start Date   aspirin 81 MG EC tablet   81 mg, Oral, Daily      azelastine 0.1 % nasal spray  Commonly known as: ASTELIN   2 sprays, Nasal, 2 Times Daily, Use in each nostril as directed      carvedilol 25 MG tablet  Commonly known as: COREG   25 mg, Oral, 2 Times Daily With Meals      cloNIDine 0.1 MG tablet  Commonly known as: CATAPRES   0.1 mg, Oral, Every 6 Hours PRN      ferrous sulfate 325 (65 FE) MG tablet   325 mg, Oral, 2 Times Daily With Meals      fluticasone 50 MCG/ACT nasal spray  Commonly known as: FLONASE   1 spray, Nasal, Every Morning, In each nostril      hydrALAZINE 50 MG tablet  Commonly known as:  APRESOLINE   75 mg, Oral, Every 8 Hours      losartan 100 MG tablet  Commonly known as: COZAAR   100 mg, Oral, Every 24 Hours Scheduled      NIFEdipine CC 60 MG 24 hr tablet  Commonly known as: ADALAT CC   60 mg, Oral, 2 times daily      nitroglycerin 0.4 MG SL tablet  Commonly known as: NITROSTAT   0.4 mg, Sublingual, Every 5 Minutes PRN, Take no more than 3 doses in 15 minutes.      OLANZapine 5 MG tablet  Commonly known as: zyPREXA   5 mg, Oral, 2 times daily      pantoprazole 40 MG EC tablet  Commonly known as: PROTONIX   40 mg, Oral, Daily      rosuvastatin 20 MG tablet  Commonly known as: CRESTOR   20 mg, Oral, Daily      ticagrelor 90 MG tablet tablet  Commonly known as: BRILINTA   90 mg, Oral, 2 Times Daily         Stop These Medications    Vitamin D2 50 MCG (2000 UT) tablet            Consults:  Pulm, Cardiology, Nephrology    Significant Diagnostic Studies:  See complete admission record      Disposition:   Home in stable condition  Follow up with Orville Weston MD in 1 week.  Pt to f/u with Nephrology, Cardiology and Pulm.    Diet: cardiac    Activity: as tolerated    Special Instructions: set up home O2, appt for sleep study scheduled, HH arranged    The patient or family are to call or return if there are any problems, questions, concerns or change in her condition.     Signed:  Orville Weston MD MD  8/14/2021, 00:48 CDT

## 2021-08-14 NOTE — OUTREACH NOTE
Prep Survey      Responses   [facility] facility patient discharged from?  [facility]   Is LACE score < 7 ?  No   Emergency Room discharge w/ pulse ox?  No   Eligibility  Readm Mgmt   Discharge diagnosis  Acute on chronic diastolic congestive heart failure    Does the patient have one of the following disease processes/diagnoses(primary or secondary)?  CHF   Does the patient have Home health ordered?  Yes   What is the Home health agency?   [facility]   Is there a DME ordered?  Yes   What DME was ordered?  [employer] for home O2   Prep survey completed?  Yes          Emiliana Samayoa RN

## 2021-08-15 ENCOUNTER — HOME HEALTH ADMISSION (OUTPATIENT)
Dept: HOME HEALTH SERVICES | Facility: HOME HEALTHCARE | Age: 61
End: 2021-08-15

## 2021-08-15 ENCOUNTER — HOME CARE VISIT (OUTPATIENT)
Dept: HOME HEALTH SERVICES | Facility: CLINIC | Age: 61
End: 2021-08-15

## 2021-08-15 ENCOUNTER — TRANSCRIBE ORDERS (OUTPATIENT)
Dept: HOME HEALTH SERVICES | Facility: HOME HEALTHCARE | Age: 61
End: 2021-08-15

## 2021-08-15 VITALS
HEART RATE: 81 BPM | RESPIRATION RATE: 20 BRPM | SYSTOLIC BLOOD PRESSURE: 148 MMHG | OXYGEN SATURATION: 97 % | DIASTOLIC BLOOD PRESSURE: 90 MMHG | BODY MASS INDEX: 38.24 KG/M2 | TEMPERATURE: 98.4 F | WEIGHT: 229.8 LBS

## 2021-08-15 DIAGNOSIS — E87.70 HYPERVOLEMIA, UNSPECIFIED HYPERVOLEMIA TYPE: Primary | ICD-10-CM

## 2021-08-15 PROCEDURE — G0299 HHS/HOSPICE OF RN EA 15 MIN: HCPCS

## 2021-08-15 NOTE — HOME HEALTH
Pt/cg agreeable to homecare admission under the care of Dr. Orville Weston. Admission agreement, oxygen safety, and safety plan reviewed and signed by the pt. Medication reconciliation completed and no issues found. No recent falls and no upcoming insurance changes. Pt had no further questions regarding medication and/or plan of care. A&O X 4. No c/o pain. Pt's BP was 202/130 on arrival, but the pt hadn't taken her medication yet because she was too SOA to get up and get them. I found the pt's medications and had the pt take them. I rechecked the pt's BP 2 more times, 30 minutes and 1 hour after the pt took her medications. The pt's BP went down to 178/100 and then to 149/90. All other VSS. Pt's O2 saturation was stable on 2L O2. Pt get SOA with minimal exertion. Diminished lungs. +1 edema in BLE. Regular heart sounds.     The pt had called into the after hours MD for Dr. Weston before I arrived. Dr. Sofia spoke to the pt at the beginning of the visit and said for me to call if I feel the pt can stay home. I called and notified Dr. Sofia of the pt's BP, diminished lungs, SOA, and edema. I told Dr. Sofia that I felt the pt was stable and didn't need to go to the ER. I received a verbal order from Dr. Sofia for PT, OT, MSW, and ST evaluations for the pt.

## 2021-08-15 NOTE — PAYOR COMM NOTE
"DC HOME 8-13-21  UR  [id number]  [id number]  Ludivina Clemons (60 y.o. Female)     Date of Birth Social Security Number Address Home Phone MRN    1960  [address] [facility] [address] [phone] [id number]    Druze Marital Status          Holiness Single       Admission Date Admission Type Admitting Provider Attending Provider Department, Room/Bed    8/8/21 Emergency Orville Weston MD  [facility] 4B, 431/1    Discharge Date Discharge Disposition Discharge Destination        8/13/2021 Home or Self Care              Attending Provider: (none)   Allergies: Codeine, Imitrex [Sumatriptan]    Isolation: None   Infection: None   Code Status: Prior    Ht: 165.1 cm (65\")   Wt: 107 kg (235 lb)    Admission Cmt: None   Principal Problem: None                Active Insurance as of 8/8/2021     Primary Coverage     Payor Plan Insurance Group Employer/Plan Group    ANTH MEDICARE REPLACEMENT ANTH MEDICARE ADVANTAGE [id number]     Payor Plan Address Payor Plan Phone Number Payor Plan Fax Number Effective Dates    [address] [phone]  1/1/2017 - None Entered    [facility] [id number]       Subscriber Name Subscriber Birth Date Member ID       LUDIVINA CLEMONS 1960 [id number]                 Emergency Contacts      (Rel.) Home Phone Work Phone Mobile Phone    YOGI LOPEZ (Relative) [phone] -- --    kenzie thompson (Sister) [phone] -- [phone]    Joshua Clemons () (Son) -- -- [phone]               Physician Progress Notes (last 72 hours) (Notes from 08/11/21 1901 through 08/14/21 1901)      Parker Whitfield MD at 08/13/21 1542           PROGRESS NOTE.      Patient:  Ludivina Clemons  YOB: 1960  Date of Service: 8/13/2021  MRN: [id number]   Acct: [id number]   Primary Care Physician: Orville Weston MD  Advance Directive:   Code Status and Medical Interventions:   Ordered at: 08/08/21 1956     Code Status:    CPR     " Medical Interventions (Level of Support Prior to Arrest):    Full     Admit Date: 8/8/2021       Hospital Day: 5  Referring Provider: Orville Weston MD      Patient Seen, Chart, Consults, Notes, Labs, Radiology studies reviewed.      Subjective:  Ludivina Ramirez is a 60 y.o. female  whom we were consulted for acute kidney injury.  Patient has a history of asthma, COPD and congestive heart failure.  She is also obese and has obstructive sleep apnea.  She has recurrent episodes of admission for acute respiratory failure and congestive heart failure exacerbation.  Patient is admitted at this time with worsening dyspnea.  She received the Lasix for diuresis.  She continues to be dyspneic and has been using his CPAP.  Her serum creatinine ranges between 2.1-2.4.  On admission on August 8 her serum creatinine was 1.8.  It has since risen to 2.6 after diuretics.  Patient's Lasix was stopped.  He is currently receiving hydration by mouth.  Denies any change to her urine habits.  Needed occasional use of NSAIDs.  She has no dysuria.  Today, patient is doing better with less dyspnea.  She has good output.    Allergies:  Codeine and Imitrex [sumatriptan]    Home Meds:  Medications Prior to Admission   Medication Sig Dispense Refill Last Dose   • carvedilol (COREG) 25 MG tablet Take 25 mg by mouth 2 (Two) Times a Day With Meals.      • hydrALAZINE (APRESOLINE) 50 MG tablet Take 75 mg by mouth Every 8 (Eight) Hours.      • aspirin 81 MG EC tablet Take 1 tablet by mouth Daily.      • azelastine (ASTELIN) 0.1 % nasal spray 2 sprays into the nostril(s) as directed by provider 2 (Two) Times a Day. Use in each nostril as directed       • cloNIDine (CATAPRES) 0.1 MG tablet Take 1 tablet by mouth Every 6 (Six) Hours As Needed for High Blood Pressure (BP >180/100). 30 tablet 1    • Ergocalciferol (Vitamin D2) 50 MCG (2000 UT) tablet Take 1,000 Units by mouth Daily.      • ferrous sulfate 325 (65 FE) MG tablet Take 1 tablet by mouth 2  (Two) Times a Day With Meals. 60 tablet 0    • fluticasone (FLONASE) 50 MCG/ACT nasal spray 1 spray into the nostril(s) as directed by provider Every Morning. In each nostril      • losartan (COZAAR) 100 MG tablet Take 1 tablet by mouth Daily. 90 tablet 3    • NIFEdipine XL (ADALAT CC) 60 MG 24 hr tablet Take 1 tablet by mouth 2 (two) times a day. 60 tablet 1    • nitroglycerin (NITROSTAT) 0.4 MG SL tablet Place 1 tablet under the tongue Every 5 (Five) Minutes As Needed for Chest Pain for up to 3 doses. Take no more than 3 doses in 15 minutes. 25 tablet 0    • OLANZapine (zyPREXA) 5 MG tablet Take 1 tablet by mouth 2 (two) times a day. 60 tablet 0    • pantoprazole (PROTONIX) 40 MG EC tablet Take 1 tablet by mouth Daily. 30 tablet 1    • rosuvastatin (CRESTOR) 20 MG tablet Take 1 tablet by mouth Daily. 90 tablet 3    • ticagrelor (BRILINTA) 90 MG tablet tablet Take 1 tablet by mouth 2 (Two) Times a Day. 60 tablet 11        Medicines:  Current Facility-Administered Medications   Medication Dose Route Frequency Provider Last Rate Last Admin   • acetaminophen (TYLENOL) tablet 650 mg  650 mg Oral Q6H PRN Orville Weston MD   650 mg at 08/11/21 1346   • aspirin EC tablet 81 mg  81 mg Oral Daily Ren Santo MD   81 mg at 08/13/21 0812   • azelastine (ASTELIN) nasal spray 2 spray  2 spray Each Nare BID Ren Santo MD   2 spray at 08/13/21 0814   • calcitriol (ROCALTROL) capsule 0.25 mcg  0.25 mcg Oral Daily Parker Whitfield MD   0.25 mcg at 08/13/21 0812   • carvedilol (COREG) tablet 12.5 mg  12.5 mg Oral BID With Meals Ren Santo MD   12.5 mg at 08/13/21 0813   • cetirizine (zyrTEC) tablet 10 mg  10 mg Oral Daily Neeta Chavez MD   10 mg at 08/13/21 0812   • cloNIDine (CATAPRES) tablet 0.1 mg  0.1 mg Oral Q6H PRN Ren Santo MD       • enoxaparin (LOVENOX) syringe 30 mg  30 mg Subcutaneous Q24H Neeta Chavez MD   30 mg at 08/12/21 2111   • ferrous sulfate  tablet 325 mg  325 mg Oral BID With Meals Ren Santo MD   325 mg at 08/13/21 0813   • fluticasone (FLONASE) 50 MCG/ACT nasal spray 2 spray  2 spray Nasal Daily PRN Ren Santo MD   2 spray at 08/11/21 2016   • guaiFENesin (MUCINEX) 12 hr tablet 1,200 mg  1,200 mg Oral Q12H Orville Weston MD   1,200 mg at 08/13/21 0812   • hydrALAZINE (APRESOLINE) tablet 25 mg  25 mg Oral Q8H Ren Santo MD   25 mg at 08/13/21 1301   • ipratropium-albuterol (DUO-NEB) nebulizer solution 3 mL  3 mL Nebulization 4x Daily - RT Orville Weston MD   3 mL at 08/13/21 1027   • isosorbide dinitrate (ISORDIL) tablet 10 mg  10 mg Oral TID - Nitrates Pierce Buchanan MD   10 mg at 08/13/21 1301   • losartan (COZAAR) tablet 100 mg  100 mg Oral Q24H Ren Santo MD   100 mg at 08/13/21 0813   • NIFEdipine XL (PROCARDIA XL) 24 hr tablet 60 mg  60 mg Oral Q24H Ren Santo MD   60 mg at 08/13/21 0813   • OLANZapine (zyPREXA) tablet 5 mg  5 mg Oral Nightly Ren Santo MD   5 mg at 08/12/21 2111   • predniSONE (DELTASONE) tablet 10 mg  10 mg Oral Daily Ren Santo MD   10 mg at 08/13/21 0812   • rosuvastatin (CRESTOR) tablet 20 mg  20 mg Oral Daily Ren Santo MD   20 mg at 08/13/21 0812   • sodium chloride 0.9 % flush 10 mL  10 mL Intravenous PRN Zackary Gutierrez MD       • ticagrelor (BRILINTA) tablet 90 mg  90 mg Oral BID Ren Santo MD   90 mg at 08/13/21 0812       Past Medical History:  Past Medical History:   Diagnosis Date   • Acute CHF (CMS/HCC)    • Acute renal failure (ARF) (CMS/HCC)    • Anemia    • Asthma     childhood   • Hypertension    • Ischemic colitis (CMS/HCC)    • Metabolic acidosis    • Nonrheumatic aortic (valve) insufficiency    • PTSD (post-traumatic stress disorder)        Past Surgical History:  Past Surgical History:   Procedure Laterality Date   • CARDIAC CATHETERIZATION N/A 11/8/2020    Procedure: Left Heart Cath;   "Surgeon: Pierce Buchanan MD;  Location:  [facility]ATH [facility]VAS[facility] LOCATION;  Service: Cardiovascular;  Laterality: N/A;   • EXTERNAL FIXATION WRIST FRACTURE     • LEG SURGERY     • TUBAL ABDOMINAL LIGATION         Family History  Family History   Problem Relation Age of Onset   • Coronary artery disease Mother    • Coronary artery disease Father        Social History  Social History     Socioeconomic History   • Marital status: Single     Spouse name: Not on file   • Number of children: Not on file   • Years of education: Not on file   • Highest education level: Not on file   Tobacco Use   • Smoking status: Current Every Day Smoker     Packs/day: 0.50   • Smokeless tobacco: Never Used   Vaping Use   • Vaping Use: Never used   Substance and Sexual Activity   • Alcohol use: No   • Drug use: No   • Sexual activity: Defer       Review of Systems:  History obtained from chart review and the patient  General ROS: No fever or chills  Respiratory ROS: No cough, +shortness of breath,- wheezing  Cardiovascular ROS: no chest pain, + BS dyspnea on exertion  Gastrointestinal ROS: No abdominal pain or melena  Genito-Urinary ROS: No dysuria or hematuria  Musculoskeletal: negative  Skin: negative    Objective:  /92 (BP Location: Right arm, Patient Position: Lying)   Pulse 69   Temp 97.9 °F (36.6 °C) (Axillary)   Resp 18   Ht 165.1 cm (65\")   Wt 107 kg (235 lb)   SpO2 95%   BMI 39.11 kg/m²     Intake/Output Summary (Last 24 hours) at 8/13/2021 1542  Last data filed at 8/13/2021 1300  Gross per 24 hour   Intake 840 ml   Output 600 ml   Net 240 ml       Physical examination:  General: awake/alert   Chest:  clear to auscultation bilaterally without respiratory distress  CVS: regular rate and rhythm  Abdominal: soft, nontender, normal bowel sounds  Extremities: no cyanosis or edema  Skin: warm and dry without rash  Neuro: No focal motor deficits    Labs:  Lab Results (last 24 hours)     Procedure Component Value Units Date/Time    " Blood Culture - Blood, Arm, Left [[id number]] Collected: 08/08/21 1403    Specimen: Blood from Arm, Left Updated: 08/13/21 1445     Blood Culture No growth at 5 days    Blood Culture - Blood, Arm, Left [[id number]] Collected: 08/08/21 1403    Specimen: Blood from Arm, Left Updated: 08/13/21 1445     Blood Culture No growth at 5 days    Basic Metabolic Panel [[id number]]  (Abnormal) Collected: 08/13/21 0553    Specimen: Blood Updated: 08/13/21 0713     Glucose 94 mg/dL      BUN 42 mg/dL      Creatinine 2.18 mg/dL      Sodium 143 mmol/L      Potassium 4.4 mmol/L      Chloride 108 mmol/L      CO2 28.0 mmol/L      Calcium 8.1 mg/dL      eGFR Non African Amer 23 mL/min/1.73      BUN/Creatinine Ratio 19.3     Anion Gap 7.0 mmol/L     Narrative:      GFR Normal >60  Chronic Kidney Disease <60  Kidney Failure <15            Radiology:   Imaging Results (Last 24 Hours)     ** No results found for the last 24 hours. **              Assessment   -Acute kidney injury- ATN-renal function slightly better  -Chronic kidney disease stage IIIb  -Hypertension with nephropathy  -Congestive heart failure  -Asthma/COPD  -Morbid obesity  -Secondary hyperparathyroidism    Plan:  Patient was encouraged to continue hydrating by mouth.  Avoid NSAIDs. Continue calcitriol.  Follow-up labs.  Okay to discharge from renal standpoint.  Follow-up with the renal clinic within 2 to 3 weeks.      Parker Whitfield MD  8/13/2021  15:42 CDT    Electronically signed by Parker Whitfield MD at 08/13/21 1543     Parker Whitfield MD at 08/12/21 1529           PROGRESS NOTE.      Patient:  Ludivina Ramirez  YOB: 1960  Date of Service: 8/12/2021  MRN: [id number]   Acct: [id number]   Primary Care Physician: Orville Weston MD  Advance Directive:   Code Status and Medical Interventions:   Ordered at: 08/08/21 1956     Code Status:    CPR     Medical Interventions (Level of Support Prior to Arrest):    Full     Admit Date:  8/8/2021       Hospital Day: 4  Referring Provider: Orville Weston MD      Patient Seen, Chart, Consults, Notes, Labs, Radiology studies reviewed.      Subjective:  Ludivina Ramirez is a 60 y.o. female  whom we were consulted for acute kidney injury.  Patient has a history of asthma, COPD and congestive heart failure.  She is also obese and has obstructive sleep apnea.  She has recurrent episodes of admission for acute respiratory failure and congestive heart failure exacerbation.  Patient is admitted at this time with worsening dyspnea.  She received the Lasix for diuresis.  She continues to be dyspneic and has been using his CPAP.  Her serum creatinine ranges between 2.1-2.4.  On admission on August 8 her serum creatinine was 1.8.  It has since risen to 2.6 after diuretics.  Patient's Lasix was stopped.  He is currently receiving hydration by mouth.  Denies any change to her urine habits.  Needed occasional use of NSAIDs.  She has no dysuria.  Today, patient appears tless dyspneic. She had no new complaints.  She had good urine output.    Allergies:  Codeine and Imitrex [sumatriptan]    Home Meds:  Medications Prior to Admission   Medication Sig Dispense Refill Last Dose   • carvedilol (COREG) 25 MG tablet Take 25 mg by mouth 2 (Two) Times a Day With Meals.      • hydrALAZINE (APRESOLINE) 50 MG tablet Take 75 mg by mouth Every 8 (Eight) Hours.      • aspirin 81 MG EC tablet Take 1 tablet by mouth Daily.      • azelastine (ASTELIN) 0.1 % nasal spray 2 sprays into the nostril(s) as directed by provider 2 (Two) Times a Day. Use in each nostril as directed       • cloNIDine (CATAPRES) 0.1 MG tablet Take 1 tablet by mouth Every 6 (Six) Hours As Needed for High Blood Pressure (BP >180/100). 30 tablet 1    • Ergocalciferol (Vitamin D2) 50 MCG (2000 UT) tablet Take 1,000 Units by mouth Daily.      • ferrous sulfate 325 (65 FE) MG tablet Take 1 tablet by mouth 2 (Two) Times a Day With Meals. 60 tablet 0    • fluticasone  (FLONASE) 50 MCG/ACT nasal spray 1 spray into the nostril(s) as directed by provider Every Morning. In each nostril      • losartan (COZAAR) 100 MG tablet Take 1 tablet by mouth Daily. 90 tablet 3    • NIFEdipine XL (ADALAT CC) 60 MG 24 hr tablet Take 1 tablet by mouth 2 (two) times a day. 60 tablet 1    • nitroglycerin (NITROSTAT) 0.4 MG SL tablet Place 1 tablet under the tongue Every 5 (Five) Minutes As Needed for Chest Pain for up to 3 doses. Take no more than 3 doses in 15 minutes. 25 tablet 0    • OLANZapine (zyPREXA) 5 MG tablet Take 1 tablet by mouth 2 (two) times a day. 60 tablet 0    • pantoprazole (PROTONIX) 40 MG EC tablet Take 1 tablet by mouth Daily. 30 tablet 1    • rosuvastatin (CRESTOR) 20 MG tablet Take 1 tablet by mouth Daily. 90 tablet 3    • ticagrelor (BRILINTA) 90 MG tablet tablet Take 1 tablet by mouth 2 (Two) Times a Day. 60 tablet 11        Medicines:  Current Facility-Administered Medications   Medication Dose Route Frequency Provider Last Rate Last Admin   • acetaminophen (TYLENOL) tablet 650 mg  650 mg Oral Q6H PRN Orville Weston MD   650 mg at 08/11/21 1346   • aspirin EC tablet 81 mg  81 mg Oral Daily Ren Santo MD   81 mg at 08/12/21 0829   • azelastine (ASTELIN) nasal spray 2 spray  2 spray Each Nare BID Ren Santo MD   2 spray at 08/12/21 0830   • calcitriol (ROCALTROL) capsule 0.25 mcg  0.25 mcg Oral Daily Parker Whitfield MD   0.25 mcg at 08/12/21 0829   • carvedilol (COREG) tablet 12.5 mg  12.5 mg Oral BID With Meals Ren Santo MD   12.5 mg at 08/12/21 0829   • cetirizine (zyrTEC) tablet 10 mg  10 mg Oral Daily Neeta Chavez MD   10 mg at 08/12/21 0829   • cloNIDine (CATAPRES) tablet 0.1 mg  0.1 mg Oral Q6H PRN Ren Santo MD       • enoxaparin (LOVENOX) syringe 30 mg  30 mg Subcutaneous Q24H Neeta Chavez MD   30 mg at 08/11/21 2016   • ferrous sulfate tablet 325 mg  325 mg Oral BID With Meals Ren Santo,  MD   325 mg at 08/12/21 0829   • fluticasone (FLONASE) 50 MCG/ACT nasal spray 2 spray  2 spray Nasal Daily PRN Ren Santo MD   2 spray at 08/11/21 2016   • guaiFENesin (MUCINEX) 12 hr tablet 1,200 mg  1,200 mg Oral Q12H Orville Weston MD   1,200 mg at 08/12/21 0829   • hydrALAZINE (APRESOLINE) tablet 25 mg  25 mg Oral Q8H Ren Santo MD   25 mg at 08/12/21 1332   • ipratropium-albuterol (DUO-NEB) nebulizer solution 3 mL  3 mL Nebulization 4x Daily - RT Orville Weston MD   3 mL at 08/12/21 1526   • isosorbide dinitrate (ISORDIL) tablet 10 mg  10 mg Oral TID - Nitrates Pierce Buchanan MD   10 mg at 08/12/21 1332   • losartan (COZAAR) tablet 100 mg  100 mg Oral Q24H Ren Santo MD   100 mg at 08/12/21 0829   • NIFEdipine XL (PROCARDIA XL) 24 hr tablet 60 mg  60 mg Oral Q24H Ren Santo MD   60 mg at 08/12/21 0829   • OLANZapine (zyPREXA) tablet 5 mg  5 mg Oral Nightly Ren Santo MD   5 mg at 08/11/21 2016   • predniSONE (DELTASONE) tablet 10 mg  10 mg Oral Daily Ren Santo MD   10 mg at 08/12/21 0829   • rosuvastatin (CRESTOR) tablet 20 mg  20 mg Oral Daily Ren Santo MD   20 mg at 08/12/21 0829   • sodium chloride 0.9 % flush 10 mL  10 mL Intravenous PRN Zackary Gutierrez MD       • ticagrelor (BRILINTA) tablet 90 mg  90 mg Oral BID Ren Santo MD   90 mg at 08/12/21 0829       Past Medical History:  Past Medical History:   Diagnosis Date   • Acute CHF (CMS/HCC)    • Acute renal failure (ARF) (CMS/HCC)    • Anemia    • Asthma     childhood   • Hypertension    • Ischemic colitis (CMS/HCC)    • Metabolic acidosis    • Nonrheumatic aortic (valve) insufficiency    • PTSD (post-traumatic stress disorder)        Past Surgical History:  Past Surgical History:   Procedure Laterality Date   • CARDIAC CATHETERIZATION N/A 11/8/2020    Procedure: Left Heart Cath;  Surgeon: Pierce Buchanan MD;  Location:  [facility]  "LOCATION;  Service: Cardiovascular;  Laterality: N/A;   • EXTERNAL FIXATION WRIST FRACTURE     • LEG SURGERY     • TUBAL ABDOMINAL LIGATION         Family History  Family History   Problem Relation Age of Onset   • Coronary artery disease Mother    • Coronary artery disease Father        Social History  Social History     Socioeconomic History   • Marital status: Single     Spouse name: Not on file   • Number of children: Not on file   • Years of education: Not on file   • Highest education level: Not on file   Tobacco Use   • Smoking status: Current Every Day Smoker     Packs/day: 0.50   • Smokeless tobacco: Never Used   Vaping Use   • Vaping Use: Never used   Substance and Sexual Activity   • Alcohol use: No   • Drug use: No   • Sexual activity: Defer       Review of Systems:  History obtained from chart review and the patient  General ROS: No fever or chills  Respiratory ROS: No cough, +shortness of breath,- wheezing  Cardiovascular ROS: no chest pain, + BS dyspnea on exertion  Gastrointestinal ROS: No abdominal pain or melena  Genito-Urinary ROS: No dysuria or hematuria  Musculoskeletal: negative  Skin: negative    Objective:  /79 (BP Location: Right arm, Patient Position: Lying)   Pulse 64   Temp 96.8 °F (36 °C) (Axillary)   Resp 18   Ht 165.1 cm (65\")   Wt 107 kg (235 lb)   SpO2 98%   BMI 39.11 kg/m²     Intake/Output Summary (Last 24 hours) at 8/12/2021 1529  Last data filed at 8/12/2021 1321  Gross per 24 hour   Intake 720 ml   Output 300 ml   Net 420 ml       Physical examination:  General: awake/alert   Chest:  clear to auscultation bilaterally without respiratory distress  CVS: regular rate and rhythm  Abdominal: soft, nontender, normal bowel sounds  Extremities: no cyanosis or edema  Skin: warm and dry without rash  Neuro: No focal motor deficits    Labs:  Lab Results (last 24 hours)     Procedure Component Value Units Date/Time    Blood Culture - Blood, Arm, Left [[id number]] Collected: " 08/08/21 1403    Specimen: Blood from Arm, Left Updated: 08/12/21 1431     Blood Culture No growth at 4 days    Blood Culture - Blood, Arm, Left [[id number]] Collected: 08/08/21 1403    Specimen: Blood from Arm, Left Updated: 08/12/21 1431     Blood Culture No growth at 4 days    Basic Metabolic Panel [[id number]]  (Abnormal) Collected: 08/12/21 0445    Specimen: Blood Updated: 08/12/21 0534     Glucose 146 mg/dL      BUN 43 mg/dL      Creatinine 2.38 mg/dL      Sodium 141 mmol/L      Potassium 4.0 mmol/L      Chloride 106 mmol/L      CO2 27.0 mmol/L      Calcium 8.0 mg/dL      eGFR Non African Amer 21 mL/min/1.73      BUN/Creatinine Ratio 18.1     Anion Gap 8.0 mmol/L     Narrative:      GFR Normal >60  Chronic Kidney Disease <60  Kidney Failure <15            Radiology:   Imaging Results (Last 24 Hours)     ** No results found for the last 24 hours. **              Assessment   -Acute kidney injury- ATN-renal function slightly better  -Chronic kidney disease stage IIIb  -Hypertension with nephropathy  -Congestive heart failure  -Asthma/COPD  -Morbid obesity  -Secondary hyperparathyroidism    Plan:  Patient was encouraged to continue hydrating by mouth.  Continue calcitriol.  Follow-up labs.      Parker Whitfield MD  8/12/2021  15:29 CDT    Electronically signed by Parker Whitfield MD at 08/12/21 1531     Orville Weston MD at 08/12/21 1028          Progress Note  Ludivina Ramirez  8/12/2021 10:28 CDT  Subjective:   Admit Date:   8/8/2021      CC/ADMIT DX:       Interval History:   Reviewed overnight events and nursing notes. She c/o SOA with activity. No CP.    I have reviewed all labs/diagnostics from the last 24hrs.       ROS:   I have done a 10 point ROS and all are negative, except what is mentioned in the HPI.    Diet Regular; Cardiac    Medications:      aspirin, 81 mg, Oral, Daily  azelastine, 2 spray, Each Nare, BID  calcitriol, 0.25 mcg, Oral, Daily  carvedilol, 12.5 mg, Oral, BID With  "Meals  cetirizine, 10 mg, Oral, Daily  enoxaparin, 30 mg, Subcutaneous, Q24H  ferrous sulfate, 325 mg, Oral, BID With Meals  guaiFENesin, 1,200 mg, Oral, Q12H  hydrALAZINE, 25 mg, Oral, Q8H  ipratropium-albuterol, 3 mL, Nebulization, 4x Daily - RT  isosorbide dinitrate, 10 mg, Oral, TID - Nitrates  losartan, 100 mg, Oral, Q24H  NIFEdipine CC, 60 mg, Oral, Q24H  OLANZapine, 5 mg, Oral, Nightly  predniSONE, 10 mg, Oral, Daily  rosuvastatin, 20 mg, Oral, Daily  ticagrelor, 90 mg, Oral, BID            Objective:   Vitals: /81 (BP Location: Right arm, Patient Position: Lying)   Pulse 67   Temp 97.6 °F (36.4 °C) (Oral)   Resp 19   Ht 165.1 cm (65\")   Wt 107 kg (235 lb)   SpO2 96%   BMI 39.11 kg/m²      Intake/Output Summary (Last 24 hours) at 2021 1028  Last data filed at 2021 0950  Gross per 24 hour   Intake 1080 ml   Output 600 ml   Net 480 ml     General appearance: alert and cooperative with exam  Lungs: coarse  Heart: RRR  Abdomen: soft, non-tender; bowel sounds normal; no masses,  no organomegaly  Extremities: extremities normal, atraumatic, no cyanosis or edema  Neurologic:  No obvious focal neurologic deficits.    Assessment and Plan:     Acute on chronic diastolic congestive heart failure (CMS/HCC)    Acute on Chronic Kidney disease    CAD    HTN    COPD    Plan:  1.  Continue present medication(s)   2.  Follow with others  3.  Lab better  4.  Follow BP      Discharge planning:   her home     Reviewed treatment plans with the patient and/or family.             Electronically signed by Orville Weston MD on 2021 at 10:28 CDT    Electronically signed by Orville Weston MD at 21 1028       Consult Notes (last 48 hours) (Notes from 21 through 21)    No notes of this type exist for this encounter.          Discharge Summary      Orville Weston MD at 21 1655          Hospital Discharge Summary    Ludivina Ramirez  :  1960  MRN:  " [id number]    Admit date:  8/8/2021  Discharge date:  8/13/2021    Admitting Physician:    Orville Weston MD    Discharge Diagnoses:      Acute on chronic diastolic congestive heart failure (CMS/HCC)    COPD    CRF    HTN    CAD    Hospital Course:   She was admitted with ARF, volume overload. She was treated with diuresis and is now euvolemic. She will be d/c on prn Lasix, along with PO BP medicine. She will have a f/u and sleep study with Pulm. She will be evaluated for home O2 and Home Health will follow on d/c. She wants to go home. Her VS are stable. She is eating and ambulating.     Discharge Medications:         Discharge Medications      New Medications      Instructions Start Date   calcitriol 0.25 MCG capsule  Commonly known as: ROCALTROL   0.25 mcg, Oral, Daily      furosemide 40 MG tablet  Commonly known as: Lasix   40 mg, Oral, Daily PRN      ipratropium-albuterol 0.5-2.5 mg/3 ml nebulizer  Commonly known as: DUO-NEB   3 mL, Nebulization, 4 Times Daily - RT      isosorbide dinitrate 10 MG tablet  Commonly known as: ISORDIL   10 mg, Oral, 3 Times Daily (Nitrates)      Mucinex 600 MG 12 hr tablet  Generic drug: guaiFENesin   1,200 mg, Oral, Every 12 Hours Scheduled         Continue These Medications      Instructions Start Date   aspirin 81 MG EC tablet   81 mg, Oral, Daily      azelastine 0.1 % nasal spray  Commonly known as: ASTELIN   2 sprays, Nasal, 2 Times Daily, Use in each nostril as directed      carvedilol 25 MG tablet  Commonly known as: COREG   25 mg, Oral, 2 Times Daily With Meals      cloNIDine 0.1 MG tablet  Commonly known as: CATAPRES   0.1 mg, Oral, Every 6 Hours PRN      ferrous sulfate 325 (65 FE) MG tablet   325 mg, Oral, 2 Times Daily With Meals      fluticasone 50 MCG/ACT nasal spray  Commonly known as: FLONASE   1 spray, Nasal, Every Morning, In each nostril      hydrALAZINE 50 MG tablet  Commonly known as: APRESOLINE   75 mg, Oral, Every 8 Hours      losartan 100 MG  tablet  Commonly known as: COZAAR   100 mg, Oral, Every 24 Hours Scheduled      NIFEdipine CC 60 MG 24 hr tablet  Commonly known as: ADALAT CC   60 mg, Oral, 2 times daily      nitroglycerin 0.4 MG SL tablet  Commonly known as: NITROSTAT   0.4 mg, Sublingual, Every 5 Minutes PRN, Take no more than 3 doses in 15 minutes.      OLANZapine 5 MG tablet  Commonly known as: zyPREXA   5 mg, Oral, 2 times daily      pantoprazole 40 MG EC tablet  Commonly known as: PROTONIX   40 mg, Oral, Daily      rosuvastatin 20 MG tablet  Commonly known as: CRESTOR   20 mg, Oral, Daily      ticagrelor 90 MG tablet tablet  Commonly known as: BRILINTA   90 mg, Oral, 2 Times Daily         Stop These Medications    Vitamin D2 50 MCG (2000 UT) tablet            Consults:  Pulm, Cardiology, Nephrology    Significant Diagnostic Studies:  See complete admission record      Disposition:   Home in stable condition  Follow up with Orville Weston MD in 1 week.  Pt to f/u with Nephrology, Cardiology and Pulm.    Diet: cardiac    Activity: as tolerated    Special Instructions: set up home O2, appt for sleep study scheduled, HH arranged    The patient or family are to call or return if there are any problems, questions, concerns or change in her condition.     Signed:  Orville Weston MD MD  8/14/2021, 00:48 CDT        Electronically signed by Orville Weston MD at 08/14/21 0052        show

## 2021-08-16 ENCOUNTER — HOME CARE VISIT (OUTPATIENT)
Dept: HOME HEALTH SERVICES | Facility: HOME HEALTHCARE | Age: 61
End: 2021-08-16

## 2021-08-16 ENCOUNTER — HOSPITAL ENCOUNTER (OUTPATIENT)
Facility: HOSPITAL | Age: 61
Setting detail: OBSERVATION
Discharge: HOME | End: 2021-08-21
Attending: EMERGENCY MEDICINE | Admitting: FAMILY MEDICINE

## 2021-08-16 ENCOUNTER — APPOINTMENT (OUTPATIENT)
Dept: GENERAL RADIOLOGY | Facility: HOSPITAL | Age: 61
End: 2021-08-16

## 2021-08-16 DIAGNOSIS — J44.9 CHRONIC OBSTRUCTIVE PULMONARY DISEASE, UNSPECIFIED COPD TYPE (HCC): Primary | ICD-10-CM

## 2021-08-16 PROBLEM — U07.1 COVID-19: Status: ACTIVE | Noted: 2021-08-16

## 2021-08-16 LAB
ALBUMIN SERPL-MCNC: 3.7 G/DL (ref 3.5–5.2)
ALBUMIN/GLOB SERPL: 1.5 G/DL
ALP SERPL-CCNC: 82 U/L (ref 39–117)
ALT SERPL W P-5'-P-CCNC: 16 U/L (ref 1–33)
ANION GAP SERPL CALCULATED.3IONS-SCNC: 9 MMOL/L (ref 5–15)
ARTERIAL PATENCY WRIST A: POSITIVE
AST SERPL-CCNC: 16 U/L (ref 1–32)
ATMOSPHERIC PRESS: 749 MMHG
BASE EXCESS BLDA CALC-SCNC: 5.2 MMOL/L (ref 0–2)
BASOPHILS # BLD AUTO: 0.05 10*3/MM3 (ref 0–0.2)
BASOPHILS NFR BLD AUTO: 0.7 % (ref 0–1.5)
BDY SITE: ABNORMAL
BILIRUB SERPL-MCNC: 0.7 MG/DL (ref 0–1.2)
BODY TEMPERATURE: 37 C
BUN SERPL-MCNC: 40 MG/DL (ref 8–23)
BUN/CREAT SERPL: 22.1 (ref 7–25)
CALCIUM SPEC-SCNC: 8.3 MG/DL (ref 8.6–10.5)
CHLORIDE SERPL-SCNC: 111 MMOL/L (ref 98–107)
CO2 SERPL-SCNC: 28 MMOL/L (ref 22–29)
CREAT SERPL-MCNC: 1.81 MG/DL (ref 0.57–1)
DEPRECATED RDW RBC AUTO: 57.1 FL (ref 37–54)
EOSINOPHIL # BLD AUTO: 0.23 10*3/MM3 (ref 0–0.4)
EOSINOPHIL NFR BLD AUTO: 3.2 % (ref 0.3–6.2)
ERYTHROCYTE [DISTWIDTH] IN BLOOD BY AUTOMATED COUNT: 15.6 % (ref 12.3–15.4)
GAS FLOW AIRWAY: 2 LPM
GFR SERPL CREATININE-BSD FRML MDRD: 29 ML/MIN/1.73
GLOBULIN UR ELPH-MCNC: 2.4 GM/DL
GLUCOSE SERPL-MCNC: 110 MG/DL (ref 65–99)
HCO3 BLDA-SCNC: 31.3 MMOL/L (ref 20–26)
HCT VFR BLD AUTO: 31.7 % (ref 34–46.6)
HGB BLD-MCNC: 9.4 G/DL (ref 12–15.9)
HOLD SPECIMEN: NORMAL
HOLD SPECIMEN: NORMAL
IMM GRANULOCYTES # BLD AUTO: 0.1 10*3/MM3 (ref 0–0.05)
IMM GRANULOCYTES NFR BLD AUTO: 1.4 % (ref 0–0.5)
LYMPHOCYTES # BLD AUTO: 0.87 10*3/MM3 (ref 0.7–3.1)
LYMPHOCYTES NFR BLD AUTO: 12 % (ref 19.6–45.3)
Lab: ABNORMAL
MAGNESIUM SERPL-MCNC: 2.2 MG/DL (ref 1.6–2.4)
MCH RBC QN AUTO: 29.4 PG (ref 26.6–33)
MCHC RBC AUTO-ENTMCNC: 29.7 G/DL (ref 31.5–35.7)
MCV RBC AUTO: 99.1 FL (ref 79–97)
MODALITY: ABNORMAL
MONOCYTES # BLD AUTO: 0.52 10*3/MM3 (ref 0.1–0.9)
MONOCYTES NFR BLD AUTO: 7.2 % (ref 5–12)
NEUTROPHILS NFR BLD AUTO: 5.46 10*3/MM3 (ref 1.7–7)
NEUTROPHILS NFR BLD AUTO: 75.5 % (ref 42.7–76)
NRBC BLD AUTO-RTO: 0 /100 WBC (ref 0–0.2)
NT-PROBNP SERPL-MCNC: ABNORMAL PG/ML (ref 0–900)
PCO2 BLDA: 53.7 MM HG (ref 35–45)
PCO2 TEMP ADJ BLD: 53.7 MM HG (ref 35–45)
PH BLDA: 7.37 PH UNITS (ref 7.35–7.45)
PH, TEMP CORRECTED: 7.37 PH UNITS (ref 7.35–7.45)
PLATELET # BLD AUTO: 202 10*3/MM3 (ref 140–450)
PMV BLD AUTO: 10.6 FL (ref 6–12)
PO2 BLDA: 72.3 MM HG (ref 83–108)
PO2 TEMP ADJ BLD: 72.3 MM HG (ref 83–108)
POTASSIUM SERPL-SCNC: 4.2 MMOL/L (ref 3.5–5.2)
PROT SERPL-MCNC: 6.1 G/DL (ref 6–8.5)
RBC # BLD AUTO: 3.2 10*6/MM3 (ref 3.77–5.28)
SAO2 % BLDCOA: 95.4 % (ref 94–99)
SARS-COV-2 RNA PNL SPEC NAA+PROBE: NOT DETECTED
SODIUM SERPL-SCNC: 148 MMOL/L (ref 136–145)
TROPONIN T SERPL-MCNC: 0.02 NG/ML (ref 0–0.03)
VENTILATOR MODE: ABNORMAL
WBC # BLD AUTO: 7.23 10*3/MM3 (ref 3.4–10.8)
WHOLE BLOOD HOLD SPECIMEN: NORMAL

## 2021-08-16 PROCEDURE — 87635 SARS-COV-2 COVID-19 AMP PRB: CPT | Performed by: EMERGENCY MEDICINE

## 2021-08-16 PROCEDURE — C9803 HOPD COVID-19 SPEC COLLECT: HCPCS

## 2021-08-16 PROCEDURE — 80053 COMPREHEN METABOLIC PANEL: CPT | Performed by: EMERGENCY MEDICINE

## 2021-08-16 PROCEDURE — 83880 ASSAY OF NATRIURETIC PEPTIDE: CPT | Performed by: EMERGENCY MEDICINE

## 2021-08-16 PROCEDURE — 85025 COMPLETE CBC W/AUTO DIFF WBC: CPT | Performed by: EMERGENCY MEDICINE

## 2021-08-16 PROCEDURE — 93005 ELECTROCARDIOGRAM TRACING: CPT | Performed by: EMERGENCY MEDICINE

## 2021-08-16 PROCEDURE — 25010000002 METHYLPREDNISOLONE PER 125 MG: Performed by: EMERGENCY MEDICINE

## 2021-08-16 PROCEDURE — 99284 EMERGENCY DEPT VISIT MOD MDM: CPT

## 2021-08-16 PROCEDURE — 83735 ASSAY OF MAGNESIUM: CPT | Performed by: EMERGENCY MEDICINE

## 2021-08-16 PROCEDURE — 93010 ELECTROCARDIOGRAM REPORT: CPT | Performed by: INTERNAL MEDICINE

## 2021-08-16 PROCEDURE — 93005 ELECTROCARDIOGRAM TRACING: CPT

## 2021-08-16 PROCEDURE — 94644 CONT INHLJ TX 1ST HOUR: CPT

## 2021-08-16 PROCEDURE — 71045 X-RAY EXAM CHEST 1 VIEW: CPT

## 2021-08-16 PROCEDURE — 96374 THER/PROPH/DIAG INJ IV PUSH: CPT

## 2021-08-16 PROCEDURE — 94640 AIRWAY INHALATION TREATMENT: CPT

## 2021-08-16 PROCEDURE — 82803 BLOOD GASES ANY COMBINATION: CPT

## 2021-08-16 PROCEDURE — 84484 ASSAY OF TROPONIN QUANT: CPT | Performed by: EMERGENCY MEDICINE

## 2021-08-16 PROCEDURE — 94799 UNLISTED PULMONARY SVC/PX: CPT

## 2021-08-16 PROCEDURE — 36600 WITHDRAWAL OF ARTERIAL BLOOD: CPT

## 2021-08-16 PROCEDURE — G0378 HOSPITAL OBSERVATION PER HR: HCPCS

## 2021-08-16 RX ORDER — SODIUM CHLORIDE 0.9 % (FLUSH) 0.9 %
10 SYRINGE (ML) INJECTION AS NEEDED
Status: DISCONTINUED | OUTPATIENT
Start: 2021-08-16 | End: 2021-08-21 | Stop reason: HOSPADM

## 2021-08-16 RX ORDER — ALBUTEROL SULFATE 2.5 MG/3ML
2.5 SOLUTION RESPIRATORY (INHALATION)
Status: DISCONTINUED | OUTPATIENT
Start: 2021-08-16 | End: 2021-08-19

## 2021-08-16 RX ORDER — METHYLPREDNISOLONE SODIUM SUCCINATE 125 MG/2ML
125 INJECTION, POWDER, LYOPHILIZED, FOR SOLUTION INTRAMUSCULAR; INTRAVENOUS ONCE
Status: COMPLETED | OUTPATIENT
Start: 2021-08-16 | End: 2021-08-16

## 2021-08-16 RX ORDER — IPRATROPIUM BROMIDE AND ALBUTEROL SULFATE 2.5; .5 MG/3ML; MG/3ML
3 SOLUTION RESPIRATORY (INHALATION) ONCE
Status: COMPLETED | OUTPATIENT
Start: 2021-08-16 | End: 2021-08-16

## 2021-08-16 RX ORDER — ALBUTEROL SULFATE 2.5 MG/3ML
2.5 SOLUTION RESPIRATORY (INHALATION)
Status: COMPLETED | OUTPATIENT
Start: 2021-08-16 | End: 2021-08-16

## 2021-08-16 RX ADMIN — IPRATROPIUM BROMIDE AND ALBUTEROL SULFATE 3 ML: 2.5; .5 SOLUTION RESPIRATORY (INHALATION) at 21:13

## 2021-08-16 RX ADMIN — ALBUTEROL SULFATE 2.5 MG: 2.5 SOLUTION RESPIRATORY (INHALATION) at 22:48

## 2021-08-16 RX ADMIN — ALBUTEROL SULFATE 2.5 MG: 2.5 SOLUTION RESPIRATORY (INHALATION) at 22:47

## 2021-08-16 RX ADMIN — ALBUTEROL SULFATE 2.5 MG: 2.5 SOLUTION RESPIRATORY (INHALATION) at 22:45

## 2021-08-16 RX ADMIN — METHYLPREDNISOLONE SODIUM SUCCINATE 125 MG: 125 INJECTION, POWDER, FOR SOLUTION INTRAMUSCULAR; INTRAVENOUS at 21:06

## 2021-08-16 NOTE — CASE COMMUNICATION
SOC HUDDLE NOTE    Problems identified: HTN, SOA WITH MINIMAL EXERTION, WEAKNESS, FATIGUE    Disease processes: CHF and HTN.  Acuity and severity: LEVEL 1  Medication Management: PT MANAGES  Care procedures: NONE    Caregiver Status: PT LIVES ALONE, AND HAS FAMILY THAT WILL ONLY HELP FOR SHORT PERIODS OF TIME.  Availability: DAYTIME  Ability to meet patient's needs: ABLE  Willingness: WILLING    Risk for Hospitalization: MODERATE    Patient's stated goal(s): PT STATED HER GOAL IS TO GET MOVING AGAIN.    Services required to achieve goals: SN, PT, OT, MSW, ST    Potential Issues for goal attainment: NONE    Functional status and safety: UP AS TOLERATED, FALL RISK    Environmental issues? CLUTTER NOTED IN THE PT'S HOME, PT HAS A DOG  Physical environment:     Devices for care present in the home: BATH CHAIR, WALKER, OXYGEN SUPPLIES  Devices needed and not present:     Community resources involved/needed:   Dialysis: NO  Transportation: NO  Meals on Wheels: NO    Any additional needs:    Based upon record review and collaboration conference, the recommended frequency for this patient is:    SN - 2W1, 1W4  PT - PENDING EVAL  OT - PENDING EVAL  MSW - PENDING EVAL  ST - PENDING EVAL    Please note that above is a recommendation only and that the plan of care should reflect the patient's clinical needs and goals. If in agreement, please complete note. If a different frequency is necessary, please explain in note box and proceed per patients clinical needs.

## 2021-08-17 ENCOUNTER — HOME CARE VISIT (OUTPATIENT)
Dept: HOME HEALTH SERVICES | Facility: HOME HEALTHCARE | Age: 61
End: 2021-08-17

## 2021-08-17 ENCOUNTER — READMISSION MANAGEMENT (OUTPATIENT)
Dept: CALL CENTER | Facility: HOSPITAL | Age: 61
End: 2021-08-17

## 2021-08-17 PROCEDURE — 25010000002 METHYLPREDNISOLONE PER 40 MG: Performed by: FAMILY MEDICINE

## 2021-08-17 PROCEDURE — 96372 THER/PROPH/DIAG INJ SC/IM: CPT

## 2021-08-17 PROCEDURE — 99226 PR SBSQ OBSERVATION CARE/DAY 35 MINUTES: CPT | Performed by: INTERNAL MEDICINE

## 2021-08-17 PROCEDURE — G0378 HOSPITAL OBSERVATION PER HR: HCPCS

## 2021-08-17 PROCEDURE — 96376 TX/PRO/DX INJ SAME DRUG ADON: CPT

## 2021-08-17 PROCEDURE — 94799 UNLISTED PULMONARY SVC/PX: CPT

## 2021-08-17 PROCEDURE — 25010000002 ENOXAPARIN PER 10 MG: Performed by: FAMILY MEDICINE

## 2021-08-17 RX ORDER — ISOSORBIDE DINITRATE 10 MG/1
10 TABLET ORAL
Status: DISCONTINUED | OUTPATIENT
Start: 2021-08-17 | End: 2021-08-21 | Stop reason: HOSPADM

## 2021-08-17 RX ORDER — FERROUS SULFATE 325(65) MG
325 TABLET ORAL 2 TIMES DAILY WITH MEALS
Status: DISCONTINUED | OUTPATIENT
Start: 2021-08-17 | End: 2021-08-21 | Stop reason: HOSPADM

## 2021-08-17 RX ORDER — FUROSEMIDE 40 MG/1
40 TABLET ORAL ONCE
Status: COMPLETED | OUTPATIENT
Start: 2021-08-17 | End: 2021-08-17

## 2021-08-17 RX ORDER — BUSPIRONE HYDROCHLORIDE 10 MG/1
10 TABLET ORAL 3 TIMES DAILY
Status: DISCONTINUED | OUTPATIENT
Start: 2021-08-17 | End: 2021-08-21 | Stop reason: HOSPADM

## 2021-08-17 RX ORDER — FLUTICASONE PROPIONATE 50 MCG
2 SPRAY, SUSPENSION (ML) NASAL DAILY
Status: DISCONTINUED | OUTPATIENT
Start: 2021-08-17 | End: 2021-08-21 | Stop reason: HOSPADM

## 2021-08-17 RX ORDER — OLANZAPINE 5 MG/1
5 TABLET ORAL 2 TIMES DAILY
Status: DISCONTINUED | OUTPATIENT
Start: 2021-08-17 | End: 2021-08-21 | Stop reason: HOSPADM

## 2021-08-17 RX ORDER — ROSUVASTATIN CALCIUM 20 MG/1
20 TABLET, COATED ORAL NIGHTLY
Status: DISCONTINUED | OUTPATIENT
Start: 2021-08-17 | End: 2021-08-21 | Stop reason: HOSPADM

## 2021-08-17 RX ORDER — PANTOPRAZOLE SODIUM 40 MG/1
40 TABLET, DELAYED RELEASE ORAL DAILY
Status: DISCONTINUED | OUTPATIENT
Start: 2021-08-17 | End: 2021-08-21 | Stop reason: HOSPADM

## 2021-08-17 RX ORDER — CARVEDILOL 25 MG/1
25 TABLET ORAL 2 TIMES DAILY WITH MEALS
Status: DISCONTINUED | OUTPATIENT
Start: 2021-08-17 | End: 2021-08-21 | Stop reason: HOSPADM

## 2021-08-17 RX ORDER — CLONIDINE HYDROCHLORIDE 0.1 MG/1
0.1 TABLET ORAL EVERY 6 HOURS PRN
Status: DISCONTINUED | OUTPATIENT
Start: 2021-08-17 | End: 2021-08-21 | Stop reason: HOSPADM

## 2021-08-17 RX ORDER — LOSARTAN POTASSIUM 50 MG/1
100 TABLET ORAL
Status: DISCONTINUED | OUTPATIENT
Start: 2021-08-17 | End: 2021-08-21 | Stop reason: HOSPADM

## 2021-08-17 RX ORDER — GUAIFENESIN 600 MG/1
1200 TABLET, EXTENDED RELEASE ORAL EVERY 12 HOURS SCHEDULED
Status: DISCONTINUED | OUTPATIENT
Start: 2021-08-17 | End: 2021-08-21 | Stop reason: HOSPADM

## 2021-08-17 RX ORDER — ASPIRIN 81 MG/1
81 TABLET ORAL DAILY
Status: DISCONTINUED | OUTPATIENT
Start: 2021-08-17 | End: 2021-08-21 | Stop reason: HOSPADM

## 2021-08-17 RX ORDER — HYDRALAZINE HYDROCHLORIDE 25 MG/1
25 TABLET, FILM COATED ORAL EVERY 8 HOURS SCHEDULED
Status: DISCONTINUED | OUTPATIENT
Start: 2021-08-17 | End: 2021-08-19

## 2021-08-17 RX ORDER — NIFEDIPINE 60 MG/1
60 TABLET, EXTENDED RELEASE ORAL
Status: DISCONTINUED | OUTPATIENT
Start: 2021-08-17 | End: 2021-08-19

## 2021-08-17 RX ORDER — METHYLPREDNISOLONE SODIUM SUCCINATE 40 MG/ML
40 INJECTION, POWDER, LYOPHILIZED, FOR SOLUTION INTRAMUSCULAR; INTRAVENOUS EVERY 8 HOURS
Status: DISCONTINUED | OUTPATIENT
Start: 2021-08-17 | End: 2021-08-18

## 2021-08-17 RX ORDER — CALCITRIOL 0.25 UG/1
0.25 CAPSULE, LIQUID FILLED ORAL DAILY
Status: DISCONTINUED | OUTPATIENT
Start: 2021-08-17 | End: 2021-08-21 | Stop reason: HOSPADM

## 2021-08-17 RX ADMIN — BUSPIRONE HYDROCHLORIDE 10 MG: 10 TABLET ORAL at 21:24

## 2021-08-17 RX ADMIN — PANTOPRAZOLE SODIUM 40 MG: 40 TABLET, DELAYED RELEASE ORAL at 08:41

## 2021-08-17 RX ADMIN — METHYLPREDNISOLONE SODIUM SUCCINATE 40 MG: 40 INJECTION, POWDER, FOR SOLUTION INTRAMUSCULAR; INTRAVENOUS at 11:30

## 2021-08-17 RX ADMIN — ALBUTEROL SULFATE 2.5 MG: 2.5 SOLUTION RESPIRATORY (INHALATION) at 15:03

## 2021-08-17 RX ADMIN — NIFEDIPINE 60 MG: 60 TABLET, FILM COATED, EXTENDED RELEASE ORAL at 08:40

## 2021-08-17 RX ADMIN — GUAIFENESIN 1200 MG: 600 TABLET, EXTENDED RELEASE ORAL at 21:24

## 2021-08-17 RX ADMIN — METHYLPREDNISOLONE SODIUM SUCCINATE 40 MG: 40 INJECTION, POWDER, FOR SOLUTION INTRAMUSCULAR; INTRAVENOUS at 18:59

## 2021-08-17 RX ADMIN — SODIUM CHLORIDE, PRESERVATIVE FREE 10 ML: 5 INJECTION INTRAVENOUS at 08:41

## 2021-08-17 RX ADMIN — OLANZAPINE 5 MG: 5 TABLET, FILM COATED ORAL at 08:40

## 2021-08-17 RX ADMIN — ALBUTEROL SULFATE 2.5 MG: 2.5 SOLUTION RESPIRATORY (INHALATION) at 19:32

## 2021-08-17 RX ADMIN — CARVEDILOL 25 MG: 25 TABLET, FILM COATED ORAL at 17:19

## 2021-08-17 RX ADMIN — HYDRALAZINE HYDROCHLORIDE 25 MG: 25 TABLET, FILM COATED ORAL at 13:43

## 2021-08-17 RX ADMIN — ROSUVASTATIN CALCIUM 20 MG: 20 TABLET, FILM COATED ORAL at 21:24

## 2021-08-17 RX ADMIN — GUAIFENESIN 1200 MG: 600 TABLET, EXTENDED RELEASE ORAL at 08:41

## 2021-08-17 RX ADMIN — TICAGRELOR 90 MG: 90 TABLET ORAL at 21:24

## 2021-08-17 RX ADMIN — FERROUS SULFATE TAB 325 MG (65 MG ELEMENTAL FE) 325 MG: 325 (65 FE) TAB at 17:19

## 2021-08-17 RX ADMIN — FLUTICASONE PROPIONATE 2 SPRAY: 50 SPRAY, METERED NASAL at 08:41

## 2021-08-17 RX ADMIN — ALBUTEROL SULFATE 2.5 MG: 2.5 SOLUTION RESPIRATORY (INHALATION) at 10:56

## 2021-08-17 RX ADMIN — CLONIDINE HYDROCHLORIDE 0.1 MG: 0.1 TABLET ORAL at 04:26

## 2021-08-17 RX ADMIN — ISOSORBIDE DINITRATE 10 MG: 10 TABLET ORAL at 17:19

## 2021-08-17 RX ADMIN — ALBUTEROL SULFATE 2.5 MG: 2.5 SOLUTION RESPIRATORY (INHALATION) at 07:13

## 2021-08-17 RX ADMIN — ISOSORBIDE DINITRATE 10 MG: 10 TABLET ORAL at 13:44

## 2021-08-17 RX ADMIN — CALCITRIOL 0.25 MCG: 0.25 CAPSULE ORAL at 08:40

## 2021-08-17 RX ADMIN — HYDRALAZINE HYDROCHLORIDE 25 MG: 25 TABLET, FILM COATED ORAL at 21:24

## 2021-08-17 RX ADMIN — CARVEDILOL 25 MG: 25 TABLET, FILM COATED ORAL at 08:41

## 2021-08-17 RX ADMIN — ALBUTEROL SULFATE 2.5 MG: 2.5 SOLUTION RESPIRATORY (INHALATION) at 03:46

## 2021-08-17 RX ADMIN — ASPIRIN 81 MG: 81 TABLET, COATED ORAL at 08:41

## 2021-08-17 RX ADMIN — ISOSORBIDE DINITRATE 10 MG: 10 TABLET ORAL at 08:41

## 2021-08-17 RX ADMIN — FERROUS SULFATE TAB 325 MG (65 MG ELEMENTAL FE) 325 MG: 325 (65 FE) TAB at 08:41

## 2021-08-17 RX ADMIN — ENOXAPARIN SODIUM 40 MG: 40 INJECTION SUBCUTANEOUS at 11:30

## 2021-08-17 RX ADMIN — OLANZAPINE 5 MG: 5 TABLET, FILM COATED ORAL at 21:24

## 2021-08-17 RX ADMIN — TICAGRELOR 90 MG: 90 TABLET ORAL at 08:40

## 2021-08-17 RX ADMIN — LOSARTAN POTASSIUM 100 MG: 50 TABLET, FILM COATED ORAL at 08:40

## 2021-08-17 RX ADMIN — FUROSEMIDE 40 MG: 40 TABLET ORAL at 11:30

## 2021-08-17 NOTE — ED NOTES
Pt presents CC of SOB. Pt states she was released from the hospital Friday and has had increasing SOB since then. Pt states she has a hx of COPD and CHF.     Jazz Adkins, KRAY  08/16/21 2026

## 2021-08-17 NOTE — OUTREACH NOTE
CHF Week 1 Survey      Responses   [facility] patient discharged from?  [place]   Does the patient have one of the following disease processes/diagnoses(primary or secondary)?  CHF   CHF Week 1 attempt successful?  No   Unsuccessful attempts  Attempt 1   Revoke  Readmitted          Cinda Dyer RN

## 2021-08-17 NOTE — ED PROVIDER NOTES
Subjective       59 y/o female with oxygen dependent COPD (on 2L) with history of CHF with dual dysfunction (slightly reduced EF at 51-55% with noted diastolic dysfunction) arrives for evaluation of SOB with SHAW being the worst that began after she was recently discharged (discharged last week for similar, per dc was CHF) placed on PRN lasix. She tells me she felt bad right after getting out of the hospital and has continued to get worse despite her lasix. She denies any further smoking telling me she quit two weeks ago. She denies fevers or chills, falls or trauma, nausea, vomiting nor diarrhea. She has no CP, ill contacts, sore throat, loss of taste or smell. She denies any PND or weight gain or increased LE swelling. She has no history of PE. She arrives with noted WOB with 3 word dyspnea but otherwise in NAD. No alleviating factors, no radiation, similar to previous.         ·          Review of Systems   All other systems reviewed and are negative.      Past Medical History:   Diagnosis Date   • Acute CHF (CMS/HCC)    • Acute renal failure (ARF) (CMS/HCC)    • Anemia    • Asthma     childhood   • Hypertension    • Ischemic colitis (CMS/HCC)    • Metabolic acidosis    • Nonrheumatic aortic (valve) insufficiency    • PTSD (post-traumatic stress disorder)        Allergies   Allergen Reactions   • Codeine Nausea And Vomiting   • Imitrex [Sumatriptan] Other (See Comments)     HA       Past Surgical History:   Procedure Laterality Date   • CARDIAC CATHETERIZATION N/A 11/8/2020    Procedure: Left Heart Cath;  Surgeon: Pierce Buchanan MD;  Location:  [facility];  Service: Cardiovascular;  Laterality: N/A;   • EXTERNAL FIXATION WRIST FRACTURE     • LEG SURGERY     • TUBAL ABDOMINAL LIGATION         Family History   Problem Relation Age of Onset   • Coronary artery disease Mother    • Coronary artery disease Father        Social History     Socioeconomic History   • Marital status: Single     Spouse name:  Not on file   • Number of children: Not on file   • Years of education: Not on file   • Highest education level: Not on file   Tobacco Use   • Smoking status: Current Every Day Smoker     Packs/day: 0.50   • Smokeless tobacco: Never Used   Vaping Use   • Vaping Use: Never used   Substance and Sexual Activity   • Alcohol use: No   • Drug use: No   • Sexual activity: Defer           Objective   Physical Exam  Vitals and nursing note reviewed.   Constitutional:       Appearance: She is well-developed and normal weight. She is ill-appearing.   HENT:      Head: Normocephalic.      Mouth/Throat:      Mouth: Mucous membranes are moist.   Eyes:      Pupils: Pupils are equal, round, and reactive to light.   Cardiovascular:      Rate and Rhythm: Normal rate and regular rhythm.   Pulmonary:      Effort: Tachypnea and respiratory distress present.      Breath sounds: Wheezing present. No decreased breath sounds, rhonchi or rales.   Chest:      Chest wall: No mass, deformity, tenderness or crepitus.   Abdominal:      General: Bowel sounds are normal.      Palpations: Abdomen is soft. There is no hepatomegaly or splenomegaly.   Musculoskeletal:         General: Normal range of motion.      Cervical back: Normal range of motion and neck supple.      Right lower leg: No edema.      Left lower leg: No edema.   Skin:     General: Skin is warm.      Capillary Refill: Capillary refill takes less than 2 seconds.   Neurological:      General: No focal deficit present.      Mental Status: She is alert and oriented to person, place, and time.   Psychiatric:         Mood and Affect: Mood normal.         Behavior: Behavior normal.         Procedures           ED Course  ED Course as of Aug 16 2303   Mon Aug 16, 2021   2137 Bnp has greatly decreased     [JH]   2215 Still wheezing will redose     [JH]   2259 Still wheezing, pulse ox did dip here to 85% per nursing. Given this we will admit.     [JH]      ED Course User Index  [JH] Tonia  Son Romero MD           · Left ventricular ejection fraction appears to be 51 - 55%. Left ventricular systolic function is low normal.  · The following left ventricular wall segments are hypokinetic: apex hypokinetic.  · Mild aortic valve stenosis is present.  · Left ventricular wall thickness is consistent with mild concentric hypertrophy.  · Left ventricular diastolic function is consistent with (grade II w/high LAP) pseudonormalization.  · Left atrial volume is severely increased.  · Estimated right ventricular systolic pressure from tricuspid regurgitation is mildly elevated (35-45 mmHg). Calculated right ventricular systolic pressure from tricuspid regurgitation is 40.8 mmHg.  · Normal size and function of the right ventricle.     XR Chest 1 View   Final Result        Labs Reviewed   COMPREHENSIVE METABOLIC PANEL - Abnormal; Notable for the following components:       Result Value    Glucose 110 (*)     BUN 40 (*)     Creatinine 1.81 (*)     Sodium 148 (*)     Chloride 111 (*)     Calcium 8.3 (*)     eGFR Non  Amer 29 (*)     All other components within normal limits    Narrative:     GFR Normal >60  Chronic Kidney Disease <60  Kidney Failure <15     BNP (IN-HOUSE) - Abnormal; Notable for the following components:    proBNP 21,127.0 (*)     All other components within normal limits    Narrative:     Among patients with dyspnea, NT-proBNP is highly sensitive for the detection of acute congestive heart failure. In addition NT-proBNP of <300 pg/ml effectively rules out acute congestive heart failure with 99% negative predictive value.    Results may be falsely decreased if patient taking Biotin.     CBC WITH AUTO DIFFERENTIAL - Abnormal; Notable for the following components:    RBC 3.20 (*)     Hemoglobin 9.4 (*)     Hematocrit 31.7 (*)     MCV 99.1 (*)     MCHC 29.7 (*)     RDW 15.6 (*)     RDW-SD 57.1 (*)     Lymphocyte % 12.0 (*)     Immature Grans % 1.4 (*)     Immature Grans, Absolute 0.10 (*)     All  other components within normal limits   BLOOD GAS, ARTERIAL - Abnormal; Notable for the following components:    pCO2, Arterial 53.7 (*)     pO2, Arterial 72.3 (*)     HCO3, Arterial 31.3 (*)     Base Excess, Arterial 5.2 (*)     pCO2, Temperature Corrected 53.7 (*)     pO2, Temperature Corrected 72.3 (*)     All other components within normal limits   COVID-19,NESS BIO IN-HOUSE,NASAL SWAB NO TRANSPORT MEDIA 2 HR TAT - Normal    Narrative:     Fact sheet for providers: https://www.fda.gov/media/411224/download     Fact sheet for patients: https://www.[employer].gov/media/028060/download    Test performed by PCR.    Consider negative results in combination with clinical observations, patient history, and epidemiological information.  Fact sheet for providers: https://www.fda.gov/media/820803/download     Fact sheet for patients: https://www.[employer].gov/media/521908/download    Test performed by PCR.    Consider negative results in combination with clinical observations, patient history, and epidemiological information.   TROPONIN (IN-HOUSE) - Normal    Narrative:     Troponin T Reference Range:  <= 0.03 ng/mL-   Negative for AMI  >0.03 ng/mL-     Abnormal for myocardial necrosis.  Clinicians would have to utilize clinical acumen, EKG, Troponin and serial changes to determine if it is an Acute Myocardial Infarction or myocardial injury due to an underlying chronic condition.       Results may be falsely decreased if patient taking Biotin.     MAGNESIUM - Normal   COVID PRE-OP / PRE-PROCEDURE SCREENING ORDER (NO ISOLATION)    Narrative:     The following orders were created for panel order COVID PRE-OP / PRE-PROCEDURE SCREENING ORDER (NO ISOLATION) - Swab, Nasal Cavity.  Procedure                               Abnormality         Status                     ---------                               -----------         ------                     COVID-19,[employer] IN-LOTTIE...[[id number]]  Normal              Final result                  Please view results for these tests on the individual orders.   RAINBOW DRAW    Narrative:     The following orders were created for panel order [place] Draw.  Procedure                               Abnormality         Status                     ---------                               -----------         ------                     Green Top (Gel)[[id number]]                                  Final result               Lavender Top[[id number]]                                     Final result               Red Top[[id number]]                                          Final result                 Please view results for these tests on the individual orders.   BLOOD GAS, ARTERIAL   CBC AND DIFFERENTIAL    Narrative:     The following orders were created for panel order CBC & Differential.  Procedure                               Abnormality         Status                     ---------                               -----------         ------                     CBC Auto Differential[[id number]]        Abnormal            Final result                 Please view results for these tests on the individual orders.   GREEN TOP   LAVENDER TOP   RED TOP                                MDM    Final diagnoses:   Chronic obstructive pulmonary disease, unspecified COPD type (CMS/[facility])       ED Disposition  ED Disposition     ED Disposition Condition Comment    Decision to Admit  Level of Care: Telemetry [5]   Diagnosis: COVID-19 [[id number]]   Admitting Physician: JESSE MEIER [[id number]]   Attending Physician: JESSE MEIER [[id number]]            No follow-up provider specified.       Medication List      No changes were made to your prescriptions during this visit.          Son Randall MD  08/16/21 2302       Son Randall MD  08/16/21 2307

## 2021-08-17 NOTE — PLAN OF CARE
Goal Outcome Evaluation:  Plan of Care Reviewed With: patient        Progress: no change  Outcome Summary: BP elevated this shift. Started on Hydralazine 25mg Q8H. IV steroids started. One time dose of PO lasix given. Requiring 2L NC, wears PRN at home. Safety maintained, no falls. Will cont to monitor and notify MD of any changes. S 73-84 with a first degree block on tele.

## 2021-08-17 NOTE — CONSULTS
PULMONARY & CRITICAL CARE CONSULT - [facility]    21, 10:54 CDT  Patient Care Team:  Orville Weston MD as PCP - General (Family Medicine)  Juanpablo Rea MD as Consulting Physician (Gastroenterology)  Nickolas Alegria MD as Consulting Physician (Gastroenterology)  Name: Ludivina Ramirez  : 1960  MRN: [id number]  Contact Serial Number [id number]    Chief complaint: Shortness of breath    HPI:  We have been consulted by Orville Weston MD to see this 61 y.o. female born on 1960.  Patient was just seen at this facility for the same complaints and discharged on 21.  She indicates she is still too short of breath ambulate in her home to the bathroom, shower or eat.  Patient was to go home with home health however they could not start until today.  Patient did have oxygen delivered over the weekend however she does not think that was helping her shortness of breath.  She felt the bronchodilators were helpful in the inpatient setting however she was discharged home with medication but no nebulizer to take the medication.  Patient unable to get BiPAP for home due to pending sleep study.  She indicates this was not scheduled either.  She has had no fever.  Cough remains nonproductive.  Lower extremity edema, no worse.  She reports she did not resume smoking upon discharge.  Currently she is lying in the bed.  She is breathing comfortably on 2 L nasal cannula.  She did not require BiPAP at this time.  She is afebrile.  Chest x-ray showing chronic interstitial changes, nothing acute.  Creatinine improved compared to 4 days ago.  BNP remains significantly elevated although improved compared to 9 days ago.  White count normal.  Hemoglobin stable at 9.4.    Past Medical History:   has a past medical history of Acute CHF (CMS/HCC), Acute renal failure (ARF) (CMS/HCC), Anemia, Asthma, Hypertension, Ischemic colitis (CMS/HCC), Metabolic acidosis, Nonrheumatic aortic (valve)  insufficiency, and PTSD (post-traumatic stress disorder).   has a past surgical history that includes Tubal ligation; External fixation wrist fracture; Leg Surgery; and Cardiac catheterization (N/A, 11/8/2020).  Allergies   Allergen Reactions   • Codeine Nausea And Vomiting   • Imitrex [Sumatriptan] Other (See Comments)     HA     Medications:  albuterol, 2.5 mg, Nebulization, Q4H - RT  aspirin, 81 mg, Oral, Daily  calcitriol, 0.25 mcg, Oral, Daily  carvedilol, 25 mg, Oral, BID With Meals  enoxaparin, 40 mg, Subcutaneous, Q24H  ferrous sulfate, 325 mg, Oral, BID With Meals  fluticasone, 2 spray, Each Nare, Daily  furosemide, 40 mg, Oral, Once  guaiFENesin, 1,200 mg, Oral, Q12H  hydrALAZINE, 25 mg, Oral, Q8H  isosorbide dinitrate, 10 mg, Oral, TID - Nitrates  losartan, 100 mg, Oral, Q24H  methylPREDNISolone sodium succinate, 40 mg, Intravenous, Q8H  NIFEdipine XL, 60 mg, Oral, Q24H  OLANZapine, 5 mg, Oral, BID  pantoprazole, 40 mg, Oral, Daily  rosuvastatin, 20 mg, Oral, Nightly  ticagrelor, 90 mg, Oral, Q12H         Family History:  Family History   Problem Relation Age of Onset   • Coronary artery disease Mother    • Coronary artery disease Father      Social History:   reports that she has been smoking. She has been smoking about 0.50 packs per day. She has never used smokeless tobacco. She reports that she does not drink alcohol and does not use drugs.  Review of Systems:  Review of Systems   Constitutional: Positive for fatigue. Negative for chills and fever.   HENT: Positive for congestion. Negative for rhinorrhea, sinus pressure, sinus pain and trouble swallowing.    Eyes: Negative for pain, discharge and itching.   Respiratory: Positive for cough, chest tightness and shortness of breath.    Cardiovascular: Positive for leg swelling. Negative for chest pain and palpitations.   Gastrointestinal: Positive for abdominal distention. Negative for constipation, diarrhea, nausea and vomiting.   Endocrine: Negative  for polydipsia, polyphagia and polyuria.   Genitourinary: Negative for dysuria and hematuria.   Musculoskeletal: Negative for arthralgias, back pain and gait problem.   Skin: Negative for color change, pallor and rash.   Allergic/Immunologic: Negative for environmental allergies, food allergies and immunocompromised state.   Neurological: Positive for weakness. Negative for dizziness, light-headedness and numbness.   Hematological: Negative for adenopathy. Does not bruise/bleed easily.   Psychiatric/Behavioral: Positive for sleep disturbance. Negative for agitation. The patient is not nervous/anxious and is not hyperactive.       Physical Exam:  Temp:  [97.4 °F (36.3 °C)-98.7 °F (37.1 °C)] 97.4 °F (36.3 °C)  Heart Rate:  [69-89] 84  Resp:  [18-28] 18  BP: (147-190)/() 178/98    Intake/Output Summary (Last 24 hours) at 8/17/2021 1054  Last data filed at 8/17/2021 0856  Gross per 24 hour   Intake 240 ml   Output --   Net 240 ml         08/16/21 2024 08/17/21  0126   Weight: 104 kg (230 lb) 105 kg (231 lb 9.6 oz)     SpO2 Percentage    08/17/21 0346 08/17/21 0713 08/17/21 0725   SpO2: 96% 92% 93%     Physical Exam  Vitals and nursing note reviewed. Exam conducted with a chaperone present.   Constitutional:       Appearance: She is obese. She is not ill-appearing or toxic-appearing.      Interventions: Nasal cannula in place.   HENT:      Head: Normocephalic and atraumatic.      Right Ear: External ear normal.      Left Ear: External ear normal.      Nose: Nose normal.      Mouth/Throat:      Mouth: Mucous membranes are moist.      Pharynx: Oropharynx is clear.   Eyes:      General:         Right eye: No discharge.         Left eye: No discharge.      Extraocular Movements: Extraocular movements intact.      Conjunctiva/sclera: Conjunctivae normal.      Pupils: Pupils are equal, round, and reactive to light.   Cardiovascular:      Rate and Rhythm: Normal rate and regular rhythm.      Heart sounds: No murmur  heard.     Pulmonary:      Effort: No tachypnea, accessory muscle usage or respiratory distress.      Breath sounds: Decreased breath sounds present.   Abdominal:      General: Abdomen is flat. Bowel sounds are normal. There is no distension.   Musculoskeletal:         General: Normal range of motion.      Cervical back: Normal range of motion and neck supple.      Right lower leg: Edema present.      Left lower leg: Edema present.   Skin:     General: Skin is warm and dry.      Coloration: Skin is not jaundiced or pale.   Neurological:      General: No focal deficit present.      Mental Status: She is alert and oriented to person, place, and time. Mental status is at baseline.   Psychiatric:         Mood and Affect: Mood normal.         Behavior: Behavior normal.         Thought Content: Thought content normal.       Results from last 7 days   Lab Units 08/16/21  2050 08/10/21  2351   WBC 10*3/mm3 7.23 7.38   HEMOGLOBIN g/dL 9.4* 9.8*   PLATELETS 10*3/mm3 202 145     Results from last 7 days   Lab Units 08/16/21 2050 08/13/21  0553 08/12/21  0445   SODIUM mmol/L 148* 143 141   POTASSIUM mmol/L 4.2 4.4 4.0   CO2 mmol/L 28.0 28.0 27.0   BUN mg/dL 40* 42* 43*   CREATININE mg/dL 1.81* 2.18* 2.38*   MAGNESIUM mg/dL 2.2  --   --    GLUCOSE mg/dL 110* 94 146*     Results from last 7 days   Lab Units 08/16/21  2111   PH, ARTERIAL pH units 7.374   PCO2, ARTERIAL mm Hg 53.7*   PO2 ART mm Hg 72.3*     Lab Results   Component Value Date    PROBNP 21,127.0 (H) 08/16/2021     No results found for: BLOODCX, URINECX, WOUNDCX, MRSACX, RESPCX, STOOLCX  Recent radiology:   Imaging Results (Last 72 Hours)     Procedure Component Value Units Date/Time    XR Chest 1 View [[id number]] Collected: 08/16/21 2046     Updated: 08/16/21 2050    Narrative:      EXAMINATION: XR CHEST 1 VW-     8/16/2021 8:45 PM CDT     HISTORY: SOA Triage Protocol     1 view chest x-ray compared with August 10, 2021.     Magnified heart size.  Aortic arch  calcification.     Diffuse chronic interstitial disease or  No new or increased lung abnormality.     No pneumothorax.     Summary:  1. Diffuse chronic interstitial disease.  This report was finalized on 08/16/2021 20:47 by Dr. Felice Reddy MD.        My radiograph interpretation/independent review of imaging: chronic interstitial disease    Other test results (not lab or imaging): Results for orders placed during the hospital encounter of 08/08/21    Adult Transthoracic Echo Complete W/ Cont if Necessary Per Protocol    Interpretation Summary  · Left ventricular ejection fraction appears to be 51 - 55%. Left ventricular systolic function is low normal.  · The following left ventricular wall segments are hypokinetic: apex hypokinetic.  · Mild aortic valve stenosis is present.  · Left ventricular wall thickness is consistent with mild concentric hypertrophy.  · Left ventricular diastolic function is consistent with (grade II w/high LAP) pseudonormalization.  · Left atrial volume is severely increased.  · Estimated right ventricular systolic pressure from tricuspid regurgitation is mildly elevated (35-45 mmHg). Calculated right ventricular systolic pressure from tricuspid regurgitation is 40.8 mmHg.  · Normal size and function of the right ventricle.  Grade 2 diastolic dysfunction, pulmonary hypertension, RVSP 40, EF 51-55    Independent review of ekg: SR, rate 72, 1st degree block, normal QTc    Problem List as identified by Epic (may contain historical, inactive problems)  Patient Active Problem List   Diagnosis   • Hypertensive urgency   • Iron deficiency anemia   • Cigarette smoker   • Nonrheumatic aortic valve insufficiency   • Acute kidney injury superimposed on CKD (CMS/HCC)   • Colitis, ischemic (CMS/HCC)   • Family hx colonic polyps   • Stage 3 chronic kidney disease (CMS/HCC)   • Shortness of breath   • Noncompliance   • ST elevation myocardial infarction involving left anterior descending (LAD) coronary  artery (CMS/[facility])   • Essential hypertension   • Acute on chronic diastolic heart failure (CMS/[facility])   • Coronary artery disease of native artery of native heart with stable angina pectoris (CMS/[facility])   • Status post insertion of drug-eluting stent into left anterior descending (LAD) artery for coronary artery disease   • Hyperlipidemia LDL goal <70   • Class 1 obesity due to excess calories with serious comorbidity and body mass index (BMI) of 31.0 to 31.9 in adult   • Bradycardia   • Acute on chronic diastolic congestive heart failure (CMS/[facility])   • COVID-19     Pulmonary Assessment:    New problem (to me), with additional workup planned: Acute on chronic hypoxemic respiratory failure    New problem (to me), no additional workup planned: Diastolic heart failure, defer    Other problems either stable, failing to improve or worsenin. Suspected COPD, no PFTs  2. History of tobacco abuse.  3. Recent hospitalization with COVID-19 pneumonia with acute respiratory failure.  4. Interstitial lung disease.  5. Hypertension  6. Obesity  7. Sleep apnea, noncompliant with BiPAP, sleep study out of date  8. Deconditioned    Recommend/plan:     · Supplemental oxygen for saturation between 90 and 94, currently on 2 L  · BiPAP as needed  · Recommend scheduling her sleep study prior to discharge from this facility  · Recommend having case management work on arrangements for nebulizer prior to discharge.  Patient went home with nebulizer medication but no nebulizer equipment  · May coordinate timing of discharge with planned home health/PT visit scheduled for home.  They were scheduled to arrive today however patient reports she went the whole weekend without being able to function and perform ADLs on her own  · Continue bronchodilators, ipratropium and albuterol  · DVT prophylaxis, Lovenox 40 daily  · Mucinex  · IV Solu-Medrol, 40 mg every 8 hours.  Taper quickly as tolerated  · Diuresis per attending  · Mobilize with  therapy  · Prognosis stable    Thank you for this consult.  We will follow along.  Electronically signed by ISAIAH Bryant, 08/17/21, 10:54 AM CDT.    ATTESTATION OF CLINICAL NOTE:  I have reviewed the notes, assessments, and/or procedures performed by  ISAIAH Bryant, I concur with her/his documentation of Ludivina Ramirez.Patient was sen in the medical floor as new consult but she was recently seen by me as an inpatient pulmonary consult for Covid19 pneumonia and respiratory failure .    She was treated as an inpatient and was discharged home on 8/13/2021.  Apparently she did not have home health care arranged at home at the time of discharge and she did have oxygen set up at home but BiPAP could not be arranged and a sleep study is pending.  Even after using all the respiratory treatment and the oxygen patient felt she is getting too short of breath and return to the hospital and was readmitted.  She did not have any fever and has some nonproductive dry cough.  She is a smoker but she did resume smoking upon return to the home.  She did not need any BiPAP during these hospitalizations and on admission she got routine breathing treatment is on 2 L of oxygen and oxygenating well.  Chest x-ray confirmed chronic interstitial changes which is unchanged from before.  Her BNP is high suggesting component of heart failure and her renal function has improved with serum creatinine better than before.  White cell count is normal and hemoglobin is also within normal limits.    On physical examination patient is obese middle aged  female sitting up in the bed in no distress.  HEENT: Atraumatic normocephalic.  Neck: Supple no mass no jugular venous distention no lymphadenopathy.  Heart: Sounds normal rate and rhythm regular no murmur.  Lungs bibasilar crackles and diminished breath sound.  Abdomen: Soft nondistended nontender.  Extremities: Trace ankle edema normal pulses and color.  Neurologic: Grossly  intact.  Skin: No breakdown.    Continue supportive respiratory care.  Continue bronchodilator treatment with albuterol and Symbicort.  Titrate oxygen to keep oxygen more than 92% she is requiring 2 days of oxygen and will use BiPAP as needed.  She will need outpatient sleep study and set up CPAP at home for the sleep apnea.  Encourage incentive spirometry and flutter valve.  Continue diuresis for heart failure and monitor renal function ultralights.  Repeat labs and imaging studies as needed.  Increase PT OT as tolerated.  Plan for outpatient pulmonary clinic follow-up after discharge.  She has chronic interstitial lung disease and will need to get regular follow-up in the pulmonary clinic.  Patient has history of tobacco use in the past but she reported she is not smoking since her discharge from the hospital.  Again smoking cessation is encouraged.  Continue current treatment plan and supportive care DVT and stress ulcer prophylaxis and pain and anxiety control.  CODE STATUS: Full.  Overall prognosis: Good.  Pulmonary team will continue following her and make further recommendations.  We appreciate the consult and like to thank Dr. Weston for the referral.  Total time spent in seeing this patient as inpatient pulmonary consult was 45 minutes.      I have seen and examined patient personally, performing a face-to-face diagnostic evaluation with plan of care reviewed and developed with APRN and nursing staff. I have addended and/or modified the above history of present illness, physical examination, and assessment and plan to reflect my findings and impressions. Essential elements of the care plan were discussed with APRN above.  Agree with findings and assessment/plan as documented above.    Neeta Chavez MD  Pulmonologist/Intensivist  8/17/2021 17:50 CDT

## 2021-08-17 NOTE — PLAN OF CARE
Goal Outcome Evaluation:  Plan of Care Reviewed With: patient        Progress: no change  Outcome Summary: Admitted to  for COPD exac./SOA. BP elevated, Dr. Weston aware and home meds resumed. Pt. asking about getting a Bipap machine and having a sleep study completed while admitted. NSR on tele. Will continue to monitor and await further MD orders.

## 2021-08-17 NOTE — PROGRESS NOTES
History and Physical      CHIEF COMPLAINT:  SOA    Reason for Admission:  COPD Exac    History Obtained From:  patient    HISTORY OF PRESENT ILLNESS:      The patient is a 61 y.o. female who was admitted through ER with c/o increased SOA. She has no CP. No GI complaints. She has some cough. She has been taking Lasix, but has not felt better. No worsening edema. She has no fever. No dysuria.     Past Medical History:    Past Medical History:   Diagnosis Date   • Acute CHF (CMS/HCC)    • Acute renal failure (ARF) (CMS/HCC)    • Anemia    • Asthma     childhood   • Hypertension    • Ischemic colitis (CMS/HCC)    • Metabolic acidosis    • Nonrheumatic aortic (valve) insufficiency    • PTSD (post-traumatic stress disorder)      Past Surgical History:    Past Surgical History:   Procedure Laterality Date   • CARDIAC CATHETERIZATION N/A 11/8/2020    Procedure: Left Heart Cath;  Surgeon: Pierce Buchanan MD;  Location:  [facility];  Service: Cardiovascular;  Laterality: N/A;   • EXTERNAL FIXATION WRIST FRACTURE     • LEG SURGERY     • TUBAL ABDOMINAL LIGATION           Medications Prior to Admission:    Medications Prior to Admission   Medication Sig Dispense Refill Last Dose   • aspirin 81 MG EC tablet Take 1 tablet by mouth Daily.      • azelastine (ASTELIN) 0.1 % nasal spray 2 sprays into the nostril(s) as directed by provider 2 (Two) Times a Day. Use in each nostril as directed       • calcitriol (ROCALTROL) 0.25 MCG capsule Take 1 capsule by mouth Daily. 30 capsule 3    • carvedilol (COREG) 25 MG tablet Take 25 mg by mouth 2 (Two) Times a Day With Meals.      • cloNIDine (CATAPRES) 0.1 MG tablet Take 1 tablet by mouth Every 6 (Six) Hours As Needed for High Blood Pressure (BP >180/100). 30 tablet 1    • ferrous sulfate 325 (65 FE) MG tablet Take 1 tablet by mouth 2 (Two) Times a Day With Meals. 60 tablet 0    • fluticasone (FLONASE) 50 MCG/ACT nasal spray 1 spray into the nostril(s) as directed by  provider Every Morning. In each nostril      • furosemide (Lasix) 40 MG tablet Take 1 tablet by mouth Daily As Needed (increased SOA, edema, weight gain (2 pounds overnight, 5 pounds in 2 days)). 30 tablet 0    • guaiFENesin (MUCINEX) 600 MG 12 hr tablet Take 2 tablets by mouth Every 12 (Twelve) Hours. 60 tablet 3    • hydrALAZINE (APRESOLINE) 50 MG tablet Take 75 mg by mouth Every 8 (Eight) Hours.      • ipratropium-albuterol (DUO-NEB) 0.5-2.5 mg/3 ml nebulizer Take 3 mL by nebulization 4 (Four) Times a Day. 360 mL 0    • isosorbide dinitrate (ISORDIL) 10 MG tablet Take 1 tablet by mouth 3 (Three) Times a Day. 90 tablet 3    • losartan (COZAAR) 100 MG tablet Take 1 tablet by mouth Daily. 90 tablet 3    • NIFEdipine XL (ADALAT CC) 60 MG 24 hr tablet Take 1 tablet by mouth 2 (two) times a day. 60 tablet 1    • nitroglycerin (NITROSTAT) 0.4 MG SL tablet Place 1 tablet under the tongue Every 5 (Five) Minutes As Needed for Chest Pain for up to 3 doses. Take no more than 3 doses in 15 minutes. 25 tablet 0    • O2 (OXYGEN) Inhale 2 L/min Continuous.      • OLANZapine (zyPREXA) 5 MG tablet Take 1 tablet by mouth 2 (two) times a day. 60 tablet 0    • pantoprazole (PROTONIX) 40 MG EC tablet Take 1 tablet by mouth Daily. 30 tablet 1    • rosuvastatin (CRESTOR) 20 MG tablet Take 1 tablet by mouth Daily. 90 tablet 3    • ticagrelor (BRILINTA) 90 MG tablet tablet Take 1 tablet by mouth 2 (Two) Times a Day. 60 tablet 11        Allergies:  Codeine and Imitrex [sumatriptan]    Social History:   TOBACCO:   reports that she has been smoking. She has been smoking about 0.50 packs per day. She has never used smokeless tobacco.  ETOH:   reports no history of alcohol use.  DRUGS:   reports no history of drug use.        Family History:   Family History   Problem Relation Age of Onset   • Coronary artery disease Mother    • Coronary artery disease Father      REVIEW OF SYSTEMS:  Constitutional: neg  CV: neg  Pulmonary: SOA. cough  GI:  "neg  : neg  Psych: neg  Neuro: neg  Skin: neg  MusculoSkeletal: neg  HEENT: neg  Joints: neg  Vitals:  /98 (BP Location: Right arm, Patient Position: Sitting)   Pulse 84   Temp 97.4 °F (36.3 °C) (Oral)   Resp 18   Ht 165.1 cm (65\")   Wt 105 kg (231 lb 9.6 oz)   SpO2 93%   BMI 38.54 kg/m²     PHYSICAL EXAM:  Gen: NAD, alert, pleasant  HEENT: WNL  Lymph: no LAD  Neck: no JVD or masses  Chest: coarse  CV: RRR  Abdomen: NT/ND  Extrem: no C/C/E  Neuro: non focal  Skin: no rashes  Joints: no redness  DATA:  I have reviewed the admission labs and imaging tests.    ASSESSMENT AND PLAN:      COPD Exac---continue steroids, resp treatments, consult Pulm, O2  Diastolic Heart Failure----continue PO lasix and follow volume status and labs  HTN---restart PO meds and follow BP      Orville Weston MD  09:54 CDT 8/17/2021  "

## 2021-08-18 ENCOUNTER — HOME CARE VISIT (OUTPATIENT)
Dept: HOME HEALTH SERVICES | Facility: HOME HEALTHCARE | Age: 61
End: 2021-08-18

## 2021-08-18 LAB
ANION GAP SERPL CALCULATED.3IONS-SCNC: 10 MMOL/L (ref 5–15)
BUN SERPL-MCNC: 41 MG/DL (ref 8–23)
BUN/CREAT SERPL: 21.1 (ref 7–25)
CALCIUM SPEC-SCNC: 8.7 MG/DL (ref 8.6–10.5)
CHLORIDE SERPL-SCNC: 105 MMOL/L (ref 98–107)
CO2 SERPL-SCNC: 27 MMOL/L (ref 22–29)
CREAT SERPL-MCNC: 1.94 MG/DL (ref 0.57–1)
GFR SERPL CREATININE-BSD FRML MDRD: 26 ML/MIN/1.73
GLUCOSE SERPL-MCNC: 166 MG/DL (ref 65–99)
POTASSIUM SERPL-SCNC: 4.5 MMOL/L (ref 3.5–5.2)
QT INTERVAL: 410 MS
QTC INTERVAL: 448 MS
SODIUM SERPL-SCNC: 142 MMOL/L (ref 136–145)

## 2021-08-18 PROCEDURE — 96372 THER/PROPH/DIAG INJ SC/IM: CPT

## 2021-08-18 PROCEDURE — G0378 HOSPITAL OBSERVATION PER HR: HCPCS

## 2021-08-18 PROCEDURE — 99226 PR SBSQ OBSERVATION CARE/DAY 35 MINUTES: CPT | Performed by: INTERNAL MEDICINE

## 2021-08-18 PROCEDURE — 94799 UNLISTED PULMONARY SVC/PX: CPT

## 2021-08-18 PROCEDURE — 80048 BASIC METABOLIC PNL TOTAL CA: CPT | Performed by: FAMILY MEDICINE

## 2021-08-18 PROCEDURE — 96376 TX/PRO/DX INJ SAME DRUG ADON: CPT

## 2021-08-18 PROCEDURE — 25010000002 ENOXAPARIN PER 10 MG: Performed by: FAMILY MEDICINE

## 2021-08-18 PROCEDURE — 99214 OFFICE O/P EST MOD 30 MIN: CPT | Performed by: INTERNAL MEDICINE

## 2021-08-18 PROCEDURE — 25010000002 METHYLPREDNISOLONE PER 40 MG: Performed by: FAMILY MEDICINE

## 2021-08-18 RX ORDER — METHYLPREDNISOLONE SODIUM SUCCINATE 40 MG/ML
40 INJECTION, POWDER, LYOPHILIZED, FOR SOLUTION INTRAMUSCULAR; INTRAVENOUS EVERY 12 HOURS
Status: DISCONTINUED | OUTPATIENT
Start: 2021-08-18 | End: 2021-08-19

## 2021-08-18 RX ADMIN — GUAIFENESIN 1200 MG: 600 TABLET, EXTENDED RELEASE ORAL at 08:00

## 2021-08-18 RX ADMIN — ALBUTEROL SULFATE 2.5 MG: 2.5 SOLUTION RESPIRATORY (INHALATION) at 11:40

## 2021-08-18 RX ADMIN — FLUTICASONE PROPIONATE 2 SPRAY: 50 SPRAY, METERED NASAL at 08:00

## 2021-08-18 RX ADMIN — METHYLPREDNISOLONE SODIUM SUCCINATE 40 MG: 40 INJECTION, POWDER, FOR SOLUTION INTRAMUSCULAR; INTRAVENOUS at 18:19

## 2021-08-18 RX ADMIN — ISOSORBIDE DINITRATE 10 MG: 10 TABLET ORAL at 13:11

## 2021-08-18 RX ADMIN — CARVEDILOL 25 MG: 25 TABLET, FILM COATED ORAL at 18:19

## 2021-08-18 RX ADMIN — PANTOPRAZOLE SODIUM 40 MG: 40 TABLET, DELAYED RELEASE ORAL at 08:00

## 2021-08-18 RX ADMIN — FERROUS SULFATE TAB 325 MG (65 MG ELEMENTAL FE) 325 MG: 325 (65 FE) TAB at 07:56

## 2021-08-18 RX ADMIN — BUSPIRONE HYDROCHLORIDE 10 MG: 10 TABLET ORAL at 08:00

## 2021-08-18 RX ADMIN — ENOXAPARIN SODIUM 40 MG: 40 INJECTION SUBCUTANEOUS at 13:11

## 2021-08-18 RX ADMIN — OLANZAPINE 5 MG: 5 TABLET, FILM COATED ORAL at 08:00

## 2021-08-18 RX ADMIN — BUSPIRONE HYDROCHLORIDE 10 MG: 10 TABLET ORAL at 20:56

## 2021-08-18 RX ADMIN — ALBUTEROL SULFATE 2.5 MG: 2.5 SOLUTION RESPIRATORY (INHALATION) at 07:43

## 2021-08-18 RX ADMIN — CALCITRIOL 0.25 MCG: 0.25 CAPSULE ORAL at 08:00

## 2021-08-18 RX ADMIN — ALBUTEROL SULFATE 2.5 MG: 2.5 SOLUTION RESPIRATORY (INHALATION) at 19:55

## 2021-08-18 RX ADMIN — BUSPIRONE HYDROCHLORIDE 10 MG: 10 TABLET ORAL at 18:19

## 2021-08-18 RX ADMIN — HYDRALAZINE HYDROCHLORIDE 25 MG: 25 TABLET, FILM COATED ORAL at 06:00

## 2021-08-18 RX ADMIN — TICAGRELOR 90 MG: 90 TABLET ORAL at 20:56

## 2021-08-18 RX ADMIN — NIFEDIPINE 60 MG: 60 TABLET, FILM COATED, EXTENDED RELEASE ORAL at 08:00

## 2021-08-18 RX ADMIN — ALBUTEROL SULFATE 2.5 MG: 2.5 SOLUTION RESPIRATORY (INHALATION) at 15:25

## 2021-08-18 RX ADMIN — HYDRALAZINE HYDROCHLORIDE 25 MG: 25 TABLET, FILM COATED ORAL at 13:11

## 2021-08-18 RX ADMIN — ASPIRIN 81 MG: 81 TABLET, COATED ORAL at 08:00

## 2021-08-18 RX ADMIN — GUAIFENESIN 1200 MG: 600 TABLET, EXTENDED RELEASE ORAL at 20:57

## 2021-08-18 RX ADMIN — TICAGRELOR 90 MG: 90 TABLET ORAL at 08:00

## 2021-08-18 RX ADMIN — ROSUVASTATIN CALCIUM 20 MG: 20 TABLET, FILM COATED ORAL at 20:58

## 2021-08-18 RX ADMIN — OLANZAPINE 5 MG: 5 TABLET, FILM COATED ORAL at 20:56

## 2021-08-18 RX ADMIN — FERROUS SULFATE TAB 325 MG (65 MG ELEMENTAL FE) 325 MG: 325 (65 FE) TAB at 18:19

## 2021-08-18 RX ADMIN — HYDRALAZINE HYDROCHLORIDE 25 MG: 25 TABLET, FILM COATED ORAL at 22:21

## 2021-08-18 RX ADMIN — LOSARTAN POTASSIUM 100 MG: 50 TABLET, FILM COATED ORAL at 08:00

## 2021-08-18 RX ADMIN — CARVEDILOL 25 MG: 25 TABLET, FILM COATED ORAL at 07:56

## 2021-08-18 RX ADMIN — ISOSORBIDE DINITRATE 10 MG: 10 TABLET ORAL at 07:56

## 2021-08-18 RX ADMIN — ISOSORBIDE DINITRATE 10 MG: 10 TABLET ORAL at 18:19

## 2021-08-18 RX ADMIN — METHYLPREDNISOLONE SODIUM SUCCINATE 40 MG: 40 INJECTION, POWDER, FOR SOLUTION INTRAMUSCULAR; INTRAVENOUS at 04:48

## 2021-08-18 NOTE — PLAN OF CARE
Goal Outcome Evaluation:  Plan of Care Reviewed With: patient        Progress: no change  Outcome Summary: VSS. No c/o pain. Slept with 2L O2 NC through night. BP slightly improved. IV steroids given. Will continue to monitor.

## 2021-08-18 NOTE — PROGRESS NOTES
PULMONARY AND CRITICAL CARE PROGRESS NOTE - [facility]    Patient: Ludivina Ramirez  1960   MR# [id number]   Acct# [id number]  08/18/21   11:31 CDT  Referring Provider: Orville Weston MD    Chief Complaint: Shortness of breath    Interval history: Patient was seen in the follow-up visit in pulmonary rounds in medical floor today.  She is doing well.  She is on 2 L of oxygen with no need BiPAP last night.  She has anxiety issues and after talking to the nursing staff it appears patient was scheduled to get the sleep study set up as an outpatient and she was sent home on oxygen for pickup but she did have some anxiety issues and return to the hospital and is currently not requiring BiPAP.  She has no other acute events overnight.  Cardiology has been consulted and patient was seen by Dr. Ramirez today.  She had history of coronary disease heart failure and percutaneous coronary intervention in the past.    Meds:  albuterol, 2.5 mg, Nebulization, Q4H - RT  aspirin, 81 mg, Oral, Daily  busPIRone, 10 mg, Oral, TID  calcitriol, 0.25 mcg, Oral, Daily  carvedilol, 25 mg, Oral, BID With Meals  enoxaparin, 40 mg, Subcutaneous, Q24H  ferrous sulfate, 325 mg, Oral, BID With Meals  fluticasone, 2 spray, Each Nare, Daily  guaiFENesin, 1,200 mg, Oral, Q12H  hydrALAZINE, 25 mg, Oral, Q8H  isosorbide dinitrate, 10 mg, Oral, TID - Nitrates  losartan, 100 mg, Oral, Q24H  methylPREDNISolone sodium succinate, 40 mg, Intravenous, Q12H  NIFEdipine XL, 60 mg, Oral, Q24H  OLANZapine, 5 mg, Oral, BID  pantoprazole, 40 mg, Oral, Daily  rosuvastatin, 20 mg, Oral, Nightly  ticagrelor, 90 mg, Oral, Q12H         Review of Systems:   Review of Systems  Physical Exam:  SpO2 Percentage    08/18/21 0700 08/18/21 0743 08/18/21 1100   SpO2: 95% 95% 95%     Temp:  [96.7 °F (35.9 °C)-98.5 °F (36.9 °C)] 98.1 °F (36.7 °C)  Heart Rate:  [63-79] 66  Resp:  [18] 18  BP: (131-192)/() 131/80    Intake/Output Summary (Last 24  hours) at 8/18/2021 1131  Last data filed at 8/18/2021 0854  Gross per 24 hour   Intake 840 ml   Output --   Net 840 ml     Physical Exam      Vitals and nursing note reviewed. Exam conducted with a chaperone present.   Constitutional:       Appearance: She is obese. She is not ill-appearing or toxic-appearing.      Interventions: Nasal cannula in place.   HENT:      Head: Normocephalic and atraumatic.      Right Ear: External ear normal.      Left Ear: External ear normal.      Nose: Nose normal.      Mouth/Throat:      Mouth: Mucous membranes are moist.      Pharynx: Oropharynx is clear.   Eyes:      General:         Right eye: No discharge.         Left eye: No discharge.      Extraocular Movements: Extraocular movements intact.      Conjunctiva/sclera: Conjunctivae normal.      Pupils: Pupils are equal, round, and reactive to light.   Cardiovascular:      Rate and Rhythm: Normal rate and regular rhythm.      Heart sounds: No murmur heard.     Pulmonary:      Effort: No tachypnea, accessory muscle usage or respiratory distress.      Breath sounds: Decreased breath sounds present.   Abdominal:      General: Abdomen is flat. Bowel sounds are normal. There is no distension.   Musculoskeletal:         General: Normal range of motion.      Cervical back: Normal range of motion and neck supple.      Right lower leg: Edema present.      Left lower leg: Edema present.   Skin:     General: Skin is warm and dry.      Coloration: Skin is not jaundiced or pale.   Neurological:      General: No focal deficit present.      Mental Status: She is alert and oriented to person, place, and time. Mental status is at baseline.   Psychiatric:         Mood and Affect: Mood normal.         Behavior: Behavior normal.         Thought Content: Thought content normal.      Laboratory Data:  Results from last 7 days   Lab Units 08/16/21  2050   WBC 10*3/mm3 7.23   HEMOGLOBIN g/dL 9.4*   PLATELETS 10*3/mm3 202     Results from last 7 days    Lab Units 08/18/21  0528 08/16/21 2050 08/13/21  0553   SODIUM mmol/L 142 148* 143   POTASSIUM mmol/L 4.5 4.2 4.4   BUN mg/dL 41* 40* 42*   CREATININE mg/dL 1.94* 1.81* 2.18*     Results from last 7 days   Lab Units 08/16/21  2111   PH, ARTERIAL pH units 7.374   PCO2, ARTERIAL mm Hg 53.7*   PO2 ART mm Hg 72.3*     No results found for: BLOODCX, URINECX, WOUNDCX, MRSACX, RESPCX, STOOLCX  Recent films:  No radiology results for the last day  Films reviewed personally by me.  My interpretation: Agree with current interpretation last chest x-ray    Pulmonary Assessment:  1. Acute on chronic respiratory failure  2. Shortness of breath  3. Chronic obstructive pulmonary disease with history of tobacco use.  4. Pulmonary fibrosis and scarring  5. Sleep disturbances  6. Coronary artery disease  7. Congestive diastolic heart failure  8. Chronic kidney stage III  9. Obesity  10. Anxiety and depression    Recommend:   · Continue bronchodilator treatment with Symbicort and DuoNeb.  · Continue supplemental oxygen and titrate to keep saturation more than 92%.  · Home oxygen evaluation prior to the discharge.  She is currently requiring 2 L  · For outpatient sleep study and if she qualifies she will be requiring BiPAP but currently she does not appear to be a candidate for BiPAP treatment.  · Added buspirone for anxiety.  She has anxiety issues.  · Cardiology is following for congestive diastolic heart failure and coronary artery disease.  Follow cardiology recommendations.  · Continue PT OT, DVT and also prophylaxis and pain and anxiety control.  · We will repeat the blood gas in the morning while on oxygen and will check if she needs any CPAP or BiPAP or not.  · Repeat labs and imaging studies from time to time.  Nutritional support.  Pain and anxiety control.  · Pulmonary team will continue following her and make further recommendations.  · She has a long history of noncompliance which will be problematic.  CODE STATUS: Full.   Overall prognosis: Good    Electronically signed by     Neeta Chavez MD,  Pulmonologist/intensivist   08/18/21, 11:31 CDT

## 2021-08-18 NOTE — CASE MANAGEMENT/SOCIAL WORK
Continued Stay Note   [facility]     Patient Name: Ludivina Ramirez  MRN: [id number]  Today's Date: 8/18/2021    Admit Date: 8/16/2021    Discharge Plan     Row Name 08/18/21 1448       Plan    Plan Comments  SW received orders for trilogy. Pt requesting to use [facility] where she has her o2 through. SW faxed info over and Jose C from [employer] is aware. ANTHONY will await approval for set up        Discharge Codes    No documentation.             Arabella León

## 2021-08-18 NOTE — DISCHARGE PLACEMENT REQUEST
"Ludivina Clemons (61 y.o. Female)     Date of Birth Social Security Number Address Home Phone MRN    1960  [address] [place] [address] [phone] [id number]    Holiness Marital Status          Cheondoism Single       Admission Date Admission Type Admitting Provider Attending Provider Department, Room/Bed    21 Emergency Orville Weston MD Martin, Aribbe Allen, MD [address], 433/1    Discharge Date Discharge Disposition Discharge Destination                       Attending Provider: Orville Weston MD    Allergies: Codeine, Imitrex [Sumatriptan]    Isolation: None   Infection: None   Code Status: Prior    Ht: 165.1 cm (65\")   Wt: 106 kg (233 lb 3 oz)    Admission Cmt: None   Principal Problem: None                Active Insurance as of 2021     Primary Coverage     Payor Plan Insurance Group Employer/Plan Group    ANTHEM MEDICARE REPLACEMENT ANTHEM MEDICARE ADVANTAGE [id number]     Payor Plan Address Payor Plan Phone Number Payor Plan Fax Number Effective Dates    [address] [phone]  2017 - None Entered    [facility] [id number]       Subscriber Name Subscriber Birth Date Member ID       LUDIVINA CLEMONS 1960 [id number]                 Emergency Contacts      (Rel.) Home Phone Work Phone Mobile Phone    YOGI LOPEZ (Relative) [phone] -- --    donnacoltoni (Sister) [phone] -- [phone]    Joshua Clemons () (Son) -- -- [phone]          [address] [address]  Phone:  [phone]  Fax:  [phone]        Patient:     Ludivina Clemons MRN:  [id number]   [address] [place] [id number] :  1960  SSN:    Phone: [phone] Sex:  F      INSURANCE PAYOR PLAN GROUP # SUBSCRIBER ID   Primary:    ANTHEM MEDICARE REPLACEMENT [id number]   Admitting Diagnosis: COVID-19 [U07.1]  Order Date:  Aug 18, 2021         Case Management Social " Services Consult       (Order ID: [id number])     Diagnosis:         Priority:  Routine Expected Date:   Expiration Date:        Interval:   Count:    Comments: Dr. Chavez would like to get her a trilogy through [facility] for treatment of chronic hypercapnic respiratory failure. He will put appropriate documentation in her note for today. Thank you  Reason for Consult? DME equipment     Specimen Type:   Specimen Source:   Specimen Taken Date:   Specimen Taken Time:                   Authorizing Provider:Madonna Fournier APRN  Authorizing Provider's NPI: [id number]  Order Entered By: Madonna Fournier APRN 8/18/2021  2:10 PM     Electronically signed by: Madonna Fournier APRN 8/18/2021  2:10 PM          Neeta Chavez MD   Physician   Pulmonology   Progress Notes      Signed   Date of Service:  08/18/21 1131   Creation Time:  08/18/21 1131            Signed             Show:Clear all  [x]Manual[x]Template[x]Copied    Added by:  [x]Neeta Chavez MD    []Jen for details       PULMONARY AND CRITICAL CARE PROGRESS NOTE - [facility]     Patient: Ludivina Ramirez  1960   MR# [id number]   Acct# [id number]  08/18/21   11:31 CDT  Referring Provider: Orville Weston MD     Chief Complaint: Shortness of breath     Interval history: Patient was seen in the follow-up visit in pulmonary rounds in medical floor today.  She is doing well.  She is on 2 L of oxygen with no need BiPAP last night.  She has anxiety issues and after talking to the nursing staff it appears patient was scheduled to get the sleep study set up as an outpatient and she was sent home on oxygen for pickup but she did have some anxiety issues and return to the hospital and is currently not requiring BiPAP.  She has no other acute events overnight.  Cardiology has been consulted and patient was seen by Dr. Ramirez today.  She had history of coronary disease heart failure and percutaneous coronary intervention in the  past.     Meds:  albuterol, 2.5 mg, Nebulization, Q4H - RT  aspirin, 81 mg, Oral, Daily  busPIRone, 10 mg, Oral, TID  calcitriol, 0.25 mcg, Oral, Daily  carvedilol, 25 mg, Oral, BID With Meals  enoxaparin, 40 mg, Subcutaneous, Q24H  ferrous sulfate, 325 mg, Oral, BID With Meals  fluticasone, 2 spray, Each Nare, Daily  guaiFENesin, 1,200 mg, Oral, Q12H  hydrALAZINE, 25 mg, Oral, Q8H  isosorbide dinitrate, 10 mg, Oral, TID - Nitrates  losartan, 100 mg, Oral, Q24H  methylPREDNISolone sodium succinate, 40 mg, Intravenous, Q12H  NIFEdipine XL, 60 mg, Oral, Q24H  OLANZapine, 5 mg, Oral, BID  pantoprazole, 40 mg, Oral, Daily  rosuvastatin, 20 mg, Oral, Nightly  ticagrelor, 90 mg, Oral, Q12H        Review of Systems:   Review of Systems  Physical Exam:        SpO2 Percentage     08/18/21 0700 08/18/21 0743 08/18/21 1100   SpO2: 95% 95% 95%      Temp:  [96.7 °F (35.9 °C)-98.5 °F (36.9 °C)] 98.1 °F (36.7 °C)  Heart Rate:  [63-79] 66  Resp:  [18] 18  BP: (131-192)/() 131/80     Intake/Output Summary (Last 24 hours) at 8/18/2021 1131  Last data filed at 8/18/2021 0854      Gross per 24 hour   Intake 840 ml   Output --   Net 840 ml      Physical Exam      Vitals and nursing note reviewed. Exam conducted with a chaperone present.   Constitutional:       Appearance: She is obese. She is not ill-appearing or toxic-appearing.      Interventions: Nasal cannula in place.   HENT:      Head: Normocephalic and atraumatic.      Right Ear: External ear normal.      Left Ear: External ear normal.      Nose: Nose normal.      Mouth/Throat:      Mouth: Mucous membranes are moist.      Pharynx: Oropharynx is clear.   Eyes:      General:         Right eye: No discharge.         Left eye: No discharge.      Extraocular Movements: Extraocular movements intact.      Conjunctiva/sclera: Conjunctivae normal.      Pupils: Pupils are equal, round, and reactive to light.   Cardiovascular:      Rate and Rhythm: Normal rate and regular rhythm.       Heart sounds: No murmur heard.     Pulmonary:      Effort: No tachypnea, accessory muscle usage or respiratory distress.      Breath sounds: Decreased breath sounds present.   Abdominal:      General: Abdomen is flat. Bowel sounds are normal. There is no distension.   Musculoskeletal:         General: Normal range of motion.      Cervical back: Normal range of motion and neck supple.      Right lower leg: Edema present.      Left lower leg: Edema present.   Skin:     General: Skin is warm and dry.      Coloration: Skin is not jaundiced or pale.   Neurological:      General: No focal deficit present.      Mental Status: She is alert and oriented to person, place, and time. Mental status is at baseline.   Psychiatric:         Mood and Affect: Mood normal.         Behavior: Behavior normal.         Thought Content: Thought content normal.      Laboratory Data:       Results from last 7 days   Lab Units 08/16/21 2050   WBC 10*3/mm3 7.23   HEMOGLOBIN g/dL 9.4*   PLATELETS 10*3/mm3 202             Results from last 7 days   Lab Units 08/18/21  0528 08/16/21 2050 08/13/21  0553   SODIUM mmol/L 142 148* 143   POTASSIUM mmol/L 4.5 4.2 4.4   BUN mg/dL 41* 40* 42*   CREATININE mg/dL 1.94* 1.81* 2.18*           Results from last 7 days   Lab Units 08/16/21  2111   PH, ARTERIAL pH units 7.374   PCO2, ARTERIAL mm Hg 53.7*   PO2 ART mm Hg 72.3*      No results found for: BLOODCX, URINECX, WOUNDCX, MRSACX, RESPCX, STOOLCX  Recent films:  No radiology results for the last day  Films reviewed personally by me.  My interpretation: Agree with current interpretation last chest x-ray     Pulmonary Assessment:  1. Acute on chronic respiratory failure  2. Shortness of breath  3. Chronic obstructive pulmonary disease with history of tobacco use.  4. Pulmonary fibrosis and scarring  5. Sleep disturbances  6. Coronary artery disease  7. Congestive diastolic heart failure  8. Chronic kidney stage III  9. Obesity  10. Anxiety and  depression     Recommend:   · Continue bronchodilator treatment with Symbicort and DuoNeb.  · Continue supplemental oxygen and titrate to keep saturation more than 92%.  · Home oxygen evaluation prior to the discharge.  She is currently requiring 2 L  · For outpatient sleep study and if she qualifies she will be requiring BiPAP but currently she does not appear to be a candidate for BiPAP treatment.  · Added buspirone for anxiety.  She has anxiety issues.  · Cardiology is following for congestive diastolic heart failure and coronary artery disease.  Follow cardiology recommendations.  · Continue PT OT, DVT and also prophylaxis and pain and anxiety control.  · We will repeat the blood gas in the morning while on oxygen and will check if she needs any CPAP or BiPAP or not.  · Repeat labs and imaging studies from time to time.  Nutritional support.  Pain and anxiety control.  · Pulmonary team will continue following her and make further recommendations.  · She has a long history of noncompliance which will be problematic.  CODE STATUS: Full.  Overall prognosis: Good     Electronically signed by      Neeta Chavez MD,  Pulmonologist/intensivist   08/18/21, 11:31 CDT

## 2021-08-18 NOTE — DISCHARGE PLACEMENT REQUEST
"Ludivina Clemons (61 y.o. Female)     Date of Birth Social Security Number Address Home Phone MRN    1960  [address] [facility] [address] [phone] [id number]    [employer] Marital Status          Sabianist Single       Admission Date Admission Type Admitting Provider Attending Provider Department, Room/Bed    8/16/21 Emergency Orville Weston MD Martin, Aribbe Allen, MD [facility] 4B, 433/1    Discharge Date Discharge Disposition Discharge Destination                       Attending Provider: Orville Weston MD    Allergies: Codeine, Imitrex [Sumatriptan]    Isolation: None   Infection: None   Code Status: Prior    Ht: 165.1 cm (65\")   Wt: 106 kg (233 lb 3 oz)    Admission Cmt: None   Principal Problem: None                Active Insurance as of 8/16/2021     Primary Coverage     Payor Plan Insurance Group Employer/Plan Group    ANTHEM MEDICARE REPLACEMENT ANTHEM MEDICARE ADVANTAGE [id number]     Payor Plan Address Payor Plan Phone Number Payor Plan Fax Number Effective Dates    [address] [phone]  1/1/2017 - None Entered    [facility] [address]       Subscriber Name Subscriber Birth Date Member ID       LUDIVINA CLEMONS 1960 [id number]                 Emergency Contacts      (Rel.) Home Phone Work Phone Mobile Phone    YOGI LOPEZ (Relative) [phone] -- --    donna kenzie (Sister) [phone] -- [phone]    Joshua Clemons () (Son) -- -- [phone]            History & Physical    No notes of this type exist for this encounter.            Physician Progress Notes (most recent note)      Neeta Chavez MD at 08/18/21 1131              PULMONARY AND CRITICAL CARE PROGRESS NOTE - [facility]    Patient: Ludivina Clemons  1960   MR# [id number]   Acct# [id number]  08/18/21   11:31 CDT  Referring Provider: Orville Weston MD    Chief Complaint: Shortness of breath    Interval history: Patient was seen in the " follow-up visit in pulmonary rounds in medical floor today.  She is doing well.  She is on 2 L of oxygen with no need BiPAP last night.  She has anxiety issues and after talking to the nursing staff it appears patient was scheduled to get the sleep study set up as an outpatient and she was sent home on oxygen for pickup but she did have some anxiety issues and return to the hospital and is currently not requiring BiPAP.  She has no other acute events overnight.  Cardiology has been consulted and patient was seen by Dr. Ramirez today.  She had history of coronary disease heart failure and percutaneous coronary intervention in the past.    Meds:  albuterol, 2.5 mg, Nebulization, Q4H - RT  aspirin, 81 mg, Oral, Daily  busPIRone, 10 mg, Oral, TID  calcitriol, 0.25 mcg, Oral, Daily  carvedilol, 25 mg, Oral, BID With Meals  enoxaparin, 40 mg, Subcutaneous, Q24H  ferrous sulfate, 325 mg, Oral, BID With Meals  fluticasone, 2 spray, Each Nare, Daily  guaiFENesin, 1,200 mg, Oral, Q12H  hydrALAZINE, 25 mg, Oral, Q8H  isosorbide dinitrate, 10 mg, Oral, TID - Nitrates  losartan, 100 mg, Oral, Q24H  methylPREDNISolone sodium succinate, 40 mg, Intravenous, Q12H  NIFEdipine XL, 60 mg, Oral, Q24H  OLANZapine, 5 mg, Oral, BID  pantoprazole, 40 mg, Oral, Daily  rosuvastatin, 20 mg, Oral, Nightly  ticagrelor, 90 mg, Oral, Q12H         Review of Systems:   Review of Systems  Physical Exam:  SpO2 Percentage    08/18/21 0700 08/18/21 0743 08/18/21 1100   SpO2: 95% 95% 95%     Temp:  [96.7 °F (35.9 °C)-98.5 °F (36.9 °C)] 98.1 °F (36.7 °C)  Heart Rate:  [63-79] 66  Resp:  [18] 18  BP: (131-192)/() 131/80    Intake/Output Summary (Last 24 hours) at 8/18/2021 1131  Last data filed at 8/18/2021 0854  Gross per 24 hour   Intake 840 ml   Output --   Net 840 ml     Physical Exam      Vitals and nursing note reviewed. Exam conducted with a chaperone present.   Constitutional:       Appearance: She is obese. She is not ill-appearing or  toxic-appearing.      Interventions: Nasal cannula in place.   HENT:      Head: Normocephalic and atraumatic.      Right Ear: External ear normal.      Left Ear: External ear normal.      Nose: Nose normal.      Mouth/Throat:      Mouth: Mucous membranes are moist.      Pharynx: Oropharynx is clear.   Eyes:      General:         Right eye: No discharge.         Left eye: No discharge.      Extraocular Movements: Extraocular movements intact.      Conjunctiva/sclera: Conjunctivae normal.      Pupils: Pupils are equal, round, and reactive to light.   Cardiovascular:      Rate and Rhythm: Normal rate and regular rhythm.      Heart sounds: No murmur heard.     Pulmonary:      Effort: No tachypnea, accessory muscle usage or respiratory distress.      Breath sounds: Decreased breath sounds present.   Abdominal:      General: Abdomen is flat. Bowel sounds are normal. There is no distension.   Musculoskeletal:         General: Normal range of motion.      Cervical back: Normal range of motion and neck supple.      Right lower leg: Edema present.      Left lower leg: Edema present.   Skin:     General: Skin is warm and dry.      Coloration: Skin is not jaundiced or pale.   Neurological:      General: No focal deficit present.      Mental Status: She is alert and oriented to person, place, and time. Mental status is at baseline.   Psychiatric:         Mood and Affect: Mood normal.         Behavior: Behavior normal.         Thought Content: Thought content normal.      Laboratory Data:  Results from last 7 days   Lab Units 08/16/21 2050   WBC 10*3/mm3 7.23   HEMOGLOBIN g/dL 9.4*   PLATELETS 10*3/mm3 202     Results from last 7 days   Lab Units 08/18/21  0528 08/16/21 2050 08/13/21  0553   SODIUM mmol/L 142 148* 143   POTASSIUM mmol/L 4.5 4.2 4.4   BUN mg/dL 41* 40* 42*   CREATININE mg/dL 1.94* 1.81* 2.18*     Results from last 7 days   Lab Units 08/16/21  2111   PH, ARTERIAL pH units 7.374   PCO2, ARTERIAL mm Hg 53.7*   PO2  ART mm Hg 72.3*     No results found for: BLOODCX, URINECX, WOUNDCX, MRSACX, RESPCX, STOOLCX  Recent films:  No radiology results for the last day  Films reviewed personally by me.  My interpretation: Agree with current interpretation last chest x-ray    Pulmonary Assessment:  1. Acute on chronic respiratory failure  2. Shortness of breath  3. Chronic obstructive pulmonary disease with history of tobacco use.  4. Pulmonary fibrosis and scarring  5. Sleep disturbances  6. Coronary artery disease  7. Congestive diastolic heart failure  8. Chronic kidney stage III  9. Obesity  10. Anxiety and depression    Recommend:   · Continue bronchodilator treatment with Symbicort and DuoNeb.  · Continue supplemental oxygen and titrate to keep saturation more than 92%.  · Home oxygen evaluation prior to the discharge.  She is currently requiring 2 L  · For outpatient sleep study and if she qualifies she will be requiring BiPAP but currently she does not appear to be a candidate for BiPAP treatment.  · Added buspirone for anxiety.  She has anxiety issues.  · Cardiology is following for congestive diastolic heart failure and coronary artery disease.  Follow cardiology recommendations.  · Continue PT OT, DVT and also prophylaxis and pain and anxiety control.  · We will repeat the blood gas in the morning while on oxygen and will check if she needs any CPAP or BiPAP or not.  · Repeat labs and imaging studies from time to time.  Nutritional support.  Pain and anxiety control.  · Pulmonary team will continue following her and make further recommendations.  · She has a long history of noncompliance which will be problematic.  CODE STATUS: Full.  Overall prognosis: Good    Electronically signed by     Neeta Chavez MD,  Pulmonologist/intensivist   08/18/21, 11:31 CDT       Electronically signed by Neeta Chavez MD at 08/18/21 1142          Consult Notes (most recent note)      Valentino Ramirez MD at 08/18/21 1101       Consult Orders    1. Inpatient Cardiology Consult [[id number]] ordered by Orville Weston MD               Inpatient Cardiology Consult  Consult performed by: Valentino Ramirez MD  Consult ordered by: Orville Weston MD  Reason for consult: CHF        [facility] - Consultation Note    Chief Complaint   Patient presents with   • Shortness of Breath     HPI: This is a 61-year-old female with coronary artery disease, status post previous PCI, chronic diastolic heart failure, renal insufficiency, hypertension, valvular heart disease, tobacco abuse, who we are asked to see regarding possible CHF.  This patient has a history of coronary artery disease with previous PCI of the LAD in November 2020.  The patient is known to have normal systolic function but does have diastolic dysfunction and also has aortic valve disease.  She presents with complaints of worsening shortness of breath and dyspnea on exertion as well as lower extremity edema.  The patient was admitted and discharged last week with a diagnosis of acute on chronic diastolic heart failure and at that time was also admitted with renal failure, acute on chronic.  She was discharged home with what appears to be Lasix as needed.  Since that time, she says that she has had worsening shortness of breath, dyspnea on exertion.  She does have some chronic lower extremity edema but this has not increased lately.  She has some chronic intermittent coughing and wheezing.  No recent fevers or chills.  She denies having orthopnea, PND.  No lightheadedness, dizziness, syncope.  No chest pain.  No nausea, vomiting, abdominal pain.  Cardiology was asked to see this patient as it is thought that her clinical presentation may be related to CHF, examination with some COPD issues as well.  The patient does note feeling better than she did upon arrival but not back at baseline at this time still with significant shortness of breath and dyspnea on  exertion.    Past Medical History:   Diagnosis Date   • Acute CHF (CMS/HCC)    • Acute renal failure (ARF) (CMS/HCC)    • Anemia    • Asthma     childhood   • Coronary artery disease    • Hypertension    • Ischemic colitis (CMS/HCC)    • Metabolic acidosis    • Nonrheumatic aortic (valve) insufficiency    • PTSD (post-traumatic stress disorder)      Past Surgical History:   Procedure Laterality Date   • CARDIAC CATHETERIZATION N/A 11/8/2020    Procedure: Left Heart Cath;  Surgeon: Pierce Buchanan MD;  Location: [facility] CATH INVASIVE LOCATION;  Service: Cardiovascular;  Laterality: N/A;   • EXTERNAL FIXATION WRIST FRACTURE     • LEG SURGERY     • TUBAL ABDOMINAL LIGATION         Current Facility-Administered Medications:   •  albuterol (PROVENTIL) nebulizer solution 0.083% 2.5 mg/3mL, 2.5 mg, Nebulization, Q4H - RT, Son Randall MD, 2.5 mg at 08/18/21 0743  •  aspirin EC tablet 81 mg, 81 mg, Oral, Daily, Orville Weston MD, 81 mg at 08/18/21 0800  •  busPIRone (BUSPAR) tablet 10 mg, 10 mg, Oral, TID, Neeta Chavez MD, 10 mg at 08/18/21 0800  •  calcitriol (ROCALTROL) capsule 0.25 mcg, 0.25 mcg, Oral, Daily, Orville Weston MD, 0.25 mcg at 08/18/21 0800  •  carvedilol (COREG) tablet 25 mg, 25 mg, Oral, BID With Meals, Orville Weston MD, 25 mg at 08/18/21 0756  •  cloNIDine (CATAPRES) tablet 0.1 mg, 0.1 mg, Oral, Q6H PRN, Orville Weston MD, 0.1 mg at 08/17/21 0426  •  enoxaparin (LOVENOX) syringe 40 mg, 40 mg, Subcutaneous, Q24H, Orville Weston MD, 40 mg at 08/17/21 1130  •  ferrous sulfate tablet 325 mg, 325 mg, Oral, BID With Meals, Orville Weston MD, 325 mg at 08/18/21 0756  •  fluticasone (FLONASE) 50 MCG/ACT nasal spray 2 spray, 2 spray, Each Nare, Daily, Orville Weston MD, 2 spray at 08/18/21 0800  •  guaiFENesin (MUCINEX) 12 hr tablet 1,200 mg, 1,200 mg, Oral, Q12H, Orville Weston MD, 1,200 mg at 08/18/21 0800  •  hydrALAZINE (APRESOLINE) tablet 25  mg, 25 mg, Oral, Q8H, Orville Weston MD, 25 mg at 08/18/21 0600  •  isosorbide dinitrate (ISORDIL) tablet 10 mg, 10 mg, Oral, TID - Nitrates, Orville Weston MD, 10 mg at 08/18/21 0756  •  losartan (COZAAR) tablet 100 mg, 100 mg, Oral, Q24H, Orville Weston MD, 100 mg at 08/18/21 0800  •  methylPREDNISolone sodium succinate (SOLU-Medrol) injection 40 mg, 40 mg, Intravenous, Q12H, Orville Weston MD  •  NIFEdipine XL (PROCARDIA XL) 24 hr tablet 60 mg, 60 mg, Oral, Q24H, Orville Weston MD, 60 mg at 08/18/21 0800  •  OLANZapine (zyPREXA) tablet 5 mg, 5 mg, Oral, BID, Orville Weston MD, 5 mg at 08/18/21 0800  •  pantoprazole (PROTONIX) EC tablet 40 mg, 40 mg, Oral, Daily, Orville Weston MD, 40 mg at 08/18/21 0800  •  rosuvastatin (CRESTOR) tablet 20 mg, 20 mg, Oral, Nightly, Orville Weston MD, 20 mg at 08/17/21 2124  •  sodium chloride 0.9 % flush 10 mL, 10 mL, Intravenous, PRN, Son Randall MD, 10 mL at 08/17/21 0841  •  ticagrelor (BRILINTA) tablet 90 mg, 90 mg, Oral, Q12H, Orville Weston MD, 90 mg at 08/18/21 0800    Allergies   Allergen Reactions   • Codeine Nausea And Vomiting   • Imitrex [Sumatriptan] Other (See Comments)     LOPEZ     Social History     Tobacco Use   • Smoking status: Current Every Day Smoker     Packs/day: 0.50   • Smokeless tobacco: Never Used   Substance Use Topics   • Alcohol use: No       Family History   Problem Relation Age of Onset   • Coronary artery disease Mother    • Coronary artery disease Father        Review of Systems   Constitutional: Positive for malaise/fatigue and weight gain. Negative for chills, fever and weight loss.   HENT: Negative for congestion and hearing loss.    Eyes: Negative for blurred vision and pain.   Cardiovascular: Positive for dyspnea on exertion and leg swelling. Negative for chest pain, claudication, orthopnea, palpitations, paroxysmal nocturnal dyspnea and syncope.   Respiratory: Positive  "for cough, shortness of breath and wheezing. Negative for hemoptysis.    Endocrine: Negative for cold intolerance and heat intolerance.   Hematologic/Lymphatic: Negative for adenopathy and bleeding problem.   Skin: Negative for color change, poor wound healing and rash.   Musculoskeletal: Negative for joint swelling, myalgias and neck pain.   Gastrointestinal: Negative for abdominal pain, change in bowel habit, heartburn, nausea and vomiting.   Genitourinary: Negative for dysuria and frequency.   Neurological: Positive for weakness. Negative for dizziness, headaches, light-headedness, loss of balance and numbness.   Psychiatric/Behavioral: Negative for altered mental status and memory loss.   Allergic/Immunologic: Negative for hives and persistent infections.     Physical Exam:    /87 (BP Location: Right arm, Patient Position: Lying)   Pulse 63   Temp 97.8 °F (36.6 °C) (Oral)   Resp 18   Ht 165.1 cm (65\")   Wt 106 kg (233 lb 3 oz)   SpO2 95%   BMI 38.80 kg/m²   Temp:  [96.7 °F (35.9 °C)-98.5 °F (36.9 °C)] 97.8 °F (36.6 °C)  Heart Rate:  [63-79] 63  Resp:  [18] 18  BP: (136-192)/() 146/87    Vitals reviewed.   Constitutional:       General: Not in acute distress.     Appearance: Normal appearance. Well-developed. Chronically ill-appearing. Not toxic-appearing or diaphoretic.      Comments: Sitting up in bed   Eyes:      General: Lids are normal.      Extraocular Movements: Extraocular movements intact.      Pupils: Pupils are equal, round, and reactive to light.   HENT:      Head: Normocephalic and atraumatic.      Right Ear: External ear normal.      Left Ear: External ear normal.      Nose: Nose normal.    Mouth/Throat:      Mouth: Mucous membranes are not pale, not dry and not cyanotic.      Pharynx: Uvula midline. No oropharyngeal exudate.   Neck:      Thyroid: No thyroid mass or thyromegaly.      Vascular: No carotid bruit, hepatojugular reflux or JVD.      Trachea: No tracheal deviation.      " Lymphadenopathy: No cervical adenopathy.   Pulmonary:      Effort: Pulmonary effort is normal. No accessory muscle usage or respiratory distress.      Breath sounds: Decreased breath sounds present. No wheezing. No rhonchi. No rales.   Chest:      Chest wall: Not tender to palpatation.   Cardiovascular:      Normal rate. Regular rhythm.      Murmurs: There is a grade 2/6 harsh midsystolic murmur at the URSB.      No gallop.   Pulses:     Intact distal pulses.   Edema:     Peripheral edema present.     Pretibial: bilateral trace edema of the pretibial area.  Abdominal:      General: Bowel sounds are normal. There is no distension or abdominal bruit.      Palpations: Abdomen is soft. There is no pulsatile midline mass.      Tenderness: There is no abdominal tenderness.   Musculoskeletal: Normal range of motion.         General: No tenderness or deformity.      Extremities: No clubbing present.     Cervical back: Normal range of motion and neck supple. No edema. Skin:     General: Skin is warm and dry. There is no cyanosis.      Findings: No erythema or rash.   Neurological:      General: No focal deficit present.      Mental Status: Oriented to person, place, and time and oriented to person, place and time.      Cranial Nerves: No cranial nerve deficit.   Psychiatric:         Attention and Perception: Attention normal.         Mood and Affect: Mood normal.         Speech: Speech normal.         Behavior: Behavior normal. Behavior is cooperative.         Thought Content: Thought content normal.       Diagnostic Data:    Lab Results   Component Value Date    WBC 7.23 08/16/2021    HGB 9.4 (L) 08/16/2021    HCT 31.7 (L) 08/16/2021    MCV 99.1 (H) 08/16/2021     08/16/2021     Lab Results   Component Value Date    GLUCOSE 166 (H) 08/18/2021    CALCIUM 8.7 08/18/2021     08/18/2021    K 4.5 08/18/2021    CO2 27.0 08/18/2021     08/18/2021    BUN 41 (H) 08/18/2021    CREATININE 1.94 (H) 08/18/2021     EGFRIFAFRI  10/14/2020      Comment:      <15 Indicative of kidney failure.    EGFRIFNONA 26 (L) 08/18/2021    BCR 21.1 08/18/2021    ANIONGAP 10.0 08/18/2021     ECG 8/16/2021 at 8:25 PM: Normal sinus rhythm, first-degree AV block, 72 bpm, poor R wave progression consistent with previous anterior myocardial infarction, nonspecific ST changes    Troponin: 0.021 8/16/2021  BNP: 21,127, previously greater than 70,000 on 8/8/2021    Echo 8/11/21:  · Left ventricular ejection fraction appears to be 51 - 55%. Left ventricular systolic function is low normal.  · The following left ventricular wall segments are hypokinetic: apex hypokinetic.  · Mild aortic valve stenosis is present.  · Left ventricular wall thickness is consistent with mild concentric hypertrophy.  · Left ventricular diastolic function is consistent with (grade II w/high LAP) pseudonormalization.  · Left atrial volume is severely increased.  · Estimated right ventricular systolic pressure from tricuspid regurgitation is mildly elevated (35-45 mmHg). Calculated right ventricular systolic pressure from tricuspid regurgitation is 40.8 mmHg.  · Normal size and function of the right ventricle.    Chest x-ray on 8/16/2021: Diffuse chronic interstitial disease.    ASSESSMENT/PLAN:    1.  Acute hypoxemic respiratory failure, potentially multifactorial, related to problems #2, 3, 4  2.  Suspected COPD, thought to have an acute exacerbation  3.  Chronic diastolic heart failure, likely some component of acute exacerbation, although not markedly volume overloaded and certainly not decompensated otherwise  4.  Stage III chronic kidney disease  5.  Coronary artery disease and native coronary artery, no angina  6.  Essential hypertension  7.  Mixed hyperlipidemia  8.  Valvular heart disease  9.  Tobacco abuse  10.  Recent hospitalization with COVID-19 pneumonia and acute respiratory failure  11.  Deconditioning    -We are asked to see this patient regarding her  respiratory failure and presentation, thought to be potentially a mixed picture of COPD and CHF.  On exam, the patient does have some mild peripheral edema which she says is at her baseline.  She does not describe orthopnea or PND to me.  Her weight has been relatively stable.  Frankly, her lung exam is relatively benign today as well with no evidence of wheezes during my evaluation.  No rhonchi or rales noted either.  I do not feel that the primary problem is heart failure at this time, although certainly her diastolic dysfunction is likely a contributor.  I would recommend using daily doses of oral diuretics and monitor renal function.  Lasix has been held today due to increase in renal function, potentially suggestive that the patient might actually be intravascularly dry likely, however even today, her creatinine is still within what is considered a relatively baseline range for this patient. The patient does not grossly volume overloaded compared to her baseline with only mild peripheral edema.  Her BNP level is elevated, however this is in the setting of her chronic kidney disease and is improved compared to the previous value.  Her chest x-ray did not show overt failure.  Therefore, I agree with the current level of care provided by the primary service and other services.  The patient is being treated for COPD exacerbation with bronchodilators, Solu-Medrol, etc.  Would recommend reinstituting daily diuretics, oral, tomorrow along with daily basic metabolic panels to follow renal function and electrolytes.  Likely, this patient does need a small maintenance dose of oral diuretics.  However, it is most likely that this patient's presentation is multifactorial, potentially even lingering effects from her recent hospitalization with COVID-19 pneumonia, as her exam is otherwise relatively unremarkable today.  -We will see the patient again tomorrow.    Electronically signed by Valentino Ramirez MD at  08/18/21 1119       Discharge Summary    No notes of this type exist for this encounter.       Neeta Chavez MD   Physician   Pulmonology   Progress Notes      Signed   Date of Service:  08/18/21 1131   Creation Time:  08/18/21 1131            Signed             Show:Clear all  [x]Manual[x]Template[x]Copied    Added by:  [x]Neeta Chavez MD    []Jen for details       PULMONARY AND CRITICAL CARE PROGRESS NOTE - [facility]     Patient: Ludivina Ramirez  1960   MR# [id number]   Acct# [id number]  08/18/21   11:31 CDT  Referring Provider: Orville Weston MD     Chief Complaint: Shortness of breath     Interval history: Patient was seen in the follow-up visit in pulmonary rounds in medical floor today.  She is doing well.  She is on 2 L of oxygen with no need BiPAP last night.  She has anxiety issues and after talking to the nursing staff it appears patient was scheduled to get the sleep study set up as an outpatient and she was sent home on oxygen for pickup but she did have some anxiety issues and return to the hospital and is currently not requiring BiPAP.  She has no other acute events overnight.  Cardiology has been consulted and patient was seen by Dr. Ramirez today.  She had history of coronary disease heart failure and percutaneous coronary intervention in the past.     Meds:  albuterol, 2.5 mg, Nebulization, Q4H - RT  aspirin, 81 mg, Oral, Daily  busPIRone, 10 mg, Oral, TID  calcitriol, 0.25 mcg, Oral, Daily  carvedilol, 25 mg, Oral, BID With Meals  enoxaparin, 40 mg, Subcutaneous, Q24H  ferrous sulfate, 325 mg, Oral, BID With Meals  fluticasone, 2 spray, Each Nare, Daily  guaiFENesin, 1,200 mg, Oral, Q12H  hydrALAZINE, 25 mg, Oral, Q8H  isosorbide dinitrate, 10 mg, Oral, TID - Nitrates  losartan, 100 mg, Oral, Q24H  methylPREDNISolone sodium succinate, 40 mg, Intravenous, Q12H  NIFEdipine XL, 60 mg, Oral, Q24H  OLANZapine, 5 mg, Oral, BID  pantoprazole, 40 mg, Oral,  Daily  rosuvastatin, 20 mg, Oral, Nightly  ticagrelor, 90 mg, Oral, Q12H        Review of Systems:   Review of Systems  Physical Exam:        SpO2 Percentage     08/18/21 0700 08/18/21 0743 08/18/21 1100   SpO2: 95% 95% 95%      Temp:  [96.7 °F (35.9 °C)-98.5 °F (36.9 °C)] 98.1 °F (36.7 °C)  Heart Rate:  [63-79] 66  Resp:  [18] 18  BP: (131-192)/() 131/80     Intake/Output Summary (Last 24 hours) at 8/18/2021 1131  Last data filed at 8/18/2021 0854      Gross per 24 hour   Intake 840 ml   Output --   Net 840 ml      Physical Exam      Vitals and nursing note reviewed. Exam conducted with a chaperone present.   Constitutional:       Appearance: She is obese. She is not ill-appearing or toxic-appearing.      Interventions: Nasal cannula in place.   HENT:      Head: Normocephalic and atraumatic.      Right Ear: External ear normal.      Left Ear: External ear normal.      Nose: Nose normal.      Mouth/Throat:      Mouth: Mucous membranes are moist.      Pharynx: Oropharynx is clear.   Eyes:      General:         Right eye: No discharge.         Left eye: No discharge.      Extraocular Movements: Extraocular movements intact.      Conjunctiva/sclera: Conjunctivae normal.      Pupils: Pupils are equal, round, and reactive to light.   Cardiovascular:      Rate and Rhythm: Normal rate and regular rhythm.      Heart sounds: No murmur heard.     Pulmonary:      Effort: No tachypnea, accessory muscle usage or respiratory distress.      Breath sounds: Decreased breath sounds present.   Abdominal:      General: Abdomen is flat. Bowel sounds are normal. There is no distension.   Musculoskeletal:         General: Normal range of motion.      Cervical back: Normal range of motion and neck supple.      Right lower leg: Edema present.      Left lower leg: Edema present.   Skin:     General: Skin is warm and dry.      Coloration: Skin is not jaundiced or pale.   Neurological:      General: No focal deficit present.      Mental  Status: She is alert and oriented to person, place, and time. Mental status is at baseline.   Psychiatric:         Mood and Affect: Mood normal.         Behavior: Behavior normal.         Thought Content: Thought content normal.      Laboratory Data:       Results from last 7 days   Lab Units 08/16/21 2050   WBC 10*3/mm3 7.23   HEMOGLOBIN g/dL 9.4*   PLATELETS 10*3/mm3 202             Results from last 7 days   Lab Units 08/18/21  0528 08/16/21 2050 08/13/21  0553   SODIUM mmol/L 142 148* 143   POTASSIUM mmol/L 4.5 4.2 4.4   BUN mg/dL 41* 40* 42*   CREATININE mg/dL 1.94* 1.81* 2.18*           Results from last 7 days   Lab Units 08/16/21 2111   PH, ARTERIAL pH units 7.374   PCO2, ARTERIAL mm Hg 53.7*   PO2 ART mm Hg 72.3*      No results found for: BLOODCX, URINECX, WOUNDCX, MRSACX, RESPCX, STOOLCX  Recent films:  No radiology results for the last day  Films reviewed personally by me.  My interpretation: Agree with current interpretation last chest x-ray     Pulmonary Assessment:  1. Acute on chronic respiratory failure  2. Shortness of breath  3. Chronic obstructive pulmonary disease with history of tobacco use.  4. Pulmonary fibrosis and scarring  5. Sleep disturbances  6. Coronary artery disease  7. Congestive diastolic heart failure  8. Chronic kidney stage III  9. Obesity  10. Anxiety and depression     Recommend:   · Continue bronchodilator treatment with Symbicort and DuoNeb.  · Continue supplemental oxygen and titrate to keep saturation more than 92%.  · Home oxygen evaluation prior to the discharge.  She is currently requiring 2 L  · For outpatient sleep study and if she qualifies she will be requiring BiPAP but currently she does not appear to be a candidate for BiPAP treatment.  · Added buspirone for anxiety.  She has anxiety issues.  · Cardiology is following for congestive diastolic heart failure and coronary artery disease.  Follow cardiology recommendations.  · Continue PT OT, DVT and also  prophylaxis and pain and anxiety control.  · We will repeat the blood gas in the morning while on oxygen and will check if she needs any CPAP or BiPAP or not.  · Repeat labs and imaging studies from time to time.  Nutritional support.  Pain and anxiety control.  · Pulmonary team will continue following her and make further recommendations.  · She has a long history of noncompliance which will be problematic.  CODE STATUS: Full.  Overall prognosis: Good     Electronically signed by      Neeta Chavez MD,  Pulmonologist/intensivist   08/18/21, 11:31 CDT                    Blood Gas, Arterial - [LAB76] (Order [id number])  Order  Date: 8/16/2021 Department: [address] Released By: Interface, Poct Results To [employer] (auto-released) Authorizing: Son Randall MD   Reprint Order Requisition    Blood Gas, Arterial - (Order #[id number]) on 8/16/21       Contains abnormal data Blood Gas, Arterial -  Order: [id number]  Status:  Final result   Visible to patient:  Yes (MyChart)   Next appt:  Today at 03:45 PM in [facility] (Omar Cruz, SLP)  Specimen Information: Arterial Blood         0 Result Notes  Component   Ref Range & Units 2 d ago   Site  Right Radial    Guido's Test  Positive    pH, Arterial   7.350 - 7.450 pH units 7.374    pCO2, Arterial   35.0 - 45.0 mm Hg 53.7High     Comment: 83 Value above reference range   pO2, Arterial   83.0 - 108.0 mm Hg 72.3Low     Comment: 84 Value below reference range   HCO3, Arterial   20.0 - 26.0 mmol/L 31.3High     Comment: 83 Value above reference range   Base Excess, Arterial   0.0 - 2.0 mmol/L 5.2High     Comment: 83 Value above reference range   O2 Saturation, Arterial   94.0 - 99.0 % 95.4    Temperature   C 37.0    Barometric Pressure for Blood Gas   mmHg 749    Modality  Nasal Cannula    Flow Rate   lpm 2.0    Ventilator Mode  NA    Collected by  [id number]    Comment: Meter: [id number]     :  [id number]   pCO2, Temperature Corrected   35  - 45 mm Hg 53.7High     pH, Temp Corrected   7.350 - 7.450 pH Units 7.374    pO2, Temperature Corrected   83 - 108 mm Hg 72.3Low     Resulting Agency  [facility]         Specimen Collected: 08/16/21 21:11   Last Resulted: 08/16/21 21:19        Lab Flowsheet     Order Details     View Encounter     Lab and Collection Details     Routing     Result History              Result Read / Acknowledged    Acknowledge result  No acknowledgement history exists for this order.   Lab Component SmartPhrase Guide    Blood Gas, Arterial - (Order #[id number]) on 8/16/21   Other Results from 8/16/2021     Basic Metabolic Panel  Final result 8/18/2021    COVID-19,[facility]HOU[facility],Nasal Swab No Transport Media 3-4 HR TAT - Swab, Nasal Cavity  Final result 8/16/2021    Comprehensive Metabolic Panel  Final result 8/16/2021    BNP  Final result 8/16/2021    Troponin  Final result 8/16/2021    Green Top (Gel)  Final result 8/16/2021    Lavender Top  Final result 8/16/2021    Red Top  Final result 8/16/2021    CBC Auto Differential  Final result 8/16/2021    Magnesium  Final result 8/16/2021   (important suggestion)  Warning: Additional results from 8/16/2021 are available but are not displayed in this report.

## 2021-08-18 NOTE — PROGRESS NOTES
"Progress Note  Ludivina Ramirez  8/18/2021 10:20 CDT  Subjective:   Admit Date:   8/16/2021      CC/ADMIT DX:       Interval History:   Reviewed overnight events and nursing notes. She has no c/o CP. She has SOA with exertion. No GI complaints.   I have reviewed all labs/diagnostics from the last 24hrs.       ROS:   I have done a 10 point ROS and all are negative, except what is mentioned in the HPI.    Diet Regular    Medications:      albuterol, 2.5 mg, Nebulization, Q4H - RT  aspirin, 81 mg, Oral, Daily  busPIRone, 10 mg, Oral, TID  calcitriol, 0.25 mcg, Oral, Daily  carvedilol, 25 mg, Oral, BID With Meals  enoxaparin, 40 mg, Subcutaneous, Q24H  ferrous sulfate, 325 mg, Oral, BID With Meals  fluticasone, 2 spray, Each Nare, Daily  guaiFENesin, 1,200 mg, Oral, Q12H  hydrALAZINE, 25 mg, Oral, Q8H  isosorbide dinitrate, 10 mg, Oral, TID - Nitrates  losartan, 100 mg, Oral, Q24H  methylPREDNISolone sodium succinate, 40 mg, Intravenous, Q12H  NIFEdipine XL, 60 mg, Oral, Q24H  OLANZapine, 5 mg, Oral, BID  pantoprazole, 40 mg, Oral, Daily  rosuvastatin, 20 mg, Oral, Nightly  ticagrelor, 90 mg, Oral, Q12H            Objective:   Vitals: /87 (BP Location: Right arm, Patient Position: Lying)   Pulse 63   Temp 97.8 °F (36.6 °C) (Oral)   Resp 18   Ht 165.1 cm (65\")   Wt 106 kg (233 lb 3 oz)   SpO2 95%   BMI 38.80 kg/m²      Intake/Output Summary (Last 24 hours) at 8/18/2021 1020  Last data filed at 8/18/2021 0854  Gross per 24 hour   Intake 840 ml   Output --   Net 840 ml     General appearance: alert and cooperative with exam  Lungs: coarse  Heart: RRR  Abdomen: soft, non-tender; bowel sounds normal; no masses,  no organomegaly  Extremities: extremities normal, atraumatic, no cyanosis or edema  Neurologic:  No obvious focal neurologic deficits.    Assessment and Plan:   COPD Exacerbation  Acute on Chronic Resp Failure  Chronic Diastolic HF  CKD  HTN  CAD    Plan:  1.  Continue present medication(s)   2.  Wean " steroids  3.  Follow with Pulm  4.  Continue O2 and resp treatments  5.  D/C planning  6. Hold Lasix with increase in renal function and follow      Discharge planning:   her home     Reviewed treatment plans with the patient and/or family.             Electronically signed by Orville Weston MD on 8/18/2021 at 10:20 CDT

## 2021-08-18 NOTE — CASE MANAGEMENT/SOCIAL WORK
Discharge Planning Assessment  [facility]     Patient Name: Ludivina Ramirez  MRN: [id number]  Today's Date: 8/18/2021    Admit Date: 8/16/2021    Discharge Needs Assessment     Row Name 08/18/21 1227       Living Environment    Lives With  alone    Current Living Arrangements  home/apartment/condo    Primary Care Provided by  self    Provides Primary Care For  no one    Family Caregiver if Needed  friend(s)    Quality of Family Relationships  helpful;involved;supportive    Able to Return to Prior Arrangements  yes       Resource/Environmental Concerns    Resource/Environmental Concerns  none    Transportation Concerns  car, none       Transition Planning    Patient/Family Anticipates Transition to  home with family;home with help/services    Patient/Family Anticipated Services at Transition  home health care    Transportation Anticipated  family or friend will provide       Discharge Needs Assessment    Readmission Within the Last 30 Days  no previous admission in last 30 days    Equipment Currently Used at Home  oxygen O2- [facility]    Concerns to be Addressed  no discharge needs identified;denies needs/concerns at this time    Anticipated Changes Related to Illness  none    Provided Post Acute Provider List?  Yes    Post Acute Provider List  Home Health;DME Supplier    Provided Post Acute Provider Quality & Resource List?  Yes    Post Acute Provider Quality and Resource List  Home Health    Delivered To  Patient    Method of Delivery  In person    Patient's Choice of Community Agency(s)  [facility]    Discharge Coordination/Progress  Pt has RX coverage and a PCP. Pt lives at home alone with no one to help her. Pt states on a normal basis she is able to care for herself but with her breathing she is not able to. Pt will need new orders for [facility]. Pt states that she does not want SNF placement but will try HH again. Pt will need orders for neb as well due to not being ordered last admission. SW will follow and  assist with ordering/setting up at discharge        Discharge Plan    No documentation.       Continued Care and Services - Admitted Since 8/16/2021    Coordination has not been started for this encounter.     Selected Continued Care - Prior Encounters Includes selections from prior encounters from 5/18/2021 to 8/18/2021    Discharged on 8/13/2021 Admission date: 8/8/2021 - Discharge disposition: Home or Self Care    Durable Medical Equipment     Service Provider Selected Services Address Phone Fax Patient Preferred    [facility]  Durable Medical Equipment [address] [address] [phone] --          Home Medical Care     Service Provider Selected Services Address Phone Fax Patient Preferred    [facility]  [facility] Health [facility] [address] [id number] [phone] --                      Demographic Summary    No documentation.       Functional Status    No documentation.       Psychosocial    No documentation.       Abuse/Neglect    No documentation.       Legal    No documentation.       Substance Abuse    No documentation.       Patient Forms    No documentation.           Arabella León

## 2021-08-18 NOTE — CONSULTS
Inpatient Cardiology Consult  Consult performed by: Valentino Ramirez MD  Consult ordered by: Orville Weston MD  Reason for consult: CHF        [facility] - Consultation Note    Chief Complaint   Patient presents with   • Shortness of Breath     HPI: This is a 61-year-old female with coronary artery disease, status post previous PCI, chronic diastolic heart failure, renal insufficiency, hypertension, valvular heart disease, tobacco abuse, who we are asked to see regarding possible CHF.  This patient has a history of coronary artery disease with previous PCI of the LAD in November 2020.  The patient is known to have normal systolic function but does have diastolic dysfunction and also has aortic valve disease.  She presents with complaints of worsening shortness of breath and dyspnea on exertion as well as lower extremity edema.  The patient was admitted and discharged last week with a diagnosis of acute on chronic diastolic heart failure and at that time was also admitted with renal failure, acute on chronic.  She was discharged home with what appears to be Lasix as needed.  Since that time, she says that she has had worsening shortness of breath, dyspnea on exertion.  She does have some chronic lower extremity edema but this has not increased lately.  She has some chronic intermittent coughing and wheezing.  No recent fevers or chills.  She denies having orthopnea, PND.  No lightheadedness, dizziness, syncope.  No chest pain.  No nausea, vomiting, abdominal pain.  Cardiology was asked to see this patient as it is thought that her clinical presentation may be related to CHF, examination with some COPD issues as well.  The patient does note feeling better than she did upon arrival but not back at baseline at this time still with significant shortness of breath and dyspnea on exertion.    Past Medical History:   Diagnosis Date   • Acute CHF (CMS/HCC)    • Acute renal failure (ARF) (CMS/HCC)    •  Anemia    • Asthma     childhood   • Coronary artery disease    • Hypertension    • Ischemic colitis (CMS/HCC)    • Metabolic acidosis    • Nonrheumatic aortic (valve) insufficiency    • PTSD (post-traumatic stress disorder)      Past Surgical History:   Procedure Laterality Date   • CARDIAC CATHETERIZATION N/A 11/8/2020    Procedure: Left Heart Cath;  Surgeon: Pierce Buchanan MD;  Location:  [facility];  Service: Cardiovascular;  Laterality: N/A;   • EXTERNAL FIXATION WRIST FRACTURE     • LEG SURGERY     • TUBAL ABDOMINAL LIGATION         Current Facility-Administered Medications:   •  albuterol (PROVENTIL) nebulizer solution 0.083% 2.5 mg/3mL, 2.5 mg, Nebulization, Q4H - RT, Son Randall MD, 2.5 mg at 08/18/21 0743  •  aspirin EC tablet 81 mg, 81 mg, Oral, Daily, Orville Weston MD, 81 mg at 08/18/21 0800  •  busPIRone (BUSPAR) tablet 10 mg, 10 mg, Oral, TID, Neeta Chavez MD, 10 mg at 08/18/21 0800  •  calcitriol (ROCALTROL) capsule 0.25 mcg, 0.25 mcg, Oral, Daily, Orville Weston MD, 0.25 mcg at 08/18/21 0800  •  carvedilol (COREG) tablet 25 mg, 25 mg, Oral, BID With Meals, Orville Weston MD, 25 mg at 08/18/21 0756  •  cloNIDine (CATAPRES) tablet 0.1 mg, 0.1 mg, Oral, Q6H PRN, Orville Weston MD, 0.1 mg at 08/17/21 0426  •  enoxaparin (LOVENOX) syringe 40 mg, 40 mg, Subcutaneous, Q24H, Orville Weston MD, 40 mg at 08/17/21 1130  •  ferrous sulfate tablet 325 mg, 325 mg, Oral, BID With Meals, Orville Weston MD, 325 mg at 08/18/21 0756  •  fluticasone (FLONASE) 50 MCG/ACT nasal spray 2 spray, 2 spray, Each Nare, Daily, Orville Weston MD, 2 spray at 08/18/21 0800  •  guaiFENesin (MUCINEX) 12 hr tablet 1,200 mg, 1,200 mg, Oral, Q12H, Orville Weston MD, 1,200 mg at 08/18/21 0800  •  hydrALAZINE (APRESOLINE) tablet 25 mg, 25 mg, Oral, Q8H, Orville Weston MD, 25 mg at 08/18/21 0600  •  isosorbide dinitrate (ISORDIL) tablet 10 mg, 10  mg, Oral, TID - Nitrates, Orville Weston MD, 10 mg at 08/18/21 0756  •  losartan (COZAAR) tablet 100 mg, 100 mg, Oral, Q24H, Orville Weston MD, 100 mg at 08/18/21 0800  •  methylPREDNISolone sodium succinate (SOLU-Medrol) injection 40 mg, 40 mg, Intravenous, Q12H, Orville Weston MD  •  NIFEdipine XL (PROCARDIA XL) 24 hr tablet 60 mg, 60 mg, Oral, Q24H, Orville Weston MD, 60 mg at 08/18/21 0800  •  OLANZapine (zyPREXA) tablet 5 mg, 5 mg, Oral, BID, Orville Weston MD, 5 mg at 08/18/21 0800  •  pantoprazole (PROTONIX) EC tablet 40 mg, 40 mg, Oral, Daily, Orville Weston MD, 40 mg at 08/18/21 0800  •  rosuvastatin (CRESTOR) tablet 20 mg, 20 mg, Oral, Nightly, Orville Weston MD, 20 mg at 08/17/21 2124  •  sodium chloride 0.9 % flush 10 mL, 10 mL, Intravenous, PRN, Son Randall MD, 10 mL at 08/17/21 0841  •  ticagrelor (BRILINTA) tablet 90 mg, 90 mg, Oral, Q12H, Orville Weston MD, 90 mg at 08/18/21 0800    Allergies   Allergen Reactions   • Codeine Nausea And Vomiting   • Imitrex [Sumatriptan] Other (See Comments)     LOPEZ     Social History     Tobacco Use   • Smoking status: Current Every Day Smoker     Packs/day: 0.50   • Smokeless tobacco: Never Used   Substance Use Topics   • Alcohol use: No       Family History   Problem Relation Age of Onset   • Coronary artery disease Mother    • Coronary artery disease Father        Review of Systems   Constitutional: Positive for malaise/fatigue and weight gain. Negative for chills, fever and weight loss.   HENT: Negative for congestion and hearing loss.    Eyes: Negative for blurred vision and pain.   Cardiovascular: Positive for dyspnea on exertion and leg swelling. Negative for chest pain, claudication, orthopnea, palpitations, paroxysmal nocturnal dyspnea and syncope.   Respiratory: Positive for cough, shortness of breath and wheezing. Negative for hemoptysis.    Endocrine: Negative for cold intolerance and heat  "intolerance.   Hematologic/Lymphatic: Negative for adenopathy and bleeding problem.   Skin: Negative for color change, poor wound healing and rash.   Musculoskeletal: Negative for joint swelling, myalgias and neck pain.   Gastrointestinal: Negative for abdominal pain, change in bowel habit, heartburn, nausea and vomiting.   Genitourinary: Negative for dysuria and frequency.   Neurological: Positive for weakness. Negative for dizziness, headaches, light-headedness, loss of balance and numbness.   Psychiatric/Behavioral: Negative for altered mental status and memory loss.   Allergic/Immunologic: Negative for hives and persistent infections.     Physical Exam:    /87 (BP Location: Right arm, Patient Position: Lying)   Pulse 63   Temp 97.8 °F (36.6 °C) (Oral)   Resp 18   Ht 165.1 cm (65\")   Wt 106 kg (233 lb 3 oz)   SpO2 95%   BMI 38.80 kg/m²   Temp:  [96.7 °F (35.9 °C)-98.5 °F (36.9 °C)] 97.8 °F (36.6 °C)  Heart Rate:  [63-79] 63  Resp:  [18] 18  BP: (136-192)/() 146/87    Vitals reviewed.   Constitutional:       General: Not in acute distress.     Appearance: Normal appearance. Well-developed. Chronically ill-appearing. Not toxic-appearing or diaphoretic.      Comments: Sitting up in bed   Eyes:      General: Lids are normal.      Extraocular Movements: Extraocular movements intact.      Pupils: Pupils are equal, round, and reactive to light.   HENT:      Head: Normocephalic and atraumatic.      Right Ear: External ear normal.      Left Ear: External ear normal.      Nose: Nose normal.    Mouth/Throat:      Mouth: Mucous membranes are not pale, not dry and not cyanotic.      Pharynx: Uvula midline. No oropharyngeal exudate.   Neck:      Thyroid: No thyroid mass or thyromegaly.      Vascular: No carotid bruit, hepatojugular reflux or JVD.      Trachea: No tracheal deviation.      Lymphadenopathy: No cervical adenopathy.   Pulmonary:      Effort: Pulmonary effort is normal. No accessory muscle usage " or respiratory distress.      Breath sounds: Decreased breath sounds present. No wheezing. No rhonchi. No rales.   Chest:      Chest wall: Not tender to palpatation.   Cardiovascular:      Normal rate. Regular rhythm.      Murmurs: There is a grade 2/6 harsh midsystolic murmur at the URSB.      No gallop.   Pulses:     Intact distal pulses.   Edema:     Peripheral edema present.     Pretibial: bilateral trace edema of the pretibial area.  Abdominal:      General: Bowel sounds are normal. There is no distension or abdominal bruit.      Palpations: Abdomen is soft. There is no pulsatile midline mass.      Tenderness: There is no abdominal tenderness.   Musculoskeletal: Normal range of motion.         General: No tenderness or deformity.      Extremities: No clubbing present.     Cervical back: Normal range of motion and neck supple. No edema. Skin:     General: Skin is warm and dry. There is no cyanosis.      Findings: No erythema or rash.   Neurological:      General: No focal deficit present.      Mental Status: Oriented to person, place, and time and oriented to person, place and time.      Cranial Nerves: No cranial nerve deficit.   Psychiatric:         Attention and Perception: Attention normal.         Mood and Affect: Mood normal.         Speech: Speech normal.         Behavior: Behavior normal. Behavior is cooperative.         Thought Content: Thought content normal.       Diagnostic Data:    Lab Results   Component Value Date    WBC 7.23 08/16/2021    HGB 9.4 (L) 08/16/2021    HCT 31.7 (L) 08/16/2021    MCV 99.1 (H) 08/16/2021     08/16/2021     Lab Results   Component Value Date    GLUCOSE 166 (H) 08/18/2021    CALCIUM 8.7 08/18/2021     08/18/2021    K 4.5 08/18/2021    CO2 27.0 08/18/2021     08/18/2021    BUN 41 (H) 08/18/2021    CREATININE 1.94 (H) 08/18/2021    EGFRIFAFRI  10/14/2020      Comment:      <15 Indicative of kidney failure.    EGFRIFNONA 26 (L) 08/18/2021    BCR 21.1  08/18/2021    ANIONGAP 10.0 08/18/2021     ECG 8/16/2021 at 8:25 PM: Normal sinus rhythm, first-degree AV block, 72 bpm, poor R wave progression consistent with previous anterior myocardial infarction, nonspecific ST changes    Troponin: 0.021 8/16/2021  BNP: 21,127, previously greater than 70,000 on 8/8/2021    Echo 8/11/21:  · Left ventricular ejection fraction appears to be 51 - 55%. Left ventricular systolic function is low normal.  · The following left ventricular wall segments are hypokinetic: apex hypokinetic.  · Mild aortic valve stenosis is present.  · Left ventricular wall thickness is consistent with mild concentric hypertrophy.  · Left ventricular diastolic function is consistent with (grade II w/high LAP) pseudonormalization.  · Left atrial volume is severely increased.  · Estimated right ventricular systolic pressure from tricuspid regurgitation is mildly elevated (35-45 mmHg). Calculated right ventricular systolic pressure from tricuspid regurgitation is 40.8 mmHg.  · Normal size and function of the right ventricle.    Chest x-ray on 8/16/2021: Diffuse chronic interstitial disease.    ASSESSMENT/PLAN:    1.  Acute hypoxemic respiratory failure, potentially multifactorial, related to problems #2, 3, 4  2.  Suspected COPD, thought to have an acute exacerbation  3.  Chronic diastolic heart failure, likely some component of acute exacerbation, although not markedly volume overloaded and certainly not decompensated otherwise  4.  Stage III chronic kidney disease  5.  Coronary artery disease and native coronary artery, no angina  6.  Essential hypertension  7.  Mixed hyperlipidemia  8.  Valvular heart disease  9.  Tobacco abuse  10.  Recent hospitalization with COVID-19 pneumonia and acute respiratory failure  11.  Deconditioning    -We are asked to see this patient regarding her respiratory failure and presentation, thought to be potentially a mixed picture of COPD and CHF.  On exam, the patient does have  some mild peripheral edema which she says is at her baseline.  She does not describe orthopnea or PND to me.  Her weight has been relatively stable.  Frankly, her lung exam is relatively benign today as well with no evidence of wheezes during my evaluation.  No rhonchi or rales noted either.  I do not feel that the primary problem is heart failure at this time, although certainly her diastolic dysfunction is likely a contributor.  I would recommend using daily doses of oral diuretics and monitor renal function.  Lasix has been held today due to increase in renal function, potentially suggestive that the patient might actually be intravascularly dry likely, however even today, her creatinine is still within what is considered a relatively baseline range for this patient. The patient does not grossly volume overloaded compared to her baseline with only mild peripheral edema.  Her BNP level is elevated, however this is in the setting of her chronic kidney disease and is improved compared to the previous value.  Her chest x-ray did not show overt failure.  Therefore, I agree with the current level of care provided by the primary service and other services.  The patient is being treated for COPD exacerbation with bronchodilators, Solu-Medrol, etc.  Would recommend reinstituting daily diuretics, oral, tomorrow along with daily basic metabolic panels to follow renal function and electrolytes.  Likely, this patient does need a small maintenance dose of oral diuretics.  However, it is most likely that this patient's presentation is multifactorial, potentially even lingering effects from her recent hospitalization with COVID-19 pneumonia, as her exam is otherwise relatively unremarkable today.  -We will see the patient again tomorrow.

## 2021-08-19 ENCOUNTER — HOME CARE VISIT (OUTPATIENT)
Dept: HOME HEALTH SERVICES | Facility: HOME HEALTHCARE | Age: 61
End: 2021-08-19

## 2021-08-19 LAB
ANION GAP SERPL CALCULATED.3IONS-SCNC: 8 MMOL/L (ref 5–15)
BUN SERPL-MCNC: 45 MG/DL (ref 8–23)
BUN/CREAT SERPL: 20.7 (ref 7–25)
CALCIUM SPEC-SCNC: 8.7 MG/DL (ref 8.6–10.5)
CHLORIDE SERPL-SCNC: 104 MMOL/L (ref 98–107)
CO2 SERPL-SCNC: 28 MMOL/L (ref 22–29)
CREAT SERPL-MCNC: 2.17 MG/DL (ref 0.57–1)
GFR SERPL CREATININE-BSD FRML MDRD: 23 ML/MIN/1.73
GLUCOSE SERPL-MCNC: 149 MG/DL (ref 65–99)
POTASSIUM SERPL-SCNC: 5.1 MMOL/L (ref 3.5–5.2)
SODIUM SERPL-SCNC: 140 MMOL/L (ref 136–145)

## 2021-08-19 PROCEDURE — 96372 THER/PROPH/DIAG INJ SC/IM: CPT

## 2021-08-19 PROCEDURE — 99213 OFFICE O/P EST LOW 20 MIN: CPT | Performed by: INTERNAL MEDICINE

## 2021-08-19 PROCEDURE — 63710000001 PREDNISONE PER 1 MG: Performed by: NURSE PRACTITIONER

## 2021-08-19 PROCEDURE — 94799 UNLISTED PULMONARY SVC/PX: CPT

## 2021-08-19 PROCEDURE — 96376 TX/PRO/DX INJ SAME DRUG ADON: CPT

## 2021-08-19 PROCEDURE — 25010000002 METHYLPREDNISOLONE PER 40 MG: Performed by: FAMILY MEDICINE

## 2021-08-19 PROCEDURE — G0378 HOSPITAL OBSERVATION PER HR: HCPCS

## 2021-08-19 PROCEDURE — 80048 BASIC METABOLIC PNL TOTAL CA: CPT | Performed by: FAMILY MEDICINE

## 2021-08-19 PROCEDURE — 25010000002 ENOXAPARIN PER 10 MG: Performed by: FAMILY MEDICINE

## 2021-08-19 PROCEDURE — 94640 AIRWAY INHALATION TREATMENT: CPT

## 2021-08-19 PROCEDURE — 99226 PR SBSQ OBSERVATION CARE/DAY 35 MINUTES: CPT | Performed by: INTERNAL MEDICINE

## 2021-08-19 RX ORDER — IPRATROPIUM BROMIDE AND ALBUTEROL SULFATE 2.5; .5 MG/3ML; MG/3ML
3 SOLUTION RESPIRATORY (INHALATION)
Status: DISCONTINUED | OUTPATIENT
Start: 2021-08-19 | End: 2021-08-21 | Stop reason: HOSPADM

## 2021-08-19 RX ORDER — NIFEDIPINE 60 MG/1
60 TABLET, EXTENDED RELEASE ORAL EVERY 12 HOURS
Status: DISCONTINUED | OUTPATIENT
Start: 2021-08-19 | End: 2021-08-21 | Stop reason: HOSPADM

## 2021-08-19 RX ORDER — BUDESONIDE AND FORMOTEROL FUMARATE DIHYDRATE 160; 4.5 UG/1; UG/1
2 AEROSOL RESPIRATORY (INHALATION)
Status: DISCONTINUED | OUTPATIENT
Start: 2021-08-19 | End: 2021-08-21 | Stop reason: HOSPADM

## 2021-08-19 RX ORDER — PREDNISONE 20 MG/1
40 TABLET ORAL
Status: DISCONTINUED | OUTPATIENT
Start: 2021-08-19 | End: 2021-08-21 | Stop reason: HOSPADM

## 2021-08-19 RX ADMIN — NIFEDIPINE 60 MG: 60 TABLET, FILM COATED, EXTENDED RELEASE ORAL at 21:02

## 2021-08-19 RX ADMIN — LOSARTAN POTASSIUM 100 MG: 50 TABLET, FILM COATED ORAL at 08:02

## 2021-08-19 RX ADMIN — BUSPIRONE HYDROCHLORIDE 10 MG: 10 TABLET ORAL at 21:02

## 2021-08-19 RX ADMIN — CALCITRIOL 0.25 MCG: 0.25 CAPSULE ORAL at 08:02

## 2021-08-19 RX ADMIN — BUSPIRONE HYDROCHLORIDE 10 MG: 10 TABLET ORAL at 17:29

## 2021-08-19 RX ADMIN — PREDNISONE 40 MG: 20 TABLET ORAL at 11:02

## 2021-08-19 RX ADMIN — BUDESONIDE AND FORMOTEROL FUMARATE DIHYDRATE 2 PUFF: 160; 4.5 AEROSOL RESPIRATORY (INHALATION) at 10:05

## 2021-08-19 RX ADMIN — CARVEDILOL 25 MG: 25 TABLET, FILM COATED ORAL at 17:29

## 2021-08-19 RX ADMIN — TICAGRELOR 90 MG: 90 TABLET ORAL at 08:03

## 2021-08-19 RX ADMIN — NIFEDIPINE 60 MG: 60 TABLET, FILM COATED, EXTENDED RELEASE ORAL at 08:03

## 2021-08-19 RX ADMIN — CLONIDINE HYDROCHLORIDE 0.1 MG: 0.1 TABLET ORAL at 08:01

## 2021-08-19 RX ADMIN — BUSPIRONE HYDROCHLORIDE 10 MG: 10 TABLET ORAL at 08:02

## 2021-08-19 RX ADMIN — OLANZAPINE 5 MG: 5 TABLET, FILM COATED ORAL at 08:03

## 2021-08-19 RX ADMIN — TICAGRELOR 90 MG: 90 TABLET ORAL at 21:02

## 2021-08-19 RX ADMIN — FLUTICASONE PROPIONATE 2 SPRAY: 50 SPRAY, METERED NASAL at 08:02

## 2021-08-19 RX ADMIN — FERROUS SULFATE TAB 325 MG (65 MG ELEMENTAL FE) 325 MG: 325 (65 FE) TAB at 17:29

## 2021-08-19 RX ADMIN — PANTOPRAZOLE SODIUM 40 MG: 40 TABLET, DELAYED RELEASE ORAL at 08:07

## 2021-08-19 RX ADMIN — IPRATROPIUM BROMIDE AND ALBUTEROL SULFATE 3 ML: 2.5; .5 SOLUTION RESPIRATORY (INHALATION) at 11:28

## 2021-08-19 RX ADMIN — ROSUVASTATIN CALCIUM 20 MG: 20 TABLET, FILM COATED ORAL at 21:02

## 2021-08-19 RX ADMIN — HYDRALAZINE HYDROCHLORIDE 25 MG: 25 TABLET, FILM COATED ORAL at 04:10

## 2021-08-19 RX ADMIN — HYDRALAZINE HYDROCHLORIDE 75 MG: 50 TABLET ORAL at 21:02

## 2021-08-19 RX ADMIN — ISOSORBIDE DINITRATE 10 MG: 10 TABLET ORAL at 13:38

## 2021-08-19 RX ADMIN — FERROUS SULFATE TAB 325 MG (65 MG ELEMENTAL FE) 325 MG: 325 (65 FE) TAB at 08:02

## 2021-08-19 RX ADMIN — IPRATROPIUM BROMIDE AND ALBUTEROL SULFATE 3 ML: 2.5; .5 SOLUTION RESPIRATORY (INHALATION) at 16:44

## 2021-08-19 RX ADMIN — ISOSORBIDE DINITRATE 10 MG: 10 TABLET ORAL at 08:01

## 2021-08-19 RX ADMIN — METHYLPREDNISOLONE SODIUM SUCCINATE 40 MG: 40 INJECTION, POWDER, FOR SOLUTION INTRAMUSCULAR; INTRAVENOUS at 04:09

## 2021-08-19 RX ADMIN — ASPIRIN 81 MG: 81 TABLET, COATED ORAL at 08:02

## 2021-08-19 RX ADMIN — ALBUTEROL SULFATE 2.5 MG: 2.5 SOLUTION RESPIRATORY (INHALATION) at 06:55

## 2021-08-19 RX ADMIN — ISOSORBIDE DINITRATE 10 MG: 10 TABLET ORAL at 17:29

## 2021-08-19 RX ADMIN — ENOXAPARIN SODIUM 40 MG: 40 INJECTION SUBCUTANEOUS at 13:38

## 2021-08-19 RX ADMIN — CARVEDILOL 25 MG: 25 TABLET, FILM COATED ORAL at 08:02

## 2021-08-19 RX ADMIN — GUAIFENESIN 1200 MG: 600 TABLET, EXTENDED RELEASE ORAL at 08:02

## 2021-08-19 RX ADMIN — OLANZAPINE 5 MG: 5 TABLET, FILM COATED ORAL at 21:02

## 2021-08-19 RX ADMIN — GUAIFENESIN 1200 MG: 600 TABLET, EXTENDED RELEASE ORAL at 21:02

## 2021-08-19 RX ADMIN — HYDRALAZINE HYDROCHLORIDE 75 MG: 50 TABLET ORAL at 11:02

## 2021-08-19 NOTE — PROGRESS NOTES
[facility] -  Progress Note     LOS: 0 days   Patient Care Team:  Orville Weston MD as PCP - General (Family Medicine)  Juanpablo Rea MD as Consulting Physician (Gastroenterology)  Nickolas Alegria MD as Consulting Physician (Gastroenterology)    Chief Complaint: Shortness of Breath     Subjective     Interval History:   Shortness of breath with little activity. Denies orthopnea. Leg swelling is minimal and at baseline. No new complaints. Minimal improvement in breathing. Blood pressure elevated but improving with adjusting medications to previous home doses    Review of Systems:     Review of Systems   Constitutional: Positive for fatigue.   Respiratory: Positive for shortness of breath.      Objective     Vital Sign Min/Max for last 24 hours  Temp  Min: 97.1 °F (36.2 °C)  Max: 97.9 °F (36.6 °C)   BP  Min: 148/96  Max: 183/118   Pulse  Min: 65  Max: 88   Resp  Min: 16  Max: 20   SpO2  Min: 91 %  Max: 95 %   No data recorded   Weight  Min: 105 kg (231 lb 4 oz)  Max: 105 kg (231 lb 4 oz)         08/18/21 2013   Weight: 105 kg (231 lb 4 oz)       Telemetry:       Physical Exam:    Constitutional:       Appearance: Healthy appearance. Not in distress.      Interventions: Nasal cannula in place.   Eyes:      Conjunctiva/sclera: Conjunctivae normal.      Pupils: Pupils are equal, round, and reactive to light.   HENT:      Nose: Nose normal.    Mouth/Throat:      Pharynx: Oropharynx is clear.   Pulmonary:      Effort: Pulmonary effort is normal.      Breath sounds: Decreased breath sounds present. No wheezing. No rhonchi. No rales.   Chest:      Chest wall: Not tender to palpatation.   Cardiovascular:      Normal rate.      Murmurs: There is a systolic murmur.   Edema:     Ankle: bilateral trace edema of the ankle.  Abdominal:      General: Bowel sounds are normal.      Palpations: Abdomen is soft.   Musculoskeletal:      Cervical back: Normal range of motion. Skin:     General: Skin  is warm and dry.   Neurological:      Mental Status: Alert and oriented to person, place and time.       Results Review:   Lab Results (last 72 hours)     Procedure Component Value Units Date/Time    Basic Metabolic Panel [[id number]]  (Abnormal) Collected: 08/19/21 0512    Specimen: Blood Updated: 08/19/21 0642     Glucose 149 mg/dL      BUN 45 mg/dL      Creatinine 2.17 mg/dL      Sodium 140 mmol/L      Potassium 5.1 mmol/L      Comment: Slight hemolysis detected by analyzer. Results may be affected.        Chloride 104 mmol/L      CO2 28.0 mmol/L      Calcium 8.7 mg/dL      eGFR Non African Amer 23 mL/min/1.73      BUN/Creatinine Ratio 20.7     Anion Gap 8.0 mmol/L     Narrative:      GFR Normal >60  Chronic Kidney Disease <60  Kidney Failure <15      Basic Metabolic Panel [[id number]]  (Abnormal) Collected: 08/18/21 0528    Specimen: Blood Updated: 08/18/21 0622     Glucose 166 mg/dL      BUN 41 mg/dL      Creatinine 1.94 mg/dL      Sodium 142 mmol/L      Potassium 4.5 mmol/L      Chloride 105 mmol/L      CO2 27.0 mmol/L      Calcium 8.7 mg/dL      eGFR Non African Amer 26 mL/min/1.73      BUN/Creatinine Ratio 21.1     Anion Gap 10.0 mmol/L     Narrative:      GFR Normal >60  Chronic Kidney Disease <60  Kidney Failure <15      [place] Draw [[id number]] Collected: 08/16/21 2050    Specimen: Blood Updated: 08/16/21 2200    Narrative:      The following orders were created for panel order [place] Draw.  Procedure                               Abnormality         Status                     ---------                               -----------         ------                     Green Top (Gel)[[id number]]                                  Final result               Lavender Top[[id number]]                                     Final result               Red Top[[id number]]                                          Final result                 Please view results for these tests on the individual orders.    Lavender Top  [[id number]] Collected: 08/16/21 2050    Specimen: Blood Updated: 08/16/21 2200     Extra Tube hold for add-on     Comment: Auto resulted       Red Top [[id number]] Collected: 08/16/21 2050    Specimen: Blood Updated: 08/16/21 2200     Extra Tube Hold for add-ons.     Comment: Auto resulted.       Green Top (Gel) [[id number]] Collected: 08/16/21 2050    Specimen: Blood Updated: 08/16/21 2200     Extra Tube Hold for add-ons.     Comment: Auto resulted.       COVID PRE-OP / PRE-PROCEDURE SCREENING ORDER (NO ISOLATION) - Swab, Nasal Cavity [[id number]]  (Normal) Collected: 08/16/21 2053    Specimen: Swab from Nasal Cavity Updated: 08/16/21 2135    Narrative:      The following orders were created for panel order COVID PRE-OP / PRE-PROCEDURE SCREENING ORDER (NO ISOLATION) - Swab, Nasal Cavity.  Procedure                               Abnormality         Status                     ---------                               -----------         ------                     COVID-19,[facility]...[[id number]]  Normal              Final result                 Please view results for these tests on the individual orders.    COVID-19,[facility] [employer] IN-HOUSE,Nasal Swab No Transport Media 3-4 HR TAT - Swab, Nasal Cavity [[id number]]  (Normal) Collected: 08/16/21 2053    Specimen: Swab from Nasal Cavity Updated: 08/16/21 2135     COVID19 Not Detected    Narrative:      Fact sheet for providers: https://www.fda.gov/media/395967/download     Fact sheet for patients: https://www.fda.gov/media/632736/download    Test performed by PCR.    Consider negative results in combination with clinical observations, patient history, and epidemiological information.  Fact sheet for providers: https://www.fda.gov/media/442631/download     Fact sheet for patients: https://www.fda.gov/media/447509/download    Test performed by PCR.    Consider negative results in combination with clinical observations, patient history, and epidemiological information.     Troponin [[id number]]  (Normal) Collected: 08/16/21 2050    Specimen: Blood Updated: 08/16/21 2134     Troponin T 0.021 ng/mL     Narrative:      Troponin T Reference Range:  <= 0.03 ng/mL-   Negative for AMI  >0.03 ng/mL-     Abnormal for myocardial necrosis.  Clinicians would have to utilize clinical acumen, EKG, Troponin and serial changes to determine if it is an Acute Myocardial Infarction or myocardial injury due to an underlying chronic condition.       Results may be falsely decreased if patient taking Biotin.      Comprehensive Metabolic Panel [[id number]]  (Abnormal) Collected: 08/16/21 2050    Specimen: Blood Updated: 08/16/21 2124     Glucose 110 mg/dL      BUN 40 mg/dL      Creatinine 1.81 mg/dL      Sodium 148 mmol/L      Potassium 4.2 mmol/L      Chloride 111 mmol/L      CO2 28.0 mmol/L      Calcium 8.3 mg/dL      Total Protein 6.1 g/dL      Albumin 3.70 g/dL      ALT (SGPT) 16 U/L      AST (SGOT) 16 U/L      Alkaline Phosphatase 82 U/L      Total Bilirubin 0.7 mg/dL      eGFR Non African Amer 29 mL/min/1.73      Globulin 2.4 gm/dL      A/G Ratio 1.5 g/dL      BUN/Creatinine Ratio 22.1     Anion Gap 9.0 mmol/L     Narrative:      GFR Normal >60  Chronic Kidney Disease <60  Kidney Failure <15      BNP [[id number]]  (Abnormal) Collected: 08/16/21 2050    Specimen: Blood Updated: 08/16/21 2121     proBNP 21,127.0 pg/mL     Narrative:      Among patients with dyspnea, NT-proBNP is highly sensitive for the detection of acute congestive heart failure. In addition NT-proBNP of <300 pg/ml effectively rules out acute congestive heart failure with 99% negative predictive value.    Results may be falsely decreased if patient taking Biotin.      Magnesium [[id number]]  (Normal) Collected: 08/16/21 2050    Specimen: Blood Updated: 08/16/21 2118     Magnesium 2.2 mg/dL     Blood Gas, Arterial - [[id number]]  (Abnormal) Collected: 08/16/21 2111    Specimen: Arterial Blood Updated: 08/16/21 2117     Site Right Radial      Guido's Test Positive     pH, Arterial 7.374 pH units      pCO2, Arterial 53.7 mm Hg      Comment: 83 Value above reference range        pO2, Arterial 72.3 mm Hg      Comment: 84 Value below reference range        HCO3, Arterial 31.3 mmol/L      Comment: 83 Value above reference range        Base Excess, Arterial 5.2 mmol/L      Comment: 83 Value above reference range        O2 Saturation, Arterial 95.4 %      Temperature 37.0 C      Barometric Pressure for Blood Gas 749 mmHg      Modality Nasal Cannula     Flow Rate 2.0 lpm      Ventilator Mode NA     Collected by [id number]     Comment: Meter: [id number]     :  [id number]        pCO2, Temperature Corrected 53.7 mm Hg      pH, Temp Corrected 7.374 pH Units      pO2, Temperature Corrected 72.3 mm Hg     CBC & Differential [[id number]]  (Abnormal) Collected: 08/16/21 2050    Specimen: Blood Updated: 08/16/21 2101    Narrative:      The following orders were created for panel order CBC & Differential.  Procedure                               Abnormality         Status                     ---------                               -----------         ------                     CBC Auto Differential[[id number]]        Abnormal            Final result                 Please view results for these tests on the individual orders.    CBC Auto Differential [[id number]]  (Abnormal) Collected: 08/16/21 2050    Specimen: Blood Updated: 08/16/21 2101     WBC 7.23 10*3/mm3      RBC 3.20 10*6/mm3      Hemoglobin 9.4 g/dL      Hematocrit 31.7 %      MCV 99.1 fL      MCH 29.4 pg      MCHC 29.7 g/dL      RDW 15.6 %      RDW-SD 57.1 fl      MPV 10.6 fL      Platelets 202 10*3/mm3      Neutrophil % 75.5 %      Lymphocyte % 12.0 %      Monocyte % 7.2 %      Eosinophil % 3.2 %      Basophil % 0.7 %      Immature Grans % 1.4 %      Neutrophils, Absolute 5.46 10*3/mm3      Lymphocytes, Absolute 0.87 10*3/mm3      Monocytes, Absolute 0.52 10*3/mm3      Eosinophils, Absolute 0.23 10*3/mm3       Basophils, Absolute 0.05 10*3/mm3      Immature Grans, Absolute 0.10 10*3/mm3      nRBC 0.0 /100 WBC         Results from last 7 days   Lab Units 08/19/21  0512 08/18/21  0528 08/18/21  0528 08/16/21 2050 08/16/21 2050   SODIUM mmol/L 140  --  142  --  148*   POTASSIUM mmol/L 5.1  --  4.5  --  4.2   CHLORIDE mmol/L 104  --  105  --  111*   CO2 mmol/L 28.0  --  27.0  --  28.0   BUN mg/dL 45*  --  41*  --  40*   CREATININE mg/dL 2.17*  --  1.94*  --  1.81*   GLUCOSE mg/dL 149*   < > 166*   < > 110*   CALCIUM mg/dL 8.7  --  8.7  --  8.3*    < > = values in this interval not displayed.     Results for orders placed during the hospital encounter of 08/08/21    Adult Transthoracic Echo Complete W/ Cont if Necessary Per Protocol    Interpretation Summary  · Left ventricular ejection fraction appears to be 51 - 55%. Left ventricular systolic function is low normal.  · The following left ventricular wall segments are hypokinetic: apex hypokinetic.  · Mild aortic valve stenosis is present.  · Left ventricular wall thickness is consistent with mild concentric hypertrophy.  · Left ventricular diastolic function is consistent with (grade II w/high LAP) pseudonormalization.  · Left atrial volume is severely increased.  · Estimated right ventricular systolic pressure from tricuspid regurgitation is mildly elevated (35-45 mmHg). Calculated right ventricular systolic pressure from tricuspid regurgitation is 40.8 mmHg.  · Normal size and function of the right ventricle.      Medication Review: yes  Current Facility-Administered Medications   Medication Dose Route Frequency Provider Last Rate Last Admin   • aspirin EC tablet 81 mg  81 mg Oral Daily Orville Weston MD   81 mg at 08/19/21 0802   • budesonide-formoterol (SYMBICORT) 160-4.5 MCG/ACT inhaler 2 puff  2 puff Inhalation BID - RT Mehreen Kirkpatrick APRN   2 puff at 08/19/21 1005   • busPIRone (BUSPAR) tablet 10 mg  10 mg Oral TID Neeta Chavez MD   10 mg at  08/19/21 0802   • calcitriol (ROCALTROL) capsule 0.25 mcg  0.25 mcg Oral Daily Orville Weston MD   0.25 mcg at 08/19/21 0802   • carvedilol (COREG) tablet 25 mg  25 mg Oral BID With Meals Orville Weston MD   25 mg at 08/19/21 0802   • cloNIDine (CATAPRES) tablet 0.1 mg  0.1 mg Oral Q6H PRN Orville Weston MD   0.1 mg at 08/19/21 0801   • enoxaparin (LOVENOX) syringe 40 mg  40 mg Subcutaneous Q24H Orville Weston MD   40 mg at 08/18/21 1311   • ferrous sulfate tablet 325 mg  325 mg Oral BID With Meals Orville Weston MD   325 mg at 08/19/21 0802   • fluticasone (FLONASE) 50 MCG/ACT nasal spray 2 spray  2 spray Each Nare Daily Orville Weston MD   2 spray at 08/19/21 0802   • guaiFENesin (MUCINEX) 12 hr tablet 1,200 mg  1,200 mg Oral Q12H Orville Weston MD   1,200 mg at 08/19/21 0802   • hydrALAZINE (APRESOLINE) tablet 75 mg  75 mg Oral Q8H Preston Wiley APRN   75 mg at 08/19/21 1102   • ipratropium-albuterol (DUO-NEB) nebulizer solution 3 mL  3 mL Nebulization 4x Daily - RT Kaya, ISAIAH Friedman       • isosorbide dinitrate (ISORDIL) tablet 10 mg  10 mg Oral TID - Nitrates Orville Weston MD   10 mg at 08/19/21 0801   • losartan (COZAAR) tablet 100 mg  100 mg Oral Q24H Orville Weston MD   100 mg at 08/19/21 0802   • NIFEdipine XL (PROCARDIA XL) 24 hr tablet 60 mg  60 mg Oral Q12H Preston Wiley APRN       • OLANZapine (zyPREXA) tablet 5 mg  5 mg Oral BID Orville Weston MD   5 mg at 08/19/21 0803   • pantoprazole (PROTONIX) EC tablet 40 mg  40 mg Oral Daily Orville Weston MD   40 mg at 08/19/21 0807   • predniSONE (DELTASONE) tablet 40 mg  40 mg Oral Daily With Breakfast Kaya, ISAIAH Friedman   40 mg at 08/19/21 1102   • rosuvastatin (CRESTOR) tablet 20 mg  20 mg Oral Nightly Orville Weston MD   20 mg at 08/18/21 2058   • sodium chloride 0.9 % flush 10 mL  10 mL Intravenous PRN Son Randall MD   10 mL at 08/17/21  0841   • ticagrelor (BRILINTA) tablet 90 mg  90 mg Oral Q12H Orville Weston MD   90 mg at 08/19/21 0803         Assessment/Plan       Shortness of breath    Nonrheumatic aortic valve insufficiency    Acute kidney injury superimposed on CKD (CMS/[facility])    Essential hypertension    Coronary artery disease of native artery of native heart with stable angina pectoris (CMS/[facility])    COVID-19    Plan:  Acute and Chronic Respiratory Failure, Shortness of Breath - Plans to go home with trilogy. Of note admitting diagnosis was COVID 19- however patient denies having a history of COVID infection. Shortness of Breath persists.     COPD- following with pulmonary. Plans for home trilogy. Treating with steroids and breathing treatments.    Bradycardia- nocturnal bradycardia. Needs sleep apnea work up. Seems to be only during sleeping hours continue to monitor. May need holter monitor.     Coronary Artery Disease - drug eluting stent to LAD in November 2020. On Brilinta and Aspirin. Last LDL was 132. Will add Lipid panel to am labs. Goal LDL less than 70. On Crestor 20 if not at goal would increase to 40. No angina.     Diastolic Congestive Heart Failure- LVEF 51-55% on admission earlier this month. Treated with IV diuretics at that time with MATTY. Daily weights. Low Salt Diet. Appears overall euvolemic. Would not given Diuretic at this time with renal function declining. Continue to monitor.     Essential  Hypertension - running persistently high. I have adjusted medications back to home dosing of medications- home dose of Hydralazine was 75 TID on admission was started on 25 TID. Was given PRN Clonidine this am. Also CCB was resumed at half of dose she takes at home. Monitor response of blood pressure to resuming home dose of medications.  May need further adjustments.     Acute on Chronic Kidney Disease- renal function continues to decline today. Baseline Creatinine appears to be somewhere around 1.6-1.8- has had a few  readings over 2 the last few months. Continue to monitor    Electronically signed by ISAIAH Sanchez, 08/19/21, 10:58 AM CDT.    none

## 2021-08-19 NOTE — PROGRESS NOTES
"Progress Note  Ludivina Ramirez  8/19/2021 10:21 CDT  Subjective:   Admit Date:   8/16/2021      CC/ADMIT DX:       Interval History:   Reviewed overnight events and nursing notes. She has no c/o CP. She c/o SOA with any activity.       I have reviewed all labs/diagnostics from the last 24hrs.       ROS:   I have done a 10 point ROS and all are negative, except what is mentioned in the HPI.    Diet Regular    Medications:      aspirin, 81 mg, Oral, Daily  budesonide-formoterol, 2 puff, Inhalation, BID - RT  busPIRone, 10 mg, Oral, TID  calcitriol, 0.25 mcg, Oral, Daily  carvedilol, 25 mg, Oral, BID With Meals  enoxaparin, 40 mg, Subcutaneous, Q24H  ferrous sulfate, 325 mg, Oral, BID With Meals  fluticasone, 2 spray, Each Nare, Daily  guaiFENesin, 1,200 mg, Oral, Q12H  hydrALAZINE, 75 mg, Oral, Q8H  ipratropium-albuterol, 3 mL, Nebulization, 4x Daily - RT  isosorbide dinitrate, 10 mg, Oral, TID - Nitrates  losartan, 100 mg, Oral, Q24H  NIFEdipine XL, 60 mg, Oral, Q12H  OLANZapine, 5 mg, Oral, BID  pantoprazole, 40 mg, Oral, Daily  predniSONE, 40 mg, Oral, Daily With Breakfast  rosuvastatin, 20 mg, Oral, Nightly  ticagrelor, 90 mg, Oral, Q12H            Objective:   Vitals: BP (!) 183/118 (BP Location: Right arm, Patient Position: Lying)   Pulse 67   Temp 97.1 °F (36.2 °C) (Oral)   Resp 18   Ht 165.1 cm (65\")   Wt 105 kg (231 lb 4 oz)   SpO2 92%   BMI 38.48 kg/m²      Intake/Output Summary (Last 24 hours) at 8/19/2021 1021  Last data filed at 8/19/2021 0900  Gross per 24 hour   Intake 1000 ml   Output --   Net 1000 ml     General appearance: alert and cooperative with exam  Lungs: coarse  Heart: RRR  Abdomen: soft, non-tender; bowel sounds normal; no masses,  no organomegaly  Extremities: extremities normal, atraumatic, no cyanosis or edema  Neurologic:  No obvious focal neurologic deficits.    Assessment and Plan:   COPD Exacerbation  Acute on Chronic Resp Failure  Chronic Diastolic HF  CKD  HTN  CAD    Plan:  1.  " Continue present medication(s)   2.  Wean steroids  3.  Follow with Pulm and Cardiology  4.  Continue O2 and resp treatments  5.   Renal function up today, per Cardiology note, plan to resume oral diuretics today. With lab        increase will defer this to Cardiology.     Discharge planning:   her home     Reviewed treatment plans with the patient and/or family.             Electronically signed by Orville Weston MD on 8/19/2021 at 10:21 CDT

## 2021-08-19 NOTE — PROGRESS NOTES
PULMONARY AND CRITICAL CARE PROGRESS NOTE - [facility]    Patient: Ludivina Ramirez  1960   MR# [id number]   Acct# [id number]  08/19/21   09:09 CDT  Referring Provider: Orville Weston MD    Chief Complaint: Shortness of breath    Interval history: Patient was seen in the follow-up visit in pulmonary rounds in medical floor today.  She is resting in bed on 2 L nasal cannula.  She is hypertensive this morning-defer to others.  It appears that [facility] will be setting up the patient's trilogy.  Awaiting approval for set up.  She has no new pulmonary complaints today.  Meds:  albuterol, 2.5 mg, Nebulization, Q4H - RT  aspirin, 81 mg, Oral, Daily  busPIRone, 10 mg, Oral, TID  calcitriol, 0.25 mcg, Oral, Daily  carvedilol, 25 mg, Oral, BID With Meals  enoxaparin, 40 mg, Subcutaneous, Q24H  ferrous sulfate, 325 mg, Oral, BID With Meals  fluticasone, 2 spray, Each Nare, Daily  guaiFENesin, 1,200 mg, Oral, Q12H  hydrALAZINE, 25 mg, Oral, Q8H  isosorbide dinitrate, 10 mg, Oral, TID - Nitrates  losartan, 100 mg, Oral, Q24H  methylPREDNISolone sodium succinate, 40 mg, Intravenous, Q12H  NIFEdipine XL, 60 mg, Oral, Q24H  OLANZapine, 5 mg, Oral, BID  pantoprazole, 40 mg, Oral, Daily  rosuvastatin, 20 mg, Oral, Nightly  ticagrelor, 90 mg, Oral, Q12H         Review of Systems:   Review of Systems   Constitutional: Negative for chills and fever.   Respiratory: Positive for shortness of breath (improving).    Gastrointestinal: Negative for diarrhea, nausea and vomiting.     Physical Exam:  SpO2 Percentage    08/19/21 0351 08/19/21 0655 08/19/21 0801   SpO2: 93% 91% 92%     Temp:  [97.1 °F (36.2 °C)-98.1 °F (36.7 °C)] 97.1 °F (36.2 °C)  Heart Rate:  [66-88] 71  Resp:  [16-20] 20  BP: (131-183)/() 183/118    Intake/Output Summary (Last 24 hours) at 8/19/2021 0909  Last data filed at 8/19/2021 0351  Gross per 24 hour   Intake 760 ml   Output --   Net 760 ml     Physical Exam  Vitals reviewed.    Constitutional:       General: She is not in acute distress.     Appearance: She is well-developed. She is obese.      Interventions: Nasal cannula in place.   HENT:      Head: Normocephalic and atraumatic.   Eyes:      General: No scleral icterus.     Conjunctiva/sclera: Conjunctivae normal.      Pupils: Pupils are equal, round, and reactive to light.   Cardiovascular:      Rate and Rhythm: Normal rate.   Pulmonary:      Effort: Pulmonary effort is normal. No respiratory distress.      Breath sounds: Wheezing (mild) present. No rales.   Musculoskeletal:         General: Normal range of motion.      Cervical back: Normal range of motion and neck supple.      Right lower leg: Edema present.      Left lower leg: Edema present.   Skin:     General: Skin is warm and dry.   Neurological:      Mental Status: She is alert and oriented to person, place, and time.   Psychiatric:         Behavior: Behavior normal.         Thought Content: Thought content normal.         Judgment: Judgment normal.           Laboratory Data:  Results from last 7 days   Lab Units 08/16/21  2050   WBC 10*3/mm3 7.23   HEMOGLOBIN g/dL 9.4*   PLATELETS 10*3/mm3 202     Results from last 7 days   Lab Units 08/19/21  0512 08/18/21  0528 08/16/21 2050   SODIUM mmol/L 140 142 148*   POTASSIUM mmol/L 5.1 4.5 4.2   BUN mg/dL 45* 41* 40*   CREATININE mg/dL 2.17* 1.94* 1.81*     Results from last 7 days   Lab Units 08/16/21  2111   PH, ARTERIAL pH units 7.374   PCO2, ARTERIAL mm Hg 53.7*   PO2 ART mm Hg 72.3*     No results found for: BLOODCX, URINECX, WOUNDCX, MRSACX, RESPCX, STOOLCX  Recent films:  No radiology results for the last day  Films reviewed personally by me.  My interpretation: None to review today  Pulmonary Assessment:  1. Acute on chronic respiratory failure  2. Shortness of breath  3. Chronic obstructive pulmonary disease with history of tobacco use.  4. Pulmonary fibrosis and scarring  5. Sleep disturbances  6. Coronary artery  disease  7. Congestive diastolic heart failure  8. Chronic kidney stage III  9. Obesity  10. Anxiety and depression    Recommend:   · Continue supplemental oxygen and titrate to keep saturation more than 92%.  · Start Symbicort and DuoNeb  · DVT prophylaxis-Lovenox  · Stress ulcer prophylaxis-Protonix  · Continue Flonase and Mucinex  · Solu-Medrol 40 mg IV every 12 hours, transition to oral prednisone today  · Home oxygen evaluation prior to the discharge.  She is currently requiring 2 L  · Home trilogy has been ordered per Dr. Chavez.  Awaiting approval and set up.  · Consider outpatient sleep study ?  · Buspirone for anxiety per Dr. Chavez.  · Cardiology is following for congestive diastolic heart failure and coronary artery disease.  Follow cardiology recommendations.  · She has a long history of noncompliance which will be problematic.   · Add incentive spirometry, flutter, and ambulation    Electronically signed by ISAIAH Harris 8/19/2021 09:12 CDT    ATTESTATION OF CLINICAL NOTE:  I have reviewed the notes, assessments, and/or procedures performed by ISAIAH Harris, I concur with her/his documentation of Ludivina Ramirez.  Patient was seen in the follow-up visit in pulmonary rounds in medical floor today.    She is doing well overall and her respiratory status is stable and improving.  Her arterial blood gas shows persistent hypercapnia and she is going to need NIPPV at home and trilogy will be ordered.  Case management will be arranging trilogy and home oxygen at the time of discharge.  She had no other acute events overnight except some anxiety.  She will need outpatient PFT and sleep study prior to return to my clinic for follow-up visit in a few months time.  She was started on buspirone for anxiety.    On physical examination patient is a middle-aged  female looks anxious sitting up in the bed comfortable but HEENT: Atraumatic normocephalic.  Neck: Supple no mass no jugular  venous distention no lymphadenopathy.  Heart: Sounds normal rate and rhythm regular no murmur.  Lungs: Bibasilar diminished air entry and few crackles.  Abdomen: Soft nontender nondistended.  Extremities: No edema normal pulses and color.  Neurologic: Grossly intact.  Skin: No breakdown.    Patient requires frequent use of NIMV. Intermittent support will not be sufficient. I am ordering nocturnal and daytime volume ventilation to reduce the risk of exacerbation.   The individual cannot maintain spontaneous ventilation for four or more consecutive hours without the assistance of a device. Bi-level (or inferior choice) was considered however I feel that this would be insufficient given the severity of disease, lack of backup battery and continuous alarms that would be required, which are not provided by a bi-level device.  Patient's condition is of severity where back-up battery and continuous alarms are required, which are not provided by bi-level devices.  Interruption of failure to provide NIMV would quickly lead to exacerbation of patient's condition, hospital readmission, and likely harm the patient.  Primary cause of hypercapnia is COPD, with chronic respiratory failure.  Continued use is preferred.      Continue bronchodilator treatment, continue supplemental oxygen and BiPAP at night.  Although patient was not requiring BiPAP in the past she has got progressive respiratory issues with persistent hypercapnia and this is worsening her underlying cardiac issues including heart failure and a trilogy use will be preferred.  Continue DVT stress ulcer prophylaxis.  Home oxygen reevaluation prior to the discharge.  Smoking cessation discussed with the patient.  Pain anxiety control.  Buspirone started for anxiety.  Management of heart failure and arrhythmias per cardiology.  Continue PT OT nutritional support.  DVT and stress ulcer prophylaxis.  Anticipate discharge in the next few days and follow-up as an outpatient.   CODE STATUS: Full.  Overall prognosis: Guarded.  Once oxygen is arranged and trilogy is arranged patient may be discharged home and may be followed as an outpatient.  Her pulmonary status is stable but is still followed by cardiology.  I will sign off from this patient's care but will be happy to see her as requested again.  She will definitely need a follow-up visit with me after discharge within a month and in the long-term will need a PFT and sleep study.  We appreciate the consult from Dr. Weston.  Please call us if needed.    I have seen and examined patient personally, performing a face-to-face diagnostic evaluation with plan of care reviewed and developed with APRN and nursing staff. I have addended and/or modified the above history of present illness, physical examination, and assessment and plan to reflect my findings and impressions. Essential elements of the care plan were discussed with APRN above.  Agree with findings and assessment/plan as documented above.    Neeta Chavez MD  Pulmonologist/Intensivist  8/19/2021 09:38 CDT

## 2021-08-19 NOTE — CASE COMMUNICATION
Patient missed a ST visit from [facility] on 08/19/21.     Reason: PT. IN HOSPITAL/OBS.       For your records only.   As per home health protocol, MD must be notified of missed/cancelled visits; therefore the prescribed frequency was not met.

## 2021-08-20 LAB
ANION GAP SERPL CALCULATED.3IONS-SCNC: 12 MMOL/L (ref 5–15)
BUN SERPL-MCNC: 47 MG/DL (ref 8–23)
BUN/CREAT SERPL: 21.8 (ref 7–25)
CALCIUM SPEC-SCNC: 8.3 MG/DL (ref 8.6–10.5)
CHLORIDE SERPL-SCNC: 106 MMOL/L (ref 98–107)
CO2 SERPL-SCNC: 26 MMOL/L (ref 22–29)
CREAT SERPL-MCNC: 2.16 MG/DL (ref 0.57–1)
GFR SERPL CREATININE-BSD FRML MDRD: 23 ML/MIN/1.73
GLUCOSE SERPL-MCNC: 121 MG/DL (ref 65–99)
POTASSIUM SERPL-SCNC: 4.5 MMOL/L (ref 3.5–5.2)
SODIUM SERPL-SCNC: 144 MMOL/L (ref 136–145)

## 2021-08-20 PROCEDURE — 25010000002 ENOXAPARIN PER 10 MG: Performed by: FAMILY MEDICINE

## 2021-08-20 PROCEDURE — 94799 UNLISTED PULMONARY SVC/PX: CPT

## 2021-08-20 PROCEDURE — 80048 BASIC METABOLIC PNL TOTAL CA: CPT | Performed by: FAMILY MEDICINE

## 2021-08-20 PROCEDURE — 94644 CONT INHLJ TX 1ST HOUR: CPT

## 2021-08-20 PROCEDURE — 63710000001 PREDNISONE PER 1 MG: Performed by: NURSE PRACTITIONER

## 2021-08-20 PROCEDURE — 96372 THER/PROPH/DIAG INJ SC/IM: CPT

## 2021-08-20 PROCEDURE — G0378 HOSPITAL OBSERVATION PER HR: HCPCS

## 2021-08-20 RX ADMIN — PREDNISONE 40 MG: 20 TABLET ORAL at 08:36

## 2021-08-20 RX ADMIN — CALCITRIOL 0.25 MCG: 0.25 CAPSULE ORAL at 08:37

## 2021-08-20 RX ADMIN — BUSPIRONE HYDROCHLORIDE 10 MG: 10 TABLET ORAL at 16:27

## 2021-08-20 RX ADMIN — ISOSORBIDE DINITRATE 10 MG: 10 TABLET ORAL at 13:12

## 2021-08-20 RX ADMIN — PANTOPRAZOLE SODIUM 40 MG: 40 TABLET, DELAYED RELEASE ORAL at 08:36

## 2021-08-20 RX ADMIN — ASPIRIN 81 MG: 81 TABLET, COATED ORAL at 08:37

## 2021-08-20 RX ADMIN — ISOSORBIDE DINITRATE 10 MG: 10 TABLET ORAL at 08:37

## 2021-08-20 RX ADMIN — HYDRALAZINE HYDROCHLORIDE 75 MG: 50 TABLET ORAL at 05:16

## 2021-08-20 RX ADMIN — FERROUS SULFATE TAB 325 MG (65 MG ELEMENTAL FE) 325 MG: 325 (65 FE) TAB at 08:36

## 2021-08-20 RX ADMIN — OLANZAPINE 5 MG: 5 TABLET, FILM COATED ORAL at 08:37

## 2021-08-20 RX ADMIN — HYDRALAZINE HYDROCHLORIDE 75 MG: 50 TABLET ORAL at 13:12

## 2021-08-20 RX ADMIN — BUDESONIDE AND FORMOTEROL FUMARATE DIHYDRATE 2 PUFF: 160; 4.5 AEROSOL RESPIRATORY (INHALATION) at 20:38

## 2021-08-20 RX ADMIN — BUSPIRONE HYDROCHLORIDE 10 MG: 10 TABLET ORAL at 21:00

## 2021-08-20 RX ADMIN — IPRATROPIUM BROMIDE AND ALBUTEROL SULFATE 3 ML: 2.5; .5 SOLUTION RESPIRATORY (INHALATION) at 15:14

## 2021-08-20 RX ADMIN — CARVEDILOL 25 MG: 25 TABLET, FILM COATED ORAL at 08:37

## 2021-08-20 RX ADMIN — BUSPIRONE HYDROCHLORIDE 10 MG: 10 TABLET ORAL at 08:37

## 2021-08-20 RX ADMIN — ROSUVASTATIN CALCIUM 20 MG: 20 TABLET, FILM COATED ORAL at 21:00

## 2021-08-20 RX ADMIN — OLANZAPINE 5 MG: 5 TABLET, FILM COATED ORAL at 21:00

## 2021-08-20 RX ADMIN — NIFEDIPINE 60 MG: 60 TABLET, FILM COATED, EXTENDED RELEASE ORAL at 21:01

## 2021-08-20 RX ADMIN — NIFEDIPINE 60 MG: 60 TABLET, FILM COATED, EXTENDED RELEASE ORAL at 08:36

## 2021-08-20 RX ADMIN — ENOXAPARIN SODIUM 40 MG: 40 INJECTION SUBCUTANEOUS at 12:05

## 2021-08-20 RX ADMIN — GUAIFENESIN 1200 MG: 600 TABLET, EXTENDED RELEASE ORAL at 21:00

## 2021-08-20 RX ADMIN — FERROUS SULFATE TAB 325 MG (65 MG ELEMENTAL FE) 325 MG: 325 (65 FE) TAB at 17:13

## 2021-08-20 RX ADMIN — TICAGRELOR 90 MG: 90 TABLET ORAL at 08:36

## 2021-08-20 RX ADMIN — LOSARTAN POTASSIUM 100 MG: 50 TABLET, FILM COATED ORAL at 08:37

## 2021-08-20 RX ADMIN — CARVEDILOL 25 MG: 25 TABLET, FILM COATED ORAL at 17:13

## 2021-08-20 RX ADMIN — IPRATROPIUM BROMIDE AND ALBUTEROL SULFATE 3 ML: 2.5; .5 SOLUTION RESPIRATORY (INHALATION) at 07:01

## 2021-08-20 RX ADMIN — ISOSORBIDE DINITRATE 10 MG: 10 TABLET ORAL at 17:13

## 2021-08-20 RX ADMIN — GUAIFENESIN 1200 MG: 600 TABLET, EXTENDED RELEASE ORAL at 08:37

## 2021-08-20 RX ADMIN — IPRATROPIUM BROMIDE AND ALBUTEROL SULFATE 3 ML: 2.5; .5 SOLUTION RESPIRATORY (INHALATION) at 20:37

## 2021-08-20 RX ADMIN — FLUTICASONE PROPIONATE 2 SPRAY: 50 SPRAY, METERED NASAL at 08:37

## 2021-08-20 RX ADMIN — IPRATROPIUM BROMIDE AND ALBUTEROL SULFATE 3 ML: 2.5; .5 SOLUTION RESPIRATORY (INHALATION) at 11:00

## 2021-08-20 RX ADMIN — BUDESONIDE AND FORMOTEROL FUMARATE DIHYDRATE 2 PUFF: 160; 4.5 AEROSOL RESPIRATORY (INHALATION) at 07:01

## 2021-08-20 RX ADMIN — TICAGRELOR 90 MG: 90 TABLET ORAL at 21:00

## 2021-08-20 RX ADMIN — HYDRALAZINE HYDROCHLORIDE 75 MG: 50 TABLET ORAL at 21:01

## 2021-08-20 NOTE — CASE MANAGEMENT/SOCIAL WORK
Continued Stay Note   [facility]     Patient Name: Ludivina Ramirez  MRN: [id number]  Today's Date: 8/20/2021    Admit Date: 8/16/2021    Discharge Plan     Row Name 08/20/21 0835       Plan    Patient/Family in Agreement with Plan  yes    Plan Comments  Anahy from [facility] confirmed that they are still awaiting insurance approval for trilogy.        Discharge Codes    No documentation.             DERRICK José

## 2021-08-20 NOTE — PLAN OF CARE
Goal Outcome Evaluation:           Progress: improving   VSS. No c/o pain. S 63-73 on tele. No c/o SOB. [facility] delivered trilogy machine for pt today for upcoming discharge. Safety maintained.

## 2021-08-20 NOTE — PLAN OF CARE
Goal Outcome Evaluation:  Plan of Care Reviewed With: patient        Progress: improving  Outcome Summary: VSS. no co of pain. Sats wnl on 2l NC. No c/o SOA. BP improved. Safety maintained. Possible dc today. S 70-80

## 2021-08-20 NOTE — PROGRESS NOTES
"Progress Note  Ludivina Ramirez  8/20/2021 10:25 CDT  Subjective:   Admit Date:   8/16/2021      CC/ADMIT DX:       Interval History:   Reviewed overnight events and nursing notes. She denies any CP. Her exertional SOA is stable. No worsening edema.       I have reviewed all labs/diagnostics from the last 24hrs.       ROS:   I have done a 10 point ROS and all are negative, except what is mentioned in the HPI.    Diet Regular    Medications:      aspirin, 81 mg, Oral, Daily  budesonide-formoterol, 2 puff, Inhalation, BID - RT  busPIRone, 10 mg, Oral, TID  calcitriol, 0.25 mcg, Oral, Daily  carvedilol, 25 mg, Oral, BID With Meals  enoxaparin, 40 mg, Subcutaneous, Q24H  ferrous sulfate, 325 mg, Oral, BID With Meals  fluticasone, 2 spray, Each Nare, Daily  guaiFENesin, 1,200 mg, Oral, Q12H  hydrALAZINE, 75 mg, Oral, Q8H  ipratropium-albuterol, 3 mL, Nebulization, 4x Daily - RT  isosorbide dinitrate, 10 mg, Oral, TID - Nitrates  losartan, 100 mg, Oral, Q24H  NIFEdipine XL, 60 mg, Oral, Q12H  OLANZapine, 5 mg, Oral, BID  pantoprazole, 40 mg, Oral, Daily  predniSONE, 40 mg, Oral, Daily With Breakfast  rosuvastatin, 20 mg, Oral, Nightly  ticagrelor, 90 mg, Oral, Q12H            Objective:   Vitals: /87 (Patient Position: Lying) Comment: reported to nurse  Pulse 71   Temp 97.5 °F (36.4 °C) (Oral)   Resp 18   Ht 165.1 cm (65\")   Wt 108 kg (237 lb 3.2 oz)   SpO2 93%   BMI 39.47 kg/m²      Intake/Output Summary (Last 24 hours) at 8/20/2021 1025  Last data filed at 8/20/2021 0900  Gross per 24 hour   Intake 480 ml   Output --   Net 480 ml     General appearance: alert and cooperative with exam  Lungs: coarse  Heart: RRR  Abdomen: soft, non-tender; bowel sounds normal; no masses,  no organomegaly  Extremities: extremities normal, atraumatic, no cyanosis or edema  Neurologic:  No obvious focal neurologic deficits.    Assessment and Plan:   COPD Exacerbation  Acute on Chronic Resp Failure  Chronic Diastolic " HF  CKD  HTN  CAD    Plan:  1.  Continue present medication(s)   2.  Follow with Pulm and Cardiology  3.  Continue O2 and resp treatments  4.  Follow BP  5.    Recheck Labs, following renal function      Discharge planning:   her home     Reviewed treatment plans with the patient and/or family.             Electronically signed by Orville Weston MD on 8/20/2021 at 10:25 CDT

## 2021-08-21 ENCOUNTER — READMISSION MANAGEMENT (OUTPATIENT)
Dept: CALL CENTER | Facility: HOSPITAL | Age: 61
End: 2021-08-21

## 2021-08-21 VITALS
TEMPERATURE: 98 F | BODY MASS INDEX: 39.91 KG/M2 | OXYGEN SATURATION: 99 % | SYSTOLIC BLOOD PRESSURE: 154 MMHG | WEIGHT: 239.56 LBS | DIASTOLIC BLOOD PRESSURE: 79 MMHG | HEART RATE: 79 BPM | HEIGHT: 65 IN | RESPIRATION RATE: 20 BRPM

## 2021-08-21 LAB
ANION GAP SERPL CALCULATED.3IONS-SCNC: 7 MMOL/L (ref 5–15)
BUN SERPL-MCNC: 51 MG/DL (ref 8–23)
BUN/CREAT SERPL: 21.8 (ref 7–25)
CALCIUM SPEC-SCNC: 8.3 MG/DL (ref 8.6–10.5)
CHLORIDE SERPL-SCNC: 107 MMOL/L (ref 98–107)
CO2 SERPL-SCNC: 27 MMOL/L (ref 22–29)
CREAT SERPL-MCNC: 2.34 MG/DL (ref 0.57–1)
GFR SERPL CREATININE-BSD FRML MDRD: 21 ML/MIN/1.73
GLUCOSE SERPL-MCNC: 123 MG/DL (ref 65–99)
POTASSIUM SERPL-SCNC: 4.6 MMOL/L (ref 3.5–5.2)
SODIUM SERPL-SCNC: 141 MMOL/L (ref 136–145)

## 2021-08-21 PROCEDURE — 94799 UNLISTED PULMONARY SVC/PX: CPT

## 2021-08-21 PROCEDURE — 63710000001 PREDNISONE PER 1 MG: Performed by: NURSE PRACTITIONER

## 2021-08-21 PROCEDURE — 80048 BASIC METABOLIC PNL TOTAL CA: CPT | Performed by: FAMILY MEDICINE

## 2021-08-21 PROCEDURE — G0378 HOSPITAL OBSERVATION PER HR: HCPCS

## 2021-08-21 RX ORDER — BUDESONIDE AND FORMOTEROL FUMARATE DIHYDRATE 160; 4.5 UG/1; UG/1
2 AEROSOL RESPIRATORY (INHALATION)
Qty: 1 EACH | Refills: 12 | Status: SHIPPED | OUTPATIENT
Start: 2021-08-21 | End: 2021-08-21

## 2021-08-21 RX ORDER — IPRATROPIUM BROMIDE AND ALBUTEROL SULFATE 2.5; .5 MG/3ML; MG/3ML
3 SOLUTION RESPIRATORY (INHALATION) EVERY 4 HOURS PRN
Qty: 360 ML | Refills: 0 | Status: SHIPPED | OUTPATIENT
Start: 2021-08-21 | End: 2021-10-18 | Stop reason: SDUPTHER

## 2021-08-21 RX ORDER — IPRATROPIUM BROMIDE AND ALBUTEROL SULFATE 2.5; .5 MG/3ML; MG/3ML
3 SOLUTION RESPIRATORY (INHALATION) EVERY 4 HOURS PRN
Qty: 360 ML | Refills: 0 | Status: SHIPPED | OUTPATIENT
Start: 2021-08-21 | End: 2021-08-21 | Stop reason: SDUPTHER

## 2021-08-21 RX ORDER — BUSPIRONE HYDROCHLORIDE 10 MG/1
10 TABLET ORAL 3 TIMES DAILY
Qty: 90 TABLET | Refills: 0 | Status: SHIPPED | OUTPATIENT
Start: 2021-08-21 | End: 2021-08-21

## 2021-08-21 RX ORDER — PREDNISONE 10 MG/1
TABLET ORAL
Qty: 13 TABLET | Refills: 0 | Status: ON HOLD | OUTPATIENT
Start: 2021-08-21 | End: 2021-08-24

## 2021-08-21 RX ORDER — BUSPIRONE HYDROCHLORIDE 10 MG/1
10 TABLET ORAL 3 TIMES DAILY
Qty: 90 TABLET | Refills: 0 | Status: ON HOLD | OUTPATIENT
Start: 2021-08-21 | End: 2021-12-01 | Stop reason: SDDI

## 2021-08-21 RX ORDER — BUDESONIDE AND FORMOTEROL FUMARATE DIHYDRATE 160; 4.5 UG/1; UG/1
2 AEROSOL RESPIRATORY (INHALATION)
Qty: 1 EACH | Refills: 12 | Status: SHIPPED | OUTPATIENT
Start: 2021-08-21 | End: 2021-10-13 | Stop reason: SDUPTHER

## 2021-08-21 RX ORDER — PREDNISONE 10 MG/1
TABLET ORAL
Qty: 13 TABLET | Refills: 0 | Status: SHIPPED | OUTPATIENT
Start: 2021-08-21 | End: 2021-08-21

## 2021-08-21 RX ADMIN — LOSARTAN POTASSIUM 100 MG: 50 TABLET, FILM COATED ORAL at 09:36

## 2021-08-21 RX ADMIN — ISOSORBIDE DINITRATE 10 MG: 10 TABLET ORAL at 09:38

## 2021-08-21 RX ADMIN — HYDRALAZINE HYDROCHLORIDE 75 MG: 50 TABLET ORAL at 05:22

## 2021-08-21 RX ADMIN — FERROUS SULFATE TAB 325 MG (65 MG ELEMENTAL FE) 325 MG: 325 (65 FE) TAB at 09:38

## 2021-08-21 RX ADMIN — GUAIFENESIN 1200 MG: 600 TABLET, EXTENDED RELEASE ORAL at 09:37

## 2021-08-21 RX ADMIN — CARVEDILOL 25 MG: 25 TABLET, FILM COATED ORAL at 09:38

## 2021-08-21 RX ADMIN — PANTOPRAZOLE SODIUM 40 MG: 40 TABLET, DELAYED RELEASE ORAL at 09:36

## 2021-08-21 RX ADMIN — TICAGRELOR 90 MG: 90 TABLET ORAL at 09:36

## 2021-08-21 RX ADMIN — NIFEDIPINE 60 MG: 60 TABLET, FILM COATED, EXTENDED RELEASE ORAL at 09:37

## 2021-08-21 RX ADMIN — PREDNISONE 40 MG: 20 TABLET ORAL at 09:38

## 2021-08-21 RX ADMIN — OLANZAPINE 5 MG: 5 TABLET, FILM COATED ORAL at 09:37

## 2021-08-21 RX ADMIN — FLUTICASONE PROPIONATE 2 SPRAY: 50 SPRAY, METERED NASAL at 09:39

## 2021-08-21 RX ADMIN — IPRATROPIUM BROMIDE AND ALBUTEROL SULFATE 3 ML: 2.5; .5 SOLUTION RESPIRATORY (INHALATION) at 10:19

## 2021-08-21 RX ADMIN — BUSPIRONE HYDROCHLORIDE 10 MG: 10 TABLET ORAL at 09:37

## 2021-08-21 RX ADMIN — CALCITRIOL 0.25 MCG: 0.25 CAPSULE ORAL at 09:37

## 2021-08-21 RX ADMIN — ASPIRIN 81 MG: 81 TABLET, COATED ORAL at 09:37

## 2021-08-21 NOTE — DISCHARGE SUMMARY
Hospital Discharge Summary    Ludivina Ramirez  :  1960  MRN:  [id number]    Admit date:  2021  Discharge date: 2021    Admitting Physician:    Orville Weston MD    Discharge Diagnoses:      Shortness of breath    Nonrheumatic aortic valve insufficiency    Acute kidney injury superimposed on CKD (CMS/HCC)    Essential hypertension    Coronary artery disease of native artery of native heart with stable angina pectoris (CMS/HCC)    COVID-19      Hospital Course:   61-year-old female with COPD and chronic diastolic heart failure who presented with worsening shortness of breath consistent with COPD exacerbation.  Patient initially received IV steroids and was transitioned to p.o. with good response.  She was started on Symbicort and DuoNeb and will be discharged home with these.  Also based on her chronic respiratory failure, she was deemed a candidate for home trilogy as BiPAP would be insufficient to manage her home needs.  She tolerated her home trilogy the night before discharge and did quite well with that and will discharge home with it.  Will discharge on a prednisone taper and follow-up closely with her PCP and pulmonology.  Need to continue maintenance inhaler.    The patient was admitted for the above noted medical/surgical issues. Please see daily progress note for further details concerning their stay. The patient improved throughout their stay and reached maximum medical improvement on the day of discharge. The patient/family agree with the treatment plan as outlined above. All questions concerning their stay were answered prior to discharge. They understand the importance of follow up concerning any abnormal test results.     Physical Exam  Constitutional:       Appearance: She is well-developed.   HENT:      Head: Normocephalic and atraumatic.   Eyes:      Conjunctiva/sclera: Conjunctivae normal.      Pupils: Pupils are equal, round, and reactive to light.   Cardiovascular:      Rate  and Rhythm: Normal rate and regular rhythm.      Heart sounds: Normal heart sounds. No murmur heard.   No friction rub.   Pulmonary:      Effort: Pulmonary effort is normal. No respiratory distress.      Breath sounds: Normal breath sounds. No wheezing or rales.   Abdominal:      General: Bowel sounds are normal. There is no distension.      Palpations: Abdomen is soft.      Tenderness: There is no abdominal tenderness.   Musculoskeletal:         General: Normal range of motion.      Cervical back: Normal range of motion.   Skin:     Capillary Refill: Capillary refill takes less than 2 seconds.   Neurological:      Mental Status: She is alert and oriented to person, place, and time.      Cranial Nerves: No cranial nerve deficit.   Psychiatric:         Behavior: Behavior normal.           Discharge Medications:         Discharge Medications      New Medications      Instructions Start Date   budesonide-formoterol 160-4.5 MCG/ACT inhaler  Commonly known as: SYMBICORT   2 puffs, Inhalation, 2 Times Daily - RT      busPIRone 10 MG tablet  Commonly known as: BUSPAR   10 mg, Oral, 3 Times Daily      predniSONE 10 MG tablet  Commonly known as: DELTASONE   Take 4 tablets by mouth Daily With Breakfast for 1 day, THEN 2 tablets Daily With Breakfast for 3 days, THEN 1 tablet Daily With Breakfast for 3 days.   Start Date: August 21, 2021        Changes to Medications      Instructions Start Date   ipratropium-albuterol 0.5-2.5 mg/3 ml nebulizer  Commonly known as: DUO-NEB  What changed: Another medication with the same name was added. Make sure you understand how and when to take each.   3 mL, Nebulization, 4 Times Daily - RT      ipratropium-albuterol 0.5-2.5 mg/3 ml nebulizer  Commonly known as: DUO-NEB  What changed: You were already taking a medication with the same name, and this prescription was added. Make sure you understand how and when to take each.   3 mL, Nebulization, Every 4 Hours PRN         Continue These  Medications      Instructions Start Date   aspirin 81 MG EC tablet   81 mg, Oral, Daily      azelastine 0.1 % nasal spray  Commonly known as: ASTELIN   2 sprays, Nasal, 2 Times Daily, Use in each nostril as directed      calcitriol 0.25 MCG capsule  Commonly known as: ROCALTROL   0.25 mcg, Oral, Daily      carvedilol 25 MG tablet  Commonly known as: COREG   25 mg, Oral, 2 Times Daily With Meals      cloNIDine 0.1 MG tablet  Commonly known as: CATAPRES   0.1 mg, Oral, Every 6 Hours PRN      ferrous sulfate 325 (65 FE) MG tablet   325 mg, Oral, 2 Times Daily With Meals      fluticasone 50 MCG/ACT nasal spray  Commonly known as: FLONASE   1 spray, Nasal, Every Morning, In each nostril      furosemide 40 MG tablet  Commonly known as: Lasix   40 mg, Oral, Daily PRN      guaiFENesin 600 MG 12 hr tablet  Commonly known as: MUCINEX   1,200 mg, Oral, Every 12 Hours Scheduled      hydrALAZINE 50 MG tablet  Commonly known as: APRESOLINE   75 mg, Oral, Every 8 Hours      isosorbide dinitrate 10 MG tablet  Commonly known as: ISORDIL   10 mg, Oral, 3 Times Daily (Nitrates)      losartan 100 MG tablet  Commonly known as: COZAAR   100 mg, Oral, Every 24 Hours Scheduled      NIFEdipine CC 60 MG 24 hr tablet  Commonly known as: ADALAT CC   60 mg, Oral, 2 times daily      nitroglycerin 0.4 MG SL tablet  Commonly known as: NITROSTAT   0.4 mg, Sublingual, Every 5 Minutes PRN, Take no more than 3 doses in 15 minutes.      O2  Commonly known as: OXYGEN   2 L/min, Inhalation, Continuous      OLANZapine 5 MG tablet  Commonly known as: zyPREXA   5 mg, Oral, 2 times daily      pantoprazole 40 MG EC tablet  Commonly known as: PROTONIX   40 mg, Oral, Daily      rosuvastatin 20 MG tablet  Commonly known as: CRESTOR   20 mg, Oral, Daily      ticagrelor 90 MG tablet tablet  Commonly known as: BRILINTA   90 mg, Oral, 2 Times Daily             Activity: Ad vern.    Diet: Low-sodium    Consults: Cardiology, pulmonology    Significant Diagnostic  Studies:    No radiology results for the last 7 days         Treatments:   IV steroids, nebs, diuresis    Disposition:   Home in stable condition    25 minutes time spent on discharge including discussion with patient/family, SW, and coordination of care.     Follow up with Orville Weston MD in 1 weeks.    Signed:  Freddie Johnson MD   8/21/2021, 09:31 CDT

## 2021-08-21 NOTE — PLAN OF CARE
Goal Outcome Evaluation:  Plan of Care Reviewed With: patient        Progress: improving  Outcome Summary: VSS. no c/o pain. sat wnl on trilogy. No c/o SOA. Possible DC today. S 60-14

## 2021-08-22 NOTE — OUTREACH NOTE
Prep Survey      Responses   [facility] patient discharged from?  [place]   Is LACE score < 7 ?  No   Emergency Room discharge w/ pulse ox?  No   Eligibility  Readm Mgmt   Discharge diagnosis  a/c COPD,  a/c CHF,  Covid negative,  HTN MATTY/CKI   Does the patient have one of the following disease processes/diagnoses(primary or secondary)?  COPD/Pneumonia   Does the patient have Home health ordered?  No   Is there a DME ordered?  Yes   What DME was ordered?  Awaiting insurance approval for triolgy - see CM note   General alerts for this patient  Patient likes to be called after 11 am.    Prep survey completed?  Yes          Marlys Tinoco RN

## 2021-08-23 ENCOUNTER — HOME CARE VISIT (OUTPATIENT)
Dept: HOME HEALTH SERVICES | Facility: CLINIC | Age: 61
End: 2021-08-23

## 2021-08-23 ENCOUNTER — HOSPITAL ENCOUNTER (OUTPATIENT)
Facility: HOSPITAL | Age: 61
Setting detail: OBSERVATION
Discharge: HOME | End: 2021-09-01
Attending: EMERGENCY MEDICINE | Admitting: FAMILY MEDICINE

## 2021-08-23 ENCOUNTER — APPOINTMENT (OUTPATIENT)
Dept: CT IMAGING | Facility: HOSPITAL | Age: 61
End: 2021-08-23

## 2021-08-23 ENCOUNTER — APPOINTMENT (OUTPATIENT)
Dept: GENERAL RADIOLOGY | Facility: HOSPITAL | Age: 61
End: 2021-08-23

## 2021-08-23 DIAGNOSIS — Z74.09 IMPAIRED MOBILITY AND ADLS: ICD-10-CM

## 2021-08-23 DIAGNOSIS — J44.1 COPD EXACERBATION (HCC): ICD-10-CM

## 2021-08-23 DIAGNOSIS — I50.33 ACUTE ON CHRONIC DIASTOLIC HEART FAILURE (HCC): ICD-10-CM

## 2021-08-23 DIAGNOSIS — Z91.199 NONCOMPLIANCE: ICD-10-CM

## 2021-08-23 DIAGNOSIS — I25.118 CORONARY ARTERY DISEASE OF NATIVE ARTERY OF NATIVE HEART WITH STABLE ANGINA PECTORIS (HCC): ICD-10-CM

## 2021-08-23 DIAGNOSIS — I50.33 ACUTE ON CHRONIC DIASTOLIC CONGESTIVE HEART FAILURE (HCC): ICD-10-CM

## 2021-08-23 DIAGNOSIS — Z74.09 DECREASED FUNCTIONAL MOBILITY AND ENDURANCE: ICD-10-CM

## 2021-08-23 DIAGNOSIS — Z78.9 IMPAIRED MOBILITY AND ADLS: ICD-10-CM

## 2021-08-23 DIAGNOSIS — R07.9 CHEST PAIN, UNSPECIFIED TYPE: Primary | ICD-10-CM

## 2021-08-23 LAB
ALBUMIN SERPL-MCNC: 3.6 G/DL (ref 3.5–5.2)
ALBUMIN/GLOB SERPL: 1.8 G/DL
ALP SERPL-CCNC: 73 U/L (ref 39–117)
ALT SERPL W P-5'-P-CCNC: 20 U/L (ref 1–33)
ANION GAP SERPL CALCULATED.3IONS-SCNC: 6 MMOL/L (ref 5–15)
ANISOCYTOSIS BLD QL: ABNORMAL
APTT PPP: 24.3 SECONDS (ref 24.1–35)
AST SERPL-CCNC: 14 U/L (ref 1–32)
BILIRUB SERPL-MCNC: 0.4 MG/DL (ref 0–1.2)
BUN SERPL-MCNC: 43 MG/DL (ref 8–23)
BUN/CREAT SERPL: 21.9 (ref 7–25)
CALCIUM SPEC-SCNC: 7.6 MG/DL (ref 8.6–10.5)
CHLORIDE SERPL-SCNC: 113 MMOL/L (ref 98–107)
CO2 SERPL-SCNC: 25 MMOL/L (ref 22–29)
CREAT SERPL-MCNC: 1.96 MG/DL (ref 0.57–1)
D DIMER PPP FEU-MCNC: 0.62 MG/L (FEU) (ref 0–0.5)
DEPRECATED RDW RBC AUTO: 55.5 FL (ref 37–54)
ERYTHROCYTE [DISTWIDTH] IN BLOOD BY AUTOMATED COUNT: 15.6 % (ref 12.3–15.4)
GFR SERPL CREATININE-BSD FRML MDRD: 26 ML/MIN/1.73
GLOBULIN UR ELPH-MCNC: 2 GM/DL
GLUCOSE SERPL-MCNC: 187 MG/DL (ref 65–99)
HCT VFR BLD AUTO: 32.6 % (ref 34–46.6)
HGB BLD-MCNC: 9.8 G/DL (ref 12–15.9)
INR PPP: 1.07 (ref 0.91–1.09)
LYMPHOCYTES # BLD MANUAL: 0.55 10*3/MM3 (ref 0.7–3.1)
LYMPHOCYTES NFR BLD MANUAL: 1 % (ref 5–12)
LYMPHOCYTES NFR BLD MANUAL: 5 % (ref 19.6–45.3)
MCH RBC QN AUTO: 29.3 PG (ref 26.6–33)
MCHC RBC AUTO-ENTMCNC: 30.1 G/DL (ref 31.5–35.7)
MCV RBC AUTO: 97.6 FL (ref 79–97)
METAMYELOCYTES NFR BLD MANUAL: 1 % (ref 0–0)
MONOCYTES # BLD AUTO: 0.11 10*3/MM3 (ref 0.1–0.9)
MYELOCYTES NFR BLD MANUAL: 1 % (ref 0–0)
NEUTROPHILS # BLD AUTO: 10.17 10*3/MM3 (ref 1.7–7)
NEUTROPHILS NFR BLD MANUAL: 92 % (ref 42.7–76)
NT-PROBNP SERPL-MCNC: 8629 PG/ML (ref 0–900)
PLAT MORPH BLD: NORMAL
PLATELET # BLD AUTO: 277 10*3/MM3 (ref 140–450)
PMV BLD AUTO: 10.3 FL (ref 6–12)
POLYCHROMASIA BLD QL SMEAR: ABNORMAL
POTASSIUM SERPL-SCNC: 4.3 MMOL/L (ref 3.5–5.2)
PROT SERPL-MCNC: 5.6 G/DL (ref 6–8.5)
PROTHROMBIN TIME: 13.1 SECONDS (ref 11.5–13.4)
RBC # BLD AUTO: 3.34 10*6/MM3 (ref 3.77–5.28)
SARS-COV-2 RNA PNL SPEC NAA+PROBE: NOT DETECTED
SODIUM SERPL-SCNC: 144 MMOL/L (ref 136–145)
TROPONIN T SERPL-MCNC: <0.01 NG/ML (ref 0–0.03)
WBC # BLD AUTO: 11.05 10*3/MM3 (ref 3.4–10.8)
WBC MORPH BLD: NORMAL

## 2021-08-23 PROCEDURE — 87635 SARS-COV-2 COVID-19 AMP PRB: CPT | Performed by: EMERGENCY MEDICINE

## 2021-08-23 PROCEDURE — 94640 AIRWAY INHALATION TREATMENT: CPT

## 2021-08-23 PROCEDURE — 71045 X-RAY EXAM CHEST 1 VIEW: CPT

## 2021-08-23 PROCEDURE — 84484 ASSAY OF TROPONIN QUANT: CPT | Performed by: EMERGENCY MEDICINE

## 2021-08-23 PROCEDURE — 25010000002 METHYLPREDNISOLONE PER 125 MG: Performed by: EMERGENCY MEDICINE

## 2021-08-23 PROCEDURE — 85025 COMPLETE CBC W/AUTO DIFF WBC: CPT | Performed by: EMERGENCY MEDICINE

## 2021-08-23 PROCEDURE — 93010 ELECTROCARDIOGRAM REPORT: CPT | Performed by: INTERNAL MEDICINE

## 2021-08-23 PROCEDURE — 93005 ELECTROCARDIOGRAM TRACING: CPT | Performed by: EMERGENCY MEDICINE

## 2021-08-23 PROCEDURE — C9803 HOPD COVID-19 SPEC COLLECT: HCPCS

## 2021-08-23 PROCEDURE — 85379 FIBRIN DEGRADATION QUANT: CPT | Performed by: EMERGENCY MEDICINE

## 2021-08-23 PROCEDURE — 25010000002 FENTANYL CITRATE (PF) 50 MCG/ML SOLUTION: Performed by: EMERGENCY MEDICINE

## 2021-08-23 PROCEDURE — 94799 UNLISTED PULMONARY SVC/PX: CPT

## 2021-08-23 PROCEDURE — 80053 COMPREHEN METABOLIC PANEL: CPT | Performed by: EMERGENCY MEDICINE

## 2021-08-23 PROCEDURE — 25010000002 ONDANSETRON PER 1 MG: Performed by: EMERGENCY MEDICINE

## 2021-08-23 PROCEDURE — 96375 TX/PRO/DX INJ NEW DRUG ADDON: CPT

## 2021-08-23 PROCEDURE — 99285 EMERGENCY DEPT VISIT HI MDM: CPT

## 2021-08-23 PROCEDURE — G0155 HHCP-SVS OF CSW,EA 15 MIN: HCPCS

## 2021-08-23 PROCEDURE — 85007 BL SMEAR W/DIFF WBC COUNT: CPT | Performed by: EMERGENCY MEDICINE

## 2021-08-23 PROCEDURE — 0 IOPAMIDOL PER 1 ML: Performed by: EMERGENCY MEDICINE

## 2021-08-23 PROCEDURE — 71275 CT ANGIOGRAPHY CHEST: CPT

## 2021-08-23 PROCEDURE — 85730 THROMBOPLASTIN TIME PARTIAL: CPT | Performed by: EMERGENCY MEDICINE

## 2021-08-23 PROCEDURE — 85610 PROTHROMBIN TIME: CPT | Performed by: EMERGENCY MEDICINE

## 2021-08-23 PROCEDURE — 83880 ASSAY OF NATRIURETIC PEPTIDE: CPT | Performed by: EMERGENCY MEDICINE

## 2021-08-23 PROCEDURE — 96374 THER/PROPH/DIAG INJ IV PUSH: CPT

## 2021-08-23 RX ORDER — FENTANYL CITRATE 50 UG/ML
25 INJECTION, SOLUTION INTRAMUSCULAR; INTRAVENOUS ONCE
Status: COMPLETED | OUTPATIENT
Start: 2021-08-23 | End: 2021-08-23

## 2021-08-23 RX ORDER — ONDANSETRON 2 MG/ML
4 INJECTION INTRAMUSCULAR; INTRAVENOUS ONCE
Status: COMPLETED | OUTPATIENT
Start: 2021-08-23 | End: 2021-08-23

## 2021-08-23 RX ORDER — FUROSEMIDE 10 MG/ML
40 INJECTION INTRAMUSCULAR; INTRAVENOUS ONCE
Status: COMPLETED | OUTPATIENT
Start: 2021-08-23 | End: 2021-08-24

## 2021-08-23 RX ORDER — IPRATROPIUM BROMIDE AND ALBUTEROL SULFATE 2.5; .5 MG/3ML; MG/3ML
3 SOLUTION RESPIRATORY (INHALATION) ONCE
Status: COMPLETED | OUTPATIENT
Start: 2021-08-23 | End: 2021-08-23

## 2021-08-23 RX ORDER — METHYLPREDNISOLONE SODIUM SUCCINATE 125 MG/2ML
125 INJECTION, POWDER, LYOPHILIZED, FOR SOLUTION INTRAMUSCULAR; INTRAVENOUS ONCE
Status: COMPLETED | OUTPATIENT
Start: 2021-08-23 | End: 2021-08-23

## 2021-08-23 RX ADMIN — IPRATROPIUM BROMIDE AND ALBUTEROL SULFATE 3 ML: 2.5; .5 SOLUTION RESPIRATORY (INHALATION) at 22:19

## 2021-08-23 RX ADMIN — METHYLPREDNISOLONE SODIUM SUCCINATE 125 MG: 125 INJECTION, POWDER, FOR SOLUTION INTRAMUSCULAR; INTRAVENOUS at 21:13

## 2021-08-23 RX ADMIN — FENTANYL CITRATE 25 MCG: 50 INJECTION, SOLUTION INTRAMUSCULAR; INTRAVENOUS at 21:13

## 2021-08-23 RX ADMIN — IOPAMIDOL 100 ML: 755 INJECTION, SOLUTION INTRAVENOUS at 22:02

## 2021-08-23 RX ADMIN — ONDANSETRON 4 MG: 2 INJECTION INTRAMUSCULAR; INTRAVENOUS at 21:13

## 2021-08-23 NOTE — HOME HEALTH
SKILLED SERVICES PROVIDED / MEDICAL   ASSESSMENT OF SOCIAL AND EMOTIONAL FACTORS  COUNSELING FOR LONG RANGE PLANNING AND DECISION MAKING  COMMUNITY RESOURCE PLANNING    HOMEBOUND STATUS-cont O2, sob with any exertion, weak, taxing effort to leave home    HOME/SOCIAL ENVIRONMENT-Patient lives alone in a large older home, very cluttered. She has a very limited support systeml. She had lost her cannula to her oxygen so she was using her  Trilogy machine to breath. Contacted [facility] to discuss patient's need for a cannula and her prescription she had for a nebulizer. [facility] was to come to assist patient with needs.

## 2021-08-24 ENCOUNTER — READMISSION MANAGEMENT (OUTPATIENT)
Dept: CALL CENTER | Facility: HOSPITAL | Age: 61
End: 2021-08-24

## 2021-08-24 LAB
ANION GAP SERPL CALCULATED.3IONS-SCNC: 7 MMOL/L (ref 5–15)
BUN SERPL-MCNC: 44 MG/DL (ref 8–23)
BUN/CREAT SERPL: 21.9 (ref 7–25)
CALCIUM SPEC-SCNC: 8.1 MG/DL (ref 8.6–10.5)
CHLORIDE SERPL-SCNC: 105 MMOL/L (ref 98–107)
CO2 SERPL-SCNC: 31 MMOL/L (ref 22–29)
CREAT SERPL-MCNC: 2.01 MG/DL (ref 0.57–1)
GFR SERPL CREATININE-BSD FRML MDRD: 25 ML/MIN/1.73
GLUCOSE SERPL-MCNC: 166 MG/DL (ref 65–99)
POTASSIUM SERPL-SCNC: 4.6 MMOL/L (ref 3.5–5.2)
SODIUM SERPL-SCNC: 143 MMOL/L (ref 136–145)
TROPONIN T SERPL-MCNC: <0.01 NG/ML (ref 0–0.03)
TROPONIN T SERPL-MCNC: <0.01 NG/ML (ref 0–0.03)

## 2021-08-24 PROCEDURE — 80048 BASIC METABOLIC PNL TOTAL CA: CPT | Performed by: FAMILY MEDICINE

## 2021-08-24 PROCEDURE — 94640 AIRWAY INHALATION TREATMENT: CPT

## 2021-08-24 PROCEDURE — 25010000002 FUROSEMIDE PER 20 MG: Performed by: EMERGENCY MEDICINE

## 2021-08-24 PROCEDURE — G0378 HOSPITAL OBSERVATION PER HR: HCPCS

## 2021-08-24 PROCEDURE — 97165 OT EVAL LOW COMPLEX 30 MIN: CPT | Performed by: OCCUPATIONAL THERAPIST

## 2021-08-24 PROCEDURE — 94799 UNLISTED PULMONARY SVC/PX: CPT

## 2021-08-24 PROCEDURE — 84484 ASSAY OF TROPONIN QUANT: CPT | Performed by: FAMILY MEDICINE

## 2021-08-24 PROCEDURE — 96372 THER/PROPH/DIAG INJ SC/IM: CPT

## 2021-08-24 PROCEDURE — 96375 TX/PRO/DX INJ NEW DRUG ADDON: CPT

## 2021-08-24 PROCEDURE — 25010000002 METHYLPREDNISOLONE PER 40 MG: Performed by: FAMILY MEDICINE

## 2021-08-24 PROCEDURE — 97161 PT EVAL LOW COMPLEX 20 MIN: CPT | Performed by: PHYSICAL THERAPIST

## 2021-08-24 PROCEDURE — 94664 DEMO&/EVAL PT USE INHALER: CPT

## 2021-08-24 PROCEDURE — 96376 TX/PRO/DX INJ SAME DRUG ADON: CPT

## 2021-08-24 PROCEDURE — 99214 OFFICE O/P EST MOD 30 MIN: CPT | Performed by: NURSE PRACTITIONER

## 2021-08-24 PROCEDURE — 25010000002 ENOXAPARIN PER 10 MG: Performed by: FAMILY MEDICINE

## 2021-08-24 RX ORDER — OLANZAPINE 5 MG/1
5 TABLET ORAL NIGHTLY
Status: DISCONTINUED | OUTPATIENT
Start: 2021-08-24 | End: 2021-09-01 | Stop reason: HOSPADM

## 2021-08-24 RX ORDER — CALCITRIOL 0.25 UG/1
0.25 CAPSULE, LIQUID FILLED ORAL DAILY
Status: DISCONTINUED | OUTPATIENT
Start: 2021-08-24 | End: 2021-09-01 | Stop reason: HOSPADM

## 2021-08-24 RX ORDER — IPRATROPIUM BROMIDE AND ALBUTEROL SULFATE 2.5; .5 MG/3ML; MG/3ML
3 SOLUTION RESPIRATORY (INHALATION)
Status: DISCONTINUED | OUTPATIENT
Start: 2021-08-24 | End: 2021-09-01 | Stop reason: HOSPADM

## 2021-08-24 RX ORDER — ROSUVASTATIN CALCIUM 20 MG/1
20 TABLET, COATED ORAL DAILY
Status: DISCONTINUED | OUTPATIENT
Start: 2021-08-24 | End: 2021-09-01 | Stop reason: HOSPADM

## 2021-08-24 RX ORDER — NITROGLYCERIN 0.4 MG/1
0.4 TABLET SUBLINGUAL
Status: DISCONTINUED | OUTPATIENT
Start: 2021-08-24 | End: 2021-09-01 | Stop reason: HOSPADM

## 2021-08-24 RX ORDER — FLUTICASONE PROPIONATE 50 MCG
1 SPRAY, SUSPENSION (ML) NASAL EVERY MORNING
Status: DISCONTINUED | OUTPATIENT
Start: 2021-08-24 | End: 2021-09-01 | Stop reason: HOSPADM

## 2021-08-24 RX ORDER — FUROSEMIDE 40 MG/1
40 TABLET ORAL DAILY PRN
Status: DISCONTINUED | OUTPATIENT
Start: 2021-08-24 | End: 2021-09-01 | Stop reason: HOSPADM

## 2021-08-24 RX ORDER — ISOSORBIDE DINITRATE 10 MG/1
10 TABLET ORAL
Status: DISCONTINUED | OUTPATIENT
Start: 2021-08-24 | End: 2021-09-01 | Stop reason: HOSPADM

## 2021-08-24 RX ORDER — MULTIPLE VITAMINS W/ MINERALS TAB 9MG-400MCG
1 TAB ORAL DAILY
COMMUNITY

## 2021-08-24 RX ORDER — FERROUS SULFATE 325(65) MG
325 TABLET ORAL 2 TIMES DAILY WITH MEALS
Status: DISCONTINUED | OUTPATIENT
Start: 2021-08-24 | End: 2021-09-01 | Stop reason: HOSPADM

## 2021-08-24 RX ORDER — AZELASTINE 1 MG/ML
2 SPRAY, METERED NASAL 2 TIMES DAILY
Status: DISCONTINUED | OUTPATIENT
Start: 2021-08-24 | End: 2021-09-01 | Stop reason: HOSPADM

## 2021-08-24 RX ORDER — CARVEDILOL 25 MG/1
25 TABLET ORAL 2 TIMES DAILY WITH MEALS
Status: DISCONTINUED | OUTPATIENT
Start: 2021-08-24 | End: 2021-09-01 | Stop reason: HOSPADM

## 2021-08-24 RX ORDER — NIFEDIPINE 60 MG/1
60 TABLET, EXTENDED RELEASE ORAL 2 TIMES DAILY
Status: DISCONTINUED | OUTPATIENT
Start: 2021-08-24 | End: 2021-09-01 | Stop reason: HOSPADM

## 2021-08-24 RX ORDER — BUSPIRONE HYDROCHLORIDE 10 MG/1
10 TABLET ORAL 3 TIMES DAILY
Status: DISCONTINUED | OUTPATIENT
Start: 2021-08-24 | End: 2021-09-01 | Stop reason: HOSPADM

## 2021-08-24 RX ORDER — PANTOPRAZOLE SODIUM 40 MG/1
40 TABLET, DELAYED RELEASE ORAL
Status: DISCONTINUED | OUTPATIENT
Start: 2021-08-24 | End: 2021-09-01 | Stop reason: HOSPADM

## 2021-08-24 RX ORDER — BUDESONIDE AND FORMOTEROL FUMARATE DIHYDRATE 160; 4.5 UG/1; UG/1
2 AEROSOL RESPIRATORY (INHALATION)
Status: DISCONTINUED | OUTPATIENT
Start: 2021-08-24 | End: 2021-09-01 | Stop reason: HOSPADM

## 2021-08-24 RX ORDER — ASPIRIN 81 MG/1
81 TABLET ORAL DAILY
Status: DISCONTINUED | OUTPATIENT
Start: 2021-08-24 | End: 2021-09-01 | Stop reason: HOSPADM

## 2021-08-24 RX ORDER — CHOLECALCIFEROL (VITAMIN D3) 125 MCG
5 CAPSULE ORAL NIGHTLY
Status: ON HOLD | COMMUNITY
End: 2022-06-04

## 2021-08-24 RX ORDER — GUAIFENESIN 600 MG/1
1200 TABLET, EXTENDED RELEASE ORAL EVERY 12 HOURS SCHEDULED
Status: DISCONTINUED | OUTPATIENT
Start: 2021-08-24 | End: 2021-09-01 | Stop reason: HOSPADM

## 2021-08-24 RX ORDER — MULTIVIT WITH MINERALS/LUTEIN
500 TABLET ORAL DAILY
COMMUNITY
End: 2022-06-10 | Stop reason: HOSPADM

## 2021-08-24 RX ORDER — IPRATROPIUM BROMIDE AND ALBUTEROL SULFATE 2.5; .5 MG/3ML; MG/3ML
3 SOLUTION RESPIRATORY (INHALATION)
COMMUNITY
End: 2021-10-13 | Stop reason: SDUPTHER

## 2021-08-24 RX ORDER — METHYLPREDNISOLONE SODIUM SUCCINATE 40 MG/ML
40 INJECTION, POWDER, LYOPHILIZED, FOR SOLUTION INTRAMUSCULAR; INTRAVENOUS EVERY 6 HOURS
Status: DISCONTINUED | OUTPATIENT
Start: 2021-08-24 | End: 2021-08-26

## 2021-08-24 RX ADMIN — ISOSORBIDE DINITRATE 10 MG: 10 TABLET ORAL at 13:07

## 2021-08-24 RX ADMIN — FLUTICASONE PROPIONATE 1 SPRAY: 50 SPRAY, METERED NASAL at 08:20

## 2021-08-24 RX ADMIN — BUSPIRONE HYDROCHLORIDE 10 MG: 10 TABLET ORAL at 08:20

## 2021-08-24 RX ADMIN — GUAIFENESIN 1200 MG: 600 TABLET, EXTENDED RELEASE ORAL at 08:24

## 2021-08-24 RX ADMIN — CALCITRIOL 0.25 MCG: 0.25 CAPSULE ORAL at 08:31

## 2021-08-24 RX ADMIN — NIFEDIPINE 60 MG: 60 TABLET, FILM COATED, EXTENDED RELEASE ORAL at 21:23

## 2021-08-24 RX ADMIN — AZELASTINE HYDROCHLORIDE 2 SPRAY: 137 SPRAY, METERED NASAL at 21:24

## 2021-08-24 RX ADMIN — TICAGRELOR 90 MG: 90 TABLET ORAL at 08:20

## 2021-08-24 RX ADMIN — BUSPIRONE HYDROCHLORIDE 10 MG: 10 TABLET ORAL at 21:23

## 2021-08-24 RX ADMIN — ISOSORBIDE DINITRATE 10 MG: 10 TABLET ORAL at 17:04

## 2021-08-24 RX ADMIN — CARVEDILOL 25 MG: 25 TABLET, FILM COATED ORAL at 08:20

## 2021-08-24 RX ADMIN — IPRATROPIUM BROMIDE AND ALBUTEROL SULFATE 3 ML: 2.5; .5 SOLUTION RESPIRATORY (INHALATION) at 07:17

## 2021-08-24 RX ADMIN — IPRATROPIUM BROMIDE AND ALBUTEROL SULFATE 3 ML: 2.5; .5 SOLUTION RESPIRATORY (INHALATION) at 10:48

## 2021-08-24 RX ADMIN — GUAIFENESIN 1200 MG: 600 TABLET, EXTENDED RELEASE ORAL at 21:23

## 2021-08-24 RX ADMIN — HYDRALAZINE HYDROCHLORIDE 75 MG: 50 TABLET ORAL at 21:23

## 2021-08-24 RX ADMIN — IPRATROPIUM BROMIDE AND ALBUTEROL SULFATE 3 ML: 2.5; .5 SOLUTION RESPIRATORY (INHALATION) at 15:37

## 2021-08-24 RX ADMIN — CARVEDILOL 25 MG: 25 TABLET, FILM COATED ORAL at 17:04

## 2021-08-24 RX ADMIN — METHYLPREDNISOLONE SODIUM SUCCINATE 40 MG: 40 INJECTION, POWDER, FOR SOLUTION INTRAMUSCULAR; INTRAVENOUS at 21:22

## 2021-08-24 RX ADMIN — TICAGRELOR 90 MG: 90 TABLET ORAL at 21:23

## 2021-08-24 RX ADMIN — ROSUVASTATIN CALCIUM 20 MG: 20 TABLET, FILM COATED ORAL at 08:20

## 2021-08-24 RX ADMIN — OLANZAPINE 5 MG: 5 TABLET, FILM COATED ORAL at 21:23

## 2021-08-24 RX ADMIN — ENOXAPARIN SODIUM 40 MG: 40 INJECTION SUBCUTANEOUS at 08:19

## 2021-08-24 RX ADMIN — METHYLPREDNISOLONE SODIUM SUCCINATE 40 MG: 40 INJECTION, POWDER, FOR SOLUTION INTRAMUSCULAR; INTRAVENOUS at 08:19

## 2021-08-24 RX ADMIN — HYDRALAZINE HYDROCHLORIDE 75 MG: 50 TABLET ORAL at 08:20

## 2021-08-24 RX ADMIN — FUROSEMIDE 40 MG: 10 INJECTION, SOLUTION INTRAMUSCULAR; INTRAVENOUS at 00:13

## 2021-08-24 RX ADMIN — AZELASTINE HYDROCHLORIDE 2 SPRAY: 137 SPRAY, METERED NASAL at 08:21

## 2021-08-24 RX ADMIN — ISOSORBIDE DINITRATE 10 MG: 10 TABLET ORAL at 08:20

## 2021-08-24 RX ADMIN — BUDESONIDE AND FORMOTEROL FUMARATE DIHYDRATE 2 PUFF: 160; 4.5 AEROSOL RESPIRATORY (INHALATION) at 09:33

## 2021-08-24 RX ADMIN — FERROUS SULFATE TAB 325 MG (65 MG ELEMENTAL FE) 325 MG: 325 (65 FE) TAB at 17:04

## 2021-08-24 RX ADMIN — NIFEDIPINE 60 MG: 60 TABLET, FILM COATED, EXTENDED RELEASE ORAL at 08:20

## 2021-08-24 RX ADMIN — HYDRALAZINE HYDROCHLORIDE 75 MG: 50 TABLET ORAL at 13:07

## 2021-08-24 RX ADMIN — IPRATROPIUM BROMIDE AND ALBUTEROL SULFATE 3 ML: 2.5; .5 SOLUTION RESPIRATORY (INHALATION) at 19:42

## 2021-08-24 RX ADMIN — BUSPIRONE HYDROCHLORIDE 10 MG: 10 TABLET ORAL at 17:04

## 2021-08-24 RX ADMIN — BUDESONIDE AND FORMOTEROL FUMARATE DIHYDRATE 2 PUFF: 160; 4.5 AEROSOL RESPIRATORY (INHALATION) at 19:48

## 2021-08-24 RX ADMIN — METHYLPREDNISOLONE SODIUM SUCCINATE 40 MG: 40 INJECTION, POWDER, FOR SOLUTION INTRAMUSCULAR; INTRAVENOUS at 13:07

## 2021-08-24 RX ADMIN — ASPIRIN 81 MG: 81 TABLET ORAL at 08:20

## 2021-08-24 NOTE — OUTREACH NOTE
COPD/PN Week 1 Survey      Responses   [facility] patient discharged from?  [place]   Does the patient have one of the following disease processes/diagnoses(primary or secondary)?  COPD/Pneumonia   Week 1 attempt successful?  No   Unsuccessful attempts  Attempt 1   Revoke  Readmitted          Cinda Dyer RN

## 2021-08-25 LAB
QT INTERVAL: 394 MS
QTC INTERVAL: 437 MS

## 2021-08-25 PROCEDURE — 97110 THERAPEUTIC EXERCISES: CPT

## 2021-08-25 PROCEDURE — G0378 HOSPITAL OBSERVATION PER HR: HCPCS

## 2021-08-25 PROCEDURE — 25010000002 ENOXAPARIN PER 10 MG: Performed by: FAMILY MEDICINE

## 2021-08-25 PROCEDURE — 25010000002 METHYLPREDNISOLONE PER 40 MG: Performed by: FAMILY MEDICINE

## 2021-08-25 PROCEDURE — 94799 UNLISTED PULMONARY SVC/PX: CPT

## 2021-08-25 PROCEDURE — 97116 GAIT TRAINING THERAPY: CPT

## 2021-08-25 PROCEDURE — 96372 THER/PROPH/DIAG INJ SC/IM: CPT

## 2021-08-25 PROCEDURE — 94664 DEMO&/EVAL PT USE INHALER: CPT

## 2021-08-25 PROCEDURE — 96376 TX/PRO/DX INJ SAME DRUG ADON: CPT

## 2021-08-25 RX ADMIN — BUDESONIDE AND FORMOTEROL FUMARATE DIHYDRATE 2 PUFF: 160; 4.5 AEROSOL RESPIRATORY (INHALATION) at 10:11

## 2021-08-25 RX ADMIN — IPRATROPIUM BROMIDE AND ALBUTEROL SULFATE 3 ML: 2.5; .5 SOLUTION RESPIRATORY (INHALATION) at 19:37

## 2021-08-25 RX ADMIN — OLANZAPINE 5 MG: 5 TABLET, FILM COATED ORAL at 21:11

## 2021-08-25 RX ADMIN — METHYLPREDNISOLONE SODIUM SUCCINATE 40 MG: 40 INJECTION, POWDER, FOR SOLUTION INTRAMUSCULAR; INTRAVENOUS at 13:03

## 2021-08-25 RX ADMIN — ISOSORBIDE DINITRATE 10 MG: 10 TABLET ORAL at 08:42

## 2021-08-25 RX ADMIN — CARVEDILOL 25 MG: 25 TABLET, FILM COATED ORAL at 17:04

## 2021-08-25 RX ADMIN — IPRATROPIUM BROMIDE AND ALBUTEROL SULFATE 3 ML: 2.5; .5 SOLUTION RESPIRATORY (INHALATION) at 14:14

## 2021-08-25 RX ADMIN — NIFEDIPINE 60 MG: 60 TABLET, FILM COATED, EXTENDED RELEASE ORAL at 08:42

## 2021-08-25 RX ADMIN — GUAIFENESIN 1200 MG: 600 TABLET, EXTENDED RELEASE ORAL at 21:10

## 2021-08-25 RX ADMIN — CARVEDILOL 25 MG: 25 TABLET, FILM COATED ORAL at 08:42

## 2021-08-25 RX ADMIN — GUAIFENESIN 1200 MG: 600 TABLET, EXTENDED RELEASE ORAL at 08:42

## 2021-08-25 RX ADMIN — ISOSORBIDE DINITRATE 10 MG: 10 TABLET ORAL at 13:03

## 2021-08-25 RX ADMIN — ASPIRIN 81 MG: 81 TABLET ORAL at 08:43

## 2021-08-25 RX ADMIN — AZELASTINE HYDROCHLORIDE 2 SPRAY: 137 SPRAY, METERED NASAL at 21:08

## 2021-08-25 RX ADMIN — METHYLPREDNISOLONE SODIUM SUCCINATE 40 MG: 40 INJECTION, POWDER, FOR SOLUTION INTRAMUSCULAR; INTRAVENOUS at 02:21

## 2021-08-25 RX ADMIN — FLUTICASONE PROPIONATE 1 SPRAY: 50 SPRAY, METERED NASAL at 06:10

## 2021-08-25 RX ADMIN — METHYLPREDNISOLONE SODIUM SUCCINATE 40 MG: 40 INJECTION, POWDER, FOR SOLUTION INTRAMUSCULAR; INTRAVENOUS at 21:07

## 2021-08-25 RX ADMIN — IPRATROPIUM BROMIDE AND ALBUTEROL SULFATE 3 ML: 2.5; .5 SOLUTION RESPIRATORY (INHALATION) at 10:11

## 2021-08-25 RX ADMIN — AZELASTINE HYDROCHLORIDE 2 SPRAY: 137 SPRAY, METERED NASAL at 08:43

## 2021-08-25 RX ADMIN — ROSUVASTATIN CALCIUM 20 MG: 20 TABLET, FILM COATED ORAL at 08:42

## 2021-08-25 RX ADMIN — HYDRALAZINE HYDROCHLORIDE 75 MG: 50 TABLET ORAL at 21:11

## 2021-08-25 RX ADMIN — ENOXAPARIN SODIUM 40 MG: 40 INJECTION SUBCUTANEOUS at 08:42

## 2021-08-25 RX ADMIN — BUSPIRONE HYDROCHLORIDE 10 MG: 10 TABLET ORAL at 21:10

## 2021-08-25 RX ADMIN — TICAGRELOR 90 MG: 90 TABLET ORAL at 08:42

## 2021-08-25 RX ADMIN — TICAGRELOR 90 MG: 90 TABLET ORAL at 21:11

## 2021-08-25 RX ADMIN — METHYLPREDNISOLONE SODIUM SUCCINATE 40 MG: 40 INJECTION, POWDER, FOR SOLUTION INTRAMUSCULAR; INTRAVENOUS at 08:42

## 2021-08-25 RX ADMIN — FERROUS SULFATE TAB 325 MG (65 MG ELEMENTAL FE) 325 MG: 325 (65 FE) TAB at 08:43

## 2021-08-25 RX ADMIN — BUSPIRONE HYDROCHLORIDE 10 MG: 10 TABLET ORAL at 17:04

## 2021-08-25 RX ADMIN — HYDRALAZINE HYDROCHLORIDE 75 MG: 50 TABLET ORAL at 06:10

## 2021-08-25 RX ADMIN — PANTOPRAZOLE SODIUM 40 MG: 40 TABLET, DELAYED RELEASE ORAL at 06:09

## 2021-08-25 RX ADMIN — FERROUS SULFATE TAB 325 MG (65 MG ELEMENTAL FE) 325 MG: 325 (65 FE) TAB at 17:04

## 2021-08-25 RX ADMIN — BUDESONIDE AND FORMOTEROL FUMARATE DIHYDRATE 2 PUFF: 160; 4.5 AEROSOL RESPIRATORY (INHALATION) at 19:46

## 2021-08-25 RX ADMIN — ISOSORBIDE DINITRATE 10 MG: 10 TABLET ORAL at 17:04

## 2021-08-25 RX ADMIN — NIFEDIPINE 60 MG: 60 TABLET, FILM COATED, EXTENDED RELEASE ORAL at 21:09

## 2021-08-25 RX ADMIN — HYDRALAZINE HYDROCHLORIDE 75 MG: 50 TABLET ORAL at 13:03

## 2021-08-25 RX ADMIN — CALCITRIOL 0.25 MCG: 0.25 CAPSULE ORAL at 08:42

## 2021-08-25 RX ADMIN — BUSPIRONE HYDROCHLORIDE 10 MG: 10 TABLET ORAL at 08:42

## 2021-08-26 LAB
ANION GAP SERPL CALCULATED.3IONS-SCNC: 8 MMOL/L (ref 5–15)
BUN SERPL-MCNC: 50 MG/DL (ref 8–23)
BUN/CREAT SERPL: 23.3 (ref 7–25)
CALCIUM SPEC-SCNC: 8 MG/DL (ref 8.6–10.5)
CHLORIDE SERPL-SCNC: 104 MMOL/L (ref 98–107)
CO2 SERPL-SCNC: 28 MMOL/L (ref 22–29)
CREAT SERPL-MCNC: 2.15 MG/DL (ref 0.57–1)
GFR SERPL CREATININE-BSD FRML MDRD: 23 ML/MIN/1.73
GLUCOSE SERPL-MCNC: 240 MG/DL (ref 65–99)
POTASSIUM SERPL-SCNC: 4.6 MMOL/L (ref 3.5–5.2)
SODIUM SERPL-SCNC: 140 MMOL/L (ref 136–145)

## 2021-08-26 PROCEDURE — 94799 UNLISTED PULMONARY SVC/PX: CPT

## 2021-08-26 PROCEDURE — 25010000002 METHYLPREDNISOLONE PER 40 MG: Performed by: FAMILY MEDICINE

## 2021-08-26 PROCEDURE — G0378 HOSPITAL OBSERVATION PER HR: HCPCS

## 2021-08-26 PROCEDURE — 96376 TX/PRO/DX INJ SAME DRUG ADON: CPT

## 2021-08-26 PROCEDURE — 97110 THERAPEUTIC EXERCISES: CPT

## 2021-08-26 PROCEDURE — 97116 GAIT TRAINING THERAPY: CPT

## 2021-08-26 PROCEDURE — 63710000001 PREDNISONE PER 1 MG: Performed by: FAMILY MEDICINE

## 2021-08-26 PROCEDURE — 80048 BASIC METABOLIC PNL TOTAL CA: CPT | Performed by: FAMILY MEDICINE

## 2021-08-26 PROCEDURE — 96372 THER/PROPH/DIAG INJ SC/IM: CPT

## 2021-08-26 PROCEDURE — 25010000002 ENOXAPARIN PER 10 MG: Performed by: FAMILY MEDICINE

## 2021-08-26 RX ORDER — PREDNISONE 20 MG/1
20 TABLET ORAL 2 TIMES DAILY WITH MEALS
Status: DISCONTINUED | OUTPATIENT
Start: 2021-08-26 | End: 2021-08-27

## 2021-08-26 RX ADMIN — BUSPIRONE HYDROCHLORIDE 10 MG: 10 TABLET ORAL at 08:34

## 2021-08-26 RX ADMIN — ASPIRIN 81 MG: 81 TABLET ORAL at 08:34

## 2021-08-26 RX ADMIN — ISOSORBIDE DINITRATE 10 MG: 10 TABLET ORAL at 21:05

## 2021-08-26 RX ADMIN — TICAGRELOR 90 MG: 90 TABLET ORAL at 08:34

## 2021-08-26 RX ADMIN — IPRATROPIUM BROMIDE AND ALBUTEROL SULFATE 3 ML: 2.5; .5 SOLUTION RESPIRATORY (INHALATION) at 14:16

## 2021-08-26 RX ADMIN — IPRATROPIUM BROMIDE AND ALBUTEROL SULFATE 3 ML: 2.5; .5 SOLUTION RESPIRATORY (INHALATION) at 06:29

## 2021-08-26 RX ADMIN — BUDESONIDE AND FORMOTEROL FUMARATE DIHYDRATE 2 PUFF: 160; 4.5 AEROSOL RESPIRATORY (INHALATION) at 18:41

## 2021-08-26 RX ADMIN — IPRATROPIUM BROMIDE AND ALBUTEROL SULFATE 3 ML: 2.5; .5 SOLUTION RESPIRATORY (INHALATION) at 18:34

## 2021-08-26 RX ADMIN — BUSPIRONE HYDROCHLORIDE 10 MG: 10 TABLET ORAL at 21:03

## 2021-08-26 RX ADMIN — BUSPIRONE HYDROCHLORIDE 10 MG: 10 TABLET ORAL at 15:28

## 2021-08-26 RX ADMIN — NIFEDIPINE 60 MG: 60 TABLET, FILM COATED, EXTENDED RELEASE ORAL at 08:34

## 2021-08-26 RX ADMIN — TICAGRELOR 90 MG: 90 TABLET ORAL at 21:03

## 2021-08-26 RX ADMIN — HYDRALAZINE HYDROCHLORIDE 75 MG: 50 TABLET ORAL at 06:00

## 2021-08-26 RX ADMIN — ENOXAPARIN SODIUM 40 MG: 40 INJECTION SUBCUTANEOUS at 08:34

## 2021-08-26 RX ADMIN — PREDNISONE 20 MG: 20 TABLET ORAL at 17:09

## 2021-08-26 RX ADMIN — ISOSORBIDE DINITRATE 10 MG: 10 TABLET ORAL at 08:34

## 2021-08-26 RX ADMIN — METHYLPREDNISOLONE SODIUM SUCCINATE 40 MG: 40 INJECTION, POWDER, FOR SOLUTION INTRAMUSCULAR; INTRAVENOUS at 02:08

## 2021-08-26 RX ADMIN — FLUTICASONE PROPIONATE 1 SPRAY: 50 SPRAY, METERED NASAL at 06:00

## 2021-08-26 RX ADMIN — FERROUS SULFATE TAB 325 MG (65 MG ELEMENTAL FE) 325 MG: 325 (65 FE) TAB at 17:09

## 2021-08-26 RX ADMIN — ISOSORBIDE DINITRATE 10 MG: 10 TABLET ORAL at 15:28

## 2021-08-26 RX ADMIN — GUAIFENESIN 1200 MG: 600 TABLET, EXTENDED RELEASE ORAL at 21:03

## 2021-08-26 RX ADMIN — CARVEDILOL 25 MG: 25 TABLET, FILM COATED ORAL at 08:34

## 2021-08-26 RX ADMIN — HYDRALAZINE HYDROCHLORIDE 75 MG: 50 TABLET ORAL at 15:28

## 2021-08-26 RX ADMIN — IPRATROPIUM BROMIDE AND ALBUTEROL SULFATE 3 ML: 2.5; .5 SOLUTION RESPIRATORY (INHALATION) at 10:12

## 2021-08-26 RX ADMIN — AZELASTINE HYDROCHLORIDE 2 SPRAY: 137 SPRAY, METERED NASAL at 21:02

## 2021-08-26 RX ADMIN — CALCITRIOL 0.25 MCG: 0.25 CAPSULE ORAL at 08:34

## 2021-08-26 RX ADMIN — METHYLPREDNISOLONE SODIUM SUCCINATE 40 MG: 40 INJECTION, POWDER, FOR SOLUTION INTRAMUSCULAR; INTRAVENOUS at 08:34

## 2021-08-26 RX ADMIN — NIFEDIPINE 60 MG: 60 TABLET, FILM COATED, EXTENDED RELEASE ORAL at 21:03

## 2021-08-26 RX ADMIN — HYDRALAZINE HYDROCHLORIDE 75 MG: 50 TABLET ORAL at 21:03

## 2021-08-26 RX ADMIN — BUDESONIDE AND FORMOTEROL FUMARATE DIHYDRATE 2 PUFF: 160; 4.5 AEROSOL RESPIRATORY (INHALATION) at 06:32

## 2021-08-26 RX ADMIN — FERROUS SULFATE TAB 325 MG (65 MG ELEMENTAL FE) 325 MG: 325 (65 FE) TAB at 08:34

## 2021-08-26 RX ADMIN — OLANZAPINE 5 MG: 5 TABLET, FILM COATED ORAL at 21:02

## 2021-08-26 RX ADMIN — AZELASTINE HYDROCHLORIDE 2 SPRAY: 137 SPRAY, METERED NASAL at 08:35

## 2021-08-26 RX ADMIN — ROSUVASTATIN CALCIUM 20 MG: 20 TABLET, FILM COATED ORAL at 08:34

## 2021-08-26 RX ADMIN — PANTOPRAZOLE SODIUM 40 MG: 40 TABLET, DELAYED RELEASE ORAL at 06:00

## 2021-08-26 RX ADMIN — CARVEDILOL 25 MG: 25 TABLET, FILM COATED ORAL at 17:09

## 2021-08-26 RX ADMIN — GUAIFENESIN 1200 MG: 600 TABLET, EXTENDED RELEASE ORAL at 08:34

## 2021-08-27 ENCOUNTER — APPOINTMENT (OUTPATIENT)
Dept: GENERAL RADIOLOGY | Facility: HOSPITAL | Age: 61
End: 2021-08-27

## 2021-08-27 ENCOUNTER — HOME CARE VISIT (OUTPATIENT)
Dept: HOME HEALTH SERVICES | Facility: HOME HEALTHCARE | Age: 61
End: 2021-08-27

## 2021-08-27 LAB
ANION GAP SERPL CALCULATED.3IONS-SCNC: 10 MMOL/L (ref 5–15)
BUN SERPL-MCNC: 45 MG/DL (ref 8–23)
BUN/CREAT SERPL: 20.9 (ref 7–25)
CALCIUM SPEC-SCNC: 8.1 MG/DL (ref 8.6–10.5)
CHLORIDE SERPL-SCNC: 105 MMOL/L (ref 98–107)
CO2 SERPL-SCNC: 28 MMOL/L (ref 22–29)
CREAT SERPL-MCNC: 2.15 MG/DL (ref 0.57–1)
GFR SERPL CREATININE-BSD FRML MDRD: 23 ML/MIN/1.73
GLUCOSE BLDC GLUCOMTR-MCNC: 109 MG/DL (ref 70–130)
GLUCOSE SERPL-MCNC: 182 MG/DL (ref 65–99)
NT-PROBNP SERPL-MCNC: 9770 PG/ML (ref 0–900)
POTASSIUM SERPL-SCNC: 4 MMOL/L (ref 3.5–5.2)
SODIUM SERPL-SCNC: 143 MMOL/L (ref 136–145)

## 2021-08-27 PROCEDURE — 97110 THERAPEUTIC EXERCISES: CPT

## 2021-08-27 PROCEDURE — G0378 HOSPITAL OBSERVATION PER HR: HCPCS

## 2021-08-27 PROCEDURE — 25010000002 ENOXAPARIN PER 10 MG: Performed by: FAMILY MEDICINE

## 2021-08-27 PROCEDURE — 96372 THER/PROPH/DIAG INJ SC/IM: CPT

## 2021-08-27 PROCEDURE — 80048 BASIC METABOLIC PNL TOTAL CA: CPT | Performed by: FAMILY MEDICINE

## 2021-08-27 PROCEDURE — 63710000001 PREDNISONE PER 1 MG: Performed by: FAMILY MEDICINE

## 2021-08-27 PROCEDURE — 83880 ASSAY OF NATRIURETIC PEPTIDE: CPT | Performed by: FAMILY MEDICINE

## 2021-08-27 PROCEDURE — 94799 UNLISTED PULMONARY SVC/PX: CPT

## 2021-08-27 PROCEDURE — 82962 GLUCOSE BLOOD TEST: CPT

## 2021-08-27 PROCEDURE — 71046 X-RAY EXAM CHEST 2 VIEWS: CPT

## 2021-08-27 RX ORDER — ACETAMINOPHEN 325 MG/1
650 TABLET ORAL EVERY 6 HOURS PRN
Status: DISCONTINUED | OUTPATIENT
Start: 2021-08-27 | End: 2021-09-01 | Stop reason: HOSPADM

## 2021-08-27 RX ORDER — PREDNISONE 20 MG/1
20 TABLET ORAL DAILY
Status: DISCONTINUED | OUTPATIENT
Start: 2021-08-28 | End: 2021-08-29

## 2021-08-27 RX ADMIN — FERROUS SULFATE TAB 325 MG (65 MG ELEMENTAL FE) 325 MG: 325 (65 FE) TAB at 17:01

## 2021-08-27 RX ADMIN — ISOSORBIDE DINITRATE 10 MG: 10 TABLET ORAL at 21:03

## 2021-08-27 RX ADMIN — HYDRALAZINE HYDROCHLORIDE 75 MG: 50 TABLET ORAL at 21:02

## 2021-08-27 RX ADMIN — OLANZAPINE 5 MG: 5 TABLET, FILM COATED ORAL at 21:03

## 2021-08-27 RX ADMIN — BUSPIRONE HYDROCHLORIDE 10 MG: 10 TABLET ORAL at 08:11

## 2021-08-27 RX ADMIN — AZELASTINE HYDROCHLORIDE 2 SPRAY: 137 SPRAY, METERED NASAL at 21:04

## 2021-08-27 RX ADMIN — ISOSORBIDE DINITRATE 10 MG: 10 TABLET ORAL at 08:11

## 2021-08-27 RX ADMIN — IPRATROPIUM BROMIDE AND ALBUTEROL SULFATE 3 ML: 2.5; .5 SOLUTION RESPIRATORY (INHALATION) at 15:08

## 2021-08-27 RX ADMIN — ISOSORBIDE DINITRATE 10 MG: 10 TABLET ORAL at 15:30

## 2021-08-27 RX ADMIN — IPRATROPIUM BROMIDE AND ALBUTEROL SULFATE 3 ML: 2.5; .5 SOLUTION RESPIRATORY (INHALATION) at 11:20

## 2021-08-27 RX ADMIN — CALCITRIOL 0.25 MCG: 0.25 CAPSULE ORAL at 08:11

## 2021-08-27 RX ADMIN — IPRATROPIUM BROMIDE AND ALBUTEROL SULFATE 3 ML: 2.5; .5 SOLUTION RESPIRATORY (INHALATION) at 19:24

## 2021-08-27 RX ADMIN — ENOXAPARIN SODIUM 40 MG: 40 INJECTION SUBCUTANEOUS at 08:11

## 2021-08-27 RX ADMIN — BUSPIRONE HYDROCHLORIDE 10 MG: 10 TABLET ORAL at 21:03

## 2021-08-27 RX ADMIN — BUSPIRONE HYDROCHLORIDE 10 MG: 10 TABLET ORAL at 15:30

## 2021-08-27 RX ADMIN — CARVEDILOL 25 MG: 25 TABLET, FILM COATED ORAL at 08:11

## 2021-08-27 RX ADMIN — ASPIRIN 81 MG: 81 TABLET ORAL at 08:11

## 2021-08-27 RX ADMIN — ACETAMINOPHEN 650 MG: 325 TABLET, FILM COATED ORAL at 16:16

## 2021-08-27 RX ADMIN — HYDRALAZINE HYDROCHLORIDE 75 MG: 50 TABLET ORAL at 15:30

## 2021-08-27 RX ADMIN — TICAGRELOR 90 MG: 90 TABLET ORAL at 08:11

## 2021-08-27 RX ADMIN — NIFEDIPINE 60 MG: 60 TABLET, FILM COATED, EXTENDED RELEASE ORAL at 08:11

## 2021-08-27 RX ADMIN — PREDNISONE 20 MG: 20 TABLET ORAL at 08:11

## 2021-08-27 RX ADMIN — HYDRALAZINE HYDROCHLORIDE 75 MG: 50 TABLET ORAL at 05:12

## 2021-08-27 RX ADMIN — CARVEDILOL 25 MG: 25 TABLET, FILM COATED ORAL at 17:01

## 2021-08-27 RX ADMIN — BUDESONIDE AND FORMOTEROL FUMARATE DIHYDRATE 2 PUFF: 160; 4.5 AEROSOL RESPIRATORY (INHALATION) at 19:24

## 2021-08-27 RX ADMIN — TICAGRELOR 90 MG: 90 TABLET ORAL at 21:03

## 2021-08-27 RX ADMIN — ROSUVASTATIN CALCIUM 20 MG: 20 TABLET, FILM COATED ORAL at 08:11

## 2021-08-27 RX ADMIN — NIFEDIPINE 60 MG: 60 TABLET, FILM COATED, EXTENDED RELEASE ORAL at 21:03

## 2021-08-27 RX ADMIN — PANTOPRAZOLE SODIUM 40 MG: 40 TABLET, DELAYED RELEASE ORAL at 05:12

## 2021-08-27 RX ADMIN — FERROUS SULFATE TAB 325 MG (65 MG ELEMENTAL FE) 325 MG: 325 (65 FE) TAB at 08:11

## 2021-08-27 RX ADMIN — GUAIFENESIN 1200 MG: 600 TABLET, EXTENDED RELEASE ORAL at 08:11

## 2021-08-27 RX ADMIN — AZELASTINE HYDROCHLORIDE 2 SPRAY: 137 SPRAY, METERED NASAL at 08:12

## 2021-08-27 RX ADMIN — FLUTICASONE PROPIONATE 1 SPRAY: 50 SPRAY, METERED NASAL at 06:31

## 2021-08-27 RX ADMIN — GUAIFENESIN 1200 MG: 600 TABLET, EXTENDED RELEASE ORAL at 21:03

## 2021-08-28 LAB
ANION GAP SERPL CALCULATED.3IONS-SCNC: 9 MMOL/L (ref 5–15)
BUN SERPL-MCNC: 38 MG/DL (ref 8–23)
BUN/CREAT SERPL: 19.3 (ref 7–25)
CALCIUM SPEC-SCNC: 8.3 MG/DL (ref 8.6–10.5)
CHLORIDE SERPL-SCNC: 105 MMOL/L (ref 98–107)
CO2 SERPL-SCNC: 28 MMOL/L (ref 22–29)
CREAT SERPL-MCNC: 1.97 MG/DL (ref 0.57–1)
GFR SERPL CREATININE-BSD FRML MDRD: 26 ML/MIN/1.73
GLUCOSE BLDC GLUCOMTR-MCNC: 140 MG/DL (ref 70–130)
GLUCOSE BLDC GLUCOMTR-MCNC: 99 MG/DL (ref 70–130)
GLUCOSE SERPL-MCNC: 95 MG/DL (ref 65–99)
HBA1C MFR BLD: 5.7 % (ref 4.8–5.6)
POTASSIUM SERPL-SCNC: 4.5 MMOL/L (ref 3.5–5.2)
SODIUM SERPL-SCNC: 142 MMOL/L (ref 136–145)

## 2021-08-28 PROCEDURE — 97116 GAIT TRAINING THERAPY: CPT

## 2021-08-28 PROCEDURE — 80048 BASIC METABOLIC PNL TOTAL CA: CPT | Performed by: FAMILY MEDICINE

## 2021-08-28 PROCEDURE — 83036 HEMOGLOBIN GLYCOSYLATED A1C: CPT | Performed by: FAMILY MEDICINE

## 2021-08-28 PROCEDURE — 96372 THER/PROPH/DIAG INJ SC/IM: CPT

## 2021-08-28 PROCEDURE — 63710000001 PREDNISONE PER 1 MG: Performed by: FAMILY MEDICINE

## 2021-08-28 PROCEDURE — 94799 UNLISTED PULMONARY SVC/PX: CPT

## 2021-08-28 PROCEDURE — 82962 GLUCOSE BLOOD TEST: CPT

## 2021-08-28 PROCEDURE — 25010000002 ENOXAPARIN PER 10 MG: Performed by: FAMILY MEDICINE

## 2021-08-28 PROCEDURE — G0378 HOSPITAL OBSERVATION PER HR: HCPCS

## 2021-08-28 RX ADMIN — ACETAMINOPHEN 650 MG: 325 TABLET, FILM COATED ORAL at 22:00

## 2021-08-28 RX ADMIN — HYDRALAZINE HYDROCHLORIDE 75 MG: 50 TABLET ORAL at 20:34

## 2021-08-28 RX ADMIN — TICAGRELOR 90 MG: 90 TABLET ORAL at 08:51

## 2021-08-28 RX ADMIN — BUDESONIDE AND FORMOTEROL FUMARATE DIHYDRATE 2 PUFF: 160; 4.5 AEROSOL RESPIRATORY (INHALATION) at 20:07

## 2021-08-28 RX ADMIN — ACETAMINOPHEN 650 MG: 325 TABLET, FILM COATED ORAL at 05:58

## 2021-08-28 RX ADMIN — ISOSORBIDE DINITRATE 10 MG: 10 TABLET ORAL at 13:54

## 2021-08-28 RX ADMIN — CALCITRIOL 0.25 MCG: 0.25 CAPSULE ORAL at 08:51

## 2021-08-28 RX ADMIN — IPRATROPIUM BROMIDE AND ALBUTEROL SULFATE 3 ML: 2.5; .5 SOLUTION RESPIRATORY (INHALATION) at 20:07

## 2021-08-28 RX ADMIN — NIFEDIPINE 60 MG: 60 TABLET, FILM COATED, EXTENDED RELEASE ORAL at 20:35

## 2021-08-28 RX ADMIN — ACETAMINOPHEN 650 MG: 325 TABLET, FILM COATED ORAL at 15:41

## 2021-08-28 RX ADMIN — CARVEDILOL 25 MG: 25 TABLET, FILM COATED ORAL at 08:51

## 2021-08-28 RX ADMIN — ISOSORBIDE DINITRATE 10 MG: 10 TABLET ORAL at 17:30

## 2021-08-28 RX ADMIN — FUROSEMIDE 40 MG: 40 TABLET ORAL at 08:51

## 2021-08-28 RX ADMIN — ROSUVASTATIN CALCIUM 20 MG: 20 TABLET, FILM COATED ORAL at 08:51

## 2021-08-28 RX ADMIN — BUSPIRONE HYDROCHLORIDE 10 MG: 10 TABLET ORAL at 15:40

## 2021-08-28 RX ADMIN — AZELASTINE HYDROCHLORIDE 2 SPRAY: 137 SPRAY, METERED NASAL at 20:29

## 2021-08-28 RX ADMIN — PREDNISONE 20 MG: 20 TABLET ORAL at 08:51

## 2021-08-28 RX ADMIN — FERROUS SULFATE TAB 325 MG (65 MG ELEMENTAL FE) 325 MG: 325 (65 FE) TAB at 17:30

## 2021-08-28 RX ADMIN — FERROUS SULFATE TAB 325 MG (65 MG ELEMENTAL FE) 325 MG: 325 (65 FE) TAB at 08:52

## 2021-08-28 RX ADMIN — BUSPIRONE HYDROCHLORIDE 10 MG: 10 TABLET ORAL at 20:45

## 2021-08-28 RX ADMIN — ISOSORBIDE DINITRATE 10 MG: 10 TABLET ORAL at 08:51

## 2021-08-28 RX ADMIN — PANTOPRAZOLE SODIUM 40 MG: 40 TABLET, DELAYED RELEASE ORAL at 05:58

## 2021-08-28 RX ADMIN — IPRATROPIUM BROMIDE AND ALBUTEROL SULFATE 3 ML: 2.5; .5 SOLUTION RESPIRATORY (INHALATION) at 10:38

## 2021-08-28 RX ADMIN — FLUTICASONE PROPIONATE 1 SPRAY: 50 SPRAY, METERED NASAL at 06:22

## 2021-08-28 RX ADMIN — BUSPIRONE HYDROCHLORIDE 10 MG: 10 TABLET ORAL at 08:51

## 2021-08-28 RX ADMIN — GUAIFENESIN 1200 MG: 600 TABLET, EXTENDED RELEASE ORAL at 08:51

## 2021-08-28 RX ADMIN — HYDRALAZINE HYDROCHLORIDE 75 MG: 50 TABLET ORAL at 05:58

## 2021-08-28 RX ADMIN — IPRATROPIUM BROMIDE AND ALBUTEROL SULFATE 3 ML: 2.5; .5 SOLUTION RESPIRATORY (INHALATION) at 15:38

## 2021-08-28 RX ADMIN — ENOXAPARIN SODIUM 40 MG: 40 INJECTION SUBCUTANEOUS at 08:54

## 2021-08-28 RX ADMIN — CARVEDILOL 25 MG: 25 TABLET, FILM COATED ORAL at 17:30

## 2021-08-28 RX ADMIN — HYDRALAZINE HYDROCHLORIDE 75 MG: 50 TABLET ORAL at 13:52

## 2021-08-28 RX ADMIN — GUAIFENESIN 1200 MG: 600 TABLET, EXTENDED RELEASE ORAL at 20:33

## 2021-08-28 RX ADMIN — ASPIRIN 81 MG: 81 TABLET ORAL at 08:51

## 2021-08-28 RX ADMIN — OLANZAPINE 5 MG: 5 TABLET, FILM COATED ORAL at 20:35

## 2021-08-28 RX ADMIN — TICAGRELOR 90 MG: 90 TABLET ORAL at 20:35

## 2021-08-28 RX ADMIN — NIFEDIPINE 60 MG: 60 TABLET, FILM COATED, EXTENDED RELEASE ORAL at 08:51

## 2021-08-29 LAB
GLUCOSE BLDC GLUCOMTR-MCNC: 130 MG/DL (ref 70–130)
GLUCOSE BLDC GLUCOMTR-MCNC: 150 MG/DL (ref 70–130)
GLUCOSE BLDC GLUCOMTR-MCNC: 92 MG/DL (ref 70–130)

## 2021-08-29 PROCEDURE — 96372 THER/PROPH/DIAG INJ SC/IM: CPT

## 2021-08-29 PROCEDURE — 82962 GLUCOSE BLOOD TEST: CPT

## 2021-08-29 PROCEDURE — G0378 HOSPITAL OBSERVATION PER HR: HCPCS

## 2021-08-29 PROCEDURE — 97535 SELF CARE MNGMENT TRAINING: CPT

## 2021-08-29 PROCEDURE — 63710000001 PREDNISONE PER 1 MG: Performed by: FAMILY MEDICINE

## 2021-08-29 PROCEDURE — 94799 UNLISTED PULMONARY SVC/PX: CPT

## 2021-08-29 PROCEDURE — 25010000002 ENOXAPARIN PER 10 MG: Performed by: FAMILY MEDICINE

## 2021-08-29 RX ORDER — PREDNISONE 10 MG/1
10 TABLET ORAL DAILY
Status: DISCONTINUED | OUTPATIENT
Start: 2021-08-30 | End: 2021-09-01 | Stop reason: HOSPADM

## 2021-08-29 RX ADMIN — TICAGRELOR 90 MG: 90 TABLET ORAL at 08:11

## 2021-08-29 RX ADMIN — ENOXAPARIN SODIUM 40 MG: 40 INJECTION SUBCUTANEOUS at 08:12

## 2021-08-29 RX ADMIN — NIFEDIPINE 60 MG: 60 TABLET, FILM COATED, EXTENDED RELEASE ORAL at 08:11

## 2021-08-29 RX ADMIN — HYDRALAZINE HYDROCHLORIDE 75 MG: 50 TABLET ORAL at 13:06

## 2021-08-29 RX ADMIN — ISOSORBIDE DINITRATE 10 MG: 10 TABLET ORAL at 13:06

## 2021-08-29 RX ADMIN — FERROUS SULFATE TAB 325 MG (65 MG ELEMENTAL FE) 325 MG: 325 (65 FE) TAB at 08:12

## 2021-08-29 RX ADMIN — IPRATROPIUM BROMIDE AND ALBUTEROL SULFATE 3 ML: 2.5; .5 SOLUTION RESPIRATORY (INHALATION) at 07:10

## 2021-08-29 RX ADMIN — IPRATROPIUM BROMIDE AND ALBUTEROL SULFATE 3 ML: 2.5; .5 SOLUTION RESPIRATORY (INHALATION) at 19:24

## 2021-08-29 RX ADMIN — ASPIRIN 81 MG: 81 TABLET ORAL at 08:12

## 2021-08-29 RX ADMIN — CALCITRIOL 0.25 MCG: 0.25 CAPSULE ORAL at 08:12

## 2021-08-29 RX ADMIN — AZELASTINE HYDROCHLORIDE 2 SPRAY: 137 SPRAY, METERED NASAL at 08:11

## 2021-08-29 RX ADMIN — BUSPIRONE HYDROCHLORIDE 10 MG: 10 TABLET ORAL at 20:28

## 2021-08-29 RX ADMIN — IPRATROPIUM BROMIDE AND ALBUTEROL SULFATE 3 ML: 2.5; .5 SOLUTION RESPIRATORY (INHALATION) at 10:32

## 2021-08-29 RX ADMIN — BUSPIRONE HYDROCHLORIDE 10 MG: 10 TABLET ORAL at 08:12

## 2021-08-29 RX ADMIN — ROSUVASTATIN CALCIUM 20 MG: 20 TABLET, FILM COATED ORAL at 08:12

## 2021-08-29 RX ADMIN — IPRATROPIUM BROMIDE AND ALBUTEROL SULFATE 3 ML: 2.5; .5 SOLUTION RESPIRATORY (INHALATION) at 15:01

## 2021-08-29 RX ADMIN — OLANZAPINE 5 MG: 5 TABLET, FILM COATED ORAL at 20:28

## 2021-08-29 RX ADMIN — NIFEDIPINE 60 MG: 60 TABLET, FILM COATED, EXTENDED RELEASE ORAL at 20:28

## 2021-08-29 RX ADMIN — BUSPIRONE HYDROCHLORIDE 10 MG: 10 TABLET ORAL at 17:07

## 2021-08-29 RX ADMIN — PANTOPRAZOLE SODIUM 40 MG: 40 TABLET, DELAYED RELEASE ORAL at 07:05

## 2021-08-29 RX ADMIN — TICAGRELOR 90 MG: 90 TABLET ORAL at 20:28

## 2021-08-29 RX ADMIN — FERROUS SULFATE TAB 325 MG (65 MG ELEMENTAL FE) 325 MG: 325 (65 FE) TAB at 17:07

## 2021-08-29 RX ADMIN — CARVEDILOL 25 MG: 25 TABLET, FILM COATED ORAL at 17:07

## 2021-08-29 RX ADMIN — BUDESONIDE AND FORMOTEROL FUMARATE DIHYDRATE 2 PUFF: 160; 4.5 AEROSOL RESPIRATORY (INHALATION) at 07:10

## 2021-08-29 RX ADMIN — HYDRALAZINE HYDROCHLORIDE 75 MG: 50 TABLET ORAL at 07:02

## 2021-08-29 RX ADMIN — CARVEDILOL 25 MG: 25 TABLET, FILM COATED ORAL at 08:12

## 2021-08-29 RX ADMIN — AZELASTINE HYDROCHLORIDE 2 SPRAY: 137 SPRAY, METERED NASAL at 20:29

## 2021-08-29 RX ADMIN — BUDESONIDE AND FORMOTEROL FUMARATE DIHYDRATE 2 PUFF: 160; 4.5 AEROSOL RESPIRATORY (INHALATION) at 19:24

## 2021-08-29 RX ADMIN — HYDRALAZINE HYDROCHLORIDE 75 MG: 50 TABLET ORAL at 22:47

## 2021-08-29 RX ADMIN — PREDNISONE 20 MG: 20 TABLET ORAL at 08:12

## 2021-08-29 RX ADMIN — GUAIFENESIN 1200 MG: 600 TABLET, EXTENDED RELEASE ORAL at 20:29

## 2021-08-29 RX ADMIN — ISOSORBIDE DINITRATE 10 MG: 10 TABLET ORAL at 08:15

## 2021-08-29 RX ADMIN — FLUTICASONE PROPIONATE 1 SPRAY: 50 SPRAY, METERED NASAL at 08:11

## 2021-08-29 RX ADMIN — GUAIFENESIN 1200 MG: 600 TABLET, EXTENDED RELEASE ORAL at 08:12

## 2021-08-29 RX ADMIN — ISOSORBIDE DINITRATE 10 MG: 10 TABLET ORAL at 17:07

## 2021-08-30 LAB
GLUCOSE BLDC GLUCOMTR-MCNC: 118 MG/DL (ref 70–130)
GLUCOSE BLDC GLUCOMTR-MCNC: 141 MG/DL (ref 70–130)
GLUCOSE BLDC GLUCOMTR-MCNC: 164 MG/DL (ref 70–130)
GLUCOSE BLDC GLUCOMTR-MCNC: 97 MG/DL (ref 70–130)

## 2021-08-30 PROCEDURE — 25010000002 ENOXAPARIN PER 10 MG: Performed by: FAMILY MEDICINE

## 2021-08-30 PROCEDURE — G0378 HOSPITAL OBSERVATION PER HR: HCPCS

## 2021-08-30 PROCEDURE — 63710000001 PREDNISONE PER 5 MG: Performed by: FAMILY MEDICINE

## 2021-08-30 PROCEDURE — 94799 UNLISTED PULMONARY SVC/PX: CPT

## 2021-08-30 PROCEDURE — 96372 THER/PROPH/DIAG INJ SC/IM: CPT

## 2021-08-30 PROCEDURE — 82962 GLUCOSE BLOOD TEST: CPT

## 2021-08-30 PROCEDURE — 97116 GAIT TRAINING THERAPY: CPT

## 2021-08-30 RX ADMIN — BUSPIRONE HYDROCHLORIDE 10 MG: 10 TABLET ORAL at 17:10

## 2021-08-30 RX ADMIN — AZELASTINE HYDROCHLORIDE 2 SPRAY: 137 SPRAY, METERED NASAL at 08:28

## 2021-08-30 RX ADMIN — ROSUVASTATIN CALCIUM 20 MG: 20 TABLET, FILM COATED ORAL at 08:28

## 2021-08-30 RX ADMIN — HYDRALAZINE HYDROCHLORIDE 75 MG: 50 TABLET ORAL at 20:52

## 2021-08-30 RX ADMIN — FLUTICASONE PROPIONATE 1 SPRAY: 50 SPRAY, METERED NASAL at 06:03

## 2021-08-30 RX ADMIN — GUAIFENESIN 1200 MG: 600 TABLET, EXTENDED RELEASE ORAL at 08:28

## 2021-08-30 RX ADMIN — ISOSORBIDE DINITRATE 10 MG: 10 TABLET ORAL at 08:28

## 2021-08-30 RX ADMIN — CARVEDILOL 25 MG: 25 TABLET, FILM COATED ORAL at 17:10

## 2021-08-30 RX ADMIN — CALCITRIOL 0.25 MCG: 0.25 CAPSULE ORAL at 08:28

## 2021-08-30 RX ADMIN — IPRATROPIUM BROMIDE AND ALBUTEROL SULFATE 3 ML: 2.5; .5 SOLUTION RESPIRATORY (INHALATION) at 20:15

## 2021-08-30 RX ADMIN — ENOXAPARIN SODIUM 40 MG: 40 INJECTION SUBCUTANEOUS at 08:28

## 2021-08-30 RX ADMIN — ISOSORBIDE DINITRATE 10 MG: 10 TABLET ORAL at 13:27

## 2021-08-30 RX ADMIN — ASPIRIN 81 MG: 81 TABLET ORAL at 08:28

## 2021-08-30 RX ADMIN — BUDESONIDE AND FORMOTEROL FUMARATE DIHYDRATE 2 PUFF: 160; 4.5 AEROSOL RESPIRATORY (INHALATION) at 20:15

## 2021-08-30 RX ADMIN — NIFEDIPINE 60 MG: 60 TABLET, FILM COATED, EXTENDED RELEASE ORAL at 08:28

## 2021-08-30 RX ADMIN — BUSPIRONE HYDROCHLORIDE 10 MG: 10 TABLET ORAL at 20:53

## 2021-08-30 RX ADMIN — GUAIFENESIN 1200 MG: 600 TABLET, EXTENDED RELEASE ORAL at 20:53

## 2021-08-30 RX ADMIN — BUSPIRONE HYDROCHLORIDE 10 MG: 10 TABLET ORAL at 08:28

## 2021-08-30 RX ADMIN — IPRATROPIUM BROMIDE AND ALBUTEROL SULFATE 3 ML: 2.5; .5 SOLUTION RESPIRATORY (INHALATION) at 14:31

## 2021-08-30 RX ADMIN — TICAGRELOR 90 MG: 90 TABLET ORAL at 20:53

## 2021-08-30 RX ADMIN — NIFEDIPINE 60 MG: 60 TABLET, FILM COATED, EXTENDED RELEASE ORAL at 20:53

## 2021-08-30 RX ADMIN — ISOSORBIDE DINITRATE 10 MG: 10 TABLET ORAL at 17:10

## 2021-08-30 RX ADMIN — IPRATROPIUM BROMIDE AND ALBUTEROL SULFATE 3 ML: 2.5; .5 SOLUTION RESPIRATORY (INHALATION) at 10:34

## 2021-08-30 RX ADMIN — HYDRALAZINE HYDROCHLORIDE 75 MG: 50 TABLET ORAL at 13:27

## 2021-08-30 RX ADMIN — FERROUS SULFATE TAB 325 MG (65 MG ELEMENTAL FE) 325 MG: 325 (65 FE) TAB at 17:10

## 2021-08-30 RX ADMIN — FERROUS SULFATE TAB 325 MG (65 MG ELEMENTAL FE) 325 MG: 325 (65 FE) TAB at 08:28

## 2021-08-30 RX ADMIN — HYDRALAZINE HYDROCHLORIDE 75 MG: 50 TABLET ORAL at 06:02

## 2021-08-30 RX ADMIN — PREDNISONE 10 MG: 10 TABLET ORAL at 08:28

## 2021-08-30 RX ADMIN — PANTOPRAZOLE SODIUM 40 MG: 40 TABLET, DELAYED RELEASE ORAL at 06:02

## 2021-08-30 RX ADMIN — CARVEDILOL 25 MG: 25 TABLET, FILM COATED ORAL at 08:28

## 2021-08-30 RX ADMIN — TICAGRELOR 90 MG: 90 TABLET ORAL at 08:28

## 2021-08-30 RX ADMIN — OLANZAPINE 5 MG: 5 TABLET, FILM COATED ORAL at 20:53

## 2021-08-30 RX ADMIN — AZELASTINE HYDROCHLORIDE 2 SPRAY: 137 SPRAY, METERED NASAL at 20:54

## 2021-08-31 LAB — GLUCOSE BLDC GLUCOMTR-MCNC: 107 MG/DL (ref 70–130)

## 2021-08-31 PROCEDURE — 94799 UNLISTED PULMONARY SVC/PX: CPT

## 2021-08-31 PROCEDURE — 63710000001 PREDNISONE PER 5 MG: Performed by: FAMILY MEDICINE

## 2021-08-31 PROCEDURE — G0378 HOSPITAL OBSERVATION PER HR: HCPCS

## 2021-08-31 PROCEDURE — 25010000002 ENOXAPARIN PER 10 MG: Performed by: FAMILY MEDICINE

## 2021-08-31 PROCEDURE — 96372 THER/PROPH/DIAG INJ SC/IM: CPT

## 2021-08-31 PROCEDURE — 97116 GAIT TRAINING THERAPY: CPT

## 2021-08-31 PROCEDURE — 97535 SELF CARE MNGMENT TRAINING: CPT | Performed by: OCCUPATIONAL THERAPIST

## 2021-08-31 PROCEDURE — 82962 GLUCOSE BLOOD TEST: CPT

## 2021-08-31 RX ADMIN — BUSPIRONE HYDROCHLORIDE 10 MG: 10 TABLET ORAL at 20:26

## 2021-08-31 RX ADMIN — HYDRALAZINE HYDROCHLORIDE 75 MG: 50 TABLET ORAL at 20:26

## 2021-08-31 RX ADMIN — AZELASTINE HYDROCHLORIDE 2 SPRAY: 137 SPRAY, METERED NASAL at 08:59

## 2021-08-31 RX ADMIN — NIFEDIPINE 60 MG: 60 TABLET, FILM COATED, EXTENDED RELEASE ORAL at 20:26

## 2021-08-31 RX ADMIN — ROSUVASTATIN CALCIUM 20 MG: 20 TABLET, FILM COATED ORAL at 08:58

## 2021-08-31 RX ADMIN — ENOXAPARIN SODIUM 40 MG: 40 INJECTION SUBCUTANEOUS at 08:59

## 2021-08-31 RX ADMIN — BUSPIRONE HYDROCHLORIDE 10 MG: 10 TABLET ORAL at 17:04

## 2021-08-31 RX ADMIN — ISOSORBIDE DINITRATE 10 MG: 10 TABLET ORAL at 08:59

## 2021-08-31 RX ADMIN — GUAIFENESIN 1200 MG: 600 TABLET, EXTENDED RELEASE ORAL at 20:27

## 2021-08-31 RX ADMIN — NIFEDIPINE 60 MG: 60 TABLET, FILM COATED, EXTENDED RELEASE ORAL at 08:59

## 2021-08-31 RX ADMIN — HYDRALAZINE HYDROCHLORIDE 75 MG: 50 TABLET ORAL at 05:29

## 2021-08-31 RX ADMIN — ISOSORBIDE DINITRATE 10 MG: 10 TABLET ORAL at 13:28

## 2021-08-31 RX ADMIN — HYDRALAZINE HYDROCHLORIDE 75 MG: 50 TABLET ORAL at 13:28

## 2021-08-31 RX ADMIN — BUSPIRONE HYDROCHLORIDE 10 MG: 10 TABLET ORAL at 08:58

## 2021-08-31 RX ADMIN — TICAGRELOR 90 MG: 90 TABLET ORAL at 20:26

## 2021-08-31 RX ADMIN — ASPIRIN 81 MG: 81 TABLET ORAL at 08:58

## 2021-08-31 RX ADMIN — PREDNISONE 10 MG: 10 TABLET ORAL at 08:58

## 2021-08-31 RX ADMIN — IPRATROPIUM BROMIDE AND ALBUTEROL SULFATE 3 ML: 2.5; .5 SOLUTION RESPIRATORY (INHALATION) at 10:26

## 2021-08-31 RX ADMIN — PANTOPRAZOLE SODIUM 40 MG: 40 TABLET, DELAYED RELEASE ORAL at 05:29

## 2021-08-31 RX ADMIN — IPRATROPIUM BROMIDE AND ALBUTEROL SULFATE 3 ML: 2.5; .5 SOLUTION RESPIRATORY (INHALATION) at 18:13

## 2021-08-31 RX ADMIN — ISOSORBIDE DINITRATE 10 MG: 10 TABLET ORAL at 17:04

## 2021-08-31 RX ADMIN — CARVEDILOL 25 MG: 25 TABLET, FILM COATED ORAL at 17:04

## 2021-08-31 RX ADMIN — FERROUS SULFATE TAB 325 MG (65 MG ELEMENTAL FE) 325 MG: 325 (65 FE) TAB at 17:04

## 2021-08-31 RX ADMIN — FERROUS SULFATE TAB 325 MG (65 MG ELEMENTAL FE) 325 MG: 325 (65 FE) TAB at 08:59

## 2021-08-31 RX ADMIN — IPRATROPIUM BROMIDE AND ALBUTEROL SULFATE 3 ML: 2.5; .5 SOLUTION RESPIRATORY (INHALATION) at 14:52

## 2021-08-31 RX ADMIN — OLANZAPINE 5 MG: 5 TABLET, FILM COATED ORAL at 20:26

## 2021-08-31 RX ADMIN — AZELASTINE HYDROCHLORIDE 2 SPRAY: 137 SPRAY, METERED NASAL at 20:28

## 2021-08-31 RX ADMIN — CALCITRIOL 0.25 MCG: 0.25 CAPSULE ORAL at 08:58

## 2021-08-31 RX ADMIN — TICAGRELOR 90 MG: 90 TABLET ORAL at 08:59

## 2021-08-31 RX ADMIN — GUAIFENESIN 1200 MG: 600 TABLET, EXTENDED RELEASE ORAL at 08:58

## 2021-08-31 RX ADMIN — CARVEDILOL 25 MG: 25 TABLET, FILM COATED ORAL at 08:58

## 2021-08-31 RX ADMIN — BUDESONIDE AND FORMOTEROL FUMARATE DIHYDRATE 2 PUFF: 160; 4.5 AEROSOL RESPIRATORY (INHALATION) at 18:13

## 2021-08-31 RX ADMIN — ACETAMINOPHEN 650 MG: 325 TABLET, FILM COATED ORAL at 17:05

## 2021-08-31 RX ADMIN — FLUTICASONE PROPIONATE 1 SPRAY: 50 SPRAY, METERED NASAL at 06:00

## 2021-09-01 ENCOUNTER — HOME HEALTH ADMISSION (OUTPATIENT)
Dept: HOME HEALTH SERVICES | Facility: HOME HEALTHCARE | Age: 61
End: 2021-09-01

## 2021-09-01 VITALS
BODY MASS INDEX: 41.88 KG/M2 | SYSTOLIC BLOOD PRESSURE: 142 MMHG | HEIGHT: 65 IN | TEMPERATURE: 98.5 F | OXYGEN SATURATION: 94 % | DIASTOLIC BLOOD PRESSURE: 77 MMHG | HEART RATE: 104 BPM | WEIGHT: 251.38 LBS | RESPIRATION RATE: 20 BRPM

## 2021-09-01 LAB — GLUCOSE BLDC GLUCOMTR-MCNC: 97 MG/DL (ref 70–130)

## 2021-09-01 PROCEDURE — 96372 THER/PROPH/DIAG INJ SC/IM: CPT

## 2021-09-01 PROCEDURE — 94799 UNLISTED PULMONARY SVC/PX: CPT

## 2021-09-01 PROCEDURE — 25010000002 ENOXAPARIN PER 10 MG: Performed by: FAMILY MEDICINE

## 2021-09-01 PROCEDURE — 63710000001 PREDNISONE PER 5 MG: Performed by: FAMILY MEDICINE

## 2021-09-01 PROCEDURE — 82962 GLUCOSE BLOOD TEST: CPT

## 2021-09-01 PROCEDURE — G0378 HOSPITAL OBSERVATION PER HR: HCPCS

## 2021-09-01 PROCEDURE — 94664 DEMO&/EVAL PT USE INHALER: CPT

## 2021-09-01 RX ORDER — PREDNISONE 1 MG/1
5 TABLET ORAL DAILY
Qty: 3 TABLET | Refills: 0 | Status: SHIPPED | OUTPATIENT
Start: 2021-09-01 | End: 2021-09-04

## 2021-09-01 RX ADMIN — ASPIRIN 81 MG: 81 TABLET ORAL at 08:04

## 2021-09-01 RX ADMIN — PREDNISONE 10 MG: 10 TABLET ORAL at 08:04

## 2021-09-01 RX ADMIN — IPRATROPIUM BROMIDE AND ALBUTEROL SULFATE 3 ML: 2.5; .5 SOLUTION RESPIRATORY (INHALATION) at 10:19

## 2021-09-01 RX ADMIN — PANTOPRAZOLE SODIUM 40 MG: 40 TABLET, DELAYED RELEASE ORAL at 05:18

## 2021-09-01 RX ADMIN — ISOSORBIDE DINITRATE 10 MG: 10 TABLET ORAL at 08:04

## 2021-09-01 RX ADMIN — BUSPIRONE HYDROCHLORIDE 10 MG: 10 TABLET ORAL at 08:04

## 2021-09-01 RX ADMIN — NIFEDIPINE 60 MG: 60 TABLET, FILM COATED, EXTENDED RELEASE ORAL at 08:04

## 2021-09-01 RX ADMIN — TICAGRELOR 90 MG: 90 TABLET ORAL at 08:04

## 2021-09-01 RX ADMIN — GUAIFENESIN 1200 MG: 600 TABLET, EXTENDED RELEASE ORAL at 08:04

## 2021-09-01 RX ADMIN — FERROUS SULFATE TAB 325 MG (65 MG ELEMENTAL FE) 325 MG: 325 (65 FE) TAB at 08:04

## 2021-09-01 RX ADMIN — CALCITRIOL 0.25 MCG: 0.25 CAPSULE ORAL at 08:04

## 2021-09-01 RX ADMIN — FLUTICASONE PROPIONATE 1 SPRAY: 50 SPRAY, METERED NASAL at 05:18

## 2021-09-01 RX ADMIN — AZELASTINE HYDROCHLORIDE 2 SPRAY: 137 SPRAY, METERED NASAL at 08:05

## 2021-09-01 RX ADMIN — ROSUVASTATIN CALCIUM 20 MG: 20 TABLET, FILM COATED ORAL at 08:04

## 2021-09-01 RX ADMIN — CARVEDILOL 25 MG: 25 TABLET, FILM COATED ORAL at 08:04

## 2021-09-01 RX ADMIN — HYDRALAZINE HYDROCHLORIDE 75 MG: 50 TABLET ORAL at 05:18

## 2021-09-01 RX ADMIN — ENOXAPARIN SODIUM 40 MG: 40 INJECTION SUBCUTANEOUS at 08:04

## 2021-09-01 NOTE — CASE MANAGEMENT/SOCIAL WORK
Continued Stay Note  [facility]     Patient Name: Ludivina Ramirez  MRN: [id number]  Today's Date: 9/1/2021    Admit Date: 8/23/2021    Discharge Plan     Row Name 09/01/21 1346       Plan    Plan  Home with [facility]    Provided Post Acute Provider List?  Yes    Post Acute Provider List  Home Health    Delivered To  Patient    Method of Delivery  Telephone    Patient/Family in Agreement with Plan  yes    Final Discharge Disposition Code  06 - home with home health care    Final Note  Referral for  services.  Pt was provided options and chose [facility].  ANTHONY has informed Rosita in intake.  Pt did not have a portable tank and ANTHONY spoke to [facility] and they have brought one to pt's room.  Pt to dc today.        Discharge Codes    No documentation.       Expected Discharge Date and Time     Expected Discharge Date Expected Discharge Time    Sep 1, 2021             DERRICK José

## 2021-09-01 NOTE — DISCHARGE SUMMARY
Hospital Discharge Summary    Ludivina Ramirez  :  1960  MRN:  [id number]    Admit date:  2021  Discharge date:      Admitting Physician:    Orville Weston MD    Discharge Diagnoses:      Chest pain    COPD    CRF    HTN    CAD    Hospital Course:   She was admitted with BEE SOUSA.  She was treated and seen by Cardiology. No further work up was planned. They thought she may benefit from SNF/Rehab. This was initiated and the insurance has no approved. The patient now would like to go home with . She will have a f/u and sleep study with Pulm. Her VS are stable. She is eating and ambulating.     Discharge Medications:         Discharge Medications      New Medications      Instructions Start Date   predniSONE 5 MG tablet  Commonly known as: DELTASONE   5 mg, Oral, Daily         Continue These Medications      Instructions Start Date   aspirin 81 MG EC tablet   81 mg, Oral, Daily      azelastine 0.1 % nasal spray  Commonly known as: ASTELIN   2 sprays, Nasal, 2 Times Daily      budesonide-formoterol 160-4.5 MCG/ACT inhaler  Commonly known as: SYMBICORT   2 puffs, Inhalation, 2 Times Daily - RT      busPIRone 10 MG tablet  Commonly known as: BUSPAR   10 mg, Oral, 3 Times Daily      calcitriol 0.25 MCG capsule  Commonly known as: ROCALTROL   0.25 mcg, Oral, Daily      carvedilol 25 MG tablet  Commonly known as: COREG   25 mg, Oral, 2 Times Daily With Meals      ferrous sulfate 325 (65 FE) MG tablet   325 mg, Oral, 2 Times Daily With Meals      fluticasone 50 MCG/ACT nasal spray  Commonly known as: FLONASE   2 sprays, Nasal, Every Morning, In each nostril      furosemide 40 MG tablet  Commonly known as: Lasix   40 mg, Oral, Daily PRN      guaiFENesin 600 MG 12 hr tablet  Commonly known as: MUCINEX   1,200 mg, Oral, Every 12 Hours Scheduled      hydrALAZINE 50 MG tablet  Commonly known as: APRESOLINE   75 mg, Oral, Every 8 Hours, 1.5 (one tablet (50 mg) and one half tablet (25 mg)) to equal 75 mg every 8  hours      ipratropium-albuterol 0.5-2.5 mg/3 ml nebulizer  Commonly known as: DUO-NEB   3 mL, Nebulization, 4 Times Daily - RT      ipratropium-albuterol 0.5-2.5 mg/3 ml nebulizer  Commonly known as: DUO-NEB   3 mL, Nebulization, Every 4 Hours PRN      isosorbide dinitrate 10 MG tablet  Commonly known as: ISORDIL   10 mg, Oral, 3 Times Daily (Nitrates)      melatonin 5 MG tablet tablet   5 mg, Oral, Nightly      multivitamin with minerals tablet tablet   1 tablet, Oral, Daily      NIFEdipine CC 60 MG 24 hr tablet  Commonly known as: ADALAT CC   60 mg, Oral, 2 times daily      nitroglycerin 0.4 MG SL tablet  Commonly known as: NITROSTAT   0.4 mg, Sublingual, Every 5 Minutes PRN, Take no more than 3 doses in 15 minutes.      OLANZapine 5 MG tablet  Commonly known as: zyPREXA   5 mg, Oral, 2 times daily      pantoprazole 40 MG EC tablet  Commonly known as: PROTONIX   40 mg, Oral, Daily      rosuvastatin 20 MG tablet  Commonly known as: CRESTOR   20 mg, Oral, Daily      ticagrelor 90 MG tablet tablet  Commonly known as: BRILINTA   90 mg, Oral, 2 Times Daily      vitamin C 250 MG tablet  Commonly known as: ASCORBIC ACID   500 mg, Oral, Daily         Stop These Medications    cloNIDine 0.1 MG tablet  Commonly known as: CATAPRES     losartan 100 MG tablet  Commonly known as: COZAAR     ZINC-220 PO            Consults: Cardiology    Significant Diagnostic Studies:  See complete admission record      Disposition:   Home in stable condition  Follow up with Orville Weston MD in 1 week.  Pt to f/u with Cardiology and Pulm.    Diet: cardiac    Activity: as tolerated    Special Instructions: continue home O2, appt for sleep study is scheduled, HH arranged    The patient or family are to call or return if there are any problems, questions, concerns or change in her condition.     Signed:  Orville Weston MD MD  9/1/2021, 13:38 CDT

## 2021-09-01 NOTE — PLAN OF CARE
Goal Outcome Evaluation:           Progress: improving  Outcome Summary: No c/o pain. Slept well with CPAP on. Up ad. Possibly d/c today with HH. NSR 76-86. VSS. Safety maintained. Will continue to monitor.

## 2021-09-02 ENCOUNTER — HOME CARE VISIT (OUTPATIENT)
Dept: HOME HEALTH SERVICES | Facility: CLINIC | Age: 61
End: 2021-09-02

## 2021-09-02 ENCOUNTER — HOME CARE VISIT (OUTPATIENT)
Dept: HOME HEALTH SERVICES | Facility: HOME HEALTHCARE | Age: 61
End: 2021-09-02

## 2021-09-02 ENCOUNTER — READMISSION MANAGEMENT (OUTPATIENT)
Dept: CALL CENTER | Facility: HOSPITAL | Age: 61
End: 2021-09-02

## 2021-09-02 VITALS
DIASTOLIC BLOOD PRESSURE: 76 MMHG | BODY MASS INDEX: 42.99 KG/M2 | SYSTOLIC BLOOD PRESSURE: 130 MMHG | HEIGHT: 65 IN | TEMPERATURE: 98.6 F | OXYGEN SATURATION: 93 % | WEIGHT: 258 LBS | RESPIRATION RATE: 18 BRPM | HEART RATE: 84 BPM

## 2021-09-02 PROCEDURE — G0299 HHS/HOSPICE OF RN EA 15 MIN: HCPCS

## 2021-09-02 NOTE — HOME HEALTH
Complete review regarding home health services including emergency preparedness, emergency procedures, and admission paperwork signed. Pt verb knowledge and compliance with plan of care.     Pt sitting in chair upon sn arrival with her oxygen on per nc. I could audibly hear the flow from the oxygen. When setting checked, o2 was on 5 lpm/nc. Pt verified oxygen is suppose to be set on 2 lpm/nc however, emts said she may have to increase the flow r/t long tubing. Pt was unaware oxygen was on 5 lpm/nc. Decreased to 2 lpm/nc and left oxygen sensor on patient during assessment. Oxygen levels bounced between 82% and 88%. Increased to 3 lpm/nc and oxygen levels stayed 92% or above. Pt does have an extra long tubing so she can wear it throughout the home. Educated pt on oxygen safety precautions including not tripping over cord, do not use gas stove while oxygen on, make sure to wash face, eyebrows, etc before cooking after taking oxygen off, no candle use, lip balm etc. Encouraged pt to buy a pulse oximeter so she can also check her sats periodically. Pt verb understanding of all education and asked appropriate questions. Pt uses a trilogy at night and states she is independent in trilogy use. LS clear/diminished throughout. Pulmonary hygiene educated and pt will need further education. Pt states she does have to periodically drive herself to md appt, grocery pickup as she has no one to assist her. Pt states she is unable to get out of the car and doing any shopping so she just goes through the grocery  line at [facility].  Pt states she likes to make lists for everything and then ends up losing her lists. Asked pt if she would like for an MSW eval to assist with chore housekeeping services, assist with bills, etc and she agrees that would be helpful. Pt states she has a lot of paperwork that she needs assistance with and not sure who can help. Will call for an MSW eval order. Pt is up ad vern with a cane, however, she  has small animals in the home who like to stay at her feet. Home safety concerns r/t animals, oxygen tubing, throw rugs, small narrow pathways, steps leading into the home, etc. Assessment is completed. Pt becomes sob with moderate exertion. Pt has several questions about her diagnosis copd and will require further education. Medications are set up by pharmacy. Medication changes were educated and pharmacy was called for a description of which pill pt is to stop taking out of the pill packs. Cozaar is a tear drop shape pill and an H on it. Pt wrote down description and states she will remove every am. Pt is ordered prednisone x 3 days and that is in a bottle that pt will take on her own. Pt is a very pleasant lady who appears to be trying her best to stay out of a facility. She was encouraged to go to rehab following her stay in hospital, however, her dog was boarded and she felt like she needed to get home to him. Pt has a neighbor who checks on her occassionally and assists with her yard, however, he has fallen and injured himself and is unable to help right now. TC to meals on wheels while in home and they will start sending meals on friday 9/3.

## 2021-09-02 NOTE — THERAPY DISCHARGE NOTE
Acute Care - Occupational Therapy Discharge Summary  [facility]     Patient Name: Ludivina Ramirez  : 1960  MRN: [id number]    Today's Date: 2021                 Admit Date: 2021        OT Recommendation and Plan    Visit Dx:    ICD-10-CM ICD-9-CM   1. Chest pain, unspecified type  R07.9 786.50   2. COPD exacerbation (CMS/HCC)  J44.1 491.21   3. Decreased functional mobility and endurance  Z74.09 780.99   4. Impaired mobility and ADLs  Z74.09 V49.89    Z78.9    5. Acute on chronic diastolic congestive heart failure (CMS/[facility])  I50.33 428.33     428.0   6. Coronary artery disease of native artery of native heart with stable angina pectoris (CMS/HCC)  I25.118 414.01     413.9   7. Acute on chronic diastolic heart failure (CMS/[facility])  I50.33 428.33   8. Noncompliance  Z91.19 V15.81               OT Rehab Goals     Row Name 21 0700             Bathing Goal 1 (OT)    Activity/Device (Bathing Goal 1, OT)  bathing skills, all;other (see comments)  -CS      Treadwell Level/Cues Needed (Bathing Goal 1, OT)  modified independence;set-up required  -CS      Time Frame (Bathing Goal 1, OT)  long term goal (LTG);by discharge  -CS      Progress/Outcomes (Bathing Goal 1, OT)  goal not met  -CS         Dressing Goal 1 (OT)    Activity/Device (Dressing Goal 1, OT)  dressing skills, all  -CS      Treadwell/Cues Needed (Dressing Goal 1, OT)  independent  -CS      Time Frame (Dressing Goal 1, OT)  long term goal (LTG);by discharge  -CS      Progress/Outcome (Dressing Goal 1, OT)  goal not met  -CS         Toileting Goal 1 (OT)    Activity/Device (Toileting Goal 1, OT)  toileting skills, all  -CS      Treadwell Level/Cues Needed (Toileting Goal 1, OT)  independent  -CS      Time Frame (Toileting Goal 1, OT)  long term goal (LTG);by discharge  -CS      Progress/Outcome (Toileting Goal 1, OT)  goal not met  -CS        User Key  (r) = Recorded By, (t) = Taken By, (c) = Cosigned By    Initials Name Provider Type  Discipline    CS Talia Hernandez OTR/L, ZAY Occupational Therapist OT              Timed Therapy Charges  Total Units: 3    Charges  Total Units: 3    Procedure Name Documented Minutes Units Code    HC OT SELF CARE/MGMT/TRAIN EA 15 MIN 38  3    36123 (CPT®)               Documented Minutes  Total Minutes: 38    Therapy Provided Minutes    02046 - OT Self Care/Mgmt Minutes 38                    OT Discharge Summary  Anticipated Discharge Disposition (OT): skilled nursing facility  Reason for Discharge: Discharge from facility  Outcomes Achieved: Refer to plan of care for updates on goals achieved  Discharge Destination: Home with assist, Home with home health      SHASHANK Lawson/L, CNT  9/2/2021

## 2021-09-02 NOTE — OUTREACH NOTE
Prep Survey      Responses   [facility] facility patient discharged from?  [facility]   Is LACE score < 7 ?  No   Emergency Room discharge w/ pulse ox?  No   Eligibility  Readm Mgmt   Discharge diagnosis  CP,  COPD,  CRF,  HTN,  CAD   Does the patient have one of the following disease processes/diagnoses(primary or secondary)?  Other   Does the patient have Home health ordered?  Yes   What is the Home health agency?   [facility]   Is there a DME ordered?  Yes   What DME was ordered?  O2   Prep survey completed?  Yes          Nena Daly RN

## 2021-09-03 ENCOUNTER — HOME CARE VISIT (OUTPATIENT)
Dept: HOME HEALTH SERVICES | Facility: CLINIC | Age: 61
End: 2021-09-03

## 2021-09-03 VITALS
TEMPERATURE: 97.3 F | RESPIRATION RATE: 20 BRPM | DIASTOLIC BLOOD PRESSURE: 84 MMHG | SYSTOLIC BLOOD PRESSURE: 132 MMHG | HEART RATE: 84 BPM | OXYGEN SATURATION: 90 %

## 2021-09-03 PROCEDURE — G0151 HHCP-SERV OF PT,EA 15 MIN: HCPCS

## 2021-09-03 NOTE — HOME HEALTH
60 yo female Primary Dx: Chronic obstructive pulmonary disease, Pt reports in <>out hospital ~ 6 times with last hospitalization  per dx 8-25 thru 9-1-21   02 new since onset of dx     PMHX :  CHF / MI - R tib/fib ORIF - HTN - PTSD /depression   PLOF : community amb -    HOME ENVIRONMENT : lives alone - family A prn   DME needs :   Homebound status assessment : Pt presents with altered gait , balance , strength and endurance per dx    discussed rollator for safety -endurance and MSW for safety community support    Md contacted and request made  for rollator  and MSW consult . Form was reviewed and signed by Jose Dumont MD. Form was faxed and placed in the file cabinet on 8/3/2021.

## 2021-09-07 ENCOUNTER — HOME CARE VISIT (OUTPATIENT)
Dept: HOME HEALTH SERVICES | Facility: CLINIC | Age: 61
End: 2021-09-07

## 2021-09-07 ENCOUNTER — APPOINTMENT (OUTPATIENT)
Dept: GENERAL RADIOLOGY | Age: 61
DRG: 280 | End: 2021-09-07
Payer: MEDICARE

## 2021-09-07 ENCOUNTER — HOSPITAL ENCOUNTER (INPATIENT)
Age: 61
LOS: 1 days | Discharge: HOME | DRG: 280 | End: 2021-09-10
Attending: EMERGENCY MEDICINE | Admitting: FAMILY MEDICINE
Payer: MEDICARE

## 2021-09-07 VITALS — TEMPERATURE: 99.7 F | RESPIRATION RATE: 24 BRPM | OXYGEN SATURATION: 95 % | HEART RATE: 75 BPM

## 2021-09-07 VITALS
OXYGEN SATURATION: 96 % | TEMPERATURE: 99.5 F | HEART RATE: 81 BPM | RESPIRATION RATE: 32 BRPM | SYSTOLIC BLOOD PRESSURE: 186 MMHG | DIASTOLIC BLOOD PRESSURE: 110 MMHG

## 2021-09-07 DIAGNOSIS — J44.1 COPD EXACERBATION (HCC): Primary | ICD-10-CM

## 2021-09-07 DIAGNOSIS — R77.8 ELEVATED TROPONIN: ICD-10-CM

## 2021-09-07 DIAGNOSIS — N18.30 CHRONIC RENAL IMPAIRMENT, STAGE 3 (MODERATE), UNSPECIFIED WHETHER STAGE 3A OR 3B CKD (HCC): ICD-10-CM

## 2021-09-07 DIAGNOSIS — I50.33 ACUTE ON CHRONIC DIASTOLIC CHF (CONGESTIVE HEART FAILURE) (HCC): ICD-10-CM

## 2021-09-07 LAB
ADENOVIRUS BY PCR: NOT DETECTED
ALBUMIN SERPL-MCNC: 3.7 G/DL (ref 3.5–5.2)
ALP BLD-CCNC: 60 U/L (ref 35–104)
ALT SERPL-CCNC: 24 U/L (ref 5–33)
ANION GAP SERPL CALCULATED.3IONS-SCNC: 8 MMOL/L (ref 7–19)
AST SERPL-CCNC: 11 U/L (ref 5–32)
BASOPHILS ABSOLUTE: 0 K/UL (ref 0–0.2)
BASOPHILS RELATIVE PERCENT: 0.3 % (ref 0–1)
BILIRUB SERPL-MCNC: 0.6 MG/DL (ref 0.2–1.2)
BORDETELLA PARAPERTUSSIS BY PCR: NOT DETECTED
BORDETELLA PERTUSSIS BY PCR: NOT DETECTED
BUN BLDV-MCNC: 30 MG/DL (ref 8–23)
CALCIUM SERPL-MCNC: 8.2 MG/DL (ref 8.8–10.2)
CHLAMYDOPHILIA PNEUMONIAE BY PCR: NOT DETECTED
CHLORIDE BLD-SCNC: 111 MMOL/L (ref 98–111)
CO2: 28 MMOL/L (ref 22–29)
CORONAVIRUS 229E BY PCR: NOT DETECTED
CORONAVIRUS HKU1 BY PCR: NOT DETECTED
CORONAVIRUS NL63 BY PCR: NOT DETECTED
CORONAVIRUS OC43 BY PCR: NOT DETECTED
CREAT SERPL-MCNC: 1.7 MG/DL (ref 0.5–0.9)
EOSINOPHILS ABSOLUTE: 0.1 K/UL (ref 0–0.6)
EOSINOPHILS RELATIVE PERCENT: 1.1 % (ref 0–5)
GFR AFRICAN AMERICAN: 37
GFR NON-AFRICAN AMERICAN: 31
GLUCOSE BLD-MCNC: 127 MG/DL (ref 74–109)
HCT VFR BLD CALC: 33 % (ref 37–47)
HEMOGLOBIN: 9.6 G/DL (ref 12–16)
HUMAN METAPNEUMOVIRUS BY PCR: NOT DETECTED
HUMAN RHINOVIRUS/ENTEROVIRUS BY PCR: NOT DETECTED
IMMATURE GRANULOCYTES #: 0.1 K/UL
INFLUENZA A BY PCR: NOT DETECTED
INFLUENZA B BY PCR: NOT DETECTED
LYMPHOCYTES ABSOLUTE: 0.9 K/UL (ref 1.1–4.5)
LYMPHOCYTES RELATIVE PERCENT: 9.4 % (ref 20–40)
MCH RBC QN AUTO: 30.3 PG (ref 27–31)
MCHC RBC AUTO-ENTMCNC: 29.1 G/DL (ref 33–37)
MCV RBC AUTO: 104.1 FL (ref 81–99)
MONOCYTES ABSOLUTE: 0.5 K/UL (ref 0–0.9)
MONOCYTES RELATIVE PERCENT: 5.8 % (ref 0–10)
MYCOPLASMA PNEUMONIAE BY PCR: NOT DETECTED
NEUTROPHILS ABSOLUTE: 7.5 K/UL (ref 1.5–7.5)
NEUTROPHILS RELATIVE PERCENT: 82 % (ref 50–65)
PARAINFLUENZA VIRUS 1 BY PCR: NOT DETECTED
PARAINFLUENZA VIRUS 2 BY PCR: NOT DETECTED
PARAINFLUENZA VIRUS 3 BY PCR: NOT DETECTED
PARAINFLUENZA VIRUS 4 BY PCR: NOT DETECTED
PDW BLD-RTO: 16.5 % (ref 11.5–14.5)
PLATELET # BLD: 119 K/UL (ref 130–400)
PMV BLD AUTO: 10.3 FL (ref 9.4–12.3)
POTASSIUM REFLEX MAGNESIUM: 3.7 MMOL/L (ref 3.5–5)
PRO-BNP: ABNORMAL PG/ML (ref 0–900)
RBC # BLD: 3.17 M/UL (ref 4.2–5.4)
RESPIRATORY SYNCYTIAL VIRUS BY PCR: NOT DETECTED
SARS-COV-2, PCR: NOT DETECTED
SODIUM BLD-SCNC: 147 MMOL/L (ref 136–145)
TOTAL PROTEIN: 6.2 G/DL (ref 6.6–8.7)
TROPONIN: 0.21 NG/ML (ref 0–0.03)
WBC # BLD: 9.1 K/UL (ref 4.8–10.8)

## 2021-09-07 PROCEDURE — 84484 ASSAY OF TROPONIN QUANT: CPT

## 2021-09-07 PROCEDURE — 80053 COMPREHEN METABOLIC PANEL: CPT

## 2021-09-07 PROCEDURE — 71045 X-RAY EXAM CHEST 1 VIEW: CPT

## 2021-09-07 PROCEDURE — 99283 EMERGENCY DEPT VISIT LOW MDM: CPT

## 2021-09-07 PROCEDURE — G0299 HHS/HOSPICE OF RN EA 15 MIN: HCPCS

## 2021-09-07 PROCEDURE — 83880 ASSAY OF NATRIURETIC PEPTIDE: CPT

## 2021-09-07 PROCEDURE — 85025 COMPLETE CBC W/AUTO DIFF WBC: CPT

## 2021-09-07 PROCEDURE — G0152 HHCP-SERV OF OT,EA 15 MIN: HCPCS

## 2021-09-07 PROCEDURE — 36415 COLL VENOUS BLD VENIPUNCTURE: CPT

## 2021-09-07 PROCEDURE — 0202U NFCT DS 22 TRGT SARS-COV-2: CPT

## 2021-09-07 RX ORDER — FUROSEMIDE 10 MG/ML
40 INJECTION INTRAMUSCULAR; INTRAVENOUS ONCE
Status: COMPLETED | OUTPATIENT
Start: 2021-09-07 | End: 2021-09-08

## 2021-09-07 RX ORDER — METHYLPREDNISOLONE SODIUM SUCCINATE 125 MG/2ML
60 INJECTION, POWDER, LYOPHILIZED, FOR SOLUTION INTRAMUSCULAR; INTRAVENOUS ONCE
Status: COMPLETED | OUTPATIENT
Start: 2021-09-07 | End: 2021-09-08

## 2021-09-07 ASSESSMENT — ENCOUNTER SYMPTOMS
EYE REDNESS: 0
ABDOMINAL PAIN: 0
RHINORRHEA: 0
DIARRHEA: 0
WHEEZING: 1
SHORTNESS OF BREATH: 1
EYE PAIN: 0
VOICE CHANGE: 0
VOMITING: 0
COUGH: 0

## 2021-09-07 NOTE — CASE COMMUNICATION
Update after SNV on 9/7/2021:   Pt A&O X 4 and no c/o pain. BP -  186/110, Respiration - 32, temp - 99.5, O2 - 96% on 2L, clear lungs, SOA at rest, labored breathing, +1 edema in BLE, and regular heart sounds. Pt stated she felt like she did before she had to go to the hospital. Pt felt better went she put her BIPAP on. The pt had a breathing tx during the visit as well. I called the on call MD and it was Dr. Agudelo. After notifying Dr. Agudelo of the pt's S&S. Dr. Agudelo stated that he recommended the pt go to the ER. I told the pt his recommendation and the pt v/u. I helped the pt get her overnight bag ready and the pt's niece, Mya, came and picked up the pt and took her to [facility] ER.    I did not draw labs due to the pt going to the ER.    Thank you!  Mary Flores, RN, BSN

## 2021-09-07 NOTE — HOME HEALTH
OT EVALUATION  Primary Dx: COPD. Hospitalization x 6 since first of July. Last hospitalization 8/25/21 thru 9/1/21.  Secondary dx: ASHD, hypertensive heart diseaes with heart failure, CHF, aortic valve insufficiency, PTSD, anemia, MI 2020, R tib/fib fx with ORIF 2020.  Surgical procedure: None  Subjective: The patient is sitting on edge of bed, has c-pap machine on and is breathing rapidly, states she can't get her breath. BP readings are very high (190/110); gave Clonidine and had patient to lie down.  Previous OT: None  Fall prevention education: Use AD for all mobility, wear non-skid footwear for mobility, have adequate lighting in the home 24/7, remove throw rugs, clear main pathways of clutter.  Diabetic teaching: patient is not diabetic  Fall reported: none reported  Medication Changes: patient states she is doing her best to take all her meds on schedule. She is supposed to be getting breathing tx every 6 hrs but has only had one in the last 24 hrs.  Medication teaching:  Ensure that she gets breathing tx as recommended.  Insurance Changes: none  Reason for referral: The patient was recently hospitalized with COPD exacerbation and is now home, needing more assist with ADL tasks, transfers and mobility.  Precautions: Chronic O2; long tubing, fall risk  Equipment: rollator, oxygen concentrator, C-pap machine, raised toilet seat, cane, shower chair, grab bars in bathroom, BSC frame (missing seat component).  PLOF: Independent with self care, drove to dr rahman, used grocery pick-up at [facility].  Patient Goals for therapy: To be able to breathe better and get around and take care of herself better.  Medical necessity: Skilled OT is reasonable and medically necessary to increase safety and independence with ADL/iADL tasks and return the patient to PLOF.  Next MD appointment:  Dr Orville Weston, 9/8/21.  Rehab potential: Fair, due to higher PLOF, motivated to reach PLOF.   D/C plan: To self, when goals attained or  highest level of function reached.  Frequency: Beginning week of 9/13/21, 1 wk/3.  Plan for next visit: Bathing transfers, bathing.

## 2021-09-07 NOTE — HOME HEALTH
No recent falls, no insurance changes, and no recent medication changes. Pt/cg had no further questions or concerns regarding the pt's medications or plan of care. Pt A&O X 4 and no c/o pain. BP -  186/110, Respiration - 32, temp - 99.5, O2 - 96% on 2L, clear lungs, SOA at rest, labored breathing, +1 edema in BLE, and regular heart sounds. Pt stated she feels like she did before she had to go to the hospital. Pt felt better went she put her BIPAP on. The pt had a breathing tx during the visit as well. I called the on call MD and it was Dr. gAudelo. After notifying Dr. Agudelo of the pt's S&S. Dr. Agudelo stated that he recommends the pt go to the ER. I told the pt his recommendation and the pt v/u. I helped the pt get her overnight bag ready and the pt's niece, Mya, came and picked up the pt and took her to [facility] ER.    I did not draw labs due to the pt going to the ER.

## 2021-09-08 ENCOUNTER — READMISSION MANAGEMENT (OUTPATIENT)
Dept: CALL CENTER | Facility: HOSPITAL | Age: 61
End: 2021-09-08

## 2021-09-08 PROBLEM — R77.8 ELEVATED TROPONIN: Status: ACTIVE | Noted: 2021-09-08

## 2021-09-08 LAB
ANION GAP SERPL CALCULATED.3IONS-SCNC: 7 MMOL/L (ref 7–19)
BUN BLDV-MCNC: 28 MG/DL (ref 8–23)
CALCIUM SERPL-MCNC: 8.5 MG/DL (ref 8.8–10.2)
CHLORIDE BLD-SCNC: 108 MMOL/L (ref 98–111)
CO2: 32 MMOL/L (ref 22–29)
CREAT SERPL-MCNC: 1.9 MG/DL (ref 0.5–0.9)
GFR AFRICAN AMERICAN: 33
GFR NON-AFRICAN AMERICAN: 27
GLUCOSE BLD-MCNC: 187 MG/DL (ref 70–99)
GLUCOSE BLD-MCNC: 225 MG/DL (ref 74–109)
HBA1C MFR BLD: 5.1 % (ref 4–6)
PERFORMED ON: ABNORMAL
POTASSIUM SERPL-SCNC: 4.1 MMOL/L (ref 3.5–5)
SODIUM BLD-SCNC: 147 MMOL/L (ref 136–145)
TROPONIN: 0.13 NG/ML (ref 0–0.03)
TROPONIN: 0.13 NG/ML (ref 0–0.03)
TROPONIN: 0.15 NG/ML (ref 0–0.03)
TROPONIN: 0.18 NG/ML (ref 0–0.03)

## 2021-09-08 PROCEDURE — G0378 HOSPITAL OBSERVATION PER HR: HCPCS

## 2021-09-08 PROCEDURE — 6360000002 HC RX W HCPCS: Performed by: FAMILY MEDICINE

## 2021-09-08 PROCEDURE — 2580000003 HC RX 258: Performed by: EMERGENCY MEDICINE

## 2021-09-08 PROCEDURE — 2700000000 HC OXYGEN THERAPY PER DAY

## 2021-09-08 PROCEDURE — 82947 ASSAY GLUCOSE BLOOD QUANT: CPT

## 2021-09-08 PROCEDURE — 96372 THER/PROPH/DIAG INJ SC/IM: CPT

## 2021-09-08 PROCEDURE — 96375 TX/PRO/DX INJ NEW DRUG ADDON: CPT

## 2021-09-08 PROCEDURE — 6360000002 HC RX W HCPCS: Performed by: EMERGENCY MEDICINE

## 2021-09-08 PROCEDURE — 6370000000 HC RX 637 (ALT 250 FOR IP): Performed by: FAMILY MEDICINE

## 2021-09-08 PROCEDURE — 84484 ASSAY OF TROPONIN QUANT: CPT

## 2021-09-08 PROCEDURE — 96376 TX/PRO/DX INJ SAME DRUG ADON: CPT

## 2021-09-08 PROCEDURE — 94640 AIRWAY INHALATION TREATMENT: CPT

## 2021-09-08 PROCEDURE — 36415 COLL VENOUS BLD VENIPUNCTURE: CPT

## 2021-09-08 PROCEDURE — 80048 BASIC METABOLIC PNL TOTAL CA: CPT

## 2021-09-08 PROCEDURE — 83036 HEMOGLOBIN GLYCOSYLATED A1C: CPT

## 2021-09-08 PROCEDURE — 96374 THER/PROPH/DIAG INJ IV PUSH: CPT

## 2021-09-08 RX ORDER — NICOTINE POLACRILEX 4 MG
15 LOZENGE BUCCAL PRN
Status: DISCONTINUED | OUTPATIENT
Start: 2021-09-08 | End: 2021-09-10 | Stop reason: HOSPADM

## 2021-09-08 RX ORDER — HYDRALAZINE HYDROCHLORIDE 50 MG/1
75 TABLET, FILM COATED ORAL EVERY 8 HOURS
COMMUNITY

## 2021-09-08 RX ORDER — IPRATROPIUM BROMIDE AND ALBUTEROL SULFATE 2.5; .5 MG/3ML; MG/3ML
1 SOLUTION RESPIRATORY (INHALATION) EVERY 4 HOURS
COMMUNITY

## 2021-09-08 RX ORDER — OLANZAPINE 5 MG/1
5 TABLET ORAL 2 TIMES DAILY
COMMUNITY

## 2021-09-08 RX ORDER — FUROSEMIDE 40 MG/1
40 TABLET ORAL DAILY PRN
Status: DISCONTINUED | OUTPATIENT
Start: 2021-09-08 | End: 2021-09-10 | Stop reason: HOSPADM

## 2021-09-08 RX ORDER — CARVEDILOL 25 MG/1
25 TABLET ORAL 2 TIMES DAILY WITH MEALS
COMMUNITY

## 2021-09-08 RX ORDER — NITROGLYCERIN 0.4 MG/1
0.4 TABLET SUBLINGUAL EVERY 5 MIN PRN
COMMUNITY

## 2021-09-08 RX ORDER — FERROUS SULFATE 325(65) MG
325 TABLET ORAL 2 TIMES DAILY
COMMUNITY

## 2021-09-08 RX ORDER — CALCITRIOL 0.25 UG/1
0.25 CAPSULE, LIQUID FILLED ORAL DAILY
COMMUNITY

## 2021-09-08 RX ORDER — SODIUM CHLORIDE 9 MG/ML
25 INJECTION, SOLUTION INTRAVENOUS PRN
Status: DISCONTINUED | OUTPATIENT
Start: 2021-09-08 | End: 2021-09-10 | Stop reason: HOSPADM

## 2021-09-08 RX ORDER — NIFEDIPINE 60 MG/1
60 TABLET, EXTENDED RELEASE ORAL 2 TIMES DAILY
COMMUNITY

## 2021-09-08 RX ORDER — DEXTROSE MONOHYDRATE 50 MG/ML
100 INJECTION, SOLUTION INTRAVENOUS PRN
Status: DISCONTINUED | OUTPATIENT
Start: 2021-09-08 | End: 2021-09-10 | Stop reason: HOSPADM

## 2021-09-08 RX ORDER — GUAIFENESIN 600 MG/1
1200 TABLET, EXTENDED RELEASE ORAL 2 TIMES DAILY
COMMUNITY

## 2021-09-08 RX ORDER — BUDESONIDE AND FORMOTEROL FUMARATE DIHYDRATE 160; 4.5 UG/1; UG/1
2 AEROSOL RESPIRATORY (INHALATION) 2 TIMES DAILY
COMMUNITY

## 2021-09-08 RX ORDER — ROSUVASTATIN CALCIUM 20 MG/1
20 TABLET, COATED ORAL DAILY
COMMUNITY

## 2021-09-08 RX ORDER — SODIUM CHLORIDE 0.9 % (FLUSH) 0.9 %
5-40 SYRINGE (ML) INJECTION PRN
Status: DISCONTINUED | OUTPATIENT
Start: 2021-09-08 | End: 2021-09-10 | Stop reason: HOSPADM

## 2021-09-08 RX ORDER — ASPIRIN 81 MG/1
81 TABLET ORAL DAILY
COMMUNITY

## 2021-09-08 RX ORDER — AZELASTINE 1 MG/ML
2 SPRAY, METERED NASAL 2 TIMES DAILY
COMMUNITY

## 2021-09-08 RX ORDER — IPRATROPIUM BROMIDE AND ALBUTEROL SULFATE 2.5; .5 MG/3ML; MG/3ML
1 SOLUTION RESPIRATORY (INHALATION) 4 TIMES DAILY
Status: DISCONTINUED | OUTPATIENT
Start: 2021-09-08 | End: 2021-09-10 | Stop reason: HOSPADM

## 2021-09-08 RX ORDER — ISOSORBIDE DINITRATE 10 MG/1
10 TABLET ORAL 3 TIMES DAILY
COMMUNITY

## 2021-09-08 RX ORDER — ONDANSETRON 2 MG/ML
4 INJECTION INTRAMUSCULAR; INTRAVENOUS EVERY 6 HOURS PRN
Status: DISCONTINUED | OUTPATIENT
Start: 2021-09-08 | End: 2021-09-10 | Stop reason: HOSPADM

## 2021-09-08 RX ORDER — CARVEDILOL 25 MG/1
25 TABLET ORAL 2 TIMES DAILY WITH MEALS
Status: DISCONTINUED | OUTPATIENT
Start: 2021-09-08 | End: 2021-09-10 | Stop reason: HOSPADM

## 2021-09-08 RX ORDER — ONDANSETRON 4 MG/1
4 TABLET, ORALLY DISINTEGRATING ORAL EVERY 8 HOURS PRN
Status: DISCONTINUED | OUTPATIENT
Start: 2021-09-08 | End: 2021-09-10 | Stop reason: HOSPADM

## 2021-09-08 RX ORDER — ISOSORBIDE DINITRATE 10 MG/1
10 TABLET ORAL 3 TIMES DAILY
Status: DISCONTINUED | OUTPATIENT
Start: 2021-09-08 | End: 2021-09-10 | Stop reason: HOSPADM

## 2021-09-08 RX ORDER — MECOBALAMIN 5000 MCG
5 TABLET,DISINTEGRATING ORAL NIGHTLY
COMMUNITY

## 2021-09-08 RX ORDER — FUROSEMIDE 40 MG/1
40 TABLET ORAL DAILY PRN
COMMUNITY

## 2021-09-08 RX ORDER — ACETAMINOPHEN 325 MG/1
650 TABLET ORAL EVERY 4 HOURS PRN
Status: DISCONTINUED | OUTPATIENT
Start: 2021-09-08 | End: 2021-09-10 | Stop reason: HOSPADM

## 2021-09-08 RX ORDER — M-VIT,TX,IRON,MINS/CALC/FOLIC 27MG-0.4MG
1 TABLET ORAL DAILY
COMMUNITY

## 2021-09-08 RX ORDER — MULTIVIT WITH MINERALS/LUTEIN
500 TABLET ORAL DAILY
COMMUNITY

## 2021-09-08 RX ORDER — NIFEDIPINE 60 MG/1
60 TABLET, EXTENDED RELEASE ORAL 2 TIMES DAILY
Status: DISCONTINUED | OUTPATIENT
Start: 2021-09-08 | End: 2021-09-10 | Stop reason: HOSPADM

## 2021-09-08 RX ORDER — SODIUM CHLORIDE 0.9 % (FLUSH) 0.9 %
5-40 SYRINGE (ML) INJECTION EVERY 12 HOURS SCHEDULED
Status: DISCONTINUED | OUTPATIENT
Start: 2021-09-08 | End: 2021-09-10 | Stop reason: HOSPADM

## 2021-09-08 RX ORDER — DEXTROSE MONOHYDRATE 25 G/50ML
12.5 INJECTION, SOLUTION INTRAVENOUS PRN
Status: DISCONTINUED | OUTPATIENT
Start: 2021-09-08 | End: 2021-09-10 | Stop reason: HOSPADM

## 2021-09-08 RX ORDER — METHYLPREDNISOLONE SODIUM SUCCINATE 40 MG/ML
40 INJECTION, POWDER, LYOPHILIZED, FOR SOLUTION INTRAMUSCULAR; INTRAVENOUS EVERY 6 HOURS
Status: DISCONTINUED | OUTPATIENT
Start: 2021-09-08 | End: 2021-09-09

## 2021-09-08 RX ORDER — BUSPIRONE HYDROCHLORIDE 10 MG/1
10 TABLET ORAL 3 TIMES DAILY
COMMUNITY

## 2021-09-08 RX ORDER — PANTOPRAZOLE SODIUM 40 MG/1
40 TABLET, DELAYED RELEASE ORAL DAILY
COMMUNITY

## 2021-09-08 RX ADMIN — METHYLPREDNISOLONE SODIUM SUCCINATE 40 MG: 40 INJECTION, POWDER, FOR SOLUTION INTRAMUSCULAR; INTRAVENOUS at 10:25

## 2021-09-08 RX ADMIN — HYDRALAZINE HYDROCHLORIDE 75 MG: 25 TABLET, FILM COATED ORAL at 21:31

## 2021-09-08 RX ADMIN — IPRATROPIUM BROMIDE AND ALBUTEROL SULFATE 1 AMPULE: .5; 3 SOLUTION RESPIRATORY (INHALATION) at 19:31

## 2021-09-08 RX ADMIN — HYDRALAZINE HYDROCHLORIDE 75 MG: 25 TABLET, FILM COATED ORAL at 15:41

## 2021-09-08 RX ADMIN — ISOSORBIDE DINITRATE 10 MG: 10 TABLET ORAL at 21:31

## 2021-09-08 RX ADMIN — CARVEDILOL 25 MG: 25 TABLET, FILM COATED ORAL at 17:59

## 2021-09-08 RX ADMIN — IPRATROPIUM BROMIDE AND ALBUTEROL SULFATE 1 AMPULE: .5; 3 SOLUTION RESPIRATORY (INHALATION) at 14:34

## 2021-09-08 RX ADMIN — METHYLPREDNISOLONE SODIUM SUCCINATE 60 MG: 125 INJECTION, POWDER, FOR SOLUTION INTRAMUSCULAR; INTRAVENOUS at 00:12

## 2021-09-08 RX ADMIN — FUROSEMIDE 40 MG: 10 INJECTION, SOLUTION INTRAMUSCULAR; INTRAVENOUS at 00:12

## 2021-09-08 RX ADMIN — SODIUM CHLORIDE, PRESERVATIVE FREE 10 ML: 5 INJECTION INTRAVENOUS at 21:31

## 2021-09-08 RX ADMIN — ENOXAPARIN SODIUM 40 MG: 40 INJECTION SUBCUTANEOUS at 09:00

## 2021-09-08 RX ADMIN — ISOSORBIDE DINITRATE 10 MG: 10 TABLET ORAL at 15:41

## 2021-09-08 RX ADMIN — METHYLPREDNISOLONE SODIUM SUCCINATE 40 MG: 40 INJECTION, POWDER, FOR SOLUTION INTRAMUSCULAR; INTRAVENOUS at 17:59

## 2021-09-08 RX ADMIN — IPRATROPIUM BROMIDE AND ALBUTEROL SULFATE 1 AMPULE: .5; 3 SOLUTION RESPIRATORY (INHALATION) at 11:10

## 2021-09-08 RX ADMIN — SODIUM CHLORIDE, PRESERVATIVE FREE 10 ML: 5 INJECTION INTRAVENOUS at 09:00

## 2021-09-08 RX ADMIN — NIFEDIPINE 60 MG: 60 TABLET, FILM COATED, EXTENDED RELEASE ORAL at 15:41

## 2021-09-08 RX ADMIN — NIFEDIPINE 60 MG: 60 TABLET, FILM COATED, EXTENDED RELEASE ORAL at 21:31

## 2021-09-08 ASSESSMENT — PAIN - FUNCTIONAL ASSESSMENT: PAIN_FUNCTIONAL_ASSESSMENT: 0-10

## 2021-09-08 NOTE — ED NOTES
Called  answering service at [phone]. Return call from Dr. Maday Yang at [phone].  Transferred call Dr. Chuy Langford  09/07/21 9918

## 2021-09-08 NOTE — ED PROVIDER NOTES
[facility] [place] [facility] EMERGENCY DEPT  EMERGENCY DEPARTMENT ENCOUNTER      Pt Name: James Amador  MRN: [id number]  [place] 1960  Date of evaluation: 9/7/2021  Provider: Philip Maria MD    CHIEF COMPLAINT       Chief Complaint   Patient presents with    Shortness of Breath         HISTORY OF PRESENT ILLNESS   (Location/Symptom, Timing/Onset,Context/Setting, Quality, Duration, Modifying Factors, Severity)  Note limiting factors. James Amador is a 64 y.o. female who presents to the emergency department with increased shortness of breath over the last couple of days. Patient was recently discharged from [facility] after she was admitted there for COPD exacerbation. Patient also has a history of CHF. States she is still on steroids and has 3 doses left states her oxygen level dropped down to the 80s 2 days ago. Wears 2 L nasal cannula at home. [facility] home health came out today and her blood pressure was high and she took clonidine with improvement of her blood pressure but after hearing about her worsening shortness of breath I advised her to come for reevaluation. Patient has been using nebulizer treatments at home and does report that they help transiently. HPI    NursingNotes were reviewed. REVIEW OF SYSTEMS    (2-9 systems for level 4, 10 or more for level 5)     Review of Systems   Constitutional: Negative for fatigue and fever. HENT: Negative for congestion, rhinorrhea and voice change. Eyes: Negative for pain and redness. Respiratory: Positive for shortness of breath and wheezing. Negative for cough. Cardiovascular: Negative for chest pain. Gastrointestinal: Negative for abdominal pain, diarrhea and vomiting. Endocrine: Negative. Genitourinary: Negative. Musculoskeletal: Negative for arthralgias and gait problem. Skin: Negative for rash and wound. Neurological: Negative for weakness and headaches. Hematological: Negative. Psychiatric/Behavioral: Negative.     All other systems reviewed and are negative. A complete review of systems was performed and is negative except as noted above in the HPI. PAST MEDICAL HISTORY     Past Medical History:   Diagnosis Date    Anemia     Asthma     Chronic headaches     Hypertension          SURGICAL HISTORY     No past surgical history on file.       CURRENT MEDICATIONS       Previous Medications    AMITRIPTYLINE (ELAVIL) 10 MG TABLET    Take 10 mg by mouth nightly    ATENOLOL (TENORMIN) 50 MG TABLET    Take 50 mg by mouth daily    AZELASTINE  MCG/SPRAY SOLN    by Nasal route    CETIRIZINE-PSUEDOEPHEDRINE (ZYRTEC-D ALLERGY & CONGESTION) 5-120 MG PER EXTENDED RELEASE TABLET    Take 1 tablet by mouth 2 times daily    CLONIDINE (CATAPRES) 0.1 MG TABLET    Take 0.1 mg by mouth 2 times daily    FLUTICASONE (FLONASE ALLERGY RELIEF) 50 MCG/ACT NASAL SPRAY    1 spray by Nasal route daily    PROMETHAZINE (PHENERGAN) 25 MG TABLET    Take 25 mg by mouth every 6 hours as needed for Nausea    VITAMIN D (ERGOCALCIFEROL) 62971 UNITS CAPS CAPSULE    Take 50,000 Units by mouth once a week       ALLERGIES     Codeine and Sumatriptan    FAMILY HISTORY       Family History   Problem Relation Age of Onset    Heart Disease Mother     Heart Disease Father           SOCIAL HISTORY       Social History     Socioeconomic History    Marital status: Single     Spouse name: Not on file    Number of children: Not on file    Years of education: Not on file    Highest education level: Not on file   Occupational History    Not on file   Tobacco Use    Smoking status: Current Every Day Smoker     Packs/day: 0.50     Years: 30.00     Pack years: 15.00    Smokeless tobacco: Never Used    Tobacco comment: Please quit   Substance and Sexual Activity    Alcohol use: No    Drug use: No    Sexual activity: Yes   Other Topics Concern    Not on file   Social History Narrative    Not on file     Social Determinants of Health     Financial Resource Strain:    [facility] Difficulty of Paying Living Expenses:    Food Insecurity:     Worried About Running Out of Food in the Last Year:     [address] in the Last Year:    Transportation Needs:     Lack of Transportation (Medical):  Lack of Transportation (Non-Medical):    Physical Activity:     Days of Exercise per Week:     Minutes of Exercise per Session:    Stress:     Feeling of Stress :    Social Connections:     Frequency of Communication with Friends and Family:     Frequency of Social Gatherings with Friends and Family:     Attends [employer] Services:     Active Member of Clubs or Organizations:     Attends Club or Organization Meetings:     Marital Status:    Intimate Partner Violence:     Fear of Current or Ex-Partner:     Emotionally Abused:     Physically Abused:     Sexually Abused:        SCREENINGS             PHYSICAL EXAM    (up to 7 for level 4, 8 or more for level 5)     ED Triage Vitals [09/07/21 1830]   BP Temp Temp src Pulse Resp SpO2 Height Weight   (!) 184/100 97.9 °F (36.6 °C) -- 76 22 96 % 5' 5\" (1.651 m) 254 lb (115.2 kg)       Physical Exam  Vitals and nursing note reviewed. Constitutional:       General: She is not in acute distress. Appearance: She is well-developed. She is not toxic-appearing or diaphoretic. Interventions: Nasal cannula in place. HENT:      Head: Normocephalic and atraumatic. Eyes:      General: No scleral icterus. Right eye: No discharge. Left eye: No discharge. Pupils: Pupils are equal, round, and reactive to light. Cardiovascular:      Rate and Rhythm: Normal rate and regular rhythm. Pulmonary:      Effort: Pulmonary effort is normal. Prolonged expiration present. No tachypnea, accessory muscle usage or respiratory distress. Breath sounds: No stridor. Abdominal:      General: There is no distension. Musculoskeletal:         General: No deformity. Normal range of motion. Cervical back: Normal range of motion. Skin:     General: Skin is warm and dry. Neurological:      Mental Status: She is alert and oriented to person, place, and time. GCS: GCS eye subscore is 4. GCS verbal subscore is 5. GCS motor subscore is 6. Cranial Nerves: No cranial nerve deficit. Motor: No abnormal muscle tone. Psychiatric:         Behavior: Behavior normal.         Thought Content: Thought content normal.         Judgment: Judgment normal.         DIAGNOSTIC RESULTS     EKG: All EKG's are interpreted by the Emergency Department Physician who either signs or Co-signs this chart in the absence of a cardiologist.      RADIOLOGY:   Non-plain film images such as CT, Ultrasound and MRI are read by the radiologist. Plainradiographic images are visualized and preliminarily interpreted by the emergency physician with the below findings:        Interpretation per the Radiologist below, if available at the time of this note:    XR CHEST PORTABLE   Final Result   1. There is a 9 mm irregular right upper lobe nodule. This could be a   small neoplasm. Follow-up nonemergent chest CT recommended. 2. Minimal infiltrate along the right hemidiaphragm may be   infectious/inflammatory or atelectasis. 3. Bronchial wall thickening. 3. Cardiomegaly. The full report of this exam was immediately signed and available to   the emergency room. The patient is currently in the emergency room.    Signed by Dr Lindy Caal            ED BEDSIDE ULTRASOUND:   Performed by ED Physician - none    LABS:  Labs Reviewed   CBC WITH AUTO DIFFERENTIAL - Abnormal; Notable for the following components:       Result Value    RBC 3.17 (*)     Hemoglobin 9.6 (*)     Hematocrit 33.0 (*)     .1 (*)     MCHC 29.1 (*)     RDW 16.5 (*)     Platelets 494 (*)     Neutrophils % 82.0 (*)     Lymphocytes % 9.4 (*)     Lymphocytes Absolute 0.9 (*)     All other components within normal limits   COMPREHENSIVE METABOLIC PANEL W/ REFLEX TO MG FOR LOW K - Abnormal; Notable for the following components:    Sodium 147 (*)     Glucose 127 (*)     BUN 30 (*)     CREATININE 1.7 (*)     GFR Non- 31 (*)     GFR  37 (*)     Calcium 8.2 (*)     Total Protein 6.2 (*)     All other components within normal limits   TROPONIN - Abnormal; Notable for the following components:    Troponin 0.21 (*)     All other components within normal limits    Narrative:     CALL  [place]  [facility] tel. ,  Chemistry results called to and read back by Catalina Marti RN in ED, 09/07/2021  22:08, by [id number] - Abnormal; Notable for the following components:    Pro-BNP 10,807 (*)     All other components within normal limits   RESPIRATORY PANEL, MOLECULAR, WITH COVID-19       All other labs were within normal range or not returned as of this dictation. Medications   furosemide (LASIX) injection 40 mg (40 mg IntraVENous Given 9/8/21 0012)   methylPREDNISolone sodium (SOLU-MEDROL) injection 60 mg (60 mg IntraVENous Given 9/8/21 0012)       EMERGENCY DEPARTMENT COURSE and DIFFERENTIALDIAGNOSIS/MDM:   Vitals:    Vitals:    09/07/21 1830 09/07/21 1949 09/07/21 2251   BP: (!) 184/100  (!) 156/98   Pulse: 76 72 71   Resp: 22  18   Temp: 97.9 °F (36.6 °C)     SpO2: 96% 98% 97%   Weight: 254 lb (115.2 kg)     Height: 5' 5\" (1.651 m)         MDM    ED Course as of Sep 08 0149   Tue Sep 07, 2021   2147 EKG shows sinus rhythm with a rate of 80. No findings of acute ischemia or infarction. Borderline NJ interval of 223. Nonspecific interventricular conduction delay. Nonspecific lateral ST segment changes. [SABRINA]   2158 Patient with no respiratory distress or increased work of breathing here. Oxygen saturation in the low 90s on home nasal cannula oxygen    [SABRINA]   2216 BNP slightly higher than recent labs available for comparison at [facility].  Creatinine similar to recent baseline as well. Troponin level elevated 0.21. Patient without chest pain.   Recent troponin levels at [facility] were negative though patient does have several instances of elevated troponin readings there. Unclear whether this is due to renal insufficiency    [SABRINA]   2312 On review of recent records from [facility], patient was recently evaluated by cardiology there and no additional work-up was planned. Recommendations were for patient to go to a nursing facility but this was not approved by insurance    [SABRINA]      ED Course User Index  [SABRINA] Sally Montilla MD       Discussed patient with Dr. Bucky Edwards who is on-call for Dr. Annemarie Leone, patient's PCP. Given the results tonight, plan for observation overnight for reevaluation in the morning. Based on the evaluation and work-up here patient is felt to require further monitoring, work-up, or treatment that is available in the emergency department. Case was discussed with Dr. Bucky Edwards who agrees for observation or admission for further management. Treatment and stabilization as necessary were provided in the emergency department prior to transfer of care to the [facility] service. CONSULTS:  None    PROCEDURES:  Unless otherwise notedbelow, none     Procedures      FINAL IMPRESSION     1. COPD exacerbation ([address]    2. Acute on chronic diastolic CHF (congestive heart failure) (HCC)    3. Elevated troponin    4. Chronic renal impairment, stage 3 (moderate), unspecified whether stage 3a or 3b CKD ([address]          DISPOSITION/PLAN   DISPOSITION Admitted 09/08/2021 01:11:08 AM      PATIENT REFERRED TO:  No follow-up provider specified.     DISCHARGE MEDICATIONS:  New Prescriptions    No medications on file          (Please note that portions of this note were completed with a voice recognition program.  Efforts were made to edit the dictations butoccasionally words are mis-transcribed.)    Sally Chamorro MD (electronically signed)  AttendingEmergency Physician          Sally Montilla MD  09/08/21 2253

## 2021-09-08 NOTE — OUTREACH NOTE
Medical Week 1 Survey      Responses   [facility] patient discharged from?  [facility]   Does the patient have one of the following disease processes/diagnoses(primary or secondary)?  Other   Week 1 attempt successful?  Yes   Call start time  1047   Rescheduled  Revoked   Revoke  Readmitted [PATIENT WAS READMITTED TO [facility]]          Trini Cuellar LPN

## 2021-09-08 NOTE — CARE COORDINATION
Patient current with [facility]   Electronically signed by Aayush Max on 9/8/21 at 8:12 AM CDT

## 2021-09-08 NOTE — H&P
History and Physical      CHIEF COMPLAINT:  CP    Reason for Admission:  CP, SOA    History Obtained From:  Patient, chart    HISTORY OF PRESENT ILLNESS:      The patient is a 64 y.o. female who came to ER with c/o CP and worsening SOA. She has had multiple recent Hospital stays at [facility], with assessments by Cardiology and Pulm. She says she is using all of her d/c medicine and treatments. She denies any current CP. Her SOA is improved. No GI complaints. No fevers. No dysuria. Past Medical History:        Diagnosis Date    Anemia     Asthma     Chronic headaches     Hypertension      Past Surgical History:    No past surgical history on file. Medications Prior to Admission:    Medications Prior to Admission: azelastine (ASTELIN) 0.1 % nasal spray, 2 sprays by Nasal route 2 times daily Use in each nostril as directed  pantoprazole (PROTONIX) 40 MG tablet, Take 40 mg by mouth daily  NIFEdipine (PROCARDIA XL) 60 MG extended release tablet, Take 60 mg by mouth 2 times daily  ferrous sulfate (IRON 325) 325 (65 Fe) MG tablet, Take 325 mg by mouth 2 times daily  OLANZapine (ZYPREXA) 5 MG tablet, Take 5 mg by mouth 2 times daily  aspirin 81 MG EC tablet, Take 81 mg by mouth daily  nitroGLYCERIN (NITROSTAT) 0.4 MG SL tablet, Place 0.4 mg under the tongue every 5 minutes as needed for Chest pain up to max of 3 total doses. If no relief after 1 dose, call 911.   rosuvastatin (CRESTOR) 20 MG tablet, Take 20 mg by mouth daily  ticagrelor (BRILINTA) 90 MG TABS tablet, Take 90 mg by mouth 2 times daily  hydrALAZINE (APRESOLINE) 50 MG tablet, Take 75 mg by mouth every 8 hours  carvedilol (COREG) 25 MG tablet, Take 25 mg by mouth 2 times daily (with meals)  calcitRIOL (ROCALTROL) 0.25 MCG capsule, Take 0.25 mcg by mouth daily  guaiFENesin (MUCINEX) 600 MG extended release tablet, Take 1,200 mg by mouth 2 times daily  isosorbide dinitrate (ISORDIL) 10 MG tablet, Take 10 mg by mouth 3 times daily  furosemide (LASIX) 40 MG tablet, Take 40 mg by mouth daily as needed INCREASED SOA, EDEMA, WEIGHT GAIN (2LBS OVERNIGHT/5 POUNDS IN 2 DAYS)  budesonide-formoterol (SYMBICORT) 160-4.5 MCG/ACT AERO, Inhale 2 puffs into the lungs 2 times daily  busPIRone (BUSPAR) 10 MG tablet, Take 10 mg by mouth 3 times daily  ipratropium-albuterol (DUONEB) 0.5-2.5 (3) MG/3ML SOLN nebulizer solution, Inhale 1 vial into the lungs every 4 hours  melatonin 5 MG TBDP disintegrating tablet, Take 5 mg by mouth nightly  Multiple Vitamins-Minerals (THERAPEUTIC MULTIVITAMIN-MINERALS) tablet, Take 1 tablet by mouth daily  Ascorbic Acid (VITAMIN C) 250 MG tablet, Take 500 mg by mouth daily  [DISCONTINUED] Azelastine HCl 137 MCG/SPRAY SOLN, by Nasal route  fluticasone (FLONASE ALLERGY RELIEF) 50 MCG/ACT nasal spray, 2 sprays by Nasal route daily   [DISCONTINUED] vitamin D (ERGOCALCIFEROL) 79148 units CAPS capsule, Take 50,000 Units by mouth once a week  [DISCONTINUED] cetirizine-psuedoephedrine (ZYRTEC-D ALLERGY & CONGESTION) 5-120 MG per extended release tablet, Take 1 tablet by mouth 2 times daily  [DISCONTINUED] atenolol (TENORMIN) 50 MG tablet, Take 50 mg by mouth daily  [DISCONTINUED] promethazine (PHENERGAN) 25 MG tablet, Take 25 mg by mouth every 6 hours as needed for Nausea  [DISCONTINUED] cloNIDine (CATAPRES) 0.1 MG tablet, Take 0.1 mg by mouth 2 times daily  [DISCONTINUED] amitriptyline (ELAVIL) 10 MG tablet, Take 10 mg by mouth nightly    Allergies:  Codeine and Sumatriptan    Social History:   TOBACCO:   reports that she has been smoking. She has a 15.00 pack-year smoking history. She has never used smokeless tobacco.  ETOH:   reports no history of alcohol use. DRUGS:   reports no history of drug use.   MARITAL STATUS:  Not   OCCUPATION:  Not working  Patient currently lives alone      Family History:       Problem Relation Age of Onset    Heart Disease Mother     Heart Disease Father      REVIEW OF SYSTEMS:  Constitutional: neg  CV: CP  Pulmonary: SOA  GI: neg  : neg  Psych: neg  Neuro: neg  Skin: neg  MusculoSkeletal: neg  HEENT: neg  Joints: neg  Vitals:  BP (!) 150/101   Pulse 78   Temp 96.8 °F (36 °C) (Temporal)   Resp 18   Ht 5' 5\" (1.651 m)   Wt 216 lb (98 kg)   SpO2 94%   BMI 35.94 kg/m²     PHYSICAL EXAM:  Gen: NAD, alert, pleasant  HEENT: WNL  Lymph: no LAD  Neck: no JVD or masses  Chest: CTA bilat  CV: RRR  Abdomen: NT/ND  Extrem: no C/C/E  Neuro: non focal  Skin: no rashes  Joints: no redness  DATA:  I have reviewed the admission labs and imaging tests.     ASSESSMENT AND PLAN:      Active Problems:    Elevated troponin, H/O CAD--follow withlabs, Cardiology consult ordered   COPD, CRF---continue current treatment with steroids, O2, neb treatments   HTN---follow with BP, resume home medicine   Isha Lanza MD  5:56 PM 9/8/2021

## 2021-09-09 LAB
ANION GAP SERPL CALCULATED.3IONS-SCNC: 11 MMOL/L (ref 7–19)
BUN BLDV-MCNC: 37 MG/DL (ref 8–23)
CALCIUM SERPL-MCNC: 8.3 MG/DL (ref 8.8–10.2)
CHLORIDE BLD-SCNC: 107 MMOL/L (ref 98–111)
CO2: 27 MMOL/L (ref 22–29)
CREAT SERPL-MCNC: 1.8 MG/DL (ref 0.5–0.9)
EKG P AXIS: 73 DEGREES
EKG P-R INTERVAL: 232 MS
EKG Q-T INTERVAL: 394 MS
EKG QRS DURATION: 86 MS
EKG QTC CALCULATION (BAZETT): 413 MS
EKG T AXIS: 69 DEGREES
GFR AFRICAN AMERICAN: 35
GFR NON-AFRICAN AMERICAN: 29
GLUCOSE BLD-MCNC: 117 MG/DL (ref 70–99)
GLUCOSE BLD-MCNC: 151 MG/DL (ref 74–109)
GLUCOSE BLD-MCNC: 200 MG/DL (ref 70–99)
GLUCOSE BLD-MCNC: 205 MG/DL (ref 70–99)
PERFORMED ON: ABNORMAL
POTASSIUM SERPL-SCNC: 4.4 MMOL/L (ref 3.5–5)
SODIUM BLD-SCNC: 145 MMOL/L (ref 136–145)
TROPONIN: 0.1 NG/ML (ref 0–0.03)

## 2021-09-09 PROCEDURE — 2580000003 HC RX 258: Performed by: EMERGENCY MEDICINE

## 2021-09-09 PROCEDURE — 99222 1ST HOSP IP/OBS MODERATE 55: CPT | Performed by: INTERNAL MEDICINE

## 2021-09-09 PROCEDURE — 96376 TX/PRO/DX INJ SAME DRUG ADON: CPT

## 2021-09-09 PROCEDURE — G0378 HOSPITAL OBSERVATION PER HR: HCPCS

## 2021-09-09 PROCEDURE — 93005 ELECTROCARDIOGRAM TRACING: CPT | Performed by: NURSE PRACTITIONER

## 2021-09-09 PROCEDURE — 6370000000 HC RX 637 (ALT 250 FOR IP)

## 2021-09-09 PROCEDURE — 80048 BASIC METABOLIC PNL TOTAL CA: CPT

## 2021-09-09 PROCEDURE — 96372 THER/PROPH/DIAG INJ SC/IM: CPT

## 2021-09-09 PROCEDURE — 6370000000 HC RX 637 (ALT 250 FOR IP): Performed by: EMERGENCY MEDICINE

## 2021-09-09 PROCEDURE — 1210000000 HC MED SURG R&B

## 2021-09-09 PROCEDURE — 6370000000 HC RX 637 (ALT 250 FOR IP): Performed by: FAMILY MEDICINE

## 2021-09-09 PROCEDURE — 2700000000 HC OXYGEN THERAPY PER DAY

## 2021-09-09 PROCEDURE — 6360000002 HC RX W HCPCS: Performed by: EMERGENCY MEDICINE

## 2021-09-09 PROCEDURE — 97165 OT EVAL LOW COMPLEX 30 MIN: CPT

## 2021-09-09 PROCEDURE — 97535 SELF CARE MNGMENT TRAINING: CPT

## 2021-09-09 PROCEDURE — 6360000002 HC RX W HCPCS: Performed by: FAMILY MEDICINE

## 2021-09-09 PROCEDURE — 82947 ASSAY GLUCOSE BLOOD QUANT: CPT

## 2021-09-09 PROCEDURE — 84484 ASSAY OF TROPONIN QUANT: CPT

## 2021-09-09 PROCEDURE — 94640 AIRWAY INHALATION TREATMENT: CPT

## 2021-09-09 PROCEDURE — 6370000000 HC RX 637 (ALT 250 FOR IP): Performed by: HOSPITALIST

## 2021-09-09 PROCEDURE — 36415 COLL VENOUS BLD VENIPUNCTURE: CPT

## 2021-09-09 RX ORDER — SODIUM CHLORIDE 9 MG/ML
25 INJECTION, SOLUTION INTRAVENOUS PRN
Status: CANCELLED | OUTPATIENT
Start: 2021-09-10

## 2021-09-09 RX ORDER — METHYLPREDNISOLONE SODIUM SUCCINATE 40 MG/ML
40 INJECTION, POWDER, LYOPHILIZED, FOR SOLUTION INTRAMUSCULAR; INTRAVENOUS EVERY 12 HOURS
Status: DISCONTINUED | OUTPATIENT
Start: 2021-09-10 | End: 2021-09-10 | Stop reason: HOSPADM

## 2021-09-09 RX ORDER — ATORVASTATIN CALCIUM 80 MG/1
80 TABLET, FILM COATED ORAL NIGHTLY
Status: DISCONTINUED | OUTPATIENT
Start: 2021-09-09 | End: 2021-09-10 | Stop reason: HOSPADM

## 2021-09-09 RX ORDER — ASPIRIN 81 MG/1
81 TABLET ORAL ONCE
Status: CANCELLED | OUTPATIENT
Start: 2021-09-10

## 2021-09-09 RX ORDER — SODIUM CHLORIDE 0.9 % (FLUSH) 0.9 %
5-40 SYRINGE (ML) INJECTION PRN
Status: CANCELLED | OUTPATIENT
Start: 2021-09-10

## 2021-09-09 RX ORDER — SODIUM CHLORIDE 0.9 % (FLUSH) 0.9 %
5-40 SYRINGE (ML) INJECTION EVERY 12 HOURS SCHEDULED
Status: CANCELLED | OUTPATIENT
Start: 2021-09-10

## 2021-09-09 RX ORDER — FERROUS SULFATE 325(65) MG
325 TABLET ORAL 2 TIMES DAILY
Status: DISCONTINUED | OUTPATIENT
Start: 2021-09-09 | End: 2021-09-10 | Stop reason: HOSPADM

## 2021-09-09 RX ORDER — SODIUM CHLORIDE 9 MG/ML
INJECTION, SOLUTION INTRAVENOUS CONTINUOUS
Status: CANCELLED | OUTPATIENT
Start: 2021-09-10

## 2021-09-09 RX ORDER — ASPIRIN 325 MG
TABLET ORAL
Status: COMPLETED
Start: 2021-09-09 | End: 2021-09-09

## 2021-09-09 RX ORDER — ASPIRIN 81 MG/1
81 TABLET, CHEWABLE ORAL DAILY
Status: DISCONTINUED | OUTPATIENT
Start: 2021-09-10 | End: 2021-09-10 | Stop reason: HOSPADM

## 2021-09-09 RX ADMIN — ATORVASTATIN CALCIUM 80 MG: 80 TABLET, FILM COATED ORAL at 21:17

## 2021-09-09 RX ADMIN — HYDRALAZINE HYDROCHLORIDE 75 MG: 25 TABLET, FILM COATED ORAL at 05:52

## 2021-09-09 RX ADMIN — IPRATROPIUM BROMIDE AND ALBUTEROL SULFATE 1 AMPULE: .5; 3 SOLUTION RESPIRATORY (INHALATION) at 14:48

## 2021-09-09 RX ADMIN — CARVEDILOL 25 MG: 25 TABLET, FILM COATED ORAL at 16:36

## 2021-09-09 RX ADMIN — ISOSORBIDE DINITRATE 10 MG: 10 TABLET ORAL at 12:58

## 2021-09-09 RX ADMIN — ISOSORBIDE DINITRATE 10 MG: 10 TABLET ORAL at 21:17

## 2021-09-09 RX ADMIN — SODIUM CHLORIDE, PRESERVATIVE FREE 10 ML: 5 INJECTION INTRAVENOUS at 21:18

## 2021-09-09 RX ADMIN — IPRATROPIUM BROMIDE AND ALBUTEROL SULFATE 1 AMPULE: .5; 3 SOLUTION RESPIRATORY (INHALATION) at 19:30

## 2021-09-09 RX ADMIN — METHYLPREDNISOLONE SODIUM SUCCINATE 40 MG: 40 INJECTION, POWDER, FOR SOLUTION INTRAMUSCULAR; INTRAVENOUS at 11:57

## 2021-09-09 RX ADMIN — HYDRALAZINE HYDROCHLORIDE 75 MG: 25 TABLET, FILM COATED ORAL at 21:17

## 2021-09-09 RX ADMIN — NIFEDIPINE 60 MG: 60 TABLET, FILM COATED, EXTENDED RELEASE ORAL at 07:58

## 2021-09-09 RX ADMIN — ACETAMINOPHEN 650 MG: 325 TABLET ORAL at 08:08

## 2021-09-09 RX ADMIN — METHYLPREDNISOLONE SODIUM SUCCINATE 40 MG: 40 INJECTION, POWDER, FOR SOLUTION INTRAMUSCULAR; INTRAVENOUS at 02:00

## 2021-09-09 RX ADMIN — ISOSORBIDE DINITRATE 10 MG: 10 TABLET ORAL at 07:58

## 2021-09-09 RX ADMIN — CARVEDILOL 25 MG: 25 TABLET, FILM COATED ORAL at 07:58

## 2021-09-09 RX ADMIN — ACETAMINOPHEN 650 MG: 325 TABLET ORAL at 16:36

## 2021-09-09 RX ADMIN — IPRATROPIUM BROMIDE AND ALBUTEROL SULFATE 1 AMPULE: .5; 3 SOLUTION RESPIRATORY (INHALATION) at 11:12

## 2021-09-09 RX ADMIN — IPRATROPIUM BROMIDE AND ALBUTEROL SULFATE 1 AMPULE: .5; 3 SOLUTION RESPIRATORY (INHALATION) at 06:58

## 2021-09-09 RX ADMIN — METHYLPREDNISOLONE SODIUM SUCCINATE 40 MG: 40 INJECTION, POWDER, FOR SOLUTION INTRAMUSCULAR; INTRAVENOUS at 05:52

## 2021-09-09 RX ADMIN — METHYLPREDNISOLONE SODIUM SUCCINATE 40 MG: 40 INJECTION, POWDER, FOR SOLUTION INTRAMUSCULAR; INTRAVENOUS at 16:36

## 2021-09-09 RX ADMIN — INSULIN LISPRO 2 UNITS: 100 INJECTION, SOLUTION INTRAVENOUS; SUBCUTANEOUS at 12:03

## 2021-09-09 RX ADMIN — HYDRALAZINE HYDROCHLORIDE 75 MG: 25 TABLET, FILM COATED ORAL at 12:58

## 2021-09-09 RX ADMIN — NIFEDIPINE 60 MG: 60 TABLET, FILM COATED, EXTENDED RELEASE ORAL at 21:17

## 2021-09-09 RX ADMIN — SODIUM CHLORIDE, PRESERVATIVE FREE 10 ML: 5 INJECTION INTRAVENOUS at 08:02

## 2021-09-09 RX ADMIN — ASPIRIN 325 MG: 325 TABLET ORAL at 21:17

## 2021-09-09 RX ADMIN — ENOXAPARIN SODIUM 40 MG: 40 INJECTION SUBCUTANEOUS at 07:59

## 2021-09-09 ASSESSMENT — PAIN - FUNCTIONAL ASSESSMENT: PAIN_FUNCTIONAL_ASSESSMENT: ACTIVITIES ARE NOT PREVENTED

## 2021-09-09 ASSESSMENT — ENCOUNTER SYMPTOMS
CHEST TIGHTNESS: 1
GASTROINTESTINAL NEGATIVE: 1
SHORTNESS OF BREATH: 1

## 2021-09-09 ASSESSMENT — PAIN DESCRIPTION - PAIN TYPE: TYPE: ACUTE PAIN

## 2021-09-09 ASSESSMENT — PAIN DESCRIPTION - DESCRIPTORS: DESCRIPTORS: ACHING

## 2021-09-09 ASSESSMENT — PAIN SCALES - GENERAL
PAINLEVEL_OUTOF10: 4
PAINLEVEL_OUTOF10: 3
PAINLEVEL_OUTOF10: 4

## 2021-09-09 ASSESSMENT — PAIN DESCRIPTION - LOCATION: LOCATION: HEAD

## 2021-09-09 NOTE — PROGRESS NOTES
respiration       Objective        Orientation  Overall Orientation Status: Within Normal Limits     Functional Mobility  Functional - Mobility Device: No device  Assist Level: Independent  Functional Mobility Comments: activities in the room  Toilet Transfers  Toilet Transfer: Independent  ADL  Feeding: Independent  Grooming: Independent  UE Bathing: Independent  LE Bathing: Supervision  UE Dressing: Independent  LE Dressing: Independent  Toileting: Independent        Bed mobility  Supine to Sit: Independent  Transfers  Stand Step Transfers: Independent     Cognition  Overall Cognitive Status: WNL                 LUE PROM (degrees)  LUE PROM: WNL  LUE AROM (degrees)  LUE AROM : WNL  RUE PROM (degrees)  RUE PROM: WNL  RUE AROM (degrees)  RUE AROM : WNL                    Tx initiated:  DME training for light home management to incorporate energy conservation strategies.  (15 mins)  Plan   Plan  Plan Comment: No further visits planned    G-Code     OutComes Score                                                  AM-PAC Score             Goals  Short term goals  Short term goal 1: Independent with DME options       Therapy Time   Individual Concurrent Group Co-treatment   Time In           Time Out           Minutes                   Edgar Jung OT Electronically signed by Edgar Jung OT on 9/9/2021 at 12:25 PM

## 2021-09-09 NOTE — PROGRESS NOTES
Progress Note  Mello Sep  9/9/2021 5:28 PM  Subjective:   Admit Date:   9/7/2021      CC/ADMIT DX:       Interval History:   Reviewed overnight events and nursing notes. She has no c/o CP. Her SOA is stable. I have reviewed all labs/diagnostics from the last 24hrs. ROS:   I have done a 10 point ROS and all are negative, except what is mentioned in the HPI. ADULT DIET; Regular; 4 carb choices (60 gm/meal); Low Sodium (2 gm)  Diet NPO Exceptions are: Sips of Water with Meds    Medications:      sodium chloride      dextrose        sodium chloride flush  5-40 mL IntraVENous 2 times per day    enoxaparin  40 mg SubCUTAneous Daily    methylPREDNISolone  40 mg IntraVENous Q6H    ipratropium-albuterol  1 ampule Inhalation 4x daily    carvedilol  25 mg Oral BID WC    hydrALAZINE  75 mg Oral Q8H    NIFEdipine  60 mg Oral BID    isosorbide dinitrate  10 mg Oral TID    insulin lispro  0-6 Units SubCUTAneous TID WC    insulin lispro  0-3 Units SubCUTAneous Nightly           Objective:   Vitals: BP (!) 163/101   Pulse 76   Temp 97.7 °F (36.5 °C) (Temporal)   Resp 20   Ht 5' 5\" (1.651 m)   Wt 216 lb (98 kg)   SpO2 93%   BMI 35.94 kg/m²      Intake/Output Summary (Last 24 hours) at 9/9/2021 1728  Last data filed at 9/9/2021 1351  Gross per 24 hour   Intake 820 ml   Output --   Net 820 ml     General appearance: alert and cooperative with exam  Lungs: clear to auscultation bilaterally  Heart: regular rate and rhythm, S1, S2 normal, no murmur, click, rub or gallop  Abdomen: soft, non-tender; bowel sounds normal; no masses,  no organomegaly  Extremities: extremities normal, atraumatic, no cyanosis or edema  Neurologic:  No obvious focal neurologic deficits. Assessment and Plan: Active Problems:    Elevated troponin  Resolved Problems:    * No resolved hospital problems. *   COPD    CRF    Volume Overlaod    Plan:  1. Continue present medication(s)   2. Continue with treatment  3.   Wean steroids  4. D/C planning      Discharge planning:   her home vs SNF    Reviewed treatment plans with the patient and/or family.              Electronically signed by Maureen Guy MD on 9/9/2021 at 5:28 PM

## 2021-09-09 NOTE — CONSULTS
[facility] ([facility] [place]) Cardiology   Consult Note   Anabell Stockton       Requesting MD:  Eliana Shaw MD   Admit Status:  Observation [104]       History obtained from:   [x] Patient  [] Other (specify):     Patient:  Bina Hush  [id number]     Chief Complaint:   Chief Complaint   Patient presents with    Shortness of Breath       HPI: Ms. Dwayne Lundborg is a 64 y.o. female with a history of coronary artery disease with prior stenting at [facility], patient was admitted to [facility] recently with shortness of breath and was diagnosed with COPD exacerbation also congestive heart failure, patient takes her medication regularly including steroid inhalers and oxygen but she did not improve, she came nacho to ED worsening shortness of breath on September 7, she was also having worsening chest pain that has improved significantly after she came to the ER    She was found to have elevated blood pressure and hypoxia in the emergency room    Further evaluation showed that she had multiple elevated troponins last reading of 0.13, she was diagnosed with NSTEMI  Cardiology was consulted to help evaluate and manage her presentation    Review of Systems:  Review of Systems   Constitutional: Negative. Respiratory: Positive for chest tightness and shortness of breath. Cardiovascular: Positive for chest pain. Gastrointestinal: Negative. Endocrine: Negative. Genitourinary: Negative. Musculoskeletal: Negative. Skin: Negative. Neurological: Negative. Hematological: Negative. All other systems reviewed and are negative. Cardiac Specific Data:  Specialty Problems     None          Past Medical History:  Past Medical History:   Diagnosis Date    Anemia     Asthma     Chronic headaches     Hypertension         Past Surgical History:  No past surgical history on file.     Past Family History:  Family History   Problem Relation Age of Onset    Heart Disease Mother     Heart Disease Father        Past Social History:  Social History     Socioeconomic History    Marital status: Single     Spouse name: Not on file    Number of children: Not on file    Years of education: Not on file    Highest education level: Not on file   Occupational History    Not on file   Tobacco Use    Smoking status: Current Every Day Smoker     Packs/day: 0.50     Years: 30.00     Pack years: 15.00    Smokeless tobacco: Never Used    Tobacco comment: Please quit   Substance and Sexual Activity    Alcohol use: No    Drug use: No    Sexual activity: Yes   Other Topics Concern    Not on file   Social History Narrative    Not on file     Social Determinants of Health     Financial Resource Strain:     Difficulty of Paying Living Expenses:    Food Insecurity:     Worried About Running Out of Food in the Last Year:     Ran Out of Food in the Last Year:    Transportation Needs:     Lack of Transportation (Medical):  Lack of Transportation (Non-Medical):    Physical Activity:     Days of Exercise per Week:     Minutes of Exercise per Session:    Stress:     Feeling of Stress :    Social Connections:     Frequency of Communication with Friends and Family:     Frequency of Social Gatherings with Friends and Family:     Attends Sikh Services:     Active Member of Clubs or Organizations:     Attends Club or Organization Meetings:     Marital Status:    Intimate Partner Violence:     Fear of Current or Ex-Partner:     Emotionally Abused:     Physically Abused:     Sexually Abused: Allergies: Allergies   Allergen Reactions    Codeine     Sumatriptan Other (See Comments)     HA       Home Meds:  Prior to Admission medications    Medication Sig Start Date End Date Taking?  Authorizing Provider   azelastine (ASTELIN) 0.1 % nasal spray 2 sprays by Nasal route 2 times daily Use in each nostril as directed   Yes Historical Provider, MD   pantoprazole (PROTONIX) 40 MG tablet Take 40 mg by mouth daily   Yes Historical Provider, MD   NIFEdipine (PROCARDIA XL) 60 MG extended release tablet Take 60 mg by mouth 2 times daily   Yes Historical Provider, MD   ferrous sulfate (IRON 325) 325 (65 Fe) MG tablet Take 325 mg by mouth 2 times daily   Yes Historical Provider, MD   OLANZapine (ZYPREXA) 5 MG tablet Take 5 mg by mouth 2 times daily   Yes Historical Provider, MD   aspirin 81 MG EC tablet Take 81 mg by mouth daily   Yes Historical Provider, MD   nitroGLYCERIN (NITROSTAT) 0.4 MG SL tablet Place 0.4 mg under the tongue every 5 minutes as needed for Chest pain up to max of 3 total doses. If no relief after 1 dose, call 911.    Yes Historical Provider, MD   rosuvastatin (CRESTOR) 20 MG tablet Take 20 mg by mouth daily   Yes Historical Provider, MD   ticagrelor (BRILINTA) 90 MG TABS tablet Take 90 mg by mouth 2 times daily   Yes Historical Provider, MD   hydrALAZINE (APRESOLINE) 50 MG tablet Take 75 mg by mouth every 8 hours   Yes Historical Provider, MD   carvedilol (COREG) 25 MG tablet Take 25 mg by mouth 2 times daily (with meals)   Yes Historical Provider, MD   calcitRIOL (ROCALTROL) 0.25 MCG capsule Take 0.25 mcg by mouth daily   Yes Historical Provider, MD   guaiFENesin (MUCINEX) 600 MG extended release tablet Take 1,200 mg by mouth 2 times daily   Yes Historical Provider, MD   isosorbide dinitrate (ISORDIL) 10 MG tablet Take 10 mg by mouth 3 times daily   Yes Historical Provider, MD   furosemide (LASIX) 40 MG tablet Take 40 mg by mouth daily as needed INCREASED SOA, EDEMA, WEIGHT GAIN (2LBS OVERNIGHT/5 POUNDS IN 2 DAYS)   Yes Historical Provider, MD   budesonide-formoterol (SYMBICORT) 160-4.5 MCG/ACT AERO Inhale 2 puffs into the lungs 2 times daily   Yes Historical Provider, MD   busPIRone (BUSPAR) 10 MG tablet Take 10 mg by mouth 3 times daily   Yes Historical Provider, MD   ipratropium-albuterol (DUONEB) 0.5-2.5 (3) MG/3ML SOLN nebulizer solution Inhale 1 vial into the lungs every 4 hours   Yes Historical Provider, MD   melatonin 5 MG TBDP disintegrating tablet Take 5 mg by mouth nightly   Yes Historical Provider, MD   Multiple Vitamins-Minerals (THERAPEUTIC MULTIVITAMIN-MINERALS) tablet Take 1 tablet by mouth daily   Yes Historical Provider, MD   Ascorbic Acid (VITAMIN C) 250 MG tablet Take 500 mg by mouth daily   Yes Historical Provider, MD   fluticasone (FLONASE ALLERGY RELIEF) 50 MCG/ACT nasal spray 2 sprays by Nasal route daily     Historical Provider, MD       Current Meds:   sodium chloride flush  5-40 mL IntraVENous 2 times per day    enoxaparin  40 mg SubCUTAneous Daily    methylPREDNISolone  40 mg IntraVENous Q6H    ipratropium-albuterol  1 ampule Inhalation 4x daily    carvedilol  25 mg Oral BID WC    hydrALAZINE  75 mg Oral Q8H    NIFEdipine  60 mg Oral BID    isosorbide dinitrate  10 mg Oral TID    insulin lispro  0-6 Units SubCUTAneous TID WC    insulin lispro  0-3 Units SubCUTAneous Nightly       Current Infused Meds:   sodium chloride      dextrose         Physical Exam:  Vitals:    09/09/21 0708   BP: (!) 152/100   Pulse: 79   Resp: 18   Temp: 97 °F (36.1 °C)   SpO2: 93%       Intake/Output Summary (Last 24 hours) at 9/9/2021 1131  Last data filed at 9/9/2021 0940  Gross per 24 hour   Intake 820 ml   Output --   Net 820 ml     Estimated body mass index is 35.94 kg/m² as calculated from the following:    Height as of this encounter: 5' 5\" (1.651 m). Weight as of this encounter: 216 lb (98 kg). Physical Exam  Vitals and nursing note reviewed. Constitutional:       Appearance: Normal appearance. Comments: Patient is using CPAP machine during examination   HENT:      Head: Normocephalic. Mouth/Throat:      Mouth: Mucous membranes are moist.   Cardiovascular:      Rate and Rhythm: Normal rate and regular rhythm. Pulses: Normal pulses. Heart sounds: Normal heart sounds. No murmur heard. Pulmonary:      Effort: Pulmonary effort is normal.      Breath sounds: Normal breath sounds. Abdominal:      General: Bowel sounds are normal.      Palpations: Abdomen is soft. Tenderness: There is no abdominal tenderness. Musculoskeletal:         General: Normal range of motion. Right lower leg: No edema. Left lower leg: No edema. Skin:     General: Skin is warm. Neurological:      General: No focal deficit present. Mental Status: She is alert and oriented to person, place, and time. Psychiatric:         Mood and Affect: Mood normal.         Behavior: Behavior normal.         Labs:  Recent Labs     09/07/21 2135   WBC 9.1   HGB 9.6*   *       Recent Labs     09/07/21 2135 09/08/21  1015 09/09/21  0408   * 147* 145   K 3.7 4.1 4.4    108 107   CO2 28 32* 27   BUN 30* 28* 37*   CREATININE 1.7* 1.9* 1.8*   LABGLOM 31* 27* 29*   CALCIUM 8.2* 8.5* 8.3*       CK, CKMB, Troponin:   Recent Labs     09/08/21  1015 09/08/21  1623 09/08/21  2156   TROPONINI 0.15* 0.13* 0.13*       Last 3 BNP:  Recent Labs     09/07/21 2135   PROBNP 10,807*         IMAGING:    EKG -  Narrative & Impression    71 BPM  Sinus rhythm with 1st degree A-V block  Left atrial abnormality  Pretransitional Q wave with poor R wave progression consistent with previous anterior   infarction  Nonspecific ST-T wave abnormalities  Comparison Summary: No serial comparison made  Summary: Abnormal ECG       ECHO  Last echo reviewed from [facility] catheterization at [facility] on July 2020 for a anterior STEMI  Selective coronary angiography:    Left main coronary artery: The left main coronary artery arises from the   left coronary cusp and bifurcates into the left anterior descending   coronary artery and left circumflex arteries.      Left main coronary artery is normal    Left anterior descending artery: The left anterior descending coronary   artery arises normally from the left main coronary artery and courses in   the anterior interventricular groove and terminates at the apex. Occluded   proximal left anterior descending coronary artery which was the culprit   vessel and was treated with angioplasty and stent placement as below    Left circumflex: The left circumflex arises form the left man artery and   supplies obtuse marginal branches. Left circumflex artery is normal.   Overall large distribution with moderate tortuosity of obtuse marginal   vessels without any obstructive or significant coronary artery disease    Right coronary artery: The RCA arises normally from the right coronary   cusp and is dominant for the posterior circulation. The RCA is dominant   Right posterior descending coronary artery has approximately a 50 to 60%   stenosis in the midportion    Left heart cath: LVEDP 39 mmHg mm Hg with no gradient across aortic   valve on pullback. LV Gram: Not performed to save contrast as has acute on chronic kidney   disease    Interventions: JL4 7 [facility] guide was used advanced a coronary wire into   the distal left anterior descending coronary artery did angioplasty using   a 2.0 mm balloon  Then implanted a 2.75 x 28 mm drug-eluting stent and postdilated with a   3.0 mm balloon. Initial stenosis 100% with ELDON 0 flow post procedure 0%   stenosis with ELDON-3 flow with complete resolution of chest pain prior to   departure from Cath Lab  ___________________________________________________________________________      XR CHEST PORTABLE    Result Date: 9/7/2021  1. There is a 9 mm irregular right upper lobe nodule. This could be a small neoplasm. Follow-up nonemergent chest CT recommended. 2. Minimal infiltrate along the right hemidiaphragm may be infectious/inflammatory or atelectasis. 3. Bronchial wall thickening. 3. Cardiomegaly. The full report of this exam was immediately signed and available to the emergency room. The patient is currently in the emergency room. Signed by Dr Marquis Elliott and Plan:  1.  NSTEMI -patient is known to have prior history of coronary artery disease with previous stenting for anterior STEMI in 2020, she presented with worsening shortness of breath and chest pain was found to have elevated troponin I month readings, now her chest pain has improved she was placed on medical therapy including aspirin statin beta-blocker with improvement in chest pain or shortness of breath, our plan is to perform diagnostic coronary angiogram tomorrow morning via right radial approach, patient agreed to proceed to rule out obstructive coronary artery disease with possible intervention if needed, will continue medical therapy for coronary artery disease including dual antiplatelet agents  2. Acute on chronic diastolic congestive heart failure -reviewed her echo at [facility]  3. COPD the patient -managed by primary team  4. Chronic kidney disease stage III -with stable creatinine at 1.7      Thank you for the consult, we appreciate the opportunity to provide care to your patients. Feel free to contact me if I can be of any further assistance. Electronically signed by Papa Hidalgo MD on 9/9/2021 at 11:31 AM    ------------------------    Risks, benefits, alternatives of Heart cath/PCI discussed with the patient   I explained the procedure to the patient in detail and fully informed consent obtained.   Acceptable Mallampati score  Consent for moderate conscious sedation  ASA 3    Papa Hidalgo MD, [facility] [address] Cardiologist, Endovascular Specialist   Medical Director, [facility] Heart Program   [facility]       (Please note that portions of this note were completed with a voice recognition program.  Effortswere made to edit the dictations but occasionally words are mis-transcribed.)

## 2021-09-10 ENCOUNTER — HOME CARE VISIT (OUTPATIENT)
Dept: HOME HEALTH SERVICES | Facility: HOME HEALTHCARE | Age: 61
End: 2021-09-10

## 2021-09-10 VITALS
HEIGHT: 65 IN | HEART RATE: 69 BPM | BODY MASS INDEX: 35.99 KG/M2 | SYSTOLIC BLOOD PRESSURE: 146 MMHG | RESPIRATION RATE: 15 BRPM | DIASTOLIC BLOOD PRESSURE: 93 MMHG | WEIGHT: 216 LBS | TEMPERATURE: 97.5 F | OXYGEN SATURATION: 95 %

## 2021-09-10 LAB
ANION GAP SERPL CALCULATED.3IONS-SCNC: 10 MMOL/L (ref 7–19)
BUN BLDV-MCNC: 45 MG/DL (ref 8–23)
CALCIUM SERPL-MCNC: 8.4 MG/DL (ref 8.8–10.2)
CHLORIDE BLD-SCNC: 107 MMOL/L (ref 98–111)
CO2: 26 MMOL/L (ref 22–29)
CREAT SERPL-MCNC: 2 MG/DL (ref 0.5–0.9)
EKG P AXIS: 95 DEGREES
EKG P-R INTERVAL: 226 MS
EKG Q-T INTERVAL: 396 MS
EKG QRS DURATION: 88 MS
EKG QTC CALCULATION (BAZETT): 417 MS
EKG T AXIS: 107 DEGREES
GFR AFRICAN AMERICAN: 31
GFR NON-AFRICAN AMERICAN: 25
GLUCOSE BLD-MCNC: 124 MG/DL (ref 70–99)
GLUCOSE BLD-MCNC: 139 MG/DL (ref 74–109)
GLUCOSE BLD-MCNC: 152 MG/DL (ref 70–99)
GLUCOSE BLD-MCNC: 212 MG/DL (ref 70–99)
PERFORMED ON: ABNORMAL
POTASSIUM SERPL-SCNC: 4.1 MMOL/L (ref 3.5–5)
SODIUM BLD-SCNC: 143 MMOL/L (ref 136–145)
TROPONIN: 0.08 NG/ML (ref 0–0.03)
TROPONIN: 0.09 NG/ML (ref 0–0.03)
TROPONIN: 0.1 NG/ML (ref 0–0.03)

## 2021-09-10 PROCEDURE — 99152 MOD SED SAME PHYS/QHP 5/>YRS: CPT

## 2021-09-10 PROCEDURE — 6360000002 HC RX W HCPCS

## 2021-09-10 PROCEDURE — 6370000000 HC RX 637 (ALT 250 FOR IP): Performed by: EMERGENCY MEDICINE

## 2021-09-10 PROCEDURE — 2700000000 HC OXYGEN THERAPY PER DAY

## 2021-09-10 PROCEDURE — 2500000003 HC RX 250 WO HCPCS

## 2021-09-10 PROCEDURE — 93458 L HRT ARTERY/VENTRICLE ANGIO: CPT | Performed by: INTERNAL MEDICINE

## 2021-09-10 PROCEDURE — 2580000003 HC RX 258: Performed by: INTERNAL MEDICINE

## 2021-09-10 PROCEDURE — 82947 ASSAY GLUCOSE BLOOD QUANT: CPT

## 2021-09-10 PROCEDURE — 2709999900 HC NON-CHARGEABLE SUPPLY

## 2021-09-10 PROCEDURE — 94640 AIRWAY INHALATION TREATMENT: CPT

## 2021-09-10 PROCEDURE — B2151ZZ FLUOROSCOPY OF LEFT HEART USING LOW OSMOLAR CONTRAST: ICD-10-PCS | Performed by: INTERNAL MEDICINE

## 2021-09-10 PROCEDURE — 6360000002 HC RX W HCPCS: Performed by: FAMILY MEDICINE

## 2021-09-10 PROCEDURE — 80048 BASIC METABOLIC PNL TOTAL CA: CPT

## 2021-09-10 PROCEDURE — 93458 L HRT ARTERY/VENTRICLE ANGIO: CPT

## 2021-09-10 PROCEDURE — 2500000003 HC RX 250 WO HCPCS: Performed by: INTERNAL MEDICINE

## 2021-09-10 PROCEDURE — 6370000000 HC RX 637 (ALT 250 FOR IP): Performed by: FAMILY MEDICINE

## 2021-09-10 PROCEDURE — 4A023N7 MEASUREMENT OF CARDIAC SAMPLING AND PRESSURE, LEFT HEART, PERCUTANEOUS APPROACH: ICD-10-PCS | Performed by: INTERNAL MEDICINE

## 2021-09-10 PROCEDURE — 6370000000 HC RX 637 (ALT 250 FOR IP): Performed by: HOSPITALIST

## 2021-09-10 PROCEDURE — 6370000000 HC RX 637 (ALT 250 FOR IP): Performed by: INTERNAL MEDICINE

## 2021-09-10 PROCEDURE — C1894 INTRO/SHEATH, NON-LASER: HCPCS

## 2021-09-10 PROCEDURE — 6360000004 HC RX CONTRAST MEDICATION: Performed by: FAMILY MEDICINE

## 2021-09-10 PROCEDURE — C1769 GUIDE WIRE: HCPCS

## 2021-09-10 PROCEDURE — 6360000002 HC RX W HCPCS: Performed by: EMERGENCY MEDICINE

## 2021-09-10 PROCEDURE — 2580000003 HC RX 258: Performed by: EMERGENCY MEDICINE

## 2021-09-10 PROCEDURE — B2111ZZ FLUOROSCOPY OF MULTIPLE CORONARY ARTERIES USING LOW OSMOLAR CONTRAST: ICD-10-PCS | Performed by: INTERNAL MEDICINE

## 2021-09-10 PROCEDURE — 84484 ASSAY OF TROPONIN QUANT: CPT

## 2021-09-10 PROCEDURE — 36415 COLL VENOUS BLD VENIPUNCTURE: CPT

## 2021-09-10 RX ORDER — SODIUM CHLORIDE 0.9 % (FLUSH) 0.9 %
5-40 SYRINGE (ML) INJECTION EVERY 12 HOURS SCHEDULED
Status: DISCONTINUED | OUTPATIENT
Start: 2021-09-10 | End: 2021-09-10 | Stop reason: HOSPADM

## 2021-09-10 RX ORDER — ACETAMINOPHEN 325 MG/1
650 TABLET ORAL EVERY 4 HOURS PRN
Status: DISCONTINUED | OUTPATIENT
Start: 2021-09-10 | End: 2021-09-10 | Stop reason: HOSPADM

## 2021-09-10 RX ORDER — SODIUM CHLORIDE 9 MG/ML
25 INJECTION, SOLUTION INTRAVENOUS PRN
Status: DISCONTINUED | OUTPATIENT
Start: 2021-09-10 | End: 2021-09-10 | Stop reason: HOSPADM

## 2021-09-10 RX ORDER — FERROUS SULFATE 325(65) MG
TABLET ORAL
Status: DISPENSED
Start: 2021-09-10 | End: 2021-09-10

## 2021-09-10 RX ORDER — ONDANSETRON 2 MG/ML
4 INJECTION INTRAMUSCULAR; INTRAVENOUS EVERY 6 HOURS PRN
Status: DISCONTINUED | OUTPATIENT
Start: 2021-09-10 | End: 2021-09-10 | Stop reason: HOSPADM

## 2021-09-10 RX ORDER — SODIUM CHLORIDE 9 MG/ML
INJECTION, SOLUTION INTRAVENOUS CONTINUOUS
Status: DISCONTINUED | OUTPATIENT
Start: 2021-09-10 | End: 2021-09-10 | Stop reason: HOSPADM

## 2021-09-10 RX ORDER — SODIUM CHLORIDE 0.9 % (FLUSH) 0.9 %
5-40 SYRINGE (ML) INJECTION PRN
Status: DISCONTINUED | OUTPATIENT
Start: 2021-09-10 | End: 2021-09-10 | Stop reason: HOSPADM

## 2021-09-10 RX ADMIN — TICAGRELOR 90 MG: 60 TABLET ORAL at 10:20

## 2021-09-10 RX ADMIN — CARVEDILOL 25 MG: 25 TABLET, FILM COATED ORAL at 07:38

## 2021-09-10 RX ADMIN — ISOSORBIDE DINITRATE 10 MG: 10 TABLET ORAL at 14:20

## 2021-09-10 RX ADMIN — HYDRALAZINE HYDROCHLORIDE 75 MG: 25 TABLET, FILM COATED ORAL at 14:20

## 2021-09-10 RX ADMIN — NIFEDIPINE 60 MG: 60 TABLET, FILM COATED, EXTENDED RELEASE ORAL at 10:19

## 2021-09-10 RX ADMIN — ACETAMINOPHEN 650 MG: 325 TABLET ORAL at 11:51

## 2021-09-10 RX ADMIN — IOPAMIDOL 87 ML: 612 INJECTION, SOLUTION INTRAVENOUS at 09:22

## 2021-09-10 RX ADMIN — TICAGRELOR 90 MG: 60 TABLET ORAL at 00:22

## 2021-09-10 RX ADMIN — IPRATROPIUM BROMIDE AND ALBUTEROL SULFATE 1 AMPULE: .5; 3 SOLUTION RESPIRATORY (INHALATION) at 15:52

## 2021-09-10 RX ADMIN — METHYLPREDNISOLONE SODIUM SUCCINATE 40 MG: 40 INJECTION, POWDER, FOR SOLUTION INTRAMUSCULAR; INTRAVENOUS at 05:28

## 2021-09-10 RX ADMIN — ASPIRIN 81 MG: 81 TABLET, CHEWABLE ORAL at 10:19

## 2021-09-10 RX ADMIN — SODIUM CHLORIDE, PRESERVATIVE FREE 10 ML: 5 INJECTION INTRAVENOUS at 10:22

## 2021-09-10 RX ADMIN — FERROUS SULFATE TAB 325 MG (65 MG ELEMENTAL FE) 325 MG: 325 (65 FE) TAB at 00:20

## 2021-09-10 RX ADMIN — SODIUM BICARBONATE: 84 INJECTION, SOLUTION INTRAVENOUS at 09:09

## 2021-09-10 RX ADMIN — ISOSORBIDE DINITRATE 10 MG: 10 TABLET ORAL at 10:19

## 2021-09-10 RX ADMIN — CARVEDILOL 25 MG: 25 TABLET, FILM COATED ORAL at 17:49

## 2021-09-10 RX ADMIN — IPRATROPIUM BROMIDE AND ALBUTEROL SULFATE 1 AMPULE: .5; 3 SOLUTION RESPIRATORY (INHALATION) at 10:40

## 2021-09-10 RX ADMIN — INSULIN LISPRO 1 UNITS: 100 INJECTION, SOLUTION INTRAVENOUS; SUBCUTANEOUS at 17:48

## 2021-09-10 RX ADMIN — HYDRALAZINE HYDROCHLORIDE 75 MG: 25 TABLET, FILM COATED ORAL at 10:18

## 2021-09-10 RX ADMIN — IPRATROPIUM BROMIDE AND ALBUTEROL SULFATE 1 AMPULE: .5; 3 SOLUTION RESPIRATORY (INHALATION) at 18:53

## 2021-09-10 RX ADMIN — Medication 10 ML: at 10:22

## 2021-09-10 RX ADMIN — FERROUS SULFATE TAB 325 MG (65 MG ELEMENTAL FE) 325 MG: 325 (65 FE) TAB at 10:20

## 2021-09-10 RX ADMIN — IPRATROPIUM BROMIDE AND ALBUTEROL SULFATE 1 AMPULE: .5; 3 SOLUTION RESPIRATORY (INHALATION) at 07:21

## 2021-09-10 RX ADMIN — INSULIN LISPRO 2 UNITS: 100 INJECTION, SOLUTION INTRAVENOUS; SUBCUTANEOUS at 13:01

## 2021-09-10 RX ADMIN — ENOXAPARIN SODIUM 40 MG: 40 INJECTION SUBCUTANEOUS at 14:20

## 2021-09-10 ASSESSMENT — PAIN DESCRIPTION - PAIN TYPE: TYPE: ACUTE PAIN

## 2021-09-10 ASSESSMENT — PAIN SCALES - GENERAL
PAINLEVEL_OUTOF10: 4
PAINLEVEL_OUTOF10: 4

## 2021-09-10 NOTE — MANAGEMENT PLAN
Heart catheter was performed today via right radial approach without complications, patient coronary angiogram showed fully patent left main without any significant stenosis, LAD was fully patent with fully patent old stents with no in-stent restenosis, RCA showed mild to moderate lesions with no indication for intervention    Cath was done via radial approach  Patient transferred to back to her room in a stable condition, routine postop care for radial band    Patient can be discharged home today on medical therapy including aspirin Brilinta statin Imdur and beta-blocker and follow-up with cardiology as scheduled    Discussed the plan of care with the patient's at bedside and the nursing staff      John Klein MD, [facility], [facility]  Interventional Cardiologist, Endovascular Specialist   Medical Director, Structural Heart Program   [facility]

## 2021-09-10 NOTE — CARE COORDINATION
Date / Time of Evaluation:   9/10/2021    2:15 PM  Assessment Completed by:   Adilene Lindsey RN      Patient Name:   Lavinia Sharma  MRN:   [id number]  [place]:   1960    Patient Admission Status:   Inpatient [101]    Patient Contact Information:    [address]  [phone] (home)   Telephone Information:   Mobile [phone]     Above information verified? [x]   Yes  []   No    (Best Practice:   Have patient/caregiver verify above address and phone number by stating out loud their current address and reachable phone number. Initial Assessment Completed at bedside with:      [x]   Patient  []   Family/Caregiver/Guardian   []   Other:      Current PCP:    Audrey Dumont MD    PCP verified? [x]   Yes  []   No    Emergency Contacts:    Extended Emergency Contact Information  Primary Emergency Contact: Loly Rocha  Address: [address] Phone: [phone]  Mobile Phone: [phone]  Relation: Brother/Sister  Preferred language: English   needed? No    Advance Directives:    Does Ms. Samuel Hollingsworth have an advance directive in her electronic medical record? []   Yes  [x]   No    Code Status:   Full Code      Have you been vaccinated for COVID-19 (SARS-CoV-2)? []   Yes                   No, patient states she is \"thinking about it\"  If so, when?     Which :         []   [employer]  []   Moderna  []   [employer]  []   Other:       Do you have any of the following unmet social needs that would keep you from returning home safely:    []   Yes  [x]   No                    Unmet Social Needs:           []   Living Situation/Housing  []   Food  []   Stroke Education   []   Utilities  []   Personal Safety  []   Financial Strain  []   Employment  []   Mental Health  []   Substance Abuse  []   Transportation Barriers    Additional Unmet Social Needs Notes:     Patient lives alone and would like more help at home      Financial:    Payor: Rowan Hendricks / Plan: Oswaldo Spicer ESSENTIAL/PLUS / Product Type: *No Product type* /     Pre-Cert required for SNF:     [x]   Yes  []   No    Have Long Term Care Insurance:      []   Yes  [x]   No      Pharmacy:  Patient states, she uses [place] for medications. Pt states, she has Brillinta at home    Potential assistance purchasing medications? []   Yes  [x]   No      AD[facility]:   [facility]    Support System:   Friends/Neighbors (Does not think she has a good support system, \"They're not available. \")      Current Home Environment:       Steps:       [x]   Yes managing and has railing  []   No    If yes, how many? Plans to RETURN to current housing:     [x]   Yes  []   No    Barriers to RETURNING to current housing:  None stated      Currently ACTIVE with Home Health CARE:      [x]   Yes  []   No    [facility]:  [facility] home care      DME Provider:   Leon, pt has Trilogy, and wears 02 at @ L at baseline      Had [facility] prior to admission:      [x]   Yes  []   No    Oxygen Company:   [employer]    Has a pulse oximetry unit at home:     [x]   Yes Pt provided with pulse oximetry at discharge  []   No    Informed of need to bring portable home O2 tank to hospital on day of DISCHARGE:      [x]   Yes CM contacted [facility] to bring 02 tank to hospital for d/c home  []   No    Name of person committed to bringing portable tank at discharge:   [facility]    Transition Plan:  Pt plans on returning home with homecare.  CM has requested HC SW and HC OT to [facility] for HHA as additional services    Transportation PLAN for Discharge:  Pt is hoping niece with p/u today, if not, patient states, she has money for a cab    Factors facilitating achievement of predicted outcomes: medically stable    Barriers to discharge:  COPD ea, Chest pain, elevated troponins,   S/p heart catheterization      Additional CM Notes: Pt lives alone and has HC at home. CM requested additional services of SW/OT/HHA today. [facility] has provided pulse oximetry unit. Pt has agreed to [facility][note 3]. [facility] has ordered 02 tank from [place] for transport home.         Maryam Cowart and/or her family were provided with choice of provider:    [x]   Yes   []   No        Dwaine Muller RN  [address] Management  Phone:  [phone]      Fax: [phone]    Electronically signed by Dwaine Muller RN on 9/10/2021 at 2:30 PM

## 2021-09-10 NOTE — CARE COORDINATION
CM attempted to see patient. Pt s/p diagnostic cath this AM, sleeping soundly. Pt appears to have Trilogy at the bedside. CM will attempt assessment later.   Electronically signed by Jennifer Leonard RN on 9/10/2021 at 11:47 AM

## 2021-09-10 NOTE — PROGRESS NOTES
Physical Therapy    Pt found to be Ind with mobility. No skilled needs in this setting.     Electronically signed by Kenzie Jewell PT on 9/10/2021 at 12:50 PM

## 2021-09-14 ENCOUNTER — TRANSCRIBE ORDERS (OUTPATIENT)
Dept: HOME HEALTH SERVICES | Facility: HOME HEALTHCARE | Age: 61
End: 2021-09-14

## 2021-09-14 DIAGNOSIS — J44.1 COPD EXACERBATION (HCC): Primary | ICD-10-CM

## 2021-09-14 NOTE — DISCHARGE SUMMARY
Hospital Discharge Summary    Yeni Hunt  :  1960  MRN:  [id number]    Admit date:  2021  Discharge date:  9/10/2021    Admitting Physician:    Mary Godwin MD    Discharge Diagnoses:    CP  Volume Overload  Acute in Chronic Resp Failure'  CKD 3  COPD       Hospital Course:   She was admitted with volume overload, and CP. She was treated with diuretics and was seen by Cardiology. She did have a heart cath that did not show any significant stenosis. She was stable on d/c. Her VS are stable. She is back to baseline at d/c. She is eating and ambulating.      Discharge Medications:       Tej Austinlilly [address] Medication Instructions [facility]    Printed on:21   Medication Information                      Ascorbic Acid (VITAMIN C) 250 MG tablet  Take 500 mg by mouth daily             aspirin 81 MG EC tablet  Take 81 mg by mouth daily             azelastine (ASTELIN) 0.1 % nasal spray  2 sprays by Nasal route 2 times daily Use in each nostril as directed             budesonide-formoterol (SYMBICORT) 160-4.5 MCG/ACT AERO  Inhale 2 puffs into the lungs 2 times daily             busPIRone (BUSPAR) 10 MG tablet  Take 10 mg by mouth 3 times daily             calcitRIOL (ROCALTROL) 0.25 MCG capsule  Take 0.25 mcg by mouth daily             carvedilol (COREG) 25 MG tablet  Take 25 mg by mouth 2 times daily (with meals)             ferrous sulfate (IRON 325) 325 (65 Fe) MG tablet  Take 325 mg by mouth 2 times daily             fluticasone (FLONASE ALLERGY RELIEF) 50 MCG/ACT nasal spray  2 sprays by Nasal route daily              furosemide (LASIX) 40 MG tablet  Take 40 mg by mouth daily as needed INCREASED SOA, EDEMA, WEIGHT GAIN (2LBS OVERNIGHT/5 POUNDS IN 2 DAYS)             guaiFENesin (MUCINEX) 600 MG extended release tablet  Take 1,200 mg by mouth 2 times daily             hydrALAZINE (APRESOLINE) 50 MG tablet  Take 75 mg by mouth every 8 hours             ipratropium-albuterol (DUONEB) 0.5-2.5 (3) MG/3ML SOLN nebulizer solution  Inhale 1 vial into the lungs every 4 hours             isosorbide dinitrate (ISORDIL) 10 MG tablet  Take 10 mg by mouth 3 times daily             melatonin 5 MG TBDP disintegrating tablet  Take 5 mg by mouth nightly             Multiple Vitamins-Minerals (THERAPEUTIC MULTIVITAMIN-MINERALS) tablet  Take 1 tablet by mouth daily             NIFEdipine (PROCARDIA XL) 60 MG extended release tablet  Take 60 mg by mouth 2 times daily             nitroGLYCERIN (NITROSTAT) 0.4 MG SL tablet  Place 0.4 mg under the tongue every 5 minutes as needed for Chest pain up to max of 3 total doses. If no relief after 1 dose, call 911. OLANZapine (ZYPREXA) 5 MG tablet  Take 5 mg by mouth 2 times daily             pantoprazole (PROTONIX) 40 MG tablet  Take 40 mg by mouth daily             rosuvastatin (CRESTOR) 20 MG tablet  Take 20 mg by mouth daily             ticagrelor (BRILINTA) 90 MG TABS tablet  Take 90 mg by mouth 2 times daily                 Consults: Cardiology    Significant Diagnostic Studies:  See complete admission record      Disposition:   Home in stable condition  Follow up with Tony De La Paz MD in 1 week. F/U with Cardiology and Pulm as previously scheduled. Diet: Cardiac    Activity: as tolerated    Special Instructions: monitor weight daily      The patient or family are to call or return if there are any problems, questions, concerns or change in her condition.      Signed:  Tony De La Paz MD MD  9/14/2021, 1:06 AM

## 2021-09-15 ENCOUNTER — HOME CARE VISIT (OUTPATIENT)
Dept: HOME HEALTH SERVICES | Facility: CLINIC | Age: 61
End: 2021-09-15

## 2021-09-15 VITALS
OXYGEN SATURATION: 92 % | BODY MASS INDEX: 39.67 KG/M2 | RESPIRATION RATE: 20 BRPM | TEMPERATURE: 97.7 F | WEIGHT: 238.4 LBS | DIASTOLIC BLOOD PRESSURE: 76 MMHG | HEART RATE: 78 BPM | SYSTOLIC BLOOD PRESSURE: 136 MMHG

## 2021-09-15 PROCEDURE — G0495 RN CARE TRAIN/EDU IN HH: HCPCS

## 2021-09-15 NOTE — HOME HEALTH
Resumption of care following hospital stay for COPD exacerbation. Pt/cg agreeable to homecare resumption of care. Medication reconciliation completed and no issues found. No recent falls and no upcoming insurance changes. Pt had no further questions regarding medication and/or plan of care. Pt's O2 was 71% on RA upon arrival. I helped the pt put her O2 on and the pt's O2 saturation stablilized. The pt's BP was elevated at the beginning of the visit, but the pt hadn't taken her medications yet. 1 hour after the pt took her morning medications her BP was WNL. All other VSS. SOA at rest. There was no need to contact the MD.    I asked the pt if she wanted the Current Health Monitoring system and she stated she wasn't sure and to let her think about it.

## 2021-09-17 ENCOUNTER — HOME CARE VISIT (OUTPATIENT)
Dept: HOME HEALTH SERVICES | Facility: CLINIC | Age: 61
End: 2021-09-17

## 2021-09-17 VITALS
OXYGEN SATURATION: 91 % | SYSTOLIC BLOOD PRESSURE: 120 MMHG | TEMPERATURE: 97.7 F | DIASTOLIC BLOOD PRESSURE: 70 MMHG | HEART RATE: 76 BPM | RESPIRATION RATE: 16 BRPM

## 2021-09-17 PROCEDURE — G0151 HHCP-SERV OF PT,EA 15 MIN: HCPCS

## 2021-09-22 ENCOUNTER — HOME CARE VISIT (OUTPATIENT)
Dept: HOME HEALTH SERVICES | Facility: CLINIC | Age: 61
End: 2021-09-22

## 2021-09-22 ENCOUNTER — LAB REQUISITION (OUTPATIENT)
Dept: LAB | Facility: HOSPITAL | Age: 61
End: 2021-09-22

## 2021-09-22 VITALS
RESPIRATION RATE: 20 BRPM | TEMPERATURE: 97.1 F | SYSTOLIC BLOOD PRESSURE: 156 MMHG | HEART RATE: 79 BPM | OXYGEN SATURATION: 96 % | DIASTOLIC BLOOD PRESSURE: 90 MMHG

## 2021-09-22 DIAGNOSIS — Z00.00 ENCOUNTER FOR GENERAL ADULT MEDICAL EXAMINATION WITHOUT ABNORMAL FINDINGS: ICD-10-CM

## 2021-09-22 LAB
ALBUMIN SERPL-MCNC: 3.9 G/DL (ref 3.5–5.2)
ALBUMIN/GLOB SERPL: 1.6 G/DL
ALP SERPL-CCNC: 86 U/L (ref 39–117)
ALT SERPL W P-5'-P-CCNC: 19 U/L (ref 1–33)
ANION GAP SERPL CALCULATED.3IONS-SCNC: 7 MMOL/L (ref 5–15)
AST SERPL-CCNC: 15 U/L (ref 1–32)
BACTERIA UR QL AUTO: ABNORMAL /HPF
BILIRUB SERPL-MCNC: 0.3 MG/DL (ref 0–1.2)
BILIRUB UR QL STRIP: NEGATIVE
BUN SERPL-MCNC: 28 MG/DL (ref 8–23)
BUN/CREAT SERPL: 11.5 (ref 7–25)
CALCIUM SPEC-SCNC: 8.1 MG/DL (ref 8.6–10.5)
CHLORIDE SERPL-SCNC: 105 MMOL/L (ref 98–107)
CLARITY UR: CLEAR
CO2 SERPL-SCNC: 30 MMOL/L (ref 22–29)
COLOR UR: YELLOW
CREAT SERPL-MCNC: 2.43 MG/DL (ref 0.57–1)
GFR SERPL CREATININE-BSD FRML MDRD: 20 ML/MIN/1.73
GLOBULIN UR ELPH-MCNC: 2.4 GM/DL
GLUCOSE SERPL-MCNC: 124 MG/DL (ref 65–99)
GLUCOSE UR STRIP-MCNC: NEGATIVE MG/DL
HGB UR QL STRIP.AUTO: NEGATIVE
HYALINE CASTS UR QL AUTO: ABNORMAL /LPF
KETONES UR QL STRIP: NEGATIVE
LEUKOCYTE ESTERASE UR QL STRIP.AUTO: ABNORMAL
NITRITE UR QL STRIP: NEGATIVE
PH UR STRIP.AUTO: 6 [PH] (ref 5–8)
POTASSIUM SERPL-SCNC: 4.3 MMOL/L (ref 3.5–5.2)
PROT SERPL-MCNC: 6.3 G/DL (ref 6–8.5)
PROT UR QL STRIP: ABNORMAL
RBC # UR: ABNORMAL /HPF
REF LAB TEST METHOD: ABNORMAL
SODIUM SERPL-SCNC: 142 MMOL/L (ref 136–145)
SP GR UR STRIP: 1.02 (ref 1–1.03)
SQUAMOUS #/AREA URNS HPF: ABNORMAL /HPF
UROBILINOGEN UR QL STRIP: ABNORMAL
WBC UR QL AUTO: ABNORMAL /HPF

## 2021-09-22 PROCEDURE — G0299 HHS/HOSPICE OF RN EA 15 MIN: HCPCS

## 2021-09-22 PROCEDURE — 82306 VITAMIN D 25 HYDROXY: CPT | Performed by: FAMILY MEDICINE

## 2021-09-22 PROCEDURE — 81001 URINALYSIS AUTO W/SCOPE: CPT | Performed by: FAMILY MEDICINE

## 2021-09-22 PROCEDURE — 80053 COMPREHEN METABOLIC PANEL: CPT | Performed by: FAMILY MEDICINE

## 2021-09-22 PROCEDURE — 87086 URINE CULTURE/COLONY COUNT: CPT | Performed by: FAMILY MEDICINE

## 2021-09-22 NOTE — HOME HEALTH
Patient alert & oriented. Denies any new problems. V/S, no new medication changes no insurance changes. Cmp & ua collected via veinipuncture and clean catch for urine specimens to [facility] lab with results to Dr Weston.Cbc collected but was clotted per lab waiting on new order to redraw from Dr Weston's office.

## 2021-09-23 ENCOUNTER — HOME CARE VISIT (OUTPATIENT)
Dept: HOME HEALTH SERVICES | Facility: CLINIC | Age: 61
End: 2021-09-23

## 2021-09-23 LAB
25(OH)D3 SERPL-MCNC: 45.5 NG/ML (ref 30–100)
BACTERIA SPEC AEROBE CULT: NORMAL

## 2021-09-23 NOTE — CASE COMMUNICATION
"Patient missed a PT  visit from [facility] on 9/23/21     Reason: Patient refused PT. \"Stating she didn't want to do it today\"      For your records only.   As per home health protocol, MD must be notified of missed/cancelled visits; therefore the prescribed frequency was not met. "

## 2021-09-24 ENCOUNTER — HOME CARE VISIT (OUTPATIENT)
Dept: HOME HEALTH SERVICES | Facility: CLINIC | Age: 61
End: 2021-09-24

## 2021-09-24 NOTE — CASE COMMUNICATION
Patient missed a PT visit from [facility] on 9/24/21     Reason: No answer despite several calls to 2 different phone numbers      For your records only.   As per home health protocol, MD must be notified of missed/cancelled visits; therefore the prescribed frequency was not met.

## 2021-09-28 ENCOUNTER — HOME CARE VISIT (OUTPATIENT)
Dept: HOME HEALTH SERVICES | Facility: CLINIC | Age: 61
End: 2021-09-28

## 2021-09-28 VITALS
OXYGEN SATURATION: 99 % | HEART RATE: 59 BPM | SYSTOLIC BLOOD PRESSURE: 90 MMHG | DIASTOLIC BLOOD PRESSURE: 60 MMHG | TEMPERATURE: 98.1 F | RESPIRATION RATE: 18 BRPM

## 2021-09-28 PROCEDURE — G0157 HHC PT ASSISTANT EA 15: HCPCS

## 2021-09-29 ENCOUNTER — HOME CARE VISIT (OUTPATIENT)
Dept: HOME HEALTH SERVICES | Facility: CLINIC | Age: 61
End: 2021-09-29

## 2021-09-29 PROCEDURE — G0151 HHCP-SERV OF PT,EA 15 MIN: HCPCS

## 2021-09-30 ENCOUNTER — LAB REQUISITION (OUTPATIENT)
Dept: LAB | Facility: HOSPITAL | Age: 61
End: 2021-09-30

## 2021-09-30 ENCOUNTER — HOME CARE VISIT (OUTPATIENT)
Dept: HOME HEALTH SERVICES | Facility: CLINIC | Age: 61
End: 2021-09-30

## 2021-09-30 VITALS
DIASTOLIC BLOOD PRESSURE: 80 MMHG | OXYGEN SATURATION: 96 % | WEIGHT: 224.4 LBS | SYSTOLIC BLOOD PRESSURE: 130 MMHG | TEMPERATURE: 98.2 F | HEART RATE: 70 BPM | RESPIRATION RATE: 18 BRPM | BODY MASS INDEX: 37.34 KG/M2

## 2021-09-30 VITALS
RESPIRATION RATE: 20 BRPM | TEMPERATURE: 97.2 F | HEART RATE: 75 BPM | SYSTOLIC BLOOD PRESSURE: 118 MMHG | OXYGEN SATURATION: 97 % | DIASTOLIC BLOOD PRESSURE: 78 MMHG

## 2021-09-30 DIAGNOSIS — Z00.00 ENCOUNTER FOR GENERAL ADULT MEDICAL EXAMINATION WITHOUT ABNORMAL FINDINGS: ICD-10-CM

## 2021-09-30 LAB
BASOPHILS # BLD AUTO: 0.06 10*3/MM3 (ref 0–0.2)
BASOPHILS NFR BLD AUTO: 0.7 % (ref 0–1.5)
DEPRECATED RDW RBC AUTO: 54.9 FL (ref 37–54)
EOSINOPHIL # BLD AUTO: 0.1 10*3/MM3 (ref 0–0.4)
EOSINOPHIL NFR BLD AUTO: 1.2 % (ref 0.3–6.2)
ERYTHROCYTE [DISTWIDTH] IN BLOOD BY AUTOMATED COUNT: 16 % (ref 12.3–15.4)
HCT VFR BLD AUTO: 37.4 % (ref 34–46.6)
HGB BLD-MCNC: 11.3 G/DL (ref 12–15.9)
IMM GRANULOCYTES # BLD AUTO: 0.08 10*3/MM3 (ref 0–0.05)
IMM GRANULOCYTES NFR BLD AUTO: 1 % (ref 0–0.5)
LYMPHOCYTES # BLD AUTO: 0.89 10*3/MM3 (ref 0.7–3.1)
LYMPHOCYTES NFR BLD AUTO: 10.7 % (ref 19.6–45.3)
MCH RBC QN AUTO: 28.4 PG (ref 26.6–33)
MCHC RBC AUTO-ENTMCNC: 30.2 G/DL (ref 31.5–35.7)
MCV RBC AUTO: 94 FL (ref 79–97)
MONOCYTES # BLD AUTO: 0.53 10*3/MM3 (ref 0.1–0.9)
MONOCYTES NFR BLD AUTO: 6.4 % (ref 5–12)
NEUTROPHILS NFR BLD AUTO: 6.66 10*3/MM3 (ref 1.7–7)
NEUTROPHILS NFR BLD AUTO: 80 % (ref 42.7–76)
NRBC BLD AUTO-RTO: 0 /100 WBC (ref 0–0.2)
PLATELET # BLD AUTO: 240 10*3/MM3 (ref 140–450)
PMV BLD AUTO: 11.1 FL (ref 6–12)
RBC # BLD AUTO: 3.98 10*6/MM3 (ref 3.77–5.28)
WBC # BLD AUTO: 8.32 10*3/MM3 (ref 3.4–10.8)

## 2021-09-30 PROCEDURE — G0495 RN CARE TRAIN/EDU IN HH: HCPCS

## 2021-09-30 PROCEDURE — 85025 COMPLETE CBC W/AUTO DIFF WBC: CPT | Performed by: FAMILY MEDICINE

## 2021-09-30 NOTE — HOME HEALTH
No recent falls, no insurance changes, and no recent medication changes. There was no need to contact the MD. Pt/cg had no further questions or concerns regarding the pt's medications or plan of care. A&O X 4. NO C/O PAIN. VSS. CBC with differential collected via venipuncture to LT AC with 23 G needle. Specimen taken to [facility] lab with results to Dr. Orville Weston. Pt tolerated well.

## 2021-09-30 NOTE — HOME HEALTH
S : Pt report she is improving nut has good bad days with 02 . Pt feels she would benefit from cont tx to promote hannah polanco indepndence     60 yo female Primary Dx: Chronic obstructive pulmonary disease, Pt reports in <>out hospital ~ 6 times with last hospitalization per dx 8-25 thru 9-1-21   02 new since onset of dx   PMHX : CHF / MI - R tib/fib ORIF - HTN - PTSD /depression   PLOF : community amb -    HOME ENVIRONMENT : lives alone - family A prn   DME needs : na   Homebound status assessment : Pt presents with altered gait , balance , strength and endurance per dx    Pt cont with limited endurance/ strength ( CGA transfers - amb )  - AD for safety with varied fatigue SOB requiring seated rest for fundaental home mobility . Pt indepndent community amb and  prior and in discussion and review plan to cont POC to promote progression toward PLOF . PT request and agrees to same

## 2021-10-04 ENCOUNTER — HOME CARE VISIT (OUTPATIENT)
Dept: HOME HEALTH SERVICES | Facility: HOME HEALTHCARE | Age: 61
End: 2021-10-04

## 2021-10-06 ENCOUNTER — HOME CARE VISIT (OUTPATIENT)
Dept: HOME HEALTH SERVICES | Facility: CLINIC | Age: 61
End: 2021-10-06

## 2021-10-08 ENCOUNTER — HOME CARE VISIT (OUTPATIENT)
Dept: HOME HEALTH SERVICES | Facility: CLINIC | Age: 61
End: 2021-10-08

## 2021-10-08 PROBLEM — R77.8 ELEVATED TROPONIN: Status: RESOLVED | Noted: 2021-09-08 | Resolved: 2021-10-08

## 2021-10-11 ENCOUNTER — LAB REQUISITION (OUTPATIENT)
Dept: LAB | Facility: HOSPITAL | Age: 61
End: 2021-10-11

## 2021-10-11 ENCOUNTER — HOME CARE VISIT (OUTPATIENT)
Dept: HOME HEALTH SERVICES | Facility: CLINIC | Age: 61
End: 2021-10-11

## 2021-10-11 VITALS
DIASTOLIC BLOOD PRESSURE: 82 MMHG | SYSTOLIC BLOOD PRESSURE: 148 MMHG | WEIGHT: 226.8 LBS | BODY MASS INDEX: 37.74 KG/M2 | OXYGEN SATURATION: 98 % | RESPIRATION RATE: 18 BRPM | HEART RATE: 67 BPM | TEMPERATURE: 97.9 F

## 2021-10-11 DIAGNOSIS — Z00.00 ENCOUNTER FOR GENERAL ADULT MEDICAL EXAMINATION WITHOUT ABNORMAL FINDINGS: ICD-10-CM

## 2021-10-11 LAB
ANION GAP SERPL CALCULATED.3IONS-SCNC: 7 MMOL/L (ref 5–15)
BUN SERPL-MCNC: 28 MG/DL (ref 8–23)
BUN/CREAT SERPL: 13.3 (ref 7–25)
CALCIUM SPEC-SCNC: 8.5 MG/DL (ref 8.6–10.5)
CHLORIDE SERPL-SCNC: 111 MMOL/L (ref 98–107)
CO2 SERPL-SCNC: 26 MMOL/L (ref 22–29)
CREAT SERPL-MCNC: 2.11 MG/DL (ref 0.57–1)
GFR SERPL CREATININE-BSD FRML MDRD: 24 ML/MIN/1.73
GLUCOSE SERPL-MCNC: 111 MG/DL (ref 65–99)
POTASSIUM SERPL-SCNC: 4.4 MMOL/L (ref 3.5–5.2)
SODIUM SERPL-SCNC: 144 MMOL/L (ref 136–145)

## 2021-10-11 PROCEDURE — 80048 BASIC METABOLIC PNL TOTAL CA: CPT | Performed by: FAMILY MEDICINE

## 2021-10-11 PROCEDURE — G0299 HHS/HOSPICE OF RN EA 15 MIN: HCPCS

## 2021-10-11 NOTE — CASE COMMUNICATION
Patient missed a nursing visit from [facility] on 8/17/21    Reason: patient request      For your records only.   As per home health protocol, MD must be notified of missed/cancelled visits; therefore the prescribed frequency was not met.

## 2021-10-11 NOTE — CASE COMMUNICATION
Patient missed a PT visit from [facility] 8/18/21    Reason: Patient request      For your records only.   As per home health protocol, MD must be notified of missed/cancelled visits; therefore the prescribed frequency was not met.

## 2021-10-11 NOTE — CASE COMMUNICATION
Patient missed an OT visit from [facility] on 8/19/21    Reason: Patient request      For your records only.   As per home health protocol, MD must be notified of missed/cancelled visits; therefore the prescribed frequency was not met.

## 2021-10-11 NOTE — HOME HEALTH
No recent falls, no insurance changes, and no recent medication changes. There was no need to contact the MD. Pt/cg had no further questions or concerns regarding the pt's medications or plan of care. A&O X 4. No c/o pain. VSS. BMP collected via venipuncture to Rt wrist. Pt was stuck in her Lt AC, then Rt AC, and was stuck in her Rt wrist and it was successful. Specimen taken to [facility] lab with results to Dr. Weston.

## 2021-10-13 ENCOUNTER — OFFICE VISIT (OUTPATIENT)
Dept: PULMONOLOGY | Facility: CLINIC | Age: 61
End: 2021-10-13

## 2021-10-13 VITALS
DIASTOLIC BLOOD PRESSURE: 84 MMHG | HEART RATE: 73 BPM | OXYGEN SATURATION: 96 % | WEIGHT: 255 LBS | SYSTOLIC BLOOD PRESSURE: 146 MMHG | BODY MASS INDEX: 42.43 KG/M2

## 2021-10-13 DIAGNOSIS — R06.02 SHORTNESS OF BREATH: Primary | ICD-10-CM

## 2021-10-13 DIAGNOSIS — J30.89 NON-SEASONAL ALLERGIC RHINITIS, UNSPECIFIED TRIGGER: ICD-10-CM

## 2021-10-13 DIAGNOSIS — J44.9 CHRONIC OBSTRUCTIVE PULMONARY DISEASE, UNSPECIFIED COPD TYPE (HCC): ICD-10-CM

## 2021-10-13 DIAGNOSIS — G47.33 OSA (OBSTRUCTIVE SLEEP APNEA): ICD-10-CM

## 2021-10-13 DIAGNOSIS — Z99.81 HYPOXEMIA REQUIRING SUPPLEMENTAL OXYGEN: ICD-10-CM

## 2021-10-13 DIAGNOSIS — I27.20 PULMONARY HYPERTENSION (HCC): ICD-10-CM

## 2021-10-13 DIAGNOSIS — F41.8 OTHER SPECIFIED ANXIETY DISORDERS: ICD-10-CM

## 2021-10-13 DIAGNOSIS — R09.02 HYPOXEMIA REQUIRING SUPPLEMENTAL OXYGEN: ICD-10-CM

## 2021-10-13 DIAGNOSIS — Z91.199 NONCOMPLIANCE: ICD-10-CM

## 2021-10-13 DIAGNOSIS — E66.09 CLASS 1 OBESITY DUE TO EXCESS CALORIES WITH SERIOUS COMORBIDITY AND BODY MASS INDEX (BMI) OF 31.0 TO 31.9 IN ADULT: ICD-10-CM

## 2021-10-13 DIAGNOSIS — F17.210 CIGARETTE SMOKER: Chronic | ICD-10-CM

## 2021-10-13 PROCEDURE — 99406 BEHAV CHNG SMOKING 3-10 MIN: CPT | Performed by: INTERNAL MEDICINE

## 2021-10-13 PROCEDURE — 99214 OFFICE O/P EST MOD 30 MIN: CPT | Performed by: INTERNAL MEDICINE

## 2021-10-13 RX ORDER — IPRATROPIUM BROMIDE AND ALBUTEROL SULFATE 2.5; .5 MG/3ML; MG/3ML
3 SOLUTION RESPIRATORY (INHALATION)
Qty: 360 ML | Refills: 5 | Status: SHIPPED | OUTPATIENT
Start: 2021-10-13 | End: 2022-02-07 | Stop reason: SDUPTHER

## 2021-10-13 RX ORDER — BUDESONIDE AND FORMOTEROL FUMARATE DIHYDRATE 160; 4.5 UG/1; UG/1
2 AEROSOL RESPIRATORY (INHALATION)
Qty: 1 EACH | Refills: 12 | Status: SHIPPED | OUTPATIENT
Start: 2021-10-13 | End: 2022-02-07 | Stop reason: SDUPTHER

## 2021-10-13 RX ORDER — ALBUTEROL SULFATE 90 UG/1
2 AEROSOL, METERED RESPIRATORY (INHALATION) EVERY 4 HOURS PRN
Qty: 6.7 G | Refills: 5 | Status: SHIPPED | OUTPATIENT
Start: 2021-10-13 | End: 2022-02-07 | Stop reason: SDUPTHER

## 2021-10-13 RX ORDER — LORATADINE 10 MG/1
10 TABLET ORAL DAILY
Qty: 90 TABLET | Refills: 3 | Status: SHIPPED | OUTPATIENT
Start: 2021-10-13 | End: 2022-02-07 | Stop reason: SDUPTHER

## 2021-10-13 NOTE — PROGRESS NOTES
[facility]ATORY [facility]EASE CLINIC OUTPATIENT PROGRESS NOTE    Patient: Ludivina Ramirez  : 1960  Age: 61 y.o.  Date of Service: 2021    REASON FOR CLINIC VISIT:  Chief Complaint   Patient presents with   • Hospital Follow Up Visit     multiple ER visits in august;  er visit r/t copd exacerbation; DENIES HAVING COVID   • Abnormal Imaging     multiple cxr and cts done @ [facility] in August; lung vent done    • Sleep Apnea     currently on 2L of O2; uses bipap has not had sleep studies done yet but have these scheduled for december    • Shortness of Breath     hx of stage 3 kidney disease & CHF; QD smoker; had heart cath done and per pt  says it is not heart caused SOA       Subjective:    History of Present Illness:  Ludivina Ramirez is a 61 y.o. female who presents to the office today to be seen for    Diagnosis Plan   1. Shortness of breath     2. Cigarette smoker     3. Chronic obstructive pulmonary disease, unspecified COPD type (HCC)  Pulmonary Function Test    CT Chest Without Contrast Diagnostic   4. Class 1 obesity due to excess calories with serious comorbidity and body mass index (BMI) of 31.0 to 31.9 in adult     5. Non-seasonal allergic rhinitis, unspecified trigger     6. Noncompliance     7. Hypoxemia requiring supplemental oxygen     8. MARY (obstructive sleep apnea)     9. Pulmonary hypertension (HCC)     10. Other specified anxiety disorders     .  Other problems per record.  Patient is a very pleasant 61-year-old  female who returns to pulmonary for a follow-up visit today.    She was seen by me as inpatient during her hospitalization since the [facility] in 2020.  She has 2 more hospitalizations since then the last one in the [facility] with COPD examination and CHF worsening.  Unfortunately patient is an active smoker and continues to smoke half to 1 pack/day.  She is currently using only rescue inhaler and use DuoNeb and Symbicort in the past but currently not  using any.  She is on home oxygen 2 to 3 L all the time.  She is waiting for a sleep study which is scheduled in December.  Her last CT scan done during the hospitalization shows chronic changes and COPD but no pneumonia.. She has allergic rhinosinusitis but not using any loratadine but using Astelin nasal spray and Flonase nasal spray.    She did not have Covid vaccination.  She is living alone.  She is going to take the influenza pneumonia vaccine.  She had no other acute complaints at this time.  She still gets shortness of breath and occasional cough.  No fever reported.  She has history of noncompliance to treatment.      PFT done today:  Not done today      No results found for this or any previous visit.         Bronchodilator therapy: only Duoneb and Albuterol rescue inhaler. Not using Symbicort anymore.     Smoking Status:   Social History     Tobacco Use   Smoking Status Current Some Day Smoker   • Packs/day: 0.50   • Years: 46.00   • Pack years: 23.00   • Types: Cigarettes   • Start date: 1975   Smokeless Tobacco Never Used     Pulm Rehab: no  Sleep: yes    Support System: lives alone    Code Status:   There are no questions and answers to display.        Review of Systems:  A complete review of systems is performed and all other systems were reviewed and negative as note above in the HPI.  Review of Systems   Constitutional: Positive for fatigue.   HENT: Positive for congestion and postnasal drip.    Eyes: Negative.    Respiratory: Positive for cough and shortness of breath.    Cardiovascular: Negative.    Gastrointestinal: Negative.    Endocrine: Negative.    Genitourinary: Negative.    Musculoskeletal: Positive for arthralgias.   Skin: Negative.    Allergic/Immunologic: Positive for environmental allergies.   Neurological: Positive for weakness.   Hematological: Negative.    Psychiatric/Behavioral: The patient is nervous/anxious.        CAT/ACT Score:  Not done today    Medications:  Outpatient  Encounter Medications as of 10/13/2021   Medication Sig Dispense Refill   • aspirin 81 MG EC tablet Take 1 tablet by mouth Daily.     • azelastine (ASTELIN) 0.1 % nasal spray 2 sprays into the nostril(s) as directed by provider 2 (Two) Times a Day.     • budesonide-formoterol (SYMBICORT) 160-4.5 MCG/ACT inhaler Inhale 2 puffs 2 (Two) Times a Day. 1 each 12   • calcitriol (ROCALTROL) 0.25 MCG capsule Take 1 capsule by mouth Daily. 30 capsule 3   • carvedilol (COREG) 25 MG tablet Take 25 mg by mouth 2 (Two) Times a Day With Meals.     • ferrous sulfate 325 (65 FE) MG tablet Take 1 tablet by mouth 2 (Two) Times a Day With Meals. 60 tablet 0   • fluticasone (FLONASE) 50 MCG/ACT nasal spray 2 sprays into the nostril(s) as directed by provider Every Morning. In each nostril     • furosemide (Lasix) 40 MG tablet Take 1 tablet by mouth Daily As Needed (increased SOA, edema, weight gain (2 pounds overnight, 5 pounds in 2 days)). 30 tablet 0   • guaiFENesin (MUCINEX) 600 MG 12 hr tablet Take 2 tablets by mouth Every 12 (Twelve) Hours. 60 tablet 3   • hydrALAZINE (APRESOLINE) 50 MG tablet Take 75 mg by mouth Every 8 (Eight) Hours. 1.5 (one tablet (50 mg) and one half tablet (25 mg)) to equal 75 mg every 8 hours     • ipratropium-albuterol (DUO-NEB) 0.5-2.5 mg/3 ml nebulizer Take 3 mL by nebulization 4 (Four) Times a Day. 360 mL 5   • isosorbide dinitrate (ISORDIL) 10 MG tablet Take 1 tablet by mouth 3 (Three) Times a Day. 90 tablet 3   • melatonin 5 MG tablet tablet Take 5 mg by mouth Every Night.     • multivitamin with minerals tablet tablet Take 1 tablet by mouth Daily.     • NIFEdipine XL (ADALAT CC) 60 MG 24 hr tablet Take 1 tablet by mouth 2 (two) times a day. 60 tablet 1   • nitroglycerin (NITROSTAT) 0.4 MG SL tablet Place 1 tablet under the tongue Every 5 (Five) Minutes As Needed for Chest Pain for up to 3 doses. Take no more than 3 doses in 15 minutes. 25 tablet 0   • O2 (OXYGEN) Inhale 2 L/min Continuous. 2-3 lpm/nc      • OLANZapine (zyPREXA) 5 MG tablet Take 1 tablet by mouth 2 (two) times a day. 60 tablet 0   • pantoprazole (PROTONIX) 40 MG EC tablet Take 1 tablet by mouth Daily. 30 tablet 1   • rosuvastatin (CRESTOR) 20 MG tablet Take 1 tablet by mouth Daily. 90 tablet 3   • ticagrelor (BRILINTA) 90 MG tablet tablet Take 1 tablet by mouth 2 (Two) Times a Day. 60 tablet 11   • vitamin C (ASCORBIC ACID) 250 MG tablet Take 500 mg by mouth Daily.     • [DISCONTINUED] budesonide-formoterol (SYMBICORT) 160-4.5 MCG/ACT inhaler Inhale 2 puffs 2 (Two) Times a Day. 1 each 12   • [DISCONTINUED] ipratropium-albuterol (DUO-NEB) 0.5-2.5 mg/3 ml nebulizer Take 3 mL by nebulization 4 (Four) Times a Day.     • albuterol sulfate  (90 Base) MCG/ACT inhaler Inhale 2 puffs Every 4 (Four) Hours As Needed for Wheezing. 6.7 g 5   • busPIRone (BUSPAR) 10 MG tablet Take 1 tablet by mouth 3 (Three) Times a Day for 30 days. 90 tablet 0   • ipratropium-albuterol (DUO-NEB) 0.5-2.5 mg/3 ml nebulizer Take 3 mL by nebulization Every 4 (Four) Hours As Needed for Wheezing for up to 30 days. 360 mL 0   • loratadine (CLARITIN) 10 MG tablet Take 1 tablet by mouth Daily. 90 tablet 3     No facility-administered encounter medications on file as of 10/13/2021.       Allergies:  Allergies   Allergen Reactions   • Codeine Nausea And Vomiting   • Imitrex [Sumatriptan] Other (See Comments)     HA       Immunizations:    There is no immunization history on file for this patient.    Objective:    Vitals:  /84   Pulse 73   Wt 116 kg (255 lb)   SpO2 96% Comment: 2L  BMI 42.43 kg/m²     Physical Exam:  General: Patient is a 61 y.o. middle aged  female. Looks stated age. Appears to be in no acute distress.  Eyes: EOMI. PERRLA. Vision intact. No scleral icterus.  Ear, Nose, Mouth and Throat: Hearing is grossly intact. No Leukoplakia, pharyngitis, stomatitis or thrush. Swollen nasal mucosa with post nasal drop.  Neck: Range of motion of neck normal. No  thyromegaly or masses. Mallampati Class 3, No JVD  Respiratory: Clear to auscultation bilaterally. No use of accessory muscles. Decreased breath sounds.  Cardiovascular: Normal heart sounds. Regularly regular rhythm without murmur.  Gastrointestinal: Non tender, non distended, soft. Bowel sounds positive in all four quadrants. No organomegaly.  Skin: No obvious rashes, lesions, ulcers or large amount of bruising. No edema.   Neurological: No new motor deficits. Cranial nerves appear intact.  Psychiatric: Patient is alert and oriented to person, place and time.    Chest Imaging:      Last CT angiography showed     IMPRESSION:  1. Septal thickening, cardiomegaly with coronary artery calcifications  and trace left pleural effusion. Correlate for early or mild fluid  overload.  2. Enlarged pulmonary artery, which can be seen with pulmonary artery  hypertension.  3. Ascending thoracic aorta measures up to 4.0 cm.     Preliminary interpretation performed by Tasha and I agree with the  preliminary report.    This report was finalized on 08/24/2021 07:36 by Dr Eugenia Zheng MD.    Assessment:  1. Shortness of breath    2. Cigarette smoker    3. Chronic obstructive pulmonary disease, unspecified COPD type (HCC)    4. Class 1 obesity due to excess calories with serious comorbidity and body mass index (BMI) of 31.0 to 31.9 in adult    5. Non-seasonal allergic rhinitis, unspecified trigger    6. Noncompliance    7. Hypoxemia requiring supplemental oxygen    8. MARY (obstructive sleep apnea)    9. Pulmonary hypertension (HCC)    10. Other specified anxiety disorders        Plan/Recommendations:    1.  Patient has ongoing tobacco abuse and shortness of breath with cough.  She is not using her medications regularly and I strongly recommended her to get back on the regular regimen of medications and she will take Symbicort twice a day with DuoNeb 4 times a day and albuterol rescue inhaler as needed.  Prescription refills were  sent to the pharmacy.  2.  For hypoxemia and chronic respiratory failure she is on 2 L oxygen all the time.  We will continue the same oxygen for now.  3.  For suspected sleep apnea she is waiting for sleep study rescheduled in December.  If she was noted to be positive for obstructive sleep apnea she will be  started on CPAP treatment.  4.  For her allergic rhinitis she should continue using Flonase nasal spray Astelin and loratadine and prescription was sent to the pharmacy.  5. Ludivina Ramirez  reports that she has been smoking cigarettes. She started smoking about 46 years ago. She has a 23.00 pack-year smoking history. She has never used smokeless tobacco.. I have educated her on the risk of diseases from using tobacco products such as cancer, COPD and heart disease.   I advised her to quit and she is willing to quit. We have discussed the following method/s for tobacco cessation:  Education Material Counseling She wanted to quit on her own..  Together we have set a quit date for 3 weeks from today.  She will follow up with me in 3 months or sooner to check on her progress. I spent 7 minutes counseling the patient.  6.  She is not vaccinated for Covid and I strongly commended to get Covid vaccination due to her high risk of complications if she gets Covid due to underlying comorbidities.  She will also need influenza pneumonia vaccine.  She is waiting for sleep study and will also get a PFT and a follow-up CT scan before the next visit.  In reviewing her last CT scan she has enlarged pulmonary setting underlying pulmonary hypertension.  Return to pulmonary clinic in 3 months time for a follow-up visit or earlier if needed.    Follow up:  3 Months    Time Spent:  30 minutes    I appreciate the opportunity of participating in this patient's care. I would like to thank the PCP for the referral.  Please feel free to contact me with any other questions.    Neeta Chavez MD   Pulmonologist/Intensivist      Electronically signed by: Neeta Chavez MD, 10/13/2021 16:56 CDT

## 2021-10-15 ENCOUNTER — HOME CARE VISIT (OUTPATIENT)
Dept: HOME HEALTH SERVICES | Facility: CLINIC | Age: 61
End: 2021-10-15

## 2021-10-18 ENCOUNTER — OFFICE VISIT (OUTPATIENT)
Dept: CARDIOLOGY | Facility: CLINIC | Age: 61
End: 2021-10-18

## 2021-10-18 VITALS
OXYGEN SATURATION: 95 % | WEIGHT: 226 LBS | SYSTOLIC BLOOD PRESSURE: 140 MMHG | DIASTOLIC BLOOD PRESSURE: 78 MMHG | HEIGHT: 65 IN | HEART RATE: 74 BPM | BODY MASS INDEX: 37.65 KG/M2

## 2021-10-18 DIAGNOSIS — I35.1 NONRHEUMATIC AORTIC VALVE INSUFFICIENCY: ICD-10-CM

## 2021-10-18 DIAGNOSIS — F17.210 CIGARETTE SMOKER: Chronic | ICD-10-CM

## 2021-10-18 DIAGNOSIS — I25.118 CORONARY ARTERY DISEASE OF NATIVE ARTERY OF NATIVE HEART WITH STABLE ANGINA PECTORIS (HCC): Primary | Chronic | ICD-10-CM

## 2021-10-18 DIAGNOSIS — E66.01 CLASS 2 SEVERE OBESITY DUE TO EXCESS CALORIES WITH SERIOUS COMORBIDITY AND BODY MASS INDEX (BMI) OF 37.0 TO 37.9 IN ADULT (HCC): ICD-10-CM

## 2021-10-18 DIAGNOSIS — N18.31 STAGE 3A CHRONIC KIDNEY DISEASE (HCC): Chronic | ICD-10-CM

## 2021-10-18 DIAGNOSIS — Z95.5 STATUS POST INSERTION OF DRUG-ELUTING STENT INTO LEFT ANTERIOR DESCENDING (LAD) ARTERY FOR CORONARY ARTERY DISEASE: Chronic | ICD-10-CM

## 2021-10-18 DIAGNOSIS — I50.32 CHRONIC DIASTOLIC HEART FAILURE (HCC): Chronic | ICD-10-CM

## 2021-10-18 DIAGNOSIS — I21.02 ST ELEVATION MYOCARDIAL INFARCTION INVOLVING LEFT ANTERIOR DESCENDING (LAD) CORONARY ARTERY (HCC): Chronic | ICD-10-CM

## 2021-10-18 DIAGNOSIS — J44.9 CHRONIC OBSTRUCTIVE PULMONARY DISEASE, UNSPECIFIED COPD TYPE (HCC): ICD-10-CM

## 2021-10-18 DIAGNOSIS — I10 ESSENTIAL HYPERTENSION: Chronic | ICD-10-CM

## 2021-10-18 DIAGNOSIS — E78.5 HYPERLIPIDEMIA LDL GOAL <70: Chronic | ICD-10-CM

## 2021-10-18 PROBLEM — E66.813 CLASS 3 OBESITY WITH ALVEOLAR HYPOVENTILATION, SERIOUS COMORBIDITY, AND BODY MASS INDEX (BMI) OF 40.0 TO 44.9 IN ADULT: Status: ACTIVE | Noted: 2020-11-23

## 2021-10-18 PROBLEM — E66.812 CLASS 2 SEVERE OBESITY DUE TO EXCESS CALORIES WITH SERIOUS COMORBIDITY AND BODY MASS INDEX (BMI) OF 37.0 TO 37.9 IN ADULT: Status: ACTIVE | Noted: 2020-11-23

## 2021-10-18 PROBLEM — E66.2 CLASS 3 OBESITY WITH ALVEOLAR HYPOVENTILATION, SERIOUS COMORBIDITY, AND BODY MASS INDEX (BMI) OF 40.0 TO 44.9 IN ADULT: Status: ACTIVE | Noted: 2020-11-23

## 2021-10-18 PROBLEM — N18.30 STAGE 3 CHRONIC KIDNEY DISEASE: Chronic | Status: ACTIVE | Noted: 2020-10-13

## 2021-10-18 PROCEDURE — 99214 OFFICE O/P EST MOD 30 MIN: CPT | Performed by: NURSE PRACTITIONER

## 2021-10-18 RX ORDER — ISOSORBIDE DINITRATE 20 MG/1
20 TABLET ORAL
Qty: 90 TABLET | Refills: 11 | Status: SHIPPED | OUTPATIENT
Start: 2021-10-18 | End: 2022-02-07 | Stop reason: SDUPTHER

## 2021-10-18 RX ORDER — NIFEDIPINE 90 MG/1
90 TABLET, FILM COATED, EXTENDED RELEASE ORAL DAILY
Qty: 30 TABLET | Refills: 11 | Status: SHIPPED | OUTPATIENT
Start: 2021-10-18 | End: 2022-02-07 | Stop reason: SDUPTHER

## 2021-10-18 RX ORDER — LOSARTAN POTASSIUM 100 MG/1
100 TABLET ORAL DAILY
COMMUNITY
Start: 2021-09-22 | End: 2021-12-03 | Stop reason: HOSPADM

## 2021-10-18 RX ORDER — CLONIDINE HYDROCHLORIDE 0.1 MG/1
0.1 TABLET ORAL EVERY 6 HOURS PRN
COMMUNITY

## 2021-10-18 NOTE — PATIENT INSTRUCTIONS
Steps to Quit Smoking  Smoking tobacco is the leading cause of preventable death. It can affect almost every organ in the body. Smoking puts you and those around you at risk for developing many serious chronic diseases. Quitting smoking can be difficult, but it is one of the best things that you can do for your health. It is never too late to quit.  How do I get ready to quit?  When you decide to quit smoking, create a plan to help you succeed. Before you quit:  · Pick a date to quit. Set a date within the next 2 weeks to give you time to prepare.  · Write down the reasons why you are quitting. Keep this list in places where you will see it often.  · Tell your family, friends, and co-workers that you are quitting. Support from your loved ones can make quitting easier.  · Talk with your health care provider about your options for quitting smoking.  · Find out what treatment options are covered by your health insurance.  · Identify people, places, things, and activities that make you want to smoke (triggers). Avoid them.  What first steps can I take to quit smoking?  · Throw away all cigarettes at home, at work, and in your car.  · Throw away smoking accessories, such as ashtrays and lighters.  · Clean your car. Make sure to empty the ashtray.  · Clean your home, including curtains and carpets.  What strategies can I use to quit smoking?  Talk with your health care provider about combining strategies, such as taking medicines while you are also receiving in-person counseling. Using these two strategies together makes you more likely to succeed in quitting than if you used either strategy on its own.  · If you are pregnant or breastfeeding, talk with your health care provider about finding counseling or other support strategies to quit smoking. Do not take medicine to help you quit smoking unless your health care provider tells you to do so.  To quit smoking:  Quit right away  · Quit smoking completely, instead of  gradually reducing how much you smoke over a period of time. Research shows that stopping smoking right away is more successful than gradually quitting.  · Attend in-person counseling to help you build problem-solving skills. You are more likely to succeed in quitting if you attend counseling sessions regularly. Even short sessions of 10 minutes can be effective.  Take medicine  You may take medicines to help you quit smoking. Some medicines require a prescription and some you can purchase over-the-counter. Medicines may have nicotine in them to replace the nicotine in cigarettes. Medicines may:  · Help to stop cravings.  · Help to relieve withdrawal symptoms.  Your health care provider may recommend:  · Nicotine patches, gum, or lozenges.  · Nicotine inhalers or sprays.  · Non-nicotine medicine that is taken by mouth.  Find resources  Find resources and support systems that can help you to quit smoking and remain smoke-free after you quit. These resources are most helpful when you use them often. They include:  · Online chats with a counselor.  · Telephone quitlines.  · Printed self-help materials.  · Support groups or group counseling.  · Text messaging programs.  · Mobile phone apps or applications. Use apps that can help you stick to your quit plan by providing reminders, tips, and encouragement. There are many free apps for mobile devices as well as websites. Examples include Quit Guide from the CDC and smokefree.gov  What things can I do to make it easier to quit?    · Reach out to your family and friends for support and encouragement. Call telephone quitlines (0-313-QUIT-NOW), reach out to support groups, or work with a counselor for support.  · Ask people who smoke to avoid smoking around you.  · Avoid places that trigger you to smoke, such as bars, parties, or smoke-break areas at work.  · Spend time with people who do not smoke.  · Lessen the stress in your life. Stress can be a smoking trigger for some  people. To lessen stress, try:  ? Exercising regularly.  ? Doing deep-breathing exercises.  ? Doing yoga.  ? Meditating.  ? Performing a body scan. This involves closing your eyes, scanning your body from head to toe, and noticing which parts of your body are particularly tense. Try to relax the muscles in those areas.  How will I feel when I quit smoking?  Day 1 to 3 weeks  Within the first 24 hours of quitting smoking, you may start to feel withdrawal symptoms. These symptoms are usually most noticeable 2-3 days after quitting, but they usually do not last for more than 2-3 weeks. You may experience these symptoms:  · Mood swings.  · Restlessness, anxiety, or irritability.  · Trouble concentrating.  · Dizziness.  · Strong cravings for sugary foods and nicotine.  · Mild weight gain.  · Constipation.  · Nausea.  · Coughing or a sore throat.  · Changes in how the medicines that you take for unrelated issues work in your body.  · Depression.  · Trouble sleeping (insomnia).  Week 3 and afterward  After the first 2-3 weeks of quitting, you may start to notice more positive results, such as:  · Improved sense of smell and taste.  · Decreased coughing and sore throat.  · Slower heart rate.  · Lower blood pressure.  · Clearer skin.  · The ability to breathe more easily.  · Fewer sick days.  Quitting smoking can be very challenging. Do not get discouraged if you are not successful the first time. Some people need to make many attempts to quit before they achieve long-term success. Do your best to stick to your quit plan, and talk with your health care provider if you have any questions or concerns.  Summary  · Smoking tobacco is the leading cause of preventable death. Quitting smoking is one of the best things that you can do for your health.  · When you decide to quit smoking, create a plan to help you succeed.  · Quit smoking right away, not slowly over a period of time.  · When you start quitting, seek help from your  health care provider, family, or friends.  This information is not intended to replace advice given to you by your health care provider. Make sure you discuss any questions you have with your health care provider.  Document Revised: 09/11/2020 Document Reviewed: 03/07/2020  [employer] Patient Education © 2021 [employer]  Obesity, Adult  Obesity is the condition of having too much total body fat. Being overweight or obese means that your weight is greater than what is considered healthy for your body size. Obesity is determined by a measurement called BMI. BMI is an estimate of body fat and is calculated from height and weight. For adults, a BMI of 30 or higher is considered obese.  Obesity can lead to other health concerns and major illnesses, including:  · Stroke.  · Coronary artery disease (CAD).  · Type 2 diabetes.  · Some types of cancer, including cancers of the colon, breast, uterus, and gallbladder.  · Osteoarthritis.  · High blood pressure (hypertension).  · High cholesterol.  · Sleep apnea.  · Gallbladder stones.  · Infertility problems.  What are the causes?  Common causes of this condition include:  · Eating daily meals that are high in calories, sugar, and fat.  · Being born with genes that may make you more likely to become obese.  · Having a medical condition that causes obesity, including:  ? Hypothyroidism.  ? Polycystic ovarian syndrome (PCOS).  ? Binge-eating disorder.  ? Cushing syndrome.  · Taking certain medicines, such as steroids, antidepressants, and seizure medicines.  · Not being physically active (sedentary lifestyle).  · Not getting enough sleep.  · Drinking high amounts of sugar-sweetened beverages, such as soft drinks.  What increases the risk?  The following factors may make you more likely to develop this condition:  · Having a family history of obesity.  · Being a woman of  descent.  · Being a man of  descent.  · Living in an area with limited access  to:  ? Beach, recreation centers, or sidewalks.  ? Healthy food choices, such as grocery stores and farmers' markets.  What are the signs or symptoms?  The main sign of this condition is having too much body fat.  How is this diagnosed?  This condition is diagnosed based on:  · Your BMI. If you are an adult with a BMI of 30 or higher, you are considered obese.  · Your waist circumference. This measures the distance around your waistline.  · Your skinfold thickness. Your health care provider may gently pinch a fold of your skin and measure it.  You may have other tests to check for underlying conditions.  How is this treated?  Treatment for this condition often includes changing your lifestyle. Treatment may include some or all of the following:  · Dietary changes. This may include developing a healthy meal plan.  · Regular physical activity. This may include activity that causes your heart to beat faster (aerobic exercise) and strength training. Work with your health care provider to design an exercise program that works for you.  · Medicine to help you lose weight if you are unable to lose 1 pound a week after 6 weeks of healthy eating and more physical activity.  · Treating conditions that cause the obesity (underlying conditions).  · Surgery. Surgical options may include gastric banding and gastric bypass. Surgery may be done if:  ? Other treatments have not helped to improve your condition.  ? You have a BMI of 40 or higher.  ? You have life-threatening health problems related to obesity.  Follow these instructions at home:  Eating and drinking    · Follow recommendations from your health care provider about what you eat and drink. Your health care provider may advise you to:  ? Limit fast food, sweets, and processed snack foods.  ? Choose low-fat options, such as low-fat milk instead of whole milk.  ? Eat 5 or more servings of fruits or vegetables every day.  ? Eat at home more often. This gives you more  control over what you eat.  ? Choose healthy foods when you eat out.  ? Learn to read food labels. This will help you understand how much food is considered 1 serving.  ? Learn what a healthy serving size is.  ? Keep low-fat snacks available.  ? Limit sugary drinks, such as soda, fruit juice, sweetened iced tea, and flavored milk.  · Drink enough water to keep your urine pale yellow.  · Do not follow a fad diet. Fad diets can be unhealthy and even dangerous.    Physical activity  · Exercise regularly, as told by your health care provider.  ? Most adults should get up to 150 minutes of moderate-intensity exercise every week.  ? Ask your health care provider what types of exercise are safe for you and how often you should exercise.  · Warm up and stretch before being active.  · Cool down and stretch after being active.  · Rest between periods of activity.  Lifestyle  · Work with your health care provider and a dietitian to set a weight-loss goal that is healthy and reasonable for you.  · Limit your screen time.  · Find ways to reward yourself that do not involve food.  · Do not drink alcohol if:  ? Your health care provider tells you not to drink.  ? You are pregnant, may be pregnant, or are planning to become pregnant.  · If you drink alcohol:  ? Limit how much you use to:  § 0-1 drink a day for women.  § 0-2 drinks a day for men.  ? Be aware of how much alcohol is in your drink. In the [place] one drink equals one 12 oz bottle of beer (355 mL), one 5 oz glass of wine (148 mL), or one 1½ oz glass of hard liquor (44 mL).  General instructions  · Keep a weight-loss journal to keep track of the food you eat and how much exercise you get.  · Take over-the-counter and prescription medicines only as told by your health care provider.  · Take vitamins and supplements only as told by your health care provider.  · Consider joining a support group. Your health care provider may be able to recommend a support group.  · Keep all  follow-up visits as told by your health care provider. This is important.  Contact a health care provider if:  · You are unable to meet your weight loss goal after 6 weeks of dietary and lifestyle changes.  Get help right away if you are having:  · Trouble breathing.  · Suicidal thoughts or behaviors.  Summary  · Obesity is the condition of having too much total body fat.  · Being overweight or obese means that your weight is greater than what is considered healthy for your body size.  · Work with your health care provider and a dietitian to set a weight-loss goal that is healthy and reasonable for you.  · Exercise regularly, as told by your health care provider. Ask your health care provider what types of exercise are safe for you and how often you should exercise.  This information is not intended to replace advice given to you by your health care provider. Make sure you discuss any questions you have with your health care provider.  Document Revised: 08/22/2019 Document Reviewed: 08/22/2019  [employer] Patient Education © 2021 [employer]

## 2021-10-18 NOTE — PROGRESS NOTES
"Subjective:     Encounter Date:10/18/2021      Patient ID: Ludivina Ramirez is a 61 y.o. female   HPI: This patient presents today for routine follow-up of hospital discharge on 9/1/2021 for chest pain. She was subsequently admitted to [facility] and had a left heart catheterization on 9/10/21, which revealed a patent stent in the mid-LAD and no targets for intervention. She has chronic diastolic congestive heart failure, coronary artery disease status post ST elevation myocardial infarction with subsequent 2.75 x 28 mm Xience drug-eluting stent to the proximal to mid left anterior descending artery on 11/8/2020, hypertension, ischemic colitis, nonrheumatic aortic valve insufficiency, stage 3 chronic kidney disease, COPD on home oxygen, obesity, tobacco use and PTSD.  She continues to complain of intermittent, exertional, substernal, \"gnawing\" chest pain. The pain radiates to the back at times. The pain is not associated with any other symptoms and is resolved with nitroglycerin. She also complains of increased dyspnea with exertion and fatigue. Patient denies palpitations, dizziness, syncope, orthopnea, PND or edema.  Patient denies any signs of bleeding.    Chief Complaint: Routine follow-up  Coronary Artery Disease  Presents for follow-up visit. Symptoms include chest pain. Pertinent negatives include no chest pressure, chest tightness, dizziness, leg swelling, muscle weakness, palpitations, shortness of breath or weight gain. Risk factors include hyperlipidemia and obesity. Her past medical history is significant for CHF. The symptoms have been stable. Compliance with diet is variable. Compliance with exercise is variable. Compliance with medications is good.   Hypertension  This is a chronic problem. The current episode started more than 1 year ago. The problem is uncontrolled. Associated symptoms include chest pain and malaise/fatigue. Pertinent negatives include no anxiety, blurred vision, headaches, neck pain, " orthopnea, palpitations, peripheral edema, PND, shortness of breath or sweats. Risk factors for coronary artery disease include obesity and dyslipidemia. Current antihypertension treatment includes beta blockers, central alpha agonists, direct vasodilators, angiotensin blockers and calcium channel blockers. The current treatment provides significant improvement. Hypertensive end-organ damage includes CAD/MI.   Hyperlipidemia  This is a chronic problem. The current episode started more than 1 year ago. Exacerbating diseases include obesity. Associated symptoms include chest pain. Pertinent negatives include no shortness of breath. Current antihyperlipidemic treatment includes statins. Risk factors for coronary artery disease include hypertension, obesity, post-menopausal and dyslipidemia.   Congestive Heart Failure  Presents for follow-up visit. Associated symptoms include chest pain. Pertinent negatives include no abdominal pain, chest pressure, claudication, edema, fatigue, muscle weakness, near-syncope, nocturia, orthopnea, palpitations, paroxysmal nocturnal dyspnea, shortness of breath or unexpected weight change. The symptoms have been stable. Her past medical history is significant for CAD. Compliance with total regimen is 51-75%. Compliance with diet is 51-75%. Compliance with exercise is 51-75%. Compliance with medications is %.       Previous Cardiac Testing:  Results for orders placed during the hospital encounter of 08/08/21    Adult Transthoracic Echo Complete W/ Cont if Necessary Per Protocol    Interpretation Summary  · Left ventricular ejection fraction appears to be 51 - 55%. Left ventricular systolic function is low normal.  · The following left ventricular wall segments are hypokinetic: apex hypokinetic.  · Mild aortic valve stenosis is present.  · Left ventricular wall thickness is consistent with mild concentric hypertrophy.  · Left ventricular diastolic function is consistent with (grade II  w/high LAP) pseudonormalization.  · Left atrial volume is severely increased.  · Estimated right ventricular systolic pressure from tricuspid regurgitation is mildly elevated (35-45 mmHg). Calculated right ventricular systolic pressure from tricuspid regurgitation is 40.8 mmHg.  · Normal size and function of the right ventricle.    Results for orders placed during the hospital encounter of 11/07/20   Cardiac Catheterization/Vascular Study    Narrative · RPDA lesion is 55% stenosed.  · Prox LAD to Mid LAD lesion is 100% stenosed.  · Left ventricular end diastolic pressure: 39 mmHg     Cardiac Catheterization Operative Report    Ludivina Ramirez  [id number]  11/8/2020    Patient was referred for cardiac catheterization      Indications for the procedure include: Symptoms suggestive of angina with   high suspicion for significant obstructive coronary artery disease       Procedure performed  Left heart cath  Coronary angiography  Right femoral arteriography  Insertion of 7 Fr Mynx hemostatic closure device with effective hemostasis   and preserved right lower extremity pulses  Left ventriculography    Supervision of the administration of moderate sedation      Procedure Details  The risks, benefits, complications, treatment options, and expected   outcomes were discussed with the patient. The patient and/or family   concurred with the proposed plan, giving informed consent.     Patient was brought to the cath lab after IV hydration was begun and oral   premedication was given. He was further sedated with fentanyl and   midazolam.T    The skin overlying the patient's right femoral artery was prepped and   draped in the usual sterile fashion.     Using the modified Seldinger access technique, a 6f Yoruba sheath was   placed in the femoral artery.      Cardiac Catheterization Operative Report      Patient was referred for cardiac catheterization .      Procedure Details    Diagnostic coronary angiography was performed with 5  Mongolian JL4 and JR 4   coronary catheters.      Pigtail catheter for left ventriculography and left heart pressure   measurements    Coronary angiogram were performed in Occitan and FLYNN projection to evaluate   the coronary arterial systems.        Before all coronary angiograms were obtained, a femoral angiogram was   performed and the arteriotomy was assessed for suitability for a closure   device.      A 6/7 Fr Mynx closure device was used to achieve hemostasis.  The patient   tolerated the procedure well, and there were no immediate complications.    ___________________________________________________________________________  _________________________________________________________________   Selective coronary angiography:    Left main coronary artery:  The left main coronary artery arises from the   left coronary cusp and bifurcates into the  left anterior descending   coronary artery  and left circumflex arteries.      Left main coronary artery is normal    Left anterior descending artery:  The  left anterior descending coronary   artery   arises normally from the left main coronary artery and courses in   the anterior interventricular groove and terminates at the apex.  Occluded   proximal left anterior descending coronary artery which was the culprit   vessel and was treated with angioplasty and stent placement as below    Left circumflex:  The left circumflex arises form the left man artery and   supplies obtuse marginal branches.Left circumflex artery  is normal.    Overall large distribution with moderate tortuosity of obtuse marginal   vessels without any obstructive or significant coronary artery disease    Right coronary artery:  The RCA arises normally from the right coronary   cusp and is dominant for the posterior circulation.  The RCA is dominant   Right posterior descending coronary artery has approximately a 50 to 60%   stenosis in the midportion       Left heart cath: LVEDP   39 mmHg  mm Hg with no  gradient across aortic   valve on pullback.     LV Gram: Not performed to save contrast as has acute on chronic kidney   disease    Interventions: JL4 7 Estonian guide was used advanced a coronary wire into   the distal left anterior descending coronary artery did angioplasty using   a 2.0 mm balloon  Then implanted a 2.75 x 28 mm drug-eluting stent and postdilated with a   3.0 mm balloon.  Initial stenosis 100% with MELISSA 0 flow post procedure 0%   stenosis with MELISSA-3 flow with complete resolution of chest pain prior to   departure from Cath Lab  ___________________________________________________________________________  _________________________________________________________________  Estimated Blood Loss:  Minimal         Complications:  None; patient tolerated the procedure well.    Specimens: None           Disposition: Cardiovascular observation unit             Condition: stable          I supervised the administration of conscious sedation by nursing staff   throughout the case.      Immediately prior to the procedure the patient was re-examined and   subsequently the  first dose was given at   0126  Hours  and the end of my face-to-face   encounter was at 0202   Hours.        During the case, continuous pulse oximetry, heart rate, blood pressure,   and patient status were monitored.       ___________________________________________________________________________  _________________________________________________________________    Conclusion of cardiac catheterization    11/8/2020    Severely elevated left ventricular end-diastolic pressure 39 mmHg  50 to 60% stenosis of midportion of posterior descending coronary artery  Occluded proximal left anterior descending coronary artery treated with   PTCA and then implantation of 2.75 x 28 mm Xience drug-eluting stent   postdilated to 3.0 mm diameter  Left ventriculogram not performed to save contrast as has chronic with   acute kidney injury, prior echo shows  preserved left ventricular ejection   fraction    ___________________________________________________________________________  _________________________________________________________________    Plan after cardiac catheterization      Dual antiplatelet therapy minimum of 1 year preferably longer  Statin ACE inhibitor is and beta-blockers  Aspirin for the rest of her life  Her leukocytosis is likely due to recent infection and ongoing steroid use  No evidence of any underlying infection or sepsis.  Intensive risk factor modifications for both primary and secondary   prevention if applicable  Hydration   Observation                The following portions of the patient's history were reviewed and updated as appropriate: allergies, current medications, past family history, past medical history, past social history, past surgical history and problem list.    Allergies   Allergen Reactions   • Codeine Nausea And Vomiting   • Imitrex [Sumatriptan] Other (See Comments)     LOPEZ       Current Outpatient Medications:   •  albuterol sulfate  (90 Base) MCG/ACT inhaler, Inhale 2 puffs Every 4 (Four) Hours As Needed for Wheezing., Disp: 6.7 g, Rfl: 5  •  aspirin 81 MG EC tablet, Take 1 tablet by mouth Daily., Disp:  , Rfl:   •  azelastine (ASTELIN) 0.1 % nasal spray, 2 sprays into the nostril(s) as directed by provider 2 (Two) Times a Day., Disp: , Rfl:   •  budesonide-formoterol (SYMBICORT) 160-4.5 MCG/ACT inhaler, Inhale 2 puffs 2 (Two) Times a Day., Disp: 1 each, Rfl: 12  •  busPIRone (BUSPAR) 10 MG tablet, Take 1 tablet by mouth 3 (Three) Times a Day for 30 days., Disp: 90 tablet, Rfl: 0  •  calcitriol (ROCALTROL) 0.25 MCG capsule, Take 1 capsule by mouth Daily., Disp: 30 capsule, Rfl: 3  •  carvedilol (COREG) 25 MG tablet, Take 25 mg by mouth 2 (Two) Times a Day With Meals., Disp: , Rfl:   •  cloNIDine (CATAPRES) 0.1 MG tablet, Take 0.1 mg by mouth As Needed for High Blood Pressure., Disp: , Rfl:   •  ferrous sulfate  325 (65 FE) MG tablet, Take 1 tablet by mouth 2 (Two) Times a Day With Meals., Disp: 60 tablet, Rfl: 0  •  fluticasone (FLONASE) 50 MCG/ACT nasal spray, 2 sprays into the nostril(s) as directed by provider Every Morning. In each nostril, Disp: , Rfl:   •  furosemide (Lasix) 40 MG tablet, Take 1 tablet by mouth Daily As Needed (increased SOA, edema, weight gain (2 pounds overnight, 5 pounds in 2 days))., Disp: 30 tablet, Rfl: 0  •  guaiFENesin (MUCINEX) 600 MG 12 hr tablet, Take 2 tablets by mouth Every 12 (Twelve) Hours., Disp: 60 tablet, Rfl: 3  •  hydrALAZINE (APRESOLINE) 50 MG tablet, Take 75 mg by mouth Every 8 (Eight) Hours. 1.5 (one tablet (50 mg) and one half tablet (25 mg)) to equal 75 mg every 8 hours, Disp: , Rfl:   •  ipratropium-albuterol (DUO-NEB) 0.5-2.5 mg/3 ml nebulizer, Take 3 mL by nebulization 4 (Four) Times a Day., Disp: 360 mL, Rfl: 5  •  isosorbide dinitrate (ISORDIL) 20 MG tablet, Take 1 tablet by mouth 3 (Three) Times a Day., Disp: 90 tablet, Rfl: 11  •  loratadine (CLARITIN) 10 MG tablet, Take 1 tablet by mouth Daily., Disp: 90 tablet, Rfl: 3  •  losartan (COZAAR) 100 MG tablet, Take 100 mg by mouth Daily., Disp: , Rfl:   •  melatonin 5 MG tablet tablet, Take 5 mg by mouth Every Night., Disp: , Rfl:   •  multivitamin with minerals tablet tablet, Take 1 tablet by mouth Daily., Disp: , Rfl:   •  NIFEdipine CC (ADALAT CC) 90 MG 24 hr tablet, Take 1 tablet by mouth Daily., Disp: 30 tablet, Rfl: 11  •  nitroglycerin (NITROSTAT) 0.4 MG SL tablet, Place 1 tablet under the tongue Every 5 (Five) Minutes As Needed for Chest Pain for up to 3 doses. Take no more than 3 doses in 15 minutes., Disp: 25 tablet, Rfl: 0  •  O2 (OXYGEN), Inhale 2 L/min Continuous. 2-3 lpm/nc, Disp: , Rfl:   •  OLANZapine (zyPREXA) 5 MG tablet, Take 1 tablet by mouth 2 (two) times a day., Disp: 60 tablet, Rfl: 0  •  pantoprazole (PROTONIX) 40 MG EC tablet, Take 1 tablet by mouth Daily., Disp: 30 tablet, Rfl: 1  •  rosuvastatin  (CRESTOR) 20 MG tablet, Take 1 tablet by mouth Daily., Disp: 90 tablet, Rfl: 3  •  ticagrelor (BRILINTA) 90 MG tablet tablet, Take 1 tablet by mouth 2 (Two) Times a Day., Disp: 60 tablet, Rfl: 11  •  vitamin C (ASCORBIC ACID) 250 MG tablet, Take 500 mg by mouth Daily., Disp: , Rfl:   Past Medical History:   Diagnosis Date   • Acute CHF (HCC)    • Acute renal failure (ARF) (HCC)    • Anemia    • Asthma     childhood   • Coronary artery disease    • Hypertension    • Ischemic colitis (HCC)    • Metabolic acidosis    • Nonrheumatic aortic (valve) insufficiency    • PTSD (post-traumatic stress disorder)      Past Surgical History:   Procedure Laterality Date   • CARDIAC CATHETERIZATION N/A 11/8/2020    Procedure: Left Heart Cath;  Surgeon: Pierce Buchanan MD;  Location: [facility] CATH INVASIVE LOCATION;  Service: Cardiovascular;  Laterality: N/A;   • EXTERNAL FIXATION WRIST FRACTURE     • LEG SURGERY     • TUBAL ABDOMINAL LIGATION       Family History   Problem Relation Age of Onset   • Coronary artery disease Mother    • Coronary artery disease Father      Social History     Socioeconomic History   • Marital status: Single   Tobacco Use   • Smoking status: Current Some Day Smoker     Packs/day: 0.50     Years: 46.00     Pack years: 23.00     Types: Cigarettes     Start date: 1975   • Smokeless tobacco: Never Used   Vaping Use   • Vaping Use: Never used   Substance and Sexual Activity   • Alcohol use: No   • Drug use: No   • Sexual activity: Defer       Review of Systems   Constitutional: Positive for malaise/fatigue. Negative for chills, decreased appetite, fatigue, fever, night sweats, unexpected weight change, weight gain and weight loss.   HENT: Negative for nosebleeds.    Eyes: Negative for blurred vision and visual disturbance.   Cardiovascular: Positive for chest pain and dyspnea on exertion. Negative for claudication, leg swelling, near-syncope, orthopnea, palpitations, paroxysmal nocturnal dyspnea and syncope.    Respiratory: Negative for chest tightness, cough, hemoptysis, shortness of breath, snoring and wheezing.    Endocrine: Negative for cold intolerance and heat intolerance.   Hematologic/Lymphatic: Does not bruise/bleed easily.   Skin: Negative for rash.   Musculoskeletal: Negative for back pain, falls, muscle weakness and neck pain.   Gastrointestinal: Negative for abdominal pain, change in bowel habit, constipation, diarrhea, dysphagia, heartburn, nausea and vomiting.   Genitourinary: Negative for hematuria and nocturia.   Neurological: Negative for dizziness, headaches, light-headedness and weakness.   Psychiatric/Behavioral: Negative for altered mental status.   Allergic/Immunologic: Negative for persistent infections.              Objective:     Vitals and nursing note reviewed.   Constitutional:       General: Not in acute distress.     Appearance: Normal and healthy appearance. Well-developed, normal weight and not in distress. Not diaphoretic.      Interventions: Nasal cannula in place.   Eyes:      General: Lids are normal.         Right eye: No discharge.         Left eye: No discharge.      Conjunctiva/sclera: Conjunctivae normal.      Pupils: Pupils are equal, round, and reactive to light.   HENT:      Head: Normocephalic and atraumatic.      Jaw: There is normal jaw occlusion.      Right Ear: External ear normal.      Left Ear: External ear normal.      Nose: Nose normal.   Neck:      Thyroid: No thyromegaly.      Vascular: No carotid bruit, JVD or JVR. JVD normal.      Trachea: Trachea normal. No tracheal deviation.   Pulmonary:      Effort: Pulmonary effort is normal. No respiratory distress.      Breath sounds: Examination of the right-upper field reveals decreased breath sounds. Examination of the left-upper field reveals decreased breath sounds. Examination of the right-middle field reveals decreased breath sounds. Examination of the left-middle field reveals decreased breath sounds. Examination of  "the right-lower field reveals decreased breath sounds. Examination of the left-lower field reveals decreased breath sounds. Decreased breath sounds present. No wheezing. No rhonchi. No rales.   Chest:      Chest wall: Not tender to palpatation.   Cardiovascular:      PMI at left midclavicular line. Normal rate. Regular rhythm. Normal S1. Normal S2.      Murmurs: There is a grade 2/6 high frequency blowing holosystolic murmur at the apex. There is a grade 2/4 high frequency blowing decrescendo, early diastolic murmur at the URSB, radiating to the apex.      No gallop. No click. No rub.   Pulses:     Intact distal pulses. No decreased pulses.   Edema:     Peripheral edema absent.   Abdominal:      General: Bowel sounds are normal. There is no distension.      Palpations: Abdomen is soft.      Tenderness: There is no abdominal tenderness.   Musculoskeletal: Normal range of motion.         General: No tenderness or deformity.      Cervical back: Normal range of motion and neck supple. Skin:     General: Skin is warm and dry.      Coloration: Skin is not pale.      Findings: No erythema or rash.   Neurological:      General: No focal deficit present.      Mental Status: Alert, oriented to person, place, and time and oriented to person, place and time.   Psychiatric:         Attention and Perception: Attention and perception normal.         Mood and Affect: Mood and affect normal.         Speech: Speech normal.         Behavior: Behavior normal.         Thought Content: Thought content normal.         Cognition and Memory: Cognition and memory normal.         Judgment: Judgment normal.           Procedures  /78   Pulse 74   Ht 165.1 cm (65\")   Wt 103 kg (226 lb)   SpO2 95%   BMI 37.61 kg/m²   Lab Review:   Lab Results   Component Value Date    WBC 8.32 09/30/2021    HGB 11.3 (L) 09/30/2021    HCT 37.4 09/30/2021    MCV 94.0 09/30/2021     09/30/2021     Lab Results   Component Value Date    GLUCOSE 111 " (H) 10/11/2021    BUN 28 (H) 10/11/2021    CREATININE 2.11 (H) 10/11/2021    EGFRIFNONA 24 (L) 10/11/2021    EGFRIFAFRI 31 (L) 09/10/2021    BCR 13.3 10/11/2021    K 4.4 10/11/2021    CO2 26.0 10/11/2021    CALCIUM 8.5 (L) 10/11/2021    ALBUMIN 3.90 09/22/2021    AST 15 09/22/2021    ALT 19 09/22/2021     Lab Results   Component Value Date    CHOL 196 11/08/2020    CHOL 178 09/01/2019    CHLPL 164 11/18/2015     Lab Results   Component Value Date    TRIG 68 11/08/2020    TRIG 70 09/01/2019    TRIG 104 11/18/2015     Lab Results   Component Value Date    HDL 52 11/08/2020    HDL 51 09/01/2019    HDL 53 11/18/2015     Lab Results   Component Value Date     (H) 11/08/2020     (H) 09/01/2019    LDL 91 11/18/2015       I have reviewed the most recent lab results.       Assessment:          Diagnosis Plan   1. Coronary artery disease involving native coronary artery of native heart with stable angina pectoris (CMS/[facility])  Increase isordil to 20 mg three times daily.    2. ST elevation myocardial infarction involving left anterior descending (LAD) coronary artery (CMS/[facility])  11/8/20   3. Status post insertion of drug-eluting stent into left anterior descending (LAD) artery for coronary artery disease  2.75 x 28 mm Xience drug-eluting stent to the proximal to mid left anterior descending artery. Continues on aspirin and Brilinta. Denies bleeding.    4. Essential hypertension   blood pressure elevated in office today. change Nifedipine to 90 mg daily.  Continue carvedilol ans losartan.  Monitor and record daily blood pressure. Report readings consistently higher than 130/90 or consistently lower than 100/60.    5. Hyperlipidemia LDL goal <70   management per PCP.  Patient is not on statin.   6. Nonrheumatic aortic valve insufficiency  Mild to moderate AI on echo 8/11/2021.    7. Stage 3a chronic kidney disease  Stable.    8. COPD    9. Cigarette smoker  Ludivina Ramirez  reports that she has been smoking cigarettes.  She started smoking about 46 years ago. She has a 23.00 pack-year smoking history. She has never used smokeless tobacco.. I have educated her on the risk of diseases from using tobacco products such as cancer, COPD and heart disease.     I advised her to quit and she is not willing to quit.    I spent 3  minutes counseling the patient.          10. Class 2 severe obesity due to excess calories with serious comorbidity and body mass index (BMI) of 37.0 to 37.9 in adult (HCC) Patient's Body mass index is 37.61 kg/m². BMI is above normal parameters. Recommendations include: educational material.     11.   Chronic diastolic congestive heart failure (HCC)  NYHA class II.  Compensated. Reviewed signs and symptoms of CHF and what to report with the patient. Patient instructed to restrict sodium and weigh daily. Report weight gain of greater than 2 lbs overnight or 5 lbs in 1 week. Pt verbalized understanding of instructions and plan of care.           Plan:         1. Decrease Nifedipine to 90 mg daily. Increase Isordil to 20 mg three times daily. Continue other medications as previously prescribed.  2. Report any worsening symptoms.  3. Report any signs of bleeding.  4. Continue heart healthy diet and regular exercise as tolerated.   5. Follow up with PCP for blood pressure and cholesterol management and routine lab work.  6. Follow up in one month, or sooner if needed.   7.  Monitor and record daily blood pressure. Report readings consistently higher than 130/90 or consistently lower than 100/60.   8. Reviewed signs and symptoms of CHF and what to report with the patient. Patient instructed to restrict sodium and weigh daily. Report weight gain of greater than 2 lbs overnight or 5 lbs in 1 week. Pt verbalized understanding of instructions and plan of care.

## 2021-10-21 ENCOUNTER — TRANSCRIBE ORDERS (OUTPATIENT)
Dept: ADMINISTRATIVE | Facility: HOSPITAL | Age: 61
End: 2021-10-21

## 2021-10-21 ENCOUNTER — HOME CARE VISIT (OUTPATIENT)
Dept: HOME HEALTH SERVICES | Facility: CLINIC | Age: 61
End: 2021-10-21

## 2021-10-21 DIAGNOSIS — Z12.31 ENCOUNTER FOR SCREENING MAMMOGRAM FOR MALIGNANT NEOPLASM OF BREAST: Primary | ICD-10-CM

## 2021-10-21 PROCEDURE — G0155 HHCP-SVS OF CSW,EA 15 MIN: HCPCS

## 2021-10-22 ENCOUNTER — HOME CARE VISIT (OUTPATIENT)
Dept: HOME HEALTH SERVICES | Facility: CLINIC | Age: 61
End: 2021-10-22

## 2021-10-22 NOTE — CASE COMMUNICATION
Ludivina Ramirez has been discharged from [facility] [place] without a visit due to the lack of insurance approval for more SN visits. I was unable to get in contact with the pt today or yesterday to notify her of the discharge. I also tried calling the pt's niece multiple times.  If you have any questions, you can call me at [phone].  Thank you!

## 2021-10-22 NOTE — HOME HEALTH
I was unable to get in contact with the pt or her niece to notify her that she was being discharged from [facility] without a visit due to lack of insurance approval. I notified Dr. Weston's office of the pt's discharge.

## 2021-11-17 ENCOUNTER — TELEPHONE (OUTPATIENT)
Dept: CARDIOLOGY | Facility: CLINIC | Age: 61
End: 2021-11-17

## 2021-11-29 ENCOUNTER — APPOINTMENT (OUTPATIENT)
Dept: GENERAL RADIOLOGY | Facility: HOSPITAL | Age: 61
End: 2021-11-29

## 2021-11-29 ENCOUNTER — HOSPITAL ENCOUNTER (INPATIENT)
Facility: HOSPITAL | Age: 61
LOS: 4 days | Discharge: HOME | End: 2021-12-03
Attending: EMERGENCY MEDICINE | Admitting: FAMILY MEDICINE

## 2021-11-29 DIAGNOSIS — J96.01 ACUTE RESPIRATORY FAILURE WITH HYPOXIA AND HYPERCAPNIA: ICD-10-CM

## 2021-11-29 DIAGNOSIS — N28.9 ACUTE ON CHRONIC RENAL INSUFFICIENCY: ICD-10-CM

## 2021-11-29 DIAGNOSIS — J96.02 ACUTE RESPIRATORY FAILURE WITH HYPOXIA AND HYPERCAPNIA: ICD-10-CM

## 2021-11-29 DIAGNOSIS — I50.9 ACUTE ON CHRONIC CONGESTIVE HEART FAILURE, UNSPECIFIED HEART FAILURE TYPE: Primary | ICD-10-CM

## 2021-11-29 DIAGNOSIS — N18.9 ACUTE ON CHRONIC RENAL INSUFFICIENCY: ICD-10-CM

## 2021-11-29 DIAGNOSIS — I10 CHRONIC HYPERTENSION: ICD-10-CM

## 2021-11-29 LAB
ALBUMIN SERPL-MCNC: 4 G/DL (ref 3.5–5.2)
ALBUMIN/GLOB SERPL: 1.3 G/DL
ALP SERPL-CCNC: 141 U/L (ref 39–117)
ALT SERPL W P-5'-P-CCNC: 15 U/L (ref 1–33)
AMPHET+METHAMPHET UR QL: POSITIVE
AMPHETAMINES UR QL: POSITIVE
ANION GAP SERPL CALCULATED.3IONS-SCNC: 9 MMOL/L (ref 5–15)
ARTERIAL PATENCY WRIST A: ABNORMAL
ARTERIAL PATENCY WRIST A: POSITIVE
AST SERPL-CCNC: 16 U/L (ref 1–32)
ATMOSPHERIC PRESS: 751 MMHG
ATMOSPHERIC PRESS: 752 MMHG
BARBITURATES UR QL SCN: NEGATIVE
BASE EXCESS BLDA CALC-SCNC: 0.2 MMOL/L (ref 0–2)
BASE EXCESS BLDA CALC-SCNC: 5.8 MMOL/L (ref 0–2)
BASOPHILS # BLD AUTO: 0.04 10*3/MM3 (ref 0–0.2)
BASOPHILS NFR BLD AUTO: 0.5 % (ref 0–1.5)
BDY SITE: ABNORMAL
BDY SITE: ABNORMAL
BENZODIAZ UR QL SCN: POSITIVE
BILIRUB SERPL-MCNC: 0.2 MG/DL (ref 0–1.2)
BILIRUB UR QL STRIP: NEGATIVE
BODY TEMPERATURE: 37 C
BODY TEMPERATURE: 37 C
BUN SERPL-MCNC: 53 MG/DL (ref 8–23)
BUN/CREAT SERPL: 17.3 (ref 7–25)
BUPRENORPHINE SERPL-MCNC: NEGATIVE NG/ML
CALCIUM SPEC-SCNC: 8.1 MG/DL (ref 8.6–10.5)
CANNABINOIDS SERPL QL: NEGATIVE
CHLORIDE SERPL-SCNC: 104 MMOL/L (ref 98–107)
CLARITY UR: CLEAR
CO2 SERPL-SCNC: 28 MMOL/L (ref 22–29)
COCAINE UR QL: POSITIVE
COLOR UR: YELLOW
CREAT SERPL-MCNC: 3.07 MG/DL (ref 0.57–1)
D-LACTATE SERPL-SCNC: 1 MMOL/L (ref 0.5–2)
DEPRECATED RDW RBC AUTO: 57.8 FL (ref 37–54)
EOSINOPHIL # BLD AUTO: 0.21 10*3/MM3 (ref 0–0.4)
EOSINOPHIL NFR BLD AUTO: 2.7 % (ref 0.3–6.2)
EPAP: 8
ERYTHROCYTE [DISTWIDTH] IN BLOOD BY AUTOMATED COUNT: 15.7 % (ref 12.3–15.4)
GAS FLOW AIRWAY: 4 LPM
GFR SERPL CREATININE-BSD FRML MDRD: 15 ML/MIN/1.73
GLOBULIN UR ELPH-MCNC: 3.2 GM/DL
GLUCOSE BLDC GLUCOMTR-MCNC: 87 MG/DL (ref 70–130)
GLUCOSE SERPL-MCNC: 133 MG/DL (ref 65–99)
GLUCOSE UR STRIP-MCNC: NEGATIVE MG/DL
HCO3 BLDA-SCNC: 29.8 MMOL/L (ref 20–26)
HCO3 BLDA-SCNC: 33.8 MMOL/L (ref 20–26)
HCT VFR BLD AUTO: 38.1 % (ref 34–46.6)
HGB BLD-MCNC: 10.6 G/DL (ref 12–15.9)
HGB UR QL STRIP.AUTO: NEGATIVE
IMM GRANULOCYTES # BLD AUTO: 0.12 10*3/MM3 (ref 0–0.05)
IMM GRANULOCYTES NFR BLD AUTO: 1.6 % (ref 0–0.5)
INHALED O2 CONCENTRATION: 26 %
IPAP: 16
KETONES UR QL STRIP: NEGATIVE
LEUKOCYTE ESTERASE UR QL STRIP.AUTO: NEGATIVE
LYMPHOCYTES # BLD AUTO: 0.79 10*3/MM3 (ref 0.7–3.1)
LYMPHOCYTES NFR BLD AUTO: 10.3 % (ref 19.6–45.3)
Lab: ABNORMAL
MCH RBC QN AUTO: 28.4 PG (ref 26.6–33)
MCHC RBC AUTO-ENTMCNC: 27.8 G/DL (ref 31.5–35.7)
MCV RBC AUTO: 102.1 FL (ref 79–97)
METHADONE UR QL SCN: NEGATIVE
MODALITY: ABNORMAL
MODALITY: ABNORMAL
MONOCYTES # BLD AUTO: 0.7 10*3/MM3 (ref 0.1–0.9)
MONOCYTES NFR BLD AUTO: 9.1 % (ref 5–12)
NEUTROPHILS NFR BLD AUTO: 5.8 10*3/MM3 (ref 1.7–7)
NEUTROPHILS NFR BLD AUTO: 75.8 % (ref 42.7–76)
NITRITE UR QL STRIP: NEGATIVE
NOTIFIED BY: ABNORMAL
NOTIFIED WHO: ABNORMAL
NRBC BLD AUTO-RTO: 0.4 /100 WBC (ref 0–0.2)
NT-PROBNP SERPL-MCNC: ABNORMAL PG/ML (ref 0–900)
OPIATES UR QL: NEGATIVE
OXYCODONE UR QL SCN: NEGATIVE
PCO2 BLDA: 66.8 MM HG (ref 35–45)
PCO2 BLDA: 77.3 MM HG (ref 35–45)
PCO2 TEMP ADJ BLD: 66.8 MM HG (ref 35–45)
PCO2 TEMP ADJ BLD: 77.3 MM HG (ref 35–45)
PCP UR QL SCN: NEGATIVE
PH BLDA: 7.2 PH UNITS (ref 7.35–7.45)
PH BLDA: 7.31 PH UNITS (ref 7.35–7.45)
PH UR STRIP.AUTO: 6 [PH] (ref 5–8)
PH, TEMP CORRECTED: 7.2 PH UNITS (ref 7.35–7.45)
PH, TEMP CORRECTED: 7.31 PH UNITS (ref 7.35–7.45)
PLATELET # BLD AUTO: 169 10*3/MM3 (ref 140–450)
PMV BLD AUTO: 11.1 FL (ref 6–12)
PO2 BLDA: 55.2 MM HG (ref 83–108)
PO2 BLDA: 63.4 MM HG (ref 83–108)
PO2 TEMP ADJ BLD: 55.2 MM HG (ref 83–108)
PO2 TEMP ADJ BLD: 63.4 MM HG (ref 83–108)
POTASSIUM SERPL-SCNC: 4.4 MMOL/L (ref 3.5–5.2)
PROPOXYPH UR QL: NEGATIVE
PROT SERPL-MCNC: 7.2 G/DL (ref 6–8.5)
PROT UR QL STRIP: NEGATIVE
RBC # BLD AUTO: 3.73 10*6/MM3 (ref 3.77–5.28)
SAO2 % BLDCOA: 89.9 % (ref 94–99)
SAO2 % BLDCOA: 90.5 % (ref 94–99)
SARS-COV-2 RNA PNL SPEC NAA+PROBE: NOT DETECTED
SET MECH RESP RATE: 20
SODIUM SERPL-SCNC: 141 MMOL/L (ref 136–145)
SP GR UR STRIP: 1.01 (ref 1–1.03)
TRICYCLICS UR QL SCN: NEGATIVE
TROPONIN T SERPL-MCNC: 0.06 NG/ML (ref 0–0.03)
TROPONIN T SERPL-MCNC: 0.06 NG/ML (ref 0–0.03)
UROBILINOGEN UR QL STRIP: NORMAL
VENTILATOR MODE: ABNORMAL
VENTILATOR MODE: ABNORMAL
WBC NRBC COR # BLD: 7.66 10*3/MM3 (ref 3.4–10.8)

## 2021-11-29 PROCEDURE — 36600 WITHDRAWAL OF ARTERIAL BLOOD: CPT

## 2021-11-29 PROCEDURE — 94799 UNLISTED PULMONARY SVC/PX: CPT

## 2021-11-29 PROCEDURE — 81003 URINALYSIS AUTO W/O SCOPE: CPT | Performed by: FAMILY MEDICINE

## 2021-11-29 PROCEDURE — 80053 COMPREHEN METABOLIC PANEL: CPT | Performed by: EMERGENCY MEDICINE

## 2021-11-29 PROCEDURE — 82570 ASSAY OF URINE CREATININE: CPT | Performed by: INTERNAL MEDICINE

## 2021-11-29 PROCEDURE — 99284 EMERGENCY DEPT VISIT MOD MDM: CPT

## 2021-11-29 PROCEDURE — 93010 ELECTROCARDIOGRAM REPORT: CPT | Performed by: INTERNAL MEDICINE

## 2021-11-29 PROCEDURE — 25010000002 HYDRALAZINE PER 20 MG: Performed by: EMERGENCY MEDICINE

## 2021-11-29 PROCEDURE — 94640 AIRWAY INHALATION TREATMENT: CPT

## 2021-11-29 PROCEDURE — 94660 CPAP INITIATION&MGMT: CPT

## 2021-11-29 PROCEDURE — 85025 COMPLETE CBC W/AUTO DIFF WBC: CPT | Performed by: EMERGENCY MEDICINE

## 2021-11-29 PROCEDURE — 87040 BLOOD CULTURE FOR BACTERIA: CPT | Performed by: EMERGENCY MEDICINE

## 2021-11-29 PROCEDURE — 82962 GLUCOSE BLOOD TEST: CPT

## 2021-11-29 PROCEDURE — 25010000002 ENOXAPARIN PER 10 MG: Performed by: FAMILY MEDICINE

## 2021-11-29 PROCEDURE — 83605 ASSAY OF LACTIC ACID: CPT | Performed by: EMERGENCY MEDICINE

## 2021-11-29 PROCEDURE — 83880 ASSAY OF NATRIURETIC PEPTIDE: CPT | Performed by: EMERGENCY MEDICINE

## 2021-11-29 PROCEDURE — 84484 ASSAY OF TROPONIN QUANT: CPT | Performed by: EMERGENCY MEDICINE

## 2021-11-29 PROCEDURE — 80306 DRUG TEST PRSMV INSTRMNT: CPT | Performed by: FAMILY MEDICINE

## 2021-11-29 PROCEDURE — 93005 ELECTROCARDIOGRAM TRACING: CPT | Performed by: EMERGENCY MEDICINE

## 2021-11-29 PROCEDURE — 82803 BLOOD GASES ANY COMBINATION: CPT

## 2021-11-29 PROCEDURE — 87635 SARS-COV-2 COVID-19 AMP PRB: CPT | Performed by: EMERGENCY MEDICINE

## 2021-11-29 PROCEDURE — 71045 X-RAY EXAM CHEST 1 VIEW: CPT

## 2021-11-29 PROCEDURE — 25010000002 METHYLPREDNISOLONE PER 40 MG: Performed by: FAMILY MEDICINE

## 2021-11-29 PROCEDURE — 25010000002 FUROSEMIDE PER 20 MG: Performed by: EMERGENCY MEDICINE

## 2021-11-29 PROCEDURE — 84300 ASSAY OF URINE SODIUM: CPT | Performed by: INTERNAL MEDICINE

## 2021-11-29 PROCEDURE — 84484 ASSAY OF TROPONIN QUANT: CPT | Performed by: FAMILY MEDICINE

## 2021-11-29 RX ORDER — BUDESONIDE AND FORMOTEROL FUMARATE DIHYDRATE 160; 4.5 UG/1; UG/1
2 AEROSOL RESPIRATORY (INHALATION)
Status: DISCONTINUED | OUTPATIENT
Start: 2021-11-29 | End: 2021-12-03 | Stop reason: HOSPADM

## 2021-11-29 RX ORDER — IPRATROPIUM BROMIDE AND ALBUTEROL SULFATE 2.5; .5 MG/3ML; MG/3ML
3 SOLUTION RESPIRATORY (INHALATION)
Status: DISCONTINUED | OUTPATIENT
Start: 2021-11-29 | End: 2021-12-03 | Stop reason: HOSPADM

## 2021-11-29 RX ORDER — CETIRIZINE HYDROCHLORIDE 10 MG/1
5 TABLET ORAL DAILY
Status: DISCONTINUED | OUTPATIENT
Start: 2021-11-30 | End: 2021-12-03 | Stop reason: HOSPADM

## 2021-11-29 RX ORDER — FUROSEMIDE 10 MG/ML
80 INJECTION INTRAMUSCULAR; INTRAVENOUS ONCE
Status: COMPLETED | OUTPATIENT
Start: 2021-11-29 | End: 2021-11-29

## 2021-11-29 RX ORDER — ASPIRIN 81 MG/1
81 TABLET ORAL DAILY
Status: DISCONTINUED | OUTPATIENT
Start: 2021-11-30 | End: 2021-12-03 | Stop reason: HOSPADM

## 2021-11-29 RX ORDER — METHYLPREDNISOLONE SODIUM SUCCINATE 40 MG/ML
40 INJECTION, POWDER, LYOPHILIZED, FOR SOLUTION INTRAMUSCULAR; INTRAVENOUS EVERY 8 HOURS
Status: DISCONTINUED | OUTPATIENT
Start: 2021-11-29 | End: 2021-11-30

## 2021-11-29 RX ORDER — HYDRALAZINE HYDROCHLORIDE 20 MG/ML
20 INJECTION INTRAMUSCULAR; INTRAVENOUS ONCE
Status: COMPLETED | OUTPATIENT
Start: 2021-11-29 | End: 2021-11-29

## 2021-11-29 RX ORDER — GUAIFENESIN 600 MG/1
1200 TABLET, EXTENDED RELEASE ORAL EVERY 12 HOURS SCHEDULED
Status: DISCONTINUED | OUTPATIENT
Start: 2021-11-29 | End: 2021-12-03 | Stop reason: HOSPADM

## 2021-11-29 RX ORDER — AZELASTINE 1 MG/ML
2 SPRAY, METERED NASAL 2 TIMES DAILY
Status: DISCONTINUED | OUTPATIENT
Start: 2021-11-29 | End: 2021-12-03 | Stop reason: HOSPADM

## 2021-11-29 RX ORDER — ROSUVASTATIN CALCIUM 10 MG/1
10 TABLET, COATED ORAL NIGHTLY
Status: DISCONTINUED | OUTPATIENT
Start: 2021-11-29 | End: 2021-12-03 | Stop reason: HOSPADM

## 2021-11-29 RX ORDER — CALCITRIOL 0.25 UG/1
0.25 CAPSULE, LIQUID FILLED ORAL DAILY
Status: DISCONTINUED | OUTPATIENT
Start: 2021-11-30 | End: 2021-12-03 | Stop reason: HOSPADM

## 2021-11-29 RX ORDER — CARVEDILOL 25 MG/1
25 TABLET ORAL 2 TIMES DAILY WITH MEALS
Status: DISCONTINUED | OUTPATIENT
Start: 2021-11-29 | End: 2021-12-03 | Stop reason: HOSPADM

## 2021-11-29 RX ORDER — SODIUM CHLORIDE 0.9 % (FLUSH) 0.9 %
10 SYRINGE (ML) INJECTION AS NEEDED
Status: DISCONTINUED | OUTPATIENT
Start: 2021-11-29 | End: 2021-12-03 | Stop reason: HOSPADM

## 2021-11-29 RX ORDER — ALBUTEROL SULFATE 2.5 MG/3ML
2.5 SOLUTION RESPIRATORY (INHALATION) ONCE
Status: COMPLETED | OUTPATIENT
Start: 2021-11-29 | End: 2021-11-29

## 2021-11-29 RX ORDER — NITROGLYCERIN 20 MG/100ML
10-50 INJECTION INTRAVENOUS
Status: DISCONTINUED | OUTPATIENT
Start: 2021-11-29 | End: 2021-12-02

## 2021-11-29 RX ORDER — FLUTICASONE PROPIONATE 50 MCG
2 SPRAY, SUSPENSION (ML) NASAL DAILY
Status: DISCONTINUED | OUTPATIENT
Start: 2021-11-30 | End: 2021-12-03 | Stop reason: HOSPADM

## 2021-11-29 RX ORDER — FERROUS SULFATE 325(65) MG
325 TABLET ORAL 2 TIMES DAILY WITH MEALS
Status: DISCONTINUED | OUTPATIENT
Start: 2021-11-29 | End: 2021-12-03 | Stop reason: HOSPADM

## 2021-11-29 RX ORDER — PANTOPRAZOLE SODIUM 40 MG/1
40 TABLET, DELAYED RELEASE ORAL
Status: DISCONTINUED | OUTPATIENT
Start: 2021-11-30 | End: 2021-12-03 | Stop reason: HOSPADM

## 2021-11-29 RX ADMIN — ROSUVASTATIN CALCIUM 10 MG: 10 TABLET, FILM COATED ORAL at 21:46

## 2021-11-29 RX ADMIN — GUAIFENESIN 1200 MG: 600 TABLET, EXTENDED RELEASE ORAL at 21:45

## 2021-11-29 RX ADMIN — ENOXAPARIN SODIUM 30 MG: 30 INJECTION SUBCUTANEOUS at 21:45

## 2021-11-29 RX ADMIN — IPRATROPIUM BROMIDE AND ALBUTEROL SULFATE 3 ML: 2.5; .5 SOLUTION RESPIRATORY (INHALATION) at 20:50

## 2021-11-29 RX ADMIN — METHYLPREDNISOLONE SODIUM SUCCINATE 40 MG: 40 INJECTION, POWDER, FOR SOLUTION INTRAMUSCULAR; INTRAVENOUS at 21:46

## 2021-11-29 RX ADMIN — SODIUM CHLORIDE, PRESERVATIVE FREE 10 ML: 5 INJECTION INTRAVENOUS at 16:51

## 2021-11-29 RX ADMIN — BUDESONIDE AND FORMOTEROL FUMARATE DIHYDRATE 2 PUFF: 160; 4.5 AEROSOL RESPIRATORY (INHALATION) at 20:55

## 2021-11-29 RX ADMIN — AZELASTINE HYDROCHLORIDE 2 SPRAY: 137 SPRAY, METERED NASAL at 21:45

## 2021-11-29 RX ADMIN — NITROGLYCERIN 10 MCG/MIN: 20 INJECTION INTRAVENOUS at 19:46

## 2021-11-29 RX ADMIN — HYDRALAZINE HYDROCHLORIDE 75 MG: 50 TABLET ORAL at 21:46

## 2021-11-29 RX ADMIN — FERROUS SULFATE TAB 325 MG (65 MG ELEMENTAL FE) 325 MG: 325 (65 FE) TAB at 21:45

## 2021-11-29 RX ADMIN — ALBUTEROL SULFATE 2.5 MG: 2.5 SOLUTION RESPIRATORY (INHALATION) at 15:47

## 2021-11-29 RX ADMIN — CARVEDILOL 25 MG: 25 TABLET, FILM COATED ORAL at 21:46

## 2021-11-29 RX ADMIN — TICAGRELOR 90 MG: 90 TABLET ORAL at 21:45

## 2021-11-29 RX ADMIN — HYDRALAZINE HYDROCHLORIDE 20 MG: 20 INJECTION INTRAMUSCULAR; INTRAVENOUS at 17:31

## 2021-11-29 RX ADMIN — FUROSEMIDE 80 MG: 10 INJECTION, SOLUTION INTRAVENOUS at 16:50

## 2021-11-30 ENCOUNTER — APPOINTMENT (OUTPATIENT)
Dept: ULTRASOUND IMAGING | Facility: HOSPITAL | Age: 61
End: 2021-11-30

## 2021-11-30 LAB
ANION GAP SERPL CALCULATED.3IONS-SCNC: 10 MMOL/L (ref 5–15)
ARTERIAL PATENCY WRIST A: POSITIVE
ATMOSPHERIC PRESS: 750 MMHG
B PARAPERT DNA SPEC QL NAA+PROBE: NOT DETECTED
B PERT DNA SPEC QL NAA+PROBE: NOT DETECTED
BASE EXCESS BLDA CALC-SCNC: 7.4 MMOL/L (ref 0–2)
BDY SITE: ABNORMAL
BODY TEMPERATURE: 37 C
BUN SERPL-MCNC: 53 MG/DL (ref 8–23)
BUN/CREAT SERPL: 19 (ref 7–25)
C PNEUM DNA NPH QL NAA+NON-PROBE: NOT DETECTED
CALCIUM SPEC-SCNC: 8.3 MG/DL (ref 8.6–10.5)
CHLORIDE SERPL-SCNC: 101 MMOL/L (ref 98–107)
CO2 SERPL-SCNC: 30 MMOL/L (ref 22–29)
CREAT SERPL-MCNC: 2.79 MG/DL (ref 0.57–1)
EPAP: 8
FLUAV SUBTYP SPEC NAA+PROBE: NOT DETECTED
FLUBV RNA ISLT QL NAA+PROBE: NOT DETECTED
GFR SERPL CREATININE-BSD FRML MDRD: 17 ML/MIN/1.73
GLUCOSE BLDC GLUCOMTR-MCNC: 123 MG/DL (ref 70–130)
GLUCOSE BLDC GLUCOMTR-MCNC: 133 MG/DL (ref 70–130)
GLUCOSE BLDC GLUCOMTR-MCNC: 205 MG/DL (ref 70–130)
GLUCOSE SERPL-MCNC: 179 MG/DL (ref 65–99)
HADV DNA SPEC NAA+PROBE: NOT DETECTED
HCO3 BLDA-SCNC: 34.2 MMOL/L (ref 20–26)
HCOV 229E RNA SPEC QL NAA+PROBE: NOT DETECTED
HCOV HKU1 RNA SPEC QL NAA+PROBE: NOT DETECTED
HCOV NL63 RNA SPEC QL NAA+PROBE: NOT DETECTED
HCOV OC43 RNA SPEC QL NAA+PROBE: NOT DETECTED
HMPV RNA NPH QL NAA+NON-PROBE: NOT DETECTED
HPIV1 RNA ISLT QL NAA+PROBE: NOT DETECTED
HPIV2 RNA SPEC QL NAA+PROBE: NOT DETECTED
HPIV3 RNA NPH QL NAA+PROBE: NOT DETECTED
HPIV4 P GENE NPH QL NAA+PROBE: NOT DETECTED
INHALED O2 CONCENTRATION: 35 %
IPAP: 16
Lab: ABNORMAL
M PNEUMO IGG SER IA-ACNC: NOT DETECTED
MODALITY: ABNORMAL
PCO2 BLDA: 59.7 MM HG (ref 35–45)
PCO2 TEMP ADJ BLD: 59.7 MM HG (ref 35–45)
PH BLDA: 7.37 PH UNITS (ref 7.35–7.45)
PH, TEMP CORRECTED: 7.37 PH UNITS (ref 7.35–7.45)
PO2 BLDA: 91 MM HG (ref 83–108)
PO2 TEMP ADJ BLD: 91 MM HG (ref 83–108)
POTASSIUM SERPL-SCNC: 3.6 MMOL/L (ref 3.5–5.2)
QT INTERVAL: 386 MS
QTC INTERVAL: 434 MS
RHINOVIRUS RNA SPEC NAA+PROBE: NOT DETECTED
RSV RNA NPH QL NAA+NON-PROBE: NOT DETECTED
SAO2 % BLDCOA: 98.1 % (ref 94–99)
SET MECH RESP RATE: 20
SODIUM SERPL-SCNC: 141 MMOL/L (ref 136–145)
SODIUM UR-SCNC: 108 MMOL/L
TROPONIN T SERPL-MCNC: 0.06 NG/ML (ref 0–0.03)
TROPONIN T SERPL-MCNC: 0.07 NG/ML (ref 0–0.03)
VENTILATOR MODE: ABNORMAL

## 2021-11-30 PROCEDURE — 76775 US EXAM ABDO BACK WALL LIM: CPT

## 2021-11-30 PROCEDURE — 80048 BASIC METABOLIC PNL TOTAL CA: CPT | Performed by: FAMILY MEDICINE

## 2021-11-30 PROCEDURE — 99223 1ST HOSP IP/OBS HIGH 75: CPT | Performed by: INTERNAL MEDICINE

## 2021-11-30 PROCEDURE — 94799 UNLISTED PULMONARY SVC/PX: CPT

## 2021-11-30 PROCEDURE — 99222 1ST HOSP IP/OBS MODERATE 55: CPT | Performed by: NURSE PRACTITIONER

## 2021-11-30 PROCEDURE — 84484 ASSAY OF TROPONIN QUANT: CPT | Performed by: FAMILY MEDICINE

## 2021-11-30 PROCEDURE — 36600 WITHDRAWAL OF ARTERIAL BLOOD: CPT

## 2021-11-30 PROCEDURE — 25010000002 FUROSEMIDE PER 20 MG: Performed by: INTERNAL MEDICINE

## 2021-11-30 PROCEDURE — 82962 GLUCOSE BLOOD TEST: CPT

## 2021-11-30 PROCEDURE — 82803 BLOOD GASES ANY COMBINATION: CPT

## 2021-11-30 PROCEDURE — 94660 CPAP INITIATION&MGMT: CPT

## 2021-11-30 PROCEDURE — 25010000002 ENOXAPARIN PER 10 MG: Performed by: FAMILY MEDICINE

## 2021-11-30 PROCEDURE — 87633 RESP VIRUS 12-25 TARGETS: CPT | Performed by: NURSE PRACTITIONER

## 2021-11-30 PROCEDURE — 25010000002 METHYLPREDNISOLONE PER 40 MG: Performed by: FAMILY MEDICINE

## 2021-11-30 RX ORDER — FUROSEMIDE 10 MG/ML
40 INJECTION INTRAMUSCULAR; INTRAVENOUS DAILY
Status: DISCONTINUED | OUTPATIENT
Start: 2021-11-30 | End: 2021-12-02

## 2021-11-30 RX ORDER — ISOSORBIDE DINITRATE 20 MG/1
20 TABLET ORAL
Status: DISCONTINUED | OUTPATIENT
Start: 2021-11-30 | End: 2021-12-03 | Stop reason: HOSPADM

## 2021-11-30 RX ORDER — METHYLPREDNISOLONE SODIUM SUCCINATE 40 MG/ML
40 INJECTION, POWDER, LYOPHILIZED, FOR SOLUTION INTRAMUSCULAR; INTRAVENOUS EVERY 12 HOURS
Status: DISCONTINUED | OUTPATIENT
Start: 2021-12-01 | End: 2021-12-03 | Stop reason: HOSPADM

## 2021-11-30 RX ADMIN — ENOXAPARIN SODIUM 30 MG: 30 INJECTION SUBCUTANEOUS at 20:16

## 2021-11-30 RX ADMIN — IPRATROPIUM BROMIDE AND ALBUTEROL SULFATE 3 ML: 2.5; .5 SOLUTION RESPIRATORY (INHALATION) at 07:34

## 2021-11-30 RX ADMIN — METHYLPREDNISOLONE SODIUM SUCCINATE 40 MG: 40 INJECTION, POWDER, FOR SOLUTION INTRAMUSCULAR; INTRAVENOUS at 13:16

## 2021-11-30 RX ADMIN — CALCITRIOL 0.25 MCG: 0.25 CAPSULE ORAL at 08:16

## 2021-11-30 RX ADMIN — FUROSEMIDE 40 MG: 10 INJECTION INTRAMUSCULAR; INTRAVENOUS at 13:16

## 2021-11-30 RX ADMIN — ISOSORBIDE DINITRATE 20 MG: 20 TABLET ORAL at 13:17

## 2021-11-30 RX ADMIN — BUDESONIDE AND FORMOTEROL FUMARATE DIHYDRATE 2 PUFF: 160; 4.5 AEROSOL RESPIRATORY (INHALATION) at 10:42

## 2021-11-30 RX ADMIN — FERROUS SULFATE TAB 325 MG (65 MG ELEMENTAL FE) 325 MG: 325 (65 FE) TAB at 08:16

## 2021-11-30 RX ADMIN — ASPIRIN 81 MG: 81 TABLET, COATED ORAL at 08:16

## 2021-11-30 RX ADMIN — TICAGRELOR 90 MG: 90 TABLET ORAL at 08:16

## 2021-11-30 RX ADMIN — HYDRALAZINE HYDROCHLORIDE 75 MG: 50 TABLET ORAL at 13:17

## 2021-11-30 RX ADMIN — PANTOPRAZOLE SODIUM 40 MG: 40 TABLET, DELAYED RELEASE ORAL at 06:16

## 2021-11-30 RX ADMIN — IPRATROPIUM BROMIDE AND ALBUTEROL SULFATE 3 ML: 2.5; .5 SOLUTION RESPIRATORY (INHALATION) at 15:09

## 2021-11-30 RX ADMIN — CETIRIZINE HYDROCHLORIDE 5 MG: 10 TABLET ORAL at 08:16

## 2021-11-30 RX ADMIN — GUAIFENESIN 1200 MG: 600 TABLET, EXTENDED RELEASE ORAL at 20:17

## 2021-11-30 RX ADMIN — FLUTICASONE PROPIONATE 2 SPRAY: 50 SPRAY, METERED NASAL at 08:15

## 2021-11-30 RX ADMIN — TICAGRELOR 90 MG: 90 TABLET ORAL at 20:17

## 2021-11-30 RX ADMIN — HYDRALAZINE HYDROCHLORIDE 75 MG: 50 TABLET ORAL at 22:09

## 2021-11-30 RX ADMIN — GUAIFENESIN 1200 MG: 600 TABLET, EXTENDED RELEASE ORAL at 08:16

## 2021-11-30 RX ADMIN — IPRATROPIUM BROMIDE AND ALBUTEROL SULFATE 3 ML: 2.5; .5 SOLUTION RESPIRATORY (INHALATION) at 19:30

## 2021-11-30 RX ADMIN — HYDRALAZINE HYDROCHLORIDE 75 MG: 50 TABLET ORAL at 06:15

## 2021-11-30 RX ADMIN — CARVEDILOL 25 MG: 25 TABLET, FILM COATED ORAL at 08:16

## 2021-11-30 RX ADMIN — AZELASTINE HYDROCHLORIDE 2 SPRAY: 137 SPRAY, METERED NASAL at 08:16

## 2021-11-30 RX ADMIN — IPRATROPIUM BROMIDE AND ALBUTEROL SULFATE 3 ML: 2.5; .5 SOLUTION RESPIRATORY (INHALATION) at 10:42

## 2021-11-30 RX ADMIN — METHYLPREDNISOLONE SODIUM SUCCINATE 40 MG: 40 INJECTION, POWDER, FOR SOLUTION INTRAMUSCULAR; INTRAVENOUS at 06:15

## 2021-11-30 RX ADMIN — ROSUVASTATIN CALCIUM 10 MG: 10 TABLET, FILM COATED ORAL at 20:16

## 2021-11-30 RX ADMIN — BUDESONIDE AND FORMOTEROL FUMARATE DIHYDRATE 2 PUFF: 160; 4.5 AEROSOL RESPIRATORY (INHALATION) at 19:30

## 2021-11-30 RX ADMIN — AZELASTINE HYDROCHLORIDE 2 SPRAY: 137 SPRAY, METERED NASAL at 20:18

## 2021-12-01 LAB
ALBUMIN SERPL-MCNC: 3.7 G/DL (ref 3.5–5.2)
ALBUMIN/GLOB SERPL: 1.5 G/DL
ALP SERPL-CCNC: 108 U/L (ref 39–117)
ALT SERPL W P-5'-P-CCNC: 19 U/L (ref 1–33)
ANION GAP SERPL CALCULATED.3IONS-SCNC: 10 MMOL/L (ref 5–15)
AST SERPL-CCNC: 9 U/L (ref 1–32)
BASOPHILS # BLD AUTO: 0.01 10*3/MM3 (ref 0–0.2)
BASOPHILS NFR BLD AUTO: 0.1 % (ref 0–1.5)
BILIRUB SERPL-MCNC: 0.4 MG/DL (ref 0–1.2)
BUN SERPL-MCNC: 47 MG/DL (ref 8–23)
BUN/CREAT SERPL: 21.3 (ref 7–25)
CALCIUM SPEC-SCNC: 8.4 MG/DL (ref 8.6–10.5)
CHLORIDE SERPL-SCNC: 103 MMOL/L (ref 98–107)
CO2 SERPL-SCNC: 30 MMOL/L (ref 22–29)
CREAT SERPL-MCNC: 2.21 MG/DL (ref 0.57–1)
CREAT UR-MCNC: 12.1 MG/DL
DEPRECATED RDW RBC AUTO: 54.1 FL (ref 37–54)
EOSINOPHIL # BLD AUTO: 0 10*3/MM3 (ref 0–0.4)
EOSINOPHIL NFR BLD AUTO: 0 % (ref 0.3–6.2)
ERYTHROCYTE [DISTWIDTH] IN BLOOD BY AUTOMATED COUNT: 15.9 % (ref 12.3–15.4)
GFR SERPL CREATININE-BSD FRML MDRD: 23 ML/MIN/1.73
GLOBULIN UR ELPH-MCNC: 2.5 GM/DL
GLUCOSE BLDC GLUCOMTR-MCNC: 122 MG/DL (ref 70–130)
GLUCOSE SERPL-MCNC: 114 MG/DL (ref 65–99)
HCT VFR BLD AUTO: 33.8 % (ref 34–46.6)
HGB BLD-MCNC: 10 G/DL (ref 12–15.9)
IMM GRANULOCYTES # BLD AUTO: 0.06 10*3/MM3 (ref 0–0.05)
IMM GRANULOCYTES NFR BLD AUTO: 0.7 % (ref 0–0.5)
LYMPHOCYTES # BLD AUTO: 0.83 10*3/MM3 (ref 0.7–3.1)
LYMPHOCYTES NFR BLD AUTO: 9.6 % (ref 19.6–45.3)
MCH RBC QN AUTO: 28.7 PG (ref 26.6–33)
MCHC RBC AUTO-ENTMCNC: 29.6 G/DL (ref 31.5–35.7)
MCV RBC AUTO: 96.8 FL (ref 79–97)
MONOCYTES # BLD AUTO: 0.67 10*3/MM3 (ref 0.1–0.9)
MONOCYTES NFR BLD AUTO: 7.7 % (ref 5–12)
NEUTROPHILS NFR BLD AUTO: 7.08 10*3/MM3 (ref 1.7–7)
NEUTROPHILS NFR BLD AUTO: 81.9 % (ref 42.7–76)
NRBC BLD AUTO-RTO: 0.2 /100 WBC (ref 0–0.2)
PLATELET # BLD AUTO: 183 10*3/MM3 (ref 140–450)
PMV BLD AUTO: 11.7 FL (ref 6–12)
POTASSIUM SERPL-SCNC: 3.9 MMOL/L (ref 3.5–5.2)
PROT SERPL-MCNC: 6.2 G/DL (ref 6–8.5)
RBC # BLD AUTO: 3.49 10*6/MM3 (ref 3.77–5.28)
SODIUM SERPL-SCNC: 143 MMOL/L (ref 136–145)
WBC NRBC COR # BLD: 8.65 10*3/MM3 (ref 3.4–10.8)

## 2021-12-01 PROCEDURE — 94799 UNLISTED PULMONARY SVC/PX: CPT

## 2021-12-01 PROCEDURE — 82962 GLUCOSE BLOOD TEST: CPT

## 2021-12-01 PROCEDURE — 25010000002 FUROSEMIDE PER 20 MG: Performed by: INTERNAL MEDICINE

## 2021-12-01 PROCEDURE — 94660 CPAP INITIATION&MGMT: CPT

## 2021-12-01 PROCEDURE — 85025 COMPLETE CBC W/AUTO DIFF WBC: CPT | Performed by: INTERNAL MEDICINE

## 2021-12-01 PROCEDURE — 25010000002 METHYLPREDNISOLONE PER 40 MG: Performed by: INTERNAL MEDICINE

## 2021-12-01 PROCEDURE — 25010000002 ENOXAPARIN PER 10 MG: Performed by: FAMILY MEDICINE

## 2021-12-01 PROCEDURE — 99232 SBSQ HOSP IP/OBS MODERATE 35: CPT | Performed by: INTERNAL MEDICINE

## 2021-12-01 PROCEDURE — 80053 COMPREHEN METABOLIC PANEL: CPT | Performed by: INTERNAL MEDICINE

## 2021-12-01 RX ORDER — CLONIDINE HYDROCHLORIDE 0.1 MG/1
0.1 TABLET ORAL EVERY 4 HOURS PRN
Status: DISCONTINUED | OUTPATIENT
Start: 2021-12-01 | End: 2021-12-03 | Stop reason: HOSPADM

## 2021-12-01 RX ORDER — NIFEDIPINE 30 MG/1
60 TABLET, EXTENDED RELEASE ORAL
Status: DISCONTINUED | OUTPATIENT
Start: 2021-12-01 | End: 2021-12-03 | Stop reason: HOSPADM

## 2021-12-01 RX ADMIN — FERROUS SULFATE TAB 325 MG (65 MG ELEMENTAL FE) 325 MG: 325 (65 FE) TAB at 09:07

## 2021-12-01 RX ADMIN — ISOSORBIDE DINITRATE 20 MG: 20 TABLET ORAL at 14:23

## 2021-12-01 RX ADMIN — TICAGRELOR 90 MG: 90 TABLET ORAL at 09:07

## 2021-12-01 RX ADMIN — AZELASTINE HYDROCHLORIDE 2 SPRAY: 137 SPRAY, METERED NASAL at 21:08

## 2021-12-01 RX ADMIN — IPRATROPIUM BROMIDE AND ALBUTEROL SULFATE 3 ML: 2.5; .5 SOLUTION RESPIRATORY (INHALATION) at 18:27

## 2021-12-01 RX ADMIN — ROSUVASTATIN CALCIUM 10 MG: 10 TABLET, FILM COATED ORAL at 21:05

## 2021-12-01 RX ADMIN — FLUTICASONE PROPIONATE 2 SPRAY: 50 SPRAY, METERED NASAL at 09:08

## 2021-12-01 RX ADMIN — AZELASTINE HYDROCHLORIDE 2 SPRAY: 137 SPRAY, METERED NASAL at 09:08

## 2021-12-01 RX ADMIN — IPRATROPIUM BROMIDE AND ALBUTEROL SULFATE 3 ML: 2.5; .5 SOLUTION RESPIRATORY (INHALATION) at 14:30

## 2021-12-01 RX ADMIN — NITROGLYCERIN 10 MCG/MIN: 20 INJECTION INTRAVENOUS at 06:11

## 2021-12-01 RX ADMIN — CARVEDILOL 25 MG: 25 TABLET, FILM COATED ORAL at 17:21

## 2021-12-01 RX ADMIN — GUAIFENESIN 1200 MG: 600 TABLET, EXTENDED RELEASE ORAL at 09:07

## 2021-12-01 RX ADMIN — GUAIFENESIN 1200 MG: 600 TABLET, EXTENDED RELEASE ORAL at 21:06

## 2021-12-01 RX ADMIN — CARVEDILOL 25 MG: 25 TABLET, FILM COATED ORAL at 09:07

## 2021-12-01 RX ADMIN — ISOSORBIDE DINITRATE 20 MG: 20 TABLET ORAL at 17:21

## 2021-12-01 RX ADMIN — HYDRALAZINE HYDROCHLORIDE 75 MG: 50 TABLET ORAL at 21:05

## 2021-12-01 RX ADMIN — ISOSORBIDE DINITRATE 20 MG: 20 TABLET ORAL at 09:07

## 2021-12-01 RX ADMIN — HYDRALAZINE HYDROCHLORIDE 75 MG: 50 TABLET ORAL at 05:05

## 2021-12-01 RX ADMIN — HYDRALAZINE HYDROCHLORIDE 75 MG: 50 TABLET ORAL at 14:23

## 2021-12-01 RX ADMIN — CALCITRIOL 0.25 MCG: 0.25 CAPSULE ORAL at 09:07

## 2021-12-01 RX ADMIN — BUDESONIDE AND FORMOTEROL FUMARATE DIHYDRATE 2 PUFF: 160; 4.5 AEROSOL RESPIRATORY (INHALATION) at 18:27

## 2021-12-01 RX ADMIN — TICAGRELOR 90 MG: 90 TABLET ORAL at 21:05

## 2021-12-01 RX ADMIN — BUDESONIDE AND FORMOTEROL FUMARATE DIHYDRATE 2 PUFF: 160; 4.5 AEROSOL RESPIRATORY (INHALATION) at 06:20

## 2021-12-01 RX ADMIN — FUROSEMIDE 40 MG: 10 INJECTION INTRAMUSCULAR; INTRAVENOUS at 09:07

## 2021-12-01 RX ADMIN — NIFEDIPINE 60 MG: 30 TABLET, FILM COATED, EXTENDED RELEASE ORAL at 14:23

## 2021-12-01 RX ADMIN — FERROUS SULFATE TAB 325 MG (65 MG ELEMENTAL FE) 325 MG: 325 (65 FE) TAB at 17:21

## 2021-12-01 RX ADMIN — PANTOPRAZOLE SODIUM 40 MG: 40 TABLET, DELAYED RELEASE ORAL at 05:06

## 2021-12-01 RX ADMIN — ENOXAPARIN SODIUM 30 MG: 30 INJECTION SUBCUTANEOUS at 21:06

## 2021-12-01 RX ADMIN — ASPIRIN 81 MG: 81 TABLET, COATED ORAL at 09:07

## 2021-12-01 RX ADMIN — IPRATROPIUM BROMIDE AND ALBUTEROL SULFATE 3 ML: 2.5; .5 SOLUTION RESPIRATORY (INHALATION) at 09:54

## 2021-12-01 RX ADMIN — METHYLPREDNISOLONE SODIUM SUCCINATE 40 MG: 40 INJECTION, POWDER, LYOPHILIZED, FOR SOLUTION INTRAMUSCULAR; INTRAVENOUS at 02:41

## 2021-12-01 RX ADMIN — METHYLPREDNISOLONE SODIUM SUCCINATE 40 MG: 40 INJECTION, POWDER, LYOPHILIZED, FOR SOLUTION INTRAMUSCULAR; INTRAVENOUS at 14:23

## 2021-12-01 RX ADMIN — IPRATROPIUM BROMIDE AND ALBUTEROL SULFATE 3 ML: 2.5; .5 SOLUTION RESPIRATORY (INHALATION) at 06:20

## 2021-12-01 RX ADMIN — CETIRIZINE HYDROCHLORIDE 5 MG: 10 TABLET ORAL at 09:07

## 2021-12-02 LAB
ALBUMIN SERPL-MCNC: 3.8 G/DL (ref 3.5–5.2)
ALBUMIN/GLOB SERPL: 1.3 G/DL
ALP SERPL-CCNC: 102 U/L (ref 39–117)
ALT SERPL W P-5'-P-CCNC: 22 U/L (ref 1–33)
ANION GAP SERPL CALCULATED.3IONS-SCNC: 7 MMOL/L (ref 5–15)
AST SERPL-CCNC: 8 U/L (ref 1–32)
BASOPHILS # BLD AUTO: 0.02 10*3/MM3 (ref 0–0.2)
BASOPHILS NFR BLD AUTO: 0.1 % (ref 0–1.5)
BILIRUB SERPL-MCNC: 0.4 MG/DL (ref 0–1.2)
BUN SERPL-MCNC: 52 MG/DL (ref 8–23)
BUN/CREAT SERPL: 22.7 (ref 7–25)
CALCIUM SPEC-SCNC: 8.6 MG/DL (ref 8.6–10.5)
CHLORIDE SERPL-SCNC: 101 MMOL/L (ref 98–107)
CO2 SERPL-SCNC: 34 MMOL/L (ref 22–29)
CREAT SERPL-MCNC: 2.29 MG/DL (ref 0.57–1)
DEPRECATED RDW RBC AUTO: 54.9 FL (ref 37–54)
EOSINOPHIL # BLD AUTO: 0.02 10*3/MM3 (ref 0–0.4)
EOSINOPHIL NFR BLD AUTO: 0.1 % (ref 0.3–6.2)
ERYTHROCYTE [DISTWIDTH] IN BLOOD BY AUTOMATED COUNT: 16.3 % (ref 12.3–15.4)
GFR SERPL CREATININE-BSD FRML MDRD: 22 ML/MIN/1.73
GLOBULIN UR ELPH-MCNC: 2.9 GM/DL
GLUCOSE BLDC GLUCOMTR-MCNC: 130 MG/DL (ref 70–130)
GLUCOSE BLDC GLUCOMTR-MCNC: 145 MG/DL (ref 70–130)
GLUCOSE BLDC GLUCOMTR-MCNC: 184 MG/DL (ref 70–130)
GLUCOSE SERPL-MCNC: 153 MG/DL (ref 65–99)
HCT VFR BLD AUTO: 37.1 % (ref 34–46.6)
HGB BLD-MCNC: 10.8 G/DL (ref 12–15.9)
IMM GRANULOCYTES # BLD AUTO: 0.12 10*3/MM3 (ref 0–0.05)
IMM GRANULOCYTES NFR BLD AUTO: 0.8 % (ref 0–0.5)
LYMPHOCYTES # BLD AUTO: 0.46 10*3/MM3 (ref 0.7–3.1)
LYMPHOCYTES NFR BLD AUTO: 3.2 % (ref 19.6–45.3)
MCH RBC QN AUTO: 27.8 PG (ref 26.6–33)
MCHC RBC AUTO-ENTMCNC: 29.1 G/DL (ref 31.5–35.7)
MCV RBC AUTO: 95.6 FL (ref 79–97)
MONOCYTES # BLD AUTO: 0.57 10*3/MM3 (ref 0.1–0.9)
MONOCYTES NFR BLD AUTO: 4 % (ref 5–12)
NEUTROPHILS NFR BLD AUTO: 13.22 10*3/MM3 (ref 1.7–7)
NEUTROPHILS NFR BLD AUTO: 91.8 % (ref 42.7–76)
NRBC BLD AUTO-RTO: 0 /100 WBC (ref 0–0.2)
PLATELET # BLD AUTO: 210 10*3/MM3 (ref 140–450)
PMV BLD AUTO: 10.7 FL (ref 6–12)
POTASSIUM SERPL-SCNC: 4.1 MMOL/L (ref 3.5–5.2)
PROT SERPL-MCNC: 6.7 G/DL (ref 6–8.5)
RBC # BLD AUTO: 3.88 10*6/MM3 (ref 3.77–5.28)
SODIUM SERPL-SCNC: 142 MMOL/L (ref 136–145)
WBC NRBC COR # BLD: 14.41 10*3/MM3 (ref 3.4–10.8)

## 2021-12-02 PROCEDURE — 80053 COMPREHEN METABOLIC PANEL: CPT | Performed by: INTERNAL MEDICINE

## 2021-12-02 PROCEDURE — 25010000002 ENOXAPARIN PER 10 MG: Performed by: FAMILY MEDICINE

## 2021-12-02 PROCEDURE — 94799 UNLISTED PULMONARY SVC/PX: CPT

## 2021-12-02 PROCEDURE — 25010000002 FUROSEMIDE PER 20 MG: Performed by: INTERNAL MEDICINE

## 2021-12-02 PROCEDURE — 25010000002 METHYLPREDNISOLONE PER 40 MG: Performed by: INTERNAL MEDICINE

## 2021-12-02 PROCEDURE — 99232 SBSQ HOSP IP/OBS MODERATE 35: CPT | Performed by: INTERNAL MEDICINE

## 2021-12-02 PROCEDURE — 85025 COMPLETE CBC W/AUTO DIFF WBC: CPT | Performed by: INTERNAL MEDICINE

## 2021-12-02 PROCEDURE — 82962 GLUCOSE BLOOD TEST: CPT

## 2021-12-02 RX ORDER — FUROSEMIDE 40 MG/1
40 TABLET ORAL DAILY
Status: DISCONTINUED | OUTPATIENT
Start: 2021-12-03 | End: 2021-12-03 | Stop reason: HOSPADM

## 2021-12-02 RX ADMIN — METHYLPREDNISOLONE SODIUM SUCCINATE 40 MG: 40 INJECTION, POWDER, LYOPHILIZED, FOR SOLUTION INTRAMUSCULAR; INTRAVENOUS at 12:18

## 2021-12-02 RX ADMIN — IPRATROPIUM BROMIDE AND ALBUTEROL SULFATE 3 ML: 2.5; .5 SOLUTION RESPIRATORY (INHALATION) at 18:39

## 2021-12-02 RX ADMIN — CARVEDILOL 25 MG: 25 TABLET, FILM COATED ORAL at 17:03

## 2021-12-02 RX ADMIN — IPRATROPIUM BROMIDE AND ALBUTEROL SULFATE 3 ML: 2.5; .5 SOLUTION RESPIRATORY (INHALATION) at 06:36

## 2021-12-02 RX ADMIN — CETIRIZINE HYDROCHLORIDE 5 MG: 10 TABLET ORAL at 08:17

## 2021-12-02 RX ADMIN — TICAGRELOR 90 MG: 90 TABLET ORAL at 20:48

## 2021-12-02 RX ADMIN — PANTOPRAZOLE SODIUM 40 MG: 40 TABLET, DELAYED RELEASE ORAL at 05:50

## 2021-12-02 RX ADMIN — ISOSORBIDE DINITRATE 20 MG: 20 TABLET ORAL at 08:17

## 2021-12-02 RX ADMIN — GUAIFENESIN 1200 MG: 600 TABLET, EXTENDED RELEASE ORAL at 20:48

## 2021-12-02 RX ADMIN — HYDRALAZINE HYDROCHLORIDE 75 MG: 50 TABLET ORAL at 13:36

## 2021-12-02 RX ADMIN — ISOSORBIDE DINITRATE 20 MG: 20 TABLET ORAL at 17:03

## 2021-12-02 RX ADMIN — BUDESONIDE AND FORMOTEROL FUMARATE DIHYDRATE 2 PUFF: 160; 4.5 AEROSOL RESPIRATORY (INHALATION) at 18:45

## 2021-12-02 RX ADMIN — AZELASTINE HYDROCHLORIDE 2 SPRAY: 137 SPRAY, METERED NASAL at 08:18

## 2021-12-02 RX ADMIN — ENOXAPARIN SODIUM 30 MG: 30 INJECTION SUBCUTANEOUS at 20:49

## 2021-12-02 RX ADMIN — FERROUS SULFATE TAB 325 MG (65 MG ELEMENTAL FE) 325 MG: 325 (65 FE) TAB at 17:03

## 2021-12-02 RX ADMIN — BUDESONIDE AND FORMOTEROL FUMARATE DIHYDRATE 2 PUFF: 160; 4.5 AEROSOL RESPIRATORY (INHALATION) at 06:36

## 2021-12-02 RX ADMIN — AZELASTINE HYDROCHLORIDE 2 SPRAY: 137 SPRAY, METERED NASAL at 20:49

## 2021-12-02 RX ADMIN — ROSUVASTATIN CALCIUM 10 MG: 10 TABLET, FILM COATED ORAL at 20:48

## 2021-12-02 RX ADMIN — CARVEDILOL 25 MG: 25 TABLET, FILM COATED ORAL at 08:17

## 2021-12-02 RX ADMIN — FUROSEMIDE 40 MG: 10 INJECTION INTRAMUSCULAR; INTRAVENOUS at 08:18

## 2021-12-02 RX ADMIN — ISOSORBIDE DINITRATE 20 MG: 20 TABLET ORAL at 12:18

## 2021-12-02 RX ADMIN — METHYLPREDNISOLONE SODIUM SUCCINATE 40 MG: 40 INJECTION, POWDER, LYOPHILIZED, FOR SOLUTION INTRAMUSCULAR; INTRAVENOUS at 00:06

## 2021-12-02 RX ADMIN — FERROUS SULFATE TAB 325 MG (65 MG ELEMENTAL FE) 325 MG: 325 (65 FE) TAB at 08:18

## 2021-12-02 RX ADMIN — ASPIRIN 81 MG: 81 TABLET, COATED ORAL at 08:18

## 2021-12-02 RX ADMIN — CALCITRIOL 0.25 MCG: 0.25 CAPSULE ORAL at 08:17

## 2021-12-02 RX ADMIN — FLUTICASONE PROPIONATE 2 SPRAY: 50 SPRAY, METERED NASAL at 08:18

## 2021-12-02 RX ADMIN — IPRATROPIUM BROMIDE AND ALBUTEROL SULFATE 3 ML: 2.5; .5 SOLUTION RESPIRATORY (INHALATION) at 10:08

## 2021-12-02 RX ADMIN — NIFEDIPINE 60 MG: 30 TABLET, FILM COATED, EXTENDED RELEASE ORAL at 08:17

## 2021-12-02 RX ADMIN — HYDRALAZINE HYDROCHLORIDE 75 MG: 50 TABLET ORAL at 05:50

## 2021-12-02 RX ADMIN — GUAIFENESIN 1200 MG: 600 TABLET, EXTENDED RELEASE ORAL at 08:18

## 2021-12-02 RX ADMIN — HYDRALAZINE HYDROCHLORIDE 75 MG: 50 TABLET ORAL at 22:30

## 2021-12-02 RX ADMIN — TICAGRELOR 90 MG: 90 TABLET ORAL at 08:17

## 2021-12-03 VITALS
SYSTOLIC BLOOD PRESSURE: 137 MMHG | BODY MASS INDEX: 38.94 KG/M2 | TEMPERATURE: 97.9 F | HEART RATE: 71 BPM | OXYGEN SATURATION: 95 % | DIASTOLIC BLOOD PRESSURE: 64 MMHG | RESPIRATION RATE: 18 BRPM | WEIGHT: 242.3 LBS | HEIGHT: 66 IN

## 2021-12-03 LAB
ALBUMIN SERPL-MCNC: 3.8 G/DL (ref 3.5–5.2)
ALBUMIN/GLOB SERPL: 1.4 G/DL
ALP SERPL-CCNC: 98 U/L (ref 39–117)
ALT SERPL W P-5'-P-CCNC: 17 U/L (ref 1–33)
ANION GAP SERPL CALCULATED.3IONS-SCNC: 12 MMOL/L (ref 5–15)
AST SERPL-CCNC: 8 U/L (ref 1–32)
BASOPHILS # BLD AUTO: 0.02 10*3/MM3 (ref 0–0.2)
BASOPHILS NFR BLD AUTO: 0.2 % (ref 0–1.5)
BILIRUB SERPL-MCNC: 0.5 MG/DL (ref 0–1.2)
BUN SERPL-MCNC: 55 MG/DL (ref 8–23)
BUN/CREAT SERPL: 27.1 (ref 7–25)
CALCIUM SPEC-SCNC: 8.9 MG/DL (ref 8.6–10.5)
CHLORIDE SERPL-SCNC: 97 MMOL/L (ref 98–107)
CO2 SERPL-SCNC: 33 MMOL/L (ref 22–29)
CREAT SERPL-MCNC: 2.03 MG/DL (ref 0.57–1)
DEPRECATED RDW RBC AUTO: 54.4 FL (ref 37–54)
EOSINOPHIL # BLD AUTO: 0 10*3/MM3 (ref 0–0.4)
EOSINOPHIL NFR BLD AUTO: 0 % (ref 0.3–6.2)
ERYTHROCYTE [DISTWIDTH] IN BLOOD BY AUTOMATED COUNT: 16 % (ref 12.3–15.4)
GFR SERPL CREATININE-BSD FRML MDRD: 25 ML/MIN/1.73
GLOBULIN UR ELPH-MCNC: 2.7 GM/DL
GLUCOSE BLDC GLUCOMTR-MCNC: 134 MG/DL (ref 70–130)
GLUCOSE BLDC GLUCOMTR-MCNC: 159 MG/DL (ref 70–130)
GLUCOSE SERPL-MCNC: 126 MG/DL (ref 65–99)
HCT VFR BLD AUTO: 38.7 % (ref 34–46.6)
HGB BLD-MCNC: 11.6 G/DL (ref 12–15.9)
IMM GRANULOCYTES # BLD AUTO: 0.06 10*3/MM3 (ref 0–0.05)
IMM GRANULOCYTES NFR BLD AUTO: 0.5 % (ref 0–0.5)
LYMPHOCYTES # BLD AUTO: 0.71 10*3/MM3 (ref 0.7–3.1)
LYMPHOCYTES NFR BLD AUTO: 6.1 % (ref 19.6–45.3)
MCH RBC QN AUTO: 28.4 PG (ref 26.6–33)
MCHC RBC AUTO-ENTMCNC: 30 G/DL (ref 31.5–35.7)
MCV RBC AUTO: 94.6 FL (ref 79–97)
MONOCYTES # BLD AUTO: 0.9 10*3/MM3 (ref 0.1–0.9)
MONOCYTES NFR BLD AUTO: 7.8 % (ref 5–12)
NEUTROPHILS NFR BLD AUTO: 85.4 % (ref 42.7–76)
NEUTROPHILS NFR BLD AUTO: 9.9 10*3/MM3 (ref 1.7–7)
NRBC BLD AUTO-RTO: 0 /100 WBC (ref 0–0.2)
PLATELET # BLD AUTO: 222 10*3/MM3 (ref 140–450)
PMV BLD AUTO: 11 FL (ref 6–12)
POTASSIUM SERPL-SCNC: 4 MMOL/L (ref 3.5–5.2)
PROT SERPL-MCNC: 6.5 G/DL (ref 6–8.5)
RBC # BLD AUTO: 4.09 10*6/MM3 (ref 3.77–5.28)
SODIUM SERPL-SCNC: 142 MMOL/L (ref 136–145)
WBC NRBC COR # BLD: 11.59 10*3/MM3 (ref 3.4–10.8)

## 2021-12-03 PROCEDURE — 94799 UNLISTED PULMONARY SVC/PX: CPT

## 2021-12-03 PROCEDURE — 82962 GLUCOSE BLOOD TEST: CPT

## 2021-12-03 PROCEDURE — 80053 COMPREHEN METABOLIC PANEL: CPT | Performed by: INTERNAL MEDICINE

## 2021-12-03 PROCEDURE — 99231 SBSQ HOSP IP/OBS SF/LOW 25: CPT | Performed by: INTERNAL MEDICINE

## 2021-12-03 PROCEDURE — 85025 COMPLETE CBC W/AUTO DIFF WBC: CPT | Performed by: INTERNAL MEDICINE

## 2021-12-03 PROCEDURE — 25010000002 METHYLPREDNISOLONE PER 40 MG: Performed by: FAMILY MEDICINE

## 2021-12-03 RX ORDER — METHYLPREDNISOLONE 4 MG/1
TABLET ORAL
Qty: 1 EACH | Refills: 0 | Status: SHIPPED | OUTPATIENT
Start: 2021-12-03 | End: 2022-04-21

## 2021-12-03 RX ADMIN — METHYLPREDNISOLONE SODIUM SUCCINATE 40 MG: 40 INJECTION, POWDER, LYOPHILIZED, FOR SOLUTION INTRAMUSCULAR; INTRAVENOUS at 13:04

## 2021-12-03 RX ADMIN — ISOSORBIDE DINITRATE 20 MG: 20 TABLET ORAL at 13:03

## 2021-12-03 RX ADMIN — BUDESONIDE AND FORMOTEROL FUMARATE DIHYDRATE 2 PUFF: 160; 4.5 AEROSOL RESPIRATORY (INHALATION) at 07:10

## 2021-12-03 RX ADMIN — HYDRALAZINE HYDROCHLORIDE 75 MG: 50 TABLET ORAL at 06:36

## 2021-12-03 RX ADMIN — CALCITRIOL 0.25 MCG: 0.25 CAPSULE ORAL at 08:37

## 2021-12-03 RX ADMIN — FLUTICASONE PROPIONATE 2 SPRAY: 50 SPRAY, METERED NASAL at 08:37

## 2021-12-03 RX ADMIN — METHYLPREDNISOLONE SODIUM SUCCINATE 40 MG: 40 INJECTION, POWDER, LYOPHILIZED, FOR SOLUTION INTRAMUSCULAR; INTRAVENOUS at 01:57

## 2021-12-03 RX ADMIN — PANTOPRAZOLE SODIUM 40 MG: 40 TABLET, DELAYED RELEASE ORAL at 06:36

## 2021-12-03 RX ADMIN — NIFEDIPINE 60 MG: 30 TABLET, FILM COATED, EXTENDED RELEASE ORAL at 08:37

## 2021-12-03 RX ADMIN — AZELASTINE HYDROCHLORIDE 2 SPRAY: 137 SPRAY, METERED NASAL at 08:37

## 2021-12-03 RX ADMIN — CARVEDILOL 25 MG: 25 TABLET, FILM COATED ORAL at 08:37

## 2021-12-03 RX ADMIN — ISOSORBIDE DINITRATE 20 MG: 20 TABLET ORAL at 08:37

## 2021-12-03 RX ADMIN — FUROSEMIDE 40 MG: 40 TABLET ORAL at 08:37

## 2021-12-03 RX ADMIN — HYDRALAZINE HYDROCHLORIDE 75 MG: 50 TABLET ORAL at 13:04

## 2021-12-03 RX ADMIN — IPRATROPIUM BROMIDE AND ALBUTEROL SULFATE 3 ML: 2.5; .5 SOLUTION RESPIRATORY (INHALATION) at 07:10

## 2021-12-03 RX ADMIN — GUAIFENESIN 1200 MG: 600 TABLET, EXTENDED RELEASE ORAL at 08:37

## 2021-12-03 RX ADMIN — ASPIRIN 81 MG: 81 TABLET, COATED ORAL at 08:37

## 2021-12-03 RX ADMIN — CETIRIZINE HYDROCHLORIDE 5 MG: 10 TABLET ORAL at 08:38

## 2021-12-03 RX ADMIN — IPRATROPIUM BROMIDE AND ALBUTEROL SULFATE 3 ML: 2.5; .5 SOLUTION RESPIRATORY (INHALATION) at 10:56

## 2021-12-03 RX ADMIN — SODIUM CHLORIDE, PRESERVATIVE FREE 10 ML: 5 INJECTION INTRAVENOUS at 08:37

## 2021-12-03 RX ADMIN — TICAGRELOR 90 MG: 90 TABLET ORAL at 08:37

## 2021-12-03 RX ADMIN — FERROUS SULFATE TAB 325 MG (65 MG ELEMENTAL FE) 325 MG: 325 (65 FE) TAB at 08:37

## 2021-12-04 ENCOUNTER — READMISSION MANAGEMENT (OUTPATIENT)
Dept: CALL CENTER | Facility: HOSPITAL | Age: 61
End: 2021-12-04

## 2021-12-04 LAB
BACTERIA SPEC AEROBE CULT: NORMAL
BACTERIA SPEC AEROBE CULT: NORMAL

## 2021-12-04 NOTE — OUTREACH NOTE
Prep Survey      Responses   [facility] patient discharged from? [place]   Is LACE score < 7 ? No   Emergency Room discharge w/ pulse ox? No   Eligibility Readm Mgmt   Discharge diagnosis Acute on chronic congestive heart failure, COPD exac   Does the patient have one of the following disease processes/diagnoses(primary or secondary)? CHF   Does the patient have Home health ordered? No   Is there a DME ordered? No   General alerts for this patient Please call after 11 a.m.    Prep survey completed? Yes          Haylie Townsend RN

## 2021-12-07 ENCOUNTER — READMISSION MANAGEMENT (OUTPATIENT)
Dept: CALL CENTER | Facility: HOSPITAL | Age: 61
End: 2021-12-07

## 2021-12-07 NOTE — OUTREACH NOTE
CHF Week 1 Survey      Responses   [facility] patient discharged from? [place]   Does the patient have one of the following disease processes/diagnoses(primary or secondary)? CHF   CHF Week 1 attempt successful? Yes   Call start time 1658   Call end time 1700   Discharge diagnosis Acute on chronic congestive heart failure, COPD exac   Meds reviewed with patient/caregiver? Yes   Is the patient having any side effects they believe may be caused by any medication additions or changes? No   Does the patient have all medications ordered at discharge? Yes   Is the patient taking all medications as directed (includes completed medication regime)? Yes   Does the patient have a primary care provider?  Yes   Does the patient have an appointment with their PCP within 7 days of discharge? Greater than 7 days   Comments regarding PCP PCP APPOINTMENT IS NEXT FRIDAY 12/17/21   What is preventing the patient from scheduling follow up appointments within 7 days of discharge? Earlier appointment not available   Nursing Interventions Verified appointment date/time/provider   Has the patient kept scheduled appointments due by today? N/A   Has home health visited the patient within 72 hours of discharge? N/A   Psychosocial issues? No   Did the patient receive a copy of their discharge instructions? Yes   Nursing interventions Reviewed instructions with patient   What is the patient's perception of their health status since discharge? Improving   Nursing interventions Nurse provided patient education   Is the patient weighing daily? Yes   Does the patient have scales? Yes   Daily weight interventions Education provided on importance of daily weight   Is the patient able to teach back Heart Failure diet management? Yes   Is the patient able to teach back Heart Failure Zones? Yes   Is the patient able to teach back signs and symptoms of worsening condition? (i.e. weight gain, shortness of air, etc.) Yes   If the patient is a current  smoker, are they able to teach back resources for cessation? Not a smoker   Is the patient/caregiver able to teach back the hierarchy of who to call/visit for symptoms/problems? PCP, Specialist, Home health nurse, Urgent Care, ED, 911 Yes   Additional teach back comments PATIENT STATES SHE IS NOW WEIGHING HERSELF DAILY AND CHECKING HER BLOOD PRESSURE    CHF Week 1 call completed? Yes          Trini Cuellar LPN

## 2021-12-16 ENCOUNTER — READMISSION MANAGEMENT (OUTPATIENT)
Dept: CALL CENTER | Facility: HOSPITAL | Age: 61
End: 2021-12-16

## 2021-12-22 ENCOUNTER — READMISSION MANAGEMENT (OUTPATIENT)
Dept: CALL CENTER | Facility: HOSPITAL | Age: 61
End: 2021-12-22

## 2021-12-22 NOTE — OUTREACH NOTE
CHF Week 3 Survey      Responses   [facility] patient discharged from? [place]   Does the patient have one of the following disease processes/diagnoses(primary or secondary)? CHF   Week 3 attempt successful? No   Unsuccessful attempts Attempt 1          Trini Cuellar LPN

## 2022-02-07 ENCOUNTER — HOSPITAL ENCOUNTER (OUTPATIENT)
Dept: CT IMAGING | Facility: HOSPITAL | Age: 62
Discharge: HOME | End: 2022-02-07
Admitting: INTERNAL MEDICINE

## 2022-02-07 DIAGNOSIS — J44.9 CHRONIC OBSTRUCTIVE PULMONARY DISEASE, UNSPECIFIED COPD TYPE: ICD-10-CM

## 2022-02-07 DIAGNOSIS — J30.89 NON-SEASONAL ALLERGIC RHINITIS, UNSPECIFIED TRIGGER: Primary | ICD-10-CM

## 2022-02-07 PROCEDURE — 71250 CT THORAX DX C-: CPT

## 2022-02-07 RX ORDER — BUDESONIDE AND FORMOTEROL FUMARATE DIHYDRATE 160; 4.5 UG/1; UG/1
2 AEROSOL RESPIRATORY (INHALATION)
Qty: 30.6 G | Refills: 3 | Status: SHIPPED | OUTPATIENT
Start: 2022-02-07 | End: 2022-02-08

## 2022-02-07 RX ORDER — NIFEDIPINE 90 MG/1
90 TABLET, FILM COATED, EXTENDED RELEASE ORAL DAILY
Qty: 90 TABLET | Refills: 3 | Status: SHIPPED | OUTPATIENT
Start: 2022-02-07

## 2022-02-07 RX ORDER — ISOSORBIDE DINITRATE 20 MG/1
20 TABLET ORAL
Qty: 270 TABLET | Refills: 3 | Status: SHIPPED | OUTPATIENT
Start: 2022-02-07 | End: 2022-11-22

## 2022-02-07 RX ORDER — ROSUVASTATIN CALCIUM 20 MG/1
20 TABLET, COATED ORAL DAILY
Qty: 90 TABLET | Refills: 3 | Status: SHIPPED | OUTPATIENT
Start: 2022-02-07 | End: 2022-06-10 | Stop reason: HOSPADM

## 2022-02-07 RX ORDER — ASPIRIN 81 MG/1
81 TABLET ORAL DAILY
Qty: 90 TABLET | Refills: 3 | Status: SHIPPED | OUTPATIENT
Start: 2022-02-07

## 2022-02-07 RX ORDER — LORATADINE 10 MG/1
10 TABLET ORAL DAILY
Qty: 90 TABLET | Refills: 3 | Status: SHIPPED | OUTPATIENT
Start: 2022-02-07 | End: 2022-06-10 | Stop reason: HOSPADM

## 2022-02-07 RX ORDER — IPRATROPIUM BROMIDE AND ALBUTEROL SULFATE 2.5; .5 MG/3ML; MG/3ML
3 SOLUTION RESPIRATORY (INHALATION)
Qty: 1080 ML | Refills: 3 | Status: SHIPPED | OUTPATIENT
Start: 2022-02-07 | End: 2022-10-26 | Stop reason: SDUPTHER

## 2022-02-07 RX ORDER — ALBUTEROL SULFATE 90 UG/1
2 AEROSOL, METERED RESPIRATORY (INHALATION) EVERY 4 HOURS PRN
Qty: 20.1 G | Refills: 3 | Status: SHIPPED | OUTPATIENT
Start: 2022-02-07 | End: 2022-10-26 | Stop reason: SDUPTHER

## 2022-02-07 RX ORDER — NITROGLYCERIN 0.4 MG/1
0.4 TABLET SUBLINGUAL
Qty: 25 TABLET | Refills: 0 | Status: SHIPPED | OUTPATIENT
Start: 2022-02-07 | End: 2022-03-25

## 2022-02-07 NOTE — TELEPHONE ENCOUNTER
Patient called to request refills on Albuterol HFA, Symbicort DuoNeb and Claritin. She states it will be cheaper to get a 90 day supply for each of the medicines and will be out before her appointment next week.   Rx Refill Note  Requested Prescriptions     Pending Prescriptions Disp Refills   • albuterol sulfate  (90 Base) MCG/ACT inhaler 20.1 g 3     Sig: Inhale 2 puffs Every 4 (Four) Hours As Needed for Wheezing.   • budesonide-formoterol (SYMBICORT) 160-4.5 MCG/ACT inhaler 30.6 g 3     Sig: Inhale 2 puffs 2 (Two) Times a Day.   • ipratropium-albuterol (DUO-NEB) 0.5-2.5 mg/3 ml nebulizer 1080 mL 3     Sig: Take 3 mL by nebulization 4 (Four) Times a Day.   • loratadine (CLARITIN) 10 MG tablet 90 tablet 3     Sig: Take 1 tablet by mouth Daily.      Last office visit with prescribing clinician: 10/13/2021      Next office visit with prescribing clinician: 2/14/2022            David Jacob CMA  02/07/22, 15:56 CST

## 2022-02-08 DIAGNOSIS — J44.9 CHRONIC OBSTRUCTIVE PULMONARY DISEASE, UNSPECIFIED COPD TYPE: ICD-10-CM

## 2022-02-08 RX ORDER — BUDESONIDE AND FORMOTEROL FUMARATE DIHYDRATE 160; 4.5 UG/1; UG/1
AEROSOL RESPIRATORY (INHALATION)
Qty: 30.6 EACH | Refills: 3 | Status: ON HOLD | OUTPATIENT
Start: 2022-02-08 | End: 2022-06-04

## 2022-02-08 NOTE — TELEPHONE ENCOUNTER
Rx Refill Note  Requested Prescriptions     Pending Prescriptions Disp Refills   • budesonide-formoterol (SYMBICORT) 160-4.5 MCG/ACT inhaler [Pharmacy Med Name: BUDESONIDE-FORMOTEROL 160-4.5] 30.6 each 3     Si puff twice a day      Last office visit with prescribing clinician: 10/13/2021      Next office visit with prescribing clinician: 2022            Kate Finley CMA  22, 12:39 CST

## 2022-03-25 RX ORDER — NITROGLYCERIN 0.4 MG/1
TABLET SUBLINGUAL
Qty: 25 TABLET | Refills: 0 | Status: ON HOLD | OUTPATIENT
Start: 2022-03-25 | End: 2022-06-04

## 2022-03-29 ENCOUNTER — TRANSCRIBE ORDERS (OUTPATIENT)
Dept: ADMINISTRATIVE | Facility: HOSPITAL | Age: 62
End: 2022-03-29

## 2022-03-29 DIAGNOSIS — Z12.31 ENCOUNTER FOR SCREENING MAMMOGRAM FOR MALIGNANT NEOPLASM OF BREAST: Primary | ICD-10-CM

## 2022-04-08 ENCOUNTER — APPOINTMENT (OUTPATIENT)
Dept: MAMMOGRAPHY | Facility: HOSPITAL | Age: 62
End: 2022-04-08

## 2022-04-18 ENCOUNTER — LAB (OUTPATIENT)
Dept: LAB | Facility: HOSPITAL | Age: 62
End: 2022-04-18

## 2022-04-18 DIAGNOSIS — Z01.812 ENCOUNTER FOR PREOPERATIVE SCREENING LABORATORY TESTING FOR COVID-19 VIRUS: ICD-10-CM

## 2022-04-18 DIAGNOSIS — Z20.822 ENCOUNTER FOR PREOPERATIVE SCREENING LABORATORY TESTING FOR COVID-19 VIRUS: ICD-10-CM

## 2022-04-18 LAB — SARS-COV-2 ORF1AB RESP QL NAA+PROBE: NOT DETECTED

## 2022-04-18 PROCEDURE — U0004 COV-19 TEST NON-CDC HGH THRU: HCPCS

## 2022-04-18 PROCEDURE — C9803 HOPD COVID-19 SPEC COLLECT: HCPCS

## 2022-04-21 ENCOUNTER — OFFICE VISIT (OUTPATIENT)
Dept: PULMONOLOGY | Facility: CLINIC | Age: 62
End: 2022-04-21

## 2022-04-21 VITALS
SYSTOLIC BLOOD PRESSURE: 132 MMHG | OXYGEN SATURATION: 96 % | HEART RATE: 80 BPM | BODY MASS INDEX: 37.32 KG/M2 | WEIGHT: 224 LBS | HEIGHT: 65 IN | DIASTOLIC BLOOD PRESSURE: 76 MMHG

## 2022-04-21 DIAGNOSIS — I27.20 PULMONARY HYPERTENSION: ICD-10-CM

## 2022-04-21 DIAGNOSIS — F17.210 CIGARETTE SMOKER: Chronic | ICD-10-CM

## 2022-04-21 DIAGNOSIS — R09.02 HYPOXEMIA REQUIRING SUPPLEMENTAL OXYGEN: ICD-10-CM

## 2022-04-21 DIAGNOSIS — R06.02 SHORTNESS OF BREATH: ICD-10-CM

## 2022-04-21 DIAGNOSIS — I50.32 CHRONIC DIASTOLIC HEART FAILURE: Chronic | ICD-10-CM

## 2022-04-21 DIAGNOSIS — J44.9 CHRONIC OBSTRUCTIVE PULMONARY DISEASE, UNSPECIFIED COPD TYPE: ICD-10-CM

## 2022-04-21 DIAGNOSIS — J30.89 NON-SEASONAL ALLERGIC RHINITIS, UNSPECIFIED TRIGGER: ICD-10-CM

## 2022-04-21 DIAGNOSIS — Z20.822 ENCOUNTER FOR PREOPERATIVE SCREENING LABORATORY TESTING FOR COVID-19 VIRUS: Primary | ICD-10-CM

## 2022-04-21 DIAGNOSIS — Z01.812 ENCOUNTER FOR PREOPERATIVE SCREENING LABORATORY TESTING FOR COVID-19 VIRUS: Primary | ICD-10-CM

## 2022-04-21 DIAGNOSIS — F41.8 OTHER SPECIFIED ANXIETY DISORDERS: ICD-10-CM

## 2022-04-21 DIAGNOSIS — G47.33 OSA (OBSTRUCTIVE SLEEP APNEA): ICD-10-CM

## 2022-04-21 DIAGNOSIS — Z99.81 HYPOXEMIA REQUIRING SUPPLEMENTAL OXYGEN: ICD-10-CM

## 2022-04-21 PROBLEM — U07.1 COVID-19: Status: RESOLVED | Noted: 2021-08-16 | Resolved: 2022-04-21

## 2022-04-21 PROCEDURE — 94010 BREATHING CAPACITY TEST: CPT | Performed by: INTERNAL MEDICINE

## 2022-04-21 PROCEDURE — 94727 GAS DIL/WSHOT DETER LNG VOL: CPT | Performed by: INTERNAL MEDICINE

## 2022-04-21 PROCEDURE — 99406 BEHAV CHNG SMOKING 3-10 MIN: CPT | Performed by: INTERNAL MEDICINE

## 2022-04-21 PROCEDURE — 99214 OFFICE O/P EST MOD 30 MIN: CPT | Performed by: INTERNAL MEDICINE

## 2022-04-21 PROCEDURE — 94729 DIFFUSING CAPACITY: CPT | Performed by: INTERNAL MEDICINE

## 2022-04-21 RX ORDER — LOSARTAN POTASSIUM 100 MG/1
1 TABLET ORAL DAILY
COMMUNITY
Start: 2022-03-25 | End: 2022-06-10 | Stop reason: HOSPADM

## 2022-04-21 RX ORDER — MELATONIN
1000 DAILY
COMMUNITY
Start: 2022-02-14

## 2022-04-21 RX ORDER — BUSPIRONE HYDROCHLORIDE 10 MG/1
10 TABLET ORAL 3 TIMES DAILY
COMMUNITY

## 2022-04-21 RX ORDER — AZITHROMYCIN 250 MG/1
TABLET, FILM COATED ORAL
Qty: 6 TABLET | Refills: 0 | Status: SHIPPED | OUTPATIENT
Start: 2022-04-21 | End: 2022-05-03

## 2022-04-21 RX ORDER — OLANZAPINE 5 MG/1
5 TABLET ORAL 2 TIMES DAILY
Status: ON HOLD | COMMUNITY
End: 2022-06-04

## 2022-04-21 NOTE — PROCEDURES
Pulmonary Function Test  Performed by: Kate Ford, RRT  Authorized by: Neeta Chavez MD      Pre Drug % Predicted    FVC: 58%   FEV1: 55%   FEF 25-75%: 47%   FEV1/FVC: 75%   T%   RV: 120%   DLCO: 65%   D/VAsb: 81%

## 2022-04-21 NOTE — PROGRESS NOTES
RESPIRATORY DISEASE CLINIC OUTPATIENT PROGRESS NOTE    Patient: Ludivina Ramirez  : 1960  Age: 61 y.o.  Date of Service: 2022    REASON FOR CLINIC VISIT:  Chief Complaint   Patient presents with   • COPD   • Shortness of Breath     Pulmonary function test today;  chest CT        Subjective:    History of Present Illness:  Ludivina Ramirez is a 61 y.o. female who presents to the office today to be seen for    Diagnosis Plan   1. Encounter for preoperative screening laboratory testing for COVID-19 virus  COVID PRE-OP / PRE-PROCEDURE SCREENING ORDER (NO ISOLATION) - Swab, Nasal Cavity   2. Chronic obstructive pulmonary disease, unspecified COPD type (HCC)     3. Hypoxemia requiring supplemental oxygen     4. Pulmonary hypertension (HCC)     5. Shortness of breath     6. MARY (obstructive sleep apnea)     7. Cigarette smoker     8. Non-seasonal allergic rhinitis, unspecified trigger     9. Chronic diastolic heart failure (HCC)     10. Other specified anxiety disorders     .  Other problems per record.  Patient is a very pleasant middle aged  female who returns to pulmonary clinic with her niece for a follow-up visit.  She was seen by me a few months ago for ongoing shortness of breath and respiratory failure.    Patient has a pulmonary function test done today which shows moderate obstructive and mild restrictive dysfunction with moderate decrease in diffusion capacity and no bronchial challenge was done.  She is an active smoker and continues to smoke about half a pack per day.  She is currently using Symbicort and DuoNeb nebulizer with albuterol rescue [place] for underlying COPD and is breathing little better.  She also has chronic respiratory failure and hypoxemia and is on oxygen 2 to 3 L during the day at rest and activity and also uses home oxygen 2 to 3 L at night.  She has a home trilogy which she is using routinely and feels that made a big difference she is breathing much better and did  not have any acute shortness of breath.  She also has cardiac issues and has coronary artery disease and had a stent placement done and follows up with Dr. Buchanan.  She is currently on Brilinta.    Patient did not get COVID-vaccine but did not get COVID.  She is staying at home and her niece comes and checks on her regularly.  She has history of cancer in the family and a few people  from cancer she is worried about it and is considering trying to quit smoking but is unable to do so so far.  She has allergy problems and using fluticasone nasal spray Astelin nasal spray loratadine.  Allergy symptoms are better.  She did not have any recent hospital admissions and ER visit an urgent care visit.  She reported she is having some sinus infection respiratory infections with cough and yellow sputum production for the last few days.  She did not have any fever.      PFT done today:  Not done today    PFT Values        Some values may be hidden. Unless noted otherwise, only the newest values recorded on each date are displayed.         Old Values PFT Results 22   No data to display.      Pre Drug PFT Results 22   FVC 58   FEV1 55   FEF 25-75% 47   FEV1/FVC 75      Post Drug PFT Results 22   No data to display.      Other Tests PFT Results 22   TLC 88      DLCO 65   D/VAsb 81           Results for orders placed in visit on 22    Pulmonary Function Test    Narrative  Pulmonary Function Test  Performed by: Kate Ford, RRT  Authorized by: Neeta Chavez MD    Pre Drug % Predicted  FVC: 58%  FEV1: 55%  FEF 25-75%: 47%  FEV1/FVC: 75%  T%  RV: 120%  DLCO: 65%  D/VAsb: 81%         Bronchodilator therapy: Symbicort, Duoneb and Albuterol rescue inhaler    Smoking Status:   Social History     Tobacco Use   Smoking Status Current Some Day Smoker   • Packs/day: 0.50   • Years: 46.00   • Pack years: 23.00   • Types: Cigarettes   • Start date:    Smokeless Tobacco Never Used      Pulm Rehab: no  Sleep: yes on Trilogy and Oxygen    Support System: lives with their family    Code Status:   There are no questions and answers to display.        Review of Systems:  A complete review of systems is performed and all other systems were reviewed and negative as note above in the HPI.  Review of Systems   Constitutional: Positive for fatigue.   HENT: Positive for congestion, postnasal drip and sinus pressure.    Eyes: Negative.    Respiratory: Positive for cough, chest tightness and shortness of breath.    Cardiovascular: Negative.    Gastrointestinal: Negative.    Endocrine: Negative.    Genitourinary: Negative.    Musculoskeletal: Negative.    Allergic/Immunologic: Positive for environmental allergies.   Neurological: Negative.    Hematological: Negative.    Psychiatric/Behavioral: Negative.        CAT/ACT Score:  Not done today    Medications:  Outpatient Encounter Medications as of 4/21/2022   Medication Sig Dispense Refill   • albuterol sulfate  (90 Base) MCG/ACT inhaler Inhale 2 puffs Every 4 (Four) Hours As Needed for Wheezing. 20.1 g 3   • aspirin 81 MG EC tablet Take 1 tablet by mouth Daily. 90 tablet 3   • azelastine (ASTELIN) 0.1 % nasal spray 2 sprays into the nostril(s) as directed by provider 2 (Two) Times a Day.     • budesonide-formoterol (SYMBICORT) 160-4.5 MCG/ACT inhaler 2 puff twice a day 30.6 each 3   • busPIRone (BUSPAR) 10 MG tablet Take 10 mg by mouth 3 (Three) Times a Day.     • calcitriol (ROCALTROL) 0.25 MCG capsule Take 1 capsule by mouth Daily. 30 capsule 3   • carvedilol (COREG) 25 MG tablet Take 25 mg by mouth 2 (Two) Times a Day With Meals.     • cholecalciferol (VITAMIN D3) 25 MCG (1000 UT) tablet Take 1,000 Units by mouth Daily.     • cloNIDine (CATAPRES) 0.1 MG tablet Take 0.1 mg by mouth Every 6 (Six) Hours As Needed for High Blood Pressure (systolic >160).     • ferrous sulfate 325 (65 FE) MG tablet Take 1 tablet by mouth 2 (Two) Times a Day With Meals.  60 tablet 0   • fluticasone (FLONASE) 50 MCG/ACT nasal spray 2 sprays into the nostril(s) as directed by provider Every Morning. In each nostril     • furosemide (Lasix) 40 MG tablet Take 1 tablet by mouth Daily As Needed (increased SOA, edema, weight gain (2 pounds overnight, 5 pounds in 2 days)). 30 tablet 0   • guaiFENesin (MUCINEX) 600 MG 12 hr tablet Take 2 tablets by mouth Every 12 (Twelve) Hours. 60 tablet 3   • hydrALAZINE (APRESOLINE) 50 MG tablet Take 75 mg by mouth Every 8 (Eight) Hours.     • ipratropium-albuterol (DUO-NEB) 0.5-2.5 mg/3 ml nebulizer Take 3 mL by nebulization 4 (Four) Times a Day. 1080 mL 3   • isosorbide dinitrate (ISORDIL) 20 MG tablet Take 1 tablet by mouth 3 (Three) Times a Day. 270 tablet 3   • loratadine (CLARITIN) 10 MG tablet Take 1 tablet by mouth Daily. 90 tablet 3   • losartan (COZAAR) 100 MG tablet Take 1 tablet by mouth Daily.     • melatonin 5 MG tablet tablet Take 5 mg by mouth Every Night.     • multivitamin with minerals tablet tablet Take 1 tablet by mouth Daily.     • NIFEdipine CC (ADALAT CC) 90 MG 24 hr tablet Take 1 tablet by mouth Daily. 90 tablet 3   • nitroglycerin (NITROSTAT) 0.4 MG SL tablet PLACE 1 TABLET UNDER THE TONGUE EVERY 5 MINUTES FOR 3 DOSES AS NEEDED CHEST PAIN NO MORE THAN 3 DOSES IN 15 MINUTES 25 tablet 0   • O2 (OXYGEN) Inhale 2 L/min Continuous. 2-3 lpm/nc     • OLANZapine (zyPREXA) 10 MG tablet Take 5 mg by mouth Every Night.     • pantoprazole (PROTONIX) 40 MG EC tablet Take 1 tablet by mouth Daily. 30 tablet 1   • rosuvastatin (CRESTOR) 20 MG tablet Take 1 tablet by mouth Daily. 90 tablet 3   • ticagrelor (BRILINTA) 90 MG tablet tablet Take 1 tablet by mouth 2 (Two) Times a Day. 180 tablet 3   • vitamin C (ASCORBIC ACID) 250 MG tablet Take 500 mg by mouth Daily.     • [DISCONTINUED] methylPREDNISolone (MEDROL) 4 MG dose pack Take as directed on package instructions. 1 each 0     No facility-administered encounter medications on file as of  "4/21/2022.       Allergies:  Allergies   Allergen Reactions   • Codeine Nausea And Vomiting   • Imitrex [Sumatriptan] Other (See Comments)     HA       Immunizations:    There is no immunization history on file for this patient.    Objective:    Vitals:  /76   Pulse 80   Ht 163.8 cm (64.5\")   Wt 102 kg (224 lb)   SpO2 96% Comment: 3L  BMI 37.86 kg/m²     Physical Exam:  General: Patient is a 61 y.o. pleasant elderly  female. Looks stated age. Appears to be in no acute distress.  Eyes: EOMI. PERRLA. Vision intact. No scleral icterus.  Ear, Nose, Mouth and Throat: Hearing is grossly intact. No Leukoplakia, pharyngitis, stomatitis or thrush. Swollen nasal mucosa with post nasal drop.  Neck: Range of motion of neck normal. No thyromegaly or masses. Mallampati Class 3  Respiratory: Clear to auscultation bilaterally. No use of accessory muscles. Decreased breath sounds.  Cardiovascular: Normal heart sounds. Regularly regular rhythm without murmur.  Gastrointestinal: Non tender, non distended, soft. Bowel sounds positive in all four quadrants. No organomegaly.  Skin: No obvious rashes, lesions, ulcers or large amount of bruising. No edema.   Neurological: No new motor deficits. Cranial nerves appear intact.  Psychiatric: Patient is alert and oriented to person, place and time.    Chest Imaging:    Last CT chest in February 2022 showed     IMPRESSION:  1. Essentially stable appearance of the chest from previous study of  8/23/2021. There is mild cardiomegaly with heavy coronary artery  calcifications and mitral annulus calcifications. No pericardial  effusion is present.  2. There is elevation of the right hemidiaphragm with right basilar  atelectasis. Left lower lobe atelectasis or scarring is also present.  There is a stable subpleural groundglass nodule in the posterior left  lower lobe. There are mild changes of centrilobular emphysema. No  evidence of acute consolidative pneumonia.  3. Borderline " aneurysmal dilatation of the ascending thoracic aorta with  a transverse diameter of 3.9 cm. The thoracic aorta is ectatic.  4. There is enlargement of the pulmonary arteries suggesting pulmonary  arterial hypertension.  5. Small hiatal hernia is present. There is scarring of the upper pole  of the left kidney..     This report was finalized on 02/07/2022 14:55 by Dr. Ye Montaño MD.    Assessment:  1. Encounter for preoperative screening laboratory testing for COVID-19 virus    2. Chronic obstructive pulmonary disease, unspecified COPD type (HCC)    3. Hypoxemia requiring supplemental oxygen    4. Pulmonary hypertension (HCC)    5. Shortness of breath    6. MARY (obstructive sleep apnea)    7. Cigarette smoker    8. Non-seasonal allergic rhinitis, unspecified trigger    9. Chronic diastolic heart failure (HCC)    10. Other specified anxiety disorders        Plan/Recommendations:    1.  I explained the PFT results with the patient.  He has moderate obstructive and mild restrictive dysfunction and also has decrease in diffusion capacity corrected for alveolar volume.  2.  She had a CT scan of the chest done in February which shows right hemidiaphragm elevation some basal atelectasis and pulmonary hypertension with some chronic changes in the lung.  3.  Unfortunately patient is still smoking. Ludivina Ramirez  reports that she has been smoking cigarettes. She started smoking about 47 years ago. She has a 23.00 pack-year smoking history. She has never used smokeless tobacco.. I have educated her on the risk of diseases from using tobacco products such as cancer, COPD and heart disease.   I advised her to quit and she is willing to quit. We have discussed the following method/s for tobacco cessation:  Counseling.  Together we have set a quit date for 3 weeks from today.  She will follow up with me in 6 months or sooner to check on her progress.I spent 7 minutes counseling the patient.  4.  For her underlying COPD she  should continue using Symbicort and DuoNeb as before and albuterol scheduled as needed.  No medication refill was needed at this time.  5.  She has upper respiratory infection and is coughing up some greenish and yellow sputum and I prescribed her a course of azithromycin and prescription was sent to the pharmacy.  6.  For nasal allergies she should continue zinc fluticasone nasal spray, Astelin nasal spray and loratadine.  No medication refill was given at this time.  7.  Patient is not vaccinated for COVID and does not take the COVID-vaccine.  She will continue follow-up with her primary care provider and cardiologist.  8.  For hypoxemia she should continue home oxygen 2 to 3 L all the time and 3 L at night with CPAP.  She will return to pulmonary clinic in 6 months time for follow-up visit or earlier if needed.  Annual CT scan of the chest will be needed for her ongoing tobacco abuse and a follow-up CT is ordered in a year time.    Follow up:  6 Months    Time Spent:  30 minutes    I appreciate the opportunity of participating in this patient's care. I would like to thank the PCP for the referral.  Please feel free to contact me with any other questions.    Neeta Chavez MD   Pulmonologist/Intensivist     Electronically signed by: Neeta Chavez MD, 4/21/2022 12:05 CDT

## 2022-04-22 ENCOUNTER — TELEPHONE (OUTPATIENT)
Dept: PULMONOLOGY | Facility: CLINIC | Age: 62
End: 2022-04-22

## 2022-04-25 NOTE — TELEPHONE ENCOUNTER
"Per dr trujillo \"I tried to prescribe it but it did not go through.  Please ask her to talk to her primary care provider if it could help.  Thank you.  I believe he already filled out the azithromycin.\" Left voicemail of this for patient       "

## 2022-04-27 ENCOUNTER — HOSPITAL ENCOUNTER (OUTPATIENT)
Dept: MAMMOGRAPHY | Facility: HOSPITAL | Age: 62
Discharge: HOME | End: 2022-04-27
Admitting: FAMILY MEDICINE

## 2022-04-27 DIAGNOSIS — Z12.31 ENCOUNTER FOR SCREENING MAMMOGRAM FOR MALIGNANT NEOPLASM OF BREAST: ICD-10-CM

## 2022-04-27 PROCEDURE — 77063 BREAST TOMOSYNTHESIS BI: CPT

## 2022-04-27 PROCEDURE — 77067 SCR MAMMO BI INCL CAD: CPT

## 2022-05-03 ENCOUNTER — OFFICE VISIT (OUTPATIENT)
Dept: CARDIOLOGY | Facility: CLINIC | Age: 62
End: 2022-05-03

## 2022-05-03 VITALS
DIASTOLIC BLOOD PRESSURE: 92 MMHG | WEIGHT: 226 LBS | HEIGHT: 64 IN | BODY MASS INDEX: 38.58 KG/M2 | SYSTOLIC BLOOD PRESSURE: 175 MMHG | HEART RATE: 74 BPM

## 2022-05-03 DIAGNOSIS — I35.1 NONRHEUMATIC AORTIC VALVE INSUFFICIENCY: ICD-10-CM

## 2022-05-03 DIAGNOSIS — I10 ESSENTIAL HYPERTENSION: ICD-10-CM

## 2022-05-03 DIAGNOSIS — I50.32 CHRONIC DIASTOLIC HEART FAILURE: Primary | ICD-10-CM

## 2022-05-03 DIAGNOSIS — E78.5 HYPERLIPIDEMIA LDL GOAL <70: ICD-10-CM

## 2022-05-03 DIAGNOSIS — J44.9 CHRONIC OBSTRUCTIVE PULMONARY DISEASE, UNSPECIFIED COPD TYPE: ICD-10-CM

## 2022-05-03 DIAGNOSIS — I21.02 ST ELEVATION MYOCARDIAL INFARCTION INVOLVING LEFT ANTERIOR DESCENDING (LAD) CORONARY ARTERY: ICD-10-CM

## 2022-05-03 DIAGNOSIS — I25.118 CORONARY ARTERY DISEASE OF NATIVE ARTERY OF NATIVE HEART WITH STABLE ANGINA PECTORIS: ICD-10-CM

## 2022-05-03 DIAGNOSIS — E66.01 CLASS 2 SEVERE OBESITY DUE TO EXCESS CALORIES WITH SERIOUS COMORBIDITY AND BODY MASS INDEX (BMI) OF 38.0 TO 38.9 IN ADULT: ICD-10-CM

## 2022-05-03 DIAGNOSIS — N18.31 STAGE 3A CHRONIC KIDNEY DISEASE: ICD-10-CM

## 2022-05-03 DIAGNOSIS — F17.210 CIGARETTE SMOKER: ICD-10-CM

## 2022-05-03 PROCEDURE — 93000 ELECTROCARDIOGRAM COMPLETE: CPT | Performed by: NURSE PRACTITIONER

## 2022-05-03 PROCEDURE — 99214 OFFICE O/P EST MOD 30 MIN: CPT | Performed by: NURSE PRACTITIONER

## 2022-05-03 RX ORDER — NIFEDIPINE 90 MG/1
90 TABLET, EXTENDED RELEASE ORAL DAILY
COMMUNITY
Start: 2022-03-25 | End: 2022-05-03 | Stop reason: SDUPTHER

## 2022-05-03 NOTE — PROGRESS NOTES
Subjective:     Encounter Date: 05/03/2022      Patient ID: Ludivina Ramirez is a 61 y.o. female with chronic diastolic congestive heart failure, coronary artery disease status post ST elevation myocardial infarction with subsequent 2.75 x 28 mm Xience drug-eluting stent to the proximal to mid left anterior descending artery on 11/8/2020, hypertension, ischemic colitis, nonrheumatic aortic valve insufficiency, stage 3 chronic kidney disease, COPD on home oxygen, obesity, tobacco use, polysubstance abuse and PTSD    Chief Complaint: follow up  Coronary Artery Disease  Presents for follow-up visit. Symptoms include shortness of breath (wearing supplemental O2). Pertinent negatives include no chest pain, chest pressure, chest tightness, dizziness, leg swelling, palpitations or weight gain. Risk factors include hyperlipidemia and obesity. The symptoms have been stable. Compliance with diet is good. Compliance with exercise is good. Compliance with medications is good.   Hypertension  This is a chronic problem. The current episode started more than 1 year ago. The problem is uncontrolled. Associated symptoms include malaise/fatigue (chronic) and shortness of breath (wearing supplemental O2). Pertinent negatives include no chest pain, orthopnea, palpitations, peripheral edema or PND. Risk factors for coronary artery disease include dyslipidemia, obesity and smoking/tobacco exposure. Current antihypertension treatment includes beta blockers, angiotensin blockers, calcium channel blockers and diuretics. Compliance problems include psychosocial issues.  Hypertensive end-organ damage includes CAD/MI and heart failure. Identifiable causes of hypertension include sleep apnea.   Hyperlipidemia  This is a chronic problem. The current episode started more than 1 year ago. Recent lipid tests were reviewed and are high. Exacerbating diseases include obesity. Associated symptoms include shortness of breath (wearing supplemental O2).  Pertinent negatives include no chest pain. Current antihyperlipidemic treatment includes statins. Compliance problems include psychosocial issues.  Risk factors for coronary artery disease include dyslipidemia, hypertension and obesity.     Patient presents today for management of chronic diastolic congestive heart failure. Patient was hospitalized 11/29/2021-12/03/2021. She was admitted with respiratory failure, CHF exacerbation. MATTY on CKD and polysubstance abuse. Initial troponin was 0.063 with repeat levels noted to be 0.063, 0.065 and 0.058. Cardiology was consulted for elevated troponin which was felt to be due to acute renal failure, acute hypoxic failure and polysubstance abuse. Treatment included IV diuresis and supplemental oxygen/BiPAP.   Today patient reports that she has been doing ok. Blood pressure in office was noted to be 210/80-patient was given a clonidine after triage. She reports that he hasnt taken her oral medications today. Recheck BP improved to 170/90. She states that she has not been checking her blood pressure routinely. She states that she normally takes clonidine 1-2 a month. She denies any chest pain. She denies any heart racing or palpitations. She reports that her dyspnea on exertion has remained mild which is chronic for her and denies any worsening shortness of breath. Patient is wearing her home O2 at 2L. She reports using her Bipap as directed. She denies any leg swelling, orthopnea or PND. Dog present in room with patient. She follows with Dr Weston as PCP.     The following portions of the patient's history were reviewed and updated as appropriate: allergies, current medications, past family history, past medical history, past social history, past surgical history and problem list.    Allergies   Allergen Reactions   • Codeine Nausea And Vomiting   • Imitrex [Sumatriptan] Other (See Comments)     LOPEZ       Current Outpatient Medications:   •  albuterol sulfate  (90 Base)  MCG/ACT inhaler, Inhale 2 puffs Every 4 (Four) Hours As Needed for Wheezing., Disp: 20.1 g, Rfl: 3  •  aspirin 81 MG EC tablet, Take 1 tablet by mouth Daily., Disp: 90 tablet, Rfl: 3  •  azelastine (ASTELIN) 0.1 % nasal spray, 2 sprays into the nostril(s) as directed by provider 2 (Two) Times a Day., Disp: , Rfl:   •  budesonide-formoterol (SYMBICORT) 160-4.5 MCG/ACT inhaler, 2 puff twice a day, Disp: 30.6 each, Rfl: 3  •  busPIRone (BUSPAR) 10 MG tablet, Take 10 mg by mouth 3 (Three) Times a Day., Disp: , Rfl:   •  calcitriol (ROCALTROL) 0.25 MCG capsule, Take 1 capsule by mouth Daily., Disp: 30 capsule, Rfl: 3  •  carvedilol (COREG) 25 MG tablet, Take 25 mg by mouth 2 (Two) Times a Day With Meals., Disp: , Rfl:   •  cholecalciferol (VITAMIN D3) 25 MCG (1000 UT) tablet, Take 1,000 Units by mouth Daily., Disp: , Rfl:   •  cloNIDine (CATAPRES) 0.1 MG tablet, Take 0.1 mg by mouth Every 6 (Six) Hours As Needed for High Blood Pressure (systolic >160)., Disp: , Rfl:   •  ferrous sulfate 325 (65 FE) MG tablet, Take 1 tablet by mouth 2 (Two) Times a Day With Meals., Disp: 60 tablet, Rfl: 0  •  fluticasone (FLONASE) 50 MCG/ACT nasal spray, 2 sprays into the nostril(s) as directed by provider Every Morning. In each nostril, Disp: , Rfl:   •  furosemide (Lasix) 40 MG tablet, Take 1 tablet by mouth Daily As Needed (increased SOA, edema, weight gain (2 pounds overnight, 5 pounds in 2 days))., Disp: 30 tablet, Rfl: 0  •  guaiFENesin (MUCINEX) 600 MG 12 hr tablet, Take 2 tablets by mouth Every 12 (Twelve) Hours., Disp: 60 tablet, Rfl: 3  •  hydrALAZINE (APRESOLINE) 50 MG tablet, Take 75 mg by mouth Every 8 (Eight) Hours., Disp: , Rfl:   •  ipratropium-albuterol (DUO-NEB) 0.5-2.5 mg/3 ml nebulizer, Take 3 mL by nebulization 4 (Four) Times a Day., Disp: 1080 mL, Rfl: 3  •  isosorbide dinitrate (ISORDIL) 20 MG tablet, Take 1 tablet by mouth 3 (Three) Times a Day., Disp: 270 tablet, Rfl: 3  •  loratadine (CLARITIN) 10 MG tablet, Take  1 tablet by mouth Daily., Disp: 90 tablet, Rfl: 3  •  losartan (COZAAR) 100 MG tablet, Take 1 tablet by mouth Daily., Disp: , Rfl:   •  melatonin 5 MG tablet tablet, Take 5 mg by mouth Every Night., Disp: , Rfl:   •  multivitamin with minerals tablet tablet, Take 1 tablet by mouth Daily., Disp: , Rfl:   •  NIFEdipine CC (ADALAT CC) 90 MG 24 hr tablet, Take 1 tablet by mouth Daily., Disp: 90 tablet, Rfl: 3  •  nitroglycerin (NITROSTAT) 0.4 MG SL tablet, PLACE 1 TABLET UNDER THE TONGUE EVERY 5 MINUTES FOR 3 DOSES AS NEEDED CHEST PAIN NO MORE THAN 3 DOSES IN 15 MINUTES, Disp: 25 tablet, Rfl: 0  •  O2 (OXYGEN), Inhale 2 L/min Continuous. 2-3 lpm/nc, Disp: , Rfl:   •  OLANZapine (zyPREXA) 10 MG tablet, Take 5 mg by mouth Every Night., Disp: , Rfl:   •  pantoprazole (PROTONIX) 40 MG EC tablet, Take 1 tablet by mouth Daily., Disp: 30 tablet, Rfl: 1  •  rosuvastatin (CRESTOR) 20 MG tablet, Take 1 tablet by mouth Daily., Disp: 90 tablet, Rfl: 3  •  ticagrelor (BRILINTA) 90 MG tablet tablet, Take 1 tablet by mouth 2 (Two) Times a Day., Disp: 180 tablet, Rfl: 3  •  vitamin C (ASCORBIC ACID) 250 MG tablet, Take 500 mg by mouth Daily., Disp: , Rfl:   Past Medical History:   Diagnosis Date   • Acute CHF (HCC)    • Acute renal failure (ARF) (HCC)    • Anemia    • Asthma     childhood   • Coronary artery disease    • Hypertension    • Ischemic colitis (HCC)    • Metabolic acidosis    • Nonrheumatic aortic (valve) insufficiency    • PTSD (post-traumatic stress disorder)      Social History     Socioeconomic History   • Marital status: Single   Tobacco Use   • Smoking status: Current Some Day Smoker     Packs/day: 0.50     Years: 46.00     Pack years: 23.00     Types: Cigarettes     Start date: 1975   • Smokeless tobacco: Never Used   Vaping Use   • Vaping Use: Never used   Substance and Sexual Activity   • Alcohol use: No   • Drug use: No   • Sexual activity: Defer       Review of Systems   Constitutional: Positive for  "malaise/fatigue (chronic). Negative for decreased appetite, weight gain and weight loss.   HENT: Negative for nosebleeds.    Cardiovascular: Positive for dyspnea on exertion (chronic ). Negative for chest pain, leg swelling, near-syncope, orthopnea, palpitations, paroxysmal nocturnal dyspnea and syncope.   Respiratory: Positive for shortness of breath (wearing supplemental O2). Negative for chest tightness.    Hematologic/Lymphatic: Does not bruise/bleed easily.   Genitourinary: Negative for hematuria.   Neurological: Negative for dizziness and weakness.          Objective:     Vitals reviewed.   Constitutional:       General: Not in acute distress.     Appearance: Normal appearance. Well-developed. Morbidly obese.      Interventions: Nasal cannula in place.      Comments: Dog present in room    Eyes:      Pupils: Pupils are equal, round, and reactive to light.   HENT:      Head: Normocephalic and atraumatic.      Nose: Nose normal.   Neck:      Vascular: No carotid bruit.   Pulmonary:      Effort: Pulmonary effort is normal. No respiratory distress.      Breath sounds: Normal breath sounds. No wheezing. No rales.   Cardiovascular:      Normal rate. Regular rhythm.      Murmurs: There is no murmur.   Edema:     Peripheral edema absent.   Abdominal:      General: There is no distension.      Palpations: Abdomen is soft.   Musculoskeletal: Normal range of motion.      Cervical back: Normal range of motion and neck supple. Skin:     General: Skin is warm.      Findings: No erythema or rash.   Neurological:      General: No focal deficit present.      Mental Status: Alert and oriented to person, place, and time.   Psychiatric:         Attention and Perception: Attention normal.         Mood and Affect: Mood normal.         Speech: Speech normal.         Behavior: Behavior normal.         Thought Content: Thought content normal.         Judgment: Judgment normal.         /92   Pulse 74   Ht 162.6 cm (64\")   Wt " 103 kg (226 lb)   BMI 38.79 kg/m²       ECG 12 Lead    Date/Time: 5/3/2022 1:18 PM  Performed by: Jer Cuevas APRN  Authorized by: Jer Cuevas APRN   Comparison: compared with previous ECG from 11/29/2021  Similar to previous ECG  Rhythm: sinus rhythm  Rate: normal  BPM: 74  Other findings: left ventricular hypertrophy            Lab Review:       Lab Results   Component Value Date    CHOL 196 11/08/2020    CHLPL 164 11/18/2015    TRIG 68 11/08/2020    HDL 52 11/08/2020     (H) 11/08/2020     Results for orders placed during the hospital encounter of 08/08/21    Adult Transthoracic Echo Complete W/ Cont if Necessary Per Protocol    Interpretation Summary  · Left ventricular ejection fraction appears to be 51 - 55%. Left ventricular systolic function is low normal.  · The following left ventricular wall segments are hypokinetic: apex hypokinetic.  · Mild aortic valve stenosis is present.  · Left ventricular wall thickness is consistent with mild concentric hypertrophy.  · Left ventricular diastolic function is consistent with (grade II w/high LAP) pseudonormalization.  · Left atrial volume is severely increased.  · Estimated right ventricular systolic pressure from tricuspid regurgitation is mildly elevated (35-45 mmHg). Calculated right ventricular systolic pressure from tricuspid regurgitation is 40.8 mmHg.  · Normal size and function of the right ventricle.    I have personally reviewed hospitalization notes, past office notes, labs and echo prior to patients visit  Assessment:          Diagnosis Plan   1. Chronic diastolic heart failure (HCC)     2. Coronary artery disease of native artery of native heart with stable angina pectoris (HCC)  ECG 12 Lead   3. ST elevation myocardial infarction involving left anterior descending (LAD) coronary artery (HCC)     4. Essential hypertension     5. Hyperlipidemia LDL goal <70     6. Nonrheumatic aortic valve insufficiency     7. Stage 3a chronic kidney  disease ([facility])     8. Chronic obstructive pulmonary disease, unspecified COPD type ([facility])     9. Cigarette smoker     10. Class 2 severe obesity due to excess calories with serious comorbidity and body mass index (BMI) of 38.0 to 38.9 in adult ([facility])            Plan:        Chronic diastolic congestive heart failure:NYHA class II. LVEF 51-55% on echo from 8/2021. patient appears euvolemic on examination. Continue carvedilol, losartan, hydralazine, isordil and lasix. Recommend low sodium diet. Discussed importance of daily weights. Recommend leg elevation and compression stockings for leg swelling    CAD: s/p 2.75 x 28 mm Xience drug-eluting stent to the proximal to mid left anterior descending artery. [facility] 9/2021 showed patent stents to LAD. Continue aspirin, brilinta, carvedilol and rosuvastatin.     STEMI: 11/8/2020    Hypertension: Elevated in office initially 210/180; patient given clonidine 0.1 mg recheck 170/90. Patient reports that she hasnt taken her medication today. Instructed to take as directed and counseled on importance. Recommend patient to begin to monitor her BP routinely (3 times a week) and notify office is consistently running >140/90 for medication adjustments.     Hyperlipidemia:  11/2020-managed and followed by PCP. Goal LDL <70. Continue rosuvastatin    Nonrheumatic aortic valve insufficiency: mild to moderate AI on echo 8/2021    Stage 3a CKD: stable    COPD    Smoker: Ludivina Ramirez  reports that she has been smoking cigarettes. She started smoking about 47 years ago. She has a 23.00 pack-year smoking history. She has never used smokeless tobacco.. I have educated her on the risk of diseases from using tobacco products such as cancer, COPD and heart disease.   I advised her to quit and she is not willing to quit.  I spent 3  minutes counseling the patient.     Obesity: Class 2 Severe Obesity (BMI >=35 and <=39.9). Obesity-related health conditions include the following:  obstructive sleep apnea, hypertension, coronary heart disease and dyslipidemias. Obesity is unchanged. BMI is is above average; BMI management plan is completed. We discussed portion control and increasing exercise.    Patient is to follow up in 3 months or sooner if needed

## 2022-05-31 ENCOUNTER — APPOINTMENT (OUTPATIENT)
Dept: GENERAL RADIOLOGY | Facility: HOSPITAL | Age: 62
End: 2022-05-31

## 2022-05-31 ENCOUNTER — HOSPITAL ENCOUNTER (INPATIENT)
Facility: HOSPITAL | Age: 62
LOS: 10 days | Discharge: HOME | End: 2022-06-10
Attending: EMERGENCY MEDICINE | Admitting: FAMILY MEDICINE

## 2022-05-31 ENCOUNTER — APPOINTMENT (OUTPATIENT)
Dept: CT IMAGING | Facility: HOSPITAL | Age: 62
End: 2022-05-31

## 2022-05-31 DIAGNOSIS — N17.9 ACUTE KIDNEY INJURY: Primary | ICD-10-CM

## 2022-05-31 DIAGNOSIS — J44.9 CHRONIC OBSTRUCTIVE PULMONARY DISEASE, UNSPECIFIED COPD TYPE: ICD-10-CM

## 2022-05-31 DIAGNOSIS — Z74.09 IMPAIRED MOBILITY: ICD-10-CM

## 2022-05-31 DIAGNOSIS — D72.829 LEUKOCYTOSIS, UNSPECIFIED TYPE: ICD-10-CM

## 2022-05-31 LAB
ALBUMIN SERPL-MCNC: 3.5 G/DL (ref 3.5–5.2)
ALBUMIN/GLOB SERPL: 1.1 G/DL
ALP SERPL-CCNC: 115 U/L (ref 39–117)
ALT SERPL W P-5'-P-CCNC: 45 U/L (ref 1–33)
ANION GAP SERPL CALCULATED.3IONS-SCNC: 23 MMOL/L (ref 5–15)
APTT PPP: 33.2 SECONDS (ref 24.1–35)
AST SERPL-CCNC: 52 U/L (ref 1–32)
BASOPHILS # BLD AUTO: 0.03 10*3/MM3 (ref 0–0.2)
BASOPHILS NFR BLD AUTO: 0.2 % (ref 0–1.5)
BILIRUB SERPL-MCNC: 0.3 MG/DL (ref 0–1.2)
BUN SERPL-MCNC: 110 MG/DL (ref 8–23)
BUN/CREAT SERPL: 10.6 (ref 7–25)
CALCIUM SPEC-SCNC: 7.5 MG/DL (ref 8.6–10.5)
CHLORIDE SERPL-SCNC: 94 MMOL/L (ref 98–107)
CO2 SERPL-SCNC: 16 MMOL/L (ref 22–29)
CREAT SERPL-MCNC: 10.37 MG/DL (ref 0.57–1)
DEPRECATED RDW RBC AUTO: 54.4 FL (ref 37–54)
EGFRCR SERPLBLD CKD-EPI 2021: 3.9 ML/MIN/1.73
EOSINOPHIL # BLD AUTO: 0.08 10*3/MM3 (ref 0–0.4)
EOSINOPHIL NFR BLD AUTO: 0.4 % (ref 0.3–6.2)
ERYTHROCYTE [DISTWIDTH] IN BLOOD BY AUTOMATED COUNT: 15.1 % (ref 12.3–15.4)
GLOBULIN UR ELPH-MCNC: 3.2 GM/DL
GLUCOSE SERPL-MCNC: 152 MG/DL (ref 65–99)
HCT VFR BLD AUTO: 37.2 % (ref 34–46.6)
HGB BLD-MCNC: 11.4 G/DL (ref 12–15.9)
HOLD SPECIMEN: NORMAL
HOLD SPECIMEN: NORMAL
IMM GRANULOCYTES # BLD AUTO: 0.21 10*3/MM3 (ref 0–0.05)
IMM GRANULOCYTES NFR BLD AUTO: 1.1 % (ref 0–0.5)
INR PPP: 1.12 (ref 0.91–1.09)
LYMPHOCYTES # BLD AUTO: 1.17 10*3/MM3 (ref 0.7–3.1)
LYMPHOCYTES NFR BLD AUTO: 5.9 % (ref 19.6–45.3)
MCH RBC QN AUTO: 30.2 PG (ref 26.6–33)
MCHC RBC AUTO-ENTMCNC: 30.6 G/DL (ref 31.5–35.7)
MCV RBC AUTO: 98.7 FL (ref 79–97)
MONOCYTES # BLD AUTO: 1.13 10*3/MM3 (ref 0.1–0.9)
MONOCYTES NFR BLD AUTO: 5.7 % (ref 5–12)
NEUTROPHILS NFR BLD AUTO: 17.36 10*3/MM3 (ref 1.7–7)
NEUTROPHILS NFR BLD AUTO: 86.7 % (ref 42.7–76)
NRBC BLD AUTO-RTO: 0 /100 WBC (ref 0–0.2)
PLATELET # BLD AUTO: 195 10*3/MM3 (ref 140–450)
PMV BLD AUTO: 11.1 FL (ref 6–12)
POTASSIUM SERPL-SCNC: 4.4 MMOL/L (ref 3.5–5.2)
PROT SERPL-MCNC: 6.7 G/DL (ref 6–8.5)
PROTHROMBIN TIME: 13.9 SECONDS (ref 11.9–14.6)
RBC # BLD AUTO: 3.77 10*6/MM3 (ref 3.77–5.28)
SARS-COV-2 RNA PNL SPEC NAA+PROBE: NOT DETECTED
SODIUM SERPL-SCNC: 133 MMOL/L (ref 136–145)
WBC NRBC COR # BLD: 19.98 10*3/MM3 (ref 3.4–10.8)
WHOLE BLOOD HOLD COAG: NORMAL
WHOLE BLOOD HOLD SPECIMEN: NORMAL

## 2022-05-31 PROCEDURE — 70450 CT HEAD/BRAIN W/O DYE: CPT

## 2022-05-31 PROCEDURE — 87635 SARS-COV-2 COVID-19 AMP PRB: CPT

## 2022-05-31 PROCEDURE — 85610 PROTHROMBIN TIME: CPT

## 2022-05-31 PROCEDURE — 99284 EMERGENCY DEPT VISIT MOD MDM: CPT

## 2022-05-31 PROCEDURE — 93010 ELECTROCARDIOGRAM REPORT: CPT | Performed by: INTERNAL MEDICINE

## 2022-05-31 PROCEDURE — 93005 ELECTROCARDIOGRAM TRACING: CPT | Performed by: EMERGENCY MEDICINE

## 2022-05-31 PROCEDURE — 72125 CT NECK SPINE W/O DYE: CPT

## 2022-05-31 PROCEDURE — 71046 X-RAY EXAM CHEST 2 VIEWS: CPT

## 2022-05-31 PROCEDURE — 93005 ELECTROCARDIOGRAM TRACING: CPT

## 2022-05-31 PROCEDURE — 80053 COMPREHEN METABOLIC PANEL: CPT

## 2022-05-31 PROCEDURE — 85025 COMPLETE CBC W/AUTO DIFF WBC: CPT

## 2022-05-31 PROCEDURE — 85730 THROMBOPLASTIN TIME PARTIAL: CPT

## 2022-05-31 RX ADMIN — SODIUM CHLORIDE 1000 ML: 9 INJECTION, SOLUTION INTRAVENOUS at 22:37

## 2022-06-01 ENCOUNTER — APPOINTMENT (OUTPATIENT)
Dept: MRI IMAGING | Facility: HOSPITAL | Age: 62
End: 2022-06-01

## 2022-06-01 ENCOUNTER — APPOINTMENT (OUTPATIENT)
Dept: ULTRASOUND IMAGING | Facility: HOSPITAL | Age: 62
End: 2022-06-01

## 2022-06-01 ENCOUNTER — APPOINTMENT (OUTPATIENT)
Dept: GENERAL RADIOLOGY | Facility: HOSPITAL | Age: 62
End: 2022-06-01

## 2022-06-01 LAB
25(OH)D3 SERPL-MCNC: 34.9 NG/ML (ref 30–100)
ALBUMIN SERPL-MCNC: 3.4 G/DL (ref 3.5–5.2)
ALBUMIN/GLOB SERPL: 1.1 G/DL
ALP SERPL-CCNC: 117 U/L (ref 39–117)
ALT SERPL W P-5'-P-CCNC: 42 U/L (ref 1–33)
AMMONIA BLD-SCNC: 35 UMOL/L (ref 11–51)
AMPHET+METHAMPHET UR QL: POSITIVE
AMPHETAMINES UR QL: POSITIVE
ANION GAP SERPL CALCULATED.3IONS-SCNC: 21 MMOL/L (ref 5–15)
ANISOCYTOSIS BLD QL: ABNORMAL
ARTERIAL PATENCY WRIST A: ABNORMAL
ARTERIAL PATENCY WRIST A: POSITIVE
AST SERPL-CCNC: 42 U/L (ref 1–32)
ATMOSPHERIC PRESS: 747 MMHG
ATMOSPHERIC PRESS: 748 MMHG
BACTERIA UR QL AUTO: ABNORMAL /HPF
BARBITURATES UR QL SCN: NEGATIVE
BASE EXCESS BLDA CALC-SCNC: -2.4 MMOL/L (ref 0–2)
BASE EXCESS BLDA CALC-SCNC: 0 MMOL/L (ref 0–2)
BDY SITE: ABNORMAL
BDY SITE: ABNORMAL
BENZODIAZ UR QL SCN: NEGATIVE
BILIRUB SERPL-MCNC: 0.3 MG/DL (ref 0–1.2)
BILIRUB UR QL STRIP: NEGATIVE
BODY TEMPERATURE: 37 C
BODY TEMPERATURE: 37 C
BUN SERPL-MCNC: 108 MG/DL (ref 8–23)
BUN/CREAT SERPL: 10.5 (ref 7–25)
BUPRENORPHINE SERPL-MCNC: NEGATIVE NG/ML
BURR CELLS BLD QL SMEAR: ABNORMAL
CALCIUM SPEC-SCNC: 7.2 MG/DL (ref 8.6–10.5)
CANNABINOIDS SERPL QL: NEGATIVE
CHLORIDE SERPL-SCNC: 95 MMOL/L (ref 98–107)
CLARITY UR: CLEAR
CO2 SERPL-SCNC: 17 MMOL/L (ref 22–29)
COCAINE UR QL: NEGATIVE
COLOR UR: ABNORMAL
CREAT SERPL-MCNC: 10.25 MG/DL (ref 0.57–1)
CREAT UR-MCNC: 165 MG/DL
CREAT UR-MCNC: 85.6 MG/DL
DEPRECATED RDW RBC AUTO: 54.8 FL (ref 37–54)
EGFRCR SERPLBLD CKD-EPI 2021: 3.9 ML/MIN/1.73
ERYTHROCYTE [DISTWIDTH] IN BLOOD BY AUTOMATED COUNT: 15.2 % (ref 12.3–15.4)
GAS FLOW AIRWAY: 4 LPM
GAS FLOW AIRWAY: 6 LPM
GLOBULIN UR ELPH-MCNC: 3.2 GM/DL
GLUCOSE SERPL-MCNC: 136 MG/DL (ref 65–99)
GLUCOSE UR STRIP-MCNC: NEGATIVE MG/DL
HBA1C MFR BLD: 5.4 % (ref 4.8–5.6)
HCO3 BLDA-SCNC: 24.1 MMOL/L (ref 20–26)
HCO3 BLDA-SCNC: 25.9 MMOL/L (ref 20–26)
HCT VFR BLD AUTO: 32.7 % (ref 34–46.6)
HCT VFR BLD AUTO: 37.8 % (ref 34–46.6)
HEMOCCULT STL QL: NEGATIVE
HGB BLD-MCNC: 10.4 G/DL (ref 12–15.9)
HGB BLD-MCNC: 11.6 G/DL (ref 12–15.9)
HGB UR QL STRIP.AUTO: NEGATIVE
HYALINE CASTS UR QL AUTO: ABNORMAL /LPF
INHALED O2 CONCENTRATION: 44 %
KETONES UR QL STRIP: NEGATIVE
LEUKOCYTE ESTERASE UR QL STRIP.AUTO: ABNORMAL
LYMPHOCYTES # BLD MANUAL: 1.14 10*3/MM3 (ref 0.7–3.1)
LYMPHOCYTES NFR BLD MANUAL: 1 % (ref 5–12)
Lab: ABNORMAL
Lab: ABNORMAL
MAGNESIUM SERPL-MCNC: 2.6 MG/DL (ref 1.6–2.4)
MCH RBC QN AUTO: 29.9 PG (ref 26.6–33)
MCHC RBC AUTO-ENTMCNC: 30.7 G/DL (ref 31.5–35.7)
MCV RBC AUTO: 97.4 FL (ref 79–97)
METHADONE UR QL SCN: NEGATIVE
MODALITY: ABNORMAL
MODALITY: ABNORMAL
MONOCYTES # BLD: 0.16 10*3/MM3 (ref 0.1–0.9)
NEUTROPHILS # BLD AUTO: 15.03 10*3/MM3 (ref 1.7–7)
NEUTROPHILS NFR BLD MANUAL: 88 % (ref 42.7–76)
NEUTS BAND NFR BLD MANUAL: 4 % (ref 0–5)
NITRITE UR QL STRIP: NEGATIVE
OPIATES UR QL: NEGATIVE
OSMOLALITY UR: 326 MOSM/KG (ref 50–1400)
OVALOCYTES BLD QL SMEAR: ABNORMAL
OXYCODONE UR QL SCN: NEGATIVE
PCO2 BLDA: 46.7 MM HG (ref 35–45)
PCO2 BLDA: 48 MM HG (ref 35–45)
PCO2 TEMP ADJ BLD: 46.7 MM HG (ref 35–45)
PCO2 TEMP ADJ BLD: 48 MM HG (ref 35–45)
PCP UR QL SCN: NEGATIVE
PH BLDA: 7.31 PH UNITS (ref 7.35–7.45)
PH BLDA: 7.35 PH UNITS (ref 7.35–7.45)
PH UR STRIP.AUTO: <=5 [PH] (ref 5–8)
PH, TEMP CORRECTED: 7.31 PH UNITS (ref 7.35–7.45)
PH, TEMP CORRECTED: 7.35 PH UNITS (ref 7.35–7.45)
PHOSPHATE SERPL-MCNC: 8.7 MG/DL (ref 2.5–4.5)
PLAT MORPH BLD: NORMAL
PLATELET # BLD AUTO: 174 10*3/MM3 (ref 140–450)
PMV BLD AUTO: 11.2 FL (ref 6–12)
PO2 BLDA: 55.4 MM HG (ref 83–108)
PO2 BLDA: 57 MM HG (ref 83–108)
PO2 TEMP ADJ BLD: 55.4 MM HG (ref 83–108)
PO2 TEMP ADJ BLD: 57 MM HG (ref 83–108)
POIKILOCYTOSIS BLD QL SMEAR: ABNORMAL
POLYCHROMASIA BLD QL SMEAR: ABNORMAL
POTASSIUM SERPL-SCNC: 4.3 MMOL/L (ref 3.5–5.2)
PROPOXYPH UR QL: NEGATIVE
PROT ?TM UR-MCNC: 42.7 MG/DL
PROT SERPL-MCNC: 6.6 G/DL (ref 6–8.5)
PROT UR QL STRIP: ABNORMAL
RBC # BLD AUTO: 3.88 10*6/MM3 (ref 3.77–5.28)
RBC # UR STRIP: ABNORMAL /HPF
REF LAB TEST METHOD: ABNORMAL
SAO2 % BLDCOA: 89.9 % (ref 94–99)
SAO2 % BLDCOA: 91.1 % (ref 94–99)
SODIUM SERPL-SCNC: 133 MMOL/L (ref 136–145)
SODIUM UR-SCNC: <20 MMOL/L
SODIUM UR-SCNC: <20 MMOL/L
SP GR UR STRIP: 1.02 (ref 1–1.03)
SQUAMOUS #/AREA URNS HPF: ABNORMAL /HPF
T4 FREE SERPL-MCNC: 0.99 NG/DL (ref 0.93–1.7)
TRICYCLICS UR QL SCN: NEGATIVE
TROPONIN T SERPL-MCNC: 0.16 NG/ML (ref 0–0.03)
TROPONIN T SERPL-MCNC: 0.17 NG/ML (ref 0–0.03)
TROPONIN T SERPL-MCNC: 0.21 NG/ML (ref 0–0.03)
TSH SERPL DL<=0.05 MIU/L-ACNC: 0.2 UIU/ML (ref 0.27–4.2)
UROBILINOGEN UR QL STRIP: ABNORMAL
VARIANT LYMPHS NFR BLD MANUAL: 7 % (ref 19.6–45.3)
VENTILATOR MODE: ABNORMAL
VENTILATOR MODE: ABNORMAL
WBC # UR STRIP: ABNORMAL /HPF
WBC MORPH BLD: NORMAL
WBC NRBC COR # BLD: 16.34 10*3/MM3 (ref 3.4–10.8)

## 2022-06-01 PROCEDURE — 82570 ASSAY OF URINE CREATININE: CPT | Performed by: CLINICAL NURSE SPECIALIST

## 2022-06-01 PROCEDURE — 85014 HEMATOCRIT: CPT | Performed by: FAMILY MEDICINE

## 2022-06-01 PROCEDURE — 84484 ASSAY OF TROPONIN QUANT: CPT | Performed by: INTERNAL MEDICINE

## 2022-06-01 PROCEDURE — 85027 COMPLETE CBC AUTOMATED: CPT | Performed by: INTERNAL MEDICINE

## 2022-06-01 PROCEDURE — 94664 DEMO&/EVAL PT USE INHALER: CPT

## 2022-06-01 PROCEDURE — 80053 COMPREHEN METABOLIC PANEL: CPT | Performed by: INTERNAL MEDICINE

## 2022-06-01 PROCEDURE — 82140 ASSAY OF AMMONIA: CPT | Performed by: FAMILY MEDICINE

## 2022-06-01 PROCEDURE — 94761 N-INVAS EAR/PLS OXIMETRY MLT: CPT

## 2022-06-01 PROCEDURE — 82272 OCCULT BLD FECES 1-3 TESTS: CPT | Performed by: FAMILY MEDICINE

## 2022-06-01 PROCEDURE — 84156 ASSAY OF PROTEIN URINE: CPT | Performed by: CLINICAL NURSE SPECIALIST

## 2022-06-01 PROCEDURE — 94640 AIRWAY INHALATION TREATMENT: CPT

## 2022-06-01 PROCEDURE — 82803 BLOOD GASES ANY COMBINATION: CPT

## 2022-06-01 PROCEDURE — 82306 VITAMIN D 25 HYDROXY: CPT | Performed by: CLINICAL NURSE SPECIALIST

## 2022-06-01 PROCEDURE — 25010000002 ENOXAPARIN PER 10 MG: Performed by: INTERNAL MEDICINE

## 2022-06-01 PROCEDURE — 83935 ASSAY OF URINE OSMOLALITY: CPT | Performed by: CLINICAL NURSE SPECIALIST

## 2022-06-01 PROCEDURE — 36600 WITHDRAWAL OF ARTERIAL BLOOD: CPT

## 2022-06-01 PROCEDURE — 81001 URINALYSIS AUTO W/SCOPE: CPT | Performed by: INTERNAL MEDICINE

## 2022-06-01 PROCEDURE — 84100 ASSAY OF PHOSPHORUS: CPT | Performed by: CLINICAL NURSE SPECIALIST

## 2022-06-01 PROCEDURE — 84484 ASSAY OF TROPONIN QUANT: CPT | Performed by: FAMILY MEDICINE

## 2022-06-01 PROCEDURE — 85007 BL SMEAR W/DIFF WBC COUNT: CPT | Performed by: INTERNAL MEDICINE

## 2022-06-01 PROCEDURE — 83036 HEMOGLOBIN GLYCOSYLATED A1C: CPT | Performed by: PSYCHIATRY & NEUROLOGY

## 2022-06-01 PROCEDURE — 84443 ASSAY THYROID STIM HORMONE: CPT | Performed by: FAMILY MEDICINE

## 2022-06-01 PROCEDURE — 99223 1ST HOSP IP/OBS HIGH 75: CPT | Performed by: PSYCHIATRY & NEUROLOGY

## 2022-06-01 PROCEDURE — 85018 HEMOGLOBIN: CPT | Performed by: FAMILY MEDICINE

## 2022-06-01 PROCEDURE — 73100 X-RAY EXAM OF WRIST: CPT

## 2022-06-01 PROCEDURE — 70551 MRI BRAIN STEM W/O DYE: CPT

## 2022-06-01 PROCEDURE — 84300 ASSAY OF URINE SODIUM: CPT | Performed by: INTERNAL MEDICINE

## 2022-06-01 PROCEDURE — 94799 UNLISTED PULMONARY SVC/PX: CPT

## 2022-06-01 PROCEDURE — 80306 DRUG TEST PRSMV INSTRMNT: CPT | Performed by: NURSE PRACTITIONER

## 2022-06-01 PROCEDURE — 99222 1ST HOSP IP/OBS MODERATE 55: CPT | Performed by: INTERNAL MEDICINE

## 2022-06-01 PROCEDURE — 83735 ASSAY OF MAGNESIUM: CPT | Performed by: CLINICAL NURSE SPECIALIST

## 2022-06-01 PROCEDURE — 76775 US EXAM ABDO BACK WALL LIM: CPT

## 2022-06-01 PROCEDURE — 82607 VITAMIN B-12: CPT | Performed by: FAMILY MEDICINE

## 2022-06-01 PROCEDURE — 84439 ASSAY OF FREE THYROXINE: CPT | Performed by: PSYCHIATRY & NEUROLOGY

## 2022-06-01 PROCEDURE — 80061 LIPID PANEL: CPT | Performed by: PSYCHIATRY & NEUROLOGY

## 2022-06-01 PROCEDURE — 84300 ASSAY OF URINE SODIUM: CPT | Performed by: CLINICAL NURSE SPECIALIST

## 2022-06-01 PROCEDURE — 84481 FREE ASSAY (FT-3): CPT | Performed by: PSYCHIATRY & NEUROLOGY

## 2022-06-01 RX ORDER — BUDESONIDE AND FORMOTEROL FUMARATE DIHYDRATE 160; 4.5 UG/1; UG/1
2 AEROSOL RESPIRATORY (INHALATION)
Status: DISCONTINUED | OUTPATIENT
Start: 2022-06-01 | End: 2022-06-10 | Stop reason: HOSPADM

## 2022-06-01 RX ORDER — SODIUM CHLORIDE 0.9 % (FLUSH) 0.9 %
10 SYRINGE (ML) INJECTION AS NEEDED
Status: DISCONTINUED | OUTPATIENT
Start: 2022-05-31 | End: 2022-06-10 | Stop reason: HOSPADM

## 2022-06-01 RX ORDER — OLANZAPINE 5 MG/1
5 TABLET ORAL NIGHTLY
Status: DISCONTINUED | OUTPATIENT
Start: 2022-06-01 | End: 2022-06-10 | Stop reason: HOSPADM

## 2022-06-01 RX ORDER — CARVEDILOL 25 MG/1
25 TABLET ORAL 2 TIMES DAILY WITH MEALS
Status: DISCONTINUED | OUTPATIENT
Start: 2022-06-01 | End: 2022-06-10 | Stop reason: HOSPADM

## 2022-06-01 RX ORDER — ROSUVASTATIN CALCIUM 20 MG/1
20 TABLET, COATED ORAL DAILY
Status: DISCONTINUED | OUTPATIENT
Start: 2022-06-01 | End: 2022-06-01

## 2022-06-01 RX ORDER — ALBUTEROL SULFATE 2.5 MG/3ML
2.5 SOLUTION RESPIRATORY (INHALATION) EVERY 6 HOURS PRN
Refills: 3 | Status: DISCONTINUED | OUTPATIENT
Start: 2022-05-31 | End: 2022-06-10 | Stop reason: HOSPADM

## 2022-06-01 RX ORDER — ISOSORBIDE DINITRATE 20 MG/1
20 TABLET ORAL
Status: DISCONTINUED | OUTPATIENT
Start: 2022-06-01 | End: 2022-06-10 | Stop reason: HOSPADM

## 2022-06-01 RX ORDER — LANOLIN ALCOHOL/MO/W.PET/CERES
6 CREAM (GRAM) TOPICAL NIGHTLY
Status: DISCONTINUED | OUTPATIENT
Start: 2022-06-01 | End: 2022-06-10 | Stop reason: HOSPADM

## 2022-06-01 RX ORDER — CLONIDINE HYDROCHLORIDE 0.1 MG/1
0.1 TABLET ORAL EVERY 6 HOURS PRN
Status: DISCONTINUED | OUTPATIENT
Start: 2022-05-31 | End: 2022-06-10 | Stop reason: HOSPADM

## 2022-06-01 RX ORDER — ENOXAPARIN SODIUM 100 MG/ML
30 INJECTION SUBCUTANEOUS
Status: DISCONTINUED | OUTPATIENT
Start: 2022-06-01 | End: 2022-06-10 | Stop reason: HOSPADM

## 2022-06-01 RX ORDER — IPRATROPIUM BROMIDE AND ALBUTEROL SULFATE 2.5; .5 MG/3ML; MG/3ML
3 SOLUTION RESPIRATORY (INHALATION)
Status: DISCONTINUED | OUTPATIENT
Start: 2022-06-01 | End: 2022-06-10 | Stop reason: HOSPADM

## 2022-06-01 RX ORDER — BUSPIRONE HYDROCHLORIDE 10 MG/1
10 TABLET ORAL 3 TIMES DAILY
Status: DISCONTINUED | OUTPATIENT
Start: 2022-06-01 | End: 2022-06-10 | Stop reason: HOSPADM

## 2022-06-01 RX ORDER — ASPIRIN 81 MG/1
81 TABLET ORAL DAILY
Status: DISCONTINUED | OUTPATIENT
Start: 2022-06-01 | End: 2022-06-10 | Stop reason: HOSPADM

## 2022-06-01 RX ORDER — CALCITRIOL 0.25 UG/1
0.25 CAPSULE, LIQUID FILLED ORAL DAILY
Status: DISCONTINUED | OUTPATIENT
Start: 2022-06-01 | End: 2022-06-10 | Stop reason: HOSPADM

## 2022-06-01 RX ORDER — NIFEDIPINE 90 MG/1
90 TABLET, FILM COATED, EXTENDED RELEASE ORAL DAILY
Status: DISCONTINUED | OUTPATIENT
Start: 2022-06-01 | End: 2022-06-10 | Stop reason: HOSPADM

## 2022-06-01 RX ORDER — PANTOPRAZOLE SODIUM 40 MG/1
40 TABLET, DELAYED RELEASE ORAL DAILY
Status: DISCONTINUED | OUTPATIENT
Start: 2022-06-01 | End: 2022-06-02

## 2022-06-01 RX ORDER — SODIUM CHLORIDE 0.9 % (FLUSH) 0.9 %
10 SYRINGE (ML) INJECTION EVERY 12 HOURS SCHEDULED
Status: DISCONTINUED | OUTPATIENT
Start: 2022-06-01 | End: 2022-06-10 | Stop reason: HOSPADM

## 2022-06-01 RX ORDER — FLUTICASONE PROPIONATE 50 MCG
2 SPRAY, SUSPENSION (ML) NASAL DAILY
Status: DISCONTINUED | OUTPATIENT
Start: 2022-06-01 | End: 2022-06-02

## 2022-06-01 RX ORDER — ROSUVASTATIN CALCIUM 10 MG/1
10 TABLET, COATED ORAL DAILY
Status: DISCONTINUED | OUTPATIENT
Start: 2022-06-02 | End: 2022-06-10 | Stop reason: HOSPADM

## 2022-06-01 RX ORDER — ACETAMINOPHEN 325 MG/1
650 TABLET ORAL EVERY 4 HOURS PRN
Status: DISCONTINUED | OUTPATIENT
Start: 2022-06-01 | End: 2022-06-10 | Stop reason: HOSPADM

## 2022-06-01 RX ADMIN — TICAGRELOR 90 MG: 90 TABLET ORAL at 09:14

## 2022-06-01 RX ADMIN — CALCITRIOL 0.25 MCG: 0.25 CAPSULE ORAL at 09:14

## 2022-06-01 RX ADMIN — NIFEDIPINE 90 MG: 90 TABLET, EXTENDED RELEASE ORAL at 09:13

## 2022-06-01 RX ADMIN — TICAGRELOR 90 MG: 90 TABLET ORAL at 21:36

## 2022-06-01 RX ADMIN — FLUTICASONE PROPIONATE 2 SPRAY: 50 SPRAY, METERED NASAL at 09:14

## 2022-06-01 RX ADMIN — ACETAMINOPHEN 650 MG: 325 TABLET ORAL at 22:51

## 2022-06-01 RX ADMIN — BUDESONIDE AND FORMOTEROL FUMARATE DIHYDRATE 2 PUFF: 160; 4.5 AEROSOL RESPIRATORY (INHALATION) at 19:11

## 2022-06-01 RX ADMIN — SODIUM BICARBONATE 150 MEQ: 84 INJECTION, SOLUTION INTRAVENOUS at 19:14

## 2022-06-01 RX ADMIN — ASPIRIN 81 MG: 81 TABLET, COATED ORAL at 09:13

## 2022-06-01 RX ADMIN — IPRATROPIUM BROMIDE AND ALBUTEROL SULFATE 3 ML: 2.5; .5 SOLUTION RESPIRATORY (INHALATION) at 19:10

## 2022-06-01 RX ADMIN — ROSUVASTATIN CALCIUM 20 MG: 20 TABLET, FILM COATED ORAL at 09:13

## 2022-06-01 RX ADMIN — BUSPIRONE HYDROCHLORIDE 10 MG: 10 TABLET ORAL at 01:25

## 2022-06-01 RX ADMIN — ENOXAPARIN SODIUM 30 MG: 30 INJECTION SUBCUTANEOUS at 09:13

## 2022-06-01 RX ADMIN — ISOSORBIDE DINITRATE 20 MG: 20 TABLET ORAL at 12:42

## 2022-06-01 RX ADMIN — SODIUM BICARBONATE 150 MEQ: 84 INJECTION, SOLUTION INTRAVENOUS at 09:12

## 2022-06-01 RX ADMIN — TICAGRELOR 90 MG: 90 TABLET ORAL at 01:25

## 2022-06-01 RX ADMIN — BUDESONIDE AND FORMOTEROL FUMARATE DIHYDRATE 2 PUFF: 160; 4.5 AEROSOL RESPIRATORY (INHALATION) at 06:38

## 2022-06-01 RX ADMIN — PANTOPRAZOLE SODIUM 40 MG: 40 TABLET, DELAYED RELEASE ORAL at 09:22

## 2022-06-01 RX ADMIN — IPRATROPIUM BROMIDE AND ALBUTEROL SULFATE 3 ML: 2.5; .5 SOLUTION RESPIRATORY (INHALATION) at 14:30

## 2022-06-01 RX ADMIN — CARVEDILOL 25 MG: 25 TABLET, FILM COATED ORAL at 08:31

## 2022-06-01 RX ADMIN — ISOSORBIDE DINITRATE 20 MG: 20 TABLET ORAL at 08:31

## 2022-06-01 RX ADMIN — SODIUM BICARBONATE 150 MEQ: 84 INJECTION, SOLUTION INTRAVENOUS at 00:36

## 2022-06-01 RX ADMIN — Medication 10 ML: at 01:26

## 2022-06-01 RX ADMIN — OLANZAPINE 5 MG: 5 TABLET, FILM COATED ORAL at 21:36

## 2022-06-01 RX ADMIN — Medication 6 MG: at 01:25

## 2022-06-01 RX ADMIN — IPRATROPIUM BROMIDE AND ALBUTEROL SULFATE 3 ML: 2.5; .5 SOLUTION RESPIRATORY (INHALATION) at 06:38

## 2022-06-01 RX ADMIN — IPRATROPIUM BROMIDE AND ALBUTEROL SULFATE 3 ML: 2.5; .5 SOLUTION RESPIRATORY (INHALATION) at 10:18

## 2022-06-01 RX ADMIN — BUSPIRONE HYDROCHLORIDE 10 MG: 10 TABLET ORAL at 21:35

## 2022-06-01 RX ADMIN — Medication 10 ML: at 09:14

## 2022-06-01 RX ADMIN — Medication 6 MG: at 21:36

## 2022-06-01 RX ADMIN — BUSPIRONE HYDROCHLORIDE 10 MG: 10 TABLET ORAL at 09:14

## 2022-06-01 RX ADMIN — Medication 10 ML: at 21:36

## 2022-06-01 RX ADMIN — OLANZAPINE 5 MG: 5 TABLET, FILM COATED ORAL at 01:25

## 2022-06-02 ENCOUNTER — APPOINTMENT (OUTPATIENT)
Dept: CT IMAGING | Facility: HOSPITAL | Age: 62
End: 2022-06-02

## 2022-06-02 LAB
ADV 40+41 DNA STL QL NAA+NON-PROBE: NOT DETECTED
ANION GAP SERPL CALCULATED.3IONS-SCNC: 16 MMOL/L (ref 5–15)
ASTRO TYP 1-8 RNA STL QL NAA+NON-PROBE: NOT DETECTED
BUN SERPL-MCNC: 102 MG/DL (ref 8–23)
BUN/CREAT SERPL: 11.1 (ref 7–25)
C CAYETANENSIS DNA STL QL NAA+NON-PROBE: NOT DETECTED
C COLI+JEJ+UPSA DNA STL QL NAA+NON-PROBE: NOT DETECTED
C DIFF TOX GENS STL QL NAA+PROBE: POSITIVE
CALCIUM SPEC-SCNC: 7.1 MG/DL (ref 8.6–10.5)
CHLORIDE SERPL-SCNC: 89 MMOL/L (ref 98–107)
CHOLEST SERPL-MCNC: 131 MG/DL (ref 0–200)
CO2 SERPL-SCNC: 30 MMOL/L (ref 22–29)
CREAT SERPL-MCNC: 9.16 MG/DL (ref 0.57–1)
CRYPTOSP DNA STL QL NAA+NON-PROBE: NOT DETECTED
E HISTOLYT DNA STL QL NAA+NON-PROBE: NOT DETECTED
EAEC PAA PLAS AGGR+AATA ST NAA+NON-PRB: NOT DETECTED
EC STX1+STX2 GENES STL QL NAA+NON-PROBE: NOT DETECTED
EGFRCR SERPLBLD CKD-EPI 2021: 4.5 ML/MIN/1.73
EPEC EAE GENE STL QL NAA+NON-PROBE: NOT DETECTED
ETEC LTA+ST1A+ST1B TOX ST NAA+NON-PROBE: NOT DETECTED
G LAMBLIA DNA STL QL NAA+NON-PROBE: NOT DETECTED
GLUCOSE SERPL-MCNC: 161 MG/DL (ref 65–99)
HCT VFR BLD AUTO: 30.8 % (ref 34–46.6)
HCT VFR BLD AUTO: 30.9 % (ref 34–46.6)
HCT VFR BLD AUTO: 33.8 % (ref 34–46.6)
HDLC SERPL-MCNC: 30 MG/DL (ref 40–60)
HGB BLD-MCNC: 10.9 G/DL (ref 12–15.9)
HGB BLD-MCNC: 9.9 G/DL (ref 12–15.9)
HGB BLD-MCNC: 9.9 G/DL (ref 12–15.9)
LDLC SERPL CALC-MCNC: 79 MG/DL (ref 0–100)
LDLC/HDLC SERPL: 2.58 {RATIO}
NOROVIRUS GI+II RNA STL QL NAA+NON-PROBE: NOT DETECTED
P SHIGELLOIDES DNA STL QL NAA+NON-PROBE: NOT DETECTED
POTASSIUM SERPL-SCNC: 3.3 MMOL/L (ref 3.5–5.2)
QT INTERVAL: 384 MS
QTC INTERVAL: 462 MS
RVA RNA STL QL NAA+NON-PROBE: NOT DETECTED
S ENT+BONG DNA STL QL NAA+NON-PROBE: NOT DETECTED
SAPO I+II+IV+V RNA STL QL NAA+NON-PROBE: NOT DETECTED
SHIGELLA SP+EIEC IPAH ST NAA+NON-PROBE: NOT DETECTED
SODIUM SERPL-SCNC: 135 MMOL/L (ref 136–145)
T3FREE SERPL-MCNC: 1.64 PG/ML (ref 2–4.4)
TRIGL SERPL-MCNC: 118 MG/DL (ref 0–150)
TROPONIN T SERPL-MCNC: 0.21 NG/ML (ref 0–0.03)
TROPONIN T SERPL-MCNC: 0.21 NG/ML (ref 0–0.03)
TROPONIN T SERPL-MCNC: 0.23 NG/ML (ref 0–0.03)
V CHOL+PARA+VUL DNA STL QL NAA+NON-PROBE: NOT DETECTED
V CHOLERAE DNA STL QL NAA+NON-PROBE: NOT DETECTED
VIT B12 BLD-MCNC: 987 PG/ML (ref 211–946)
VLDLC SERPL-MCNC: 22 MG/DL (ref 5–40)
Y ENTEROCOL DNA STL QL NAA+NON-PROBE: NOT DETECTED

## 2022-06-02 PROCEDURE — 0 HYDROMORPHONE 1 MG/ML SOLUTION: Performed by: FAMILY MEDICINE

## 2022-06-02 PROCEDURE — 83690 ASSAY OF LIPASE: CPT | Performed by: CLINICAL NURSE SPECIALIST

## 2022-06-02 PROCEDURE — 74176 CT ABD & PELVIS W/O CONTRAST: CPT

## 2022-06-02 PROCEDURE — 86140 C-REACTIVE PROTEIN: CPT | Performed by: CLINICAL NURSE SPECIALIST

## 2022-06-02 PROCEDURE — 82150 ASSAY OF AMYLASE: CPT | Performed by: CLINICAL NURSE SPECIALIST

## 2022-06-02 PROCEDURE — 94799 UNLISTED PULMONARY SVC/PX: CPT

## 2022-06-02 PROCEDURE — 99233 SBSQ HOSP IP/OBS HIGH 50: CPT | Performed by: INTERNAL MEDICINE

## 2022-06-02 PROCEDURE — 99223 1ST HOSP IP/OBS HIGH 75: CPT | Performed by: INTERNAL MEDICINE

## 2022-06-02 PROCEDURE — 80048 BASIC METABOLIC PNL TOTAL CA: CPT | Performed by: NURSE PRACTITIONER

## 2022-06-02 PROCEDURE — 85014 HEMATOCRIT: CPT | Performed by: FAMILY MEDICINE

## 2022-06-02 PROCEDURE — 93010 ELECTROCARDIOGRAM REPORT: CPT | Performed by: INTERNAL MEDICINE

## 2022-06-02 PROCEDURE — 85018 HEMOGLOBIN: CPT | Performed by: FAMILY MEDICINE

## 2022-06-02 PROCEDURE — 87507 IADNA-DNA/RNA PROBE TQ 12-25: CPT | Performed by: FAMILY MEDICINE

## 2022-06-02 PROCEDURE — 93005 ELECTROCARDIOGRAM TRACING: CPT | Performed by: INTERNAL MEDICINE

## 2022-06-02 PROCEDURE — 85652 RBC SED RATE AUTOMATED: CPT | Performed by: CLINICAL NURSE SPECIALIST

## 2022-06-02 PROCEDURE — 25010000002 ENOXAPARIN PER 10 MG: Performed by: INTERNAL MEDICINE

## 2022-06-02 PROCEDURE — 99221 1ST HOSP IP/OBS SF/LOW 40: CPT | Performed by: SURGERY

## 2022-06-02 PROCEDURE — 87493 C DIFF AMPLIFIED PROBE: CPT | Performed by: FAMILY MEDICINE

## 2022-06-02 PROCEDURE — 99233 SBSQ HOSP IP/OBS HIGH 50: CPT | Performed by: PSYCHIATRY & NEUROLOGY

## 2022-06-02 PROCEDURE — 84484 ASSAY OF TROPONIN QUANT: CPT | Performed by: INTERNAL MEDICINE

## 2022-06-02 RX ORDER — FLUTICASONE PROPIONATE 50 MCG
2 SPRAY, SUSPENSION (ML) NASAL DAILY
Status: DISCONTINUED | OUTPATIENT
Start: 2022-06-03 | End: 2022-06-10 | Stop reason: HOSPADM

## 2022-06-02 RX ORDER — SODIUM CHLORIDE, SODIUM LACTATE, POTASSIUM CHLORIDE, CALCIUM CHLORIDE 600; 310; 30; 20 MG/100ML; MG/100ML; MG/100ML; MG/100ML
100 INJECTION, SOLUTION INTRAVENOUS CONTINUOUS
Status: DISCONTINUED | OUTPATIENT
Start: 2022-06-02 | End: 2022-06-03

## 2022-06-02 RX ORDER — POTASSIUM CHLORIDE 750 MG/1
20 CAPSULE, EXTENDED RELEASE ORAL ONCE
Status: COMPLETED | OUTPATIENT
Start: 2022-06-02 | End: 2022-06-02

## 2022-06-02 RX ORDER — FAMOTIDINE 20 MG/1
20 TABLET, FILM COATED ORAL
Status: DISCONTINUED | OUTPATIENT
Start: 2022-06-03 | End: 2022-06-03

## 2022-06-02 RX ORDER — ALUMINA, MAGNESIA, AND SIMETHICONE 2400; 2400; 240 MG/30ML; MG/30ML; MG/30ML
15 SUSPENSION ORAL ONCE
Status: COMPLETED | OUTPATIENT
Start: 2022-06-02 | End: 2022-06-02

## 2022-06-02 RX ORDER — METRONIDAZOLE 500 MG/100ML
500 INJECTION, SOLUTION INTRAVENOUS EVERY 8 HOURS SCHEDULED
Status: DISCONTINUED | OUTPATIENT
Start: 2022-06-02 | End: 2022-06-03

## 2022-06-02 RX ORDER — VANCOMYCIN HYDROCHLORIDE 125 MG/1
125 CAPSULE ORAL EVERY 6 HOURS SCHEDULED
Status: DISCONTINUED | OUTPATIENT
Start: 2022-06-03 | End: 2022-06-10 | Stop reason: HOSPADM

## 2022-06-02 RX ORDER — CETIRIZINE HYDROCHLORIDE 10 MG/1
10 TABLET ORAL DAILY
Status: DISCONTINUED | OUTPATIENT
Start: 2022-06-02 | End: 2022-06-10 | Stop reason: HOSPADM

## 2022-06-02 RX ADMIN — PANTOPRAZOLE SODIUM 40 MG: 40 TABLET, DELAYED RELEASE ORAL at 08:15

## 2022-06-02 RX ADMIN — CARVEDILOL 25 MG: 25 TABLET, FILM COATED ORAL at 08:15

## 2022-06-02 RX ADMIN — HYDROMORPHONE HYDROCHLORIDE 0.25 MG: 1 INJECTION, SOLUTION INTRAMUSCULAR; INTRAVENOUS; SUBCUTANEOUS at 22:01

## 2022-06-02 RX ADMIN — Medication 10 ML: at 08:16

## 2022-06-02 RX ADMIN — BUSPIRONE HYDROCHLORIDE 10 MG: 10 TABLET ORAL at 16:42

## 2022-06-02 RX ADMIN — ROSUVASTATIN CALCIUM 10 MG: 10 TABLET, FILM COATED ORAL at 08:15

## 2022-06-02 RX ADMIN — IPRATROPIUM BROMIDE AND ALBUTEROL SULFATE 3 ML: 2.5; .5 SOLUTION RESPIRATORY (INHALATION) at 19:12

## 2022-06-02 RX ADMIN — FLUTICASONE PROPIONATE 2 SPRAY: 50 SPRAY, METERED NASAL at 08:16

## 2022-06-02 RX ADMIN — ALUMINUM HYDROXIDE, MAGNESIUM HYDROXIDE, AND DIMETHICONE 15 ML: 400; 400; 40 SUSPENSION ORAL at 16:41

## 2022-06-02 RX ADMIN — ISOSORBIDE DINITRATE 20 MG: 20 TABLET ORAL at 08:16

## 2022-06-02 RX ADMIN — CALCITRIOL 0.25 MCG: 0.25 CAPSULE ORAL at 08:15

## 2022-06-02 RX ADMIN — POTASSIUM CHLORIDE 20 MEQ: 10 CAPSULE, COATED, EXTENDED RELEASE ORAL at 10:24

## 2022-06-02 RX ADMIN — BUDESONIDE AND FORMOTEROL FUMARATE DIHYDRATE 2 PUFF: 160; 4.5 AEROSOL RESPIRATORY (INHALATION) at 19:12

## 2022-06-02 RX ADMIN — BUDESONIDE AND FORMOTEROL FUMARATE DIHYDRATE 2 PUFF: 160; 4.5 AEROSOL RESPIRATORY (INHALATION) at 07:16

## 2022-06-02 RX ADMIN — TICAGRELOR 90 MG: 90 TABLET ORAL at 08:15

## 2022-06-02 RX ADMIN — SODIUM BICARBONATE 150 MEQ: 84 INJECTION, SOLUTION INTRAVENOUS at 05:07

## 2022-06-02 RX ADMIN — ENOXAPARIN SODIUM 30 MG: 30 INJECTION SUBCUTANEOUS at 08:15

## 2022-06-02 RX ADMIN — SODIUM CHLORIDE, POTASSIUM CHLORIDE, SODIUM LACTATE AND CALCIUM CHLORIDE 500 ML: 600; 310; 30; 20 INJECTION, SOLUTION INTRAVENOUS at 15:15

## 2022-06-02 RX ADMIN — BUSPIRONE HYDROCHLORIDE 10 MG: 10 TABLET ORAL at 08:15

## 2022-06-02 RX ADMIN — SODIUM CHLORIDE, POTASSIUM CHLORIDE, SODIUM LACTATE AND CALCIUM CHLORIDE 100 ML/HR: 600; 310; 30; 20 INJECTION, SOLUTION INTRAVENOUS at 16:46

## 2022-06-02 RX ADMIN — BUSPIRONE HYDROCHLORIDE 10 MG: 10 TABLET ORAL at 21:16

## 2022-06-02 RX ADMIN — Medication 10 ML: at 10:23

## 2022-06-02 RX ADMIN — SODIUM CHLORIDE, POTASSIUM CHLORIDE, SODIUM LACTATE AND CALCIUM CHLORIDE 100 ML/HR: 600; 310; 30; 20 INJECTION, SOLUTION INTRAVENOUS at 10:23

## 2022-06-02 RX ADMIN — NIFEDIPINE 90 MG: 90 TABLET, EXTENDED RELEASE ORAL at 09:23

## 2022-06-02 RX ADMIN — TICAGRELOR 90 MG: 90 TABLET ORAL at 21:17

## 2022-06-02 RX ADMIN — Medication 10 ML: at 21:16

## 2022-06-02 RX ADMIN — IPRATROPIUM BROMIDE AND ALBUTEROL SULFATE 3 ML: 2.5; .5 SOLUTION RESPIRATORY (INHALATION) at 10:42

## 2022-06-02 RX ADMIN — ASPIRIN 81 MG: 81 TABLET, COATED ORAL at 08:15

## 2022-06-02 RX ADMIN — Medication 6 MG: at 21:16

## 2022-06-02 RX ADMIN — IPRATROPIUM BROMIDE AND ALBUTEROL SULFATE 3 ML: 2.5; .5 SOLUTION RESPIRATORY (INHALATION) at 07:16

## 2022-06-02 RX ADMIN — ISOSORBIDE DINITRATE 20 MG: 20 TABLET ORAL at 13:06

## 2022-06-03 ENCOUNTER — ANESTHESIA EVENT (OUTPATIENT)
Dept: PERIOP | Facility: HOSPITAL | Age: 62
End: 2022-06-03

## 2022-06-03 ENCOUNTER — ANESTHESIA (OUTPATIENT)
Dept: PERIOP | Facility: HOSPITAL | Age: 62
End: 2022-06-03

## 2022-06-03 ENCOUNTER — APPOINTMENT (OUTPATIENT)
Dept: GENERAL RADIOLOGY | Facility: HOSPITAL | Age: 62
End: 2022-06-03

## 2022-06-03 ENCOUNTER — APPOINTMENT (OUTPATIENT)
Dept: INTERVENTIONAL RADIOLOGY/VASCULAR | Facility: HOSPITAL | Age: 62
End: 2022-06-03

## 2022-06-03 LAB
ALBUMIN SERPL-MCNC: 3.3 G/DL (ref 3.5–5.2)
ALBUMIN/GLOB SERPL: 1.5 G/DL
ALP SERPL-CCNC: 91 U/L (ref 39–117)
ALT SERPL W P-5'-P-CCNC: 27 U/L (ref 1–33)
AMYLASE SERPL-CCNC: 59 U/L (ref 28–100)
ANION GAP SERPL CALCULATED.3IONS-SCNC: 17 MMOL/L (ref 5–15)
AST SERPL-CCNC: 14 U/L (ref 1–32)
BASOPHILS # BLD AUTO: 0.03 10*3/MM3 (ref 0–0.2)
BASOPHILS NFR BLD AUTO: 0.3 % (ref 0–1.5)
BILIRUB SERPL-MCNC: 0.4 MG/DL (ref 0–1.2)
BUN SERPL-MCNC: 94 MG/DL (ref 8–23)
BUN/CREAT SERPL: 11.9 (ref 7–25)
CALCIUM SPEC-SCNC: 7.6 MG/DL (ref 8.6–10.5)
CHLORIDE SERPL-SCNC: 94 MMOL/L (ref 98–107)
CO2 SERPL-SCNC: 28 MMOL/L (ref 22–29)
CREAT SERPL-MCNC: 7.89 MG/DL (ref 0.57–1)
CRP SERPL-MCNC: 13.06 MG/DL (ref 0–0.5)
DEPRECATED RDW RBC AUTO: 50.2 FL (ref 37–54)
EGFRCR SERPLBLD CKD-EPI 2021: 5.4 ML/MIN/1.73
EOSINOPHIL # BLD AUTO: 0.11 10*3/MM3 (ref 0–0.4)
EOSINOPHIL NFR BLD AUTO: 1.1 % (ref 0.3–6.2)
ERYTHROCYTE [DISTWIDTH] IN BLOOD BY AUTOMATED COUNT: 14.6 % (ref 12.3–15.4)
ERYTHROCYTE [SEDIMENTATION RATE] IN BLOOD: 57 MM/HR (ref 0–30)
GLOBULIN UR ELPH-MCNC: 2.2 GM/DL
GLUCOSE SERPL-MCNC: 120 MG/DL (ref 65–99)
HAV IGM SERPL QL IA: ABNORMAL
HBV CORE IGM SERPL QL IA: ABNORMAL
HBV SURFACE AB SER RIA-ACNC: NORMAL
HBV SURFACE AG SERPL QL IA: ABNORMAL
HCT VFR BLD AUTO: 31.4 % (ref 34–46.6)
HCT VFR BLD AUTO: 33 % (ref 34–46.6)
HCT VFR BLD AUTO: 35.4 % (ref 34–46.6)
HCT VFR BLD AUTO: 35.6 % (ref 34–46.6)
HCV AB SER DONR QL: REACTIVE
HGB BLD-MCNC: 10.1 G/DL (ref 12–15.9)
HGB BLD-MCNC: 10.7 G/DL (ref 12–15.9)
HGB BLD-MCNC: 11.2 G/DL (ref 12–15.9)
HGB BLD-MCNC: 11.4 G/DL (ref 12–15.9)
IMM GRANULOCYTES # BLD AUTO: 0.11 10*3/MM3 (ref 0–0.05)
IMM GRANULOCYTES NFR BLD AUTO: 1.1 % (ref 0–0.5)
LIPASE SERPL-CCNC: 54 U/L (ref 13–60)
LYMPHOCYTES # BLD AUTO: 0.67 10*3/MM3 (ref 0.7–3.1)
LYMPHOCYTES NFR BLD AUTO: 6.9 % (ref 19.6–45.3)
MCH RBC QN AUTO: 30.3 PG (ref 26.6–33)
MCHC RBC AUTO-ENTMCNC: 32.4 G/DL (ref 31.5–35.7)
MCV RBC AUTO: 93.5 FL (ref 79–97)
MONOCYTES # BLD AUTO: 0.79 10*3/MM3 (ref 0.1–0.9)
MONOCYTES NFR BLD AUTO: 8.1 % (ref 5–12)
NEUTROPHILS NFR BLD AUTO: 7.99 10*3/MM3 (ref 1.7–7)
NEUTROPHILS NFR BLD AUTO: 82.5 % (ref 42.7–76)
NRBC BLD AUTO-RTO: 0 /100 WBC (ref 0–0.2)
PLATELET # BLD AUTO: 194 10*3/MM3 (ref 140–450)
PMV BLD AUTO: 11.3 FL (ref 6–12)
POTASSIUM SERPL-SCNC: 4 MMOL/L (ref 3.5–5.2)
PROT SERPL-MCNC: 5.5 G/DL (ref 6–8.5)
QT INTERVAL: 432 MS
QTC INTERVAL: 413 MS
RBC # BLD AUTO: 3.53 10*6/MM3 (ref 3.77–5.28)
SODIUM SERPL-SCNC: 139 MMOL/L (ref 136–145)
TROPONIN T SERPL-MCNC: 0.19 NG/ML (ref 0–0.03)
TROPONIN T SERPL-MCNC: 0.22 NG/ML (ref 0–0.03)
WBC NRBC COR # BLD: 9.7 10*3/MM3 (ref 3.4–10.8)

## 2022-06-03 PROCEDURE — 77001 FLUOROGUIDE FOR VEIN DEVICE: CPT | Performed by: SURGERY

## 2022-06-03 PROCEDURE — 02HV33Z INSERTION OF INFUSION DEVICE INTO SUPERIOR VENA CAVA, PERCUTANEOUS APPROACH: ICD-10-PCS | Performed by: SURGERY

## 2022-06-03 PROCEDURE — 25010000002 PROPOFOL 10 MG/ML EMULSION: Performed by: NURSE ANESTHETIST, CERTIFIED REGISTERED

## 2022-06-03 PROCEDURE — 99233 SBSQ HOSP IP/OBS HIGH 50: CPT | Performed by: INTERNAL MEDICINE

## 2022-06-03 PROCEDURE — 94799 UNLISTED PULMONARY SVC/PX: CPT

## 2022-06-03 PROCEDURE — 80074 ACUTE HEPATITIS PANEL: CPT | Performed by: INTERNAL MEDICINE

## 2022-06-03 PROCEDURE — 99232 SBSQ HOSP IP/OBS MODERATE 35: CPT | Performed by: INTERNAL MEDICINE

## 2022-06-03 PROCEDURE — 80053 COMPREHEN METABOLIC PANEL: CPT | Performed by: NURSE PRACTITIONER

## 2022-06-03 PROCEDURE — 76937 US GUIDE VASCULAR ACCESS: CPT

## 2022-06-03 PROCEDURE — 5A1D70Z PERFORMANCE OF URINARY FILTRATION, INTERMITTENT, LESS THAN 6 HOURS PER DAY: ICD-10-PCS | Performed by: INTERNAL MEDICINE

## 2022-06-03 PROCEDURE — 85014 HEMATOCRIT: CPT | Performed by: SURGERY

## 2022-06-03 PROCEDURE — 36558 INSERT TUNNELED CV CATH: CPT | Performed by: SURGERY

## 2022-06-03 PROCEDURE — 93005 ELECTROCARDIOGRAM TRACING: CPT | Performed by: FAMILY MEDICINE

## 2022-06-03 PROCEDURE — 84484 ASSAY OF TROPONIN QUANT: CPT | Performed by: INTERNAL MEDICINE

## 2022-06-03 PROCEDURE — 94761 N-INVAS EAR/PLS OXIMETRY MLT: CPT

## 2022-06-03 PROCEDURE — 25010000002 CEFAZOLIN PER 500 MG: Performed by: SURGERY

## 2022-06-03 PROCEDURE — 71045 X-RAY EXAM CHEST 1 VIEW: CPT

## 2022-06-03 PROCEDURE — 93010 ELECTROCARDIOGRAM REPORT: CPT | Performed by: INTERNAL MEDICINE

## 2022-06-03 PROCEDURE — 36558 INSERT TUNNELED CV CATH: CPT

## 2022-06-03 PROCEDURE — 85025 COMPLETE CBC W/AUTO DIFF WBC: CPT | Performed by: INTERNAL MEDICINE

## 2022-06-03 PROCEDURE — 25010000002 MIDAZOLAM PER 1 MG: Performed by: NURSE ANESTHETIST, CERTIFIED REGISTERED

## 2022-06-03 PROCEDURE — C1750 CATH, HEMODIALYSIS,LONG-TERM: HCPCS | Performed by: SURGERY

## 2022-06-03 PROCEDURE — 0JH63XZ INSERTION OF TUNNELED VASCULAR ACCESS DEVICE INTO CHEST SUBCUTANEOUS TISSUE AND FASCIA, PERCUTANEOUS APPROACH: ICD-10-PCS | Performed by: INTERNAL MEDICINE

## 2022-06-03 PROCEDURE — 0 HYDROMORPHONE 1 MG/ML SOLUTION: Performed by: FAMILY MEDICINE

## 2022-06-03 PROCEDURE — 77001 FLUOROGUIDE FOR VEIN DEVICE: CPT

## 2022-06-03 PROCEDURE — 86706 HEP B SURFACE ANTIBODY: CPT | Performed by: INTERNAL MEDICINE

## 2022-06-03 PROCEDURE — 25010000002 HEPARIN (PORCINE) PER 1000 UNITS: Performed by: SURGERY

## 2022-06-03 PROCEDURE — 80053 COMPREHEN METABOLIC PANEL: CPT | Performed by: SURGERY

## 2022-06-03 PROCEDURE — 25010000002 HEPARIN (PORCINE) PER 1000 UNITS: Performed by: INTERNAL MEDICINE

## 2022-06-03 PROCEDURE — 0 HYDROMORPHONE 1 MG/ML SOLUTION: Performed by: SURGERY

## 2022-06-03 PROCEDURE — 99223 1ST HOSP IP/OBS HIGH 75: CPT | Performed by: INTERNAL MEDICINE

## 2022-06-03 PROCEDURE — 85018 HEMOGLOBIN: CPT | Performed by: SURGERY

## 2022-06-03 PROCEDURE — 76000 FLUOROSCOPY <1 HR PHYS/QHP: CPT

## 2022-06-03 RX ORDER — NALOXONE HCL 0.4 MG/ML
0.04 VIAL (ML) INJECTION AS NEEDED
Status: DISCONTINUED | OUTPATIENT
Start: 2022-06-03 | End: 2022-06-03 | Stop reason: HOSPADM

## 2022-06-03 RX ORDER — SODIUM CHLORIDE 9 MG/ML
50 INJECTION, SOLUTION INTRAVENOUS CONTINUOUS
Status: DISCONTINUED | OUTPATIENT
Start: 2022-06-03 | End: 2022-06-03

## 2022-06-03 RX ORDER — ONDANSETRON 2 MG/ML
4 INJECTION INTRAMUSCULAR; INTRAVENOUS
Status: DISCONTINUED | OUTPATIENT
Start: 2022-06-03 | End: 2022-06-03 | Stop reason: HOSPADM

## 2022-06-03 RX ORDER — HYDROMORPHONE HYDROCHLORIDE 1 MG/ML
0.25 INJECTION, SOLUTION INTRAMUSCULAR; INTRAVENOUS; SUBCUTANEOUS
Status: DISCONTINUED | OUTPATIENT
Start: 2022-06-03 | End: 2022-06-03 | Stop reason: HOSPADM

## 2022-06-03 RX ORDER — SODIUM CHLORIDE, SODIUM LACTATE, POTASSIUM CHLORIDE, CALCIUM CHLORIDE 600; 310; 30; 20 MG/100ML; MG/100ML; MG/100ML; MG/100ML
100 INJECTION, SOLUTION INTRAVENOUS CONTINUOUS
Status: DISCONTINUED | OUTPATIENT
Start: 2022-06-03 | End: 2022-06-03

## 2022-06-03 RX ORDER — HEPARIN SODIUM 1000 [USP'U]/ML
INJECTION, SOLUTION INTRAVENOUS; SUBCUTANEOUS AS NEEDED
Status: DISCONTINUED | OUTPATIENT
Start: 2022-06-03 | End: 2022-06-03 | Stop reason: HOSPADM

## 2022-06-03 RX ORDER — LABETALOL HYDROCHLORIDE 5 MG/ML
5 INJECTION, SOLUTION INTRAVENOUS
Status: DISCONTINUED | OUTPATIENT
Start: 2022-06-03 | End: 2022-06-03 | Stop reason: HOSPADM

## 2022-06-03 RX ORDER — FAMOTIDINE 20 MG/1
20 TABLET, FILM COATED ORAL DAILY
Status: DISCONTINUED | OUTPATIENT
Start: 2022-06-04 | End: 2022-06-10 | Stop reason: HOSPADM

## 2022-06-03 RX ORDER — LIDOCAINE HYDROCHLORIDE 20 MG/ML
INJECTION, SOLUTION EPIDURAL; INFILTRATION; INTRACAUDAL; PERINEURAL AS NEEDED
Status: DISCONTINUED | OUTPATIENT
Start: 2022-06-03 | End: 2022-06-03 | Stop reason: SURG

## 2022-06-03 RX ORDER — HEPARIN SODIUM 1000 [USP'U]/ML
3200 INJECTION, SOLUTION INTRAVENOUS; SUBCUTANEOUS AS NEEDED
Status: DISCONTINUED | OUTPATIENT
Start: 2022-06-03 | End: 2022-06-10 | Stop reason: HOSPADM

## 2022-06-03 RX ORDER — FLUMAZENIL 0.1 MG/ML
0.2 INJECTION INTRAVENOUS AS NEEDED
Status: DISCONTINUED | OUTPATIENT
Start: 2022-06-03 | End: 2022-06-03 | Stop reason: HOSPADM

## 2022-06-03 RX ORDER — PROPOFOL 10 MG/ML
VIAL (ML) INTRAVENOUS AS NEEDED
Status: DISCONTINUED | OUTPATIENT
Start: 2022-06-03 | End: 2022-06-03 | Stop reason: SURG

## 2022-06-03 RX ORDER — DROPERIDOL 2.5 MG/ML
0.62 INJECTION, SOLUTION INTRAMUSCULAR; INTRAVENOUS ONCE AS NEEDED
Status: DISCONTINUED | OUTPATIENT
Start: 2022-06-03 | End: 2022-06-03 | Stop reason: HOSPADM

## 2022-06-03 RX ORDER — BUPIVACAINE HCL/0.9 % NACL/PF 0.1 %
2 PLASTIC BAG, INJECTION (ML) EPIDURAL ONCE
Status: COMPLETED | OUTPATIENT
Start: 2022-06-03 | End: 2022-06-03

## 2022-06-03 RX ORDER — LIDOCAINE HYDROCHLORIDE 10 MG/ML
0.5 INJECTION, SOLUTION EPIDURAL; INFILTRATION; INTRACAUDAL; PERINEURAL ONCE AS NEEDED
Status: DISCONTINUED | OUTPATIENT
Start: 2022-06-03 | End: 2022-06-03 | Stop reason: HOSPADM

## 2022-06-03 RX ORDER — LIDOCAINE HYDROCHLORIDE 10 MG/ML
INJECTION, SOLUTION EPIDURAL; INFILTRATION; INTRACAUDAL; PERINEURAL AS NEEDED
Status: DISCONTINUED | OUTPATIENT
Start: 2022-06-03 | End: 2022-06-03 | Stop reason: HOSPADM

## 2022-06-03 RX ORDER — MIDAZOLAM HYDROCHLORIDE 1 MG/ML
INJECTION INTRAMUSCULAR; INTRAVENOUS AS NEEDED
Status: DISCONTINUED | OUTPATIENT
Start: 2022-06-03 | End: 2022-06-03 | Stop reason: SURG

## 2022-06-03 RX ORDER — FENTANYL CITRATE 50 UG/ML
25 INJECTION, SOLUTION INTRAMUSCULAR; INTRAVENOUS
Status: DISCONTINUED | OUTPATIENT
Start: 2022-06-03 | End: 2022-06-03 | Stop reason: HOSPADM

## 2022-06-03 RX ORDER — SODIUM CHLORIDE 0.9 % (FLUSH) 0.9 %
3-10 SYRINGE (ML) INJECTION AS NEEDED
Status: DISCONTINUED | OUTPATIENT
Start: 2022-06-03 | End: 2022-06-03 | Stop reason: HOSPADM

## 2022-06-03 RX ORDER — SODIUM CHLORIDE 0.9 % (FLUSH) 0.9 %
3 SYRINGE (ML) INJECTION EVERY 12 HOURS SCHEDULED
Status: DISCONTINUED | OUTPATIENT
Start: 2022-06-03 | End: 2022-06-03 | Stop reason: HOSPADM

## 2022-06-03 RX ADMIN — VANCOMYCIN HYDROCHLORIDE 125 MG: 125 CAPSULE ORAL at 23:02

## 2022-06-03 RX ADMIN — VANCOMYCIN HYDROCHLORIDE 125 MG: 125 CAPSULE ORAL at 00:10

## 2022-06-03 RX ADMIN — VANCOMYCIN HYDROCHLORIDE 125 MG: 125 CAPSULE ORAL at 11:33

## 2022-06-03 RX ADMIN — METRONIDAZOLE 500 MG: 500 INJECTION, SOLUTION INTRAVENOUS at 00:10

## 2022-06-03 RX ADMIN — HYDROMORPHONE HYDROCHLORIDE 0.25 MG: 1 INJECTION, SOLUTION INTRAMUSCULAR; INTRAVENOUS; SUBCUTANEOUS at 02:26

## 2022-06-03 RX ADMIN — ISOSORBIDE DINITRATE 20 MG: 20 TABLET ORAL at 10:27

## 2022-06-03 RX ADMIN — TICAGRELOR 90 MG: 90 TABLET ORAL at 21:22

## 2022-06-03 RX ADMIN — MIDAZOLAM 2 MG: 1 INJECTION INTRAMUSCULAR; INTRAVENOUS at 08:43

## 2022-06-03 RX ADMIN — NIFEDIPINE 90 MG: 90 TABLET, EXTENDED RELEASE ORAL at 10:26

## 2022-06-03 RX ADMIN — BUSPIRONE HYDROCHLORIDE 10 MG: 10 TABLET ORAL at 16:46

## 2022-06-03 RX ADMIN — PROPOFOL 100 MCG/KG/MIN: 10 INJECTION, EMULSION INTRAVENOUS at 08:41

## 2022-06-03 RX ADMIN — SODIUM CHLORIDE 50 ML/HR: 9 INJECTION, SOLUTION INTRAVENOUS at 08:15

## 2022-06-03 RX ADMIN — ISOSORBIDE DINITRATE 20 MG: 20 TABLET ORAL at 18:04

## 2022-06-03 RX ADMIN — FAMOTIDINE 20 MG: 20 TABLET, FILM COATED ORAL at 05:18

## 2022-06-03 RX ADMIN — IPRATROPIUM BROMIDE AND ALBUTEROL SULFATE 3 ML: 2.5; .5 SOLUTION RESPIRATORY (INHALATION) at 06:49

## 2022-06-03 RX ADMIN — HYDROMORPHONE HYDROCHLORIDE 0.25 MG: 1 INJECTION, SOLUTION INTRAMUSCULAR; INTRAVENOUS; SUBCUTANEOUS at 23:03

## 2022-06-03 RX ADMIN — PROPOFOL 30 MG: 10 INJECTION, EMULSION INTRAVENOUS at 08:40

## 2022-06-03 RX ADMIN — IPRATROPIUM BROMIDE AND ALBUTEROL SULFATE 3 ML: 2.5; .5 SOLUTION RESPIRATORY (INHALATION) at 10:47

## 2022-06-03 RX ADMIN — METRONIDAZOLE 500 MG: 500 INJECTION, SOLUTION INTRAVENOUS at 16:46

## 2022-06-03 RX ADMIN — HYDROMORPHONE HYDROCHLORIDE 0.25 MG: 1 INJECTION, SOLUTION INTRAMUSCULAR; INTRAVENOUS; SUBCUTANEOUS at 18:04

## 2022-06-03 RX ADMIN — CARVEDILOL 25 MG: 25 TABLET, FILM COATED ORAL at 18:04

## 2022-06-03 RX ADMIN — IPRATROPIUM BROMIDE AND ALBUTEROL SULFATE 3 ML: 2.5; .5 SOLUTION RESPIRATORY (INHALATION) at 19:12

## 2022-06-03 RX ADMIN — CARVEDILOL 25 MG: 25 TABLET, FILM COATED ORAL at 10:26

## 2022-06-03 RX ADMIN — VANCOMYCIN HYDROCHLORIDE 125 MG: 125 CAPSULE ORAL at 05:17

## 2022-06-03 RX ADMIN — SODIUM CHLORIDE, POTASSIUM CHLORIDE, SODIUM LACTATE AND CALCIUM CHLORIDE 100 ML/HR: 600; 310; 30; 20 INJECTION, SOLUTION INTRAVENOUS at 02:26

## 2022-06-03 RX ADMIN — BUDESONIDE AND FORMOTEROL FUMARATE DIHYDRATE 2 PUFF: 160; 4.5 AEROSOL RESPIRATORY (INHALATION) at 06:49

## 2022-06-03 RX ADMIN — Medication 2 G: at 08:42

## 2022-06-03 RX ADMIN — IPRATROPIUM BROMIDE AND ALBUTEROL SULFATE 3 ML: 2.5; .5 SOLUTION RESPIRATORY (INHALATION) at 14:20

## 2022-06-03 RX ADMIN — HEPARIN SODIUM 3200 UNITS: 1000 INJECTION, SOLUTION INTRAVENOUS; SUBCUTANEOUS at 16:17

## 2022-06-03 RX ADMIN — BUDESONIDE AND FORMOTEROL FUMARATE DIHYDRATE 2 PUFF: 160; 4.5 AEROSOL RESPIRATORY (INHALATION) at 19:15

## 2022-06-03 RX ADMIN — LIDOCAINE HYDROCHLORIDE 100 MG: 20 INJECTION, SOLUTION EPIDURAL; INFILTRATION; INTRACAUDAL; PERINEURAL at 08:40

## 2022-06-03 RX ADMIN — VANCOMYCIN HYDROCHLORIDE 125 MG: 125 CAPSULE ORAL at 18:04

## 2022-06-03 RX ADMIN — METRONIDAZOLE 500 MG: 500 INJECTION, SOLUTION INTRAVENOUS at 05:17

## 2022-06-03 NOTE — ANESTHESIA PREPROCEDURE EVALUATION
Anesthesia Evaluation     no history of anesthetic complications:  NPO Solid Status: > 8 hours  NPO Liquid Status: > 8 hours           Airway   Mallampati: I  TM distance: >3 FB  Neck ROM: full  No difficulty expected  Dental      Pulmonary    (+) a smoker Current, COPD, asthma,sleep apnea,   Cardiovascular   Exercise tolerance: poor (<4 METS)    (+) hypertension, past MI , CAD, cardiac stents (2020) more than 12 months ago CHF , hyperlipidemia,     ROS comment: Echo 8/10/21  · Left ventricular ejection fraction appears to be 51 - 55%. Left ventricular systolic function is low normal.  · The following left ventricular wall segments are hypokinetic: apex hypokinetic.  · Mild aortic valve stenosis is present.  · Left ventricular wall thickness is consistent with mild concentric hypertrophy.  · Left ventricular diastolic function is consistent with (grade II w/high LAP) pseudonormalization.  · Left atrial volume is severely increased.  · Estimated right ventricular systolic pressure from tricuspid regurgitation is mildly elevated (35-45 mmHg). Calculated right ventricular systolic pressure from tricuspid regurgitation is 40.8 mmHg.  · Normal size and function of the right ventricle.    Pt was complaining of chest pain on admission  Troponins elevated and trended, felt to be secondary to ARF, peak 0.23  Received brilinta last night, aspirin yesterday am    Neuro/Psych  GI/Hepatic/Renal/Endo    (+) obesity,   renal disease ARF and CRI,     ROS Comment: Presented with ams, weakness  Found to have meth and amphetamines +  ARF needing dialysis    Musculoskeletal     Abdominal    Substance History   (+) drug use     OB/GYN          Other   blood dyscrasia anemia,                   Anesthesia Plan    ASA 4 - emergent     MAC   (Increased risk of cardiac complications discussed with patient)  intravenous induction     Anesthetic plan, all risks, benefits, and alternatives have been provided, discussed and informed consent has  been obtained with: patient.        CODE STATUS:

## 2022-06-03 NOTE — ANESTHESIA POSTPROCEDURE EVALUATION
"Patient: Ludivina Ramirez    Procedure Summary     Date: 06/03/22 Room / Location:  [facility] OR 06 /  [facility] OR    Anesthesia Start: 0835 Anesthesia Stop: 0906    Procedure: INSERTION OF RIGHT INTERNAL JUGULAR HEMODIALYSIS CATHETER (Right Neck) Diagnosis:       Acute kidney injury (HCC)      (Acute kidney injury (HCC) [N17.9])    Surgeons: Dexter eRd MD Provider: Alexia Carter CRNA    Anesthesia Type: MAC ASA Status: 4 - Emergent          Anesthesia Type: MAC    Vitals  Vitals Value Taken Time   /83 06/03/22 0930   Temp 98.4 °F (36.9 °C) 06/03/22 0930   Pulse 69 06/03/22 0930   Resp 16 06/03/22 0930   SpO2 96 % 06/03/22 0930           Post Anesthesia Care and Evaluation    Patient location during evaluation: PACU  Patient participation: complete - patient participated  Level of consciousness: awake and alert  Pain management: adequate  Airway patency: patent  Anesthetic complications: No anesthetic complications    Cardiovascular status: acceptable  Respiratory status: acceptable  Hydration status: acceptable    Comments: Blood pressure 138/83, pulse 69, temperature 98.4 °F (36.9 °C), resp. rate 16, height 167.6 cm (66\"), weight 109 kg (240 lb 1.3 oz), SpO2 96 %, not currently breastfeeding.    Pt discharged from PACU based on caprice score >8      "

## 2022-06-04 LAB
ALBUMIN SERPL-MCNC: 2.9 G/DL (ref 3.5–5.2)
ALBUMIN/GLOB SERPL: 0.9 G/DL
ALP SERPL-CCNC: 84 U/L (ref 39–117)
ALT SERPL W P-5'-P-CCNC: 18 U/L (ref 1–33)
ANION GAP SERPL CALCULATED.3IONS-SCNC: 10 MMOL/L (ref 5–15)
ARTERIAL PATENCY WRIST A: POSITIVE
AST SERPL-CCNC: 11 U/L (ref 1–32)
ATMOSPHERIC PRESS: 749 MMHG
BASE EXCESS BLDA CALC-SCNC: 5 MMOL/L (ref 0–2)
BASOPHILS # BLD AUTO: 0.08 10*3/MM3 (ref 0–0.2)
BASOPHILS NFR BLD AUTO: 1 % (ref 0–1.5)
BDY SITE: ABNORMAL
BILIRUB SERPL-MCNC: 0.4 MG/DL (ref 0–1.2)
BODY TEMPERATURE: 37 C
BUN SERPL-MCNC: 40 MG/DL (ref 8–23)
BUN/CREAT SERPL: 9.7 (ref 7–25)
CALCIUM SPEC-SCNC: 8 MG/DL (ref 8.6–10.5)
CHLORIDE SERPL-SCNC: 103 MMOL/L (ref 98–107)
CO2 SERPL-SCNC: 28 MMOL/L (ref 22–29)
CREAT SERPL-MCNC: 4.11 MG/DL (ref 0.57–1)
DEPRECATED RDW RBC AUTO: 52.1 FL (ref 37–54)
EGFRCR SERPLBLD CKD-EPI 2021: 11.8 ML/MIN/1.73
EOSINOPHIL # BLD AUTO: 0.17 10*3/MM3 (ref 0–0.4)
EOSINOPHIL NFR BLD AUTO: 2.2 % (ref 0.3–6.2)
ERYTHROCYTE [DISTWIDTH] IN BLOOD BY AUTOMATED COUNT: 14.5 % (ref 12.3–15.4)
GAS FLOW AIRWAY: 6 LPM
GLOBULIN UR ELPH-MCNC: 3.1 GM/DL
GLUCOSE SERPL-MCNC: 113 MG/DL (ref 65–99)
HCO3 BLDA-SCNC: 30.2 MMOL/L (ref 20–26)
HCT VFR BLD AUTO: 34.9 % (ref 34–46.6)
HCT VFR BLD AUTO: 36.7 % (ref 34–46.6)
HCT VFR BLD AUTO: 38.4 % (ref 34–46.6)
HGB BLD-MCNC: 10.9 G/DL (ref 12–15.9)
HGB BLD-MCNC: 11.2 G/DL (ref 12–15.9)
HGB BLD-MCNC: 12 G/DL (ref 12–15.9)
IMM GRANULOCYTES # BLD AUTO: 0.14 10*3/MM3 (ref 0–0.05)
IMM GRANULOCYTES NFR BLD AUTO: 1.8 % (ref 0–0.5)
LYMPHOCYTES # BLD AUTO: 0.7 10*3/MM3 (ref 0.7–3.1)
LYMPHOCYTES NFR BLD AUTO: 9.1 % (ref 19.6–45.3)
Lab: ABNORMAL
MCH RBC QN AUTO: 30.4 PG (ref 26.6–33)
MCHC RBC AUTO-ENTMCNC: 31.2 G/DL (ref 31.5–35.7)
MCV RBC AUTO: 97.2 FL (ref 79–97)
MODALITY: ABNORMAL
MONOCYTES # BLD AUTO: 0.85 10*3/MM3 (ref 0.1–0.9)
MONOCYTES NFR BLD AUTO: 11 % (ref 5–12)
NEUTROPHILS NFR BLD AUTO: 5.76 10*3/MM3 (ref 1.7–7)
NEUTROPHILS NFR BLD AUTO: 74.9 % (ref 42.7–76)
NRBC BLD AUTO-RTO: 0 /100 WBC (ref 0–0.2)
PCO2 BLDA: 46.5 MM HG (ref 35–45)
PCO2 TEMP ADJ BLD: 46.5 MM HG (ref 35–45)
PH BLDA: 7.42 PH UNITS (ref 7.35–7.45)
PH, TEMP CORRECTED: 7.42 PH UNITS (ref 7.35–7.45)
PLATELET # BLD AUTO: 198 10*3/MM3 (ref 140–450)
PMV BLD AUTO: 11 FL (ref 6–12)
PO2 BLDA: 62.1 MM HG (ref 83–108)
PO2 TEMP ADJ BLD: 62.1 MM HG (ref 83–108)
POTASSIUM SERPL-SCNC: 4.5 MMOL/L (ref 3.5–5.2)
PROT SERPL-MCNC: 6 G/DL (ref 6–8.5)
QT INTERVAL: 414 MS
QTC INTERVAL: 434 MS
RBC # BLD AUTO: 3.59 10*6/MM3 (ref 3.77–5.28)
SAO2 % BLDCOA: 93.1 % (ref 94–99)
SODIUM SERPL-SCNC: 141 MMOL/L (ref 136–145)
VENTILATOR MODE: ABNORMAL
WBC NRBC COR # BLD: 7.7 10*3/MM3 (ref 3.4–10.8)

## 2022-06-04 PROCEDURE — 85014 HEMATOCRIT: CPT | Performed by: SURGERY

## 2022-06-04 PROCEDURE — 85018 HEMOGLOBIN: CPT | Performed by: SURGERY

## 2022-06-04 PROCEDURE — 94799 UNLISTED PULMONARY SVC/PX: CPT

## 2022-06-04 PROCEDURE — 25010000002 ENOXAPARIN PER 10 MG: Performed by: SURGERY

## 2022-06-04 PROCEDURE — 25010000002 HEPARIN (PORCINE) PER 1000 UNITS: Performed by: INTERNAL MEDICINE

## 2022-06-04 PROCEDURE — 0 HYDROMORPHONE 1 MG/ML SOLUTION: Performed by: SURGERY

## 2022-06-04 PROCEDURE — 82803 BLOOD GASES ANY COMBINATION: CPT

## 2022-06-04 PROCEDURE — 99233 SBSQ HOSP IP/OBS HIGH 50: CPT | Performed by: INTERNAL MEDICINE

## 2022-06-04 PROCEDURE — 36600 WITHDRAWAL OF ARTERIAL BLOOD: CPT

## 2022-06-04 PROCEDURE — 85014 HEMATOCRIT: CPT | Performed by: FAMILY MEDICINE

## 2022-06-04 PROCEDURE — 85018 HEMOGLOBIN: CPT | Performed by: FAMILY MEDICINE

## 2022-06-04 PROCEDURE — 0 HYDROMORPHONE 1 MG/ML SOLUTION: Performed by: FAMILY MEDICINE

## 2022-06-04 RX ORDER — HEPARIN SODIUM 1000 [USP'U]/ML
3600 INJECTION, SOLUTION INTRAVENOUS; SUBCUTANEOUS ONCE
Status: COMPLETED | OUTPATIENT
Start: 2022-06-04 | End: 2022-06-04

## 2022-06-04 RX ORDER — LISINOPRIL 40 MG/1
40 TABLET ORAL DAILY
COMMUNITY
End: 2022-06-10 | Stop reason: HOSPADM

## 2022-06-04 RX ORDER — BUDESONIDE AND FORMOTEROL FUMARATE DIHYDRATE 160; 4.5 UG/1; UG/1
2 AEROSOL RESPIRATORY (INHALATION)
COMMUNITY
End: 2022-10-26 | Stop reason: SDUPTHER

## 2022-06-04 RX ORDER — NITROGLYCERIN 0.4 MG/1
0.4 TABLET SUBLINGUAL
COMMUNITY

## 2022-06-04 RX ADMIN — ASPIRIN 81 MG: 81 TABLET, COATED ORAL at 08:10

## 2022-06-04 RX ADMIN — VANCOMYCIN HYDROCHLORIDE 125 MG: 125 CAPSULE ORAL at 23:55

## 2022-06-04 RX ADMIN — HYDROMORPHONE HYDROCHLORIDE 0.25 MG: 1 INJECTION, SOLUTION INTRAMUSCULAR; INTRAVENOUS; SUBCUTANEOUS at 17:01

## 2022-06-04 RX ADMIN — CARVEDILOL 25 MG: 25 TABLET, FILM COATED ORAL at 17:02

## 2022-06-04 RX ADMIN — NIFEDIPINE 90 MG: 90 TABLET, EXTENDED RELEASE ORAL at 08:12

## 2022-06-04 RX ADMIN — IPRATROPIUM BROMIDE AND ALBUTEROL SULFATE 3 ML: 2.5; .5 SOLUTION RESPIRATORY (INHALATION) at 20:04

## 2022-06-04 RX ADMIN — CARVEDILOL 25 MG: 25 TABLET, FILM COATED ORAL at 08:10

## 2022-06-04 RX ADMIN — CETIRIZINE HYDROCHLORIDE 10 MG: 10 TABLET ORAL at 08:09

## 2022-06-04 RX ADMIN — ENOXAPARIN SODIUM 30 MG: 30 INJECTION SUBCUTANEOUS at 09:22

## 2022-06-04 RX ADMIN — Medication 10 ML: at 08:14

## 2022-06-04 RX ADMIN — FLUTICASONE PROPIONATE 2 SPRAY: 50 SPRAY, METERED NASAL at 08:10

## 2022-06-04 RX ADMIN — IPRATROPIUM BROMIDE AND ALBUTEROL SULFATE 3 ML: 2.5; .5 SOLUTION RESPIRATORY (INHALATION) at 10:23

## 2022-06-04 RX ADMIN — ROSUVASTATIN CALCIUM 10 MG: 10 TABLET, FILM COATED ORAL at 08:11

## 2022-06-04 RX ADMIN — OLANZAPINE 5 MG: 5 TABLET, FILM COATED ORAL at 21:18

## 2022-06-04 RX ADMIN — Medication 6 MG: at 21:18

## 2022-06-04 RX ADMIN — HEPARIN SODIUM 3600 UNITS: 1000 INJECTION, SOLUTION INTRAVENOUS; SUBCUTANEOUS at 16:42

## 2022-06-04 RX ADMIN — IPRATROPIUM BROMIDE AND ALBUTEROL SULFATE 3 ML: 2.5; .5 SOLUTION RESPIRATORY (INHALATION) at 06:40

## 2022-06-04 RX ADMIN — HYDROMORPHONE HYDROCHLORIDE 0.25 MG: 1 INJECTION, SOLUTION INTRAMUSCULAR; INTRAVENOUS; SUBCUTANEOUS at 20:51

## 2022-06-04 RX ADMIN — VANCOMYCIN HYDROCHLORIDE 125 MG: 125 CAPSULE ORAL at 11:09

## 2022-06-04 RX ADMIN — ISOSORBIDE DINITRATE 20 MG: 20 TABLET ORAL at 11:10

## 2022-06-04 RX ADMIN — Medication 10 ML: at 21:18

## 2022-06-04 RX ADMIN — BUDESONIDE AND FORMOTEROL FUMARATE DIHYDRATE 2 PUFF: 160; 4.5 AEROSOL RESPIRATORY (INHALATION) at 06:42

## 2022-06-04 RX ADMIN — ISOSORBIDE DINITRATE 20 MG: 20 TABLET ORAL at 08:09

## 2022-06-04 RX ADMIN — BUDESONIDE AND FORMOTEROL FUMARATE DIHYDRATE 2 PUFF: 160; 4.5 AEROSOL RESPIRATORY (INHALATION) at 20:04

## 2022-06-04 RX ADMIN — VANCOMYCIN HYDROCHLORIDE 125 MG: 125 CAPSULE ORAL at 17:01

## 2022-06-04 RX ADMIN — BUSPIRONE HYDROCHLORIDE 10 MG: 10 TABLET ORAL at 08:09

## 2022-06-04 RX ADMIN — ISOSORBIDE DINITRATE 20 MG: 20 TABLET ORAL at 17:01

## 2022-06-04 RX ADMIN — BUSPIRONE HYDROCHLORIDE 10 MG: 10 TABLET ORAL at 21:18

## 2022-06-04 RX ADMIN — TICAGRELOR 90 MG: 90 TABLET ORAL at 08:08

## 2022-06-04 RX ADMIN — TICAGRELOR 90 MG: 90 TABLET ORAL at 21:18

## 2022-06-04 RX ADMIN — BUSPIRONE HYDROCHLORIDE 10 MG: 10 TABLET ORAL at 17:01

## 2022-06-04 RX ADMIN — HYDROMORPHONE HYDROCHLORIDE 0.25 MG: 1 INJECTION, SOLUTION INTRAMUSCULAR; INTRAVENOUS; SUBCUTANEOUS at 05:16

## 2022-06-04 RX ADMIN — VANCOMYCIN HYDROCHLORIDE 125 MG: 125 CAPSULE ORAL at 05:18

## 2022-06-04 RX ADMIN — CALCITRIOL 0.25 MCG: 0.25 CAPSULE ORAL at 08:09

## 2022-06-04 RX ADMIN — FAMOTIDINE 20 MG: 20 TABLET, FILM COATED ORAL at 08:09

## 2022-06-05 LAB
ALBUMIN SERPL-MCNC: 3.9 G/DL (ref 3.5–5.2)
ALBUMIN/GLOB SERPL: 1.3 G/DL
ALP SERPL-CCNC: 92 U/L (ref 39–117)
ALT SERPL W P-5'-P-CCNC: 13 U/L (ref 1–33)
ANION GAP SERPL CALCULATED.3IONS-SCNC: 10 MMOL/L (ref 5–15)
AST SERPL-CCNC: 17 U/L (ref 1–32)
BASOPHILS # BLD AUTO: 0.11 10*3/MM3 (ref 0–0.2)
BASOPHILS NFR BLD AUTO: 1.3 % (ref 0–1.5)
BILIRUB SERPL-MCNC: 0.6 MG/DL (ref 0–1.2)
BUN SERPL-MCNC: 25 MG/DL (ref 8–23)
BUN/CREAT SERPL: 8.5 (ref 7–25)
CALCIUM SPEC-SCNC: 9.2 MG/DL (ref 8.6–10.5)
CHLORIDE SERPL-SCNC: 103 MMOL/L (ref 98–107)
CO2 SERPL-SCNC: 29 MMOL/L (ref 22–29)
CREAT SERPL-MCNC: 2.95 MG/DL (ref 0.57–1)
DEPRECATED RDW RBC AUTO: 49 FL (ref 37–54)
EGFRCR SERPLBLD CKD-EPI 2021: 17.5 ML/MIN/1.73
EOSINOPHIL # BLD AUTO: 0.2 10*3/MM3 (ref 0–0.4)
EOSINOPHIL NFR BLD AUTO: 2.3 % (ref 0.3–6.2)
ERYTHROCYTE [DISTWIDTH] IN BLOOD BY AUTOMATED COUNT: 14.3 % (ref 12.3–15.4)
GLOBULIN UR ELPH-MCNC: 3.1 GM/DL
GLUCOSE SERPL-MCNC: 111 MG/DL (ref 65–99)
HCT VFR BLD AUTO: 36.5 % (ref 34–46.6)
HCT VFR BLD AUTO: 39.4 % (ref 34–46.6)
HCT VFR BLD AUTO: 41.4 % (ref 34–46.6)
HGB BLD-MCNC: 11.5 G/DL (ref 12–15.9)
HGB BLD-MCNC: 12.5 G/DL (ref 12–15.9)
HGB BLD-MCNC: 13.1 G/DL (ref 12–15.9)
IMM GRANULOCYTES # BLD AUTO: 0.39 10*3/MM3 (ref 0–0.05)
IMM GRANULOCYTES NFR BLD AUTO: 4.4 % (ref 0–0.5)
LYMPHOCYTES # BLD AUTO: 0.83 10*3/MM3 (ref 0.7–3.1)
LYMPHOCYTES NFR BLD AUTO: 9.5 % (ref 19.6–45.3)
MCH RBC QN AUTO: 30 PG (ref 26.6–33)
MCHC RBC AUTO-ENTMCNC: 31.7 G/DL (ref 31.5–35.7)
MCV RBC AUTO: 94.7 FL (ref 79–97)
MONOCYTES # BLD AUTO: 1.04 10*3/MM3 (ref 0.1–0.9)
MONOCYTES NFR BLD AUTO: 11.8 % (ref 5–12)
NEUTROPHILS NFR BLD AUTO: 6.21 10*3/MM3 (ref 1.7–7)
NEUTROPHILS NFR BLD AUTO: 70.7 % (ref 42.7–76)
NRBC BLD AUTO-RTO: 0 /100 WBC (ref 0–0.2)
PLATELET # BLD AUTO: 225 10*3/MM3 (ref 140–450)
PMV BLD AUTO: 10.6 FL (ref 6–12)
POTASSIUM SERPL-SCNC: 3.8 MMOL/L (ref 3.5–5.2)
PROT SERPL-MCNC: 7 G/DL (ref 6–8.5)
RBC # BLD AUTO: 4.16 10*6/MM3 (ref 3.77–5.28)
SODIUM SERPL-SCNC: 142 MMOL/L (ref 136–145)
WBC NRBC COR # BLD: 8.78 10*3/MM3 (ref 3.4–10.8)

## 2022-06-05 PROCEDURE — 94799 UNLISTED PULMONARY SVC/PX: CPT

## 2022-06-05 PROCEDURE — 99233 SBSQ HOSP IP/OBS HIGH 50: CPT | Performed by: INTERNAL MEDICINE

## 2022-06-05 PROCEDURE — 85014 HEMATOCRIT: CPT | Performed by: FAMILY MEDICINE

## 2022-06-05 PROCEDURE — 0 HYDROMORPHONE 1 MG/ML SOLUTION: Performed by: FAMILY MEDICINE

## 2022-06-05 PROCEDURE — 85018 HEMOGLOBIN: CPT | Performed by: FAMILY MEDICINE

## 2022-06-05 PROCEDURE — 80053 COMPREHEN METABOLIC PANEL: CPT | Performed by: FAMILY MEDICINE

## 2022-06-05 PROCEDURE — 85025 COMPLETE CBC W/AUTO DIFF WBC: CPT | Performed by: INTERNAL MEDICINE

## 2022-06-05 RX ADMIN — FAMOTIDINE 20 MG: 20 TABLET, FILM COATED ORAL at 08:26

## 2022-06-05 RX ADMIN — NIFEDIPINE 90 MG: 90 TABLET, EXTENDED RELEASE ORAL at 08:26

## 2022-06-05 RX ADMIN — VANCOMYCIN HYDROCHLORIDE 125 MG: 125 CAPSULE ORAL at 06:10

## 2022-06-05 RX ADMIN — CARVEDILOL 25 MG: 25 TABLET, FILM COATED ORAL at 08:27

## 2022-06-05 RX ADMIN — VANCOMYCIN HYDROCHLORIDE 125 MG: 125 CAPSULE ORAL at 23:53

## 2022-06-05 RX ADMIN — ISOSORBIDE DINITRATE 20 MG: 20 TABLET ORAL at 16:46

## 2022-06-05 RX ADMIN — CETIRIZINE HYDROCHLORIDE 10 MG: 10 TABLET ORAL at 08:26

## 2022-06-05 RX ADMIN — CALCITRIOL 0.25 MCG: 0.25 CAPSULE ORAL at 08:26

## 2022-06-05 RX ADMIN — IPRATROPIUM BROMIDE AND ALBUTEROL SULFATE 3 ML: 2.5; .5 SOLUTION RESPIRATORY (INHALATION) at 21:09

## 2022-06-05 RX ADMIN — ISOSORBIDE DINITRATE 20 MG: 20 TABLET ORAL at 12:35

## 2022-06-05 RX ADMIN — VANCOMYCIN HYDROCHLORIDE 125 MG: 125 CAPSULE ORAL at 12:35

## 2022-06-05 RX ADMIN — VANCOMYCIN HYDROCHLORIDE 125 MG: 125 CAPSULE ORAL at 16:46

## 2022-06-05 RX ADMIN — TICAGRELOR 90 MG: 90 TABLET ORAL at 20:50

## 2022-06-05 RX ADMIN — CARVEDILOL 25 MG: 25 TABLET, FILM COATED ORAL at 16:46

## 2022-06-05 RX ADMIN — ASPIRIN 81 MG: 81 TABLET, COATED ORAL at 08:26

## 2022-06-05 RX ADMIN — HYDROMORPHONE HYDROCHLORIDE 0.25 MG: 1 INJECTION, SOLUTION INTRAMUSCULAR; INTRAVENOUS; SUBCUTANEOUS at 20:55

## 2022-06-05 RX ADMIN — ISOSORBIDE DINITRATE 20 MG: 20 TABLET ORAL at 08:26

## 2022-06-05 RX ADMIN — TICAGRELOR 90 MG: 90 TABLET ORAL at 08:26

## 2022-06-05 RX ADMIN — BUSPIRONE HYDROCHLORIDE 10 MG: 10 TABLET ORAL at 14:55

## 2022-06-05 RX ADMIN — IPRATROPIUM BROMIDE AND ALBUTEROL SULFATE 3 ML: 2.5; .5 SOLUTION RESPIRATORY (INHALATION) at 14:37

## 2022-06-05 RX ADMIN — IPRATROPIUM BROMIDE AND ALBUTEROL SULFATE 3 ML: 2.5; .5 SOLUTION RESPIRATORY (INHALATION) at 10:53

## 2022-06-05 RX ADMIN — Medication 10 ML: at 20:50

## 2022-06-05 RX ADMIN — OLANZAPINE 5 MG: 5 TABLET, FILM COATED ORAL at 20:50

## 2022-06-05 RX ADMIN — ROSUVASTATIN CALCIUM 10 MG: 10 TABLET, FILM COATED ORAL at 08:26

## 2022-06-05 RX ADMIN — HYDROMORPHONE HYDROCHLORIDE 0.25 MG: 1 INJECTION, SOLUTION INTRAMUSCULAR; INTRAVENOUS; SUBCUTANEOUS at 14:55

## 2022-06-05 RX ADMIN — BUSPIRONE HYDROCHLORIDE 10 MG: 10 TABLET ORAL at 08:26

## 2022-06-05 RX ADMIN — Medication 6 MG: at 20:50

## 2022-06-05 RX ADMIN — BUDESONIDE AND FORMOTEROL FUMARATE DIHYDRATE 2 PUFF: 160; 4.5 AEROSOL RESPIRATORY (INHALATION) at 21:10

## 2022-06-05 RX ADMIN — BUSPIRONE HYDROCHLORIDE 10 MG: 10 TABLET ORAL at 20:50

## 2022-06-06 LAB
ALBUMIN SERPL-MCNC: 3.4 G/DL (ref 3.5–5.2)
ALBUMIN/GLOB SERPL: 1.1 G/DL
ALP SERPL-CCNC: 84 U/L (ref 39–117)
ALT SERPL W P-5'-P-CCNC: 16 U/L (ref 1–33)
ANION GAP SERPL CALCULATED.3IONS-SCNC: 14 MMOL/L (ref 5–15)
AST SERPL-CCNC: 26 U/L (ref 1–32)
BASOPHILS # BLD AUTO: 0.11 10*3/MM3 (ref 0–0.2)
BASOPHILS NFR BLD AUTO: 1.2 % (ref 0–1.5)
BILIRUB SERPL-MCNC: 0.5 MG/DL (ref 0–1.2)
BUN SERPL-MCNC: 34 MG/DL (ref 8–23)
BUN/CREAT SERPL: 9.4 (ref 7–25)
CALCIUM SPEC-SCNC: 9 MG/DL (ref 8.6–10.5)
CHLORIDE SERPL-SCNC: 101 MMOL/L (ref 98–107)
CO2 SERPL-SCNC: 26 MMOL/L (ref 22–29)
CREAT SERPL-MCNC: 3.63 MG/DL (ref 0.57–1)
DEPRECATED RDW RBC AUTO: 49.4 FL (ref 37–54)
EGFRCR SERPLBLD CKD-EPI 2021: 13.7 ML/MIN/1.73
EOSINOPHIL # BLD AUTO: 0.24 10*3/MM3 (ref 0–0.4)
EOSINOPHIL NFR BLD AUTO: 2.6 % (ref 0.3–6.2)
ERYTHROCYTE [DISTWIDTH] IN BLOOD BY AUTOMATED COUNT: 14 % (ref 12.3–15.4)
GLOBULIN UR ELPH-MCNC: 3 GM/DL
GLUCOSE SERPL-MCNC: 121 MG/DL (ref 65–99)
HCT VFR BLD AUTO: 38.5 % (ref 34–46.6)
HCT VFR BLD AUTO: 38.9 % (ref 34–46.6)
HCT VFR BLD AUTO: 39.6 % (ref 34–46.6)
HCT VFR BLD AUTO: 40.3 % (ref 34–46.6)
HGB BLD-MCNC: 11.9 G/DL (ref 12–15.9)
HGB BLD-MCNC: 12.3 G/DL (ref 12–15.9)
HGB BLD-MCNC: 12.6 G/DL (ref 12–15.9)
HGB BLD-MCNC: 12.9 G/DL (ref 12–15.9)
IMM GRANULOCYTES # BLD AUTO: 0.46 10*3/MM3 (ref 0–0.05)
IMM GRANULOCYTES NFR BLD AUTO: 5 % (ref 0–0.5)
LYMPHOCYTES # BLD AUTO: 1.12 10*3/MM3 (ref 0.7–3.1)
LYMPHOCYTES NFR BLD AUTO: 12.1 % (ref 19.6–45.3)
MCH RBC QN AUTO: 29.4 PG (ref 26.6–33)
MCHC RBC AUTO-ENTMCNC: 30.6 G/DL (ref 31.5–35.7)
MCV RBC AUTO: 96 FL (ref 79–97)
MONOCYTES # BLD AUTO: 0.9 10*3/MM3 (ref 0.1–0.9)
MONOCYTES NFR BLD AUTO: 9.8 % (ref 5–12)
NEUTROPHILS NFR BLD AUTO: 6.4 10*3/MM3 (ref 1.7–7)
NEUTROPHILS NFR BLD AUTO: 69.3 % (ref 42.7–76)
NRBC BLD AUTO-RTO: 0 /100 WBC (ref 0–0.2)
PLATELET # BLD AUTO: 226 10*3/MM3 (ref 140–450)
PMV BLD AUTO: 10.5 FL (ref 6–12)
POTASSIUM SERPL-SCNC: 3.6 MMOL/L (ref 3.5–5.2)
PROT SERPL-MCNC: 6.4 G/DL (ref 6–8.5)
RBC # BLD AUTO: 4.05 10*6/MM3 (ref 3.77–5.28)
SODIUM SERPL-SCNC: 141 MMOL/L (ref 136–145)
TROPONIN T SERPL-MCNC: 0.19 NG/ML (ref 0–0.03)
WBC NRBC COR # BLD: 9.23 10*3/MM3 (ref 3.4–10.8)

## 2022-06-06 PROCEDURE — 25010000002 HEPARIN (PORCINE) PER 1000 UNITS: Performed by: FAMILY MEDICINE

## 2022-06-06 PROCEDURE — 93005 ELECTROCARDIOGRAM TRACING: CPT | Performed by: FAMILY MEDICINE

## 2022-06-06 PROCEDURE — 80053 COMPREHEN METABOLIC PANEL: CPT | Performed by: FAMILY MEDICINE

## 2022-06-06 PROCEDURE — 85025 COMPLETE CBC W/AUTO DIFF WBC: CPT | Performed by: INTERNAL MEDICINE

## 2022-06-06 PROCEDURE — 0 HYDROMORPHONE 1 MG/ML SOLUTION: Performed by: FAMILY MEDICINE

## 2022-06-06 PROCEDURE — 94799 UNLISTED PULMONARY SVC/PX: CPT

## 2022-06-06 PROCEDURE — 85014 HEMATOCRIT: CPT | Performed by: FAMILY MEDICINE

## 2022-06-06 PROCEDURE — 94664 DEMO&/EVAL PT USE INHALER: CPT

## 2022-06-06 PROCEDURE — 93010 ELECTROCARDIOGRAM REPORT: CPT | Performed by: INTERNAL MEDICINE

## 2022-06-06 PROCEDURE — 25010000002 ENOXAPARIN PER 10 MG: Performed by: FAMILY MEDICINE

## 2022-06-06 PROCEDURE — 84484 ASSAY OF TROPONIN QUANT: CPT | Performed by: FAMILY MEDICINE

## 2022-06-06 PROCEDURE — 85018 HEMOGLOBIN: CPT | Performed by: FAMILY MEDICINE

## 2022-06-06 PROCEDURE — 99233 SBSQ HOSP IP/OBS HIGH 50: CPT | Performed by: INTERNAL MEDICINE

## 2022-06-06 RX ORDER — NITROGLYCERIN 0.4 MG/1
0.4 TABLET SUBLINGUAL
Status: DISCONTINUED | OUTPATIENT
Start: 2022-06-06 | End: 2022-06-10 | Stop reason: HOSPADM

## 2022-06-06 RX ADMIN — BUDESONIDE AND FORMOTEROL FUMARATE DIHYDRATE 2 PUFF: 160; 4.5 AEROSOL RESPIRATORY (INHALATION) at 18:29

## 2022-06-06 RX ADMIN — Medication 10 ML: at 09:29

## 2022-06-06 RX ADMIN — HYDROMORPHONE HYDROCHLORIDE 0.25 MG: 1 INJECTION, SOLUTION INTRAMUSCULAR; INTRAVENOUS; SUBCUTANEOUS at 20:45

## 2022-06-06 RX ADMIN — ISOSORBIDE DINITRATE 20 MG: 20 TABLET ORAL at 18:23

## 2022-06-06 RX ADMIN — HYDROMORPHONE HYDROCHLORIDE 0.25 MG: 1 INJECTION, SOLUTION INTRAMUSCULAR; INTRAVENOUS; SUBCUTANEOUS at 13:12

## 2022-06-06 RX ADMIN — IPRATROPIUM BROMIDE AND ALBUTEROL SULFATE 3 ML: 2.5; .5 SOLUTION RESPIRATORY (INHALATION) at 18:29

## 2022-06-06 RX ADMIN — ISOSORBIDE DINITRATE 20 MG: 20 TABLET ORAL at 09:29

## 2022-06-06 RX ADMIN — IPRATROPIUM BROMIDE AND ALBUTEROL SULFATE 3 ML: 2.5; .5 SOLUTION RESPIRATORY (INHALATION) at 06:44

## 2022-06-06 RX ADMIN — Medication 10 ML: at 20:45

## 2022-06-06 RX ADMIN — ENOXAPARIN SODIUM 30 MG: 30 INJECTION SUBCUTANEOUS at 09:29

## 2022-06-06 RX ADMIN — Medication 6 MG: at 20:45

## 2022-06-06 RX ADMIN — NIFEDIPINE 90 MG: 90 TABLET, EXTENDED RELEASE ORAL at 09:30

## 2022-06-06 RX ADMIN — IPRATROPIUM BROMIDE AND ALBUTEROL SULFATE 3 ML: 2.5; .5 SOLUTION RESPIRATORY (INHALATION) at 10:30

## 2022-06-06 RX ADMIN — HEPARIN SODIUM 3200 UNITS: 1000 INJECTION, SOLUTION INTRAVENOUS; SUBCUTANEOUS at 18:12

## 2022-06-06 RX ADMIN — BUDESONIDE AND FORMOTEROL FUMARATE DIHYDRATE 2 PUFF: 160; 4.5 AEROSOL RESPIRATORY (INHALATION) at 06:44

## 2022-06-06 RX ADMIN — BUSPIRONE HYDROCHLORIDE 10 MG: 10 TABLET ORAL at 20:46

## 2022-06-06 RX ADMIN — ASPIRIN 81 MG: 81 TABLET, COATED ORAL at 09:29

## 2022-06-06 RX ADMIN — TICAGRELOR 90 MG: 90 TABLET ORAL at 09:30

## 2022-06-06 RX ADMIN — ACETAMINOPHEN 650 MG: 325 TABLET ORAL at 18:23

## 2022-06-06 RX ADMIN — CARVEDILOL 25 MG: 25 TABLET, FILM COATED ORAL at 09:29

## 2022-06-06 RX ADMIN — ACETAMINOPHEN 650 MG: 325 TABLET ORAL at 09:36

## 2022-06-06 RX ADMIN — TICAGRELOR 90 MG: 90 TABLET ORAL at 20:46

## 2022-06-06 RX ADMIN — BUSPIRONE HYDROCHLORIDE 10 MG: 10 TABLET ORAL at 09:29

## 2022-06-06 RX ADMIN — VANCOMYCIN HYDROCHLORIDE 125 MG: 125 CAPSULE ORAL at 18:23

## 2022-06-06 RX ADMIN — FAMOTIDINE 20 MG: 20 TABLET, FILM COATED ORAL at 09:29

## 2022-06-06 RX ADMIN — VANCOMYCIN HYDROCHLORIDE 125 MG: 125 CAPSULE ORAL at 12:26

## 2022-06-06 RX ADMIN — CALCITRIOL 0.25 MCG: 0.25 CAPSULE ORAL at 09:29

## 2022-06-06 RX ADMIN — CARVEDILOL 25 MG: 25 TABLET, FILM COATED ORAL at 18:23

## 2022-06-06 RX ADMIN — FLUTICASONE PROPIONATE 2 SPRAY: 50 SPRAY, METERED NASAL at 09:30

## 2022-06-06 RX ADMIN — ROSUVASTATIN CALCIUM 10 MG: 10 TABLET, FILM COATED ORAL at 09:30

## 2022-06-06 RX ADMIN — VANCOMYCIN HYDROCHLORIDE 125 MG: 125 CAPSULE ORAL at 04:13

## 2022-06-06 RX ADMIN — ISOSORBIDE DINITRATE 20 MG: 20 TABLET ORAL at 12:25

## 2022-06-06 RX ADMIN — CETIRIZINE HYDROCHLORIDE 10 MG: 10 TABLET ORAL at 09:29

## 2022-06-07 LAB
ALBUMIN SERPL-MCNC: 3.7 G/DL (ref 3.5–5.2)
ALBUMIN/GLOB SERPL: 1.1 G/DL
ALP SERPL-CCNC: 88 U/L (ref 39–117)
ALT SERPL W P-5'-P-CCNC: 20 U/L (ref 1–33)
ANION GAP SERPL CALCULATED.3IONS-SCNC: 14 MMOL/L (ref 5–15)
AST SERPL-CCNC: 29 U/L (ref 1–32)
BILIRUB SERPL-MCNC: 0.4 MG/DL (ref 0–1.2)
BUN SERPL-MCNC: 19 MG/DL (ref 8–23)
BUN/CREAT SERPL: 6.5 (ref 7–25)
CALCIUM SPEC-SCNC: 9.1 MG/DL (ref 8.6–10.5)
CHLORIDE SERPL-SCNC: 99 MMOL/L (ref 98–107)
CO2 SERPL-SCNC: 27 MMOL/L (ref 22–29)
CREAT SERPL-MCNC: 2.92 MG/DL (ref 0.57–1)
EGFRCR SERPLBLD CKD-EPI 2021: 17.8 ML/MIN/1.73
GLOBULIN UR ELPH-MCNC: 3.3 GM/DL
GLUCOSE SERPL-MCNC: 125 MG/DL (ref 65–99)
HCT VFR BLD AUTO: 38.1 % (ref 34–46.6)
HCT VFR BLD AUTO: 38.8 % (ref 34–46.6)
HCT VFR BLD AUTO: 41.9 % (ref 34–46.6)
HGB BLD-MCNC: 12.3 G/DL (ref 12–15.9)
HGB BLD-MCNC: 12.4 G/DL (ref 12–15.9)
HGB BLD-MCNC: 13.3 G/DL (ref 12–15.9)
POTASSIUM SERPL-SCNC: 3.8 MMOL/L (ref 3.5–5.2)
PROT SERPL-MCNC: 7 G/DL (ref 6–8.5)
QT INTERVAL: 384 MS
QTC INTERVAL: 411 MS
SODIUM SERPL-SCNC: 140 MMOL/L (ref 136–145)

## 2022-06-07 PROCEDURE — 85018 HEMOGLOBIN: CPT | Performed by: FAMILY MEDICINE

## 2022-06-07 PROCEDURE — 99233 SBSQ HOSP IP/OBS HIGH 50: CPT | Performed by: INTERNAL MEDICINE

## 2022-06-07 PROCEDURE — 25010000002 ENOXAPARIN PER 10 MG: Performed by: FAMILY MEDICINE

## 2022-06-07 PROCEDURE — 0 HYDROMORPHONE 1 MG/ML SOLUTION: Performed by: FAMILY MEDICINE

## 2022-06-07 PROCEDURE — 80053 COMPREHEN METABOLIC PANEL: CPT | Performed by: FAMILY MEDICINE

## 2022-06-07 PROCEDURE — 94799 UNLISTED PULMONARY SVC/PX: CPT

## 2022-06-07 PROCEDURE — 85014 HEMATOCRIT: CPT | Performed by: FAMILY MEDICINE

## 2022-06-07 RX ADMIN — CARVEDILOL 25 MG: 25 TABLET, FILM COATED ORAL at 18:36

## 2022-06-07 RX ADMIN — Medication 10 ML: at 21:34

## 2022-06-07 RX ADMIN — Medication 6 MG: at 21:33

## 2022-06-07 RX ADMIN — TICAGRELOR 90 MG: 90 TABLET ORAL at 08:21

## 2022-06-07 RX ADMIN — BUSPIRONE HYDROCHLORIDE 10 MG: 10 TABLET ORAL at 18:35

## 2022-06-07 RX ADMIN — IPRATROPIUM BROMIDE AND ALBUTEROL SULFATE 3 ML: 2.5; .5 SOLUTION RESPIRATORY (INHALATION) at 10:40

## 2022-06-07 RX ADMIN — CALCITRIOL 0.25 MCG: 0.25 CAPSULE ORAL at 08:22

## 2022-06-07 RX ADMIN — NIFEDIPINE 90 MG: 90 TABLET, EXTENDED RELEASE ORAL at 08:21

## 2022-06-07 RX ADMIN — ISOSORBIDE DINITRATE 20 MG: 20 TABLET ORAL at 18:36

## 2022-06-07 RX ADMIN — VANCOMYCIN HYDROCHLORIDE 125 MG: 125 CAPSULE ORAL at 05:06

## 2022-06-07 RX ADMIN — BUSPIRONE HYDROCHLORIDE 10 MG: 10 TABLET ORAL at 21:33

## 2022-06-07 RX ADMIN — BUSPIRONE HYDROCHLORIDE 10 MG: 10 TABLET ORAL at 08:21

## 2022-06-07 RX ADMIN — ASPIRIN 81 MG: 81 TABLET, COATED ORAL at 08:21

## 2022-06-07 RX ADMIN — IPRATROPIUM BROMIDE AND ALBUTEROL SULFATE 3 ML: 2.5; .5 SOLUTION RESPIRATORY (INHALATION) at 06:42

## 2022-06-07 RX ADMIN — ISOSORBIDE DINITRATE 20 MG: 20 TABLET ORAL at 13:24

## 2022-06-07 RX ADMIN — TICAGRELOR 90 MG: 90 TABLET ORAL at 22:28

## 2022-06-07 RX ADMIN — HYDROMORPHONE HYDROCHLORIDE 0.25 MG: 1 INJECTION, SOLUTION INTRAMUSCULAR; INTRAVENOUS; SUBCUTANEOUS at 13:28

## 2022-06-07 RX ADMIN — BUDESONIDE AND FORMOTEROL FUMARATE DIHYDRATE 2 PUFF: 160; 4.5 AEROSOL RESPIRATORY (INHALATION) at 20:49

## 2022-06-07 RX ADMIN — ENOXAPARIN SODIUM 30 MG: 30 INJECTION SUBCUTANEOUS at 08:21

## 2022-06-07 RX ADMIN — VANCOMYCIN HYDROCHLORIDE 125 MG: 125 CAPSULE ORAL at 18:36

## 2022-06-07 RX ADMIN — CARVEDILOL 25 MG: 25 TABLET, FILM COATED ORAL at 08:22

## 2022-06-07 RX ADMIN — VANCOMYCIN HYDROCHLORIDE 125 MG: 125 CAPSULE ORAL at 13:24

## 2022-06-07 RX ADMIN — HYDROMORPHONE HYDROCHLORIDE 0.25 MG: 1 INJECTION, SOLUTION INTRAMUSCULAR; INTRAVENOUS; SUBCUTANEOUS at 10:19

## 2022-06-07 RX ADMIN — IPRATROPIUM BROMIDE AND ALBUTEROL SULFATE 3 ML: 2.5; .5 SOLUTION RESPIRATORY (INHALATION) at 15:01

## 2022-06-07 RX ADMIN — ROSUVASTATIN CALCIUM 10 MG: 10 TABLET, FILM COATED ORAL at 08:22

## 2022-06-07 RX ADMIN — VANCOMYCIN HYDROCHLORIDE 125 MG: 125 CAPSULE ORAL at 01:25

## 2022-06-07 RX ADMIN — FAMOTIDINE 20 MG: 20 TABLET, FILM COATED ORAL at 08:21

## 2022-06-07 RX ADMIN — IPRATROPIUM BROMIDE AND ALBUTEROL SULFATE 3 ML: 2.5; .5 SOLUTION RESPIRATORY (INHALATION) at 20:48

## 2022-06-07 RX ADMIN — ISOSORBIDE DINITRATE 20 MG: 20 TABLET ORAL at 08:21

## 2022-06-07 RX ADMIN — HYDROMORPHONE HYDROCHLORIDE 0.25 MG: 1 INJECTION, SOLUTION INTRAMUSCULAR; INTRAVENOUS; SUBCUTANEOUS at 19:29

## 2022-06-07 RX ADMIN — Medication 10 ML: at 08:25

## 2022-06-07 RX ADMIN — BUDESONIDE AND FORMOTEROL FUMARATE DIHYDRATE 2 PUFF: 160; 4.5 AEROSOL RESPIRATORY (INHALATION) at 06:42

## 2022-06-08 LAB
ALBUMIN SERPL-MCNC: 3.6 G/DL (ref 3.5–5.2)
ALBUMIN/GLOB SERPL: 1.2 G/DL
ALP SERPL-CCNC: 83 U/L (ref 39–117)
ALT SERPL W P-5'-P-CCNC: 15 U/L (ref 1–33)
ANION GAP SERPL CALCULATED.3IONS-SCNC: 12 MMOL/L (ref 5–15)
AST SERPL-CCNC: 17 U/L (ref 1–32)
BILIRUB SERPL-MCNC: 0.3 MG/DL (ref 0–1.2)
BUN SERPL-MCNC: 40 MG/DL (ref 8–23)
BUN/CREAT SERPL: 9.6 (ref 7–25)
CALCIUM SPEC-SCNC: 9.1 MG/DL (ref 8.6–10.5)
CHLORIDE SERPL-SCNC: 101 MMOL/L (ref 98–107)
CO2 SERPL-SCNC: 25 MMOL/L (ref 22–29)
CREAT SERPL-MCNC: 4.15 MG/DL (ref 0.57–1)
EGFRCR SERPLBLD CKD-EPI 2021: 11.6 ML/MIN/1.73
GLOBULIN UR ELPH-MCNC: 3 GM/DL
GLUCOSE SERPL-MCNC: 110 MG/DL (ref 65–99)
HCT VFR BLD AUTO: 38.1 % (ref 34–46.6)
HCT VFR BLD AUTO: 43.4 % (ref 34–46.6)
HGB BLD-MCNC: 11.9 G/DL (ref 12–15.9)
HGB BLD-MCNC: 13.8 G/DL (ref 12–15.9)
POTASSIUM SERPL-SCNC: 3.8 MMOL/L (ref 3.5–5.2)
PROT SERPL-MCNC: 6.6 G/DL (ref 6–8.5)
SODIUM SERPL-SCNC: 138 MMOL/L (ref 136–145)

## 2022-06-08 PROCEDURE — 80053 COMPREHEN METABOLIC PANEL: CPT | Performed by: FAMILY MEDICINE

## 2022-06-08 PROCEDURE — 99233 SBSQ HOSP IP/OBS HIGH 50: CPT | Performed by: INTERNAL MEDICINE

## 2022-06-08 PROCEDURE — 94664 DEMO&/EVAL PT USE INHALER: CPT

## 2022-06-08 PROCEDURE — 85018 HEMOGLOBIN: CPT | Performed by: FAMILY MEDICINE

## 2022-06-08 PROCEDURE — 25010000002 HEPARIN (PORCINE) PER 1000 UNITS: Performed by: FAMILY MEDICINE

## 2022-06-08 PROCEDURE — 94799 UNLISTED PULMONARY SVC/PX: CPT

## 2022-06-08 PROCEDURE — 85014 HEMATOCRIT: CPT | Performed by: FAMILY MEDICINE

## 2022-06-08 PROCEDURE — 0 HYDROMORPHONE 1 MG/ML SOLUTION: Performed by: FAMILY MEDICINE

## 2022-06-08 RX ADMIN — ISOSORBIDE DINITRATE 20 MG: 20 TABLET ORAL at 12:49

## 2022-06-08 RX ADMIN — HYDROMORPHONE HYDROCHLORIDE 0.25 MG: 1 INJECTION, SOLUTION INTRAMUSCULAR; INTRAVENOUS; SUBCUTANEOUS at 20:11

## 2022-06-08 RX ADMIN — CARVEDILOL 25 MG: 25 TABLET, FILM COATED ORAL at 17:53

## 2022-06-08 RX ADMIN — VANCOMYCIN HYDROCHLORIDE 125 MG: 125 CAPSULE ORAL at 06:46

## 2022-06-08 RX ADMIN — HEPARIN SODIUM 3200 UNITS: 1000 INJECTION, SOLUTION INTRAVENOUS; SUBCUTANEOUS at 12:09

## 2022-06-08 RX ADMIN — NIFEDIPINE 90 MG: 90 TABLET, EXTENDED RELEASE ORAL at 12:52

## 2022-06-08 RX ADMIN — CALCITRIOL 0.25 MCG: 0.25 CAPSULE ORAL at 12:52

## 2022-06-08 RX ADMIN — TICAGRELOR 90 MG: 90 TABLET ORAL at 12:52

## 2022-06-08 RX ADMIN — IPRATROPIUM BROMIDE AND ALBUTEROL SULFATE 3 ML: 2.5; .5 SOLUTION RESPIRATORY (INHALATION) at 06:23

## 2022-06-08 RX ADMIN — Medication 6 MG: at 20:03

## 2022-06-08 RX ADMIN — CLONIDINE HYDROCHLORIDE 0.1 MG: 0.1 TABLET ORAL at 03:18

## 2022-06-08 RX ADMIN — ROSUVASTATIN CALCIUM 10 MG: 10 TABLET, FILM COATED ORAL at 12:52

## 2022-06-08 RX ADMIN — CETIRIZINE HYDROCHLORIDE 10 MG: 10 TABLET ORAL at 12:52

## 2022-06-08 RX ADMIN — ASPIRIN 81 MG: 81 TABLET, COATED ORAL at 12:52

## 2022-06-08 RX ADMIN — VANCOMYCIN HYDROCHLORIDE 125 MG: 125 CAPSULE ORAL at 00:49

## 2022-06-08 RX ADMIN — VANCOMYCIN HYDROCHLORIDE 125 MG: 125 CAPSULE ORAL at 17:52

## 2022-06-08 RX ADMIN — BUSPIRONE HYDROCHLORIDE 10 MG: 10 TABLET ORAL at 20:03

## 2022-06-08 RX ADMIN — CARVEDILOL 25 MG: 25 TABLET, FILM COATED ORAL at 12:52

## 2022-06-08 RX ADMIN — TICAGRELOR 90 MG: 90 TABLET ORAL at 20:03

## 2022-06-08 RX ADMIN — IPRATROPIUM BROMIDE AND ALBUTEROL SULFATE 3 ML: 2.5; .5 SOLUTION RESPIRATORY (INHALATION) at 20:31

## 2022-06-08 RX ADMIN — BUDESONIDE AND FORMOTEROL FUMARATE DIHYDRATE 2 PUFF: 160; 4.5 AEROSOL RESPIRATORY (INHALATION) at 06:18

## 2022-06-08 RX ADMIN — SODIUM CHLORIDE 250 ML: 9 INJECTION, SOLUTION INTRAVENOUS at 22:09

## 2022-06-08 RX ADMIN — ISOSORBIDE DINITRATE 20 MG: 20 TABLET ORAL at 17:52

## 2022-06-08 RX ADMIN — ACETAMINOPHEN 650 MG: 325 TABLET ORAL at 11:25

## 2022-06-08 RX ADMIN — FAMOTIDINE 20 MG: 20 TABLET, FILM COATED ORAL at 12:52

## 2022-06-08 RX ADMIN — IPRATROPIUM BROMIDE AND ALBUTEROL SULFATE 3 ML: 2.5; .5 SOLUTION RESPIRATORY (INHALATION) at 13:49

## 2022-06-08 RX ADMIN — BUSPIRONE HYDROCHLORIDE 10 MG: 10 TABLET ORAL at 12:52

## 2022-06-08 RX ADMIN — HYDROMORPHONE HYDROCHLORIDE 0.25 MG: 1 INJECTION, SOLUTION INTRAMUSCULAR; INTRAVENOUS; SUBCUTANEOUS at 13:03

## 2022-06-08 RX ADMIN — Medication 10 ML: at 20:04

## 2022-06-08 RX ADMIN — BUDESONIDE AND FORMOTEROL FUMARATE DIHYDRATE 2 PUFF: 160; 4.5 AEROSOL RESPIRATORY (INHALATION) at 20:31

## 2022-06-09 LAB
ALBUMIN SERPL-MCNC: 4.3 G/DL (ref 3.5–5.2)
ALBUMIN/GLOB SERPL: 1.3 G/DL
ALP SERPL-CCNC: 94 U/L (ref 39–117)
ALT SERPL W P-5'-P-CCNC: 17 U/L (ref 1–33)
ANION GAP SERPL CALCULATED.3IONS-SCNC: 16 MMOL/L (ref 5–15)
AST SERPL-CCNC: 16 U/L (ref 1–32)
BILIRUB SERPL-MCNC: 0.4 MG/DL (ref 0–1.2)
BUN SERPL-MCNC: 38 MG/DL (ref 8–23)
BUN/CREAT SERPL: 8.7 (ref 7–25)
CALCIUM SPEC-SCNC: 9.6 MG/DL (ref 8.6–10.5)
CHLORIDE SERPL-SCNC: 99 MMOL/L (ref 98–107)
CO2 SERPL-SCNC: 21 MMOL/L (ref 22–29)
CREAT SERPL-MCNC: 4.39 MG/DL (ref 0.57–1)
EGFRCR SERPLBLD CKD-EPI 2021: 10.9 ML/MIN/1.73
GLOBULIN UR ELPH-MCNC: 3.2 GM/DL
GLUCOSE SERPL-MCNC: 102 MG/DL (ref 65–99)
HCT VFR BLD AUTO: 40.4 % (ref 34–46.6)
HCT VFR BLD AUTO: 41.9 % (ref 34–46.6)
HGB BLD-MCNC: 13 G/DL (ref 12–15.9)
HGB BLD-MCNC: 13.1 G/DL (ref 12–15.9)
POTASSIUM SERPL-SCNC: 4.2 MMOL/L (ref 3.5–5.2)
PROT SERPL-MCNC: 7.5 G/DL (ref 6–8.5)
SODIUM SERPL-SCNC: 136 MMOL/L (ref 136–145)

## 2022-06-09 PROCEDURE — 25010000002 ENOXAPARIN PER 10 MG: Performed by: FAMILY MEDICINE

## 2022-06-09 PROCEDURE — 97165 OT EVAL LOW COMPLEX 30 MIN: CPT

## 2022-06-09 PROCEDURE — 94799 UNLISTED PULMONARY SVC/PX: CPT

## 2022-06-09 PROCEDURE — 85014 HEMATOCRIT: CPT | Performed by: FAMILY MEDICINE

## 2022-06-09 PROCEDURE — 80053 COMPREHEN METABOLIC PANEL: CPT | Performed by: FAMILY MEDICINE

## 2022-06-09 PROCEDURE — 85018 HEMOGLOBIN: CPT | Performed by: FAMILY MEDICINE

## 2022-06-09 PROCEDURE — 97161 PT EVAL LOW COMPLEX 20 MIN: CPT

## 2022-06-09 PROCEDURE — 0 HYDROMORPHONE 1 MG/ML SOLUTION: Performed by: FAMILY MEDICINE

## 2022-06-09 RX ADMIN — ISOSORBIDE DINITRATE 20 MG: 20 TABLET ORAL at 09:15

## 2022-06-09 RX ADMIN — VANCOMYCIN HYDROCHLORIDE 125 MG: 125 CAPSULE ORAL at 23:04

## 2022-06-09 RX ADMIN — ASPIRIN 81 MG: 81 TABLET, COATED ORAL at 09:15

## 2022-06-09 RX ADMIN — Medication 10 ML: at 20:22

## 2022-06-09 RX ADMIN — ISOSORBIDE DINITRATE 20 MG: 20 TABLET ORAL at 13:49

## 2022-06-09 RX ADMIN — VANCOMYCIN HYDROCHLORIDE 125 MG: 125 CAPSULE ORAL at 18:39

## 2022-06-09 RX ADMIN — BUSPIRONE HYDROCHLORIDE 10 MG: 10 TABLET ORAL at 18:39

## 2022-06-09 RX ADMIN — ISOSORBIDE DINITRATE 20 MG: 20 TABLET ORAL at 18:39

## 2022-06-09 RX ADMIN — IPRATROPIUM BROMIDE AND ALBUTEROL SULFATE 3 ML: 2.5; .5 SOLUTION RESPIRATORY (INHALATION) at 19:38

## 2022-06-09 RX ADMIN — OLANZAPINE 5 MG: 5 TABLET, FILM COATED ORAL at 20:22

## 2022-06-09 RX ADMIN — HYDROMORPHONE HYDROCHLORIDE 0.25 MG: 1 INJECTION, SOLUTION INTRAMUSCULAR; INTRAVENOUS; SUBCUTANEOUS at 10:44

## 2022-06-09 RX ADMIN — BUSPIRONE HYDROCHLORIDE 10 MG: 10 TABLET ORAL at 09:15

## 2022-06-09 RX ADMIN — TICAGRELOR 90 MG: 90 TABLET ORAL at 20:22

## 2022-06-09 RX ADMIN — VANCOMYCIN HYDROCHLORIDE 125 MG: 125 CAPSULE ORAL at 11:53

## 2022-06-09 RX ADMIN — BUDESONIDE AND FORMOTEROL FUMARATE DIHYDRATE 2 PUFF: 160; 4.5 AEROSOL RESPIRATORY (INHALATION) at 19:38

## 2022-06-09 RX ADMIN — VANCOMYCIN HYDROCHLORIDE 125 MG: 125 CAPSULE ORAL at 05:50

## 2022-06-09 RX ADMIN — ENOXAPARIN SODIUM 30 MG: 30 INJECTION SUBCUTANEOUS at 09:15

## 2022-06-09 RX ADMIN — IPRATROPIUM BROMIDE AND ALBUTEROL SULFATE 3 ML: 2.5; .5 SOLUTION RESPIRATORY (INHALATION) at 10:41

## 2022-06-09 RX ADMIN — CETIRIZINE HYDROCHLORIDE 10 MG: 10 TABLET ORAL at 09:15

## 2022-06-09 RX ADMIN — ROSUVASTATIN CALCIUM 10 MG: 10 TABLET, FILM COATED ORAL at 09:15

## 2022-06-09 RX ADMIN — BUSPIRONE HYDROCHLORIDE 10 MG: 10 TABLET ORAL at 20:22

## 2022-06-09 RX ADMIN — FLUTICASONE PROPIONATE 2 SPRAY: 50 SPRAY, METERED NASAL at 09:14

## 2022-06-09 RX ADMIN — CALCITRIOL 0.25 MCG: 0.25 CAPSULE ORAL at 09:15

## 2022-06-09 RX ADMIN — Medication 6 MG: at 20:22

## 2022-06-09 RX ADMIN — FAMOTIDINE 20 MG: 20 TABLET, FILM COATED ORAL at 09:15

## 2022-06-09 RX ADMIN — NIFEDIPINE 90 MG: 90 TABLET, EXTENDED RELEASE ORAL at 09:15

## 2022-06-09 RX ADMIN — VANCOMYCIN HYDROCHLORIDE 125 MG: 125 CAPSULE ORAL at 00:11

## 2022-06-09 RX ADMIN — ACETAMINOPHEN 650 MG: 325 TABLET ORAL at 20:22

## 2022-06-09 RX ADMIN — CARVEDILOL 25 MG: 25 TABLET, FILM COATED ORAL at 09:15

## 2022-06-09 RX ADMIN — CARVEDILOL 25 MG: 25 TABLET, FILM COATED ORAL at 18:39

## 2022-06-09 RX ADMIN — IPRATROPIUM BROMIDE AND ALBUTEROL SULFATE 3 ML: 2.5; .5 SOLUTION RESPIRATORY (INHALATION) at 14:42

## 2022-06-09 RX ADMIN — Medication 10 ML: at 09:15

## 2022-06-09 RX ADMIN — TICAGRELOR 90 MG: 90 TABLET ORAL at 09:15

## 2022-06-10 ENCOUNTER — READMISSION MANAGEMENT (OUTPATIENT)
Dept: CALL CENTER | Facility: HOSPITAL | Age: 62
End: 2022-06-10

## 2022-06-10 VITALS
OXYGEN SATURATION: 94 % | RESPIRATION RATE: 18 BRPM | TEMPERATURE: 96.7 F | SYSTOLIC BLOOD PRESSURE: 120 MMHG | HEIGHT: 66 IN | BODY MASS INDEX: 36.35 KG/M2 | DIASTOLIC BLOOD PRESSURE: 90 MMHG | WEIGHT: 226.2 LBS | HEART RATE: 62 BPM

## 2022-06-10 LAB
ALBUMIN SERPL-MCNC: 3.9 G/DL (ref 3.5–5.2)
ALBUMIN/GLOB SERPL: 1.3 G/DL
ALP SERPL-CCNC: 90 U/L (ref 39–117)
ALT SERPL W P-5'-P-CCNC: 17 U/L (ref 1–33)
ANION GAP SERPL CALCULATED.3IONS-SCNC: 16 MMOL/L (ref 5–15)
AST SERPL-CCNC: 17 U/L (ref 1–32)
BILIRUB SERPL-MCNC: 0.4 MG/DL (ref 0–1.2)
BUN SERPL-MCNC: 59 MG/DL (ref 8–23)
BUN/CREAT SERPL: 11.6 (ref 7–25)
CALCIUM SPEC-SCNC: 9.1 MG/DL (ref 8.6–10.5)
CHLORIDE SERPL-SCNC: 98 MMOL/L (ref 98–107)
CO2 SERPL-SCNC: 19 MMOL/L (ref 22–29)
CREAT SERPL-MCNC: 5.08 MG/DL (ref 0.57–1)
EGFRCR SERPLBLD CKD-EPI 2021: 9.1 ML/MIN/1.73
GLOBULIN UR ELPH-MCNC: 3.1 GM/DL
GLUCOSE SERPL-MCNC: 106 MG/DL (ref 65–99)
HCT VFR BLD AUTO: 38.8 % (ref 34–46.6)
HGB BLD-MCNC: 12.1 G/DL (ref 12–15.9)
POTASSIUM SERPL-SCNC: 4.6 MMOL/L (ref 3.5–5.2)
PROT SERPL-MCNC: 7 G/DL (ref 6–8.5)
SODIUM SERPL-SCNC: 133 MMOL/L (ref 136–145)

## 2022-06-10 PROCEDURE — 94799 UNLISTED PULMONARY SVC/PX: CPT

## 2022-06-10 PROCEDURE — 25010000002 ENOXAPARIN PER 10 MG: Performed by: FAMILY MEDICINE

## 2022-06-10 PROCEDURE — 85018 HEMOGLOBIN: CPT | Performed by: FAMILY MEDICINE

## 2022-06-10 PROCEDURE — 80053 COMPREHEN METABOLIC PANEL: CPT | Performed by: FAMILY MEDICINE

## 2022-06-10 PROCEDURE — 85014 HEMATOCRIT: CPT | Performed by: FAMILY MEDICINE

## 2022-06-10 RX ORDER — VANCOMYCIN HYDROCHLORIDE 125 MG/1
125 CAPSULE ORAL EVERY 6 HOURS SCHEDULED
Qty: 10 CAPSULE | Refills: 0 | Status: SHIPPED | OUTPATIENT
Start: 2022-06-10 | End: 2022-06-10 | Stop reason: SDUPTHER

## 2022-06-10 RX ORDER — OLANZAPINE 5 MG/1
5 TABLET ORAL NIGHTLY
Qty: 30 TABLET | Refills: 0 | OUTPATIENT
Start: 2022-06-10 | End: 2022-08-21

## 2022-06-10 RX ORDER — LANOLIN ALCOHOL/MO/W.PET/CERES
6 CREAM (GRAM) TOPICAL NIGHTLY
Qty: 30 TABLET | Refills: 0 | Status: SHIPPED | OUTPATIENT
Start: 2022-06-10

## 2022-06-10 RX ORDER — FAMOTIDINE 20 MG/1
20 TABLET, FILM COATED ORAL DAILY
Qty: 30 TABLET | Refills: 0 | Status: SHIPPED | OUTPATIENT
Start: 2022-06-11

## 2022-06-10 RX ORDER — VANCOMYCIN HYDROCHLORIDE 125 MG/1
125 CAPSULE ORAL EVERY 6 HOURS SCHEDULED
Qty: 10 CAPSULE | Refills: 0 | Status: SHIPPED | OUTPATIENT
Start: 2022-06-10 | End: 2022-06-16

## 2022-06-10 RX ORDER — ROSUVASTATIN CALCIUM 10 MG/1
10 TABLET, COATED ORAL DAILY
Qty: 30 TABLET | Refills: 0 | Status: SHIPPED | OUTPATIENT
Start: 2022-06-11

## 2022-06-10 RX ADMIN — ISOSORBIDE DINITRATE 20 MG: 20 TABLET ORAL at 09:13

## 2022-06-10 RX ADMIN — ASPIRIN 81 MG: 81 TABLET, COATED ORAL at 09:12

## 2022-06-10 RX ADMIN — FAMOTIDINE 20 MG: 20 TABLET, FILM COATED ORAL at 09:13

## 2022-06-10 RX ADMIN — VANCOMYCIN HYDROCHLORIDE 125 MG: 125 CAPSULE ORAL at 05:21

## 2022-06-10 RX ADMIN — BUSPIRONE HYDROCHLORIDE 10 MG: 10 TABLET ORAL at 09:12

## 2022-06-10 RX ADMIN — CETIRIZINE HYDROCHLORIDE 10 MG: 10 TABLET ORAL at 09:13

## 2022-06-10 RX ADMIN — NIFEDIPINE 90 MG: 90 TABLET, EXTENDED RELEASE ORAL at 09:13

## 2022-06-10 RX ADMIN — ROSUVASTATIN CALCIUM 10 MG: 10 TABLET, FILM COATED ORAL at 09:12

## 2022-06-10 RX ADMIN — CALCITRIOL 0.25 MCG: 0.25 CAPSULE ORAL at 09:12

## 2022-06-10 RX ADMIN — VANCOMYCIN HYDROCHLORIDE 125 MG: 125 CAPSULE ORAL at 12:35

## 2022-06-10 RX ADMIN — FLUTICASONE PROPIONATE 2 SPRAY: 50 SPRAY, METERED NASAL at 09:13

## 2022-06-10 RX ADMIN — ISOSORBIDE DINITRATE 20 MG: 20 TABLET ORAL at 12:35

## 2022-06-10 RX ADMIN — CARVEDILOL 25 MG: 25 TABLET, FILM COATED ORAL at 09:13

## 2022-06-10 RX ADMIN — TICAGRELOR 90 MG: 90 TABLET ORAL at 09:12

## 2022-06-10 RX ADMIN — ENOXAPARIN SODIUM 30 MG: 30 INJECTION SUBCUTANEOUS at 09:13

## 2022-06-10 RX ADMIN — IPRATROPIUM BROMIDE AND ALBUTEROL SULFATE 3 ML: 2.5; .5 SOLUTION RESPIRATORY (INHALATION) at 10:54

## 2022-06-10 RX ADMIN — Medication 10 ML: at 09:13

## 2022-06-11 NOTE — OUTREACH NOTE
Prep Survey    Flowsheet Row Responses   [facility] facility patient discharged from? [facility]   Is LACE score < 7 ? No   Emergency Room discharge w/ pulse ox? No   Eligibility Readm Mgmt   Discharge diagnosis Acute kidney injury  C Diff Colitis   Does the patient have one of the following disease processes/diagnoses(primary or secondary)? Other   Comments regarding appointments outpatient dialysis chair time is tomorrow at 0715.   Prep survey completed? Yes          JAIMIE LINARES - Registered Nurse

## 2022-06-14 ENCOUNTER — READMISSION MANAGEMENT (OUTPATIENT)
Dept: CALL CENTER | Facility: HOSPITAL | Age: 62
End: 2022-06-14

## 2022-06-16 ENCOUNTER — TRANSCRIBE ORDERS (OUTPATIENT)
Dept: ADMINISTRATIVE | Facility: HOSPITAL | Age: 62
End: 2022-06-16

## 2022-06-16 ENCOUNTER — READMISSION MANAGEMENT (OUTPATIENT)
Dept: CALL CENTER | Facility: HOSPITAL | Age: 62
End: 2022-06-16

## 2022-06-16 DIAGNOSIS — R19.7 DIARRHEA, UNSPECIFIED TYPE: Primary | ICD-10-CM

## 2022-06-16 NOTE — OUTREACH NOTE
Medical Week 1 Survey    Flowsheet Row Responses   [facility] facility patient discharged from? [facility]   Does the patient have one of the following disease processes/diagnoses(primary or secondary)? Other   Week 1 attempt successful? No   Unsuccessful attempts Attempt 2   Discharge diagnosis Acute kidney injury  C Diff Colitis          ELIER ROB - Registered Nurse

## 2022-06-17 ENCOUNTER — LAB (OUTPATIENT)
Dept: LAB | Facility: HOSPITAL | Age: 62
End: 2022-06-17

## 2022-06-17 DIAGNOSIS — R19.7 DIARRHEA, UNSPECIFIED TYPE: ICD-10-CM

## 2022-06-17 LAB — C DIFF TOX GENS STL QL NAA+PROBE: NEGATIVE

## 2022-06-17 PROCEDURE — 87493 C DIFF AMPLIFIED PROBE: CPT

## 2022-06-20 ENCOUNTER — READMISSION MANAGEMENT (OUTPATIENT)
Dept: CALL CENTER | Facility: HOSPITAL | Age: 62
End: 2022-06-20

## 2022-06-20 NOTE — OUTREACH NOTE
Medical Week 2 Survey    Flowsheet Row Responses   [facility] patient discharged from? [facility]   Does the patient have one of the following disease processes/diagnoses(primary or secondary)? Other   Week 2 attempt successful? Yes   Call start time 1737   General alerts for this patient tues, thurs, saturday dialysis   Discharge diagnosis Acute kidney injury  C Diff Colitis   Call end time 1744   Is patient permission given to speak with other caregiver? Yes   List who call center can speak with wendy Roque    Person spoke with today (if not patient) and relationship Mya, nidominique    Meds reviewed with patient/caregiver? Yes   Does the patient have all medications ordered at discharge? Yes   Is the patient taking all medications as directed (includes completed medication regime)? Yes   Does the patient have a primary care provider?  Yes   Comments regarding PCP Orville Weston MD Wednesday Jun 29, 2022 @ 9:50   Has the patient kept scheduled appointments due by today? Yes   Has home health visited the patient within 72 hours of discharge? N/A   Psychosocial issues? No   Did the patient receive a copy of their discharge instructions? Yes   Nursing interventions Reviewed instructions with patient   What is the patient's perception of their health status since discharge? Improving   Is the patient/caregiver able to teach back signs and symptoms related to disease process for when to call PCP? Yes   Is the patient/caregiver able to teach back signs and symptoms related to disease process for when to call 911? Yes   Is the patient/caregiver able to teach back the hierarchy of who to call/visit for symptoms/problems? PCP, Specialist, Home health nurse, Urgent Care, ED, 911 Yes   Week 2 Call Completed? Yes          JOSSELIN CATALAN - Registered Nurse

## 2022-06-30 NOTE — PROGRESS NOTES
"Progress Note  Ludivina Ramirez  8/12/2021 10:28 CDT  Subjective:   Admit Date:   8/8/2021      CC/ADMIT DX:       Interval History:   Reviewed overnight events and nursing notes. She c/o SOA with activity. No CP.    I have reviewed all labs/diagnostics from the last 24hrs.       ROS:   I have done a 10 point ROS and all are negative, except what is mentioned in the HPI.    Diet Regular; Cardiac    Medications:      aspirin, 81 mg, Oral, Daily  azelastine, 2 spray, Each Nare, BID  calcitriol, 0.25 mcg, Oral, Daily  carvedilol, 12.5 mg, Oral, BID With Meals  cetirizine, 10 mg, Oral, Daily  enoxaparin, 30 mg, Subcutaneous, Q24H  ferrous sulfate, 325 mg, Oral, BID With Meals  guaiFENesin, 1,200 mg, Oral, Q12H  hydrALAZINE, 25 mg, Oral, Q8H  ipratropium-albuterol, 3 mL, Nebulization, 4x Daily - RT  isosorbide dinitrate, 10 mg, Oral, TID - Nitrates  losartan, 100 mg, Oral, Q24H  NIFEdipine CC, 60 mg, Oral, Q24H  OLANZapine, 5 mg, Oral, Nightly  predniSONE, 10 mg, Oral, Daily  rosuvastatin, 20 mg, Oral, Daily  ticagrelor, 90 mg, Oral, BID            Objective:   Vitals: /81 (BP Location: Right arm, Patient Position: Lying)   Pulse 67   Temp 97.6 °F (36.4 °C) (Oral)   Resp 19   Ht 165.1 cm (65\")   Wt 107 kg (235 lb)   SpO2 96%   BMI 39.11 kg/m²      Intake/Output Summary (Last 24 hours) at 8/12/2021 1028  Last data filed at 8/12/2021 0950  Gross per 24 hour   Intake 1080 ml   Output 600 ml   Net 480 ml     General appearance: alert and cooperative with exam  Lungs: coarse  Heart: RRR  Abdomen: soft, non-tender; bowel sounds normal; no masses,  no organomegaly  Extremities: extremities normal, atraumatic, no cyanosis or edema  Neurologic:  No obvious focal neurologic deficits.    Assessment and Plan:     Acute on chronic diastolic congestive heart failure (CMS/HCC)    Acute on Chronic Kidney disease    CAD    HTN    COPD    Plan:  1.  Continue present medication(s)   2.  Follow with others  3.  Lab better  4.  " Noted. Follow BP      Discharge planning:   her home     Reviewed treatment plans with the patient and/or family.             Electronically signed by Orville Weston MD on 8/12/2021 at 10:28 CDT

## 2022-08-02 ENCOUNTER — OFFICE VISIT (OUTPATIENT)
Dept: CARDIOLOGY | Facility: CLINIC | Age: 62
End: 2022-08-02

## 2022-08-02 VITALS
DIASTOLIC BLOOD PRESSURE: 78 MMHG | WEIGHT: 206 LBS | HEIGHT: 60 IN | HEART RATE: 67 BPM | SYSTOLIC BLOOD PRESSURE: 121 MMHG | BODY MASS INDEX: 40.44 KG/M2

## 2022-08-02 DIAGNOSIS — J44.9 CHRONIC OBSTRUCTIVE PULMONARY DISEASE, UNSPECIFIED COPD TYPE: ICD-10-CM

## 2022-08-02 DIAGNOSIS — E66.1 CLASS 3 DRUG-INDUCED OBESITY WITH SERIOUS COMORBIDITY AND BODY MASS INDEX (BMI) OF 40.0 TO 44.9 IN ADULT: ICD-10-CM

## 2022-08-02 DIAGNOSIS — E78.5 HYPERLIPIDEMIA LDL GOAL <70: ICD-10-CM

## 2022-08-02 DIAGNOSIS — I35.1 NONRHEUMATIC AORTIC VALVE INSUFFICIENCY: ICD-10-CM

## 2022-08-02 DIAGNOSIS — I10 ESSENTIAL HYPERTENSION: ICD-10-CM

## 2022-08-02 DIAGNOSIS — I21.02 ST ELEVATION MYOCARDIAL INFARCTION INVOLVING LEFT ANTERIOR DESCENDING (LAD) CORONARY ARTERY: ICD-10-CM

## 2022-08-02 DIAGNOSIS — I25.118 CORONARY ARTERY DISEASE OF NATIVE ARTERY OF NATIVE HEART WITH STABLE ANGINA PECTORIS: ICD-10-CM

## 2022-08-02 DIAGNOSIS — I50.32 CHRONIC DIASTOLIC HEART FAILURE: Primary | ICD-10-CM

## 2022-08-02 DIAGNOSIS — F17.210 CIGARETTE SMOKER: ICD-10-CM

## 2022-08-02 DIAGNOSIS — N18.4 STAGE 4 CHRONIC KIDNEY DISEASE: ICD-10-CM

## 2022-08-02 PROCEDURE — 99214 OFFICE O/P EST MOD 30 MIN: CPT | Performed by: NURSE PRACTITIONER

## 2022-08-02 RX ORDER — FUROSEMIDE 40 MG/1
40 TABLET ORAL 2 TIMES DAILY
COMMUNITY

## 2022-08-02 RX ORDER — RANOLAZINE 500 MG/1
500 TABLET, EXTENDED RELEASE ORAL 2 TIMES DAILY
Qty: 60 TABLET | Refills: 11 | Status: SHIPPED | OUTPATIENT
Start: 2022-08-02 | End: 2022-08-02

## 2022-08-02 NOTE — PROGRESS NOTES
Subjective:     Encounter Date: 08/02/2022      Patient ID: Ludivina Ramirez is a 61 y.o. female with chronic diastolic congestive heart failure, coronary artery disease status post ST elevation myocardial infarction with subsequent 2.75 x 28 mm Xience drug-eluting stent to the proximal to mid left anterior descending artery on 11/8/2020, hypertension, ischemic colitis, nonrheumatic aortic valve insufficiency, stage 4 chronic kidney disease on dialysis, COPD on home oxygen, obesity, tobacco use, polysubstance abuse and PTSD    Chief Complaint: 3 month follow up  Coronary Artery Disease  Presents for follow-up visit. Symptoms include chest pain, chest pressure and shortness of breath. Pertinent negatives include no chest tightness, dizziness, leg swelling, palpitations or weight gain. Risk factors include hyperlipidemia and obesity. Her past medical history is significant for CHF. The symptoms have been stable. Compliance with diet is good. Compliance with exercise is good. Compliance with medications is good.   Hypertension  This is a chronic problem. The current episode started more than 1 year ago. The problem is uncontrolled. Associated symptoms include chest pain, malaise/fatigue (chronic) and shortness of breath. Pertinent negatives include no orthopnea, palpitations, peripheral edema or PND. Risk factors for coronary artery disease include dyslipidemia, obesity and smoking/tobacco exposure. Current antihypertension treatment includes beta blockers, angiotensin blockers, calcium channel blockers and diuretics. Compliance problems include psychosocial issues.  Hypertensive end-organ damage includes CAD/MI and heart failure. Identifiable causes of hypertension include sleep apnea.   Hyperlipidemia  This is a chronic problem. The current episode started more than 1 year ago. Recent lipid tests were reviewed and are high. Exacerbating diseases include obesity. Associated symptoms include chest pain and shortness of  breath. Current antihyperlipidemic treatment includes statins. Compliance problems include psychosocial issues.  Risk factors for coronary artery disease include dyslipidemia, hypertension and obesity.   Congestive Heart Failure  Presents for follow-up visit. Associated symptoms include chest pain, chest pressure and shortness of breath. Pertinent negatives include no edema, fatigue, near-syncope, orthopnea, palpitations or paroxysmal nocturnal dyspnea. The symptoms have been worsening. The pain is at a severity of 6/10. The pain is moderate. Her past medical history is significant for CAD. Compliance with total regimen is 51-75%. Compliance with diet is 51-75%. Compliance with exercise is 51-75%. Compliance with medications is 51-75%.     Patient presents today for management of chronic diastolic congestive heart failure.   Patient was hospitalized 5/31/2022-6/10/2022 for altered mental status, acute kidney injury, polysubstance abuse. She was hydrated and seen by Nephrology. Her renal function did not improve and started HD. She has been doing dialysis outpatient three times a week.   Today she reports that she has a sharp pain in between her shoulder blades or at the base of her neck that stops her in her tracks from time to time over the past couple of weeks. She reports that she has been having more chest pain for the past couple of weeks. She states that she has been under more stress over the past couple of weeks and feels like that is contributing to her more frequent chest pain. She states that over the past 2 weeks she has been taking more NTG. Patient states that her chest pain will then resolve. She reports that chest pain is located in the center of her chest and doesn't radiate. She reports denies any leg swelling. She reports that her blood pressure has been running stable overall. She reports that she doesn't take her medication on dialysis days so sometimes it is high and she has been given  clonidine. She reports that she still has dyspnea on exertion. She denies any heart racing or palpitations. She denies any palpitations. She reports that she has been under more stress the past couple of weeks. She follows with Dr Weston as PCP. Patient is wearing her home O2 at 2L. She reports using her Bipap as directed.     The following portions of the patient's history were reviewed and updated as appropriate: allergies, current medications, past family history, past medical history, past social history, past surgical history and problem list.    Allergies   Allergen Reactions   • Codeine Nausea And Vomiting   • Imitrex [Sumatriptan] Other (See Comments)     LOPEZ       Current Outpatient Medications:   •  albuterol sulfate  (90 Base) MCG/ACT inhaler, Inhale 2 puffs Every 4 (Four) Hours As Needed for Wheezing., Disp: 20.1 g, Rfl: 3  •  aspirin 81 MG EC tablet, Take 1 tablet by mouth Daily., Disp: 90 tablet, Rfl: 3  •  azelastine (ASTELIN) 0.1 % nasal spray, 2 sprays into the nostril(s) as directed by provider 2 (Two) Times a Day., Disp: , Rfl:   •  budesonide-formoterol (SYMBICORT) 160-4.5 MCG/ACT inhaler, Inhale 2 puffs 2 (Two) Times a Day., Disp: , Rfl:   •  busPIRone (BUSPAR) 10 MG tablet, Take 10 mg by mouth 3 (Three) Times a Day., Disp: , Rfl:   •  calcitriol (ROCALTROL) 0.25 MCG capsule, Take 1 capsule by mouth Daily., Disp: 30 capsule, Rfl: 3  •  carvedilol (COREG) 25 MG tablet, Take 25 mg by mouth 2 (Two) Times a Day With Meals., Disp: , Rfl:   •  cholecalciferol (VITAMIN D3) 25 MCG (1000 UT) tablet, Take 1,000 Units by mouth Daily., Disp: , Rfl:   •  cloNIDine (CATAPRES) 0.1 MG tablet, Take 0.1 mg by mouth Every 6 (Six) Hours As Needed for High Blood Pressure (systolic >160)., Disp: , Rfl:   •  famotidine (PEPCID) 20 MG tablet, Take 1 tablet by mouth Daily., Disp: 30 tablet, Rfl: 0  •  fluticasone (FLONASE) 50 MCG/ACT nasal spray, 2 sprays into the nostril(s) as directed by provider Every Morning.  In each nostril, Disp: , Rfl:   •  furosemide (LASIX) 40 MG tablet, Take 40 mg by mouth 2 (Two) Times a Day. As needed, Disp: , Rfl:   •  ipratropium-albuterol (DUO-NEB) 0.5-2.5 mg/3 ml nebulizer, Take 3 mL by nebulization 4 (Four) Times a Day., Disp: 1080 mL, Rfl: 3  •  isosorbide dinitrate (ISORDIL) 20 MG tablet, Take 1 tablet by mouth 3 (Three) Times a Day., Disp: 270 tablet, Rfl: 3  •  melatonin 3 MG tablet, Take 2 tablets by mouth Every Night., Disp: 30 tablet, Rfl: 0  •  multivitamin with minerals tablet tablet, Take 1 tablet by mouth Daily., Disp: , Rfl:   •  NIFEdipine CC (ADALAT CC) 90 MG 24 hr tablet, Take 1 tablet by mouth Daily., Disp: 90 tablet, Rfl: 3  •  nitroglycerin (NITROSTAT) 0.4 MG SL tablet, Place 0.4 mg under the tongue Every 5 (Five) Minutes As Needed for Chest Pain. Take no more than 3 doses in 15 minutes., Disp: , Rfl:   •  OLANZapine (zyPREXA) 5 MG tablet, Take 1 tablet by mouth Every Night., Disp: 30 tablet, Rfl: 0  •  rosuvastatin (CRESTOR) 10 MG tablet, Take 1 tablet by mouth Daily., Disp: 30 tablet, Rfl: 0  •  ticagrelor (BRILINTA) 90 MG tablet tablet, Take 1 tablet by mouth 2 (Two) Times a Day., Disp: 180 tablet, Rfl: 3  Past Medical History:   Diagnosis Date   • Acute CHF (HCC)    • Acute renal failure (ARF) (HCC)    • Anemia    • Asthma     childhood   • Coronary artery disease    • Hypertension    • Ischemic colitis (HCC)    • Metabolic acidosis    • Nonrheumatic aortic (valve) insufficiency    • PTSD (post-traumatic stress disorder)      Social History     Socioeconomic History   • Marital status: Single   Tobacco Use   • Smoking status: Current Some Day Smoker     Packs/day: 0.50     Years: 46.00     Pack years: 23.00     Types: Cigarettes     Start date: 1975   • Smokeless tobacco: Never Used   Vaping Use   • Vaping Use: Never used   Substance and Sexual Activity   • Alcohol use: No   • Drug use: No   • Sexual activity: Defer       Review of Systems   Constitutional: Positive for  "malaise/fatigue (chronic). Negative for decreased appetite, fatigue, weight gain and weight loss.   HENT: Negative for nosebleeds.    Cardiovascular: Positive for chest pain and dyspnea on exertion (chronic ). Negative for leg swelling, near-syncope, orthopnea, palpitations, paroxysmal nocturnal dyspnea and syncope.   Respiratory: Positive for shortness of breath. Negative for chest tightness.    Hematologic/Lymphatic: Does not bruise/bleed easily.   Genitourinary: Negative for hematuria.   Neurological: Negative for dizziness and weakness.          Objective:     Vitals reviewed.   Constitutional:       General: Not in acute distress.     Appearance: Normal appearance. Well-developed. Morbidly obese. Chronically ill-appearing.   Eyes:      Pupils: Pupils are equal, round, and reactive to light.   HENT:      Head: Normocephalic and atraumatic.      Nose: Nose normal.   Neck:      Vascular: No carotid bruit.   Pulmonary:      Effort: Pulmonary effort is normal. No respiratory distress.      Breath sounds: Wheezing present. No rales.   Cardiovascular:      Normal rate. Regular rhythm.      Murmurs: There is no murmur.   Edema:     Peripheral edema absent.   Abdominal:      General: There is no distension.      Palpations: Abdomen is soft.   Musculoskeletal: Normal range of motion.      Cervical back: Normal range of motion and neck supple. Skin:     General: Skin is warm.      Findings: No erythema or rash.   Neurological:      General: No focal deficit present.      Mental Status: Alert and oriented to person, place, and time.   Psychiatric:         Attention and Perception: Attention normal.         Mood and Affect: Mood normal.         Speech: Speech normal.         Behavior: Behavior normal.         Thought Content: Thought content normal.         Judgment: Judgment normal.         /78   Pulse 67   Ht 152.4 cm (60\")   Wt 93.4 kg (206 lb)   BMI 40.23 kg/m²     Procedures    Lab Review:       Lab Results "   Component Value Date    CHOL 131 06/01/2022    CHLPL 164 11/18/2015    TRIG 118 06/01/2022    HDL 30 (L) 06/01/2022    LDL 79 06/01/2022     Results for orders placed during the hospital encounter of 08/08/21    Adult Transthoracic Echo Complete W/ Cont if Necessary Per Protocol    Interpretation Summary  · Left ventricular ejection fraction appears to be 51 - 55%. Left ventricular systolic function is low normal.  · The following left ventricular wall segments are hypokinetic: apex hypokinetic.  · Mild aortic valve stenosis is present.  · Left ventricular wall thickness is consistent with mild concentric hypertrophy.  · Left ventricular diastolic function is consistent with (grade II w/high LAP) pseudonormalization.  · Left atrial volume is severely increased.  · Estimated right ventricular systolic pressure from tricuspid regurgitation is mildly elevated (35-45 mmHg). Calculated right ventricular systolic pressure from tricuspid regurgitation is 40.8 mmHg.  · Normal size and function of the right ventricle.    I have personally reviewed hospitalization notes, past office notes, labs and echo prior to patients visit  Assessment:          Diagnosis Plan   1. Chronic diastolic heart failure ([facility])     2. Coronary artery disease of native artery of native heart with stable angina pectoris ([facility])     3. ST elevation myocardial infarction involving left anterior descending (LAD) coronary artery ([facility])     4. Essential hypertension     5. Hyperlipidemia LDL goal <70     6. Nonrheumatic aortic valve insufficiency     7. Stage 4 chronic kidney disease ([facility])     8. Chronic obstructive pulmonary disease, unspecified COPD type ([facility])     9. Cigarette smoker     10. Class 3 drug-induced obesity with serious comorbidity and body mass index (BMI) of 40.0 to 44.9 in adult ([facility])            Plan:        1. Chronic diastolic congestive heart failure:NYHA class II. LVEF 51-55% on echo from 8/2021. patient appears euvolemic on  examination. Continue carvedilol, isordil and lasix. Recommend low sodium diet. Recommend leg elevation and compression stockings for leg swelling. Patient is having dialysis treatments three times a week.    2. CAD: s/p 2.75 x 28 mm Xience drug-eluting stent to the proximal to mid left anterior descending artery. [facility] [employer] [facility] 9/2021 showed patent stents to LAD. Continue aspirin, brilinta, carvedilol and rosuvastatin. Discussed patient having more chest pain and using NTG more the past couple of weeks. I am sending Ranexa to start for chest pain. Patient was reluctant as she feels her increased stress is contributing to her chest pain.    3. STEMI: 11/8/2020    4. Hypertension: Controlled. Continue current medications. Recommend patient to begin to monitor her BP routinely (3 times a week) and notify office is consistently running >140/90 for medication adjustments.     5. Hyperlipidemia:  11/2020-managed and followed by PCP. Goal LDL <70. Continue rosuvastatin    6. Nonrheumatic aortic valve insufficiency: mild to moderate AI on echo 8/2021    7. Stage 4 CKD: Patient has continued outpatient dialysis three times a week since discharge    8. COPD    9. Smoker: Ludivina Ramirez  reports that she has been smoking cigarettes. She started smoking about 47 years ago. She has a 23.00 pack-year smoking history. She has never used smokeless tobacco.. I have educated her on the risk of diseases from using tobacco products such as cancer, COPD and heart disease.   I advised her to quit and she is not willing to quit.  I spent 3  minutes counseling the patient.     10. Obesity: Class 2 Severe Obesity (BMI >=35 and <=39.9). Obesity-related health conditions include the following: obstructive sleep apnea, hypertension, coronary heart disease and dyslipidemias. Obesity is unchanged. BMI is is above average; BMI management plan is completed. We discussed portion control and increasing exercise.    Patient is to follow up in  3 months or sooner if needed

## 2022-08-03 PROBLEM — E66.1 CLASS 3 DRUG-INDUCED OBESITY WITH BODY MASS INDEX (BMI) OF 40.0 TO 44.9 IN ADULT (HCC): Status: ACTIVE | Noted: 2020-11-23

## 2022-08-21 ENCOUNTER — APPOINTMENT (OUTPATIENT)
Dept: CT IMAGING | Facility: HOSPITAL | Age: 62
End: 2022-08-21

## 2022-08-21 ENCOUNTER — HOSPITAL ENCOUNTER (EMERGENCY)
Facility: HOSPITAL | Age: 62
Discharge: HOME | End: 2022-08-21
Attending: EMERGENCY MEDICINE | Admitting: EMERGENCY MEDICINE

## 2022-08-21 VITALS
DIASTOLIC BLOOD PRESSURE: 75 MMHG | SYSTOLIC BLOOD PRESSURE: 116 MMHG | HEART RATE: 67 BPM | HEIGHT: 65 IN | OXYGEN SATURATION: 96 % | WEIGHT: 217 LBS | BODY MASS INDEX: 36.15 KG/M2 | TEMPERATURE: 98.2 F | RESPIRATION RATE: 18 BRPM

## 2022-08-21 DIAGNOSIS — R10.84 GENERALIZED ABDOMINAL PAIN: Primary | ICD-10-CM

## 2022-08-21 LAB
ALBUMIN SERPL-MCNC: 4.1 G/DL (ref 3.5–5.2)
ALBUMIN/GLOB SERPL: 1.5 G/DL
ALP SERPL-CCNC: 92 U/L (ref 39–117)
ALT SERPL W P-5'-P-CCNC: 11 U/L (ref 1–33)
ANION GAP SERPL CALCULATED.3IONS-SCNC: 10 MMOL/L (ref 5–15)
AST SERPL-CCNC: 12 U/L (ref 1–32)
BASOPHILS # BLD AUTO: 0.07 10*3/MM3 (ref 0–0.2)
BASOPHILS NFR BLD AUTO: 0.9 % (ref 0–1.5)
BILIRUB SERPL-MCNC: 0.3 MG/DL (ref 0–1.2)
BUN SERPL-MCNC: 38 MG/DL (ref 8–23)
BUN/CREAT SERPL: 9.9 (ref 7–25)
CALCIUM SPEC-SCNC: 9.3 MG/DL (ref 8.6–10.5)
CHLORIDE SERPL-SCNC: 104 MMOL/L (ref 98–107)
CO2 SERPL-SCNC: 26 MMOL/L (ref 22–29)
CREAT SERPL-MCNC: 3.84 MG/DL (ref 0.57–1)
DEPRECATED RDW RBC AUTO: 53.2 FL (ref 37–54)
EGFRCR SERPLBLD CKD-EPI 2021: 12.7 ML/MIN/1.73
EOSINOPHIL # BLD AUTO: 0.14 10*3/MM3 (ref 0–0.4)
EOSINOPHIL NFR BLD AUTO: 1.8 % (ref 0.3–6.2)
ERYTHROCYTE [DISTWIDTH] IN BLOOD BY AUTOMATED COUNT: 14.6 % (ref 12.3–15.4)
GLOBULIN UR ELPH-MCNC: 2.8 GM/DL
GLUCOSE SERPL-MCNC: 124 MG/DL (ref 65–99)
HCT VFR BLD AUTO: 38.8 % (ref 34–46.6)
HGB BLD-MCNC: 12.5 G/DL (ref 12–15.9)
HOLD SPECIMEN: NORMAL
HOLD SPECIMEN: NORMAL
IMM GRANULOCYTES # BLD AUTO: 0.07 10*3/MM3 (ref 0–0.05)
IMM GRANULOCYTES NFR BLD AUTO: 0.9 % (ref 0–0.5)
LIPASE SERPL-CCNC: 74 U/L (ref 13–60)
LYMPHOCYTES # BLD AUTO: 1.64 10*3/MM3 (ref 0.7–3.1)
LYMPHOCYTES NFR BLD AUTO: 20.7 % (ref 19.6–45.3)
MCH RBC QN AUTO: 32.1 PG (ref 26.6–33)
MCHC RBC AUTO-ENTMCNC: 32.2 G/DL (ref 31.5–35.7)
MCV RBC AUTO: 99.7 FL (ref 79–97)
MONOCYTES # BLD AUTO: 0.57 10*3/MM3 (ref 0.1–0.9)
MONOCYTES NFR BLD AUTO: 7.2 % (ref 5–12)
NEUTROPHILS NFR BLD AUTO: 5.44 10*3/MM3 (ref 1.7–7)
NEUTROPHILS NFR BLD AUTO: 68.5 % (ref 42.7–76)
NRBC BLD AUTO-RTO: 0 /100 WBC (ref 0–0.2)
PLATELET # BLD AUTO: 188 10*3/MM3 (ref 140–450)
PMV BLD AUTO: 10.7 FL (ref 6–12)
POTASSIUM SERPL-SCNC: 5.1 MMOL/L (ref 3.5–5.2)
PROT SERPL-MCNC: 6.9 G/DL (ref 6–8.5)
RBC # BLD AUTO: 3.89 10*6/MM3 (ref 3.77–5.28)
SODIUM SERPL-SCNC: 140 MMOL/L (ref 136–145)
WBC NRBC COR # BLD: 7.93 10*3/MM3 (ref 3.4–10.8)
WHOLE BLOOD HOLD COAG: NORMAL
WHOLE BLOOD HOLD SPECIMEN: NORMAL

## 2022-08-21 PROCEDURE — 96360 HYDRATION IV INFUSION INIT: CPT

## 2022-08-21 PROCEDURE — 85025 COMPLETE CBC W/AUTO DIFF WBC: CPT | Performed by: EMERGENCY MEDICINE

## 2022-08-21 PROCEDURE — 83690 ASSAY OF LIPASE: CPT | Performed by: EMERGENCY MEDICINE

## 2022-08-21 PROCEDURE — 99283 EMERGENCY DEPT VISIT LOW MDM: CPT

## 2022-08-21 PROCEDURE — 93005 ELECTROCARDIOGRAM TRACING: CPT | Performed by: EMERGENCY MEDICINE

## 2022-08-21 PROCEDURE — 80053 COMPREHEN METABOLIC PANEL: CPT | Performed by: EMERGENCY MEDICINE

## 2022-08-21 PROCEDURE — 74176 CT ABD & PELVIS W/O CONTRAST: CPT

## 2022-08-21 PROCEDURE — 93010 ELECTROCARDIOGRAM REPORT: CPT | Performed by: INTERNAL MEDICINE

## 2022-08-21 RX ORDER — SODIUM CHLORIDE 0.9 % (FLUSH) 0.9 %
10 SYRINGE (ML) INJECTION AS NEEDED
Status: DISCONTINUED | OUTPATIENT
Start: 2022-08-21 | End: 2022-08-21 | Stop reason: HOSPADM

## 2022-08-21 RX ORDER — SODIUM CHLORIDE 9 MG/ML
125 INJECTION, SOLUTION INTRAVENOUS CONTINUOUS
Status: DISCONTINUED | OUTPATIENT
Start: 2022-08-21 | End: 2022-08-21 | Stop reason: HOSPADM

## 2022-08-21 RX ADMIN — SODIUM CHLORIDE 125 ML/HR: 9 INJECTION, SOLUTION INTRAVENOUS at 04:26

## 2022-08-21 NOTE — ED PROVIDER NOTES
"Subjective   Patient complains of abdominal pain.  She says it first hit her yesterday morning in the wee hours of morning around 3 or 4 AM and awaken her with severe pain around her abdomen diffusely.  She describes it as \"a band around her abdomen but not actually inside the abdomen.  She felt she needed have a bowel movement so took a laxative.  She did have a little diarrhea with some stool fragments in it.  She did not have any nausea or vomiting.  The pain gradually let up and she got some things done during the day yesterday.  She did eat okay yesterday.  She awakened again this morning with a return of the same pain.  She is never had anything like this before.  She has had a previous tube ligation but no other surgeries on her abdomen.      History provided by:  Patient   used: No    Abdominal Pain  Pain location:  Generalized  Pain quality: aching    Pain radiates to:  Does not radiate  Pain severity:  Severe  Onset quality:  Sudden  Duration:  1 day  Timing:  Constant  Progression:  Waxing and waning  Chronicity:  New  Context: awakening from sleep    Context: not alcohol use, not diet changes, not eating, not medication withdrawal, not recent illness, not recent travel, not sick contacts, not suspicious food intake and not trauma    Relieved by:  Nothing  Worsened by:  Nothing  Ineffective treatments:  None tried  Associated symptoms: diarrhea    Associated symptoms: no anorexia, no belching, no chills, no constipation, no dysuria, no fatigue, no flatus, no hematemesis, no melena, no shortness of breath, no sore throat and no vaginal discharge    Risk factors: no alcohol abuse, no aspirin use, has not had multiple surgeries, no NSAID use, not pregnant and no recent hospitalization        Review of Systems   Constitutional: Negative.  Negative for chills and fatigue.   HENT: Negative.  Negative for sore throat.    Respiratory: Negative.  Negative for shortness of breath.  "   Cardiovascular: Negative.    Gastrointestinal: Positive for abdominal pain and diarrhea. Negative for anorexia, constipation, flatus, hematemesis and melena.   Genitourinary: Negative.  Negative for dysuria and vaginal discharge.   Musculoskeletal: Negative.    Skin: Negative.    Neurological: Negative.    Psychiatric/Behavioral: Negative.    All other systems reviewed and are negative.      Past Medical History:   Diagnosis Date   • Acute CHF (HCC)    • Acute renal failure (ARF) (HCC)    • Anemia    • Asthma     childhood   • Chronic kidney disease    • Coronary artery disease    • Hypertension    • Ischemic colitis (HCC)    • Metabolic acidosis    • Myocardial infarction (HCC) 11/07/2020   • Nonrheumatic aortic (valve) insufficiency    • PTSD (post-traumatic stress disorder)        Allergies   Allergen Reactions   • Codeine Nausea And Vomiting   • Imitrex [Sumatriptan] Other (See Comments)     HA       Past Surgical History:   Procedure Laterality Date   • CARDIAC CATHETERIZATION N/A 11/8/2020    Procedure: Left Heart Cath;  Surgeon: Pierce Buchanan MD;  Location:  [facility];  Service: Cardiovascular;  Laterality: N/A;   • EXTERNAL FIXATION WRIST FRACTURE     • INSERTION HEMODIALYSIS CATHETER Right 6/3/2022    Procedure: INSERTION OF RIGHT INTERNAL JUGULAR HEMODIALYSIS CATHETER;  Surgeon: Dexter Red MD;  Location:  [facility] OR;  Service: Vascular;  Laterality: Right;   • LEG SURGERY     • TUBAL ABDOMINAL LIGATION         Family History   Problem Relation Age of Onset   • Coronary artery disease Mother    • Coronary artery disease Father    • Breast cancer Neg Hx        Social History     Socioeconomic History   • Marital status: Single   Tobacco Use   • Smoking status: Current Some Day Smoker     Packs/day: 0.50     Years: 46.00     Pack years: 23.00     Types: Cigarettes     Start date: 1975   • Smokeless tobacco: Never Used   Vaping Use   • Vaping Use: Never used   Substance and  Sexual Activity   • Alcohol use: No   • Drug use: No   • Sexual activity: Defer       Prior to Admission medications    Medication Sig Start Date End Date Taking? Authorizing Provider   albuterol sulfate  (90 Base) MCG/ACT inhaler Inhale 2 puffs Every 4 (Four) Hours As Needed for Wheezing. 2/7/22   Neeta Chavez MD   aspirin 81 MG EC tablet Take 1 tablet by mouth Daily. 2/7/22   Destini Gaitan APRN   azelastine (ASTELIN) 0.1 % nasal spray 2 sprays into the nostril(s) as directed by provider 2 (Two) Times a Day.    Edwin Wise MD   budesonide-formoterol (SYMBICORT) 160-4.5 MCG/ACT inhaler Inhale 2 puffs 2 (Two) Times a Day.    Edwin Wise MD   busPIRone (BUSPAR) 10 MG tablet Take 10 mg by mouth 3 (Three) Times a Day.    Edwin Wise MD   calcitriol (ROCALTROL) 0.25 MCG capsule Take 1 capsule by mouth Daily. 8/14/21   Orville Weston MD   carvedilol (COREG) 25 MG tablet Take 25 mg by mouth 2 (Two) Times a Day With Meals.    Edwin Wise MD   cholecalciferol (VITAMIN D3) 25 MCG (1000 UT) tablet Take 1,000 Units by mouth Daily. 2/14/22   Edwin Wise MD   cloNIDine (CATAPRES) 0.1 MG tablet Take 0.1 mg by mouth Every 6 (Six) Hours As Needed for High Blood Pressure (systolic >160).    Edwin Wise MD   famotidine (PEPCID) 20 MG tablet Take 1 tablet by mouth Daily. 6/11/22   Orville Weston MD   fluticasone (FLONASE) 50 MCG/ACT nasal spray 2 sprays into the nostril(s) as directed by provider Every Morning. In each nostril    Edwin Wise MD   furosemide (LASIX) 40 MG tablet Take 40 mg by mouth 2 (Two) Times a Day. As needed    Edwin Wise MD   ipratropium-albuterol (DUO-NEB) 0.5-2.5 mg/3 ml nebulizer Take 3 mL by nebulization 4 (Four) Times a Day. 2/7/22   Neeta Chavez MD   isosorbide dinitrate (ISORDIL) 20 MG tablet Take 1 tablet by mouth 3 (Three) Times a Day. 2/7/22   Destini Gaitan APRN   melatonin 3 MG  tablet Take 2 tablets by mouth Every Night. 6/10/22   Orville Weston MD   multivitamin with minerals tablet tablet Take 1 tablet by mouth Daily.    ProviderEdwin MD   NIFEdipine CC (ADALAT CC) 90 MG 24 hr tablet Take 1 tablet by mouth Daily. 2/7/22   Destini Gaitan APRN   nitroglycerin (NITROSTAT) 0.4 MG SL tablet Place 0.4 mg under the tongue Every 5 (Five) Minutes As Needed for Chest Pain. Take no more than 3 doses in 15 minutes.    ProviderEdwin MD   OLANZapine (zyPREXA) 5 MG tablet Take 1 tablet by mouth Every Night. 6/10/22   Orville Weston MD   ranolazine (RANEXA) 500 MG 12 hr tablet Take 500 mg by mouth 2 (Two) Times a Day.    ProviderEdwin MD   rosuvastatin (CRESTOR) 10 MG tablet Take 1 tablet by mouth Daily. 6/11/22   Orville Weston MD   ticagrelor (BRILINTA) 90 MG tablet tablet Take 1 tablet by mouth 2 (Two) Times a Day. 2/7/22   Destini Gaitan APRN       Medications   sodium chloride 0.9 % flush 10 mL (has no administration in time range)   sodium chloride 0.9 % flush 10 mL (has no administration in time range)   sodium chloride 0.9 % infusion (0 mL/hr Intravenous Stopped 8/21/22 0512)       Vitals:    08/21/22 0518   BP: 117/78   Pulse: 61   Resp:    Temp:    SpO2: 96%         Objective   Physical Exam  Vitals and nursing note reviewed.   Constitutional:       Appearance: She is well-developed.   HENT:      Head: Normocephalic and atraumatic.   Cardiovascular:      Rate and Rhythm: Normal rate and regular rhythm.   Pulmonary:      Effort: Pulmonary effort is normal.      Breath sounds: Normal breath sounds.   Abdominal:      General: Bowel sounds are decreased.      Palpations: Abdomen is soft.      Tenderness: There is generalized abdominal tenderness. There is no guarding or rebound.   Skin:     General: Skin is warm and dry.   Neurological:      General: No focal deficit present.      Mental Status: She is alert and oriented to person, place, and  time.   Psychiatric:         Mood and Affect: Mood normal.         Behavior: Behavior normal.         Procedures         Lab Results (last 24 hours)     Procedure Component Value Units Date/Time    CBC & Differential [[id number]]  (Abnormal) Collected: 08/21/22 0418    Specimen: Blood Updated: 08/21/22 0428    Narrative:      The following orders were created for panel order CBC & Differential.  Procedure                               Abnormality         Status                     ---------                               -----------         ------                     CBC Auto Differential[[id number]]        Abnormal            Final result                 Please view results for these tests on the individual orders.    Comprehensive Metabolic Panel [[id number]]  (Abnormal) Collected: 08/21/22 0418    Specimen: Blood Updated: 08/21/22 0447     Glucose 124 mg/dL      BUN 38 mg/dL      Creatinine 3.84 mg/dL      Sodium 140 mmol/L      Potassium 5.1 mmol/L      Chloride 104 mmol/L      CO2 26.0 mmol/L      Calcium 9.3 mg/dL      Total Protein 6.9 g/dL      Albumin 4.10 g/dL      ALT (SGPT) 11 U/L      AST (SGOT) 12 U/L      Alkaline Phosphatase 92 U/L      Total Bilirubin 0.3 mg/dL      Globulin 2.8 gm/dL      A/G Ratio 1.5 g/dL      BUN/Creatinine Ratio 9.9     Anion Gap 10.0 mmol/L      eGFR 12.7 mL/min/1.73      Comment: <15 Indicative of kidney failure       Narrative:      GFR Normal >60  Chronic Kidney Disease <60  Kidney Failure <15      Lipase [[id number]]  (Abnormal) Collected: 08/21/22 0418    Specimen: Blood Updated: 08/21/22 0442     Lipase 74 U/L     CBC Auto Differential [[id number]]  (Abnormal) Collected: 08/21/22 0418    Specimen: Blood Updated: 08/21/22 0428     WBC 7.93 10*3/mm3      RBC 3.89 10*6/mm3      Hemoglobin 12.5 g/dL      Hematocrit 38.8 %      MCV 99.7 fL      MCH 32.1 pg      MCHC 32.2 g/dL      RDW 14.6 %      RDW-SD 53.2 fl      MPV 10.7 fL      Platelets 188 10*3/mm3      Neutrophil % 68.5 %       Lymphocyte % 20.7 %      Monocyte % 7.2 %      Eosinophil % 1.8 %      Basophil % 0.9 %      Immature Grans % 0.9 %      Neutrophils, Absolute 5.44 10*3/mm3      Lymphocytes, Absolute 1.64 10*3/mm3      Monocytes, Absolute 0.57 10*3/mm3      Eosinophils, Absolute 0.14 10*3/mm3      Basophils, Absolute 0.07 10*3/mm3      Immature Grans, Absolute 0.07 10*3/mm3      nRBC 0.0 /100 WBC           CT Abdomen Pelvis Without Contrast    (Results Pending)       ED Course  ED Course as of 08/21/22 0623   Sun Aug 21, 2022   0620 I told the patient her lab work all looks fine.  Her white blood cell count is normal.  Her renal functions are elevated but they are actually better than they have been in the past according to our computer.  She is a dialysis patient so these are not bad at all.  Her CT scan to my reading looks okay but I do not have the official radiology reading as yet.  It apparently was taking a while.  However she is now pain-free and wants to go home.  I think it is perfectly reasonable.  This is probably some type of just irritation in her abdomen but no signs of any acute.  She is discharged in stable condition. [TR]      ED Course User Index  [TR] Devin Qureshi Jr., MD          MDM  Number of Diagnoses or Management Options  Generalized abdominal pain: new and requires workup     Amount and/or Complexity of Data Reviewed  Clinical lab tests: ordered and reviewed  Tests in the radiology section of CPT®: ordered and reviewed  Decide to obtain previous medical records or to obtain history from someone other than the patient: yes    Risk of Complications, Morbidity, and/or Mortality  Presenting problems: moderate  Diagnostic procedures: moderate  Management options: moderate    Patient Progress  Patient progress: stable      Final diagnoses:   Generalized abdominal pain          Devin Qureshi Jr., MD  08/21/22 0623
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
## 2022-08-23 LAB
QT INTERVAL: 452 MS
QTC INTERVAL: 455 MS

## 2022-10-12 ENCOUNTER — HOSPITAL ENCOUNTER (EMERGENCY)
Facility: HOSPITAL | Age: 62
Discharge: HOME | End: 2022-10-12
Attending: EMERGENCY MEDICINE | Admitting: EMERGENCY MEDICINE

## 2022-10-12 ENCOUNTER — APPOINTMENT (OUTPATIENT)
Dept: CT IMAGING | Facility: HOSPITAL | Age: 62
End: 2022-10-12

## 2022-10-12 VITALS
RESPIRATION RATE: 20 BRPM | HEART RATE: 69 BPM | SYSTOLIC BLOOD PRESSURE: 136 MMHG | DIASTOLIC BLOOD PRESSURE: 86 MMHG | BODY MASS INDEX: 38.65 KG/M2 | HEIGHT: 65 IN | WEIGHT: 232 LBS | OXYGEN SATURATION: 98 % | TEMPERATURE: 98 F

## 2022-10-12 DIAGNOSIS — N18.9 CHRONIC RENAL IMPAIRMENT, UNSPECIFIED CKD STAGE: ICD-10-CM

## 2022-10-12 DIAGNOSIS — S20.211A CONTUSION OF RIGHT CHEST WALL, INITIAL ENCOUNTER: ICD-10-CM
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
## 2022-10-12 LAB
ALBUMIN SERPL-MCNC: 4.4 G/DL (ref 3.5–5.2)
ALBUMIN/GLOB SERPL: 1.6 G/DL
ALP SERPL-CCNC: 106 U/L (ref 39–117)
ALT SERPL W P-5'-P-CCNC: 7 U/L (ref 1–33)
ANION GAP SERPL CALCULATED.3IONS-SCNC: 11 MMOL/L (ref 5–15)
AST SERPL-CCNC: 11 U/L (ref 1–32)
BASOPHILS # BLD AUTO: 0.05 10*3/MM3 (ref 0–0.2)
BASOPHILS NFR BLD AUTO: 0.7 % (ref 0–1.5)
BILIRUB SERPL-MCNC: 0.3 MG/DL (ref 0–1.2)
BUN SERPL-MCNC: 33 MG/DL (ref 8–23)
BUN/CREAT SERPL: 12.9 (ref 7–25)
CALCIUM SPEC-SCNC: 8.9 MG/DL (ref 8.6–10.5)
CHLORIDE SERPL-SCNC: 103 MMOL/L (ref 98–107)
CO2 SERPL-SCNC: 28 MMOL/L (ref 22–29)
CREAT SERPL-MCNC: 2.55 MG/DL (ref 0.57–1)
DEPRECATED RDW RBC AUTO: 50.3 FL (ref 37–54)
EGFRCR SERPLBLD CKD-EPI 2021: 20.8 ML/MIN/1.73
EOSINOPHIL # BLD AUTO: 0.18 10*3/MM3 (ref 0–0.4)
EOSINOPHIL NFR BLD AUTO: 2.5 % (ref 0.3–6.2)
ERYTHROCYTE [DISTWIDTH] IN BLOOD BY AUTOMATED COUNT: 14.2 % (ref 12.3–15.4)
GLOBULIN UR ELPH-MCNC: 2.7 GM/DL
GLUCOSE SERPL-MCNC: 110 MG/DL (ref 65–99)
HCT VFR BLD AUTO: 36.8 % (ref 34–46.6)
HGB BLD-MCNC: 11.9 G/DL (ref 12–15.9)
IMM GRANULOCYTES # BLD AUTO: 0.08 10*3/MM3 (ref 0–0.05)
IMM GRANULOCYTES NFR BLD AUTO: 1.1 % (ref 0–0.5)
LYMPHOCYTES # BLD AUTO: 1.54 10*3/MM3 (ref 0.7–3.1)
LYMPHOCYTES NFR BLD AUTO: 21.6 % (ref 19.6–45.3)
MCH RBC QN AUTO: 31.2 PG (ref 26.6–33)
MCHC RBC AUTO-ENTMCNC: 32.3 G/DL (ref 31.5–35.7)
MCV RBC AUTO: 96.6 FL (ref 79–97)
MONOCYTES # BLD AUTO: 0.54 10*3/MM3 (ref 0.1–0.9)
MONOCYTES NFR BLD AUTO: 7.6 % (ref 5–12)
NEUTROPHILS NFR BLD AUTO: 4.73 10*3/MM3 (ref 1.7–7)
NEUTROPHILS NFR BLD AUTO: 66.5 % (ref 42.7–76)
NRBC BLD AUTO-RTO: 0 /100 WBC (ref 0–0.2)
PLATELET # BLD AUTO: 224 10*3/MM3 (ref 140–450)
PMV BLD AUTO: 10.3 FL (ref 6–12)
POTASSIUM SERPL-SCNC: 3.7 MMOL/L (ref 3.5–5.2)
PROT SERPL-MCNC: 7.1 G/DL (ref 6–8.5)
RBC # BLD AUTO: 3.81 10*6/MM3 (ref 3.77–5.28)
SODIUM SERPL-SCNC: 142 MMOL/L (ref 136–145)
WBC NRBC COR # BLD: 7.12 10*3/MM3 (ref 3.4–10.8)

## 2022-10-12 PROCEDURE — 80053 COMPREHEN METABOLIC PANEL: CPT | Performed by: EMERGENCY MEDICINE

## 2022-10-12 PROCEDURE — 72128 CT CHEST SPINE W/O DYE: CPT

## 2022-10-12 PROCEDURE — 96375 TX/PRO/DX INJ NEW DRUG ADDON: CPT

## 2022-10-12 PROCEDURE — 72131 CT LUMBAR SPINE W/O DYE: CPT

## 2022-10-12 PROCEDURE — 96374 THER/PROPH/DIAG INJ IV PUSH: CPT

## 2022-10-12 PROCEDURE — 70450 CT HEAD/BRAIN W/O DYE: CPT

## 2022-10-12 PROCEDURE — 72125 CT NECK SPINE W/O DYE: CPT

## 2022-10-12 PROCEDURE — 71250 CT THORAX DX C-: CPT

## 2022-10-12 PROCEDURE — 25010000002 MORPHINE PER 10 MG: Performed by: EMERGENCY MEDICINE

## 2022-10-12 PROCEDURE — 99284 EMERGENCY DEPT VISIT MOD MDM: CPT

## 2022-10-12 PROCEDURE — 74176 CT ABD & PELVIS W/O CONTRAST: CPT

## 2022-10-12 PROCEDURE — 25010000002 ONDANSETRON PER 1 MG: Performed by: EMERGENCY MEDICINE

## 2022-10-12 PROCEDURE — 85025 COMPLETE CBC W/AUTO DIFF WBC: CPT | Performed by: EMERGENCY MEDICINE

## 2022-10-12 RX ORDER — MORPHINE SULFATE 2 MG/ML
2 INJECTION, SOLUTION INTRAMUSCULAR; INTRAVENOUS ONCE
Status: COMPLETED | OUTPATIENT
Start: 2022-10-12 | End: 2022-10-12

## 2022-10-12 RX ORDER — SODIUM CHLORIDE 0.9 % (FLUSH) 0.9 %
10 SYRINGE (ML) INJECTION AS NEEDED
Status: DISCONTINUED | OUTPATIENT
Start: 2022-10-12 | End: 2022-10-12 | Stop reason: HOSPADM

## 2022-10-12 RX ORDER — ONDANSETRON 2 MG/ML
4 INJECTION INTRAMUSCULAR; INTRAVENOUS ONCE
Status: COMPLETED | OUTPATIENT
Start: 2022-10-12 | End: 2022-10-12

## 2022-10-12 RX ADMIN — ONDANSETRON 4 MG: 2 INJECTION INTRAMUSCULAR; INTRAVENOUS at 17:26

## 2022-10-12 RX ADMIN — MORPHINE SULFATE 2 MG: 2 INJECTION, SOLUTION INTRAMUSCULAR; INTRAVENOUS at 17:26
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
## 2022-10-12 NOTE — TELEPHONE ENCOUNTER
Patient called stating she needed refills on her Brilinta 90 mg. I called and confirmed with [facility] that she has plenty of refills (480 quantity) at this time and that her out of pocket copay is $42.00. Patient was advised and voiced understanding however states that she cannot afford the $42.00 copay at this time. Patient was provided with two week supply of samples on 10-12-22. wf

## 2022-10-21 NOTE — OUTREACH NOTE
CHF Week 1 Survey      Responses   Facility patient discharged from?  [place]   Does the patient have one of the following disease processes/diagnoses(primary or secondary)?  CHF   Is there a successful TCM telephone encounter documented?  No   CHF Week 1 attempt successful?  No   Unsuccessful attempts  Attempt 2          Chencho Nichols RN  
Patient Specific Counseling (Will Not Stick From Patient To Patient): Verrucae are getting smaller and thinner. Pt has tolerated tx well. Has a little redness and mild edema the day of inj. Recommend cont tx.
Detail Level: Detailed

## 2022-10-26 ENCOUNTER — OFFICE VISIT (OUTPATIENT)
Dept: PULMONOLOGY | Facility: CLINIC | Age: 62
End: 2022-10-26

## 2022-10-26 VITALS
BODY MASS INDEX: 33.45 KG/M2 | WEIGHT: 200.8 LBS | OXYGEN SATURATION: 90 % | SYSTOLIC BLOOD PRESSURE: 120 MMHG | HEIGHT: 65 IN | HEART RATE: 71 BPM | DIASTOLIC BLOOD PRESSURE: 84 MMHG

## 2022-10-26 DIAGNOSIS — I27.20 PULMONARY HYPERTENSION: ICD-10-CM

## 2022-10-26 DIAGNOSIS — G47.33 OSA (OBSTRUCTIVE SLEEP APNEA): ICD-10-CM

## 2022-10-26 DIAGNOSIS — Z99.81 HYPOXEMIA REQUIRING SUPPLEMENTAL OXYGEN: ICD-10-CM

## 2022-10-26 DIAGNOSIS — F17.210 CIGARETTE SMOKER: Chronic | ICD-10-CM

## 2022-10-26 DIAGNOSIS — J30.89 NON-SEASONAL ALLERGIC RHINITIS, UNSPECIFIED TRIGGER: ICD-10-CM

## 2022-10-26 DIAGNOSIS — Z91.199 NONCOMPLIANCE: ICD-10-CM

## 2022-10-26 DIAGNOSIS — R09.02 HYPOXEMIA REQUIRING SUPPLEMENTAL OXYGEN: ICD-10-CM

## 2022-10-26 DIAGNOSIS — J44.9 CHRONIC OBSTRUCTIVE PULMONARY DISEASE, UNSPECIFIED COPD TYPE: Primary | ICD-10-CM

## 2022-10-26 DIAGNOSIS — R06.02 SHORTNESS OF BREATH: ICD-10-CM

## 2022-10-26 PROCEDURE — 99406 BEHAV CHNG SMOKING 3-10 MIN: CPT | Performed by: INTERNAL MEDICINE

## 2022-10-26 PROCEDURE — 99214 OFFICE O/P EST MOD 30 MIN: CPT | Performed by: INTERNAL MEDICINE

## 2022-10-26 RX ORDER — RANOLAZINE 500 MG/1
TABLET, EXTENDED RELEASE ORAL
COMMUNITY
Start: 2022-10-14

## 2022-10-26 RX ORDER — BUDESONIDE AND FORMOTEROL FUMARATE DIHYDRATE 160; 4.5 UG/1; UG/1
2 AEROSOL RESPIRATORY (INHALATION)
Qty: 1 EACH | Refills: 11 | Status: SHIPPED | OUTPATIENT
Start: 2022-10-26

## 2022-10-26 RX ORDER — LORATADINE 10 MG/1
10 TABLET ORAL DAILY
Qty: 90 TABLET | Refills: 3 | Status: SHIPPED | OUTPATIENT
Start: 2022-10-26

## 2022-10-26 RX ORDER — ALBUTEROL SULFATE 90 UG/1
2 AEROSOL, METERED RESPIRATORY (INHALATION) EVERY 4 HOURS PRN
Qty: 20.1 G | Refills: 3 | Status: SHIPPED | OUTPATIENT
Start: 2022-10-26

## 2022-10-26 RX ORDER — AZELASTINE 1 MG/ML
2 SPRAY, METERED NASAL 2 TIMES DAILY
Qty: 30 ML | Refills: 11 | Status: SHIPPED | OUTPATIENT
Start: 2022-10-26

## 2022-10-26 RX ORDER — NIFEDIPINE 90 MG/1
1 TABLET, EXTENDED RELEASE ORAL DAILY
COMMUNITY
Start: 2022-06-15

## 2022-10-26 RX ORDER — FLUTICASONE PROPIONATE 50 MCG
2 SPRAY, SUSPENSION (ML) NASAL EVERY MORNING
Qty: 16 G | Refills: 11 | Status: SHIPPED | OUTPATIENT
Start: 2022-10-26

## 2022-10-26 RX ORDER — LOSARTAN POTASSIUM 50 MG/1
TABLET ORAL
COMMUNITY
Start: 2022-08-11

## 2022-10-26 RX ORDER — IPRATROPIUM BROMIDE AND ALBUTEROL SULFATE 2.5; .5 MG/3ML; MG/3ML
3 SOLUTION RESPIRATORY (INHALATION)
Qty: 1080 ML | Refills: 3 | Status: SHIPPED | OUTPATIENT
Start: 2022-10-26
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
## 2022-10-26 NOTE — PROCEDURES
Walking Oximetry  Performed by: Sweetie Chery MA  Authorized by: Neeta Chavez MD     Rest room air SAT %:  90  Exercise room air SAT %:  88

## 2022-11-02 ENCOUNTER — TRANSCRIBE ORDERS (OUTPATIENT)
Dept: ADMINISTRATIVE | Facility: HOSPITAL | Age: 62
End: 2022-11-02

## 2022-11-03 ENCOUNTER — APPOINTMENT (OUTPATIENT)
Dept: GENERAL RADIOLOGY | Facility: HOSPITAL | Age: 62
End: 2022-11-03

## 2022-11-03 ENCOUNTER — HOSPITAL ENCOUNTER (OUTPATIENT)
Dept: GENERAL RADIOLOGY | Facility: HOSPITAL | Age: 62
Discharge: HOME | End: 2022-11-03

## 2022-11-03 ENCOUNTER — TRANSCRIBE ORDERS (OUTPATIENT)
Dept: ADMINISTRATIVE | Facility: HOSPITAL | Age: 62
End: 2022-11-03

## 2022-11-03 DIAGNOSIS — R06.02 SHORTNESS OF BREATH: ICD-10-CM

## 2022-11-03 DIAGNOSIS — M54.50 LOW BACK PAIN, UNSPECIFIED BACK PAIN LATERALITY, UNSPECIFIED CHRONICITY, UNSPECIFIED WHETHER SCIATICA PRESENT: ICD-10-CM

## 2022-11-03 DIAGNOSIS — R06.02 SHORTNESS OF BREATH: Primary | ICD-10-CM

## 2022-11-03 PROCEDURE — 72072 X-RAY EXAM THORAC SPINE 3VWS: CPT

## 2022-11-03 PROCEDURE — 72082 X-RAY EXAM ENTIRE SPI 2/3 VW: CPT

## 2022-11-03 PROCEDURE — 71046 X-RAY EXAM CHEST 2 VIEWS: CPT

## 2022-11-23 RX ORDER — ISOSORBIDE DINITRATE 20 MG/1
20 TABLET ORAL
Qty: 270 TABLET | Refills: 3 | Status: SHIPPED | OUTPATIENT
Start: 2022-11-23

## 2023-02-16 RX ORDER — ASPIRIN 81 MG/1
TABLET, COATED ORAL
Qty: 90 TABLET | Refills: 2 | OUTPATIENT
Start: 2023-02-16

## 2023-02-22 NOTE — PROGRESS NOTES
"Progress Note  Ludivina Ramirez  7/23/2021 09:57 CDT  Subjective:   Admit Date:   7/21/2021      CC/ADMIT DX:       Interval History:   Reviewed overnight events and nursing notes.  She has no new complaints. Her SOA is improved. No CP.   I have reviewed all labs/diagnostics from the last 24hrs.       ROS:   I have done a 10 point ROS and all are negative, except what is mentioned in the HPI.    Diet Regular; Cardiac    Medications:      aspirin, 81 mg, Oral, Daily  carvedilol, 12.5 mg, Oral, BID With Meals  doxycycline, 100 mg, Intravenous, Q12H  enoxaparin, 40 mg, Subcutaneous, Q24H  hydrALAZINE, 25 mg, Oral, Q8H  ipratropium-albuterol, 3 mL, Nebulization, 4x Daily - RT  losartan, 100 mg, Oral, Q24H  methylPREDNISolone sodium succinate, 40 mg, Intravenous, Q8H  NIFEdipine CC, 60 mg, Oral, BID  OLANZapine, 5 mg, Oral, Daily  pantoprazole, 40 mg, Oral, Daily  rosuvastatin, 20 mg, Oral, Nightly  ticagrelor, 90 mg, Oral, BID            Objective:   Vitals: /94 (BP Location: Left arm)   Pulse 80   Temp 98 °F (36.7 °C) (Oral)   Resp 20   Ht 167.6 cm (66\")   Wt 95.5 kg (210 lb 9.6 oz)   SpO2 93%   BMI 33.99 kg/m²      Intake/Output Summary (Last 24 hours) at 7/23/2021 0957  Last data filed at 7/23/2021 0900  Gross per 24 hour   Intake 580 ml   Output 1950 ml   Net -1370 ml     General appearance: alert and cooperative with exam  Lungs: clear to auscultation bilaterally  Heart: regular rate and rhythm, S1, S2 normal, no murmur, click, rub or gallop  Abdomen: soft, non-tender; bowel sounds normal; no masses,  no organomegaly  Extremities: extremities normal, atraumatic, no cyanosis or edema  Neurologic:  No obvious focal neurologic deficits.    Assessment and Plan:     Hypertensive urgency    Stage 3 chronic kidney disease (CMS/HCC)    Acute on chronic diastolic heart failure (CMS/HCC)    Coronary artery disease of native artery of native heart with stable angina pectoris (CMS/HCC)    Bradycardia    " MATTY    Plan:  1.  Continue present medication(s)   2.  Stop Lasix and follow labs  3.  Follow with Cardiology  4.  Monitor BP      Discharge planning:   her home     Reviewed treatment plans with the patient and/or family.             Electronically signed by Orville Weston MD on 7/23/2021 at 09:57 CDT   Helical Rim Advancement Flap Text: The defect edges were debeveled with a #15 blade scalpel.  Given the location of the defect and the proximity to free margins (helical rim) a double helical rim advancement flap was deemed most appropriate.  Using a sterile surgical marker, the appropriate advancement flaps were drawn incorporating the defect and placing the expected incisions between the helical rim and antihelix where possible.  The area thus outlined was incised through and through with a #15 scalpel blade.  With a skin hook and iris scissors, the flaps were gently and sharply undermined and freed up.

## 2023-06-22 ENCOUNTER — HOSPITAL ENCOUNTER (OUTPATIENT)
Dept: PREADMISSION TESTING | Age: 63
Discharge: HOME | End: 2023-06-26
Payer: MEDICARE

## 2023-06-22 VITALS — BODY MASS INDEX: 31.82 KG/M2 | HEIGHT: 66 IN | WEIGHT: 198 LBS

## 2023-06-22 LAB
EKG P AXIS: 68 DEGREES
EKG P-R INTERVAL: 236 MS
EKG Q-T INTERVAL: 436 MS
EKG QRS DURATION: 82 MS
EKG QTC CALCULATION (BAZETT): 439 MS
EKG T AXIS: 86 DEGREES
MRSA DNA SPEC QL NAA+PROBE: DETECTED

## 2023-06-22 PROCEDURE — 93005 ELECTROCARDIOGRAM TRACING: CPT | Performed by: SURGERY

## 2023-06-22 PROCEDURE — 93010 ELECTROCARDIOGRAM REPORT: CPT | Performed by: INTERNAL MEDICINE

## 2023-06-22 PROCEDURE — 87641 MR-STAPH DNA AMP PROBE: CPT

## 2023-06-22 RX ORDER — NIFEDIPINE 90 MG/1
90 TABLET, EXTENDED RELEASE ORAL DAILY
COMMUNITY

## 2023-06-22 RX ORDER — ZINC 25 MG
25 TABLET ORAL DAILY
COMMUNITY

## 2023-06-22 RX ORDER — ALUMINUM ZIRCONIUM OCTACHLOROHYDREX GLY 16 G/100G
1 GEL TOPICAL DAILY
COMMUNITY

## 2023-08-02 ENCOUNTER — OFFICE VISIT (OUTPATIENT)
Dept: PULMONOLOGY | Facility: CLINIC | Age: 63
End: 2023-08-02
Payer: MEDICARE

## 2023-08-02 VITALS
DIASTOLIC BLOOD PRESSURE: 78 MMHG | HEART RATE: 84 BPM | OXYGEN SATURATION: 95 % | BODY MASS INDEX: 32.39 KG/M2 | HEIGHT: 65 IN | WEIGHT: 194.4 LBS | SYSTOLIC BLOOD PRESSURE: 122 MMHG

## 2023-08-02 DIAGNOSIS — J44.9 CHRONIC OBSTRUCTIVE PULMONARY DISEASE, UNSPECIFIED COPD TYPE: Primary | ICD-10-CM

## 2023-08-02 DIAGNOSIS — E66.9 OBESITY (BMI 30-39.9): ICD-10-CM

## 2023-08-02 DIAGNOSIS — I27.20 PULMONARY HYPERTENSION: ICD-10-CM

## 2023-08-02 DIAGNOSIS — G47.33 OSA ON CPAP: ICD-10-CM

## 2023-08-02 DIAGNOSIS — F17.210 CIGARETTE SMOKER: Chronic | ICD-10-CM

## 2023-08-02 DIAGNOSIS — Z99.89 OSA ON CPAP: ICD-10-CM

## 2023-08-02 DIAGNOSIS — Z91.199 NONCOMPLIANCE: ICD-10-CM

## 2023-08-02 DIAGNOSIS — Z99.11 DEPENDENCE ON NON-INVASIVE VENTILATION: ICD-10-CM

## 2023-08-02 DIAGNOSIS — J30.89 NON-SEASONAL ALLERGIC RHINITIS, UNSPECIFIED TRIGGER: ICD-10-CM

## 2023-08-02 DIAGNOSIS — Z99.81 HYPOXEMIA REQUIRING SUPPLEMENTAL OXYGEN: ICD-10-CM

## 2023-08-02 DIAGNOSIS — R09.02 HYPOXEMIA REQUIRING SUPPLEMENTAL OXYGEN: ICD-10-CM

## 2023-08-02 DIAGNOSIS — F41.8 OTHER SPECIFIED ANXIETY DISORDERS: ICD-10-CM

## 2023-08-02 PROBLEM — E66.813 CLASS 3 DRUG-INDUCED OBESITY WITH BODY MASS INDEX (BMI) OF 40.0 TO 44.9 IN ADULT: Status: RESOLVED | Noted: 2020-11-23 | Resolved: 2023-08-02

## 2023-08-02 PROBLEM — E66.1 CLASS 3 DRUG-INDUCED OBESITY WITH BODY MASS INDEX (BMI) OF 40.0 TO 44.9 IN ADULT: Status: RESOLVED | Noted: 2020-11-23 | Resolved: 2023-08-02

## 2023-08-02 RX ORDER — GUAIFENESIN 600 MG/1
1200 TABLET, EXTENDED RELEASE ORAL 2 TIMES DAILY
Qty: 60 TABLET | Refills: 3 | Status: SHIPPED | OUTPATIENT
Start: 2023-08-02

## 2023-08-02 RX ORDER — AZITHROMYCIN 250 MG/1
TABLET, FILM COATED ORAL
Qty: 6 TABLET | Refills: 0 | Status: SHIPPED | OUTPATIENT
Start: 2023-08-02

## 2023-08-02 RX ORDER — ALBUTEROL SULFATE 90 UG/1
2 AEROSOL, METERED RESPIRATORY (INHALATION) EVERY 4 HOURS PRN
Qty: 20.1 G | Refills: 3 | Status: SHIPPED | OUTPATIENT
Start: 2023-08-02

## 2023-08-02 RX ORDER — ASCORBIC ACID 250 MG
2 TABLET ORAL DAILY
COMMUNITY

## 2023-08-02 RX ORDER — DOCUSATE SODIUM 250 MG
1 CAPSULE ORAL DAILY
COMMUNITY
Start: 2023-06-22

## 2023-08-02 RX ORDER — FERRIC CITRATE 210 MG/1
1 TABLET, COATED ORAL 3 TIMES DAILY
COMMUNITY
Start: 2023-03-17

## 2023-08-02 RX ORDER — BUDESONIDE AND FORMOTEROL FUMARATE DIHYDRATE 160; 4.5 UG/1; UG/1
2 AEROSOL RESPIRATORY (INHALATION)
Qty: 1 EACH | Refills: 11 | Status: SHIPPED | OUTPATIENT
Start: 2023-08-02

## 2023-08-02 RX ORDER — FLUTICASONE PROPIONATE 50 MCG
2 SPRAY, SUSPENSION (ML) NASAL EVERY MORNING
Qty: 16 G | Refills: 11 | Status: SHIPPED | OUTPATIENT
Start: 2023-08-02

## 2023-08-02 RX ORDER — ALUMINUM ZIRCONIUM OCTACHLOROHYDREX GLY 16 G/100G
1 GEL TOPICAL DAILY
COMMUNITY

## 2023-08-02 RX ORDER — AZELASTINE 1 MG/ML
2 SPRAY, METERED NASAL 2 TIMES DAILY
Qty: 30 ML | Refills: 11 | Status: SHIPPED | OUTPATIENT
Start: 2023-08-02

## 2023-08-02 RX ORDER — ZINC 25 MG
25 TABLET ORAL DAILY
COMMUNITY

## 2023-08-02 RX ORDER — LORATADINE 10 MG/1
10 TABLET ORAL DAILY
Qty: 90 TABLET | Refills: 3 | Status: SHIPPED | OUTPATIENT
Start: 2023-08-02

## 2023-08-02 RX ORDER — IPRATROPIUM BROMIDE AND ALBUTEROL SULFATE 2.5; .5 MG/3ML; MG/3ML
3 SOLUTION RESPIRATORY (INHALATION)
Qty: 1080 ML | Refills: 3 | Status: SHIPPED | OUTPATIENT
Start: 2023-08-02

## 2023-08-10 ENCOUNTER — HOSPITAL ENCOUNTER (INPATIENT)
Facility: HOSPITAL | Age: 63
LOS: 1 days | Discharge: LEFT BEFORE DISCHARGE | End: 2023-08-11
Attending: STUDENT IN AN ORGANIZED HEALTH CARE EDUCATION/TRAINING PROGRAM | Admitting: FAMILY MEDICINE
Payer: MEDICARE

## 2023-08-10 ENCOUNTER — APPOINTMENT (OUTPATIENT)
Dept: GENERAL RADIOLOGY | Facility: HOSPITAL | Age: 63
End: 2023-08-10
Payer: MEDICARE

## 2023-08-10 DIAGNOSIS — I16.1 HYPERTENSIVE EMERGENCY: Primary | ICD-10-CM

## 2023-08-10 LAB
ALBUMIN SERPL-MCNC: 4.3 G/DL (ref 3.5–5.2)
ALBUMIN/GLOB SERPL: 1.4 G/DL
ALP SERPL-CCNC: 123 U/L (ref 39–117)
ALT SERPL W P-5'-P-CCNC: 7 U/L (ref 1–33)
ANION GAP SERPL CALCULATED.3IONS-SCNC: 14 MMOL/L (ref 5–15)
AST SERPL-CCNC: 10 U/L (ref 1–32)
BASOPHILS # BLD AUTO: 0.06 10*3/MM3 (ref 0–0.2)
BASOPHILS NFR BLD AUTO: 0.9 % (ref 0–1.5)
BILIRUB SERPL-MCNC: 0.6 MG/DL (ref 0–1.2)
BUN SERPL-MCNC: 45 MG/DL (ref 8–23)
BUN/CREAT SERPL: 17.9 (ref 7–25)
CALCIUM SPEC-SCNC: 8.7 MG/DL (ref 8.6–10.5)
CHLORIDE SERPL-SCNC: 104 MMOL/L (ref 98–107)
CO2 SERPL-SCNC: 24 MMOL/L (ref 22–29)
CREAT SERPL-MCNC: 2.52 MG/DL (ref 0.57–1)
DEPRECATED RDW RBC AUTO: 48 FL (ref 37–54)
EGFRCR SERPLBLD CKD-EPI 2021: 21.1 ML/MIN/1.73
EOSINOPHIL # BLD AUTO: 0.22 10*3/MM3 (ref 0–0.4)
EOSINOPHIL NFR BLD AUTO: 3.3 % (ref 0.3–6.2)
ERYTHROCYTE [DISTWIDTH] IN BLOOD BY AUTOMATED COUNT: 13.5 % (ref 12.3–15.4)
GLOBULIN UR ELPH-MCNC: 3.1 GM/DL
GLUCOSE SERPL-MCNC: 125 MG/DL (ref 65–99)
HCT VFR BLD AUTO: 34 % (ref 34–46.6)
HGB BLD-MCNC: 10.6 G/DL (ref 12–15.9)
IMM GRANULOCYTES # BLD AUTO: 0.02 10*3/MM3 (ref 0–0.05)
IMM GRANULOCYTES NFR BLD AUTO: 0.3 % (ref 0–0.5)
LYMPHOCYTES # BLD AUTO: 1.31 10*3/MM3 (ref 0.7–3.1)
LYMPHOCYTES NFR BLD AUTO: 19.8 % (ref 19.6–45.3)
MCH RBC QN AUTO: 30.4 PG (ref 26.6–33)
MCHC RBC AUTO-ENTMCNC: 31.2 G/DL (ref 31.5–35.7)
MCV RBC AUTO: 97.4 FL (ref 79–97)
MONOCYTES # BLD AUTO: 0.37 10*3/MM3 (ref 0.1–0.9)
MONOCYTES NFR BLD AUTO: 5.6 % (ref 5–12)
NEUTROPHILS NFR BLD AUTO: 4.62 10*3/MM3 (ref 1.7–7)
NEUTROPHILS NFR BLD AUTO: 70.1 % (ref 42.7–76)
NRBC BLD AUTO-RTO: 0 /100 WBC (ref 0–0.2)
PLATELET # BLD AUTO: 210 10*3/MM3 (ref 140–450)
PMV BLD AUTO: 10 FL (ref 6–12)
POTASSIUM SERPL-SCNC: 3.8 MMOL/L (ref 3.5–5.2)
PROT SERPL-MCNC: 7.4 G/DL (ref 6–8.5)
RBC # BLD AUTO: 3.49 10*6/MM3 (ref 3.77–5.28)
SODIUM SERPL-SCNC: 142 MMOL/L (ref 136–145)
TROPONIN T SERPL HS-MCNC: 35 NG/L
WBC NRBC COR # BLD: 6.6 10*3/MM3 (ref 3.4–10.8)

## 2023-08-10 PROCEDURE — 36415 COLL VENOUS BLD VENIPUNCTURE: CPT

## 2023-08-10 PROCEDURE — 84100 ASSAY OF PHOSPHORUS: CPT | Performed by: STUDENT IN AN ORGANIZED HEALTH CARE EDUCATION/TRAINING PROGRAM

## 2023-08-10 PROCEDURE — 71045 X-RAY EXAM CHEST 1 VIEW: CPT

## 2023-08-10 PROCEDURE — 85025 COMPLETE CBC W/AUTO DIFF WBC: CPT

## 2023-08-10 PROCEDURE — 93005 ELECTROCARDIOGRAM TRACING: CPT

## 2023-08-10 PROCEDURE — 84484 ASSAY OF TROPONIN QUANT: CPT

## 2023-08-10 PROCEDURE — 93005 ELECTROCARDIOGRAM TRACING: CPT | Performed by: STUDENT IN AN ORGANIZED HEALTH CARE EDUCATION/TRAINING PROGRAM

## 2023-08-10 PROCEDURE — 99284 EMERGENCY DEPT VISIT MOD MDM: CPT

## 2023-08-10 PROCEDURE — 93010 ELECTROCARDIOGRAM REPORT: CPT | Performed by: INTERNAL MEDICINE

## 2023-08-10 PROCEDURE — 83735 ASSAY OF MAGNESIUM: CPT | Performed by: STUDENT IN AN ORGANIZED HEALTH CARE EDUCATION/TRAINING PROGRAM

## 2023-08-10 PROCEDURE — 80053 COMPREHEN METABOLIC PANEL: CPT

## 2023-08-10 RX ORDER — ASPIRIN 81 MG/1
324 TABLET, CHEWABLE ORAL ONCE
Status: COMPLETED | OUTPATIENT
Start: 2023-08-10 | End: 2023-08-11

## 2023-08-10 RX ORDER — SODIUM CHLORIDE 0.9 % (FLUSH) 0.9 %
10 SYRINGE (ML) INJECTION AS NEEDED
Status: DISCONTINUED | OUTPATIENT
Start: 2023-08-10 | End: 2023-08-11 | Stop reason: HOSPADM

## 2023-08-10 RX ORDER — METHYLPREDNISOLONE SODIUM SUCCINATE 125 MG/2ML
125 INJECTION, POWDER, LYOPHILIZED, FOR SOLUTION INTRAMUSCULAR; INTRAVENOUS ONCE
Status: COMPLETED | OUTPATIENT
Start: 2023-08-11 | End: 2023-08-11

## 2023-08-10 RX ORDER — IPRATROPIUM BROMIDE AND ALBUTEROL SULFATE 2.5; .5 MG/3ML; MG/3ML
3 SOLUTION RESPIRATORY (INHALATION)
Status: COMPLETED | OUTPATIENT
Start: 2023-08-11 | End: 2023-08-11

## 2023-08-10 RX ORDER — LOSARTAN POTASSIUM 50 MG/1
50 TABLET ORAL ONCE
Status: COMPLETED | OUTPATIENT
Start: 2023-08-10 | End: 2023-08-11

## 2023-08-10 RX ORDER — CLONIDINE HYDROCHLORIDE 0.1 MG/1
0.1 TABLET ORAL ONCE
Status: COMPLETED | OUTPATIENT
Start: 2023-08-10 | End: 2023-08-11

## 2023-08-10 NOTE — Clinical Note
Level of Care: Telemetry [5]   Diagnosis: Hypertensive emergency [[id number]]   Admitting Physician: JESSE MEIER [[id number]]   Attending Physician: JESSE MEIER [[id number]]   Certification: I Certify That Inpatient Hospital Services Are Medically Necessary For Greater Than 2 Midnights

## 2023-08-11 VITALS
HEART RATE: 81 BPM | TEMPERATURE: 98 F | RESPIRATION RATE: 20 BRPM | OXYGEN SATURATION: 94 % | WEIGHT: 198 LBS | DIASTOLIC BLOOD PRESSURE: 78 MMHG | HEIGHT: 65 IN | SYSTOLIC BLOOD PRESSURE: 134 MMHG | BODY MASS INDEX: 32.99 KG/M2

## 2023-08-11 PROBLEM — I16.1 HYPERTENSIVE EMERGENCY: Status: ACTIVE | Noted: 2023-08-11

## 2023-08-11 LAB
FLUAV RNA RESP QL NAA+PROBE: NOT DETECTED
FLUBV RNA RESP QL NAA+PROBE: NOT DETECTED
GEN 5 2HR TROPONIN T REFLEX: 34 NG/L
HOLD SPECIMEN: NORMAL
HOLD SPECIMEN: NORMAL
MAGNESIUM SERPL-MCNC: 2.3 MG/DL (ref 1.6–2.4)
PHOSPHATE SERPL-MCNC: 4.6 MG/DL (ref 2.5–4.5)
QT INTERVAL: 346 MS
QT INTERVAL: 354 MS
QTC INTERVAL: 430 MS
QTC INTERVAL: 430 MS
SARS-COV-2 RNA RESP QL NAA+PROBE: NOT DETECTED
TROPONIN T DELTA: -1 NG/L
WHOLE BLOOD HOLD COAG: NORMAL
WHOLE BLOOD HOLD SPECIMEN: NORMAL

## 2023-08-11 PROCEDURE — 94799 UNLISTED PULMONARY SVC/PX: CPT

## 2023-08-11 PROCEDURE — 96374 THER/PROPH/DIAG INJ IV PUSH: CPT

## 2023-08-11 PROCEDURE — 93005 ELECTROCARDIOGRAM TRACING: CPT | Performed by: STUDENT IN AN ORGANIZED HEALTH CARE EDUCATION/TRAINING PROGRAM

## 2023-08-11 PROCEDURE — 25010000002 LABETALOL 5 MG/ML SOLUTION: Performed by: STUDENT IN AN ORGANIZED HEALTH CARE EDUCATION/TRAINING PROGRAM

## 2023-08-11 PROCEDURE — 96375 TX/PRO/DX INJ NEW DRUG ADDON: CPT

## 2023-08-11 PROCEDURE — 87636 SARSCOV2 & INF A&B AMP PRB: CPT | Performed by: STUDENT IN AN ORGANIZED HEALTH CARE EDUCATION/TRAINING PROGRAM

## 2023-08-11 PROCEDURE — 84484 ASSAY OF TROPONIN QUANT: CPT | Performed by: STUDENT IN AN ORGANIZED HEALTH CARE EDUCATION/TRAINING PROGRAM

## 2023-08-11 PROCEDURE — 25010000002 METHYLPREDNISOLONE PER 125 MG: Performed by: STUDENT IN AN ORGANIZED HEALTH CARE EDUCATION/TRAINING PROGRAM

## 2023-08-11 PROCEDURE — 93010 ELECTROCARDIOGRAM REPORT: CPT | Performed by: INTERNAL MEDICINE

## 2023-08-11 PROCEDURE — 94640 AIRWAY INHALATION TREATMENT: CPT

## 2023-08-11 RX ORDER — DOXYCYCLINE 100 MG/1
100 CAPSULE ORAL 2 TIMES DAILY
Qty: 20 CAPSULE | Refills: 0 | Status: CANCELLED | OUTPATIENT
Start: 2023-08-11 | End: 2023-08-21

## 2023-08-11 RX ORDER — NITROGLYCERIN 0.4 MG/1
0.4 TABLET SUBLINGUAL
Status: DISCONTINUED | OUTPATIENT
Start: 2023-08-11 | End: 2023-08-11 | Stop reason: HOSPADM

## 2023-08-11 RX ORDER — METHYLPREDNISOLONE 4 MG/1
TABLET ORAL
Qty: 21 TABLET | Refills: 0 | Status: CANCELLED | OUTPATIENT
Start: 2023-08-11

## 2023-08-11 RX ORDER — ACETAMINOPHEN 500 MG
1000 TABLET ORAL ONCE
Status: COMPLETED | OUTPATIENT
Start: 2023-08-11 | End: 2023-08-11

## 2023-08-11 RX ORDER — OXYCODONE HYDROCHLORIDE 5 MG/1
5 TABLET ORAL ONCE
Status: COMPLETED | OUTPATIENT
Start: 2023-08-11 | End: 2023-08-11

## 2023-08-11 RX ORDER — LABETALOL HYDROCHLORIDE 5 MG/ML
10 INJECTION, SOLUTION INTRAVENOUS ONCE
Status: COMPLETED | OUTPATIENT
Start: 2023-08-11 | End: 2023-08-11

## 2023-08-11 RX ADMIN — IPRATROPIUM BROMIDE AND ALBUTEROL SULFATE 3 ML: .5; 3 SOLUTION RESPIRATORY (INHALATION) at 00:03

## 2023-08-11 RX ADMIN — NITROGLYCERIN 0.4 MG: 0.4 TABLET SUBLINGUAL at 01:27

## 2023-08-11 RX ADMIN — ASPIRIN 324 MG: 81 TABLET, CHEWABLE ORAL at 00:16

## 2023-08-11 RX ADMIN — SODIUM CHLORIDE 5 MG/HR: 9 INJECTION, SOLUTION INTRAVENOUS at 02:05

## 2023-08-11 RX ADMIN — IPRATROPIUM BROMIDE AND ALBUTEROL SULFATE 3 ML: .5; 3 SOLUTION RESPIRATORY (INHALATION) at 00:21

## 2023-08-11 RX ADMIN — CLONIDINE HYDROCHLORIDE 0.1 MG: 0.1 TABLET ORAL at 00:16

## 2023-08-11 RX ADMIN — METHYLPREDNISOLONE SODIUM SUCCINATE 125 MG: 125 INJECTION, POWDER, LYOPHILIZED, FOR SOLUTION INTRAMUSCULAR; INTRAVENOUS at 00:17

## 2023-08-11 RX ADMIN — OXYCODONE HYDROCHLORIDE 5 MG: 5 TABLET ORAL at 01:26

## 2023-08-11 RX ADMIN — ACETAMINOPHEN 1000 MG: 500 TABLET, FILM COATED ORAL at 00:59

## 2023-08-11 RX ADMIN — LABETALOL HYDROCHLORIDE 10 MG: 5 INJECTION INTRAVENOUS at 01:13

## 2023-08-11 RX ADMIN — LOSARTAN POTASSIUM 50 MG: 50 TABLET, FILM COATED ORAL at 00:48

## 2023-08-11 RX ADMIN — IPRATROPIUM BROMIDE AND ALBUTEROL SULFATE 3 ML: .5; 3 SOLUTION RESPIRATORY (INHALATION) at 00:12

## 2023-08-11 NOTE — PLAN OF CARE
Goal Outcome Evaluation:    Patient admitted from ER with complaints of chest pain, shortness of breath and hypertension. Patient was on Cardene when she was in the ER. She has been off of the Cardene since 2am and has been normotensive.  Patient to be admitted with hypertensive urgency. Patient stated that her chest pain is much better. Patient safety to be maintained this shift, continue to monitor and report abnormal to provider

## 2023-08-11 NOTE — ED PROVIDER NOTES
Subjective   History of Present Illness  Patient presents due to upper respiratory illness and trouble breathing.  Started feeling poorly about a week ago with shortness of breath, cough, sore throat, losing her voice.  She took a Z-Charles but this did not improve her symptoms.  She came in tonight because of gradual progression of her shortness of breath.  She also notes over the past week sharp chest pains but notes that she has over a year long history of sharp chest pains and pains in her back.  She has seen her doctor and had back x-rays, does not take anything at home for pain.  History of COPD but has not been using inhalers or nebulizer treatments.  History of ESRD and has avoided dialysis for the past 3 weeks because her last session made her arms go numb and caused a brief pain in her chest; she is taking increased Lasix at home to try to keep fluid off and avoid dialysis.  No fevers at home.  Quit taking all of her medicines including her blood pressure medicines and is unclear about why.  History of CHF; has been taking her Lasix as discussed above.  No lightheadedness or syncope.  No ripping or tearing chest pain.  No history of DVT or pulmonary embolus.    Review of Systems   Constitutional:  Negative for chills and fever.   Respiratory:  Positive for shortness of breath. Negative for cough.    Cardiovascular:  Positive for chest pain. Negative for palpitations.   Gastrointestinal:  Negative for abdominal pain and vomiting.   Genitourinary:  Negative for difficulty urinating and dysuria.   Neurological:  Negative for syncope and light-headedness.     Past Medical History:   Diagnosis Date    Acute CHF     Acute renal failure (ARF)     Anemia     Asthma     childhood    Chronic kidney disease     Coronary artery disease     Hypertension     Ischemic colitis     Metabolic acidosis     Myocardial infarction 11/07/2020    Nonrheumatic aortic (valve) insufficiency     PTSD (post-traumatic stress disorder)         Allergies   Allergen Reactions    Codeine Nausea And Vomiting    Imitrex [Sumatriptan] Other (See Comments)     HA       Past Surgical History:   Procedure Laterality Date    CARDIAC CATHETERIZATION N/A 11/8/2020    Procedure: Left Heart Cath;  Surgeon: Pierce Buchanan MD;  Location:  [facility] CATH INVASIVE LOCATION;  Service: Cardiovascular;  Laterality: N/A;    EXTERNAL FIXATION WRIST FRACTURE      INSERTION HEMODIALYSIS CATHETER Right 6/3/2022    Procedure: INSERTION OF RIGHT INTERNAL JUGULAR HEMODIALYSIS CATHETER;  Surgeon: Dexter Red MD;  Location:  [facility] OR;  Service: Vascular;  Laterality: Right;    LEG SURGERY      TUBAL ABDOMINAL LIGATION         Family History   Problem Relation Age of Onset    Coronary artery disease Mother     Coronary artery disease Father     Breast cancer Neg Hx        Social History     Socioeconomic History    Marital status: Single   Tobacco Use    Smoking status: Some Days     Packs/day: 0.50     Years: 46.00     Pack years: 23.00     Types: Cigarettes     Start date: 1975     Passive exposure: Current    Smokeless tobacco: Never    Tobacco comments:     Trying to quit    Vaping Use    Vaping Use: Never used   Substance and Sexual Activity    Alcohol use: No    Drug use: No    Sexual activity: Defer           Objective   Physical Exam  Vitals reviewed.   Constitutional:       General: She is not in acute distress.  HENT:      Head: Normocephalic and atraumatic.   Eyes:      Extraocular Movements: Extraocular movements intact.      Conjunctiva/sclera: Conjunctivae normal.   Cardiovascular:      Pulses: Normal pulses.      Heart sounds: Normal heart sounds.   Pulmonary:      Effort: Pulmonary effort is normal. No respiratory distress.      Breath sounds: Wheezing and rhonchi present.   Abdominal:      General: Abdomen is flat. There is no distension.      Tenderness: There is no guarding.   Musculoskeletal:         General: No tenderness.      Cervical back: Normal  range of motion and neck supple.      Right lower leg: No tenderness. No edema.      Left lower leg: No tenderness. No edema.   Skin:     General: Skin is warm and dry.   Neurological:      General: No focal deficit present.      Mental Status: She is alert. Mental status is at baseline.   Psychiatric:         Behavior: Behavior normal.         Thought Content: Thought content normal.       Procedures           ED Course                                           Medical Decision Making  Problems Addressed:  Hypertensive emergency: complicated acute illness or injury    Amount and/or Complexity of Data Reviewed  Labs: ordered.  Radiology: ordered.  ECG/medicine tests: ordered.    Risk  OTC drugs.  Prescription drug management.  Decision regarding hospitalization.      Ludivina Ramirez is a 62 y.o. female with PMH above who presents to the Emergency Department with chest pain. Diagnoses considered include hypertensive emergency, COPD exacerbation with the wheezing, fluid overload from missing HD, ACS, MSK chest pain, pleurisy, GERD, pneumonia, pericarditis, aortic dissection. Low suspicion for PE at this time given gradual onset sx with several more likely differentials and lack of risk fx including pain and swelling in the legs, history of DVT, or hemoptysis effective she is not tachycardic saturating normally on room air. Patient hemodynamically stable; tamponade physiology unlikely. Given the differential, initial evaluation will include ECG, CXR, CBC, BMP, Tn x2.     ED Course:   - Patient received clonidine, PO antihypertensives, did not reach BP goal. IV labetalol given, still significantly hypertensive  -Patient did have significant clinical improvement after DuoNebs and Solu-Medrol and felt her breathing had improved significantly.  She complains of persistent chest pain and back pain.  - EKG reviewed by me; shows sinus rhythm, no focal ischemic changes, no arrhythmia.  - chest x-ray reviewed by me and shows no  focal consolidations, no mediastinal widening, no cardiomegaly, no other acute cardiopulmonary process.  - Lab studies reviewed by me and are significant for Tn elevated but flat, not c/w ACS, c//w h/o ESRD. No emergent dialysis indications based on labs- On re-evaluation, patient overall feels improved but persistent pain. Repeat Troponin is flat. At this time, ACS is unlikely given the above ECG interpretation and negative troponin. Aortic dissection unlikely given lack of widened mediastinum on CXR, pain does not radiate to the back but is rather CP with chronic back pain, is not described as tearing, and peripheral pulses noted to be equal in bilateral upper extremities. Pericarditis unlikely as the pain is not positional, no diffuse ST changes on ECG. No sign of pneumonia on CXR. Tamponade physiology unlikely given BP stable, no JVD on exam, no electrical alternans on ECG.     Overall, patient has had significant improvement respiratory status, still needs to have blood pressure control and reassessment of her chest pain before she could be considered for discharge.  She ruled in for hypertensive emergency; further medications will be administered in the ER to help control her blood pressure however she was admitted to Dr. Weston at telemetry level of care for further management.    Electronically signed by:  Zackary Song MD 8/11/2023 02:29 CDT  Note: Dragon medical dictation software was used in the creation of this note.      Final diagnoses:   Hypertensive emergency       ED Disposition  ED Disposition       ED Disposition   Decision to Admit    Condition   --    Comment   Level of Care: Telemetry [5]   Diagnosis: Hypertensive emergency [[id number]]   Admitting Physician: JESSE WESTON [[id number]]   Attending Physician: JSESE WESTON [[id number]]   Certification: I Certify That Inpatient Hospital Services Are Medically Necessary For Greater Than 2 Midnights                 No follow-up provider  specified.       Medication List      No changes were made to your prescriptions during this visit.            Zackary Song MD  08/11/23 [id number]

## 2023-08-12 ENCOUNTER — READMISSION MANAGEMENT (OUTPATIENT)
Dept: CALL CENTER | Facility: HOSPITAL | Age: 63
End: 2023-08-12
Payer: MEDICARE

## 2023-08-12 NOTE — OUTREACH NOTE
Prep Survey      Flowsheet Row Responses   [facility] facility patient discharged from? [place]   Is LACE score < 7 ? No   Eligibility Not Eligible   What are the reasons patient is not eligible? Other  [AMA]   Does the patient have one of the following disease processes/diagnoses(primary or secondary)? Other   Prep survey completed? Yes            Nena ROB - Registered Nurse

## 2023-08-15 NOTE — DISCHARGE SUMMARY
Hospital Discharge Summary    Ludivina Ramirez  :  1960  MRN:  [id number]    Admit date:  8/10/2023  Discharge date:  2023    Admitting Physician:    Orville Weston MD    Discharge Diagnoses:      Hypertensive emergency      Hospital Course:   The patient left AMA before she left the ER.             Signed:  Orville Weston MD MD  8/15/2023, 13:53 CDT

## 2023-09-11 ENCOUNTER — TELEPHONE (OUTPATIENT)
Dept: VASCULAR SURGERY | Facility: CLINIC | Age: 63
End: 2023-09-11
Payer: MEDICARE

## 2023-09-12 ENCOUNTER — OFFICE VISIT (OUTPATIENT)
Dept: VASCULAR SURGERY | Facility: CLINIC | Age: 63
End: 2023-09-12
Payer: MEDICARE

## 2023-09-12 VITALS
SYSTOLIC BLOOD PRESSURE: 126 MMHG | OXYGEN SATURATION: 98 % | DIASTOLIC BLOOD PRESSURE: 68 MMHG | BODY MASS INDEX: 32.3 KG/M2 | WEIGHT: 201 LBS | HEART RATE: 78 BPM | HEIGHT: 66 IN

## 2023-09-12 DIAGNOSIS — N18.4 STAGE 4 CHRONIC KIDNEY DISEASE: Primary | ICD-10-CM

## 2023-09-12 NOTE — LETTER
"September 12, 2023     Quinn Rodarte, APRN  [address]  [address]  [place] [address]    Patient: Ludivina Ramirez   YOB: 1960   Date of Visit: 9/12/2023     Dear Quinn Rodarte, ISAIAH:       Thank you for referring Ludivina Ramirez to me for evaluation. Below are the relevant portions of my assessment and plan of care.    If you have questions, please do not hesitate to call me. I look forward to following Ludivina along with you.         Sincerely,        Chris Jimenez DO        CC: No Recipients    Chris Jimenez DO  09/12/23 1428  Sign when Signing Visit  09/12/2023      Orville Weston MD  [address] [place] [address] [address]  [place] [address]        Ludivina Ramirez  1960    Chief Complaint   Patient presents with   • Follow-up     Patient is here for permcath removal. Placed by Teofilo on 6/3/23. Patient last full treatment in July.       Dear Orville Weston MD:    HPI     I had the pleasure of seeing you patient in the office today for follow up.  As you recall, the patient is a 63 y.o. female who was recently seen by Dr. Red.  She had a Permcath placed on 6/3/22.  She reports no further dialysis since July.  She is maintained on aspirin, Brilinta, and crestor.  She is here for permcath removal.      Review of Systems   Constitutional: Negative.    HENT: Negative.     Eyes: Negative.    Respiratory: Negative.     Cardiovascular: Negative.    Gastrointestinal: Negative.    Endocrine: Negative.    Genitourinary: Negative.    Musculoskeletal: Negative.    Skin: Negative.    Allergic/Immunologic: Negative.    Neurological: Negative.    Hematological: Negative.    Psychiatric/Behavioral: Negative.     All other systems reviewed and are negative.    /68   Pulse 78   Ht 166.4 cm (65.5\")   Wt 91.2 kg (201 lb)   LMP  (LMP Unknown)   SpO2 98%   BMI 32.94 kg/m²   Physical Exam  Vitals and nursing note reviewed.   Constitutional:       Appearance: She is well-developed. "   HENT:      Head: Normocephalic and atraumatic.   Eyes:      General: No scleral icterus.     Pupils: Pupils are equal, round, and reactive to light.   Neck:      Thyroid: No thyromegaly.      Vascular: No carotid bruit or JVD.      Comments: Right IJ permcath  Cardiovascular:      Rate and Rhythm: Normal rate and regular rhythm.      Pulses:           Carotid pulses are 2+ on the right side and 2+ on the left side.       Femoral pulses are 2+ on the right side and 2+ on the left side.       Popliteal pulses are 2+ on the right side and 2+ on the left side.        Dorsalis pedis pulses are 2+ on the right side and 2+ on the left side.        Posterior tibial pulses are 2+ on the right side and 2+ on the left side.      Heart sounds: Normal heart sounds.   Pulmonary:      Effort: Pulmonary effort is normal.      Breath sounds: Normal breath sounds.   Abdominal:      General: Bowel sounds are normal. There is no distension or abdominal bruit.      Palpations: Abdomen is soft. There is no mass.      Tenderness: There is no abdominal tenderness.   Musculoskeletal:         General: Normal range of motion.      Cervical back: Neck supple.   Lymphadenopathy:      Cervical: No cervical adenopathy.   Skin:     General: Skin is warm and dry.   Neurological:      Mental Status: She is alert and oriented to person, place, and time.      Cranial Nerves: No cranial nerve deficit.      Sensory: No sensory deficit.       DIAGNOSTIC DATA:    No results found.    Patient Active Problem List   Diagnosis   • Hypertensive urgency   • Iron deficiency anemia   • Cigarette smoker   • Nonrheumatic aortic valve insufficiency   • Acute kidney injury superimposed on CKD   • Colitis, ischemic   • Family hx colonic polyps   • Stage 4 chronic kidney disease   • Shortness of breath   • Noncompliance   • ST elevation myocardial infarction involving left anterior descending (LAD) coronary artery   • Essential hypertension   • Chronic diastolic  heart failure   • Coronary artery disease of native artery of native heart with stable angina pectoris   • Status post insertion of drug-eluting stent into left anterior descending (LAD) artery for coronary artery disease   • Hyperlipidemia LDL goal <70   • Bradycardia   • Acute on chronic diastolic congestive heart failure   • Chest pain   • COPD (chronic obstructive pulmonary disease)   • Non-seasonal allergic rhinitis   • Hypoxemia requiring supplemental oxygen   • MARY on CPAP   • Pulmonary hypertension   • Other specified anxiety disorders   • Acute on chronic congestive heart failure   • Acute kidney injury   • Dependence on non-invasive ventilation   • Obesity (BMI 30-39.9)   • Hypertensive emergency         ICD-10-CM ICD-9-CM   1. Stage 4 chronic kidney disease  N18.4 585.4           PLAN: After thoroughly evaluating Ludivina Ramirez, I believe the best course of action is to proceed with permcath removal. After the patient was correctly identified, the patient was placed in the supine position on exam table.  The stitches of the catheter were not intact.  The catheter was easily removed completely intact and direct pressure was held over the right internal jugular vein for hemostasis for 15 minutes.  Sterile dressings were applied.  The patient tolerated the procedure well.  She can follow up as needed.  The patient is to continue taking their medications as previously discussed.   This was all discussed in full with complete understanding.  Thank you for allowing me to participate in the care of your patient.  Please do not hesitate to call with any questions or concerns.  We will keep you aware of any further encounters with Ludivina Ramirez.      Sincerely Yours,      Chris Jimenez, DO

## 2023-09-12 NOTE — PROGRESS NOTES
"09/12/2023      Orville Weston MD  [address] [place] [address] [address] [place] [address]        Ludivina Ramirez  1960    Chief Complaint   Patient presents with    Follow-up     Patient is here for permcath removal. Placed by Teofilo on 6/3/23. Patient last full treatment in July.       Dear Orville Weston MD:    HPI     I had the pleasure of seeing you patient in the office today for follow up.  As you recall, the patient is a 63 y.o. female who was recently seen by Dr. Red.  She had a Permcath placed on 6/3/22.  She reports no further dialysis since July.  She is maintained on aspirin, Brilinta, and crestor.  She is here for permcath removal.      Review of Systems   Constitutional: Negative.    HENT: Negative.     Eyes: Negative.    Respiratory: Negative.     Cardiovascular: Negative.    Gastrointestinal: Negative.    Endocrine: Negative.    Genitourinary: Negative.    Musculoskeletal: Negative.    Skin: Negative.    Allergic/Immunologic: Negative.    Neurological: Negative.    Hematological: Negative.    Psychiatric/Behavioral: Negative.     All other systems reviewed and are negative.    /68   Pulse 78   Ht 166.4 cm (65.5\")   Wt 91.2 kg (201 lb)   LMP  (LMP Unknown)   SpO2 98%   BMI 32.94 kg/m²   Physical Exam  Vitals and nursing note reviewed.   Constitutional:       Appearance: She is well-developed.   HENT:      Head: Normocephalic and atraumatic.   Eyes:      General: No scleral icterus.     Pupils: Pupils are equal, round, and reactive to light.   Neck:      Thyroid: No thyromegaly.      Vascular: No carotid bruit or JVD.      Comments: Right IJ permcath  Cardiovascular:      Rate and Rhythm: Normal rate and regular rhythm.      Pulses:           Carotid pulses are 2+ on the right side and 2+ on the left side.       Femoral pulses are 2+ on the right side and 2+ on the left side.       Popliteal pulses are 2+ on the right side and 2+ on the left side.        Dorsalis pedis pulses " are 2+ on the right side and 2+ on the left side.        Posterior tibial pulses are 2+ on the right side and 2+ on the left side.      Heart sounds: Normal heart sounds.   Pulmonary:      Effort: Pulmonary effort is normal.      Breath sounds: Normal breath sounds.   Abdominal:      General: Bowel sounds are normal. There is no distension or abdominal bruit.      Palpations: Abdomen is soft. There is no mass.      Tenderness: There is no abdominal tenderness.   Musculoskeletal:         General: Normal range of motion.      Cervical back: Neck supple.   Lymphadenopathy:      Cervical: No cervical adenopathy.   Skin:     General: Skin is warm and dry.   Neurological:      Mental Status: She is alert and oriented to person, place, and time.      Cranial Nerves: No cranial nerve deficit.      Sensory: No sensory deficit.       DIAGNOSTIC DATA:    No results found.    Patient Active Problem List   Diagnosis    Hypertensive urgency    Iron deficiency anemia    Cigarette smoker    Nonrheumatic aortic valve insufficiency    Acute kidney injury superimposed on CKD    Colitis, ischemic    Family hx colonic polyps    Stage 4 chronic kidney disease    Shortness of breath    Noncompliance    ST elevation myocardial infarction involving left anterior descending (LAD) coronary artery    Essential hypertension    Chronic diastolic heart failure    Coronary artery disease of native artery of native heart with stable angina pectoris    Status post insertion of drug-eluting stent into left anterior descending (LAD) artery for coronary artery disease    Hyperlipidemia LDL goal <70    Bradycardia    Acute on chronic diastolic congestive heart failure    Chest pain    COPD (chronic obstructive pulmonary disease)    Non-seasonal allergic rhinitis    Hypoxemia requiring supplemental oxygen    MARY on CPAP    Pulmonary hypertension    Other specified anxiety disorders    Acute on chronic congestive heart failure    Acute kidney injury     Dependence on non-invasive ventilation    Obesity (BMI 30-39.9)    Hypertensive emergency         ICD-10-CM ICD-9-CM   1. Stage 4 chronic kidney disease  N18.4 585.4           PLAN: After thoroughly evaluating Ludivina Ramirez, I believe the best course of action is to proceed with permcath removal. After the patient was correctly identified, the patient was placed in the supine position on exam table.  The stitches of the catheter were not intact.  The catheter was easily removed completely intact and direct pressure was held over the right internal jugular vein for hemostasis for 15 minutes.  Sterile dressings were applied.  The patient tolerated the procedure well.  She can follow up as needed.  The patient is to continue taking their medications as previously discussed.   This was all discussed in full with complete understanding.  Thank you for allowing me to participate in the care of your patient.  Please do not hesitate to call with any questions or concerns.  We will keep you aware of any further encounters with Ludivina Ramirez.      Sincerely Yours,      Chris Jimenez, DO

## 2023-09-12 NOTE — LETTER
"September 12, 2023     Orville Weston MD  [address] [place] [address] [address]  [place] [address]    Patient: Ludivina Ramirez   YOB: 1960   Date of Visit: 9/12/2023     Dear Orville Weston MD:       Thank you for referring Ludivina Ramirez to me for evaluation. Below are the relevant portions of my assessment and plan of care.    If you have questions, please do not hesitate to call me. I look forward to following Ludivina along with you.         Sincerely,        Chris Jimenez DO        CC: No Recipients    Chris Jimenez DO  09/12/23 1428  Sign when Signing Visit  09/12/2023      Orville Weston MD  [address] [place] [address] [address]  [place] [address]        Ludivina Ramirez  1960    Chief Complaint   Patient presents with   • Follow-up     Patient is here for permcath removal. Placed by Teofilo on 6/3/23. Patient last full treatment in July.       Dear Orville Weston MD:    HPI     I had the pleasure of seeing you patient in the office today for follow up.  As you recall, the patient is a 63 y.o. female who was recently seen by Dr. Red.  She had a Permcath placed on 6/3/22.  She reports no further dialysis since July.  She is maintained on aspirin, Brilinta, and crestor.  She is here for permcath removal.      Review of Systems   Constitutional: Negative.    HENT: Negative.     Eyes: Negative.    Respiratory: Negative.     Cardiovascular: Negative.    Gastrointestinal: Negative.    Endocrine: Negative.    Genitourinary: Negative.    Musculoskeletal: Negative.    Skin: Negative.    Allergic/Immunologic: Negative.    Neurological: Negative.    Hematological: Negative.    Psychiatric/Behavioral: Negative.     All other systems reviewed and are negative.    /68   Pulse 78   Ht 166.4 cm (65.5\")   Wt 91.2 kg (201 lb)   LMP  (LMP Unknown)   SpO2 98%   BMI 32.94 kg/m²   Physical Exam  Vitals and nursing note reviewed.   Constitutional:       Appearance: She is well-developed. "   HENT:      Head: Normocephalic and atraumatic.   Eyes:      General: No scleral icterus.     Pupils: Pupils are equal, round, and reactive to light.   Neck:      Thyroid: No thyromegaly.      Vascular: No carotid bruit or JVD.      Comments: Right IJ permcath  Cardiovascular:      Rate and Rhythm: Normal rate and regular rhythm.      Pulses:           Carotid pulses are 2+ on the right side and 2+ on the left side.       Femoral pulses are 2+ on the right side and 2+ on the left side.       Popliteal pulses are 2+ on the right side and 2+ on the left side.        Dorsalis pedis pulses are 2+ on the right side and 2+ on the left side.        Posterior tibial pulses are 2+ on the right side and 2+ on the left side.      Heart sounds: Normal heart sounds.   Pulmonary:      Effort: Pulmonary effort is normal.      Breath sounds: Normal breath sounds.   Abdominal:      General: Bowel sounds are normal. There is no distension or abdominal bruit.      Palpations: Abdomen is soft. There is no mass.      Tenderness: There is no abdominal tenderness.   Musculoskeletal:         General: Normal range of motion.      Cervical back: Neck supple.   Lymphadenopathy:      Cervical: No cervical adenopathy.   Skin:     General: Skin is warm and dry.   Neurological:      Mental Status: She is alert and oriented to person, place, and time.      Cranial Nerves: No cranial nerve deficit.      Sensory: No sensory deficit.       DIAGNOSTIC DATA:    No results found.    Patient Active Problem List   Diagnosis   • Hypertensive urgency   • Iron deficiency anemia   • Cigarette smoker   • Nonrheumatic aortic valve insufficiency   • Acute kidney injury superimposed on CKD   • Colitis, ischemic   • Family hx colonic polyps   • Stage 4 chronic kidney disease   • Shortness of breath   • Noncompliance   • ST elevation myocardial infarction involving left anterior descending (LAD) coronary artery   • Essential hypertension   • Chronic diastolic  heart failure   • Coronary artery disease of native artery of native heart with stable angina pectoris   • Status post insertion of drug-eluting stent into left anterior descending (LAD) artery for coronary artery disease   • Hyperlipidemia LDL goal <70   • Bradycardia   • Acute on chronic diastolic congestive heart failure   • Chest pain   • COPD (chronic obstructive pulmonary disease)   • Non-seasonal allergic rhinitis   • Hypoxemia requiring supplemental oxygen   • MARY on CPAP   • Pulmonary hypertension   • Other specified anxiety disorders   • Acute on chronic congestive heart failure   • Acute kidney injury   • Dependence on non-invasive ventilation   • Obesity (BMI 30-39.9)   • Hypertensive emergency         ICD-10-CM ICD-9-CM   1. Stage 4 chronic kidney disease  N18.4 585.4           PLAN: After thoroughly evaluating Ludivina Ramirez, I believe the best course of action is to proceed with permcath removal. After the patient was correctly identified, the patient was placed in the supine position on exam table.  The stitches of the catheter were not intact.  The catheter was easily removed completely intact and direct pressure was held over the right internal jugular vein for hemostasis for 15 minutes.  Sterile dressings were applied.  The patient tolerated the procedure well.  She can follow up as needed.  The patient is to continue taking their medications as previously discussed.   This was all discussed in full with complete understanding.  Thank you for allowing me to participate in the care of your patient.  Please do not hesitate to call with any questions or concerns.  We will keep you aware of any further encounters with Ludivina Ramirez.      Sincerely Yours,      Chris Jimenez, DO

## 2023-10-03 ENCOUNTER — HOSPITAL ENCOUNTER (OUTPATIENT)
Dept: GENERAL RADIOLOGY | Facility: HOSPITAL | Age: 63
Discharge: HOME | End: 2023-10-03
Admitting: INTERNAL MEDICINE
Payer: MEDICARE

## 2023-10-03 ENCOUNTER — TELEPHONE (OUTPATIENT)
Dept: PULMONOLOGY | Facility: CLINIC | Age: 63
End: 2023-10-03
Payer: MEDICARE

## 2023-10-03 ENCOUNTER — OFFICE VISIT (OUTPATIENT)
Dept: PULMONOLOGY | Facility: CLINIC | Age: 63
End: 2023-10-03
Payer: MEDICARE

## 2023-10-03 VITALS
BODY MASS INDEX: 32.3 KG/M2 | HEIGHT: 66 IN | WEIGHT: 201 LBS | SYSTOLIC BLOOD PRESSURE: 168 MMHG | HEART RATE: 91 BPM | DIASTOLIC BLOOD PRESSURE: 88 MMHG | OXYGEN SATURATION: 97 %

## 2023-10-03 DIAGNOSIS — R09.02 HYPOXEMIA REQUIRING SUPPLEMENTAL OXYGEN: ICD-10-CM

## 2023-10-03 DIAGNOSIS — J44.1 COPD WITH ACUTE EXACERBATION: ICD-10-CM

## 2023-10-03 DIAGNOSIS — J30.89 NON-SEASONAL ALLERGIC RHINITIS, UNSPECIFIED TRIGGER: ICD-10-CM

## 2023-10-03 DIAGNOSIS — Z91.199 NONCOMPLIANCE: ICD-10-CM

## 2023-10-03 DIAGNOSIS — Z99.81 HYPOXEMIA REQUIRING SUPPLEMENTAL OXYGEN: ICD-10-CM

## 2023-10-03 DIAGNOSIS — I27.20 PULMONARY HYPERTENSION: ICD-10-CM

## 2023-10-03 DIAGNOSIS — J44.1 COPD WITH ACUTE EXACERBATION: Primary | ICD-10-CM

## 2023-10-03 DIAGNOSIS — Z99.11 DEPENDENCE ON NON-INVASIVE VENTILATION: ICD-10-CM

## 2023-10-03 DIAGNOSIS — G47.33 OSA ON CPAP: ICD-10-CM

## 2023-10-03 DIAGNOSIS — F17.210 CIGARETTE SMOKER: Chronic | ICD-10-CM

## 2023-10-03 PROCEDURE — 3077F SYST BP >= 140 MM HG: CPT | Performed by: INTERNAL MEDICINE

## 2023-10-03 PROCEDURE — 71045 X-RAY EXAM CHEST 1 VIEW: CPT

## 2023-10-03 PROCEDURE — 3079F DIAST BP 80-89 MM HG: CPT | Performed by: INTERNAL MEDICINE

## 2023-10-03 PROCEDURE — 99214 OFFICE O/P EST MOD 30 MIN: CPT | Performed by: INTERNAL MEDICINE

## 2023-10-03 RX ORDER — AZITHROMYCIN 250 MG/1
TABLET, FILM COATED ORAL
Qty: 6 TABLET | Refills: 0 | Status: ON HOLD | OUTPATIENT
Start: 2023-10-03

## 2023-10-03 RX ORDER — GUAIFENESIN 600 MG/1
1200 TABLET, EXTENDED RELEASE ORAL 2 TIMES DAILY
Qty: 120 TABLET | Refills: 5 | Status: ON HOLD | OUTPATIENT
Start: 2023-10-03

## 2023-10-03 RX ORDER — PREDNISONE 10 MG/1
TABLET ORAL
Qty: 31 TABLET | Refills: 0 | Status: ON HOLD | OUTPATIENT
Start: 2023-10-03

## 2023-10-03 NOTE — TELEPHONE ENCOUNTER
When did your symptoms start? 2 weeks ago    What are your symptoms? Trouble breathing, can't catch breath, coughing up clear secretions, is very thick and is having trouble getting it up.     Have you been treated recently by another provider for this problem? No    Have you had any recent testing (ex. COVID or x-ray)? No     Have you had any exposure to anyone sick? No    Are you taking your medication for your breathing as prescribed? Yes    She is on her CPAP right now, does not have her O2 hooked up, she needs assistance setting it up

## 2023-10-03 NOTE — TELEPHONE ENCOUNTER
Patient called and states that her sister is able to bring her in and she will try to be here about 3:30.

## 2023-10-03 NOTE — PROGRESS NOTES
[facility] OUTPATIENT PROGRESS NOTE    Patient: Ludivina Ramirez  : 1960  Age: 63 y.o.  Date of Service: October 3, 2023    REASON FOR CLINIC VISIT:  Chief Complaint   Patient presents with    COPD     Pt has stopped several medications because she cannot afford them.       Subjective:    History of Present Illness:  Ludivina Ramirez is a 63 y.o. female who presents to the office today to be seen for    Diagnosis Plan   1. COPD with acute exacerbation        2. 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 Hypoxemia requiring supplemental oxygen        4. MARY on CPAP        5. Pulmonary hypertension        6. Cigarette smoker        7. Non-seasonal allergic rhinitis, unspecified trigger        8. Noncompliance        .  Other problems per record.    History:    Patient is a pleasant middle-aged  female was seen in the pulmonary clinic as an add-on follow-up visit today.  Is accompanied by her sister.  Old our office due to increasing shortness of breath and was planning to go to urgent care but she was told to come to our office because she had an appointment already scheduled with me next week.    Patient is known to me from the prior clinic visit and she has a long history of tobacco abuse and she told me at this time that she did quit smoking 4 days ago did not want to start back again.  She has moderate heading to severe COPD noted in the last pulmonary function test done in 2022 and she was using Symbicort and albuterol rescue inhaler with DuoNeb nebulizer.  [facility] and told me she did not get her medications as advised because she was unable to refill it for financial reasons.  She was not using her inhalers and nebulizers for the last few weeks.  She started having shortness of breath and nasal congestion and cough with very thick expectoration and also having wheezing on minimal exertion.    She has obstructive sleep apnea and also has  chronic respiratory failure with hypercapnia and she uses home trilogy with 2 L of oxygen which she is using only during sleep and as needed but not all the time.  She is trying to keep her oxygen on more often now.  She did not have any fever but is not sleeping well due to shortness of breath.  She is tired and exhausted.  At the time of her visit in the clinic today her oxygen saturation was 97% on room air but she was visibly short of breath and was having some wheezing.  Her body weight has increased and she gained about 7 pounds since her last visit.    She is vaccinated for COVID and also had influenza and pneumonia vaccine but not recently.  She had no recent hospitalizations and ER visit and urgent care visit or any other new complaints.  She also had history of chronic kidney disease and its renal disease on dialysis but currently not requiring dialysis.  She follows up with nephrology regularly.  In addition to the nebulizer and inhalers she uses Astelin nasal spray fluticasone aspirin loratadine for nasal allergy.  She is also getting Lasix.  She has anxiety issues and had history of noncompliance to treatment.    PFT done today:  Not done today    PFT Values          2022    11:30   Pre Drug PFT Results   FVC 58   FEV1 55   FEF 25-75% 47   FEV1/FVC 75   Other Tests PFT Results   TLC 88      DLCO 65   D/VAsb 81     Results for orders placed in visit on 22    Pulmonary Function Test    Narrative  Pulmonary Function Test  Performed by: Kate Ford, RRT  Authorized by: Neeta Chavez MD    Pre Drug % Predicted  FVC: 58%  FEV1: 55%  FEF 25-75%: 47%  FEV1/FVC: 75%  T%  RV: 120%  DLCO: 65%  D/VAsb: 81%    Rest/Exercise Pulse Ox Values          10/26/2022    14:30   Rest/Exercise Pulse Ox Results   Rest room air SAT % 90   Exercise room air SAT % 88        Bronchodilator therapy: Symbicort and DuoNeb with albuterol rescue inhaler as needed.    Smoking Status:   Social History      Tobacco Use   Smoking Status Former    Packs/day: 0.50    Years: 46.00    Pack years: 23.00    Types: Cigarettes    Start date: 1975    Quit date: 9/30/2023    Passive exposure: Current   Smokeless Tobacco Never   Tobacco Comments    Trying to quit      Pulm Rehab: no  Sleep: yes on Trilogy and O2@ 2 LPM on exertion and as needed.     Support System: lives alone    Code Status:   There are no questions and answers to display.        Review of Systems:  A complete review of systems is performed and all other systems were reviewed and negative as note above in the HPI.  Review of Systems   Constitutional:  Positive for fatigue and unexpected weight gain. Negative for fever.   HENT:  Positive for congestion, postnasal drip, sinus pressure and sore throat.    Eyes: Negative.    Respiratory:  Positive for cough, chest tightness, shortness of breath and wheezing.    Cardiovascular: Negative.    Gastrointestinal: Negative.    Endocrine: Negative.    Genitourinary: Negative.    Musculoskeletal:  Positive for arthralgias and back pain.   Skin: Negative.    Allergic/Immunologic: Positive for environmental allergies.   Neurological: Negative.    Hematological: Negative.    Psychiatric/Behavioral:  The patient is nervous/anxious.      CAT/ACT Score:  Not done today    Medications:  Outpatient Encounter Medications as of 10/3/2023   Medication Sig Dispense Refill    albuterol sulfate  (90 Base) MCG/ACT inhaler Inhale 2 puffs Every 4 (Four) Hours As Needed for Wheezing. 20.1 g 3    ascorbic acid (VITAMIN C) 250 MG tablet Take 2 tablets by mouth Daily.      aspirin 81 MG EC tablet Take 1 tablet by mouth Daily. 90 tablet 3    azelastine (ASTELIN) 0.1 % nasal spray 2 sprays into the nostril(s) as directed by provider 2 (Two) Times a Day. 30 mL 11    budesonide-formoterol (SYMBICORT) 160-4.5 MCG/ACT inhaler Inhale 2 puffs 2 (Two) Times a Day. 1 each 11    carvedilol (COREG) 25 MG tablet Take 1 tablet by mouth 2 (Two) Times  a Day With Meals.      cloNIDine (CATAPRES) 0.1 MG tablet Take 1 tablet by mouth Every 6 (Six) Hours As Needed for High Blood Pressure (systolic >160).      docusate sodium (COLACE) 250 MG capsule Take 1 capsule by mouth Daily.      famotidine (PEPCID) 20 MG tablet Take 1 tablet by mouth Daily. 30 tablet 0    fluticasone (FLONASE) 50 MCG/ACT nasal spray 2 sprays into the nostril(s) as directed by provider Every Morning. In each nostril 16 g 11    furosemide (LASIX) 40 MG tablet Take 1 tablet by mouth 2 (Two) Times a Day. As needed      ipratropium-albuterol (DUO-NEB) 0.5-2.5 mg/3 ml nebulizer Take 3 mL by nebulization 4 (Four) Times a Day. 1080 mL 3    isosorbide dinitrate (ISORDIL) 20 MG tablet TAKE 1 TABLET BY MOUTH 3 (THREE) TIMES A DAY. 270 tablet 3    Magnesium 400 MG capsule Take 400 mg by mouth.      melatonin 3 MG tablet Take 2 tablets by mouth Every Night. 30 tablet 0    nitroglycerin (NITROSTAT) 0.4 MG SL tablet Place 1 tablet under the tongue Every 5 (Five) Minutes As Needed for Chest Pain. Take no more than 3 doses in 15 minutes.      ranolazine (RANEXA) 500 MG 12 hr tablet       Zinc 25 MG tablet Take 25 mg by mouth Daily.      cholecalciferol (VITAMIN D3) 25 MCG (1000 UT) tablet Take 1 tablet by mouth Daily. (Patient not taking: Reported on 10/3/2023)      guaiFENesin (Mucinex) 600 MG 12 hr tablet Take 2 tablets by mouth 2 (Two) Times a Day. (Patient not taking: Reported on 10/3/2023) 60 tablet 3    NIFEdipine CC (ADALAT CC) 90 MG 24 hr tablet Take 1 tablet by mouth Daily. (Patient not taking: Reported on 10/3/2023) 90 tablet 3    [DISCONTINUED] azithromycin (ZITHROMAX) 250 MG tablet Take 2 by mouth today then 1 daily for 4 days 6 tablet 0    [DISCONTINUED] busPIRone (BUSPAR) 10 MG tablet Take 1 tablet by mouth 3 (Three) Times a Day.      [DISCONTINUED] calcitriol (ROCALTROL) 0.25 MCG capsule Take 1 capsule by mouth Daily. 30 capsule 3    [DISCONTINUED] Ferric Citrate (Auryxia) 1  MG(Fe) tablet  "Take 1 tablet by mouth 3 (Three) Times a Day.      [DISCONTINUED] loratadine (CLARITIN) 10 MG tablet Take 1 tablet by mouth Daily. 90 tablet 3    [DISCONTINUED] losartan (COZAAR) 50 MG tablet       [DISCONTINUED] multivitamin with minerals tablet tablet Take 1 tablet by mouth Daily.      [DISCONTINUED] NIFEdipine XL (PROCARDIA XL) 90 MG 24 hr tablet TAKE 1 TABLET BY MOUTH EVERY DAY (Patient not taking: Reported on 10/3/2023) 30 tablet 1    [DISCONTINUED] OLANZapine (zyPREXA) 5 MG tablet Take 1 tablet by mouth Every Night. 30 tablet 0    [DISCONTINUED] Probiotic Product (Align) capsule Take 1 capsule by mouth Daily.      [DISCONTINUED] rosuvastatin (CRESTOR) 10 MG tablet Take 1 tablet by mouth Daily. 30 tablet 0    [DISCONTINUED] ticagrelor (BRILINTA) 90 MG tablet tablet Take 1 tablet by mouth 2 (Two) Times a Day. 180 tablet 3     No facility-administered encounter medications on file as of 10/3/2023.       Allergies:  Allergies   Allergen Reactions    Codeine Nausea And Vomiting    Imitrex [Sumatriptan] Other (See Comments)     HA       Immunizations:    There is no immunization history on file for this patient.    Objective:    Vitals:  /88   Pulse 91   Ht 166.4 cm (65.5\")   Wt 91.2 kg (201 lb)   LMP  (LMP Unknown)   SpO2 97% Comment: RA  Breastfeeding No   BMI 32.94 kg/m²     Physical Exam:  General: Patient is a 63 y.o. middle aged anxious looking tired  female. Looks stated age. Appears to be in no acute distress.  However on minimal exertion she appears to be short of breath and having some audible wheezing  Eyes: EOMI. PERRLA. Vision intact. No scleral icterus.  Ear, Nose, Mouth and Throat: Hearing is grossly intact. No Leukoplakia, pharyngitis, stomatitis or thrush. Swollen nasal mucosa with post nasal drop.  Neck: Range of motion of neck normal. No thyromegaly or masses. Mallampati Class 3  Respiratory: Clear to auscultation bilaterally. No use of accessory muscles. Decreased breath " sounds.  Cardiovascular: Normal heart sounds. Regularly regular rhythm without murmur.  Gastrointestinal: Non tender, non distended, soft. Bowel sounds positive in all four quadrants. No organomegaly.  Skin: No obvious rashes, lesions, ulcers or large amount of bruising. No edema.   Neurological: No new motor deficits. Cranial nerves appear intact.  Psychiatric: Patient is alert and oriented to person, place and time.    Chest Imaging:      Study Result    Narrative & Impression   EXAMINATION: XR CHEST 1 VW-     8/10/2023 11:28 PM CDT     HISTORY: Chest Pain. Chest pain Triage Protocol     A frontal projection of the chest is compared with the previous study  dated 11/03/2022.     The lungs are moderately well-expanded.     There are coarse interstitial changes in the lungs bilaterally, right  more than the left probably represent a chronic process.     A calcified granuloma the right upper lung is similar to the previous  study.     A moderate elevation right diaphragm is stable.     There is no pleural effusion, pulmonary congestion or pneumothorax. No  recent infiltrate.     There is moderate cardiomegaly. Atheromatous change of thoracic aorta  noted.     A right internal jugular approach permacath is in place. No change in  position.     No acute bony abnormality.     IMPRESSION:  1. No active cardiopulmonary disease.  2. Chronic inflammatory or granulomatous lung changes.  3. A moderate cardiomegaly.  4. A permacath is in stable position. No change.    This report was finalized on 08/11/2023 06:20 by Dr. Shima Flynn MD.       Assessment:  1. COPD with acute exacerbation    2. Dependence on non-invasive ventilation    3. Hypoxemia requiring supplemental oxygen    4. MARY on CPAP    5. Pulmonary hypertension    6. Cigarette smoker    7. Non-seasonal allergic rhinitis, unspecified trigger    8. Noncompliance        Plan/Recommendations:    1.  Patient is likely having an acute exacerbation of chronic  obstructive pulmonary disease.  I did her azithromycin and a prednisone taper.  I also advised her to get a chest x-ray today to rule out any pneumonia.  She did not get tested for COVID but denies any sick contact.  If her shortness of breath and wheezing last time to take the medicines she may need to get a COVID or the viral screening.  2.  She will continue her respiratory medications including Symbicort and DuoNeb with albuterol rescue inhaler.  I told her to  her medicines from the pharmacy.  She told me she could not pick it up recently due to financial reasons but she will be able to  now.  She also wanted some guaifenesin for better expectoration and guaifenesin 600 long-acting was sent to the pharmacy.  3.  She has nasal allergy and will continue using fluticasone nasal spray and Astelin nasal spray loratadine.  For her chronic respiratory failure and sleep apnea she will use the home trilogy at night.  I advised her to keep using the oxygen 2 L all the time instead of using on and off and also to increase the oxygen to 3 L if needed to keep saturation more than 92%.  She was not measuring her oxygen routinely at home but I advised her to continue oxygen measurements frequently at home.  4.  Patient is already vaccinated for influenza pneumonia according to the patient and also will need to get COVID-vaccine booster dose.  If her condition worsens advised her to return to the emergency room or urgent care.  She will be rescheduled for her next appointment in 3 months time in the clinic with me or earlier if needed.  Her last CT scan of the chest reviewed which showed chronic changes we will do the annual CT screening of the chest due to her history of tobacco use.    Follow up:  3 Months    Time Spent:  30 minutes    I appreciate the opportunity of participating in this patient's care. I would like to thank the PCP for the referral.  Please feel free to contact me with any other  questions.    Neeta Chavez MD   Pulmonologist/Intensivist     Electronically signed by: Neeta Chavez MD, 10/3/2023 16:07 CDT

## 2023-10-03 NOTE — TELEPHONE ENCOUNTER
Dr. Chavez advised that he would see the patient this afternoon at 3:30 if she can make it in. I called and let patient know and she said that she would have to see if her sister could bring her in. She is going to call us back to let us know if she can come in.

## 2023-10-04 NOTE — PROGRESS NOTES
Neeta Chavez MD  P w Res[facility] Di Pad Clinical Pool  Please call the patient regarding her abnormal result.  Please let her know that she has chronic changes in the lung but no acute pneumonia is identified and she will get the treatment as COPD exacerbation as advised.  She had a dialysis line in the chest which has been removed.  He also has atelectasis in the lung and needs to do more deep breathing exercise.  I will see her back in the office in 3 months as scheduled.  Thank you.      Spoke with patient and relayed test results. Patient voiced understanding.

## 2023-10-09 ENCOUNTER — HOSPITAL ENCOUNTER (OUTPATIENT)
Facility: HOSPITAL | Age: 63
Setting detail: OBSERVATION
Discharge: HOME | End: 2023-10-11
Attending: STUDENT IN AN ORGANIZED HEALTH CARE EDUCATION/TRAINING PROGRAM | Admitting: FAMILY MEDICINE
Payer: MEDICARE

## 2023-10-09 ENCOUNTER — TELEPHONE (OUTPATIENT)
Dept: PULMONOLOGY | Facility: CLINIC | Age: 63
End: 2023-10-09
Payer: MEDICARE

## 2023-10-09 ENCOUNTER — APPOINTMENT (OUTPATIENT)
Dept: CT IMAGING | Facility: HOSPITAL | Age: 63
End: 2023-10-09
Payer: MEDICARE

## 2023-10-09 DIAGNOSIS — Z74.09 IMPAIRED MOBILITY: ICD-10-CM

## 2023-10-09 DIAGNOSIS — R79.89 ELEVATED TROPONIN: ICD-10-CM

## 2023-10-09 DIAGNOSIS — R06.02 SHORTNESS OF BREATH: Primary | ICD-10-CM

## 2023-10-09 LAB
ANION GAP SERPL CALCULATED.3IONS-SCNC: 11 MMOL/L (ref 5–15)
BASOPHILS # BLD AUTO: 0.04 10*3/MM3 (ref 0–0.2)
BASOPHILS NFR BLD AUTO: 0.4 % (ref 0–1.5)
BUN SERPL-MCNC: 64 MG/DL (ref 8–23)
BUN/CREAT SERPL: 28.4 (ref 7–25)
CALCIUM SPEC-SCNC: 8.5 MG/DL (ref 8.6–10.5)
CHLORIDE SERPL-SCNC: 109 MMOL/L (ref 98–107)
CO2 SERPL-SCNC: 21 MMOL/L (ref 22–29)
CREAT SERPL-MCNC: 2.25 MG/DL (ref 0.57–1)
DEPRECATED RDW RBC AUTO: 58.9 FL (ref 37–54)
EGFRCR SERPLBLD CKD-EPI 2021: 24 ML/MIN/1.73
EOSINOPHIL # BLD AUTO: 0.03 10*3/MM3 (ref 0–0.4)
EOSINOPHIL NFR BLD AUTO: 0.3 % (ref 0.3–6.2)
ERYTHROCYTE [DISTWIDTH] IN BLOOD BY AUTOMATED COUNT: 16.2 % (ref 12.3–15.4)
FLUAV RNA RESP QL NAA+PROBE: NOT DETECTED
FLUBV RNA RESP QL NAA+PROBE: NOT DETECTED
GLUCOSE SERPL-MCNC: 142 MG/DL (ref 65–99)
HCT VFR BLD AUTO: 34.1 % (ref 34–46.6)
HGB BLD-MCNC: 9.9 G/DL (ref 12–15.9)
IMM GRANULOCYTES # BLD AUTO: 0.25 10*3/MM3 (ref 0–0.05)
IMM GRANULOCYTES NFR BLD AUTO: 2.5 % (ref 0–0.5)
LYMPHOCYTES # BLD AUTO: 0.61 10*3/MM3 (ref 0.7–3.1)
LYMPHOCYTES NFR BLD AUTO: 6.1 % (ref 19.6–45.3)
MCH RBC QN AUTO: 28.9 PG (ref 26.6–33)
MCHC RBC AUTO-ENTMCNC: 29 G/DL (ref 31.5–35.7)
MCV RBC AUTO: 99.7 FL (ref 79–97)
MONOCYTES # BLD AUTO: 0.44 10*3/MM3 (ref 0.1–0.9)
MONOCYTES NFR BLD AUTO: 4.4 % (ref 5–12)
NEUTROPHILS NFR BLD AUTO: 8.65 10*3/MM3 (ref 1.7–7)
NEUTROPHILS NFR BLD AUTO: 86.3 % (ref 42.7–76)
NRBC BLD AUTO-RTO: 0 /100 WBC (ref 0–0.2)
PLATELET # BLD AUTO: 286 10*3/MM3 (ref 140–450)
PMV BLD AUTO: 10.6 FL (ref 6–12)
POTASSIUM SERPL-SCNC: 5.1 MMOL/L (ref 3.5–5.2)
RBC # BLD AUTO: 3.42 10*6/MM3 (ref 3.77–5.28)
RSV RNA NPH QL NAA+NON-PROBE: NOT DETECTED
SARS-COV-2 RNA RESP QL NAA+PROBE: NOT DETECTED
SODIUM SERPL-SCNC: 141 MMOL/L (ref 136–145)
TROPONIN T SERPL HS-MCNC: 29 NG/L
WBC NRBC COR # BLD: 10.02 10*3/MM3 (ref 3.4–10.8)

## 2023-10-09 PROCEDURE — 94640 AIRWAY INHALATION TREATMENT: CPT

## 2023-10-09 PROCEDURE — G0378 HOSPITAL OBSERVATION PER HR: HCPCS

## 2023-10-09 PROCEDURE — 71250 CT THORAX DX C-: CPT

## 2023-10-09 PROCEDURE — 99284 EMERGENCY DEPT VISIT MOD MDM: CPT

## 2023-10-09 PROCEDURE — 84484 ASSAY OF TROPONIN QUANT: CPT | Performed by: STUDENT IN AN ORGANIZED HEALTH CARE EDUCATION/TRAINING PROGRAM

## 2023-10-09 PROCEDURE — 93010 ELECTROCARDIOGRAM REPORT: CPT | Performed by: INTERNAL MEDICINE

## 2023-10-09 PROCEDURE — 87637 SARSCOV2&INF A&B&RSV AMP PRB: CPT | Performed by: STUDENT IN AN ORGANIZED HEALTH CARE EDUCATION/TRAINING PROGRAM

## 2023-10-09 PROCEDURE — 96374 THER/PROPH/DIAG INJ IV PUSH: CPT

## 2023-10-09 PROCEDURE — 85025 COMPLETE CBC W/AUTO DIFF WBC: CPT | Performed by: STUDENT IN AN ORGANIZED HEALTH CARE EDUCATION/TRAINING PROGRAM

## 2023-10-09 PROCEDURE — 80048 BASIC METABOLIC PNL TOTAL CA: CPT | Performed by: STUDENT IN AN ORGANIZED HEALTH CARE EDUCATION/TRAINING PROGRAM

## 2023-10-09 PROCEDURE — 25010000002 METHYLPREDNISOLONE PER 125 MG: Performed by: STUDENT IN AN ORGANIZED HEALTH CARE EDUCATION/TRAINING PROGRAM

## 2023-10-09 PROCEDURE — 93005 ELECTROCARDIOGRAM TRACING: CPT | Performed by: STUDENT IN AN ORGANIZED HEALTH CARE EDUCATION/TRAINING PROGRAM

## 2023-10-09 RX ORDER — METHYLPREDNISOLONE SODIUM SUCCINATE 125 MG/2ML
125 INJECTION, POWDER, LYOPHILIZED, FOR SOLUTION INTRAMUSCULAR; INTRAVENOUS ONCE
Status: COMPLETED | OUTPATIENT
Start: 2023-10-09 | End: 2023-10-09

## 2023-10-09 RX ORDER — CALCITRIOL 0.25 UG/1
0.25 CAPSULE, LIQUID FILLED ORAL DAILY
COMMUNITY

## 2023-10-09 RX ORDER — ACETAMINOPHEN 325 MG/1
650 TABLET ORAL EVERY 6 HOURS PRN
Status: DISCONTINUED | OUTPATIENT
Start: 2023-10-09 | End: 2023-10-11 | Stop reason: HOSPADM

## 2023-10-09 RX ORDER — MULTIPLE VITAMINS W/ MINERALS TAB 9MG-400MCG
1 TAB ORAL DAILY
COMMUNITY

## 2023-10-09 RX ORDER — IPRATROPIUM BROMIDE AND ALBUTEROL SULFATE 2.5; .5 MG/3ML; MG/3ML
3 SOLUTION RESPIRATORY (INHALATION) ONCE
Status: COMPLETED | OUTPATIENT
Start: 2023-10-09 | End: 2023-10-09

## 2023-10-09 RX ORDER — CLONIDINE HYDROCHLORIDE 0.1 MG/1
0.1 TABLET ORAL EVERY 6 HOURS PRN
Status: DISCONTINUED | OUTPATIENT
Start: 2023-10-09 | End: 2023-10-11 | Stop reason: HOSPADM

## 2023-10-09 RX ORDER — LOSARTAN POTASSIUM 100 MG/1
50 TABLET ORAL DAILY
COMMUNITY

## 2023-10-09 RX ADMIN — METHYLPREDNISOLONE SODIUM SUCCINATE 125 MG: 125 INJECTION, POWDER, LYOPHILIZED, FOR SOLUTION INTRAMUSCULAR; INTRAVENOUS at 20:13

## 2023-10-09 RX ADMIN — IPRATROPIUM BROMIDE AND ALBUTEROL SULFATE 3 ML: .5; 3 SOLUTION RESPIRATORY (INHALATION) at 20:17

## 2023-10-09 NOTE — TELEPHONE ENCOUNTER
Caller: Ludivina Ramirez    Relationship to patient: Self    Best call back number: [phone]    Patient is needing: PT CALLED BACK NEEDING TO SPEAK WITH  LEFT MESSAGE STATING SHE IS HAVING TROUBLE CATCHING HER BREATH. OK TO LEAVE VOICEMAIL

## 2023-10-09 NOTE — TELEPHONE ENCOUNTER
Patient called to let Dr. Chavez know that she is still having trouble catching her breath, she has not gotten any better since last week. She states that she finished her antibiotic yesterday or the day before and she has almost finished her steroid. She is having trouble even walking to and from the bathroom and she has to sit for a while to catch her breath. She never got her pulse ox fixed so she is not able to check it but she states that her oxygen is fine. Please advise. Thank you

## 2023-10-10 ENCOUNTER — APPOINTMENT (OUTPATIENT)
Dept: CARDIOLOGY | Facility: HOSPITAL | Age: 63
End: 2023-10-10
Payer: MEDICARE

## 2023-10-10 LAB
ASCENDING AORTA: 4.4 CM
BH CV ECHO MEAS - AO MAX PG: 17.5 MMHG
BH CV ECHO MEAS - AO MEAN PG: 9 MMHG
BH CV ECHO MEAS - AO ROOT DIAM: 2.7 CM
BH CV ECHO MEAS - AO V2 MAX: 209 CM/SEC
BH CV ECHO MEAS - AO V2 VTI: 42.8 CM
BH CV ECHO MEAS - AVA(I,D): 2.8 CM2
BH CV ECHO MEAS - EDV(CUBED): 185.2 ML
BH CV ECHO MEAS - EDV(MOD-SP2): 189 ML
BH CV ECHO MEAS - EDV(MOD-SP4): 173 ML
BH CV ECHO MEAS - EF(MOD-BP): 47.7 %
BH CV ECHO MEAS - EF(MOD-SP2): 42.9 %
BH CV ECHO MEAS - EF(MOD-SP4): 53.1 %
BH CV ECHO MEAS - ESV(CUBED): 65.5 ML
BH CV ECHO MEAS - ESV(MOD-SP2): 108 ML
BH CV ECHO MEAS - ESV(MOD-SP4): 81.2 ML
BH CV ECHO MEAS - FS: 29.3 %
BH CV ECHO MEAS - IVS/LVPW: 0.94 CM
BH CV ECHO MEAS - IVSD: 1.16 CM
BH CV ECHO MEAS - LA DIMENSION: 5 CM
BH CV ECHO MEAS - LAT PEAK E' VEL: 7.6 CM/SEC
BH CV ECHO MEAS - LV DIASTOLIC VOL/BSA (35-75): 85.7 CM2
BH CV ECHO MEAS - LV MASS(C)D: 287 GRAMS
BH CV ECHO MEAS - LV MAX PG: 13.5 MMHG
BH CV ECHO MEAS - LV MEAN PG: 6 MMHG
BH CV ECHO MEAS - LV SYSTOLIC VOL/BSA (12-30): 40.2 CM2
BH CV ECHO MEAS - LV V1 MAX: 184 CM/SEC
BH CV ECHO MEAS - LV V1 VTI: 38.5 CM
BH CV ECHO MEAS - LVIDD: 5.7 CM
BH CV ECHO MEAS - LVIDS: 4 CM
BH CV ECHO MEAS - LVOT AREA: 3.1 CM2
BH CV ECHO MEAS - LVOT DIAM: 2 CM
BH CV ECHO MEAS - LVPWD: 1.23 CM
BH CV ECHO MEAS - MED PEAK E' VEL: 7 CM/SEC
BH CV ECHO MEAS - MR MAX PG: 120.3 MMHG
BH CV ECHO MEAS - MR MAX VEL: 548 CM/SEC
BH CV ECHO MEAS - MV A MAX VEL: 52.3 CM/SEC
BH CV ECHO MEAS - MV DEC SLOPE: 920 CM/SEC2
BH CV ECHO MEAS - MV E MAX VEL: 130 CM/SEC
BH CV ECHO MEAS - MV E/A: 2.49
BH CV ECHO MEAS - MV P1/2T: 61.8 MSEC
BH CV ECHO MEAS - MVA(P1/2T): 3.6 CM2
BH CV ECHO MEAS - PA V2 MAX: 96.9 CM/SEC
BH CV ECHO MEAS - PI END-D VEL: 224 CM/SEC
BH CV ECHO MEAS - RAP SYSTOLE: 5 MMHG
BH CV ECHO MEAS - RV MAX PG: 4.2 MMHG
BH CV ECHO MEAS - RV V1 MAX: 102 CM/SEC
BH CV ECHO MEAS - RVDD: 4 CM
BH CV ECHO MEAS - RVSP: 64.6 MMHG
BH CV ECHO MEAS - SI(MOD-SP2): 40.1 ML/M2
BH CV ECHO MEAS - SI(MOD-SP4): 45.5 ML/M2
BH CV ECHO MEAS - SV(LVOT): 121 ML
BH CV ECHO MEAS - SV(MOD-SP2): 81 ML
BH CV ECHO MEAS - SV(MOD-SP4): 91.8 ML
BH CV ECHO MEAS - TAPSE (>1.6): 2.7 CM
BH CV ECHO MEAS - TR MAX PG: 59.6 MMHG
BH CV ECHO MEAS - TR MAX VEL: 386 CM/SEC
BH CV ECHO MEASUREMENTS AVERAGE E/E' RATIO: 17.81
BH CV XLRA - RV BASE: 4.4 CM
LEFT ATRIUM VOLUME INDEX: 66.3 ML/M2
LEFT ATRIUM VOLUME: 134 ML
PROT ?TM UR-MCNC: 34.8 MG/DL
QT INTERVAL: 444 MS
QTC INTERVAL: 424 MS
SODIUM UR-SCNC: 35 MMOL/L

## 2023-10-10 PROCEDURE — 96372 THER/PROPH/DIAG INJ SC/IM: CPT

## 2023-10-10 PROCEDURE — 84156 ASSAY OF PROTEIN URINE: CPT | Performed by: INTERNAL MEDICINE

## 2023-10-10 PROCEDURE — 25510000001 PERFLUTREN 6.52 MG/ML SUSPENSION: Performed by: FAMILY MEDICINE

## 2023-10-10 PROCEDURE — G0378 HOSPITAL OBSERVATION PER HR: HCPCS

## 2023-10-10 PROCEDURE — 84300 ASSAY OF URINE SODIUM: CPT | Performed by: INTERNAL MEDICINE

## 2023-10-10 PROCEDURE — 93306 TTE W/DOPPLER COMPLETE: CPT | Performed by: INTERNAL MEDICINE

## 2023-10-10 PROCEDURE — 94799 UNLISTED PULMONARY SVC/PX: CPT

## 2023-10-10 PROCEDURE — 93306 TTE W/DOPPLER COMPLETE: CPT

## 2023-10-10 PROCEDURE — 25010000002 ENOXAPARIN PER 10 MG: Performed by: FAMILY MEDICINE

## 2023-10-10 PROCEDURE — 25010000002 METHYLPREDNISOLONE PER 40 MG: Performed by: FAMILY MEDICINE

## 2023-10-10 PROCEDURE — 82570 ASSAY OF URINE CREATININE: CPT | Performed by: INTERNAL MEDICINE

## 2023-10-10 PROCEDURE — 96376 TX/PRO/DX INJ SAME DRUG ADON: CPT

## 2023-10-10 RX ORDER — NITROGLYCERIN 0.4 MG/1
0.4 TABLET SUBLINGUAL
Status: DISCONTINUED | OUTPATIENT
Start: 2023-10-10 | End: 2023-10-11 | Stop reason: HOSPADM

## 2023-10-10 RX ORDER — CARVEDILOL 25 MG/1
25 TABLET ORAL 2 TIMES DAILY WITH MEALS
Status: DISCONTINUED | OUTPATIENT
Start: 2023-10-10 | End: 2023-10-11 | Stop reason: HOSPADM

## 2023-10-10 RX ORDER — FUROSEMIDE 40 MG/1
40 TABLET ORAL DAILY
Status: DISCONTINUED | OUTPATIENT
Start: 2023-10-10 | End: 2023-10-11 | Stop reason: HOSPADM

## 2023-10-10 RX ORDER — LOSARTAN POTASSIUM 50 MG/1
50 TABLET ORAL DAILY
Status: DISCONTINUED | OUTPATIENT
Start: 2023-10-10 | End: 2023-10-11 | Stop reason: HOSPADM

## 2023-10-10 RX ORDER — ENOXAPARIN SODIUM 100 MG/ML
30 INJECTION SUBCUTANEOUS EVERY 24 HOURS
Status: DISCONTINUED | OUTPATIENT
Start: 2023-10-10 | End: 2023-10-11 | Stop reason: HOSPADM

## 2023-10-10 RX ORDER — FLUTICASONE PROPIONATE 50 MCG
2 SPRAY, SUSPENSION (ML) NASAL DAILY
Status: DISCONTINUED | OUTPATIENT
Start: 2023-10-10 | End: 2023-10-11 | Stop reason: HOSPADM

## 2023-10-10 RX ORDER — BUDESONIDE AND FORMOTEROL FUMARATE DIHYDRATE 160; 4.5 UG/1; UG/1
2 AEROSOL RESPIRATORY (INHALATION)
Status: DISCONTINUED | OUTPATIENT
Start: 2023-10-10 | End: 2023-10-11 | Stop reason: HOSPADM

## 2023-10-10 RX ORDER — ISOSORBIDE DINITRATE 20 MG/1
20 TABLET ORAL
Status: DISCONTINUED | OUTPATIENT
Start: 2023-10-10 | End: 2023-10-11 | Stop reason: HOSPADM

## 2023-10-10 RX ORDER — METHYLPREDNISOLONE SODIUM SUCCINATE 40 MG/ML
40 INJECTION, POWDER, LYOPHILIZED, FOR SOLUTION INTRAMUSCULAR; INTRAVENOUS EVERY 6 HOURS
Status: DISCONTINUED | OUTPATIENT
Start: 2023-10-10 | End: 2023-10-11

## 2023-10-10 RX ORDER — IPRATROPIUM BROMIDE AND ALBUTEROL SULFATE 2.5; .5 MG/3ML; MG/3ML
3 SOLUTION RESPIRATORY (INHALATION)
Status: DISCONTINUED | OUTPATIENT
Start: 2023-10-10 | End: 2023-10-10

## 2023-10-10 RX ORDER — LORATADINE 10 MG/1
10 TABLET ORAL DAILY
COMMUNITY
End: 2023-10-11 | Stop reason: HOSPADM

## 2023-10-10 RX ORDER — IPRATROPIUM BROMIDE AND ALBUTEROL SULFATE 2.5; .5 MG/3ML; MG/3ML
3 SOLUTION RESPIRATORY (INHALATION) EVERY 6 HOURS PRN
Status: DISCONTINUED | OUTPATIENT
Start: 2023-10-10 | End: 2023-10-11 | Stop reason: HOSPADM

## 2023-10-10 RX ORDER — ISOSORBIDE DINITRATE 20 MG/1
20 TABLET ORAL 3 TIMES DAILY
COMMUNITY
End: 2023-10-11 | Stop reason: HOSPADM

## 2023-10-10 RX ORDER — CETIRIZINE HYDROCHLORIDE 10 MG/1
10 TABLET ORAL DAILY
Status: DISCONTINUED | OUTPATIENT
Start: 2023-10-10 | End: 2023-10-11 | Stop reason: HOSPADM

## 2023-10-10 RX ORDER — ASPIRIN 81 MG/1
81 TABLET ORAL DAILY
Status: DISCONTINUED | OUTPATIENT
Start: 2023-10-10 | End: 2023-10-11 | Stop reason: HOSPADM

## 2023-10-10 RX ORDER — GUAIFENESIN 600 MG/1
1200 TABLET, EXTENDED RELEASE ORAL 2 TIMES DAILY
Status: DISCONTINUED | OUTPATIENT
Start: 2023-10-10 | End: 2023-10-11 | Stop reason: HOSPADM

## 2023-10-10 RX ADMIN — ISOSORBIDE DINITRATE 20 MG: 20 TABLET ORAL at 14:03

## 2023-10-10 RX ADMIN — IPRATROPIUM BROMIDE AND ALBUTEROL SULFATE 3 ML: .5; 3 SOLUTION RESPIRATORY (INHALATION) at 10:06

## 2023-10-10 RX ADMIN — ASPIRIN 81 MG: 81 TABLET, COATED ORAL at 08:53

## 2023-10-10 RX ADMIN — METHYLPREDNISOLONE SODIUM SUCCINATE 40 MG: 40 INJECTION INTRAMUSCULAR; INTRAVENOUS at 20:41

## 2023-10-10 RX ADMIN — GUAIFENESIN 1200 MG: 600 TABLET, EXTENDED RELEASE ORAL at 08:53

## 2023-10-10 RX ADMIN — BUDESONIDE AND FORMOTEROL FUMARATE DIHYDRATE 2 PUFF: 160; 4.5 AEROSOL RESPIRATORY (INHALATION) at 19:02

## 2023-10-10 RX ADMIN — METHYLPREDNISOLONE SODIUM SUCCINATE 40 MG: 40 INJECTION INTRAMUSCULAR; INTRAVENOUS at 15:38

## 2023-10-10 RX ADMIN — GUAIFENESIN 1200 MG: 600 TABLET, EXTENDED RELEASE ORAL at 20:40

## 2023-10-10 RX ADMIN — ISOSORBIDE DINITRATE 20 MG: 20 TABLET ORAL at 18:09

## 2023-10-10 RX ADMIN — PERFLUTREN 13.04 MG: 6.52 INJECTION, SUSPENSION INTRAVENOUS at 13:05

## 2023-10-10 RX ADMIN — LOSARTAN POTASSIUM 50 MG: 50 TABLET, FILM COATED ORAL at 08:53

## 2023-10-10 RX ADMIN — ENOXAPARIN SODIUM 30 MG: 100 INJECTION SUBCUTANEOUS at 14:03

## 2023-10-10 RX ADMIN — CARVEDILOL 25 MG: 25 TABLET, FILM COATED ORAL at 08:53

## 2023-10-10 RX ADMIN — FUROSEMIDE 40 MG: 40 TABLET ORAL at 08:53

## 2023-10-10 RX ADMIN — BUDESONIDE AND FORMOTEROL FUMARATE DIHYDRATE 2 PUFF: 160; 4.5 AEROSOL RESPIRATORY (INHALATION) at 08:44

## 2023-10-10 RX ADMIN — ISOSORBIDE DINITRATE 20 MG: 20 TABLET ORAL at 08:53

## 2023-10-10 RX ADMIN — CLONIDINE HYDROCHLORIDE 0.1 MG: 0.1 TABLET ORAL at 04:04

## 2023-10-10 RX ADMIN — CETIRIZINE HYDROCHLORIDE 10 MG: 10 TABLET ORAL at 11:10

## 2023-10-10 RX ADMIN — CARVEDILOL 25 MG: 25 TABLET, FILM COATED ORAL at 18:09

## 2023-10-10 RX ADMIN — FLUTICASONE PROPIONATE 2 SPRAY: 50 SPRAY, METERED NASAL at 11:10

## 2023-10-10 RX ADMIN — METHYLPREDNISOLONE SODIUM SUCCINATE 40 MG: 40 INJECTION INTRAMUSCULAR; INTRAVENOUS at 08:53

## 2023-10-10 RX ADMIN — IPRATROPIUM BROMIDE AND ALBUTEROL SULFATE 3 ML: .5; 3 SOLUTION RESPIRATORY (INHALATION) at 19:02

## 2023-10-10 NOTE — PLAN OF CARE
Goal Outcome Evaluation:                        Problem: Adult Inpatient Plan of Care  Goal: Plan of Care Review  10/10/2023 0644 by Savannah Daly RN  Outcome: Ongoing, Progressing  10/10/2023 0104 by Savannah Daly RN  Outcome: Ongoing, Progressing  Goal: Patient-Specific Goal (Individualized)  10/10/2023 0644 by Savannah Daly RN  Outcome: Ongoing, Progressing  10/10/2023 0104 by Savannah Daly RN  Outcome: Ongoing, Progressing  Goal: Absence of Hospital-Acquired Illness or Injury  10/10/2023 0644 by Savannah Daly RN  Outcome: Ongoing, Progressing  10/10/2023 0104 by Savannah Daly RN  Outcome: Ongoing, Progressing  Intervention: Identify and Manage Fall Risk  Recent Flowsheet Documentation  Taken 10/10/2023 0600 by Savannah Daly RN  Safety Promotion/Fall Prevention: safety round/check completed  Taken 10/10/2023 0400 by Savannah Daly RN  Safety Promotion/Fall Prevention: safety round/check completed  Taken 10/10/2023 0200 by Savannah Daly RN  Safety Promotion/Fall Prevention: safety round/check completed  Taken 10/10/2023 0000 by Savannah Daly RN  Safety Promotion/Fall Prevention: safety round/check completed  Taken 10/9/2023 2331 by Savannah Daly RN  Safety Promotion/Fall Prevention: safety round/check completed  Intervention: Prevent Skin Injury  Recent Flowsheet Documentation  Taken 10/10/2023 0600 by Savannah Daly RN  Body Position: sitting up in bed  Taken 10/10/2023 0400 by Savannah Daly RN  Body Position: sitting up in bed  Taken 10/10/2023 0200 by Savannah Daly RN  Body Position: left  Intervention: Prevent and Manage VTE (Venous Thromboembolism) Risk  Recent Flowsheet Documentation  Taken 10/9/2023 2331 by Savannah Daly RN  Activity Management: activity encouraged  VTE Prevention/Management: (waiting for orders) other (see comments)  Range of Motion: active ROM (range of motion) encouraged  Goal: Optimal Comfort and Wellbeing  10/10/2023 0644 by Savannah Daly RN  Outcome:  Ongoing, Progressing  10/10/2023 0104 by Savannah Daly RN  Outcome: Ongoing, Progressing  Intervention: Provide Person-Centered Care  Recent Flowsheet Documentation  Taken 10/9/2023 2331 by Savannah Daly RN  Trust Relationship/Rapport:   care explained   choices provided   questions answered   questions encouraged   reassurance provided  Goal: Readiness for Transition of Care  10/10/2023 0644 by Savannah Daly RN  Outcome: Ongoing, Progressing  10/10/2023 0104 by Savannah Daly RN  Outcome: Ongoing, Progressing     Problem: COPD (Chronic Obstructive Pulmonary Disease) Comorbidity  Goal: Maintenance of COPD Symptom Control  10/10/2023 0644 by Savannah Daly RN  Outcome: Ongoing, Progressing  10/10/2023 0104 by Savannah Daly RN  Outcome: Ongoing, Progressing     Problem: Heart Failure Comorbidity  Goal: Maintenance of Heart Failure Symptom Control  10/10/2023 0644 by Savannah Daly RN  Outcome: Ongoing, Progressing  10/10/2023 0104 by Savannah Daly RN  Outcome: Ongoing, Progressing     Problem: Hypertension Comorbidity  Goal: Blood Pressure in Desired Range  10/10/2023 0644 by Savannah Daly RN  Outcome: Ongoing, Progressing  10/10/2023 0104 by Savannah Daly RN  Outcome: Ongoing, Progressing     Problem: Obstructive Sleep Apnea Risk or Actual Comorbidity Management  Goal: Unobstructed Breathing During Sleep  10/10/2023 0644 by Savannah aDly RN  Outcome: Ongoing, Progressing  10/10/2023 0104 by Savannah Daly RN  Outcome: Ongoing, Progressing

## 2023-10-10 NOTE — PLAN OF CARE
Problem: Adult Inpatient Plan of Care  Goal: Plan of Care Review  Outcome: Ongoing, Progressing  Goal: Patient-Specific Goal (Individualized)  Outcome: Ongoing, Progressing  Goal: Absence of Hospital-Acquired Illness or Injury  Outcome: Ongoing, Progressing  Intervention: Identify and Manage Fall Risk  Recent Flowsheet Documentation  Taken 10/9/2023 2331 by Savannah Daly RN  Safety Promotion/Fall Prevention: safety round/check completed  Intervention: Prevent and Manage VTE (Venous Thromboembolism) Risk  Recent Flowsheet Documentation  Taken 10/9/2023 2331 by Savannah Daly RN  Activity Management: activity encouraged  VTE Prevention/Management: (waiting for orders) other (see comments)  Range of Motion: active ROM (range of motion) encouraged  Goal: Optimal Comfort and Wellbeing  Outcome: Ongoing, Progressing  Intervention: Provide Person-Centered Care  Recent Flowsheet Documentation  Taken 10/9/2023 2331 by Savannah Daly RN  Trust Relationship/Rapport:   care explained   choices provided   questions answered   questions encouraged   reassurance provided  Goal: Readiness for Transition of Care  Outcome: Ongoing, Progressing     Problem: COPD (Chronic Obstructive Pulmonary Disease) Comorbidity  Goal: Maintenance of COPD Symptom Control  Outcome: Ongoing, Progressing     Problem: Heart Failure Comorbidity  Goal: Maintenance of Heart Failure Symptom Control  Outcome: Ongoing, Progressing     Problem: Hypertension Comorbidity  Goal: Blood Pressure in Desired Range  Outcome: Ongoing, Progressing     Problem: Obstructive Sleep Apnea Risk or Actual Comorbidity Management  Goal: Unobstructed Breathing During Sleep  Outcome: Ongoing, Progressing   Goal Outcome Evaluation:

## 2023-10-10 NOTE — H&P
History and Physical      CHIEF COMPLAINT:  SOA, cough    Reason for Admission:  As Above    History Obtained From:  chart    HISTORY OF PRESENT ILLNESS:      The patient is a 63 y.o. female who was admitted from ER with 2-3 week h/o worsening SOA, cough. She has been on PO medicine, steroids, and antibiotics. She is followed by Pulm. Outpatient. I have not seen her in my office for quite some time. She was on HD, but per chart has been taken off recently. She is off the floor currently getting an ECHO done. She did tell Nursing last night that she was not taking all of her medicine.     Past Medical History:    Past Medical History:   Diagnosis Date    Acute CHF     Acute renal failure (ARF)     Anemia     Asthma     childhood    Chronic kidney disease     Coronary artery disease     Hypertension     Ischemic colitis     Metabolic acidosis     Myocardial infarction 11/07/2020    Nonrheumatic aortic (valve) insufficiency     PTSD (post-traumatic stress disorder)      Past Surgical History:    Past Surgical History:   Procedure Laterality Date    CARDIAC CATHETERIZATION N/A 11/8/2020    Procedure: Left Heart Cath;  Surgeon: Pierce Buchanan MD;  Location:  [facility]ATH [facility];  Service: Cardiovascular;  Laterality: N/A;    EXTERNAL FIXATION WRIST FRACTURE      INSERTION HEMODIALYSIS CATHETER Right 6/3/2022    Procedure: INSERTION OF RIGHT INTERNAL JUGULAR HEMODIALYSIS CATHETER;  Surgeon: Dexter Red MD;  Location:  [facility] OR;  Service: Vascular;  Laterality: Right;    LEG SURGERY      TUBAL ABDOMINAL LIGATION           Medications Prior to Admission:    Medications Prior to Admission   Medication Sig Dispense Refill Last Dose    ascorbic acid (VITAMIN C) 250 MG tablet Take 2 tablets by mouth Daily.   Past Month    docusate sodium (COLACE) 250 MG capsule Take 1 capsule by mouth Daily.   Past Week    loratadine (CLARITIN) 10 MG tablet Take 1 tablet by mouth Daily.   Past Month    predniSONE (DELTASONE)  10 MG tablet Take 4 tabs daily x 3 days, then take 3 tabs daily x 3 days, then take 2 tabs daily x 3 days, then take 1 tab daily x 3 days 31 tablet 0 Past Week    albuterol sulfate  (90 Base) MCG/ACT inhaler Inhale 2 puffs Every 4 (Four) Hours As Needed for Wheezing. 20.1 g 3 Unknown    aspirin 81 MG EC tablet Take 1 tablet by mouth Daily. 90 tablet 3 Unknown    azelastine (ASTELIN) 0.1 % nasal spray 2 sprays into the nostril(s) as directed by provider 2 (Two) Times a Day. 30 mL 11 Unknown    azithromycin (ZITHROMAX) 250 MG tablet Take 2 by mouth today then 1 daily for 4 days (Patient not taking: Reported on 10/10/2023) 6 tablet 0 Not Taking    Bacillus Coagulans-Inulin (ALIGN PREBIOTIC-PROBIOTIC PO) Take 1 capsule by mouth Daily.   Unknown    budesonide-formoterol (SYMBICORT) 160-4.5 MCG/ACT inhaler Inhale 2 puffs 2 (Two) Times a Day. 1 each 11 Unknown    calcitriol (ROCALTROL) 0.25 MCG capsule Take 1 capsule by mouth Daily.   Unknown    carvedilol (COREG) 25 MG tablet Take 1 tablet by mouth 2 (Two) Times a Day With Meals.   Unknown    cholecalciferol (VITAMIN D3) 25 MCG (1000 UT) tablet Take 1 tablet by mouth Daily.   Unknown    cloNIDine (CATAPRES) 0.1 MG tablet Take 4 tablets by mouth Every 6 (Six) Hours As Needed for High Blood Pressure (sbp >180 dbp >100).   Unknown    famotidine (PEPCID) 20 MG tablet Take 1 tablet by mouth Daily. 30 tablet 0 Unknown    fluticasone (FLONASE) 50 MCG/ACT nasal spray 2 sprays into the nostril(s) as directed by provider Every Morning. In each nostril 16 g 11 Unknown    furosemide (LASIX) 40 MG tablet Take 1 tablet by mouth Daily As Needed (Increased shortness of air, edema, weight gain of 2 pounds overnight, 5 pounds in 2 days.).   Unknown    guaiFENesin (Mucinex) 600 MG 12 hr tablet Take 2 tablets by mouth 2 (Two) Times a Day. (Patient not taking: Reported on 10/3/2023) 60 tablet 3     guaiFENesin (Mucinex) 600 MG 12 hr tablet Take 2 tablets by mouth 2 (Two) Times a Day.  (Patient not taking: Reported on 10/10/2023) 120 tablet 5 Not Taking    ipratropium-albuterol (DUO-NEB) 0.5-2.5 mg/3 ml nebulizer Take 3 mL by nebulization 4 (Four) Times a Day. 1080 mL 3 Unknown    isosorbide dinitrate (ISORDIL) 20 MG tablet Take 1 tablet by mouth 3 (Three) Times a Day.   Unknown    losartan (COZAAR) 100 MG tablet Take 0.5 tablets by mouth Daily.   Unknown    Magnesium 400 MG capsule Take 400 mg by mouth Daily.   Unknown    melatonin 3 MG tablet Take 2 tablets by mouth Every Night. 30 tablet 0 Unknown    multivitamin with minerals tablet tablet Take 1 tablet by mouth Daily.   Unknown    NIFEdipine CC (ADALAT CC) 90 MG 24 hr tablet Take 1 tablet by mouth Daily. 90 tablet 3 Unknown    nitroglycerin (NITROSTAT) 0.4 MG SL tablet Place 1 tablet under the tongue Every 5 (Five) Minutes As Needed for Chest Pain. Take no more than 3 doses in 15 minutes.   Unknown    ranolazine (RANEXA) 500 MG 12 hr tablet Take 1 tablet by mouth 2 (Two) Times a Day.   Unknown    ticagrelor (BRILINTA) 90 MG tablet tablet Take 1 tablet by mouth 2 (Two) Times a Day. (Patient not taking: Reported on 10/10/2023)   Not Taking    Zinc 25 MG tablet Take 25 mg by mouth Daily.   Unknown       Allergies:  Codeine and Imitrex [sumatriptan]    Social History:   TOBACCO:   reports that she quit smoking 10 days ago. Her smoking use included cigarettes. She started smoking about 48 years ago. She has a 23.00 pack-year smoking history. She has been exposed to tobacco smoke. She has never used smokeless tobacco.  ETOH:   reports no history of alcohol use.  DRUGS:   reports no history of drug use.        Family History:   Family History   Problem Relation Age of Onset    Coronary artery disease Mother     Coronary artery disease Father     Breast cancer Neg Hx      REVIEW OF SYSTEMS:  Constitutional: Negative   CV: Negative  Pulmonary: SOA, cough  GI: Negative  : Negative  Psych: Negative  Neuro: Negative  Skin: Negative  MusculoSkeletal:  "Negative  HEENT: Negative  Joints: Negative  Vitals:  /100 (BP Location: Left arm, Patient Position: Sitting) Comment: nurse notified  Pulse 75   Temp 98.4 °F (36.9 °C) (Oral)   Resp 18   Ht 166.4 cm (65.5\")   Wt 95.3 kg (210 lb)   LMP  (LMP Unknown)   SpO2 94%   BMI 34.41 kg/m²     PHYSICAL EXAM (per notes):  Gen: NAD, alert, pleasant  HEENT: WNL  Lymph: no LAD  Neck: no JVD or masses  Chest: coarse  CV: RRR  Abdomen: NT/ND  Extrem: no C/C/E  Neuro: Nonfocal  Skin: no rashes  Joints: no redness  DATA:  I have reviewed the admission labs and imaging tests.    ASSESSMENT AND PLAN:      SOA, Cough----continue steroids, nebs, O2, Pulm is seeing patient also and will follow with their recommendations, ECHO ordered  CRF, with hypoxia  COPD  H/O ESRD  HTN--follow BP        Orville Weston MD  12:51 CDT 10/10/2023  "

## 2023-10-10 NOTE — CONSULTS
[facility]ULMONARY & CRITICAL CARE CONSULT - [facility]    10/10/23, 18:31 CDT  Patient Care Team:  Orville Weston MD as PCP - General (Family Medicine)  Juanpablo Rea MD as Consulting Physician (Gastroenterology)  Nickolas Alegria MD as Consulting Physician (Gastroenterology)  Neeta Chavez MD as Consulting Physician (Pulmonary Disease)  [facility]  Pierce Buchanan MD as Cardiologist (Cardiology)  Jer Cuevas APRN as Nurse Practitioner (Cardiology)  Destini Gaitan APRN as Nurse Practitioner (Family Medicine)  Name: Ludivina Ramirez  : 1960  MRN: [id number]  Contact Serial Number [id number]    Chief complaint: shortness of breath   HPI:  We have been consulted by Orville Weston MD to see this 63 y.o. female patient.  Ms. Ramirez is known to our practice and followed by Dr. Chavez for Gold stage II COPD.  She has been on Symbicort outpatient.  She has been experiencing increased shortness of breath and productive cough over the last 3 weeks.  She was seen in the office last week with similar complaints and was started on Mucinex, Claritin, Flonase, prednisone and azithromycin.  She tells me she gets short of breath with very minimal exertion.  When she ambulates to the bathroom she has to sit and recover for several minutes.  She feels like the trash was piling up in her house because she is unable to complete basic tasks due to her breathing.  She wears 2 L nasal cannula at baseline.  She has home trilogy device and tells me she has been using it without issue.  She quit smoking last week.  She is seen awake and alert sitting up in bed.  Oxygen saturations stable on 2 L nasal cannula.  She tolerated trilogy without issue overnight.  She had been on dialysis over the last year or so and was recently taken off.  She tells me cough has improved.  She notices wheezing with exertion.  No documented fevers.  Recommendations per Dr. Chavez to follow.  Past  Medical History:   has a past medical history of Acute CHF, Acute renal failure (ARF), Anemia, Asthma, Chronic kidney disease, Coronary artery disease, Hypertension, Ischemic colitis, Metabolic acidosis, Myocardial infarction (11/07/2020), Nonrheumatic aortic (valve) insufficiency, and PTSD (post-traumatic stress disorder).   has a past surgical history that includes Tubal ligation; External fixation wrist fracture; Leg Surgery; Cardiac catheterization (N/A, 11/8/2020); and Hemodialysis Catheter (Right, 6/3/2022).  Allergies   Allergen Reactions    Codeine Nausea And Vomiting    Imitrex [Sumatriptan] Other (See Comments)     HA     Medications:  aspirin, 81 mg, Oral, Daily  budesonide-formoterol, 2 puff, Inhalation, BID - RT  carvedilol, 25 mg, Oral, BID With Meals  cetirizine, 10 mg, Oral, Daily  enoxaparin, 30 mg, Subcutaneous, Q24H  fluticasone, 2 spray, Each Nare, Daily  furosemide, 40 mg, Oral, Daily  guaiFENesin, 1,200 mg, Oral, BID  isosorbide dinitrate, 20 mg, Oral, TID - Nitrates  losartan, 50 mg, Oral, Daily  methylPREDNISolone sodium succinate, 40 mg, Intravenous, Q6H         Family History:  Family History   Problem Relation Age of Onset    Coronary artery disease Mother     Coronary artery disease Father     Breast cancer Neg Hx      Social History:   reports that she quit smoking 10 days ago. Her smoking use included cigarettes. She started smoking about 48 years ago. She has a 23.00 pack-year smoking history. She has been exposed to tobacco smoke. She has never used smokeless tobacco. She reports that she does not drink alcohol and does not use drugs.  Review of Systems:  Positive for cough, shortness of breath, wheezing  Negative for nausea, vomiting, diarrhea, pain  Physical Exam:   Temp:  [97.4 °F (36.3 °C)-98.4 °F (36.9 °C)] 97.9 °F (36.6 °C)  Heart Rate:  [54-75] 60  Resp:  [16-20] 18  BP: (134-209)/() 134/105  Intake/Output Summary (Last 24 hours) at 10/10/2023 1831  Last data filed at  10/10/2023 0900  Gross per 24 hour   Intake 600 ml   Output --   Net 600 ml         10/09/23  1818 10/09/23  2249   Weight: 90.7 kg (200 lb) 95.3 kg (210 lb)     SpO2 Percentage    10/10/23 0844 10/10/23 1006 10/10/23 1553   SpO2: 91% 94% 95%     Body mass index is 34.41 kg/m².   Physical Exam  Constitutional:       General: She is not in acute distress.     Interventions: Nasal cannula in place.      Comments: 2l   HENT:      Head: Normocephalic.      Nose: Nose normal.      Mouth/Throat:      Mouth: Mucous membranes are moist.   Eyes:      General: No scleral icterus.  Cardiovascular:      Rate and Rhythm: Normal rate.   Pulmonary:      Effort: No respiratory distress.      Breath sounds: Rales present.   Abdominal:      General: There is no distension.   Musculoskeletal:      Right lower leg: Edema present.      Left lower leg: Edema present.   Neurological:      Mental Status: She is alert and oriented to person, place, and time.   Psychiatric:         Mood and Affect: Mood normal.         Behavior: Behavior is cooperative.         Electronically signed by Neeta Chavez MD, 10/10/2023, 18:31 CDT       Physician Substantive Portion: Medical Decision Making  Result Review  Results from last 7 days   Lab Units 10/09/23  2009   WBC 10*3/mm3 10.02   HEMOGLOBIN g/dL 9.9*   PLATELETS 10*3/mm3 286     Results from last 7 days   Lab Units 10/09/23  2009   SODIUM mmol/L 141   POTASSIUM mmol/L 5.1   CO2 mmol/L 21.0*   BUN mg/dL 64*   CREATININE mg/dL 2.25*   GLUCOSE mg/dL 142*         Lab Results   Component Value Date    PROBNP 19,936.0 (H) 11/29/2021     Microbiology Results (last 10 days)       Procedure Component Value - Date/Time    COVID PRE-OP / PRE-PROCEDURE SCREENING ORDER (NO ISOLATION) - Swab, Nasopharynx [[id number]]  (Normal) Collected: 10/09/23 2010    Lab Status: Final result Specimen: Swab from Nasopharynx Updated: 10/09/23 2100    Narrative:      The following orders were created for panel order COVID  PRE-OP / PRE-PROCEDURE SCREENING ORDER (NO ISOLATION) - Swab, Nasopharynx.  Procedure                               Abnormality         Status                     ---------                               -----------         ------                     COVID-19, FLU A/B, RSV P...[[id number]]  Normal              Final result                 Please view results for these tests on the individual orders.    COVID-19, FLU A/B, RSV PCR - Swab, Nasopharynx [[id number]]  (Normal) Collected: 10/09/23 2010    Lab Status: Final result Specimen: Swab from Nasopharynx Updated: 10/09/23 2100     COVID19 Not Detected     Influenza A PCR Not Detected     Influenza B PCR Not Detected     RSV, PCR Not Detected    Narrative:      Fact sheet for providers: https://www.fda.gov/media/879815/download    Fact sheet for patients: https://www.fda.gov/media/087164/download    Test performed by PCR.             Recent radiology:   Imaging Results (Last 72 Hours)       Procedure Component Value Units Date/Time    CT Chest Without Contrast Diagnostic [[id number]] Collected: 10/09/23 2116     Updated: 10/09/23 2127    Narrative:      CT CHEST WO CONTRAST DIAGNOSTIC- 10/9/2023 9:07 PM CDT     HISTORY: sob, possible pneumonia, failed outpatient abx     COMPARISON: 10/12/2022     DOSE LENGTH PRODUCT: 228 mGy cm. Automated exposure control was also  utilized to decrease patient radiation dose.     TECHNIQUE: Serial helical tomographic images of the chest were acquired  without contrast. Multiplanar reformatted images were provided for  review.     FINDINGS:     Visualized neck base: The imaged portion of the base of the neck appears  unremarkable.     Lungs: There is evidence of remote granulomatous disease. Bilateral  pleural effusions are present with bibasilar atelectasis. There is  thickening of the interlobular septa with reticular opacities suggesting  pulmonary venous hypertension and interstitial pulmonary edema..  Bilateral pleural effusions are  present.. There is mild bronchial wall  thickening within the lungs..     Heart: There is moderate cardiomegaly.. There is no pericardial  effusion. Advanced coronary artery atheromatous calcification.     Vasculature: There is atheromatous calcification of the thoracic aorta  and great vessels. The ascending thoracic aorta measures 4.1 cm in  transverse dimension.. The pulmonary arteries are enlarged, suggestive  of pulmonary arterial hypertension.     Mediastinum and lymph nodes: Multiple shotty nodes are noted within the  anterior middle mediastinum. There are a few mildly enlarged right  paratracheal and AP window nodes. These may be reactive in nature. No  evidence of axillary adenopathy..     Skeletal and soft tissues: Chest wall soft tissues are unremarkable. No  acute bony abnormality. Mild thoracic spine degenerative change.     Upper abdomen: Mild body wall edema is present. There is trace free  fluid in the perihepatic space. The left kidney is atrophic..          Impression:      1. FINDINGS suggesting pulmonary venous hypertension with interstitial  pulmonary edema with thickening of the interlobar and interlobular septa  and reticular opacities. Bilateral pleural effusions are present with  bibasilar atelectasis. There is moderate cardiomegaly.  2. No acute infiltrate.  3. Heavy coronary artery calcifications are present. Mitral annulus  calcifications are demonstrated. There is evidence of pulmonary arterial  hypertension. The ascending thoracic aorta measures 4.1 cm in transverse  dimension.  4. Mild body wall edema. There is a small amount of free fluid in the  perihepatic space.           This report was signed and finalized on 10/9/2023 9:24 PM CDT by Dr. Ye Montaño MD.               Personal review of imaging : CT scan shows pulmonary venous hypertension, bilateral pleural effusions and bibasilar atelectasis with moderate cardiomegaly.  No acute infiltrate noted there is heavy coronary  artery calcifications noted.  Mitral annulus calcifications are demonstrated.  Other test results: Results for orders placed during the hospital encounter of 08/08/21    Adult Transthoracic Echo Complete W/ Cont if Necessary Per Protocol    Interpretation Summary  · Left ventricular ejection fraction appears to be 51 - 55%. Left ventricular systolic function is low normal.  · The following left ventricular wall segments are hypokinetic: apex hypokinetic.  · Mild aortic valve stenosis is present.  · Left ventricular wall thickness is consistent with mild concentric hypertrophy.  · Left ventricular diastolic function is consistent with (grade II w/high LAP) pseudonormalization.  · Left atrial volume is severely increased.  · Estimated right ventricular systolic pressure from tricuspid regurgitation is mildly elevated (35-45 mmHg). Calculated right ventricular systolic pressure from tricuspid regurgitation is 40.8 mmHg.  · Normal size and function of the right ventricle.  Reviewed.  Pulmonary hypertension noted.  Independent review of ekg: Done.  Problem List as identified by Epic (may contain historical, inactive problems)    Shortness of breath    Cigarette smoker    Noncompliance    COPD (chronic obstructive pulmonary disease)    Hypoxemia requiring supplemental oxygen    MARY on CPAP    Pulmonary hypertension    Other specified anxiety disorders    Acute on chronic congestive heart failure    Dependence on non-invasive ventilation    Pulmonary Assessment:  Acute on chronic hypoxic and hypercapnic respiratory failure  Chronic obstructive pulmonary disease with acute exacerbation  Failed outpatient treatment  Chronic hypoxemic respiratory failure on home oxygen  Obstructive sleep apnea on home trilogy  Morbid obesity  Cigarette smoker  Noncompliance to treatment  Essential hypertension  Chronic kidney disease was on dialysis currently off dialysis.  History of ischemic colitis  Coronary artery disease and history of  MI in the past  Pulmonary hypertension  Aortic insufficiency  Anxiety and depression  History of PTSD    Recommend/plan:   Patient was seen as a new pulmonary consult in medical floor today.  She is known to me from the clinic visit.  She was a heavy smoker and has COPD.  She was recently seen by me a few days ago in the pulmonary clinic and was prescribed a course of azithromycin  She was also getting prednisone taper.  However patient called my office yesterday and reported her shortness of breath did not improve  I advised her to return to ER and after initial evaluation she was admitted  Currently she is on Symbicort and DuoNeb and started on Solu-Medrol 40 mg every 6 hours.  Her COVID screen was negative  She is feeling better and is on 2 L oxygen which is her baseline.  Home trilogy at bedside which will be used at night and during sleep.  No antibiotics needed white cell count is not elevated.  Continue incentive spirometry and flutter valve to improve pulmonary compliance.  Cardiac work-up has been ordered by Dr. Weston the primary care provider.  Echocardiogram ordered which has not been read yet.  Patient had known pulmonary hypertension.  Continue pain and anxiety control and DVT and stress ulcer prophylaxis.  Nutritional support.  Patient is a smoker but claims that she quit smoking about a month ago.    I strongly encouraged her to continue smoking cessation sincerely.  She did not want routine patch.  Increase physical activity as tolerated.  Repeat labs ordered tomorrow morning.  Her renal function is still abnormal.  She was dialysis dependent but dialysis line has been discontinued few weeks ago.  I requested a renal consult with Dr. Lazo for further evaluation.  Her imaging study shows some fluid overload.    She was off Lasix but started back on Lasix after she was off for few days which may also cause worsening fluid overload  Continue current treatment plan and supportive care.  Patient wanted to  go home soon depending on her progress.  Care plan discussed with RN  Code status: Full.  Overall prognosis: Guarded but she appears stable.  We will continue following and we appreciate the consult.  She will also follow-up with me as an outpatient after her discharge from the hospital  Total time spent in seeing this patient as a pulmonary consult was 45 minutes    This visit was performed by both a physician and an [facility] Practice RN.  I personally evaluated and examined the patient.  I performed all aspects of the medical decision making as documented.    Electronically signed by     Neeta Chavez MD,   Pulmonologist/Intensivist   10/10/2023, 18:31 CDT

## 2023-10-10 NOTE — CASE MANAGEMENT/SOCIAL WORK
Discharge Planning Assessment   [facility]     Patient Name: Ludivina Ramirez  MRN: [id number]  Today's Date: 10/10/2023    Admit Date: 10/9/2023        Discharge Needs Assessment       Row Name 10/10/23 1110       Living Environment    People in Home alone (P)     Primary Care Provided by self (P)     Provides Primary Care For no one (P)     Family Caregiver if Needed sibling(s) (P)     Quality of Family Relationships unable to assess (P)        Discharge Needs Assessment    Equipment Currently Used at Home cpap;cane, straight (P)     Equipment Needed After Discharge none (P)     Discharge Coordination/Progress Spoke with patient she lives alone. She has a brother and sister but they do not see each other often. PCP Dr. Weston. She has used HH before. She said she is interested in meals on wheels because she cannot get to the grocery store often. (P)              Lynn Miranda, Social Work Student

## 2023-10-10 NOTE — PLAN OF CARE
Goal Outcome Evaluation:  Plan of Care Reviewed With: patient        Progress: improving  Outcome Evaluation: SOB with exertion; 2L O2 nasal cannula. BP elevated; home medications restarted. Echo completed today. Respiratory sputum needed. Alert x4. Tele on. Cardiac diet; tolerated well. Voiding. BM x1 today. Up with stand by assist. Safety maintained.

## 2023-10-11 ENCOUNTER — READMISSION MANAGEMENT (OUTPATIENT)
Dept: CALL CENTER | Facility: HOSPITAL | Age: 63
End: 2023-10-11
Payer: MEDICARE

## 2023-10-11 ENCOUNTER — APPOINTMENT (OUTPATIENT)
Dept: GENERAL RADIOLOGY | Facility: HOSPITAL | Age: 63
End: 2023-10-11
Payer: MEDICARE

## 2023-10-11 VITALS
BODY MASS INDEX: 33.75 KG/M2 | DIASTOLIC BLOOD PRESSURE: 98 MMHG | HEIGHT: 66 IN | OXYGEN SATURATION: 97 % | SYSTOLIC BLOOD PRESSURE: 185 MMHG | HEART RATE: 62 BPM | WEIGHT: 210 LBS | TEMPERATURE: 97.4 F | RESPIRATION RATE: 16 BRPM

## 2023-10-11 LAB
ALBUMIN SERPL-MCNC: 3.6 G/DL (ref 3.5–5.2)
ALBUMIN/GLOB SERPL: 1.4 G/DL
ALP SERPL-CCNC: 91 U/L (ref 39–117)
ALT SERPL W P-5'-P-CCNC: 28 U/L (ref 1–33)
ANION GAP SERPL CALCULATED.3IONS-SCNC: 12 MMOL/L (ref 5–15)
AST SERPL-CCNC: 17 U/L (ref 1–32)
BASOPHILS # BLD AUTO: 0.02 10*3/MM3 (ref 0–0.2)
BASOPHILS NFR BLD AUTO: 0.3 % (ref 0–1.5)
BILIRUB SERPL-MCNC: 0.3 MG/DL (ref 0–1.2)
BUN SERPL-MCNC: 64 MG/DL (ref 8–23)
BUN/CREAT SERPL: 27.4 (ref 7–25)
CALCIUM SPEC-SCNC: 8 MG/DL (ref 8.6–10.5)
CHLORIDE SERPL-SCNC: 106 MMOL/L (ref 98–107)
CO2 SERPL-SCNC: 20 MMOL/L (ref 22–29)
CREAT SERPL-MCNC: 2.34 MG/DL (ref 0.57–1)
CREAT UR-MCNC: 43.8 MG/DL
D-LACTATE SERPL-SCNC: 1.3 MMOL/L (ref 0.5–2)
DEPRECATED RDW RBC AUTO: 60.1 FL (ref 37–54)
EGFRCR SERPLBLD CKD-EPI 2021: 22.9 ML/MIN/1.73
EOSINOPHIL # BLD AUTO: 0 10*3/MM3 (ref 0–0.4)
EOSINOPHIL NFR BLD AUTO: 0 % (ref 0.3–6.2)
ERYTHROCYTE [DISTWIDTH] IN BLOOD BY AUTOMATED COUNT: 16.5 % (ref 12.3–15.4)
GLOBULIN UR ELPH-MCNC: 2.6 GM/DL
GLUCOSE SERPL-MCNC: 183 MG/DL (ref 65–99)
HCT VFR BLD AUTO: 31.8 % (ref 34–46.6)
HGB BLD-MCNC: 9.5 G/DL (ref 12–15.9)
IMM GRANULOCYTES # BLD AUTO: 0.18 10*3/MM3 (ref 0–0.05)
IMM GRANULOCYTES NFR BLD AUTO: 2.3 % (ref 0–0.5)
IRON 24H UR-MRATE: 38 MCG/DL (ref 37–145)
IRON SATN MFR SERPL: 12 % (ref 20–50)
LYMPHOCYTES # BLD AUTO: 0.51 10*3/MM3 (ref 0.7–3.1)
LYMPHOCYTES NFR BLD AUTO: 6.4 % (ref 19.6–45.3)
MAGNESIUM SERPL-MCNC: 2.8 MG/DL (ref 1.6–2.4)
MCH RBC QN AUTO: 30 PG (ref 26.6–33)
MCHC RBC AUTO-ENTMCNC: 29.9 G/DL (ref 31.5–35.7)
MCV RBC AUTO: 100.3 FL (ref 79–97)
MONOCYTES # BLD AUTO: 0.16 10*3/MM3 (ref 0.1–0.9)
MONOCYTES NFR BLD AUTO: 2 % (ref 5–12)
NEUTROPHILS NFR BLD AUTO: 7.07 10*3/MM3 (ref 1.7–7)
NEUTROPHILS NFR BLD AUTO: 89 % (ref 42.7–76)
NRBC BLD AUTO-RTO: 0.3 /100 WBC (ref 0–0.2)
PHOSPHATE SERPL-MCNC: 4.9 MG/DL (ref 2.5–4.5)
PLATELET # BLD AUTO: 280 10*3/MM3 (ref 140–450)
PMV BLD AUTO: 10.6 FL (ref 6–12)
POTASSIUM SERPL-SCNC: 4.9 MMOL/L (ref 3.5–5.2)
PROT SERPL-MCNC: 6.2 G/DL (ref 6–8.5)
PTH-INTACT SERPL-MCNC: 161.7 PG/ML (ref 15–65)
RBC # BLD AUTO: 3.17 10*6/MM3 (ref 3.77–5.28)
SODIUM SERPL-SCNC: 138 MMOL/L (ref 136–145)
TIBC SERPL-MCNC: 316 MCG/DL (ref 298–536)
TRANSFERRIN SERPL-MCNC: 212 MG/DL (ref 200–360)
WBC NRBC COR # BLD: 7.94 10*3/MM3 (ref 3.4–10.8)

## 2023-10-11 PROCEDURE — 83605 ASSAY OF LACTIC ACID: CPT | Performed by: INTERNAL MEDICINE

## 2023-10-11 PROCEDURE — 83540 ASSAY OF IRON: CPT | Performed by: INTERNAL MEDICINE

## 2023-10-11 PROCEDURE — 94799 UNLISTED PULMONARY SVC/PX: CPT

## 2023-10-11 PROCEDURE — 83970 ASSAY OF PARATHORMONE: CPT | Performed by: INTERNAL MEDICINE

## 2023-10-11 PROCEDURE — 84100 ASSAY OF PHOSPHORUS: CPT | Performed by: INTERNAL MEDICINE

## 2023-10-11 PROCEDURE — 94664 DEMO&/EVAL PT USE INHALER: CPT

## 2023-10-11 PROCEDURE — 84466 ASSAY OF TRANSFERRIN: CPT | Performed by: INTERNAL MEDICINE

## 2023-10-11 PROCEDURE — G0378 HOSPITAL OBSERVATION PER HR: HCPCS

## 2023-10-11 PROCEDURE — 85025 COMPLETE CBC W/AUTO DIFF WBC: CPT | Performed by: INTERNAL MEDICINE

## 2023-10-11 PROCEDURE — 83735 ASSAY OF MAGNESIUM: CPT | Performed by: INTERNAL MEDICINE

## 2023-10-11 PROCEDURE — 80053 COMPREHEN METABOLIC PANEL: CPT | Performed by: INTERNAL MEDICINE

## 2023-10-11 PROCEDURE — 96376 TX/PRO/DX INJ SAME DRUG ADON: CPT

## 2023-10-11 PROCEDURE — 25010000002 METHYLPREDNISOLONE PER 40 MG: Performed by: FAMILY MEDICINE

## 2023-10-11 PROCEDURE — 97161 PT EVAL LOW COMPLEX 20 MIN: CPT | Performed by: PHYSICAL THERAPIST

## 2023-10-11 PROCEDURE — 71045 X-RAY EXAM CHEST 1 VIEW: CPT

## 2023-10-11 RX ORDER — METHYLPREDNISOLONE SODIUM SUCCINATE 40 MG/ML
40 INJECTION, POWDER, LYOPHILIZED, FOR SOLUTION INTRAMUSCULAR; INTRAVENOUS EVERY 12 HOURS
Status: DISCONTINUED | OUTPATIENT
Start: 2023-10-11 | End: 2023-10-11 | Stop reason: HOSPADM

## 2023-10-11 RX ORDER — ISOSORBIDE DINITRATE 20 MG/1
20 TABLET ORAL
Qty: 270 TABLET | Refills: 3 | Status: SHIPPED | OUTPATIENT
Start: 2023-10-11

## 2023-10-11 RX ORDER — GUAIFENESIN 600 MG/1
1200 TABLET, EXTENDED RELEASE ORAL 2 TIMES DAILY
Qty: 120 TABLET | Refills: 5 | Status: SHIPPED | OUTPATIENT
Start: 2023-10-11

## 2023-10-11 RX ORDER — FUROSEMIDE 40 MG/1
40 TABLET ORAL DAILY
Qty: 30 TABLET | Refills: 0 | Status: SHIPPED | OUTPATIENT
Start: 2023-10-12

## 2023-10-11 RX ORDER — CETIRIZINE HYDROCHLORIDE 10 MG/1
10 TABLET ORAL DAILY
Qty: 30 TABLET | Refills: 0 | Status: SHIPPED | OUTPATIENT
Start: 2023-10-12

## 2023-10-11 RX ORDER — CLONIDINE HYDROCHLORIDE 0.1 MG/1
0.1 TABLET ORAL EVERY 6 HOURS PRN
Qty: 30 TABLET | Refills: 1 | Status: SHIPPED | OUTPATIENT
Start: 2023-10-11

## 2023-10-11 RX ADMIN — LOSARTAN POTASSIUM 50 MG: 50 TABLET, FILM COATED ORAL at 10:49

## 2023-10-11 RX ADMIN — ASPIRIN 81 MG: 81 TABLET, COATED ORAL at 10:49

## 2023-10-11 RX ADMIN — CARVEDILOL 25 MG: 25 TABLET, FILM COATED ORAL at 10:49

## 2023-10-11 RX ADMIN — ISOSORBIDE DINITRATE 20 MG: 20 TABLET ORAL at 15:02

## 2023-10-11 RX ADMIN — GUAIFENESIN 1200 MG: 600 TABLET, EXTENDED RELEASE ORAL at 10:49

## 2023-10-11 RX ADMIN — BUDESONIDE AND FORMOTEROL FUMARATE DIHYDRATE 2 PUFF: 160; 4.5 AEROSOL RESPIRATORY (INHALATION) at 06:23

## 2023-10-11 RX ADMIN — CETIRIZINE HYDROCHLORIDE 10 MG: 10 TABLET ORAL at 10:49

## 2023-10-11 RX ADMIN — FUROSEMIDE 40 MG: 40 TABLET ORAL at 10:49

## 2023-10-11 RX ADMIN — METHYLPREDNISOLONE SODIUM SUCCINATE 40 MG: 40 INJECTION INTRAMUSCULAR; INTRAVENOUS at 02:49

## 2023-10-11 RX ADMIN — ISOSORBIDE DINITRATE 20 MG: 20 TABLET ORAL at 10:49

## 2023-10-11 RX ADMIN — FLUTICASONE PROPIONATE 2 SPRAY: 50 SPRAY, METERED NASAL at 10:50

## 2023-10-11 NOTE — OUTREACH NOTE
Prep Survey      Flowsheet Row Responses   [facility] facility patient discharged from? [facility]   Is LACE score < 7 ? No   Eligibility Readm Mgmt   Discharge diagnosis Shortness of breath   Does the patient have one of the following disease processes/diagnoses(primary or secondary)? Other   Does the patient have Home health ordered? No   Is there a DME ordered? No   Prep survey completed? Yes            JAIMIE LINARES - Registered Nurse

## 2023-10-11 NOTE — CONSULTS
Nephrology ([facility]) Consult Note      Patient:  Ludivina Ramirez  YOB: 1960  Date of Service: 10/11/2023  MRN: [id number]   Acct: [id number]   Primary Care Physician: Orville Weston MD  Advance Directive:   There are no questions and answers to display.     Admit Date: 10/9/2023       Hospital Day: 0  Referring Provider: Orville Westno MD      Patient personally seen and examined.  Complete chart including Consults, Notes, Operative Reports, Labs, Cardiology, and Radiology studies reviewed as able.        Subjective:  Ludivina Ramirez is a 63 y.o. female for whom we were consulted for evaluation and treatment of chronic kidney disease stage 4. Baseline creatinine approximately 2.3.  Patient had MATTY in 2022 which required hemodialysis. Recently demonstrated some renal recovery, last HD was in July 2023. Permcath was removed on 9/12. History of COPD, hypertension, congestive heart failure, coronary artery disease. Patient presented to ER on 10/10 with about 2 weeks of dyspnea and cough. Has been on antibiotics as outpatient. Recently stopped smoking. Creatinine 2.25 on admission and nephrology is consulted. Currently patient is awake and alert. States she feels 100% better today compared to yesterday. Denies pain or dyspnea at time of exam. She does admit to not taking her medications at home as directed. Denies NSAIDs, denies n/v/d. No recent changes in urination.     Allergies:  Codeine and Imitrex [sumatriptan]    Home Meds:  Medications Prior to Admission   Medication Sig Dispense Refill Last Dose    ascorbic acid (VITAMIN C) 250 MG tablet Take 2 tablets by mouth Daily.   Past Month    docusate sodium (COLACE) 250 MG capsule Take 1 capsule by mouth Daily.   Past Week    loratadine (CLARITIN) 10 MG tablet Take 1 tablet by mouth Daily.   Past Month    predniSONE (DELTASONE) 10 MG tablet Take 4 tabs daily x 3 days, then take 3 tabs daily x 3 days, then take 2 tabs  daily x 3 days, then take 1 tab daily x 3 days 31 tablet 0 Past Week    albuterol sulfate  (90 Base) MCG/ACT inhaler Inhale 2 puffs Every 4 (Four) Hours As Needed for Wheezing. 20.1 g 3 Unknown    aspirin 81 MG EC tablet Take 1 tablet by mouth Daily. 90 tablet 3 Unknown    azelastine (ASTELIN) 0.1 % nasal spray 2 sprays into the nostril(s) as directed by provider 2 (Two) Times a Day. 30 mL 11 Unknown    Bacillus Coagulans-Inulin (ALIGN PREBIOTIC-PROBIOTIC PO) Take 1 capsule by mouth Daily.   Unknown    budesonide-formoterol (SYMBICORT) 160-4.5 MCG/ACT inhaler Inhale 2 puffs 2 (Two) Times a Day. 1 each 11 Unknown    calcitriol (ROCALTROL) 0.25 MCG capsule Take 1 capsule by mouth Daily.   Unknown    carvedilol (COREG) 25 MG tablet Take 1 tablet by mouth 2 (Two) Times a Day With Meals.   Unknown    cholecalciferol (VITAMIN D3) 25 MCG (1000 UT) tablet Take 1 tablet by mouth Daily.   Unknown    cloNIDine (CATAPRES) 0.1 MG tablet Take 4 tablets by mouth Every 6 (Six) Hours As Needed for High Blood Pressure (sbp >180 dbp >100).   Unknown    famotidine (PEPCID) 20 MG tablet Take 1 tablet by mouth Daily. 30 tablet 0 Unknown    fluticasone (FLONASE) 50 MCG/ACT nasal spray 2 sprays into the nostril(s) as directed by provider Every Morning. In each nostril 16 g 11 Unknown    furosemide (LASIX) 40 MG tablet Take 1 tablet by mouth Daily As Needed (Increased shortness of air, edema, weight gain of 2 pounds overnight, 5 pounds in 2 days.).   Unknown    ipratropium-albuterol (DUO-NEB) 0.5-2.5 mg/3 ml nebulizer Take 3 mL by nebulization 4 (Four) Times a Day. 1080 mL 3 Unknown    isosorbide dinitrate (ISORDIL) 20 MG tablet Take 1 tablet by mouth 3 (Three) Times a Day.   Unknown    losartan (COZAAR) 100 MG tablet Take 0.5 tablets by mouth Daily.   Unknown    Magnesium 400 MG capsule Take 400 mg by mouth Daily.   Unknown    melatonin 3 MG tablet Take 2 tablets by mouth Every Night. 30 tablet 0 Unknown    multivitamin with  minerals tablet tablet Take 1 tablet by mouth Daily.   Unknown    NIFEdipine CC (ADALAT CC) 90 MG 24 hr tablet Take 1 tablet by mouth Daily. 90 tablet 3 Unknown    nitroglycerin (NITROSTAT) 0.4 MG SL tablet Place 1 tablet under the tongue Every 5 (Five) Minutes As Needed for Chest Pain. Take no more than 3 doses in 15 minutes.   Unknown    ranolazine (RANEXA) 500 MG 12 hr tablet Take 1 tablet by mouth 2 (Two) Times a Day.   Unknown    Zinc 25 MG tablet Take 25 mg by mouth Daily.   Unknown       Medicines:  Current Facility-Administered Medications   Medication Dose Route Frequency Provider Last Rate Last Admin    acetaminophen (TYLENOL) tablet 650 mg  650 mg Oral Q6H PRN Orville Weston MD        aspirin EC tablet 81 mg  81 mg Oral Daily Orville Weston MD   81 mg at 10/10/23 0853    budesonide-formoterol (SYMBICORT) 160-4.5 MCG/ACT inhaler 2 puff  2 puff Inhalation BID - RT Amy Vicente APRN   2 puff at 10/11/23 0623    carvedilol (COREG) tablet 25 mg  25 mg Oral BID With Meals Orville Weston MD   25 mg at 10/10/23 1809    cetirizine (zyrTEC) tablet 10 mg  10 mg Oral Daily Amy Vicente APRN   10 mg at 10/10/23 1110    cloNIDine (CATAPRES) tablet 0.1 mg  0.1 mg Oral Q6H PRN Orville Weston MD   0.1 mg at 10/10/23 0404    Enoxaparin Sodium (LOVENOX) syringe 30 mg  30 mg Subcutaneous Q24H Orville Weston MD   30 mg at 10/10/23 1403    fluticasone (FLONASE) 50 MCG/ACT nasal spray 2 spray  2 spray Each Nare Daily Amy Vicente APRN   2 spray at 10/10/23 1110    furosemide (LASIX) tablet 40 mg  40 mg Oral Daily Orville Weston MD   40 mg at 10/10/23 0853    guaiFENesin (MUCINEX) 12 hr tablet 1,200 mg  1,200 mg Oral BID Orville Weston MD   1,200 mg at 10/10/23 2040    ipratropium-albuterol (DUO-NEB) nebulizer solution 3 mL  3 mL Nebulization Q6H PRN Amy Vicente, APRN   3 mL at 10/10/23 1902    isosorbide dinitrate (ISORDIL) tablet 20 mg  20 mg Oral TID - Nitrates  Orville Weston MD   20 mg at 10/10/23 1809    losartan (COZAAR) tablet 50 mg  50 mg Oral Daily Orville Weston MD   50 mg at 10/10/23 0853    methylPREDNISolone sodium succinate (SOLU-Medrol) injection 40 mg  40 mg Intravenous Q12H [place] APRN        nitroglycerin (NITROSTAT) SL tablet 0.4 mg  0.4 mg Sublingual Q5 Min PRN Orville Weston MD           Past Medical History:  Past Medical History:   Diagnosis Date    Acute CHF     Acute renal failure (ARF)     Anemia     Asthma     childhood    Chronic kidney disease     Coronary artery disease     Hypertension     Ischemic colitis     Metabolic acidosis     Myocardial infarction 2020    Nonrheumatic aortic (valve) insufficiency     PTSD (post-traumatic stress disorder)        Past Surgical History:  Past Surgical History:   Procedure Laterality Date    CARDIAC CATHETERIZATION N/A 2020    Procedure: Left Heart Cath;  Surgeon: Pierce Buchanan MD;  Location:  [facility]ATH [facility]AS[facility];  Service: Cardiovascular;  Laterality: N/A;    EXTERNAL FIXATION WRIST FRACTURE      INSERTION HEMODIALYSIS CATHETER Right 6/3/2022    Procedure: INSERTION OF RIGHT INTERNAL JUGULAR HEMODIALYSIS CATHETER;  Surgeon: Dexter Red MD;  Location:  [facility] OR;  Service: Vascular;  Laterality: Right;    LEG SURGERY      TUBAL ABDOMINAL LIGATION         Family History  Family History   Problem Relation Age of Onset    Coronary artery disease Mother     Coronary artery disease Father     Breast cancer Neg Hx        Social History  Social History     Socioeconomic History    Marital status: Single   Tobacco Use    Smoking status: Former     Packs/day: 0.50     Years: 46.00     Additional pack years: 0.00     Total pack years: 23.00     Types: Cigarettes     Start date:      Quit date: 2023     Years since quittin.0     Passive exposure: Current    Smokeless tobacco: Never    Tobacco comments:     Trying to quit    Vaping Use     Vaping Use: Never used   Substance and Sexual Activity    Alcohol use: No    Drug use: No    Sexual activity: Defer         Review of Systems:  History obtained from chart review and the patient  General ROS: No fever or chills  Respiratory ROS: No cough, shortness of breath, wheezing  Cardiovascular ROS: No chest pain or palpitations  Gastrointestinal ROS: No abdominal pain or melena  Genito-Urinary ROS: No dysuria or hematuria  Psych ROS: No anxiety and depression  14 point ROS reviewed with the patient and negative except as noted above and in the HPI unless unable to obtain.      Objective:  Patient Vitals for the past 24 hrs:   BP Temp Temp src Pulse Resp SpO2   10/11/23 0824 179/94 97.7 °F (36.5 °C) -- 62 16 94 %   10/11/23 0623 -- -- -- 60 16 97 %   10/11/23 0349 154/86 97.8 °F (36.6 °C) Axillary 55 18 98 %   10/10/23 2337 137/87 98.6 °F (37 °C) Axillary 55 18 95 %   10/10/23 1920 142/91 98 °F (36.7 °C) Oral 81 18 95 %   10/10/23 1907 -- -- -- 65 18 96 %   10/10/23 1902 -- -- -- 64 18 95 %   10/10/23 1553 (!) 134/105 97.9 °F (36.6 °C) Oral 60 18 95 %   10/10/23 1017 -- -- -- 75 18 --       Intake/Output Summary (Last 24 hours) at 10/11/2023 1015  Last data filed at 10/10/2023 2100  Gross per 24 hour   Intake --   Output 200 ml   Net -200 ml     General: awake/alert   Chest:  clear to auscultation bilaterally without respiratory distress  CVS: regular rate and rhythm  Abdominal: soft, nontender, positive bowel sounds  Extremities: no cyanosis or edema  Skin: warm and dry without rash      Labs:  Results from last 7 days   Lab Units 10/11/23  0634 10/09/23  2009   WBC 10*3/mm3 7.94 10.02   HEMOGLOBIN g/dL 9.5* 9.9*   HEMATOCRIT % 31.8* 34.1   PLATELETS 10*3/mm3 280 286         Results from last 7 days   Lab Units 10/11/23  0634 10/09/23  2009   SODIUM mmol/L 138 141   POTASSIUM mmol/L 4.9 5.1   CHLORIDE mmol/L 106 109*   CO2 mmol/L 20.0* 21.0*   BUN mg/dL 64* 64*   CREATININE mg/dL 2.34* 2.25*   CALCIUM mg/dL  8.0* 8.5*   EGFR mL/min/1.73 22.9* 24.0*   BILIRUBIN mg/dL 0.3  --    ALK PHOS U/L 91  --    ALT (SGPT) U/L 28  --    AST (SGOT) U/L 17  --    GLUCOSE mg/dL 183* 142*       Radiology:   Imaging Results (Last 72 Hours)       Procedure Component Value Units Date/Time    XR Chest 1 View [[id number]] Collected: 10/11/23 0606     Updated: 10/11/23 0611    Narrative:      EXAMINATION: XR CHEST 1 VW-     10/11/2023 3:50 AM CDT     HISTORY: SOB; R06.02-Shortness of breath; R79.89-Other specified  abnormal findings of blood chemistry     A frontal projection of the chest is compared with the previous study  dated 10/3/2023.     The lungs are moderately well-expanded.     Moderate improvement of pulmonary vascular congestion/pulm edema since  the previous study.     A small left basal pleural effusion was not noted in the previous study.     There is no pneumothorax.     There is moderate cardiomegaly. Atheromatous changes of the thoracic  aorta are noted.     No acute bony abnormality.       Impression:      1. Improved pulmonary vascular congestion/pulmonary edema since the  previous study.  2. A small left basal pleural effusion.  3. Cardiomegaly.              This report was signed and finalized on 10/11/2023 6:08 AM CDT by Dr. Shima Flynn MD.       CT Chest Without Contrast Diagnostic [[id number]] Collected: 10/09/23 2116     Updated: 10/09/23 2127    Narrative:      CT CHEST WO CONTRAST DIAGNOSTIC- 10/9/2023 9:07 PM CDT     HISTORY: sob, possible pneumonia, failed outpatient abx     COMPARISON: 10/12/2022     DOSE LENGTH PRODUCT: 228 mGy cm. Automated exposure control was also  utilized to decrease patient radiation dose.     TECHNIQUE: Serial helical tomographic images of the chest were acquired  without contrast. Multiplanar reformatted images were provided for  review.     FINDINGS:     Visualized neck base: The imaged portion of the base of the neck appears  unremarkable.     Lungs: There is evidence of remote  granulomatous disease. Bilateral  pleural effusions are present with bibasilar atelectasis. There is  thickening of the interlobular septa with reticular opacities suggesting  pulmonary venous hypertension and interstitial pulmonary edema..  Bilateral pleural effusions are present.. There is mild bronchial wall  thickening within the lungs..     Heart: There is moderate cardiomegaly.. There is no pericardial  effusion. Advanced coronary artery atheromatous calcification.     Vasculature: There is atheromatous calcification of the thoracic aorta  and great vessels. The ascending thoracic aorta measures 4.1 cm in  transverse dimension.. The pulmonary arteries are enlarged, suggestive  of pulmonary arterial hypertension.     Mediastinum and lymph nodes: Multiple shotty nodes are noted within the  anterior middle mediastinum. There are a few mildly enlarged right  paratracheal and AP window nodes. These may be reactive in nature. No  evidence of axillary adenopathy..     Skeletal and soft tissues: Chest wall soft tissues are unremarkable. No  acute bony abnormality. Mild thoracic spine degenerative change.     Upper abdomen: Mild body wall edema is present. There is trace free  fluid in the perihepatic space. The left kidney is atrophic..          Impression:      1. FINDINGS suggesting pulmonary venous hypertension with interstitial  pulmonary edema with thickening of the interlobar and interlobular septa  and reticular opacities. Bilateral pleural effusions are present with  bibasilar atelectasis. There is moderate cardiomegaly.  2. No acute infiltrate.  3. Heavy coronary artery calcifications are present. Mitral annulus  calcifications are demonstrated. There is evidence of pulmonary arterial  hypertension. The ascending thoracic aorta measures 4.1 cm in transverse  dimension.  4. Mild body wall edema. There is a small amount of free fluid in the  perihepatic space.           This report was signed and finalized on  "10/9/2023 9:24 PM CDT by Dr. Ye Montaño MD.               Culture:  No results found for: \"BLOODCX\", \"URINECX\", \"WOUNDCX\", \"MRSACX\", \"RESPCX\", \"STOOLCX\"      Assessment    Chronic kidney disease stage 4  History of MATTY requiring dialysis  Hypertension   COPD exacerbation  5.  Sleep apnea  6.  Metabolic acidosis  7.  anemia    Plan:   Renal function at baseline  Encouraged compliance with all medications as ordered.  OK for discharge from renal standpoint. Follow up in office in 2 weeks.      Thank you for the consult, we appreciate the opportunity to provide care to your patients.  Feel free to contact me if I can be of any further assistance.      Quinn Rodarte, APRN  10/11/2023  10:15 CDT  "

## 2023-10-11 NOTE — PROGRESS NOTES
[facility] [place] PULMONARY AND CRITICAL CARE PROGRESS NOTE - [facility]    Patient: Ludivina Ramirez  1960   MR# [id number]   Acct# [id number]  10/11/23   07:29 CDT  Referring Provider: Orville Weston MD    Chief Complaint: COPD exacerbation  Interval history: She reports that she is feeling tremendously better and wants to go home today to take care of her dog.  Creatinine is 2.34.  Nephrology consult is pending.  Per patient report, she was recently released from dialysis.  She is currently on 2 L nasal cannula with a sat of 94%.  Her home trilogy is at bedside which she wears for sleep and as needed during the day.  She had an echocardiogram yesterday and those results were reviewed with her at bedside.  We discussed the importance of continued smoking cessation.  Pulmonology will decrease IV steroids today.  Okay for home when okay with others.    ECHO:    Left ventricular systolic function is mildly decreased. Left ventricular ejection fraction appears to be 46 - 50%.    The left ventricular cavity is mildly dilated.    Left ventricular wall thickness is consistent with mild concentric hypertrophy.    Left ventricular diastolic function is consistent with (grade II w/high LAP) pseudonormalization.    The left atrial cavity is severely dilated.    The right atrial cavity is dilated.    Mild to moderate aortic valve regurgitation is present.    There is bileaflet mitral valve thickening present.    Estimated right ventricular systolic pressure from tricuspid regurgitation is markedly elevated (>55 mmHg).    Mild dilation of the ascending aorta is present, measuring 4.4 cm.    Meds:  aspirin, 81 mg, Oral, Daily  budesonide-formoterol, 2 puff, Inhalation, BID - RT  carvedilol, 25 mg, Oral, BID With Meals  cetirizine, 10 mg, Oral, Daily  enoxaparin, 30 mg, Subcutaneous, Q24H  fluticasone, 2 spray, Each Nare, Daily  furosemide, 40 mg, Oral, Daily  guaiFENesin, 1,200 mg, Oral, BID  isosorbide dinitrate,  The types of intraocular lenses were reviewed with the patient along with a discussion of their various strengths and weaknesses. 20 mg, Oral, TID - Nitrates  losartan, 50 mg, Oral, Daily  methylPREDNISolone sodium succinate, 40 mg, Intravenous, Q6H           Physical Exam:  SpO2 Percentage    10/10/23 2337 10/11/23 0349 10/11/23 0623   SpO2: 95% 98% 97%     Body mass index is 34.41 kg/m².   Temp:  [97.8 °F (36.6 °C)-98.6 °F (37 °C)] 97.8 °F (36.6 °C)  Heart Rate:  [55-81] 60  Resp:  [16-20] 16  BP: (134-178)/() 154/86  Intake/Output Summary (Last 24 hours) at 10/11/2023 0729  Last data filed at 10/10/2023 2100  Gross per 24 hour   Intake 600 ml   Output 200 ml   Net 400 ml     Physical Exam  Vitals and nursing note reviewed.   Constitutional:       Appearance: She is well-developed.      Interventions: Nasal cannula in place.   HENT:      Head: Normocephalic and atraumatic.   Eyes:      Pupils: Pupils are equal, round, and reactive to light.   Cardiovascular:      Rate and Rhythm: Normal rate and regular rhythm.   Pulmonary:      Effort: Pulmonary effort is normal.      Breath sounds: No wheezing, rhonchi or rales.   Abdominal:      General: There is no distension.      Palpations: Abdomen is soft.   Musculoskeletal:         General: Normal range of motion.      Cervical back: Normal range of motion and neck supple.      Right lower leg: Edema present.      Left lower leg: Edema present.   Skin:     General: Skin is warm and dry.   Neurological:      Mental Status: She is alert and oriented to person, place, and time.   Psychiatric:         Mood and Affect: Mood normal.         Behavior: Behavior normal.       Electronically signed by ISAIAH Ortega, 10/11/2023, 07:29 CDT      Physician substantive portion: medical decision making  Result Review  Laboratory Data:  Results from last 7 days   Lab Units 10/11/23  0634 10/09/23  2009   WBC 10*3/mm3 7.94 10.02   HEMOGLOBIN g/dL 9.5* 9.9*   PLATELETS 10*3/mm3 280 286     Results from last 7 days   Lab Units 10/11/23  0634 10/09/23  2009   SODIUM mmol/L 138 141   POTASSIUM  mmol/L 4.9 5.1   CO2 mmol/L 20.0* 21.0*   BUN mg/dL 64* 64*   CREATININE mg/dL 2.34* 2.25*   LACTATE mmol/L 1.3  --          Microbiology Results (last 10 days)       Procedure Component Value - Date/Time    COVID PRE-OP / PRE-PROCEDURE SCREENING ORDER (NO ISOLATION) - Swab, Nasopharynx [[id number]]  (Normal) Collected: 10/09/23 2010    Lab Status: Final result Specimen: Swab from Nasopharynx Updated: 10/09/23 2100    Narrative:      The following orders were created for panel order COVID PRE-OP / PRE-PROCEDURE SCREENING ORDER (NO ISOLATION) - Swab, Nasopharynx.  Procedure                               Abnormality         Status                     ---------                               -----------         ------                     COVID-19, FLU A/B, RSV P...[[id number]]  Normal              Final result                 Please view results for these tests on the individual orders.    COVID-19, FLU A/B, RSV PCR - Swab, Nasopharynx [[id number]]  (Normal) Collected: 10/09/23 2010    Lab Status: Final result Specimen: Swab from Nasopharynx Updated: 10/09/23 2100     COVID19 Not Detected     Influenza A PCR Not Detected     Influenza B PCR Not Detected     RSV, PCR Not Detected    Narrative:      Fact sheet for providers: https://www.fda.gov/media/895306/download    Fact sheet for patients: https://www.fda.gov/media/781422/download    Test performed by PCR.           Recent films:  XR Chest 1 View    Result Date: 10/11/2023  EXAMINATION: XR CHEST 1 VW-  10/11/2023 3:50 AM CDT  HISTORY: SOB; R06.02-Shortness of breath; R79.89-Other specified abnormal findings of blood chemistry  A frontal projection of the chest is compared with the previous study dated 10/3/2023.  The lungs are moderately well-expanded.  Moderate improvement of pulmonary vascular congestion/pulm edema since the previous study.  A small left basal pleural effusion was not noted in the previous study.  There is no pneumothorax.  There is moderate  cardiomegaly. Atheromatous changes of the thoracic aorta are noted.  No acute bony abnormality.      Impression: 1. Improved pulmonary vascular congestion/pulmonary edema since the previous study. 2. A small left basal pleural effusion. 3. Cardiomegaly.     This report was signed and finalized on 10/11/2023 6:08 AM CDT by Dr. Shima Flynn MD.      Adult Transthoracic Echo Complete W/ Cont if Necessary Per Protocol    Result Date: 10/10/2023    Left ventricular systolic function is mildly decreased. Left ventricular ejection fraction appears to be 46 - 50%.   The left ventricular cavity is mildly dilated.   Left ventricular wall thickness is consistent with mild concentric hypertrophy.   Left ventricular diastolic function is consistent with (grade II w/high LAP) pseudonormalization.   The left atrial cavity is severely dilated.   The right atrial cavity is dilated.   Mild to moderate aortic valve regurgitation is present.   There is bileaflet mitral valve thickening present.   Estimated right ventricular systolic pressure from tricuspid regurgitation is markedly elevated (>55 mmHg).   Mild dilation of the ascending aorta is present, measuring 4.4 cm.     CT Chest Without Contrast Diagnostic    Result Date: 10/9/2023  CT CHEST WO CONTRAST DIAGNOSTIC- 10/9/2023 9:07 PM CDT  HISTORY: sob, possible pneumonia, failed outpatient abx  COMPARISON: 10/12/2022  DOSE LENGTH PRODUCT: 228 mGy cm. Automated exposure control was also utilized to decrease patient radiation dose.  TECHNIQUE: Serial helical tomographic images of the chest were acquired without contrast. Multiplanar reformatted images were provided for review.  FINDINGS:  Visualized neck base: The imaged portion of the base of the neck appears unremarkable.  Lungs: There is evidence of remote granulomatous disease. Bilateral pleural effusions are present with bibasilar atelectasis. There is thickening of the interlobular septa with reticular opacities suggesting  pulmonary venous hypertension and interstitial pulmonary edema.. Bilateral pleural effusions are present.. There is mild bronchial wall thickening within the lungs..  Heart: There is moderate cardiomegaly.. There is no pericardial effusion. Advanced coronary artery atheromatous calcification.  Vasculature: There is atheromatous calcification of the thoracic aorta and great vessels. The ascending thoracic aorta measures 4.1 cm in transverse dimension.. The pulmonary arteries are enlarged, suggestive of pulmonary arterial hypertension.  Mediastinum and lymph nodes: Multiple shotty nodes are noted within the anterior middle mediastinum. There are a few mildly enlarged right paratracheal and AP window nodes. These may be reactive in nature. No evidence of axillary adenopathy..  Skeletal and soft tissues: Chest wall soft tissues are unremarkable. No acute bony abnormality. Mild thoracic spine degenerative change.  Upper abdomen: Mild body wall edema is present. There is trace free fluid in the perihepatic space. The left kidney is atrophic..       Impression: 1. FINDINGS suggesting pulmonary venous hypertension with interstitial pulmonary edema with thickening of the interlobar and interlobular septa and reticular opacities. Bilateral pleural effusions are present with bibasilar atelectasis. There is moderate cardiomegaly. 2. No acute infiltrate. 3. Heavy coronary artery calcifications are present. Mitral annulus calcifications are demonstrated. There is evidence of pulmonary arterial hypertension. The ascending thoracic aorta measures 4.1 cm in transverse dimension. 4. Mild body wall edema. There is a small amount of free fluid in the perihepatic space.    This report was signed and finalized on 10/9/2023 9:24 PM CDT by Dr. Ye Montaño MD.      Personal review of imaging : CXR shows chronic changes with emphysema but no acute infiltrate.  There is some fluid overload noted.  Improved vascular congestion noted in  new chest x-ray done today.    Pulmonary Assessment:  Acute on chronic hypoxic and hypercapnic respiratory failure  Chronic obstructive pulmonary disease with acute exacerbation  Failed outpatient treatment  Chronic hypoxemic respiratory failure on home oxygen  Obstructive sleep apnea on home trilogy  Morbid obesity  Cigarette smoker  Noncompliance to treatment  Essential hypertension  Chronic kidney disease was on dialysis currently off dialysis.  History of ischemic colitis  Coronary artery disease and history of MI in the past  Pulmonary hypertension  Aortic insufficiency  Anxiety and depression  History of PTSD    Recommend/plan:   Patient was seen in the follow-up visit in pulmonary rounds in medical floor today.  She is feeling better and sitting up in the bed on 2 L of oxygen.  She used her home trilogy last night.  Started on Lasix and a fluid overload improved.  Chest x-ray shows improved pulmonary infiltrate.  She was seen by nephrology.  She had been stopped dialysis recently and dialysis catheter removed.  Nephrology recommended to continue diuresis and compliance issues discussed with the patient.  They will follow her as an outpatient.  From the pulmonary standpoint she is stable  Continue bronchodilator treatment and routine respiratory care smoking cessation again encouraged.  Antibiotics already completed as an outpatient.  No need of outpatient antibiotics  Continue oral steroid taper.  Encourage incentive spirometry and flutter valve  Keep on oxygen and home trilogy as before.  She already have an appointment with me in the office as an outpatient.  She is waiting for Dr. Weston to round and after round may be discharged home and cleared from pulmonary standpoint  DVT and stress ulcer prophylaxis while in the hospital.  Pain and anxiety control.  Repeat labs and imaging studies if needed if she remains in the hospital  Discussed care plan with RN.  Looking forward to see her in the office as  outpatient  Code status: Full.  Overall prognosis: Good and improving.  We will follow.    This visit was performed by both a physician and an [facility] RN.  I performed all aspects of the medical decision making as documented.    Electronically signed by     Neeta Chavez MD,  Pulmonologist/Intensivist   10/11/2023, 11:31 CDT

## 2023-10-11 NOTE — THERAPY EVALUATION
Patient Name: Ludivina Ramirez  : 1960    MRN: [id number]                              Today's Date: 10/11/2023       Admit Date: 10/9/2023    Visit Dx:     ICD-10-CM ICD-9-CM   1. Shortness of breath  R06.02 786.05   2. Elevated troponin  R79.89 790.6   3. Impaired mobility [Z74.09]  Z74.09 799.89     Patient Active Problem List   Diagnosis    Hypertensive urgency    Iron deficiency anemia    Cigarette smoker    Nonrheumatic aortic valve insufficiency    Acute kidney injury superimposed on CKD    Colitis, ischemic    Family hx colonic polyps    Stage 4 chronic kidney disease    Shortness of breath    Noncompliance    ST elevation myocardial infarction involving left anterior descending (LAD) coronary artery    Essential hypertension    Chronic diastolic heart failure    Coronary artery disease of native artery of native heart with stable angina pectoris    Status post insertion of drug-eluting stent into left anterior descending (LAD) artery for coronary artery disease    Hyperlipidemia LDL goal <70    Bradycardia    Acute on chronic diastolic congestive heart failure    Chest pain    COPD (chronic obstructive pulmonary disease)    Non-seasonal allergic rhinitis    Hypoxemia requiring supplemental oxygen    MARY on CPAP    Pulmonary hypertension    Other specified anxiety disorders    Acute on chronic congestive heart failure    Acute kidney injury    Dependence on non-invasive ventilation    Obesity (BMI 30-39.9)    Hypertensive emergency     Past Medical History:   Diagnosis Date    Acute CHF     Acute renal failure (ARF)     Anemia     Asthma     childhood    Chronic kidney disease     Coronary artery disease     Hypertension     Ischemic colitis     Metabolic acidosis     Myocardial infarction 2020    Nonrheumatic aortic (valve) insufficiency     PTSD (post-traumatic stress disorder)      Past Surgical History:   Procedure Laterality Date    CARDIAC CATHETERIZATION N/A 2020    Procedure: Left  Heart Cath;  Surgeon: Pierce Buchanan MD;  Location:  [facility];  Service: Cardiovascular;  Laterality: N/A;    EXTERNAL FIXATION WRIST FRACTURE      INSERTION HEMODIALYSIS CATHETER Right 6/3/2022    Procedure: INSERTION OF RIGHT INTERNAL JUGULAR HEMODIALYSIS CATHETER;  Surgeon: Dexter Red MD;  Location:  [facility] OR;  Service: Vascular;  Laterality: Right;    LEG SURGERY      TUBAL ABDOMINAL LIGATION        General Information       Row Name 10/11/23 1026          Physical Therapy Time and Intention    Document Type evaluation  presents with worsening SOA over past 2 weeks; dx Acute on chronic hypoxic and hypercapnic respiratory failure  -SB     Mode of Treatment physical therapy  -SB       Row Name 10/11/23 1026          General Information    Patient Profile Reviewed yes  -SB     Prior Level of Function independent:;all household mobility;ADL's;cooking;cleaning;driving  grab bars, step over tub, shower chair  -SB     Existing Precautions/Restrictions fall;oxygen therapy device and L/min  -SB     Barriers to Rehab medically complex  -SB       Row Name 10/11/23 1026          Living Environment    People in Home alone  -SB       Row Name 10/11/23 1026          Home Main Entrance    Number of Stairs, Main Entrance four  -SB     Stair Railings, Main Entrance railings on both sides of stairs  -SB       Row Name 10/11/23 1026          Stairs Within Home, Primary    Number of Stairs, Within Home, Primary none  -SB       Row Name 10/11/23 1026          Cognition    Orientation Status (Cognition) oriented x 4  -SB       Row Name 10/11/23 1026          Safety Issues, Functional Mobility    Impairments Affecting Function (Mobility) endurance/activity tolerance;shortness of breath  -SB               User Key  (r) = Recorded By, (t) = Taken By, (c) = Cosigned By      Initials Name Provider Type    Melba Simmons PT DPT Physical Therapist                   Mobility       Row Name 10/11/23 1026           Bed Mobility    Bed Mobility supine-sit  -SB     Supine-Sit [place] (Bed Mobility) modified independence  -SB     Assistive Device (Bed Mobility) head of bed elevated  -SB       Row Name 10/11/23 1026          Sit-Stand Transfer    Sit-Stand [place] (Transfers) independent  -SB       Row Name 10/11/23 1026          Gait/Stairs (Locomotion)    [place] Level (Gait) standby assist  -SB     Distance in Feet (Gait) 70 feet x2  -SB     Comment, (Gait/Stairs) gait speed increased  -SB               User Key  (r) = Recorded By, (t) = Taken By, (c) = Cosigned By      Initials Name Provider Type    SB Melba Sanchez PT DPT Physical Therapist                   Obj/Interventions       Row Name 10/11/23 1026          Range of Motion Comprehensive    General Range of Motion bilateral lower extremity ROM WFL  -SB       Row Name 10/11/23 1026          Strength Comprehensive (MMT)    General Manual Muscle Testing (MMT) Assessment lower extremity strength deficits identified  -SB     Comment, General Manual Muscle Testing (MMT) Assessment BLE 4/5  -SB       Row Name 10/11/23 1026          Balance    Balance Assessment sitting static balance;sitting dynamic balance;standing static balance;standing dynamic balance  -SB     Static Sitting Balance independent  -SB     Dynamic Sitting Balance independent  -SB     Position, Sitting Balance unsupported;sitting edge of bed  -SB     Static Standing Balance independent  -SB     Dynamic Standing Balance independent  -SB     Position/Device Used, Standing Balance unsupported  -SB       Row Name 10/11/23 1026          Sensory Assessment (Somatosensory)    Sensory Assessment (Somatosensory) LE sensation intact  -SB               User Key  (r) = Recorded By, (t) = Taken By, (c) = Cosigned By      Initials Name Provider Type    Melba Simmons PT DPT Physical Therapist                   Goals/Plan       Row Name 10/11/23 1026          Bed Mobility Goal 1 (PT)     Activity/Assistive Device (Bed Mobility Goal 1, PT) sit to supine;supine to sit;rolling to left;rolling to right  -SB     [place] Level/Cues Needed (Bed Mobility Goal 1, PT) independent  -SB     Time Frame (Bed Mobility Goal 1, PT) long term goal (LTG)  -SB     Progress/Outcomes (Bed Mobility Goal 1, PT) new goal  -SB       Row Name 10/11/23 1026          Transfer Goal 1 (PT)    Activity/Assistive Device (Transfer Goal 1, PT) sit-to-stand/stand-to-sit;bed-to-chair/chair-to-bed  -SB     [place] Level/Cues Needed (Transfer Goal 1, PT) independent  -SB     Time Frame (Transfer Goal 1, PT) long term goal (LTG)  -SB     Progress/Outcome (Transfer Goal 1, PT) new goal  -SB       Row Name 10/11/23 1026          Gait Training Goal 1 (PT)    Activity/Assistive Device (Gait Training Goal 1, PT) gait (walking locomotion);increase endurance/gait distance;increase energy conservation  -SB     [place] Level (Gait Training Goal 1, PT) independent  -SB     Distance (Gait Training Goal 1, PT) 200 feet with 1 standing rest break  -SB     Time Frame (Gait Training Goal 1, PT) long term goal (LTG)  -SB     Progress/Outcome (Gait Training Goal 1, PT) new goal  -SB       Row Name 10/11/23 1026          Stairs Goal 1 (PT)    Activity/Assistive Device (Stairs Goal 1, PT) stairs, all skills;ascending stairs;descending stairs;using handrail, left;using handrail, right  -SB     [place] Level/Cues Needed (Stairs Goal 1, PT) modified independence  -SB     Number of Stairs (Stairs Goal 1, PT) 4  -SB     Time Frame (Stairs Goal 1, PT) long term goal (LTG)  -SB     Progress/Outcome (Stairs Goal 1, PT) new goal  -SB       Row Name 10/11/23 1026          Therapy Assessment/Plan (PT)    Planned Therapy Interventions (PT) balance training;strengthening;bed mobility training;gait training;patient/family education;transfer training;stair training  -SB               User Key  (r) = Recorded By, (t) = Taken By, (c) = Cosigned By       Initials Name Provider Type    SB Melba Sanchez, PT DPT Physical Therapist                   Clinical Impression       Row Name 10/11/23 1026          Pain    Pretreatment Pain Rating 0/10 - no pain  -SB     Pre/Posttreatment Pain Comment c/o nausea  -SB     Pain Intervention(s) Medication (See MAR);Repositioned;Ambulation/increased activity  -SB     Additional Documentation Pain Scale: Numbers Pre/Post-Treatment (Group)  -SB       Row Name 10/11/23 1026          Plan of Care Review    Plan of Care Reviewed With patient  -SB     Progress no change  -SB     Outcome Evaluation PT eval completed. Pt alert and oriented x4 on 2L O2 upon arrival. Pt reports decline over the past 3 weeks, but prior was fully independent at home with all mobility and ADLs. Pt performs bed mob mod I and sit to stand t/f indepedently. Pt performs gait training 70 feet x2 with standing rest break and demos increasing SOA with activity. Pt O2 sats remain in 90s during activity on 2L. Pt will benefit from skilled PT to improve endurance and stair training. Recommend d/c home with assist and HH.  -SB       Row Name 10/11/23 1026          Therapy Assessment/Plan (PT)    Patient/Family Therapy Goals Statement (PT) improve function  -SB     Rehab Potential (PT) good, to achieve stated therapy goals  -SB     Criteria for Skilled Interventions Met (PT) yes;meets criteria;skilled treatment is necessary  -SB     Therapy Frequency (PT) 2 times/day  -SB     Predicted Duration of Therapy Intervention (PT) until d/c or goals met  -SB       Row Name 10/11/23 1026          Vital Signs    Intratreatment Heart Rate (beats/min) 75  -SB     Posttreatment Heart Rate (beats/min) 64  -SB     Pre SpO2 (%) 97  -SB     O2 Delivery Pre Treatment nasal cannula  2L  -SB     Intra SpO2 (%) 96  -SB     O2 Delivery Intra Treatment nasal cannula  -SB     Post SpO2 (%) 97  -SB     O2 Delivery Post Treatment nasal cannula  -SB       Row Name 10/11/23 1026          Positioning  and Restraints    Pre-Treatment Position in bed  -SB     Post Treatment Position bed  -SB     In Bed sitting EOB;call light within reach;encouraged to call for assist  -SB               User Key  (r) = Recorded By, (t) = Taken By, (c) = Cosigned By      Initials Name Provider Type    Melba Simmons PT DPT Physical Therapist                   Outcome Measures       Row Name 10/11/23 1026          How much help from another person do you currently need...    Turning from your back to your side while in flat bed without using bedrails? 4  -SB     Moving from lying on back to sitting on the side of a flat bed without bedrails? 4  -SB     Moving to and from a bed to a chair (including a wheelchair)? 4  -SB     Standing up from a chair using your arms (e.g., wheelchair, bedside chair)? 4  -SB     Climbing 3-5 steps with a railing? 3  -SB     To walk in hospital room? 3  -SB     AM-PAC 6 Clicks Score (PT) 22  -SB     Highest level of mobility 7 --> Walked 25 feet or more  -SB       Row Name 10/11/23 1026          Functional Assessment    Outcome Measure Options AM-PAC 6 Clicks Basic Mobility (PT)  -SB               User Key  (r) = Recorded By, (t) = Taken By, (c) = Cosigned By      Initials Name Provider Type    Melba Simmons PT DPT Physical Therapist                                 Physical Therapy Education       Title: PT OT SLP Therapies (In Progress)       Topic: Physical Therapy (In Progress)       Point: Mobility training (Done)       Learning Progress Summary             Patient Acceptance, E, VU by SB at 10/11/2023 1042    Comment: pt edu on POC, benefits of act and d/c plans                         Point: Home exercise program (Not Started)       Learner Progress:  Not documented in this visit.              Point: Body mechanics (Not Started)       Learner Progress:  Not documented in this visit.              Point: Precautions (Done)       Learning Progress Summary             Patient Acceptance, E, VU  by SB at 10/11/2023 1042    Comment: pt edu on POC, benefits of act and d/c plans                                         User Key       Initials Effective Dates Name Provider Type Discipline     07/11/23 -  Melba Sanchez PT DPT Physical Therapist PT                  PT Recommendation and Plan  Planned Therapy Interventions (PT): balance training, strengthening, bed mobility training, gait training, patient/family education, transfer training, stair training  Plan of Care Reviewed With: patient  Progress: no change  Outcome Evaluation: PT eval completed. Pt alert and oriented x4 on 2L O2 upon arrival. Pt reports decline over the past 3 weeks, but prior was fully independent at home with all mobility and ADLs. Pt performs bed mob mod I and sit to stand t/f indepedently. Pt performs gait training 70 feet x2 with standing rest break and demos increasing SOA with activity. Pt O2 sats remain in 90s during activity on 2L. Pt will benefit from skilled PT to improve endurance and stair training. Recommend d/c home with assist and HH.     Time Calculation:         PT Charges       Row Name 10/11/23 1438             Time Calculation    Start Time 1026  -SB      Stop Time 1120  -SB      Time Calculation (min) 54 min  -SB      PT Received On 10/11/23  -SB      PT Goal Re-Cert Due Date 10/21/23  -SB                User Key  (r) = Recorded By, (t) = Taken By, (c) = Cosigned By      Initials Name Provider Type    Melba Simmons PT DPT Physical Therapist                  Therapy Charges for Today       Code Description Service Date Service Provider Modifiers Qty    98055679150 HC PT EVAL LOW COMPLEXITY 4 10/11/2023 Melba Sanchez PT DPVIJAY GP 1            PT G-Codes  Outcome Measure Options: AM-PAC 6 Clicks Basic Mobility (PT)  AM-PAC 6 Clicks Score (PT): 22  PT Discharge Summary  Anticipated Discharge Disposition (PT): home with assist, home with home health    Melba Sanchez PT DPT  10/11/2023

## 2023-10-11 NOTE — CASE MANAGEMENT/SOCIAL WORK
Discharge Planning Assessment  [facility]     Patient Name: Ludivina Ramirez  MRN: [id number]  Today's Date: 10/11/2023    Admit Date: 10/9/2023        Discharge Needs Assessment       Row Name 10/11/23 0911       Discharge Needs Assessment    Equipment Currently Used at Home cpap;pulse ox                   Discharge Plan    No documentation.  Pt reports being released from dialysis recently.  This is not confirmed as nephrology has yet to see pt.                 Continued Care and Services - Admitted Since 10/9/2023    Coordination has not been started for this encounter.          Demographic Summary    No documentation.                  Functional Status    No documentation.                  Psychosocial    No documentation.                  Abuse/Neglect    No documentation.                  Legal    No documentation.                  Substance Abuse    No documentation.                  Patient Forms    No documentation.                     Kathy Cruz RN

## 2023-10-11 NOTE — PLAN OF CARE
Goal Outcome Evaluation:  Plan of Care Reviewed With: patient        Progress: no change  Outcome Evaluation: PT eval completed. Pt alert and oriented x4 on 2L O2 upon arrival. Pt reports decline over the past 3 weeks, but prior was fully independent at home with all mobility and ADLs. Pt performs bed mob mod I and sit to stand t/f indepedently. Pt performs gait training 70 feet x2 with standing rest break and demos increasing SOA with activity. Pt O2 sats remain in 90s during activity on 2L. Pt will benefit from skilled PT to improve endurance and stair training. Recommend d/c home with assist and HH.      Anticipated Discharge Disposition (PT): home with assist, home with home health

## 2023-10-11 NOTE — PLAN OF CARE
Problem: Adult Inpatient Plan of Care  Goal: Plan of Care Review  Outcome: Ongoing, Progressing  Goal: Patient-Specific Goal (Individualized)  Outcome: Ongoing, Progressing  Goal: Absence of Hospital-Acquired Illness or Injury  Outcome: Ongoing, Progressing  Intervention: Identify and Manage Fall Risk  Recent Flowsheet Documentation  Taken 10/10/2023 2200 by Savannah Daly RN  Safety Promotion/Fall Prevention: safety round/check completed  Taken 10/10/2023 2040 by Savannah Daly RN  Safety Promotion/Fall Prevention: safety round/check completed  Taken 10/10/2023 2000 by Savannah Daly RN  Safety Promotion/Fall Prevention: safety round/check completed  Intervention: Prevent Skin Injury  Recent Flowsheet Documentation  Taken 10/10/2023 2200 by Savannah Daly RN  Body Position:   position changed independently   left  Taken 10/10/2023 2040 by Savannah Daly RN  Body Position: position changed independently  Taken 10/10/2023 2000 by Savannah Daly RN  Body Position:   sitting up in bed   position changed independently  Intervention: Prevent and Manage VTE (Venous Thromboembolism) Risk  Recent Flowsheet Documentation  Taken 10/10/2023 2040 by Savannah Daly RN  Activity Management: activity encouraged  Range of Motion: active ROM (range of motion) encouraged  Intervention: Prevent Infection  Recent Flowsheet Documentation  Taken 10/10/2023 2040 by Savannah Daly RN  Infection Prevention:   single patient room provided   rest/sleep promoted  Goal: Optimal Comfort and Wellbeing  Outcome: Ongoing, Progressing  Intervention: Provide Person-Centered Care  Recent Flowsheet Documentation  Taken 10/10/2023 2040 by Savannah Daly RN  Trust Relationship/Rapport:   care explained   choices provided   emotional support provided   empathic listening provided   questions answered   questions encouraged   reassurance provided   thoughts/feelings acknowledged  Goal: Readiness for Transition of Care  Outcome: Ongoing,  Progressing     Problem: COPD (Chronic Obstructive Pulmonary Disease) Comorbidity  Goal: Maintenance of COPD Symptom Control  Outcome: Ongoing, Progressing  Intervention: Maintain COPD-Symptom Control  Recent Flowsheet Documentation  Taken 10/10/2023 2040 by Savannah Daly RN  Medication Review/Management: medications reviewed     Problem: Heart Failure Comorbidity  Goal: Maintenance of Heart Failure Symptom Control  Outcome: Ongoing, Progressing  Intervention: Maintain Heart Failure-Management  Recent Flowsheet Documentation  Taken 10/10/2023 2040 by Savannah Daly RN  Medication Review/Management: medications reviewed     Problem: Hypertension Comorbidity  Goal: Blood Pressure in Desired Range  Outcome: Ongoing, Progressing  Intervention: Maintain Blood Pressure Management  Recent Flowsheet Documentation  Taken 10/10/2023 2040 by Savannah Daly RN  Medication Review/Management: medications reviewed     Problem: Obstructive Sleep Apnea Risk or Actual Comorbidity Management  Goal: Unobstructed Breathing During Sleep  Outcome: Ongoing, Progressing     Problem: Fall Injury Risk  Goal: Absence of Fall and Fall-Related Injury  Outcome: Ongoing, Progressing  Intervention: Identify and Manage Contributors  Recent Flowsheet Documentation  Taken 10/10/2023 2040 by Savannah Daly RN  Medication Review/Management: medications reviewed  Intervention: Promote Injury-Free Environment  Recent Flowsheet Documentation  Taken 10/10/2023 2200 by Savannah Daly RN  Safety Promotion/Fall Prevention: safety round/check completed  Taken 10/10/2023 2040 by Savannah Daly RN  Safety Promotion/Fall Prevention: safety round/check completed  Taken 10/10/2023 2000 by Savannah Daly RN  Safety Promotion/Fall Prevention: safety round/check completed   Goal Outcome Evaluation:

## 2023-10-12 NOTE — THERAPY DISCHARGE NOTE
Acute Care - Physical Therapy Discharge Summary  [facility]       Patient Name: Ludivina Ramirez  : 1960  MRN: [id number]    Today's Date: 10/12/2023                 Admit Date: 10/9/2023      PT Recommendation and Plan    Visit Dx:    ICD-10-CM ICD-9-CM   1. Shortness of breath  R06.02 786.05   2. Elevated troponin  R79.89 790.6   3. Impaired mobility [Z74.09]  Z74.09 799.89                PT Rehab Goals       Row Name 10/12/23 1100             Bed Mobility Goal 1 (PT)    Activity/Assistive Device (Bed Mobility Goal 1, PT) sit to supine;supine to sit;rolling to left;rolling to right  -AB      [place] Level/Cues Needed (Bed Mobility Goal 1, PT) independent  -AB      Time Frame (Bed Mobility Goal 1, PT) long term goal (LTG)  -AB         Transfer Goal 1 (PT)    Activity/Assistive Device (Transfer Goal 1, PT) sit-to-stand/stand-to-sit;bed-to-chair/chair-to-bed  -AB      [place] Level/Cues Needed (Transfer Goal 1, PT) independent  -AB      Time Frame (Transfer Goal 1, PT) long term goal (LTG)  -AB      Progress/Outcome (Transfer Goal 1, PT) goal not met  -AB         Gait Training Goal 1 (PT)    Activity/Assistive Device (Gait Training Goal 1, PT) gait (walking locomotion);increase endurance/gait distance;increase energy conservation  -AB      [place] Level (Gait Training Goal 1, PT) independent  -AB      Distance (Gait Training Goal 1, PT) 200 feet with 1 standing rest break  -AB      Time Frame (Gait Training Goal 1, PT) long term goal (LTG)  -AB      Progress/Outcome (Gait Training Goal 1, PT) goal not met  -AB         Stairs Goal 1 (PT)    Activity/Assistive Device (Stairs Goal 1, PT) stairs, all skills;ascending stairs;descending stairs;using handrail, left;using handrail, right  -AB      [place] Level/Cues Needed (Stairs Goal 1, PT) modified independence  -AB      Number of Stairs (Stairs Goal 1, PT) 4  -AB      Time Frame (Stairs Goal 1, PT) long term goal (LTG)  -AB      Progress/Outcome  (Stairs Goal 1, PT) goal not met  -AB                User Key  (r) = Recorded By, (t) = Taken By, (c) = Cosigned By      Initials Name Provider Type Discipline    Brittani Lowery, PTA Physical Therapist Assistant PT                        PT Discharge Summary  Anticipated Discharge Disposition (PT): home with assist, home with home health  Reason for Discharge: Discharge from facility  Outcomes Achieved: Refer to plan of care for updates on goals achieved  Discharge Destination: Home with assist      Brittani Collier, BAKARI   10/12/2023

## 2023-10-16 PROBLEM — E66.811 CLASS 1 OBESITY DUE TO EXCESS CALORIES WITH SERIOUS COMORBIDITY AND BODY MASS INDEX (BMI) OF 34.0 TO 34.9 IN ADULT: Status: ACTIVE | Noted: 2023-08-02

## 2023-10-16 PROBLEM — I50.42 CHRONIC COMBINED SYSTOLIC AND DIASTOLIC HEART FAILURE: Status: ACTIVE | Noted: 2020-11-08

## 2023-10-16 PROBLEM — E66.09 CLASS 1 OBESITY DUE TO EXCESS CALORIES WITH SERIOUS COMORBIDITY AND BODY MASS INDEX (BMI) OF 34.0 TO 34.9 IN ADULT: Status: ACTIVE | Noted: 2023-08-02

## 2023-10-17 ENCOUNTER — READMISSION MANAGEMENT (OUTPATIENT)
Dept: CALL CENTER | Facility: HOSPITAL | Age: 63
End: 2023-10-17
Payer: MEDICARE

## 2023-10-17 NOTE — OUTREACH NOTE
Medical Week 1 Survey      Flowsheet Row Responses   [facility] patient discharged from? [place]   Does the patient have one of the following disease processes/diagnoses(primary or secondary)? Other   Week 1 attempt successful? Yes   Call start time 1402   Call end time 1408   Discharge diagnosis Shortness of breath   Person spoke with today (if not patient) and relationship patient   Meds reviewed with patient/caregiver? Yes   Is the patient having any side effects they believe may be caused by any medication additions or changes? No   Does the patient have all medications ordered at discharge? Yes   Is the patient taking all medications as directed (includes completed medication regime)? Yes   Does the patient have a primary care provider?  Yes   Does the patient have an appointment with their PCP within 7 days of discharge? Yes   Comments regarding PCP Dr. Weston tomorrow at 140pm-pt may not make it due to having difficulty getting to appt.  advised to call the office to reschedule/consider a telehealth appt.   Has the patient kept scheduled appointments due by today? N/A   Psychosocial issues? No   Did the patient receive a copy of their discharge instructions? Yes   Nursing interventions Reviewed instructions with patient   What is the patient's perception of their health status since discharge? Improving   Is the patient/caregiver able to teach back the hierarchy of who to call/visit for symptoms/problems? PCP, Specialist, Home health nurse, Urgent Care, ED, 911 Yes   If the patient is a current smoker, are they able to teach back resources for cessation? Not a smoker   Week 1 call completed? Yes   Revoked No further contact(revokes)-requires comment   Is the patient interested in additional calls from an ambulatory ? Yes   What is the reason the patient needs support from an ACM? Care Coordination, Caregiving/Support, High Risk For ED/Readmission   Graduated/Revoked comments Pt states she  "cant make to doctor's appt due to not being able to \"get herself ready and get to her car\".  She is requesting help within the home.  She states she cannot bathe, needs assistance with preparing meals, taking out trash etc.  Elevated to case management.   Call end time 1406            NIA SHEEHAN - Registered Nurse  "

## 2023-10-19 ENCOUNTER — PATIENT OUTREACH (OUTPATIENT)
Dept: CASE MANAGEMENT | Facility: OTHER | Age: 63
End: 2023-10-19
Payer: MEDICARE

## 2023-10-19 NOTE — OUTREACH NOTE
"AMBULATORY CASE MANAGEMENT NOTE    Name and Relationship of Patient/Support Person: James Ludivina K - Self    Patient Outreach    Received return call from patient. Referral from call center for additional outreach related to self care deficits.     Care Coordination    Referral to [facility] for waiver services. Sitter list was mailed to patients home per her requst.    Care Coordination    Left VM message on [facility] for meals on wheels.    Care Coordination    Spoke with Deb at [facility] regarding wavier service request.    Adult Patient Profile  Questions/Answers      Flowsheet Row Most Recent Value   Symptoms/Conditions Managed at Home respiratory   Barriers to Managing Health stress of chronic illness   Respiratory Symptoms/Conditions COPD   Respiratory Management Strategies routine screening   Respiratory Comment \"breathing\" is causing self care deficits. See chart notes          Social Work Assessment  Questions/Answers      Flowsheet Row Most Recent Value   Advance Care Planning Reviewed verified with patient   People in Home alone   Current Living Arrangements home   Primary Care Provided by self   Provides Primary Care For no one   Usual Activity Tolerance fair   Current Activity Tolerance fair   Medications independent   Meal Preparation other (see comments)   Housekeeping other (see comments)   Laundry other (see comments)   Shopping other (see comments)   IADL Comments making referral to [facility] for possible waiver or home care services.            [facility] updated and reviewed with the patient during this program:    Sent via [employer].    Eden ROB  Ambulatory Case Management    10/19/2023, 09:58 CDT  "

## 2023-10-20 ENCOUNTER — PATIENT OUTREACH (OUTPATIENT)
Dept: CASE MANAGEMENT | Facility: OTHER | Age: 63
End: 2023-10-20
Payer: MEDICARE

## 2023-10-20 NOTE — OUTREACH NOTE
AMBULATORY CASE MANAGEMENT NOTE    Name and Relationship of Patient/Support Person: [facility] office - Provider    Care Coordination    Left VM message on waiver line to request meals on wheels for patient.         Eden ROB  Ambulatory Case Management    10/20/2023, 09:02 CDT

## 2023-10-26 ENCOUNTER — PATIENT OUTREACH (OUTPATIENT)
Dept: CASE MANAGEMENT | Facility: OTHER | Age: 63
End: 2023-10-26
Payer: MEDICARE

## 2023-10-26 NOTE — OUTREACH NOTE
AMBULATORY CASE MANAGEMENT NOTE    Name and Relationship of Patient/Support Person: Joshua with [facility] office - Provider    Care Coordination    Spoke with Joshua and patient had intake completed on 10.19.23 and was referred to home care.      Eden ROB  Ambulatory Case Management    10/26/2023, 08:58 CDT

## 2023-11-24 ENCOUNTER — LAB (OUTPATIENT)
Dept: LAB | Facility: HOSPITAL | Age: 63
End: 2023-11-24
Payer: MEDICARE

## 2023-11-27 ENCOUNTER — TELEPHONE (OUTPATIENT)
Dept: PULMONOLOGY | Facility: CLINIC | Age: 63
End: 2023-11-27
Payer: MEDICARE

## 2023-11-27 LAB
BACTERIA SPEC RESP CULT: NORMAL
GRAM STN SPEC: NORMAL

## 2023-11-27 NOTE — TELEPHONE ENCOUNTER
Patient states she is worse now.   When did your symptoms start? A couple of weeks ago, when she was in hospital.    What are your symptoms? Diarrhea, urinary incontinence, memory issues, body aches, no fever/ chills, headaches, sinus issues (thinks it is infection)    Have you been treated recently by another provider for this problem? no    Have you had any recent testing (ex. COVID or x-ray)? Sputum culture this weekend    Have you had any exposure to anyone sick? No    Are you taking your medication for your breathing as prescribed? Yes

## 2023-11-27 NOTE — TELEPHONE ENCOUNTER
Dr. Chavez recommends patient go to the emergency room. I advised patient of this, she states she does not have care for her dog. I advised her that she needs to go the emergency room since she is having memory issues and she is getting worse than she was before. She states she is not going to go until she has care for her dog, she may go in the morning. She was asking if she could be seen in the office but I advised her that we could not do anything for her here, and she does not need to be in the office sick. Will call patient in the morning to check on her.

## 2023-11-27 NOTE — TELEPHONE ENCOUNTER
----- Message from Neeta Chavez MD sent at 11/27/2023  9:21 AM CST -----  Please call the patient ask if she is feeling any better with antibiotics or needs some antibiotics because her respiratory culture is positive for Pseudomonas.  We sent the sputum over the weekend.  I will take care of it when I am back in the office.  Thank you.    ----- Message -----  From: Mera Chawla  Sent: 11/27/2023   8:11 AM CST  To: Neeta Chavez MD

## 2023-12-11 ENCOUNTER — TELEPHONE (OUTPATIENT)
Dept: PULMONOLOGY | Facility: CLINIC | Age: 63
End: 2023-12-11

## 2023-12-11 NOTE — TELEPHONE ENCOUNTER
Caller: Ludivina Ramirez    Relationship to patient: Self    Best call back number:[phone]     Patient is needing: pt was wanting a sooner apt. Pt stated that she is really not feeling well. Please advise with scheduling.

## 2023-12-12 ENCOUNTER — TELEPHONE (OUTPATIENT)
Dept: PULMONOLOGY | Facility: CLINIC | Age: 63
End: 2023-12-12

## 2023-12-12 NOTE — TELEPHONE ENCOUNTER
Provider: AJITH    Caller: CALI CLEMONS    Relationship to Patient: SELF    Reason for Call: PATIENT WILL BE GOING TO SEE HER PCP TOMORROW 12/13/23. NO LONGER NEEDS SOONER APPT THAN FEB. SAYS SHE'LL JUST KEEP HER FEB APPT.

## 2023-12-12 NOTE — TELEPHONE ENCOUNTER
Called and spoke to patient, she is still having the same symptoms as before, but she states that she has not been out of the house and she does not have covid or any viral illness. I advised her that we could see her today if she felt it necessary, but we would recommend her going to UC or ER so they can do testing there. She states that she would be okay being seen this week or next week,just as long as she can be seen before her appointment in Feb, she feels she should not wait that long. I advised her that if she continues to feel bad or she feels worse, she should present to the ER or UC for treatment. Let her know that we would be happy to see her here, however, we do not want to risk getting any of our patients sick as they already have weak lungs. She states that she does not want to be seen today anyways, she plans on resting. Patient is agreeable and is okay with being seen this week or next week and will go to ER or UC if need be.

## 2023-12-12 NOTE — TELEPHONE ENCOUNTER
I can see her at the end of the clinic tomorrow Tuesday, 12/12/2023 afternoon.  Please call her and tell her to come to the office.  If she does not feel well she definitely needs to go to the ER rather than waiting for me to be seen in the clinic thank you.

## 2024-01-17 ENCOUNTER — PATIENT OUTREACH (OUTPATIENT)
Dept: CASE MANAGEMENT | Facility: OTHER | Age: 64
End: 2024-01-17
Payer: MEDICARE

## 2024-01-17 NOTE — OUTREACH NOTE
AMBULATORY CASE MANAGEMENT NOTE    Name and Relationship of Patient/Support Person: Ludivina Ramirez - Self    Patient Outreach    HRCM follow up. Patient has not been contacted by [facility] but stated that she would like to hold off on the program at this time due to son living in the home and providing meals. She has not been contacted by waiver for home care services and ACM explained that there is a waiting list for the program. She is keeping provider appointments and continues to use CPAP. She denied needs at this time.         Eden ROB  Ambulatory Case Management    1/17/2024, 12:15 CST

## 2024-02-13 ENCOUNTER — OFFICE VISIT (OUTPATIENT)
Dept: CARDIOLOGY | Facility: CLINIC | Age: 64
End: 2024-02-13
Payer: MEDICARE

## 2024-02-13 VITALS
DIASTOLIC BLOOD PRESSURE: 84 MMHG | BODY MASS INDEX: 35.82 KG/M2 | OXYGEN SATURATION: 94 % | SYSTOLIC BLOOD PRESSURE: 134 MMHG | HEART RATE: 55 BPM | WEIGHT: 215 LBS | HEIGHT: 65 IN

## 2024-02-13 DIAGNOSIS — I10 ESSENTIAL HYPERTENSION: Chronic | ICD-10-CM

## 2024-02-13 DIAGNOSIS — J44.9 CHRONIC OBSTRUCTIVE PULMONARY DISEASE, UNSPECIFIED COPD TYPE: ICD-10-CM

## 2024-02-13 DIAGNOSIS — I35.1 NONRHEUMATIC AORTIC VALVE INSUFFICIENCY: ICD-10-CM

## 2024-02-13 DIAGNOSIS — E66.01 CLASS 2 SEVERE OBESITY DUE TO EXCESS CALORIES WITH SERIOUS COMORBIDITY AND BODY MASS INDEX (BMI) OF 35.0 TO 35.9 IN ADULT: ICD-10-CM

## 2024-02-13 DIAGNOSIS — I50.42 CHRONIC COMBINED SYSTOLIC AND DIASTOLIC HEART FAILURE: ICD-10-CM

## 2024-02-13 DIAGNOSIS — I25.110 CORONARY ARTERY DISEASE INVOLVING NATIVE CORONARY ARTERY OF NATIVE HEART WITH UNSTABLE ANGINA PECTORIS: Primary | ICD-10-CM

## 2024-02-13 DIAGNOSIS — N18.4 STAGE 4 CHRONIC KIDNEY DISEASE: ICD-10-CM

## 2024-02-13 DIAGNOSIS — I21.02 ST ELEVATION MYOCARDIAL INFARCTION INVOLVING LEFT ANTERIOR DESCENDING (LAD) CORONARY ARTERY: Chronic | ICD-10-CM

## 2024-02-13 DIAGNOSIS — E78.5 HYPERLIPIDEMIA LDL GOAL <70: Chronic | ICD-10-CM

## 2024-02-13 DIAGNOSIS — Z95.5 STATUS POST INSERTION OF DRUG-ELUTING STENT INTO LEFT ANTERIOR DESCENDING (LAD) ARTERY FOR CORONARY ARTERY DISEASE: Chronic | ICD-10-CM

## 2024-02-13 PROBLEM — E66.812 CLASS 2 SEVERE OBESITY DUE TO EXCESS CALORIES WITH SERIOUS COMORBIDITY AND BODY MASS INDEX (BMI) OF 35.0 TO 35.9 IN ADULT: Status: ACTIVE | Noted: 2023-08-02

## 2024-02-13 PROCEDURE — 3079F DIAST BP 80-89 MM HG: CPT | Performed by: NURSE PRACTITIONER

## 2024-02-13 PROCEDURE — 93000 ELECTROCARDIOGRAM COMPLETE: CPT | Performed by: NURSE PRACTITIONER

## 2024-02-13 PROCEDURE — 1160F RVW MEDS BY RX/DR IN RCRD: CPT | Performed by: NURSE PRACTITIONER

## 2024-02-13 PROCEDURE — 3075F SYST BP GE 130 - 139MM HG: CPT | Performed by: NURSE PRACTITIONER

## 2024-02-13 PROCEDURE — 1159F MED LIST DOCD IN RCRD: CPT | Performed by: NURSE PRACTITIONER

## 2024-02-13 PROCEDURE — 99214 OFFICE O/P EST MOD 30 MIN: CPT | Performed by: NURSE PRACTITIONER

## 2024-02-26 ENCOUNTER — TELEPHONE (OUTPATIENT)
Dept: CARDIOLOGY | Facility: CLINIC | Age: 64
End: 2024-02-26
Payer: MEDICARE

## 2024-02-26 RX ORDER — HYDROCHLOROTHIAZIDE 25 MG/1
25 TABLET ORAL DAILY
Qty: 30 TABLET | Refills: 11 | Status: SHIPPED | OUTPATIENT
Start: 2024-02-26

## 2024-02-26 NOTE — TELEPHONE ENCOUNTER
I've attempted to reach patient to advise her of new medication but her phone wouldn't connect.     OKAY FOR HUB TO READ:     We've sent HCTZ 25 mg to patients pharmacy to take once daily. She can take this in addition to what she is taking now. Monitor and record daily blood pressure. Report readings consistently higher than 130/80 or consistently lower than 100/60.

## 2024-02-26 NOTE — TELEPHONE ENCOUNTER
Patient states that her BP has been consistently running in the 174/103 HR 64 range. She's unsure what she should try next.     Please advise.

## 2024-02-28 ENCOUNTER — PATIENT OUTREACH (OUTPATIENT)
Dept: CASE MANAGEMENT | Facility: OTHER | Age: 64
End: 2024-02-28
Payer: MEDICARE

## 2024-02-28 NOTE — OUTREACH NOTE
AMBULATORY CASE MANAGEMENT NOTE    Name and Relationship of Patient/Support Person: Ludivina Ramirez - Self    Patient Outreach    HRCM follow up. Patient has added HCTZ 25 mg daily per cardiologist. She is monitoring BP and last HS was 140/90. She will contact cardiologist if consistently over 130/90 per instructions. She desires wavier services and reminded they are still wait listed.     Care Coordination    Contacted [facility] and left VM message for Shelli to inquire about waiting list for home care.     Eedn ROB  Ambulatory Case Management    2/28/2024, 12:33 CST

## 2024-02-29 ENCOUNTER — PATIENT OUTREACH (OUTPATIENT)
Dept: CASE MANAGEMENT | Facility: OTHER | Age: 64
End: 2024-02-29
Payer: MEDICARE

## 2024-02-29 NOTE — OUTREACH NOTE
AMBULATORY CASE MANAGEMENT NOTE    Name and Relationship of Patient/Support Person: Shelli with [facility] - Provider    Care Coordination    Shelli alvarado patient is on home care list. Patient has a priority score of 30(60 is the highest). If patients health has changed, she can call for a re screening.         Eden ROB  Ambulatory Case Management    2/29/2024, 12:48 CST   Zygomaticofacial Flap Text: Given the location of the defect, shape of the defect and the proximity to free margins a zygomaticofacial flap was deemed most appropriate for repair.  Using a sterile surgical marker, the appropriate flap was drawn incorporating the defect and placing the expected incisions within the relaxed skin tension lines where possible. The area thus outlined was incised deep to adipose tissue with a #15 scalpel blade with preservation of a vascular pedicle.  The skin margins were undermined to an appropriate distance in all directions utilizing iris scissors.  The flap was then placed into the defect and anchored with interrupted buried subcutaneous sutures.

## 2024-03-14 ENCOUNTER — HOSPITAL ENCOUNTER (OUTPATIENT)
Dept: CARDIOLOGY | Facility: HOSPITAL | Age: 64
Discharge: HOME | End: 2024-03-14
Payer: MEDICARE

## 2024-03-22 ENCOUNTER — PATIENT OUTREACH (OUTPATIENT)
Dept: CASE MANAGEMENT | Facility: OTHER | Age: 64
End: 2024-03-22
Payer: MEDICARE

## 2024-03-22 NOTE — OUTREACH NOTE
AMBULATORY CASE MANAGEMENT NOTE    Name and Relationship of Patient/Support Person: James Ludivina K - Self    Patient Outreach    HRCM follow up. Patient is very hoarse today. She had sputum production yesterday which is unusual. The sputum did have color per her reports. She is using oxygen and CPAP. Discussed appointment with PCP to avoid ED or hospitalization and she was agreeable. Discussed she is on waiting list for home care through the [facility] office.      Care Coordination    Contacted Dr. Weston's office. No answer by staff and it was after the lunch hour was over. There was no VM option.     Patient Outreach    Patient notified of the above information.       Eden ROB  Ambulatory Case Management    3/22/2024, 13:21 CDT

## 2024-04-03 ENCOUNTER — HOSPITAL ENCOUNTER (OUTPATIENT)
Dept: CARDIOLOGY | Facility: HOSPITAL | Age: 64
Discharge: HOME | End: 2024-04-03
Payer: MEDICARE

## 2024-04-03 ENCOUNTER — HOSPITAL ENCOUNTER (OUTPATIENT)
Dept: CT IMAGING | Facility: HOSPITAL | Age: 64
Discharge: HOME | End: 2024-04-03
Admitting: INTERNAL MEDICINE
Payer: MEDICARE

## 2024-04-03 VITALS
HEART RATE: 55 BPM | WEIGHT: 220.46 LBS | DIASTOLIC BLOOD PRESSURE: 65 MMHG | HEIGHT: 65 IN | BODY MASS INDEX: 36.73 KG/M2 | SYSTOLIC BLOOD PRESSURE: 111 MMHG

## 2024-04-03 DIAGNOSIS — R91.8 MULTIPLE LUNG NODULES: Primary | ICD-10-CM

## 2024-04-03 DIAGNOSIS — J44.9 CHRONIC OBSTRUCTIVE PULMONARY DISEASE, UNSPECIFIED COPD TYPE: ICD-10-CM

## 2024-04-03 DIAGNOSIS — I25.110 CORONARY ARTERY DISEASE INVOLVING NATIVE CORONARY ARTERY OF NATIVE HEART WITH UNSTABLE ANGINA PECTORIS: ICD-10-CM

## 2024-04-03 LAB
BH CV NUCLEAR PRIOR STUDY: 3
BH CV REST NUCLEAR ISOTOPE DOSE: 10.9 MCI
BH CV STRESS BP STAGE 1: NORMAL
BH CV STRESS COMMENTS STAGE 1: NORMAL
BH CV STRESS DOSE REGADENOSON STAGE 1: 0.4
BH CV STRESS DURATION MIN STAGE 1: 0
BH CV STRESS DURATION SEC STAGE 1: 10
BH CV STRESS HR STAGE 1: 67
BH CV STRESS NUCLEAR ISOTOPE DOSE: 34.4 MCI
BH CV STRESS PROTOCOL 1: NORMAL
BH CV STRESS RECOVERY BP: NORMAL MMHG
BH CV STRESS RECOVERY HR: 70 BPM
BH CV STRESS STAGE 1: 1
LV EF NUC BP: 53 %
MAXIMAL PREDICTED HEART RATE: 157 BPM
PERCENT MAX PREDICTED HR: 42.68 %
STRESS BASELINE BP: NORMAL MMHG
STRESS BASELINE HR: 56 BPM
STRESS PERCENT HR: 50 %
STRESS POST EXERCISE DUR MIN: 0 MIN
STRESS POST EXERCISE DUR SEC: 10 SEC
STRESS POST PEAK BP: NORMAL MMHG
STRESS POST PEAK HR: 67 BPM
STRESS TARGET HR: 133 BPM

## 2024-04-03 PROCEDURE — 71250 CT THORAX DX C-: CPT

## 2024-04-03 PROCEDURE — A9502 TC99M TETROFOSMIN: HCPCS | Performed by: NURSE PRACTITIONER

## 2024-04-03 PROCEDURE — 78452 HT MUSCLE IMAGE SPECT MULT: CPT

## 2024-04-03 PROCEDURE — 25010000002 REGADENOSON 0.4 MG/5ML SOLUTION: Performed by: INTERNAL MEDICINE

## 2024-04-03 PROCEDURE — 0 TECHNETIUM TETROFOSMIN KIT: Performed by: NURSE PRACTITIONER

## 2024-04-03 PROCEDURE — 93017 CV STRESS TEST TRACING ONLY: CPT

## 2024-04-03 RX ORDER — REGADENOSON 0.08 MG/ML
0.4 INJECTION, SOLUTION INTRAVENOUS ONCE
Status: COMPLETED | OUTPATIENT
Start: 2024-04-03 | End: 2024-04-03

## 2024-04-03 RX ADMIN — TETROFOSMIN 1 DOSE: 1.38 INJECTION, POWDER, LYOPHILIZED, FOR SOLUTION INTRAVENOUS at 10:48

## 2024-04-03 RX ADMIN — REGADENOSON 0.4 MG: 0.08 INJECTION, SOLUTION INTRAVENOUS at 11:50

## 2024-04-03 RX ADMIN — TETROFOSMIN 1 DOSE: 1.38 INJECTION, POWDER, LYOPHILIZED, FOR SOLUTION INTRAVENOUS at 11:55

## 2024-04-10 ENCOUNTER — OFFICE VISIT (OUTPATIENT)
Dept: PULMONOLOGY | Facility: CLINIC | Age: 64
End: 2024-04-10
Payer: MEDICARE

## 2024-04-10 VITALS
OXYGEN SATURATION: 97 % | SYSTOLIC BLOOD PRESSURE: 148 MMHG | BODY MASS INDEX: 35.29 KG/M2 | HEART RATE: 69 BPM | WEIGHT: 211.8 LBS | DIASTOLIC BLOOD PRESSURE: 92 MMHG | HEIGHT: 65 IN

## 2024-04-10 DIAGNOSIS — J30.89 NON-SEASONAL ALLERGIC RHINITIS, UNSPECIFIED TRIGGER: ICD-10-CM

## 2024-04-10 DIAGNOSIS — G47.33 OSA (OBSTRUCTIVE SLEEP APNEA): ICD-10-CM

## 2024-04-10 DIAGNOSIS — R09.02 HYPOXEMIA REQUIRING SUPPLEMENTAL OXYGEN: ICD-10-CM

## 2024-04-10 DIAGNOSIS — F41.8 OTHER SPECIFIED ANXIETY DISORDERS: ICD-10-CM

## 2024-04-10 DIAGNOSIS — R91.8 LUNG NODULES: ICD-10-CM

## 2024-04-10 DIAGNOSIS — I27.20 PULMONARY HYPERTENSION: ICD-10-CM

## 2024-04-10 DIAGNOSIS — Z99.81 HYPOXEMIA REQUIRING SUPPLEMENTAL OXYGEN: ICD-10-CM

## 2024-04-10 DIAGNOSIS — L98.499 SKIN ULCER, UNSPECIFIED ULCER STAGE: ICD-10-CM

## 2024-04-10 DIAGNOSIS — J84.10 PULMONARY GRANULOMATOSIS: ICD-10-CM

## 2024-04-10 DIAGNOSIS — J44.9 CHRONIC OBSTRUCTIVE PULMONARY DISEASE, UNSPECIFIED COPD TYPE: Primary | ICD-10-CM

## 2024-04-10 DIAGNOSIS — F17.210 CIGARETTE SMOKER: Chronic | ICD-10-CM

## 2024-04-10 DIAGNOSIS — R06.02 SHORTNESS OF BREATH: ICD-10-CM

## 2024-04-10 DIAGNOSIS — Z99.11 DEPENDENCE ON NON-INVASIVE VENTILATION: ICD-10-CM

## 2024-04-10 PROCEDURE — 3080F DIAST BP >= 90 MM HG: CPT | Performed by: INTERNAL MEDICINE

## 2024-04-10 PROCEDURE — 99214 OFFICE O/P EST MOD 30 MIN: CPT | Performed by: INTERNAL MEDICINE

## 2024-04-10 PROCEDURE — 3077F SYST BP >= 140 MM HG: CPT | Performed by: INTERNAL MEDICINE

## 2024-04-10 NOTE — PROGRESS NOTES
RESPIRATORY DISEASE CLINIC OUTPATIENT PROGRESS NOTE    Patient: Ludivina Ramirez  : 1960  Age: 63 y.o.  Date of Service: April 10, 2024    REASON FOR CLINIC VISIT:  Chief Complaint   Patient presents with    COPD       Subjective:    History of Present Illness:  Ludivina Ramirez is a 63 y.o. female who presents to the office today to be seen for    Diagnosis Plan   1. Chronic obstructive pulmonary disease, unspecified COPD type        2. 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 Hypoxemia requiring supplemental oxygen        4. Pulmonary hypertension        5. Shortness of breath        6. Cigarette smoker        7. MARY (obstructive sleep apnea)        8. Non-seasonal allergic rhinitis, unspecified trigger        9. Lung nodules        10. Other specified anxiety disorders        11. Skin ulcer, unspecified ulcer stage  Ambulatory Referral to Infectious Disease      12. Pulmonary granulomatosis        .  Other problems per record.    History:    Patient is a very pleasant middle-aged  female who was seen in the pulmonary clinic for a follow-up visit.    Patient is an active smoker and continues to smoke.  She is history of chronic obstructive pulmonary disease and chronic respiratory failure with obstructive sleep apnea..  He also has pulmonary hypertension she uses oxygen 2 L during exercise and activity and also at night.  She is using Trilogy at night.    Patient has history of histoplasmosis in the past and also has history of coronary artery disease with chronic congestive diastolic heart failure she has lots of anxiety issues.  She had a recent CT scan of the chest done which showed some new onset lung nodules and some chronic changes and scarring.  She reported to me she has multiple skin breakdowns with the painful and itchy ulcers and she is not sure if this is due to some kind of parasite growing in the blood or something else.  She wanted to insist that she did not get called for because  she normally had before.  She wanted to see ID physician if possible.    She had no recent hospital admissions and ER visit and urgent care visit or any other acute complaints.  She told me she does not take any vaccinations.  She did not want any medication refills at this time.  For her COPD she is using Symbicort and DuoNeb and albuterol rescue inhaler for her nasal allergies she is using Astelin nasal spray fluticasone nasal spray and loratadine.  The patient also takes furosemide for congestive diastolic heart with fluid overload from time to time but not regularly.  Her last PFT done 2 years ago shows she had moderate heading towards severe COPD but no PFT has been done since then.  She was also noted to have mild restrictive dysfunction at that time.  Follow-up CT scan of the chest and the PFT ordered before the next visit.    PFT done today:  Not done today    PFT Values          2022    11:30   Pre Drug PFT Results   FVC 58   FEV1 55   FEF 25-75% 47   FEV1/FVC 75   Other Tests PFT Results   TLC 88      DLCO 65   D/VAsb 81     Results for orders placed in visit on 22    Pulmonary Function Test    Narrative  Pulmonary Function Test  Performed by: Kate Ford, RRT  Authorized by: Neeta Chavez MD    Pre Drug % Predicted  FVC: 58%  FEV1: 55%  FEF 25-75%: 47%  FEV1/FVC: 75%  T%  RV: 120%  DLCO: 65%  D/VAsb: 81%    Rest/Exercise Pulse Ox Values          10/26/2022    14:30   Rest/Exercise Pulse Ox Results   Rest room air SAT % 90   Exercise room air SAT % 88        Bronchodilator therapy: Symbicort , Duoneb and Albuterol rescue inhaler    Smoking Status:   Social History     Tobacco Use   Smoking Status Every Day    Current packs/day: 0.00    Average packs/day: 0.5 packs/day for 48.7 years (24.4 ttl pk-yrs)    Types: Cigarettes    Start date:     Last attempt to quit: 2023    Years since quittin.5    Passive exposure: Current   Smokeless Tobacco Never   Tobacco  Comments    Smokes about 0.5 pack daily 4/10      Pulm Rehab: no  Sleep: yes . On Home Trilogy and on 2 LPM oxygen.    Support System: lives alone    Code Status:   There are no questions and answers to display.        Review of Systems:  A complete review of systems is performed and all other systems were reviewed and negative as note above in the HPI.  Review of Systems   Constitutional:  Positive for fatigue and unexpected weight gain.   HENT:  Positive for congestion, postnasal drip and sinus pressure.    Eyes: Negative.    Respiratory:  Positive for cough, chest tightness and shortness of breath.    Cardiovascular: Negative.    Gastrointestinal: Negative.    Endocrine: Negative.    Genitourinary: Negative.    Musculoskeletal:  Positive for arthralgias and back pain.   Skin: Negative.    Allergic/Immunologic: Positive for environmental allergies.   Neurological: Negative.    Hematological: Negative.    Psychiatric/Behavioral: Negative.         CAT/ACT Score:  Not done today    Medications:  Outpatient Encounter Medications as of 4/10/2024   Medication Sig Dispense Refill    albuterol sulfate  (90 Base) MCG/ACT inhaler Inhale 2 puffs Every 4 (Four) Hours As Needed for Wheezing. 20.1 g 3    aspirin 81 MG EC tablet Take 1 tablet by mouth Daily. 90 tablet 3    azelastine (ASTELIN) 0.1 % nasal spray 2 sprays into the nostril(s) as directed by provider 2 (Two) Times a Day. 30 mL 11    Bacillus Coagulans-Inulin (ALIGN PREBIOTIC-PROBIOTIC PO) Take 1 capsule by mouth Daily.      budesonide-formoterol (SYMBICORT) 160-4.5 MCG/ACT inhaler Inhale 2 puffs 2 (Two) Times a Day. 1 each 11    calcitriol (ROCALTROL) 0.25 MCG capsule Take 1 capsule by mouth Daily.      carvedilol (COREG) 25 MG tablet Take 1 tablet by mouth 2 (Two) Times a Day With Meals.      Cholecalciferol 25 MCG (1000 UT) chewable tablet 1 capsule Daily.      cloNIDine (CATAPRES) 0.1 MG tablet Take 1 tablet by mouth Every 6 (Six) Hours As Needed for High  "Blood Pressure (SBP >160). 30 tablet 1    docusate sodium (COLACE) 250 MG capsule Take 1 capsule by mouth Daily.      fluticasone (FLONASE) 50 MCG/ACT nasal spray 2 sprays into the nostril(s) as directed by provider Every Morning. In each nostril 16 g 11    furosemide (LASIX) 40 MG tablet Take 1 tablet by mouth Daily. 30 tablet 0    hydroCHLOROthiazide 25 MG tablet Take 1 tablet by mouth Daily. 30 tablet 11    ipratropium-albuterol (DUO-NEB) 0.5-2.5 mg/3 ml nebulizer Take 3 mL by nebulization 4 (Four) Times a Day. 1080 mL 3    isosorbide dinitrate (ISORDIL) 20 MG tablet Take 1 tablet by mouth 3 (Three) Times a Day. (Patient taking differently: Take 1 tablet by mouth 2 (Two) Times a Day.) 270 tablet 3    losartan (COZAAR) 100 MG tablet Daily.      melatonin 3 MG tablet Take 2 tablets by mouth Every Night. 30 tablet 0    multivitamin with minerals tablet tablet Take 1 tablet by mouth Daily.      NIFEdipine CC (ADALAT CC) 90 MG 24 hr tablet Every 12 (Twelve) Hours.      nitroglycerin (NITROSTAT) 0.4 MG SL tablet Place 1 tablet under the tongue Every 5 (Five) Minutes As Needed for Chest Pain. Take no more than 3 doses in 15 minutes.      [DISCONTINUED] OLANZapine (zyPREXA) 5 MG tablet Take 1 tablet by mouth Every Night. 30 tablet 0     No facility-administered encounter medications on file as of 4/10/2024.       Allergies:  Allergies   Allergen Reactions    Codeine Nausea And Vomiting    Sumatriptan Other (See Comments)     Headache        Immunizations:    There is no immunization history on file for this patient.    Objective:    Vitals:  /92   Pulse 69   Ht 166 cm (65.35\")   Wt 96.1 kg (211 lb 12.8 oz)   LMP  (LMP Unknown)   SpO2 97% Comment: RA  Breastfeeding No   BMI 34.87 kg/m²     Physical Exam:  General: Patient is a 63 y.o. pleasant middle aged  female. Looks stated age. Appears to be in no acute distress.  Eyes: EOMI. PERRLA. Vision intact. No scleral icterus.  Ear, Nose, Mouth and " Throat: Hearing is grossly intact. No Leukoplakia, pharyngitis, stomatitis or thrush. Swollen nasal mucosa with post nasal drop.  Neck: Range of motion of neck normal. No thyromegaly or masses. Mallampati Class 3  Respiratory: Clear to auscultation bilaterally. No use of accessory muscles. Decreased breath sounds.  Cardiovascular: Normal heart sounds. Regularly regular rhythm without murmur.  Gastrointestinal: Non tender, non distended, soft. Bowel sounds positive in all four quadrants. No organomegaly.  Skin: No obvious rashes, lesions,  a few shallow ulcers no large amount of bruising. No edema.   Neurological: No new motor deficits. Cranial nerves appear intact.  Psychiatric: Patient is alert and oriented to person, place and time.    Chest Imaging:      Study Result    Narrative & Impression   EXAMINATION:  CT CHEST WO CONTRAST DIAGNOSTIC-  4/3/2024 9:38 AM     HISTORY: COPD, surveillance; J44.9-Chronic obstructive pulmonary  disease, unspecified.     COMPARISON : 10/9/2023 and 10/12/2022. There are infiltrates and some  breathing motion artifact on the study on 10/9/2023.     DLP: 237.08 mGy.cm Automated dosage reduction technique was utilized to  reduce patient dosage.     TECHNIQUE: Spiral CT was performed of the chest without contrast.  Sagittal and coronal images were reconstructed.     MEDIASTINUM, HEART AND VASCULAR STRUCTURES: There is atheromatous  calcification of the thoracic aorta and coronary arteries. There is an  LAD stent. There is mitral valve calcification. The ascending thoracic  aorta is dilated measuring 4.2 cm. The main pulmonary artery segment is  dilated measuring 3.5 cm. There are no enlarged axillary or mediastinal  lymph nodes. There is mild cardiomegaly.     LUNGS: The lungs are clear. There may be mild centrilobular emphysema.  There is no dense infiltrate or pleural effusion.     LUNG NODULES:  1. New 2 mm left upper lobe nodule image 29 series 3.  2. Stable 3 mm right lower lobe  nodule image 59 series 3.  3. A 5 mm nodule adjacent to the major fissure on the right image 72  series 3 measuring about 2 mm on 10/12/2022.  4. New 3 mm left lower lobe nodule image 87 series 3 just posterior to  the major fissure.  5. New 2-3 mm right lower lobe nodule image 103 series 3.  6. New 2-3 mm left lower lobe nodule image 106 series 3.  7. There are 2 adjacent stable nodules measuring 2 and 3 mm in the left  lower lobe subpleural image 115 series 3.     UPPER ABDOMEN: There is a probable small hiatal hernia. There are no  other acute findings in the upper abdomen.     BONES: There are degenerative changes of the spine with syndesmophytes.  No acute bony abnormality is seen.        IMPRESSION:  1. Pulmonary nodules measuring up to 5 mm, some of which are new.  Fleischner Society guidelines recommends optional follow-up CT in 12  months. Since some of the nodules are new, I would recommend that this  be performed irregardless of patient risk.  2. Atheromatous disease of the thoracic aorta and coronary arteries. LAD  stent. Cardiomegaly. Dilated ascending thoracic aorta measuring 4.2 cm.  Dilated main pulmonary artery segment measuring 3.5 cm.  3. There may be mild centrilobular emphysema.  4. Probable small hiatal hernia. Other nonacute findings, as discussed.       This report was signed and finalized on 4/3/2024 1:10 PM by Dr. Jefe Beltre MD.       Assessment:  1. Chronic obstructive pulmonary disease, unspecified COPD type    2. Dependence on non-invasive ventilation    3. Hypoxemia requiring supplemental oxygen    4. Pulmonary hypertension    5. Shortness of breath    6. Cigarette smoker    7. MARY (obstructive sleep apnea)    8. Non-seasonal allergic rhinitis, unspecified trigger    9. Lung nodules    10. Other specified anxiety disorders    11. Skin ulcer, unspecified ulcer stage    12. Pulmonary granulomatosis        Plan/Recommendations:    1.  I reviewed the recent CT scan of the chest she was  noted to have multiple pulmonary nodules most of which is stable but a few new nodules appear solid a follow-up CT scan in 6 months time.  She did not have any lung function test done in almost 2 years and did not follow-up PFTs ordered before the next clinic visit.  2.  Patient will need to continue Symbicort and DuoNeb with albuterol rescue inhaler as before.  She will continue using oxygen 2 L during activity and as needed at night.  For chronic respiratory failure and sleep apnea she will continue using trilogy at night and the current settings and she is tolerating it well.  3. Ludivina Ramirez  reports that she has been smoking cigarettes. She started smoking about 49 years ago. She has a 24.4 pack-year smoking history. She has been exposed to tobacco smoke. She has never used smokeless tobacco. I have educated her on the risk of diseases from using tobacco products such as cancer, COPD, and heart disease. I advised her to quit and she is willing to quit. We have discussed the following method/s for tobacco cessation:  Counseling.  Together we have set a quit date for 2 weeks from today.  She will follow up with me in 6 months or sooner to check on her progress.I spent 7 minutes counseling the patient.  4.  For nasal allergy she will continue using fluticasone nasal spray loratadine and Astelin nasal spray as before.  As she has recurrent skin ulcerations which could be infectious in origin I recommended her to visit with the ID physician and I referred her to Dr. Mike Delgado for her advice.  Patient has history of inflammatory colitis and it could be a manifestation of inflammatory bowel disease with GYN problems like pyoderma gangrenosum.  However infection really needs to be excluded.  Patient had history of MRSA infection in the past.  She was told that she had impetigo by another physician but never been diagnosed with impetigo  5.  Patient has lots of anxiety issues that needs to be addressed by the primary  care provider.  Weight loss and lifestyle was recommended.  She was recommended to have vaccinations but she refused to take any vaccinations.  6.  Patient will return to pulmonary clinic for follow-up visit in 6 months time or earlier if needed.    Follow up:  6 Months    Time Spent:  30 minutes    I appreciate the opportunity of participating in this patient's care. I would like to thank the PCP for the referral.  Please feel free to contact me with any other questions.    Neeta Chavez MD   Pulmonologist/Intensivist     Electronically signed by: Neeta Chavez MD, 4/10/2024 13:30 CDT

## 2024-04-16 ENCOUNTER — OFFICE VISIT (OUTPATIENT)
Dept: CARDIOLOGY | Facility: CLINIC | Age: 64
End: 2024-04-16
Payer: MEDICARE

## 2024-04-16 VITALS
HEIGHT: 65 IN | OXYGEN SATURATION: 95 % | SYSTOLIC BLOOD PRESSURE: 131 MMHG | HEART RATE: 64 BPM | DIASTOLIC BLOOD PRESSURE: 88 MMHG | WEIGHT: 217 LBS | BODY MASS INDEX: 36.15 KG/M2

## 2024-04-16 DIAGNOSIS — J44.9 CHRONIC OBSTRUCTIVE PULMONARY DISEASE, UNSPECIFIED COPD TYPE: ICD-10-CM

## 2024-04-16 DIAGNOSIS — Z95.5 STATUS POST INSERTION OF DRUG-ELUTING STENT INTO LEFT ANTERIOR DESCENDING (LAD) ARTERY FOR CORONARY ARTERY DISEASE: Chronic | ICD-10-CM

## 2024-04-16 DIAGNOSIS — I21.02 ST ELEVATION MYOCARDIAL INFARCTION INVOLVING LEFT ANTERIOR DESCENDING (LAD) CORONARY ARTERY: Chronic | ICD-10-CM

## 2024-04-16 DIAGNOSIS — I50.42 CHRONIC COMBINED SYSTOLIC AND DIASTOLIC HEART FAILURE: ICD-10-CM

## 2024-04-16 DIAGNOSIS — E78.5 HYPERLIPIDEMIA LDL GOAL <70: Chronic | ICD-10-CM

## 2024-04-16 DIAGNOSIS — I10 ESSENTIAL HYPERTENSION: Chronic | ICD-10-CM

## 2024-04-16 DIAGNOSIS — I25.118 CORONARY ARTERY DISEASE OF NATIVE ARTERY OF NATIVE HEART WITH STABLE ANGINA PECTORIS: Primary | ICD-10-CM

## 2024-04-16 DIAGNOSIS — I35.1 NONRHEUMATIC AORTIC VALVE INSUFFICIENCY: ICD-10-CM

## 2024-04-16 DIAGNOSIS — N18.4 STAGE 4 CHRONIC KIDNEY DISEASE: ICD-10-CM

## 2024-04-16 DIAGNOSIS — E66.01 CLASS 2 SEVERE OBESITY DUE TO EXCESS CALORIES WITH SERIOUS COMORBIDITY AND BODY MASS INDEX (BMI) OF 36.0 TO 36.9 IN ADULT: ICD-10-CM

## 2024-04-16 PROCEDURE — 3079F DIAST BP 80-89 MM HG: CPT | Performed by: NURSE PRACTITIONER

## 2024-04-16 PROCEDURE — 1160F RVW MEDS BY RX/DR IN RCRD: CPT | Performed by: NURSE PRACTITIONER

## 2024-04-16 PROCEDURE — 3075F SYST BP GE 130 - 139MM HG: CPT | Performed by: NURSE PRACTITIONER

## 2024-04-16 PROCEDURE — 99214 OFFICE O/P EST MOD 30 MIN: CPT | Performed by: NURSE PRACTITIONER

## 2024-04-16 PROCEDURE — 1159F MED LIST DOCD IN RCRD: CPT | Performed by: NURSE PRACTITIONER

## 2024-04-16 RX ORDER — ISOSORBIDE DINITRATE 40 MG/1
40 TABLET ORAL 2 TIMES DAILY
Qty: 180 TABLET | Refills: 3 | Status: SHIPPED | OUTPATIENT
Start: 2024-04-16

## 2024-04-16 NOTE — PROGRESS NOTES
"Subjective:     Encounter Date: 4/16/2024    Patient ID: Ludivina Ramirez is a 63 y.o. female   HPI: This patient presents today for routine follow-up of outpatient testing.  Janey scan on 4/3/2024 revealed no significant ischemia.  She has chronic diastolic congestive heart failure, coronary artery disease status post ST elevation myocardial infarction with subsequent 2.75 x 28 mm Xience drug-eluting stent to the proximal to mid left anterior descending artery on 11/8/2020, hypertension, ischemic colitis, nonrheumatic aortic valve insufficiency, stage 3 chronic kidney disease, COPD on home oxygen, obesity, tobacco use and PTSD.  She continues to complain of intermittent, exertional, substernal, \"gnawing\" chest pain. The pain radiates to the back at times. The pain is not associated with any other symptoms and is resolved with nitroglycerin. She also complains of increased dyspnea with exertion and fatigue.  Patient denies palpitations, dizziness, syncope, orthopnea, PND or edema.  Patient denies any signs of bleeding.    Chief Complaint: Routine follow-up  Coronary Artery Disease  Presents for follow-up visit. Symptoms include chest pain. Pertinent negatives include no chest pressure, chest tightness, dizziness, leg swelling, muscle weakness, palpitations, shortness of breath or weight gain. Risk factors include hyperlipidemia and obesity. Her past medical history is significant for CHF. The symptoms have been stable. Compliance with diet is variable. Compliance with exercise is variable. Compliance with medications is good.   Hypertension  This is a chronic problem. The current episode started more than 1 year ago. The problem is uncontrolled. Associated symptoms include chest pain and malaise/fatigue. Pertinent negatives include no anxiety, blurred vision, headaches, neck pain, orthopnea, palpitations, peripheral edema, PND, shortness of breath or sweats. Risk factors for coronary artery disease include obesity and " dyslipidemia. Current antihypertension treatment includes beta blockers, central alpha agonists, direct vasodilators, angiotensin blockers and calcium channel blockers. The current treatment provides significant improvement. Hypertensive end-organ damage includes CAD/MI.   Hyperlipidemia  This is a chronic problem. The current episode started more than 1 year ago. Exacerbating diseases include obesity. Associated symptoms include chest pain. Pertinent negatives include no shortness of breath. Current antihyperlipidemic treatment includes statins. Risk factors for coronary artery disease include hypertension, obesity, post-menopausal and dyslipidemia.   Congestive Heart Failure  Presents for follow-up visit. Associated symptoms include chest pain. Pertinent negatives include no abdominal pain, chest pressure, claudication, edema, fatigue, muscle weakness, near-syncope, nocturia, orthopnea, palpitations, paroxysmal nocturnal dyspnea, shortness of breath or unexpected weight change. The symptoms have been stable. Her past medical history is significant for CAD. Compliance with total regimen is 51-75%. Compliance with diet is 51-75%. Compliance with exercise is 51-75%. Compliance with medications is %.     I have reviewed and confirmed the accuracy of the HPI ISAIAH Miranda      Previous Cardiac Testing:  Results for orders placed during the hospital encounter of 10/09/23    Adult Transthoracic Echo Complete W/ Cont if Necessary Per Protocol    Interpretation Summary    Left ventricular systolic function is mildly decreased. Left ventricular ejection fraction appears to be 46 - 50%.    The left ventricular cavity is mildly dilated.    Left ventricular wall thickness is consistent with mild concentric hypertrophy.    Left ventricular diastolic function is consistent with (grade II w/high LAP) pseudonormalization.    The left atrial cavity is severely dilated.    The right atrial cavity is dilated.    Mild  to moderate aortic valve regurgitation is present.    There is bileaflet mitral valve thickening present.    Estimated right ventricular systolic pressure from tricuspid regurgitation is markedly elevated (>55 mmHg).    Mild dilation of the ascending aorta is present, measuring 4.4 cm.    Results for orders placed during the hospital encounter of 11/07/20   Cardiac Catheterization/Vascular Study    Narrative · RPDA lesion is 55% stenosed.  · Prox LAD to Mid LAD lesion is 100% stenosed.  · Left ventricular end diastolic pressure: 39 mmHg     Cardiac Catheterization Operative Report    Ludivina Ramirez  [id number]  11/8/2020    Patient was referred for cardiac catheterization      Indications for the procedure include: Symptoms suggestive of angina with   high suspicion for significant obstructive coronary artery disease       Procedure performed  Left heart cath  Coronary angiography  Right femoral arteriography  Insertion of 7 Fr Mynx hemostatic closure device with effective hemostasis   and preserved right lower extremity pulses  Left ventriculography    Supervision of the administration of moderate sedation      Procedure Details  The risks, benefits, complications, treatment options, and expected   outcomes were discussed with the patient. The patient and/or family   concurred with the proposed plan, giving informed consent.     Patient was brought to the cath lab after IV hydration was begun and oral   premedication was given. He was further sedated with fentanyl and   midazolam.T    The skin overlying the patient's right femoral artery was prepped and   draped in the usual sterile fashion.     Using the modified Seldinger access technique, a 6f Kyrgyz sheath was   placed in the femoral artery.      Cardiac Catheterization Operative Report      Patient was referred for cardiac catheterization .      Procedure Details    Diagnostic coronary angiography was performed with 5 Kyrgyz JL4 and JR 4   coronary catheters.       Pigtail catheter for left ventriculography and left heart pressure   measurements    Coronary angiogram were performed in DONNY and FLYNN projection to evaluate   the coronary arterial systems.        Before all coronary angiograms were obtained, a femoral angiogram was   performed and the arteriotomy was assessed for suitability for a closure   device.      A 6/7 Fr Mynx closure device was used to achieve hemostasis.  The patient   tolerated the procedure well, and there were no immediate complications.    ___________________________________________________________________________  _________________________________________________________________   Selective coronary angiography:    Left main coronary artery:  The left main coronary artery arises from the   left coronary cusp and bifurcates into the  left anterior descending   coronary artery  and left circumflex arteries.      Left main coronary artery is normal    Left anterior descending artery:  The  left anterior descending coronary   artery   arises normally from the left main coronary artery and courses in   the anterior interventricular groove and terminates at the apex.  Occluded   proximal left anterior descending coronary artery which was the culprit   vessel and was treated with angioplasty and stent placement as below    Left circumflex:  The left circumflex arises form the left man artery and   supplies obtuse marginal branches.Left circumflex artery  is normal.    Overall large distribution with moderate tortuosity of obtuse marginal   vessels without any obstructive or significant coronary artery disease    Right coronary artery:  The RCA arises normally from the right coronary   cusp and is dominant for the posterior circulation.  The RCA is dominant   Right posterior descending coronary artery has approximately a 50 to 60%   stenosis in the midportion       Left heart cath: LVEDP   39 mmHg  mm Hg with no gradient across aortic   valve on  pullback.     LV Gram: Not performed to save contrast as has acute on chronic kidney   disease    Interventions: JL4 7 Slovenian guide was used advanced a coronary wire into   the distal left anterior descending coronary artery did angioplasty using   a 2.0 mm balloon  Then implanted a 2.75 x 28 mm drug-eluting stent and postdilated with a   3.0 mm balloon.  Initial stenosis 100% with MELISSA 0 flow post procedure 0%   stenosis with MELISSA-3 flow with complete resolution of chest pain prior to   departure from Cath Lab  ___________________________________________________________________________  _________________________________________________________________  Estimated Blood Loss:  Minimal         Complications:  None; patient tolerated the procedure well.    Specimens: None           Disposition: Cardiovascular observation unit             Condition: stable          I supervised the administration of conscious sedation by nursing staff   throughout the case.      Immediately prior to the procedure the patient was re-examined and   subsequently the  first dose was given at   0126  Hours  and the end of my face-to-face   encounter was at 0202   Hours.        During the case, continuous pulse oximetry, heart rate, blood pressure,   and patient status were monitored.       ___________________________________________________________________________  _________________________________________________________________    Conclusion of cardiac catheterization    11/8/2020    Severely elevated left ventricular end-diastolic pressure 39 mmHg  50 to 60% stenosis of midportion of posterior descending coronary artery  Occluded proximal left anterior descending coronary artery treated with   PTCA and then implantation of 2.75 x 28 mm Xience drug-eluting stent   postdilated to 3.0 mm diameter  Left ventriculogram not performed to save contrast as has chronic with   acute kidney injury, prior echo shows preserved left ventricular ejection    fraction    ___________________________________________________________________________  _________________________________________________________________    Plan after cardiac catheterization      Dual antiplatelet therapy minimum of 1 year preferably longer  Statin ACE inhibitor is and beta-blockers  Aspirin for the rest of her life  Her leukocytosis is likely due to recent infection and ongoing steroid use  No evidence of any underlying infection or sepsis.  Intensive risk factor modifications for both primary and secondary   prevention if applicable  Hydration   Observation                The following portions of the patient's history were reviewed and updated as appropriate: allergies, current medications, past family history, past medical history, past social history, past surgical history and problem list.    Allergies   Allergen Reactions    Codeine Nausea And Vomiting    Sumatriptan Other (See Comments)     Headache        Current Outpatient Medications:     albuterol sulfate  (90 Base) MCG/ACT inhaler, Inhale 2 puffs Every 4 (Four) Hours As Needed for Wheezing., Disp: 20.1 g, Rfl: 3    aspirin 81 MG EC tablet, Take 1 tablet by mouth Daily., Disp: 90 tablet, Rfl: 3    azelastine (ASTELIN) 0.1 % nasal spray, 2 sprays into the nostril(s) as directed by provider 2 (Two) Times a Day., Disp: 30 mL, Rfl: 11    Bacillus Coagulans-Inulin (ALIGN PREBIOTIC-PROBIOTIC PO), Take 1 capsule by mouth Daily., Disp: , Rfl:     budesonide-formoterol (SYMBICORT) 160-4.5 MCG/ACT inhaler, Inhale 2 puffs 2 (Two) Times a Day., Disp: 1 each, Rfl: 11    calcitriol (ROCALTROL) 0.25 MCG capsule, Take 1 capsule by mouth Daily., Disp: , Rfl:     carvedilol (COREG) 25 MG tablet, Take 1 tablet by mouth 2 (Two) Times a Day With Meals., Disp: , Rfl:     Cholecalciferol 25 MCG (1000 UT) chewable tablet, 1 capsule Daily., Disp: , Rfl:     cloNIDine (CATAPRES) 0.1 MG tablet, Take 1 tablet by mouth Every 6 (Six) Hours As Needed for  High Blood Pressure (SBP >160)., Disp: 30 tablet, Rfl: 1    docusate sodium (COLACE) 250 MG capsule, Take 1 capsule by mouth Daily., Disp: , Rfl:     fluticasone (FLONASE) 50 MCG/ACT nasal spray, 2 sprays into the nostril(s) as directed by provider Every Morning. In each nostril, Disp: 16 g, Rfl: 11    furosemide (LASIX) 40 MG tablet, Take 1 tablet by mouth Daily., Disp: 30 tablet, Rfl: 0    hydroCHLOROthiazide 25 MG tablet, Take 1 tablet by mouth Daily., Disp: 30 tablet, Rfl: 11    ipratropium-albuterol (DUO-NEB) 0.5-2.5 mg/3 ml nebulizer, Take 3 mL by nebulization 4 (Four) Times a Day., Disp: 1080 mL, Rfl: 3    isosorbide dinitrate (ISORDIL) 40 MG tablet, Take 1 tablet by mouth 2 (Two) Times a Day., Disp: 180 tablet, Rfl: 3    losartan (COZAAR) 100 MG tablet, Daily., Disp: , Rfl:     melatonin 3 MG tablet, Take 2 tablets by mouth Every Night., Disp: 30 tablet, Rfl: 0    multivitamin with minerals tablet tablet, Take 1 tablet by mouth Daily., Disp: , Rfl:     NIFEdipine CC (ADALAT CC) 90 MG 24 hr tablet, Every 12 (Twelve) Hours., Disp: , Rfl:     nitroglycerin (NITROSTAT) 0.4 MG SL tablet, Place 1 tablet under the tongue Every 5 (Five) Minutes As Needed for Chest Pain. Take no more than 3 doses in 15 minutes., Disp: , Rfl:   Past Medical History:   Diagnosis Date    Acute CHF     Acute renal failure (ARF)     Anemia     Asthma     childhood    Chronic kidney disease     Coronary artery disease     Hypertension     Ischemic colitis     Metabolic acidosis     Myocardial infarction 11/07/2020    Nonrheumatic aortic (valve) insufficiency     PTSD (post-traumatic stress disorder)      Past Surgical History:   Procedure Laterality Date    CARDIAC CATHETERIZATION N/A 11/8/2020    Procedure: Left Heart Cath;  Surgeon: Pierce Buchanan MD;  Location:  [facility];  Service: Cardiovascular;  Laterality: N/A;    EXTERNAL FIXATION WRIST FRACTURE      INSERTION HEMODIALYSIS CATHETER Right 6/3/2022    Procedure:  INSERTION OF RIGHT INTERNAL JUGULAR HEMODIALYSIS CATHETER;  Surgeon: Dexter Red MD;  Location: [facility] OR;  Service: Vascular;  Laterality: Right;    LEG SURGERY      TUBAL ABDOMINAL LIGATION       Family History   Problem Relation Age of Onset    Coronary artery disease Mother     Coronary artery disease Father     Breast cancer Neg Hx      Social History     Socioeconomic History    Marital status: Single   Tobacco Use    Smoking status: Every Day     Current packs/day: 0.00     Average packs/day: 0.5 packs/day for 48.7 years (24.4 ttl pk-yrs)     Types: Cigarettes     Start date:      Last attempt to quit: 2023     Years since quittin.5     Passive exposure: Current    Smokeless tobacco: Never    Tobacco comments:     Smokes about 0.5 pack daily 4/10    Vaping Use    Vaping status: Never Used   Substance and Sexual Activity    Alcohol use: No    Drug use: No    Sexual activity: Defer       Review of Systems   Constitutional: Positive for malaise/fatigue. Negative for chills, decreased appetite, fatigue, fever, night sweats, unexpected weight change, weight gain and weight loss.   HENT:  Negative for nosebleeds.    Eyes:  Negative for blurred vision and visual disturbance.   Cardiovascular:  Positive for chest pain and dyspnea on exertion. Negative for claudication, leg swelling, near-syncope, orthopnea, palpitations, paroxysmal nocturnal dyspnea and syncope.   Respiratory:  Negative for chest tightness, cough, hemoptysis, shortness of breath, snoring and wheezing.    Endocrine: Negative for cold intolerance and heat intolerance.   Hematologic/Lymphatic: Does not bruise/bleed easily.   Skin:  Negative for rash.   Musculoskeletal:  Negative for back pain, falls, muscle weakness and neck pain.   Gastrointestinal:  Negative for abdominal pain, change in bowel habit, constipation, diarrhea, dysphagia, heartburn, nausea and vomiting.   Genitourinary:  Negative for hematuria and nocturia.    Neurological:  Negative for dizziness, headaches, light-headedness and weakness.   Psychiatric/Behavioral:  Negative for altered mental status.    Allergic/Immunologic: Negative for persistent infections.     I have reviewed and confirmed the accuracy of the ROS ISAIAH Miranda       Objective:     Vitals and nursing note reviewed.   Constitutional:       General: Not in acute distress.     Appearance: Normal and healthy appearance. Well-developed, normal weight and not in distress. Not diaphoretic.      Interventions: Nasal cannula in place.   Eyes:      General: Lids are normal.         Right eye: No discharge.         Left eye: No discharge.      Conjunctiva/sclera: Conjunctivae normal.      Pupils: Pupils are equal, round, and reactive to light.   HENT:      Head: Normocephalic and atraumatic.      Jaw: There is normal jaw occlusion.      Right Ear: External ear normal.      Left Ear: External ear normal.      Nose: Nose normal.   Neck:      Thyroid: No thyromegaly.      Vascular: No carotid bruit, JVD or JVR. JVD normal.      Trachea: Trachea normal. No tracheal deviation.   Pulmonary:      Effort: Pulmonary effort is normal. No respiratory distress.      Breath sounds: Examination of the right-upper field reveals decreased breath sounds. Examination of the left-upper field reveals decreased breath sounds. Examination of the right-middle field reveals decreased breath sounds. Examination of the left-middle field reveals decreased breath sounds. Examination of the right-lower field reveals decreased breath sounds. Examination of the left-lower field reveals decreased breath sounds. Decreased breath sounds present. No wheezing. No rhonchi. No rales.   Chest:      Chest wall: Not tender to palpatation.   Cardiovascular:      PMI at left midclavicular line. Bradycardia present. Regular rhythm. Normal S1. Normal S2.       Murmurs: There is a grade 2/6 high frequency blowing holosystolic murmur at the apex.  "There is a grade 2/4 high frequency blowing decrescendo, early diastolic murmur at the URSB, radiating to the apex.      No gallop.  No click. No rub.   Pulses:     Intact distal pulses. No decreased pulses.   Edema:     Peripheral edema absent.   Abdominal:      General: Bowel sounds are normal. There is no distension.      Palpations: Abdomen is soft.      Tenderness: There is no abdominal tenderness.   Musculoskeletal: Normal range of motion.         General: No tenderness or deformity.      Cervical back: Normal range of motion and neck supple. Skin:     General: Skin is warm and dry.      Coloration: Skin is not pale.      Findings: No erythema or rash.   Neurological:      General: No focal deficit present.      Mental Status: Alert, oriented to person, place, and time and oriented to person, place and time.   Psychiatric:         Attention and Perception: Attention and perception normal.         Mood and Affect: Mood and affect normal.         Speech: Speech normal.         Behavior: Behavior normal.         Thought Content: Thought content normal.         Cognition and Memory: Cognition and memory normal.         Judgment: Judgment normal.           Procedures  /88   Pulse 64   Ht 165.1 cm (65\")   Wt 98.4 kg (217 lb)   LMP  (LMP Unknown)   SpO2 95%   BMI 36.11 kg/m²   Lab Review:   Lab Results   Component Value Date    WBC 7.94 10/11/2023    HGB 9.5 (L) 10/11/2023    HCT 31.8 (L) 10/11/2023    .3 (H) 10/11/2023     10/11/2023     Lab Results   Component Value Date    GLUCOSE 183 (H) 10/11/2023    BUN 64 (H) 10/11/2023    CREATININE 2.34 (H) 10/11/2023    EGFRIFNONA 25 (L) 12/03/2021    EGFRIFAFRI 31 (L) 09/10/2021    BCR 27.4 (H) 10/11/2023    K 4.9 10/11/2023    CO2 20.0 (L) 10/11/2023    CALCIUM 8.0 (L) 10/11/2023    ALBUMIN 3.6 10/11/2023    AST 17 10/11/2023    ALT 28 10/11/2023     Lab Results   Component Value Date    CHOL 131 06/01/2022    CHOL 196 11/08/2020    CHOL 178 " 09/01/2019    CHLPL 164 11/18/2015     Lab Results   Component Value Date    TRIG 118 06/01/2022    TRIG 68 11/08/2020    TRIG 70 09/01/2019     Lab Results   Component Value Date    HDL 30 (L) 06/01/2022    HDL 52 11/08/2020    HDL 51 09/01/2019     Lab Results   Component Value Date    LDL 79 06/01/2022     (H) 11/08/2020     (H) 09/01/2019       I have reviewed the most recent lab results.       Assessment:          Diagnosis Plan   1. Coronary artery disease involving native coronary artery of native heart with stable angina pectoris (CMS/[facility])  Negative Lexiscan 4/3/24. Increase Isordil to 40 mg twice daily.    2. ST elevation myocardial infarction involving left anterior descending (LAD) coronary artery (CMS/[facility])  11/8/20   3. Status post insertion of drug-eluting stent into left anterior descending (LAD) artery for coronary artery disease  2.75 x 28 mm Xience drug-eluting stent to the proximal to mid left anterior descending artery. Continues on aspirin. Denies bleeding.    4. Essential hypertension   blood pressure is elevated in office today. Pt has only been checking BP at home just prior to taking BP medications. Recommend checking BP after medications have been taken.  Continue nifedipine, isordil, carvedilol and losartan for now.  Monitor and record daily blood pressure. Report readings consistently higher than 130/80 or consistently lower than 100/60.    5. Hyperlipidemia LDL goal <70   management per PCP.  Patient is not on statin.   6. Nonrheumatic aortic valve insufficiency  Mild to moderate AI on echo 10/10/2023.    7. Stage 4 chronic kidney disease  Pt follows with nephrology. Stable.    8. COPD Stable on room air.    9. Class 2 severe obesity due to excess calories with serious comorbidity and body mass index (BMI) of 36.0 to 36.9 in adult ([facility]) Patient's Body mass index is 36.11 kg/m². BMI is above normal parameters. Recommendations include: educational material.     10.   Chronic  combined systolic and diastolic congestive heart failure (HCC)  NYHA class II.  Stage C. EF 46-50%. Compensated. Reviewed signs and symptoms of CHF and what to report with the patient. Patient instructed to restrict sodium and weigh daily. Report weight gain of greater than 2 lbs overnight or 5 lbs in 1 week. Pt verbalized understanding of instructions and plan of care. Continue carvedilol. Consider transition to Entresto. Pt is not a candidate for spironolactone, Jardiance or Farxiga due to GFR <25.           Plan:         1. Increase Isordil to 40 mg twice daily. Continue other medications as previously prescribed.  2. Report any worsening symptoms.  3. Report any signs of bleeding.  4. Continue heart healthy diet and regular exercise as tolerated.   5. Follow up with PCP for blood pressure and cholesterol management and routine lab work.  6. Follow up in one month, or sooner if needed.   7.  Monitor and record daily blood pressure. Report readings consistently higher than 130/80 or consistently lower than 100/60.   8. Reviewed signs and symptoms of CHF and what to report with the patient. Patient instructed to restrict sodium and weigh daily. Report weight gain of greater than 2 lbs overnight or 5 lbs in 1 week. Pt verbalized understanding of instructions and plan of care.

## 2024-04-23 ENCOUNTER — TELEPHONE (OUTPATIENT)
Dept: PULMONOLOGY | Facility: CLINIC | Age: 64
End: 2024-04-23
Payer: MEDICARE

## 2024-04-23 NOTE — TELEPHONE ENCOUNTER
Dr Zamudio's office called stating that the patient was scheduled in their office on 05/28/2024 at 1:30 pm.   We are to call to let the patient know of the appointment.  I called patient and there was no answer. Voicemail box was full so couldn't leave a message to let the patient know this information.

## 2024-04-29 NOTE — TELEPHONE ENCOUNTER
Patient relayed the message about appointment on 05/28/2024 at 1:30 pm with Dr Zamudio. She voiced understanding.

## 2024-05-23 NOTE — PROGRESS NOTES
"[facility] [place] - Infectious Diseases Consult Note    Patient:  Ludivina Ramirez 63 y.o. female  YOB: 1960  MRN: [id number]  Primary Care Physician: Orville Weston MD  REFERRING PROVIDER: Neeta Chavez MD    Chief Complaint skin lesions        History of Present Illness  Ludivina Ramirez presents to [facility] INFECTIOUS DISEASES as a referral from Dr. Chavez for \"skin ulcer, unspecified ulcer stage \"    The patient pointed to a area on her right index finger as the area of concern.  She said she had ended up with a hole there at 1 point.  She thinks it was caused by tea tree oil.    She had mention that she did had a history of histoplasmosis at some point in the past but that was mentioned decades ago with some scarring on her lung at that time.  She was not concerned about histoplasmosis at this time.    She mentioned essentially that she gets various skin lesions and then gets \"so sick\".  She did not have any photos on her phone at this time and says she has a difficult time taking photos of her face.  She then reported having multiple areas on her skin and having a hard time describing them.  She has not had any drainage.  She noted that the area on her face had a scab that came off.  She did have abnormal area on her left upper arm as well and said that when that came up it was incredibly swollen and red and she was very sick but did not want to go to the emergency department.    She feels like she has cardiac issues, some GI issues as well as nodules on her lung that she is more worried about the skin lesions and feels like that there is something systemically wrong causing the skin lesions.    She did reports she saw Dr. Ibrahim many years ago and sounds like ended up getting admitted for periorbital cellulitis.    She also mention being seen at [address] [place] [address] internal medicine several times for skin lesions on her face and was told it was impetigo.    She noted that she had a stool sample " sent by Dr. Weston previously because she thought she saw something in it.  Dr. Weston told her that it was negative.  She is not having any diarrhea but she is having constipation      Past Medical History:   Diagnosis Date    Acute CHF     Acute renal failure (ARF)     Anemia     Asthma     childhood    Bowel disease, inflammatory     Chronic kidney disease     Coronary artery disease     Hypertension     Ischemic colitis     Lung nodule, multiple 2024    Metabolic acidosis     Myocardial infarction 2020    Nonrheumatic aortic (valve) insufficiency     PTSD (post-traumatic stress disorder)    C. difficile colitis      Past Surgical History:   Procedure Laterality Date    CARDIAC CATHETERIZATION N/A 2020    Procedure: Left Heart Cath;  Surgeon: Pierce Buchanan MD;  Location:  [facility] CATH INVASIVE LOCATION;  Service: Cardiovascular;  Laterality: N/A;    CLOSED REDUCTION ANKLE FRACTURE Right     13 screws and a plate    EXTERNAL FIXATION WRIST FRACTURE      INSERTION HEMODIALYSIS CATHETER Right 2022    Procedure: INSERTION OF RIGHT INTERNAL JUGULAR HEMODIALYSIS CATHETER;  Surgeon: Dexter Red MD;  Location:  [facility] OR;  Service: Vascular;  Laterality: Right;    TUBAL ABDOMINAL LIGATION       Family History   Problem Relation Age of Onset    Coronary artery disease Mother     Coronary artery disease Father     Breast cancer Neg Hx      Social History     Socioeconomic History    Marital status: Single   Tobacco Use    Smoking status: Every Day     Current packs/day: 0.00     Average packs/day: 0.5 packs/day for 48.7 years (24.4 ttl pk-yrs)     Types: Cigarettes     Start date:      Last attempt to quit: 2023     Years since quittin.6     Passive exposure: Past    Smokeless tobacco: Never    Tobacco comments:     Smokes about 0.5 pack daily 4/10    Vaping Use    Vaping status: Never Used   Substance and Sexual Activity    Alcohol use: No    Drug use: No    Sexual  activity: Defer     Current Outpatient Medications on File Prior to Visit   Medication Sig    albuterol sulfate  (90 Base) MCG/ACT inhaler Inhale 2 puffs Every 4 (Four) Hours As Needed for Wheezing.    aspirin 81 MG EC tablet Take 1 tablet by mouth Daily.    azelastine (ASTELIN) 0.1 % nasal spray 2 sprays into the nostril(s) as directed by provider 2 (Two) Times a Day.    calcitriol (ROCALTROL) 0.25 MCG capsule Take 1 capsule by mouth Daily.    carvedilol (COREG) 25 MG tablet Take 1 tablet by mouth 2 (Two) Times a Day With Meals.    cloNIDine (CATAPRES) 0.1 MG tablet Take 1 tablet by mouth Every 6 (Six) Hours As Needed for High Blood Pressure (SBP >160).    docusate sodium (COLACE) 250 MG capsule Take 1 capsule by mouth Daily.    fluticasone (FLONASE) 50 MCG/ACT nasal spray 2 sprays into the nostril(s) as directed by provider Every Morning. In each nostril    furosemide (LASIX) 40 MG tablet Take 1 tablet by mouth Daily.    hydroCHLOROthiazide 25 MG tablet Take 1 tablet by mouth Daily.    ipratropium-albuterol (DUO-NEB) 0.5-2.5 mg/3 ml nebulizer Take 3 mL by nebulization 4 (Four) Times a Day.    isosorbide dinitrate (ISORDIL) 40 MG tablet Take 1 tablet by mouth 2 (Two) Times a Day.    losartan (COZAAR) 100 MG tablet Take 1 tablet by mouth Daily.    melatonin 3 MG tablet Take 2 tablets by mouth Every Night.    Bacillus Coagulans-Inulin (ALIGN PREBIOTIC-PROBIOTIC PO) Take 1 capsule by mouth Daily. (Patient not taking: Reported on 5/28/2024)    budesonide-formoterol (SYMBICORT) 160-4.5 MCG/ACT inhaler Inhale 2 puffs 2 (Two) Times a Day. (Patient not taking: Reported on 5/28/2024)    Cholecalciferol 25 MCG (1000 UT) chewable tablet 1 capsule Daily. (Patient not taking: Reported on 5/28/2024)    multivitamin with minerals tablet tablet Take 1 tablet by mouth Daily. (Patient not taking: Reported on 5/28/2024)    nitroglycerin (NITROSTAT) 0.4 MG SL tablet Place 1 tablet under the tongue Every 5 (Five) Minutes As  "Needed for Chest Pain. Take no more than 3 doses in 15 minutes. (Patient not taking: Reported on 5/28/2024)    [DISCONTINUED] NIFEdipine CC (ADALAT CC) 90 MG 24 hr tablet Every 12 (Twelve) Hours. (Patient not taking: Reported on 5/28/2024)    [DISCONTINUED] OLANZapine (zyPREXA) 5 MG tablet Take 1 tablet by mouth Every Night.     No current facility-administered medications on file prior to visit.     Allergies   Allergen Reactions    Codeine Nausea And Vomiting    Sumatriptan Other (See Comments)     Headache        Venous Access Review  Line/IV site: No current IV Access    Antimicrobial Review  Currently on antibiotics/antifungals: No  Start Date of Therapy: NA  If therapy completed, date complete: NA    Objective   Vital Signs:  /98 Comment: MANUAL RIGHT ARM  Pulse 62   Temp 98.2 °F (36.8 °C) (Temporal)   Ht 165.1 cm (65\")   Wt 97.7 kg (215 lb 6.4 oz)   SpO2 98%   BMI 35.84 kg/m²   Estimated body mass index is 35.84 kg/m² as calculated from the following:    Height as of this encounter: 165.1 cm (65\").    Weight as of this encounter: 97.7 kg (215 lb 6.4 oz).             Physical Exam     General: The patient is somewhat anxious appearing sitting in the exam room in no acute distress.  She was noted to be touching  left side of her face and affected finger frequently  Respiratory: Effort even unlabored, she is not conversationally dyspneic  HEENT: Left side of the face near cheek patient has a superficial erosion without any surrounding cellulitis.  No drainage.  Right index finger over PIP with very small superficial erosion with some evidence of healing.  Left upper arm with roman-shaped eschar without any surrounding cellulitis.  She has other areas of small eschars some in a linear pattern.  She has some associated ecchymosis that is somewhat yellow in color.  Psych: Patient was pleasant and cooperative.  Somewhat anxious about skin lesions     DATA REVIEWED:  The following data was reviewed by: " "Alanna Bello MD on 05/28/2024:        CT Chest Without Contrast Diagnostic (10/09/2023 21:13)           Reviewed note from [facility]    Progress Notes by Neeta Chavez MD (04/10/2024 13:00)          Assessment and Plan   Diagnoses and all orders for this visit:    1. Rash and nonspecific skin eruption (Primary)  -     MRSA Screen Culture (Outpatient) - Swab, Nares; Future    2. Multiple pulmonary nodules  Comments:  Being followed by Dr. Chavez.  No mention of concern for infectious etiology    At this point I do not see anything infectious.  She does have eschar and some evidence of excoriated areas on her left upper arm.  Difficult to assess the right index finger as it is almost completely healed as well as the left side of the cheek.  I have asked the patient to take photos anytime and area becomes inflamed, swollen or red.  Have also explained to her that if she is feeling sick when this occurs, she needs to be evaluated and not stay at home.  I let her know that is infectious diseases specialist, I can certainly help with treatment depending on culture data but there is nothing to culture at this time.  I am not aware of any systemic disease that would cause skin lesions like this but would certainly defer to dermatology as this would be their area of expertise.    She did ask about a stool specimen and I and formed her that in general, stool test or not performed on formed stool and it would have to be loose enough to \"take the shape of the container\".    Patient says she has mupirocin at home.  Recommended she keep any areas of nonintact skin clean, covered with thin layer of mupirocin until healed.  Additionally recommended covering areas that she is constantly touching to decrease risk of infection, excoriation, etc.      Patient Instructions   Cover any places that are open to keep from touching  When you get any swelling or redness please take a picture of the area          Follow Up   Return " if symptoms worsen or fail to improve.  Patient was given instructions and counseling regarding her condition or for health maintenance advice. Please see specific information pulled into the AVS if appropriate.

## 2024-05-28 ENCOUNTER — OFFICE VISIT (OUTPATIENT)
Age: 64
End: 2024-05-28
Payer: MEDICARE

## 2024-05-28 ENCOUNTER — LAB (OUTPATIENT)
Dept: LAB | Facility: HOSPITAL | Age: 64
End: 2024-05-28
Payer: MEDICARE

## 2024-05-28 VITALS
HEART RATE: 62 BPM | OXYGEN SATURATION: 98 % | DIASTOLIC BLOOD PRESSURE: 98 MMHG | WEIGHT: 215.4 LBS | HEIGHT: 65 IN | TEMPERATURE: 98.2 F | SYSTOLIC BLOOD PRESSURE: 148 MMHG | BODY MASS INDEX: 35.89 KG/M2

## 2024-05-28 DIAGNOSIS — R21 RASH AND NONSPECIFIC SKIN ERUPTION: ICD-10-CM

## 2024-05-28 DIAGNOSIS — R91.8 MULTIPLE PULMONARY NODULES: ICD-10-CM

## 2024-05-28 DIAGNOSIS — R21 RASH AND NONSPECIFIC SKIN ERUPTION: Primary | ICD-10-CM

## 2024-05-28 PROBLEM — I10 ESSENTIAL (PRIMARY) HYPERTENSION: Status: ACTIVE | Noted: 2022-06-27

## 2024-05-28 PROBLEM — E55.9 VITAMIN D DEFICIENCY: Status: ACTIVE | Noted: 2024-05-28

## 2024-05-28 PROBLEM — B19.20 HEPATITIS C: Status: ACTIVE | Noted: 2022-07-26

## 2024-05-28 PROBLEM — D64.9 ANEMIA: Status: ACTIVE | Noted: 2022-07-19

## 2024-05-28 PROBLEM — N18.6 END STAGE RENAL DISEASE: Status: ACTIVE | Noted: 2022-09-03

## 2024-05-28 PROBLEM — J45.909 UNSPECIFIED ASTHMA, UNCOMPLICATED: Status: ACTIVE | Noted: 2022-07-19

## 2024-05-28 PROBLEM — I21.4 NSTEMI (NON-ST ELEVATED MYOCARDIAL INFARCTION): Status: ACTIVE | Noted: 2024-05-28

## 2024-05-28 PROBLEM — D50.9 IRON DEFICIENCY ANEMIA: Status: ACTIVE | Noted: 2022-11-10

## 2024-05-28 PROBLEM — I25.10 ATHEROSCLEROTIC HEART DISEASE OF NATIVE CORONARY ARTERY WITHOUT ANGINA PECTORIS: Status: ACTIVE | Noted: 2022-07-19

## 2024-05-28 PROBLEM — F43.10 POST-TRAUMATIC STRESS DISORDER, UNSPECIFIED: Status: ACTIVE | Noted: 2022-07-19

## 2024-05-28 PROBLEM — I35.1 NONRHEUMATIC AORTIC (VALVE) INSUFFICIENCY: Status: ACTIVE | Noted: 2022-07-19

## 2024-05-28 PROBLEM — N17.9 ACUTE KIDNEY FAILURE, UNSPECIFIED: Status: ACTIVE | Noted: 2022-06-27

## 2024-05-28 PROBLEM — I50.9 HEART FAILURE, UNSPECIFIED: Status: ACTIVE | Noted: 2022-07-19

## 2024-05-28 PROCEDURE — 87081 CULTURE SCREEN ONLY: CPT

## 2024-05-28 PROCEDURE — 99204 OFFICE O/P NEW MOD 45 MIN: CPT | Performed by: INTERNAL MEDICINE

## 2024-05-28 PROCEDURE — 3080F DIAST BP >= 90 MM HG: CPT | Performed by: INTERNAL MEDICINE

## 2024-05-28 PROCEDURE — 3077F SYST BP >= 140 MM HG: CPT | Performed by: INTERNAL MEDICINE

## 2024-05-28 NOTE — PATIENT INSTRUCTIONS
Cover any places that are open to keep from touching  When you get any swelling or redness please take a picture of the area

## 2024-05-29 LAB — MRSA SPEC QL CULT: NORMAL

## 2024-06-04 ENCOUNTER — PATIENT ROUNDING (BHMG ONLY) (OUTPATIENT)
Age: 64
End: 2024-06-04
Payer: MEDICARE

## 2024-06-04 NOTE — PROGRESS NOTES
June 4, 2024    Hello, may I speak with Ludivina Ramirez?    My name is Tatyana       I am  [place] [facility] [place] [facility]  [facility] [facility] [address] [phone].    Before we get started may I verify your date of birth? 1960    I am calling to officially welcome you to our practice and ask about your recent visit. Is this a good time to talk? yes    Tell me about your visit with us. What things went well?  Everything went great and everyone was very nice.        We're always looking for ways to make our patients' experiences even better. Do you have recommendations on ways we may improve?  no    Overall were you satisfied with your first visit to our practice? yes       I appreciate you taking the time to speak with me today. Is there anything else I can do for you? no      Thank you, and have a great day.

## 2024-06-06 ENCOUNTER — TELEPHONE (OUTPATIENT)
Age: 64
End: 2024-06-06
Payer: MEDICARE

## 2024-06-06 NOTE — TELEPHONE ENCOUNTER
I called patient and let her know her nasal swab was negative for MRSA.  She thanked me for calling.

## 2024-06-17 NOTE — PAYOR COMM NOTE
"REF:  [id number]    [facility]  [place],  [phone]  OR  FAX  [phone]      Ludivina Clemons (60 y.o. Female)     Date of Birth Social Security Number Address Home Phone MRN    1960  [address] [facility] [address] [address] [phone] [id number]    [employer] Marital Status          Cheondoism Single       Admission Date Admission Type Admitting Provider Attending Provider Department, Room/Bed    10/26/20 Emergency Orville Weston MD  [facility] 4B, 444/1    Discharge Date Discharge Disposition Discharge Destination        11/3/2020 Home or Self Care Home             Attending Provider: (none)   Allergies: Codeine, Imitrex [Sumatriptan]    Isolation: None   Infection: None   Code Status: Prior    Ht: 165.1 cm (65\")   Wt: 88.9 kg (196 lb)    Admission Cmt: None   Principal Problem: Acute respiratory failure with hypoxia (CMS/HCC) [J96.01]                 Active Insurance as of 10/26/2020     Primary Coverage     Payor Plan Insurance Group Employer/Plan Group    ANTHEM MEDICARE REPLACEMENT ANTHEM MEDICARE ADVANTAGE [id number]     Payor Plan Address Payor Plan Phone Number Payor Plan Fax Number Effective Dates    [address] [phone]  2017 - None Entered    [facility] [id number]       Subscriber Name Subscriber Birth Date Member ID       LUDIVINA CLEMONS 1960 [id number]                 Emergency Contacts      (Rel.) Home Phone Work Phone Mobile Phone    kenzie thompson (Sister) [phone] -- [phone]    Joshua Clemons () (Son) -- -- [phone]               Discharge Summary      Orville Weston MD at 20 0929          Hospital Discharge Summary    Ludivina Clemons  :  1960  MRN:  [id number]    Admit date:  10/26/2020  Discharge date:      Admitting Physician:    Orville Weston MD    Discharge Diagnoses:      Acute respiratory failure with hypoxia (CMS/HCC)    Iron deficiency anemia    Nonrheumatic aortic valve " Patient called with  has questions about procedure today please call    insufficiency    Acute renal failure (CMS/formerly [facility])    Diastolic CHF, acute (CMS/formerly [facility])    Colitis, ischemic (CMS/formerly [facility])    Congestive heart failure (CMS/formerly [facility])    Chronic kidney disease, stage 4 (severe) (CMS/formerly [facility])    Noncompliance    Hypertensive emergency      Hospital Course:   She was admitted with ARF. SHe was treated in ICU with antibiotics, vent care. SHe was seen by other Specialists who helped with her care. She slowly improved, and was extubated. She has done well, on PO medicine, Her BP has been improved with resuming her PO medicine. SHe is eating and ambulating.     Discharge Medications:         Discharge Medications      New Medications      Instructions Start Date   ferrous sulfate 325 (65 FE) MG tablet   325 mg, Oral, 2 Times Daily With Meals      levoFLOXacin 750 MG tablet  Commonly known as: LEVAQUIN   750 mg, Oral, Every Other Day   Start Date: November 5, 2020     losartan 50 MG tablet  Commonly known as: COZAAR   50 mg, Oral, Every 24 Hours Scheduled   Start Date: November 4, 2020     methylPREDNISolone 4 MG dose pack  Commonly known as: MEDROL   Take as directed on package instructions.      OLANZapine 5 MG tablet  Commonly known as: zyPREXA   5 mg, Oral, 2 times daily         Changes to Medications      Instructions Start Date   cloNIDine 0.1 MG tablet  Commonly known as: CATAPRES  What changed: when to take this   0.1 mg, Oral, Every 6 Hours PRN      NIFEdipine CC 60 MG 24 hr tablet  Commonly known as: ADALAT CC  What changed:   · when to take this  · Another medication with the same name was removed. Continue taking this medication, and follow the directions you see here.   60 mg, Oral, 2 times daily         Continue These Medications      Instructions Start Date   azelastine 0.1 % nasal spray  Commonly known as: ASTELIN   2 sprays, Nasal, 2 Times Daily, Use in each nostril as directed      carvedilol 25 MG tablet  Commonly known as: COREG   25 mg, Oral, 2 Times Daily With Meals      Cholecalciferol  25 MCG (1000 UT) tablet   1,000 Units, Oral, Daily      fluticasone 50 MCG/ACT nasal spray  Commonly known as: FLONASE   2 sprays, Nasal, Daily PRN      hydrALAZINE 25 MG tablet  Commonly known as: APRESOLINE   75 mg, Oral, Every 8 Hours Scheduled      pantoprazole 40 MG EC tablet  Commonly known as: PROTONIX   40 mg, Oral, Daily      spironolactone 25 MG tablet  Commonly known as: ALDACTONE   25 mg, Oral, Daily             Consults:  Pulm, Nephrology, Cardiology    Significant Diagnostic Studies:  See complete admission record      Disposition:   Home in stable condition  Follow up with Orville Weston MD in 1-2 weeks. F/U with Pulm in 6 weeks. F/U with Nephrology and Cardiology    Diet: as tolerated    Activity: as tolertated    Special Instructions: set up Home Health on d/c if pt would like, monitor and record BP on d/c and bring to f/u      The patient or family are to call or return if there are any problems, questions, concerns or change in her condition.     Signed:  Orville Weston MD MD  11/3/2020, 09:29 CST        Electronically signed by Orville Weston MD at 11/03/20 0929

## 2024-07-24 PROCEDURE — 87070 CULTURE OTHR SPECIMN AEROBIC: CPT | Performed by: FAMILY MEDICINE

## 2024-07-24 PROCEDURE — 87147 CULTURE TYPE IMMUNOLOGIC: CPT | Performed by: FAMILY MEDICINE

## 2024-07-24 PROCEDURE — 87205 SMEAR GRAM STAIN: CPT | Performed by: FAMILY MEDICINE

## 2024-08-22 ENCOUNTER — HOSPITAL ENCOUNTER (INPATIENT)
Facility: HOSPITAL | Age: 64
LOS: 4 days | Discharge: HOME | End: 2024-08-26
Attending: EMERGENCY MEDICINE | Admitting: FAMILY MEDICINE
Payer: MEDICARE

## 2024-08-22 ENCOUNTER — APPOINTMENT (OUTPATIENT)
Dept: CARDIOLOGY | Facility: HOSPITAL | Age: 64
End: 2024-08-22
Payer: MEDICARE

## 2024-08-22 ENCOUNTER — APPOINTMENT (OUTPATIENT)
Dept: CT IMAGING | Facility: HOSPITAL | Age: 64
End: 2024-08-22
Payer: MEDICARE

## 2024-08-22 ENCOUNTER — APPOINTMENT (OUTPATIENT)
Dept: NUCLEAR MEDICINE | Facility: HOSPITAL | Age: 64
End: 2024-08-22
Payer: MEDICARE

## 2024-08-22 ENCOUNTER — APPOINTMENT (OUTPATIENT)
Dept: GENERAL RADIOLOGY | Facility: HOSPITAL | Age: 64
End: 2024-08-22
Payer: MEDICARE

## 2024-08-22 DIAGNOSIS — J44.1 ACUTE EXACERBATION OF CHRONIC OBSTRUCTIVE PULMONARY DISEASE (COPD): Primary | ICD-10-CM

## 2024-08-22 DIAGNOSIS — R79.89 POSITIVE D DIMER: ICD-10-CM

## 2024-08-22 DIAGNOSIS — I50.9 CONGESTIVE HEART FAILURE, UNSPECIFIED HF CHRONICITY, UNSPECIFIED HEART FAILURE TYPE: ICD-10-CM

## 2024-08-22 DIAGNOSIS — N18.9 CHRONIC KIDNEY DISEASE, UNSPECIFIED CKD STAGE: ICD-10-CM

## 2024-08-22 LAB
ALBUMIN SERPL-MCNC: 4 G/DL (ref 3.5–5.2)
ALBUMIN/GLOB SERPL: 1.3 G/DL
ALP SERPL-CCNC: 105 U/L (ref 39–117)
ALT SERPL W P-5'-P-CCNC: 13 U/L (ref 1–33)
ANION GAP SERPL CALCULATED.3IONS-SCNC: 10 MMOL/L (ref 5–15)
ARTERIAL PATENCY WRIST A: ABNORMAL
AST SERPL-CCNC: 20 U/L (ref 1–32)
ATMOSPHERIC PRESS: 758 MMHG
B PARAPERT DNA SPEC QL NAA+PROBE: NOT DETECTED
B PERT DNA SPEC QL NAA+PROBE: NOT DETECTED
BASE EXCESS BLDA CALC-SCNC: 3.1 MMOL/L (ref 0–2)
BASOPHILS # BLD AUTO: 0.03 10*3/MM3 (ref 0–0.2)
BASOPHILS NFR BLD AUTO: 0.4 % (ref 0–1.5)
BDY SITE: ABNORMAL
BH CV ECHO MEAS - AI P1/2T: 381.6 MSEC
BH CV ECHO MEAS - AO MAX PG: 9.2 MMHG
BH CV ECHO MEAS - AO MEAN PG: 5.2 MMHG
BH CV ECHO MEAS - AO ROOT DIAM: 3.3 CM
BH CV ECHO MEAS - AO V2 MAX: 151.7 CM/SEC
BH CV ECHO MEAS - AO V2 VTI: 35 CM
BH CV ECHO MEAS - AVA(I,D): 3.1 CM2
BH CV ECHO MEAS - EDV(CUBED): 192.8 ML
BH CV ECHO MEAS - EDV(MOD-SP2): 185 ML
BH CV ECHO MEAS - EDV(MOD-SP4): 159.4 ML
BH CV ECHO MEAS - EF(MOD-SP2): 46.3 %
BH CV ECHO MEAS - EF(MOD-SP4): 49.5 %
BH CV ECHO MEAS - ESV(CUBED): 80.2 ML
BH CV ECHO MEAS - ESV(MOD-SP2): 99.3 ML
BH CV ECHO MEAS - ESV(MOD-SP4): 80.6 ML
BH CV ECHO MEAS - FS: 25.4 %
BH CV ECHO MEAS - IVS/LVPW: 0.99 CM
BH CV ECHO MEAS - IVSD: 1.03 CM
BH CV ECHO MEAS - LA DIMENSION: 4.5 CM
BH CV ECHO MEAS - LAT PEAK E' VEL: 3 CM/SEC
BH CV ECHO MEAS - LV DIASTOLIC VOL/BSA (35-75): 77.3 CM2
BH CV ECHO MEAS - LV MASS(C)D: 242.7 GRAMS
BH CV ECHO MEAS - LV MAX PG: 9.1 MMHG
BH CV ECHO MEAS - LV MEAN PG: 4.7 MMHG
BH CV ECHO MEAS - LV SYSTOLIC VOL/BSA (12-30): 39.1 CM2
BH CV ECHO MEAS - LV V1 MAX: 150.4 CM/SEC
BH CV ECHO MEAS - LV V1 VTI: 32.4 CM
BH CV ECHO MEAS - LVIDD: 5.8 CM
BH CV ECHO MEAS - LVIDS: 4.3 CM
BH CV ECHO MEAS - LVOT AREA: 3.3 CM2
BH CV ECHO MEAS - LVOT DIAM: 2.06 CM
BH CV ECHO MEAS - LVPWD: 1.04 CM
BH CV ECHO MEAS - MED PEAK E' VEL: 4 CM/SEC
BH CV ECHO MEAS - MV A MAX VEL: 109.4 CM/SEC
BH CV ECHO MEAS - MV DEC SLOPE: 444.8 CM/SEC2
BH CV ECHO MEAS - MV DEC TIME: 0.23 SEC
BH CV ECHO MEAS - MV E MAX VEL: 93.6 CM/SEC
BH CV ECHO MEAS - MV E/A: 0.86
BH CV ECHO MEAS - MV MAX PG: 7.7 MMHG
BH CV ECHO MEAS - MV MEAN PG: 3.1 MMHG
BH CV ECHO MEAS - MV V2 VTI: 25.1 CM
BH CV ECHO MEAS - MVA(VTI): 4.3 CM2
BH CV ECHO MEAS - PA V2 MAX: 106.6 CM/SEC
BH CV ECHO MEAS - RV MAX PG: 1.53 MMHG
BH CV ECHO MEAS - RV V1 MAX: 60.6 CM/SEC
BH CV ECHO MEAS - RV V1 VTI: 11.5 CM
BH CV ECHO MEAS - SV(LVOT): 107.5 ML
BH CV ECHO MEAS - SV(MOD-SP2): 85.7 ML
BH CV ECHO MEAS - SV(MOD-SP4): 78.8 ML
BH CV ECHO MEAS - SVI(LVOT): 52.2 ML/M2
BH CV ECHO MEAS - SVI(MOD-SP2): 41.6 ML/M2
BH CV ECHO MEAS - SVI(MOD-SP4): 38.2 ML/M2
BH CV ECHO MEAS - TAPSE (>1.6): 3 CM
BH CV ECHO MEASUREMENTS AVERAGE E/E' RATIO: 26.74
BH CV XLRA - RV BASE: 4.7 CM
BH CV XLRA - RV MID: 2.4 CM
BH CV XLRA - TDI S': 11 CM/SEC
BILIRUB SERPL-MCNC: 0.4 MG/DL (ref 0–1.2)
BODY TEMPERATURE: 37
BUN SERPL-MCNC: 32 MG/DL (ref 8–23)
BUN/CREAT SERPL: 13.5 (ref 7–25)
C PNEUM DNA NPH QL NAA+NON-PROBE: NOT DETECTED
CA-I BLD-MCNC: 4.67 MG/DL (ref 4.6–5.4)
CALCIUM SPEC-SCNC: 9 MG/DL (ref 8.6–10.5)
CHLORIDE SERPL-SCNC: 102 MMOL/L (ref 98–107)
CO2 SERPL-SCNC: 27 MMOL/L (ref 22–29)
COHGB MFR BLD: 1.2 % (ref 0–5)
CREAT SERPL-MCNC: 2.37 MG/DL (ref 0.57–1)
D DIMER PPP FEU-MCNC: 1.33 MCGFEU/ML (ref 0–0.64)
D-LACTATE SERPL-SCNC: 0.9 MMOL/L (ref 0.5–2)
DEPRECATED RDW RBC AUTO: 49.5 FL (ref 37–54)
EGFRCR SERPLBLD CKD-EPI 2021: 22.4 ML/MIN/1.73
EOSINOPHIL # BLD AUTO: 0.2 10*3/MM3 (ref 0–0.4)
EOSINOPHIL NFR BLD AUTO: 2.4 % (ref 0.3–6.2)
EPAP: 8
ERYTHROCYTE [DISTWIDTH] IN BLOOD BY AUTOMATED COUNT: 14.6 % (ref 12.3–15.4)
FLUAV SUBTYP SPEC NAA+PROBE: NOT DETECTED
FLUBV RNA ISLT QL NAA+PROBE: NOT DETECTED
GEN 5 2HR TROPONIN T REFLEX: 25 NG/L
GLOBULIN UR ELPH-MCNC: 3.2 GM/DL
GLUCOSE SERPL-MCNC: 106 MG/DL (ref 65–99)
HADV DNA SPEC NAA+PROBE: NOT DETECTED
HCO3 BLDA-SCNC: 28.6 MMOL/L (ref 20–26)
HCOV 229E RNA SPEC QL NAA+PROBE: NOT DETECTED
HCOV HKU1 RNA SPEC QL NAA+PROBE: NOT DETECTED
HCOV NL63 RNA SPEC QL NAA+PROBE: NOT DETECTED
HCOV OC43 RNA SPEC QL NAA+PROBE: NOT DETECTED
HCT VFR BLD AUTO: 45.9 % (ref 34–46.6)
HCT VFR BLD CALC: 41.8 % (ref 38–51)
HGB BLD-MCNC: 14.2 G/DL (ref 12–15.9)
HGB BLDA-MCNC: 13.7 G/DL (ref 12–16)
HMPV RNA NPH QL NAA+NON-PROBE: NOT DETECTED
HPIV1 RNA ISLT QL NAA+PROBE: NOT DETECTED
HPIV2 RNA SPEC QL NAA+PROBE: NOT DETECTED
HPIV3 RNA NPH QL NAA+PROBE: NOT DETECTED
HPIV4 P GENE NPH QL NAA+PROBE: NOT DETECTED
IMM GRANULOCYTES # BLD AUTO: 0.04 10*3/MM3 (ref 0–0.05)
IMM GRANULOCYTES NFR BLD AUTO: 0.5 % (ref 0–0.5)
INHALED O2 CONCENTRATION: 35 %
IPAP: 16
L PNEUMO1 AG UR QL IA: NEGATIVE
LEFT ATRIUM VOLUME INDEX: 66 ML/M2
LEFT ATRIUM VOLUME: 136 ML
LYMPHOCYTES # BLD AUTO: 1.08 10*3/MM3 (ref 0.7–3.1)
LYMPHOCYTES NFR BLD AUTO: 13 % (ref 19.6–45.3)
Lab: ABNORMAL
M PNEUMO IGG SER IA-ACNC: NOT DETECTED
MCH RBC QN AUTO: 29 PG (ref 26.6–33)
MCHC RBC AUTO-ENTMCNC: 30.9 G/DL (ref 31.5–35.7)
MCV RBC AUTO: 93.9 FL (ref 79–97)
METHGB BLD QL: 0.3 % (ref 0–3)
MODALITY: ABNORMAL
MONOCYTES # BLD AUTO: 0.37 10*3/MM3 (ref 0.1–0.9)
MONOCYTES NFR BLD AUTO: 4.5 % (ref 5–12)
NEUTROPHILS NFR BLD AUTO: 6.57 10*3/MM3 (ref 1.7–7)
NEUTROPHILS NFR BLD AUTO: 79.2 % (ref 42.7–76)
NRBC BLD AUTO-RTO: 0 /100 WBC (ref 0–0.2)
NT-PROBNP SERPL-MCNC: ABNORMAL PG/ML (ref 0–900)
OXYHGB MFR BLDV: 98.3 % (ref 94–99)
PCO2 BLDA: 46.1 MM HG (ref 35–45)
PCO2 TEMP ADJ BLD: 46.1 MM HG (ref 35–45)
PH BLDA: 7.4 PH UNITS (ref 7.35–7.45)
PH, TEMP CORRECTED: 7.4 PH UNITS (ref 7.35–7.45)
PLATELET # BLD AUTO: 173 10*3/MM3 (ref 140–450)
PMV BLD AUTO: 11 FL (ref 6–12)
PO2 BLDA: 123 MM HG (ref 83–108)
PO2 TEMP ADJ BLD: 123 MM HG (ref 83–108)
POTASSIUM BLDA-SCNC: 4.5 MMOL/L (ref 3.5–5.2)
POTASSIUM SERPL-SCNC: 4.8 MMOL/L (ref 3.5–5.2)
PROCALCITONIN SERPL-MCNC: 0.05 NG/ML (ref 0–0.25)
PROT SERPL-MCNC: 7.2 G/DL (ref 6–8.5)
RBC # BLD AUTO: 4.89 10*6/MM3 (ref 3.77–5.28)
RHINOVIRUS RNA SPEC NAA+PROBE: NOT DETECTED
RSV RNA NPH QL NAA+NON-PROBE: NOT DETECTED
S PNEUM AG SPEC QL LA: NEGATIVE
SAO2 % BLDCOA: 99.7 % (ref 94–99)
SARS-COV-2 RNA NPH QL NAA+NON-PROBE: NOT DETECTED
SET MECH RESP RATE: 16
SODIUM BLDA-SCNC: 142 MMOL/L (ref 136–145)
SODIUM SERPL-SCNC: 139 MMOL/L (ref 136–145)
TROPONIN T DELTA: -2 NG/L
TROPONIN T SERPL HS-MCNC: 27 NG/L
VENTILATOR MODE: ABNORMAL
WBC NRBC COR # BLD AUTO: 8.29 10*3/MM3 (ref 3.4–10.8)

## 2024-08-22 PROCEDURE — 83880 ASSAY OF NATRIURETIC PEPTIDE: CPT | Performed by: EMERGENCY MEDICINE

## 2024-08-22 PROCEDURE — 85379 FIBRIN DEGRADATION QUANT: CPT | Performed by: EMERGENCY MEDICINE

## 2024-08-22 PROCEDURE — 87040 BLOOD CULTURE FOR BACTERIA: CPT | Performed by: EMERGENCY MEDICINE

## 2024-08-22 PROCEDURE — 25010000002 NITROGLYCERIN 200 MCG/ML SOLUTION: Performed by: EMERGENCY MEDICINE

## 2024-08-22 PROCEDURE — 71045 X-RAY EXAM CHEST 1 VIEW: CPT

## 2024-08-22 PROCEDURE — 78580 LUNG PERFUSION IMAGING: CPT

## 2024-08-22 PROCEDURE — 99222 1ST HOSP IP/OBS MODERATE 55: CPT | Performed by: INTERNAL MEDICINE

## 2024-08-22 PROCEDURE — 25510000001 PERFLUTREN 6.52 MG/ML SUSPENSION 2 ML VIAL: Performed by: FAMILY MEDICINE

## 2024-08-22 PROCEDURE — 93010 ELECTROCARDIOGRAM REPORT: CPT | Performed by: EMERGENCY MEDICINE

## 2024-08-22 PROCEDURE — 93306 TTE W/DOPPLER COMPLETE: CPT

## 2024-08-22 PROCEDURE — 94799 UNLISTED PULMONARY SVC/PX: CPT

## 2024-08-22 PROCEDURE — 0202U NFCT DS 22 TRGT SARS-COV-2: CPT | Performed by: EMERGENCY MEDICINE

## 2024-08-22 PROCEDURE — 25010000002 FUROSEMIDE PER 20 MG: Performed by: EMERGENCY MEDICINE

## 2024-08-22 PROCEDURE — 85025 COMPLETE CBC W/AUTO DIFF WBC: CPT | Performed by: EMERGENCY MEDICINE

## 2024-08-22 PROCEDURE — 93306 TTE W/DOPPLER COMPLETE: CPT | Performed by: INTERNAL MEDICINE

## 2024-08-22 PROCEDURE — 82375 ASSAY CARBOXYHB QUANT: CPT

## 2024-08-22 PROCEDURE — 93005 ELECTROCARDIOGRAM TRACING: CPT | Performed by: EMERGENCY MEDICINE

## 2024-08-22 PROCEDURE — 25010000002 METHYLPREDNISOLONE PER 125 MG: Performed by: EMERGENCY MEDICINE

## 2024-08-22 PROCEDURE — 25010000002 FUROSEMIDE PER 20 MG: Performed by: FAMILY MEDICINE

## 2024-08-22 PROCEDURE — 0 TECHNETIUM ALBUMIN AGGREGATED: Performed by: FAMILY MEDICINE

## 2024-08-22 PROCEDURE — 99285 EMERGENCY DEPT VISIT HI MDM: CPT

## 2024-08-22 PROCEDURE — 84145 PROCALCITONIN (PCT): CPT | Performed by: EMERGENCY MEDICINE

## 2024-08-22 PROCEDURE — 36600 WITHDRAWAL OF ARTERIAL BLOOD: CPT

## 2024-08-22 PROCEDURE — 25010000002 METHYLPREDNISOLONE PER 40 MG: Performed by: FAMILY MEDICINE

## 2024-08-22 PROCEDURE — 25010000002 ENOXAPARIN PER 10 MG: Performed by: EMERGENCY MEDICINE

## 2024-08-22 PROCEDURE — 94640 AIRWAY INHALATION TREATMENT: CPT

## 2024-08-22 PROCEDURE — 25010000002 LABETALOL 5 MG/ML SOLUTION: Performed by: EMERGENCY MEDICINE

## 2024-08-22 PROCEDURE — 25010000002 MAGNESIUM SULFATE 2 GM/50ML SOLUTION: Performed by: EMERGENCY MEDICINE

## 2024-08-22 PROCEDURE — 36415 COLL VENOUS BLD VENIPUNCTURE: CPT

## 2024-08-22 PROCEDURE — 94660 CPAP INITIATION&MGMT: CPT

## 2024-08-22 PROCEDURE — 87449 NOS EACH ORGANISM AG IA: CPT | Performed by: INTERNAL MEDICINE

## 2024-08-22 PROCEDURE — 84484 ASSAY OF TROPONIN QUANT: CPT | Performed by: FAMILY MEDICINE

## 2024-08-22 PROCEDURE — 80053 COMPREHEN METABOLIC PANEL: CPT | Performed by: EMERGENCY MEDICINE

## 2024-08-22 PROCEDURE — 82805 BLOOD GASES W/O2 SATURATION: CPT

## 2024-08-22 PROCEDURE — A9540 TC99M MAA: HCPCS | Performed by: FAMILY MEDICINE

## 2024-08-22 PROCEDURE — 83605 ASSAY OF LACTIC ACID: CPT | Performed by: EMERGENCY MEDICINE

## 2024-08-22 PROCEDURE — 83050 HGB METHEMOGLOBIN QUAN: CPT

## 2024-08-22 RX ORDER — ACETAMINOPHEN 500 MG
1000 TABLET ORAL ONCE
Status: COMPLETED | OUTPATIENT
Start: 2024-08-22 | End: 2024-08-22

## 2024-08-22 RX ORDER — ASPIRIN 81 MG/1
81 TABLET ORAL DAILY
Status: DISCONTINUED | OUTPATIENT
Start: 2024-08-22 | End: 2024-08-26 | Stop reason: HOSPADM

## 2024-08-22 RX ORDER — ENOXAPARIN SODIUM 100 MG/ML
30 INJECTION SUBCUTANEOUS EVERY 24 HOURS
Status: DISCONTINUED | OUTPATIENT
Start: 2024-08-23 | End: 2024-08-26 | Stop reason: HOSPADM

## 2024-08-22 RX ORDER — ACETAMINOPHEN 325 MG/1
650 TABLET ORAL EVERY 6 HOURS PRN
Status: DISCONTINUED | OUTPATIENT
Start: 2024-08-22 | End: 2024-08-26 | Stop reason: HOSPADM

## 2024-08-22 RX ORDER — MAGNESIUM SULFATE HEPTAHYDRATE 40 MG/ML
2 INJECTION, SOLUTION INTRAVENOUS ONCE
Status: COMPLETED | OUTPATIENT
Start: 2024-08-22 | End: 2024-08-22

## 2024-08-22 RX ORDER — CALCITRIOL 0.25 UG/1
0.25 CAPSULE, LIQUID FILLED ORAL DAILY
Status: DISCONTINUED | OUTPATIENT
Start: 2024-08-22 | End: 2024-08-26 | Stop reason: HOSPADM

## 2024-08-22 RX ORDER — NITROGLYCERIN 20 MG/100ML
5-200 INJECTION INTRAVENOUS
Status: DISCONTINUED | OUTPATIENT
Start: 2024-08-22 | End: 2024-08-26 | Stop reason: HOSPADM

## 2024-08-22 RX ORDER — DOXYCYCLINE 100 MG/1
100 TABLET ORAL EVERY 12 HOURS SCHEDULED
Status: DISCONTINUED | OUTPATIENT
Start: 2024-08-22 | End: 2024-08-26 | Stop reason: HOSPADM

## 2024-08-22 RX ORDER — FLUTICASONE PROPIONATE 50 MCG
2 SPRAY, SUSPENSION (ML) NASAL EVERY MORNING
Status: DISCONTINUED | OUTPATIENT
Start: 2024-08-23 | End: 2024-08-26 | Stop reason: HOSPADM

## 2024-08-22 RX ORDER — AZELASTINE 1 MG/ML
2 SPRAY, METERED NASAL 2 TIMES DAILY
Status: DISCONTINUED | OUTPATIENT
Start: 2024-08-22 | End: 2024-08-26 | Stop reason: HOSPADM

## 2024-08-22 RX ORDER — METHYLPREDNISOLONE SODIUM SUCCINATE 40 MG/ML
40 INJECTION, POWDER, LYOPHILIZED, FOR SOLUTION INTRAMUSCULAR; INTRAVENOUS EVERY 6 HOURS
Status: DISCONTINUED | OUTPATIENT
Start: 2024-08-22 | End: 2024-08-22

## 2024-08-22 RX ORDER — CARVEDILOL 25 MG/1
25 TABLET ORAL 2 TIMES DAILY WITH MEALS
Status: DISCONTINUED | OUTPATIENT
Start: 2024-08-22 | End: 2024-08-26 | Stop reason: HOSPADM

## 2024-08-22 RX ORDER — ACETAMINOPHEN 650 MG/1
650 SUPPOSITORY RECTAL EVERY 4 HOURS PRN
Status: DISCONTINUED | OUTPATIENT
Start: 2024-08-22 | End: 2024-08-26 | Stop reason: HOSPADM

## 2024-08-22 RX ORDER — METHYLPREDNISOLONE SODIUM SUCCINATE 40 MG/ML
40 INJECTION, POWDER, LYOPHILIZED, FOR SOLUTION INTRAMUSCULAR; INTRAVENOUS EVERY 12 HOURS
Status: DISCONTINUED | OUTPATIENT
Start: 2024-08-23 | End: 2024-08-23

## 2024-08-22 RX ORDER — ALBUTEROL SULFATE 0.83 MG/ML
2.5 SOLUTION RESPIRATORY (INHALATION) ONCE
Status: COMPLETED | OUTPATIENT
Start: 2024-08-22 | End: 2024-08-22

## 2024-08-22 RX ORDER — DOCUSATE SODIUM 100 MG/1
100 CAPSULE, LIQUID FILLED ORAL 2 TIMES DAILY
Status: DISCONTINUED | OUTPATIENT
Start: 2024-08-22 | End: 2024-08-26 | Stop reason: HOSPADM

## 2024-08-22 RX ORDER — FUROSEMIDE 10 MG/ML
40 INJECTION INTRAMUSCULAR; INTRAVENOUS ONCE
Status: COMPLETED | OUTPATIENT
Start: 2024-08-22 | End: 2024-08-22

## 2024-08-22 RX ORDER — ENOXAPARIN SODIUM 100 MG/ML
1 INJECTION SUBCUTANEOUS ONCE
Status: COMPLETED | OUTPATIENT
Start: 2024-08-22 | End: 2024-08-22

## 2024-08-22 RX ORDER — METHYLPREDNISOLONE SODIUM SUCCINATE 125 MG/2ML
125 INJECTION, POWDER, LYOPHILIZED, FOR SOLUTION INTRAMUSCULAR; INTRAVENOUS ONCE
Status: COMPLETED | OUTPATIENT
Start: 2024-08-22 | End: 2024-08-22

## 2024-08-22 RX ORDER — LABETALOL HYDROCHLORIDE 5 MG/ML
20 INJECTION, SOLUTION INTRAVENOUS ONCE
Status: COMPLETED | OUTPATIENT
Start: 2024-08-22 | End: 2024-08-22

## 2024-08-22 RX ORDER — IPRATROPIUM BROMIDE AND ALBUTEROL SULFATE 2.5; .5 MG/3ML; MG/3ML
3 SOLUTION RESPIRATORY (INHALATION) 4 TIMES DAILY
COMMUNITY

## 2024-08-22 RX ORDER — IPRATROPIUM BROMIDE AND ALBUTEROL SULFATE 2.5; .5 MG/3ML; MG/3ML
3 SOLUTION RESPIRATORY (INHALATION) ONCE
Status: COMPLETED | OUTPATIENT
Start: 2024-08-22 | End: 2024-08-22

## 2024-08-22 RX ORDER — IPRATROPIUM BROMIDE AND ALBUTEROL SULFATE 2.5; .5 MG/3ML; MG/3ML
3 SOLUTION RESPIRATORY (INHALATION)
Status: DISCONTINUED | OUTPATIENT
Start: 2024-08-22 | End: 2024-08-26 | Stop reason: HOSPADM

## 2024-08-22 RX ORDER — FUROSEMIDE 10 MG/ML
20 INJECTION INTRAMUSCULAR; INTRAVENOUS EVERY 12 HOURS
Status: DISCONTINUED | OUTPATIENT
Start: 2024-08-22 | End: 2024-08-23

## 2024-08-22 RX ADMIN — ALBUTEROL SULFATE 2.5 MG: 2.5 SOLUTION RESPIRATORY (INHALATION) at 09:30

## 2024-08-22 RX ADMIN — IPRATROPIUM BROMIDE AND ALBUTEROL SULFATE 3 ML: 2.5; .5 SOLUTION RESPIRATORY (INHALATION) at 09:30

## 2024-08-22 RX ADMIN — ENOXAPARIN SODIUM 100 MG: 100 INJECTION SUBCUTANEOUS at 15:07

## 2024-08-22 RX ADMIN — METHYLPREDNISOLONE SODIUM SUCCINATE 40 MG: 40 INJECTION, POWDER, FOR SOLUTION INTRAMUSCULAR; INTRAVENOUS at 15:07

## 2024-08-22 RX ADMIN — ACETAMINOPHEN 650 MG: 325 TABLET ORAL at 20:13

## 2024-08-22 RX ADMIN — LABETALOL HYDROCHLORIDE 20 MG: 5 INJECTION INTRAVENOUS at 10:11

## 2024-08-22 RX ADMIN — NITROGLYCERIN 5 MCG/MIN: 20 INJECTION INTRAVENOUS at 11:13

## 2024-08-22 RX ADMIN — DOCUSATE SODIUM 100 MG: 100 CAPSULE, LIQUID FILLED ORAL at 20:13

## 2024-08-22 RX ADMIN — FUROSEMIDE 20 MG: 10 INJECTION, SOLUTION INTRAMUSCULAR; INTRAVENOUS at 17:33

## 2024-08-22 RX ADMIN — AZELASTINE HYDROCHLORIDE 2 SPRAY: 137 SPRAY, METERED NASAL at 20:14

## 2024-08-22 RX ADMIN — METHYLPREDNISOLONE SODIUM SUCCINATE 125 MG: 125 INJECTION, POWDER, FOR SOLUTION INTRAMUSCULAR; INTRAVENOUS at 09:50

## 2024-08-22 RX ADMIN — DOXYCYCLINE 100 MG: 100 TABLET, FILM COATED ORAL at 20:13

## 2024-08-22 RX ADMIN — KIT FOR THE PREPARATION OF TECHNETIUM TC 99M ALBUMIN AGGREGATED 1 DOSE: 2.5 INJECTION, POWDER, FOR SOLUTION INTRAVENOUS at 14:42

## 2024-08-22 RX ADMIN — PERFLUTREN 10 ML: 6.52 INJECTION, SUSPENSION INTRAVENOUS at 14:58

## 2024-08-22 RX ADMIN — MAGNESIUM SULFATE HEPTAHYDRATE 2 G: 2 INJECTION, SOLUTION INTRAVENOUS at 09:50

## 2024-08-22 RX ADMIN — IPRATROPIUM BROMIDE AND ALBUTEROL SULFATE 3 ML: 2.5; .5 SOLUTION RESPIRATORY (INHALATION) at 20:28

## 2024-08-22 RX ADMIN — ACETAMINOPHEN 1000 MG: 500 TABLET, FILM COATED ORAL at 10:11

## 2024-08-22 RX ADMIN — CARVEDILOL 25 MG: 25 TABLET, FILM COATED ORAL at 17:33

## 2024-08-22 RX ADMIN — CALCITRIOL 0.25 MCG: 0.25 CAPSULE ORAL at 15:07

## 2024-08-22 RX ADMIN — ASPIRIN 81 MG: 81 TABLET, COATED ORAL at 15:07

## 2024-08-22 RX ADMIN — FUROSEMIDE 40 MG: 10 INJECTION, SOLUTION INTRAVENOUS at 11:12

## 2024-08-22 NOTE — CONSULTS
Inpatient Consult Note            NAME: Ludivina Ramirez  MRN: [id number]  AGE: 64 y.o.            : 1960  ADMISSION DATE: 2024  PRIMARY CARE PROVIDER: Orville Weston MD  ATTENDING PHYSICIAN: Orville Weston MD               Reason for Consult:  We have been consulted by Orville Weston MD to see Ludivina Ramirez for shortness of breath.    HPI:    Mrs. Ramirez is a 64-year-old female who presented to [facility] ED due to shortness of breath.  Past medical history includes COPD, former smoker, pulmonary nodules, MARY on CPAP, emphysema, chronic hypoxic respiratory failure on supplemental oxygen, combined diastolic and systolic CHF, hypertension, stage IV CKD, pulmonary hypertension, chronic dyspnea.    Patient states she presented to the ED this morning due to shortness of breath worse than her baseline.  She has been having increased shortness of breath over the past 3 days.  She complains of associated wheezing and increased cough.  States that her cough is normally nonproductive.  However her current cough is productive with difficulty expectorating.  Complains of associated chills.  Denies any fever or sick contacts.  This is the patient's first admission for COPD this year.    Patient typically follows with Dr. Chavez, she was last seen in April of this year.  Patient is a former smoker who quit 1 week ago.  Does admit to recent methamphetamine use.    Review of Systems:  A full 12-point review of systems was performed and was negative except as documented in the HPI.               Social History:  Smoking: Former who quit 1 week ago.  Admits to methamphetamine use.  No history of exposure to industrial dusts, fumes, or asbestos.            Physical Exam:  General: Well appearing, in no respiratory distress  Head: Normocephalic, atraumatic  Eyes: Conjunctiva clear, sclera non-icteric  Neck: Supple without stridor  Heart: Regular rate and rhythm, no murmur  Lungs: Diminished bilaterally with  mild rhonchi, no wheezing  Abdomen: Soft, nontender  MSK/extremities: No cyanosis or edema  Neurologic: AOx3, grossly no focal deficits      Imaging Review:  I personally reviewed the chest x-ray done on this admission:  Chest x-ray showed hyperinflated lungs with a known right-sided pulmonary nodule, no acute infiltrate or opacity.      PFTs Reivew:  PFTs from 2022 showed FEV1 of 55%            Problem List:  COPD with acute exacerbation  Acute on chronic hypoxic respiratory failure  MARY on CPAP  Tobacco and methamphetamine use  Pulmonary hypertension  Combined diastolic and systolic CHF  Pulmonary nodules  Centrilobular emphysema      Pulmonary Assessment:  Patient is a 64-year-old female admitted for COPD acute worsening of her chronic dyspnea.  She has been seen by cardiology for questionable volume overload.  We have been asked to evaluate for COPD exacerbation.  Patient does appeared to be in acute exacerbation of her COPD.  She notes worsening dyspnea and cough from her baseline.  Given her newly productive cough we will start her on a 5-day course of doxycycline.  Will decrease steroids to 40 mg IV twice daily.  As respiratory status improves we can transition to prednisone.      Recommendations:   -Supplemental oxygen as needed and wean as tolerated, goal O2 sat of 88-92%  -Chest x-ray reviewed as above, VQ scan pending  -Sputum culture, MRSA swab, urinary antigens, RPP  -Continue NIV with all sleep, can change to CPAP mode  -ABG and TTE reviewed  -Start doxycycline  -Continue scheduled nebs  -Continue IV steroids, transition to prednisone as she improves  -Frequent incentive spirometer and CPT given her productive cough  -Ambulate as able      Thank you for allowing us to participate in this patient's care. We will continue to follow.              Past Medical History:   Past Medical History:   Diagnosis Date    Acute CHF     Acute renal failure (ARF)     Anemia     Asthma     childhood    Bowel disease,  inflammatory     Chronic kidney disease     Coronary artery disease     Hypertension     Ischemic colitis     Lung nodule, multiple 04/03/2024    Metabolic acidosis     Myocardial infarction 11/07/2020    Nonrheumatic aortic (valve) insufficiency     PTSD (post-traumatic stress disorder)          Past Surgical History:   Past Surgical History:   Procedure Laterality Date    CARDIAC CATHETERIZATION N/A 11/08/2020    Procedure: Left Heart Cath;  Surgeon: Pierce Buchanan MD;  Location:  [facility] CATH [facility]VASIVE LOCATION;  Service: Cardiovascular;  Laterality: N/A;    CLOSED REDUCTION ANKLE FRACTURE Right 2019    13 screws and a plate    EXTERNAL FIXATION WRIST FRACTURE      INSERTION HEMODIALYSIS CATHETER Right 06/03/2022    Procedure: INSERTION OF RIGHT INTERNAL JUGULAR HEMODIALYSIS CATHETER;  Surgeon: Dexter Red MD;  Location:  [facility] OR;  Service: Vascular;  Laterality: Right;    TUBAL ABDOMINAL LIGATION           Family History:   Family History   Problem Relation Age of Onset    Coronary artery disease Mother     Coronary artery disease Father     Breast cancer Neg Hx          Medications:   Prior to Admission medications    Medication Sig Start Date End Date Taking? Authorizing Provider   aspirin 81 MG EC tablet Take 1 tablet by mouth Daily. 2/7/22  Yes Destini Gaitan APRN   calcitriol (ROCALTROL) 0.25 MCG capsule Take 1 capsule by mouth Daily.   Yes Edwin Wies MD   carvedilol (COREG) 25 MG tablet Take 1 tablet by mouth 2 (Two) Times a Day With Meals.   Yes Edwin Wise MD   cloNIDine (CATAPRES) 0.1 MG tablet Take 1 tablet by mouth Every 6 (Six) Hours As Needed for High Blood Pressure (SBP >160). 10/11/23  Yes Orville Weston MD   docusate sodium (COLACE) 250 MG capsule Take 1 capsule by mouth Daily. 6/22/23  Yes Edwin Wise MD   fluticasone (FLONASE) 50 MCG/ACT nasal spray 2 sprays into the nostril(s) as directed by provider Every Morning. In each nostril 8/2/23  Yes  Neeta Chavez MD   furosemide (LASIX) 40 MG tablet Take 1 tablet by mouth Daily. 10/12/23  Yes Orville Weston MD   hydroCHLOROthiazide 25 MG tablet Take 1 tablet by mouth Daily. 2/26/24  Yes Destini Gaitan APRN   ipratropium-albuterol (DUO-NEB) 0.5-2.5 mg/3 ml nebulizer Take 3 mL by nebulization 4 (Four) Times a Day. prn   Yes ProviderEdwin MD   isosorbide dinitrate (ISORDIL) 40 MG tablet Take 1 tablet by mouth 2 (Two) Times a Day. 4/16/24  Yes Destini Gaitan APRN   losartan (COZAAR) 100 MG tablet Take 1 tablet by mouth Daily.   Yes ProviderEdwin MD   melatonin 3 MG tablet Take 2 tablets by mouth Every Night. 6/10/22  Yes Orville Weston MD   albuterol sulfate  (90 Base) MCG/ACT inhaler Inhale 2 puffs Every 4 (Four) Hours As Needed for Wheezing. 8/2/23   Neeta Chavez MD   nitroglycerin (NITROSTAT) 0.4 MG SL tablet Place 1 tablet under the tongue Every 5 (Five) Minutes As Needed for Chest Pain. Take no more than 3 doses in 15 minutes.    Provider, MD Edwin   azelastine (ASTELIN) 0.1 % nasal spray 2 sprays into the nostril(s) as directed by provider 2 (Two) Times a Day.  Patient not taking: Reported on 8/22/2024 8/2/23 8/22/24  Neeta Chavez MD   ipratropium-albuterol (DUO-NEB) 0.5-2.5 mg/3 ml nebulizer Take 3 mL by nebulization 4 (Four) Times a Day.  Patient taking differently: Take 3 mL by nebulization 4 (Four) Times a Day As Needed for Wheezing or Shortness of Air. 8/2/23 8/22/24  Neeta Chavez MD   OLANZapine (zyPREXA) 5 MG tablet Take 1 tablet by mouth Every Night. 6/10/22 8/21/22  Orville Weston MD         Allergies:   Codeine and Sumatriptan      Results Review:   Results from last 7 days   Lab Units 08/22/24  0952 08/22/24  0926   SODIUM mmol/L  --  139   SODIUM, ARTERIAL mmol/L 142  --    POTASSIUM mmol/L  --  4.8   CHLORIDE mmol/L  --  102   CO2 mmol/L  --  27.0   BUN mg/dL  --  32*   CALCIUM mg/dL  --  9.0     Results from last 7  "days   Lab Units 08/22/24  0926   WBC 10*3/mm3 8.29   HEMOGLOBIN g/dL 14.2   HEMATOCRIT % 45.9   PLATELETS 10*3/mm3 173       Visit Vitals  BP (!) 175/103 (BP Location: Right arm, Patient Position: Sitting)   Pulse 77   Temp 97.8 °F (36.6 °C) (Oral)   Resp 18   Ht 165 cm (64.96\")   Wt 99.8 kg (220 lb 0.3 oz)   LMP  (LMP Unknown)   SpO2 90%   BMI 36.66 kg/m²                Rob Renee, DO  Pulmonary/Critical Care  8/22/2024  17:36 CDT    "

## 2024-08-22 NOTE — ED NOTES
Nursing report ED to floor  Ludivina Ramirez  64 y.o.  female    HPI:   Chief Complaint   Patient presents with    Shortness of Breath       Admitting doctor:   Orville Weston MD    Consulting provider(s):  Consults       No orders found from 7/24/2024 to 8/23/2024.             Admitting diagnosis:   The primary encounter diagnosis was Acute exacerbation of chronic obstructive pulmonary disease (COPD). Diagnoses of Congestive heart failure, unspecified HF chronicity, unspecified heart failure type, Chronic kidney disease, unspecified CKD stage, and Positive D dimer were also pertinent to this visit.    Code status:   Current Code Status       Date Active Code Status Order ID Comments User Context       Prior            Allergies:   Codeine and Sumatriptan    Intake and Output    Intake/Output Summary (Last 24 hours) at 8/22/2024 1125  Last data filed at 8/22/2024 1112  Gross per 24 hour   Intake 50 ml   Output --   Net 50 ml       Weight:       08/22/24  0911   Weight: 99.8 kg (220 lb)       Most recent vitals:   Vitals:    08/22/24 1001 08/22/24 1031 08/22/24 1112 08/22/24 1116   BP: (!) 200/110 158/99 (!) 195/106 (!) 183/104   BP Location: Left arm Left arm  Left arm   Patient Position: Lying Lying  Lying   Pulse: 69 54 57 57   Resp: 24 20  20   Temp:       TempSrc:       SpO2: 99% 97%  100%   Weight:       Height:         Oxygen Therapy: .    Active LDAs/IV Access:   Lines, Drains & Airways       Active LDAs       Name Placement date Placement time Site Days    Peripheral IV 08/22/24 0919 Posterior;Right Hand 08/22/24  0919  Hand  less than 1                    Labs (abnormal labs have a star):   Labs Reviewed   COMPREHENSIVE METABOLIC PANEL - Abnormal; Notable for the following components:       Result Value    Glucose 106 (*)     BUN 32 (*)     Creatinine 2.37 (*)     eGFR 22.4 (*)     All other components within normal limits    Narrative:     GFR Normal >60  Chronic Kidney Disease <60  Kidney Failure <15    "  BNP (IN-HOUSE) - Abnormal; Notable for the following components:    proBNP 13,903.0 (*)     All other components within normal limits    Narrative:     This assay is used as an aid in the diagnosis of individuals suspected of having heart failure. It can be used as an aid in the diagnosis of acute decompensated heart failure (ADHF) in patients presenting with signs and symptoms of ADHF to the emergency department (ED). In addition, NT-proBNP of <300 pg/mL indicates ADHF is not likely.    Age Range Result Interpretation  NT-proBNP Concentration (pg/mL:      <50             Positive            >450                   Gray                 300-450                    Negative             <300    50-75           Positive            >900                  Gray                300-900                  Negative            <300      >75             Positive            >1800                  Gray                300-1800                  Negative            <300   D-DIMER, QUANTITATIVE - Abnormal; Notable for the following components:    D-Dimer, Quantitative 1.33 (*)     All other components within normal limits    Narrative:     According to the assay 's published package insert, a normal (<0.50 MCGFEU/mL) D-dimer result in conjunction with a non-high clinical probability assessment, excludes deep vein thrombosis (DVT) and pulmonary embolism (PE) with high sensitivity.    D-dimer values increase with age and this can make VTE exclusion of an older population difficult. To address this, the [employer], based on best available evidence and recent guidelines, recommends that clinicians use age-adjusted D-dimer thresholds in patients greater than 50 years of age with: a) a low probability of PE who do not meet all Pulmonary Embolism Rule Out Criteria, or b) in those with intermediate probability of PE.   The formula for an age-adjusted D-dimer cut-off is \"age/100\".  For example, a 60 year old " "patient would have an age-adjusted cut-off of 0.60 MCGFEU/mL and an 80 year old 0.80 MCGFEU/mL.   CBC WITH AUTO DIFFERENTIAL - Abnormal; Notable for the following components:    MCHC 30.9 (*)     Neutrophil % 79.2 (*)     Lymphocyte % 13.0 (*)     Monocyte % 4.5 (*)     All other components within normal limits   BLOOD GAS, ARTERIAL W/CO-OXIMETRY - Abnormal; Notable for the following components:    pCO2, Arterial 46.1 (*)     pO2, Arterial 123.0 (*)     HCO3, Arterial 28.6 (*)     Base Excess, Arterial 3.1 (*)     O2 Saturation, Arterial 99.7 (*)     pCO2, Temperature Corrected 46.1 (*)     pO2, Temperature Corrected 123 (*)     All other components within normal limits   RESPIRATORY PANEL PCR W/ COVID-19 (SARS-COV-2), NP SWAB IN UTM/VTP, 2 HR TAT - Normal    Narrative:     In the setting of a positive respiratory panel with a viral infection PLUS a negative procalcitonin without other underlying concern for bacterial infection, consider observing off antibiotics or discontinuation of antibiotics and continue supportive care. If the respiratory panel is positive for atypical bacterial infection (Bordetella pertussis, Chlamydophila pneumoniae, or Mycoplasma pneumoniae), consider antibiotic de-escalation to target atypical bacterial infection.   LACTIC ACID, PLASMA - Normal   PROCALCITONIN - Normal    Narrative:     As a Marker for Sepsis (Non-Neonates):    1. <0.5 ng/mL represents a low risk of severe sepsis and/or septic shock.  2. >2 ng/mL represents a high risk of severe sepsis and/or septic shock.    As a Marker for Lower Respiratory Tract Infections that require antibiotic therapy:    PCT on Admission    Antibiotic Therapy       6-12 Hrs later    >0.5                Strongly Recommended  >0.25 - <0.5        Recommended   0.1 - 0.25          Discouraged              Remeasure/reassess PCT  <0.1                Strongly Discouraged     Remeasure/reassess PCT    As 28 day mortality risk marker: \"Change in " "Procalcitonin Result\" (>80% or <=80%) if Day 0 (or Day 1) and Day 4 values are available. Refer to http://www.[facility]-pct-calculator.com    Change in PCT <=80%  A decrease of PCT levels below or equal to 80% defines a positive change in PCT test result representing a higher risk for 28-day all-cause mortality of patients diagnosed with severe sepsis for septic shock.    Change in PCT >80%  A decrease of PCT levels of more than 80% defines a negative change in PCT result representing a lower risk for 28-day all-cause mortality of patients diagnosed with severe sepsis or septic shock.      BLOOD CULTURE   BLOOD CULTURE   BLOOD GAS, ARTERIAL   CBC AND DIFFERENTIAL    Narrative:     The following orders were created for panel order CBC & Differential.  Procedure                               Abnormality         Status                     ---------                               -----------         ------                     CBC Auto Differential[[id number]]        Abnormal            Final result                 Please view results for these tests on the individual orders.       Meds given in ED:   Medications   nitroglycerin (TRIDIL) 200 mcg/ml infusion (5 mcg/min Intravenous New Bag 8/22/24 1113)   Enoxaparin Sodium (LOVENOX) syringe 100 mg (has no administration in time range)   magnesium sulfate 2g/50 mL (PREMIX) infusion (0 g Intravenous Stopped 8/22/24 1112)   methylPREDNISolone sodium succinate (SOLU-Medrol) injection 125 mg (125 mg Intravenous Given 8/22/24 0950)   ipratropium-albuterol (DUO-NEB) nebulizer solution 3 mL (3 mL Nebulization Given 8/22/24 0930)   albuterol (PROVENTIL) nebulizer solution 0.083% 2.5 mg/3mL (2.5 mg Nebulization Given 8/22/24 0930)   labetalol (NORMODYNE,TRANDATE) injection 20 mg (20 mg Intravenous Given 8/22/24 1011)   acetaminophen (TYLENOL) tablet 1,000 mg (1,000 mg Oral Given 8/22/24 1011)   furosemide (LASIX) injection 40 mg (40 mg Intravenous Given 8/22/24 1112)     nitroglycerin, " 5-200 mcg/min, Last Rate: 5 mcg/min (08/22/24 1113)         NIH Stroke Scale:       Isolation/Infection(s):  No active isolations   C.difficile     COVID Testing  Collected . yes  Resulted . negative    Nursing report ED to floor:  Mental status: . A/ox4  Ambulatory status: . SBA  Precautions: .none    ED nurse phone extentsion- ..  [phone]

## 2024-08-22 NOTE — PLAN OF CARE
Goal Outcome Evaluation:  Plan of Care Reviewed With: patient        Progress: improving  Outcome Evaluation: SR 65-77 on tele. BP's have been elevated, titrating nitro gtt as ordered. standby assist to BR. I&O monitoring. 6L O2. Call light in reach.

## 2024-08-22 NOTE — H&P
History and Physical      CHIEF COMPLAINT:  SOA, Acute Resp Failure    Reason for Admission:  As Above    History Obtained From:  chart, patient    HISTORY OF PRESENT ILLNESS:      The patient is a 64 y.o. female who was admitted from ER with 3 day  h/o worsening SOA. She says SOA worse with activity. She has had a cough, today. She denies any chest pain. She denies fever. She has had no worsening edema or weight gain. She does take all of her medicine regularly. She has had no GI issues. She has home O2 that she does not use regularly. She does use CPAP with sleeping.   Past Medical History:    Past Medical History:   Diagnosis Date    Acute CHF     Acute renal failure (ARF)     Anemia     Asthma     childhood    Bowel disease, inflammatory     Chronic kidney disease     Coronary artery disease     Hypertension     Ischemic colitis     Lung nodule, multiple 04/03/2024    Metabolic acidosis     Myocardial infarction 11/07/2020    Nonrheumatic aortic (valve) insufficiency     PTSD (post-traumatic stress disorder)      Past Surgical History:    Past Surgical History:   Procedure Laterality Date    CARDIAC CATHETERIZATION N/A 11/08/2020    Procedure: Left Heart Cath;  Surgeon: Pierce Buchanan MD;  Location:  [facility] CATH INVASIVE LOCATION;  Service: Cardiovascular;  Laterality: N/A;    CLOSED REDUCTION ANKLE FRACTURE Right 2019    13 screws and a plate    EXTERNAL FIXATION WRIST FRACTURE      INSERTION HEMODIALYSIS CATHETER Right 06/03/2022    Procedure: INSERTION OF RIGHT INTERNAL JUGULAR HEMODIALYSIS CATHETER;  Surgeon: Dexter Red MD;  Location: [facility] OR;  Service: Vascular;  Laterality: Right;    TUBAL ABDOMINAL LIGATION           Medications Prior to Admission:    Medications Prior to Admission   Medication Sig Dispense Refill Last Dose    aspirin 81 MG EC tablet Take 1 tablet by mouth Daily. 90 tablet 3 8/21/2024    calcitriol (ROCALTROL) 0.25 MCG capsule Take 1 capsule by mouth Daily.   8/21/2024     carvedilol (COREG) 25 MG tablet Take 1 tablet by mouth 2 (Two) Times a Day With Meals.   8/21/2024    cloNIDine (CATAPRES) 0.1 MG tablet Take 1 tablet by mouth Every 6 (Six) Hours As Needed for High Blood Pressure (SBP >160). 30 tablet 1 Past Month    docusate sodium (COLACE) 250 MG capsule Take 1 capsule by mouth Daily.   8/21/2024    fluticasone (FLONASE) 50 MCG/ACT nasal spray 2 sprays into the nostril(s) as directed by provider Every Morning. In each nostril 16 g 11 8/21/2024    furosemide (LASIX) 40 MG tablet Take 1 tablet by mouth Daily. 30 tablet 0 8/21/2024    hydroCHLOROthiazide 25 MG tablet Take 1 tablet by mouth Daily. 30 tablet 11 8/21/2024    isosorbide dinitrate (ISORDIL) 40 MG tablet Take 1 tablet by mouth 2 (Two) Times a Day. 180 tablet 3 8/21/2024    losartan (COZAAR) 100 MG tablet Take 1 tablet by mouth Daily.   8/21/2024    melatonin 3 MG tablet Take 2 tablets by mouth Every Night. 30 tablet 0 8/21/2024    albuterol sulfate  (90 Base) MCG/ACT inhaler Inhale 2 puffs Every 4 (Four) Hours As Needed for Wheezing. 20.1 g 3     azelastine (ASTELIN) 0.1 % nasal spray 2 sprays into the nostril(s) as directed by provider 2 (Two) Times a Day. 30 mL 11     ipratropium-albuterol (DUO-NEB) 0.5-2.5 mg/3 ml nebulizer Take 3 mL by nebulization 4 (Four) Times a Day. (Patient taking differently: Take 3 mL by nebulization 4 (Four) Times a Day As Needed for Wheezing or Shortness of Air.) 1080 mL 3     nitroglycerin (NITROSTAT) 0.4 MG SL tablet Place 1 tablet under the tongue Every 5 (Five) Minutes As Needed for Chest Pain. Take no more than 3 doses in 15 minutes.          Allergies:  Codeine and Sumatriptan    Social History:   TOBACCO:   reports that she has been smoking cigarettes. She started smoking about 49 years ago. She has a 24.4 pack-year smoking history. She has been exposed to tobacco smoke. She has never used smokeless tobacco.  ETOH:   reports no history of alcohol use.  DRUGS:   reports no  "history of drug use.        Family History:   Family History   Problem Relation Age of Onset    Coronary artery disease Mother     Coronary artery disease Father     Breast cancer Neg Hx      REVIEW OF SYSTEMS:  Constitutional: Negative   CV: Negative  Pulmonary: SOA, cough  GI: Negative  : Negative  Psych: Negative  Neuro: Negative  Skin: Negative  MusculoSkeletal: Negative  HEENT: Negative  Joints: Negative  Vitals:  BP (!) 195/95 (BP Location: Left arm, Patient Position: Sitting)   Pulse 70   Temp 97.9 °F (36.6 °C)   Resp 20   Ht 165.1 cm (65\")   Wt 99.8 kg (220 lb)   LMP  (LMP Unknown)   SpO2 96%   BMI 36.61 kg/m²     PHYSICAL EXAM (per notes):  Gen: NAD, alert, pleasant  HEENT: WNL  Lymph: no LAD  Neck: no JVD or masses  Chest: coarse  CV: RRR  Abdomen: NT/ND  Extrem: no C/C/E  Neuro: Nonfocal  Skin: no rashes  Joints: no redness  DATA:  I have reviewed the admission labs and imaging tests.    ASSESSMENT AND PLAN:      SOA, Acute Resp Failure with  Hypoxia----continue steroids, nebs, O2, ask Pulm to see patient  Volume Overload---continue Lasix and follow labs, ECHO ordered  HTN---Coreg resumed, wean IV treatment started in ER as can  H/O ESRD---renal function stable  Elevated D Dimer---VQ scan ordered  CAD      Orville Weston MD  13:51 CDT 8/22/2024  "

## 2024-08-22 NOTE — CONSULTS
"Inpatient Cardiology Consult  Consult performed by: Valentino Ramirez MD  Consult ordered by: Orville Weston MD  Reason for consult: Volume overload, elevated BP        Chief Complaint   Patient presents with    Shortness of Breath     HPI: This is a 64-year-old female with coronary artery disease, prior PCI, chronic combined systolic and diastolic heart failure (likely multifactorial due to ischemic cardiomyopathy and substance abuse), primary hypertension, stage IV chronic kidney disease, COPD with ongoing tobacco abuse who presents with a chief complaint of shortness of breath.  The patient notes that for the past 3 to 4 days, she has been noticing worsening shortness of breath, dyspnea with exertion.  She denies having any fevers.  She denies having orthopnea, PND, significant lower extremity edema.  She also denies having any chest pain.  She notes no lightheadedness, dizziness, syncope.  She notes that she presented today due to worsening of shortness of breath of the last 24 hours.  In the emergency department, she says that she received steroids and a breathing treatment and that her she has now developed a cough since that time that has been largely nonproductive.  She denies having abdominal distention, abdominal pain, nausea, vomiting.  As stated, no orthopnea, PND or significant edema.  She also notes that her weight has been stable.    We are asked to see this patient regarding \"volume overload and elevated BP\".  Of note, the patient states that she did not take her blood pressure medications this morning and reports that her blood pressure has been reasonably well-controlled up until today.    Also, of note, this patient is known to me to be a patient who has had issues with substance abuse.  She notes that she used methamphetamine within the past week.    I have reviewed all elements of the patient's past medical, past surgical, home medications, allergies, family and social history with " "the patient and updated these in the medical record as needed.    Review of Systems: All pertinent negatives and positives as noted above.  Otherwise, all systems reviewed and found to be negative.    Physical Exam:  BP (!) 195/95 (BP Location: Left arm, Patient Position: Sitting)   Pulse 70   Temp 97.9 °F (36.6 °C)   Resp 20   Ht 165.1 cm (65\")   Wt 99.8 kg (220 lb)   LMP  (LMP Unknown)   SpO2 96%   BMI 36.61 kg/m²   Temp:  [96.6 °F (35.9 °C)-97.9 °F (36.6 °C)] 97.9 °F (36.6 °C)  Heart Rate:  [54-81] 70  Resp:  [20-30] 20  BP: (158-200)/() 195/95    Gen.: Chronically ill in appearance, sitting on the edge of the bed, awake alert and oriented ×3 and in no acute distress  HEENT: Normocephalic, atraumatic, pupils equally round and reactive to light, oropharynx clear, mucous membranes moist  Neck: Supple, no obvious elevation of JVP, no thyromegaly, no carotid bruits  CV: Regular rate and rhythm, soft diastolic and systolic murmur noted at the lower sternal border and at the right upper sternal border  Pulmonary: Rhonchi noted bilaterally, coughing during the exam, no active wheezes at this time  GI: Obese, soft, nontender, nondistended, active bowel sounds  Extremities: Warm and well-perfused, no dermatitis or ulceration, no clubbing, cyanosis, edema  Neurologic: Cranial nerves are grossly intact, patient moves all 4 extremities equally during examination    Diagnostic Data:    Lab Results   Component Value Date    WBC 8.29 08/22/2024    HGB 14.2 08/22/2024    HCT 45.9 08/22/2024    MCV 93.9 08/22/2024     08/22/2024     Lab Results   Component Value Date    GLUCOSE 106 (H) 08/22/2024    CALCIUM 9.0 08/22/2024     08/22/2024    K 4.8 08/22/2024    CO2 27.0 08/22/2024     08/22/2024    BUN 32 (H) 08/22/2024    CREATININE 2.37 (H) 08/22/2024    EGFR 22.4 (L) 08/22/2024    BCR 13.5 08/22/2024    ANIONGAP 10.0 08/22/2024     D-dimer 1.33    proBNP today 13,903, previously in 2021 19,936, " also 10,807, also previously as high as greater than 70,000 on 8/8/2021    CXR: Stable chest exam without acute process. There is an underlying pulmonary fibrosis with diffuse interstitial coarsening. No superimposed acute infiltrate or obvious pulmonary edema.    ECG reviewed, this shows sinus rhythm, ventricular rate 65, first-degree AV block, poor R wave progression, no acute ST segment changes -this is directly compared to the ECG from 2/13/2024 which shows sinus bradycardia, ventricular rate 55, first-degree AV block, poor R wave progression    Prior cardiac catheterization from 11/8/2020: Moderate stenosis in the right posterior descending artery, occluded proximal LAD treated with implantation of drug-eluting stent.  No disease noted in the left circumflex.    Results for orders placed during the hospital encounter of 10/09/23    Adult Transthoracic Echo Complete W/ Cont if Necessary Per Protocol    Interpretation Summary    Left ventricular systolic function is mildly decreased. Left ventricular ejection fraction appears to be 46 - 50%.    The left ventricular cavity is mildly dilated.    Left ventricular wall thickness is consistent with mild concentric hypertrophy.    Left ventricular diastolic function is consistent with (grade II w/high LAP) pseudonormalization.    The left atrial cavity is severely dilated.    The right atrial cavity is dilated.    Mild to moderate aortic valve regurgitation is present.    There is bileaflet mitral valve thickening present.    Estimated right ventricular systolic pressure from tricuspid regurgitation is markedly elevated (>55 mmHg).    Mild dilation of the ascending aorta is present, measuring 4.4 cm.    ASSESSMENT/PLAN:    1.  Acute on chronic hypoxemic respiratory failure  2.  Primary hypertension  3.  Substance abuse, including methamphetamine  4.  Coronary artery disease native coronary artery  5.  Chronic systolic and diastolic heart failure due to likely  "combination of ischemic cardiomyopathy and substance abuse: Despite an elevated BNP, she does not demonstrate any significant signs of volume overload on my exam with no obvious elevation of JVP, no abdominal distention, no peripheral edema.  Her chest x-ray also shows no obvious pulmonary edema.  There is some interstitial thickening.  Certainly she could have slight increased volume but certainly not grossly volume overloaded to the point where I would categorize her as acute on chronic heart failure.  6.  Tobacco abuse  7.  Valvular heart disease: Previous echocardiogram shows aortic valve regurgitation  8.  Stage IV chronic kidney disease    -We are asked see this patient regarding \"volume overload and elevated BP\".  Clinically, I am not certain that she shows significant volume overload.  Her BNP is elevated but is essentially always elevated in this patient with advanced chronic kidney disease.  She was given IV Lasix in the emergency department and she notes that she did urinate afterwards, however I do not appreciate any large volume overload at this time.  I would recommend using Lasix on an as needed basis unless otherwise thought so by the primary service.  In regards the patient's elevated blood pressure, she did not take her blood pressure medications this morning.  Also, the patient admits to me that she was using methamphetamine within the past week, likely a contributor to her overall clinical picture at this time.  (Will defer to the primary provider as to whether checking a urine drug screen would be useful in this patient with history of substance abuse ). I discussed this with her, but the patient seems to think that this likely is not a contributing factor to her current situation.  -An echocardiogram will be performed momentarily.  We will follow-up the results of this and make further recommendations if needed, based on these results.  -Otherwise, I would not recommend any further invasive " cardiac testing at this time.  We will be available as needed, but, at this time, other than treating the patient for what appears to be likely a COPD type exacerbation, would not have anything further to add from a cardiac standpoint.  The primary service has ordered a VQ scan due to the elevated D-dimer which seems reasonable, however I do not feel that any further cardiac testing is indicated.

## 2024-08-22 NOTE — ED PROVIDER NOTES
Subjective   History of Present Illness  Patient with COPD exacerbation came the ER cough congestion and shortness of breath.  No chest pain associated with this.  Patient has not been taking his blood pressure medication at home because she was short of breath.    Shortness of Breath  Severity:  Moderate  Onset quality:  Gradual  Timing:  Constant  Progression:  Worsening  Chronicity:  Recurrent  Context: activity    Context: not animal exposure, not emotional upset, not known allergens, not occupational exposure and not pollens    Relieved by:  Nothing  Worsened by:  Exertion  Ineffective treatments:  None tried  Associated symptoms: cough, sore throat and wheezing    Associated symptoms: no abdominal pain, no chest pain, no claudication, no diaphoresis, no ear pain, no fever, no headaches, no hemoptysis, no neck pain, no PND, no rash, no sputum production and no vomiting    Risk factors: obesity    Risk factors: no recent surgery        Review of Systems   Constitutional: Negative.  Negative for activity change, appetite change, chills, diaphoresis, fatigue and fever.   HENT:  Positive for sore throat. Negative for congestion, drooling, ear pain, facial swelling, hearing loss and sinus pressure.    Eyes: Negative.  Negative for discharge.   Respiratory:  Positive for cough, shortness of breath and wheezing. Negative for hemoptysis and sputum production.    Cardiovascular:  Negative for chest pain, claudication and PND.   Gastrointestinal:  Negative for abdominal distention, abdominal pain, blood in stool, diarrhea, nausea and vomiting.   Endocrine: Negative.  Negative for cold intolerance, heat intolerance, polydipsia, polyphagia and polyuria.   Genitourinary: Negative.  Negative for dysuria, flank pain and urgency.   Musculoskeletal: Negative.  Negative for arthralgias, back pain, myalgias, neck pain and neck stiffness.   Skin: Negative.  Negative for color change, pallor and rash.   Allergic/Immunologic:  Negative.    Neurological: Negative.  Negative for dizziness, seizures, speech difficulty, weakness, numbness and headaches.   Hematological: Negative.  Negative for adenopathy.   All other systems reviewed and are negative.      Past Medical History:   Diagnosis Date    Acute CHF     Acute renal failure (ARF)     Anemia     Asthma     childhood    Bowel disease, inflammatory     Chronic kidney disease     Coronary artery disease     Hypertension     Ischemic colitis     Lung nodule, multiple 2024    Metabolic acidosis     Myocardial infarction 2020    Nonrheumatic aortic (valve) insufficiency     PTSD (post-traumatic stress disorder)        Allergies   Allergen Reactions    Codeine Nausea And Vomiting    Sumatriptan Other (See Comments)     Headache        Past Surgical History:   Procedure Laterality Date    CARDIAC CATHETERIZATION N/A 2020    Procedure: Left Heart Cath;  Surgeon: Pierce Buchanan MD;  Location:  [facility]ATH [facility]AS[facility];  Service: Cardiovascular;  Laterality: N/A;    CLOSED REDUCTION ANKLE FRACTURE Right     13 screws and a plate    EXTERNAL FIXATION WRIST FRACTURE      INSERTION HEMODIALYSIS CATHETER Right 2022    Procedure: INSERTION OF RIGHT INTERNAL JUGULAR HEMODIALYSIS CATHETER;  Surgeon: Dexter Red MD;  Location:  [facility] OR;  Service: Vascular;  Laterality: Right;    TUBAL ABDOMINAL LIGATION         Family History   Problem Relation Age of Onset    Coronary artery disease Mother     Coronary artery disease Father     Breast cancer Neg Hx        Social History     Socioeconomic History    Marital status: Single   Tobacco Use    Smoking status: Every Day     Current packs/day: 0.00     Average packs/day: 0.5 packs/day for 48.7 years (24.4 ttl pk-yrs)     Types: Cigarettes     Start date:      Last attempt to quit: 2023     Years since quittin.8     Passive exposure: Past    Smokeless tobacco: Never    Tobacco comments:     Smokes about 0.5  pack daily 4/10    Vaping Use    Vaping status: Never Used   Substance and Sexual Activity    Alcohol use: No    Drug use: No    Sexual activity: Defer           Objective   Physical Exam  Vitals and nursing note reviewed. Exam conducted with a chaperone present.   Constitutional:       General: She is in acute distress.      Appearance: Normal appearance. She is well-developed. She is obese. She is ill-appearing. She is not toxic-appearing or diaphoretic.   HENT:      Head: Normocephalic and atraumatic.      Right Ear: External ear normal.      Nose: Nose normal.      Mouth/Throat:      Mouth: Mucous membranes are moist.   Eyes:      Conjunctiva/sclera: Conjunctivae normal.      Pupils: Pupils are equal, round, and reactive to light.   Cardiovascular:      Rate and Rhythm: Normal rate and regular rhythm.      Chest Wall: PMI is not displaced.      Pulses: Normal pulses. No decreased pulses.      Heart sounds: Normal heart sounds. No murmur heard.  Pulmonary:      Effort: Tachypnea, accessory muscle usage and respiratory distress present.      Breath sounds: No stridor. Examination of the right-middle field reveals rhonchi. Examination of the left-middle field reveals rhonchi. Examination of the right-lower field reveals decreased breath sounds, wheezing, rhonchi and rales. Examination of the left-lower field reveals decreased breath sounds, wheezing, rhonchi and rales. Decreased breath sounds, wheezing, rhonchi and rales present.   Chest:      Chest wall: No tenderness or crepitus.   Abdominal:      General: Bowel sounds are normal. There is no distension.      Palpations: Abdomen is soft.      Tenderness: There is no abdominal tenderness.   Musculoskeletal:         General: No swelling or tenderness. Normal range of motion.      Cervical back: Normal range of motion and neck supple. No rigidity.      Right lower leg: No edema.      Left lower leg: No edema.      Comments: Lower extremity exam bilaterally is  unremarkable.  There is no right or left calf tenderness .  There is no palpable venous cord.  No obvious difference in the size of the legs.  No pitting edema.  The dorsalis pedis and posterior tibial femoral and popliteal pulses are palpable and +2 bilaterally.  Homans sign is negative   Skin:     General: Skin is warm.      Capillary Refill: Capillary refill takes less than 2 seconds.      Coloration: Skin is not jaundiced.      Findings: No erythema or rash.   Neurological:      General: No focal deficit present.      Mental Status: She is alert and oriented to person, place, and time. Mental status is at baseline.      Cranial Nerves: No cranial nerve deficit.      Motor: No weakness.      Coordination: Coordination normal.      Deep Tendon Reflexes: Reflexes are normal and symmetric. Reflexes normal.   Psychiatric:         Mood and Affect: Mood normal.         Procedures           ED Course  ED Course as of 08/22/24 1050   Thu Aug 22, 2024   1019  Left ventricular ejection fraction is normal (Calculated EF = 53%).  ·  Breast attenuation artifact is present.  ·  Large infarction of the proximal left anterior descending coronary artery distribution with total perfusion defect at rest  26% and  28% with stress.  No significant ischemia.  Associated hypokinesis in this distribution      [TS]   1020 Wells 3 [TS]   1024 Sinus rhythm [TS]   1026  Left ventricular ejection fraction is normal (Calculated EF = 53%).  ·  Breast attenuation artifact is present.  ·  Large infarction of the proximal left anterior descending coronary artery distribution with total perfusion defect at rest  26% and  28% with stress.  No significant ischemia.  Associated hypokinesis in this distribution     Last stress test [TS]   1026 Normal sinus rhythm EKG compared to prior EKG does not show any acute change [TS]   1046 Patient came in with acute shortness of breath noted to be respiratory distress was given neb treatments and also  diuretics.  Blood pressure elevated was given labetalol unguinal place her on nitroglycerin her prior charts reviewed she got history of coronary artery disease.  With a EF 53%.  The patient feels much better with the BiPAP diuretics and steroids and neb treatments and think that she should be able to go home.  I think she will benefit for staying in the hospital at least for 1 night.  I have discussed this case at length with the patient also with the on-call physician for her and the patient will be admitted to the hospital.  Her dimer is elevated her Wells score will be 0 prior CTs of the angio chest have been negative.  This dimer elevation is probably because of CKD but can be reviewed by doing a VQ scan.  And the primary MD's nurse was informed about this.  I am empirically giving her 1 dose of Lovenox in the ER. [TS]      ED Course User Index  [TS] Cristhian Peguero MD                                             Medical Decision Making  Differential Diagnosis:  I considered pulmonary etiology, asthma, chronic obstructive pulmonary disease, pneumonia, pulmonary embolism, adult respiratory distress syndrome, pneumothorax, pleural effusion, pulmonary fibrosis, cardiac etiology, congestive heart failure, myocardial infarction, metabolic etiology, diabetic ketoacidosis, uremia, acidosis, sepsis, anemia, drug related etiology, hyperventilation and CNS disease as a possible cause of dyspnea in this patient. This is a partial list of diagnoses considered.           Amount and/or Complexity of Data Reviewed  Labs: ordered.     Details: Labs reviewed  Radiology: ordered.     Details: X-ray was reviewed looks like pulmonary fibrosis and pulmonary nodules along with pulmonary edema this have been discussed the patient and she has been informed about the findings and reason to follow-up with the primary MD as an outpatient when she gets better.  ECG/medicine tests: ordered.     Details: Nonischemic EKG the EKG unchanged  compared to prior EKGs.  Discussion of management or test interpretation with external provider(s): Discussed with Dr. Ras Weston's nurse.    Risk  OTC drugs.  Prescription drug management.  Risk Details: Patient came in with shortness of breath lab work was initial IV access obtained patient given steroids and placed on BiPAP and nitro has been initiated along with diuretics patient feels much better at this time.  Oxygen saturation 98% on the BiPAP not tachycardic not appear to be in shock.  There is no clinical evidence of pneumonia.  The patient did not undergo a CT of the chest since she has got chronic kidney disease but is not on dialysis.  Her Wells score risk for DVT and PE is low risk.  Therefore if needed she can get a VQ scan this has been discussed with the patient's primary MD's nurse.        Final diagnoses:   Acute exacerbation of chronic obstructive pulmonary disease (COPD)   Congestive heart failure, unspecified HF chronicity, unspecified heart failure type   Chronic kidney disease, unspecified CKD stage   Positive D dimer       ED Disposition  ED Disposition       ED Disposition   Decision to Admit    Condition   --    Comment   Level of Care: Telemetry [5]   Diagnosis: Acute exacerbation of chronic obstructive pulmonary disease (COPD) [520751]   Admitting Physician: JESSE WESTON [[id number]]   Attending Physician: JESSE WESTON [[id number]]   Certification: I Certify That Inpatient Hospital Services Are Medically Necessary For Greater Than 2 Midnights                 No follow-up provider specified.       Medication List      No changes were made to your prescriptions during this visit.            Cristhian Peguero MD  08/22/24 1017       Cristhian Peguero MD  08/22/24 1020       Cristhian Peguero MD  08/22/24 1050

## 2024-08-23 LAB
ANION GAP SERPL CALCULATED.3IONS-SCNC: 13 MMOL/L (ref 5–15)
BASOPHILS # BLD AUTO: 0.01 10*3/MM3 (ref 0–0.2)
BASOPHILS NFR BLD AUTO: 0.1 % (ref 0–1.5)
BUN SERPL-MCNC: 50 MG/DL (ref 8–23)
BUN/CREAT SERPL: 16.3 (ref 7–25)
CALCIUM SPEC-SCNC: 8.8 MG/DL (ref 8.6–10.5)
CHLORIDE SERPL-SCNC: 99 MMOL/L (ref 98–107)
CO2 SERPL-SCNC: 27 MMOL/L (ref 22–29)
CREAT SERPL-MCNC: 3.07 MG/DL (ref 0.57–1)
DEPRECATED RDW RBC AUTO: 48.8 FL (ref 37–54)
EGFRCR SERPLBLD CKD-EPI 2021: 16.4 ML/MIN/1.73
EOSINOPHIL # BLD AUTO: 0 10*3/MM3 (ref 0–0.4)
EOSINOPHIL NFR BLD AUTO: 0 % (ref 0.3–6.2)
ERYTHROCYTE [DISTWIDTH] IN BLOOD BY AUTOMATED COUNT: 14.3 % (ref 12.3–15.4)
GLUCOSE SERPL-MCNC: 225 MG/DL (ref 65–99)
HCT VFR BLD AUTO: 41.1 % (ref 34–46.6)
HGB BLD-MCNC: 12.8 G/DL (ref 12–15.9)
IMM GRANULOCYTES # BLD AUTO: 0.06 10*3/MM3 (ref 0–0.05)
IMM GRANULOCYTES NFR BLD AUTO: 0.7 % (ref 0–0.5)
LYMPHOCYTES # BLD AUTO: 0.6 10*3/MM3 (ref 0.7–3.1)
LYMPHOCYTES NFR BLD AUTO: 6.9 % (ref 19.6–45.3)
MCH RBC QN AUTO: 29 PG (ref 26.6–33)
MCHC RBC AUTO-ENTMCNC: 31.1 G/DL (ref 31.5–35.7)
MCV RBC AUTO: 93.2 FL (ref 79–97)
MONOCYTES # BLD AUTO: 0.19 10*3/MM3 (ref 0.1–0.9)
MONOCYTES NFR BLD AUTO: 2.2 % (ref 5–12)
NEUTROPHILS NFR BLD AUTO: 7.87 10*3/MM3 (ref 1.7–7)
NEUTROPHILS NFR BLD AUTO: 90.1 % (ref 42.7–76)
NRBC BLD AUTO-RTO: 0 /100 WBC (ref 0–0.2)
OSMOLALITY UR: 436 MOSM/KG (ref 50–1400)
PLATELET # BLD AUTO: 163 10*3/MM3 (ref 140–450)
PMV BLD AUTO: 11.5 FL (ref 6–12)
POTASSIUM SERPL-SCNC: 3.9 MMOL/L (ref 3.5–5.2)
QT INTERVAL: 436 MS
QTC INTERVAL: 453 MS
RBC # BLD AUTO: 4.41 10*6/MM3 (ref 3.77–5.28)
SODIUM SERPL-SCNC: 139 MMOL/L (ref 136–145)
SODIUM UR-SCNC: 105 MMOL/L
URATE SERPL-MCNC: 8.4 MG/DL (ref 2.4–5.7)
WBC NRBC COR # BLD AUTO: 8.73 10*3/MM3 (ref 3.4–10.8)

## 2024-08-23 PROCEDURE — 83935 ASSAY OF URINE OSMOLALITY: CPT | Performed by: INTERNAL MEDICINE

## 2024-08-23 PROCEDURE — 25010000002 METHYLPREDNISOLONE PER 40 MG: Performed by: INTERNAL MEDICINE

## 2024-08-23 PROCEDURE — 94664 DEMO&/EVAL PT USE INHALER: CPT

## 2024-08-23 PROCEDURE — 25010000002 FUROSEMIDE PER 20 MG: Performed by: FAMILY MEDICINE

## 2024-08-23 PROCEDURE — 99232 SBSQ HOSP IP/OBS MODERATE 35: CPT | Performed by: INTERNAL MEDICINE

## 2024-08-23 PROCEDURE — 25010000002 ENOXAPARIN PER 10 MG: Performed by: FAMILY MEDICINE

## 2024-08-23 PROCEDURE — 82570 ASSAY OF URINE CREATININE: CPT | Performed by: INTERNAL MEDICINE

## 2024-08-23 PROCEDURE — 94799 UNLISTED PULMONARY SVC/PX: CPT

## 2024-08-23 PROCEDURE — 80048 BASIC METABOLIC PNL TOTAL CA: CPT | Performed by: FAMILY MEDICINE

## 2024-08-23 PROCEDURE — 84300 ASSAY OF URINE SODIUM: CPT | Performed by: INTERNAL MEDICINE

## 2024-08-23 PROCEDURE — 94761 N-INVAS EAR/PLS OXIMETRY MLT: CPT

## 2024-08-23 PROCEDURE — 85025 COMPLETE CBC W/AUTO DIFF WBC: CPT | Performed by: FAMILY MEDICINE

## 2024-08-23 PROCEDURE — 25010000002 NITROGLYCERIN 200 MCG/ML SOLUTION: Performed by: EMERGENCY MEDICINE

## 2024-08-23 PROCEDURE — 94660 CPAP INITIATION&MGMT: CPT

## 2024-08-23 PROCEDURE — 84550 ASSAY OF BLOOD/URIC ACID: CPT | Performed by: INTERNAL MEDICINE

## 2024-08-23 RX ORDER — CLONIDINE HYDROCHLORIDE 0.1 MG/1
0.1 TABLET ORAL EVERY 4 HOURS PRN
Status: DISCONTINUED | OUTPATIENT
Start: 2024-08-23 | End: 2024-08-26 | Stop reason: HOSPADM

## 2024-08-23 RX ORDER — PREDNISONE 10 MG/1
40 TABLET ORAL
Status: DISCONTINUED | OUTPATIENT
Start: 2024-08-24 | End: 2024-08-26 | Stop reason: HOSPADM

## 2024-08-23 RX ORDER — PREDNISONE 10 MG/1
TABLET ORAL
Qty: 31 TABLET | Refills: 0 | Status: SHIPPED | OUTPATIENT
Start: 2024-08-23

## 2024-08-23 RX ORDER — DOXYCYCLINE 100 MG/1
100 CAPSULE ORAL 2 TIMES DAILY
Qty: 8 CAPSULE | Refills: 0 | Status: SHIPPED | OUTPATIENT
Start: 2024-08-23

## 2024-08-23 RX ADMIN — IPRATROPIUM BROMIDE AND ALBUTEROL SULFATE 3 ML: 2.5; .5 SOLUTION RESPIRATORY (INHALATION) at 18:39

## 2024-08-23 RX ADMIN — DOXYCYCLINE 100 MG: 100 TABLET, FILM COATED ORAL at 20:18

## 2024-08-23 RX ADMIN — IPRATROPIUM BROMIDE AND ALBUTEROL SULFATE 3 ML: 2.5; .5 SOLUTION RESPIRATORY (INHALATION) at 14:12

## 2024-08-23 RX ADMIN — AZELASTINE HYDROCHLORIDE 2 SPRAY: 137 SPRAY, METERED NASAL at 08:33

## 2024-08-23 RX ADMIN — CLONIDINE HYDROCHLORIDE 0.1 MG: 0.1 TABLET ORAL at 20:18

## 2024-08-23 RX ADMIN — AZELASTINE HYDROCHLORIDE 2 SPRAY: 137 SPRAY, METERED NASAL at 20:19

## 2024-08-23 RX ADMIN — METHYLPREDNISOLONE SODIUM SUCCINATE 40 MG: 40 INJECTION, POWDER, FOR SOLUTION INTRAMUSCULAR; INTRAVENOUS at 04:19

## 2024-08-23 RX ADMIN — DOXYCYCLINE 100 MG: 100 TABLET, FILM COATED ORAL at 08:32

## 2024-08-23 RX ADMIN — FLUTICASONE PROPIONATE 2 SPRAY: 50 SPRAY, METERED NASAL at 06:14

## 2024-08-23 RX ADMIN — DOCUSATE SODIUM 100 MG: 100 CAPSULE, LIQUID FILLED ORAL at 20:18

## 2024-08-23 RX ADMIN — CALCITRIOL 0.25 MCG: 0.25 CAPSULE ORAL at 08:32

## 2024-08-23 RX ADMIN — FUROSEMIDE 20 MG: 10 INJECTION, SOLUTION INTRAMUSCULAR; INTRAVENOUS at 06:15

## 2024-08-23 RX ADMIN — IPRATROPIUM BROMIDE AND ALBUTEROL SULFATE 3 ML: 2.5; .5 SOLUTION RESPIRATORY (INHALATION) at 06:39

## 2024-08-23 RX ADMIN — IPRATROPIUM BROMIDE AND ALBUTEROL SULFATE 3 ML: 2.5; .5 SOLUTION RESPIRATORY (INHALATION) at 10:20

## 2024-08-23 RX ADMIN — Medication 2.5 MG: at 20:18

## 2024-08-23 RX ADMIN — ASPIRIN 81 MG: 81 TABLET, COATED ORAL at 08:32

## 2024-08-23 RX ADMIN — ENOXAPARIN SODIUM 30 MG: 100 INJECTION SUBCUTANEOUS at 12:01

## 2024-08-23 RX ADMIN — NITROGLYCERIN 50 MCG/MIN: 20 INJECTION INTRAVENOUS at 09:44

## 2024-08-23 RX ADMIN — CARVEDILOL 25 MG: 25 TABLET, FILM COATED ORAL at 08:32

## 2024-08-23 RX ADMIN — CARVEDILOL 25 MG: 25 TABLET, FILM COATED ORAL at 18:19

## 2024-08-23 RX ADMIN — ACETAMINOPHEN 650 MG: 325 TABLET ORAL at 13:04

## 2024-08-23 NOTE — CONSULTS
Nephrology ([facility]) Consult Note      Patient:  Ludivina Ramirez  YOB: 1960  Date of Service: 8/23/2024  MRN: [id number]   Acct: [id number]   Primary Care Physician: Orville Weston MD  Advance Directive:   There are no questions and answers to display.     Admit Date: 8/22/2024       Hospital Day: 1  Referring Provider: No ref. provider found      Patient Seen, Chart, Consults, Notes, Labs, Radiology studies reviewed.    Chief complaint: Abnormal labs.    Subjective:  Ludivina Ramirez is a 64 y.o. female  whom we were consulted for acute kidney injury/chronic kidney disease.  She has history of stage IV chronic kidney disease baseline, last estimated GFR in June was 27 L.  Patient also has history of chronic obstructive pulm disease, combined systolic/diastolic CHF, benign essential hypertension, history of ischemic colitis and acute kidney injury.  Hospital course remarkable for treatment with intravenous diuretics as well as steroids/breathing treatment.  She has significant decline in renal function, now serum creatinine of 3.0 mg.  She denies any swelling of legs.  Her chest x-ray consistent with mild pulm edema.  However she has chronic interstitial bilateral lung disease with scarring.    This afternoon she is breathing well.    Allergies:  Codeine and Sumatriptan    Home Meds:  Medications Prior to Admission   Medication Sig Dispense Refill Last Dose    aspirin 81 MG EC tablet Take 1 tablet by mouth Daily. 90 tablet 3 8/21/2024    calcitriol (ROCALTROL) 0.25 MCG capsule Take 1 capsule by mouth Daily.   8/21/2024    carvedilol (COREG) 25 MG tablet Take 1 tablet by mouth 2 (Two) Times a Day With Meals.   8/21/2024    cloNIDine (CATAPRES) 0.1 MG tablet Take 1 tablet by mouth Every 6 (Six) Hours As Needed for High Blood Pressure (SBP >160). 30 tablet 1 Past Month    docusate sodium (COLACE) 250 MG capsule Take 1 capsule by mouth Daily.   8/21/2024    fluticasone  (FLONASE) 50 MCG/ACT nasal spray 2 sprays into the nostril(s) as directed by provider Every Morning. In each nostril 16 g 11 8/21/2024    furosemide (LASIX) 40 MG tablet Take 1 tablet by mouth Daily. 30 tablet 0 8/21/2024    hydroCHLOROthiazide 25 MG tablet Take 1 tablet by mouth Daily. 30 tablet 11 8/21/2024    ipratropium-albuterol (DUO-NEB) 0.5-2.5 mg/3 ml nebulizer Take 3 mL by nebulization 4 (Four) Times a Day. prn   Patient Taking Differently    isosorbide dinitrate (ISORDIL) 40 MG tablet Take 1 tablet by mouth 2 (Two) Times a Day. 180 tablet 3 8/21/2024    losartan (COZAAR) 100 MG tablet Take 1 tablet by mouth Daily.   8/21/2024    melatonin 3 MG tablet Take 2 tablets by mouth Every Night. 30 tablet 0 8/21/2024    albuterol sulfate  (90 Base) MCG/ACT inhaler Inhale 2 puffs Every 4 (Four) Hours As Needed for Wheezing. 20.1 g 3     nitroglycerin (NITROSTAT) 0.4 MG SL tablet Place 1 tablet under the tongue Every 5 (Five) Minutes As Needed for Chest Pain. Take no more than 3 doses in 15 minutes.          Medicines:  Current Facility-Administered Medications   Medication Dose Route Frequency Provider Last Rate Last Admin    acetaminophen (TYLENOL) tablet 650 mg  650 mg Oral Q6H PRN Orville Weston MD   650 mg at 08/23/24 1304    Or    acetaminophen (TYLENOL) suppository 650 mg  650 mg Rectal Q4H PRN Orville Weston MD        aspirin EC tablet 81 mg  81 mg Oral Daily Orville Weston MD   81 mg at 08/23/24 0832    azelastine (ASTELIN) nasal spray 2 spray  2 spray Each Nare BID Orville Weston MD   2 spray at 08/23/24 0833    calcitriol (ROCALTROL) capsule 0.25 mcg  0.25 mcg Oral Daily Orville Weston MD   0.25 mcg at 08/23/24 0832    carvedilol (COREG) tablet 25 mg  25 mg Oral BID With Meals Orville Weston MD   25 mg at 08/23/24 0832    cloNIDine (CATAPRES) tablet 0.1 mg  0.1 mg Oral Q4H PRN Orville Weston MD        docusate sodium (COLACE) capsule 100 mg  100 mg  Oral BID Orville Weston MD   100 mg at 08/22/24 2013    doxycycline (ADOXA) tablet 100 mg  100 mg Oral Q12H Cassy Renee DO   100 mg at 08/23/24 0832    Enoxaparin Sodium (LOVENOX) syringe 30 mg  30 mg Subcutaneous Q24H Orville Weston MD   30 mg at 08/23/24 1201    fluticasone (FLONASE) 50 MCG/ACT nasal spray 2 spray  2 spray Nasal QAM Orville Weston MD   2 spray at 08/23/24 0614    ipratropium-albuterol (DUO-NEB) nebulizer solution 3 mL  3 mL Nebulization 4x Daily - RT Orville Weston MD   3 mL at 08/23/24 1412    melatonin tablet 2.5 mg  2.5 mg Oral Nightly PRN Orville Weston MD        nitroglycerin (TRIDIL) 200 mcg/ml infusion  5-200 mcg/min Intravenous Titrated Cristhian Peguero MD 6 mL/hr at 08/23/24 1428 20 mcg/min at 08/23/24 1428    [START ON 8/24/2024] predniSONE (DELTASONE) tablet 40 mg  40 mg Oral Daily With Breakfast Cassy Renee DO           Past Medical History:  Past Medical History:   Diagnosis Date    Acute CHF     Acute renal failure (ARF)     Anemia     Asthma     childhood    Bowel disease, inflammatory     Chronic kidney disease     Coronary artery disease     Hypertension     Ischemic colitis     Lung nodule, multiple 04/03/2024    Metabolic acidosis     Myocardial infarction 11/07/2020    Nonrheumatic aortic (valve) insufficiency     PTSD (post-traumatic stress disorder)        Past Surgical History:  Past Surgical History:   Procedure Laterality Date    CARDIAC CATHETERIZATION N/A 11/08/2020    Procedure: Left Heart Cath;  Surgeon: Pierce Buchanan MD;  Location:  [facility];  Service: Cardiovascular;  Laterality: N/A;    CLOSED REDUCTION ANKLE FRACTURE Right 2019    13 screws and a plate    EXTERNAL FIXATION WRIST FRACTURE      INSERTION HEMODIALYSIS CATHETER Right 06/03/2022    Procedure: INSERTION OF RIGHT INTERNAL JUGULAR HEMODIALYSIS CATHETER;  Surgeon: Dexter Red MD;  Location: [facility] OR;  Service:  "Vascular;  Laterality: Right;    TUBAL ABDOMINAL LIGATION         Family History  Family History   Problem Relation Age of Onset    Coronary artery disease Mother     Coronary artery disease Father     Breast cancer Neg Hx        Social History  Social History     Socioeconomic History    Marital status: Single   Tobacco Use    Smoking status: Every Day     Current packs/day: 0.00     Average packs/day: 0.5 packs/day for 48.7 years (24.4 ttl pk-yrs)     Types: Cigarettes     Start date:      Last attempt to quit: 2023     Years since quittin.8     Passive exposure: Past    Smokeless tobacco: Never    Tobacco comments:     Smokes about 0.5 pack daily 4/10    Vaping Use    Vaping status: Never Used   Substance and Sexual Activity    Alcohol use: No    Drug use: No    Sexual activity: Defer         Review of Systems:  History obtained from chart review and the patient  General ROS: No fever or chills  Respiratory ROS: Mild shortness of  Cardiovascular ROS: no chest pain or dyspnea on exertion  Gastrointestinal ROS: No abdominal pain or melena  Genito-Urinary ROS: No dysuria or hematuria  14 point ROS reviewed with the patient and negative except as noted above and in the HPI unless unable to obtain.    Objective:  /82 (BP Location: Right arm, Patient Position: Lying)   Pulse 77   Temp 98 °F (36.7 °C) (Oral)   Resp 18   Ht 165 cm (64.96\")   Wt 99.8 kg (220 lb 0.3 oz)   LMP  (LMP Unknown)   SpO2 98%   BMI 36.66 kg/m²     Intake/Output Summary (Last 24 hours) at 2024 1516  Last data filed at 2024 1252  Gross per 24 hour   Intake 960 ml   Output 2200 ml   Net -1240 ml     General: awake/alert   HEENT: Normocephalic atraumatic head  Neck: Supple with no JVD or carotid bruits.  Chest:  Decreased breath sound on both lung field  CVS: regular rate and rhythm  Abdominal: soft, nontender, normal bowel sounds  Extremities: no cyanosis or edema  Skin: warm and dry without " rash      Labs:    Results from last 7 days   Lab Units 08/23/24  0345 08/22/24  0926   WBC 10*3/mm3 8.73 8.29   HEMOGLOBIN g/dL 12.8 14.2   HEMATOCRIT % 41.1 45.9   PLATELETS 10*3/mm3 163 173       Results from last 7 days   Lab Units 08/23/24  0345 08/22/24  0952 08/22/24  0926   SODIUM mmol/L 139  --  139   SODIUM, ARTERIAL mmol/L  --  142  --    POTASSIUM mmol/L 3.9  --  4.8   CHLORIDE mmol/L 99  --  102   CO2 mmol/L 27.0  --  27.0   BUN mg/dL 50*  --  32*   CREATININE mg/dL 3.07*  --  2.37*   CALCIUM mg/dL 8.8  --  9.0   BILIRUBIN mg/dL  --   --  0.4   ALK PHOS U/L  --   --  105   ALT (SGPT) U/L  --   --  13   AST (SGOT) U/L  --   --  20   GLUCOSE mg/dL 225*  --  106*   EGFR mL/min/1.73 16.4*  --  22.4*         Radiology:   Imaging Results (Last 24 Hours)       Procedure Component Value Units Date/Time    NM Lung Scan Perfusion Particulate [[id number]] Collected: 08/22/24 1532     Updated: 08/22/24 1538    Narrative:      EXAMINATION: NM LUNG SCAN PERFUSION PARTICULATE- 8/22/2024 3:32 PM     HISTORY: elevated D dimer, unable to do CTA with CKD; J44.1-Chronic  obstructive pulmonary disease with (acute) exacerbation; I50.9-Heart  failure, unspecified; N18.9-Chronic kidney disease, unspecified;  R79.89-Other specified abnormal findings of blood chemistry.     Dose: 5.19 mCi technetium 99 M MAA intravenously.     REPORT: Following administration of the intravenous radiopharmaceutical,  standard planar images of the lungs were obtained in the anterior,  posterior and oblique dimensions. No ventilation scan was performed.     COMPARISON: Chest x-ray 8/22/2024. Perfusion lung scintigraphy of  7/21/2021.     There is a normal distribution of activity within both lungs, no  discrete segmental or subsegmental perfusion defects are identified.       Impression:      Low probability perfusion only lung scintigraphy for  significant pulmonary emboli.     This report was signed and finalized on 8/22/2024 3:34 PM by   "Sudhir Hair MD.               Culture:  No components found for: \"WOUNDCUL\", \"3\"  No components found for: \"CSFCUL\", \"3\"  No components found for: \"BC\", \"3\"  No components found for: \"URINECUL\", \"3\"      Assessment   1.  Acute kidney injury/worsening.  2.  Intravascular volume depletion versus ATN.  3.  Stage IV CKD baseline.  4.  Hypertensive renal disease.  5.  Secondary hyperparathyroidism.  6.  Chronic obstructive pulm disease.  7.  Chronic interstitial lung disease    Plan:  1.  Resume IV fluid.  2.  Urinary electrolytes.  3.  Follow-up renal ultrasound.  4.  Continue to monitor renal function closely      Thank you for the consult, we appreciate the opportunity to provide care to your patients.  Feel free to contact me if I can be of any further assistance.      bOed Lazo MD  8/23/2024  15:16 CDT  "

## 2024-08-23 NOTE — CASE MANAGEMENT/SOCIAL WORK
Discharge Planning Assessment  [facility]     Patient Name: Ludivina Ramirez  MRN: [id number]  Today's Date: 8/23/2024    Admit Date: 8/22/2024        Discharge Needs Assessment       Row Name 08/23/24 1015       Living Environment    People in Home alone    Current Living Arrangements home    Primary Care Provided by self    Provides Primary Care For no one    Family Caregiver if Needed friend(s)    Family Caregiver Names Vero    Able to Return to Prior Arrangements yes       Resource/Environmental Concerns    Resource/Environmental Concerns none       Transition Planning    Patient/Family Anticipates Transition to home    Transportation Anticipated family or friend will provide       Discharge Needs Assessment    Readmission Within the Last 30 Days no previous admission in last 30 days    Equipment Currently Used at Home oxygen;nebulizer    Concerns to be Addressed no discharge needs identified    Equipment Needed After Discharge none    Discharge Coordination/Progress spoke to patient who lives alone has a friend that assists her if needed patient is independent at home prior to illness; has RX coverage oxygen 2LNC PRN with [facility] and PCP; will follow for DC needs                   Discharge Plan    No documentation.                 Continued Care and Services - Admitted Since 8/22/2024    No active coordination exists for this encounter.          Demographic Summary    No documentation.                  Functional Status    No documentation.                  Psychosocial    No documentation.                  Abuse/Neglect    No documentation.                  Legal    No documentation.                  Substance Abuse    No documentation.                  Patient Forms    No documentation.                     Mehreen Frankel RN

## 2024-08-23 NOTE — PROGRESS NOTES
"Progress Note  Ludivina Ramirez  8/23/2024 12:52 CDT  Subjective:   Admit Date:   8/22/2024      CC/ADMIT DX:       Interval History:   Reviewed overnight events and nursing notes. She has had  no new issues. Her SOA is improved. No CP.   I have reviewed all labs/diagnostics from the last 24hrs.       ROS:   I have done a 10 point ROS and all are negative, except what is mentioned in the HPI.    Diet: Cardiac; Healthy Heart (2-3 Na+); Fluid Consistency: Thin (IDDSI 0)    Medications:   nitroglycerin, 5-200 mcg/min, Last Rate: 50 mcg/min (08/23/24 0944)      aspirin, 81 mg, Oral, Daily  azelastine, 2 spray, Each Nare, BID  calcitriol, 0.25 mcg, Oral, Daily  carvedilol, 25 mg, Oral, BID With Meals  docusate sodium, 100 mg, Oral, BID  doxycycline, 100 mg, Oral, Q12H  enoxaparin, 30 mg, Subcutaneous, Q24H  fluticasone, 2 spray, Nasal, QAM  ipratropium-albuterol, 3 mL, Nebulization, 4x Daily - RT  [START ON 8/24/2024] predniSONE, 40 mg, Oral, Daily With Breakfast            Objective:   Vitals: /86 (BP Location: Left arm, Patient Position: Lying)   Pulse 72   Temp 98 °F (36.7 °C) (Oral)   Resp 20   Ht 165 cm (64.96\")   Wt 99.8 kg (220 lb 0.3 oz)   LMP  (LMP Unknown)   SpO2 91%   BMI 36.66 kg/m²    Intake/Output Summary (Last 24 hours) at 8/23/2024 1252  Last data filed at 8/23/2024 0600  Gross per 24 hour   Intake 720 ml   Output 2200 ml   Net -1480 ml     General appearance: alert and cooperative with exam  Lungs: clear to auscultation bilaterally  Heart: RRR  Abdomen: soft, non-tender; bowel sounds normal; no masses,  no organomegaly  Extremities: extremities normal, atraumatic, no cyanosis or edema  Neurologic:  No obvious focal neurologic deficits.    Assessment and Plan:     Acute exacerbation of chronic obstructive pulmonary disease (COPD)    CHF    CAD    HTN    Substance Abuse (per Cardiology note)    Elevated D dimer---low prob V/Q scan    CKD    Plan:   Continue present medication(s)    Follow with " others   Wean Nitro gtt and use prn medicine for elevated BP    Follow BP   D/C diuretic      Discharge planning:   her home     Reviewed treatment plans with the patient and/or family.             Electronically signed by Orville Weston MD on 8/23/2024 at 12:52 CDT

## 2024-08-23 NOTE — PLAN OF CARE
Goal Outcome Evaluation:  Plan of Care Reviewed With: patient        Progress: improving  Outcome Evaluation: vss, sr 63-75. room air. BP's remain elevated Per md titrated off nitro gtt to keep SBP>180, clonidine ordered prn for SBP<180. PRN tylenol for headache Up adlib Call light in reach

## 2024-08-23 NOTE — PROGRESS NOTES
"     Inpatient Progress Note        Results Review:   Results from last 7 days   Lab Units 24  0345 24  0952 24  0926   SODIUM mmol/L 139  --  139   SODIUM, ARTERIAL mmol/L  --  142  --    POTASSIUM mmol/L 3.9  --  4.8   CHLORIDE mmol/L 99  --  102   CO2 mmol/L 27.0  --  27.0   BUN mg/dL 50*  --  32*   CALCIUM mg/dL 8.8  --  9.0     Results from last 7 days   Lab Units 24  0345 24  0926   WBC 10*3/mm3 8.73 8.29   HEMOGLOBIN g/dL 12.8 14.2   HEMATOCRIT % 41.1 45.9   PLATELETS 10*3/mm3 163 173       Visit Vitals  BP (!) 180/105 (BP Location: Left arm, Patient Position: Sitting) Comment: Reported to KARY Pritchard   Pulse 72   Temp 98.7 °F (37.1 °C) (Oral)   Resp 20   Ht 165 cm (64.96\")   Wt 99.8 kg (220 lb 0.3 oz)   LMP  (LMP Unknown)   SpO2 97%   BMI 36.66 kg/m²            Medications:   aspirin, 81 mg, Oral, Daily  azelastine, 2 spray, Each Nare, BID  calcitriol, 0.25 mcg, Oral, Daily  carvedilol, 25 mg, Oral, BID With Meals  docusate sodium, 100 mg, Oral, BID  doxycycline, 100 mg, Oral, Q12H  enoxaparin, 30 mg, Subcutaneous, Q24H  fluticasone, 2 spray, Nasal, QAM  furosemide, 20 mg, Intravenous, Q12H  ipratropium-albuterol, 3 mL, Nebulization, 4x Daily - RT  [START ON 2024] predniSONE, 40 mg, Oral, Daily With Breakfast      nitroglycerin, 5-200 mcg/min, Last Rate: 50 mcg/min (24 0944)                                             NAME: Ludivina Ramirez  MRN: [id number]  AGE: 64 y.o.            : 1960  ADMISSION DATE: 2024  PRIMARY CARE PROVIDER: Orville Weston MD  ATTENDING PHYSICIAN: Orville Weston MD                       Reason for Consult:  COPD exacerbation    Interval History:    No acute events overnight, resting in bed breathing comfortably on BiPAP.  States she slept well and is just waking up.  Feels her respiratory status is at or near her baseline.  States she feels comfortable for discharge from a respiratory standpoint.  No new pulmonary " complaints.    Review of Systems:  A full 12-point review of systems was performed and was negative except as documented in the HPI.                       Physical Exam:  General: No acute distress, cooperative  Head: Atraumatic, normocephalic, normal inspection  Eyes: EOMI, normal inspection  ENT: Mucous membranes moist, no thrush  Teeth/gums: Normal inspection  Heart: RRR, no murmur  Lungs: Decreased wheezing with improved air movement  MSK: No edema or clubbing  GI/abdominal: Soft, nontender  Neurologic: Alert, oriented.  Cranial nerves II through XII grossly intact.           Imaging Review:   I reviewed the VQ scan obtained yesterday.  This test was low risk for pulmonary embolus.                       Problem List:  COPD with acute exacerbation  Acute on chronic hypoxic respiratory failure  MARY on CPAP  Tobacco and methamphetamine use  Pulmonary hypertension  Combined diastolic and systolic CHF  Pulmonary nodules  Centrilobular emphysema           Recommendations:   -Supplemental oxygen as needed and wean as tolerated, goal O2 sat of 88-92%.  She was between room air and her home 2 L O2 yesterday  -Chest x-ray reviewed, VQ low risk for PE, respiratory panel and urinary antigens negative, sputum pending collection  -Continue NIV with all sleep, can change to CPAP mode.  To continue home CPAP after discharge  -Change steroids and antibiotics to by mouth.  I have placed a prescription order for both the doxycycline and prednisone taper for after discharge  -Continue scheduled nebs while admitted.  Resume home inhaled therapy on discharge  -Frequent incentive spirometer and CPT, please send home with the patient  -Tobacco and methamphetamine cessation  -Follow-up ordered with Dr. Chavez 4 weeks after discharge    Patient appears to be at or near her respiratory baseline.  She is stable for discharge from a pulmonary standpoint with plans to complete the doxycycline and prednisone taper.  She is to continue her  home oxygen as ordered.  Continue all inhaled therapy at home.  Continue her CPAP at home.    Pulmonology will sign off, please contact us if we can be of any further assistance.             Rob Renee, DO  Pulmonary/Critical Care  8/23/2024  11:18 CDT

## 2024-08-23 NOTE — PLAN OF CARE
Goal Outcome Evaluation:  Plan of Care Reviewed With: patient           Outcome Evaluation: Pt is alert and oriented x 4. Pleasant and cooperative with cares. Pt states that she wants to go home today and wants to take her PO BP meds. Educated on High BP. Call light within reach, Will continue to monitor.

## 2024-08-23 NOTE — PAYOR COMM NOTE
"Ludivina Clemons (64 y.o. Female) [id number]  [facility] [phone]  [phone]      Date of Birth   1960    Social Security Number       Address   [address] [place] [address]    Home Phone   [phone]    MRN   [id number]       Buddhism   Rastafarian    Marital Status   Single                            Admission Date   8/22/24    Admission Type   Emergency    Admitting Provider   Orville Weston MD    Attending Provider   Orville Weston MD    Department, Room/Bed   [facility] 4B, 454/2       Discharge Date       Discharge Disposition       Discharge Destination                                 Attending Provider: Orville Weston MD    Allergies: Codeine, Sumatriptan    Isolation: None   Infection: C.difficile (06/02/22)   Code Status: Prior    Ht: 165 cm (64.96\")   Wt: 99.8 kg (220 lb 0.3 oz)    Admission Cmt: None   Principal Problem: Acute exacerbation of chronic obstructive pulmonary disease (COPD) [J44.1]                   Active Insurance as of 8/22/2024       Primary Coverage       Payor Plan Insurance Group Employer/Plan Group    HUMANA MEDICARE REPLACEMENT HUMANA MED ADV PPO [id number]       Payor Plan Address Payor Plan Phone Number Payor Plan Fax Number Effective Dates    [address] [address] [phone]  1/1/2024 - None Entered    [facility] [id number]         Subscriber Name Subscriber Birth Date Member ID       LUDIVINA CLEMONS 1960 [id number]                     Emergency Contacts        (Rel.) Home Phone Work Phone Mobile Phone    Juliette Nieto (Sister) [phone] -- [phone]                 History & Physical        Orville Weston MD at 08/22/24 1351           History and Physical      CHIEF COMPLAINT:  SOA, Acute Resp Failure    Reason for Admission:  As Above    History Obtained From:  chart, patient    HISTORY OF PRESENT ILLNESS:      The patient is a 64 y.o. female who was admitted from ER with 3 day  " h/o worsening SOA. She says SOA worse with activity. She has had a cough, today. She denies any chest pain. She denies fever. She has had no worsening edema or weight gain. She does take all of her medicine regularly. She has had no GI issues. She has home O2 that she does not use regularly. She does use CPAP with sleeping.   Past Medical History:    Past Medical History:   Diagnosis Date    Acute CHF     Acute renal failure (ARF)     Anemia     Asthma     childhood    Bowel disease, inflammatory     Chronic kidney disease     Coronary artery disease     Hypertension     Ischemic colitis     Lung nodule, multiple 04/03/2024    Metabolic acidosis     Myocardial infarction 11/07/2020    Nonrheumatic aortic (valve) insufficiency     PTSD (post-traumatic stress disorder)      Past Surgical History:    Past Surgical History:   Procedure Laterality Date    CARDIAC CATHETERIZATION N/A 11/08/2020    Procedure: Left Heart Cath;  Surgeon: Pierce Buchanan MD;  Location:  [facility] CATH [facility]VASIVE LOC[facility];  Service: Cardiovascular;  Laterality: N/A;    CLOSED REDUCTION ANKLE FRACTURE Right 2019    13 screws and a plate    EXTERNAL FIXATION WRIST FRACTURE      INSERTION HEMODIALYSIS CATHETER Right 06/03/2022    Procedure: INSERTION OF RIGHT INTERNAL JUGULAR HEMODIALYSIS CATHETER;  Surgeon: Dexter Red MD;  Location:  [facility] OR;  Service: Vascular;  Laterality: Right;    TUBAL ABDOMINAL LIGATION           Medications Prior to Admission:    Medications Prior to Admission   Medication Sig Dispense Refill Last Dose    aspirin 81 MG EC tablet Take 1 tablet by mouth Daily. 90 tablet 3 8/21/2024    calcitriol (ROCALTROL) 0.25 MCG capsule Take 1 capsule by mouth Daily.   8/21/2024    carvedilol (COREG) 25 MG tablet Take 1 tablet by mouth 2 (Two) Times a Day With Meals.   8/21/2024    cloNIDine (CATAPRES) 0.1 MG tablet Take 1 tablet by mouth Every 6 (Six) Hours As Needed for High Blood Pressure (SBP >160). 30 tablet 1 Past Month     docusate sodium (COLACE) 250 MG capsule Take 1 capsule by mouth Daily.   8/21/2024    fluticasone (FLONASE) 50 MCG/ACT nasal spray 2 sprays into the nostril(s) as directed by provider Every Morning. In each nostril 16 g 11 8/21/2024    furosemide (LASIX) 40 MG tablet Take 1 tablet by mouth Daily. 30 tablet 0 8/21/2024    hydroCHLOROthiazide 25 MG tablet Take 1 tablet by mouth Daily. 30 tablet 11 8/21/2024    isosorbide dinitrate (ISORDIL) 40 MG tablet Take 1 tablet by mouth 2 (Two) Times a Day. 180 tablet 3 8/21/2024    losartan (COZAAR) 100 MG tablet Take 1 tablet by mouth Daily.   8/21/2024    melatonin 3 MG tablet Take 2 tablets by mouth Every Night. 30 tablet 0 8/21/2024    albuterol sulfate  (90 Base) MCG/ACT inhaler Inhale 2 puffs Every 4 (Four) Hours As Needed for Wheezing. 20.1 g 3     azelastine (ASTELIN) 0.1 % nasal spray 2 sprays into the nostril(s) as directed by provider 2 (Two) Times a Day. 30 mL 11     ipratropium-albuterol (DUO-NEB) 0.5-2.5 mg/3 ml nebulizer Take 3 mL by nebulization 4 (Four) Times a Day. (Patient taking differently: Take 3 mL by nebulization 4 (Four) Times a Day As Needed for Wheezing or Shortness of Air.) 1080 mL 3     nitroglycerin (NITROSTAT) 0.4 MG SL tablet Place 1 tablet under the tongue Every 5 (Five) Minutes As Needed for Chest Pain. Take no more than 3 doses in 15 minutes.          Allergies:  Codeine and Sumatriptan    Social History:   TOBACCO:   reports that she has been smoking cigarettes. She started smoking about 49 years ago. She has a 24.4 pack-year smoking history. She has been exposed to tobacco smoke. She has never used smokeless tobacco.  ETOH:   reports no history of alcohol use.  DRUGS:   reports no history of drug use.        Family History:   Family History   Problem Relation Age of Onset    Coronary artery disease Mother     Coronary artery disease Father     Breast cancer Neg Hx      REVIEW OF SYSTEMS:  Constitutional: Negative   CV:  "Negative  Pulmonary: SOA, cough  GI: Negative  : Negative  Psych: Negative  Neuro: Negative  Skin: Negative  MusculoSkeletal: Negative  HEENT: Negative  Joints: Negative  Vitals:  BP (!) 195/95 (BP Location: Left arm, Patient Position: Sitting)   Pulse 70   Temp 97.9 °F (36.6 °C)   Resp 20   Ht 165.1 cm (65\")   Wt 99.8 kg (220 lb)   LMP  (LMP Unknown)   SpO2 96%   BMI 36.61 kg/m²     PHYSICAL EXAM (per notes):  Gen: NAD, alert, pleasant  HEENT: WNL  Lymph: no LAD  Neck: no JVD or masses  Chest: coarse  CV: RRR  Abdomen: NT/ND  Extrem: no C/C/E  Neuro: Nonfocal  Skin: no rashes  Joints: no redness  DATA:  I have reviewed the admission labs and imaging tests.    ASSESSMENT AND PLAN:      SOA, Acute Resp Failure with  Hypoxia----continue steroids, nebs, O2, ask Pulm to see patient  Volume Overload---continue Lasix and follow labs, ECHO ordered  HTN---Coreg resumed, wean IV treatment started in ER as can  H/O ESRD---renal function stable  Elevated D Dimer---VQ scan ordered  CAD      Orville Weston MD  13:51 CDT 8/22/2024    Electronically signed by Orville Weston MD at 08/22/24 1356          Emergency Department Notes        Gramlisch, Robert, RN at 08/22/24 1125          Nursing report ED to floor  Ludivina Ramirez  64 y.o.  female    HPI:   Chief Complaint   Patient presents with    Shortness of Breath       Admitting doctor:   Orville Weston MD    Consulting provider(s):  Consults       No orders found from 7/24/2024 to 8/23/2024.             Admitting diagnosis:   The primary encounter diagnosis was Acute exacerbation of chronic obstructive pulmonary disease (COPD). Diagnoses of Congestive heart failure, unspecified HF chronicity, unspecified heart failure type, Chronic kidney disease, unspecified CKD stage, and Positive D dimer were also pertinent to this visit.    Code status:   Current Code Status       Date Active Code Status Order ID Comments User Context       Prior      "       Allergies:   Codeine and Sumatriptan    Intake and Output    Intake/Output Summary (Last 24 hours) at 8/22/2024 1125  Last data filed at 8/22/2024 1112  Gross per 24 hour   Intake 50 ml   Output --   Net 50 ml       Weight:       08/22/24  0911   Weight: 99.8 kg (220 lb)       Most recent vitals:   Vitals:    08/22/24 1001 08/22/24 1031 08/22/24 1112 08/22/24 1116   BP: (!) 200/110 158/99 (!) 195/106 (!) 183/104   BP Location: Left arm Left arm  Left arm   Patient Position: Lying Lying  Lying   Pulse: 69 54 57 57   Resp: 24 20  20   Temp:       TempSrc:       SpO2: 99% 97%  100%   Weight:       Height:         Oxygen Therapy: .    Active LDAs/IV Access:   Lines, Drains & Airways       Active LDAs       Name Placement date Placement time Site Days    Peripheral IV 08/22/24 0919 Posterior;Right Hand 08/22/24 0919  Hand  less than 1                    Labs (abnormal labs have a star):   Labs Reviewed   COMPREHENSIVE METABOLIC PANEL - Abnormal; Notable for the following components:       Result Value    Glucose 106 (*)     BUN 32 (*)     Creatinine 2.37 (*)     eGFR 22.4 (*)     All other components within normal limits    Narrative:     GFR Normal >60  Chronic Kidney Disease <60  Kidney Failure <15     BNP (IN-HOUSE) - Abnormal; Notable for the following components:    proBNP 13,903.0 (*)     All other components within normal limits    Narrative:     This assay is used as an aid in the diagnosis of individuals suspected of having heart failure. It can be used as an aid in the diagnosis of acute decompensated heart failure (ADHF) in patients presenting with signs and symptoms of ADHF to the emergency department (ED). In addition, NT-proBNP of <300 pg/mL indicates ADHF is not likely.    Age Range Result Interpretation  NT-proBNP Concentration (pg/mL:      <50             Positive            >450                   Gray                 300-450                    Negative             <300    50-75           Positive  "           >900                  Gray                300-900                  Negative            <300      >75             Positive            >1800                  Gray                300-1800                  Negative            <300   D-DIMER, QUANTITATIVE - Abnormal; Notable for the following components:    D-Dimer, Quantitative 1.33 (*)     All other components within normal limits    Narrative:     According to the assay 's published package insert, a normal (<0.50 MCGFEU/mL) D-dimer result in conjunction with a non-high clinical probability assessment, excludes deep vein thrombosis (DVT) and pulmonary embolism (PE) with high sensitivity.    D-dimer values increase with age and this can make VTE exclusion of an older population difficult. To address this, the American College of Phys[employer], based on best available evidence and recent guidelines, recommends that clinicians use age-adjusted D-dimer thresholds in patients greater than 50 years of age with: a) a low probability of PE who do not meet all Pulmonary Embolism Rule Out Criteria, or b) in those with intermediate probability of PE.   The formula for an age-adjusted D-dimer cut-off is \"age/100\".  For example, a 60 year old patient would have an age-adjusted cut-off of 0.60 MCGFEU/mL and an 80 year old 0.80 MCGFEU/mL.   CBC WITH AUTO DIFFERENTIAL - Abnormal; Notable for the following components:    MCHC 30.9 (*)     Neutrophil % 79.2 (*)     Lymphocyte % 13.0 (*)     Monocyte % 4.5 (*)     All other components within normal limits   BLOOD GAS, ARTERIAL W/CO-OXIMETRY - Abnormal; Notable for the following components:    pCO2, Arterial 46.1 (*)     pO2, Arterial 123.0 (*)     HCO3, Arterial 28.6 (*)     Base Excess, Arterial 3.1 (*)     O2 Saturation, Arterial 99.7 (*)     pCO2, Temperature Corrected 46.1 (*)     pO2, Temperature Corrected 123 (*)     All other components within normal limits   RESPIRATORY PANEL PCR W/ COVID-19 (SARS-COV-2), NP " "SWAB IN UTM/VTP, 2 HR TAT - Normal    Narrative:     In the setting of a positive respiratory panel with a viral infection PLUS a negative procalcitonin without other underlying concern for bacterial infection, consider observing off antibiotics or discontinuation of antibiotics and continue supportive care. If the respiratory panel is positive for atypical bacterial infection (Bordetella pertussis, Chlamydophila pneumoniae, or Mycoplasma pneumoniae), consider antibiotic de-escalation to target atypical bacterial infection.   LACTIC ACID, PLASMA - Normal   PROCALCITONIN - Normal    Narrative:     As a Marker for Sepsis (Non-Neonates):    1. <0.5 ng/mL represents a low risk of severe sepsis and/or septic shock.  2. >2 ng/mL represents a high risk of severe sepsis and/or septic shock.    As a Marker for Lower Respiratory Tract Infections that require antibiotic therapy:    PCT on Admission    Antibiotic Therapy       6-12 Hrs later    >0.5                Strongly Recommended  >0.25 - <0.5        Recommended   0.1 - 0.25          Discouraged              Remeasure/reassess PCT  <0.1                Strongly Discouraged     Remeasure/reassess PCT    As 28 day mortality risk marker: \"Change in Procalcitonin Result\" (>80% or <=80%) if Day 0 (or Day 1) and Day 4 values are available. Refer to http://www.[employer]-pct-calculator.com    Change in PCT <=80%  A decrease of PCT levels below or equal to 80% defines a positive change in PCT test result representing a higher risk for 28-day all-cause mortality of patients diagnosed with severe sepsis for septic shock.    Change in PCT >80%  A decrease of PCT levels of more than 80% defines a negative change in PCT result representing a lower risk for 28-day all-cause mortality of patients diagnosed with severe sepsis or septic shock.      BLOOD CULTURE   BLOOD CULTURE   BLOOD GAS, ARTERIAL   CBC AND DIFFERENTIAL    Narrative:     The following orders were created for panel order CBC & " Differential.  Procedure                               Abnormality         Status                     ---------                               -----------         ------                     CBC Auto Differential[[id number]]        Abnormal            Final result                 Please view results for these tests on the individual orders.       Meds given in ED:   Medications   nitroglycerin (TRIDIL) 200 mcg/ml infusion (5 mcg/min Intravenous New Bag 8/22/24 1113)   Enoxaparin Sodium (LOVENOX) syringe 100 mg (has no administration in time range)   magnesium sulfate 2g/50 mL (PREMIX) infusion (0 g Intravenous Stopped 8/22/24 1112)   methylPREDNISolone sodium succinate (SOLU-Medrol) injection 125 mg (125 mg Intravenous Given 8/22/24 0950)   ipratropium-albuterol (DUO-NEB) nebulizer solution 3 mL (3 mL Nebulization Given 8/22/24 0930)   albuterol (PROVENTIL) nebulizer solution 0.083% 2.5 mg/3mL (2.5 mg Nebulization Given 8/22/24 0930)   labetalol (NORMODYNE,TRANDATE) injection 20 mg (20 mg Intravenous Given 8/22/24 1011)   acetaminophen (TYLENOL) tablet 1,000 mg (1,000 mg Oral Given 8/22/24 1011)   furosemide (LASIX) injection 40 mg (40 mg Intravenous Given 8/22/24 1112)     nitroglycerin, 5-200 mcg/min, Last Rate: 5 mcg/min (08/22/24 1113)         NIH Stroke Scale:       Isolation/Infection(s):  No active isolations   C.difficile     COVID Testing  Collected . yes  Resulted . negative    Nursing report ED to floor:  Mental status: . A/ox4  Ambulatory status: . SBA  Precautions: .none    ED nurse phone extentsion- ..  2522    Electronically signed by Gramlisch, Robert, RN at 08/22/24 1125       Cristhian Peguero MD at 08/22/24 1015          Subjective   History of Present Illness  Patient with COPD exacerbation came the ER cough congestion and shortness of breath.  No chest pain associated with this.  Patient has not been taking his blood pressure medication at home because she was short of breath.    Shortness of  Breath  Severity:  Moderate  Onset quality:  Gradual  Timing:  Constant  Progression:  Worsening  Chronicity:  Recurrent  Context: activity    Context: not animal exposure, not emotional upset, not known allergens, not occupational exposure and not pollens    Relieved by:  Nothing  Worsened by:  Exertion  Ineffective treatments:  None tried  Associated symptoms: cough, sore throat and wheezing    Associated symptoms: no abdominal pain, no chest pain, no claudication, no diaphoresis, no ear pain, no fever, no headaches, no hemoptysis, no neck pain, no PND, no rash, no sputum production and no vomiting    Risk factors: obesity    Risk factors: no recent surgery        Review of Systems   Constitutional: Negative.  Negative for activity change, appetite change, chills, diaphoresis, fatigue and fever.   HENT:  Positive for sore throat. Negative for congestion, drooling, ear pain, facial swelling, hearing loss and sinus pressure.    Eyes: Negative.  Negative for discharge.   Respiratory:  Positive for cough, shortness of breath and wheezing. Negative for hemoptysis and sputum production.    Cardiovascular:  Negative for chest pain, claudication and PND.   Gastrointestinal:  Negative for abdominal distention, abdominal pain, blood in stool, diarrhea, nausea and vomiting.   Endocrine: Negative.  Negative for cold intolerance, heat intolerance, polydipsia, polyphagia and polyuria.   Genitourinary: Negative.  Negative for dysuria, flank pain and urgency.   Musculoskeletal: Negative.  Negative for arthralgias, back pain, myalgias, neck pain and neck stiffness.   Skin: Negative.  Negative for color change, pallor and rash.   Allergic/Immunologic: Negative.    Neurological: Negative.  Negative for dizziness, seizures, speech difficulty, weakness, numbness and headaches.   Hematological: Negative.  Negative for adenopathy.   All other systems reviewed and are negative.      Past Medical History:   Diagnosis Date    Acute CHF      Acute renal failure (ARF)     Anemia     Asthma     childhood    Bowel disease, inflammatory     Chronic kidney disease     Coronary artery disease     Hypertension     Ischemic colitis     Lung nodule, multiple 2024    Metabolic acidosis     Myocardial infarction 2020    Nonrheumatic aortic (valve) insufficiency     PTSD (post-traumatic stress disorder)        Allergies   Allergen Reactions    Codeine Nausea And Vomiting    Sumatriptan Other (See Comments)     Headache        Past Surgical History:   Procedure Laterality Date    CARDIAC CATHETERIZATION N/A 2020    Procedure: Left Heart Cath;  Surgeon: Pierce Buchanan MD;  Location:  [facility] CATH INVASIVE LOCATION;  Service: Cardiovascular;  Laterality: N/A;    CLOSED REDUCTION ANKLE FRACTURE Right     13 screws and a plate    EXTERNAL FIXATION WRIST FRACTURE      INSERTION HEMODIALYSIS CATHETER Right 2022    Procedure: INSERTION OF RIGHT INTERNAL JUGULAR HEMODIALYSIS CATHETER;  Surgeon: Dexter Red MD;  Location:  [facility] OR;  Service: Vascular;  Laterality: Right;    TUBAL ABDOMINAL LIGATION         Family History   Problem Relation Age of Onset    Coronary artery disease Mother     Coronary artery disease Father     Breast cancer Neg Hx        Social History     Socioeconomic History    Marital status: Single   Tobacco Use    Smoking status: Every Day     Current packs/day: 0.00     Average packs/day: 0.5 packs/day for 48.7 years (24.4 ttl pk-yrs)     Types: Cigarettes     Start date:      Last attempt to quit: 2023     Years since quittin.8     Passive exposure: Past    Smokeless tobacco: Never    Tobacco comments:     Smokes about 0.5 pack daily 10    Vaping Use    Vaping status: Never Used   Substance and Sexual Activity    Alcohol use: No    Drug use: No    Sexual activity: Defer           Objective   Physical Exam  Vitals and nursing note reviewed. Exam conducted with a chaperone present.   Constitutional:        General: She is in acute distress.      Appearance: Normal appearance. She is well-developed. She is obese. She is ill-appearing. She is not toxic-appearing or diaphoretic.   HENT:      Head: Normocephalic and atraumatic.      Right Ear: External ear normal.      Nose: Nose normal.      Mouth/Throat:      Mouth: Mucous membranes are moist.   Eyes:      Conjunctiva/sclera: Conjunctivae normal.      Pupils: Pupils are equal, round, and reactive to light.   Cardiovascular:      Rate and Rhythm: Normal rate and regular rhythm.      Chest Wall: PMI is not displaced.      Pulses: Normal pulses. No decreased pulses.      Heart sounds: Normal heart sounds. No murmur heard.  Pulmonary:      Effort: Tachypnea, accessory muscle usage and respiratory distress present.      Breath sounds: No stridor. Examination of the right-middle field reveals rhonchi. Examination of the left-middle field reveals rhonchi. Examination of the right-lower field reveals decreased breath sounds, wheezing, rhonchi and rales. Examination of the left-lower field reveals decreased breath sounds, wheezing, rhonchi and rales. Decreased breath sounds, wheezing, rhonchi and rales present.   Chest:      Chest wall: No tenderness or crepitus.   Abdominal:      General: Bowel sounds are normal. There is no distension.      Palpations: Abdomen is soft.      Tenderness: There is no abdominal tenderness.   Musculoskeletal:         General: No swelling or tenderness. Normal range of motion.      Cervical back: Normal range of motion and neck supple. No rigidity.      Right lower leg: No edema.      Left lower leg: No edema.      Comments: Lower extremity exam bilaterally is unremarkable.  There is no right or left calf tenderness .  There is no palpable venous cord.  No obvious difference in the size of the legs.  No pitting edema.  The dorsalis pedis and posterior tibial femoral and popliteal pulses are palpable and +2 bilaterally.  Homans sign is negative    Skin:     General: Skin is warm.      Capillary Refill: Capillary refill takes less than 2 seconds.      Coloration: Skin is not jaundiced.      Findings: No erythema or rash.   Neurological:      General: No focal deficit present.      Mental Status: She is alert and oriented to person, place, and time. Mental status is at baseline.      Cranial Nerves: No cranial nerve deficit.      Motor: No weakness.      Coordination: Coordination normal.      Deep Tendon Reflexes: Reflexes are normal and symmetric. Reflexes normal.   Psychiatric:         Mood and Affect: Mood normal.         Procedures          ED Course  ED Course as of 08/22/24 1050   Thu Aug 22, 2024   1019  Left ventricular ejection fraction is normal (Calculated EF = 53%).  ·  Breast attenuation artifact is present.  ·  Large infarction of the proximal left anterior descending coronary artery distribution with total perfusion defect at rest  26% and  28% with stress.  No significant ischemia.  Associated hypokinesis in this distribution      [TS]   1020 Wells 3 [TS]   1024 Sinus rhythm [TS]   1026  Left ventricular ejection fraction is normal (Calculated EF = 53%).  ·  Breast attenuation artifact is present.  ·  Large infarction of the proximal left anterior descending coronary artery distribution with total perfusion defect at rest  26% and  28% with stress.  No significant ischemia.  Associated hypokinesis in this distribution     Last stress test [TS]   1026 Normal sinus rhythm EKG compared to prior EKG does not show any acute change [TS]   1046 Patient came in with acute shortness of breath noted to be respiratory distress was given neb treatments and also diuretics.  Blood pressure elevated was given labetalol unguinal place her on nitroglycerin her prior charts reviewed she got history of coronary artery disease.  With a EF 53%.  The patient feels much better with the BiPAP diuretics and steroids and neb treatments and think that she should be  able to go home.  I think she will benefit for staying in the hospital at least for 1 night.  I have discussed this case at length with the patient also with the on-call physician for her and the patient will be admitted to the hospital.  Her dimer is elevated her Wells score will be 0 prior CTs of the angio chest have been negative.  This dimer elevation is probably because of CKD but can be reviewed by doing a VQ scan.  And the primary MD's nurse was informed about this.  I am empirically giving her 1 dose of Lovenox in the ER. [TS]      ED Course User Index  [TS] Cristhian Peguero MD                                             Medical Decision Making  Differential Diagnosis:  I considered pulmonary etiology, asthma, chronic obstructive pulmonary disease, pneumonia, pulmonary embolism, adult respiratory distress syndrome, pneumothorax, pleural effusion, pulmonary fibrosis, cardiac etiology, congestive heart failure, myocardial infarction, metabolic etiology, diabetic ketoacidosis, uremia, acidosis, sepsis, anemia, drug related etiology, hyperventilation and CNS disease as a possible cause of dyspnea in this patient. This is a partial list of diagnoses considered.           Amount and/or Complexity of Data Reviewed  Labs: ordered.     Details: Labs reviewed  Radiology: ordered.     Details: X-ray was reviewed looks like pulmonary fibrosis and pulmonary nodules along with pulmonary edema this have been discussed the patient and she has been informed about the findings and reason to follow-up with the primary MD as an outpatient when she gets better.  ECG/medicine tests: ordered.     Details: Nonischemic EKG the EKG unchanged compared to prior EKGs.  Discussion of management or test interpretation with external provider(s): Discussed with Dr. Ras Weston's nurse.    Risk  OTC drugs.  Prescription drug management.  Risk Details: Patient came in with shortness of breath lab work was initial IV access obtained patient  given steroids and placed on BiPAP and nitro has been initiated along with diuretics patient feels much better at this time.  Oxygen saturation 98% on the BiPAP not tachycardic not appear to be in shock.  There is no clinical evidence of pneumonia.  The patient did not undergo a CT of the chest since she has got chronic kidney disease but is not on dialysis.  Her Wells score risk for DVT and PE is low risk.  Therefore if needed she can get a VQ scan this has been discussed with the patient's primary MD's nurse.        Final diagnoses:   Acute exacerbation of chronic obstructive pulmonary disease (COPD)   Congestive heart failure, unspecified HF chronicity, unspecified heart failure type   Chronic kidney disease, unspecified CKD stage   Positive D dimer       ED Disposition  ED Disposition       ED Disposition   Decision to Admit    Condition   --    Comment   Level of Care: Telemetry [5]   Diagnosis: Acute exacerbation of chronic obstructive pulmonary disease (COPD) [319391]   Admitting Physician: JESSE MEIER [[id number]]   Attending Physician: JESSE MEIER [[id number]]   Certification: I Certify That Inpatient Hospital Services Are Medically Necessary For Greater Than 2 Midnights                 No follow-up provider specified.       Medication List      No changes were made to your prescriptions during this visit.            Cristhian Peguero MD  08/22/24 1017       Cristhian Peguero MD  08/22/24 1020       Cristhian Peguero MD  08/22/24 1050      Electronically signed by Cristhian Peguero MD at 08/22/24 1050       Physician Progress Notes (last 24 hours)  Notes from 08/22/24 1004 through 08/23/24 1004   No notes of this type exist for this encounter.          Consult Notes (last 24 hours)        Cassy Renee DO at 08/22/24 1736        Consult Orders    1. Inpatient Pulmonology Consult [[id number]] ordered by Jesse Meier MD at 08/22/24 1320                     Inpatient Consult Note             NAME: Ludivina Ramirez  MRN: [id number]  AGE: 64 y.o.            : 1960  ADMISSION DATE: 2024  PRIMARY CARE PROVIDER: Orville Weston MD  ATTENDING PHYSICIAN: Orville Weston MD               Reason for Consult:  We have been consulted by Orville Weston MD to see Ludivina Ramirez for shortness of breath.    HPI:    Mrs. Ramirez is a 64-year-old female who presented to [facility] ED due to shortness of breath.  Past medical history includes COPD, former smoker, pulmonary nodules, MARY on CPAP, emphysema, chronic hypoxic respiratory failure on supplemental oxygen, combined diastolic and systolic CHF, hypertension, stage IV CKD, pulmonary hypertension, chronic dyspnea.    Patient states she presented to the ED this morning due to shortness of breath worse than her baseline.  She has been having increased shortness of breath over the past 3 days.  She complains of associated wheezing and increased cough.  States that her cough is normally nonproductive.  However her current cough is productive with difficulty expectorating.  Complains of associated chills.  Denies any fever or sick contacts.  This is the patient's first admission for COPD this year.    Patient typically follows with Dr. Chavez, she was last seen in April of this year.  Patient is a former smoker who quit 1 week ago.  Does admit to recent methamphetamine use.    Review of Systems:  A full 12-point review of systems was performed and was negative except as documented in the HPI.               Social History:  Smoking: Former who quit 1 week ago.  Admits to methamphetamine use.  No history of exposure to industrial dusts, fumes, or asbestos.            Physical Exam:  General: Well appearing, in no respiratory distress  Head: Normocephalic, atraumatic  Eyes: Conjunctiva clear, sclera non-icteric  Neck: Supple without stridor  Heart: Regular rate and rhythm, no murmur  Lungs: Diminished bilaterally with mild rhonchi, no  wheezing  Abdomen: Soft, nontender  MSK/extremities: No cyanosis or edema  Neurologic: AOx3, grossly no focal deficits      Imaging Review:  I personally reviewed the chest x-ray done on this admission:  Chest x-ray showed hyperinflated lungs with a known right-sided pulmonary nodule, no acute infiltrate or opacity.      PFTs Reivew:  PFTs from 2022 showed FEV1 of 55%            Problem List:  COPD with acute exacerbation  Acute on chronic hypoxic respiratory failure  MARY on CPAP  Tobacco and methamphetamine use  Pulmonary hypertension  Combined diastolic and systolic CHF  Pulmonary nodules  Centrilobular emphysema      Pulmonary Assessment:  Patient is a 64-year-old female admitted for COPD acute worsening of her chronic dyspnea.  She has been seen by cardiology for questionable volume overload.  We have been asked to evaluate for COPD exacerbation.  Patient does appeared to be in acute exacerbation of her COPD.  She notes worsening dyspnea and cough from her baseline.  Given her newly productive cough we will start her on a 5-day course of doxycycline.  Will decrease steroids to 40 mg IV twice daily.  As respiratory status improves we can transition to prednisone.      Recommendations:   -Supplemental oxygen as needed and wean as tolerated, goal O2 sat of 88-92%  -Chest x-ray reviewed as above, VQ scan pending  -Sputum culture, MRSA swab, urinary antigens, RPP  -Continue NIV with all sleep, can change to CPAP mode  -ABG and TTE reviewed  -Start doxycycline  -Continue scheduled nebs  -Continue IV steroids, transition to prednisone as she improves  -Frequent incentive spirometer and CPT given her productive cough  -Ambulate as able      Thank you for allowing us to participate in this patient's care. We will continue to follow.              Past Medical History:   Past Medical History:   Diagnosis Date    Acute CHF     Acute renal failure (ARF)     Anemia     Asthma     childhood    Bowel disease, inflammatory      Chronic kidney disease     Coronary artery disease     Hypertension     Ischemic colitis     Lung nodule, multiple 04/03/2024    Metabolic acidosis     Myocardial infarction 11/07/2020    Nonrheumatic aortic (valve) insufficiency     PTSD (post-traumatic stress disorder)          Past Surgical History:   Past Surgical History:   Procedure Laterality Date    CARDIAC CATHETERIZATION N/A 11/08/2020    Procedure: Left Heart Cath;  Surgeon: Pierce Buchanan MD;  Location:  [facility] CATH INVASIVE LOCATION;  Service: Cardiovascular;  Laterality: N/A;    CLOSED REDUCTION ANKLE FRACTURE Right 2019    13 screws and a plate    EXTERNAL FIXATION WRIST FRACTURE      INSERTION HEMODIALYSIS CATHETER Right 06/03/2022    Procedure: INSERTION OF RIGHT INTERNAL JUGULAR HEMODIALYSIS CATHETER;  Surgeon: Dexter Red MD;  Location:  [facility] OR;  Service: Vascular;  Laterality: Right;    TUBAL ABDOMINAL LIGATION           Family History:   Family History   Problem Relation Age of Onset    Coronary artery disease Mother     Coronary artery disease Father     Breast cancer Neg Hx          Medications:   Prior to Admission medications    Medication Sig Start Date End Date Taking? Authorizing Provider   aspirin 81 MG EC tablet Take 1 tablet by mouth Daily. 2/7/22  Yes Destini Gaitan APRN   calcitriol (ROCALTROL) 0.25 MCG capsule Take 1 capsule by mouth Daily.   Yes Edwin Wise MD   carvedilol (COREG) 25 MG tablet Take 1 tablet by mouth 2 (Two) Times a Day With Meals.   Yes Edwin Wise MD   cloNIDine (CATAPRES) 0.1 MG tablet Take 1 tablet by mouth Every 6 (Six) Hours As Needed for High Blood Pressure (SBP >160). 10/11/23  Yes Orville Weston MD   docusate sodium (COLACE) 250 MG capsule Take 1 capsule by mouth Daily. 6/22/23  Yes Edwin Wise MD   fluticasone (FLONASE) 50 MCG/ACT nasal spray 2 sprays into the nostril(s) as directed by provider Every Morning. In each nostril 8/2/23  Yes Sensarma Sugata,  MD   furosemide (LASIX) 40 MG tablet Take 1 tablet by mouth Daily. 10/12/23  Yes Orville Weston MD   hydroCHLOROthiazide 25 MG tablet Take 1 tablet by mouth Daily. 2/26/24  Yes Destini Gaitan APRN   ipratropium-albuterol (DUO-NEB) 0.5-2.5 mg/3 ml nebulizer Take 3 mL by nebulization 4 (Four) Times a Day. prn   Yes ProviderEdwin MD   isosorbide dinitrate (ISORDIL) 40 MG tablet Take 1 tablet by mouth 2 (Two) Times a Day. 4/16/24  Yes Destini Gaitan APRN   losartan (COZAAR) 100 MG tablet Take 1 tablet by mouth Daily.   Yes ProviderEdwin MD   melatonin 3 MG tablet Take 2 tablets by mouth Every Night. 6/10/22  Yes Orville Weston MD   albuterol sulfate  (90 Base) MCG/ACT inhaler Inhale 2 puffs Every 4 (Four) Hours As Needed for Wheezing. 8/2/23   Neeta Chavez MD   nitroglycerin (NITROSTAT) 0.4 MG SL tablet Place 1 tablet under the tongue Every 5 (Five) Minutes As Needed for Chest Pain. Take no more than 3 doses in 15 minutes.    Provider, MD Edwin   azelastine (ASTELIN) 0.1 % nasal spray 2 sprays into the nostril(s) as directed by provider 2 (Two) Times a Day.  Patient not taking: Reported on 8/22/2024 8/2/23 8/22/24  Neeta Chavez MD   ipratropium-albuterol (DUO-NEB) 0.5-2.5 mg/3 ml nebulizer Take 3 mL by nebulization 4 (Four) Times a Day.  Patient taking differently: Take 3 mL by nebulization 4 (Four) Times a Day As Needed for Wheezing or Shortness of Air. 8/2/23 8/22/24  Neeta Chavez MD   OLANZapine (zyPREXA) 5 MG tablet Take 1 tablet by mouth Every Night. 6/10/22 8/21/22  Orville Weston MD         Allergies:   Codeine and Sumatriptan      Results Review:   Results from last 7 days   Lab Units 08/22/24  0952 08/22/24  0908   SODIUM mmol/L  --  139   SODIUM, ARTERIAL mmol/L 142  --    POTASSIUM mmol/L  --  4.8   CHLORIDE mmol/L  --  102   CO2 mmol/L  --  27.0   BUN mg/dL  --  32*   CALCIUM mg/dL  --  9.0     Results from last 7 days   Lab Units  "08/22/24  0926   WBC 10*3/mm3 8.29   HEMOGLOBIN g/dL 14.2   HEMATOCRIT % 45.9   PLATELETS 10*3/mm3 173       Visit Vitals  BP (!) 175/103 (BP Location: Right arm, Patient Position: Sitting)   Pulse 77   Temp 97.8 °F (36.6 °C) (Oral)   Resp 18   Ht 165 cm (64.96\")   Wt 99.8 kg (220 lb 0.3 oz)   LMP  (LMP Unknown)   SpO2 90%   BMI 36.66 kg/m²                Rob Renee DO  Pulmonary/Critical Care  8/22/2024  17:36 CDT      Electronically signed by Cassy Renee DO at 08/22/24 1519       Valentino Ramirez MD at 08/22/24 1354        Consult Orders    1. Inpatient Cardiology Consult [[id number]] ordered by Orville Weston MD                 Inpatient Cardiology Consult  Consult performed by: Valentino Ramirez MD  Consult ordered by: Orville Weston MD  Reason for consult: Volume overload, elevated BP        Chief Complaint   Patient presents with    Shortness of Breath     HPI: This is a 64-year-old female with coronary artery disease, prior PCI, chronic combined systolic and diastolic heart failure (likely multifactorial due to ischemic cardiomyopathy and substance abuse), primary hypertension, stage IV chronic kidney disease, COPD with ongoing tobacco abuse who presents with a chief complaint of shortness of breath.  The patient notes that for the past 3 to 4 days, she has been noticing worsening shortness of breath, dyspnea with exertion.  She denies having any fevers.  She denies having orthopnea, PND, significant lower extremity edema.  She also denies having any chest pain.  She notes no lightheadedness, dizziness, syncope.  She notes that she presented today due to worsening of shortness of breath of the last 24 hours.  In the emergency department, she says that she received steroids and a breathing treatment and that her she has now developed a cough since that time that has been largely nonproductive.  She denies having abdominal distention, abdominal pain, " "nausea, vomiting.  As stated, no orthopnea, PND or significant edema.  She also notes that her weight has been stable.    We are asked to see this patient regarding \"volume overload and elevated BP\".  Of note, the patient states that she did not take her blood pressure medications this morning and reports that her blood pressure has been reasonably well-controlled up until today.    Also, of note, this patient is known to me to be a patient who has had issues with substance abuse.  She notes that she used methamphetamine within the past week.    I have reviewed all elements of the patient's past medical, past surgical, home medications, allergies, family and social history with the patient and updated these in the medical record as needed.    Review of Systems: All pertinent negatives and positives as noted above.  Otherwise, all systems reviewed and found to be negative.    Physical Exam:  BP (!) 195/95 (BP Location: Left arm, Patient Position: Sitting)   Pulse 70   Temp 97.9 °F (36.6 °C)   Resp 20   Ht 165.1 cm (65\")   Wt 99.8 kg (220 lb)   LMP  (LMP Unknown)   SpO2 96%   BMI 36.61 kg/m²   Temp:  [96.6 °F (35.9 °C)-97.9 °F (36.6 °C)] 97.9 °F (36.6 °C)  Heart Rate:  [54-81] 70  Resp:  [20-30] 20  BP: (158-200)/() 195/95    Gen.: Chronically ill in appearance, sitting on the edge of the bed, awake alert and oriented ×3 and in no acute distress  HEENT: Normocephalic, atraumatic, pupils equally round and reactive to light, oropharynx clear, mucous membranes moist  Neck: Supple, no obvious elevation of JVP, no thyromegaly, no carotid bruits  CV: Regular rate and rhythm, soft diastolic and systolic murmur noted at the lower sternal border and at the right upper sternal border  Pulmonary: Rhonchi noted bilaterally, coughing during the exam, no active wheezes at this time  GI: Obese, soft, nontender, nondistended, active bowel sounds  Extremities: Warm and well-perfused, no dermatitis or ulceration, no " clubbing, cyanosis, edema  Neurologic: Cranial nerves are grossly intact, patient moves all 4 extremities equally during examination    Diagnostic Data:    Lab Results   Component Value Date    WBC 8.29 08/22/2024    HGB 14.2 08/22/2024    HCT 45.9 08/22/2024    MCV 93.9 08/22/2024     08/22/2024     Lab Results   Component Value Date    GLUCOSE 106 (H) 08/22/2024    CALCIUM 9.0 08/22/2024     08/22/2024    K 4.8 08/22/2024    CO2 27.0 08/22/2024     08/22/2024    BUN 32 (H) 08/22/2024    CREATININE 2.37 (H) 08/22/2024    EGFR 22.4 (L) 08/22/2024    BCR 13.5 08/22/2024    ANIONGAP 10.0 08/22/2024     D-dimer 1.33    proBNP today 13,903, previously in 2021 19,936, also 10,807, also previously as high as greater than 70,000 on 8/8/2021    CXR: Stable chest exam without acute process. There is an underlying pulmonary fibrosis with diffuse interstitial coarsening. No superimposed acute infiltrate or obvious pulmonary edema.    ECG reviewed, this shows sinus rhythm, ventricular rate 65, first-degree AV block, poor R wave progression, no acute ST segment changes -this is directly compared to the ECG from 2/13/2024 which shows sinus bradycardia, ventricular rate 55, first-degree AV block, poor R wave progression    Prior cardiac catheterization from 11/8/2020: Moderate stenosis in the right posterior descending artery, occluded proximal LAD treated with implantation of drug-eluting stent.  No disease noted in the left circumflex.    Results for orders placed during the hospital encounter of 10/09/23    Adult Transthoracic Echo Complete W/ Cont if Necessary Per Protocol    Interpretation Summary    Left ventricular systolic function is mildly decreased. Left ventricular ejection fraction appears to be 46 - 50%.    The left ventricular cavity is mildly dilated.    Left ventricular wall thickness is consistent with mild concentric hypertrophy.    Left ventricular diastolic function is consistent with (grade  "II w/high LAP) pseudonormalization.    The left atrial cavity is severely dilated.    The right atrial cavity is dilated.    Mild to moderate aortic valve regurgitation is present.    There is bileaflet mitral valve thickening present.    Estimated right ventricular systolic pressure from tricuspid regurgitation is markedly elevated (>55 mmHg).    Mild dilation of the ascending aorta is present, measuring 4.4 cm.    ASSESSMENT/PLAN:    1.  Acute on chronic hypoxemic respiratory failure  2.  Primary hypertension  3.  Substance abuse, including methamphetamine  4.  Coronary artery disease native coronary artery  5.  Chronic systolic and diastolic heart failure due to likely combination of ischemic cardiomyopathy and substance abuse: Despite an elevated BNP, she does not demonstrate any significant signs of volume overload on my exam with no obvious elevation of JVP, no abdominal distention, no peripheral edema.  Her chest x-ray also shows no obvious pulmonary edema.  There is some interstitial thickening.  Certainly she could have slight increased volume but certainly not grossly volume overloaded to the point where I would categorize her as acute on chronic heart failure.  6.  Tobacco abuse  7.  Valvular heart disease: Previous echocardiogram shows aortic valve regurgitation  8.  Stage IV chronic kidney disease    -We are asked see this patient regarding \"volume overload and elevated BP\".  Clinically, I am not certain that she shows significant volume overload.  Her BNP is elevated but is essentially always elevated in this patient with advanced chronic kidney disease.  She was given IV Lasix in the emergency department and she notes that she did urinate afterwards, however I do not appreciate any large volume overload at this time.  I would recommend using Lasix on an as needed basis unless otherwise thought so by the primary service.  In regards the patient's elevated blood pressure, she did not take her blood " pressure medications this morning.  Also, the patient admits to me that she was using methamphetamine within the past week, likely a contributor to her overall clinical picture at this time.  (Will defer to the primary provider as to whether checking a urine drug screen would be useful in this patient with history of substance abuse ). I discussed this with her, but the patient seems to think that this likely is not a contributing factor to her current situation.  -An echocardiogram will be performed momentarily.  We will follow-up the results of this and make further recommendations if needed, based on these results.  -Otherwise, I would not recommend any further invasive cardiac testing at this time.  We will be available as needed, but, at this time, other than treating the patient for what appears to be likely a COPD type exacerbation, would not have anything further to add from a cardiac standpoint.  The primary service has ordered a VQ scan due to the elevated D-dimer which seems reasonable, however I do not feel that any further cardiac testing is indicated.      Electronically signed by Valentino Ramirez MD at 08/22/24 9125

## 2024-08-24 LAB
ALBUMIN SERPL-MCNC: 3.7 G/DL (ref 3.5–5.2)
ALBUMIN/GLOB SERPL: 1.3 G/DL
ALP SERPL-CCNC: 85 U/L (ref 39–117)
ALT SERPL W P-5'-P-CCNC: 7 U/L (ref 1–33)
ANION GAP SERPL CALCULATED.3IONS-SCNC: 14 MMOL/L (ref 5–15)
AST SERPL-CCNC: 9 U/L (ref 1–32)
BASOPHILS # BLD AUTO: 0.04 10*3/MM3 (ref 0–0.2)
BASOPHILS NFR BLD AUTO: 0.4 % (ref 0–1.5)
BILIRUB SERPL-MCNC: 0.3 MG/DL (ref 0–1.2)
BUN SERPL-MCNC: 56 MG/DL (ref 8–23)
BUN/CREAT SERPL: 22 (ref 7–25)
CALCIUM SPEC-SCNC: 8.8 MG/DL (ref 8.6–10.5)
CHLORIDE SERPL-SCNC: 100 MMOL/L (ref 98–107)
CO2 SERPL-SCNC: 28 MMOL/L (ref 22–29)
CREAT SERPL-MCNC: 2.54 MG/DL (ref 0.57–1)
CREAT UR-MCNC: 53.3 MG/DL
DEPRECATED RDW RBC AUTO: 50.5 FL (ref 37–54)
EGFRCR SERPLBLD CKD-EPI 2021: 20.6 ML/MIN/1.73
EOSINOPHIL # BLD AUTO: 0.11 10*3/MM3 (ref 0–0.4)
EOSINOPHIL NFR BLD AUTO: 1.2 % (ref 0.3–6.2)
ERYTHROCYTE [DISTWIDTH] IN BLOOD BY AUTOMATED COUNT: 14.6 % (ref 12.3–15.4)
GLOBULIN UR ELPH-MCNC: 2.8 GM/DL
GLUCOSE SERPL-MCNC: 113 MG/DL (ref 65–99)
HCT VFR BLD AUTO: 42.8 % (ref 34–46.6)
HGB BLD-MCNC: 12.9 G/DL (ref 12–15.9)
IMM GRANULOCYTES # BLD AUTO: 0.05 10*3/MM3 (ref 0–0.05)
IMM GRANULOCYTES NFR BLD AUTO: 0.5 % (ref 0–0.5)
LYMPHOCYTES # BLD AUTO: 1.78 10*3/MM3 (ref 0.7–3.1)
LYMPHOCYTES NFR BLD AUTO: 18.6 % (ref 19.6–45.3)
MCH RBC QN AUTO: 28.6 PG (ref 26.6–33)
MCHC RBC AUTO-ENTMCNC: 30.1 G/DL (ref 31.5–35.7)
MCV RBC AUTO: 94.9 FL (ref 79–97)
MONOCYTES # BLD AUTO: 0.5 10*3/MM3 (ref 0.1–0.9)
MONOCYTES NFR BLD AUTO: 5.2 % (ref 5–12)
NEUTROPHILS NFR BLD AUTO: 7.08 10*3/MM3 (ref 1.7–7)
NEUTROPHILS NFR BLD AUTO: 74.1 % (ref 42.7–76)
NRBC BLD AUTO-RTO: 0 /100 WBC (ref 0–0.2)
PLATELET # BLD AUTO: 161 10*3/MM3 (ref 140–450)
PMV BLD AUTO: 11.1 FL (ref 6–12)
POTASSIUM SERPL-SCNC: 4.2 MMOL/L (ref 3.5–5.2)
PROT SERPL-MCNC: 6.5 G/DL (ref 6–8.5)
RBC # BLD AUTO: 4.51 10*6/MM3 (ref 3.77–5.28)
SODIUM SERPL-SCNC: 142 MMOL/L (ref 136–145)
WBC NRBC COR # BLD AUTO: 9.56 10*3/MM3 (ref 3.4–10.8)

## 2024-08-24 PROCEDURE — 85025 COMPLETE CBC W/AUTO DIFF WBC: CPT | Performed by: INTERNAL MEDICINE

## 2024-08-24 PROCEDURE — 80053 COMPREHEN METABOLIC PANEL: CPT | Performed by: INTERNAL MEDICINE

## 2024-08-24 PROCEDURE — 63710000001 PREDNISONE PER 5 MG: Performed by: INTERNAL MEDICINE

## 2024-08-24 PROCEDURE — 25010000002 ENOXAPARIN PER 10 MG: Performed by: FAMILY MEDICINE

## 2024-08-24 PROCEDURE — 94761 N-INVAS EAR/PLS OXIMETRY MLT: CPT

## 2024-08-24 PROCEDURE — 94799 UNLISTED PULMONARY SVC/PX: CPT

## 2024-08-24 PROCEDURE — 94660 CPAP INITIATION&MGMT: CPT

## 2024-08-24 PROCEDURE — 94664 DEMO&/EVAL PT USE INHALER: CPT

## 2024-08-24 RX ORDER — GUAIFENESIN 600 MG/1
600 TABLET, EXTENDED RELEASE ORAL EVERY 12 HOURS PRN
Status: DISCONTINUED | OUTPATIENT
Start: 2024-08-24 | End: 2024-08-26 | Stop reason: HOSPADM

## 2024-08-24 RX ADMIN — ASPIRIN 81 MG: 81 TABLET, COATED ORAL at 08:52

## 2024-08-24 RX ADMIN — CARVEDILOL 25 MG: 25 TABLET, FILM COATED ORAL at 08:53

## 2024-08-24 RX ADMIN — ENOXAPARIN SODIUM 30 MG: 100 INJECTION SUBCUTANEOUS at 12:53

## 2024-08-24 RX ADMIN — CARVEDILOL 25 MG: 25 TABLET, FILM COATED ORAL at 17:07

## 2024-08-24 RX ADMIN — IPRATROPIUM BROMIDE AND ALBUTEROL SULFATE 3 ML: 2.5; .5 SOLUTION RESPIRATORY (INHALATION) at 10:11

## 2024-08-24 RX ADMIN — Medication 2.5 MG: at 20:18

## 2024-08-24 RX ADMIN — AZELASTINE HYDROCHLORIDE 2 SPRAY: 137 SPRAY, METERED NASAL at 20:19

## 2024-08-24 RX ADMIN — IPRATROPIUM BROMIDE AND ALBUTEROL SULFATE 3 ML: 2.5; .5 SOLUTION RESPIRATORY (INHALATION) at 13:44

## 2024-08-24 RX ADMIN — CLONIDINE HYDROCHLORIDE 0.1 MG: 0.1 TABLET ORAL at 20:19

## 2024-08-24 RX ADMIN — PREDNISONE 40 MG: 10 TABLET ORAL at 08:53

## 2024-08-24 RX ADMIN — IPRATROPIUM BROMIDE AND ALBUTEROL SULFATE 3 ML: 2.5; .5 SOLUTION RESPIRATORY (INHALATION) at 19:30

## 2024-08-24 RX ADMIN — DOCUSATE SODIUM 100 MG: 100 CAPSULE, LIQUID FILLED ORAL at 08:53

## 2024-08-24 RX ADMIN — DOXYCYCLINE 100 MG: 100 TABLET, FILM COATED ORAL at 08:53

## 2024-08-24 RX ADMIN — AZELASTINE HYDROCHLORIDE 2 SPRAY: 137 SPRAY, METERED NASAL at 08:52

## 2024-08-24 RX ADMIN — CALCITRIOL 0.25 MCG: 0.25 CAPSULE ORAL at 08:53

## 2024-08-24 RX ADMIN — DOXYCYCLINE 100 MG: 100 TABLET, FILM COATED ORAL at 20:18

## 2024-08-24 RX ADMIN — DOCUSATE SODIUM 100 MG: 100 CAPSULE, LIQUID FILLED ORAL at 20:18

## 2024-08-24 RX ADMIN — GUAIFENESIN 600 MG: 600 TABLET, EXTENDED RELEASE ORAL at 23:59

## 2024-08-24 RX ADMIN — FLUTICASONE PROPIONATE 2 SPRAY: 50 SPRAY, METERED NASAL at 08:52

## 2024-08-24 RX ADMIN — IPRATROPIUM BROMIDE AND ALBUTEROL SULFATE 3 ML: 2.5; .5 SOLUTION RESPIRATORY (INHALATION) at 06:14

## 2024-08-24 NOTE — PLAN OF CARE
Goal Outcome Evaluation: Pt is alert and oriented, no c/o pain, IV is clean dry intact, vitals are stable, no s/s of distress, wears home c-pap at night, pt has slept most of the day, 2L NC when awake,

## 2024-08-24 NOTE — PROGRESS NOTES
[facility]  Daily Progress Note  Ludivina Ramirez  MRN: [id number] LOS: 2    Admit Date: 8/22/2024 8/24/2024 09:56 CDT    Subjective: Patient states she felt better yesterday than she does today, overall feeling better.           Chief Complaint:  Chief Complaint   Patient presents with    Shortness of Breath       Interval History:    Reviewed overnight events and nursing notes.   Status:  better than before  Pain:  some relief        ROS:  10 point ROS obtained:   Gen: No fevers  HEENT: No migraine or visual change  Lung: No cough, hemoptysis or wheezing  Cv: No chest pain or pressure  Abd: No nausea or vomiting, no change in bowel function, no gi bleeding  Ext: No increased pain or swelling  Neuro: No seizure or syncope  Skin: No new rashes  Endocrine: No polyuria, polyphagia or polydipsia  Psych: No increased anxiety or depression  MS: No increase in joint pain or swelling reported    DIET:  Diet: Cardiac; Healthy Heart (2-3 Na+); Fluid Consistency: Thin (IDDSI 0)    Medications:   nitroglycerin, 5-200 mcg/min, Last Rate: Stopped (08/23/24 1634)      aspirin, 81 mg, Oral, Daily  azelastine, 2 spray, Each Nare, BID  calcitriol, 0.25 mcg, Oral, Daily  carvedilol, 25 mg, Oral, BID With Meals  docusate sodium, 100 mg, Oral, BID  doxycycline, 100 mg, Oral, Q12H  enoxaparin, 30 mg, Subcutaneous, Q24H  fluticasone, 2 spray, Nasal, QAM  ipratropium-albuterol, 3 mL, Nebulization, 4x Daily - RT  predniSONE, 40 mg, Oral, Daily With Breakfast        Data:     Code Status:   There are no questions and answers to display.       Family History   Problem Relation Age of Onset    Coronary artery disease Mother     Coronary artery disease Father     Breast cancer Neg Hx      Social History     Socioeconomic History    Marital status: Single   Tobacco Use    Smoking status: Every Day     Current packs/day: 0.00     Average packs/day: 0.5 packs/day for 48.7 years (24.4 ttl pk-yrs)     Types: Cigarettes     Start date:       Last attempt to quit: 2023     Years since quittin.9     Passive exposure: Past    Smokeless tobacco: Never    Tobacco comments:     Smokes about 0.5 pack daily 4/10    Vaping Use    Vaping status: Never Used   Substance and Sexual Activity    Alcohol use: No    Drug use: No    Sexual activity: Defer       Labs:    Lab Results (last 72 hours)       Procedure Component Value Units Date/Time    Comprehensive Metabolic Panel [[id number]]  (Abnormal) Collected: 24    Specimen: Blood Updated: 24     Glucose 113 mg/dL      BUN 56 mg/dL      Creatinine 2.54 mg/dL      Sodium 142 mmol/L      Potassium 4.2 mmol/L      Chloride 100 mmol/L      CO2 28.0 mmol/L      Calcium 8.8 mg/dL      Total Protein 6.5 g/dL      Albumin 3.7 g/dL      ALT (SGPT) 7 U/L      AST (SGOT) 9 U/L      Alkaline Phosphatase 85 U/L      Total Bilirubin 0.3 mg/dL      Globulin 2.8 gm/dL      A/G Ratio 1.3 g/dL      BUN/Creatinine Ratio 22.0     Anion Gap 14.0 mmol/L      eGFR 20.6 mL/min/1.73     Narrative:      GFR Normal >60  Chronic Kidney Disease <60  Kidney Failure <15      CBC & Differential [[id number]]  (Abnormal) Collected: 24    Specimen: Blood Updated: 24    Narrative:      The following orders were created for panel order CBC & Differential.  Procedure                               Abnormality         Status                     ---------                               -----------         ------                     CBC Auto Differential[[id number]]        Abnormal            Final result                 Please view results for these tests on the individual orders.    CBC Auto Differential [[id number]]  (Abnormal) Collected: 24    Specimen: Blood Updated: 24     WBC 9.56 10*3/mm3      RBC 4.51 10*6/mm3      Hemoglobin 12.9 g/dL      Hematocrit 42.8 %      MCV 94.9 fL      MCH 28.6 pg      MCHC 30.1 g/dL      RDW 14.6 %      RDW-SD 50.5 fl      MPV 11.1 fL       Platelets 161 10*3/mm3      Neutrophil % 74.1 %      Lymphocyte % 18.6 %      Monocyte % 5.2 %      Eosinophil % 1.2 %      Basophil % 0.4 %      Immature Grans % 0.5 %      Neutrophils, Absolute 7.08 10*3/mm3      Lymphocytes, Absolute 1.78 10*3/mm3      Monocytes, Absolute 0.50 10*3/mm3      Eosinophils, Absolute 0.11 10*3/mm3      Basophils, Absolute 0.04 10*3/mm3      Immature Grans, Absolute 0.05 10*3/mm3      nRBC 0.0 /100 WBC     Osmolality, Urine - Urine, Clean Catch [[id number]]  (Normal) Collected: 08/23/24 2135    Specimen: Urine, Clean Catch Updated: 08/23/24 2310     Osmolality, Urine 436 mOsm/kg     Sodium, Urine, Random - Urine, Clean Catch [[id number]] Collected: 08/23/24 2135    Specimen: Urine, Clean Catch Updated: 08/23/24 2153     Sodium, Urine 105 mmol/L     Narrative:      Reference intervals for random urine have not been established.  Clinical usage is dependent upon physician's interpretation in combination with other laboratory tests.       Creatinine Urine Random (kidney function) GFR component - Urine, Clean Catch [[id number]] Collected: 08/23/24 2135    Specimen: Urine, Clean Catch Updated: 08/23/24 2142    Uric Acid [[id number]]  (Abnormal) Collected: 08/23/24 0345    Specimen: Blood Updated: 08/23/24 1607     Uric Acid 8.4 mg/dL     Blood Culture - Blood, Hand, Right [[id number]]  (Normal) Collected: 08/22/24 0950    Specimen: Blood from Hand, Right Updated: 08/23/24 1015     Blood Culture No growth at 24 hours    Blood Culture - Blood, Hand, Right [[id number]]  (Normal) Collected: 08/22/24 0926    Specimen: Blood from Hand, Right Updated: 08/23/24 1001     Blood Culture No growth at 24 hours    Basic Metabolic Panel [[id number]]  (Abnormal) Collected: 08/23/24 0345    Specimen: Blood Updated: 08/23/24 0509     Glucose 225 mg/dL      BUN 50 mg/dL      Creatinine 3.07 mg/dL      Sodium 139 mmol/L      Potassium 3.9 mmol/L      Comment: Slight hemolysis detected by analyzer. Result may be  falsely elevated.        Chloride 99 mmol/L      CO2 27.0 mmol/L      Calcium 8.8 mg/dL      BUN/Creatinine Ratio 16.3     Anion Gap 13.0 mmol/L      eGFR 16.4 mL/min/1.73     Narrative:      GFR Normal >60  Chronic Kidney Disease <60  Kidney Failure <15      CBC & Differential [[id number]]  (Abnormal) Collected: 08/23/24 0345    Specimen: Blood Updated: 08/23/24 0445    Narrative:      The following orders were created for panel order CBC & Differential.  Procedure                               Abnormality         Status                     ---------                               -----------         ------                     CBC Auto Differential[[id number]]        Abnormal            Final result                 Please view results for these tests on the individual orders.    CBC Auto Differential [[id number]]  (Abnormal) Collected: 08/23/24 0345    Specimen: Blood Updated: 08/23/24 0445     WBC 8.73 10*3/mm3      RBC 4.41 10*6/mm3      Hemoglobin 12.8 g/dL      Hematocrit 41.1 %      MCV 93.2 fL      MCH 29.0 pg      MCHC 31.1 g/dL      RDW 14.3 %      RDW-SD 48.8 fl      MPV 11.5 fL      Platelets 163 10*3/mm3      Neutrophil % 90.1 %      Lymphocyte % 6.9 %      Monocyte % 2.2 %      Eosinophil % 0.0 %      Basophil % 0.1 %      Immature Grans % 0.7 %      Neutrophils, Absolute 7.87 10*3/mm3      Lymphocytes, Absolute 0.60 10*3/mm3      Monocytes, Absolute 0.19 10*3/mm3      Eosinophils, Absolute 0.00 10*3/mm3      Basophils, Absolute 0.01 10*3/mm3      Immature Grans, Absolute 0.06 10*3/mm3      nRBC 0.0 /100 WBC     S. Pneumo Ag Urine or CSF - Urine, Urine, Clean Catch [[id number]]  (Normal) Collected: 08/22/24 1846    Specimen: Urine, Clean Catch Updated: 08/22/24 1932     Strep Pneumo Ag Negative    Legionella Antigen, Urine - Urine, Urine, Clean Catch [[id number]]  (Normal) Collected: 08/22/24 1846    Specimen: Urine, Clean Catch Updated: 08/22/24 1932     LEGIONELLA ANTIGEN, URINE Negative    High Sensitivity  Troponin T 2Hr [[id number]]  (Abnormal) Collected: 08/22/24 1839    Specimen: Blood Updated: 08/22/24 1908     HS Troponin T 25 ng/L      Troponin T Delta -2 ng/L     Narrative:      High Sensitive Troponin T Reference Range:  <14.0 ng/L- Negative Female for AMI  <22.0 ng/L- Negative Male for AMI  >=14 - Abnormal Female indicating possible myocardial injury.  >=22 - Abnormal Male indicating possible myocardial injury.   Clinicians would have to utilize clinical acumen, EKG, Troponin, and serial changes to determine if it is an Acute Myocardial Infarction or myocardial injury due to an underlying chronic condition.         High Sensitivity Troponin T [[id number]]  (Abnormal) Collected: 08/22/24 1552    Specimen: Blood Updated: 08/22/24 1653     HS Troponin T 27 ng/L     Narrative:      High Sensitive Troponin T Reference Range:  <14.0 ng/L- Negative Female for AMI  <22.0 ng/L- Negative Male for AMI  >=14 - Abnormal Female indicating possible myocardial injury.  >=22 - Abnormal Male indicating possible myocardial injury.   Clinicians would have to utilize clinical acumen, EKG, Troponin, and serial changes to determine if it is an Acute Myocardial Infarction or myocardial injury due to an underlying chronic condition.         Respiratory Panel PCR w/COVID-19(SARS-CoV-2) [facility] In-House, NP Swab in [facility], 2 HR TAT - Swab, Nasopharynx [[id number]]  (Normal) Collected: 08/22/24 0950    Specimen: Swab from Nasopharynx Updated: 08/22/24 1053     ADENOVIRUS, PCR Not Detected     Coronavirus 229E Not Detected     Coronavirus HKU1 Not Detected     Coronavirus NL63 Not Detected     Coronavirus OC43 Not Detected     COVID19 Not Detected     Human Metapneumovirus Not Detected     Human Rhinovirus/Enterovirus Not Detected     Influenza A PCR Not Detected     Influenza B PCR Not Detected     Parainfluenza Virus 1 Not Detected     Parainfluenza Virus 2 Not Detected     Parainfluenza Virus 3 Not Detected      "Parainfluenza Virus 4 Not Detected     RSV, PCR Not Detected     Bordetella pertussis pcr Not Detected     Bordetella parapertussis PCR Not Detected     Chlamydophila pneumoniae PCR Not Detected     Mycoplasma pneumo by PCR Not Detected    Narrative:      In the setting of a positive respiratory panel with a viral infection PLUS a negative procalcitonin without other underlying concern for bacterial infection, consider observing off antibiotics or discontinuation of antibiotics and continue supportive care. If the respiratory panel is positive for atypical bacterial infection (Bordetella pertussis, Chlamydophila pneumoniae, or Mycoplasma pneumoniae), consider antibiotic de-escalation to target atypical bacterial infection.    Procalcitonin [[id number]]  (Normal) Collected: 08/22/24 0926    Specimen: Blood Updated: 08/22/24 1003     Procalcitonin 0.05 ng/mL     Narrative:      As a Marker for Sepsis (Non-Neonates):    1. <0.5 ng/mL represents a low risk of severe sepsis and/or septic shock.  2. >2 ng/mL represents a high risk of severe sepsis and/or septic shock.    As a Marker for Lower Respiratory Tract Infections that require antibiotic therapy:    PCT on Admission    Antibiotic Therapy       6-12 Hrs later    >0.5                Strongly Recommended  >0.25 - <0.5        Recommended   0.1 - 0.25          Discouraged              Remeasure/reassess PCT  <0.1                Strongly Discouraged     Remeasure/reassess PCT    As 28 day mortality risk marker: \"Change in Procalcitonin Result\" (>80% or <=80%) if Day 0 (or Day 1) and Day 4 values are available. Refer to [email or web address]-[email or web address]    Change in PCT <=80%  A decrease of PCT levels below or equal to 80% defines a positive change in PCT test result representing a higher risk for 28-day all-cause mortality of patients diagnosed with severe sepsis for septic shock.    Change in PCT >80%  A decrease of PCT levels of more than 80% defines a negative " change in PCT result representing a lower risk for 28-day all-cause mortality of patients diagnosed with severe sepsis or septic shock.       Comprehensive Metabolic Panel [[id number]]  (Abnormal) Collected: 08/22/24 0926    Specimen: Blood Updated: 08/22/24 1001     Glucose 106 mg/dL      BUN 32 mg/dL      Creatinine 2.37 mg/dL      Sodium 139 mmol/L      Potassium 4.8 mmol/L      Comment: Slight hemolysis detected by analyzer. Result may be falsely elevated.        Chloride 102 mmol/L      CO2 27.0 mmol/L      Calcium 9.0 mg/dL      Total Protein 7.2 g/dL      Albumin 4.0 g/dL      ALT (SGPT) 13 U/L      AST (SGOT) 20 U/L      Comment: Slight hemolysis detected by analyzer. Result may be falsely elevated.        Alkaline Phosphatase 105 U/L      Total Bilirubin 0.4 mg/dL      Globulin 3.2 gm/dL      A/G Ratio 1.3 g/dL      BUN/Creatinine Ratio 13.5     Anion Gap 10.0 mmol/L      eGFR 22.4 mL/min/1.73     Narrative:      GFR Normal >60  Chronic Kidney Disease <60  Kidney Failure <15      Lactic Acid, Plasma [[id number]]  (Normal) Collected: 08/22/24 0926    Specimen: Blood Updated: 08/22/24 0956     Lactate 0.9 mmol/L     BNP [[id number]]  (Abnormal) Collected: 08/22/24 0926    Specimen: Blood Updated: 08/22/24 0954     proBNP 13,903.0 pg/mL     Narrative:      This assay is used as an aid in the diagnosis of individuals suspected of having heart failure. It can be used as an aid in the diagnosis of acute decompensated heart failure (ADHF) in patients presenting with signs and symptoms of ADHF to the emergency department (ED). In addition, NT-proBNP of <300 pg/mL indicates ADHF is not likely.    Age Range Result Interpretation  NT-proBNP Concentration (pg/mL:      <50             Positive            >450                   Gray                 300-450                    Negative             <300    50-75           Positive            >900                  Gray                300-900                  Negative             <300      >75             Positive            >1800                  Gray                300-1800                  Negative            <300    Blood Gas, Arterial With Co-Ox [[id number]]  (Abnormal) Collected: 08/22/24 0952    Specimen: Arterial Blood Updated: 08/22/24 0952     Site Left Brachial     Guido's Test N/A     pH, Arterial 7.401 pH units      pCO2, Arterial 46.1 mm Hg      Comment: 83 Value above reference range        pO2, Arterial 123.0 mm Hg      Comment: 83 Value above reference range        HCO3, Arterial 28.6 mmol/L      Comment: 83 Value above reference range        Base Excess, Arterial 3.1 mmol/L      Comment: 83 Value above reference range        O2 Saturation, Arterial 99.7 %      Comment: 83 Value above reference range        Hemoglobin, Blood Gas 13.7 g/dL      Hematocrit, Blood Gas 41.8 %      Oxyhemoglobin 98.3 %      Methemoglobin 0.30 %      Carboxyhemoglobin 1.2 %      Temperature 37.0     Sodium, Arterial 142 mmol/L      Potassium, Arterial 4.5 mmol/L      Ionized Calcium 4.67 mg/dL      Barometric Pressure for Blood Gas 758 mmHg      Modality BiPap     FIO2 35 %      Ventilator Mode NA     Set [facility] Resp Rate 16.0     IPAP 16     Comment: Meter: [id number]     :  Any Cade CRT        EPAP 8     Collected by [id number]     pH, Temp Corrected 7.401 pH Units      pCO2, Temperature Corrected 46.1 mm Hg      pO2, Temperature Corrected 123 mm Hg     D-dimer, Quantitative [[id number]]  (Abnormal) Collected: 08/22/24 0926    Specimen: Blood Updated: 08/22/24 0951     D-Dimer, Quantitative 1.33 MCGFEU/mL     Narrative:      According to the assay 's published package insert, a normal (<0.50 MCGFEU/mL) D-dimer result in conjunction with a non-high clinical probability assessment, excludes deep vein thrombosis (DVT) and pulmonary embolism (PE) with high sensitivity.    D-dimer values increase with age and this can make VTE exclusion of an older population difficult. To  "address this, the [employer], based on best available evidence and recent guidelines, recommends that clinicians use age-adjusted D-dimer thresholds in patients greater than 50 years of age with: a) a low probability of PE who do not meet all Pulmonary Embolism Rule Out Criteria, or b) in those with intermediate probability of PE.   The formula for an age-adjusted D-dimer cut-off is \"age/100\".  For example, a 60 year old patient would have an age-adjusted cut-off of 0.60 MCGFEU/mL and an 80 year old 0.80 MCGFEU/mL.    CBC & Differential [[id number]]  (Abnormal) Collected: 08/22/24 0926    Specimen: Blood Updated: 08/22/24 0933    Narrative:      The following orders were created for panel order CBC & Differential.  Procedure                               Abnormality         Status                     ---------                               -----------         ------                     CBC Auto Differential[[id number]]        Abnormal            Final result                 Please view results for these tests on the individual orders.    CBC Auto Differential [[id number]]  (Abnormal) Collected: 08/22/24 0926    Specimen: Blood Updated: 08/22/24 0933     WBC 8.29 10*3/mm3      RBC 4.89 10*6/mm3      Hemoglobin 14.2 g/dL      Hematocrit 45.9 %      MCV 93.9 fL      MCH 29.0 pg      MCHC 30.9 g/dL      RDW 14.6 %      RDW-SD 49.5 fl      MPV 11.0 fL      Platelets 173 10*3/mm3      Neutrophil % 79.2 %      Lymphocyte % 13.0 %      Monocyte % 4.5 %      Eosinophil % 2.4 %      Basophil % 0.4 %      Immature Grans % 0.5 %      Neutrophils, Absolute 6.57 10*3/mm3      Lymphocytes, Absolute 1.08 10*3/mm3      Monocytes, Absolute 0.37 10*3/mm3      Eosinophils, Absolute 0.20 10*3/mm3      Basophils, Absolute 0.03 10*3/mm3      Immature Grans, Absolute 0.04 10*3/mm3      nRBC 0.0 /100 WBC               Objective:     Vitals: /91 (BP Location: Left arm, Patient Position: Lying)   Pulse 57   Temp 98 °F " "(36.7 °C) (Axillary)   Resp 18   Ht 165 cm (64.96\")   Wt 99.8 kg (220 lb 0.3 oz)   LMP  (LMP Unknown)   SpO2 100%   BMI 36.66 kg/m²    Intake/Output Summary (Last 24 hours) at 2024 0956  Last data filed at 2024 1252  Gross per 24 hour   Intake 240 ml   Output --   Net 240 ml    Temp (24hrs), Av °F (36.7 °C), Min:97.7 °F (36.5 °C), Max:98.4 °F (36.9 °C)        Physical Examination:   General appearance - alert, well appearing, and in no distress and oriented to person, place, and time  Mental status - alert, oriented to person, place, and time, normal mood, behavior, speech, dress, motor activity, and thought processes  Eyes - pupils equal and reactive, extraocular eye movements intact  Ears - bilateral TM's and external ear canals normal, hearing grossly normal bilaterally  Mouth - mucous membranes moist, pharynx normal without lesions  Neck - supple, no significant adenopathy  Lymphatics - no palpable lymphadenopathy, no hepatosplenomegaly  Chest - clear to auscultation, no wheezes, rales or rhonchi, symmetric air entry, no tachypnea, retractions or cyanosis  Heart - normal rate, regular rhythm, normal S1, S2, no murmurs, rubs, clicks or gallops  Abdomen - soft, nontender, nondistended, no masses or organomegaly bowel sounds normal  Neurological - alert, oriented, normal speech, no focal findings or movement disorder noted  Musculoskeletal - no joint tenderness, deformity or swelling  Extremities - peripheral pulses normal, no pedal edema, no clubbing or cyanosis  Skin - normal coloration and turgor, no rashes, no suspicious skin lesions noted      Assessment and Plan:     Primary Problem:  Acute exacerbation of chronic obstructive pulmonary disease (COPD)    Hospital Problem list:    Acute exacerbation of chronic obstructive pulmonary disease (COPD)      PMH:  Past Medical History:   Diagnosis Date    Acute CHF     Acute renal failure (ARF)     Anemia     Asthma     childhood    Bowel disease, " inflammatory     Chronic kidney disease     Coronary artery disease     Hypertension     Ischemic colitis     Lung nodule, multiple 04/03/2024    Metabolic acidosis     Myocardial infarction 11/07/2020    Nonrheumatic aortic (valve) insufficiency     PTSD (post-traumatic stress disorder)        Treatment Plan:  Acute on chronic COPD exacerbation-back to baseline per pulm  CHF-no evidence of edema at this time  MARY compliant with CPAP  CKD with MATTY-IVF per nephro  HTN-currently off nitro drip-bp stable, continue to monitor        Discharge planning:   Home    Reviewed treatment plans with the patient and/or family.   20 minutes spent in face to face interaction and coordination of care.     Electronically signed by VIJAY Muhammad on 8/24/2024 at 09:56 CDT

## 2024-08-24 NOTE — PROGRESS NOTES
Nephrology ([facility]) Progress Note      Patient:  Ludivina Ramirez  YOB: 1960  Date of Service: 8/24/2024  MRN: [id number]   Acct: [id number]   Primary Care Physician: Orville Weston MD  Advance Directive:   There are no questions and answers to display.     Admit Date: 8/22/2024       Hospital Day: 2  Referring Provider: No ref. provider found      Patient personally seen and examined.  Complete chart including Consults, Notes, Operative Reports, Labs, Cardiology, and Radiology studies reviewed as able.    Chief complaint: Abnormal labs.    Subjective:  Ludivina Ramirez is a 64 y.o. female  whom we were consulted for acute kidney injury/chronic kidney disease.  She has history of stage IV chronic kidney disease baseline, last estimated GFR in June was 27 L.  Patient also has history of chronic obstructive pulm disease, combined systolic/diastolic CHF, benign essential hypertension, history of ischemic colitis and acute kidney injury.  Hospital course remarkable for treatment with intravenous diuretics as well as steroids/breathing treatment.  She has significant decline in renal function, now serum creatinine of 3.0 mg.  She denies any swelling of legs.  Her chest x-ray consistent with mild pulm edema.  However she has chronic interstitial bilateral lung disease with scarring.  Diuretics has been discontinued.  She was also given some IV fluid and renal function has improved.    This morning she is still complaining of shortness of breath, stays on BiPAP.    Allergies:  Codeine and Sumatriptan    Home Meds:  Medications Prior to Admission   Medication Sig Dispense Refill Last Dose    aspirin 81 MG EC tablet Take 1 tablet by mouth Daily. 90 tablet 3 8/21/2024    calcitriol (ROCALTROL) 0.25 MCG capsule Take 1 capsule by mouth Daily.   8/21/2024    carvedilol (COREG) 25 MG tablet Take 1 tablet by mouth 2 (Two) Times a Day With Meals.   8/21/2024    cloNIDine (CATAPRES) 0.1 MG  tablet Take 1 tablet by mouth Every 6 (Six) Hours As Needed for High Blood Pressure (SBP >160). 30 tablet 1 Past Month    docusate sodium (COLACE) 250 MG capsule Take 1 capsule by mouth Daily.   8/21/2024    fluticasone (FLONASE) 50 MCG/ACT nasal spray 2 sprays into the nostril(s) as directed by provider Every Morning. In each nostril 16 g 11 8/21/2024    furosemide (LASIX) 40 MG tablet Take 1 tablet by mouth Daily. 30 tablet 0 8/21/2024    hydroCHLOROthiazide 25 MG tablet Take 1 tablet by mouth Daily. 30 tablet 11 8/21/2024    ipratropium-albuterol (DUO-NEB) 0.5-2.5 mg/3 ml nebulizer Take 3 mL by nebulization 4 (Four) Times a Day. prn   Patient Taking Differently    isosorbide dinitrate (ISORDIL) 40 MG tablet Take 1 tablet by mouth 2 (Two) Times a Day. 180 tablet 3 8/21/2024    losartan (COZAAR) 100 MG tablet Take 1 tablet by mouth Daily.   8/21/2024    melatonin 3 MG tablet Take 2 tablets by mouth Every Night. 30 tablet 0 8/21/2024    albuterol sulfate  (90 Base) MCG/ACT inhaler Inhale 2 puffs Every 4 (Four) Hours As Needed for Wheezing. 20.1 g 3     nitroglycerin (NITROSTAT) 0.4 MG SL tablet Place 1 tablet under the tongue Every 5 (Five) Minutes As Needed for Chest Pain. Take no more than 3 doses in 15 minutes.          Medicines:  Current Facility-Administered Medications   Medication Dose Route Frequency Provider Last Rate Last Admin    acetaminophen (TYLENOL) tablet 650 mg  650 mg Oral Q6H PRN Orville Weston MD   650 mg at 08/23/24 1304    Or    acetaminophen (TYLENOL) suppository 650 mg  650 mg Rectal Q4H PRN Orville Weston MD        aspirin EC tablet 81 mg  81 mg Oral Daily Orville Weston MD   81 mg at 08/24/24 0852    azelastine (ASTELIN) nasal spray 2 spray  2 spray Each Nare BID Orville Weston MD   2 spray at 08/24/24 0852    calcitriol (ROCALTROL) capsule 0.25 mcg  0.25 mcg Oral Daily Orville Weston MD   0.25 mcg at 08/24/24 0822    carvedilol (COREG) tablet 25  mg  25 mg Oral BID With Meals Orville Weston MD   25 mg at 08/24/24 0853    cloNIDine (CATAPRES) tablet 0.1 mg  0.1 mg Oral Q4H PRN Orville Weston MD   0.1 mg at 08/23/24 2018    docusate sodium (COLACE) capsule 100 mg  100 mg Oral BID Orville Weston MD   100 mg at 08/24/24 0853    doxycycline (ADOXA) tablet 100 mg  100 mg Oral Q12H Cassy Renee DO   100 mg at 08/24/24 0853    Enoxaparin Sodium (LOVENOX) syringe 30 mg  30 mg Subcutaneous Q24H Orville Weston MD   30 mg at 08/24/24 1253    fluticasone (FLONASE) 50 MCG/ACT nasal spray 2 spray  2 spray Nasal QAM Orville Weston MD   2 spray at 08/24/24 0852    ipratropium-albuterol (DUO-NEB) nebulizer solution 3 mL  3 mL Nebulization 4x Daily - RT Orville Weston MD   3 mL at 08/24/24 1011    melatonin tablet 2.5 mg  2.5 mg Oral Nightly PRN Orville Weston MD   2.5 mg at 08/23/24 2018    nitroglycerin (TRIDIL) 200 mcg/ml infusion  5-200 mcg/min Intravenous Titrated Cristhian Peguero MD   Stopped at 08/23/24 1634    predniSONE (DELTASONE) tablet 40 mg  40 mg Oral Daily With Breakfast Cassy Renee DO   40 mg at 08/24/24 0853       Past Medical History:  Past Medical History:   Diagnosis Date    Acute CHF     Acute renal failure (ARF)     Anemia     Asthma     childhood    Bowel disease, inflammatory     Chronic kidney disease     Coronary artery disease     Hypertension     Ischemic colitis     Lung nodule, multiple 04/03/2024    Metabolic acidosis     Myocardial infarction 11/07/2020    Nonrheumatic aortic (valve) insufficiency     PTSD (post-traumatic stress disorder)        Past Surgical History:  Past Surgical History:   Procedure Laterality Date    CARDIAC CATHETERIZATION N/A 11/08/2020    Procedure: Left Heart Cath;  Surgeon: Pierce Buchanan MD;  Location:  [facility];  Service: Cardiovascular;  Laterality: N/A;    CLOSED REDUCTION ANKLE FRACTURE Right 2019    13 screws and a  plate    EXTERNAL FIXATION WRIST FRACTURE      INSERTION HEMODIALYSIS CATHETER Right 2022    Procedure: INSERTION OF RIGHT INTERNAL JUGULAR HEMODIALYSIS CATHETER;  Surgeon: Dexter Red MD;  Location: [facility] OR;  Service: Vascular;  Laterality: Right;    TUBAL ABDOMINAL LIGATION         Family History  Family History   Problem Relation Age of Onset    Coronary artery disease Mother     Coronary artery disease Father     Breast cancer Neg Hx        Social History  Social History     Socioeconomic History    Marital status: Single   Tobacco Use    Smoking status: Every Day     Current packs/day: 0.00     Average packs/day: 0.5 packs/day for 48.7 years (24.4 ttl pk-yrs)     Types: Cigarettes     Start date:      Last attempt to quit: 2023     Years since quittin.9     Passive exposure: Past    Smokeless tobacco: Never    Tobacco comments:     Smokes about 0.5 pack daily 4/10    Vaping Use    Vaping status: Never Used   Substance and Sexual Activity    Alcohol use: No    Drug use: No    Sexual activity: Defer       Review of Systems:  History obtained from chart review and the patient  General ROS: No fever or chills  Respiratory ROS: Increasing shortness of  Cardiovascular ROS: No chest pain or palpitations  Gastrointestinal ROS: No abdominal pain or melena  Genito-Urinary ROS: No dysuria or hematuria  Psych ROS: No anxiety and depression  14 point ROS reviewed with the patient and negative except as noted above and in the HPI unless unable to obtain.    Objective:  Patient Vitals for the past 24 hrs:   BP Temp Temp src Pulse Resp SpO2   24 1106 165/73 -- -- 60 18 99 %   24 1019 -- -- -- 59 16 98 %   24 1011 -- -- -- 57 15 99 %   24 1009 -- -- -- 59 22 99 %   24 0621 -- -- -- 57 18 100 %   24 0614 -- -- -- 63 18 100 %   24 0610 -- -- -- 62 20 99 %   24 0330 -- -- -- 59 14 99 %   24 0323 148/91 98 °F (36.7 °C) Axillary 58 18 95 %    08/24/24 0000 -- -- -- 76 19 96 %   08/23/24 2315 159/70 98.4 °F (36.9 °C) Axillary 74 18 94 %   08/23/24 1947 (!) 198/95 97.7 °F (36.5 °C) Oral 74 18 94 %   08/23/24 1845 -- -- -- -- 18 95 %   08/23/24 1839 -- -- -- 79 18 96 %   08/23/24 1639 (!) 163/101 97.9 °F (36.6 °C) Oral 73 18 97 %   08/23/24 1502 151/82 -- -- -- -- --   08/23/24 1421 -- -- -- 77 18 98 %   08/23/24 1412 -- -- -- 90 19 97 %       Intake/Output Summary (Last 24 hours) at 8/24/2024 1336  Last data filed at 8/24/2024 0915  Gross per 24 hour   Intake 240 ml   Output --   Net 240 ml     General: awake/alert   HEENT: Normocephalic atraumatic head  Neck: Supple with no JVD or carotid bruits.  Chest: Decreased breath sounds at both lung fields.  CVS: regular rate and rhythm  Abdominal: soft, nontender, positive bowel sounds  Extremities: no cyanosis or edema  Skin: warm and dry without rash      Labs:  Results from last 7 days   Lab Units 08/24/24  0446 08/23/24  0345 08/22/24  0926   WBC 10*3/mm3 9.56 8.73 8.29   HEMOGLOBIN g/dL 12.9 12.8 14.2   HEMATOCRIT % 42.8 41.1 45.9   PLATELETS 10*3/mm3 161 163 173         Results from last 7 days   Lab Units 08/24/24  0446 08/23/24  0345 08/22/24  0952 08/22/24  0926   SODIUM mmol/L 142 139  --  139   SODIUM, ARTERIAL mmol/L  --   --  142  --    POTASSIUM mmol/L 4.2 3.9  --  4.8   CHLORIDE mmol/L 100 99  --  102   CO2 mmol/L 28.0 27.0  --  27.0   BUN mg/dL 56* 50*  --  32*   CREATININE mg/dL 2.54* 3.07*  --  2.37*   CALCIUM mg/dL 8.8 8.8  --  9.0   EGFR mL/min/1.73 20.6* 16.4*  --  22.4*   BILIRUBIN mg/dL 0.3  --   --  0.4   ALK PHOS U/L 85  --   --  105   ALT (SGPT) U/L 7  --   --  13   AST (SGOT) U/L 9  --   --  20   GLUCOSE mg/dL 113* 225*  --  106*       Radiology:   Imaging Results (Last 72 Hours)       Procedure Component Value Units Date/Time    NM Lung Scan Perfusion Particulate [[id number]] Collected: 08/22/24 1532     Updated: 08/22/24 1538    Narrative:      EXAMINATION: NM LUNG SCAN PERFUSION  PARTICULATE- 8/22/2024 3:32 PM     HISTORY: elevated D dimer, unable to do CTA with CKD; J44.1-Chronic  obstructive pulmonary disease with (acute) exacerbation; I50.9-Heart  failure, unspecified; N18.9-Chronic kidney disease, unspecified;  R79.89-Other specified abnormal findings of blood chemistry.     Dose: 5.19 mCi technetium 99 M MAA intravenously.     REPORT: Following administration of the intravenous radiopharmaceutical,  standard planar images of the lungs were obtained in the anterior,  posterior and oblique dimensions. No ventilation scan was performed.     COMPARISON: Chest x-ray 8/22/2024. Perfusion lung scintigraphy of  7/21/2021.     There is a normal distribution of activity within both lungs, no  discrete segmental or subsegmental perfusion defects are identified.       Impression:      Low probability perfusion only lung scintigraphy for  significant pulmonary emboli.     This report was signed and finalized on 8/22/2024 3:34 PM by Dr. Sudhir Hair MD.       XR Chest 1 View [[id number]] Collected: 08/22/24 1044     Updated: 08/22/24 1050    Narrative:      XR CHEST 1 VW- 8/22/2024 9:27 AM     HISTORY: Shortness of air       COMPARISON: Chest x-ray dated 10/11/2023     FINDINGS:  Upright frontal radiograph of the chest was obtained     Bilateral interstitial coarsening which appears fibrotic. Lungs are well  expanded. Right upper lobe calcified granuloma. No new consolidation. No  pleural effusion or pneumothorax. The cardiomediastinal silhouette and  pulmonary vascularity are within normal limits. The osseous structures  and surrounding soft tissues demonstrate no acute abnormality.       Impression:      1.  Stable chest exam without acute process. There is an underlying  pulmonary fibrosis with diffuse interstitial coarsening. No superimposed  acute infiltrate or obvious pulmonary edema.     This report was signed and finalized on 8/22/2024 10:47 AM by Dr Goran An.                Culture:  Blood Culture   Date Value Ref Range Status   08/22/2024 No growth at 2 days  Preliminary   08/22/2024 No growth at 2 days  Preliminary         Assessment   1.  Acute kidney injury improving.  2.  Acute tubular necrosis.  3.  Stage IV CKD baseline.  4..  Hypertensive renal disease.  5.  Acute respiratory failure/COPD exacerbation  6.  Secondary hyperparathyroidism  7.  Chronic interstitial lung disease with scarring    Plan:  1.  Continue IV fluid/slow rate.  2.  Review of renal ultrasound once available.  3.  Plan was discussed with the patient      Obed Lazo MD  8/24/2024  13:36 CDT

## 2024-08-25 ENCOUNTER — APPOINTMENT (OUTPATIENT)
Dept: ULTRASOUND IMAGING | Facility: HOSPITAL | Age: 64
End: 2024-08-25
Payer: MEDICARE

## 2024-08-25 LAB
ALBUMIN SERPL-MCNC: 3.4 G/DL (ref 3.5–5.2)
ALBUMIN/GLOB SERPL: 1.3 G/DL
ALP SERPL-CCNC: 80 U/L (ref 39–117)
ALT SERPL W P-5'-P-CCNC: 9 U/L (ref 1–33)
ANION GAP SERPL CALCULATED.3IONS-SCNC: 10 MMOL/L (ref 5–15)
AST SERPL-CCNC: 12 U/L (ref 1–32)
BASOPHILS # BLD AUTO: 0.03 10*3/MM3 (ref 0–0.2)
BASOPHILS NFR BLD AUTO: 0.3 % (ref 0–1.5)
BILIRUB SERPL-MCNC: 0.3 MG/DL (ref 0–1.2)
BUN SERPL-MCNC: 66 MG/DL (ref 8–23)
BUN/CREAT SERPL: 26.2 (ref 7–25)
CALCIUM SPEC-SCNC: 8.6 MG/DL (ref 8.6–10.5)
CHLORIDE SERPL-SCNC: 103 MMOL/L (ref 98–107)
CO2 SERPL-SCNC: 26 MMOL/L (ref 22–29)
CREAT SERPL-MCNC: 2.52 MG/DL (ref 0.57–1)
DEPRECATED RDW RBC AUTO: 49.9 FL (ref 37–54)
EGFRCR SERPLBLD CKD-EPI 2021: 20.8 ML/MIN/1.73
EOSINOPHIL # BLD AUTO: 0.04 10*3/MM3 (ref 0–0.4)
EOSINOPHIL NFR BLD AUTO: 0.4 % (ref 0.3–6.2)
ERYTHROCYTE [DISTWIDTH] IN BLOOD BY AUTOMATED COUNT: 14.6 % (ref 12.3–15.4)
GLOBULIN UR ELPH-MCNC: 2.6 GM/DL
GLUCOSE SERPL-MCNC: 103 MG/DL (ref 65–99)
HCT VFR BLD AUTO: 39.2 % (ref 34–46.6)
HGB BLD-MCNC: 12.1 G/DL (ref 12–15.9)
IMM GRANULOCYTES # BLD AUTO: 0.07 10*3/MM3 (ref 0–0.05)
IMM GRANULOCYTES NFR BLD AUTO: 0.7 % (ref 0–0.5)
LYMPHOCYTES # BLD AUTO: 1.39 10*3/MM3 (ref 0.7–3.1)
LYMPHOCYTES NFR BLD AUTO: 13.1 % (ref 19.6–45.3)
MCH RBC QN AUTO: 29.1 PG (ref 26.6–33)
MCHC RBC AUTO-ENTMCNC: 30.9 G/DL (ref 31.5–35.7)
MCV RBC AUTO: 94.2 FL (ref 79–97)
MONOCYTES # BLD AUTO: 0.74 10*3/MM3 (ref 0.1–0.9)
MONOCYTES NFR BLD AUTO: 7 % (ref 5–12)
NEUTROPHILS NFR BLD AUTO: 78.5 % (ref 42.7–76)
NEUTROPHILS NFR BLD AUTO: 8.31 10*3/MM3 (ref 1.7–7)
NRBC BLD AUTO-RTO: 0 /100 WBC (ref 0–0.2)
PLATELET # BLD AUTO: 165 10*3/MM3 (ref 140–450)
PMV BLD AUTO: 11.3 FL (ref 6–12)
POTASSIUM SERPL-SCNC: 4.2 MMOL/L (ref 3.5–5.2)
PROT SERPL-MCNC: 6 G/DL (ref 6–8.5)
RBC # BLD AUTO: 4.16 10*6/MM3 (ref 3.77–5.28)
SODIUM SERPL-SCNC: 139 MMOL/L (ref 136–145)
WBC NRBC COR # BLD AUTO: 10.58 10*3/MM3 (ref 3.4–10.8)

## 2024-08-25 PROCEDURE — 80053 COMPREHEN METABOLIC PANEL: CPT | Performed by: INTERNAL MEDICINE

## 2024-08-25 PROCEDURE — 85025 COMPLETE CBC W/AUTO DIFF WBC: CPT | Performed by: INTERNAL MEDICINE

## 2024-08-25 PROCEDURE — 63710000001 PREDNISONE PER 5 MG: Performed by: INTERNAL MEDICINE

## 2024-08-25 PROCEDURE — 76775 US EXAM ABDO BACK WALL LIM: CPT

## 2024-08-25 PROCEDURE — 25010000002 ENOXAPARIN PER 10 MG: Performed by: FAMILY MEDICINE

## 2024-08-25 PROCEDURE — 94799 UNLISTED PULMONARY SVC/PX: CPT

## 2024-08-25 PROCEDURE — 94660 CPAP INITIATION&MGMT: CPT

## 2024-08-25 PROCEDURE — 94761 N-INVAS EAR/PLS OXIMETRY MLT: CPT

## 2024-08-25 PROCEDURE — 94664 DEMO&/EVAL PT USE INHALER: CPT

## 2024-08-25 RX ADMIN — CARVEDILOL 25 MG: 25 TABLET, FILM COATED ORAL at 18:18

## 2024-08-25 RX ADMIN — CLONIDINE HYDROCHLORIDE 0.1 MG: 0.1 TABLET ORAL at 04:22

## 2024-08-25 RX ADMIN — DOXYCYCLINE 100 MG: 100 TABLET, FILM COATED ORAL at 08:09

## 2024-08-25 RX ADMIN — DOXYCYCLINE 100 MG: 100 TABLET, FILM COATED ORAL at 20:10

## 2024-08-25 RX ADMIN — ASPIRIN 81 MG: 81 TABLET, COATED ORAL at 08:09

## 2024-08-25 RX ADMIN — CARVEDILOL 25 MG: 25 TABLET, FILM COATED ORAL at 08:09

## 2024-08-25 RX ADMIN — GUAIFENESIN 600 MG: 600 TABLET, EXTENDED RELEASE ORAL at 12:42

## 2024-08-25 RX ADMIN — ENOXAPARIN SODIUM 30 MG: 100 INJECTION SUBCUTANEOUS at 12:36

## 2024-08-25 RX ADMIN — FLUTICASONE PROPIONATE 2 SPRAY: 50 SPRAY, METERED NASAL at 08:08

## 2024-08-25 RX ADMIN — Medication 2.5 MG: at 20:10

## 2024-08-25 RX ADMIN — CALCITRIOL 0.25 MCG: 0.25 CAPSULE ORAL at 08:09

## 2024-08-25 RX ADMIN — IPRATROPIUM BROMIDE AND ALBUTEROL SULFATE 3 ML: 2.5; .5 SOLUTION RESPIRATORY (INHALATION) at 20:20

## 2024-08-25 RX ADMIN — IPRATROPIUM BROMIDE AND ALBUTEROL SULFATE 3 ML: 2.5; .5 SOLUTION RESPIRATORY (INHALATION) at 06:19

## 2024-08-25 RX ADMIN — CLONIDINE HYDROCHLORIDE 0.1 MG: 0.1 TABLET ORAL at 23:35

## 2024-08-25 RX ADMIN — ACETAMINOPHEN 650 MG: 325 TABLET ORAL at 16:44

## 2024-08-25 RX ADMIN — GUAIFENESIN 600 MG: 600 TABLET, EXTENDED RELEASE ORAL at 23:35

## 2024-08-25 RX ADMIN — PREDNISONE 40 MG: 10 TABLET ORAL at 08:09

## 2024-08-25 RX ADMIN — IPRATROPIUM BROMIDE AND ALBUTEROL SULFATE 3 ML: 2.5; .5 SOLUTION RESPIRATORY (INHALATION) at 13:53

## 2024-08-25 RX ADMIN — AZELASTINE HYDROCHLORIDE 2 SPRAY: 137 SPRAY, METERED NASAL at 08:09

## 2024-08-25 NOTE — PROGRESS NOTES
Nephrology ([facility]) Progress Note      Patient:  Ludivina Ramirez  YOB: 1960  Date of Service: 8/25/2024  MRN: [id number]   Acct: [id number]   Primary Care Physician: Orville Weston MD  Advance Directive:   There are no questions and answers to display.     Admit Date: 8/22/2024       Hospital Day: 3  Referring Provider: No ref. provider found      Patient personally seen and examined.  Complete chart including Consults, Notes, Operative Reports, Labs, Cardiology, and Radiology studies reviewed as able.    Chief complaint: Abnormal labs.    Subjective:  Ludivina Ramirez is a 64 y.o. female  whom we were consulted for acute kidney injury/chronic kidney disease.  She has history of stage IV chronic kidney disease baseline, last estimated GFR in June was 27 L.  Patient also has history of chronic obstructive pulm disease, combined systolic/diastolic CHF, benign essential hypertension, history of ischemic colitis and acute kidney injury.  Hospital course remarkable for treatment with intravenous diuretics as well as steroids/breathing treatment.  She has significant decline in renal function, now serum creatinine of 3.0 mg.  She denies any swelling of legs.  Her chest x-ray consistent with mild pulm edema.  However she has chronic interstitial bilateral lung disease with scarring.  Diuretics has been discontinued.  She was also given some IV fluid and renal function has improved.    This morning patient is feeling much better.  However she is still complaining of shortness of breath.  She is hoping to go home today as well.    Allergies:  Codeine and Sumatriptan    Home Meds:  Medications Prior to Admission   Medication Sig Dispense Refill Last Dose    aspirin 81 MG EC tablet Take 1 tablet by mouth Daily. 90 tablet 3 8/21/2024    calcitriol (ROCALTROL) 0.25 MCG capsule Take 1 capsule by mouth Daily.   8/21/2024    carvedilol (COREG) 25 MG tablet Take 1 tablet by mouth 2 (Two)  Times a Day With Meals.   8/21/2024    cloNIDine (CATAPRES) 0.1 MG tablet Take 1 tablet by mouth Every 6 (Six) Hours As Needed for High Blood Pressure (SBP >160). 30 tablet 1 Past Month    docusate sodium (COLACE) 250 MG capsule Take 1 capsule by mouth Daily.   8/21/2024    fluticasone (FLONASE) 50 MCG/ACT nasal spray 2 sprays into the nostril(s) as directed by provider Every Morning. In each nostril 16 g 11 8/21/2024    furosemide (LASIX) 40 MG tablet Take 1 tablet by mouth Daily. 30 tablet 0 8/21/2024    hydroCHLOROthiazide 25 MG tablet Take 1 tablet by mouth Daily. 30 tablet 11 8/21/2024    ipratropium-albuterol (DUO-NEB) 0.5-2.5 mg/3 ml nebulizer Take 3 mL by nebulization 4 (Four) Times a Day. prn   Patient Taking Differently    isosorbide dinitrate (ISORDIL) 40 MG tablet Take 1 tablet by mouth 2 (Two) Times a Day. 180 tablet 3 8/21/2024    losartan (COZAAR) 100 MG tablet Take 1 tablet by mouth Daily.   8/21/2024    melatonin 3 MG tablet Take 2 tablets by mouth Every Night. 30 tablet 0 8/21/2024    albuterol sulfate  (90 Base) MCG/ACT inhaler Inhale 2 puffs Every 4 (Four) Hours As Needed for Wheezing. 20.1 g 3     nitroglycerin (NITROSTAT) 0.4 MG SL tablet Place 1 tablet under the tongue Every 5 (Five) Minutes As Needed for Chest Pain. Take no more than 3 doses in 15 minutes.          Medicines:  Current Facility-Administered Medications   Medication Dose Route Frequency Provider Last Rate Last Admin    acetaminophen (TYLENOL) tablet 650 mg  650 mg Oral Q6H PRN Orville Weston MD   650 mg at 08/23/24 1304    Or    acetaminophen (TYLENOL) suppository 650 mg  650 mg Rectal Q4H PRN Orville Weston MD        aspirin EC tablet 81 mg  81 mg Oral Daily Orville Weston MD   81 mg at 08/25/24 0809    azelastine (ASTELIN) nasal spray 2 spray  2 spray Each Nare BID Orville Weston MD   2 spray at 08/25/24 0809    calcitriol (ROCALTROL) capsule 0.25 mcg  0.25 mcg Oral Daily Orville Weston  MD Guido   0.25 mcg at 08/25/24 0809    carvedilol (COREG) tablet 25 mg  25 mg Oral BID With Meals Orville Weston MD   25 mg at 08/25/24 0809    cloNIDine (CATAPRES) tablet 0.1 mg  0.1 mg Oral Q4H PRN Orville Weston MD   0.1 mg at 08/25/24 0422    docusate sodium (COLACE) capsule 100 mg  100 mg Oral BID Orville Weston MD   100 mg at 08/24/24 2018    doxycycline (ADOXA) tablet 100 mg  100 mg Oral Q12H Cassy Renee DO   100 mg at 08/25/24 0809    Enoxaparin Sodium (LOVENOX) syringe 30 mg  30 mg Subcutaneous Q24H Orville Weston MD   30 mg at 08/25/24 1236    fluticasone (FLONASE) 50 MCG/ACT nasal spray 2 spray  2 spray Nasal QAM Orville Weston MD   2 spray at 08/25/24 0808    guaiFENesin (MUCINEX) 12 hr tablet 600 mg  600 mg Oral Q12H PRN Keri Moreland PA   600 mg at 08/25/24 1242    ipratropium-albuterol (DUO-NEB) nebulizer solution 3 mL  3 mL Nebulization 4x Daily - RT Orville Weston MD   3 mL at 08/25/24 0619    melatonin tablet 2.5 mg  2.5 mg Oral Nightly PRN Orville Weston MD   2.5 mg at 08/24/24 2018    nitroglycerin (TRIDIL) 200 mcg/ml infusion  5-200 mcg/min Intravenous Titrated Cristhian Peguero MD   Stopped at 08/23/24 1634    predniSONE (DELTASONE) tablet 40 mg  40 mg Oral Daily With Breakfast Cassy Renee DO   40 mg at 08/25/24 0809       Past Medical History:  Past Medical History:   Diagnosis Date    Acute CHF     Acute renal failure (ARF)     Anemia     Asthma     childhood    Bowel disease, inflammatory     Chronic kidney disease     Coronary artery disease     Hypertension     Ischemic colitis     Lung nodule, multiple 04/03/2024    Metabolic acidosis     Myocardial infarction 11/07/2020    Nonrheumatic aortic (valve) insufficiency     PTSD (post-traumatic stress disorder)        Past Surgical History:  Past Surgical History:   Procedure Laterality Date    CARDIAC CATHETERIZATION N/A 11/08/2020    Procedure: Left Heart  Cath;  Surgeon: Pierce Buchanan MD;  Location:  [facility] CATH [facility]ASIVE LOC[facility];  Service: Cardiovascular;  Laterality: N/A;    CLOSED REDUCTION ANKLE FRACTURE Right 2019    13 screws and a plate    EXTERNAL FIXATION WRIST FRACTURE      INSERTION HEMODIALYSIS CATHETER Right 2022    Procedure: INSERTION OF RIGHT INTERNAL JUGULAR HEMODIALYSIS CATHETER;  Surgeon: Dexter Red MD;  Location:  [facility] OR;  Service: Vascular;  Laterality: Right;    TUBAL ABDOMINAL LIGATION         Family History  Family History   Problem Relation Age of Onset    Coronary artery disease Mother     Coronary artery disease Father     Breast cancer Neg Hx        Social History  Social History     Socioeconomic History    Marital status: Single   Tobacco Use    Smoking status: Every Day     Current packs/day: 0.00     Average packs/day: 0.5 packs/day for 48.7 years (24.4 ttl pk-yrs)     Types: Cigarettes     Start date:      Last attempt to quit: 2023     Years since quittin.9     Passive exposure: Past    Smokeless tobacco: Never    Tobacco comments:     Smokes about 0.5 pack daily 4/10    Vaping Use    Vaping status: Never Used   Substance and Sexual Activity    Alcohol use: No    Drug use: No    Sexual activity: Defer       Review of Systems:  History obtained from chart review and the patient  General ROS: No fever or chills  Respiratory ROS: Increasing shortness of  Cardiovascular ROS: No chest pain or palpitations  Gastrointestinal ROS: No abdominal pain or melena  Genito-Urinary ROS: No dysuria or hematuria  Psych ROS: No anxiety and depression  14 point ROS reviewed with the patient and negative except as noted above and in the HPI unless unable to obtain.    Objective:  Patient Vitals for the past 24 hrs:   BP Temp Temp src Pulse Resp SpO2   24 1120 164/84 98.1 °F (36.7 °C) Oral 68 18 95 %   24 1009 -- -- -- 69 -- 96 %   24 0755 157/82 97.3 °F (36.3 °C) Oral 66 18 98 %   24 0625 -- -- --  60 16 97 %   08/25/24 0619 -- -- -- 61 18 99 %   08/25/24 0616 -- -- -- 62 18 99 %   08/25/24 0413 180/76 97.5 °F (36.4 °C) Oral 70 19 99 %   08/25/24 0301 -- -- -- 67 18 98 %   08/24/24 2300 -- -- -- 76 25 98 %   08/24/24 1945 (!) 186/94 97.3 °F (36.3 °C) Oral 79 20 96 %   08/24/24 1936 -- -- -- 72 23 --   08/24/24 1930 -- -- -- 75 21 98 %   08/24/24 1924 -- -- -- 75 17 98 %   08/24/24 1501 164/85 -- -- 80 18 96 %   08/24/24 1353 -- -- -- 68 18 98 %   08/24/24 1344 -- -- -- 66 17 99 %   08/24/24 1341 -- -- -- 66 18 99 %       Intake/Output Summary (Last 24 hours) at 8/25/2024 1318  Last data filed at 8/25/2024 0413  Gross per 24 hour   Intake 1371 ml   Output --   Net 1371 ml     General: awake/alert   HEENT: Normocephalic atraumatic head  Neck: Supple with no JVD or carotid bruits.  Chest: Decreased breath sounds at both lung fields.  CVS: regular rate and rhythm  Abdominal: soft, nontender, positive bowel sounds  Extremities: no cyanosis or edema  Skin: warm and dry without rash      Labs:  Results from last 7 days   Lab Units 08/25/24  0405 08/24/24  0446 08/23/24  0345   WBC 10*3/mm3 10.58 9.56 8.73   HEMOGLOBIN g/dL 12.1 12.9 12.8   HEMATOCRIT % 39.2 42.8 41.1   PLATELETS 10*3/mm3 165 161 163         Results from last 7 days   Lab Units 08/25/24  0406 08/24/24  0446 08/23/24  0345 08/22/24  0952 08/22/24  0926   SODIUM mmol/L 139 142 139  --  139   SODIUM, ARTERIAL   --   --   --    < >  --    POTASSIUM mmol/L 4.2 4.2 3.9  --  4.8   CHLORIDE mmol/L 103 100 99  --  102   CO2 mmol/L 26.0 28.0 27.0  --  27.0   BUN mg/dL 66* 56* 50*  --  32*   CREATININE mg/dL 2.52* 2.54* 3.07*  --  2.37*   CALCIUM mg/dL 8.6 8.8 8.8  --  9.0   EGFR mL/min/1.73 20.8* 20.6* 16.4*  --  22.4*   BILIRUBIN mg/dL 0.3 0.3  --   --  0.4   ALK PHOS U/L 80 85  --   --  105   ALT (SGPT) U/L 9 7  --   --  13   AST (SGOT) U/L 12 9  --   --  20   GLUCOSE mg/dL 103* 113* 225*  --  106*    < > = values in this interval not displayed.        Radiology:   Imaging Results (Last 72 Hours)       Procedure Component Value Units Date/Time    US Renal Bilateral [[id number]] Collected: 08/25/24 1156     Updated: 08/25/24 1201    Narrative:      RENAL ULTRASOUND COMPLETE 8/25/2024 8:21 AM     REASON FOR EXAM: Acute kidney injury; J44.1-Chronic obstructive  pulmonary disease with (acute) exacerbation; I50.9-Heart failure,  unspecified; N18.9-Chronic kidney disease, unspecified; R79.89-Other  specified abnormal findings of blood chemistry       COMPARISON: 6/1/2022.     TECHNIQUE: Multiple longitudinal and transverse realtime sonographic  images of the kidneys and urinary bladder are obtained.     FINDINGS:     RIGHT KIDNEY: The right kidney measures 9.9 cm in length. The cortical  thickness and echogenicity are normal. There are no solid or cystic  masses. There is no hydronephrosis. No nephrolithiasis or abnormal  perinephric fluid collections.     LEFT KIDNEY: The left kidney measures 6.4 cm in length. There does  appear to be some cortical thinning of the left kidney. There are no  solid or cystic masses. There is no hydronephrosis. No nephrolithiasis  or abnormal perinephric fluid collections.     PELVIS: The bladder is not well distended. No focal bladder abnormality  is seen. There is no free fluid in the pelvis.     ADDITIONAL FINDINGS: The visualized abdominal aorta is normal caliber.       Impression:      1. The right kidney is sonographically normal.  2. The left kidney is small compared to the right kidney. There does  appear to be some cortical thinning. This could be related to vascular  insufficiency. Cortical thinning may also be see with prior infection.  Correlate clinically.           The images and report are stored on PAC's per institutional and state  guidelines.           This report was signed and finalized on 8/25/2024 11:58 AM by Dr. Jefe Beltre MD.       NM Lung Scan Perfusion Particulate [[id number]] Collected: 08/22/24 2475      Updated: 08/22/24 1538    Narrative:      EXAMINATION: NM LUNG SCAN PERFUSION PARTICULATE- 8/22/2024 3:32 PM     HISTORY: elevated D dimer, unable to do CTA with CKD; J44.1-Chronic  obstructive pulmonary disease with (acute) exacerbation; I50.9-Heart  failure, unspecified; N18.9-Chronic kidney disease, unspecified;  R79.89-Other specified abnormal findings of blood chemistry.     Dose: 5.19 mCi technetium 99 M MAA intravenously.     REPORT: Following administration of the intravenous radiopharmaceutical,  standard planar images of the lungs were obtained in the anterior,  posterior and oblique dimensions. No ventilation scan was performed.     COMPARISON: Chest x-ray 8/22/2024. Perfusion lung scintigraphy of  7/21/2021.     There is a normal distribution of activity within both lungs, no  discrete segmental or subsegmental perfusion defects are identified.       Impression:      Low probability perfusion only lung scintigraphy for  significant pulmonary emboli.     This report was signed and finalized on 8/22/2024 3:34 PM by Dr. Sudhir Hair MD.               Culture:  Blood Culture   Date Value Ref Range Status   08/22/2024 No growth at 2 days  Preliminary   08/22/2024 No growth at 2 days  Preliminary         Assessment   1.  Acute kidney injury improving.  2.  Acute tubular necrosis.  3.  Stage IV CKD baseline.  4..  Hypertensive renal disease.  5.  Acute respiratory failure/COPD exacerbation  6.  Secondary hyperparathyroidism  7.  Chronic interstitial lung disease with scarring    Plan:  1.  Advised her to drink more water at least 50 ounces a day.  2.  Renal ultrasound was reviewed./Consistent with cortical thinning/CKD  3.  Plan was discussed with the patient      Obed Lazo MD  8/25/2024  13:18 CDT

## 2024-08-25 NOTE — PROGRESS NOTES
[facility]  Daily Progress Note  Ludivina Ramirez  MRN: [id number] LOS: 3    Admit Date: 8/22/2024 8/25/2024 10:48 CDT    Subjective:  C/o nausea this am, overall feeling better.         Chief Complaint:  Chief Complaint   Patient presents with    Shortness of Breath       Interval History:    Reviewed overnight events and nursing notes.   Status:  better than before  Pain:  some relief        ROS:  10 point ROS obtained:   Gen: No fevers  HEENT: No migraine or visual change  Lung: No cough, hemoptysis or wheezing  Cv: No chest pain or pressure  Abd: No nausea or vomiting, no change in bowel function, no gi bleeding  Ext: No increased pain or swelling  Neuro: No seizure or syncope  Skin: No new rashes  Endocrine: No polyuria, polyphagia or polydipsia  Psych: No increased anxiety or depression  MS: No increase in joint pain or swelling reported    DIET:  Diet: Cardiac; Healthy Heart (2-3 Na+); Fluid Consistency: Thin (IDDSI 0)    Medications:   nitroglycerin, 5-200 mcg/min, Last Rate: Stopped (08/23/24 1634)      aspirin, 81 mg, Oral, Daily  azelastine, 2 spray, Each Nare, BID  calcitriol, 0.25 mcg, Oral, Daily  carvedilol, 25 mg, Oral, BID With Meals  docusate sodium, 100 mg, Oral, BID  doxycycline, 100 mg, Oral, Q12H  enoxaparin, 30 mg, Subcutaneous, Q24H  fluticasone, 2 spray, Nasal, QAM  ipratropium-albuterol, 3 mL, Nebulization, 4x Daily - RT  predniSONE, 40 mg, Oral, Daily With Breakfast        Data:     Code Status:   There are no questions and answers to display.       Family History   Problem Relation Age of Onset    Coronary artery disease Mother     Coronary artery disease Father     Breast cancer Neg Hx      Social History     Socioeconomic History    Marital status: Single   Tobacco Use    Smoking status: Every Day     Current packs/day: 0.00     Average packs/day: 0.5 packs/day for 48.7 years (24.4 ttl pk-yrs)     Types: Cigarettes     Start date: 1975     Last attempt to quit: 9/30/2023      Years since quittin.9     Passive exposure: Past    Smokeless tobacco: Never    Tobacco comments:     Smokes about 0.5 pack daily 4/10    Vaping Use    Vaping status: Never Used   Substance and Sexual Activity    Alcohol use: No    Drug use: No    Sexual activity: Defer       Labs:    Lab Results (last 72 hours)       Procedure Component Value Units Date/Time    Blood Culture - Blood, Hand, Right [[id number]]  (Normal) Collected: 24 0950    Specimen: Blood from Hand, Right Updated: 24 1015     Blood Culture No growth at 3 days    Blood Culture - Blood, Hand, Right [[id number]]  (Normal) Collected: 24 0926    Specimen: Blood from Hand, Right Updated: 24 1000     Blood Culture No growth at 3 days    Comprehensive Metabolic Panel [[id number]]  (Abnormal) Collected: 24 0406    Specimen: Blood Updated: 24 0510     Glucose 103 mg/dL      BUN 66 mg/dL      Creatinine 2.52 mg/dL      Sodium 139 mmol/L      Potassium 4.2 mmol/L      Comment: Slight hemolysis detected by analyzer. Result may be falsely elevated.        Chloride 103 mmol/L      CO2 26.0 mmol/L      Calcium 8.6 mg/dL      Total Protein 6.0 g/dL      Albumin 3.4 g/dL      ALT (SGPT) 9 U/L      AST (SGOT) 12 U/L      Alkaline Phosphatase 80 U/L      Total Bilirubin 0.3 mg/dL      Globulin 2.6 gm/dL      A/G Ratio 1.3 g/dL      BUN/Creatinine Ratio 26.2     Anion Gap 10.0 mmol/L      eGFR 20.8 mL/min/1.73     Narrative:      GFR Normal >60  Chronic Kidney Disease <60  Kidney Failure <15      CBC & Differential [[id number]]  (Abnormal) Collected: 24 0405    Specimen: Blood Updated: 24 0442    Narrative:      The following orders were created for panel order CBC & Differential.  Procedure                               Abnormality         Status                     ---------                               -----------         ------                     CBC Auto Differential[[id number]]        Abnormal            Final  result                 Please view results for these tests on the individual orders.    CBC Auto Differential [[id number]]  (Abnormal) Collected: 08/25/24 0405    Specimen: Blood Updated: 08/25/24 0442     WBC 10.58 10*3/mm3      RBC 4.16 10*6/mm3      Hemoglobin 12.1 g/dL      Hematocrit 39.2 %      MCV 94.2 fL      MCH 29.1 pg      MCHC 30.9 g/dL      RDW 14.6 %      RDW-SD 49.9 fl      MPV 11.3 fL      Platelets 165 10*3/mm3      Neutrophil % 78.5 %      Lymphocyte % 13.1 %      Monocyte % 7.0 %      Eosinophil % 0.4 %      Basophil % 0.3 %      Immature Grans % 0.7 %      Neutrophils, Absolute 8.31 10*3/mm3      Lymphocytes, Absolute 1.39 10*3/mm3      Monocytes, Absolute 0.74 10*3/mm3      Eosinophils, Absolute 0.04 10*3/mm3      Basophils, Absolute 0.03 10*3/mm3      Immature Grans, Absolute 0.07 10*3/mm3      nRBC 0.0 /100 WBC     Creatinine Urine Random (kidney function) GFR component - Urine, Clean Catch [[id number]] Collected: 08/23/24 2135    Specimen: Urine, Clean Catch Updated: 08/24/24 1230     Creatinine, Urine 53.3 mg/dL     Narrative:      Reference intervals for random urine have not been established.  Clinical usage is dependent upon physician's interpretation in combination with other laboratory tests.       Comprehensive Metabolic Panel [[id number]]  (Abnormal) Collected: 08/24/24 0446    Specimen: Blood Updated: 08/24/24 0533     Glucose 113 mg/dL      BUN 56 mg/dL      Creatinine 2.54 mg/dL      Sodium 142 mmol/L      Potassium 4.2 mmol/L      Chloride 100 mmol/L      CO2 28.0 mmol/L      Calcium 8.8 mg/dL      Total Protein 6.5 g/dL      Albumin 3.7 g/dL      ALT (SGPT) 7 U/L      AST (SGOT) 9 U/L      Alkaline Phosphatase 85 U/L      Total Bilirubin 0.3 mg/dL      Globulin 2.8 gm/dL      A/G Ratio 1.3 g/dL      BUN/Creatinine Ratio 22.0     Anion Gap 14.0 mmol/L      eGFR 20.6 mL/min/1.73     Narrative:      GFR Normal >60  Chronic Kidney Disease <60  Kidney Failure <15      CBC & Differential  [[id number]]  (Abnormal) Collected: 08/24/24 0446    Specimen: Blood Updated: 08/24/24 0515    Narrative:      The following orders were created for panel order CBC & Differential.  Procedure                               Abnormality         Status                     ---------                               -----------         ------                     CBC Auto Differential[[id number]]        Abnormal            Final result                 Please view results for these tests on the individual orders.    CBC Auto Differential [[id number]]  (Abnormal) Collected: 08/24/24 0446    Specimen: Blood Updated: 08/24/24 0515     WBC 9.56 10*3/mm3      RBC 4.51 10*6/mm3      Hemoglobin 12.9 g/dL      Hematocrit 42.8 %      MCV 94.9 fL      MCH 28.6 pg      MCHC 30.1 g/dL      RDW 14.6 %      RDW-SD 50.5 fl      MPV 11.1 fL      Platelets 161 10*3/mm3      Neutrophil % 74.1 %      Lymphocyte % 18.6 %      Monocyte % 5.2 %      Eosinophil % 1.2 %      Basophil % 0.4 %      Immature Grans % 0.5 %      Neutrophils, Absolute 7.08 10*3/mm3      Lymphocytes, Absolute 1.78 10*3/mm3      Monocytes, Absolute 0.50 10*3/mm3      Eosinophils, Absolute 0.11 10*3/mm3      Basophils, Absolute 0.04 10*3/mm3      Immature Grans, Absolute 0.05 10*3/mm3      nRBC 0.0 /100 WBC     Osmolality, Urine - Urine, Clean Catch [[id number]]  (Normal) Collected: 08/23/24 2135    Specimen: Urine, Clean Catch Updated: 08/23/24 2310     Osmolality, Urine 436 mOsm/kg     Sodium, Urine, Random - Urine, Clean Catch [[id number]] Collected: 08/23/24 2135    Specimen: Urine, Clean Catch Updated: 08/23/24 2153     Sodium, Urine 105 mmol/L     Narrative:      Reference intervals for random urine have not been established.  Clinical usage is dependent upon physician's interpretation in combination with other laboratory tests.       Uric Acid [[id number]]  (Abnormal) Collected: 08/23/24 0345    Specimen: Blood Updated: 08/23/24 1607     Uric Acid 8.4 mg/dL     Basic  Metabolic Panel [[id number]]  (Abnormal) Collected: 08/23/24 0345    Specimen: Blood Updated: 08/23/24 0509     Glucose 225 mg/dL      BUN 50 mg/dL      Creatinine 3.07 mg/dL      Sodium 139 mmol/L      Potassium 3.9 mmol/L      Comment: Slight hemolysis detected by analyzer. Result may be falsely elevated.        Chloride 99 mmol/L      CO2 27.0 mmol/L      Calcium 8.8 mg/dL      BUN/Creatinine Ratio 16.3     Anion Gap 13.0 mmol/L      eGFR 16.4 mL/min/1.73     Narrative:      GFR Normal >60  Chronic Kidney Disease <60  Kidney Failure <15      CBC & Differential [[id number]]  (Abnormal) Collected: 08/23/24 0345    Specimen: Blood Updated: 08/23/24 0445    Narrative:      The following orders were created for panel order CBC & Differential.  Procedure                               Abnormality         Status                     ---------                               -----------         ------                     CBC Auto Differential[[id number]]        Abnormal            Final result                 Please view results for these tests on the individual orders.    CBC Auto Differential [[id number]]  (Abnormal) Collected: 08/23/24 0345    Specimen: Blood Updated: 08/23/24 0445     WBC 8.73 10*3/mm3      RBC 4.41 10*6/mm3      Hemoglobin 12.8 g/dL      Hematocrit 41.1 %      MCV 93.2 fL      MCH 29.0 pg      MCHC 31.1 g/dL      RDW 14.3 %      RDW-SD 48.8 fl      MPV 11.5 fL      Platelets 163 10*3/mm3      Neutrophil % 90.1 %      Lymphocyte % 6.9 %      Monocyte % 2.2 %      Eosinophil % 0.0 %      Basophil % 0.1 %      Immature Grans % 0.7 %      Neutrophils, Absolute 7.87 10*3/mm3      Lymphocytes, Absolute 0.60 10*3/mm3      Monocytes, Absolute 0.19 10*3/mm3      Eosinophils, Absolute 0.00 10*3/mm3      Basophils, Absolute 0.01 10*3/mm3      Immature Grans, Absolute 0.06 10*3/mm3      nRBC 0.0 /100 WBC     S. Pneumo Ag Urine or CSF - Urine, Urine, Clean Catch [[id number]]  (Normal) Collected: 08/22/24 5756     Specimen: Urine, Clean Catch Updated: 08/22/24 1932     Strep Pneumo Ag Negative    Legionella Antigen, Urine - Urine, Urine, Clean Catch [[id number]]  (Normal) Collected: 08/22/24 1846    Specimen: Urine, Clean Catch Updated: 08/22/24 1932     LEGIONELLA ANTIGEN, URINE Negative    High Sensitivity Troponin T 2Hr [[id number]]  (Abnormal) Collected: 08/22/24 1839    Specimen: Blood Updated: 08/22/24 1908     HS Troponin T 25 ng/L      Troponin T Delta -2 ng/L     Narrative:      High Sensitive Troponin T Reference Range:  <14.0 ng/L- Negative Female for AMI  <22.0 ng/L- Negative Male for AMI  >=14 - Abnormal Female indicating possible myocardial injury.  >=22 - Abnormal Male indicating possible myocardial injury.   Clinicians would have to utilize clinical acumen, EKG, Troponin, and serial changes to determine if it is an Acute Myocardial Infarction or myocardial injury due to an underlying chronic condition.         High Sensitivity Troponin T [[id number]]  (Abnormal) Collected: 08/22/24 1552    Specimen: Blood Updated: 08/22/24 1653     HS Troponin T 27 ng/L     Narrative:      High Sensitive Troponin T Reference Range:  <14.0 ng/L- Negative Female for AMI  <22.0 ng/L- Negative Male for AMI  >=14 - Abnormal Female indicating possible myocardial injury.  >=22 - Abnormal Male indicating possible myocardial injury.   Clinicians would have to utilize clinical acumen, EKG, Troponin, and serial changes to determine if it is an Acute Myocardial Infarction or myocardial injury due to an underlying chronic condition.         Respiratory Panel PCR w/COVID-19(SARS-CoV-2) [facility] In-House, NP Swab in [facility], 2 HR TAT - Swab, Nasopharynx [[id number]]  (Normal) Collected: 08/22/24 0950    Specimen: Swab from Nasopharynx Updated: 08/22/24 1053     ADENOVIRUS, PCR Not Detected     Coronavirus 229E Not Detected     Coronavirus HKU1 Not Detected     Coronavirus NL63 Not Detected     Coronavirus OC43 Not Detected      "COVID19 Not Detected     Human Metapneumovirus Not Detected     Human Rhinovirus/Enterovirus Not Detected     Influenza A PCR Not Detected     Influenza B PCR Not Detected     Parainfluenza Virus 1 Not Detected     Parainfluenza Virus 2 Not Detected     Parainfluenza Virus 3 Not Detected     Parainfluenza Virus 4 Not Detected     RSV, PCR Not Detected     Bordetella pertussis pcr Not Detected     Bordetella parapertussis PCR Not Detected     Chlamydophila pneumoniae PCR Not Detected     Mycoplasma pneumo by PCR Not Detected    Narrative:      In the setting of a positive respiratory panel with a viral infection PLUS a negative procalcitonin without other underlying concern for bacterial infection, consider observing off antibiotics or discontinuation of antibiotics and continue supportive care. If the respiratory panel is positive for atypical bacterial infection (Bordetella pertussis, Chlamydophila pneumoniae, or Mycoplasma pneumoniae), consider antibiotic de-escalation to target atypical bacterial infection.              Objective:     Vitals: /82 (BP Location: Left arm, Patient Position: Lying)   Pulse 69   Temp 97.3 °F (36.3 °C) (Oral)   Resp 18   Ht 165 cm (64.96\")   Wt 99.8 kg (220 lb 0.3 oz)   LMP  (LMP Unknown)   SpO2 96%   BMI 36.66 kg/m²    Intake/Output Summary (Last 24 hours) at 2024 1048  Last data filed at 2024 0413  Gross per 24 hour   Intake 1611 ml   Output --   Net 1611 ml    Temp (24hrs), Av.4 °F (36.3 °C), Min:97.3 °F (36.3 °C), Max:97.5 °F (36.4 °C)        Physical Examination:   General appearance - alert, well appearing, and in no distress and oriented to person, place, and time  Mental status - alert, oriented to person, place, and time, normal mood, behavior, speech, dress, motor activity, and thought processes  Eyes - pupils equal and reactive, extraocular eye movements intact  Ears - bilateral TM's and external ear canals normal, hearing grossly normal " bilaterally  Mouth - mucous membranes moist, pharynx normal without lesions  Neck - supple, no significant adenopathy  Lymphatics - no palpable lymphadenopathy, no hepatosplenomegaly  Chest - clear to auscultation, no wheezes, rales or rhonchi, symmetric air entry, no tachypnea, retractions or cyanosis  Heart - normal rate, regular rhythm, normal S1, S2, no murmurs, rubs, clicks or gallops  Abdomen - soft, nontender, nondistended, no masses or organomegaly bowel sounds normal  Neurological - alert, oriented, normal speech, no focal findings or movement disorder noted  Musculoskeletal - no joint tenderness, deformity or swelling  Extremities - peripheral pulses normal, no pedal edema, no clubbing or cyanosis  Skin - normal coloration and turgor, no rashes, no suspicious skin lesions noted      Assessment and Plan:     Primary Problem:  Acute exacerbation of chronic obstructive pulmonary disease (COPD)    Hospital Problem list:    Acute exacerbation of chronic obstructive pulmonary disease (COPD)      PMH:  Past Medical History:   Diagnosis Date    Acute CHF     Acute renal failure (ARF)     Anemia     Asthma     childhood    Bowel disease, inflammatory     Chronic kidney disease     Coronary artery disease     Hypertension     Ischemic colitis     Lung nodule, multiple 04/03/2024    Metabolic acidosis     Myocardial infarction 11/07/2020    Nonrheumatic aortic (valve) insufficiency     PTSD (post-traumatic stress disorder)        Treatment Plan:  Acute on chronic COPD exacerbation-back to baseline per pulm  CHF-stable  MARY  HTN-150/80s off nitro drip, pt currently on coreg only, can consider restarting her home losartan if ok with nephrology vs norvasc? Will follow and make adjustments accordingly   CKD per nephro-renal us pending    Discharge planning:   Home    Reviewed treatment plans with the patient and/or family.   15 minutes spent in face to face interaction and coordination of care.     Electronically signed  by VIJAY Muhammad on 8/25/2024 at 10:48 CDT

## 2024-08-25 NOTE — PROGRESS NOTES
RT EQUIPMENT DEVICE RELATED - SKIN ASSESSMENT    Griffin Score:  Griffin Score: 21     RT Medical Equipment/Device:     NIV Mask:  Under-the-nose   size:  B    Skin Assessment:      Cheek:  Intact  Chin:  Intact  Mouth:  Intact    Device Skin Pressure Protection:   none    Nurse Notification:  No    Vero Jimenes, RRT

## 2024-08-26 ENCOUNTER — READMISSION MANAGEMENT (OUTPATIENT)
Dept: CALL CENTER | Facility: HOSPITAL | Age: 64
End: 2024-08-26
Payer: MEDICARE

## 2024-08-26 VITALS
TEMPERATURE: 97.7 F | DIASTOLIC BLOOD PRESSURE: 97 MMHG | SYSTOLIC BLOOD PRESSURE: 167 MMHG | RESPIRATION RATE: 16 BRPM | BODY MASS INDEX: 36.66 KG/M2 | WEIGHT: 220.02 LBS | HEIGHT: 65 IN | HEART RATE: 64 BPM | OXYGEN SATURATION: 94 %

## 2024-08-26 LAB
ALBUMIN SERPL-MCNC: 3.3 G/DL (ref 3.5–5.2)
ALBUMIN/GLOB SERPL: 1.3 G/DL
ALP SERPL-CCNC: 75 U/L (ref 39–117)
ALT SERPL W P-5'-P-CCNC: 10 U/L (ref 1–33)
ANION GAP SERPL CALCULATED.3IONS-SCNC: 12 MMOL/L (ref 5–15)
AST SERPL-CCNC: 12 U/L (ref 1–32)
BASOPHILS # BLD AUTO: 0.03 10*3/MM3 (ref 0–0.2)
BASOPHILS NFR BLD AUTO: 0.4 % (ref 0–1.5)
BILIRUB SERPL-MCNC: 0.3 MG/DL (ref 0–1.2)
BUN SERPL-MCNC: 64 MG/DL (ref 8–23)
BUN/CREAT SERPL: 30.5 (ref 7–25)
CALCIUM SPEC-SCNC: 8.3 MG/DL (ref 8.6–10.5)
CHLORIDE SERPL-SCNC: 103 MMOL/L (ref 98–107)
CO2 SERPL-SCNC: 25 MMOL/L (ref 22–29)
CREAT SERPL-MCNC: 2.1 MG/DL (ref 0.57–1)
DEPRECATED RDW RBC AUTO: 49.8 FL (ref 37–54)
EGFRCR SERPLBLD CKD-EPI 2021: 25.9 ML/MIN/1.73
EOSINOPHIL # BLD AUTO: 0.04 10*3/MM3 (ref 0–0.4)
EOSINOPHIL NFR BLD AUTO: 0.5 % (ref 0.3–6.2)
ERYTHROCYTE [DISTWIDTH] IN BLOOD BY AUTOMATED COUNT: 14.4 % (ref 12.3–15.4)
GLOBULIN UR ELPH-MCNC: 2.6 GM/DL
GLUCOSE SERPL-MCNC: 105 MG/DL (ref 65–99)
HCT VFR BLD AUTO: 41.5 % (ref 34–46.6)
HGB BLD-MCNC: 12.5 G/DL (ref 12–15.9)
IMM GRANULOCYTES # BLD AUTO: 0.09 10*3/MM3 (ref 0–0.05)
IMM GRANULOCYTES NFR BLD AUTO: 1.1 % (ref 0–0.5)
LYMPHOCYTES # BLD AUTO: 1.5 10*3/MM3 (ref 0.7–3.1)
LYMPHOCYTES NFR BLD AUTO: 18.9 % (ref 19.6–45.3)
MCH RBC QN AUTO: 28.5 PG (ref 26.6–33)
MCHC RBC AUTO-ENTMCNC: 30.1 G/DL (ref 31.5–35.7)
MCV RBC AUTO: 94.7 FL (ref 79–97)
MONOCYTES # BLD AUTO: 0.59 10*3/MM3 (ref 0.1–0.9)
MONOCYTES NFR BLD AUTO: 7.4 % (ref 5–12)
NEUTROPHILS NFR BLD AUTO: 5.69 10*3/MM3 (ref 1.7–7)
NEUTROPHILS NFR BLD AUTO: 71.7 % (ref 42.7–76)
NRBC BLD AUTO-RTO: 0 /100 WBC (ref 0–0.2)
PLATELET # BLD AUTO: 167 10*3/MM3 (ref 140–450)
PMV BLD AUTO: 11.1 FL (ref 6–12)
POTASSIUM SERPL-SCNC: 4.2 MMOL/L (ref 3.5–5.2)
PROT SERPL-MCNC: 5.9 G/DL (ref 6–8.5)
RBC # BLD AUTO: 4.38 10*6/MM3 (ref 3.77–5.28)
SODIUM SERPL-SCNC: 140 MMOL/L (ref 136–145)
WBC NRBC COR # BLD AUTO: 7.94 10*3/MM3 (ref 3.4–10.8)

## 2024-08-26 PROCEDURE — 94660 CPAP INITIATION&MGMT: CPT

## 2024-08-26 PROCEDURE — 94799 UNLISTED PULMONARY SVC/PX: CPT

## 2024-08-26 PROCEDURE — 85025 COMPLETE CBC W/AUTO DIFF WBC: CPT | Performed by: INTERNAL MEDICINE

## 2024-08-26 PROCEDURE — 80053 COMPREHEN METABOLIC PANEL: CPT | Performed by: INTERNAL MEDICINE

## 2024-08-26 PROCEDURE — 63710000001 PREDNISONE PER 5 MG: Performed by: INTERNAL MEDICINE

## 2024-08-26 RX ORDER — GUAIFENESIN 600 MG/1
600 TABLET, EXTENDED RELEASE ORAL EVERY 12 HOURS PRN
Qty: 60 TABLET | Refills: 0 | Status: SHIPPED | OUTPATIENT
Start: 2024-08-26

## 2024-08-26 RX ORDER — AZELASTINE 1 MG/ML
2 SPRAY, METERED NASAL 2 TIMES DAILY
Qty: 30 ML | Refills: 11 | Status: SHIPPED | OUTPATIENT
Start: 2024-08-26

## 2024-08-26 RX ORDER — LOSARTAN POTASSIUM 50 MG/1
100 TABLET ORAL
Status: DISCONTINUED | OUTPATIENT
Start: 2024-08-26 | End: 2024-08-26 | Stop reason: HOSPADM

## 2024-08-26 RX ADMIN — PREDNISONE 40 MG: 10 TABLET ORAL at 09:28

## 2024-08-26 RX ADMIN — IPRATROPIUM BROMIDE AND ALBUTEROL SULFATE 3 ML: 2.5; .5 SOLUTION RESPIRATORY (INHALATION) at 05:26

## 2024-08-26 RX ADMIN — CARVEDILOL 25 MG: 25 TABLET, FILM COATED ORAL at 09:28

## 2024-08-26 RX ADMIN — CALCITRIOL 0.25 MCG: 0.25 CAPSULE ORAL at 09:28

## 2024-08-26 RX ADMIN — DOXYCYCLINE 100 MG: 100 TABLET, FILM COATED ORAL at 09:28

## 2024-08-26 RX ADMIN — LOSARTAN POTASSIUM 100 MG: 50 TABLET, FILM COATED ORAL at 12:16

## 2024-08-26 RX ADMIN — GUAIFENESIN 600 MG: 600 TABLET, EXTENDED RELEASE ORAL at 12:17

## 2024-08-26 RX ADMIN — IPRATROPIUM BROMIDE AND ALBUTEROL SULFATE 3 ML: 2.5; .5 SOLUTION RESPIRATORY (INHALATION) at 09:17

## 2024-08-26 RX ADMIN — ASPIRIN 81 MG: 81 TABLET, COATED ORAL at 09:28

## 2024-08-26 RX ADMIN — IPRATROPIUM BROMIDE AND ALBUTEROL SULFATE 3 ML: 2.5; .5 SOLUTION RESPIRATORY (INHALATION) at 13:30

## 2024-08-26 NOTE — OUTREACH NOTE
Prep Survey      Flowsheet Row Responses   [facility] patient discharged from? [facility]   Is LACE score < 7 ? No   Eligibility [facility]   Date of Admission 08/22/24   Date of Discharge 08/26/24   Discharge diagnosis Acute exacerbation of chronic obstructive pulmonary disease (COPD)   Does the patient have one of the following disease processes/diagnoses(primary or secondary)? COPD   Prep survey completed? Yes            Mehreen HOU - Registered Nurse

## 2024-08-26 NOTE — CASE MANAGEMENT/SOCIAL WORK
Continued Stay Note  NGUYEN Espinoza     Patient Name: Ludivina Ramirez  MRN: [id number]  Today's Date: 8/26/2024    Admit Date: 8/22/2024    Plan: Home   Discharge Plan       Row Name 08/26/24 1017       Plan    Plan Home    Patient/Family in Agreement with Plan yes    Plan Comments Pt will return home alone, friend to assist as needed.  Pt does have O2 PRN at home via [employer], please inform if this will need increased in home. Will follow.                   Discharge Codes    No documentation.                       JAMAL CheryW

## 2024-08-26 NOTE — PLAN OF CARE
Goal Outcome Evaluation:         Patient A/O x4 this shift. Patient treated for HTN x1 per MAR for SBP >180. Patient able to get sleep between roundings/care. Patient maintained on bipap this shift. Patient given mucinex per MAR. No needs unaddressed in the room overnight. Patient would like a shower later in the a.m. Will update oncoming dayshift nurse at bedside shift report in the a.m. as appropriate.      Telemetry: SR [id number]

## 2024-08-26 NOTE — PROGRESS NOTES
Nephrology ([facility], [facility]) Progress Note      Patient:  Ludivina Ramirez  YOB: 1960  Date of Service: 8/26/2024  MRN: [id number]   Acct: [id number]   Primary Care Physician: Orville Weston MD  Advance Directive:   There are no questions and answers to display.     Admit Date: 8/22/2024       Hospital Day: 4  Referring Provider: No ref. provider found      Patient personally seen and examined.  Complete chart including Consults, Notes, Operative Reports, Labs, Cardiology, and Radiology studies reviewed as able.        Subjective:  Ludivina Ramirez is a 64 y.o. female for whom we were consulted for evaluation and treatment of acute kidney injury. Baseline chronic kidney disease stage 4, follows in our office. History of COPD, systolic/diastolic CHF, hypertension, ischemic colitis. Presented to ER on 8/22 with shortness of breath. No recent weight gain, no peripheral edema, no edema noted on chest X-ray. Patient was therefore treated with IV diuretics. Creatinine josseline to 3.0 so nephrology was consulted for recommendations. Diuretics stopped. Renal function improved.    Today is feeling better. No new complaints. Blood pressure has been elevated, requiring PRN medication.    Allergies:  Codeine and Sumatriptan    Home Meds:  Medications Prior to Admission   Medication Sig Dispense Refill Last Dose    aspirin 81 MG EC tablet Take 1 tablet by mouth Daily. 90 tablet 3 8/21/2024    calcitriol (ROCALTROL) 0.25 MCG capsule Take 1 capsule by mouth Daily.   8/21/2024    carvedilol (COREG) 25 MG tablet Take 1 tablet by mouth 2 (Two) Times a Day With Meals.   8/21/2024    cloNIDine (CATAPRES) 0.1 MG tablet Take 1 tablet by mouth Every 6 (Six) Hours As Needed for High Blood Pressure (SBP >160). 30 tablet 1 Past Month    docusate sodium (COLACE) 250 MG capsule Take 1 capsule by mouth Daily.   8/21/2024    fluticasone (FLONASE) 50 MCG/ACT nasal spray 2 sprays into the nostril(s) as directed by  provider Every Morning. In each nostril 16 g 11 8/21/2024    furosemide (LASIX) 40 MG tablet Take 1 tablet by mouth Daily. 30 tablet 0 8/21/2024    hydroCHLOROthiazide 25 MG tablet Take 1 tablet by mouth Daily. 30 tablet 11 8/21/2024    ipratropium-albuterol (DUO-NEB) 0.5-2.5 mg/3 ml nebulizer Take 3 mL by nebulization 4 (Four) Times a Day. prn   Patient Taking Differently    isosorbide dinitrate (ISORDIL) 40 MG tablet Take 1 tablet by mouth 2 (Two) Times a Day. 180 tablet 3 8/21/2024    losartan (COZAAR) 100 MG tablet Take 1 tablet by mouth Daily.   8/21/2024    melatonin 3 MG tablet Take 2 tablets by mouth Every Night. 30 tablet 0 8/21/2024    albuterol sulfate  (90 Base) MCG/ACT inhaler Inhale 2 puffs Every 4 (Four) Hours As Needed for Wheezing. 20.1 g 3     nitroglycerin (NITROSTAT) 0.4 MG SL tablet Place 1 tablet under the tongue Every 5 (Five) Minutes As Needed for Chest Pain. Take no more than 3 doses in 15 minutes.          Medicines:  Current Facility-Administered Medications   Medication Dose Route Frequency Provider Last Rate Last Admin    acetaminophen (TYLENOL) tablet 650 mg  650 mg Oral Q6H PRN Orville Weston MD   650 mg at 08/25/24 1644    Or    acetaminophen (TYLENOL) suppository 650 mg  650 mg Rectal Q4H PRN Orville Weston MD        aspirin EC tablet 81 mg  81 mg Oral Daily Orville Weston MD   81 mg at 08/26/24 0928    azelastine (ASTELIN) nasal spray 2 spray  2 spray Each Nare BID Orville Weston MD   2 spray at 08/25/24 0809    calcitriol (ROCALTROL) capsule 0.25 mcg  0.25 mcg Oral Daily Orville Weston MD   0.25 mcg at 08/26/24 0928    carvedilol (COREG) tablet 25 mg  25 mg Oral BID With Meals Orville Weston MD   25 mg at 08/26/24 0928    cloNIDine (CATAPRES) tablet 0.1 mg  0.1 mg Oral Q4H PRN Orville Weston MD   0.1 mg at 08/25/24 2248    docusate sodium (COLACE) capsule 100 mg  100 mg Oral BID Orville Weston MD   100 mg at 08/24/24  2018    doxycycline (ADOXA) tablet 100 mg  100 mg Oral Q12H Cassy Renee, DO   100 mg at 08/26/24 0928    Enoxaparin Sodium (LOVENOX) syringe 30 mg  30 mg Subcutaneous Q24H Orville Weston MD   30 mg at 08/25/24 1236    fluticasone (FLONASE) 50 MCG/ACT nasal spray 2 spray  2 spray Nasal QAM Orville Weston MD   2 spray at 08/25/24 0808    guaiFENesin (MUCINEX) 12 hr tablet 600 mg  600 mg Oral Q12H PRN Keri Moreland PA   600 mg at 08/25/24 2335    ipratropium-albuterol (DUO-NEB) nebulizer solution 3 mL  3 mL Nebulization 4x Daily - RT Orville Weston MD   3 mL at 08/26/24 0917    losartan (COZAAR) tablet 100 mg  100 mg Oral Q24H Quinn Rodarte, APRN        melatonin tablet 2.5 mg  2.5 mg Oral Nightly PRN Orville Weston MD   2.5 mg at 08/25/24 2010    nitroglycerin (TRIDIL) 200 mcg/ml infusion  5-200 mcg/min Intravenous Titrated Cristhian Peguero MD   Stopped at 08/23/24 1634    predniSONE (DELTASONE) tablet 40 mg  40 mg Oral Daily With Breakfast Cassy Renee DO   40 mg at 08/26/24 0928       Past Medical History:  Past Medical History:   Diagnosis Date    Acute CHF     Acute renal failure (ARF)     Anemia     Asthma     childhood    Bowel disease, inflammatory     Chronic kidney disease     Coronary artery disease     Hypertension     Ischemic colitis     Lung nodule, multiple 04/03/2024    Metabolic acidosis     Myocardial infarction 11/07/2020    Nonrheumatic aortic (valve) insufficiency     PTSD (post-traumatic stress disorder)        Past Surgical History:  Past Surgical History:   Procedure Laterality Date    CARDIAC CATHETERIZATION N/A 11/08/2020    Procedure: Left Heart Cath;  Surgeon: Pierce Buchanan MD;  Location:  [facility] CATH INVASIVE LOCATION;  Service: Cardiovascular;  Laterality: N/A;    CLOSED REDUCTION ANKLE FRACTURE Right 2019    13 screws and a plate    EXTERNAL FIXATION WRIST FRACTURE      INSERTION HEMODIALYSIS CATHETER Right 06/03/2022     Procedure: INSERTION OF RIGHT INTERNAL JUGULAR HEMODIALYSIS CATHETER;  Surgeon: Dexter Red MD;  Location:  [facility] OR;  Service: Vascular;  Laterality: Right;    TUBAL ABDOMINAL LIGATION         Family History  Family History   Problem Relation Age of Onset    Coronary artery disease Mother     Coronary artery disease Father     Breast cancer Neg Hx        Social History  Social History     Socioeconomic History    Marital status: Single   Tobacco Use    Smoking status: Every Day     Current packs/day: 0.00     Average packs/day: 0.5 packs/day for 48.7 years (24.4 ttl pk-yrs)     Types: Cigarettes     Start date:      Last attempt to quit: 2023     Years since quittin.9     Passive exposure: Past    Smokeless tobacco: Never    Tobacco comments:     Smokes about 0.5 pack daily 4/10    Vaping Use    Vaping status: Never Used   Substance and Sexual Activity    Alcohol use: No    Drug use: No    Sexual activity: Defer       Review of Systems:  History obtained from chart review and the patient  General ROS: No fever or chills  Respiratory ROS: No cough, shortness of breath, wheezing  Cardiovascular ROS: No chest pain or palpitations  Gastrointestinal ROS: No abdominal pain or melena  Genito-Urinary ROS: No dysuria or hematuria  Psych ROS: No anxiety and depression  14 point ROS reviewed with the patient and negative except as noted above and in the HPI unless unable to obtain.    Objective:  Patient Vitals for the past 24 hrs:   BP Temp Temp src Pulse Resp SpO2   24 0917 -- -- -- 61 17 96 %   24 0740 151/82 97.2 °F (36.2 °C) Axillary 62 20 94 %   24 0526 -- -- -- 62 20 99 %   24 0315 142/60 97.5 °F (36.4 °C) Axillary 61 18 98 %   24 0257 -- -- -- 60 16 98 %   24 2322 (!) 184/92 97.4 °F (36.3 °C) Axillary 67 28 98 %   24 -- -- -- 68 15 99 %   24 -- -- -- 65 23 --   24 -- -- -- 64 20 94 %   24 -- -- -- 64 18 94 %    08/25/24 2015 160/87 97.5 °F (36.4 °C) Axillary 65 21 97 %   08/25/24 1547 156/76 97.6 °F (36.4 °C) Oral 82 18 96 %   08/25/24 1359 -- -- -- 73 18 95 %   08/25/24 1353 -- -- -- 77 18 98 %   08/25/24 1120 164/84 98.1 °F (36.7 °C) Oral 68 18 95 %       Intake/Output Summary (Last 24 hours) at 8/26/2024 1019  Last data filed at 8/25/2024 1353  Gross per 24 hour   Intake 320 ml   Output --   Net 320 ml     General: awake/alert   Chest:  clear to auscultation bilaterally without respiratory distress  CVS: regular rate and rhythm  Abdominal: soft, nontender, positive bowel sounds  Extremities: no cyanosis or edema  Skin: warm and dry without rash      Labs:  Results from last 7 days   Lab Units 08/26/24  0359 08/25/24  0405 08/24/24  0446   WBC 10*3/mm3 7.94 10.58 9.56   HEMOGLOBIN g/dL 12.5 12.1 12.9   HEMATOCRIT % 41.5 39.2 42.8   PLATELETS 10*3/mm3 167 165 161         Results from last 7 days   Lab Units 08/26/24  0359 08/25/24  0406 08/24/24  0446   SODIUM mmol/L 140 139 142   POTASSIUM mmol/L 4.2 4.2 4.2   CHLORIDE mmol/L 103 103 100   CO2 mmol/L 25.0 26.0 28.0   BUN mg/dL 64* 66* 56*   CREATININE mg/dL 2.10* 2.52* 2.54*   CALCIUM mg/dL 8.3* 8.6 8.8   EGFR mL/min/1.73 25.9* 20.8* 20.6*   BILIRUBIN mg/dL 0.3 0.3 0.3   ALK PHOS U/L 75 80 85   ALT (SGPT) U/L 10 9 7   AST (SGOT) U/L 12 12 9   GLUCOSE mg/dL 105* 103* 113*       Radiology:   Imaging Results (Last 72 Hours)       Procedure Component Value Units Date/Time    US Renal Bilateral [[id number]] Collected: 08/25/24 1156     Updated: 08/25/24 1201    Narrative:      RENAL ULTRASOUND COMPLETE 8/25/2024 8:21 AM     REASON FOR EXAM: Acute kidney injury; J44.1-Chronic obstructive  pulmonary disease with (acute) exacerbation; I50.9-Heart failure,  unspecified; N18.9-Chronic kidney disease, unspecified; R79.89-Other  specified abnormal findings of blood chemistry       COMPARISON: 6/1/2022.     TECHNIQUE: Multiple longitudinal and transverse realtime sonographic  images  of the kidneys and urinary bladder are obtained.     FINDINGS:     RIGHT KIDNEY: The right kidney measures 9.9 cm in length. The cortical  thickness and echogenicity are normal. There are no solid or cystic  masses. There is no hydronephrosis. No nephrolithiasis or abnormal  perinephric fluid collections.     LEFT KIDNEY: The left kidney measures 6.4 cm in length. There does  appear to be some cortical thinning of the left kidney. There are no  solid or cystic masses. There is no hydronephrosis. No nephrolithiasis  or abnormal perinephric fluid collections.     PELVIS: The bladder is not well distended. No focal bladder abnormality  is seen. There is no free fluid in the pelvis.     ADDITIONAL FINDINGS: The visualized abdominal aorta is normal caliber.       Impression:      1. The right kidney is sonographically normal.  2. The left kidney is small compared to the right kidney. There does  appear to be some cortical thinning. This could be related to vascular  insufficiency. Cortical thinning may also be see with prior infection.  Correlate clinically.           The images and report are stored on PAC's per institutional and state  guidelines.           This report was signed and finalized on 8/25/2024 11:58 AM by Dr. Jefe Beltre MD.               Culture:  Blood Culture   Date Value Ref Range Status   08/22/2024 No growth at 4 days  Preliminary   08/22/2024 No growth at 4 days  Preliminary         Assessment    Acute kidney injury, ATN--resolving  Baseline chronic kidney disease stage 4  Hypertension  Acute exacerbation of COPD  Secondary hyperparathyroidism    Plan:   Renal function approaching baseline level  OK for discharge from renal standpoint, follow up in office in 1-2 weeks.      Quinn Rodarte, ISAIAH  8/26/2024  10:19 CDT

## 2024-08-26 NOTE — PLAN OF CARE
Problem: Noninvasive Ventilation Acute  Goal: Effective Unassisted Ventilation and Oxygenation  Outcome: Ongoing, Progressing       RT EQUIPMENT DEVICE RELATED - SKIN ASSESSMENT    Griffin Score:  Griffin Score: 22     RT Medical Equipment/Device:     NIV Mask:  Under-the-nose   size:  B    Skin Assessment:       :  Intact    Device Skin Pressure Protection:  Pressure points protected    Nurse Notification:  Essence Cade, RRT

## 2024-08-26 NOTE — DISCHARGE SUMMARY
Hospital Discharge Summary    Ludivina Ramirez  :  1960  MRN:  [id number]    Admit date:  2024  Discharge date:      Admitting Physician:    Orville Weston MD    Discharge Diagnoses:      Acute exacerbation of chronic obstructive pulmonary disease (COPD)    CHF    CKD    HTN        Hospital Course:   She was admitted with acute resp failure with hypoxia. She was given Lasix, along with treatment for her COPD. She has slowly improved and is close to her baseline on d/c. The other Specialists are ok with d/c. Her VS are stable. She is eating and ambulating.     Discharge Medications:         Discharge Medications        New Medications        Instructions Start Date   doxycycline 100 MG capsule  Commonly known as: VIBRAMYCIN   100 mg, Oral, 2 Times Daily      guaiFENesin 600 MG 12 hr tablet  Commonly known as: MUCINEX   600 mg, Oral, Every 12 Hours PRN      predniSONE 10 MG tablet  Commonly known as: DELTASONE   Take 4 tabs daily x 3 days, then take 3 tabs daily x 3 days, then take 2 tabs daily x 3 days, then take 1 tab daily x 3 days             Continue These Medications        Instructions Start Date   albuterol sulfate  (90 Base) MCG/ACT inhaler  Commonly known as: PROVENTIL HFA;VENTOLIN HFA;PROAIR HFA   2 puffs, Inhalation, Every 4 Hours PRN      aspirin 81 MG EC tablet   81 mg, Oral, Daily      azelastine 0.1 % nasal spray  Commonly known as: ASTELIN   2 sprays, Nasal, 2 Times Daily      calcitriol 0.25 MCG capsule  Commonly known as: ROCALTROL   0.25 mcg, Oral, Daily      carvedilol 25 MG tablet  Commonly known as: COREG   25 mg, Oral, 2 Times Daily With Meals      cloNIDine 0.1 MG tablet  Commonly known as: CATAPRES   0.1 mg, Oral, Every 6 Hours PRN      docusate sodium 250 MG capsule  Commonly known as: COLACE   1 capsule, Oral, Daily      fluticasone 50 MCG/ACT nasal spray  Commonly known as: FLONASE   2 sprays, Nasal, Every Morning, In each nostril      ipratropium-albuterol 0.5-2.5  mg/3 ml nebulizer  Commonly known as: DUO-NEB   3 mL, Nebulization, 4 Times Daily, prn      isosorbide dinitrate 40 MG tablet  Commonly known as: ISORDIL   40 mg, Oral, 2 Times Daily      losartan 100 MG tablet  Commonly known as: COZAAR   100 mg, Oral, Daily      melatonin 3 MG tablet   6 mg, Oral, Nightly      nitroglycerin 0.4 MG SL tablet  Commonly known as: NITROSTAT   0.4 mg, Sublingual, Every 5 Minutes PRN, Take no more than 3 doses in 15 minutes.             Stop These Medications      furosemide 40 MG tablet  Commonly known as: LASIX     hydroCHLOROthiazide 25 MG tablet              Consults:  Nephology, Cardiology and Pulm    Significant Diagnostic Studies:  see complete medical record      Disposition:   home in stable condition  Follow up with Orville Weston MD in 1 week. F/U with [facility] and Nephrology as they recommend.    Diet: cardiac    Activity: as tolerated    Special Instructions: BP check and record and bring to f/u      The patient or family are to call or return if there are any problems, questions, concerns or change in her condition.     Signed:  Orville Weston MD MD  8/26/2024, 13:53 CDT

## 2024-08-27 LAB
BACTERIA SPEC AEROBE CULT: NORMAL
BACTERIA SPEC AEROBE CULT: NORMAL

## 2024-08-27 NOTE — PAYOR COMM NOTE
"REF:   [id number]     [facility]  FAX  [phone]       Ludivina Clemons (64 y.o. Female)       Date of Birth   1960    Social Security Number       Address   [address] [place] [address]    Home Phone   [phone]    MRN   [id number]       [employer]    Marital Status   Single                            Admission Date   24    Admission Type   Emergency    Admitting Provider   Orville Weston MD    Attending Provider       Department, Room/Bed   [facility] 4B, 454/2       Discharge Date   2024    Discharge Disposition   Home or Self Care    Discharge Destination                                 Attending Provider: (none)   Allergies: Codeine, Sumatriptan    Isolation: None   Infection: None   Code Status: Prior    Ht: 165 cm (64.96\")   Wt: 99.8 kg (220 lb 0.3 oz)    Admission Cmt: None   Principal Problem: Acute exacerbation of chronic obstructive pulmonary disease (COPD) [J44.1]                   Active Insurance as of 2024       Primary Coverage       Payor Plan Insurance Group Employer/Plan Group    HUMANA MEDICARE REPLACEMENT HUMANA MED ADV PPO [id number]       Payor Plan Address Payor Plan Phone Number Payor Plan Fax Number Effective Dates    [address] [address] [phone]  2024 - None Entered    [facility] [id number]         Subscriber Name Subscriber Birth Date Member ID       LUDIVINA CLEMONS 1960 [id number]                     Emergency Contacts        (Rel.) Home Phone Work Phone Mobile Phone    Juliette Nieto (Sister) [phone] -- [phone]                 Discharge Summary        Orville Weston MD at 24 1353          Hospital Discharge Summary    Ludivina Clemons  :  1960  MRN:  [id number]    Admit date:  2024  Discharge date:      Admitting Physician:    Orville Weston MD    Discharge Diagnoses:      Acute exacerbation of chronic obstructive pulmonary disease (COPD)    CHF    CKD    " HTN        Hospital Course:   She was admitted with acute resp failure with hypoxia. She was given Lasix, along with treatment for her COPD. She has slowly improved and is close to her baseline on d/c. The other Specialists are ok with d/c. Her VS are stable. She is eating and ambulating.     Discharge Medications:         Discharge Medications        New Medications        Instructions Start Date   doxycycline 100 MG capsule  Commonly known as: VIBRAMYCIN   100 mg, Oral, 2 Times Daily      guaiFENesin 600 MG 12 hr tablet  Commonly known as: MUCINEX   600 mg, Oral, Every 12 Hours PRN      predniSONE 10 MG tablet  Commonly known as: DELTASONE   Take 4 tabs daily x 3 days, then take 3 tabs daily x 3 days, then take 2 tabs daily x 3 days, then take 1 tab daily x 3 days             Continue These Medications        Instructions Start Date   albuterol sulfate  (90 Base) MCG/ACT inhaler  Commonly known as: PROVENTIL HFA;VENTOLIN HFA;PROAIR HFA   2 puffs, Inhalation, Every 4 Hours PRN      aspirin 81 MG EC tablet   81 mg, Oral, Daily      azelastine 0.1 % nasal spray  Commonly known as: ASTELIN   2 sprays, Nasal, 2 Times Daily      calcitriol 0.25 MCG capsule  Commonly known as: ROCALTROL   0.25 mcg, Oral, Daily      carvedilol 25 MG tablet  Commonly known as: COREG   25 mg, Oral, 2 Times Daily With Meals      cloNIDine 0.1 MG tablet  Commonly known as: CATAPRES   0.1 mg, Oral, Every 6 Hours PRN      docusate sodium 250 MG capsule  Commonly known as: COLACE   1 capsule, Oral, Daily      fluticasone 50 MCG/ACT nasal spray  Commonly known as: FLONASE   2 sprays, Nasal, Every Morning, In each nostril      ipratropium-albuterol 0.5-2.5 mg/3 ml nebulizer  Commonly known as: DUO-NEB   3 mL, Nebulization, 4 Times Daily, prn      isosorbide dinitrate 40 MG tablet  Commonly known as: ISORDIL   40 mg, Oral, 2 Times Daily      losartan 100 MG tablet  Commonly known as: COZAAR   100 mg, Oral, Daily      melatonin 3 MG tablet    6 mg, Oral, Nightly      nitroglycerin 0.4 MG SL tablet  Commonly known as: NITROSTAT   0.4 mg, Sublingual, Every 5 Minutes PRN, Take no more than 3 doses in 15 minutes.             Stop These Medications      furosemide 40 MG tablet  Commonly known as: LASIX     hydroCHLOROthiazide 25 MG tablet              Consults:  Nephology, Cardiology and Pulm    Significant Diagnostic Studies:  see complete medical record      Disposition:   home in stable condition  Follow up with Jesse Weston MD in 1 week. F/U with [facility] and Nephrology as they recommend.    Diet: cardiac    Activity: as tolerated    Special Instructions: BP check and record and bring to f/u      The patient or family are to call or return if there are any problems, questions, concerns or change in her condition.     Signed:  Jesse Weston MD MD  8/26/2024, 13:53 CDT        Electronically signed by Jesse Weston MD at 08/26/24 1355       Discharge Order (From admission, onward)       Start     Ordered    08/26/24 1351  Discharge patient  Once        Comments: OK for d/c if ok with others  F/U with me in 1 week  F/U with Pulm in 4 weeks  F/U with Nephrology in 2 week  Monitor BP on d/c, record and bring to f/u  Pt would like help with a shower before d/c  Continue home O2, CPAP at night---as before   Expected Discharge Date: 08/26/24   Discharge Disposition: Home or Self Care   Physician of Record for Attribution - Please select from Treatment Team: JESSE WESTON [[id number]]   Review needed by CMO to determine Physician of Record: No      Question Answer Comment   Physician of Record for Attribution - Please select from Treatment Team JESSE WESTON    Review needed by CMO to determine Physician of Record No        08/26/24 0920

## 2024-08-29 ENCOUNTER — READMISSION MANAGEMENT (OUTPATIENT)
Dept: CALL CENTER | Facility: HOSPITAL | Age: 64
End: 2024-08-29
Payer: MEDICARE

## 2024-08-29 NOTE — OUTREACH NOTE
COPD/PN Week 1 Survey      Flowsheet Row Responses   [facility] patient discharged from? [facility]   Does the patient have one of the following disease processes/diagnoses(primary or secondary)? COPD   Week 1 attempt successful? Yes   Call end time 1334   Discharge diagnosis Acute exacerbation of chronic obstructive pulmonary disease (COPD)   Meds reviewed with patient/caregiver? Yes   Is the patient having any side effects they believe may be caused by any medication additions or changes? No   Does the patient have all medications ordered at discharge? Yes   Is the patient taking all medications as directed (includes completed medication regime)? Yes   Does the patient have a primary care provider?  Yes   Does the patient have an appointment with their PCP or specialist within 7 days of discharge? Yes   Has the patient kept scheduled appointments due by today? Yes   Pulse Ox monitoring Intermittent   Pulse Ox device source Patient   O2 Sat: education provided Sat levels, When to seek care, Monitoring frequency   Psychosocial issues? No   Did the patient receive a copy of their discharge instructions? Yes   Nursing interventions Reviewed instructions with patient   What is the patient's perception of their health status since discharge? Improving   Nursing Interventions Nurse provided patient education   If the patient is a current smoker, are they able to teach back resources for cessation? Not a smoker   Is the patient/caregiver able to teach back the hierarchy of who to call/visit for symptoms/problems? PCP, Specialist, Home health nurse, Urgent Care, ED, 911 Yes   Is the patient able to teach back COPD zones? Yes   Nursing interventions Education provided on various zones   Patient reports what zone on this call? Green Zone   Green Zone Reports doing well, Breathing without shortness of breath, Usual activity and exercise level, Usual amounts of cough and phlegm/mucous   Green Zone interventions: Use oxygen  as prescribed, Continue regular exercise/diet plan, Take daily medications   Week 1 call completed? Yes   Is the patient interested in additional calls from an ambulatory ? No   Would this patient benefit from a Referral to [facility] Social Work? No   Call end time [phone]            JAIMIE LINARES - Registered Nurse

## 2024-09-12 ENCOUNTER — TELEPHONE (OUTPATIENT)
Dept: PULMONOLOGY | Facility: CLINIC | Age: 64
End: 2024-09-12
Payer: MEDICARE

## 2024-09-12 NOTE — TELEPHONE ENCOUNTER
Called patient.  No answer.  Mailbox full. Patient will be in office to see Dr Chavez on 9-30.  He will discuss with patient then.     ----- Message from Kate CATALAN sent at 9/11/2024 10:37 AM CDT -----  Called patient.  No answer.  Mailbox full.  ----- Message -----  From: Kate Ford RRT  Sent: 9/10/2024   9:30 AM CDT  To: Kate Ford RRT    Called patient.  No answer.  Mailbox full.  ----- Message -----  From: Neeta Chavez MD  Sent: 9/9/2024   6:55 PM CDT  To: Kate Ford RRT    Please let the patient know she is noncompliant with the device and they might take it back and it will be very difficult to get it back for her.  She needs to improve her compliance otherwise she will be having more problems coming.  I will see her in the office as scheduled.  Thank you.  ----- Message -----  From: Kate Ford RRT  Sent: 9/4/2024   4:05 PM CDT  To: Neeta Chavez MD    Trilogy compliance

## 2024-09-19 ENCOUNTER — HOSPITAL ENCOUNTER (INPATIENT)
Facility: HOSPITAL | Age: 64
LOS: 5 days | Discharge: HOME | End: 2024-09-24
Attending: FAMILY MEDICINE | Admitting: FAMILY MEDICINE
Payer: MEDICARE

## 2024-09-19 ENCOUNTER — APPOINTMENT (OUTPATIENT)
Dept: CT IMAGING | Facility: HOSPITAL | Age: 64
End: 2024-09-19
Payer: MEDICARE

## 2024-09-19 DIAGNOSIS — E83.41 HYPERMAGNESEMIA: ICD-10-CM

## 2024-09-19 DIAGNOSIS — N17.9 ACUTE KIDNEY INJURY: Primary | ICD-10-CM

## 2024-09-19 DIAGNOSIS — R19.7 DIARRHEA, UNSPECIFIED TYPE: ICD-10-CM

## 2024-09-19 DIAGNOSIS — A02.9 SALMONELLA: ICD-10-CM

## 2024-09-19 DIAGNOSIS — K57.92 DIVERTICULITIS: ICD-10-CM

## 2024-09-19 LAB
ALBUMIN SERPL-MCNC: 3.4 G/DL (ref 3.5–5.2)
ALBUMIN/GLOB SERPL: 1.1 G/DL
ALP SERPL-CCNC: 91 U/L (ref 39–117)
ALT SERPL W P-5'-P-CCNC: 10 U/L (ref 1–33)
ANION GAP SERPL CALCULATED.3IONS-SCNC: 15 MMOL/L (ref 5–15)
ANISOCYTOSIS BLD QL: ABNORMAL
AST SERPL-CCNC: 14 U/L (ref 1–32)
BACTERIA UR QL AUTO: ABNORMAL /HPF
BASOPHILS # BLD MANUAL: 0.04 10*3/MM3 (ref 0–0.2)
BASOPHILS NFR BLD MANUAL: 1 % (ref 0–1.5)
BILIRUB SERPL-MCNC: 0.4 MG/DL (ref 0–1.2)
BILIRUB UR QL STRIP: NEGATIVE
BUN SERPL-MCNC: 78 MG/DL (ref 8–23)
BUN/CREAT SERPL: 14.6 (ref 7–25)
C DIFF TOX GENS STL QL NAA+PROBE: NEGATIVE
CALCIUM SPEC-SCNC: 8 MG/DL (ref 8.6–10.5)
CHLORIDE SERPL-SCNC: 97 MMOL/L (ref 98–107)
CLARITY UR: CLEAR
CO2 SERPL-SCNC: 20 MMOL/L (ref 22–29)
COLOR UR: YELLOW
CREAT SERPL-MCNC: 5.36 MG/DL (ref 0.57–1)
D-LACTATE SERPL-SCNC: 1 MMOL/L (ref 0.5–2)
DEPRECATED RDW RBC AUTO: 51.9 FL (ref 37–54)
EGFRCR SERPLBLD CKD-EPI 2021: 8.4 ML/MIN/1.73
EOSINOPHIL # BLD MANUAL: 0.13 10*3/MM3 (ref 0–0.4)
EOSINOPHIL NFR BLD MANUAL: 3 % (ref 0.3–6.2)
ERYTHROCYTE [DISTWIDTH] IN BLOOD BY AUTOMATED COUNT: 15.5 % (ref 12.3–15.4)
GIANT PLATELETS: ABNORMAL
GLOBULIN UR ELPH-MCNC: 3.2 GM/DL
GLUCOSE SERPL-MCNC: 102 MG/DL (ref 65–99)
GLUCOSE UR STRIP-MCNC: NEGATIVE MG/DL
HCT VFR BLD AUTO: 41.9 % (ref 34–46.6)
HGB BLD-MCNC: 13 G/DL (ref 12–15.9)
HGB UR QL STRIP.AUTO: NEGATIVE
HYALINE CASTS UR QL AUTO: ABNORMAL /LPF
KETONES UR QL STRIP: NEGATIVE
LEUKOCYTE ESTERASE UR QL STRIP.AUTO: NEGATIVE
LIPASE SERPL-CCNC: 43 U/L (ref 13–60)
LYMPHOCYTES # BLD MANUAL: 0.58 10*3/MM3 (ref 0.7–3.1)
LYMPHOCYTES NFR BLD MANUAL: 14 % (ref 5–12)
MAGNESIUM SERPL-MCNC: 3.1 MG/DL (ref 1.6–2.4)
MCH RBC QN AUTO: 28.4 PG (ref 26.6–33)
MCHC RBC AUTO-ENTMCNC: 31 G/DL (ref 31.5–35.7)
MCV RBC AUTO: 91.7 FL (ref 79–97)
METAMYELOCYTES NFR BLD MANUAL: 1 % (ref 0–0)
MONOCYTES # BLD: 0.62 10*3/MM3 (ref 0.1–0.9)
NEUTROPHILS # BLD AUTO: 3.03 10*3/MM3 (ref 1.7–7)
NEUTROPHILS NFR BLD MANUAL: 19 % (ref 42.7–76)
NEUTS BAND NFR BLD MANUAL: 49 % (ref 0–5)
NITRITE UR QL STRIP: NEGATIVE
PH UR STRIP.AUTO: 6 [PH] (ref 5–8)
PLATELET # BLD AUTO: 192 10*3/MM3 (ref 140–450)
PMV BLD AUTO: 11.4 FL (ref 6–12)
POIKILOCYTOSIS BLD QL SMEAR: ABNORMAL
POLYCHROMASIA BLD QL SMEAR: ABNORMAL
POTASSIUM SERPL-SCNC: 3.7 MMOL/L (ref 3.5–5.2)
PROT SERPL-MCNC: 6.6 G/DL (ref 6–8.5)
PROT UR QL STRIP: ABNORMAL
RBC # BLD AUTO: 4.57 10*6/MM3 (ref 3.77–5.28)
RBC # UR STRIP: ABNORMAL /HPF
REF LAB TEST METHOD: ABNORMAL
SODIUM SERPL-SCNC: 132 MMOL/L (ref 136–145)
SP GR UR STRIP: 1.01 (ref 1–1.03)
SQUAMOUS #/AREA URNS HPF: ABNORMAL /HPF
UROBILINOGEN UR QL STRIP: ABNORMAL
VARIANT LYMPHS NFR BLD MANUAL: 1 % (ref 0–5)
VARIANT LYMPHS NFR BLD MANUAL: 12 % (ref 19.6–45.3)
WBC # UR STRIP: ABNORMAL /HPF
WBC MORPH BLD: NORMAL
WBC NRBC COR # BLD AUTO: 4.46 10*3/MM3 (ref 3.4–10.8)

## 2024-09-19 PROCEDURE — 80053 COMPREHEN METABOLIC PANEL: CPT

## 2024-09-19 PROCEDURE — 83735 ASSAY OF MAGNESIUM: CPT

## 2024-09-19 PROCEDURE — 87507 IADNA-DNA/RNA PROBE TQ 12-25: CPT

## 2024-09-19 PROCEDURE — 83605 ASSAY OF LACTIC ACID: CPT

## 2024-09-19 PROCEDURE — 81001 URINALYSIS AUTO W/SCOPE: CPT

## 2024-09-19 PROCEDURE — P9612 CATHETERIZE FOR URINE SPEC: HCPCS

## 2024-09-19 PROCEDURE — 25010000002 METRONIDAZOLE 500 MG/100ML SOLUTION

## 2024-09-19 PROCEDURE — 25010000002 DIPHENHYDRAMINE PER 50 MG

## 2024-09-19 PROCEDURE — 85025 COMPLETE CBC W/AUTO DIFF WBC: CPT

## 2024-09-19 PROCEDURE — 85007 BL SMEAR W/DIFF WBC COUNT: CPT

## 2024-09-19 PROCEDURE — 25010000002 LEVOFLOXACIN PER 250 MG

## 2024-09-19 PROCEDURE — 74176 CT ABD & PELVIS W/O CONTRAST: CPT

## 2024-09-19 PROCEDURE — 99285 EMERGENCY DEPT VISIT HI MDM: CPT

## 2024-09-19 PROCEDURE — 0 DEXTROSE 5 % SOLUTION 1,000 ML FLEX CONT: Performed by: INTERNAL MEDICINE

## 2024-09-19 PROCEDURE — 83690 ASSAY OF LIPASE: CPT

## 2024-09-19 PROCEDURE — 87493 C DIFF AMPLIFIED PROBE: CPT

## 2024-09-19 PROCEDURE — 25810000003 SODIUM CHLORIDE 0.9 % SOLUTION

## 2024-09-19 PROCEDURE — 25010000002 DICYCLOMINE PER 20 MG

## 2024-09-19 RX ORDER — METRONIDAZOLE 500 MG/100ML
500 INJECTION, SOLUTION INTRAVENOUS ONCE
Status: COMPLETED | OUTPATIENT
Start: 2024-09-19 | End: 2024-09-19

## 2024-09-19 RX ORDER — ACETAMINOPHEN 500 MG
1000 TABLET ORAL ONCE
Status: COMPLETED | OUTPATIENT
Start: 2024-09-19 | End: 2024-09-19

## 2024-09-19 RX ORDER — FUROSEMIDE 20 MG
20 TABLET ORAL DAILY
Status: ON HOLD | COMMUNITY
End: 2024-09-20 | Stop reason: DRUGHIGH

## 2024-09-19 RX ORDER — LEVOFLOXACIN 5 MG/ML
750 INJECTION, SOLUTION INTRAVENOUS ONCE
Status: COMPLETED | OUTPATIENT
Start: 2024-09-19 | End: 2024-09-19

## 2024-09-19 RX ORDER — DIPHENHYDRAMINE HYDROCHLORIDE 50 MG/ML
25 INJECTION INTRAMUSCULAR; INTRAVENOUS ONCE
Status: COMPLETED | OUTPATIENT
Start: 2024-09-19 | End: 2024-09-19

## 2024-09-19 RX ORDER — DICYCLOMINE HYDROCHLORIDE 10 MG/ML
20 INJECTION INTRAMUSCULAR ONCE
Status: COMPLETED | OUTPATIENT
Start: 2024-09-19 | End: 2024-09-19

## 2024-09-19 RX ADMIN — SODIUM CHLORIDE 1000 ML: 9 INJECTION, SOLUTION INTRAVENOUS at 14:59

## 2024-09-19 RX ADMIN — LEVOFLOXACIN 750 MG: 5 INJECTION, SOLUTION INTRAVENOUS at 18:22

## 2024-09-19 RX ADMIN — METRONIDAZOLE 500 MG: 500 INJECTION, SOLUTION INTRAVENOUS at 17:36

## 2024-09-19 RX ADMIN — SODIUM BICARBONATE 75 MEQ: 84 INJECTION, SOLUTION INTRAVENOUS at 16:20

## 2024-09-19 RX ADMIN — ACETAMINOPHEN 1000 MG: 500 TABLET, FILM COATED ORAL at 16:10

## 2024-09-19 RX ADMIN — DICYCLOMINE HYDROCHLORIDE 20 MG: 10 INJECTION INTRAMUSCULAR at 16:11

## 2024-09-19 RX ADMIN — DIPHENHYDRAMINE HYDROCHLORIDE 25 MG: 50 INJECTION, SOLUTION INTRAMUSCULAR; INTRAVENOUS at 18:41

## 2024-09-19 NOTE — ED PROVIDER NOTES
Subjective   History of Present Illness  Patient is a 64-year-old female who presents emergency department complaints of generalized abdominal pain and diarrhea.  Reports that it has been ongoing for the past 4 days.  Patient states that she is having generalized abdominal cramping.  She reports that she is having diarrhea episodes every 20 minutes.  Reports that at times it wakes her up from her sleep.  Patient denies nausea or vomiting.  Denies fevers.  Denies urinary symptoms.  She does report that she was on recent antibiotics approximately 3 weeks ago for a respiratory infection.  Patient with history of C. difficile.  Patient denies blood in her stool.        Review of Systems   Gastrointestinal:  Positive for abdominal pain and diarrhea.   All other systems reviewed and are negative.      Past Medical History:   Diagnosis Date    Acute CHF     Acute renal failure (ARF)     Anemia     Asthma     childhood    Bowel disease, inflammatory     Chronic kidney disease     Coronary artery disease     Hypertension     Ischemic colitis     Lung nodule, multiple 04/03/2024    Metabolic acidosis     Myocardial infarction 11/07/2020    Nonrheumatic aortic (valve) insufficiency     PTSD (post-traumatic stress disorder)        Allergies   Allergen Reactions    Levaquin [Levofloxacin] Itching    Codeine Nausea And Vomiting    Sumatriptan Other (See Comments)     Headache        Past Surgical History:   Procedure Laterality Date    CARDIAC CATHETERIZATION N/A 11/08/2020    Procedure: Left Heart Cath;  Surgeon: Pierce Buchanan MD;  Location:  [facility] CATH INVASIVE LOCATION;  Service: Cardiovascular;  Laterality: N/A;    CLOSED REDUCTION ANKLE FRACTURE Right 2019    13 screws and a plate    EXTERNAL FIXATION WRIST FRACTURE      INSERTION HEMODIALYSIS CATHETER Right 06/03/2022    Procedure: INSERTION OF RIGHT INTERNAL JUGULAR HEMODIALYSIS CATHETER;  Surgeon: Dexter Red MD;  Location:  [facility] OR;  Service: Vascular;   Laterality: Right;    TUBAL ABDOMINAL LIGATION         Family History   Problem Relation Age of Onset    Coronary artery disease Mother     Coronary artery disease Father     Breast cancer Neg Hx        Social History     Socioeconomic History    Marital status: Single   Tobacco Use    Smoking status: Every Day     Current packs/day: 0.00     Average packs/day: 0.5 packs/day for 48.7 years (24.4 ttl pk-yrs)     Types: Cigarettes     Start date:      Last attempt to quit: 2023     Years since quittin.9     Passive exposure: Past    Smokeless tobacco: Never    Tobacco comments:     Smokes about 0.5 pack daily 4/10    Vaping Use    Vaping status: Never Used   Substance and Sexual Activity    Alcohol use: No    Drug use: No    Sexual activity: Defer           Objective   Physical Exam  Vitals and nursing note reviewed.   Constitutional:       General: She is not in acute distress.     Appearance: Normal appearance. She is normal weight. She is not ill-appearing or toxic-appearing.   HENT:      Head: Normocephalic.   Cardiovascular:      Rate and Rhythm: Normal rate and regular rhythm.      Pulses: Normal pulses.      Heart sounds: Normal heart sounds.   Pulmonary:      Effort: Pulmonary effort is normal.      Breath sounds: Normal breath sounds.   Abdominal:      General: Abdomen is flat. Bowel sounds are normal. There is no distension.      Palpations: Abdomen is soft.      Tenderness: There is abdominal tenderness (Generalized).   Musculoskeletal:         General: Normal range of motion.      Cervical back: Normal range of motion and neck supple.   Skin:     General: Skin is warm and dry.   Neurological:      General: No focal deficit present.      Mental Status: She is alert and oriented to person, place, and time. Mental status is at baseline.   Psychiatric:         Mood and Affect: Mood normal.         Behavior: Behavior normal.         Thought Content: Thought content normal.         Judgment:  Judgment normal.         Procedures       CT Abdomen Pelvis Without Contrast   Final Result   1. Definitive wall thickening in the mid sigmoid colon with mild   stranding of the adjacent fat. There is diverticulosis in this area. The   findings are most likely due to diverticulitis.: Neoplasm less likely.   2. There is a questionable area of wall thickening involving the cecum   and ascending colon versus artifact of under distention.   3. Small hiatal hernia.   4. Atheromatous disease of the aortoiliac vessels and coronary arteries.   Abdominal aortic aneurysm measuring 3 cm. Cardiomegaly. Mitral valve   calcification.   5. Cortical scarring left kidney.   6. Degenerative changes of the spine and hips.           The full report of this exam was immediately signed and available to the   emergency room. The patient is currently in the emergency room.       This report was signed and finalized on 9/19/2024 5:13 PM by Dr. Jefe Beltre MD.            Labs Reviewed   GASTROINTESTINAL PANEL, PCR (PREFERRED) DOES NOT INCLUDE CDIFF - Abnormal; Notable for the following components:       Result Value    Salmonella Detected (*)     All other components within normal limits   COMPREHENSIVE METABOLIC PANEL - Abnormal; Notable for the following components:    Glucose 102 (*)     BUN 78 (*)     Creatinine 5.36 (*)     Sodium 132 (*)     Chloride 97 (*)     CO2 20.0 (*)     Calcium 8.0 (*)     Albumin 3.4 (*)     eGFR 8.4 (*)     All other components within normal limits    Narrative:     GFR Normal >60  Chronic Kidney Disease <60  Kidney Failure <15     URINALYSIS W/ CULTURE IF INDICATED - Abnormal; Notable for the following components:    Protein, UA 30 mg/dL (1+) (*)     All other components within normal limits    Narrative:     In absence of clinical symptoms, the presence of pyuria, bacteria, and/or nitrites on the urinalysis result does not correlate with infection.   MAGNESIUM - Abnormal; Notable for the following  components:    Magnesium 3.1 (*)     All other components within normal limits   CBC WITH AUTO DIFFERENTIAL - Abnormal; Notable for the following components:    MCHC 31.0 (*)     RDW 15.5 (*)     All other components within normal limits   MANUAL DIFFERENTIAL - Abnormal; Notable for the following components:    Neutrophil % 19.0 (*)     Lymphocyte % 12.0 (*)     Monocyte % 14.0 (*)     Bands %  49.0 (*)     Metamyelocyte % 1.0 (*)     Lymphocytes Absolute 0.58 (*)     All other components within normal limits   URINALYSIS, MICROSCOPIC ONLY - Abnormal; Notable for the following components:    Squamous Epithelial Cells, UA 3-6 (*)     All other components within normal limits   CLOSTRIDIOIDES DIFFICILE TOXIN, PCR - Normal    Narrative:     The result indicates the absence of toxigenic C. difficile from stool specimen.    LIPASE - Normal   LACTIC ACID, PLASMA - Normal   CLOSTRIDIOIDES DIFFICILE TOXIN    Narrative:     The following orders were created for panel order Clostridioides difficile Toxin - Stool, Per Rectum.  Procedure                               Abnormality         Status                     ---------                               -----------         ------                     Clostridioides difficile...[[id number]]  Normal              Final result                 Please view results for these tests on the individual orders.   CBC AND DIFFERENTIAL    Narrative:     The following orders were created for panel order CBC & Differential.  Procedure                               Abnormality         Status                     ---------                               -----------         ------                     CBC Auto Differential[[id number]]        Abnormal            Final result                 Please view results for these tests on the individual orders.           ED Course  ED Course as of 09/19/24 1837   Thu Sep 19, 2024   1553 Creatinine 5.36, BUN 78, GFR 8.4.  Patient with history of chronic kidney  disease.  Patient states that she stopped hemodialysis in August and has no longer been on dialysis since.  We will plan to admit patient for acute kidney injury.  Patient is in agreement to plan of care. [KR]   1558 Case discussed with Dr. Feng. Bicarb low. Bicarb drip has been ordered per Dr. Feng. [KR]   1720 Call has been placed to Dr. Weston, patient's PCP for admission.  IV Levaquin and Flagyl have been ordered at this time for concerns for diverticulitis on CT scan.  GI panel positive for Salmonella.  Negative C. difficile. [KR]   1814 Case has been discussed with Dr. Weston.  She has agreed to accept patient for further management.  Patient is agreeable to admission.  Dr. Weston did request an inpatient nephrology consult to be ordered for in the morning due to history of dialysis.  Consult has been ordered at this time. [KR]   1836 RN reports that patient is having a possible allergic reaction to IV Levaquin.  She does have some itching and mild redness noted to her bilateral arms.  IV Levaquin has been stopped.  We will give her a dose of IV Benadryl while in the ER. [KR]      ED Course User Index  [KR] Shelli Haynes APRN                                             Medical Decision Making  Problems Addressed:  Acute kidney injury: complicated acute illness or injury  Diarrhea, unspecified type: complicated acute illness or injury  Diverticulitis: complicated acute illness or injury  Hypermagnesemia: complicated acute illness or injury  Salmonella: complicated acute illness or injury    Amount and/or Complexity of Data Reviewed  Labs: ordered.  Radiology: ordered.    Risk  OTC drugs.  Prescription drug management.  Decision regarding hospitalization.        Final diagnoses:   Acute kidney injury   Diarrhea, unspecified type   Hypermagnesemia   Salmonella   Diverticulitis       ED Disposition  ED Disposition       ED Disposition   Decision to Admit    Condition   --    Comment   Level of Care: Remote  Telemetry [26]   Diagnosis: Acute kidney injury [047600]   Admitting Physician: JESSE MEIER [[id number]]   Certification: I Certify That Inpatient Hospital Services Are Medically Necessary For Greater Than 2 Midnights                 No follow-up provider specified.       Medication List      No changes were made to your prescriptions during this visit.            Shelli Haynes, ISAIAH  09/19/24 1816       Shelli Haynes APRN  09/19/24 183

## 2024-09-20 LAB
ANION GAP SERPL CALCULATED.3IONS-SCNC: 14 MMOL/L (ref 5–15)
ANION GAP SERPL CALCULATED.3IONS-SCNC: 16 MMOL/L (ref 5–15)
BUN SERPL-MCNC: 72 MG/DL (ref 8–23)
BUN/CREAT SERPL: 16.5 (ref 7–25)
CALCIUM SPEC-SCNC: 8.4 MG/DL (ref 8.6–10.5)
CHLORIDE SERPL-SCNC: 97 MMOL/L (ref 98–107)
CHLORIDE SERPL-SCNC: 97 MMOL/L (ref 98–107)
CO2 SERPL-SCNC: 22 MMOL/L (ref 22–29)
CO2 SERPL-SCNC: 24 MMOL/L (ref 22–29)
CREAT SERPL-MCNC: 4.37 MG/DL (ref 0.57–1)
EGFRCR SERPLBLD CKD-EPI 2021: 10.7 ML/MIN/1.73
GLUCOSE BLDC GLUCOMTR-MCNC: 101 MG/DL (ref 70–130)
GLUCOSE SERPL-MCNC: 117 MG/DL (ref 65–99)
POTASSIUM SERPL-SCNC: 3.2 MMOL/L (ref 3.5–5.2)
POTASSIUM SERPL-SCNC: 3.2 MMOL/L (ref 3.5–5.2)
SODIUM SERPL-SCNC: 135 MMOL/L (ref 136–145)
SODIUM SERPL-SCNC: 135 MMOL/L (ref 136–145)

## 2024-09-20 PROCEDURE — 82948 REAGENT STRIP/BLOOD GLUCOSE: CPT

## 2024-09-20 PROCEDURE — 94799 UNLISTED PULMONARY SVC/PX: CPT

## 2024-09-20 PROCEDURE — 80048 BASIC METABOLIC PNL TOTAL CA: CPT | Performed by: NURSE PRACTITIONER

## 2024-09-20 PROCEDURE — 25010000002 CEFTRIAXONE PER 250 MG: Performed by: FAMILY MEDICINE

## 2024-09-20 PROCEDURE — 25010000002 METRONIDAZOLE 500 MG/100ML SOLUTION: Performed by: FAMILY MEDICINE

## 2024-09-20 PROCEDURE — 94760 N-INVAS EAR/PLS OXIMETRY 1: CPT

## 2024-09-20 PROCEDURE — 25810000003 LACTATED RINGERS PER 1000 ML: Performed by: NURSE PRACTITIONER

## 2024-09-20 PROCEDURE — 94660 CPAP INITIATION&MGMT: CPT

## 2024-09-20 PROCEDURE — 80051 ELECTROLYTE PANEL: CPT | Performed by: FAMILY MEDICINE

## 2024-09-20 PROCEDURE — 0 DEXTROSE 5 % SOLUTION 1,000 ML FLEX CONT: Performed by: INTERNAL MEDICINE

## 2024-09-20 PROCEDURE — 25010000002 HEPARIN (PORCINE) PER 1000 UNITS: Performed by: FAMILY MEDICINE

## 2024-09-20 PROCEDURE — 99222 1ST HOSP IP/OBS MODERATE 55: CPT | Performed by: INTERNAL MEDICINE

## 2024-09-20 PROCEDURE — 94640 AIRWAY INHALATION TREATMENT: CPT

## 2024-09-20 PROCEDURE — 94761 N-INVAS EAR/PLS OXIMETRY MLT: CPT

## 2024-09-20 RX ORDER — FUROSEMIDE 40 MG
40 TABLET ORAL DAILY
COMMUNITY
End: 2024-09-24 | Stop reason: HOSPADM

## 2024-09-20 RX ORDER — SODIUM CHLORIDE, SODIUM LACTATE, POTASSIUM CHLORIDE, CALCIUM CHLORIDE 600; 310; 30; 20 MG/100ML; MG/100ML; MG/100ML; MG/100ML
50 INJECTION, SOLUTION INTRAVENOUS CONTINUOUS
Status: DISCONTINUED | OUTPATIENT
Start: 2024-09-20 | End: 2024-09-24

## 2024-09-20 RX ORDER — AZELASTINE 1 MG/ML
2 SPRAY, METERED NASAL 2 TIMES DAILY
Status: DISCONTINUED | OUTPATIENT
Start: 2024-09-20 | End: 2024-09-24 | Stop reason: HOSPADM

## 2024-09-20 RX ORDER — CARVEDILOL 6.25 MG/1
6.25 TABLET ORAL 2 TIMES DAILY WITH MEALS
Status: DISCONTINUED | OUTPATIENT
Start: 2024-09-20 | End: 2024-09-23

## 2024-09-20 RX ORDER — GUAIFENESIN 600 MG/1
600 TABLET, EXTENDED RELEASE ORAL EVERY 12 HOURS PRN
Status: DISCONTINUED | OUTPATIENT
Start: 2024-09-20 | End: 2024-09-24 | Stop reason: HOSPADM

## 2024-09-20 RX ORDER — HYDROCHLOROTHIAZIDE 25 MG/1
25 TABLET ORAL DAILY
COMMUNITY
End: 2024-09-24 | Stop reason: HOSPADM

## 2024-09-20 RX ORDER — ISOSORBIDE DINITRATE 20 MG/1
40 TABLET ORAL
Status: DISCONTINUED | OUTPATIENT
Start: 2024-09-20 | End: 2024-09-24 | Stop reason: HOSPADM

## 2024-09-20 RX ORDER — CALCITRIOL 0.25 UG/1
0.25 CAPSULE, LIQUID FILLED ORAL DAILY
Status: DISCONTINUED | OUTPATIENT
Start: 2024-09-20 | End: 2024-09-24 | Stop reason: HOSPADM

## 2024-09-20 RX ORDER — HYDROCODONE BITARTRATE AND ACETAMINOPHEN 5; 325 MG/1; MG/1
1 TABLET ORAL EVERY 6 HOURS PRN
Status: DISCONTINUED | OUTPATIENT
Start: 2024-09-20 | End: 2024-09-24 | Stop reason: HOSPADM

## 2024-09-20 RX ORDER — HEPARIN SODIUM 5000 [USP'U]/ML
5000 INJECTION, SOLUTION INTRAVENOUS; SUBCUTANEOUS EVERY 12 HOURS SCHEDULED
Status: DISCONTINUED | OUTPATIENT
Start: 2024-09-20 | End: 2024-09-24 | Stop reason: HOSPADM

## 2024-09-20 RX ORDER — IPRATROPIUM BROMIDE AND ALBUTEROL SULFATE 2.5; .5 MG/3ML; MG/3ML
3 SOLUTION RESPIRATORY (INHALATION)
Status: DISCONTINUED | OUTPATIENT
Start: 2024-09-20 | End: 2024-09-24 | Stop reason: HOSPADM

## 2024-09-20 RX ORDER — METRONIDAZOLE 500 MG/100ML
500 INJECTION, SOLUTION INTRAVENOUS EVERY 8 HOURS SCHEDULED
Status: DISCONTINUED | OUTPATIENT
Start: 2024-09-20 | End: 2024-09-22

## 2024-09-20 RX ADMIN — SODIUM CHLORIDE 1000 MG: 900 INJECTION INTRAVENOUS at 12:40

## 2024-09-20 RX ADMIN — HEPARIN SODIUM 5000 UNITS: 5000 INJECTION INTRAVENOUS; SUBCUTANEOUS at 10:19

## 2024-09-20 RX ADMIN — HYDROCODONE BITARTRATE AND ACETAMINOPHEN 1 TABLET: 5; 325 TABLET ORAL at 18:35

## 2024-09-20 RX ADMIN — IPRATROPIUM BROMIDE AND ALBUTEROL SULFATE 3 ML: .5; 3 SOLUTION RESPIRATORY (INHALATION) at 10:48

## 2024-09-20 RX ADMIN — SODIUM BICARBONATE 75 MEQ: 84 INJECTION, SOLUTION INTRAVENOUS at 04:38

## 2024-09-20 RX ADMIN — ISOSORBIDE DINITRATE 40 MG: 20 TABLET ORAL at 18:35

## 2024-09-20 RX ADMIN — CARVEDILOL 6.25 MG: 6.25 TABLET, FILM COATED ORAL at 10:18

## 2024-09-20 RX ADMIN — AZELASTINE HYDROCHLORIDE 2 SPRAY: 137 SPRAY, METERED NASAL at 10:18

## 2024-09-20 RX ADMIN — METRONIDAZOLE 500 MG: 500 INJECTION, SOLUTION INTRAVENOUS at 10:19

## 2024-09-20 RX ADMIN — HEPARIN SODIUM 5000 UNITS: 5000 INJECTION INTRAVENOUS; SUBCUTANEOUS at 20:39

## 2024-09-20 RX ADMIN — AZELASTINE HYDROCHLORIDE 2 SPRAY: 137 SPRAY, METERED NASAL at 20:39

## 2024-09-20 RX ADMIN — IPRATROPIUM BROMIDE AND ALBUTEROL SULFATE 3 ML: .5; 3 SOLUTION RESPIRATORY (INHALATION) at 19:53

## 2024-09-20 RX ADMIN — ISOSORBIDE DINITRATE 40 MG: 20 TABLET ORAL at 10:18

## 2024-09-20 RX ADMIN — METRONIDAZOLE 500 MG: 500 INJECTION, SOLUTION INTRAVENOUS at 18:36

## 2024-09-20 RX ADMIN — IPRATROPIUM BROMIDE AND ALBUTEROL SULFATE 3 ML: .5; 3 SOLUTION RESPIRATORY (INHALATION) at 14:34

## 2024-09-20 RX ADMIN — HYDROCODONE BITARTRATE AND ACETAMINOPHEN 1 TABLET: 5; 325 TABLET ORAL at 12:39

## 2024-09-20 RX ADMIN — CARVEDILOL 6.25 MG: 6.25 TABLET, FILM COATED ORAL at 18:35

## 2024-09-20 RX ADMIN — CALCITRIOL CAPSULES 0.25 MCG 0.25 MCG: 0.25 CAPSULE ORAL at 10:18

## 2024-09-20 RX ADMIN — SODIUM CHLORIDE, POTASSIUM CHLORIDE, SODIUM LACTATE AND CALCIUM CHLORIDE 100 ML/HR: 600; 310; 30; 20 INJECTION, SOLUTION INTRAVENOUS at 09:20

## 2024-09-20 RX ADMIN — SODIUM CHLORIDE, POTASSIUM CHLORIDE, SODIUM LACTATE AND CALCIUM CHLORIDE 100 ML/HR: 600; 310; 30; 20 INJECTION, SOLUTION INTRAVENOUS at 20:39

## 2024-09-20 NOTE — PROGRESS NOTES
RT EQUIPMENT DEVICE RELATED - SKIN ASSESSMENT    Griffin Score:  Griffin Score: 19     RT Medical Equipment/Device:     NIV Mask:  Under-the-nose   size:  B    Skin Assessment:      Cheek:  Intact  Chin:  Intact  Nares:  Intact  Neck:  Intact    Device Skin Pressure Protection:  Positioning supports utilized, Pressure points protected, and Skin-to-device areas padded:  None Required    Nurse Notification:  Essence Cummins, RRT

## 2024-09-20 NOTE — CONSULTS
Gastroenterology initial Inpatient Consult Note      Reason for Consult: Diarrhea  Date of Consult: [unfilled]  Date of Admission: 9/19/2024  Inpatient Team: Medicine  Requesting Attending: Orville Weston MD    ID/CC/HPI:   Ludivina Ramirez is a 64 y.o. female with history of hypertension, CHF, acute renal impairment, coronary artery disease presented with abdominal pain and diarrhea. Gastroenterology is consulted for management of abdominal pain and diarrhea.    HPI:  64-year-old lady presented with abdominal pain and diarrhea with acute renal impairment evaluated by nephrology, had history of hypertension, CHF, coronary artery disease, I had profuse diarrhea, a 4-day history of abdominal cramping, no nausea or vomiting, CT abdomen showed acute diverticulitis, stool workup was negative for C. difficile, but has Salmonella  Patient never had colonoscopy before, was scheduled for colonoscopy as well and canceled for high blood pressure on the same day of the procedure    Medical/Family/Social History:     Past Medical History:   Diagnosis Date    Acute CHF     Acute renal failure (ARF)     Anemia     Asthma     childhood    Bowel disease, inflammatory     Chronic kidney disease     Coronary artery disease     Hypertension     Ischemic colitis     Lung nodule, multiple 04/03/2024    Metabolic acidosis     Myocardial infarction 11/07/2020    Nonrheumatic aortic (valve) insufficiency     PTSD (post-traumatic stress disorder)      Past Surgical History:   Procedure Laterality Date    CARDIAC CATHETERIZATION N/A 11/08/2020    Procedure: Left Heart Cath;  Surgeon: Pierce Buchanan MD;  Location: [facility] CATH INVASIVE LOCATION;  Service: Cardiovascular;  Laterality: N/A;    CLOSED REDUCTION ANKLE FRACTURE Right 2019    13 screws and a plate    EXTERNAL FIXATION WRIST FRACTURE      INSERTION HEMODIALYSIS CATHETER Right 06/03/2022    Procedure: INSERTION OF RIGHT INTERNAL JUGULAR HEMODIALYSIS CATHETER;  Surgeon: Dexter Red  MD Eugenio;  Location:  [facility] OR;  Service: Vascular;  Laterality: Right;    TUBAL ABDOMINAL LIGATION       Family History   Problem Relation Age of Onset    Coronary artery disease Mother     Coronary artery disease Father     Breast cancer Neg Hx      Social History     Socioeconomic History    Marital status: Single   Tobacco Use    Smoking status: Every Day     Current packs/day: 0.00     Average packs/day: 0.5 packs/day for 48.7 years (24.4 ttl pk-yrs)     Types: Cigarettes     Start date:      Last attempt to quit: 2023     Years since quittin.9     Passive exposure: Past    Smokeless tobacco: Never    Tobacco comments:     Smokes about 0.5 pack daily 4/10    Vaping Use    Vaping status: Never Used   Substance and Sexual Activity    Alcohol use: No    Drug use: No    Sexual activity: Defer       Medications:   Allergies: Levaquin [levofloxacin], Codeine, and Sumatriptan    Medications Prior to Admission   Medication Sig Dispense Refill Last Dose    aspirin 81 MG EC tablet Take 1 tablet by mouth Daily. 90 tablet 3     calcitriol (ROCALTROL) 0.25 MCG capsule Take 1 capsule by mouth Daily.       carvedilol (COREG) 25 MG tablet Take 1 tablet by mouth 2 (Two) Times a Day With Meals.       furosemide (LASIX) 40 MG tablet Take 1 tablet by mouth Daily.       hydroCHLOROthiazide 25 MG tablet Take 1 tablet by mouth Daily.       albuterol sulfate  (90 Base) MCG/ACT inhaler Inhale 2 puffs Every 4 (Four) Hours As Needed for Wheezing. 20.1 g 3     azelastine (ASTELIN) 0.1 % nasal spray 2 sprays into the nostril(s) as directed by provider 2 (Two) Times a Day. 30 mL 11     chlorothiazide (DIURIL) 250 MG/5ML suspension Take  by mouth 2 (Two) Times a Day. (Patient not taking: Reported on 2024)   Not Taking    cloNIDine (CATAPRES) 0.1 MG tablet Take 1 tablet by mouth Every 6 (Six) Hours As Needed for High Blood Pressure (SBP >160). 30 tablet 1     docusate sodium (COLACE) 250 MG capsule Take 1 capsule by  mouth Daily.       doxycycline (VIBRAMYCIN) 100 MG capsule Take 1 capsule by mouth 2 (Two) Times a Day. (Patient not taking: Reported on 9/19/2024) 8 capsule 0 Not Taking    fluticasone (FLONASE) 50 MCG/ACT nasal spray 2 sprays into the nostril(s) as directed by provider Every Morning. In each nostril 16 g 11     guaiFENesin (MUCINEX) 600 MG 12 hr tablet Take 1 tablet by mouth Every 12 (Twelve) Hours As Needed for Cough. (Patient not taking: Reported on 9/20/2024) 60 tablet 0 Not Taking    ipratropium-albuterol (DUO-NEB) 0.5-2.5 mg/3 ml nebulizer Take 3 mL by nebulization 4 (Four) Times a Day. prn (Patient not taking: Reported on 9/20/2024)   Not Taking    isosorbide dinitrate (ISORDIL) 40 MG tablet Take 1 tablet by mouth 2 (Two) Times a Day. 180 tablet 3     losartan (COZAAR) 100 MG tablet Take 1 tablet by mouth Daily.       melatonin 3 MG tablet Take 2 tablets by mouth Every Night. 30 tablet 0     nitroglycerin (NITROSTAT) 0.4 MG SL tablet Place 1 tablet under the tongue Every 5 (Five) Minutes As Needed for Chest Pain. Take no more than 3 doses in 15 minutes.       predniSONE (DELTASONE) 10 MG tablet Take 4 tabs daily x 3 days, then take 3 tabs daily x 3 days, then take 2 tabs daily x 3 days, then take 1 tab daily x 3 days (Patient not taking: Reported on 9/19/2024) 31 tablet 0 Not Taking       Current Inpatient Medications:   azelastine, 2 spray, Each Nare, BID  calcitriol, 0.25 mcg, Oral, Daily  carvedilol, 6.25 mg, Oral, BID With Meals  cefTRIAXone, 1,000 mg, Intravenous, Q24H  heparin (porcine), 5,000 Units, Subcutaneous, Q12H  ipratropium-albuterol, 3 mL, Nebulization, 4x Daily - RT  isosorbide dinitrate, 40 mg, Oral, BID - Nitrates  metroNIDAZOLE, 500 mg, Intravenous, Q8H      Infusions:  lactated ringers, 100 mL/hr, Last Rate: 100 mL/hr (09/20/24 0920)      PRN:     guaiFENesin    HYDROcodone-acetaminophen    melatonin    Review of Systems   Constitution:  negative for chills, fatigue and fevers  Eyes:   negative for blurriness and change of vision  ENT:   negative for sore throat and voice change  Respiratory: negative for  cough and shortness of air  Cardiovascular:  Negative for chest pain or palpitations  Gastrointestinal:   See HPI  Genitourinary:  negative for  blood in urine and painful urination  Integument: negative for  rash and redness  Hematologic / Lymphatic: negative for  excessive bleeding and easy bruising  Musculoskeletal: negative for  joint pain and joint stiffness out of the ordinary  Neurological:  negative for  seizures and speech abnormality  Behavioral/Psych:  negative for  anxiety and depression out of the ordinary  Endocrine: negative for  diabetes and weight loss, unintended  Allergies / Immunologic:  negative for  anaphylaxis and rash    Vitals and I/Os:   Vitals:   [unfilled]  Admit weight: Weight: 99.8 kg (220 lb) Last weight: Weight: 91.1 kg (200 lb 14.4 oz)  Body mass index is 32.43 kg/m².    I/Os:  No intake/output data recorded.      Physical Exam:   Physical Exam  Physical Exam:  General :    Alert, cooperative, in no acute distress   Head:    Normocephalic, without obvious abnormality, atraumatic   Eyes:            Lids and lashes normal, conjunctivae and sclerae normal, no   Icterus, conjunctival pallor   Throat:   No oral lesions, no thrush, oral mucosa moist, posterior oropharynx clear   Neck:   No adenopathy, supple, trachea midline, no thyromegaly, no   carotid bruit, no JVD   Lungs:     Clear to auscultation, respirations regular, even and                   unlabored    Heart:    Regular rhythm and normal rate, normal S1 and S2, no            murmur   Chest Wall:    No abnormalities observed   Abdomen:     Normal bowel sounds, no masses, no organomegaly, soft        nontender, nondistended, no guarding, no rebound                 tenderness   Rectal:     Deferred   Extremities:   No edema, no cyanosis   Skin:   No open lesions, bruising or rash   Lymph nodes:   No  "palpable cervical adenopathy   Psychiatric:  Judgement and insight: normal   Orientation to person place and time: normal   Mood and affect: normal     Pertinent Labs:   CBC:   Results from last 7 days   Lab Units 09/19/24  1458   WBC 10*3/mm3 4.46   HEMOGLOBIN g/dL 13.0   HEMATOCRIT % 41.9   PLATELETS 10*3/mm3 192       BMP:  Results from last 7 days   Lab Units 09/20/24  0500 09/19/24  1458   SODIUM mmol/L 135*  135* 132*   POTASSIUM mmol/L 3.2*  3.2* 3.7   CHLORIDE mmol/L 97*  97* 97*   CO2 mmol/L 22.0  24.0 20.0*   BUN mg/dL 72* 78*   CREATININE mg/dL 4.37* 5.36*   CALCIUM mg/dL 8.4* 8.0*   MAGNESIUM mg/dL  --  3.1*       Liver panel:  Results from last 7 days   Lab Units 09/19/24  1458   ALBUMIN g/dL 3.4*   AST (SGOT) U/L 14   ALT (SGPT) U/L 10   ALK PHOS U/L 91   BILIRUBIN mg/dL 0.4         Coagulation:       Cardiac markers:      ABGs:  Results from last 7 days   Lab Units 09/19/24  1458   LACTATE mmol/L 1.0       Microbiology:   No results found for: \"ACANTHNAEG\", \"AFBCX\", \"BPERTUSSISCX\", \"BLOODCX\"  No results found for: \"BCIDPCR\", \"CXREFLEX\", \"CSFCX\", \"CULTURETIS\"  No results found for: \"CULTURES\", \"HSVCX\", \"URCX\"  No results found for: \"EYECULTURE\", \"GCCX\", \"HSVCULTURE\", \"LABHSV\"  No results found for: \"LEGIONELLA\", \"MRSACX\", \"MUMPSCX\", \"MYCOPLASCX\"  No results found for: \"NOCARDIACX\", \"STOOLCX\"  No results found for: \"THROATCX\", \"UNSTIMCULT\", \"URINECX\", \"CULTURE\", \"VZVCULTUR\"  No results found for: \"VIRALCULTU\", \"WOUNDCX\"      Pathology:     Imaging:   CT Abdomen Pelvis Without Contrast  Narrative: EXAMINATION:  CT ABDOMEN PELVIS WO CONTRAST-  9/19/2024 3:12 PM     HISTORY: Generalized abdominal pain and diarrhea. N17.9-Acute kidney  failure, unspecified; R19.7-Diarrhea, unspecified;  E83.41-Hypermagnesemia.     TECHNIQUE: Spiral CT was performed of the abdomen and pelvis without  contrast. Multiplanar images were reconstructed.     DLP: 694.75 mGy.cm Automated dosage reduction technique was utilized " to  reduce patient dosage.     COMPARISON: 10/12/2022.     LUNG BASES: The lung bases are clear. There is coronary artery  calcification. There is cardiomegaly. There is mitral valve  calcification.     LIVER AND SPLEEN: Normal unenhanced appearance of the liver. Normal  unenhanced appearance of the spleen. There are no dense gallstones.     PANCREAS: Normal unenhanced appearance of the pancreas.     KIDNEYS AND ADRENALS: The adrenal glands are normal in size. There is  some cortical scarring of the left kidney. The unenhanced right kidney  is unremarkable. The ureters are nondilated. The bladder is not well  distended.     BOWEL: No oral contrast was given. There is a small hiatal hernia. There  is under distention of the stomach. Small bowel loops are normal in  caliber. There is under distention of most of the colon. There is some  questionable thickening of the colon wall in the cecum and ascending  colon. There is definitive wall thickening in the mid sigmoid colon with  mild stranding of the adjacent fat. There is mild diverticulosis of the  colon. No perforation or abscess is visualized.     OTHER: There is atheromatous disease of the aortoiliac vessels. There is  a 3 cm abdominal aortic aneurysm. The unenhanced uterus is unremarkable  there are degenerative changes of the spine and bilateral hips..        Impression: 1. Definitive wall thickening in the mid sigmoid colon with mild  stranding of the adjacent fat. There is diverticulosis in this area. The  findings are most likely due to diverticulitis.: Neoplasm less likely.  2. There is a questionable area of wall thickening involving the cecum  and ascending colon versus artifact of under distention.  3. Small hiatal hernia.  4. Atheromatous disease of the aortoiliac vessels and coronary arteries.  Abdominal aortic aneurysm measuring 3 cm. Cardiomegaly. Mitral valve  calcification.  5. Cortical scarring left kidney.  6. Degenerative changes of the spine and  hips.        The full report of this exam was immediately signed and available to the  emergency room. The patient is currently in the emergency room.     This report was signed and finalized on 9/19/2024 5:13 PM by Dr. Jefe Beltre MD.           Endoscopy:   none    Impression and Recommendations:   In summary, Ludivina Ramirez is a 64 y.o. female with the above medical problem presented with diarrhea and abdominal pain      Acute diverticulitis  Associate with abdominal pain and diarrhea  Negative for C. difficile  N.p.o.  IV fluid  Continue antibiotic    Salmonella infection  None typhoid  Associated with severe diarrhea more than 10 episodes  Associated with hospitalization  Has an indication for antibiotic treatment  Continue levofloxacin and Flagyl    Impression and Recommendations discussed with primary service   Thank you for allowing us to participate in Ms. Ludivina Ramirez's care. Please feel free to page with any questions or concerns.    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  Any results not available at time of inpatient discharge and/or GI clinic follow up should be managed by the hospitalist team, PCP, or outpatient gastroenterologist.    Paras Mchugh MD  9/20/2024  12:24 CDT

## 2024-09-20 NOTE — PLAN OF CARE
Goal Outcome Evaluation:  Plan of Care Reviewed With: patient        Progress: no change     VSS. RA, CPAP at night. A/O. Up ad vern to B[facility]. Diarrhea. IV R hand, IVF infusing @ 100. Resting comfortably. Call light in reach, safety maintained.

## 2024-09-20 NOTE — PLAN OF CARE
Goal Outcome Evaluation:   PT. A&Ox4, VSS, several loose bm's this shift, up to BSC with urgent diarrhea, received orders mid morning, norco given with effectiveness for abdominal cramping, IVF's infusing and IV antibx's infused, GE consult and nephrology consult done, plan of care ongoing.        Progress: no change

## 2024-09-20 NOTE — ED NOTES
Nursing report ED to floor  Ludivina Ramirez  64 y.o.  female    HPI:   Chief Complaint   Patient presents with    Diarrhea       Admitting doctor:   Orville Weston MD    Consulting provider(s):  Consults       Date and Time Order Name Status Description    9/19/2024  6:13 PM Inpatient Nephrology Consult      8/22/2024  1:21 PM Inpatient Cardiology Consult Completed     8/22/2024  1:20 PM Inpatient Pulmonology Consult Completed              Admitting diagnosis:   The primary encounter diagnosis was Acute kidney injury. Diagnoses of Diarrhea, unspecified type, Hypermagnesemia, Salmonella, and Diverticulitis were also pertinent to this visit.    Code status:   Current Code Status       Date Active Code Status Order ID Comments User Context       Prior            Allergies:   Levaquin [levofloxacin], Codeine, and Sumatriptan    Intake and Output  No intake or output data in the 24 hours ending 09/19/24 2102    Weight:       09/19/24  1415   Weight: 99.8 kg (220 lb)       Most recent vitals:   Vitals:    09/19/24 2017 09/19/24 2028 09/19/24 2031 09/19/24 2056   BP:   93/81    Pulse: 73 77     Resp:    17   Temp:    98.5 °F (36.9 °C)   TempSrc:       SpO2: 95% 90%     Weight:       Height:         Oxygen Therapy: .    Active LDAs/IV Access:   Lines, Drains & Airways       Active LDAs       Name Placement date Placement time Site Days    Peripheral IV 09/19/24 1459 Posterior;Right Hand 09/19/24  1459  Hand  less than 1                    Labs (abnormal labs have a star):   Labs Reviewed   GASTROINTESTINAL PANEL, PCR (PREFERRED) DOES NOT INCLUDE CDIFF - Abnormal; Notable for the following components:       Result Value    Salmonella Detected (*)     All other components within normal limits   COMPREHENSIVE METABOLIC PANEL - Abnormal; Notable for the following components:    Glucose 102 (*)     BUN 78 (*)     Creatinine 5.36 (*)     Sodium 132 (*)     Chloride 97 (*)     CO2 20.0 (*)     Calcium 8.0 (*)     Albumin 3.4  (*)     eGFR 8.4 (*)     All other components within normal limits    Narrative:     GFR Normal >60  Chronic Kidney Disease <60  Kidney Failure <15     URINALYSIS W/ CULTURE IF INDICATED - Abnormal; Notable for the following components:    Protein, UA 30 mg/dL (1+) (*)     All other components within normal limits    Narrative:     In absence of clinical symptoms, the presence of pyuria, bacteria, and/or nitrites on the urinalysis result does not correlate with infection.   MAGNESIUM - Abnormal; Notable for the following components:    Magnesium 3.1 (*)     All other components within normal limits   CBC WITH AUTO DIFFERENTIAL - Abnormal; Notable for the following components:    MCHC 31.0 (*)     RDW 15.5 (*)     All other components within normal limits   MANUAL DIFFERENTIAL - Abnormal; Notable for the following components:    Neutrophil % 19.0 (*)     Lymphocyte % 12.0 (*)     Monocyte % 14.0 (*)     Bands %  49.0 (*)     Metamyelocyte % 1.0 (*)     Lymphocytes Absolute 0.58 (*)     All other components within normal limits   URINALYSIS, MICROSCOPIC ONLY - Abnormal; Notable for the following components:    Squamous Epithelial Cells, UA 3-6 (*)     All other components within normal limits   CLOSTRIDIOIDES DIFFICILE TOXIN, PCR - Normal    Narrative:     The result indicates the absence of toxigenic C. difficile from stool specimen.    LIPASE - Normal   LACTIC ACID, PLASMA - Normal   CLOSTRIDIOIDES DIFFICILE TOXIN    Narrative:     The following orders were created for panel order Clostridioides difficile Toxin - Stool, Per Rectum.  Procedure                               Abnormality         Status                     ---------                               -----------         ------                     Clostridioides difficile...[[id number]]  Normal              Final result                 Please view results for these tests on the individual orders.   CBC AND DIFFERENTIAL    Narrative:     The following orders  were created for panel order CBC & Differential.  Procedure                               Abnormality         Status                     ---------                               -----------         ------                     CBC Auto Differential[[id number]]        Abnormal            Final result                 Please view results for these tests on the individual orders.       Meds given in ED:   Medications   sodium bicarbonate 8.4 % 75 mEq in dextrose (D5W) 5 % 1,000 mL infusion (less than/equal to 100 mEq) ( Intravenous Restarted 9/19/24 1836)   sodium chloride 0.9 % bolus 1,000 mL (0 mL Intravenous Stopped 9/19/24 1615)   dicyclomine (BENTYL) injection 20 mg (20 mg Intramuscular Given 9/19/24 1611)   acetaminophen (TYLENOL) tablet 1,000 mg (1,000 mg Oral Given 9/19/24 1610)   levoFLOXacin (LEVAQUIN) 750 mg/150 mL D5W (premix) (LEVAQUIN) 750 mg (0 mg Intravenous Stopped 9/19/24 1833)   metroNIDAZOLE (FLAGYL) IVPB 500 mg (0 mg Intravenous Stopped 9/19/24 1806)   diphenhydrAMINE (BENADRYL) injection 25 mg (25 mg Intravenous Given 9/19/24 1841)     sodium bicarbonate 8.4 % 75 mEq in dextrose (D5W) 5 % 1,000 mL infusion (less than/equal to 100 mEq), 75 mEq, Last Rate: 100 mL/hr at 09/19/24 1836         NIH Stroke Scale:       Isolation/Infection(s):  No active isolations   C.difficile (rule out), Salmonella      COVID Testing  Collected .  Resulted .    Nursing report ED to floor:  Mental status: .  Ambulatory status: .  Precautions: .    ED nurse phone extentsion- ..

## 2024-09-20 NOTE — PLAN OF CARE
Problem: Noninvasive Ventilation Acute  Goal: Effective Unassisted Ventilation and Oxygenation  Outcome: Ongoing, Progressing   Goal Outcome Evaluation:   Pt continues to use BIPAP at night and off and on during the day. When not on BIPAP sats are running mid to upper 90's on room air.

## 2024-09-20 NOTE — CONSULTS
Nephrology ([facility]) Consult Note      Patient:  Ludivina Ramirez  YOB: 1960  Date of Service: 9/20/2024  MRN: [id number]   Acct: [id number]   Primary Care Physician: Orville Weston MD  Advance Directive:   There are no questions and answers to display.     Admit Date: 9/19/2024       Hospital Day: 1  Referring Provider: No ref. provider found      Patient personally seen and examined.  Complete chart including Consults, Notes, Operative Reports, Labs, Cardiology, and Radiology studies reviewed as able.        Subjective:  Ludivina Ramirez is a 64 y.o. female for whom we were consulted for evaluation and treatment of acute kidney injury. Baseline chronic kidney disease stage 4. History of MATTY in June that required short term hemodialysis, last HD was mid-July. Patient was most recently seen by Dr Lazo in hospital on 8/23-8/26. Discharged with creatinine 2.1 which is her baseline level. She did not show up for her hospital follow up visit in the office. Other history includes hypertension, CHF, coronary artery disease. Presented to ER with four days of cramping abdominal pain. Having profuse diarrhea but no nausea or vomiting. Unable to maintain adequate PO intake during this time. Denies changes in urination. No dysuria or hematuria. No edema. No NSAID use. Labs in ER revealed creatinine 5.36, BUN 78, sodium 132.  Started on bicarbonate IV fluids and admitted to medical floor. Currently is awake and alert. Feels somewhat better this morning but still complaining of diffuse abdominal pain and frequent diarrhea.     Allergies:  Levaquin [levofloxacin], Codeine, and Sumatriptan    Home Meds:  Medications Prior to Admission   Medication Sig Dispense Refill Last Dose    guaiFENesin (MUCINEX) 600 MG 12 hr tablet Take 1 tablet by mouth Every 12 (Twelve) Hours As Needed for Cough. 60 tablet 0 Patient Taking Differently    albuterol sulfate  (90 Base) MCG/ACT inhaler Inhale 2  puffs Every 4 (Four) Hours As Needed for Wheezing. 20.1 g 3     aspirin 81 MG EC tablet Take 1 tablet by mouth Daily. 90 tablet 3     azelastine (ASTELIN) 0.1 % nasal spray 2 sprays into the nostril(s) as directed by provider 2 (Two) Times a Day. 30 mL 11     calcitriol (ROCALTROL) 0.25 MCG capsule Take 1 capsule by mouth Daily.       carvedilol (COREG) 25 MG tablet Take 1 tablet by mouth 2 (Two) Times a Day With Meals.       chlorothiazide (DIURIL) 250 MG/5ML suspension Take  by mouth 2 (Two) Times a Day.       cloNIDine (CATAPRES) 0.1 MG tablet Take 1 tablet by mouth Every 6 (Six) Hours As Needed for High Blood Pressure (SBP >160). 30 tablet 1     docusate sodium (COLACE) 250 MG capsule Take 1 capsule by mouth Daily.       doxycycline (VIBRAMYCIN) 100 MG capsule Take 1 capsule by mouth 2 (Two) Times a Day. (Patient not taking: Reported on 9/19/2024) 8 capsule 0 Not Taking    fluticasone (FLONASE) 50 MCG/ACT nasal spray 2 sprays into the nostril(s) as directed by provider Every Morning. In each nostril 16 g 11     furosemide (LASIX) 20 MG tablet Take 1 tablet by mouth Daily.       ipratropium-albuterol (DUO-NEB) 0.5-2.5 mg/3 ml nebulizer Take 3 mL by nebulization 4 (Four) Times a Day. prn       isosorbide dinitrate (ISORDIL) 40 MG tablet Take 1 tablet by mouth 2 (Two) Times a Day. 180 tablet 3     losartan (COZAAR) 100 MG tablet Take 1 tablet by mouth Daily.       melatonin 3 MG tablet Take 2 tablets by mouth Every Night. 30 tablet 0     nitroglycerin (NITROSTAT) 0.4 MG SL tablet Place 1 tablet under the tongue Every 5 (Five) Minutes As Needed for Chest Pain. Take no more than 3 doses in 15 minutes.       predniSONE (DELTASONE) 10 MG tablet Take 4 tabs daily x 3 days, then take 3 tabs daily x 3 days, then take 2 tabs daily x 3 days, then take 1 tab daily x 3 days (Patient not taking: Reported on 9/19/2024) 31 tablet 0 Not Taking       Medicines:  Current Facility-Administered Medications   Medication Dose Route  Frequency Provider Last Rate Last Admin    sodium bicarbonate 8.4 % 75 mEq in dextrose (D5W) 5 % 1,000 mL infusion (less than/equal to 100 mEq)  75 mEq Intravenous Continuous Clifton Feng  mL/hr at 24 75 mEq at 24       Past Medical History:  Past Medical History:   Diagnosis Date    Acute CHF     Acute renal failure (ARF)     Anemia     Asthma     childhood    Bowel disease, inflammatory     Chronic kidney disease     Coronary artery disease     Hypertension     Ischemic colitis     Lung nodule, multiple 2024    Metabolic acidosis     Myocardial infarction 2020    Nonrheumatic aortic (valve) insufficiency     PTSD (post-traumatic stress disorder)        Past Surgical History:  Past Surgical History:   Procedure Laterality Date    CARDIAC CATHETERIZATION N/A 2020    Procedure: Left Heart Cath;  Surgeon: Pierce Buchanan MD;  Location:  [facility]ATH [facility]ASIVE [facility];  Service: Cardiovascular;  Laterality: N/A;    CLOSED REDUCTION ANKLE FRACTURE Right     13 screws and a plate    EXTERNAL FIXATION WRIST FRACTURE      INSERTION HEMODIALYSIS CATHETER Right 2022    Procedure: INSERTION OF RIGHT INTERNAL JUGULAR HEMODIALYSIS CATHETER;  Surgeon: Dexter Red MD;  Location:  [facility] OR;  Service: Vascular;  Laterality: Right;    TUBAL ABDOMINAL LIGATION         Family History  Family History   Problem Relation Age of Onset    Coronary artery disease Mother     Coronary artery disease Father     Breast cancer Neg Hx        Social History  Social History     Socioeconomic History    Marital status: Single   Tobacco Use    Smoking status: Every Day     Current packs/day: 0.00     Average packs/day: 0.5 packs/day for 48.7 years (24.4 ttl pk-yrs)     Types: Cigarettes     Start date:      Last attempt to quit: 2023     Years since quittin.9     Passive exposure: Past    Smokeless tobacco: Never    Tobacco comments:     Smokes about 0.5 pack daily  "4/10    Vaping Use    Vaping status: Never Used   Substance and Sexual Activity    Alcohol use: No    Drug use: No    Sexual activity: Defer         Review of Systems:  History obtained from chart review and the patient  General ROS: No fever or chills  Respiratory ROS: No cough, shortness of breath, wheezing  Cardiovascular ROS: No chest pain or palpitations  Gastrointestinal ROS: positive for - abdominal pain and diarrhea  Genito-Urinary ROS: No dysuria or hematuria  Psych ROS: No anxiety and depression  14 point ROS reviewed with the patient and negative except as noted above and in the HPI unless unable to obtain.      Objective:  Patient Vitals for the past 24 hrs:   BP Temp Temp src Pulse Resp SpO2 Height Weight   09/20/24 0644 -- -- -- -- -- 97 % -- --   09/20/24 0506 137/72 97.6 °F (36.4 °C) Axillary 74 14 98 % -- --   09/19/24 2329 110/57 97.9 °F (36.6 °C) Oral 78 16 94 % -- --   09/19/24 2154 100/72 97.9 °F (36.6 °C) Oral 72 16 94 % 167.6 cm (66\") 91.1 kg (200 lb 14.4 oz)   09/19/24 2056 -- 98.5 °F (36.9 °C) -- -- 17 -- -- --   09/19/24 2031 93/81 -- -- -- -- -- -- --   09/19/24 2028 -- -- -- 77 -- 90 % -- --   09/19/24 2017 -- -- -- 73 -- 95 % -- --   09/19/24 2016 107/74 -- -- 72 -- -- -- --   09/19/24 2002 -- -- -- 73 -- 95 % -- --   09/19/24 2001 116/86 -- -- 67 -- -- -- --   09/19/24 1826 94/65 -- -- 69 16 95 % -- --   09/19/24 1824 94/65 -- -- -- -- -- -- --   09/19/24 1823 -- -- -- 67 -- 97 % -- --   09/19/24 1630 92/60 -- -- -- -- -- -- --   09/19/24 1532 -- -- -- 63 -- 96 % -- --   09/19/24 1531 112/68 -- -- 64 -- -- -- --   09/19/24 1503 -- -- -- 64 -- 93 % -- --   09/19/24 1502 (!) 81/58 -- -- 61 -- -- -- --   09/19/24 1424 -- 97.9 °F (36.6 °C) Oral -- -- -- -- --   09/19/24 1415 92/64 -- -- 65 17 94 % 165.1 cm (65\") 99.8 kg (220 lb)     No intake or output data in the 24 hours ending 09/20/24 0842  General: awake/alert   Chest:  clear to auscultation bilaterally without respiratory " distress  CVS: regular rate and rhythm  Abdominal:  diffuse mild tenderness, +BS  Extremities: no cyanosis or edema  Skin: warm and dry without rash      Labs:  Results from last 7 days   Lab Units 09/19/24  1458   WBC 10*3/mm3 4.46   HEMOGLOBIN g/dL 13.0   HEMATOCRIT % 41.9   PLATELETS 10*3/mm3 192         Results from last 7 days   Lab Units 09/20/24  0500 09/19/24  1458   SODIUM mmol/L 135*  135* 132*   POTASSIUM mmol/L 3.2*  3.2* 3.7   CHLORIDE mmol/L 97*  97* 97*   CO2 mmol/L 22.0  24.0 20.0*   BUN mg/dL 72* 78*   CREATININE mg/dL 4.37* 5.36*   CALCIUM mg/dL 8.4* 8.0*   EGFR mL/min/1.73 10.7* 8.4*   BILIRUBIN mg/dL  --  0.4   ALK PHOS U/L  --  91   ALT (SGPT) U/L  --  10   AST (SGOT) U/L  --  14   GLUCOSE mg/dL 117* 102*       Radiology:   Imaging Results (Last 72 Hours)       Procedure Component Value Units Date/Time    CT Abdomen Pelvis Without Contrast [[id number]] Collected: 09/19/24 1706     Updated: 09/19/24 1716    Narrative:      EXAMINATION:  CT ABDOMEN PELVIS WO CONTRAST-  9/19/2024 3:12 PM     HISTORY: Generalized abdominal pain and diarrhea. N17.9-Acute kidney  failure, unspecified; R19.7-Diarrhea, unspecified;  E83.41-Hypermagnesemia.     TECHNIQUE: Spiral CT was performed of the abdomen and pelvis without  contrast. Multiplanar images were reconstructed.     DLP: 694.75 mGy.cm Automated dosage reduction technique was utilized to  reduce patient dosage.     COMPARISON: 10/12/2022.     LUNG BASES: The lung bases are clear. There is coronary artery  calcification. There is cardiomegaly. There is mitral valve  calcification.     LIVER AND SPLEEN: Normal unenhanced appearance of the liver. Normal  unenhanced appearance of the spleen. There are no dense gallstones.     PANCREAS: Normal unenhanced appearance of the pancreas.     KIDNEYS AND ADRENALS: The adrenal glands are normal in size. There is  some cortical scarring of the left kidney. The unenhanced right kidney  is unremarkable. The ureters are  "nondilated. The bladder is not well  distended.     BOWEL: No oral contrast was given. There is a small hiatal hernia. There  is under distention of the stomach. Small bowel loops are normal in  caliber. There is under distention of most of the colon. There is some  questionable thickening of the colon wall in the cecum and ascending  colon. There is definitive wall thickening in the mid sigmoid colon with  mild stranding of the adjacent fat. There is mild diverticulosis of the  colon. No perforation or abscess is visualized.     OTHER: There is atheromatous disease of the aortoiliac vessels. There is  a 3 cm abdominal aortic aneurysm. The unenhanced uterus is unremarkable  there are degenerative changes of the spine and bilateral hips..          Impression:      1. Definitive wall thickening in the mid sigmoid colon with mild  stranding of the adjacent fat. There is diverticulosis in this area. The  findings are most likely due to diverticulitis.: Neoplasm less likely.  2. There is a questionable area of wall thickening involving the cecum  and ascending colon versus artifact of under distention.  3. Small hiatal hernia.  4. Atheromatous disease of the aortoiliac vessels and coronary arteries.  Abdominal aortic aneurysm measuring 3 cm. Cardiomegaly. Mitral valve  calcification.  5. Cortical scarring left kidney.  6. Degenerative changes of the spine and hips.        The full report of this exam was immediately signed and available to the  emergency room. The patient is currently in the emergency room.     This report was signed and finalized on 9/19/2024 5:13 PM by Dr. Jefe Beltre MD.               Culture:  No results found for: \"BLOODCX\", \"URINECX\", \"WOUNDCX\", \"MRSACX\", \"RESPCX\", \"STOOLCX\"      Assessment    Acute kidney injury, prerenal--improving  Baseline chronic kidney disease stage 4  Hyponatremia--improving  Metabolic acidosis--improving  Hypertension  Gastroenteritis due to Salmonella "     Plan:  Continue IV fluids, change rate and composition  Some improvement of renal function overnight. No urgent indications for dialysis. Discussed possibility of dialysis with patient if no further improvement is seen.   Further assessment and plan pending Dr Negrete's evaluation of patient      Thank you for the consult, we appreciate the opportunity to provide care to your patients.  Feel free to contact me if I can be of any further assistance.      Quinn Rodarte, APRN  9/20/2024  08:42 CDT

## 2024-09-20 NOTE — H&P
History and Physical      CHIEF COMPLAINT:  AMS, Poor UOP, Weakness    Reason for Admission:  MATTY, AMS, Weakness    History Obtained From:  Patient, chart, Staff    HISTORY OF PRESENT ILLNESS:      The patient is a 64 y.o. female who was admitted from ER with MATTY, diarrhea. She has had diarrhea and abdominal cramping for 4 days. She has not been able to hydrate well. No fever. No N/V. She denies any blood or mucus in her stool. No CP or worsening SOA.   Past Medical History:    Past Medical History:   Diagnosis Date    Acute CHF     Acute renal failure (ARF)     Anemia     Asthma     childhood    Bowel disease, inflammatory     Chronic kidney disease     Coronary artery disease     Hypertension     Ischemic colitis     Lung nodule, multiple 04/03/2024    Metabolic acidosis     Myocardial infarction 11/07/2020    Nonrheumatic aortic (valve) insufficiency     PTSD (post-traumatic stress disorder)      Past Surgical History:    Past Surgical History:   Procedure Laterality Date    CARDIAC CATHETERIZATION N/A 11/08/2020    Procedure: Left Heart Cath;  Surgeon: Pierce Buchanan MD;  Location:  [facility]ATH [facility];  Service: Cardiovascular;  Laterality: N/A;    CLOSED REDUCTION ANKLE FRACTURE Right 2019    13 screws and a plate    EXTERNAL FIXATION WRIST FRACTURE      INSERTION HEMODIALYSIS CATHETER Right 06/03/2022    Procedure: INSERTION OF RIGHT INTERNAL JUGULAR HEMODIALYSIS CATHETER;  Surgeon: Dexter Red MD;  Location:  [facility] OR;  Service: Vascular;  Laterality: Right;    TUBAL ABDOMINAL LIGATION           Medications Prior to Admission:    Medications Prior to Admission   Medication Sig Dispense Refill Last Dose    aspirin 81 MG EC tablet Take 1 tablet by mouth Daily. 90 tablet 3     calcitriol (ROCALTROL) 0.25 MCG capsule Take 1 capsule by mouth Daily.       carvedilol (COREG) 25 MG tablet Take 1 tablet by mouth 2 (Two) Times a Day With Meals.       furosemide (LASIX) 40 MG tablet Take 1 tablet by  mouth Daily.       hydroCHLOROthiazide 25 MG tablet Take 1 tablet by mouth Daily.       albuterol sulfate  (90 Base) MCG/ACT inhaler Inhale 2 puffs Every 4 (Four) Hours As Needed for Wheezing. 20.1 g 3     azelastine (ASTELIN) 0.1 % nasal spray 2 sprays into the nostril(s) as directed by provider 2 (Two) Times a Day. 30 mL 11     cloNIDine (CATAPRES) 0.1 MG tablet Take 1 tablet by mouth Every 6 (Six) Hours As Needed for High Blood Pressure (SBP >160). 30 tablet 1     docusate sodium (COLACE) 250 MG capsule Take 1 capsule by mouth Daily.       fluticasone (FLONASE) 50 MCG/ACT nasal spray 2 sprays into the nostril(s) as directed by provider Every Morning. In each nostril 16 g 11     isosorbide dinitrate (ISORDIL) 40 MG tablet Take 1 tablet by mouth 2 (Two) Times a Day. 180 tablet 3     losartan (COZAAR) 100 MG tablet Take 1 tablet by mouth Daily.       melatonin 3 MG tablet Take 2 tablets by mouth Every Night. 30 tablet 0     nitroglycerin (NITROSTAT) 0.4 MG SL tablet Place 1 tablet under the tongue Every 5 (Five) Minutes As Needed for Chest Pain. Take no more than 3 doses in 15 minutes.          Allergies:  Levaquin [levofloxacin], Codeine, and Sumatriptan    Social History:   TOBACCO:   reports that she has been smoking cigarettes. She started smoking about 49 years ago. She has a 24.4 pack-year smoking history. She has been exposed to tobacco smoke. She has never used smokeless tobacco.  ETOH:   reports no history of alcohol use.  DRUGS:   reports no history of drug use.  MARITAL STATUS:  Not   OCCUPATION:  not working        Family History:   Family History   Problem Relation Age of Onset    Coronary artery disease Mother     Coronary artery disease Father     Breast cancer Neg Hx      REVIEW OF SYSTEMS:  Constitutional: weakness  CV: CP  Pulmonary: Negative  GI: diarrhea  : Negative  Psych: Negative  Neuro: Negative  Skin: Negative  MusculoSkeletal: right wrist pain  HEENT: Negative  Joints:  "Negative  Vitals:  BP 90/51 (BP Location: Left arm, Patient Position: Sitting)   Pulse 78   Temp 98.6 °F (37 °C) (Oral)   Resp 18   Ht 167.6 cm (66\")   Wt 91.1 kg (200 lb 14.4 oz)   LMP  (LMP Unknown)   SpO2 96%   BMI 32.43 kg/m²     PHYSICAL EXAM:  Gen: NAD  HEENT: WNL  Lymph: no LAD  Neck: no JVD or masses  Chest: CTA bilaterally  CV: RRR  Abdomen: NT/ND  Extrem: no C/C/E  Neuro: Nonfocal  Skin: no rashes  Joints: no redness  DATA:  I have reviewed the admission labs and imaging tests.    ASSESSMENT AND PLAN:      MATTY, Dehydration---follow with Nephrology, hydrate, follow labs  Diarrhea, + Stool testing, Abnormal CT---GI to see pt, supportive care, antibiotics started in ER  HTN---follow BP  Chronic Resp Failure---stable  H/O CHF  H/O CAD      Orville Weston MD  13:47 CDT 9/20/2024  " Never smoker

## 2024-09-21 LAB
ANION GAP SERPL CALCULATED.3IONS-SCNC: 11 MMOL/L (ref 5–15)
BUN SERPL-MCNC: 51 MG/DL (ref 8–23)
BUN/CREAT SERPL: 17.3 (ref 7–25)
CALCIUM SPEC-SCNC: 8.3 MG/DL (ref 8.6–10.5)
CHLORIDE SERPL-SCNC: 103 MMOL/L (ref 98–107)
CO2 SERPL-SCNC: 24 MMOL/L (ref 22–29)
CREAT SERPL-MCNC: 2.95 MG/DL (ref 0.57–1)
EGFRCR SERPLBLD CKD-EPI 2021: 17.2 ML/MIN/1.73
GLUCOSE SERPL-MCNC: 92 MG/DL (ref 65–99)
MAGNESIUM SERPL-MCNC: 2.5 MG/DL (ref 1.6–2.4)
POTASSIUM SERPL-SCNC: 3.4 MMOL/L (ref 3.5–5.2)
SODIUM SERPL-SCNC: 138 MMOL/L (ref 136–145)

## 2024-09-21 PROCEDURE — 94660 CPAP INITIATION&MGMT: CPT

## 2024-09-21 PROCEDURE — 94799 UNLISTED PULMONARY SVC/PX: CPT

## 2024-09-21 PROCEDURE — 94761 N-INVAS EAR/PLS OXIMETRY MLT: CPT

## 2024-09-21 PROCEDURE — 25010000002 HEPARIN (PORCINE) PER 1000 UNITS: Performed by: FAMILY MEDICINE

## 2024-09-21 PROCEDURE — 83735 ASSAY OF MAGNESIUM: CPT | Performed by: NURSE PRACTITIONER

## 2024-09-21 PROCEDURE — 80048 BASIC METABOLIC PNL TOTAL CA: CPT | Performed by: NURSE PRACTITIONER

## 2024-09-21 PROCEDURE — 94760 N-INVAS EAR/PLS OXIMETRY 1: CPT

## 2024-09-21 PROCEDURE — 25010000002 CEFTRIAXONE PER 250 MG: Performed by: FAMILY MEDICINE

## 2024-09-21 PROCEDURE — 25010000002 METRONIDAZOLE 500 MG/100ML SOLUTION: Performed by: FAMILY MEDICINE

## 2024-09-21 PROCEDURE — 94664 DEMO&/EVAL PT USE INHALER: CPT

## 2024-09-21 PROCEDURE — 25810000003 LACTATED RINGERS PER 1000 ML: Performed by: NURSE PRACTITIONER

## 2024-09-21 PROCEDURE — 99232 SBSQ HOSP IP/OBS MODERATE 35: CPT | Performed by: INTERNAL MEDICINE

## 2024-09-21 RX ORDER — SACCHAROMYCES BOULARDII 250 MG
250 CAPSULE ORAL 2 TIMES DAILY
Status: DISCONTINUED | OUTPATIENT
Start: 2024-09-21 | End: 2024-09-24 | Stop reason: HOSPADM

## 2024-09-21 RX ORDER — CLONIDINE HYDROCHLORIDE 0.1 MG/1
0.1 TABLET ORAL 4 TIMES DAILY PRN
Status: DISCONTINUED | OUTPATIENT
Start: 2024-09-21 | End: 2024-09-23

## 2024-09-21 RX ADMIN — Medication 2.5 MG: at 20:10

## 2024-09-21 RX ADMIN — AZELASTINE HYDROCHLORIDE 2 SPRAY: 137 SPRAY, METERED NASAL at 20:10

## 2024-09-21 RX ADMIN — HEPARIN SODIUM 5000 UNITS: 5000 INJECTION INTRAVENOUS; SUBCUTANEOUS at 08:50

## 2024-09-21 RX ADMIN — CALCITRIOL CAPSULES 0.25 MCG 0.25 MCG: 0.25 CAPSULE ORAL at 08:50

## 2024-09-21 RX ADMIN — IPRATROPIUM BROMIDE AND ALBUTEROL SULFATE 3 ML: .5; 3 SOLUTION RESPIRATORY (INHALATION) at 10:51

## 2024-09-21 RX ADMIN — HYDROCODONE BITARTRATE AND ACETAMINOPHEN 1 TABLET: 5; 325 TABLET ORAL at 06:12

## 2024-09-21 RX ADMIN — HYDROCODONE BITARTRATE AND ACETAMINOPHEN 1 TABLET: 5; 325 TABLET ORAL at 20:10

## 2024-09-21 RX ADMIN — SODIUM CHLORIDE, POTASSIUM CHLORIDE, SODIUM LACTATE AND CALCIUM CHLORIDE 100 ML/HR: 600; 310; 30; 20 INJECTION, SOLUTION INTRAVENOUS at 08:51

## 2024-09-21 RX ADMIN — METRONIDAZOLE 500 MG: 500 INJECTION, SOLUTION INTRAVENOUS at 01:37

## 2024-09-21 RX ADMIN — HEPARIN SODIUM 5000 UNITS: 5000 INJECTION INTRAVENOUS; SUBCUTANEOUS at 20:10

## 2024-09-21 RX ADMIN — CARVEDILOL 6.25 MG: 6.25 TABLET, FILM COATED ORAL at 18:09

## 2024-09-21 RX ADMIN — SODIUM CHLORIDE, POTASSIUM CHLORIDE, SODIUM LACTATE AND CALCIUM CHLORIDE 100 ML/HR: 600; 310; 30; 20 INJECTION, SOLUTION INTRAVENOUS at 23:00

## 2024-09-21 RX ADMIN — HYDROCODONE BITARTRATE AND ACETAMINOPHEN 1 TABLET: 5; 325 TABLET ORAL at 11:21

## 2024-09-21 RX ADMIN — METRONIDAZOLE 500 MG: 500 INJECTION, SOLUTION INTRAVENOUS at 10:30

## 2024-09-21 RX ADMIN — SODIUM CHLORIDE 1000 MG: 900 INJECTION INTRAVENOUS at 12:02

## 2024-09-21 RX ADMIN — ISOSORBIDE DINITRATE 40 MG: 20 TABLET ORAL at 18:09

## 2024-09-21 RX ADMIN — CARVEDILOL 6.25 MG: 6.25 TABLET, FILM COATED ORAL at 08:49

## 2024-09-21 RX ADMIN — IPRATROPIUM BROMIDE AND ALBUTEROL SULFATE 3 ML: .5; 3 SOLUTION RESPIRATORY (INHALATION) at 18:21

## 2024-09-21 RX ADMIN — Medication 250 MG: at 16:45

## 2024-09-21 RX ADMIN — AZELASTINE HYDROCHLORIDE 2 SPRAY: 137 SPRAY, METERED NASAL at 11:17

## 2024-09-21 RX ADMIN — ISOSORBIDE DINITRATE 40 MG: 20 TABLET ORAL at 08:49

## 2024-09-21 RX ADMIN — IPRATROPIUM BROMIDE AND ALBUTEROL SULFATE 3 ML: .5; 3 SOLUTION RESPIRATORY (INHALATION) at 07:06

## 2024-09-21 RX ADMIN — METRONIDAZOLE 500 MG: 500 INJECTION, SOLUTION INTRAVENOUS at 18:08

## 2024-09-21 RX ADMIN — CLONIDINE HYDROCHLORIDE 0.1 MG: 0.1 TABLET ORAL at 16:13

## 2024-09-21 NOTE — PLAN OF CARE
Problem: Noninvasive Ventilation Acute  Goal: Effective Unassisted Ventilation and Oxygenation  Outcome: Ongoing, Progressing   Goal Outcome Evaluation:   Pt tolerated BIPAP last night. Was wearing when I entered room this morning. FIO2 at 30% with sat of 97%. Decreased FIO2 to 24% on BIPAP. Pt has been on room air and off BIPAP all day with sats in the upper 90's.

## 2024-09-21 NOTE — PROGRESS NOTES
Gastroenterology Follow Up Inpatient Consult Note      Reason for Consult: Diarrhea  Date of Admission: 9/19/2024  Inpatient Team: Medicine  Requesting Attending: Orville Weston MD    ID/CC/HPI:   Ludivina Ramirez is a 64 y.o. female with history of hypertension, CHF, renal impairment, coronary artery disease presented with abdominal pain and diarrhea. Gastroenterology is consulted for management of abdominal pain and diarrhea     Overnight:  Continues to have abdominal pain and diarrhea.    Hospital Course:  CT suggestive of diverticulitis, had Salmonella    Review of Systems   Review of Systems  Negative except for the above    Medications:   Allergies: Levaquin [levofloxacin], Codeine, and Sumatriptan    azelastine, 2 spray, Each Nare, BID  calcitriol, 0.25 mcg, Oral, Daily  carvedilol, 6.25 mg, Oral, BID With Meals  cefTRIAXone, 1,000 mg, Intravenous, Q24H  heparin (porcine), 5,000 Units, Subcutaneous, Q12H  ipratropium-albuterol, 3 mL, Nebulization, 4x Daily - RT  isosorbide dinitrate, 40 mg, Oral, BID - Nitrates  metroNIDAZOLE, 500 mg, Intravenous, Q8H      Infusions:  lactated ringers, 100 mL/hr, Last Rate: 100 mL/hr (09/21/24 0851)      PRN:     guaiFENesin    HYDROcodone-acetaminophen    melatonin    Vitals and I/Os:   Vitals:   Vitals:    09/21/24 0741   BP: 163/83   Pulse: 65   Resp: 17   Temp: 98.1 °F (36.7 °C)   SpO2: 97%       Admit weight: Weight: 99.8 kg (220 lb) Last weight: Weight: 91.1 kg (200 lb 14.4 oz)  Body mass index is 32.43 kg/m².    I/Os:  No intake/output data recorded.    Physical Exam:   Physical Exam  Physical Exam:  General :    Alert, cooperative, in no acute distress   Head:    Normocephalic, without obvious abnormality, atraumatic   Eyes:            Lids and lashes normal, conjunctivae and sclerae normal, no   Icterus, conjunctival pallor   Throat:   No oral lesions, no thrush, oral mucosa moist, posterior oropharynx clear   Neck:   No adenopathy, supple, trachea midline, no  "thyromegaly, no   carotid bruit, no JVD   Lungs:     Clear to auscultation, respirations regular, even and                   unlabored    Heart:    Regular rhythm and normal rate, normal S1 and S2, no            murmur   Chest Wall:    No abnormalities observed   Abdomen:     Normal bowel sounds, no masses, no organomegaly, soft        nontender, nondistended, no guarding, no rebound                 tenderness   Rectal:     Deferred   Extremities:   No edema, no cyanosis   Skin:   No open lesions, bruising or rash   Lymph nodes:   No palpable cervical adenopathy   Psychiatric:  Judgement and insight: normal   Orientation to person place and time: normal   Mood and affect: normal     Pertinent Labs:   CBC:   Results from last 7 days   Lab Units 09/19/24  1458   WBC 10*3/mm3 4.46   HEMOGLOBIN g/dL 13.0   HEMATOCRIT % 41.9   PLATELETS 10*3/mm3 192       BMP:  Results from last 7 days   Lab Units 09/21/24  0731 09/20/24  0500 09/19/24  1458   SODIUM mmol/L 138 135*  135* 132*   POTASSIUM mmol/L 3.4* 3.2*  3.2* 3.7   CHLORIDE mmol/L 103 97*  97* 97*   CO2 mmol/L 24.0 22.0  24.0 20.0*   BUN mg/dL 51* 72* 78*   CREATININE mg/dL 2.95* 4.37* 5.36*   CALCIUM mg/dL 8.3* 8.4* 8.0*   MAGNESIUM mg/dL 2.5*  --  3.1*       Liver panel:  Results from last 7 days   Lab Units 09/19/24  1458   ALBUMIN g/dL 3.4*   AST (SGOT) U/L 14   ALT (SGPT) U/L 10   ALK PHOS U/L 91   BILIRUBIN mg/dL 0.4     No results found for: \"AFPTM\"    Coagulation:       Cardiac markers:      ABGs:  Results from last 7 days   Lab Units 09/19/24  1458   LACTATE mmol/L 1.0       Microbiology:   C. difficile negative  Component  Ref Range & Units    Campylobacter  Not Detected Not Detected   Plesiomonas shigelloides  Not Detected Not Detected   Salmonella  Not Detected Detected Abnormal    Vibrio  Not Detected Not Detected   Vibrio cholerae  Not Detected Not Detected   Yersinia enterocolitica  Not Detected Not Detected   Enteroaggregative E. coli (EAEC)  Not " Detected Not Detected   Enteropathogenic E. coli (EPEC)  Not Detected Not Detected   Enterotoxigenic E. coli (ETEC) lt/st  Not Detected Not Detected   Shiga-like toxin-producing E. coli (STEC) stx1/stx2  Not Detected Not Detected   Shigella/Enteroinvasive E. coli (EIEC)  Not Detected Not Detected   Cryptosporidium  Not Detected Not Detected   Cyclospora cayetanensis  Not Detected Not Detected   Entamoeba histolytica  Not Detected Not Detected   Giardia lamblia  Not Detected Not Detected   Adenovirus F40/41  Not Detected Not Detected   Astrovirus  Not Detected Not Detected   Norovirus GI/GII  Not Detected Not Detected   Rotavirus A  Not Detected Not Detected   Sapovirus (I, II, IV or V)  Not Detected Not Detected       Pathology:     Imaging:   CT Abdomen Pelvis Without Contrast  Narrative: EXAMINATION:  CT ABDOMEN PELVIS WO CONTRAST-  9/19/2024 3:12 PM     HISTORY: Generalized abdominal pain and diarrhea. N17.9-Acute kidney  failure, unspecified; R19.7-Diarrhea, unspecified;  E83.41-Hypermagnesemia.     TECHNIQUE: Spiral CT was performed of the abdomen and pelvis without  contrast. Multiplanar images were reconstructed.     DLP: 694.75 mGy.cm Automated dosage reduction technique was utilized to  reduce patient dosage.     COMPARISON: 10/12/2022.     LUNG BASES: The lung bases are clear. There is coronary artery  calcification. There is cardiomegaly. There is mitral valve  calcification.     LIVER AND SPLEEN: Normal unenhanced appearance of the liver. Normal  unenhanced appearance of the spleen. There are no dense gallstones.     PANCREAS: Normal unenhanced appearance of the pancreas.     KIDNEYS AND ADRENALS: The adrenal glands are normal in size. There is  some cortical scarring of the left kidney. The unenhanced right kidney  is unremarkable. The ureters are nondilated. The bladder is not well  distended.     BOWEL: No oral contrast was given. There is a small hiatal hernia. There  is under distention of the  stomach. Small bowel loops are normal in  caliber. There is under distention of most of the colon. There is some  questionable thickening of the colon wall in the cecum and ascending  colon. There is definitive wall thickening in the mid sigmoid colon with  mild stranding of the adjacent fat. There is mild diverticulosis of the  colon. No perforation or abscess is visualized.     OTHER: There is atheromatous disease of the aortoiliac vessels. There is  a 3 cm abdominal aortic aneurysm. The unenhanced uterus is unremarkable  there are degenerative changes of the spine and bilateral hips..        Impression: 1. Definitive wall thickening in the mid sigmoid colon with mild  stranding of the adjacent fat. There is diverticulosis in this area. The  findings are most likely due to diverticulitis.: Neoplasm less likely.  2. There is a questionable area of wall thickening involving the cecum  and ascending colon versus artifact of under distention.  3. Small hiatal hernia.  4. Atheromatous disease of the aortoiliac vessels and coronary arteries.  Abdominal aortic aneurysm measuring 3 cm. Cardiomegaly. Mitral valve  calcification.  5. Cortical scarring left kidney.  6. Degenerative changes of the spine and hips.        The full report of this exam was immediately signed and available to the  emergency room. The patient is currently in the emergency room.     This report was signed and finalized on 9/19/2024 5:13 PM by Dr. Jefe Beltre MD.          Endoscopy:   none    Impression and Recommendations:   In summary, Ludivina Ramirez is a 64 y.o. female with the above medical problem presented with diarrhea and abdominal pain        Acute diverticulitis  Associate with abdominal pain and diarrhea  Negative for C. difficile  Continue IV fluid  Continue antibiotic will need elective outpatient colonoscopy 6 to 8 weeks after discharge if       Salmonella infection  None typhoid  Associated with severe diarrhea more than 10  episodes  Associated with hospitalization  Has an indication for antibiotic treatment  Continue levofloxacin and Flagyl    Impression and Recommendations discussed with primary service,           Thank you for allowing us to participate in Ms. Ludivina Ramirez's care. Please feel free to page with any questions or concerns.      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  Any results not available at time of inpatient discharge and/or GI clinic follow up should be managed by the hospitalist team, PCP, or outpatient gastroenterologist.    Paras Mchugh MD  9/21/2024  10:34 CDT

## 2024-09-21 NOTE — PROGRESS NOTES
RT EQUIPMENT DEVICE RELATED - SKIN ASSESSMENT    Griffin Score:  Griffin Score: 19     RT Medical Equipment/Device:     NIV Mask:  Under-the-nose   size:  B    Skin Assessment:      Cheek:  Intact  Chin:  Intact  Neck:  Intact  Mouth:  Intact    Device Skin Pressure Protection:   none required    Nurse Notification:  No    Vero Jimenes, RRT

## 2024-09-21 NOTE — PROGRESS NOTES
"    Daily Progress Note  Ludivina Ramirez  MRN: [id number] LOS: 2    Admit Date: 2024 11:28 CDT    Subjective:         Interval History:    Reviewed overnight events and nursing notes.     Doing well, symptoms seem to be slowly improving.  They certainly are not any worse.  Creatinine improved.    ROS:  Review of Systems   Constitutional:  Negative for chills and fever.   Respiratory:  Negative for cough, chest tightness and shortness of breath.    Cardiovascular:  Negative for chest pain.   Gastrointestinal:  Positive for diarrhea. Negative for abdominal pain, nausea and vomiting.       DIET:  Diet: Liquid; Clear Liquid; Fluid Consistency: Thin (IDDSI 0)    Medications:   lactated ringers, 100 mL/hr, Last Rate: 100 mL/hr (24 0851)      azelastine, 2 spray, Each Nare, BID  calcitriol, 0.25 mcg, Oral, Daily  carvedilol, 6.25 mg, Oral, BID With Meals  cefTRIAXone, 1,000 mg, Intravenous, Q24H  heparin (porcine), 5,000 Units, Subcutaneous, Q12H  ipratropium-albuterol, 3 mL, Nebulization, 4x Daily - RT  isosorbide dinitrate, 40 mg, Oral, BID - Nitrates  metroNIDAZOLE, 500 mg, Intravenous, Q8H          Objective:     Vitals: /83 (BP Location: Left arm, Patient Position: Lying)   Pulse 73   Temp 98.1 °F (36.7 °C) (Axillary)   Resp 16   Ht 167.6 cm (66\")   Wt 91.1 kg (200 lb 14.4 oz)   LMP  (LMP Unknown)   SpO2 96%   BMI 32.43 kg/m²    Intake/Output Summary (Last 24 hours) at 2024 1128  Last data filed at 2024 0851  Gross per 24 hour   Intake 2591.67 ml   Output --   Net 2591.67 ml    Temp (24hrs), Av.1 °F (36.7 °C), Min:97.7 °F (36.5 °C), Max:98.6 °F (37 °C)    Glucose:  Lab Results   Component Value Date    POCGLU 101 2024    POCGLU 159 (H) 2021    POCGLU 134 (H) 2021    POCGLU 145 (H) 2021     Physical Examination:   Physical Exam  Constitutional:       General: She is not in acute distress.     Appearance: Normal appearance. She is obese. She is not " ill-appearing or toxic-appearing.   Cardiovascular:      Rate and Rhythm: Normal rate and regular rhythm.      Pulses: Normal pulses.      Heart sounds: Normal heart sounds. No murmur heard.  Pulmonary:      Effort: Pulmonary effort is normal. No respiratory distress.      Breath sounds: Normal breath sounds. No stridor. No wheezing or rales.   Abdominal:      General: Abdomen is flat. Bowel sounds are normal. There is no distension.      Palpations: Abdomen is soft.      Tenderness: There is no abdominal tenderness.   Neurological:      Mental Status: She is alert.         Labs:  Lab Results (last 24 hours)       Procedure Component Value Units Date/Time    Basic Metabolic Panel [[id number]]  (Abnormal) Collected: 09/21/24 0731    Specimen: Blood Updated: 09/21/24 0854     Glucose 92 mg/dL      BUN 51 mg/dL      Creatinine 2.95 mg/dL      Sodium 138 mmol/L      Potassium 3.4 mmol/L      Chloride 103 mmol/L      CO2 24.0 mmol/L      Calcium 8.3 mg/dL      BUN/Creatinine Ratio 17.3     Anion Gap 11.0 mmol/L      eGFR 17.2 mL/min/1.73     Narrative:      GFR Normal >60  Chronic Kidney Disease <60  Kidney Failure <15      Magnesium [[id number]]  (Abnormal) Collected: 09/21/24 0731    Specimen: Blood Updated: 09/21/24 0854     Magnesium 2.5 mg/dL     POC Glucose Once [[id number]]  (Normal) Collected: 09/20/24 1205    Specimen: Blood Updated: 09/20/24 1216     Glucose 101 mg/dL      Comment: : [id number] Butlerter ID: [id number]                Imaging:  CT Abdomen Pelvis Without Contrast    Result Date: 9/19/2024  EXAMINATION:  CT ABDOMEN PELVIS WO CONTRAST-  9/19/2024 3:12 PM  HISTORY: Generalized abdominal pain and diarrhea. N17.9-Acute kidney failure, unspecified; R19.7-Diarrhea, unspecified; E83.41-Hypermagnesemia.  TECHNIQUE: Spiral CT was performed of the abdomen and pelvis without contrast. Multiplanar images were reconstructed.  DLP: 694.75 mGy.cm Automated dosage reduction technique was utilized to  reduce patient dosage.  COMPARISON: 10/12/2022.  LUNG BASES: The lung bases are clear. There is coronary artery calcification. There is cardiomegaly. There is mitral valve calcification.  LIVER AND SPLEEN: Normal unenhanced appearance of the liver. Normal unenhanced appearance of the spleen. There are no dense gallstones.  PANCREAS: Normal unenhanced appearance of the pancreas.  KIDNEYS AND ADRENALS: The adrenal glands are normal in size. There is some cortical scarring of the left kidney. The unenhanced right kidney is unremarkable. The ureters are nondilated. The bladder is not well distended.  BOWEL: No oral contrast was given. There is a small hiatal hernia. There is under distention of the stomach. Small bowel loops are normal in caliber. There is under distention of most of the colon. There is some questionable thickening of the colon wall in the cecum and ascending colon. There is definitive wall thickening in the mid sigmoid colon with mild stranding of the adjacent fat. There is mild diverticulosis of the colon. No perforation or abscess is visualized.  OTHER: There is atheromatous disease of the aortoiliac vessels. There is a 3 cm abdominal aortic aneurysm. The unenhanced uterus is unremarkable there are degenerative changes of the spine and bilateral hips..       Impression: 1. Definitive wall thickening in the mid sigmoid colon with mild stranding of the adjacent fat. There is diverticulosis in this area. The findings are most likely due to diverticulitis.: Neoplasm less likely. 2. There is a questionable area of wall thickening involving the cecum and ascending colon versus artifact of under distention. 3. Small hiatal hernia. 4. Atheromatous disease of the aortoiliac vessels and coronary arteries. Abdominal aortic aneurysm measuring 3 cm. Cardiomegaly. Mitral valve calcification. 5. Cortical scarring left kidney. 6. Degenerative changes of the spine and hips.   The full report of this exam was  immediately signed and available to the emergency room. The patient is currently in the emergency room.  This report was signed and finalized on 9/19/2024 5:13 PM by Dr. Jefe Beltre MD.          Assessment and Plan:     Primary Problem:  Acute kidney injury    Hospital Problem list:    Acute kidney injury        Plan:    -Creatinine improving.  Will hold IV fluids and will hold IV fluids and hopefully she can continue to improve.  She is nearing baseline.  -GI following, continue antibiotics.  Can hopefully discharge over next 1 to 2 days if her diarrhea improves.      Reviewed treatment plans with the patient and/or family.     Code Status:   There are no questions and answers to display.       Electronically signed by Alfonso Walker MD on 9/21/2024 at 11:28 CDT

## 2024-09-21 NOTE — PLAN OF CARE
Goal Outcome Evaluation:  Plan of Care Reviewed With: patient        Progress: no change  Outcome Evaluation: Patient A+Ox4, ESTEVEZ- Up to bedside commode with multiple loose BMs- Patient reports some abd cramping. IVF going and antibiotics in place- Tolerating PO  food and drink and medications. Safety maintained. elevated BP today and new PRN medication orders and effective.

## 2024-09-21 NOTE — PLAN OF CARE
Goal Outcome Evaluation:  Plan of Care Reviewed With: patient        Progress: no change  Outcome Evaluation: Patient A/O x 4. VSS. BiPap at night. Very frequent green watery stools throughout the night. Tolerating IV fluids and IV ABT. Safety maintained.

## 2024-09-21 NOTE — PLAN OF CARE
Goal Outcome Evaluation:   Patient wearing Bipap at night and as needed per home usage.

## 2024-09-22 ENCOUNTER — APPOINTMENT (OUTPATIENT)
Dept: GENERAL RADIOLOGY | Facility: HOSPITAL | Age: 64
End: 2024-09-22
Payer: MEDICARE

## 2024-09-22 LAB
ANION GAP SERPL CALCULATED.3IONS-SCNC: 12 MMOL/L (ref 5–15)
BUN SERPL-MCNC: 30 MG/DL (ref 8–23)
BUN/CREAT SERPL: 14.3 (ref 7–25)
CALCIUM SPEC-SCNC: 8.2 MG/DL (ref 8.6–10.5)
CHLORIDE SERPL-SCNC: 107 MMOL/L (ref 98–107)
CO2 SERPL-SCNC: 21 MMOL/L (ref 22–29)
CREAT SERPL-MCNC: 2.1 MG/DL (ref 0.57–1)
EGFRCR SERPLBLD CKD-EPI 2021: 25.9 ML/MIN/1.73
GLUCOSE SERPL-MCNC: 120 MG/DL (ref 65–99)
POTASSIUM SERPL-SCNC: 3.5 MMOL/L (ref 3.5–5.2)
SODIUM SERPL-SCNC: 140 MMOL/L (ref 136–145)

## 2024-09-22 PROCEDURE — 25010000002 CEFTRIAXONE PER 250 MG: Performed by: FAMILY MEDICINE

## 2024-09-22 PROCEDURE — 25810000003 LACTATED RINGERS PER 1000 ML: Performed by: NURSE PRACTITIONER

## 2024-09-22 PROCEDURE — 94760 N-INVAS EAR/PLS OXIMETRY 1: CPT

## 2024-09-22 PROCEDURE — 94761 N-INVAS EAR/PLS OXIMETRY MLT: CPT

## 2024-09-22 PROCEDURE — 94664 DEMO&/EVAL PT USE INHALER: CPT

## 2024-09-22 PROCEDURE — 25010000002 METRONIDAZOLE 500 MG/100ML SOLUTION: Performed by: FAMILY MEDICINE

## 2024-09-22 PROCEDURE — 94660 CPAP INITIATION&MGMT: CPT

## 2024-09-22 PROCEDURE — 73090 X-RAY EXAM OF FOREARM: CPT

## 2024-09-22 PROCEDURE — 94799 UNLISTED PULMONARY SVC/PX: CPT

## 2024-09-22 PROCEDURE — 25010000002 HEPARIN (PORCINE) PER 1000 UNITS: Performed by: FAMILY MEDICINE

## 2024-09-22 PROCEDURE — 99232 SBSQ HOSP IP/OBS MODERATE 35: CPT | Performed by: INTERNAL MEDICINE

## 2024-09-22 PROCEDURE — 80048 BASIC METABOLIC PNL TOTAL CA: CPT | Performed by: STUDENT IN AN ORGANIZED HEALTH CARE EDUCATION/TRAINING PROGRAM

## 2024-09-22 RX ORDER — ACETAMINOPHEN 325 MG/1
650 TABLET ORAL EVERY 6 HOURS PRN
Status: DISCONTINUED | OUTPATIENT
Start: 2024-09-22 | End: 2024-09-24 | Stop reason: HOSPADM

## 2024-09-22 RX ORDER — METRONIDAZOLE 500 MG/1
500 TABLET ORAL EVERY 8 HOURS SCHEDULED
Status: DISCONTINUED | OUTPATIENT
Start: 2024-09-22 | End: 2024-09-24 | Stop reason: HOSPADM

## 2024-09-22 RX ADMIN — SODIUM CHLORIDE, POTASSIUM CHLORIDE, SODIUM LACTATE AND CALCIUM CHLORIDE 100 ML/HR: 600; 310; 30; 20 INJECTION, SOLUTION INTRAVENOUS at 18:24

## 2024-09-22 RX ADMIN — ISOSORBIDE DINITRATE 40 MG: 20 TABLET ORAL at 09:27

## 2024-09-22 RX ADMIN — Medication 2.5 MG: at 20:54

## 2024-09-22 RX ADMIN — METRONIDAZOLE 500 MG: 500 TABLET ORAL at 20:54

## 2024-09-22 RX ADMIN — AZELASTINE HYDROCHLORIDE 2 SPRAY: 137 SPRAY, METERED NASAL at 20:54

## 2024-09-22 RX ADMIN — AZELASTINE HYDROCHLORIDE 2 SPRAY: 137 SPRAY, METERED NASAL at 09:27

## 2024-09-22 RX ADMIN — IPRATROPIUM BROMIDE AND ALBUTEROL SULFATE 3 ML: .5; 3 SOLUTION RESPIRATORY (INHALATION) at 18:10

## 2024-09-22 RX ADMIN — ACETAMINOPHEN 650 MG: 325 TABLET ORAL at 12:09

## 2024-09-22 RX ADMIN — CARVEDILOL 6.25 MG: 6.25 TABLET, FILM COATED ORAL at 09:27

## 2024-09-22 RX ADMIN — ACETAMINOPHEN 650 MG: 325 TABLET ORAL at 20:54

## 2024-09-22 RX ADMIN — CARVEDILOL 6.25 MG: 6.25 TABLET, FILM COATED ORAL at 18:22

## 2024-09-22 RX ADMIN — HEPARIN SODIUM 5000 UNITS: 5000 INJECTION INTRAVENOUS; SUBCUTANEOUS at 20:54

## 2024-09-22 RX ADMIN — IPRATROPIUM BROMIDE AND ALBUTEROL SULFATE 3 ML: .5; 3 SOLUTION RESPIRATORY (INHALATION) at 06:34

## 2024-09-22 RX ADMIN — GUAIFENESIN 600 MG: 600 TABLET, EXTENDED RELEASE ORAL at 20:54

## 2024-09-22 RX ADMIN — Medication 250 MG: at 09:27

## 2024-09-22 RX ADMIN — CALCITRIOL CAPSULES 0.25 MCG 0.25 MCG: 0.25 CAPSULE ORAL at 09:28

## 2024-09-22 RX ADMIN — ISOSORBIDE DINITRATE 40 MG: 20 TABLET ORAL at 18:23

## 2024-09-22 RX ADMIN — GUAIFENESIN 600 MG: 600 TABLET, EXTENDED RELEASE ORAL at 02:34

## 2024-09-22 RX ADMIN — HEPARIN SODIUM 5000 UNITS: 5000 INJECTION INTRAVENOUS; SUBCUTANEOUS at 09:28

## 2024-09-22 RX ADMIN — METRONIDAZOLE 500 MG: 500 TABLET ORAL at 14:55

## 2024-09-22 RX ADMIN — METRONIDAZOLE 500 MG: 500 INJECTION, SOLUTION INTRAVENOUS at 02:34

## 2024-09-22 RX ADMIN — SODIUM CHLORIDE 1000 MG: 900 INJECTION INTRAVENOUS at 12:08

## 2024-09-22 RX ADMIN — HYDROCODONE BITARTRATE AND ACETAMINOPHEN 1 TABLET: 5; 325 TABLET ORAL at 18:22

## 2024-09-22 RX ADMIN — IPRATROPIUM BROMIDE AND ALBUTEROL SULFATE 3 ML: .5; 3 SOLUTION RESPIRATORY (INHALATION) at 09:58

## 2024-09-22 RX ADMIN — Medication 250 MG: at 20:54

## 2024-09-22 RX ADMIN — IPRATROPIUM BROMIDE AND ALBUTEROL SULFATE 3 ML: .5; 3 SOLUTION RESPIRATORY (INHALATION) at 14:38

## 2024-09-22 NOTE — PROGRESS NOTES
Nephrology ([facility]) Progress Note      Patient:  Ludivina Ramirez  YOB: 1960  Date of Service: 9/21/2024  MRN: [id number]   Acct: [id number]   Primary Care Physician: Orville Weston MD  Advance Directive:   There are no questions and answers to display.     Admit Date: 9/19/2024       Hospital Day: 2  Referring Provider: No ref. provider found      Patient personally seen and examined.  Complete chart including Consults, Notes, Operative Reports, Labs, Cardiology, and Radiology studies reviewed as able.        Subjective:  Ludivina Ramirez is a 64 y.o. female for whom we were consulted for evaluation and treatment of acute kidney injury. Baseline chronic kidney disease stage 4. History of MATTY in June that required short term hemodialysis with last HD in mid-July. Patient was most recently seen by Dr Lazo in the hospital on 8/23-8/26. Discharged with creatinine 2.1 which is her baseline level. She did not show up for her hospital follow up visit in the office. Other history includes hypertension, CHF, coronary artery disease. Presented to ER with four days of cramping abdominal pain. Having profuse diarrhea but no nausea or vomiting. Unable to maintain adequate PO intake during this time. Denied changes in urination. No dysuria or hematuria. No edema. No NSAID use. Labs in ER revealed creatinine 5.36, BUN 78, sodium 132.  Started on bicarbonate IV fluids and admitted to medical floor. Initially, she was awake and alert. Felt somewhat better this morning but was still complaining of diffuse abdominal pain and frequent diarrhea.      Today, no overnight events.  Renal function improved with fluids.  Still having diarrhea with Salmonella but overall feeling better.  No new complaints upon questioning.    Allergies:  Levaquin [levofloxacin], Codeine, and Sumatriptan    Home Meds:  Medications Prior to Admission   Medication Sig Dispense Refill Last Dose    aspirin 81 MG EC  tablet Take 1 tablet by mouth Daily. 90 tablet 3     calcitriol (ROCALTROL) 0.25 MCG capsule Take 1 capsule by mouth Daily.       carvedilol (COREG) 25 MG tablet Take 1 tablet by mouth 2 (Two) Times a Day With Meals.       furosemide (LASIX) 40 MG tablet Take 1 tablet by mouth Daily.       hydroCHLOROthiazide 25 MG tablet Take 1 tablet by mouth Daily.       albuterol sulfate  (90 Base) MCG/ACT inhaler Inhale 2 puffs Every 4 (Four) Hours As Needed for Wheezing. 20.1 g 3     azelastine (ASTELIN) 0.1 % nasal spray 2 sprays into the nostril(s) as directed by provider 2 (Two) Times a Day. 30 mL 11     cloNIDine (CATAPRES) 0.1 MG tablet Take 1 tablet by mouth Every 6 (Six) Hours As Needed for High Blood Pressure (SBP >160). 30 tablet 1     docusate sodium (COLACE) 250 MG capsule Take 1 capsule by mouth Daily.       fluticasone (FLONASE) 50 MCG/ACT nasal spray 2 sprays into the nostril(s) as directed by provider Every Morning. In each nostril 16 g 11     isosorbide dinitrate (ISORDIL) 40 MG tablet Take 1 tablet by mouth 2 (Two) Times a Day. 180 tablet 3     losartan (COZAAR) 100 MG tablet Take 1 tablet by mouth Daily.       melatonin 3 MG tablet Take 2 tablets by mouth Every Night. 30 tablet 0     nitroglycerin (NITROSTAT) 0.4 MG SL tablet Place 1 tablet under the tongue Every 5 (Five) Minutes As Needed for Chest Pain. Take no more than 3 doses in 15 minutes.          Medicines:  Current Facility-Administered Medications   Medication Dose Route Frequency Provider Last Rate Last Admin    azelastine (ASTELIN) nasal spray 2 spray  2 spray Each Nare BID Orville Weston MD   2 spray at 09/21/24 2010    calcitriol (ROCALTROL) capsule 0.25 mcg  0.25 mcg Oral Daily Orville Weston MD   0.25 mcg at 09/21/24 0850    carvedilol (COREG) tablet 6.25 mg  6.25 mg Oral BID With Meals Orville Weston MD   6.25 mg at 09/21/24 1809    cefTRIAXone (ROCEPHIN) 1,000 mg in sodium chloride 0.9 % 100 mL MBP  1,000 mg  Intravenous Q24H Orville Weston  mL/hr at 09/21/24 1202 1,000 mg at 09/21/24 1202    cloNIDine (CATAPRES) tablet 0.1 mg  0.1 mg Oral 4x Daily PRN Alfonso Walker MD   0.1 mg at 09/21/24 1613    guaiFENesin (MUCINEX) 12 hr tablet 600 mg  600 mg Oral Q12H PRN Orville Weston MD        heparin (porcine) 5000 UNIT/ML injection 5,000 Units  5,000 Units Subcutaneous Q12H Orville Westno MD   5,000 Units at 09/21/24 2010    HYDROcodone-acetaminophen (NORCO) 5-325 MG per tablet 1 tablet  1 tablet Oral Q6H PRN Orville Weston MD   1 tablet at 09/21/24 2010    ipratropium-albuterol (DUO-NEB) nebulizer solution 3 mL  3 mL Nebulization 4x Daily - RT Orville Weston MD   3 mL at 09/21/24 1821    isosorbide dinitrate (ISORDIL) tablet 40 mg  40 mg Oral BID - Nitrates Orville Weston MD   40 mg at 09/21/24 1809    lactated ringers infusion  100 mL/hr Intravenous Continuous Quinn Rodarte APRN 100 mL/hr at 09/21/24 0851 100 mL/hr at 09/21/24 0851    melatonin tablet 2.5 mg  2.5 mg Oral Nightly PRN Orville Weston MD   2.5 mg at 09/21/24 2010    metroNIDAZOLE (FLAGYL) IVPB 500 mg  500 mg Intravenous Q8H Orville Weston  mL/hr at 09/21/24 1808 500 mg at 09/21/24 1808    saccharomyces boulardii (FLORASTOR) capsule 250 mg  250 mg Oral BID Paras Mchugh MD   250 mg at 09/21/24 1645       Past Medical History:  Past Medical History:   Diagnosis Date    Acute CHF     Acute renal failure (ARF)     Anemia     Asthma     childhood    Bowel disease, inflammatory     Chronic kidney disease     Coronary artery disease     Hypertension     Ischemic colitis     Lung nodule, multiple 04/03/2024    Metabolic acidosis     Myocardial infarction 11/07/2020    Nonrheumatic aortic (valve) insufficiency     PTSD (post-traumatic stress disorder)        Past Surgical History:  Past Surgical History:   Procedure Laterality Date    CARDIAC CATHETERIZATION N/A 11/08/2020    Procedure:  Left Heart Cath;  Surgeon: Pierce Buchanan MD;  Location:  [facility]ATH [facility]AS[facility];  Service: Cardiovascular;  Laterality: N/A;    CLOSED REDUCTION ANKLE FRACTURE Right     13 screws and a plate    EXTERNAL FIXATION WRIST FRACTURE      INSERTION HEMODIALYSIS CATHETER Right 2022    Procedure: INSERTION OF RIGHT INTERNAL JUGULAR HEMODIALYSIS CATHETER;  Surgeon: Dexter Red MD;  Location:  [facility] OR;  Service: Vascular;  Laterality: Right;    TUBAL ABDOMINAL LIGATION         Family History  Family History   Problem Relation Age of Onset    Coronary artery disease Mother     Coronary artery disease Father     Breast cancer Neg Hx        Social History  Social History     Socioeconomic History    Marital status: Single   Tobacco Use    Smoking status: Every Day     Current packs/day: 0.00     Average packs/day: 0.5 packs/day for 48.7 years (24.4 ttl pk-yrs)     Types: Cigarettes     Start date:      Last attempt to quit: 2023     Years since quittin.9     Passive exposure: Past    Smokeless tobacco: Never    Tobacco comments:     Smokes about 0.5 pack daily 4/10    Vaping Use    Vaping status: Never Used   Substance and Sexual Activity    Alcohol use: No    Drug use: No    Sexual activity: Defer       Review of Systems:  History obtained from chart review and the patient  General ROS: No fever or chills  Respiratory ROS: No cough, shortness of breath, wheezing  Cardiovascular ROS: No chest pain or palpitations  Gastrointestinal ROS: No abdominal pain or melena  Genito-Urinary ROS: No dysuria or hematuria  Psych ROS: No anxiety and depression  14 point ROS reviewed with the patient and negative except as noted above and in the HPI unless unable to obtain.    Objective:  Patient Vitals for the past 24 hrs:   BP Temp Temp src Pulse Resp SpO2   24 1945 151/85 98 °F (36.7 °C) Oral 75 18 94 %   24 1833 -- -- -- 89 16 93 %   24 1821 -- -- -- 88 14 94 %   24 1809 152/95  -- -- 92 -- --   09/21/24 1535 (!) 182/103 -- -- -- -- --   09/21/24 1528 (!) 190/99 98.7 °F (37.1 °C) Oral 92 22 94 %   09/21/24 1142 143/80 97.9 °F (36.6 °C) Oral 93 18 96 %   09/21/24 1059 -- -- -- 73 16 96 %   09/21/24 1051 -- -- -- 76 16 94 %   09/21/24 0741 163/83 98.1 °F (36.7 °C) Axillary 65 17 97 %   09/21/24 0715 -- -- -- 68 18 97 %   09/21/24 0706 -- -- -- 71 18 97 %   09/21/24 0444 147/72 97.7 °F (36.5 °C) Oral 70 18 99 %   09/21/24 0005 127/75 97.9 °F (36.6 °C) Oral 77 18 97 %       Intake/Output Summary (Last 24 hours) at 9/21/2024 2112  Last data filed at 9/21/2024 2010  Gross per 24 hour   Intake 3511.67 ml   Output --   Net 3511.67 ml     General: awake/alert   Chest:  clear to auscultation bilaterally without respiratory distress  CVS: regular rate and rhythm  Abdominal: soft, nontender, positive bowel sounds  Extremities: no cyanosis or edema  Skin: warm and dry without rash      Labs:  Results from last 7 days   Lab Units 09/19/24  1458   WBC 10*3/mm3 4.46   HEMOGLOBIN g/dL 13.0   HEMATOCRIT % 41.9   PLATELETS 10*3/mm3 192         Results from last 7 days   Lab Units 09/21/24  0731 09/20/24  0500 09/19/24  1458   SODIUM mmol/L 138 135*  135* 132*   POTASSIUM mmol/L 3.4* 3.2*  3.2* 3.7   CHLORIDE mmol/L 103 97*  97* 97*   CO2 mmol/L 24.0 22.0  24.0 20.0*   BUN mg/dL 51* 72* 78*   CREATININE mg/dL 2.95* 4.37* 5.36*   CALCIUM mg/dL 8.3* 8.4* 8.0*   EGFR mL/min/1.73 17.2* 10.7* 8.4*   BILIRUBIN mg/dL  --   --  0.4   ALK PHOS U/L  --   --  91   ALT (SGPT) U/L  --   --  10   AST (SGOT) U/L  --   --  14   GLUCOSE mg/dL 92 117* 102*       Radiology:   Imaging Results (Last 72 Hours)       Procedure Component Value Units Date/Time    CT Abdomen Pelvis Without Contrast [[id number]] Collected: 09/19/24 1706     Updated: 09/19/24 1716    Narrative:      EXAMINATION:  CT ABDOMEN PELVIS WO CONTRAST-  9/19/2024 3:12 PM     HISTORY: Generalized abdominal pain and diarrhea. N17.9-Acute kidney  failure,  unspecified; R19.7-Diarrhea, unspecified;  E83.41-Hypermagnesemia.     TECHNIQUE: Spiral CT was performed of the abdomen and pelvis without  contrast. Multiplanar images were reconstructed.     DLP: 694.75 mGy.cm Automated dosage reduction technique was utilized to  reduce patient dosage.     COMPARISON: 10/12/2022.     LUNG BASES: The lung bases are clear. There is coronary artery  calcification. There is cardiomegaly. There is mitral valve  calcification.     LIVER AND SPLEEN: Normal unenhanced appearance of the liver. Normal  unenhanced appearance of the spleen. There are no dense gallstones.     PANCREAS: Normal unenhanced appearance of the pancreas.     KIDNEYS AND ADRENALS: The adrenal glands are normal in size. There is  some cortical scarring of the left kidney. The unenhanced right kidney  is unremarkable. The ureters are nondilated. The bladder is not well  distended.     BOWEL: No oral contrast was given. There is a small hiatal hernia. There  is under distention of the stomach. Small bowel loops are normal in  caliber. There is under distention of most of the colon. There is some  questionable thickening of the colon wall in the cecum and ascending  colon. There is definitive wall thickening in the mid sigmoid colon with  mild stranding of the adjacent fat. There is mild diverticulosis of the  colon. No perforation or abscess is visualized.     OTHER: There is atheromatous disease of the aortoiliac vessels. There is  a 3 cm abdominal aortic aneurysm. The unenhanced uterus is unremarkable  there are degenerative changes of the spine and bilateral hips..          Impression:      1. Definitive wall thickening in the mid sigmoid colon with mild  stranding of the adjacent fat. There is diverticulosis in this area. The  findings are most likely due to diverticulitis.: Neoplasm less likely.  2. There is a questionable area of wall thickening involving the cecum  and ascending colon versus artifact of under  "distention.  3. Small hiatal hernia.  4. Atheromatous disease of the aortoiliac vessels and coronary arteries.  Abdominal aortic aneurysm measuring 3 cm. Cardiomegaly. Mitral valve  calcification.  5. Cortical scarring left kidney.  6. Degenerative changes of the spine and hips.        The full report of this exam was immediately signed and available to the  emergency room. The patient is currently in the emergency room.     This report was signed and finalized on 9/19/2024 5:13 PM by Dr. Jefe Beltre MD.               Culture:  No results found for: \"BLOODCX\", \"URINECX\", \"WOUNDCX\", \"MRSACX\", \"RESPCX\", \"STOOLCX\"      Assessment   Acute kidney injury-prerenal  Chronic kidney disease stage IV  Metabolic acidosis  Hyponatremia  Hypertension  Salmonella gastroenteritis     Plan:  Discussed with patient, nursing  Workup reviewed today  Monitor labs  Fluid trial with success, continue for now until diarrhea abates  Reassess in the morning  Antibiotics per primary service  GI evaluation reviewed as current      Don Negrete MD  9/21/2024  21:12 CDT      "

## 2024-09-22 NOTE — PLAN OF CARE
Goal Outcome Evaluation:   The goal is to get patient to wear bipap.

## 2024-09-22 NOTE — PROGRESS NOTES
[facility]  Inpatient Progress Note  Today's date:  09/22/24    Ludivina Ramirez  1960       Reason for Follow Up:   Abdominal pain, Diarrhea    Subjective:   Abdominal pain improving. Still has some pain with bowel movements, but the pain is much better since admission. Still with diarrhea. She reports having several episodes of loose stool over the past 24 hours, but says that it is improving.       Current Facility-Administered Medications:     acetaminophen (TYLENOL) tablet 650 mg, 650 mg, Oral, Q6H PRN, Alfonso Walker MD    azelastine (ASTELIN) nasal spray 2 spray, 2 spray, Each Nare, BID, Orville Weston MD, 2 spray at 09/22/24 0927    calcitriol (ROCALTROL) capsule 0.25 mcg, 0.25 mcg, Oral, Daily, Orville Weston MD, 0.25 mcg at 09/22/24 0928    carvedilol (COREG) tablet 6.25 mg, 6.25 mg, Oral, BID With Meals, Orville Weston MD, 6.25 mg at 09/22/24 0927    cefTRIAXone (ROCEPHIN) 1,000 mg in sodium chloride 0.9 % 100 mL MBP, 1,000 mg, Intravenous, Q24H, Orville Weston MD, Last Rate: 200 mL/hr at 09/21/24 1202, 1,000 mg at 09/21/24 1202    cloNIDine (CATAPRES) tablet 0.1 mg, 0.1 mg, Oral, 4x Daily PRN, Alfonso Walker MD, 0.1 mg at 09/21/24 1613    guaiFENesin (MUCINEX) 12 hr tablet 600 mg, 600 mg, Oral, Q12H PRN, Orville Weston MD, 600 mg at 09/22/24 0234    heparin (porcine) 5000 UNIT/ML injection 5,000 Units, 5,000 Units, Subcutaneous, Q12H, Orville Weston MD, 5,000 Units at 09/22/24 0928    HYDROcodone-acetaminophen (NORCO) 5-325 MG per tablet 1 tablet, 1 tablet, Oral, Q6H PRN, Orville Weston MD, 1 tablet at 09/21/24 2010    ipratropium-albuterol (DUO-NEB) nebulizer solution 3 mL, 3 mL, Nebulization, 4x Daily - RT, Orville Weston MD, 3 mL at 09/22/24 0634    isosorbide dinitrate (ISORDIL) tablet 40 mg, 40 mg, Oral, BID - Nitrates, Orville Weston MD, 40 mg at 09/22/24 0927    lactated ringers infusion, 100  mL/hr, Intravenous, Continuous, Quinn Rodarte, APRN, Last Rate: 100 mL/hr at 09/21/24 2300, 100 mL/hr at 09/21/24 2300    melatonin tablet 2.5 mg, 2.5 mg, Oral, Nightly PRN, Orville Weston MD, 2.5 mg at 09/21/24 2010    metroNIDAZOLE (FLAGYL) tablet 500 mg, 500 mg, Oral, Q8H, Orville Weston MD    saccharomyces boulardii (FLORASTOR) capsule 250 mg, 250 mg, Oral, BID, Paras Mchugh MD, 250 mg at 09/22/24 0927      Vital Signs:  Temp:  [97.9 °F (36.6 °C)-98.7 °F (37.1 °C)] 98.7 °F (37.1 °C)  Heart Rate:  [73-93] 90  Resp:  [14-22] 18  BP: (143-190)/() 145/83    Physical Exam:  General: no acute distress, alert and oriented  HEENT: perrl, no sclera icterus  CV: rrr, no murmur  Resp: lungs clear to auscultation, no wheeze or rhonchi  Abd: soft, nontender, nondistended, no rebound our guarding  Skin: no rash, no jaundice     Results Review:   I have reviewed all of the patient's current test results    Results from last 7 days   Lab Units 09/19/24  1458   WBC 10*3/mm3 4.46   HEMOGLOBIN g/dL 13.0   HEMATOCRIT % 41.9   PLATELETS 10*3/mm3 192       Results from last 7 days   Lab Units 09/21/24  0731 09/20/24  0500 09/19/24  1458   SODIUM mmol/L 138 135*  135* 132*   POTASSIUM mmol/L 3.4* 3.2*  3.2* 3.7   CHLORIDE mmol/L 103 97*  97* 97*   CO2 mmol/L 24.0 22.0  24.0 20.0*   BUN mg/dL 51* 72* 78*   CREATININE mg/dL 2.95* 4.37* 5.36*   CALCIUM mg/dL 8.3* 8.4* 8.0*   BILIRUBIN mg/dL  --   --  0.4   ALK PHOS U/L  --   --  91   ALT (SGPT) U/L  --   --  10   AST (SGOT) U/L  --   --  14   GLUCOSE mg/dL 92 117* 102*             Lab Results   Lab Value Date/Time    LIPASE 43 09/19/2024 1458    LIPASE 74 (H) 08/21/2022 0418    LIPASE 54 06/02/2022 2332    LIPASE 61 (H) 11/07/2020 2119       Impression:  Diverticulitis. Abdominal pain improved. CT with thickening and stranding in the sigmoid colon with suspected sigmoid diverticulitis.  Diarrhea. Stool culture positive for Salmonella. Still with  persistent diarrhea, but slowly improving.     Plan:  Continue antibiotics. Currently on Rocephin and Flagyl. Will advance to soft diet. Recommend outpatient colonoscopy once acute infection and diverticulitis has resolved.     Diaz Ferguson MD  09/22/24  09:45 CDT

## 2024-09-22 NOTE — PROGRESS NOTES
Pharmacy Dosing Service  Automatic IV to PO Conversion  Flagyl    Assessment/Action/Plan:  Patient meets criteria listed below. Flagyl 500 mg IV every 8 hours has been changed to Flagyl 500 mg PO every 8 hours.     Subjective:  Ludivina Ramirez is a 64 y.o. female who meets the following criteria for IV to PO therapy conversion     Policy Criteria:  Tolerating oral fluids or 40ml/hour of enteral nutrition and oral route not otherwise compromised  Receiving other oral medications on a scheduled basis  Afebrile (temperature less than 100.4 degrees F) for at least 24 hours  WBC within/decreasing toward normal range    Additional Factors Considered:  Anti-emetic usage  Patient disposition per documentation  Disease states or conditions contraindicating oral conversion    Objective:  Lab Results   Component Value Date    WBC 4.46 09/19/2024     Temp Readings from Last 1 Encounters:   09/22/24 98.7 °F (37.1 °C) (Oral)     Diet Order   Procedures    Diet: Regular/House; Fluid Consistency: Thin (IDDSI 0)       Active Medications    Current Facility-Administered Medications:     azelastine (ASTELIN) nasal spray 2 spray, 2 spray, Each Nare, BID, Orville Weston MD, 2 spray at 09/21/24 2010    calcitriol (ROCALTROL) capsule 0.25 mcg, 0.25 mcg, Oral, Daily, Orville Weston MD, 0.25 mcg at 09/21/24 0850    carvedilol (COREG) tablet 6.25 mg, 6.25 mg, Oral, BID With Meals, Orville Weston MD, 6.25 mg at 09/21/24 1809    cefTRIAXone (ROCEPHIN) 1,000 mg in sodium chloride 0.9 % 100 mL MBP, 1,000 mg, Intravenous, Q24H, Orville Weston MD, Last Rate: 200 mL/hr at 09/21/24 1202, 1,000 mg at 09/21/24 1202    cloNIDine (CATAPRES) tablet 0.1 mg, 0.1 mg, Oral, 4x Daily PRN, Alfonso Walker MD, 0.1 mg at 09/21/24 1613    guaiFENesin (MUCINEX) 12 hr tablet 600 mg, 600 mg, Oral, Q12H PRN, Orville Weston MD, 600 mg at 09/22/24 0234    heparin (porcine) 5000 UNIT/ML injection 5,000 Units, 5,000 Units,  Subcutaneous, Q12H, Orville Weston MD, 5,000 Units at 09/21/24 2010    HYDROcodone-acetaminophen (NORCO) 5-325 MG per tablet 1 tablet, 1 tablet, Oral, Q6H PRN, Orville Weston MD, 1 tablet at 09/21/24 2010    ipratropium-albuterol (DUO-NEB) nebulizer solution 3 mL, 3 mL, Nebulization, 4x Daily - RT, Orville Weston MD, 3 mL at 09/22/24 0634    isosorbide dinitrate (ISORDIL) tablet 40 mg, 40 mg, Oral, BID - Nitrates, Orville Weston MD, 40 mg at 09/21/24 1809    lactated ringers infusion, 100 mL/hr, Intravenous, Continuous, Quinn Rodarte, APRN, Last Rate: 100 mL/hr at 09/21/24 2300, 100 mL/hr at 09/21/24 2300    melatonin tablet 2.5 mg, 2.5 mg, Oral, Nightly PRN, Orville Weston MD, 2.5 mg at 09/21/24 2010    metroNIDAZOLE (FLAGYL) tablet 500 mg, 500 mg, Oral, Q8H, Orville Weston MD    saccharomyces boulardii (FLORASTOR) capsule 250 mg, 250 mg, Oral, BID, Paras Mchugh MD, 250 mg at 09/21/24 1645    JENNIFER Vila, PharmD  09/22/2409:21 CDT

## 2024-09-22 NOTE — PLAN OF CARE
Goal Outcome Evaluation:  Plan of Care Reviewed With: patient        Progress: no change  Outcome Evaluation: Pt remains A&Ox4 this shift. VSS on RA. PIV to right FA remains in place, CDI and infusing. Pt wears bipap at night tolerating well. Multiple episodes of diarrhea this shift. Pt ambulates independently to BSC tolerating ambulation well. Pt c/o pain, medicated per MAR. Pt takes meds whole. Pt tolerating diet, fluids as ordered. Call light within reach and safety maintained.

## 2024-09-22 NOTE — PLAN OF CARE
Goal Outcome Evaluation:  Plan of Care Reviewed With: patient        Progress: improving  Outcome Evaluation: Patient reporting less diarrhea today- Up ad vern and up to BSC today- able to shower and perform care. Patient did report left forearm pain which was reported to doctor  on call- XRAY order recieved. Patient IVF going and antibiotics given as ordered. Tylenol given for discomfort. Safety maintained. Continue to monitor BP

## 2024-09-22 NOTE — PLAN OF CARE
Problem: Noninvasive Ventilation Acute  Goal: Effective Unassisted Ventilation and Oxygenation  Outcome: Ongoing, Progressing   Goal Outcome Evaluation:   Pt tried to use BIPAP last night states she couldn't due to coughing. Pt sats today have been in the mid to upper 90's on room air.

## 2024-09-22 NOTE — PROGRESS NOTES
RT EQUIPMENT DEVICE RELATED - SKIN ASSESSMENT    Griffin Score:  Griffin Score: 23     RT Medical Equipment/Device:     NIV Mask:  Under-the-nose   size:  B    Skin Assessment:      Nares:  Intact  Mouth:  Intact    Device Skin Pressure Protection:  Skin-to skin areas padded    Nurse Notification:  Essence Bowser, RRT

## 2024-09-22 NOTE — PROGRESS NOTES
"    Daily Progress Note  Ludivina Ramirez  MRN: [id number] LOS: 3    Admit Date: 2024 09:19 CDT    Subjective:         Interval History:    Reviewed overnight events and nursing notes.     Doing well, still having some diarrhea.  Otherwise no new complaints.    ROS:  Review of Systems   Constitutional:  Negative for chills and fever.   Respiratory:  Negative for cough, chest tightness and shortness of breath.    Cardiovascular:  Negative for chest pain.   Gastrointestinal:  Positive for diarrhea. Negative for abdominal pain, nausea and vomiting.       DIET:  Diet: Regular/House; Fluid Consistency: Thin (IDDSI 0)    Medications:   lactated ringers, 100 mL/hr, Last Rate: 100 mL/hr (24 2300)      azelastine, 2 spray, Each Nare, BID  calcitriol, 0.25 mcg, Oral, Daily  carvedilol, 6.25 mg, Oral, BID With Meals  cefTRIAXone, 1,000 mg, Intravenous, Q24H  heparin (porcine), 5,000 Units, Subcutaneous, Q12H  ipratropium-albuterol, 3 mL, Nebulization, 4x Daily - RT  isosorbide dinitrate, 40 mg, Oral, BID - Nitrates  metroNIDAZOLE, 500 mg, Intravenous, Q8H  saccharomyces boulardii, 250 mg, Oral, BID          Objective:     Vitals: /83 (BP Location: Left arm, Patient Position: Lying)   Pulse 90   Temp 98.7 °F (37.1 °C) (Oral)   Resp 18   Ht 167.6 cm (66\")   Wt 91.1 kg (200 lb 14.4 oz)   LMP  (LMP Unknown)   SpO2 94%   BMI 32.43 kg/m²    Intake/Output Summary (Last 24 hours) at 2024 09  Last data filed at 2024  Gross per 24 hour   Intake 920 ml   Output --   Net 920 ml    Temp (24hrs), Av.3 °F (36.8 °C), Min:97.9 °F (36.6 °C), Max:98.7 °F (37.1 °C)    Glucose:  Lab Results   Component Value Date    POCGLU 101 2024    POCGLU 159 (H) 2021    POCGLU 134 (H) 2021    POCGLU 145 (H) 2021     Physical Examination:   Physical Exam  Constitutional:       General: She is not in acute distress.     Appearance: Normal appearance. She is not ill-appearing or " toxic-appearing.   Cardiovascular:      Rate and Rhythm: Normal rate and regular rhythm.      Pulses: Normal pulses.      Heart sounds: Normal heart sounds. No murmur heard.  Pulmonary:      Effort: Pulmonary effort is normal. No respiratory distress.      Breath sounds: Normal breath sounds. No stridor. No wheezing or rales.   Abdominal:      General: Abdomen is flat. Bowel sounds are normal. There is no distension.      Palpations: Abdomen is soft.      Tenderness: There is no abdominal tenderness.   Neurological:      Mental Status: She is alert.         Labs:  Lab Results (last 24 hours)       ** No results found for the last 24 hours. **             Imaging:  No radiology results from the last 24 hrs       Assessment and Plan:     Primary Problem:  Acute kidney injury    Hospital Problem list:    Acute kidney injury        Plan:    -GI following continue antibiotics.  Will advance diet today and hopefully can discharge in next 1 to 2 days  -Labs pending for today, will recheck creatinine but I anticipate it will return to baseline.      Reviewed treatment plans with the patient and/or family.     Code Status:   There are no questions and answers to display.       Electronically signed by Alfonso Walker MD on 9/22/2024 at 09:19 CDT

## 2024-09-22 NOTE — PROGRESS NOTES
RT EQUIPMENT DEVICE RELATED - SKIN ASSESSMENT    Griffin Score:  Griffin Score: 23     RT Medical Equipment/Device:     NIV Mask:  Under-the-nose   size:  B    Skin Assessment:      Cheek:  Intact  Chin:  Intact  Nares:  Intact  Neck:  Intact    Device Skin Pressure Protection:  Positioning supports utilized, Pressure points protected, and Skin-to-device areas padded:  None Required    Nurse Notification:  Essence Cummins, RRT

## 2024-09-22 NOTE — PROGRESS NOTES
Nephrology ([facility]) Progress Note      Patient:  Ludivina Ramirez  YOB: 1960  Date of Service: 9/22/2024  MRN: [id number]   Acct: [id number]   Primary Care Physician: Orville Weston MD  Advance Directive:   There are no questions and answers to display.     Admit Date: 9/19/2024       Hospital Day: 3  Referring Provider: No ref. provider found      Patient personally seen and examined.  Complete chart including Consults, Notes, Operative Reports, Labs, Cardiology, and Radiology studies reviewed as able.        Subjective:  Ludivina Ramirez is a 64 y.o. female for whom we were consulted for evaluation and treatment of acute kidney injury. Baseline chronic kidney disease stage 4. History of MATTY in June that required short term hemodialysis with last HD in mid-July. Patient was most recently seen by Dr Lazo in the hospital on 8/23-8/26. Discharged with creatinine 2.1 which is her baseline level. She did not show up for her hospital follow up visit in the office. Other history includes hypertension, CHF, coronary artery disease. Presented to ER with four days of cramping abdominal pain. Having profuse diarrhea but no nausea or vomiting. Unable to maintain adequate PO intake during this time. Denied changes in urination. No dysuria or hematuria. No edema. No NSAID use. Labs in ER revealed creatinine 5.36, BUN 78, sodium 132.  Started on bicarbonate IV fluids and admitted to medical floor. Initially, she was awake and alert. Felt somewhat better this morning but was still complaining of diffuse abdominal pain and frequent diarrhea.      Today, no overnight events.  Renal function improved with fluids.  Still having diarrhea with Salmonella but overall feeling better and continuing to improve.  No new complaints upon questioning.    Allergies:  Levaquin [levofloxacin], Codeine, and Sumatriptan    Home Meds:  Medications Prior to Admission   Medication Sig Dispense Refill Last Dose     aspirin 81 MG EC tablet Take 1 tablet by mouth Daily. 90 tablet 3     calcitriol (ROCALTROL) 0.25 MCG capsule Take 1 capsule by mouth Daily.       carvedilol (COREG) 25 MG tablet Take 1 tablet by mouth 2 (Two) Times a Day With Meals.       furosemide (LASIX) 40 MG tablet Take 1 tablet by mouth Daily.       hydroCHLOROthiazide 25 MG tablet Take 1 tablet by mouth Daily.       albuterol sulfate  (90 Base) MCG/ACT inhaler Inhale 2 puffs Every 4 (Four) Hours As Needed for Wheezing. 20.1 g 3     azelastine (ASTELIN) 0.1 % nasal spray 2 sprays into the nostril(s) as directed by provider 2 (Two) Times a Day. 30 mL 11     cloNIDine (CATAPRES) 0.1 MG tablet Take 1 tablet by mouth Every 6 (Six) Hours As Needed for High Blood Pressure (SBP >160). 30 tablet 1     docusate sodium (COLACE) 250 MG capsule Take 1 capsule by mouth Daily.       fluticasone (FLONASE) 50 MCG/ACT nasal spray 2 sprays into the nostril(s) as directed by provider Every Morning. In each nostril 16 g 11     isosorbide dinitrate (ISORDIL) 40 MG tablet Take 1 tablet by mouth 2 (Two) Times a Day. 180 tablet 3     losartan (COZAAR) 100 MG tablet Take 1 tablet by mouth Daily.       melatonin 3 MG tablet Take 2 tablets by mouth Every Night. 30 tablet 0     nitroglycerin (NITROSTAT) 0.4 MG SL tablet Place 1 tablet under the tongue Every 5 (Five) Minutes As Needed for Chest Pain. Take no more than 3 doses in 15 minutes.          Medicines:  Current Facility-Administered Medications   Medication Dose Route Frequency Provider Last Rate Last Admin    acetaminophen (TYLENOL) tablet 650 mg  650 mg Oral Q6H PRN Alfonso Walker MD   650 mg at 09/22/24 1209    azelastine (ASTELIN) nasal spray 2 spray  2 spray Each Nare BID Orville Weston MD   2 spray at 09/22/24 0927    calcitriol (ROCALTROL) capsule 0.25 mcg  0.25 mcg Oral Daily Orville Weston MD   0.25 mcg at 09/22/24 0928    carvedilol (COREG) tablet 6.25 mg  6.25 mg Oral BID With Meals Enrico  Orville Lora MD   6.25 mg at 09/22/24 1822    cefTRIAXone (ROCEPHIN) 1,000 mg in sodium chloride 0.9 % 100 mL MBP  1,000 mg Intravenous Q24H Orville Weston  mL/hr at 09/22/24 1208 1,000 mg at 09/22/24 1208    cloNIDine (CATAPRES) tablet 0.1 mg  0.1 mg Oral 4x Daily PRN Alfonso Walker MD   0.1 mg at 09/21/24 1613    guaiFENesin (MUCINEX) 12 hr tablet 600 mg  600 mg Oral Q12H PRN Orville Weston MD   600 mg at 09/22/24 0234    heparin (porcine) 5000 UNIT/ML injection 5,000 Units  5,000 Units Subcutaneous Q12H Orville Weston MD   5,000 Units at 09/22/24 0928    HYDROcodone-acetaminophen (NORCO) 5-325 MG per tablet 1 tablet  1 tablet Oral Q6H PRN Orville Weston MD   1 tablet at 09/22/24 1822    ipratropium-albuterol (DUO-NEB) nebulizer solution 3 mL  3 mL Nebulization 4x Daily - RT Orville Weston MD   3 mL at 09/22/24 1810    isosorbide dinitrate (ISORDIL) tablet 40 mg  40 mg Oral BID - Nitrates Orville Weston MD   40 mg at 09/22/24 1823    lactated ringers infusion  100 mL/hr Intravenous Continuous Quinn Rodarte APRN 100 mL/hr at 09/22/24 1824 100 mL/hr at 09/22/24 1824    melatonin tablet 2.5 mg  2.5 mg Oral Nightly PRN Orville Weston MD   2.5 mg at 09/21/24 2010    metroNIDAZOLE (FLAGYL) tablet 500 mg  500 mg Oral Q8H Orville Weston MD   500 mg at 09/22/24 1455    saccharomyces boulardii (FLORASTOR) capsule 250 mg  250 mg Oral BID Paras Mchugh MD   250 mg at 09/22/24 0927       Past Medical History:  Past Medical History:   Diagnosis Date    Acute CHF     Acute renal failure (ARF)     Anemia     Asthma     childhood    Bowel disease, inflammatory     Chronic kidney disease     Coronary artery disease     Hypertension     Ischemic colitis     Lung nodule, multiple 04/03/2024    Metabolic acidosis     Myocardial infarction 11/07/2020    Nonrheumatic aortic (valve) insufficiency     PTSD (post-traumatic stress disorder)        Past Surgical  History:  Past Surgical History:   Procedure Laterality Date    CARDIAC CATHETERIZATION N/A 2020    Procedure: Left Heart Cath;  Surgeon: Pierce Buchanan MD;  Location:  [facility]ATH [facility]AS[facility];  Service: Cardiovascular;  Laterality: N/A;    CLOSED REDUCTION ANKLE FRACTURE Right     13 screws and a plate    EXTERNAL FIXATION WRIST FRACTURE      INSERTION HEMODIALYSIS CATHETER Right 2022    Procedure: INSERTION OF RIGHT INTERNAL JUGULAR HEMODIALYSIS CATHETER;  Surgeon: Dexter Red MD;  Location:  [facility] OR;  Service: Vascular;  Laterality: Right;    TUBAL ABDOMINAL LIGATION         Family History  Family History   Problem Relation Age of Onset    Coronary artery disease Mother     Coronary artery disease Father     Breast cancer Neg Hx        Social History  Social History     Socioeconomic History    Marital status: Single   Tobacco Use    Smoking status: Every Day     Current packs/day: 0.00     Average packs/day: 0.5 packs/day for 48.7 years (24.4 ttl pk-yrs)     Types: Cigarettes     Start date:      Last attempt to quit: 2023     Years since quittin.9     Passive exposure: Past    Smokeless tobacco: Never    Tobacco comments:     Smokes about 0.5 pack daily 4/10    Vaping Use    Vaping status: Never Used   Substance and Sexual Activity    Alcohol use: No    Drug use: No    Sexual activity: Defer       Review of Systems:  History obtained from chart review and the patient  General ROS: No fever or chills  Respiratory ROS: No cough, shortness of breath, wheezing  Cardiovascular ROS: No chest pain or palpitations  Gastrointestinal ROS: No abdominal pain or melena  Genito-Urinary ROS: No dysuria or hematuria  Psych ROS: No anxiety and depression  14 point ROS reviewed with the patient and negative except as noted above and in the HPI unless unable to obtain.    Objective:  Patient Vitals for the past 24 hrs:   BP Temp Temp src Pulse Resp SpO2   24 1821 -- -- -- 77 16  96 %   09/22/24 1810 -- -- -- 74 16 95 %   09/22/24 1452 164/82 98.5 °F (36.9 °C) Oral 78 16 96 %   09/22/24 1444 -- -- -- 77 16 96 %   09/22/24 1438 -- -- -- 76 18 97 %   09/22/24 1007 -- -- -- 86 18 94 %   09/22/24 0958 -- -- -- 84 18 97 %   09/22/24 0731 145/83 98.7 °F (37.1 °C) Oral 90 18 94 %   09/22/24 0641 -- -- -- 80 16 95 %   09/22/24 0634 -- -- -- 84 16 95 %   09/22/24 0440 177/92 98.2 °F (36.8 °C) Oral 87 18 94 %   09/21/24 1945 151/85 98 °F (36.7 °C) Oral 75 18 94 %   09/21/24 1833 -- -- -- 89 16 93 %       Intake/Output Summary (Last 24 hours) at 9/22/2024 1831  Last data filed at 9/22/2024 1824  Gross per 24 hour   Intake 1710 ml   Output --   Net 1710 ml     General: awake/alert   Chest:  clear to auscultation bilaterally without respiratory distress  CVS: regular rate and rhythm  Abdominal: soft, nontender, positive bowel sounds  Extremities: no cyanosis or edema  Skin: warm and dry without rash      Labs:  Results from last 7 days   Lab Units 09/19/24  1458   WBC 10*3/mm3 4.46   HEMOGLOBIN g/dL 13.0   HEMATOCRIT % 41.9   PLATELETS 10*3/mm3 192         Results from last 7 days   Lab Units 09/22/24  1006 09/21/24  0731 09/20/24  0500 09/19/24  1458   SODIUM mmol/L 140 138 135*  135* 132*   POTASSIUM mmol/L 3.5 3.4* 3.2*  3.2* 3.7   CHLORIDE mmol/L 107 103 97*  97* 97*   CO2 mmol/L 21.0* 24.0 22.0  24.0 20.0*   BUN mg/dL 30* 51* 72* 78*   CREATININE mg/dL 2.10* 2.95* 4.37* 5.36*   CALCIUM mg/dL 8.2* 8.3* 8.4* 8.0*   EGFR mL/min/1.73 25.9* 17.2* 10.7* 8.4*   BILIRUBIN mg/dL  --   --   --  0.4   ALK PHOS U/L  --   --   --  91   ALT (SGPT) U/L  --   --   --  10   AST (SGOT) U/L  --   --   --  14   GLUCOSE mg/dL 120* 92 117* 102*       Radiology:   Imaging Results (Last 72 Hours)       Procedure Component Value Units Date/Time    XR Forearm 2 View Left [[id number]] Collected: 09/22/24 1611     Updated: 09/22/24 1615    Narrative:      EXAMINATION:  XR FOREARM 2 VW LEFT-  9/22/2024 2:29 PM     HISTORY:  "The patient fell. Left forearm pain.     COMPARISON: No comparison study.     TECHNIQUE: 2 views were obtained.     FINDINGS: No acute fracture is seen of the radius or the ulna. The  visualized carpal bones appear to be intact. There is widening of the  scapholunate space. There is degenerative change at the first  carpal-metacarpal joint. There is mild dorsal tilting of the lunate on  the lateral image.          Impression:      1. No acute fracture is seen.  2. Widening of the scapholunate space and mild dorsal tilting of the  lunate may be seen with scapholunate ligament instability and dorsal  anterior cannulated segment instability.  3. Degenerative osteoarthritis at the first carpal-metacarpal joint.     This report was signed and finalized on 9/22/2024 4:12 PM by Dr. Jefe Beltre MD.               Culture:  No results found for: \"BLOODCX\", \"URINECX\", \"WOUNDCX\", \"MRSACX\", \"RESPCX\", \"STOOLCX\"      Assessment   Acute kidney injury-prerenal  Chronic kidney disease stage IV  Metabolic acidosis  Hyponatremia  Hypertension  Salmonella gastroenteritis     Plan:  Discussed with patient, nursing  Workup reviewed today  Monitor labs  Fluid trial with success, continue for now until diarrhea abates entirely adequate diet, should be able to wean tomorrow  Reassess in the morning  Antibiotics per primary service  GI evaluation reviewed as current      Don Negrete MD  9/22/2024  18:31 CDT      "

## 2024-09-23 LAB
ANION GAP SERPL CALCULATED.3IONS-SCNC: 10 MMOL/L (ref 5–15)
BUN SERPL-MCNC: 32 MG/DL (ref 8–23)
BUN/CREAT SERPL: 13.4 (ref 7–25)
CALCIUM SPEC-SCNC: 8.6 MG/DL (ref 8.6–10.5)
CHLORIDE SERPL-SCNC: 109 MMOL/L (ref 98–107)
CO2 SERPL-SCNC: 24 MMOL/L (ref 22–29)
CREAT SERPL-MCNC: 2.38 MG/DL (ref 0.57–1)
EGFRCR SERPLBLD CKD-EPI 2021: 22.3 ML/MIN/1.73
GLUCOSE SERPL-MCNC: 119 MG/DL (ref 65–99)
POTASSIUM SERPL-SCNC: 3.8 MMOL/L (ref 3.5–5.2)
SODIUM SERPL-SCNC: 143 MMOL/L (ref 136–145)

## 2024-09-23 PROCEDURE — 99232 SBSQ HOSP IP/OBS MODERATE 35: CPT | Performed by: INTERNAL MEDICINE

## 2024-09-23 PROCEDURE — 94799 UNLISTED PULMONARY SVC/PX: CPT

## 2024-09-23 PROCEDURE — 25010000002 CEFTRIAXONE PER 250 MG: Performed by: FAMILY MEDICINE

## 2024-09-23 PROCEDURE — 25810000003 LACTATED RINGERS PER 1000 ML: Performed by: INTERNAL MEDICINE

## 2024-09-23 PROCEDURE — 80048 BASIC METABOLIC PNL TOTAL CA: CPT | Performed by: STUDENT IN AN ORGANIZED HEALTH CARE EDUCATION/TRAINING PROGRAM

## 2024-09-23 PROCEDURE — 25010000002 HEPARIN (PORCINE) PER 1000 UNITS: Performed by: FAMILY MEDICINE

## 2024-09-23 PROCEDURE — 94660 CPAP INITIATION&MGMT: CPT

## 2024-09-23 PROCEDURE — 25010000002 ONDANSETRON PER 1 MG: Performed by: STUDENT IN AN ORGANIZED HEALTH CARE EDUCATION/TRAINING PROGRAM

## 2024-09-23 RX ORDER — ONDANSETRON 2 MG/ML
4 INJECTION INTRAMUSCULAR; INTRAVENOUS EVERY 6 HOURS PRN
Status: DISCONTINUED | OUTPATIENT
Start: 2024-09-23 | End: 2024-09-24 | Stop reason: HOSPADM

## 2024-09-23 RX ORDER — CLONIDINE HYDROCHLORIDE 0.1 MG/1
0.1 TABLET ORAL EVERY 4 HOURS PRN
Status: DISCONTINUED | OUTPATIENT
Start: 2024-09-23 | End: 2024-09-24 | Stop reason: HOSPADM

## 2024-09-23 RX ORDER — CARVEDILOL 25 MG/1
25 TABLET ORAL 2 TIMES DAILY WITH MEALS
Status: DISCONTINUED | OUTPATIENT
Start: 2024-09-23 | End: 2024-09-24 | Stop reason: HOSPADM

## 2024-09-23 RX ADMIN — ISOSORBIDE DINITRATE 40 MG: 20 TABLET ORAL at 08:32

## 2024-09-23 RX ADMIN — CARVEDILOL 6.25 MG: 6.25 TABLET, FILM COATED ORAL at 08:33

## 2024-09-23 RX ADMIN — CLONIDINE HYDROCHLORIDE 0.1 MG: 0.1 TABLET ORAL at 14:30

## 2024-09-23 RX ADMIN — AZELASTINE HYDROCHLORIDE 2 SPRAY: 137 SPRAY, METERED NASAL at 08:33

## 2024-09-23 RX ADMIN — METRONIDAZOLE 500 MG: 500 TABLET ORAL at 04:42

## 2024-09-23 RX ADMIN — HEPARIN SODIUM 5000 UNITS: 5000 INJECTION INTRAVENOUS; SUBCUTANEOUS at 08:33

## 2024-09-23 RX ADMIN — SODIUM CHLORIDE, POTASSIUM CHLORIDE, SODIUM LACTATE AND CALCIUM CHLORIDE 50 ML/HR: 600; 310; 30; 20 INJECTION, SOLUTION INTRAVENOUS at 23:13

## 2024-09-23 RX ADMIN — CALCITRIOL CAPSULES 0.25 MCG 0.25 MCG: 0.25 CAPSULE ORAL at 08:33

## 2024-09-23 RX ADMIN — CLONIDINE HYDROCHLORIDE 0.1 MG: 0.1 TABLET ORAL at 09:35

## 2024-09-23 RX ADMIN — Medication 250 MG: at 08:32

## 2024-09-23 RX ADMIN — IPRATROPIUM BROMIDE AND ALBUTEROL SULFATE 3 ML: .5; 3 SOLUTION RESPIRATORY (INHALATION) at 14:37

## 2024-09-23 RX ADMIN — Medication 2.5 MG: at 21:48

## 2024-09-23 RX ADMIN — IPRATROPIUM BROMIDE AND ALBUTEROL SULFATE 3 ML: .5; 3 SOLUTION RESPIRATORY (INHALATION) at 18:34

## 2024-09-23 RX ADMIN — IPRATROPIUM BROMIDE AND ALBUTEROL SULFATE 3 ML: .5; 3 SOLUTION RESPIRATORY (INHALATION) at 10:25

## 2024-09-23 RX ADMIN — ISOSORBIDE DINITRATE 40 MG: 20 TABLET ORAL at 17:31

## 2024-09-23 RX ADMIN — ACETAMINOPHEN 650 MG: 325 TABLET ORAL at 11:35

## 2024-09-23 RX ADMIN — METRONIDAZOLE 500 MG: 500 TABLET ORAL at 21:40

## 2024-09-23 RX ADMIN — ACETAMINOPHEN 650 MG: 325 TABLET ORAL at 21:48

## 2024-09-23 RX ADMIN — CLONIDINE HYDROCHLORIDE 0.1 MG: 0.1 TABLET ORAL at 04:42

## 2024-09-23 RX ADMIN — Medication 250 MG: at 21:39

## 2024-09-23 RX ADMIN — ONDANSETRON 4 MG: 2 INJECTION INTRAMUSCULAR; INTRAVENOUS at 11:36

## 2024-09-23 RX ADMIN — HYDROCODONE BITARTRATE AND ACETAMINOPHEN 1 TABLET: 5; 325 TABLET ORAL at 04:42

## 2024-09-23 RX ADMIN — SODIUM CHLORIDE 1000 MG: 900 INJECTION INTRAVENOUS at 12:10

## 2024-09-23 RX ADMIN — ONDANSETRON 4 MG: 2 INJECTION INTRAMUSCULAR; INTRAVENOUS at 02:56

## 2024-09-23 RX ADMIN — METRONIDAZOLE 500 MG: 500 TABLET ORAL at 14:38

## 2024-09-23 RX ADMIN — HEPARIN SODIUM 5000 UNITS: 5000 INJECTION INTRAVENOUS; SUBCUTANEOUS at 21:39

## 2024-09-23 RX ADMIN — IPRATROPIUM BROMIDE AND ALBUTEROL SULFATE 3 ML: .5; 3 SOLUTION RESPIRATORY (INHALATION) at 06:24

## 2024-09-23 RX ADMIN — CARVEDILOL 25 MG: 25 TABLET, FILM COATED ORAL at 17:31

## 2024-09-23 NOTE — PLAN OF CARE
Goal Outcome Evaluation:the goal is for the patient to wear bipap hs

## 2024-09-23 NOTE — PLAN OF CARE
Goal Outcome Evaluation:the goal is for patient to wear bipap hs

## 2024-09-23 NOTE — PROGRESS NOTES
RT EQUIPMENT DEVICE RELATED - SKIN ASSESSMENT    Griffin Score:  Griffin Score: 22     RT Medical Equipment/Device:     NIV Mask:  Under-the-nose   size:  b    Skin Assessment:      Nares:  Intact  Mouth:  Intact    Device Skin Pressure Protection:  Skin-to skin areas padded    Nurse Notification:  Essence Bowser, RRT

## 2024-09-23 NOTE — PROGRESS NOTES
[facility]  Inpatient Progress Note  Today's date:  09/23/24    Ludivina Ramirez  1960       Reason for Follow Up:   Diarrhea  Abdominal pain    Subjective:   Feeling much better. Still with diarrhea, but it is improving. Abdominal pain improved. Tolerating diet.      Current Facility-Administered Medications:     acetaminophen (TYLENOL) tablet 650 mg, 650 mg, Oral, Q6H PRN, Alfonso Walker MD, 650 mg at 09/22/24 2054    azelastine (ASTELIN) nasal spray 2 spray, 2 spray, Each Nare, BID, Orville Weston MD, 2 spray at 09/23/24 0833    calcitriol (ROCALTROL) capsule 0.25 mcg, 0.25 mcg, Oral, Daily, Orville Weston MD, 0.25 mcg at 09/23/24 0833    carvedilol (COREG) tablet 6.25 mg, 6.25 mg, Oral, BID With Meals, Orville Weston MD, 6.25 mg at 09/23/24 0833    cefTRIAXone (ROCEPHIN) 1,000 mg in sodium chloride 0.9 % 100 mL MBP, 1,000 mg, Intravenous, Q24H, Orville Weston MD, Last Rate: 200 mL/hr at 09/22/24 1208, 1,000 mg at 09/22/24 1208    cloNIDine (CATAPRES) tablet 0.1 mg, 0.1 mg, Oral, 4x Daily PRN, Alfonso Walker MD, 0.1 mg at 09/23/24 0935    guaiFENesin (MUCINEX) 12 hr tablet 600 mg, 600 mg, Oral, Q12H PRN, Orville Weston MD, 600 mg at 09/22/24 2054    heparin (porcine) 5000 UNIT/ML injection 5,000 Units, 5,000 Units, Subcutaneous, Q12H, Orville Weston MD, 5,000 Units at 09/23/24 0833    HYDROcodone-acetaminophen (NORCO) 5-325 MG per tablet 1 tablet, 1 tablet, Oral, Q6H PRN, Orville Weston MD, 1 tablet at 09/23/24 0442    ipratropium-albuterol (DUO-NEB) nebulizer solution 3 mL, 3 mL, Nebulization, 4x Daily - RT, Orville Weston MD, 3 mL at 09/23/24 1025    isosorbide dinitrate (ISORDIL) tablet 40 mg, 40 mg, Oral, BID - Nitrates, Orville Weston MD, 40 mg at 09/23/24 0832    lactated ringers infusion, 100 mL/hr, Intravenous, Continuous, Quinn Rodarte, APRN, Last Rate: 100 mL/hr at 09/22/24 1824, 100 mL/hr at  09/22/24 1824    melatonin tablet 2.5 mg, 2.5 mg, Oral, Nightly PRN, Orville Weston MD, 2.5 mg at 09/22/24 2054    metroNIDAZOLE (FLAGYL) tablet 500 mg, 500 mg, Oral, Q8H, Orville Weston MD, 500 mg at 09/23/24 0442    ondansetron (ZOFRAN) injection 4 mg, 4 mg, Intravenous, Q6H PRN, Alfonso Walker MD, 4 mg at 09/23/24 0256    saccharomyces boulardii (FLORASTOR) capsule 250 mg, 250 mg, Oral, BID, Paras Mchugh MD, 250 mg at 09/23/24 0832      Vital Signs:  Temp:  [97.6 °F (36.4 °C)-98.5 °F (36.9 °C)] 97.8 °F (36.6 °C)  Heart Rate:  [73-98] 79  Resp:  [16-20] 18  BP: (158-191)/() 167/97    Physical Exam:  General: no acute distress, alert and oriented  HEENT: perrl, no sclera icterus  CV: rrr, no murmur  Resp: lungs clear to auscultation, no wheeze or rhonchi  Abd: soft, nontender, nondistended, no rebound our guarding  Skin: no rash, no jaundice     Results Review:   I have reviewed all of the patient's current test results    Results from last 7 days   Lab Units 09/19/24  1458   WBC 10*3/mm3 4.46   HEMOGLOBIN g/dL 13.0   HEMATOCRIT % 41.9   PLATELETS 10*3/mm3 192       Results from last 7 days   Lab Units 09/23/24  0452 09/22/24  1006 09/21/24  0731 09/20/24  0500 09/19/24  1458   SODIUM mmol/L 143 140 138   < > 132*   POTASSIUM mmol/L 3.8 3.5 3.4*   < > 3.7   CHLORIDE mmol/L 109* 107 103   < > 97*   CO2 mmol/L 24.0 21.0* 24.0   < > 20.0*   BUN mg/dL 32* 30* 51*   < > 78*   CREATININE mg/dL 2.38* 2.10* 2.95*   < > 5.36*   CALCIUM mg/dL 8.6 8.2* 8.3*   < > 8.0*   BILIRUBIN mg/dL  --   --   --   --  0.4   ALK PHOS U/L  --   --   --   --  91   ALT (SGPT) U/L  --   --   --   --  10   AST (SGOT) U/L  --   --   --   --  14   GLUCOSE mg/dL 119* 120* 92   < > 102*    < > = values in this interval not displayed.             Lab Results   Lab Value Date/Time    LIPASE 43 09/19/2024 1458    LIPASE 74 (H) 08/21/2022 0418    LIPASE 54 06/02/2022 2332    LIPASE 61 (H) 11/07/2020 3379        Impression:  Diverticulitis. Abdominal pain improved. CT with thickening and stranding in the sigmoid colon with suspected sigmoid diverticulitis.  Diarrhea. Stool culture positive for Salmonella. Still with persistent diarrhea, but slowly improving.     Plan:  Ok to continue diet as tolerated. Continue course of antibiotics. Recommend 10 day course for treatment of both possible diverticulitis and salmonella gastroenteritis. Overall, she seems to be improving. Will need outpatient follow after discharge to discuss colonoscopy. Nothing further to add from GI at this time. Please call back if needed.     Diaz Ferguson MD  09/23/24  11:16 CDT

## 2024-09-23 NOTE — CASE MANAGEMENT/SOCIAL WORK
Continued Stay Note  NGUYEN Espinoza     Patient Name: Ludivina Ramirez  MRN: [id number]  Today's Date: 9/23/2024    Admit Date: 9/19/2024        Discharge Plan       Row Name 09/23/24 7138       Plan    Plan Comments Events noted. Plan is still for dc home when pt is medically stable. Pt has home O2 from [facility]. Will follow.                   Discharge Codes    No documentation.                       NAOMI Live

## 2024-09-23 NOTE — PROGRESS NOTES
RT EQUIPMENT DEVICE RELATED - SKIN ASSESSMENT    Griffin Score:  Griffin Score: 23     RT Medical Equipment/Device:     NIV Mask:  Under-the-nose   size:  b    Skin Assessment:      Chin:  Intact  Nose:  Intact    Device Skin Pressure Protection:   n a      Nurse Notification:  Essence Mejia, CRT

## 2024-09-23 NOTE — PROGRESS NOTES
"Progress Note  Ludivina Ramirez  9/23/2024 13:22 CDT  Subjective:   Admit Date:   9/19/2024      CC/ADMIT DX:       Interval History:   Reviewed overnight events and nursing notes.  She has no new complaints. Her GI issues are improving.     I have reviewed all labs/diagnostics from the last 24hrs.       ROS:   I have done a 10 point ROS and all are negative, except what is mentioned in the HPI.    Diet: Regular/House; Fluid Consistency: Thin (IDDSI 0)    Medications:   lactated ringers, 100 mL/hr, Last Rate: 100 mL/hr (09/22/24 1824)      azelastine, 2 spray, Each Nare, BID  calcitriol, 0.25 mcg, Oral, Daily  carvedilol, 25 mg, Oral, BID With Meals  heparin (porcine), 5,000 Units, Subcutaneous, Q12H  ipratropium-albuterol, 3 mL, Nebulization, 4x Daily - RT  isosorbide dinitrate, 40 mg, Oral, BID - Nitrates  metroNIDAZOLE, 500 mg, Oral, Q8H  saccharomyces boulardii, 250 mg, Oral, BID            Objective:   Vitals: BP (!) 188/102 (BP Location: Left arm, Patient Position: Lying)   Pulse 79   Temp 97.8 °F (36.6 °C) (Oral)   Resp 18   Ht 167.6 cm (66\")   Wt 91.1 kg (200 lb 14.4 oz)   LMP  (LMP Unknown)   SpO2 97%   BMI 32.43 kg/m²    Intake/Output Summary (Last 24 hours) at 9/23/2024 1322  Last data filed at 9/23/2024 0915  Gross per 24 hour   Intake 1710 ml   Output --   Net 1710 ml     General appearance: alert and cooperative with exam  Lungs: clear to auscultation bilaterally  Heart: regular rate and rhythm, S1, S2 normal, no murmur, click, rub or gallop  Abdomen: soft, non-tender; bowel sounds normal; no masses,  no organomegaly  Extremities: extremities normal, atraumatic, no cyanosis or edema  Neurologic:  No obvious focal neurologic deficits.    Assessment and Plan:     Acute kidney injury      Plan:   Continue present medication(s)    Adjust B Blocker, restart ARB if ok with Nephrology   Follow with antibiotic treatment   IVF and follow labs      Discharge planning:   her home     Reviewed treatment plans " with the patient and/or family.             Electronically signed by Orville Weston MD on 9/23/2024 at 13:22 CDT

## 2024-09-23 NOTE — PLAN OF CARE
Goal Outcome Evaluation:  Plan of Care Reviewed With: patient        Progress: no change  Outcome Evaluation: Pt remains A&Ox4 this shift. VSS on RA. PIV to right FA remains in place, CDI and infusing. Pt wears bipap sporadically at night, tolerating well when in use. Diarrhea persists this shift. Pt ambulates independently to BSC tolerating ambulation well. Pt c/o pain, medicated per MAR. Pt takes meds whole. Pt c/o nausea this shift, with one episode of emesis. MD notified, zofran prescribed and administered. Pt tolerating diet, fluids as ordered. Call light within reach and safety maintained.

## 2024-09-23 NOTE — PLAN OF CARE
Goal Outcome Evaluation:           Progress: no change   Pt A/Ox4. RA. IV to RFA, infusing LR @50 . Hypertensive, PRN meds given. Daily BP meds increased. Discussed with nephrology about restarting Losartan, per MD they said they would restart in orders, I have yet to see new ones placed. BiPAP in room, pt refused last night due to dryness in nares. + for salmonella. Diarrhea present. Up independently to BSC. C/o headache and nausea, PRN meds given. SCDs. ABX given. Swallows meds whole. Safety maintained. Call light in reach.

## 2024-09-24 ENCOUNTER — READMISSION MANAGEMENT (OUTPATIENT)
Dept: CALL CENTER | Facility: HOSPITAL | Age: 64
End: 2024-09-24
Payer: MEDICARE

## 2024-09-24 VITALS
SYSTOLIC BLOOD PRESSURE: 151 MMHG | BODY MASS INDEX: 32.29 KG/M2 | TEMPERATURE: 98.2 F | WEIGHT: 200.9 LBS | HEART RATE: 92 BPM | HEIGHT: 66 IN | RESPIRATION RATE: 16 BRPM | DIASTOLIC BLOOD PRESSURE: 80 MMHG | OXYGEN SATURATION: 96 %

## 2024-09-24 LAB
ANION GAP SERPL CALCULATED.3IONS-SCNC: 10 MMOL/L (ref 5–15)
BUN SERPL-MCNC: 27 MG/DL (ref 8–23)
BUN/CREAT SERPL: 12.7 (ref 7–25)
CALCIUM SPEC-SCNC: 8 MG/DL (ref 8.6–10.5)
CHLORIDE SERPL-SCNC: 107 MMOL/L (ref 98–107)
CO2 SERPL-SCNC: 20 MMOL/L (ref 22–29)
CREAT SERPL-MCNC: 2.12 MG/DL (ref 0.57–1)
EGFRCR SERPLBLD CKD-EPI 2021: 25.6 ML/MIN/1.73
GLUCOSE SERPL-MCNC: 101 MG/DL (ref 65–99)
POTASSIUM SERPL-SCNC: 4.1 MMOL/L (ref 3.5–5.2)
SODIUM SERPL-SCNC: 137 MMOL/L (ref 136–145)

## 2024-09-24 PROCEDURE — 25010000002 HEPARIN (PORCINE) PER 1000 UNITS: Performed by: FAMILY MEDICINE

## 2024-09-24 PROCEDURE — 94799 UNLISTED PULMONARY SVC/PX: CPT

## 2024-09-24 PROCEDURE — 94660 CPAP INITIATION&MGMT: CPT

## 2024-09-24 PROCEDURE — 94664 DEMO&/EVAL PT USE INHALER: CPT

## 2024-09-24 PROCEDURE — 80048 BASIC METABOLIC PNL TOTAL CA: CPT | Performed by: STUDENT IN AN ORGANIZED HEALTH CARE EDUCATION/TRAINING PROGRAM

## 2024-09-24 RX ORDER — IPRATROPIUM BROMIDE AND ALBUTEROL SULFATE 2.5; .5 MG/3ML; MG/3ML
3 SOLUTION RESPIRATORY (INHALATION) 4 TIMES DAILY
Qty: 360 ML | Refills: 0 | Status: SHIPPED | OUTPATIENT
Start: 2024-09-24

## 2024-09-24 RX ORDER — METRONIDAZOLE 500 MG/1
500 TABLET ORAL EVERY 8 HOURS SCHEDULED
Qty: 15 TABLET | Refills: 0 | Status: SHIPPED | OUTPATIENT
Start: 2024-09-24 | End: 2024-09-29

## 2024-09-24 RX ORDER — GUAIFENESIN 600 MG/1
600 TABLET, EXTENDED RELEASE ORAL EVERY 12 HOURS PRN
COMMUNITY
Start: 2024-09-24

## 2024-09-24 RX ORDER — ONDANSETRON 4 MG/1
4 TABLET, FILM COATED ORAL EVERY 8 HOURS PRN
Qty: 20 TABLET | Refills: 0 | Status: SHIPPED | OUTPATIENT
Start: 2024-09-24

## 2024-09-24 RX ORDER — CEFDINIR 300 MG/1
300 CAPSULE ORAL 2 TIMES DAILY
Qty: 10 CAPSULE | Refills: 0 | Status: SHIPPED | OUTPATIENT
Start: 2024-09-24 | End: 2024-09-29

## 2024-09-24 RX ORDER — SACCHAROMYCES BOULARDII 250 MG
250 CAPSULE ORAL 2 TIMES DAILY
Qty: 60 CAPSULE | Refills: 0 | Status: SHIPPED | OUTPATIENT
Start: 2024-09-24

## 2024-09-24 RX ADMIN — CARVEDILOL 25 MG: 25 TABLET, FILM COATED ORAL at 09:43

## 2024-09-24 RX ADMIN — IPRATROPIUM BROMIDE AND ALBUTEROL SULFATE 3 ML: .5; 3 SOLUTION RESPIRATORY (INHALATION) at 06:12

## 2024-09-24 RX ADMIN — ISOSORBIDE DINITRATE 40 MG: 20 TABLET ORAL at 09:43

## 2024-09-24 RX ADMIN — HEPARIN SODIUM 5000 UNITS: 5000 INJECTION INTRAVENOUS; SUBCUTANEOUS at 09:43

## 2024-09-24 RX ADMIN — Medication 250 MG: at 09:43

## 2024-09-24 RX ADMIN — ACETAMINOPHEN 650 MG: 325 TABLET ORAL at 10:22

## 2024-09-24 RX ADMIN — CALCITRIOL CAPSULES 0.25 MCG 0.25 MCG: 0.25 CAPSULE ORAL at 09:43

## 2024-09-24 RX ADMIN — METRONIDAZOLE 500 MG: 500 TABLET ORAL at 05:58

## 2024-09-24 NOTE — DISCHARGE SUMMARY
Hospital Discharge Summary    Ludivina Ramirez  :  1960  MRN:  [id number]    Admit date:  2024  Discharge date:      Admitting Physician:    Orville Weston MD    Discharge Diagnoses:      Acute kidney injury    Salmonella    Diverticulitis    COPD, CRF    MARY    CKD    Hospital Course:   She was admitted with dehydration and did have salmonella on her stool testing. She also had evidence of diverticulitis on CT. She was treated with IV antibiotics and hydrated. She has been managed also by Nephology and GI. Her diarrhea is improved on d/c. She is taking PO. Her labs are back to baseline. She will finish PO antibiotics and need GI follow up to discuss c-scope.     Discharge Medications:         Discharge Medications        New Medications        Instructions Start Date   cefdinir 300 MG capsule  Commonly known as: OMNICEF   300 mg, Oral, 2 Times Daily      metroNIDAZOLE 500 MG tablet  Commonly known as: FLAGYL   500 mg, Oral, Every 8 Hours Scheduled      ondansetron 4 MG tablet  Commonly known as: Zofran   4 mg, Oral, Every 8 Hours PRN      saccharomyces boulardii 250 MG capsule  Commonly known as: FLORASTOR   250 mg, Oral, 2 Times Daily             Continue These Medications        Instructions Start Date   albuterol sulfate  (90 Base) MCG/ACT inhaler  Commonly known as: PROVENTIL HFA;VENTOLIN HFA;PROAIR HFA   2 puffs, Inhalation, Every 4 Hours PRN      aspirin 81 MG EC tablet   81 mg, Oral, Daily      azelastine 0.1 % nasal spray  Commonly known as: ASTELIN   2 sprays, Nasal, 2 Times Daily      calcitriol 0.25 MCG capsule  Commonly known as: ROCALTROL   0.25 mcg, Oral, Daily      carvedilol 25 MG tablet  Commonly known as: COREG   25 mg, Oral, 2 Times Daily With Meals      cloNIDine 0.1 MG tablet  Commonly known as: CATAPRES   0.1 mg, Oral, Every 6 Hours PRN      fluticasone 50 MCG/ACT nasal spray  Commonly known as: FLONASE   2 sprays, Nasal, Every Morning, In each nostril      guaiFENesin  600 MG 12 hr tablet  Commonly known as: MUCINEX   600 mg, Oral, Every 12 Hours PRN      ipratropium-albuterol 0.5-2.5 mg/3 ml nebulizer  Commonly known as: DUO-NEB   3 mL, Nebulization, 4 Times Daily, prn      isosorbide dinitrate 40 MG tablet  Commonly known as: ISORDIL   40 mg, Oral, 2 Times Daily      melatonin 3 MG tablet   6 mg, Oral, Nightly      nitroglycerin 0.4 MG SL tablet  Commonly known as: NITROSTAT   0.4 mg, Sublingual, Every 5 Minutes PRN, Take no more than 3 doses in 15 minutes.             Stop These Medications      docusate sodium 250 MG capsule  Commonly known as: COLACE     furosemide 40 MG tablet  Commonly known as: LASIX     hydroCHLOROthiazide 25 MG tablet     losartan 100 MG tablet  Commonly known as: COZAAR              Consults:  GI, Nephrology    Significant Diagnostic Studies:  see complete admission record      Disposition:   home in stable condition  Follow up with Orville Weston MD in 1 week. F/U with GI and Nephrology as they recommend    Diet: as tolerated    Activity: as tolerated    Special Instructions: monitor BP BID and record and bring to f/u, BMP and CBC in 1 week      The patient or family are to call or return if there are any problems, questions, concerns or change in her condition.     Signed:  Orville Weston MD MD  9/24/2024, 11:51 CDT

## 2024-09-24 NOTE — PLAN OF CARE
Goal Outcome Evaluation:  Plan of Care Reviewed With: patient        Progress: improving  Outcome Evaluation: Patient A/O x 4. VSS. On room air during the day, Bipap at night. No nausea or emesis. Stool is now brown instead of green, just slightly more formed, and much less frequent. Tolerating IV fluids. No PRNs requested. Was able to sleep well during the night. Safety maintained.

## 2024-09-24 NOTE — PROGRESS NOTES
Nephrology ([facility]) Progress Note      Patient:  Ludivina Ramirez  YOB: 1960  Date of Service: 9/24/2024  MRN: [id number]   Acct: [id number]   Primary Care Physician: Orville Weston MD  Advance Directive:   There are no questions and answers to display.     Admit Date: 9/19/2024       Hospital Day: 5  Referring Provider: No ref. provider found      Patient personally seen and examined.  Complete chart including Consults, Notes, Operative Reports, Labs, Cardiology, and Radiology studies reviewed as able.        Subjective:  Ludivina Ramirez is a 64 y.o. female for whom we were consulted for evaluation and treatment of acute kidney injury. Baseline chronic kidney disease stage 4. History of MATTY in June that required short term hemodialysis, last HD was mid-July. Patient was most recently seen by Dr Lazo in hospital on 8/23-8/26. Discharged with creatinine 2.1 which is her baseline level. She did not show up for her hospital follow up visit in the office. Other history includes hypertension, CHF, coronary artery disease. Presented to ER with four days of cramping abdominal pain. Having profuse diarrhea but no nausea or vomiting. Unable to maintain adequate PO intake during this time. Denies changes in urination. No dysuria or hematuria. No edema. No NSAID use. Labs in ER revealed creatinine 5.36, BUN 78, sodium 132. Started on bicarbonate IV fluids and admitted to medical floor. Hospital course remarkable for improvement of renal function with IV fluids.    Today is awake and alert. No new complaint. Frequency of stools improving. Urine output nonoliguric    Allergies:  Levaquin [levofloxacin], Codeine, and Sumatriptan    Home Meds:  Medications Prior to Admission   Medication Sig Dispense Refill Last Dose    aspirin 81 MG EC tablet Take 1 tablet by mouth Daily. 90 tablet 3     calcitriol (ROCALTROL) 0.25 MCG capsule Take 1 capsule by mouth Daily.       carvedilol (COREG) 25  MG tablet Take 1 tablet by mouth 2 (Two) Times a Day With Meals.       furosemide (LASIX) 40 MG tablet Take 1 tablet by mouth Daily.       hydroCHLOROthiazide 25 MG tablet Take 1 tablet by mouth Daily.       albuterol sulfate  (90 Base) MCG/ACT inhaler Inhale 2 puffs Every 4 (Four) Hours As Needed for Wheezing. 20.1 g 3     azelastine (ASTELIN) 0.1 % nasal spray 2 sprays into the nostril(s) as directed by provider 2 (Two) Times a Day. 30 mL 11     cloNIDine (CATAPRES) 0.1 MG tablet Take 1 tablet by mouth Every 6 (Six) Hours As Needed for High Blood Pressure (SBP >160). 30 tablet 1     docusate sodium (COLACE) 250 MG capsule Take 1 capsule by mouth Daily.       fluticasone (FLONASE) 50 MCG/ACT nasal spray 2 sprays into the nostril(s) as directed by provider Every Morning. In each nostril 16 g 11     isosorbide dinitrate (ISORDIL) 40 MG tablet Take 1 tablet by mouth 2 (Two) Times a Day. 180 tablet 3     losartan (COZAAR) 100 MG tablet Take 1 tablet by mouth Daily.       melatonin 3 MG tablet Take 2 tablets by mouth Every Night. 30 tablet 0     nitroglycerin (NITROSTAT) 0.4 MG SL tablet Place 1 tablet under the tongue Every 5 (Five) Minutes As Needed for Chest Pain. Take no more than 3 doses in 15 minutes.          Medicines:  Current Facility-Administered Medications   Medication Dose Route Frequency Provider Last Rate Last Admin    acetaminophen (TYLENOL) tablet 650 mg  650 mg Oral Q6H PRN Alfonso Walker MD   650 mg at 09/23/24 2148    azelastine (ASTELIN) nasal spray 2 spray  2 spray Each Nare BID Orville Weston MD   2 spray at 09/23/24 0833    calcitriol (ROCALTROL) capsule 0.25 mcg  0.25 mcg Oral Daily Orville Weston MD   0.25 mcg at 09/24/24 0943    carvedilol (COREG) tablet 25 mg  25 mg Oral BID With Meals Orville Weston MD   25 mg at 09/24/24 0943    cloNIDine (CATAPRES) tablet 0.1 mg  0.1 mg Oral Q4H PRN Orville Weston MD   0.1 mg at 09/23/24 7385    guaiFENesin  (MUCINEX) 12 hr tablet 600 mg  600 mg Oral Q12H PRN Orville Weston MD   600 mg at 09/22/24 2054    heparin (porcine) 5000 UNIT/ML injection 5,000 Units  5,000 Units Subcutaneous Q12H Orville Weston MD   5,000 Units at 09/24/24 0943    HYDROcodone-acetaminophen (NORCO) 5-325 MG per tablet 1 tablet  1 tablet Oral Q6H PRN Orville Weston MD   1 tablet at 09/23/24 0442    ipratropium-albuterol (DUO-NEB) nebulizer solution 3 mL  3 mL Nebulization 4x Daily - RT Orville Weston MD   3 mL at 09/24/24 0612    isosorbide dinitrate (ISORDIL) tablet 40 mg  40 mg Oral BID - Nitrates Orville Weston MD   40 mg at 09/24/24 0943    lactated ringers infusion  50 mL/hr Intravenous Continuous Parker Whitfield MD 50 mL/hr at 09/23/24 2313 50 mL/hr at 09/23/24 2313    melatonin tablet 2.5 mg  2.5 mg Oral Nightly PRN Orville Weston MD   2.5 mg at 09/23/24 2148    metroNIDAZOLE (FLAGYL) tablet 500 mg  500 mg Oral Q8H Orville Weston MD   500 mg at 09/24/24 0558    ondansetron (ZOFRAN) injection 4 mg  4 mg Intravenous Q6H PRN Alfonso Walker MD   4 mg at 09/23/24 1136    saccharomyces boulardii (FLORASTOR) capsule 250 mg  250 mg Oral BID Paras Mchugh MD   250 mg at 09/24/24 0943       Past Medical History:  Past Medical History:   Diagnosis Date    Acute CHF     Acute renal failure (ARF)     Anemia     Asthma     childhood    Bowel disease, inflammatory     Chronic kidney disease     Coronary artery disease     Hypertension     Ischemic colitis     Lung nodule, multiple 04/03/2024    Metabolic acidosis     Myocardial infarction 11/07/2020    Nonrheumatic aortic (valve) insufficiency     PTSD (post-traumatic stress disorder)        Past Surgical History:  Past Surgical History:   Procedure Laterality Date    CARDIAC CATHETERIZATION N/A 11/08/2020    Procedure: Left Heart Cath;  Surgeon: Pierce Buchanan MD;  Location:  [facility];  Service: Cardiovascular;   Laterality: N/A;    CLOSED REDUCTION ANKLE FRACTURE Right 2019    13 screws and a plate    EXTERNAL FIXATION WRIST FRACTURE      INSERTION HEMODIALYSIS CATHETER Right 2022    Procedure: INSERTION OF RIGHT INTERNAL JUGULAR HEMODIALYSIS CATHETER;  Surgeon: Dexter Red MD;  Location: [facility] OR;  Service: Vascular;  Laterality: Right;    TUBAL ABDOMINAL LIGATION         Family History  Family History   Problem Relation Age of Onset    Coronary artery disease Mother     Coronary artery disease Father     Breast cancer Neg Hx        Social History  Social History     Socioeconomic History    Marital status: Single   Tobacco Use    Smoking status: Every Day     Current packs/day: 0.00     Average packs/day: 0.5 packs/day for 48.7 years (24.4 ttl pk-yrs)     Types: Cigarettes     Start date:      Last attempt to quit: 2023     Years since quittin.9     Passive exposure: Past    Smokeless tobacco: Never    Tobacco comments:     Smokes about 0.5 pack daily 4/10    Vaping Use    Vaping status: Never Used   Substance and Sexual Activity    Alcohol use: No    Drug use: No    Sexual activity: Defer       Review of Systems:  History obtained from chart review and the patient  General ROS: No fever or chills  Respiratory ROS: No cough, shortness of breath, wheezing  Cardiovascular ROS: No chest pain or palpitations  Gastrointestinal ROS: positive for - diarrhea  No abdominal pain or melena  Genito-Urinary ROS: No dysuria or hematuria  Psych ROS: No anxiety and depression  14 point ROS reviewed with the patient and negative except as noted above and in the HPI unless unable to obtain.    Objective:  Patient Vitals for the past 24 hrs:   BP Temp Temp src Pulse Resp SpO2   24 0945 -- -- -- 93 -- 95 %   24 0834 164/79 98.4 °F (36.9 °C) Oral 87 16 96 %   24 0617 -- -- -- 70 -- --   24 0612 -- -- -- 66 18 95 %   24 0429 160/83 98.1 °F (36.7 °C) Axillary 69 20 97 %   24  0320 -- -- -- 69 18 98 %   09/24/24 0021 140/68 97.9 °F (36.6 °C) Axillary 77 18 95 %   09/23/24 2348 -- -- -- 80 18 96 %   09/23/24 2002 148/78 98 °F (36.7 °C) Axillary 73 20 95 %   09/23/24 1834 -- -- -- 80 20 99 %   09/23/24 1540 (!) 182/97 97.8 °F (36.6 °C) Oral 85 18 97 %   09/23/24 1510 173/89 -- -- -- -- --   09/23/24 1443 -- -- -- 78 20 95 %   09/23/24 1437 -- -- -- 82 20 96 %   09/23/24 1300 173/92 -- -- -- -- --   09/23/24 1147 (!) 188/102 -- -- -- -- --   09/23/24 1145 (!) 198/120 -- -- -- -- --   09/23/24 1108 167/97 97.8 °F (36.6 °C) Oral 79 18 97 %   09/23/24 1030 -- -- -- 76 20 98 %   09/23/24 1028 169/89 -- -- -- -- --   09/23/24 1025 -- -- -- 73 20 97 %       Intake/Output Summary (Last 24 hours) at 9/24/2024 1009  Last data filed at 9/24/2024 0834  Gross per 24 hour   Intake 900 ml   Output --   Net 900 ml     General: awake/alert   Chest:  clear to auscultation bilaterally without respiratory distress  CVS: regular rate and rhythm  Abdominal: soft, nontender, positive bowel sounds  Extremities: no cyanosis or edema  Skin: warm and dry without rash      Labs:  Results from last 7 days   Lab Units 09/19/24  1458   WBC 10*3/mm3 4.46   HEMOGLOBIN g/dL 13.0   HEMATOCRIT % 41.9   PLATELETS 10*3/mm3 192         Results from last 7 days   Lab Units 09/24/24  0602 09/23/24  0452 09/22/24  1006 09/20/24  0500 09/19/24  1458   SODIUM mmol/L 137 143 140   < > 132*   POTASSIUM mmol/L 4.1 3.8 3.5   < > 3.7   CHLORIDE mmol/L 107 109* 107   < > 97*   CO2 mmol/L 20.0* 24.0 21.0*   < > 20.0*   BUN mg/dL 27* 32* 30*   < > 78*   CREATININE mg/dL 2.12* 2.38* 2.10*   < > 5.36*   CALCIUM mg/dL 8.0* 8.6 8.2*   < > 8.0*   EGFR mL/min/1.73 25.6* 22.3* 25.9*   < > 8.4*   BILIRUBIN mg/dL  --   --   --   --  0.4   ALK PHOS U/L  --   --   --   --  91   ALT (SGPT) U/L  --   --   --   --  10   AST (SGOT) U/L  --   --   --   --  14   GLUCOSE mg/dL 101* 119* 120*   < > 102*    < > = values in this interval not displayed.  "      Radiology:   Imaging Results (Last 72 Hours)       Procedure Component Value Units Date/Time    XR Forearm 2 View Left [[id number]] Collected: 09/22/24 1611     Updated: 09/22/24 1615    Narrative:      EXAMINATION:  XR FOREARM 2 VW LEFT-  9/22/2024 2:29 PM     HISTORY: The patient fell. Left forearm pain.     COMPARISON: No comparison study.     TECHNIQUE: 2 views were obtained.     FINDINGS: No acute fracture is seen of the radius or the ulna. The  visualized carpal bones appear to be intact. There is widening of the  scapholunate space. There is degenerative change at the first  carpal-metacarpal joint. There is mild dorsal tilting of the lunate on  the lateral image.          Impression:      1. No acute fracture is seen.  2. Widening of the scapholunate space and mild dorsal tilting of the  lunate may be seen with scapholunate ligament instability and dorsal  anterior cannulated segment instability.  3. Degenerative osteoarthritis at the first carpal-metacarpal joint.     This report was signed and finalized on 9/22/2024 4:12 PM by Dr. Jefe Beltre MD.               Culture:  No results found for: \"BLOODCX\", \"URINECX\", \"WOUNDCX\", \"MRSACX\", \"RESPCX\", \"STOOLCX\"      Assessment    Acute kidney injury, prerenal--improved  Baseline chronic kidney disease stage 4  Hyponatremia--resolved  Metabolic acidosis--resolved  Hypertension  Gastroenteritis due to Salmonella     Plan:   Renal function stable  Wean IV fluids. Encourage PO intake  Monitor labs      Quinn Rodarte, ISAIAH  9/24/2024  10:09 CDT    "

## 2024-09-24 NOTE — PLAN OF CARE
Goal Outcome Evaluation:  Plan of Care Reviewed With: patient        Progress: no change       Pt alert and oriented x4. VSS. Pt c/o headache, prn medication given with some relief of symptoms. PPP. , satting at 99% on room air. Tolerating regular diet. Skin intact. Voiding via bathroom. Up ad vern. Last BM 9/24.  Plans to d/c home. Call light within reach. Safety maintained. Discharge instructions done with patient. No neuro changes this shift.

## 2024-09-27 LAB
ADV 40+41 DNA STL QL NAA+NON-PROBE: NOT DETECTED
ASTRO TYP 1-8 RNA STL QL NAA+NON-PROBE: NOT DETECTED
C CAYETANENSIS DNA STL QL NAA+NON-PROBE: NOT DETECTED
C COLI+JEJ+UPSA DNA STL QL NAA+NON-PROBE: NOT DETECTED
CRYPTOSP DNA STL QL NAA+NON-PROBE: NOT DETECTED
E HISTOLYT DNA STL QL NAA+NON-PROBE: NOT DETECTED
EAEC PAA PLAS AGGR+AATA ST NAA+NON-PRB: NOT DETECTED
EC STX1+STX2 GENES STL QL NAA+NON-PROBE: NOT DETECTED
EPEC EAE GENE STL QL NAA+NON-PROBE: NOT DETECTED
ETEC LTA+ST1A+ST1B TOX ST NAA+NON-PROBE: NOT DETECTED
G LAMBLIA DNA STL QL NAA+NON-PROBE: NOT DETECTED
NOROVIRUS GI+II RNA STL QL NAA+NON-PROBE: NOT DETECTED
P SHIGELLOIDES DNA STL QL NAA+NON-PROBE: NOT DETECTED
RVA RNA STL QL NAA+NON-PROBE: NOT DETECTED
S ENT+BONG DNA STL QL NAA+NON-PROBE: DETECTED
SAPO I+II+IV+V RNA STL QL NAA+NON-PROBE: NOT DETECTED
SHIGELLA SP+EIEC IPAH ST NAA+NON-PROBE: NOT DETECTED
V CHOL+PARA+VUL DNA STL QL NAA+NON-PROBE: NOT DETECTED
V CHOLERAE DNA STL QL NAA+NON-PROBE: NOT DETECTED
Y ENTEROCOL DNA STL QL NAA+NON-PROBE: NOT DETECTED

## 2024-09-30 ENCOUNTER — READMISSION MANAGEMENT (OUTPATIENT)
Dept: CALL CENTER | Facility: HOSPITAL | Age: 64
End: 2024-09-30
Payer: MEDICARE

## 2024-09-30 ENCOUNTER — APPOINTMENT (OUTPATIENT)
Dept: GENERAL RADIOLOGY | Facility: HOSPITAL | Age: 64
End: 2024-09-30
Payer: MEDICARE

## 2024-09-30 ENCOUNTER — HOSPITAL ENCOUNTER (OUTPATIENT)
Facility: HOSPITAL | Age: 64
Setting detail: OBSERVATION
Discharge: HOME | End: 2024-10-03
Attending: FAMILY MEDICINE | Admitting: FAMILY MEDICINE
Payer: MEDICARE

## 2024-09-30 DIAGNOSIS — Z86.79 HISTORY OF CHF (CONGESTIVE HEART FAILURE): ICD-10-CM

## 2024-09-30 DIAGNOSIS — R79.89 ELEVATED BRAIN NATRIURETIC PEPTIDE (BNP) LEVEL: ICD-10-CM

## 2024-09-30 DIAGNOSIS — R06.09 DYSPNEA ON EXERTION: Primary | ICD-10-CM

## 2024-09-30 DIAGNOSIS — Z87.09 HISTORY OF COPD: ICD-10-CM

## 2024-09-30 DIAGNOSIS — N18.9 CHRONIC KIDNEY DISEASE, UNSPECIFIED CKD STAGE: ICD-10-CM

## 2024-09-30 PROBLEM — I50.9 CHF EXACERBATION: Status: ACTIVE | Noted: 2024-09-30

## 2024-09-30 LAB
ALBUMIN SERPL-MCNC: 3.2 G/DL (ref 3.5–5.2)
ALBUMIN/GLOB SERPL: 1.1 G/DL
ALP SERPL-CCNC: 79 U/L (ref 39–117)
ALT SERPL W P-5'-P-CCNC: 13 U/L (ref 1–33)
ANION GAP SERPL CALCULATED.3IONS-SCNC: 11 MMOL/L (ref 5–15)
ARTERIAL PATENCY WRIST A: ABNORMAL
AST SERPL-CCNC: 13 U/L (ref 1–32)
ATMOSPHERIC PRESS: 751 MMHG
BASE EXCESS BLDA CALC-SCNC: -3.6 MMOL/L (ref 0–2)
BASOPHILS # BLD AUTO: 0.06 10*3/MM3 (ref 0–0.2)
BASOPHILS NFR BLD AUTO: 0.5 % (ref 0–1.5)
BDY SITE: ABNORMAL
BILIRUB SERPL-MCNC: 0.7 MG/DL (ref 0–1.2)
BODY TEMPERATURE: 37
BUN SERPL-MCNC: 30 MG/DL (ref 8–23)
BUN/CREAT SERPL: 13.2 (ref 7–25)
CALCIUM SPEC-SCNC: 8.3 MG/DL (ref 8.6–10.5)
CHLORIDE SERPL-SCNC: 111 MMOL/L (ref 98–107)
CO2 SERPL-SCNC: 21 MMOL/L (ref 22–29)
COHGB MFR BLD: 1.6 % (ref 0–5)
CREAT SERPL-MCNC: 2.28 MG/DL (ref 0.57–1)
D DIMER PPP FEU-MCNC: 1.63 MCGFEU/ML (ref 0–0.64)
DEPRECATED RDW RBC AUTO: 55.8 FL (ref 37–54)
EGFRCR SERPLBLD CKD-EPI 2021: 23.4 ML/MIN/1.73
EOSINOPHIL # BLD AUTO: 0.04 10*3/MM3 (ref 0–0.4)
EOSINOPHIL NFR BLD AUTO: 0.3 % (ref 0.3–6.2)
ERYTHROCYTE [DISTWIDTH] IN BLOOD BY AUTOMATED COUNT: 17.3 % (ref 12.3–15.4)
GAS FLOW AIRWAY: 2.5 LPM
GLOBULIN UR ELPH-MCNC: 2.8 GM/DL
GLUCOSE SERPL-MCNC: 107 MG/DL (ref 65–99)
HCO3 BLDA-SCNC: 22.1 MMOL/L (ref 20–26)
HCT VFR BLD AUTO: 36.1 % (ref 34–46.6)
HCT VFR BLD CALC: 36.2 % (ref 38–51)
HGB BLD-MCNC: 11.1 G/DL (ref 12–15.9)
HGB BLDA-MCNC: 11.8 G/DL (ref 12–16)
IMM GRANULOCYTES # BLD AUTO: 0.08 10*3/MM3 (ref 0–0.05)
IMM GRANULOCYTES NFR BLD AUTO: 0.7 % (ref 0–0.5)
LYMPHOCYTES # BLD AUTO: 0.87 10*3/MM3 (ref 0.7–3.1)
LYMPHOCYTES NFR BLD AUTO: 7.4 % (ref 19.6–45.3)
Lab: ABNORMAL
MAGNESIUM SERPL-MCNC: 2.1 MG/DL (ref 1.6–2.4)
MCH RBC QN AUTO: 28.9 PG (ref 26.6–33)
MCHC RBC AUTO-ENTMCNC: 30.7 G/DL (ref 31.5–35.7)
MCV RBC AUTO: 94 FL (ref 79–97)
METHGB BLD QL: 0.7 % (ref 0–3)
MODALITY: ABNORMAL
MONOCYTES # BLD AUTO: 0.57 10*3/MM3 (ref 0.1–0.9)
MONOCYTES NFR BLD AUTO: 4.8 % (ref 5–12)
NEUTROPHILS NFR BLD AUTO: 10.21 10*3/MM3 (ref 1.7–7)
NEUTROPHILS NFR BLD AUTO: 86.3 % (ref 42.7–76)
NRBC BLD AUTO-RTO: 0 /100 WBC (ref 0–0.2)
NT-PROBNP SERPL-MCNC: ABNORMAL PG/ML (ref 0–900)
OXYHGB MFR BLDV: 90.2 % (ref 94–99)
PCO2 BLDA: 41.2 MM HG (ref 35–45)
PCO2 TEMP ADJ BLD: 41.2 MM HG (ref 35–45)
PH BLDA: 7.34 PH UNITS (ref 7.35–7.45)
PH, TEMP CORRECTED: 7.34 PH UNITS (ref 7.35–7.45)
PLATELET # BLD AUTO: 227 10*3/MM3 (ref 140–450)
PMV BLD AUTO: 10.5 FL (ref 6–12)
PO2 BLDA: 60 MM HG (ref 83–108)
PO2 TEMP ADJ BLD: 60 MM HG (ref 83–108)
POTASSIUM BLDA-SCNC: 3.7 MMOL/L (ref 3.5–5.2)
POTASSIUM SERPL-SCNC: 3.8 MMOL/L (ref 3.5–5.2)
PROT SERPL-MCNC: 6 G/DL (ref 6–8.5)
RBC # BLD AUTO: 3.84 10*6/MM3 (ref 3.77–5.28)
SAO2 % BLDCOA: 92.3 % (ref 94–99)
SODIUM BLDA-SCNC: 143 MMOL/L (ref 136–145)
SODIUM SERPL-SCNC: 143 MMOL/L (ref 136–145)
TROPONIN T SERPL HS-MCNC: 47 NG/L
TROPONIN T SERPL HS-MCNC: 49 NG/L
VENTILATOR MODE: ABNORMAL
WBC NRBC COR # BLD AUTO: 11.83 10*3/MM3 (ref 3.4–10.8)

## 2024-09-30 PROCEDURE — 36415 COLL VENOUS BLD VENIPUNCTURE: CPT

## 2024-09-30 PROCEDURE — 84484 ASSAY OF TROPONIN QUANT: CPT

## 2024-09-30 PROCEDURE — 83050 HGB METHEMOGLOBIN QUAN: CPT

## 2024-09-30 PROCEDURE — 82805 BLOOD GASES W/O2 SATURATION: CPT

## 2024-09-30 PROCEDURE — 93005 ELECTROCARDIOGRAM TRACING: CPT

## 2024-09-30 PROCEDURE — 83735 ASSAY OF MAGNESIUM: CPT

## 2024-09-30 PROCEDURE — 71046 X-RAY EXAM CHEST 2 VIEWS: CPT

## 2024-09-30 PROCEDURE — 93005 ELECTROCARDIOGRAM TRACING: CPT | Performed by: FAMILY MEDICINE

## 2024-09-30 PROCEDURE — 94799 UNLISTED PULMONARY SVC/PX: CPT

## 2024-09-30 PROCEDURE — 96375 TX/PRO/DX INJ NEW DRUG ADDON: CPT

## 2024-09-30 PROCEDURE — 93010 ELECTROCARDIOGRAM REPORT: CPT | Performed by: INTERNAL MEDICINE

## 2024-09-30 PROCEDURE — 94660 CPAP INITIATION&MGMT: CPT

## 2024-09-30 PROCEDURE — 85379 FIBRIN DEGRADATION QUANT: CPT

## 2024-09-30 PROCEDURE — 36600 WITHDRAWAL OF ARTERIAL BLOOD: CPT

## 2024-09-30 PROCEDURE — 83880 ASSAY OF NATRIURETIC PEPTIDE: CPT

## 2024-09-30 PROCEDURE — 25010000002 FUROSEMIDE PER 20 MG

## 2024-09-30 PROCEDURE — G0378 HOSPITAL OBSERVATION PER HR: HCPCS

## 2024-09-30 PROCEDURE — 82375 ASSAY CARBOXYHB QUANT: CPT

## 2024-09-30 PROCEDURE — 25010000002 METHYLPREDNISOLONE PER 125 MG

## 2024-09-30 PROCEDURE — 85025 COMPLETE CBC W/AUTO DIFF WBC: CPT

## 2024-09-30 PROCEDURE — 99285 EMERGENCY DEPT VISIT HI MDM: CPT

## 2024-09-30 PROCEDURE — 80053 COMPREHEN METABOLIC PANEL: CPT

## 2024-09-30 PROCEDURE — 96374 THER/PROPH/DIAG INJ IV PUSH: CPT

## 2024-09-30 RX ORDER — SODIUM CHLORIDE 0.9 % (FLUSH) 0.9 %
10 SYRINGE (ML) INJECTION AS NEEDED
Status: DISCONTINUED | OUTPATIENT
Start: 2024-09-30 | End: 2024-10-03 | Stop reason: HOSPADM

## 2024-09-30 RX ORDER — FUROSEMIDE 10 MG/ML
40 INJECTION INTRAMUSCULAR; INTRAVENOUS ONCE
Status: COMPLETED | OUTPATIENT
Start: 2024-09-30 | End: 2024-09-30

## 2024-09-30 RX ORDER — METHYLPREDNISOLONE SODIUM SUCCINATE 125 MG/2ML
125 INJECTION, POWDER, LYOPHILIZED, FOR SOLUTION INTRAMUSCULAR; INTRAVENOUS ONCE
Status: COMPLETED | OUTPATIENT
Start: 2024-09-30 | End: 2024-09-30

## 2024-09-30 RX ADMIN — METHYLPREDNISOLONE SODIUM SUCCINATE 125 MG: 125 INJECTION, POWDER, FOR SOLUTION INTRAMUSCULAR; INTRAVENOUS at 16:35

## 2024-09-30 RX ADMIN — FUROSEMIDE 40 MG: 10 INJECTION, SOLUTION INTRAVENOUS at 17:34

## 2024-09-30 NOTE — ED PROVIDER NOTES
"Subjective   History of Present Illness  Patient is a 64-year-old female who presents emergency department with complaints of shortness of breath.  Patient with history of COPD.  Follows with Dr. Chavez as her pulmonologist.  Patient actually reports that she had an appointment today, but was unable to make it due to her shortness of breath.  She reports that the shortness of breath is worse with exertion.  States that she has been taking Mucinex for her cough.  Reports that she got a breathing treatment in EMS and that \"broke everything loose\".  She denies fevers.  On exam, patient does have wheezing on auscultation.  Patient denies chest pain.  Denies abdominal pain, nausea, vomiting, diarrhea.  States that she has had a mild headache at the top of her head.  Patient is alert and oriented x 4 on exam.  Answering all questions appropriately and following all commands.  No leg swelling noted on exam.  Patient reports that she wears 2 L of oxygen as needed and CPAP at night.        Review of Systems   Respiratory:  Positive for cough, shortness of breath and wheezing.    All other systems reviewed and are negative.      Past Medical History:   Diagnosis Date    Acute CHF     Acute renal failure (ARF)     Anemia     Asthma     childhood    Bowel disease, inflammatory     Chronic kidney disease     Coronary artery disease     Hypertension     Ischemic colitis     Lung nodule, multiple 04/03/2024    Metabolic acidosis     Myocardial infarction 11/07/2020    Nonrheumatic aortic (valve) insufficiency     PTSD (post-traumatic stress disorder)        Allergies   Allergen Reactions    Levaquin [Levofloxacin] Itching    Codeine Nausea And Vomiting    Sumatriptan Other (See Comments)     Headache        Past Surgical History:   Procedure Laterality Date    CARDIAC CATHETERIZATION N/A 11/08/2020    Procedure: Left Heart Cath;  Surgeon: Pierce Buchanan MD;  Location:  [facility] CATH [facility]ASIVE LOC[facility];  Service: Cardiovascular;  " Laterality: N/A;    CLOSED REDUCTION ANKLE FRACTURE Right     13 screws and a plate    EXTERNAL FIXATION WRIST FRACTURE      INSERTION HEMODIALYSIS CATHETER Right 2022    Procedure: INSERTION OF RIGHT INTERNAL JUGULAR HEMODIALYSIS CATHETER;  Surgeon: Dexter Red MD;  Location:  [facility] OR;  Service: Vascular;  Laterality: Right;    TUBAL ABDOMINAL LIGATION         Family History   Problem Relation Age of Onset    Coronary artery disease Mother     Coronary artery disease Father     Breast cancer Neg Hx        Social History     Socioeconomic History    Marital status: Single   Tobacco Use    Smoking status: Every Day     Current packs/day: 0.00     Average packs/day: 0.5 packs/day for 48.7 years (24.4 ttl pk-yrs)     Types: Cigarettes     Start date:      Last attempt to quit: 2023     Years since quittin.0     Passive exposure: Past    Smokeless tobacco: Never    Tobacco comments:     Smokes about 0.5 pack daily 4/10    Vaping Use    Vaping status: Never Used   Substance and Sexual Activity    Alcohol use: No    Drug use: No    Sexual activity: Defer           Objective   Physical Exam  Vitals and nursing note reviewed.   Constitutional:       General: She is not in acute distress.     Appearance: Normal appearance. She is normal weight. She is not ill-appearing or toxic-appearing.   HENT:      Head: Normocephalic.   Cardiovascular:      Rate and Rhythm: Normal rate and regular rhythm.      Pulses: Normal pulses.      Heart sounds: Normal heart sounds.   Pulmonary:      Effort: Pulmonary effort is normal.      Breath sounds: Wheezing present.   Abdominal:      General: Abdomen is flat. Bowel sounds are normal. There is no distension.      Palpations: Abdomen is soft.      Tenderness: There is no abdominal tenderness.   Musculoskeletal:         General: Normal range of motion.      Cervical back: Normal range of motion and neck supple.   Skin:     General: Skin is warm and dry.    Neurological:      General: No focal deficit present.      Mental Status: She is alert and oriented to person, place, and time. Mental status is at baseline.   Psychiatric:         Mood and Affect: Mood normal.         Behavior: Behavior normal.         Thought Content: Thought content normal.         Judgment: Judgment normal.         Procedures       XR Chest 2 View   Final Result   1.. Stage I congestive changes with redistribution and cardiac   enlargement. No avel pulmonary edema or infiltrate.   2. Remote granulomatous disease.       This report was signed and finalized on 9/30/2024 3:54 PM by Dr. Ye Montaño MD.            Labs Reviewed   COMPREHENSIVE METABOLIC PANEL - Abnormal; Notable for the following components:       Result Value    Glucose 107 (*)     BUN 30 (*)     Creatinine 2.28 (*)     Chloride 111 (*)     CO2 21.0 (*)     Calcium 8.3 (*)     Albumin 3.2 (*)     eGFR 23.4 (*)     All other components within normal limits    Narrative:     GFR Normal >60  Chronic Kidney Disease <60  Kidney Failure <15     BNP (IN-HOUSE) - Abnormal; Notable for the following components:    proBNP 55,636.0 (*)     All other components within normal limits    Narrative:     This assay is used as an aid in the diagnosis of individuals suspected of having heart failure. It can be used as an aid in the diagnosis of acute decompensated heart failure (ADHF) in patients presenting with signs and symptoms of ADHF to the emergency department (ED). In addition, NT-proBNP of <300 pg/mL indicates ADHF is not likely.    Age Range Result Interpretation  NT-proBNP Concentration (pg/mL:      <50             Positive            >450                   Gray                 300-450                    Negative             <300    50-75           Positive            >900                  Gray                300-900                  Negative            <300      >75             Positive            >1800                  Gonzalez        "         300-1800                  Negative            <300   D-DIMER, QUANTITATIVE - Abnormal; Notable for the following components:    D-Dimer, Quantitative 1.63 (*)     All other components within normal limits    Narrative:     According to the assay 's published package insert, a normal (<0.50 MCGFEU/mL) D-dimer result in conjunction with a non-high clinical probability assessment, excludes deep vein thrombosis (DVT) and pulmonary embolism (PE) with high sensitivity.    D-dimer values increase with age and this can make VTE exclusion of an older population difficult. To address this, the [employer], based on best available evidence and recent guidelines, recommends that clinicians use age-adjusted D-dimer thresholds in patients greater than 50 years of age with: a) a low probability of PE who do not meet all Pulmonary Embolism Rule Out Criteria, or b) in those with intermediate probability of PE.   The formula for an age-adjusted D-dimer cut-off is \"age/100\".  For example, a 60 year old patient would have an age-adjusted cut-off of 0.60 MCGFEU/mL and an 80 year old 0.80 MCGFEU/mL.   SINGLE HS TROPONIN T - Abnormal; Notable for the following components:    HS Troponin T 49 (*)     All other components within normal limits    Narrative:     High Sensitive Troponin T Reference Range:  <14.0 ng/L- Negative Female for AMI  <22.0 ng/L- Negative Male for AMI  >=14 - Abnormal Female indicating possible myocardial injury.  >=22 - Abnormal Male indicating possible myocardial injury.   Clinicians would have to utilize clinical acumen, EKG, Troponin, and serial changes to determine if it is an Acute Myocardial Infarction or myocardial injury due to an underlying chronic condition.        CBC WITH AUTO DIFFERENTIAL - Abnormal; Notable for the following components:    WBC 11.83 (*)     Hemoglobin 11.1 (*)     MCHC 30.7 (*)     RDW 17.3 (*)     RDW-SD 55.8 (*)     Neutrophil % 86.3 (*)     " Lymphocyte % 7.4 (*)     Monocyte % 4.8 (*)     Immature Grans % 0.7 (*)     Neutrophils, Absolute 10.21 (*)     Immature Grans, Absolute 0.08 (*)     All other components within normal limits   BLOOD GAS, ARTERIAL W/CO-OXIMETRY - Abnormal; Notable for the following components:    pH, Arterial 7.337 (*)     pO2, Arterial 60.0 (*)     Base Excess, Arterial -3.6 (*)     O2 Saturation, Arterial 92.3 (*)     Hemoglobin, Blood Gas 11.8 (*)     Hematocrit, Blood Gas 36.2 (*)     Oxyhemoglobin 90.2 (*)     pH, Temp Corrected 7.337 (*)     pO2, Temperature Corrected 60.0 (*)     All other components within normal limits   MAGNESIUM - Normal   BLOOD GAS, ARTERIAL W/CO-OXIMETRY   SINGLE HS TROPONIN T   CBC AND DIFFERENTIAL    Narrative:     The following orders were created for panel order CBC & Differential.  Procedure                               Abnormality         Status                     ---------                               -----------         ------                     CBC Auto Differential[[id number]]        Abnormal            Final result                 Please view results for these tests on the individual orders.           ED Course  ED Course as of 09/30/24 1703   Mon Sep 30, 2024   1645 Call placed to Dr. Weston for admission. [KR]   1702 Discussed case with Dr. Weston's nurse, Sneha. Will admit under Dr. Weston's service. IV Lasix ordered in the ER. Patient agreeable to admission.  [KR]      ED Course User Index  [KR] Shelli Haynes APRN                                             Medical Decision Making  Problems Addressed:  Chronic kidney disease, unspecified CKD stage: complicated acute illness or injury  Dyspnea on exertion: complicated acute illness or injury  Elevated brain natriuretic peptide (BNP) level: complicated acute illness or injury  History of CHF (congestive heart failure): complicated acute illness or injury  History of COPD: complicated acute illness or injury    Amount and/or Complexity  of Data Reviewed  Labs: ordered.  Radiology: ordered.    Risk  Prescription drug management.  Decision regarding hospitalization.        Final diagnoses:   Dyspnea on exertion   Elevated brain natriuretic peptide (BNP) level   History of CHF (congestive heart failure)   History of COPD   Chronic kidney disease, unspecified CKD stage       ED Disposition  ED Disposition       ED Disposition   Decision to Admit    Condition   --    Comment   Level of Care: Remote Telemetry [26]   Diagnosis: CHF exacerbation [415587]   Admitting Physician: JESSE MEIER [[id number]]                 No follow-up provider specified.       Medication List        ASK your doctor about these medications      cefdinir 300 MG capsule  Commonly known as: OMNICEF  Take 1 capsule by mouth 2 (Two) Times a Day for 5 days.  Ask about: Should I take this medication?     metroNIDAZOLE 500 MG tablet  Commonly known as: FLAGYL  Take 1 tablet by mouth Every 8 (Eight) Hours for 5 days. Indications: Infection Within the Abdomen  Ask about: Should I take this medication?                 Shelli Haynes, ISAIAH  09/30/24 2420

## 2024-10-01 PROBLEM — I50.43 ACUTE ON CHRONIC COMBINED SYSTOLIC AND DIASTOLIC CONGESTIVE HEART FAILURE: Status: ACTIVE | Noted: 2024-09-30

## 2024-10-01 PROBLEM — F15.10 METHAMPHETAMINE USE: Status: ACTIVE | Noted: 2024-10-01

## 2024-10-01 LAB
AMPHET+METHAMPHET UR QL: POSITIVE
AMPHETAMINES UR QL: POSITIVE
ANION GAP SERPL CALCULATED.3IONS-SCNC: 12 MMOL/L (ref 5–15)
BARBITURATES UR QL SCN: NEGATIVE
BENZODIAZ UR QL SCN: NEGATIVE
BUN SERPL-MCNC: 35 MG/DL (ref 8–23)
BUN/CREAT SERPL: 14.9 (ref 7–25)
BUPRENORPHINE SERPL-MCNC: NEGATIVE NG/ML
CALCIUM SPEC-SCNC: 8.4 MG/DL (ref 8.6–10.5)
CANNABINOIDS SERPL QL: NEGATIVE
CHLORIDE SERPL-SCNC: 107 MMOL/L (ref 98–107)
CO2 SERPL-SCNC: 22 MMOL/L (ref 22–29)
COCAINE UR QL: NEGATIVE
CREAT SERPL-MCNC: 2.35 MG/DL (ref 0.57–1)
EGFRCR SERPLBLD CKD-EPI 2021: 22.6 ML/MIN/1.73
FENTANYL UR-MCNC: NEGATIVE NG/ML
GLUCOSE SERPL-MCNC: 144 MG/DL (ref 65–99)
METHADONE UR QL SCN: NEGATIVE
NT-PROBNP SERPL-MCNC: ABNORMAL PG/ML (ref 0–900)
OPIATES UR QL: NEGATIVE
OXYCODONE UR QL SCN: NEGATIVE
PCP UR QL SCN: NEGATIVE
POTASSIUM SERPL-SCNC: 3.8 MMOL/L (ref 3.5–5.2)
QT INTERVAL: 398 MS
QTC INTERVAL: 435 MS
SODIUM SERPL-SCNC: 141 MMOL/L (ref 136–145)
TRICYCLICS UR QL SCN: NEGATIVE

## 2024-10-01 PROCEDURE — 94799 UNLISTED PULMONARY SVC/PX: CPT

## 2024-10-01 PROCEDURE — 25010000002 FUROSEMIDE PER 20 MG: Performed by: FAMILY MEDICINE

## 2024-10-01 PROCEDURE — 83880 ASSAY OF NATRIURETIC PEPTIDE: CPT | Performed by: FAMILY MEDICINE

## 2024-10-01 PROCEDURE — 96376 TX/PRO/DX INJ SAME DRUG ADON: CPT

## 2024-10-01 PROCEDURE — 94660 CPAP INITIATION&MGMT: CPT

## 2024-10-01 PROCEDURE — 80048 BASIC METABOLIC PNL TOTAL CA: CPT | Performed by: FAMILY MEDICINE

## 2024-10-01 PROCEDURE — 99214 OFFICE O/P EST MOD 30 MIN: CPT | Performed by: NURSE PRACTITIONER

## 2024-10-01 PROCEDURE — 25010000002 METHYLPREDNISOLONE PER 40 MG: Performed by: INTERNAL MEDICINE

## 2024-10-01 PROCEDURE — G0378 HOSPITAL OBSERVATION PER HR: HCPCS

## 2024-10-01 PROCEDURE — 94640 AIRWAY INHALATION TREATMENT: CPT

## 2024-10-01 PROCEDURE — 80307 DRUG TEST PRSMV CHEM ANLYZR: CPT | Performed by: FAMILY MEDICINE

## 2024-10-01 PROCEDURE — 99222 1ST HOSP IP/OBS MODERATE 55: CPT | Performed by: INTERNAL MEDICINE

## 2024-10-01 RX ORDER — CLONIDINE HYDROCHLORIDE 0.1 MG/1
0.1 TABLET ORAL EVERY 6 HOURS PRN
Status: DISCONTINUED | OUTPATIENT
Start: 2024-10-01 | End: 2024-10-03 | Stop reason: HOSPADM

## 2024-10-01 RX ORDER — NIFEDIPINE 60 MG/1
120 TABLET, EXTENDED RELEASE ORAL
Status: DISCONTINUED | OUTPATIENT
Start: 2024-10-01 | End: 2024-10-02

## 2024-10-01 RX ORDER — CARVEDILOL 6.25 MG/1
25 TABLET ORAL ONCE
Status: COMPLETED | OUTPATIENT
Start: 2024-10-01 | End: 2024-10-01

## 2024-10-01 RX ORDER — IPRATROPIUM BROMIDE AND ALBUTEROL SULFATE 2.5; .5 MG/3ML; MG/3ML
3 SOLUTION RESPIRATORY (INHALATION) 4 TIMES DAILY
Status: DISCONTINUED | OUTPATIENT
Start: 2024-10-01 | End: 2024-10-03 | Stop reason: HOSPADM

## 2024-10-01 RX ORDER — CARVEDILOL 6.25 MG/1
25 TABLET ORAL 2 TIMES DAILY WITH MEALS
Status: DISCONTINUED | OUTPATIENT
Start: 2024-10-01 | End: 2024-10-01

## 2024-10-01 RX ORDER — ASPIRIN 81 MG/1
81 TABLET ORAL DAILY
Status: DISCONTINUED | OUTPATIENT
Start: 2024-10-01 | End: 2024-10-03 | Stop reason: HOSPADM

## 2024-10-01 RX ORDER — FLUTICASONE PROPIONATE 50 UG/1
2 SPRAY, METERED NASAL EVERY MORNING
Status: DISCONTINUED | OUTPATIENT
Start: 2024-10-02 | End: 2024-10-03 | Stop reason: HOSPADM

## 2024-10-01 RX ORDER — CETIRIZINE HYDROCHLORIDE 10 MG/1
10 TABLET ORAL DAILY
Status: DISCONTINUED | OUTPATIENT
Start: 2024-10-02 | End: 2024-10-03 | Stop reason: HOSPADM

## 2024-10-01 RX ORDER — METHYLPREDNISOLONE SODIUM SUCCINATE 40 MG/ML
40 INJECTION, POWDER, LYOPHILIZED, FOR SOLUTION INTRAMUSCULAR; INTRAVENOUS EVERY 12 HOURS
Status: DISCONTINUED | OUTPATIENT
Start: 2024-10-01 | End: 2024-10-03 | Stop reason: HOSPADM

## 2024-10-01 RX ORDER — ISOSORBIDE DINITRATE 20 MG/1
40 TABLET ORAL
Status: DISCONTINUED | OUTPATIENT
Start: 2024-10-01 | End: 2024-10-03 | Stop reason: HOSPADM

## 2024-10-01 RX ORDER — CALCITRIOL 0.25 UG/1
0.25 CAPSULE, LIQUID FILLED ORAL DAILY
Status: DISCONTINUED | OUTPATIENT
Start: 2024-10-01 | End: 2024-10-03 | Stop reason: HOSPADM

## 2024-10-01 RX ORDER — NITROGLYCERIN 0.4 MG/1
0.4 TABLET SUBLINGUAL
Status: DISCONTINUED | OUTPATIENT
Start: 2024-10-01 | End: 2024-10-03 | Stop reason: HOSPADM

## 2024-10-01 RX ORDER — FUROSEMIDE 20 MG
40 TABLET ORAL DAILY
Status: DISCONTINUED | OUTPATIENT
Start: 2024-10-02 | End: 2024-10-03 | Stop reason: HOSPADM

## 2024-10-01 RX ORDER — CLONIDINE HYDROCHLORIDE 0.1 MG/1
0.1 TABLET ORAL ONCE
Status: COMPLETED | OUTPATIENT
Start: 2024-10-01 | End: 2024-10-01

## 2024-10-01 RX ORDER — BUDESONIDE AND FORMOTEROL FUMARATE DIHYDRATE 160; 4.5 UG/1; UG/1
2 AEROSOL RESPIRATORY (INHALATION)
Status: DISCONTINUED | OUTPATIENT
Start: 2024-10-01 | End: 2024-10-03 | Stop reason: HOSPADM

## 2024-10-01 RX ORDER — LOSARTAN POTASSIUM 50 MG/1
100 TABLET ORAL
Status: DISCONTINUED | OUTPATIENT
Start: 2024-10-01 | End: 2024-10-03 | Stop reason: HOSPADM

## 2024-10-01 RX ORDER — GUAIFENESIN 600 MG/1
600 TABLET, EXTENDED RELEASE ORAL EVERY 12 HOURS PRN
Status: DISCONTINUED | OUTPATIENT
Start: 2024-10-01 | End: 2024-10-03 | Stop reason: HOSPADM

## 2024-10-01 RX ORDER — CARVEDILOL 25 MG/1
25 TABLET ORAL 2 TIMES DAILY WITH MEALS
Status: DISCONTINUED | OUTPATIENT
Start: 2024-10-01 | End: 2024-10-03 | Stop reason: HOSPADM

## 2024-10-01 RX ORDER — IPRATROPIUM BROMIDE AND ALBUTEROL SULFATE 2.5; .5 MG/3ML; MG/3ML
3 SOLUTION RESPIRATORY (INHALATION) 4 TIMES DAILY
Status: DISCONTINUED | OUTPATIENT
Start: 2024-10-01 | End: 2024-10-01

## 2024-10-01 RX ORDER — FUROSEMIDE 10 MG/ML
20 INJECTION INTRAMUSCULAR; INTRAVENOUS ONCE
Status: COMPLETED | OUTPATIENT
Start: 2024-10-01 | End: 2024-10-01

## 2024-10-01 RX ADMIN — ISOSORBIDE DINITRATE 40 MG: 20 TABLET ORAL at 17:35

## 2024-10-01 RX ADMIN — CLONIDINE HYDROCHLORIDE 0.1 MG: 0.1 TABLET ORAL at 06:22

## 2024-10-01 RX ADMIN — IPRATROPIUM BROMIDE AND ALBUTEROL SULFATE 3 ML: .5; 3 SOLUTION RESPIRATORY (INHALATION) at 20:27

## 2024-10-01 RX ADMIN — CARVEDILOL 25 MG: 6.25 TABLET, FILM COATED ORAL at 06:22

## 2024-10-01 RX ADMIN — CALCITRIOL CAPSULES 0.25 MCG 0.25 MCG: 0.25 CAPSULE ORAL at 14:43

## 2024-10-01 RX ADMIN — IPRATROPIUM BROMIDE AND ALBUTEROL SULFATE 3 ML: .5; 3 SOLUTION RESPIRATORY (INHALATION) at 14:56

## 2024-10-01 RX ADMIN — NIFEDIPINE 120 MG: 60 TABLET, FILM COATED, EXTENDED RELEASE ORAL at 18:24

## 2024-10-01 RX ADMIN — CARVEDILOL 25 MG: 25 TABLET, FILM COATED ORAL at 18:23

## 2024-10-01 RX ADMIN — METHYLPREDNISOLONE SODIUM SUCCINATE 40 MG: 40 INJECTION, POWDER, FOR SOLUTION INTRAMUSCULAR; INTRAVENOUS at 22:23

## 2024-10-01 RX ADMIN — LOSARTAN POTASSIUM 100 MG: 50 TABLET, FILM COATED ORAL at 18:24

## 2024-10-01 RX ADMIN — CLONIDINE HYDROCHLORIDE 0.1 MG: 0.1 TABLET ORAL at 14:29

## 2024-10-01 RX ADMIN — ASPIRIN 81 MG: 81 TABLET, COATED ORAL at 14:29

## 2024-10-01 RX ADMIN — FUROSEMIDE 20 MG: 10 INJECTION, SOLUTION INTRAVENOUS at 14:29

## 2024-10-01 NOTE — H&P
History and Physical      CHIEF COMPLAINT:  SOA    Reason for Admission:  Volume Overload, Acute on Chronic Resp Failure    History Obtained From:  chart, patient    HISTORY OF PRESENT ILLNESS:      The patient is a 64 y.o. female who was admitted from ER with  worsening SOA, hypoxia. She has had no cough, or fevers. She denies any CP. She denies any abdominal pain or N/V. She has had no worsening edema. Her SOA is worse with activity. She has had some issues with her social situation that she would like to d/w Social Service.  She is feeling better after Lasix IV given last evening.    Past Medical History:    Past Medical History:   Diagnosis Date    Acute CHF     Acute renal failure (ARF)     Anemia     Asthma     childhood    Bowel disease, inflammatory     Chronic kidney disease     Coronary artery disease     Hypertension     Ischemic colitis     Lung nodule, multiple 04/03/2024    Metabolic acidosis     Myocardial infarction 11/07/2020    Nonrheumatic aortic (valve) insufficiency     PTSD (post-traumatic stress disorder)      Past Surgical History:    Past Surgical History:   Procedure Laterality Date    CARDIAC CATHETERIZATION N/A 11/08/2020    Procedure: Left Heart Cath;  Surgeon: Pierce Buchanan MD;  Location:  [facility]ATH [facility];  Service: Cardiovascular;  Laterality: N/A;    CLOSED REDUCTION ANKLE FRACTURE Right 2019    13 screws and a plate    EXTERNAL FIXATION WRIST FRACTURE      INSERTION HEMODIALYSIS CATHETER Right 06/03/2022    Procedure: INSERTION OF RIGHT INTERNAL JUGULAR HEMODIALYSIS CATHETER;  Surgeon: Dexter Red MD;  Location: [facility] OR;  Service: Vascular;  Laterality: Right;    TUBAL ABDOMINAL LIGATION           Medications Prior to Admission:    (Not in a hospital admission)      Allergies:  Levaquin [levofloxacin], Codeine, and Sumatriptan    Social History:   TOBACCO:   reports that she has been smoking cigarettes. She started smoking about 49 years ago. She has a 24.4  "pack-year smoking history. She has been exposed to tobacco smoke. She has never used smokeless tobacco.  ETOH:   reports no history of alcohol use.  DRUGS:   reports no history of drug use.        Family History:   Family History   Problem Relation Age of Onset    Coronary artery disease Mother     Coronary artery disease Father     Breast cancer Neg Hx      REVIEW OF SYSTEMS:  Constitutional: Negative   CV: Negative  Pulmonary: SOA  GI: Negative  : Negative  Psych: Negative  Neuro: Negative  Skin: Negative  MusculoSkeletal: Negative  HEENT: Negative  Joints: Negative  Vitals:  /100 (BP Location: Left arm, Patient Position: Sitting)   Pulse 79   Temp 98.2 °F (36.8 °C) (Oral)   Resp 18   Ht 167.6 cm (66\")   Wt 90.9 kg (200 lb 6.4 oz)   LMP  (LMP Unknown)   SpO2 94%   BMI 32.35 kg/m²     PHYSICAL EXAM (per notes):  Gen: NAD, alert, pleasant  HEENT: WNL  Lymph: no LAD  Neck: no JVD or masses  Chest: coarse  CV: RRR  Abdomen: NT/ND  Extrem: no C/C/E  Neuro: Nonfocal  Skin: no rashes  Joints: no redness  DATA:  I have reviewed the admission labs and imaging tests.    ASSESSMENT AND PLAN:      SOA, Volume Overload----give Lasix this am and then follow, O2, ask Cardiology to see patient   Acute on Chronic Resp Failure--improved, follow  COPD  CKD  HTN--follow BP        Orville Weston MD  09:46 CDT 10/1/2024  "

## 2024-10-01 NOTE — ED NOTES
Nursing report ED to floor  Ludivina Ramirez  64 y.o.  female    HPI:   Chief Complaint   Patient presents with    Shortness of Breath       Admitting doctor:   Orville Weston MD    Consulting provider(s):  Consults       Date and Time Order Name Status Description    10/1/2024  3:16 PM Inpatient Pulmonology Consult      10/1/2024  9:44 AM Inpatient Nephrology Consult      10/1/2024  9:44 AM Inpatient Cardiology Consult Completed     9/20/2024  8:58 AM Inpatient Gastroenterology Consult Completed     9/19/2024  6:13 PM Inpatient Nephrology Consult Completed              Admitting diagnosis:   The primary encounter diagnosis was Dyspnea on exertion. Diagnoses of Elevated brain natriuretic peptide (BNP) level, History of CHF (congestive heart failure), History of COPD, and Chronic kidney disease, unspecified CKD stage were also pertinent to this visit.    Code status:   Current Code Status       Date Active Code Status Order ID Comments User Context       Prior            Allergies:   Levaquin [levofloxacin], Codeine, and Sumatriptan    Intake and Output  No intake or output data in the 24 hours ending 10/01/24 1739    Weight:       09/30/24  1517   Weight: 90.9 kg (200 lb 6.4 oz)       Most recent vitals:   Vitals:    10/01/24 1431 10/01/24 1456 10/01/24 1503 10/01/24 1716   BP: (!) 193/104  (!) 186/108 172/92   BP Location: Left arm   Left arm   Patient Position: Sitting   Sitting   Pulse: 85 84 87 85   Resp: 24 20 12 18   Temp:       TempSrc:       SpO2: 93% 94% 93% 92%   Weight:       Height:         Oxygen Therapy: .    Active LDAs/IV Access:   Lines, Drains & Airways       Active LDAs       Name Placement date Placement time Site Days    Peripheral IV 09/30/24 1537 Right;Posterior Hand 09/30/24  1537  Hand  1                    Labs (abnormal labs have a star):   Labs Reviewed   COMPREHENSIVE METABOLIC PANEL - Abnormal; Notable for the following components:       Result Value    Glucose 107 (*)     BUN 30 (*)      Creatinine 2.28 (*)     Chloride 111 (*)     CO2 21.0 (*)     Calcium 8.3 (*)     Albumin 3.2 (*)     eGFR 23.4 (*)     All other components within normal limits    Narrative:     GFR Normal >60  Chronic Kidney Disease <60  Kidney Failure <15     BNP (IN-HOUSE) - Abnormal; Notable for the following components:    proBNP 55,636.0 (*)     All other components within normal limits    Narrative:     This assay is used as an aid in the diagnosis of individuals suspected of having heart failure. It can be used as an aid in the diagnosis of acute decompensated heart failure (ADHF) in patients presenting with signs and symptoms of ADHF to the emergency department (ED). In addition, NT-proBNP of <300 pg/mL indicates ADHF is not likely.    Age Range Result Interpretation  NT-proBNP Concentration (pg/mL:      <50             Positive            >450                   Gray                 300-450                    Negative             <300    50-75           Positive            >900                  Gray                300-900                  Negative            <300      >75             Positive            >1800                  Gray                300-1800                  Negative            <300   D-DIMER, QUANTITATIVE - Abnormal; Notable for the following components:    D-Dimer, Quantitative 1.63 (*)     All other components within normal limits    Narrative:     According to the assay 's published package insert, a normal (<0.50 MCGFEU/mL) D-dimer result in conjunction with a non-high clinical probability assessment, excludes deep vein thrombosis (DVT) and pulmonary embolism (PE) with high sensitivity.    D-dimer values increase with age and this can make VTE exclusion of an older population difficult. To address this, the [employer], based on best available evidence and recent guidelines, recommends that clinicians use age-adjusted D-dimer thresholds in patients greater than 50 years  "of age with: a) a low probability of PE who do not meet all Pulmonary Embolism Rule Out Criteria, or b) in those with intermediate probability of PE.   The formula for an age-adjusted D-dimer cut-off is \"age/100\".  For example, a 60 year old patient would have an age-adjusted cut-off of 0.60 MCGFEU/mL and an 80 year old 0.80 MCGFEU/mL.   SINGLE HS TROPONIN T - Abnormal; Notable for the following components:    HS Troponin T 49 (*)     All other components within normal limits    Narrative:     High Sensitive Troponin T Reference Range:  <14.0 ng/L- Negative Female for AMI  <22.0 ng/L- Negative Male for AMI  >=14 - Abnormal Female indicating possible myocardial injury.  >=22 - Abnormal Male indicating possible myocardial injury.   Clinicians would have to utilize clinical acumen, EKG, Troponin, and serial changes to determine if it is an Acute Myocardial Infarction or myocardial injury due to an underlying chronic condition.        CBC WITH AUTO DIFFERENTIAL - Abnormal; Notable for the following components:    WBC 11.83 (*)     Hemoglobin 11.1 (*)     MCHC 30.7 (*)     RDW 17.3 (*)     RDW-SD 55.8 (*)     Neutrophil % 86.3 (*)     Lymphocyte % 7.4 (*)     Monocyte % 4.8 (*)     Immature Grans % 0.7 (*)     Neutrophils, Absolute 10.21 (*)     Immature Grans, Absolute 0.08 (*)     All other components within normal limits   BLOOD GAS, ARTERIAL W/CO-OXIMETRY - Abnormal; Notable for the following components:    pH, Arterial 7.337 (*)     pO2, Arterial 60.0 (*)     Base Excess, Arterial -3.6 (*)     O2 Saturation, Arterial 92.3 (*)     Hemoglobin, Blood Gas 11.8 (*)     Hematocrit, Blood Gas 36.2 (*)     Oxyhemoglobin 90.2 (*)     pH, Temp Corrected 7.337 (*)     pO2, Temperature Corrected 60.0 (*)     All other components within normal limits   SINGLE HS TROPONIN T - Abnormal; Notable for the following components:    HS Troponin T 47 (*)     All other components within normal limits    Narrative:     High Sensitive " Troponin T Reference Range:  <14.0 ng/L- Negative Female for AMI  <22.0 ng/L- Negative Male for AMI  >=14 - Abnormal Female indicating possible myocardial injury.  >=22 - Abnormal Male indicating possible myocardial injury.   Clinicians would have to utilize clinical acumen, EKG, Troponin, and serial changes to determine if it is an Acute Myocardial Infarction or myocardial injury due to an underlying chronic condition.        BASIC METABOLIC PANEL - Abnormal; Notable for the following components:    Glucose 144 (*)     BUN 35 (*)     Creatinine 2.35 (*)     Calcium 8.4 (*)     eGFR 22.6 (*)     All other components within normal limits    Narrative:     GFR Normal >60  Chronic Kidney Disease <60  Kidney Failure <15     URINE DRUG SCREEN - Abnormal; Notable for the following components:    Methamphetamine, Ur Positive (*)     Amphetamine Screen, Urine Positive (*)     All other components within normal limits    Narrative:     Cutoff For Drugs Screened:    Amphetamines               500 ng/ml  Barbiturates               200 ng/ml  Benzodiazepines            150 ng/ml  Cocaine                    150 ng/ml  Methadone                  200 ng/ml  Opiates                    100 ng/ml  Phencyclidine               25 ng/ml  THC                         50 ng/ml  Methamphetamine            500 ng/ml  Tricyclic Antidepressants  300 ng/ml  Oxycodone                  100 ng/ml  Buprenorphine               10 ng/ml    The normal value for all drugs tested is negative. This report includes unconfirmed screening results, with the cutoff values listed, to be used for medical treatment purposes only.  Unconfirmed results must not be used for non-medical purposes such as employment or legal testing.  Clinical consideration should be applied to any drug of abuse test, particularly when unconfirmed results are used.     BNP (IN-HOUSE) - Abnormal; Notable for the following components:    proBNP >70,000.0 (*)     All other components  within normal limits    Narrative:     This assay is used as an aid in the diagnosis of individuals suspected of having heart failure. It can be used as an aid in the diagnosis of acute decompensated heart failure (ADHF) in patients presenting with signs and symptoms of ADHF to the emergency department (ED). In addition, NT-proBNP of <300 pg/mL indicates ADHF is not likely.    Age Range Result Interpretation  NT-proBNP Concentration (pg/mL:      <50             Positive            >450                   Gray                 300-450                    Negative             <300    50-75           Positive            >900                  Gray                300-900                  Negative            <300      >75             Positive            >1800                  Gray                300-1800                  Negative            <300   MAGNESIUM - Normal   FENTANYL, URINE - Normal    Narrative:     Negative Threshold:      Fentanyl 5 ng/mL     The normal value for the drug tested is negative. This report includes final unconfirmed screening results to be used for medical treatment purposes only. Unconfirmed results must not be used for non-medical purposes such as employment or legal testing. Clinical consideration should be applied to any drug of abuse test, particularly when unconfirmed results are used.          BLOOD GAS, ARTERIAL W/CO-OXIMETRY   CBC AND DIFFERENTIAL    Narrative:     The following orders were created for panel order CBC & Differential.  Procedure                               Abnormality         Status                     ---------                               -----------         ------                     CBC Auto Differential[[id number]]        Abnormal            Final result                 Please view results for these tests on the individual orders.       Meds given in ED:   Medications   sodium chloride 0.9 % flush 10 mL (has no administration in time range)   aspirin EC tablet 81 mg  (81 mg Oral Given 10/1/24 1429)   calcitriol (ROCALTROL) capsule 0.25 mcg (0.25 mcg Oral Given 10/1/24 1443)   carvedilol (COREG) tablet 25 mg (has no administration in time range)   cloNIDine (CATAPRES) tablet 0.1 mg (0.1 mg Oral Given 10/1/24 1429)   fluticasone (FLONASE) 50 MCG/ACT nasal spray 2 spray (has no administration in time range)   guaiFENesin (MUCINEX) 12 hr tablet 600 mg (has no administration in time range)   isosorbide dinitrate (ISORDIL) tablet 40 mg (40 mg Oral Given 10/1/24 1735)   melatonin tablet 2.5 mg (has no administration in time range)   nitroglycerin (NITROSTAT) SL tablet 0.4 mg (has no administration in time range)   ipratropium-albuterol (DUO-NEB) nebulizer solution 3 mL (3 mL Nebulization Given 10/1/24 1456)   furosemide (LASIX) tablet 40 mg (has no administration in time range)   losartan (COZAAR) tablet 100 mg (has no administration in time range)   NIFEdipine XL (PROCARDIA XL) 24 hr tablet 120 mg (has no administration in time range)   methylPREDNISolone sodium succinate (SOLU-Medrol) injection 125 mg (125 mg Intravenous Given 9/30/24 1635)   furosemide (LASIX) injection 40 mg (40 mg Intravenous Given 9/30/24 1734)   cloNIDine (CATAPRES) tablet 0.1 mg (0.1 mg Oral Given 10/1/24 0622)   carvedilol (COREG) tablet 25 mg (25 mg Oral Given 10/1/24 0622)   furosemide (LASIX) injection 20 mg (20 mg Intravenous Given 10/1/24 1429)           NIH Stroke Scale:       Isolation/Infection(s):  No active isolations   No active infections     COVID Testing  Collected .  Resulted .    Nursing report ED to floor:  Mental status: . A/o x4  Ambulatory status: .   Precautions: .    ED nurse phone extentsion- ..  [phone]

## 2024-10-01 NOTE — OUTREACH NOTE
Medical Week 2 Survey      Flowsheet Row Responses   [facility] patient discharged from? [facility]   Does the patient have one of the following disease processes/diagnoses(primary or secondary)? Other   Week 2 attempt successful? No   Unsuccessful attempts Attempt 1   Revoke Readmitted            Jessi RBO - Registered Nurse

## 2024-10-01 NOTE — ED NOTES
"Bipap alarming - this RN to BS due to alarm.  Patient sitting upright in bed and states she doesn't want to wear the bipap.  All monitor leads disconnected.  Attempted to reattach monitor and patient refused.  She stated \" I guess it is too hard to find a bedside commode around here.\"  I offered to get a BSC for patient and she stated \"I have already been using that community bathroom like a peasant.  I guess we all have salmonella now, I hope that makes you feel good.\"  BSC again was offered and patient stated she would not use it anyway.  I asked patient if there were any needs that I could help meet at this time and she shouted at me that I was an idiot and couldn't figure it out anyway.  I explained to patient that I was not her primary nurse and that I was unsure of her plan of care.  She called me a disgrace and stated I should lose my nursing license.  She shouted it was unreasonable that she has not been given a bed or any medications since being here.  She demanded that I pull her medications out of my pocket and give them to her.  I again explained that I do not have medications for her as I am not her nurse but would be happy to go get her nurse.  Patient remained sitting on stretcher in room, call light in her hand.  Discussed episode with charge RN and primary RN.  "

## 2024-10-01 NOTE — PLAN OF CARE
Problem: Noninvasive Ventilation Acute  Goal: Effective Unassisted Ventilation and Oxygenation  Outcome: Progressing   Goal Outcome Evaluation:

## 2024-10-01 NOTE — CASE MANAGEMENT/SOCIAL WORK
Discharge Planning Assessment  [facility]     Patient Name: Ludivina Ramirez  MRN: [id number]  Today's Date: 10/1/2024    Admit Date: 9/30/2024        Discharge Needs Assessment       Row Name 10/01/24 1409       Living Environment    People in Home alone    Current Living Arrangements home    Potentially Unsafe Housing Conditions none    Primary Care Provided by self    Provides Primary Care For no one    Family Caregiver if Needed sibling(s)    Quality of Family Relationships helpful;involved;supportive    Able to Return to Prior Arrangements yes       Resource/Environmental Concerns    Resource/Environmental Concerns none    Transportation Concerns none       Transition Planning    Patient/Family Anticipates Transition to home    Patient/Family Anticipated Services at Transition none    Transportation Anticipated family or friend will provide       Discharge Needs Assessment    Readmission Within the Last 30 Days no previous admission in last 30 days    Equipment Currently Used at Home oxygen;cpap;commode    Concerns to be Addressed no discharge needs identified    Anticipated Changes Related to Illness none    Equipment Needed After Discharge none    Discharge Coordination/Progress PT resides at home alone and has been independent prior to admission. ANTHONY has been consulted for dc planning/social concerns. SW has met with PT who states that she has limited help/support and is upset that her brother does not help her. She has a dog at home that her brother will not come get and take to his house. PT states that she is on a waiting list for services through [employer]. PT is unsure where she is on the waiting list and asked SW to see if she can be moved up on the list. SW advised that SW has no authority over [employer] and would not be able to get her moved up on the waiting list. PT states that she just wants to know how much longer it will be. SW advised that with most all waiting lists a specific timeframe  is unable to be given due to a variety of factors. PT asked if there are any services she can get. SW advised that she coudl have HH services. She states that all they do is come check her blood pressure and talk to her for a few minutes. She advised that this is not helpful. PT is looking for unskilled services to help her around the house. SW advised that this is a service that is not covered by insurance or Medicare and that the services provided by Medicaid are what she is currently on the waiting list for. Otherwise the unskilled services she is looking for would be an out of pocket expense. PT states she cannot afford to pay for those services and guesses she will have to wait for [facility]. PT denies any other needs at this time. PT plans to return home when medically ready. SW will follow and assist as needed.                   Discharge Plan    No documentation.                 Continued Care and Services - Admitted Since 9/30/2024    No active coordination exists for this encounter.          Demographic Summary    No documentation.                  Functional Status    No documentation.                  Psychosocial    No documentation.                  Abuse/Neglect    No documentation.                  Legal    No documentation.                  Substance Abuse    No documentation.                  Patient Forms    No documentation.                     ESTRELLITA Moran

## 2024-10-01 NOTE — PROGRESS NOTES
RT EQUIPMENT DEVICE RELATED - SKIN ASSESSMENT    Griffin Score:        RT Medical Equipment/Device:     NIV Mask:  Under-the-nose   size:  B    Skin Assessment:      Cheek:  Intact  Nares:  Intact  :  Intact    Device Skin Pressure Protection:  Skin-to-device areas padded:  None Required    Nurse Notification:  Essence Davis, RRT

## 2024-10-01 NOTE — CONSULTS
"[facility] HEART GROUP CONSULT NOTE    Referring Provider: Orville Weston MD    Reason for Consultation: CHF Exacerbation    Chief complaint:   Chief Complaint   Patient presents with    Shortness of Breath       Subjective .     History of present illness:  Ludivina Ramirez is a 64 y.o. female with CAD s/p CORA to the LAD in 2020 following a STEMI, chronic combined CHF with most recent EF noted to be 46-50%, AI, CKD, COPD, tobacco use and methamphetamine use. She presented to the hospital yesterday with complaints of shortness of breath. She notes since being admitted to the hospital in August and her lasix and HCTZ were stopped at discharge, she has struggled with fluid issues. She notes she developed severe dyspnea and was \"guppy breathing\" yesterday therefore came to the ER. Upon arrival she was noted to be hypertensive with /10, creatinine 2.28 which is fairly normal for her (previously had to be on dialysis and has since been taken off and refuses to restart if needed), BNP 55,636, HS troponin 49 with delta -2. CT abdomen revealed diverticulitis, small hiatal hernia. CXR revealed congestive changes and cardiac enlargement. UDS was positive for methamphetamines and amphetamines. She has received 40mg IV lasix and 20mg IV lasix since arrival. Cardiology has been consulted for CHF exacerbation.     She reports since receiving lasix she can breathe again. She notes her last meth use was 3 weeks ago and she \"is done with the meth\". She wants her medications straightened out so she can go home and stay home. She notes some mild ankle edema in the last few days.     History  Past Medical History:   Diagnosis Date    Acute CHF     Acute renal failure (ARF)     Anemia     Asthma     childhood    Bowel disease, inflammatory     Chronic kidney disease     Coronary artery disease     Hypertension     Ischemic colitis     Lung nodule, multiple 04/03/2024    Metabolic acidosis     Myocardial infarction " 2020    Nonrheumatic aortic (valve) insufficiency     PTSD (post-traumatic stress disorder)    ,   Past Surgical History:   Procedure Laterality Date    CARDIAC CATHETERIZATION N/A 2020    Procedure: Left Heart Cath;  Surgeon: Pierce Buchanan MD;  Location:  [facility] CATH INVASIVE LOCATION;  Service: Cardiovascular;  Laterality: N/A;    CLOSED REDUCTION ANKLE FRACTURE Right     13 screws and a plate    EXTERNAL FIXATION WRIST FRACTURE      INSERTION HEMODIALYSIS CATHETER Right 2022    Procedure: INSERTION OF RIGHT INTERNAL JUGULAR HEMODIALYSIS CATHETER;  Surgeon: Dexter Red MD;  Location:  [facility] OR;  Service: Vascular;  Laterality: Right;    TUBAL ABDOMINAL LIGATION     ,   Family History   Problem Relation Age of Onset    Coronary artery disease Mother     Coronary artery disease Father     Breast cancer Neg Hx    ,   Social History     Tobacco Use    Smoking status: Every Day     Current packs/day: 0.00     Average packs/day: 0.5 packs/day for 48.7 years (24.4 ttl pk-yrs)     Types: Cigarettes     Start date:      Last attempt to quit: 2023     Years since quittin.0     Passive exposure: Past    Smokeless tobacco: Never    Tobacco comments:     Smokes about 0.5 pack daily 4/10    Vaping Use    Vaping status: Never Used   Substance Use Topics    Alcohol use: No    Drug use: No   ,     Medications    Prior to Admission medications    Medication Sig Start Date End Date Taking? Authorizing Provider   aspirin 81 MG EC tablet Take 1 tablet by mouth Daily. 22  Yes Destini Gaitan APRN   calcitriol (ROCALTROL) 0.25 MCG capsule Take 1 capsule by mouth Daily.   Yes Provider, MD Edwin   carvedilol (COREG) 25 MG tablet Take 1 tablet by mouth 2 (Two) Times a Day With Meals.   Yes ProviderEdwin MD   guaiFENesin (MUCINEX) 600 MG 12 hr tablet Take 1 tablet by mouth Every 12 (Twelve) Hours As Needed for Cough. 24  Yes Orville Weston MD    ipratropium-albuterol (DUO-NEB) 0.5-2.5 mg/3 ml nebulizer Take 3 mL by nebulization 4 (Four) Times a Day. prn 9/24/24  Yes Orville Weston MD   isosorbide dinitrate (ISORDIL) 40 MG tablet Take 1 tablet by mouth 2 (Two) Times a Day. 4/16/24  Yes Destini Gaitan APRN   melatonin 3 MG tablet Take 2 tablets by mouth Every Night. 6/10/22  Yes Orville Weston MD   ondansetron (Zofran) 4 MG tablet Take 1 tablet by mouth Every 8 (Eight) Hours As Needed for Nausea or Vomiting. 9/24/24  Yes Orville Weston MD   cloNIDine (CATAPRES) 0.1 MG tablet Take 1 tablet by mouth Every 6 (Six) Hours As Needed for High Blood Pressure (SBP >160). 10/11/23   Orville Weston MD   nitroglycerin (NITROSTAT) 0.4 MG SL tablet Place 1 tablet under the tongue Every 5 (Five) Minutes As Needed for Chest Pain. Take no more than 3 doses in 15 minutes.    Provider, MD Edwin   albuterol sulfate  (90 Base) MCG/ACT inhaler Inhale 2 puffs Every 4 (Four) Hours As Needed for Wheezing.  Patient not taking: Reported on 10/1/2024 8/2/23 10/1/24  Neeta Chavez MD   azelastine (ASTELIN) 0.1 % nasal spray 2 sprays into the nostril(s) as directed by provider 2 (Two) Times a Day.  Patient not taking: Reported on 10/1/2024 8/26/24 10/1/24  Orville Weston MD   cefdinir (OMNICEF) 300 MG capsule Take 1 capsule by mouth 2 (Two) Times a Day for 5 days.  Patient not taking: Reported on 10/1/2024 9/24/24 10/1/24  Orville Weston MD   fluticasone (FLONASE) 50 MCG/ACT nasal spray 2 sprays into the nostril(s) as directed by provider Every Morning. In each nostril  Patient not taking: Reported on 10/1/2024 8/2/23 10/1/24  Neeta Chavez MD   metroNIDAZOLE (FLAGYL) 500 MG tablet Take 1 tablet by mouth Every 8 (Eight) Hours for 5 days. Indications: Infection Within the Abdomen  Patient not taking: Reported on 10/1/2024 9/24/24 10/1/24  Orville Weston MD   OLANZapine (zyPREXA) 5 MG tablet Take 1 tablet by mouth  Every Night. 6/10/22 8/21/22  Orville Weston MD   rosio searsdii (FLORASTOR) 250 MG capsule Take 1 capsule by mouth 2 (Two) Times a Day.  Patient not taking: Reported on 10/1/2024 9/24/24 10/1/24  Orville Weston MD       Current Facility-Administered Medications   Medication Dose Route Frequency Provider Last Rate Last Admin    aspirin EC tablet 81 mg  81 mg Oral Daily Orville Weston MD   81 mg at 10/01/24 1429    calcitriol (ROCALTROL) capsule 0.25 mcg  0.25 mcg Oral Daily Orville Weston MD   0.25 mcg at 10/01/24 1443    carvedilol (COREG) tablet 25 mg  25 mg Oral BID With Meals Orville Weston MD        cloNIDine (CATAPRES) tablet 0.1 mg  0.1 mg Oral Q6H PRN Orville Weston MD   0.1 mg at 10/01/24 1429    [START ON 10/2/2024] fluticasone (FLONASE) 50 MCG/ACT nasal spray 2 spray  2 spray Nasal QAM Orville Weston MD        [START ON 10/2/2024] furosemide (LASIX) tablet 40 mg  40 mg Oral Daily Mary Rodriguez APRN        guaiFENesin (MUCINEX) 12 hr tablet 600 mg  600 mg Oral Q12H PRN Orville Weston MD        ipratropium-albuterol (DUO-NEB) nebulizer solution 3 mL  3 mL Nebulization 4x Daily Orville Weston MD   3 mL at 10/01/24 1456    isosorbide dinitrate (ISORDIL) tablet 40 mg  40 mg Oral BID - Nitrates Orville Weston MD        losartan (COZAAR) tablet 100 mg  100 mg Oral Q24H Mary Rodriguez APRN        melatonin tablet 2.5 mg  2.5 mg Oral Nightly PRN Orville Weston MD        NIFEdipine XL (PROCARDIA XL) 24 hr tablet 120 mg  120 mg Oral Q24H Mary Rodriguez APRN        nitroglycerin (NITROSTAT) SL tablet 0.4 mg  0.4 mg Sublingual Q5 Min PRN Orville Weston MD        sodium chloride 0.9 % flush 10 mL  10 mL Intravenous PRN Shelli Haynes APRN         Current Outpatient Medications   Medication Sig Dispense Refill    aspirin 81 MG EC tablet Take 1 tablet by mouth Daily. 90 tablet 3    calcitriol (ROCALTROL) 0.25 MCG capsule  Take 1 capsule by mouth Daily.      carvedilol (COREG) 25 MG tablet Take 1 tablet by mouth 2 (Two) Times a Day With Meals.      guaiFENesin (MUCINEX) 600 MG 12 hr tablet Take 1 tablet by mouth Every 12 (Twelve) Hours As Needed for Cough.      ipratropium-albuterol (DUO-NEB) 0.5-2.5 mg/3 ml nebulizer Take 3 mL by nebulization 4 (Four) Times a Day. prn 360 mL 0    isosorbide dinitrate (ISORDIL) 40 MG tablet Take 1 tablet by mouth 2 (Two) Times a Day. 180 tablet 3    melatonin 3 MG tablet Take 2 tablets by mouth Every Night. 30 tablet 0    ondansetron (Zofran) 4 MG tablet Take 1 tablet by mouth Every 8 (Eight) Hours As Needed for Nausea or Vomiting. 20 tablet 0    cloNIDine (CATAPRES) 0.1 MG tablet Take 1 tablet by mouth Every 6 (Six) Hours As Needed for High Blood Pressure (SBP >160). 30 tablet 1    nitroglycerin (NITROSTAT) 0.4 MG SL tablet Place 1 tablet under the tongue Every 5 (Five) Minutes As Needed for Chest Pain. Take no more than 3 doses in 15 minutes.         Allergies:  Levaquin [levofloxacin], Codeine, and Sumatriptan    Review of Systems  Review of Systems   Constitutional:  Negative for diaphoresis, fatigue and unexpected weight change.   Respiratory:  Positive for shortness of breath. Negative for chest tightness and wheezing.    Cardiovascular:  Negative for chest pain, palpitations and leg swelling.   Gastrointestinal:  Negative for diarrhea, nausea and vomiting.   Neurological:  Negative for dizziness, syncope and light-headedness.   All other systems reviewed and are negative.      Objective     Physical Exam:  Patient Vitals for the past 24 hrs:   BP Pulse Resp SpO2   10/01/24 1503 (!) 186/108 87 12 93 %   10/01/24 1456 -- 84 20 94 %   10/01/24 1431 (!) 193/104 85 24 93 %   10/01/24 1429 (!) 193/104 85 -- --   10/01/24 1201 (!) 181/99 69 22 95 %   10/01/24 1013 -- 75 24 98 %   10/01/24 1001 165/98 76 18 93 %   10/01/24 0801 166/100 79 18 94 %   10/01/24 0635 -- 83 16 97 %   10/01/24 0531 (!) 187/97  76 -- --   10/01/24 0417 -- -- -- 97 %   10/01/24 0416 (!) 186/103 72 -- --   10/01/24 0230 -- -- -- 96 %   10/01/24 0200 -- 77 -- 97 %   10/01/24 0145 -- 79 -- 97 %   10/01/24 0115 -- 79 22 96 %   10/01/24 0100 -- 76 -- 97 %   10/01/24 0000 -- 84 -- 96 %   09/30/24 2300 -- 75 -- 97 %   09/30/24 2200 -- 83 -- 95 %   09/30/24 1959 -- 81 -- 94 %   09/30/24 1758 -- 84 -- 95 %   09/30/24 1731 131/100 82 -- 95 %     Vitals reviewed.   Constitutional:       General: Not in acute distress.     Appearance: Healthy appearance. Well-developed. Not diaphoretic.   Eyes:      General: No scleral icterus.     Conjunctiva/sclera: Conjunctivae normal.      Pupils: Pupils are equal, round, and reactive to light.   HENT:      Head: Normocephalic.    Mouth/Throat:      Pharynx: No oropharyngeal exudate.   Neck:      Vascular: No JVR.   Pulmonary:      Effort: Pulmonary effort is normal. No respiratory distress.      Breath sounds: Normal breath sounds. No wheezing. No rhonchi. No rales.   Chest:      Chest wall: Not tender to palpatation.   Cardiovascular:      Normal rate. Regular rhythm.   Pulses:     Intact distal pulses.   Edema:     Peripheral edema absent.   Abdominal:      General: Bowel sounds are normal. There is no distension.      Palpations: Abdomen is soft.      Tenderness: There is no abdominal tenderness.   Musculoskeletal: Normal range of motion.      Cervical back: Normal range of motion and neck supple. Skin:     General: Skin is warm and dry.      Coloration: Skin is not pale.      Findings: No erythema or rash.   Neurological:      Mental Status: Alert, oriented to person, place, and time and oriented to person, place and time.      Deep Tendon Reflexes: Reflexes are normal and symmetric.   Psychiatric:         Behavior: Behavior normal.         Results Review:   I reviewed the patient's new clinical results.    Lab Results (last 72 hours)       Procedure Component Value Units Date/Time    BNP [[id number]]   (Abnormal) Collected: 10/01/24 1437    Specimen: Blood Updated: 10/01/24 1553     proBNP >70,000.0 pg/mL     Narrative:      This assay is used as an aid in the diagnosis of individuals suspected of having heart failure. It can be used as an aid in the diagnosis of acute decompensated heart failure (ADHF) in patients presenting with signs and symptoms of ADHF to the emergency department (ED). In addition, NT-proBNP of <300 pg/mL indicates ADHF is not likely.    Age Range Result Interpretation  NT-proBNP Concentration (pg/mL:      <50             Positive            >450                   Gray                 300-450                    Negative             <300    50-75           Positive            >900                  Gray                300-900                  Negative            <300      >75             Positive            >1800                  Gray                300-1800                  Negative            <300    Fentanyl, Urine - Urine, Clean Catch [[id number]]  (Normal) Collected: 10/01/24 1437    Specimen: Urine, Clean Catch Updated: 10/01/24 1455     Fentanyl, Urine Negative    Narrative:      Negative Threshold:      Fentanyl 5 ng/mL     The normal value for the drug tested is negative. This report includes final unconfirmed screening results to be used for medical treatment purposes only. Unconfirmed results must not be used for non-medical purposes such as employment or legal testing. Clinical consideration should be applied to any drug of abuse test, particularly when unconfirmed results are used.           Urine Drug Screen - Urine, Clean Catch [[id number]]  (Abnormal) Collected: 10/01/24 1437    Specimen: Urine, Clean Catch Updated: 10/01/24 1454     THC, Screen, Urine Negative     Phencyclidine (PCP), Urine Negative     Cocaine Screen, Urine Negative     Methamphetamine, Ur Positive     Opiate Screen Negative     Amphetamine Screen, Urine Positive     Benzodiazepine Screen, Urine Negative      Tricyclic Antidepressants Screen Negative     Methadone Screen, Urine Negative     Barbiturates Screen, Urine Negative     Oxycodone Screen, Urine Negative     Buprenorphine, Screen, Urine Negative    Narrative:      Cutoff For Drugs Screened:    Amphetamines               500 ng/ml  Barbiturates               200 ng/ml  Benzodiazepines            150 ng/ml  Cocaine                    150 ng/ml  Methadone                  200 ng/ml  Opiates                    100 ng/ml  Phencyclidine               25 ng/ml  THC                         50 ng/ml  Methamphetamine            500 ng/ml  Tricyclic Antidepressants  300 ng/ml  Oxycodone                  100 ng/ml  Buprenorphine               10 ng/ml    The normal value for all drugs tested is negative. This report includes unconfirmed screening results, with the cutoff values listed, to be used for medical treatment purposes only.  Unconfirmed results must not be used for non-medical purposes such as employment or legal testing.  Clinical consideration should be applied to any drug of abuse test, particularly when unconfirmed results are used.      Basic Metabolic Panel [[id number]]  (Abnormal) Collected: 10/01/24 0901    Specimen: Blood Updated: 10/01/24 0948     Glucose 144 mg/dL      BUN 35 mg/dL      Creatinine 2.35 mg/dL      Sodium 141 mmol/L      Potassium 3.8 mmol/L      Comment: Slight hemolysis detected by analyzer. Result may be falsely elevated.        Chloride 107 mmol/L      CO2 22.0 mmol/L      Calcium 8.4 mg/dL      BUN/Creatinine Ratio 14.9     Anion Gap 12.0 mmol/L      eGFR 22.6 mL/min/1.73     Narrative:      GFR Normal >60  Chronic Kidney Disease <60  Kidney Failure <15      Single High Sensitivity Troponin T [[id number]]  (Abnormal) Collected: 09/30/24 1758    Specimen: Blood Updated: 09/30/24 1828     HS Troponin T 47 ng/L     Narrative:      High Sensitive Troponin T Reference Range:  <14.0 ng/L- Negative Female for AMI  <22.0 ng/L- Negative Male for  AMI  >=14 - Abnormal Female indicating possible myocardial injury.  >=22 - Abnormal Male indicating possible myocardial injury.   Clinicians would have to utilize clinical acumen, EKG, Troponin, and serial changes to determine if it is an Acute Myocardial Infarction or myocardial injury due to an underlying chronic condition.         BNP [[id number]]  (Abnormal) Collected: 09/30/24 1554    Specimen: Blood Updated: 09/30/24 1639     proBNP 55,636.0 pg/mL     Narrative:      This assay is used as an aid in the diagnosis of individuals suspected of having heart failure. It can be used as an aid in the diagnosis of acute decompensated heart failure (ADHF) in patients presenting with signs and symptoms of ADHF to the emergency department (ED). In addition, NT-proBNP of <300 pg/mL indicates ADHF is not likely.    Age Range Result Interpretation  NT-proBNP Concentration (pg/mL:      <50             Positive            >450                   Gray                 300-450                    Negative             <300    50-75           Positive            >900                  Gray                300-900                  Negative            <300      >75             Positive            >1800                  Gray                300-1800                  Negative            <300    Single High Sensitivity Troponin T [[id number]]  (Abnormal) Collected: 09/30/24 1554    Specimen: Blood Updated: 09/30/24 1626     HS Troponin T 49 ng/L     Narrative:      High Sensitive Troponin T Reference Range:  <14.0 ng/L- Negative Female for AMI  <22.0 ng/L- Negative Male for AMI  >=14 - Abnormal Female indicating possible myocardial injury.  >=22 - Abnormal Male indicating possible myocardial injury.   Clinicians would have to utilize clinical acumen, EKG, Troponin, and serial changes to determine if it is an Acute Myocardial Infarction or myocardial injury due to an underlying chronic condition.         Comprehensive Metabolic Panel  [[id number]]  (Abnormal) Collected: 09/30/24 1554    Specimen: Blood Updated: 09/30/24 1622     Glucose 107 mg/dL      BUN 30 mg/dL      Creatinine 2.28 mg/dL      Sodium 143 mmol/L      Potassium 3.8 mmol/L      Comment: Slight hemolysis detected by analyzer. Result may be falsely elevated.        Chloride 111 mmol/L      CO2 21.0 mmol/L      Calcium 8.3 mg/dL      Total Protein 6.0 g/dL      Albumin 3.2 g/dL      ALT (SGPT) 13 U/L      AST (SGOT) 13 U/L      Alkaline Phosphatase 79 U/L      Total Bilirubin 0.7 mg/dL      Globulin 2.8 gm/dL      A/G Ratio 1.1 g/dL      BUN/Creatinine Ratio 13.2     Anion Gap 11.0 mmol/L      eGFR 23.4 mL/min/1.73     Narrative:      GFR Normal >60  Chronic Kidney Disease <60  Kidney Failure <15      Magnesium [[id number]]  (Normal) Collected: 09/30/24 1554    Specimen: Blood Updated: 09/30/24 1617     Magnesium 2.1 mg/dL     Blood Gas, Arterial With Co-Ox [[id number]]  (Abnormal) Collected: 09/30/24 1616    Specimen: Arterial Blood Updated: 09/30/24 1616     Site Left Brachial     Guido's Test N/A     pH, Arterial 7.337 pH units      Comment: 84 Value below reference range        pCO2, Arterial 41.2 mm Hg      pO2, Arterial 60.0 mm Hg      Comment: 84 Value below reference range        HCO3, Arterial 22.1 mmol/L      Base Excess, Arterial -3.6 mmol/L      Comment: 84 Value below reference range        O2 Saturation, Arterial 92.3 %      Comment: 84 Value below reference range        Hemoglobin, Blood Gas 11.8 g/dL      Comment: 84 Value below reference range        Hematocrit, Blood Gas 36.2 %      Comment: 84 Value below reference range        Oxyhemoglobin 90.2 %      Comment: 84 Value below reference range        Methemoglobin 0.70 %      Carboxyhemoglobin 1.6 %      Temperature 37.0     Sodium, Arterial 143 mmol/L      Potassium, Arterial 3.7 mmol/L      Barometric Pressure for Blood Gas 751 mmHg      Modality Nasal Cannula     Flow Rate 2.5 lpm      Ventilator Mode NA      "Collected by [id number]     Comment: Meter: [id number]     :  Danica Davis, RRT        pH, Temp Corrected 7.337 pH Units      pCO2, Temperature Corrected 41.2 mm Hg      pO2, Temperature Corrected 60.0 mm Hg     D-dimer, Quantitative [[id number]]  (Abnormal) Collected: 09/30/24 1554    Specimen: Blood Updated: 09/30/24 1615     D-Dimer, Quantitative 1.63 MCGFEU/mL     Narrative:      According to the assay 's published package insert, a normal (<0.50 MCGFEU/mL) D-dimer result in conjunction with a non-high clinical probability assessment, excludes deep vein thrombosis (DVT) and pulmonary embolism (PE) with high sensitivity.    D-dimer values increase with age and this can make VTE exclusion of an older population difficult. To address this, the [employer], based on best available evidence and recent guidelines, recommends that clinicians use age-adjusted D-dimer thresholds in patients greater than 50 years of age with: a) a low probability of PE who do not meet all Pulmonary Embolism Rule Out Criteria, or b) in those with intermediate probability of PE.   The formula for an age-adjusted D-dimer cut-off is \"age/100\".  For example, a 60 year old patient would have an age-adjusted cut-off of 0.60 MCGFEU/mL and an 80 year old 0.80 MCGFEU/mL.    CBC & Differential [[id number]]  (Abnormal) Collected: 09/30/24 1554    Specimen: Blood Updated: 09/30/24 1604    Narrative:      The following orders were created for panel order CBC & Differential.  Procedure                               Abnormality         Status                     ---------                               -----------         ------                     CBC Auto Differential[[id number]]        Abnormal            Final result                 Please view results for these tests on the individual orders.    CBC Auto Differential [[id number]]  (Abnormal) Collected: 09/30/24 1554    Specimen: Blood Updated: 09/30/24 1604     " "WBC 11.83 10*3/mm3      RBC 3.84 10*6/mm3      Hemoglobin 11.1 g/dL      Hematocrit 36.1 %      MCV 94.0 fL      MCH 28.9 pg      MCHC 30.7 g/dL      RDW 17.3 %      RDW-SD 55.8 fl      MPV 10.5 fL      Platelets 227 10*3/mm3      Neutrophil % 86.3 %      Lymphocyte % 7.4 %      Monocyte % 4.8 %      Eosinophil % 0.3 %      Basophil % 0.5 %      Immature Grans % 0.7 %      Neutrophils, Absolute 10.21 10*3/mm3      Lymphocytes, Absolute 0.87 10*3/mm3      Monocytes, Absolute 0.57 10*3/mm3      Eosinophils, Absolute 0.04 10*3/mm3      Basophils, Absolute 0.06 10*3/mm3      Immature Grans, Absolute 0.08 10*3/mm3      nRBC 0.0 /100 WBC             No results found for: \"ECHOEFEST\"    Imaging Results (Last 72 Hours)       Procedure Component Value Units Date/Time    XR Chest 2 View [[id number]] Collected: 09/30/24 1553     Updated: 09/30/24 1557    Narrative:      EXAMINATION: XR CHEST 2 VW-  9/30/2024 3:53 PM     HISTORY: Shortness of air.     FINDINGS: Today's exam is compared to a previous study of 8/22/2024.  There is a granuloma in the right upper lobe. The cardiac silhouette is  enlarged. There is vascular redistribution suggesting pulmonary venous  hypertension. No evidence of avel pulmonary edema or effusion.       Impression:      1.. Stage I congestive changes with redistribution and cardiac  enlargement. No avel pulmonary edema or infiltrate.  2. Remote granulomatous disease.     This report was signed and finalized on 9/30/2024 3:54 PM by Dr. Ye Montaño MD.               Assessment & Plan       Acute on chronic combined systolic and diastolic congestive heart failure    Essential hypertension    Elevated troponin    Methamphetamine use      Plan:   Acute on chronic CHF exacerbation: likely due to a combination of uncontrolled HTN with meth use and CKD. Symptoms improved with IV lasix however she remains hypertensive. Per review of last cardiology note, it appears she is on coreg 25mg BID, Losartan " 100mg daily, nifedipine 90mg Q12hrs and Isosorbide Dnitrate 40mg BID. Will restart home medications.     Elevated troponin: flat trending. suspect type 2 elevation due to uncontrolled HTN and acute CHF exacerbation. Denies ischemic symptoms.     HTN: restarting home medications. See #1.     Meth use: reports she is done with meth. Educated on risks of continued use.     Further orders per Dr. Hernández     Thank you for asking us to follow this patient with you.     Electronically signed by ISAIAH Leger, 10/01/24, 4:00 PM CDT.    Please note this cardiology consultation note is the result of a face to face consultation with the patient, in addition to reviewing medical records at length by myself, ISAIAH Styles      Time spent: 45 minutes

## 2024-10-02 ENCOUNTER — APPOINTMENT (OUTPATIENT)
Dept: CARDIOLOGY | Facility: HOSPITAL | Age: 64
End: 2024-10-02
Payer: MEDICARE

## 2024-10-02 LAB
ANION GAP SERPL CALCULATED.3IONS-SCNC: 14 MMOL/L (ref 5–15)
BH CV ECHO MEAS - AO MAX PG: 15.1 MMHG
BH CV ECHO MEAS - AO MEAN PG: 8 MMHG
BH CV ECHO MEAS - AO ROOT DIAM: 3 CM
BH CV ECHO MEAS - AO V2 MAX: 194 CM/SEC
BH CV ECHO MEAS - AO V2 VTI: 36.3 CM
BH CV ECHO MEAS - AVA(I,D): 2.45 CM2
BH CV ECHO MEAS - EDV(CUBED): 185.2 ML
BH CV ECHO MEAS - EDV(MOD-SP2): 227 ML
BH CV ECHO MEAS - EDV(MOD-SP4): 196 ML
BH CV ECHO MEAS - EF(MOD-BP): 52.8 %
BH CV ECHO MEAS - EF(MOD-SP2): 53.3 %
BH CV ECHO MEAS - EF(MOD-SP4): 53.3 %
BH CV ECHO MEAS - ESV(CUBED): 76.2 ML
BH CV ECHO MEAS - ESV(MOD-SP2): 106 ML
BH CV ECHO MEAS - ESV(MOD-SP4): 91.6 ML
BH CV ECHO MEAS - FS: 25.6 %
BH CV ECHO MEAS - IVS/LVPW: 0.93 CM
BH CV ECHO MEAS - IVSD: 1.21 CM
BH CV ECHO MEAS - LA DIMENSION: 4.5 CM
BH CV ECHO MEAS - LAT PEAK E' VEL: 4.9 CM/SEC
BH CV ECHO MEAS - LV DIASTOLIC VOL/BSA (35-75): 96.8 CM2
BH CV ECHO MEAS - LV MASS(C)D: 306.9 GRAMS
BH CV ECHO MEAS - LV MAX PG: 11.8 MMHG
BH CV ECHO MEAS - LV MEAN PG: 6 MMHG
BH CV ECHO MEAS - LV SYSTOLIC VOL/BSA (12-30): 45.2 CM2
BH CV ECHO MEAS - LV V1 MAX: 172 CM/SEC
BH CV ECHO MEAS - LV V1 VTI: 35 CM
BH CV ECHO MEAS - LVIDD: 5.7 CM
BH CV ECHO MEAS - LVIDS: 4.2 CM
BH CV ECHO MEAS - LVOT AREA: 2.5 CM2
BH CV ECHO MEAS - LVOT DIAM: 1.8 CM
BH CV ECHO MEAS - LVPWD: 1.3 CM
BH CV ECHO MEAS - MED PEAK E' VEL: 5.1 CM/SEC
BH CV ECHO MEAS - MV A MAX VEL: 126 CM/SEC
BH CV ECHO MEAS - MV DEC TIME: 0.23 SEC
BH CV ECHO MEAS - MV E MAX VEL: 127 CM/SEC
BH CV ECHO MEAS - MV E/A: 1.01
BH CV ECHO MEAS - PI END-D VEL: 186 CM/SEC
BH CV ECHO MEAS - RAP SYSTOLE: 3 MMHG
BH CV ECHO MEAS - RVSP: 40 MMHG
BH CV ECHO MEAS - SV(LVOT): 89.1 ML
BH CV ECHO MEAS - SV(MOD-SP2): 121 ML
BH CV ECHO MEAS - SV(MOD-SP4): 104.4 ML
BH CV ECHO MEAS - SVI(LVOT): 44 ML/M2
BH CV ECHO MEAS - SVI(MOD-SP2): 59.7 ML/M2
BH CV ECHO MEAS - SVI(MOD-SP4): 51.5 ML/M2
BH CV ECHO MEAS - TR MAX PG: 37 MMHG
BH CV ECHO MEAS - TR MAX VEL: 304 CM/SEC
BH CV ECHO MEASUREMENTS AVERAGE E/E' RATIO: 25.4
BH CV XLRA - RV BASE: 4.4 CM
BH CV XLRA - RV LENGTH: 6.2 CM
BH CV XLRA - RV MID: 3.1 CM
BUN SERPL-MCNC: 48 MG/DL (ref 8–23)
BUN/CREAT SERPL: 19.6 (ref 7–25)
CALCIUM SPEC-SCNC: 8.8 MG/DL (ref 8.6–10.5)
CHLORIDE SERPL-SCNC: 104 MMOL/L (ref 98–107)
CO2 SERPL-SCNC: 21 MMOL/L (ref 22–29)
CREAT SERPL-MCNC: 2.45 MG/DL (ref 0.57–1)
EGFRCR SERPLBLD CKD-EPI 2021: 21.5 ML/MIN/1.73
GLUCOSE SERPL-MCNC: 129 MG/DL (ref 65–99)
LEFT ATRIUM VOLUME INDEX: 49.3 ML/M2
LEFT ATRIUM VOLUME: 100 ML
POTASSIUM SERPL-SCNC: 4 MMOL/L (ref 3.5–5.2)
SODIUM SERPL-SCNC: 139 MMOL/L (ref 136–145)
SODIUM UR-SCNC: 79 MMOL/L

## 2024-10-02 PROCEDURE — 25010000002 ENOXAPARIN PER 10 MG: Performed by: NURSE PRACTITIONER

## 2024-10-02 PROCEDURE — 94660 CPAP INITIATION&MGMT: CPT

## 2024-10-02 PROCEDURE — 96372 THER/PROPH/DIAG INJ SC/IM: CPT

## 2024-10-02 PROCEDURE — 96376 TX/PRO/DX INJ SAME DRUG ADON: CPT

## 2024-10-02 PROCEDURE — G0378 HOSPITAL OBSERVATION PER HR: HCPCS

## 2024-10-02 PROCEDURE — 25510000001 PERFLUTREN 6.52 MG/ML SUSPENSION: Performed by: FAMILY MEDICINE

## 2024-10-02 PROCEDURE — 93306 TTE W/DOPPLER COMPLETE: CPT

## 2024-10-02 PROCEDURE — 93306 TTE W/DOPPLER COMPLETE: CPT | Performed by: INTERNAL MEDICINE

## 2024-10-02 PROCEDURE — 82043 UR ALBUMIN QUANTITATIVE: CPT | Performed by: INTERNAL MEDICINE

## 2024-10-02 PROCEDURE — 80048 BASIC METABOLIC PNL TOTAL CA: CPT | Performed by: FAMILY MEDICINE

## 2024-10-02 PROCEDURE — 84300 ASSAY OF URINE SODIUM: CPT | Performed by: INTERNAL MEDICINE

## 2024-10-02 PROCEDURE — 94799 UNLISTED PULMONARY SVC/PX: CPT

## 2024-10-02 PROCEDURE — 82570 ASSAY OF URINE CREATININE: CPT | Performed by: INTERNAL MEDICINE

## 2024-10-02 PROCEDURE — 99233 SBSQ HOSP IP/OBS HIGH 50: CPT | Performed by: INTERNAL MEDICINE

## 2024-10-02 PROCEDURE — 25010000002 METHYLPREDNISOLONE PER 40 MG: Performed by: INTERNAL MEDICINE

## 2024-10-02 PROCEDURE — 99214 OFFICE O/P EST MOD 30 MIN: CPT | Performed by: NURSE PRACTITIONER

## 2024-10-02 PROCEDURE — 94761 N-INVAS EAR/PLS OXIMETRY MLT: CPT

## 2024-10-02 PROCEDURE — 94664 DEMO&/EVAL PT USE INHALER: CPT

## 2024-10-02 PROCEDURE — 36415 COLL VENOUS BLD VENIPUNCTURE: CPT | Performed by: FAMILY MEDICINE

## 2024-10-02 RX ORDER — ENOXAPARIN SODIUM 100 MG/ML
30 INJECTION SUBCUTANEOUS EVERY 24 HOURS
Status: DISCONTINUED | OUTPATIENT
Start: 2024-10-02 | End: 2024-10-02

## 2024-10-02 RX ORDER — ENOXAPARIN SODIUM 100 MG/ML
1 INJECTION SUBCUTANEOUS EVERY 24 HOURS
Status: DISCONTINUED | OUTPATIENT
Start: 2024-10-02 | End: 2024-10-03 | Stop reason: HOSPADM

## 2024-10-02 RX ADMIN — NITROGLYCERIN 0.4 MG: 0.4 TABLET SUBLINGUAL at 09:28

## 2024-10-02 RX ADMIN — ISOSORBIDE DINITRATE 40 MG: 20 TABLET ORAL at 08:13

## 2024-10-02 RX ADMIN — PERFLUTREN 9.78 MG: 6.52 INJECTION, SUSPENSION INTRAVENOUS at 11:17

## 2024-10-02 RX ADMIN — LOSARTAN POTASSIUM 100 MG: 50 TABLET, FILM COATED ORAL at 08:14

## 2024-10-02 RX ADMIN — ASPIRIN 81 MG: 81 TABLET, COATED ORAL at 08:13

## 2024-10-02 RX ADMIN — CALCITRIOL CAPSULES 0.25 MCG 0.25 MCG: 0.25 CAPSULE ORAL at 08:14

## 2024-10-02 RX ADMIN — IPRATROPIUM BROMIDE AND ALBUTEROL SULFATE 3 ML: .5; 3 SOLUTION RESPIRATORY (INHALATION) at 19:27

## 2024-10-02 RX ADMIN — IPRATROPIUM BROMIDE AND ALBUTEROL SULFATE 3 ML: .5; 3 SOLUTION RESPIRATORY (INHALATION) at 10:03

## 2024-10-02 RX ADMIN — IPRATROPIUM BROMIDE AND ALBUTEROL SULFATE 3 ML: .5; 3 SOLUTION RESPIRATORY (INHALATION) at 06:38

## 2024-10-02 RX ADMIN — METHYLPREDNISOLONE SODIUM SUCCINATE 40 MG: 40 INJECTION, POWDER, FOR SOLUTION INTRAMUSCULAR; INTRAVENOUS at 20:05

## 2024-10-02 RX ADMIN — BUDESONIDE AND FORMOTEROL FUMARATE DIHYDRATE 2 PUFF: 160; 4.5 AEROSOL RESPIRATORY (INHALATION) at 19:31

## 2024-10-02 RX ADMIN — CETIRIZINE HYDROCHLORIDE 10 MG: 10 TABLET ORAL at 08:14

## 2024-10-02 RX ADMIN — ENOXAPARIN SODIUM 90 MG: 100 INJECTION SUBCUTANEOUS at 16:17

## 2024-10-02 RX ADMIN — ISOSORBIDE DINITRATE 40 MG: 20 TABLET ORAL at 17:35

## 2024-10-02 RX ADMIN — CARVEDILOL 25 MG: 25 TABLET, FILM COATED ORAL at 08:13

## 2024-10-02 RX ADMIN — IPRATROPIUM BROMIDE AND ALBUTEROL SULFATE 3 ML: .5; 3 SOLUTION RESPIRATORY (INHALATION) at 14:28

## 2024-10-02 RX ADMIN — BUDESONIDE AND FORMOTEROL FUMARATE DIHYDRATE 2 PUFF: 160; 4.5 AEROSOL RESPIRATORY (INHALATION) at 10:00

## 2024-10-02 RX ADMIN — NIFEDIPINE 120 MG: 60 TABLET, FILM COATED, EXTENDED RELEASE ORAL at 08:14

## 2024-10-02 RX ADMIN — CARVEDILOL 25 MG: 25 TABLET, FILM COATED ORAL at 17:35

## 2024-10-02 RX ADMIN — FUROSEMIDE 40 MG: 20 TABLET ORAL at 08:13

## 2024-10-02 RX ADMIN — METHYLPREDNISOLONE SODIUM SUCCINATE 40 MG: 40 INJECTION, POWDER, FOR SOLUTION INTRAMUSCULAR; INTRAVENOUS at 08:16

## 2024-10-02 NOTE — PLAN OF CARE
Goal Outcome Evaluation:      The patient uses bipap at night.

## 2024-10-02 NOTE — PROGRESS NOTES
"Pharmacy Dosing Service  Automatic Renal Adjustment  Lovenox    Assessment/Action/Plan:  Based on prescribing information provided by the drug , Lovenox 90 mg  SQ  every 12 hours, has been changed to Lovenox 90 mg  SQ  every 24 hours.      Subjective:  Ludivina Ramirez is a 64 y.o. female.     Objective:  Ht: 167.6 cm (65.98\"); Wt: 93.6 kg (206 lb 6.4 oz)  Actual body weight will be used unless the patient is 20% over Ideal Body Weight, in which case the Adjusted Ideal Body Weight will be used.     Estimated Creatinine Clearance: 26.7 mL/min (A) (by C-G formula based on SCr of 2.45 mg/dL (H)).   Creatinine   Date Value Ref Range Status   10/02/2024 2.45 (H) 0.57 - 1.00 mg/dL Final   10/01/2024 2.35 (H) 0.57 - 1.00 mg/dL Final   09/30/2024 2.28 (H) 0.57 - 1.00 mg/dL Final   09/10/2021 2 (H) 0.5 - 0.9 mg/dL Final   09/09/2021 1.8 (H) 0.5 - 0.9 mg/dL Final   09/08/2021 1.9 (H) 0.5 - 0.9 mg/dL Final       Additional Factors Considered:  Patient disposition per documentation  Disease state or condition being treated    Pharmacy will continue to monitor daily and make further adjustment(s) accordingly.    Kristen Bolivar [facility]  10/02/24 14:57 CDT    "

## 2024-10-02 NOTE — PLAN OF CARE
Goal Outcome Evaluation:  Plan of Care Reviewed With: patient        Progress: no change  Outcome Evaluation: No change. Sats WNL on room air, bipap when sleeping. C/o chest pain once, nitro given x1. No other complaints. SOA with exertion. BUN 48, creat 2.45 today. Echo done. NPO after midnight for stress test tomorrow.

## 2024-10-02 NOTE — PLAN OF CARE
Problem: Noninvasive Ventilation Acute  Goal: Effective Unassisted Ventilation and Oxygenation  Outcome: Progressing   Goal Outcome Evaluation:      Pt used BIPAP last night. She has been on room air all day with sats in the low to mid 90's.

## 2024-10-02 NOTE — PROGRESS NOTES
"Progress Note  Ludivina Ramirez  10/2/2024 13:19 CDT  Subjective:   Admit Date:   9/30/2024      CC/ADMIT DX:       Interval History:   Reviewed overnight events and nursing notes.  She feels better. No CP. Her SOA is improved.     I have reviewed all labs/diagnostics from the last 24hrs.       ROS:   I have done a 10 point ROS and all are negative, except what is mentioned in the HPI.    Diet: Cardiac; Healthy Heart (2-3 Na+); Fluid Consistency: Thin (IDDSI 0)    Medications:      aspirin, 81 mg, Oral, Daily  budesonide-formoterol, 2 puff, Inhalation, BID - RT  calcitriol, 0.25 mcg, Oral, Daily  carvedilol, 25 mg, Oral, BID With Meals  cetirizine, 10 mg, Oral, Daily  fluticasone, 2 spray, Nasal, QAM  furosemide, 40 mg, Oral, Daily  ipratropium-albuterol, 3 mL, Nebulization, 4x Daily  isosorbide dinitrate, 40 mg, Oral, BID - Nitrates  losartan, 100 mg, Oral, Q24H  methylPREDNISolone sodium succinate, 40 mg, Intravenous, Q12H  NIFEdipine XL, 120 mg, Oral, Q24H            Objective:   Vitals: BP 97/58 (BP Location: Right arm, Patient Position: Lying)   Pulse 60   Temp 97.5 °F (36.4 °C) (Oral)   Resp 18   Ht 167.6 cm (65.98\")   Wt 93.6 kg (206 lb 6.4 oz)   LMP  (LMP Unknown)   SpO2 92%   BMI 33.33 kg/m²    Intake/Output Summary (Last 24 hours) at 10/2/2024 1319  Last data filed at 10/2/2024 0900  Gross per 24 hour   Intake 720 ml   Output --   Net 720 ml     General appearance: alert and cooperative with exam  Lungs: decreased BS  Heart: RRR  Abdomen: soft, non-tender; bowel sounds normal; no masses,  no organomegaly  Extremities: extremities normal, atraumatic, no cyanosis or edema  Neurologic:  No obvious focal neurologic deficits.    Assessment and Plan:     Acute on chronic combined systolic and diastolic congestive heart failure    Essential hypertension    Elevated troponin    Methamphetamine use    MATTY    CKD    COPD    Medical Noncompliance    Chronic Resp Failure           Plan:   Continue present " medication   Follow with Pulm, Nephrology and Cardiology   Follow labs   Monitor BP   Cessation of Methamphetamines d/w pt today and in past      Discharge planning:   her home     Reviewed treatment plans with the patient and/or family.             Electronically signed by Orville Weston MD on 10/2/2024 at 13:19 CDT

## 2024-10-02 NOTE — PROGRESS NOTES
RT EQUIPMENT DEVICE RELATED - SKIN ASSESSMENT    Griffin Score:  Griffin Score: 20     RT Medical Equipment/Device:     NIV Mask:  Under-the-nose   size:  B    Skin Assessment:      Nose:  Intact    Device Skin Pressure Protection:  Pressure points protected    Nurse Notification:  No    Valentino Pressley, RRT

## 2024-10-02 NOTE — PROGRESS NOTES
[facility] PULMONARY AND CRITICAL CARE PROGRESS NOTE - [facility]    Patient: Ludivina Ramirez  1960   MR# [id number]   Acct# [id number]  10/02/24   13:40 CDT  Referring Provider: Orville Weston MD    Chief Complaint:  Acute on chronic hypoxic respiratory failure acute exacerbation COPD, worsening acute on chronic congestive diastolic heart failure. Tobacco and substance abuse.  Interval history: Patient was seen in the follow-up visit in pulmonary rounds in medical floor today.  She appears comfortable on the liters of oxygen and used BiPAP at night.  She has home trilogy and I have advised her to bring the home trilogy and during the night if possible.  She told me her niece will try to bring it to the hospital but she is busy now.  She had an echocardiogram done this morning.  She did not have any other acute events overnight.  She is currently getting Lasix for heart failure and also getting Symbicort and DuoNeb with Solu-Medrol for COPD exacerbation.    Meds:  aspirin, 81 mg, Oral, Daily  budesonide-formoterol, 2 puff, Inhalation, BID - RT  calcitriol, 0.25 mcg, Oral, Daily  carvedilol, 25 mg, Oral, BID With Meals  cetirizine, 10 mg, Oral, Daily  enoxaparin, 30 mg, Subcutaneous, Q24H  fluticasone, 2 spray, Nasal, QAM  furosemide, 40 mg, Oral, Daily  ipratropium-albuterol, 3 mL, Nebulization, 4x Daily  isosorbide dinitrate, 40 mg, Oral, BID - Nitrates  losartan, 100 mg, Oral, Q24H  methylPREDNISolone sodium succinate, 40 mg, Intravenous, Q12H  NIFEdipine XL, 120 mg, Oral, Q24H         Physical Exam:  SpO2 Percentage    10/02/24 0800 10/02/24 1000 10/02/24 1010   SpO2: 96% 96% 92%     Body mass index is 33.33 kg/m².   Temp:  [97.5 °F (36.4 °C)-98.3 °F (36.8 °C)] 97.5 °F (36.4 °C)  Heart Rate:  [58-87] 60  Resp:  [12-24] 18  BP: ()/() 97/58  Intake/Output Summary (Last 24 hours) at 10/2/2024 1340  Last data filed at 10/2/2024 0900  Gross per 24 hour   Intake 720 ml   Output --   Net  720 ml     Physical Exam  Vitals and nursing note reviewed.   Constitutional:       General: She is not in acute distress.     Appearance: She is obese. She is ill-appearing.      Comments: Obese middle-aged  female currently on BiPAP   HENT:      Head: Normocephalic and atraumatic.      Right Ear: Tympanic membrane normal. There is no impacted cerumen.      Left Ear: Tympanic membrane normal. There is no impacted cerumen.      Nose: Nose normal. No congestion or rhinorrhea.      Mouth/Throat:      Mouth: Mucous membranes are moist.      Pharynx: Oropharynx is clear. No oropharyngeal exudate or posterior oropharyngeal erythema.   Eyes:      General:         Right eye: No discharge.         Left eye: No discharge.      Extraocular Movements: Extraocular movements intact.      Conjunctiva/sclera: Conjunctivae normal.      Pupils: Pupils are equal, round, and reactive to light.   Neck:      Vascular: No carotid bruit.   Cardiovascular:      Rate and Rhythm: Normal rate and regular rhythm.      Pulses: Normal pulses.      Heart sounds: No murmur heard.     No friction rub. No gallop.   Pulmonary:      Effort: Pulmonary effort is normal. No respiratory distress.      Breath sounds: No stridor. Wheezing and rales present.      Comments: Decreased breath sound bilaterally  Chest:      Chest wall: No tenderness.   Abdominal:      General: Abdomen is flat. Bowel sounds are normal. There is no distension.      Palpations: Abdomen is soft. There is no mass.      Tenderness: There is no abdominal tenderness. There is no right CVA tenderness, left CVA tenderness, guarding or rebound.      Hernia: No hernia is present.   Genitourinary:     Comments: Not examined  Musculoskeletal:         General: No swelling, tenderness, deformity or signs of injury. Normal range of motion.      Cervical back: Normal range of motion and neck supple. No rigidity or tenderness.      Right lower leg: Edema present.      Left lower leg:  Edema present.      Comments: Trace ankle edema noted   Lymphadenopathy:      Cervical: No cervical adenopathy.   Skin:     General: Skin is warm and dry.      Coloration: Skin is not jaundiced or pale.      Findings: No bruising, erythema, lesion or rash.   Neurological:      General: No focal deficit present.      Mental Status: She is alert and oriented to person, place, and time. Mental status is at baseline.      Cranial Nerves: No cranial nerve deficit.      Sensory: No sensory deficit.      Motor: No weakness.      Deep Tendon Reflexes: Reflexes normal.   Psychiatric:         Mood and Affect: Mood normal.         Behavior: Behavior normal.         Thought Content: Thought content normal.           Laboratory Data:  Results from last 7 days   Lab Units 09/30/24  1554   WBC 10*3/mm3 11.83*   HEMOGLOBIN g/dL 11.1*   PLATELETS 10*3/mm3 227     Results from last 7 days   Lab Units 10/02/24  0258 10/01/24  0901 09/30/24  1616 09/30/24  1554   SODIUM mmol/L 139 141  --  143   SODIUM, ARTERIAL mmol/L  --   --  143  --    POTASSIUM mmol/L 4.0 3.8  --  3.8   BUN mg/dL 48* 35*  --  30*   CREATININE mg/dL 2.45* 2.35*  --  2.28*     Results from last 7 days   Lab Units 09/30/24  1616   PH, ARTERIAL pH units 7.337*   PCO2, ARTERIAL mm Hg 41.2   PO2 ART mm Hg 60.0*     Microbiology Results (last 10 days)       ** No results found for the last 240 hours. **          Recent films:  XR Chest 2 View    Result Date: 9/30/2024  1.. Stage I congestive changes with redistribution and cardiac enlargement. No avel pulmonary edema or infiltrate. 2. Remote granulomatous disease.  This report was signed and finalized on 9/30/2024 3:54 PM by Dr. Ye Montaño MD.     Personal review of imaging : CXR shows yesterday's chest x-ray shows pulmonary vascular congestion no acute infiltrate or edema hide to monitor normotensive and COPD changes.  No new imaging studies done today.    Pulmonary Assessment:  Acute on chronic combined  congestive systolic and diastolic heart failure  Acute on chronic hypoxic respiratory failure  Chronic obstructive pulmonary disease with mild acute exacerbation  Chronic respiratory failure and hypoxemia on home oxygen  On NIPPV and home trilogy  Morbid obesity, obstructive sleep apnea obesity hypoventilation syndrome  History of tobacco abuse and substance abuse  Urinary drug screen positive for amphetamine methamphetamine  Allergic rhinitis  Chronic kidney disease stage III  Granulomatous lung disease and histoplasmosis/history of lung nodules  Anxiety and depression    Recommend:   Patient was seen in the follow-up visit in pulmonary rounds in medical floor today.  She is stable from a respiratory standpoint and is current with liters of oxygen use BiPAP last night  I advised her to bring her home trilogy.  She can use the home trilogy at night if available.  Discussed with the patient about the tobacco and substance abuse but apparently she was still smoking and using methamphetamine and amphetamine has a positive urinary drug screen noted.  Patient may need nicotine patch continue bronchodilator treatment with Symbicort DuoNeb and Solu-Medrol  Continue oxygen and titrate to be suction podiatry percent currently at baseline on 2 L which she uses at home.  Continue allergy medications Flonase and Zyrtec.  DVT and stress ulcer prophylaxis.  Pain and anxiety control  Encourage incentive spirometry and flutter valve.  Increase physical activity as tolerated.  Nutritional support  Plan for outpatient pulmonary clinic follow-up with me in a month after discharge  Repeat labs and urine studies from time to time  CODE STATUS: Full.  Overall prognosis: Guarded but stable  We will follow.    Electronically signed by     Neeta Chavez MD,   Pulmonologist/Intensivist  10/02/24, 13:40 CDT

## 2024-10-02 NOTE — PROGRESS NOTES
RT EQUIPMENT DEVICE RELATED - SKIN ASSESSMENT    Griffin Score:  Griffin Score: 20     RT Medical Equipment/Device:     NIV Mask:  Under-the-nose   size:  B    Skin Assessment:      Cheek:  Intact  Chin:  Intact  Nares:  Intact  Neck:  Intact    Device Skin Pressure Protection:  Positioning supports utilized, Pressure points protected, and Skin-to-device areas padded:  None Required    Nurse Notification:  Essence Cummins, RRT

## 2024-10-02 NOTE — PLAN OF CARE
Problem: Adult Inpatient Plan of Care  Goal: Plan of Care Review  Outcome: Progressing  Flowsheets (Taken 10/2/2024 3651)  Progress: no change  Plan of Care Reviewed With: patient  Outcome Evaluation: Patient has no c/o pain. Up ad vern. Patient rested during the night with BiPAp. NSR 70-78 on tele. VSS. Safety maintained. Will notify MD of any changes.

## 2024-10-02 NOTE — PROGRESS NOTES
[facility] -  Progress Note     LOS: 0 days   Patient Care Team:  Orville Weston MD as PCP - General (Family Medicine)  Juanpablo Rea MD as Consulting Physician (Gastroenterology)  Nickolas Alegria MD as Consulting Physician (Gastroenterology)  Neeta Chavez MD as Consulting Physician (Pulmonary Disease)  [facility] ([facility] [place] [place] [facility])  Pierce Buchanan MD as Cardiologist (Cardiology)  Jer Cuevas APRN as Nurse Practitioner (Cardiology)  Destini Gaitan APRN as Nurse Practitioner (Family Medicine)    Chief Complaint: shortness of breath     Subjective     Interval History:   2D echo completed today noted normal EF, moderate AI. BP significantly improved after restarted home antihypertensives and has been hypotensive at times. Slight decline in creatinine today at 2.45 from 2.35 yesterday. Reports her breathing is better but still not back to normal.         Review of Systems:   Review of Systems   Constitutional:  Negative for diaphoresis, fatigue and unexpected weight change.   Respiratory:  Positive for shortness of breath. Negative for chest tightness and wheezing.    Cardiovascular:  Negative for chest pain, palpitations and leg swelling.   Gastrointestinal:  Negative for diarrhea, nausea and vomiting.   Neurological:  Negative for dizziness, syncope and light-headedness.   All other systems reviewed and are negative.      Objective     Vital Sign Min/Max for last 24 hours  Temp  Min: 97.5 °F (36.4 °C)  Max: 98.3 °F (36.8 °C)   BP  Min: 97/58  Max: 193/104   Pulse  Min: 58  Max: 87   Resp  Min: 12  Max: 24   SpO2  Min: 92 %  Max: 98 %   No data recorded   Weight  Min: 93.6 kg (206 lb 6.4 oz)  Max: 93.6 kg (206 lb 6.4 oz)         10/01/24  1814   Weight: 93.6 kg (206 lb 6.4 oz)         Intake/Output Summary (Last 24 hours) at 10/2/2024 1354  Last data filed at 10/2/2024 0900  Gross per 24 hour   Intake 720 ml   Output --   Net 720 ml         Physical Exam:  Vitals reviewed.    Constitutional:       General: Not in acute distress.     Appearance: Healthy appearance. Well-developed. Not diaphoretic.   Eyes:      General: No scleral icterus.     Conjunctiva/sclera: Conjunctivae normal.      Pupils: Pupils are equal, round, and reactive to light.   HENT:      Head: Normocephalic.    Mouth/Throat:      Pharynx: No oropharyngeal exudate.   Neck:      Vascular: No JVR.   Pulmonary:      Effort: Pulmonary effort is normal. No respiratory distress.      Breath sounds: Examination of the left-lower field reveals wheezing. Wheezing present. No rhonchi. No rales.   Chest:      Chest wall: Not tender to palpatation.   Cardiovascular:      Normal rate. Regular rhythm.      Murmurs: There is a grade 2/4 high frequency blowing decrescendo, early diastolic murmur at the URSB, radiating to the apex.   Pulses:     Intact distal pulses.   Edema:     Peripheral edema absent.   Abdominal:      General: Bowel sounds are normal. There is no distension.      Palpations: Abdomen is soft.      Tenderness: There is no abdominal tenderness.   Musculoskeletal: Normal range of motion.      Cervical back: Normal range of motion and neck supple. Skin:     General: Skin is warm and dry.      Coloration: Skin is not pale.      Findings: No erythema or rash.   Neurological:      Mental Status: Alert, oriented to person, place, and time and oriented to person, place and time.      Deep Tendon Reflexes: Reflexes are normal and symmetric.   Psychiatric:         Behavior: Behavior normal.          Results Review:   Lab Results (last 72 hours)       Procedure Component Value Units Date/Time    Basic Metabolic Panel [[id number]]  (Abnormal) Collected: 10/02/24 0258    Specimen: Blood Updated: 10/02/24 0407     Glucose 129 mg/dL      BUN 48 mg/dL      Creatinine 2.45 mg/dL      Sodium 139 mmol/L      Potassium 4.0 mmol/L      Chloride 104 mmol/L      CO2 21.0 mmol/L      Calcium 8.8 mg/dL      BUN/Creatinine Ratio 19.6     Anion  Gap 14.0 mmol/L      eGFR 21.5 mL/min/1.73     Narrative:      GFR Normal >60  Chronic Kidney Disease <60  Kidney Failure <15      BNP [[id number]]  (Abnormal) Collected: 10/01/24 1437    Specimen: Blood Updated: 10/01/24 1553     proBNP >70,000.0 pg/mL     Narrative:      This assay is used as an aid in the diagnosis of individuals suspected of having heart failure. It can be used as an aid in the diagnosis of acute decompensated heart failure (ADHF) in patients presenting with signs and symptoms of ADHF to the emergency department (ED). In addition, NT-proBNP of <300 pg/mL indicates ADHF is not likely.    Age Range Result Interpretation  NT-proBNP Concentration (pg/mL:      <50             Positive            >450                   Gray                 300-450                    Negative             <300    50-75           Positive            >900                  Gray                300-900                  Negative            <300      >75             Positive            >1800                  Gray                300-1800                  Negative            <300    Fentanyl, Urine - Urine, Clean Catch [[id number]]  (Normal) Collected: 10/01/24 1437    Specimen: Urine, Clean Catch Updated: 10/01/24 1455     Fentanyl, Urine Negative    Narrative:      Negative Threshold:      Fentanyl 5 ng/mL     The normal value for the drug tested is negative. This report includes final unconfirmed screening results to be used for medical treatment purposes only. Unconfirmed results must not be used for non-medical purposes such as employment or legal testing. Clinical consideration should be applied to any drug of abuse test, particularly when unconfirmed results are used.           Urine Drug Screen - Urine, Clean Catch [[id number]]  (Abnormal) Collected: 10/01/24 1437    Specimen: Urine, Clean Catch Updated: 10/01/24 1454     THC, Screen, Urine Negative     Phencyclidine (PCP), Urine Negative     Cocaine Screen, Urine Negative      Methamphetamine, Ur Positive     Opiate Screen Negative     Amphetamine Screen, Urine Positive     Benzodiazepine Screen, Urine Negative     Tricyclic Antidepressants Screen Negative     Methadone Screen, Urine Negative     Barbiturates Screen, Urine Negative     Oxycodone Screen, Urine Negative     Buprenorphine, Screen, Urine Negative    Narrative:      Cutoff For Drugs Screened:    Amphetamines               500 ng/ml  Barbiturates               200 ng/ml  Benzodiazepines            150 ng/ml  Cocaine                    150 ng/ml  Methadone                  200 ng/ml  Opiates                    100 ng/ml  Phencyclidine               25 ng/ml  THC                         50 ng/ml  Methamphetamine            500 ng/ml  Tricyclic Antidepressants  300 ng/ml  Oxycodone                  100 ng/ml  Buprenorphine               10 ng/ml    The normal value for all drugs tested is negative. This report includes unconfirmed screening results, with the cutoff values listed, to be used for medical treatment purposes only.  Unconfirmed results must not be used for non-medical purposes such as employment or legal testing.  Clinical consideration should be applied to any drug of abuse test, particularly when unconfirmed results are used.      Basic Metabolic Panel [[id number]]  (Abnormal) Collected: 10/01/24 0901    Specimen: Blood Updated: 10/01/24 0948     Glucose 144 mg/dL      BUN 35 mg/dL      Creatinine 2.35 mg/dL      Sodium 141 mmol/L      Potassium 3.8 mmol/L      Comment: Slight hemolysis detected by analyzer. Result may be falsely elevated.        Chloride 107 mmol/L      CO2 22.0 mmol/L      Calcium 8.4 mg/dL      BUN/Creatinine Ratio 14.9     Anion Gap 12.0 mmol/L      eGFR 22.6 mL/min/1.73     Narrative:      GFR Normal >60  Chronic Kidney Disease <60  Kidney Failure <15      Single High Sensitivity Troponin T [[id number]]  (Abnormal) Collected: 09/30/24 1758    Specimen: Blood Updated: 09/30/24 1828     HS  Troponin T 47 ng/L     Narrative:      High Sensitive Troponin T Reference Range:  <14.0 ng/L- Negative Female for AMI  <22.0 ng/L- Negative Male for AMI  >=14 - Abnormal Female indicating possible myocardial injury.  >=22 - Abnormal Male indicating possible myocardial injury.   Clinicians would have to utilize clinical acumen, EKG, Troponin, and serial changes to determine if it is an Acute Myocardial Infarction or myocardial injury due to an underlying chronic condition.         BNP [[id number]]  (Abnormal) Collected: 09/30/24 1554    Specimen: Blood Updated: 09/30/24 1639     proBNP 55,636.0 pg/mL     Narrative:      This assay is used as an aid in the diagnosis of individuals suspected of having heart failure. It can be used as an aid in the diagnosis of acute decompensated heart failure (ADHF) in patients presenting with signs and symptoms of ADHF to the emergency department (ED). In addition, NT-proBNP of <300 pg/mL indicates ADHF is not likely.    Age Range Result Interpretation  NT-proBNP Concentration (pg/mL:      <50             Positive            >450                   Gray                 300-450                    Negative             <300    50-75           Positive            >900                  Gray                300-900                  Negative            <300      >75             Positive            >1800                  Gray                300-1800                  Negative            <300    Single High Sensitivity Troponin T [[id number]]  (Abnormal) Collected: 09/30/24 1554    Specimen: Blood Updated: 09/30/24 1626     HS Troponin T 49 ng/L     Narrative:      High Sensitive Troponin T Reference Range:  <14.0 ng/L- Negative Female for AMI  <22.0 ng/L- Negative Male for AMI  >=14 - Abnormal Female indicating possible myocardial injury.  >=22 - Abnormal Male indicating possible myocardial injury.   Clinicians would have to utilize clinical acumen, EKG, Troponin, and serial changes to determine  if it is an Acute Myocardial Infarction or myocardial injury due to an underlying chronic condition.         Comprehensive Metabolic Panel [[id number]]  (Abnormal) Collected: 09/30/24 1554    Specimen: Blood Updated: 09/30/24 1622     Glucose 107 mg/dL      BUN 30 mg/dL      Creatinine 2.28 mg/dL      Sodium 143 mmol/L      Potassium 3.8 mmol/L      Comment: Slight hemolysis detected by analyzer. Result may be falsely elevated.        Chloride 111 mmol/L      CO2 21.0 mmol/L      Calcium 8.3 mg/dL      Total Protein 6.0 g/dL      Albumin 3.2 g/dL      ALT (SGPT) 13 U/L      AST (SGOT) 13 U/L      Alkaline Phosphatase 79 U/L      Total Bilirubin 0.7 mg/dL      Globulin 2.8 gm/dL      A/G Ratio 1.1 g/dL      BUN/Creatinine Ratio 13.2     Anion Gap 11.0 mmol/L      eGFR 23.4 mL/min/1.73     Narrative:      GFR Normal >60  Chronic Kidney Disease <60  Kidney Failure <15      Magnesium [[id number]]  (Normal) Collected: 09/30/24 1554    Specimen: Blood Updated: 09/30/24 1617     Magnesium 2.1 mg/dL     Blood Gas, Arterial With Co-Ox [[id number]]  (Abnormal) Collected: 09/30/24 1616    Specimen: Arterial Blood Updated: 09/30/24 1616     Site Left Brachial     Guido's Test N/A     pH, Arterial 7.337 pH units      Comment: 84 Value below reference range        pCO2, Arterial 41.2 mm Hg      pO2, Arterial 60.0 mm Hg      Comment: 84 Value below reference range        HCO3, Arterial 22.1 mmol/L      Base Excess, Arterial -3.6 mmol/L      Comment: 84 Value below reference range        O2 Saturation, Arterial 92.3 %      Comment: 84 Value below reference range        Hemoglobin, Blood Gas 11.8 g/dL      Comment: 84 Value below reference range        Hematocrit, Blood Gas 36.2 %      Comment: 84 Value below reference range        Oxyhemoglobin 90.2 %      Comment: 84 Value below reference range        Methemoglobin 0.70 %      Carboxyhemoglobin 1.6 %      Temperature 37.0     Sodium, Arterial 143 mmol/L      Potassium, Arterial 3.7  "mmol/L      Barometric Pressure for Blood Gas 751 mmHg      Modality Nasal Cannula     Flow Rate 2.5 lpm      Ventilator Mode NA     Collected by [id number]     Comment: Meter: [id number]     :  Danica Davis, RRT        pH, Temp Corrected 7.337 pH Units      pCO2, Temperature Corrected 41.2 mm Hg      pO2, Temperature Corrected 60.0 mm Hg     D-dimer, Quantitative [[id number]]  (Abnormal) Collected: 09/30/24 1554    Specimen: Blood Updated: 09/30/24 1615     D-Dimer, Quantitative 1.63 MCGFEU/mL     Narrative:      According to the assay 's published package insert, a normal (<0.50 MCGFEU/mL) D-dimer result in conjunction with a non-high clinical probability assessment, excludes deep vein thrombosis (DVT) and pulmonary embolism (PE) with high sensitivity.    D-dimer values increase with age and this can make VTE exclusion of an older population difficult. To address this, the [employer], based on best available evidence and recent guidelines, recommends that clinicians use age-adjusted D-dimer thresholds in patients greater than 50 years of age with: a) a low probability of PE who do not meet all Pulmonary Embolism Rule Out Criteria, or b) in those with intermediate probability of PE.   The formula for an age-adjusted D-dimer cut-off is \"age/100\".  For example, a 60 year old patient would have an age-adjusted cut-off of 0.60 MCGFEU/mL and an 80 year old 0.80 MCGFEU/mL.    CBC & Differential [[id number]]  (Abnormal) Collected: 09/30/24 1554    Specimen: Blood Updated: 09/30/24 1604    Narrative:      The following orders were created for panel order CBC & Differential.  Procedure                               Abnormality         Status                     ---------                               -----------         ------                     CBC Auto Differential[[id number]]        Abnormal            Final result                 Please view results for these tests on the " individual orders.    CBC Auto Differential [[id number]]  (Abnormal) Collected: 09/30/24 1554    Specimen: Blood Updated: 09/30/24 1604     WBC 11.83 10*3/mm3      RBC 3.84 10*6/mm3      Hemoglobin 11.1 g/dL      Hematocrit 36.1 %      MCV 94.0 fL      MCH 28.9 pg      MCHC 30.7 g/dL      RDW 17.3 %      RDW-SD 55.8 fl      MPV 10.5 fL      Platelets 227 10*3/mm3      Neutrophil % 86.3 %      Lymphocyte % 7.4 %      Monocyte % 4.8 %      Eosinophil % 0.3 %      Basophil % 0.5 %      Immature Grans % 0.7 %      Neutrophils, Absolute 10.21 10*3/mm3      Lymphocytes, Absolute 0.87 10*3/mm3      Monocytes, Absolute 0.57 10*3/mm3      Eosinophils, Absolute 0.04 10*3/mm3      Basophils, Absolute 0.06 10*3/mm3      Immature Grans, Absolute 0.08 10*3/mm3      nRBC 0.0 /100 WBC              Results for orders placed during the hospital encounter of 09/30/24    Adult Transthoracic Echo Complete W/ Cont if Necessary Per Protocol    Interpretation Summary    Left ventricular systolic function is low normal. Left ventricular ejection fraction appears to be 51 - 55%.    Left ventricular wall thickness is consistent with moderate to severe concentric hypertrophy.    The following left ventricular wall segments are hypokinetic: apical anterior, apical lateral, mid inferolateral and apical septal. The following left ventricular wall segments are akinetic: apical inferior and apex.    The findings are consistent with non-obstructive, hypertrophic cardiomyopathy.    Left ventricular diastolic function is consistent with (grade II w/high LAP) pseudonormalization.    Left atrial volume is moderately increased.    The right atrial cavity is mild to moderately  dilated.    Moderate aortic valve regurgitation is present.    Mild aortic valve stenosis is present.    Moderate mitral valve regurgitation is present.    Estimated right ventricular systolic pressure from tricuspid regurgitation is mildly elevated (35-45 mmHg).    Mild pulmonary  hypertension is present.          Medication Review: yes  Current Facility-Administered Medications   Medication Dose Route Frequency Provider Last Rate Last Admin    aspirin EC tablet 81 mg  81 mg Oral Daily Orville Weston MD   81 mg at 10/02/24 0813    budesonide-formoterol (SYMBICORT) 160-4.5 MCG/ACT inhaler 2 puff  2 puff Inhalation BID - RT Neeta Chavez MD   2 puff at 10/02/24 1000    calcitriol (ROCALTROL) capsule 0.25 mcg  0.25 mcg Oral Daily Orville Weston MD   0.25 mcg at 10/02/24 0814    carvedilol (COREG) tablet 25 mg  25 mg Oral BID With Meals Orville Weston MD   25 mg at 10/02/24 0813    cetirizine (zyrTEC) tablet 10 mg  10 mg Oral Daily Neeta Chavez MD   10 mg at 10/02/24 0814    cloNIDine (CATAPRES) tablet 0.1 mg  0.1 mg Oral Q6H PRN Orville Weston MD   0.1 mg at 10/01/24 1429    Enoxaparin Sodium (LOVENOX) syringe 30 mg  30 mg Subcutaneous Q24H Orville Weston MD        fluticasone (FLONASE) 50 MCG/ACT nasal spray 2 spray  2 spray Nasal QAM Orville Weston MD        furosemide (LASIX) tablet 40 mg  40 mg Oral Daily Mary Rodriguez APRN   40 mg at 10/02/24 0813    guaiFENesin (MUCINEX) 12 hr tablet 600 mg  600 mg Oral Q12H PRN Orville Weston MD        ipratropium-albuterol (DUO-NEB) nebulizer solution 3 mL  3 mL Nebulization 4x Daily Orville Weston MD   3 mL at 10/02/24 1003    isosorbide dinitrate (ISORDIL) tablet 40 mg  40 mg Oral BID - Nitrates Orville Weston MD   40 mg at 10/02/24 0813    losartan (COZAAR) tablet 100 mg  100 mg Oral Q24H Mary Rodriguez APRN   100 mg at 10/02/24 0814    melatonin tablet 2.5 mg  2.5 mg Oral Nightly PRN Orville Weston MD        methylPREDNISolone sodium succinate (SOLU-Medrol) injection 40 mg  40 mg Intravenous Q12H Neeta Chavez MD   40 mg at 10/02/24 0816    [START ON 10/3/2024] NIFEdipine XL (PROCARDIA XL) 24 hr tablet 90 mg  90 mg Oral Q24H Mary Rodriguez, APRN         nitroglycerin (NITROSTAT) SL tablet 0.4 mg  0.4 mg Sublingual Q5 Min PRN Orville Weston MD   0.4 mg at 10/02/24 0928    sodium chloride 0.9 % flush 10 mL  10 mL Intravenous PRN Shelli Haynes APRN             Assessment & Plan       Acute on chronic combined systolic and diastolic congestive heart failure    Stage 4 chronic kidney disease    Essential hypertension    Elevated troponin    Coronary artery disease involving native coronary artery of native heart without angina pectoris    Methamphetamine use    Plan:   Acute on chronic CHF exacerbation: likely due to a combination of uncontrolled HTN with meth use and CKD. Symptoms improved with IV lasix. EF normal per echo. Continue coreg 25mg BID, Losartan 100mg daily, Isosorbide Dnitrate 40mg BID and lasix 40mg daily.      CKD: fairly stable. Nephrology is following.     Elevated troponin/CAD: flat trending. suspect type 2 elevation due to uncontrolled HTN and acute CHF exacerbation. Denies ischemic symptoms. Previous CORA to the LAD in 2020 following a STEMI. Continue ASA, BB, ARB and statin     HTN: improved after restarting antihypertensives however is having some low readings now. Will reduce procardia.      Meth use: reports she is done with meth. Educated on risks of continued use.      Further recommendations per Dr. Hernández    Electronically signed by ISAIAH Leger, 10/02/24, 12:19 PM CDT.    Time spent: 45 minutes

## 2024-10-02 NOTE — CONSULTS
Nephrology ([facility]) Consult Note      Patient:  Ludivina Ramirez  YOB: 1960  Date of Service: 10/2/2024  MRN: [id number]   Acct: [id number]   Primary Care Physician: Orville Weston MD  Advance Directive:   There are no questions and answers to display.     Admit Date: 9/30/2024       Hospital Day: 0  Referring Provider: Orville Weston MD      Patient personally seen and examined.  Complete chart including Consults, Notes, Operative Reports, Labs, Cardiology, and Radiology studies reviewed as able.        Subjective:  Ludivina Ramirez is a 64 y.o. female for whom we were consulted for evaluation and treatment of chronic kidney disease. Baseline chronic kidney disease stage 4. Baseline creatinine approximately 2.2.  History of MATTY in June that required short term hemodialysis, last HD was mid-July. Other history includes hypertension, CHF, coronary artery disease, methamphetamine abuse.  Patient recently was admitted to hospital from 9/19-9/24 for MATTY secondary to volume depletion from GI illness. Discharged with creatinine 2.1.  Previous home diuretics were not restarted when she was discharged.  Presented to ER on 10/01 with dyspnea. Initial creatinine in ER was 2.28. Given IV Lasix and admitted to medical floor. Dyspnea improved following the Lasix administration. Currently is awake and alert. No pain or dyspnea at time of exam. Denies n/v/d. Denies changes in urination.    Allergies:  Levaquin [levofloxacin], Codeine, and Sumatriptan    Home Meds:  Medications Prior to Admission   Medication Sig Dispense Refill Last Dose    aspirin 81 MG EC tablet Take 1 tablet by mouth Daily. 90 tablet 3 9/30/2024    calcitriol (ROCALTROL) 0.25 MCG capsule Take 1 capsule by mouth Daily.   9/30/2024    carvedilol (COREG) 25 MG tablet Take 1 tablet by mouth 2 (Two) Times a Day With Meals.   9/30/2024    guaiFENesin (MUCINEX) 600 MG 12 hr tablet Take 1 tablet by mouth Every 12  (Twelve) Hours As Needed for Cough.   9/30/2024    ipratropium-albuterol (DUO-NEB) 0.5-2.5 mg/3 ml nebulizer Take 3 mL by nebulization 4 (Four) Times a Day. prn 360 mL 0 Past Week    isosorbide dinitrate (ISORDIL) 40 MG tablet Take 1 tablet by mouth 2 (Two) Times a Day. 180 tablet 3 9/30/2024    melatonin 3 MG tablet Take 2 tablets by mouth Every Night. 30 tablet 0 9/29/2024    ondansetron (Zofran) 4 MG tablet Take 1 tablet by mouth Every 8 (Eight) Hours As Needed for Nausea or Vomiting. 20 tablet 0 Past Week    cloNIDine (CATAPRES) 0.1 MG tablet Take 1 tablet by mouth Every 6 (Six) Hours As Needed for High Blood Pressure (SBP >160). 30 tablet 1 More than a month    nitroglycerin (NITROSTAT) 0.4 MG SL tablet Place 1 tablet under the tongue Every 5 (Five) Minutes As Needed for Chest Pain. Take no more than 3 doses in 15 minutes.   Unknown       Medicines:  Current Facility-Administered Medications   Medication Dose Route Frequency Provider Last Rate Last Admin    aspirin EC tablet 81 mg  81 mg Oral Daily Orville Weston MD   81 mg at 10/02/24 0813    budesonide-formoterol (SYMBICORT) 160-4.5 MCG/ACT inhaler 2 puff  2 puff Inhalation BID - RT Neeta Chavez MD        calcitriol (ROCALTROL) capsule 0.25 mcg  0.25 mcg Oral Daily Orville Weston MD   0.25 mcg at 10/02/24 0814    carvedilol (COREG) tablet 25 mg  25 mg Oral BID With Meals Orville Weston MD   25 mg at 10/02/24 0813    cetirizine (zyrTEC) tablet 10 mg  10 mg Oral Daily Neeta Chavez MD   10 mg at 10/02/24 0814    cloNIDine (CATAPRES) tablet 0.1 mg  0.1 mg Oral Q6H PRN Orville Weston MD   0.1 mg at 10/01/24 1429    fluticasone (FLONASE) 50 MCG/ACT nasal spray 2 spray  2 spray Nasal QAM Orville Weston MD        furosemide (LASIX) tablet 40 mg  40 mg Oral Daily Mary Rodriguez APRN   40 mg at 10/02/24 0813    guaiFENesin (MUCINEX) 12 hr tablet 600 mg  600 mg Oral Q12H PRN Orville Weston MD         ipratropium-albuterol (DUO-NEB) nebulizer solution 3 mL  3 mL Nebulization 4x Daily Orville Weston MD   3 mL at 10/02/24 0638    isosorbide dinitrate (ISORDIL) tablet 40 mg  40 mg Oral BID - Nitrates Orville Weston MD   40 mg at 10/02/24 0813    losartan (COZAAR) tablet 100 mg  100 mg Oral Q24H Mary Rodriguez APRN   100 mg at 10/02/24 0814    melatonin tablet 2.5 mg  2.5 mg Oral Nightly PRN Orville Weston MD        methylPREDNISolone sodium succinate (SOLU-Medrol) injection 40 mg  40 mg Intravenous Q12H Neeta Chavez MD   40 mg at 10/02/24 0816    NIFEdipine XL (PROCARDIA XL) 24 hr tablet 120 mg  120 mg Oral Q24H Mary Rodriguez APRN   120 mg at 10/02/24 0814    nitroglycerin (NITROSTAT) SL tablet 0.4 mg  0.4 mg Sublingual Q5 Min PRN Orville Weston MD        sodium chloride 0.9 % flush 10 mL  10 mL Intravenous PRN Shelli Haynes APRN           Past Medical History:  Past Medical History:   Diagnosis Date    Acute CHF     Acute renal failure (ARF)     Anemia     Asthma     childhood    Bowel disease, inflammatory     Chronic kidney disease     Coronary artery disease     Hypertension     Ischemic colitis     Lung nodule, multiple 04/03/2024    Metabolic acidosis     Myocardial infarction 11/07/2020    Nonrheumatic aortic (valve) insufficiency     PTSD (post-traumatic stress disorder)        Past Surgical History:  Past Surgical History:   Procedure Laterality Date    CARDIAC CATHETERIZATION N/A 11/08/2020    Procedure: Left Heart Cath;  Surgeon: Pierce Buchanan MD;  Location:  [facility];  Service: Cardiovascular;  Laterality: N/A;    CLOSED REDUCTION ANKLE FRACTURE Right 2019    13 screws and a plate    EXTERNAL FIXATION WRIST FRACTURE      INSERTION HEMODIALYSIS CATHETER Right 06/03/2022    Procedure: INSERTION OF RIGHT INTERNAL JUGULAR HEMODIALYSIS CATHETER;  Surgeon: Dexter Red MD;  Location: [facility];  Service: Vascular;  Laterality: Right;    TUBAL  "ABDOMINAL LIGATION         Family History  Family History   Problem Relation Age of Onset    Coronary artery disease Mother     Coronary artery disease Father     Breast cancer Neg Hx        Social History  Social History     Socioeconomic History    Marital status: Single   Tobacco Use    Smoking status: Every Day     Current packs/day: 0.00     Average packs/day: 0.5 packs/day for 48.7 years (24.4 ttl pk-yrs)     Types: Cigarettes     Start date:      Last attempt to quit: 2023     Years since quittin.0     Passive exposure: Past    Smokeless tobacco: Never    Tobacco comments:     Smokes about 0.5 pack daily 4/10    Vaping Use    Vaping status: Never Used   Substance and Sexual Activity    Alcohol use: No    Drug use: No    Sexual activity: Defer         Review of Systems:  History obtained from chart review and the patient  General ROS: No fever or chills  Respiratory ROS: No cough, shortness of breath, wheezing  Cardiovascular ROS: No chest pain or palpitations  Gastrointestinal ROS: No abdominal pain or melena  Genito-Urinary ROS: No dysuria or hematuria  Psych ROS: No anxiety and depression  14 point ROS reviewed with the patient and negative except as noted above and in the HPI unless unable to obtain.      Objective:  Patient Vitals for the past 24 hrs:   BP Temp Temp src Pulse Resp SpO2 Height Weight   10/02/24 0800 125/72 97.5 °F (36.4 °C) Oral 76 20 96 % -- --   10/02/24 0646 -- -- -- 70 17 94 % -- --   10/02/24 0638 -- -- -- 68 17 97 % -- --   10/02/24 0402 137/78 98 °F (36.7 °C) Axillary 74 16 98 % -- --   10/02/24 0317 -- -- -- 67 16 97 % -- --   10/01/24 2359 142/74 98 °F (36.7 °C) Axillary 77 18 98 % -- --   10/01/24 230 -- -- -- 77 16 97 % -- --   10/01/24 2037 -- -- -- 73 18 96 % -- --   10/01/24 2027 -- -- -- 72 17 97 % -- --   10/01/24 1932 152/86 98.3 °F (36.8 °C) Oral 80 18 93 % -- --   10/01/24 1814 168/98 97.6 °F (36.4 °C) Oral 83 -- 98 % 167.6 cm (65.98\") 93.6 kg (206 lb 6.4 " oz)   10/01/24 1716 172/92 -- -- 85 18 92 % -- --   10/01/24 1503 (!) 186/108 -- -- 87 12 93 % -- --   10/01/24 1456 -- -- -- 84 20 94 % -- --   10/01/24 1431 (!) 193/104 -- -- 85 24 93 % -- --   10/01/24 1429 (!) 193/104 -- -- 85 -- -- -- --   10/01/24 1201 (!) 181/99 -- -- 69 22 95 % -- --   10/01/24 1013 -- -- -- 75 24 98 % -- --   10/01/24 1001 165/98 -- -- 76 18 93 % -- --       Intake/Output Summary (Last 24 hours) at 10/2/2024 0925  Last data filed at 10/1/2024 2359  Gross per 24 hour   Intake 360 ml   Output --   Net 360 ml     General: awake/alert   Chest:  clear to auscultation bilaterally without respiratory distress  CVS: regular rate and rhythm  Abdominal: soft, nontender, positive bowel sounds  Extremities: no cyanosis or edema  Skin: warm and dry without rash      Labs:  Results from last 7 days   Lab Units 09/30/24  1554   WBC 10*3/mm3 11.83*   HEMOGLOBIN g/dL 11.1*   HEMATOCRIT % 36.1   PLATELETS 10*3/mm3 227         Results from last 7 days   Lab Units 10/02/24  0258 10/01/24  0901 09/30/24  1616 09/30/24  1554   SODIUM mmol/L 139 141  --  143   SODIUM, ARTERIAL mmol/L  --   --  143  --    POTASSIUM mmol/L 4.0 3.8  --  3.8   CHLORIDE mmol/L 104 107  --  111*   CO2 mmol/L 21.0* 22.0  --  21.0*   BUN mg/dL 48* 35*  --  30*   CREATININE mg/dL 2.45* 2.35*  --  2.28*   CALCIUM mg/dL 8.8 8.4*  --  8.3*   EGFR mL/min/1.73 21.5* 22.6*  --  23.4*   BILIRUBIN mg/dL  --   --   --  0.7   ALK PHOS U/L  --   --   --  79   ALT (SGPT) U/L  --   --   --  13   AST (SGOT) U/L  --   --   --  13   GLUCOSE mg/dL 129* 144*  --  107*       Radiology:   Imaging Results (Last 72 Hours)       Procedure Component Value Units Date/Time    XR Chest 2 View [[id number]] Collected: 09/30/24 1553     Updated: 09/30/24 1557    Narrative:      EXAMINATION: XR CHEST 2 VW-  9/30/2024 3:53 PM     HISTORY: Shortness of air.     FINDINGS: Today's exam is compared to a previous study of 8/22/2024.  There is a granuloma in the right upper  "lobe. The cardiac silhouette is  enlarged. There is vascular redistribution suggesting pulmonary venous  hypertension. No evidence of avel pulmonary edema or effusion.       Impression:      1.. Stage I congestive changes with redistribution and cardiac  enlargement. No avel pulmonary edema or infiltrate.  2. Remote granulomatous disease.     This report was signed and finalized on 9/30/2024 3:54 PM by Dr. Ye Montaño MD.               Culture:  No results found for: \"BLOODCX\", \"URINECX\", \"WOUNDCX\", \"MRSACX\", \"RESPCX\", \"STOOLCX\"      Assessment    Chronic kidney disease stage 4  Hypertension  Acute on chronic systolic/diastolic congestive heart failure--improving  Methamphetamine abuse  COPD    Plan:   Creatinine slightly higher today due to IV diuretic administration. Volume overload has improved since admission  Continue daily PO Lasix  Further assessment and plan pending Dr Lazo's evaluation of patient      Thank you for the consult, we appreciate the opportunity to provide care to your patients.  Feel free to contact me if I can be of any further assistance.      Quinn Rodarte, APRN  10/2/2024  09:25 CDT  "

## 2024-10-02 NOTE — CONSULTS
[facility] [place] [facility]    10/01/24, 20:19 CDT  Patient Care Team:  Orville Weston MD as PCP - General (Family Medicine)  Juanpablo Rea MD as Consulting Physician (Gastroenterology)  Nickolas Alegria MD as Consulting Physician (Gastroenterology)  Neeta Chavez MD as Consulting Physician (Pulmonary Disease)  [facility]  Pierce Buchanan MD as Cardiologist (Cardiology)  Jer Cuevas APRN as Nurse Practitioner (Cardiology)  Destini Gaitan APRN as Nurse Practitioner (Family Medicine)  Name: Ludivina Ramirez  : 1960  MRN: [id number]  Contact Serial Number [id number]    Chief complaint: Acute on chronic hypoxic respiratory failure acute exacerbation COPD, worsening acute on chronic congestive diastolic heart failure.  HPI:  We have been consulted by Orville Weston MD to see this 64 y.o. female.  Patient is a very pleasant middle-aged  female was seen as a pulmonary inpatient consult today.  She is known to me and follows up with me in the pulmonary outpatient clinic.    Patient has history of chronic obstructive pulmonary disease and she uses home trilogy with 2 L oxygen at night and also uses 2 L oxygen all the time and sometimes uses liters on exercise and activity.  She has history of histoplasmosis in the past and also is a former smoker.  Apparently she was still smoking a few months ago and still smokes a few cigarettes on and off.  She has granulomatous lung disease.  She was noted to have lung nodules during last clinic visit and also reported having pustular painful rash all over the body.  She was hospitalized last month with similar complaints and at that time patient was seen by Dr. Renee during that visit.  She has history of chronic congestive diastolic heart failure and followed by cardiology and she also follows up with Dr. Lazo in nephrology for her chronic kidney disease.    She was scheduled to see me in the office  yesterday but missed her appointment and presented to the [facility] emergency room with worsening shortness of breath and hypoxic respiratory failure.  Arterial blood gas did not show any hypercapnia she did not have any cough or fever but had increasing shortness of breath but no chest pain.  She did not have any worsening edema.  She was BiPAP 14/6 with a rate of 16 and FiO2 30% at the time of our visit her oxygen saturation was 100%.  She looks little anxious but otherwise stable.    Her workup done in the ER showed she had renal failure with elevated BUN and creatinine chest x-ray shows pulmonary edema definite consolidation noted.  She has granuloma noted in the lung and chronic changes noted in the lung.  Her proBNP is more than 04782 suggesting worsening heart failure.  Her urinary potassium is positive amphetamine and methamphetamine.  She was started on Lasix.  She was also started on fluticasone and Zyrtec for nasal allergy and was getting DuoNeb Symbicort was added which she uses at home and she was also started on Solu-Medrol 40 mg IV twice a day.  Cardiology has been consulted.    Past Medical History:   has a past medical history of Acute CHF, Acute renal failure (ARF), Anemia, Asthma, Bowel disease, inflammatory, Chronic kidney disease, Coronary artery disease, Hypertension, Ischemic colitis, Lung nodule, multiple (04/03/2024), Metabolic acidosis, Myocardial infarction (11/07/2020), Nonrheumatic aortic (valve) insufficiency, and PTSD (post-traumatic stress disorder).   has a past surgical history that includes Tubal ligation; External fixation wrist fracture; Cardiac catheterization (N/A, 11/08/2020); Hemodialysis Catheter (Right, 06/03/2022); and Closed reduction ankle fracture (Right, 2019).  Allergies   Allergen Reactions    Levaquin [Levofloxacin] Itching    Codeine Nausea And Vomiting    Sumatriptan Other (See Comments)     Headache      Medications:  aspirin, 81 mg, Oral,  Daily  calcitriol, 0.25 mcg, Oral, Daily  carvedilol, 25 mg, Oral, BID With Meals  [START ON 10/2/2024] fluticasone, 2 spray, Nasal, QAM  [START ON 10/2/2024] furosemide, 40 mg, Oral, Daily  ipratropium-albuterol, 3 mL, Nebulization, 4x Daily  isosorbide dinitrate, 40 mg, Oral, BID - Nitrates  losartan, 100 mg, Oral, Q24H  NIFEdipine XL, 120 mg, Oral, Q24H         Family History:  Family History   Problem Relation Age of Onset    Coronary artery disease Mother     Coronary artery disease Father     Breast cancer Neg Hx      Social History:   reports that she has been smoking cigarettes. She started smoking about 49 years ago. She has a 24.4 pack-year smoking history. She has been exposed to tobacco smoke. She has never used smokeless tobacco. She reports that she does not drink alcohol and does not use drugs.  Review of Systems:  Review of Systems   Constitutional:  Positive for fatigue. Negative for fever and unexpected weight change.   HENT:  Positive for congestion, postnasal drip and sinus pressure.    Eyes: Negative.    Respiratory:  Positive for cough, chest tightness and shortness of breath.    Cardiovascular:  Positive for leg swelling. Negative for chest pain.   Gastrointestinal: Negative.    Endocrine: Negative.    Genitourinary: Negative.    Musculoskeletal:  Positive for arthralgias and back pain.   Skin: Negative.    Allergic/Immunologic: Positive for environmental allergies.   Neurological: Negative.    Hematological: Negative.    Psychiatric/Behavioral:  The patient is nervous/anxious.       Physical Exam:  Temp:  [97.6 °F (36.4 °C)-98.3 °F (36.8 °C)] 98.3 °F (36.8 °C)  Heart Rate:  [69-87] 80  Resp:  [12-24] 18  BP: (152-193)/() 152/86No intake or output data in the 24 hours ending 10/01/24 2019      09/30/24  1517 10/01/24  1814   Weight: 90.9 kg (200 lb 6.4 oz) 93.6 kg (206 lb 6.4 oz)     SpO2 Percentage    10/01/24 1716 10/01/24 1814 10/01/24 1932   SpO2: 92% 98% 93%     Body mass index is  33.33 kg/m².   Physical Exam  Vitals and nursing note reviewed.   Constitutional:       General: She is not in acute distress.     Appearance: She is obese. She is ill-appearing.      Comments: Obese middle-aged  female currently on BiPAP   HENT:      Head: Normocephalic and atraumatic.      Right Ear: Tympanic membrane normal. There is no impacted cerumen.      Left Ear: Tympanic membrane normal. There is no impacted cerumen.      Nose: Nose normal. No congestion or rhinorrhea.      Mouth/Throat:      Mouth: Mucous membranes are moist.      Pharynx: Oropharynx is clear. No oropharyngeal exudate or posterior oropharyngeal erythema.   Eyes:      General:         Right eye: No discharge.         Left eye: No discharge.      Extraocular Movements: Extraocular movements intact.      Conjunctiva/sclera: Conjunctivae normal.      Pupils: Pupils are equal, round, and reactive to light.   Neck:      Vascular: No carotid bruit.   Cardiovascular:      Rate and Rhythm: Normal rate and regular rhythm.      Pulses: Normal pulses.      Heart sounds: No murmur heard.     No friction rub. No gallop.   Pulmonary:      Effort: Pulmonary effort is normal. No respiratory distress.      Breath sounds: No stridor. Wheezing and rales present.      Comments: Decreased breath sound bilaterally  Chest:      Chest wall: No tenderness.   Abdominal:      General: Abdomen is flat. Bowel sounds are normal. There is no distension.      Palpations: Abdomen is soft. There is no mass.      Tenderness: There is no abdominal tenderness. There is no right CVA tenderness, left CVA tenderness, guarding or rebound.      Hernia: No hernia is present.   Genitourinary:     Comments: Not examined  Musculoskeletal:         General: No swelling, tenderness, deformity or signs of injury. Normal range of motion.      Cervical back: Normal range of motion and neck supple. No rigidity or tenderness.      Right lower leg: Edema present.      Left lower leg:  Edema present.      Comments: Trace ankle edema noted   Lymphadenopathy:      Cervical: No cervical adenopathy.   Skin:     General: Skin is warm and dry.      Coloration: Skin is not jaundiced or pale.      Findings: No bruising, erythema, lesion or rash.   Neurological:      General: No focal deficit present.      Mental Status: She is alert and oriented to person, place, and time. Mental status is at baseline.      Cranial Nerves: No cranial nerve deficit.      Sensory: No sensory deficit.      Motor: No weakness.      Deep Tendon Reflexes: Reflexes normal.   Psychiatric:         Mood and Affect: Mood normal.         Behavior: Behavior normal.         Thought Content: Thought content normal.       Result Review  Results from last 7 days   Lab Units 09/30/24  1554   WBC 10*3/mm3 11.83*   HEMOGLOBIN g/dL 11.1*   PLATELETS 10*3/mm3 227     Results from last 7 days   Lab Units 10/01/24  0901 09/30/24  1616 09/30/24  1554   SODIUM mmol/L 141  --  143   SODIUM, ARTERIAL mmol/L  --  143  --    POTASSIUM mmol/L 3.8  --  3.8   CO2 mmol/L 22.0  --  21.0*   BUN mg/dL 35*  --  30*   CREATININE mg/dL 2.35*  --  2.28*   MAGNESIUM mg/dL  --   --  2.1   GLUCOSE mg/dL 144*  --  107*     Results from last 7 days   Lab Units 09/30/24  1616   PH, ARTERIAL pH units 7.337*   PCO2, ARTERIAL mm Hg 41.2   PO2 ART mm Hg 60.0*     Microbiology Results (last 10 days)       ** No results found for the last 240 hours. **          Recent radiology:   Imaging Results (Last 72 Hours)       Procedure Component Value Units Date/Time    XR Chest 2 View [[id number]] Collected: 09/30/24 1553     Updated: 09/30/24 1557    Narrative:      EXAMINATION: XR CHEST 2 VW-  9/30/2024 3:53 PM     HISTORY: Shortness of air.     FINDINGS: Today's exam is compared to a previous study of 8/22/2024.  There is a granuloma in the right upper lobe. The cardiac silhouette is  enlarged. There is vascular redistribution suggesting pulmonary venous  hypertension. No  evidence of avel pulmonary edema or effusion.       Impression:      1.. Stage I congestive changes with redistribution and cardiac  enlargement. No avel pulmonary edema or infiltrate.  2. Remote granulomatous disease.     This report was signed and finalized on 9/30/2024 3:54 PM by Dr. Ye Montaño MD.             Personal review of imaging : CXR shows granuloma the lung chronic changes and vascular congestion and pulm edema suggesting heart failure.  Other test results (not lab or imaging): Results for orders placed during the hospital encounter of 08/22/24    Adult Transthoracic Echo Complete W/ Cont if Necessary Per Protocol    Interpretation Summary    Left ventricular systolic function is mildly decreased. Left ventricular ejection fraction appears to be 46 - 50%.    The left ventricular cavity is mildly dilated.    Left ventricular wall thickness is consistent with mild concentric hypertrophy.    Normal right ventricular cavity size and systolic function noted.    The right atrial cavity is dilated.    Moderate aortic valve regurgitation is present.    Mild mitral valve regurgitation is present.    Mild dilation of the aortic root is present.  Reviewed decreased ejection fraction noted no significant pulm hypertension noted  Independent review of ekg: Done.  Problem List as identified by Epic (may contain historical, inactive problems)    Acute on chronic combined systolic and diastolic congestive heart failure    Essential hypertension    Elevated troponin    Methamphetamine use    Pulmonary Assessment:    Acute on chronic combined congestive systolic and diastolic heart failure  Acute on chronic hypoxic respiratory failure  Chronic obstructive pulmonary disease with mild acute exacerbation  Chronic respiratory failure and hypoxemia on home oxygen  On NIPPV and home trilogy  Morbid obesity, obstructive sleep apnea obesity hypoventilation syndrome  History of tobacco abuse and substance abuse  Urinary  drug screen positive for amphetamine methamphetamine  Allergic rhinitis  Chronic kidney disease stage III  Granulomatous lung disease and histoplasmosis/history of lung nodules  Anxiety and depression    Recommend/plan:   Patient admitted with increasing shortness of breath and was noted to have BNP more than 70,000  She is currently in heart failure and is started on Lasix.  Monitor renal function and electrolytes.  Cardiology has been consulted.  Congestive heart failure.  Fluid restriction and salt restriction will be needed  May need nephrology consult for chronic kidney disease and acute kidney injury.  From pulmonary standpoint she is at her baseline.  Continue on hospital BiPAP at night  She we will try to bring her home trilogy if possible.    She is on her baseline of 2 L of oxygen on and off BiPAP and oxygen need to be titrated to keep saturation of 92%  Continue fluticasone nasal spray and Zyrtec for nasal allergy.  She started on Symbicort and DuoNeb  Also started on Solu-Medrol 40 mg IV twice a day.  Plan to switch to to oral steroids for few days.  DVT and stress ulcer prophylaxis pain and anxiety control.  Incentive spirometry.  Physical activity as tolerated.  Blood pressure and glycemic control.  Repeat labs and imaging studies from time to time  We will continue follow-up as an outpatient.  She was already scheduled with me for the clinic visit  My clinic visit will be rescheduled at time of discharge.  She may need nicotine patch for history of tobacco abuse  As she has a history of substance abuse and methamphetamine monitor for withdrawal symptoms  Repeat labs imaging studies from time to time  CODE STATUS: Full.  Overall process: Guarded.  We appreciate the consult.  We will follow  Total time spent in seeing this patient as inpatient pulmonary consult was 45 minutes    Thank you for this consult.  We will follow along.    Electronically signed by     Neeta Chavez MD,    Pulmonologist/Intensivist  10/01/24, 8:19 PM CDT.

## 2024-10-03 ENCOUNTER — APPOINTMENT (OUTPATIENT)
Dept: CARDIOLOGY | Facility: HOSPITAL | Age: 64
End: 2024-10-03
Payer: MEDICARE

## 2024-10-03 ENCOUNTER — READMISSION MANAGEMENT (OUTPATIENT)
Dept: CALL CENTER | Facility: HOSPITAL | Age: 64
End: 2024-10-03
Payer: MEDICARE

## 2024-10-03 VITALS
DIASTOLIC BLOOD PRESSURE: 73 MMHG | RESPIRATION RATE: 16 BRPM | HEART RATE: 80 BPM | HEIGHT: 66 IN | SYSTOLIC BLOOD PRESSURE: 134 MMHG | WEIGHT: 206.4 LBS | BODY MASS INDEX: 33.17 KG/M2 | TEMPERATURE: 97.7 F | OXYGEN SATURATION: 97 %

## 2024-10-03 LAB
ALBUMIN SERPL-MCNC: 3.9 G/DL (ref 3.5–5.2)
ALBUMIN UR-MCNC: 4.8 MG/DL
ALBUMIN/GLOB SERPL: 1.5 G/DL
ALP SERPL-CCNC: 76 U/L (ref 39–117)
ALT SERPL W P-5'-P-CCNC: 11 U/L (ref 1–33)
ANION GAP SERPL CALCULATED.3IONS-SCNC: 13 MMOL/L (ref 5–15)
ARTERIAL PATENCY WRIST A: POSITIVE
AST SERPL-CCNC: 8 U/L (ref 1–32)
ATMOSPHERIC PRESS: 753 MMHG
BASE EXCESS BLDA CALC-SCNC: 0 MMOL/L (ref 0–2)
BASOPHILS # BLD AUTO: 0.01 10*3/MM3 (ref 0–0.2)
BASOPHILS NFR BLD AUTO: 0.1 % (ref 0–1.5)
BDY SITE: ABNORMAL
BH CV REST NUCLEAR ISOTOPE DOSE: 10.9 MCI
BH CV STRESS BP STAGE 1: NORMAL
BH CV STRESS COMMENTS STAGE 1: NORMAL
BH CV STRESS DOSE REGADENOSON STAGE 1: 0.4
BH CV STRESS DURATION MIN STAGE 1: 0
BH CV STRESS DURATION SEC STAGE 1: 10
BH CV STRESS HR STAGE 1: 77
BH CV STRESS NUCLEAR ISOTOPE DOSE: 35.8 MCI
BH CV STRESS PROTOCOL 1: NORMAL
BH CV STRESS RECOVERY BP: NORMAL MMHG
BH CV STRESS RECOVERY HR: 71 BPM
BH CV STRESS STAGE 1: 1
BILIRUB SERPL-MCNC: 0.2 MG/DL (ref 0–1.2)
BODY TEMPERATURE: 37
BUN SERPL-MCNC: 57 MG/DL (ref 8–23)
BUN/CREAT SERPL: 21.9 (ref 7–25)
CALCIUM SPEC-SCNC: 8.6 MG/DL (ref 8.6–10.5)
CHLORIDE SERPL-SCNC: 104 MMOL/L (ref 98–107)
CO2 SERPL-SCNC: 25 MMOL/L (ref 22–29)
CREAT SERPL-MCNC: 2.6 MG/DL (ref 0.57–1)
CREAT UR-MCNC: 37.9 MG/DL
DEPRECATED RDW RBC AUTO: 56.5 FL (ref 37–54)
EGFRCR SERPLBLD CKD-EPI 2021: 20 ML/MIN/1.73
EOSINOPHIL # BLD AUTO: 0 10*3/MM3 (ref 0–0.4)
EOSINOPHIL NFR BLD AUTO: 0 % (ref 0.3–6.2)
EPAP: 6
ERYTHROCYTE [DISTWIDTH] IN BLOOD BY AUTOMATED COUNT: 17.3 % (ref 12.3–15.4)
GLOBULIN UR ELPH-MCNC: 2.6 GM/DL
GLUCOSE SERPL-MCNC: 182 MG/DL (ref 65–99)
HCO3 BLDA-SCNC: 25.6 MMOL/L (ref 20–26)
HCT VFR BLD AUTO: 38.3 % (ref 34–46.6)
HGB BLD-MCNC: 11.8 G/DL (ref 12–15.9)
IMM GRANULOCYTES # BLD AUTO: 0.07 10*3/MM3 (ref 0–0.05)
IMM GRANULOCYTES NFR BLD AUTO: 0.8 % (ref 0–0.5)
INHALED O2 CONCENTRATION: 30 %
IPAP: 14
LV EF NUC BP: 58 %
LYMPHOCYTES # BLD AUTO: 0.44 10*3/MM3 (ref 0.7–3.1)
LYMPHOCYTES NFR BLD AUTO: 4.8 % (ref 19.6–45.3)
Lab: ABNORMAL
MAXIMAL PREDICTED HEART RATE: 156 BPM
MCH RBC QN AUTO: 28.8 PG (ref 26.6–33)
MCHC RBC AUTO-ENTMCNC: 30.8 G/DL (ref 31.5–35.7)
MCV RBC AUTO: 93.4 FL (ref 79–97)
MICROALBUMIN/CREAT UR: 126.6 MG/G (ref 0–29)
MODALITY: ABNORMAL
MONOCYTES # BLD AUTO: 0.22 10*3/MM3 (ref 0.1–0.9)
MONOCYTES NFR BLD AUTO: 2.4 % (ref 5–12)
NEUTROPHILS NFR BLD AUTO: 8.37 10*3/MM3 (ref 1.7–7)
NEUTROPHILS NFR BLD AUTO: 91.9 % (ref 42.7–76)
NRBC BLD AUTO-RTO: 0 /100 WBC (ref 0–0.2)
PCO2 BLDA: 44.2 MM HG (ref 35–45)
PCO2 TEMP ADJ BLD: 44.2 MM HG (ref 35–45)
PERCENT MAX PREDICTED HR: 49.36 %
PH BLDA: 7.37 PH UNITS (ref 7.35–7.45)
PH, TEMP CORRECTED: 7.37 PH UNITS (ref 7.35–7.45)
PLATELET # BLD AUTO: 275 10*3/MM3 (ref 140–450)
PMV BLD AUTO: 10.9 FL (ref 6–12)
PO2 BLD: 443 MM[HG] (ref 0–500)
PO2 BLDA: 133 MM HG (ref 83–108)
PO2 TEMP ADJ BLD: 133 MM HG (ref 83–108)
POTASSIUM SERPL-SCNC: 3.7 MMOL/L (ref 3.5–5.2)
PROT SERPL-MCNC: 6.5 G/DL (ref 6–8.5)
RBC # BLD AUTO: 4.1 10*6/MM3 (ref 3.77–5.28)
SAO2 % BLDCOA: 99.2 % (ref 94–99)
SET MECH RESP RATE: 16
SODIUM SERPL-SCNC: 142 MMOL/L (ref 136–145)
STRESS BASELINE BP: NORMAL MMHG
STRESS BASELINE HR: 69 BPM
STRESS PERCENT HR: 58 %
STRESS POST EXERCISE DUR SEC: 10 SEC
STRESS POST PEAK BP: NORMAL MMHG
STRESS POST PEAK HR: 77 BPM
STRESS TARGET HR: 133 BPM
VENTILATOR MODE: ABNORMAL
WBC NRBC COR # BLD AUTO: 9.11 10*3/MM3 (ref 3.4–10.8)

## 2024-10-03 PROCEDURE — 93017 CV STRESS TEST TRACING ONLY: CPT

## 2024-10-03 PROCEDURE — 80053 COMPREHEN METABOLIC PANEL: CPT | Performed by: INTERNAL MEDICINE

## 2024-10-03 PROCEDURE — 94799 UNLISTED PULMONARY SVC/PX: CPT

## 2024-10-03 PROCEDURE — 0 TECHNETIUM TETROFOSMIN KIT: Performed by: FAMILY MEDICINE

## 2024-10-03 PROCEDURE — G0378 HOSPITAL OBSERVATION PER HR: HCPCS

## 2024-10-03 PROCEDURE — 25010000002 METHYLPREDNISOLONE PER 40 MG: Performed by: INTERNAL MEDICINE

## 2024-10-03 PROCEDURE — 85025 COMPLETE CBC W/AUTO DIFF WBC: CPT | Performed by: INTERNAL MEDICINE

## 2024-10-03 PROCEDURE — 78452 HT MUSCLE IMAGE SPECT MULT: CPT | Performed by: INTERNAL MEDICINE

## 2024-10-03 PROCEDURE — 78452 HT MUSCLE IMAGE SPECT MULT: CPT

## 2024-10-03 PROCEDURE — A9502 TC99M TETROFOSMIN: HCPCS | Performed by: FAMILY MEDICINE

## 2024-10-03 PROCEDURE — 25010000002 REGADENOSON 0.4 MG/5ML SOLUTION: Performed by: INTERNAL MEDICINE

## 2024-10-03 PROCEDURE — 82803 BLOOD GASES ANY COMBINATION: CPT

## 2024-10-03 PROCEDURE — 94660 CPAP INITIATION&MGMT: CPT

## 2024-10-03 PROCEDURE — 96376 TX/PRO/DX INJ SAME DRUG ADON: CPT

## 2024-10-03 PROCEDURE — 99232 SBSQ HOSP IP/OBS MODERATE 35: CPT | Performed by: INTERNAL MEDICINE

## 2024-10-03 PROCEDURE — 36600 WITHDRAWAL OF ARTERIAL BLOOD: CPT

## 2024-10-03 PROCEDURE — 93018 CV STRESS TEST I&R ONLY: CPT | Performed by: INTERNAL MEDICINE

## 2024-10-03 PROCEDURE — 99214 OFFICE O/P EST MOD 30 MIN: CPT | Performed by: NURSE PRACTITIONER

## 2024-10-03 RX ORDER — LOSARTAN POTASSIUM 100 MG/1
100 TABLET ORAL
Qty: 30 TABLET | Refills: 1 | Status: SHIPPED | OUTPATIENT
Start: 2024-10-04 | End: 2024-10-09

## 2024-10-03 RX ORDER — ACETAMINOPHEN 325 MG/1
650 TABLET ORAL EVERY 6 HOURS PRN
Status: DISCONTINUED | OUTPATIENT
Start: 2024-10-03 | End: 2024-10-03 | Stop reason: HOSPADM

## 2024-10-03 RX ORDER — NIFEDIPINE 90 MG/1
90 TABLET, EXTENDED RELEASE ORAL
Qty: 30 TABLET | Refills: 1 | Status: SHIPPED | OUTPATIENT
Start: 2024-10-04

## 2024-10-03 RX ORDER — CALCIUM CARBONATE 500 MG/1
2 TABLET, CHEWABLE ORAL 3 TIMES DAILY PRN
Status: DISCONTINUED | OUTPATIENT
Start: 2024-10-03 | End: 2024-10-03 | Stop reason: HOSPADM

## 2024-10-03 RX ORDER — FUROSEMIDE 40 MG
40 TABLET ORAL DAILY
Qty: 30 TABLET | Refills: 1 | Status: SHIPPED | OUTPATIENT
Start: 2024-10-04

## 2024-10-03 RX ORDER — REGADENOSON 0.08 MG/ML
0.4 INJECTION, SOLUTION INTRAVENOUS ONCE
Status: COMPLETED | OUTPATIENT
Start: 2024-10-03 | End: 2024-10-03

## 2024-10-03 RX ORDER — METHYLPREDNISOLONE 4 MG
TABLET, DOSE PACK ORAL
Qty: 21 TABLET | Refills: 0 | Status: SHIPPED | OUTPATIENT
Start: 2024-10-03 | End: 2024-10-09

## 2024-10-03 RX ORDER — FLUTICASONE PROPIONATE 50 UG/1
2 SPRAY, METERED NASAL EVERY MORNING
Qty: 16 G | Refills: 11 | Status: SHIPPED | OUTPATIENT
Start: 2024-10-03

## 2024-10-03 RX ORDER — CETIRIZINE HYDROCHLORIDE 10 MG/1
10 TABLET ORAL DAILY
Qty: 30 TABLET | Refills: 1 | Status: SHIPPED | OUTPATIENT
Start: 2024-10-04

## 2024-10-03 RX ORDER — BUDESONIDE AND FORMOTEROL FUMARATE DIHYDRATE 160; 4.5 UG/1; UG/1
2 AEROSOL RESPIRATORY (INHALATION)
Qty: 1 EACH | Refills: 1 | Status: SHIPPED | OUTPATIENT
Start: 2024-10-03

## 2024-10-03 RX ADMIN — METHYLPREDNISOLONE SODIUM SUCCINATE 40 MG: 40 INJECTION, POWDER, FOR SOLUTION INTRAMUSCULAR; INTRAVENOUS at 08:28

## 2024-10-03 RX ADMIN — IPRATROPIUM BROMIDE AND ALBUTEROL SULFATE 3 ML: .5; 3 SOLUTION RESPIRATORY (INHALATION) at 10:54

## 2024-10-03 RX ADMIN — TETROFOSMIN 1 DOSE: 1.38 INJECTION, POWDER, LYOPHILIZED, FOR SOLUTION INTRAVENOUS at 07:30

## 2024-10-03 RX ADMIN — FUROSEMIDE 40 MG: 20 TABLET ORAL at 08:28

## 2024-10-03 RX ADMIN — LOSARTAN POTASSIUM 100 MG: 50 TABLET, FILM COATED ORAL at 08:28

## 2024-10-03 RX ADMIN — ANTACID TABLETS 2 TABLET: 500 TABLET, CHEWABLE ORAL at 07:45

## 2024-10-03 RX ADMIN — ACETAMINOPHEN 650 MG: 325 TABLET ORAL at 11:07

## 2024-10-03 RX ADMIN — Medication 10 ML: at 08:28

## 2024-10-03 RX ADMIN — NIFEDIPINE 90 MG: 60 TABLET, FILM COATED, EXTENDED RELEASE ORAL at 08:32

## 2024-10-03 RX ADMIN — ANTACID TABLETS 2 TABLET: 500 TABLET, CHEWABLE ORAL at 02:49

## 2024-10-03 RX ADMIN — CETIRIZINE HYDROCHLORIDE 10 MG: 10 TABLET ORAL at 08:28

## 2024-10-03 RX ADMIN — CARVEDILOL 25 MG: 25 TABLET, FILM COATED ORAL at 08:28

## 2024-10-03 RX ADMIN — REGADENOSON 0.4 MG: 0.08 INJECTION, SOLUTION INTRAVENOUS at 09:32

## 2024-10-03 RX ADMIN — CALCITRIOL CAPSULES 0.25 MCG 0.25 MCG: 0.25 CAPSULE ORAL at 08:32

## 2024-10-03 RX ADMIN — ISOSORBIDE DINITRATE 40 MG: 20 TABLET ORAL at 08:31

## 2024-10-03 RX ADMIN — TETROFOSMIN 1 DOSE: 1.38 INJECTION, POWDER, LYOPHILIZED, FOR SOLUTION INTRAVENOUS at 09:30

## 2024-10-03 RX ADMIN — ASPIRIN 81 MG: 81 TABLET, COATED ORAL at 08:28

## 2024-10-03 RX ADMIN — IPRATROPIUM BROMIDE AND ALBUTEROL SULFATE 3 ML: .5; 3 SOLUTION RESPIRATORY (INHALATION) at 14:54

## 2024-10-03 NOTE — PROGRESS NOTES
[facility] -  Progress Note     LOS: 0 days   Patient Care Team:  Orville Weston MD as PCP - General (Family Medicine)  Juanpablo Rea MD as Consulting Physician (Gastroenterology)  Nickolas Alegria MD as Consulting Physician (Gastroenterology)  Neeta Chavez MD as Consulting Physician (Pulmonary Disease)  [facility] ([facility])  Pierce Buchanan MD as Cardiologist (Cardiology)  Jer Cuevas APRN as Nurse Practitioner (Cardiology)  Destini Gaitan APRN as Nurse Practitioner (Family Medicine)    Chief Complaint:no complaints    Subjective     Interval History:   Lexiscan today was noted to be low risk for ischemia. She reports she feels well. BP is well controlled.       Review of Systems:   Review of Systems   Constitutional:  Negative for diaphoresis, fatigue and unexpected weight change.   Respiratory:  Negative for chest tightness, shortness of breath and wheezing.    Cardiovascular:  Negative for chest pain, palpitations and leg swelling.   Gastrointestinal:  Negative for diarrhea, nausea and vomiting.   Neurological:  Negative for dizziness, syncope and light-headedness.   All other systems reviewed and are negative.      Objective     Vital Sign Min/Max for last 24 hours  Temp  Min: 97.5 °F (36.4 °C)  Max: 98.7 °F (37.1 °C)   BP  Min: 111/62  Max: 172/93   Pulse  Min: 70  Max: 93   Resp  Min: 16  Max: 32   SpO2  Min: 93 %  Max: 99 %   Flow (L/min)  Min: 2  Max: 2   No data recorded         10/01/24  1814   Weight: 93.6 kg (206 lb 6.4 oz)         Intake/Output Summary (Last 24 hours) at 10/3/2024 1509  Last data filed at 10/2/2024 1840  Gross per 24 hour   Intake 360 ml   Output --   Net 360 ml         Physical Exam:  Vitals reviewed.   Constitutional:       General: Not in acute distress.     Appearance: Healthy appearance. Well-developed. Not diaphoretic.   Eyes:      General: No scleral icterus.     Conjunctiva/sclera: Conjunctivae normal.      Pupils: Pupils are  equal, round, and reactive to light.   HENT:      Head: Normocephalic.    Mouth/Throat:      Pharynx: No oropharyngeal exudate.   Neck:      Vascular: No JVR.   Pulmonary:      Effort: Pulmonary effort is normal. No respiratory distress.      Breath sounds: Normal breath sounds. No wheezing. No rhonchi. No rales.   Chest:      Chest wall: Not tender to palpatation.   Cardiovascular:      Normal rate. Regular rhythm.   Pulses:     Intact distal pulses.   Edema:     Peripheral edema absent.   Abdominal:      General: Bowel sounds are normal. There is no distension.      Palpations: Abdomen is soft.      Tenderness: There is no abdominal tenderness.   Musculoskeletal: Normal range of motion.      Cervical back: Normal range of motion and neck supple. Skin:     General: Skin is warm and dry.      Coloration: Skin is not pale.      Findings: No erythema or rash.   Neurological:      Mental Status: Alert, oriented to person, place, and time and oriented to person, place and time.      Deep Tendon Reflexes: Reflexes are normal and symmetric.   Psychiatric:         Behavior: Behavior normal.          Results Review:   Lab Results (last 72 hours)       Procedure Component Value Units Date/Time    Comprehensive Metabolic Panel [[id number]]  (Abnormal) Collected: 10/03/24 0325    Specimen: Blood Updated: 10/03/24 0432     Glucose 182 mg/dL      BUN 57 mg/dL      Creatinine 2.60 mg/dL      Sodium 142 mmol/L      Potassium 3.7 mmol/L      Chloride 104 mmol/L      CO2 25.0 mmol/L      Calcium 8.6 mg/dL      Total Protein 6.5 g/dL      Albumin 3.9 g/dL      ALT (SGPT) 11 U/L      AST (SGOT) 8 U/L      Alkaline Phosphatase 76 U/L      Total Bilirubin 0.2 mg/dL      Globulin 2.6 gm/dL      A/G Ratio 1.5 g/dL      BUN/Creatinine Ratio 21.9     Anion Gap 13.0 mmol/L      eGFR 20.0 mL/min/1.73     Narrative:      GFR Normal >60  Chronic Kidney Disease <60  Kidney Failure <15      CBC & Differential [[id number]]  (Abnormal) Collected:  10/03/24 0325    Specimen: Blood Updated: 10/03/24 0411    Narrative:      The following orders were created for panel order CBC & Differential.  Procedure                               Abnormality         Status                     ---------                               -----------         ------                     CBC Auto Differential[[id number]]        Abnormal            Final result                 Please view results for these tests on the individual orders.    CBC Auto Differential [[id number]]  (Abnormal) Collected: 10/03/24 0325    Specimen: Blood Updated: 10/03/24 0411     WBC 9.11 10*3/mm3      RBC 4.10 10*6/mm3      Hemoglobin 11.8 g/dL      Hematocrit 38.3 %      MCV 93.4 fL      MCH 28.8 pg      MCHC 30.8 g/dL      RDW 17.3 %      RDW-SD 56.5 fl      MPV 10.9 fL      Platelets 275 10*3/mm3      Neutrophil % 91.9 %      Lymphocyte % 4.8 %      Monocyte % 2.4 %      Eosinophil % 0.0 %      Basophil % 0.1 %      Immature Grans % 0.8 %      Neutrophils, Absolute 8.37 10*3/mm3      Lymphocytes, Absolute 0.44 10*3/mm3      Monocytes, Absolute 0.22 10*3/mm3      Eosinophils, Absolute 0.00 10*3/mm3      Basophils, Absolute 0.01 10*3/mm3      Immature Grans, Absolute 0.07 10*3/mm3      nRBC 0.0 /100 WBC     Blood Gas, Arterial - [[id number]]  (Abnormal) Collected: 10/03/24 0345    Specimen: Arterial Blood Updated: 10/03/24 0345     Site Right Radial     Guido's Test Positive     pH, Arterial 7.371 pH units      pCO2, Arterial 44.2 mm Hg      pO2, Arterial 133.0 mm Hg      Comment: 83 Value above reference range        HCO3, Arterial 25.6 mmol/L      Base Excess, Arterial 0.0 mmol/L      O2 Saturation, Arterial 99.2 %      Comment: 83 Value above reference range        Temperature 37.0     Barometric Pressure for Blood Gas 753 mmHg      Modality BiPap     FIO2 30 %      Ventilator Mode NA     Set [facility] Resp Rate 16.0     IPAP 14     Comment: Meter: [id number]     :  Valentino Pressley, MENG        EPAP  6     Collected by [id number]     pCO2, Temperature Corrected 44.2 mm Hg      pH, Temp Corrected 7.371 pH Units      pO2, Temperature Corrected 133 mm Hg      PO2/FIO2 443    Microalbumin / Creatinine Urine Ratio - Urine, Clean Catch [[id number]]  (Abnormal) Collected: 10/02/24 1448    Specimen: Urine, Clean Catch Updated: 10/03/24 0021     Microalbumin/Creatinine Ratio 126.6 mg/g      Creatinine, Urine 37.9 mg/dL      Microalbumin, Urine 4.8 mg/dL     Sodium, Urine, Random - Urine, Clean Catch [[id number]] Collected: 10/02/24 1448    Specimen: Urine, Clean Catch Updated: 10/02/24 1520     Sodium, Urine 79 mmol/L     Narrative:      Reference intervals for random urine have not been established.  Clinical usage is dependent upon physician's interpretation in combination with other laboratory tests.       Basic Metabolic Panel [[id number]]  (Abnormal) Collected: 10/02/24 0258    Specimen: Blood Updated: 10/02/24 0407     Glucose 129 mg/dL      BUN 48 mg/dL      Creatinine 2.45 mg/dL      Sodium 139 mmol/L      Potassium 4.0 mmol/L      Chloride 104 mmol/L      CO2 21.0 mmol/L      Calcium 8.8 mg/dL      BUN/Creatinine Ratio 19.6     Anion Gap 14.0 mmol/L      eGFR 21.5 mL/min/1.73     Narrative:      GFR Normal >60  Chronic Kidney Disease <60  Kidney Failure <15      BNP [[id number]]  (Abnormal) Collected: 10/01/24 1437    Specimen: Blood Updated: 10/01/24 1553     proBNP >70,000.0 pg/mL     Narrative:      This assay is used as an aid in the diagnosis of individuals suspected of having heart failure. It can be used as an aid in the diagnosis of acute decompensated heart failure (ADHF) in patients presenting with signs and symptoms of ADHF to the emergency department (ED). In addition, NT-proBNP of <300 pg/mL indicates ADHF is not likely.    Age Range Result Interpretation  NT-proBNP Concentration (pg/mL:      <50             Positive            >450                   Gray                 300-450                    Negative              <300    50-75           Positive            >900                  Gray                300-900                  Negative            <300      >75             Positive            >1800                  Gray                300-1800                  Negative            <300    Fentanyl, Urine - Urine, Clean Catch [[id number]]  (Normal) Collected: 10/01/24 1437    Specimen: Urine, Clean Catch Updated: 10/01/24 1455     Fentanyl, Urine Negative    Narrative:      Negative Threshold:      Fentanyl 5 ng/mL     The normal value for the drug tested is negative. This report includes final unconfirmed screening results to be used for medical treatment purposes only. Unconfirmed results must not be used for non-medical purposes such as employment or legal testing. Clinical consideration should be applied to any drug of abuse test, particularly when unconfirmed results are used.           Urine Drug Screen - Urine, Clean Catch [[id number]]  (Abnormal) Collected: 10/01/24 1437    Specimen: Urine, Clean Catch Updated: 10/01/24 1454     THC, Screen, Urine Negative     Phencyclidine (PCP), Urine Negative     Cocaine Screen, Urine Negative     Methamphetamine, Ur Positive     Opiate Screen Negative     Amphetamine Screen, Urine Positive     Benzodiazepine Screen, Urine Negative     Tricyclic Antidepressants Screen Negative     Methadone Screen, Urine Negative     Barbiturates Screen, Urine Negative     Oxycodone Screen, Urine Negative     Buprenorphine, Screen, Urine Negative    Narrative:      Cutoff For Drugs Screened:    Amphetamines               500 ng/ml  Barbiturates               200 ng/ml  Benzodiazepines            150 ng/ml  Cocaine                    150 ng/ml  Methadone                  200 ng/ml  Opiates                    100 ng/ml  Phencyclidine               25 ng/ml  THC                         50 ng/ml  Methamphetamine            500 ng/ml  Tricyclic Antidepressants  300 ng/ml  Oxycodone                   100 ng/ml  Buprenorphine               10 ng/ml    The normal value for all drugs tested is negative. This report includes unconfirmed screening results, with the cutoff values listed, to be used for medical treatment purposes only.  Unconfirmed results must not be used for non-medical purposes such as employment or legal testing.  Clinical consideration should be applied to any drug of abuse test, particularly when unconfirmed results are used.      Basic Metabolic Panel [[id number]]  (Abnormal) Collected: 10/01/24 0901    Specimen: Blood Updated: 10/01/24 0948     Glucose 144 mg/dL      BUN 35 mg/dL      Creatinine 2.35 mg/dL      Sodium 141 mmol/L      Potassium 3.8 mmol/L      Comment: Slight hemolysis detected by analyzer. Result may be falsely elevated.        Chloride 107 mmol/L      CO2 22.0 mmol/L      Calcium 8.4 mg/dL      BUN/Creatinine Ratio 14.9     Anion Gap 12.0 mmol/L      eGFR 22.6 mL/min/1.73     Narrative:      GFR Normal >60  Chronic Kidney Disease <60  Kidney Failure <15      Single High Sensitivity Troponin T [[id number]]  (Abnormal) Collected: 09/30/24 1758    Specimen: Blood Updated: 09/30/24 1828     HS Troponin T 47 ng/L     Narrative:      High Sensitive Troponin T Reference Range:  <14.0 ng/L- Negative Female for AMI  <22.0 ng/L- Negative Male for AMI  >=14 - Abnormal Female indicating possible myocardial injury.  >=22 - Abnormal Male indicating possible myocardial injury.   Clinicians would have to utilize clinical acumen, EKG, Troponin, and serial changes to determine if it is an Acute Myocardial Infarction or myocardial injury due to an underlying chronic condition.         BNP [[id number]]  (Abnormal) Collected: 09/30/24 1554    Specimen: Blood Updated: 09/30/24 1639     proBNP 55,636.0 pg/mL     Narrative:      This assay is used as an aid in the diagnosis of individuals suspected of having heart failure. It can be used as an aid in the diagnosis of acute decompensated heart failure  (ADHF) in patients presenting with signs and symptoms of ADHF to the emergency department (ED). In addition, NT-proBNP of <300 pg/mL indicates ADHF is not likely.    Age Range Result Interpretation  NT-proBNP Concentration (pg/mL:      <50             Positive            >450                   Gray                 300-450                    Negative             <300    50-75           Positive            >900                  Gray                300-900                  Negative            <300      >75             Positive            >1800                  Gray                300-1800                  Negative            <300    Single High Sensitivity Troponin T [[id number]]  (Abnormal) Collected: 09/30/24 1554    Specimen: Blood Updated: 09/30/24 1626     HS Troponin T 49 ng/L     Narrative:      High Sensitive Troponin T Reference Range:  <14.0 ng/L- Negative Female for AMI  <22.0 ng/L- Negative Male for AMI  >=14 - Abnormal Female indicating possible myocardial injury.  >=22 - Abnormal Male indicating possible myocardial injury.   Clinicians would have to utilize clinical acumen, EKG, Troponin, and serial changes to determine if it is an Acute Myocardial Infarction or myocardial injury due to an underlying chronic condition.         Comprehensive Metabolic Panel [[id number]]  (Abnormal) Collected: 09/30/24 1554    Specimen: Blood Updated: 09/30/24 1622     Glucose 107 mg/dL      BUN 30 mg/dL      Creatinine 2.28 mg/dL      Sodium 143 mmol/L      Potassium 3.8 mmol/L      Comment: Slight hemolysis detected by analyzer. Result may be falsely elevated.        Chloride 111 mmol/L      CO2 21.0 mmol/L      Calcium 8.3 mg/dL      Total Protein 6.0 g/dL      Albumin 3.2 g/dL      ALT (SGPT) 13 U/L      AST (SGOT) 13 U/L      Alkaline Phosphatase 79 U/L      Total Bilirubin 0.7 mg/dL      Globulin 2.8 gm/dL      A/G Ratio 1.1 g/dL      BUN/Creatinine Ratio 13.2     Anion Gap 11.0 mmol/L      eGFR 23.4 mL/min/1.73      Narrative:      GFR Normal >60  Chronic Kidney Disease <60  Kidney Failure <15      Magnesium [[id number]]  (Normal) Collected: 09/30/24 1554    Specimen: Blood Updated: 09/30/24 1617     Magnesium 2.1 mg/dL     Blood Gas, Arterial With Co-Ox [[id number]]  (Abnormal) Collected: 09/30/24 1616    Specimen: Arterial Blood Updated: 09/30/24 1616     Site Left Brachial     Guido's Test N/A     pH, Arterial 7.337 pH units      Comment: 84 Value below reference range        pCO2, Arterial 41.2 mm Hg      pO2, Arterial 60.0 mm Hg      Comment: 84 Value below reference range        HCO3, Arterial 22.1 mmol/L      Base Excess, Arterial -3.6 mmol/L      Comment: 84 Value below reference range        O2 Saturation, Arterial 92.3 %      Comment: 84 Value below reference range        Hemoglobin, Blood Gas 11.8 g/dL      Comment: 84 Value below reference range        Hematocrit, Blood Gas 36.2 %      Comment: 84 Value below reference range        Oxyhemoglobin 90.2 %      Comment: 84 Value below reference range        Methemoglobin 0.70 %      Carboxyhemoglobin 1.6 %      Temperature 37.0     Sodium, Arterial 143 mmol/L      Potassium, Arterial 3.7 mmol/L      Barometric Pressure for Blood Gas 751 mmHg      Modality Nasal Cannula     Flow Rate 2.5 lpm      Ventilator Mode NA     Collected by [id number]     Comment: Meter: [id number]     :  Danica Davis, RRT        pH, Temp Corrected 7.337 pH Units      pCO2, Temperature Corrected 41.2 mm Hg      pO2, Temperature Corrected 60.0 mm Hg     D-dimer, Quantitative [[id number]]  (Abnormal) Collected: 09/30/24 1554    Specimen: Blood Updated: 09/30/24 1615     D-Dimer, Quantitative 1.63 MCGFEU/mL     Narrative:      According to the assay 's published package insert, a normal (<0.50 MCGFEU/mL) D-dimer result in conjunction with a non-high clinical probability assessment, excludes deep vein thrombosis (DVT) and pulmonary embolism (PE) with high sensitivity.    D-dimer  "values increase with age and this can make VTE exclusion of an older population difficult. To address this, the [employer], based on best available evidence and recent guidelines, recommends that clinicians use age-adjusted D-dimer thresholds in patients greater than 50 years of age with: a) a low probability of PE who do not meet all Pulmonary Embolism Rule Out Criteria, or b) in those with intermediate probability of PE.   The formula for an age-adjusted D-dimer cut-off is \"age/100\".  For example, a 60 year old patient would have an age-adjusted cut-off of 0.60 MCGFEU/mL and an 80 year old 0.80 MCGFEU/mL.    CBC & Differential [[id number]]  (Abnormal) Collected: 09/30/24 1554    Specimen: Blood Updated: 09/30/24 1604    Narrative:      The following orders were created for panel order CBC & Differential.  Procedure                               Abnormality         Status                     ---------                               -----------         ------                     CBC Auto Differential[[id number]]        Abnormal            Final result                 Please view results for these tests on the individual orders.    CBC Auto Differential [[id number]]  (Abnormal) Collected: 09/30/24 1554    Specimen: Blood Updated: 09/30/24 1604     WBC 11.83 10*3/mm3      RBC 3.84 10*6/mm3      Hemoglobin 11.1 g/dL      Hematocrit 36.1 %      MCV 94.0 fL      MCH 28.9 pg      MCHC 30.7 g/dL      RDW 17.3 %      RDW-SD 55.8 fl      MPV 10.5 fL      Platelets 227 10*3/mm3      Neutrophil % 86.3 %      Lymphocyte % 7.4 %      Monocyte % 4.8 %      Eosinophil % 0.3 %      Basophil % 0.5 %      Immature Grans % 0.7 %      Neutrophils, Absolute 10.21 10*3/mm3      Lymphocytes, Absolute 0.87 10*3/mm3      Monocytes, Absolute 0.57 10*3/mm3      Eosinophils, Absolute 0.04 10*3/mm3      Basophils, Absolute 0.06 10*3/mm3      Immature Grans, Absolute 0.08 10*3/mm3      nRBC 0.0 /100 WBC              Results for " orders placed during the hospital encounter of 09/30/24    Adult Transthoracic Echo Complete W/ Cont if Necessary Per Protocol    Interpretation Summary    Left ventricular systolic function is low normal. Left ventricular ejection fraction appears to be 51 - 55%.    Left ventricular wall thickness is consistent with moderate to severe concentric hypertrophy.    The following left ventricular wall segments are hypokinetic: apical anterior, apical lateral, mid inferolateral and apical septal. The following left ventricular wall segments are akinetic: apical inferior and apex.    The findings are consistent with non-obstructive, hypertrophic cardiomyopathy.    Left ventricular diastolic function is consistent with (grade II w/high LAP) pseudonormalization.    Left atrial volume is moderately increased.    The right atrial cavity is mild to moderately  dilated.    Moderate aortic valve regurgitation is present.    Mild aortic valve stenosis is present.    Moderate mitral valve regurgitation is present.    Estimated right ventricular systolic pressure from tricuspid regurgitation is mildly elevated (35-45 mmHg).    Mild pulmonary hypertension is present.    An apparent apical mural thrombus is noted.          Medication Review: yes  Current Facility-Administered Medications   Medication Dose Route Frequency Provider Last Rate Last Admin    acetaminophen (TYLENOL) tablet 650 mg  650 mg Oral Q6H PRN Orville Weston MD   650 mg at 10/03/24 1107    aspirin EC tablet 81 mg  81 mg Oral Daily Orville Weston MD   81 mg at 10/03/24 0828    budesonide-formoterol (SYMBICORT) 160-4.5 MCG/ACT inhaler 2 puff  2 puff Inhalation BID - RT Neeta Chavez MD   2 puff at 10/02/24 1931    calcitriol (ROCALTROL) capsule 0.25 mcg  0.25 mcg Oral Daily Orville Weston MD   0.25 mcg at 10/03/24 0832    calcium carbonate (TUMS) chewable tablet 500 mg (200 mg elemental)  2 tablet Oral TID PRN Orville Weston MD   2  tablet at 10/03/24 0745    carvedilol (COREG) tablet 25 mg  25 mg Oral BID With Meals Orville Weston MD   25 mg at 10/03/24 0828    cetirizine (zyrTEC) tablet 10 mg  10 mg Oral Daily Neeta Chavez MD   10 mg at 10/03/24 0828    cloNIDine (CATAPRES) tablet 0.1 mg  0.1 mg Oral Q6H PRN Orville Weston MD   0.1 mg at 10/01/24 1429    Enoxaparin Sodium (LOVENOX) syringe 90 mg  1 mg/kg Subcutaneous Q24H Mary Rodriguez APRN   90 mg at 10/02/24 1617    fluticasone (FLONASE) 50 MCG/ACT nasal spray 2 spray  2 spray Nasal QAM Orville Weston MD        furosemide (LASIX) tablet 40 mg  40 mg Oral Daily Mary Rodriguez APRN   40 mg at 10/03/24 0828    guaiFENesin (MUCINEX) 12 hr tablet 600 mg  600 mg Oral Q12H PRN Orville Weston MD        ipratropium-albuterol (DUO-NEB) nebulizer solution 3 mL  3 mL Nebulization 4x Daily Orville Weston MD   3 mL at 10/03/24 1454    isosorbide dinitrate (ISORDIL) tablet 40 mg  40 mg Oral BID - Nitrates Orville Weston MD   40 mg at 10/03/24 0831    losartan (COZAAR) tablet 100 mg  100 mg Oral Q24H Mary Rodriguez APRN   100 mg at 10/03/24 0828    melatonin tablet 2.5 mg  2.5 mg Oral Nightly PRN Orville Weston MD        methylPREDNISolone sodium succinate (SOLU-Medrol) injection 40 mg  40 mg Intravenous Q12H Neeta Chavez MD   40 mg at 10/03/24 0828    NIFEdipine XL (PROCARDIA XL) 24 hr tablet 90 mg  90 mg Oral Q24H Mary Rodriguez APRN   90 mg at 10/03/24 0832    nitroglycerin (NITROSTAT) SL tablet 0.4 mg  0.4 mg Sublingual Q5 Min PRN Orville Weston MD   0.4 mg at 10/02/24 0928    sodium chloride 0.9 % flush 10 mL  10 mL Intravenous PRN Shelli Haynes APRN   10 mL at 10/03/24 0828     Assessment & Plan       Acute on chronic combined systolic and diastolic congestive heart failure    Stage 4 chronic kidney disease    Essential hypertension    Elevated troponin    Coronary artery disease involving native coronary artery of native  heart without angina pectoris    Methamphetamine use    Plan:   Acute on chronic CHF exacerbation: likely due to a combination of uncontrolled HTN with meth use and CKD. Symptoms improved with IV lasix. EF normal per echo. Continue coreg 25mg BID, Losartan 100mg daily, Isosorbide Dnitrate 40mg BID and lasix 40mg daily.      CKD: creatinine 2.6 today, lasix was resumed per nephrology. Nephrology is following.      Elevated troponin/CAD: low risk nuclear stress test today. type 2 elevation due to uncontrolled HTN, acute CHF exacerbation and CKD. Previous CORA to the LAD in 2020 following a STEMI. Continue ASA, BB, ARB and statin     HTN: stable.      Meth use: reports she is done with meth. Educated on risks of continued use.     Ok to d/c home from cardiology standpoint.     Further recommendations per Dr. Hernández    Electronically signed by ISAIAH Leger, 10/03/24, 2:20 PM CDT.    Time spent: 30 minutes

## 2024-10-03 NOTE — PROGRESS NOTES
Nephrology ([facility]) Progress Note      Patient:  Ludivina Ramirez  YOB: 1960  Date of Service: 10/3/2024  MRN: [id number]   Acct: [id number]   Primary Care Physician: Orville Weston MD  Advance Directive:   There are no questions and answers to display.     Admit Date: 9/30/2024       Hospital Day: 0  Referring Provider: Orville Weston MD      Patient personally seen and examined.  Complete chart including Consults, Notes, Operative Reports, Labs, Cardiology, and Radiology studies reviewed as able.        Subjective:  Ludivina Ramirez is a 64 y.o. female for whom we were consulted for evaluation and treatment of chronic kidney disease. Baseline chronic kidney disease stage 4. Baseline creatinine approximately 2.2.  History of MATTY in June that required short term hemodialysis, last HD was mid-July. Other history includes hypertension, CHF, coronary artery disease, methamphetamine abuse.  Patient recently was admitted to hospital from 9/19-9/24 for MATTY secondary to volume depletion from GI illness. Discharged with creatinine 2.1.  Previous home diuretics were not restarted when she was discharged.  Presented to ER on 10/01 with dyspnea. Initial creatinine in ER was 2.28. Given IV Lasix and admitted to medical floor. Dyspnea improved following the Lasix administration. Denied n/v/d. Denied changes in urination. Had cardiac echo on 10/02    Today is awake and alert. No new complaints. Planning for stress test later today       Allergies:  Levaquin [levofloxacin], Codeine, and Sumatriptan    Home Meds:  Medications Prior to Admission   Medication Sig Dispense Refill Last Dose    aspirin 81 MG EC tablet Take 1 tablet by mouth Daily. 90 tablet 3 9/30/2024    calcitriol (ROCALTROL) 0.25 MCG capsule Take 1 capsule by mouth Daily.   9/30/2024    carvedilol (COREG) 25 MG tablet Take 1 tablet by mouth 2 (Two) Times a Day With Meals.   9/30/2024    guaiFENesin (MUCINEX) 600 MG 12  hr tablet Take 1 tablet by mouth Every 12 (Twelve) Hours As Needed for Cough.   9/30/2024    ipratropium-albuterol (DUO-NEB) 0.5-2.5 mg/3 ml nebulizer Take 3 mL by nebulization 4 (Four) Times a Day. prn 360 mL 0 Past Week    isosorbide dinitrate (ISORDIL) 40 MG tablet Take 1 tablet by mouth 2 (Two) Times a Day. 180 tablet 3 9/30/2024    melatonin 3 MG tablet Take 2 tablets by mouth Every Night. 30 tablet 0 9/29/2024    ondansetron (Zofran) 4 MG tablet Take 1 tablet by mouth Every 8 (Eight) Hours As Needed for Nausea or Vomiting. 20 tablet 0 Past Week    cloNIDine (CATAPRES) 0.1 MG tablet Take 1 tablet by mouth Every 6 (Six) Hours As Needed for High Blood Pressure (SBP >160). 30 tablet 1 More than a month    nitroglycerin (NITROSTAT) 0.4 MG SL tablet Place 1 tablet under the tongue Every 5 (Five) Minutes As Needed for Chest Pain. Take no more than 3 doses in 15 minutes.   Unknown       Medicines:  Current Facility-Administered Medications   Medication Dose Route Frequency Provider Last Rate Last Admin    aspirin EC tablet 81 mg  81 mg Oral Daily Orville Weston MD   81 mg at 10/03/24 0828    budesonide-formoterol (SYMBICORT) 160-4.5 MCG/ACT inhaler 2 puff  2 puff Inhalation BID - RT Neeta Chavez MD   2 puff at 10/02/24 1931    calcitriol (ROCALTROL) capsule 0.25 mcg  0.25 mcg Oral Daily rOville Weston MD   0.25 mcg at 10/03/24 0832    calcium carbonate (TUMS) chewable tablet 500 mg (200 mg elemental)  2 tablet Oral TID PRN Orville Weston MD   2 tablet at 10/03/24 0745    carvedilol (COREG) tablet 25 mg  25 mg Oral BID With Meals Orville Weston MD   25 mg at 10/03/24 0828    cetirizine (zyrTEC) tablet 10 mg  10 mg Oral Daily Neeta Chavez MD   10 mg at 10/03/24 0828    cloNIDine (CATAPRES) tablet 0.1 mg  0.1 mg Oral Q6H PRN Orville Weston MD   0.1 mg at 10/01/24 1429    Enoxaparin Sodium (LOVENOX) syringe 90 mg  1 mg/kg Subcutaneous Q24H Mary Rodriguez APRN   90 mg at  10/02/24 1617    fluticasone (FLONASE) 50 MCG/ACT nasal spray 2 spray  2 spray Nasal QAM Orville Weston MD        furosemide (LASIX) tablet 40 mg  40 mg Oral Daily Mary Rodriguez APRN   40 mg at 10/03/24 0828    guaiFENesin (MUCINEX) 12 hr tablet 600 mg  600 mg Oral Q12H PRN Orville Weston MD        ipratropium-albuterol (DUO-NEB) nebulizer solution 3 mL  3 mL Nebulization 4x Daily Orville Weston MD   3 mL at 10/02/24 1927    isosorbide dinitrate (ISORDIL) tablet 40 mg  40 mg Oral BID - Nitrates Orville Weston MD   40 mg at 10/03/24 0831    losartan (COZAAR) tablet 100 mg  100 mg Oral Q24H Mary Rodriguez APRN   100 mg at 10/03/24 0828    melatonin tablet 2.5 mg  2.5 mg Oral Nightly PRN Orville Weston MD        methylPREDNISolone sodium succinate (SOLU-Medrol) injection 40 mg  40 mg Intravenous Q12H Neeta Chavez MD   40 mg at 10/03/24 0828    NIFEdipine XL (PROCARDIA XL) 24 hr tablet 90 mg  90 mg Oral Q24H Mary Rodriguez APRN   90 mg at 10/03/24 0832    nitroglycerin (NITROSTAT) SL tablet 0.4 mg  0.4 mg Sublingual Q5 Min PRN Orville Weston MD   0.4 mg at 10/02/24 0928    sodium chloride 0.9 % flush 10 mL  10 mL Intravenous PRN Shelli Haynes APRN   10 mL at 10/03/24 0828       Past Medical History:  Past Medical History:   Diagnosis Date    Acute CHF     Acute renal failure (ARF)     Anemia     Asthma     childhood    Bowel disease, inflammatory     Chronic kidney disease     Coronary artery disease     Hypertension     Ischemic colitis     Lung nodule, multiple 04/03/2024    Metabolic acidosis     Myocardial infarction 11/07/2020    Nonrheumatic aortic (valve) insufficiency     PTSD (post-traumatic stress disorder)        Past Surgical History:  Past Surgical History:   Procedure Laterality Date    CARDIAC CATHETERIZATION N/A 11/08/2020    Procedure: Left Heart Cath;  Surgeon: Pierce Buchanan MD;  Location:  [facility];  Service: Cardiovascular;   Laterality: N/A;    CLOSED REDUCTION ANKLE FRACTURE Right 2019    13 screws and a plate    EXTERNAL FIXATION WRIST FRACTURE      INSERTION HEMODIALYSIS CATHETER Right 2022    Procedure: INSERTION OF RIGHT INTERNAL JUGULAR HEMODIALYSIS CATHETER;  Surgeon: Dexter Red MD;  Location: [facility];  Service: Vascular;  Laterality: Right;    TUBAL ABDOMINAL LIGATION         Family History  Family History   Problem Relation Age of Onset    Coronary artery disease Mother     Coronary artery disease Father     Breast cancer Neg Hx        Social History  Social History     Socioeconomic History    Marital status: Single   Tobacco Use    Smoking status: Every Day     Current packs/day: 0.00     Average packs/day: 0.5 packs/day for 48.7 years (24.4 ttl pk-yrs)     Types: Cigarettes     Start date:      Last attempt to quit: 2023     Years since quittin.0     Passive exposure: Past    Smokeless tobacco: Never    Tobacco comments:     Smokes about 0.5 pack daily 4/10    Vaping Use    Vaping status: Never Used   Substance and Sexual Activity    Alcohol use: No    Drug use: No    Sexual activity: Defer       Review of Systems:  History obtained from chart review and the patient  General ROS: No fever or chills  Respiratory ROS: No cough, shortness of breath, wheezing  Cardiovascular ROS: No chest pain or palpitations  Gastrointestinal ROS: No abdominal pain or melena  Genito-Urinary ROS: No dysuria or hematuria  Psych ROS: No anxiety and depression  14 point ROS reviewed with the patient and negative except as noted above and in the HPI unless unable to obtain.    Objective:  Patient Vitals for the past 24 hrs:   BP Temp Temp src Pulse Resp SpO2   10/03/24 0826 172/93 98 °F (36.7 °C) Oral 89 18 96 %   10/03/24 0451 148/81 98.2 °F (36.8 °C) Oral 82 18 95 %   10/03/24 0324 -- -- -- 73 -- 97 %   10/02/24 2357 -- -- -- 77 (!) 32 98 %   10/02/24 2004 111/62 98.7 °F (37.1 °C) Axillary 78 18 98 %   10/02/24  1934 -- -- -- 78 -- 99 %   10/02/24 1931 -- -- -- 73 18 94 %   10/02/24 1615 140/79 97.5 °F (36.4 °C) Oral 93 18 93 %   10/02/24 1437 -- -- -- 65 18 95 %   10/02/24 1428 -- -- -- 69 18 94 %       Intake/Output Summary (Last 24 hours) at 10/3/2024 1022  Last data filed at 10/2/2024 1840  Gross per 24 hour   Intake 840 ml   Output 100 ml   Net 740 ml     General: awake/alert   Chest:  clear to auscultation bilaterally without respiratory distress  CVS: regular rate and rhythm  Abdominal: soft, nontender, positive bowel sounds  Extremities: no cyanosis or edema  Skin: warm and dry without rash      Labs:  Results from last 7 days   Lab Units 10/03/24  0325 09/30/24  1554   WBC 10*3/mm3 9.11 11.83*   HEMOGLOBIN g/dL 11.8* 11.1*   HEMATOCRIT % 38.3 36.1   PLATELETS 10*3/mm3 275 227         Results from last 7 days   Lab Units 10/03/24  0325 10/02/24  0258 10/01/24  0901 09/30/24  1616 09/30/24  1554   SODIUM mmol/L 142 139 141  --  143   SODIUM, ARTERIAL   --   --   --    < >  --    POTASSIUM mmol/L 3.7 4.0 3.8  --  3.8   CHLORIDE mmol/L 104 104 107  --  111*   CO2 mmol/L 25.0 21.0* 22.0  --  21.0*   BUN mg/dL 57* 48* 35*  --  30*   CREATININE mg/dL 2.60* 2.45* 2.35*  --  2.28*   CALCIUM mg/dL 8.6 8.8 8.4*  --  8.3*   EGFR mL/min/1.73 20.0* 21.5* 22.6*  --  23.4*   BILIRUBIN mg/dL 0.2  --   --   --  0.7   ALK PHOS U/L 76  --   --   --  79   ALT (SGPT) U/L 11  --   --   --  13   AST (SGOT) U/L 8  --   --   --  13   GLUCOSE mg/dL 182* 129* 144*  --  107*    < > = values in this interval not displayed.       Radiology:   Imaging Results (Last 72 Hours)       Procedure Component Value Units Date/Time    XR Chest 2 View [[id number]] Collected: 09/30/24 1553     Updated: 09/30/24 1557    Narrative:      EXAMINATION: XR CHEST 2 VW-  9/30/2024 3:53 PM     HISTORY: Shortness of air.     FINDINGS: Today's exam is compared to a previous study of 8/22/2024.  There is a granuloma in the right upper lobe. The cardiac silhouette  "is  enlarged. There is vascular redistribution suggesting pulmonary venous  hypertension. No evidence of avel pulmonary edema or effusion.       Impression:      1.. Stage I congestive changes with redistribution and cardiac  enlargement. No avel pulmonary edema or infiltrate.  2. Remote granulomatous disease.     This report was signed and finalized on 9/30/2024 3:54 PM by Dr. Ye Montaño MD.               Culture:  No results found for: \"BLOODCX\", \"URINECX\", \"WOUNDCX\", \"MRSACX\", \"RESPCX\", \"STOOLCX\"      Assessment    Acute kidney injury, cardiorenal  Baseline chronic kidney disease stage 4  Hypertension  Acute on chronic systolic/diastolic congestive heart failure--improving  Methamphetamine abuse  COPD    Plan:   Continue current dose Lasix  Cardiology workup ongoing  Monitor labs      Quinn Rodarte, ISAIAH  10/3/2024  10:22 CDT    "

## 2024-10-03 NOTE — PLAN OF CARE
Goal Outcome Evaluation:      The patient used bipap 14/6, 16, 30% last night.

## 2024-10-03 NOTE — PROGRESS NOTES
RT EQUIPMENT DEVICE RELATED - SKIN ASSESSMENT    Griffin Score:  Griffin Score: 20     RT Medical Equipment/Device:     NIV Mask:  Under-the-nose   size:  B    Skin Assessment:      Nares:  Intact    Device Skin Pressure Protection:   na    Nurse Notification:  No    Rosita SANCHEZ Torrez, RRT

## 2024-10-03 NOTE — PLAN OF CARE
"Goal Outcome Evaluation:  Plan of Care Reviewed With: (P) patient        Progress: (P) no change  Outcome Evaluation: (P) PT evaulation attempted multipe times (10/2 and 10/3). Pt continues to refuse. Pt reports \"she has been walking to the bathroom\". PT to sign off at this time. Please reconsult if status changes.                               "

## 2024-10-03 NOTE — PLAN OF CARE
Problem: Adult Inpatient Plan of Care  Goal: Plan of Care Review  Outcome: Progressing  Flowsheets (Taken 10/3/2024 0407)  Progress: no change  Plan of Care Reviewed With: patient  Outcome Evaluation: Patient has no c/o pain. Patient resting with bipap on. NPO at midnight for stress test. NSR 75-84 on tele. VSS. Safety maintained. Will notify MD of any changes.

## 2024-10-03 NOTE — PROGRESS NOTES
"Progress Note  Ludivina Ramirez  10/3/2024 13:49 CDT  Subjective:   Admit Date:   9/30/2024      CC/ADMIT DX:       Interval History:   Reviewed overnight events and nursing notes.  She has had no new issues.    I have reviewed all labs/diagnostics from the last 24hrs.       ROS:   I have done a 10 point ROS and all are negative, except what is mentioned in the HPI.    Diet: Cardiac; Healthy Heart (2-3 Na+); Fluid Consistency: Thin (IDDSI 0)    Medications:      aspirin, 81 mg, Oral, Daily  budesonide-formoterol, 2 puff, Inhalation, BID - RT  calcitriol, 0.25 mcg, Oral, Daily  carvedilol, 25 mg, Oral, BID With Meals  cetirizine, 10 mg, Oral, Daily  enoxaparin, 1 mg/kg, Subcutaneous, Q24H  fluticasone, 2 spray, Nasal, QAM  furosemide, 40 mg, Oral, Daily  ipratropium-albuterol, 3 mL, Nebulization, 4x Daily  isosorbide dinitrate, 40 mg, Oral, BID - Nitrates  losartan, 100 mg, Oral, Q24H  methylPREDNISolone sodium succinate, 40 mg, Intravenous, Q12H  NIFEdipine XL, 90 mg, Oral, Q24H            Objective:   Vitals: /73 (BP Location: Left arm, Patient Position: Lying)   Pulse 72   Temp 97.7 °F (36.5 °C) (Oral)   Resp 16   Ht 167.6 cm (65.98\")   Wt 93.6 kg (206 lb 6.4 oz)   LMP  (LMP Unknown)   SpO2 96%   BMI 33.33 kg/m²    Intake/Output Summary (Last 24 hours) at 10/3/2024 1349  Last data filed at 10/2/2024 1840  Gross per 24 hour   Intake 360 ml   Output --   Net 360 ml     General appearance: alert and cooperative with exam  Lungs: decreased BS  Heart: RRR  Abdomen: soft, non-tender; bowel sounds normal; no masses,  no organomegaly  Extremities: extremities normal, atraumatic, no cyanosis or edema  Neurologic:  No obvious focal neurologic deficits.    Assessment and Plan:     Acute on chronic combined systolic and diastolic congestive heart failure    Stage 4 chronic kidney disease    Essential hypertension    Elevated troponin    Coronary artery disease involving native coronary artery of native heart without " angina pectoris    Methamphetamine use    MATTY    CKD    COPD    Medical Noncompliance    Chronic Resp Failure           Plan:   Continue present medication   Follow with Pulm, Nephrology and Cardiology   Labs stable   Monitor BP   Cessation of Methamphetamines d/w pt today and in past   Cardiac testing ongoing   Plan for d/c when ok with Cardiology      Discharge planning:   her home     Reviewed treatment plans with the patient and/or family.             Electronically signed by Orville Weston MD on 10/3/2024 at 13:49 CDT

## 2024-10-04 NOTE — OUTREACH NOTE
Prep Survey      Flowsheet Row Responses   [facility] facility patient discharged from? [place]   Is LACE score < 7 ? No   Eligibility Readm Mgmt   Discharge diagnosis a/c CHF   Does the patient have one of the following disease processes/diagnoses(primary or secondary)? CHF   Does the patient have Home health ordered? No   Is there a DME ordered? No   Prep survey completed? Yes            NIA SHEEHAN - Registered Nurse

## 2024-10-04 NOTE — PROGRESS NOTES
"     Inpatient Progress Note        Results Review:   Results from last 7 days   Lab Units 10/03/24  0325 10/02/24  0258 10/01/24  0901   SODIUM mmol/L 142 139 141   POTASSIUM mmol/L 3.7 4.0 3.8   CHLORIDE mmol/L 104 104 107   CO2 mmol/L 25.0 21.0* 22.0   BUN mg/dL 57* 48* 35*   CALCIUM mg/dL 8.6 8.8 8.4*     Results from last 7 days   Lab Units 10/03/24  0325 24  1554   WBC 10*3/mm3 9.11 11.83*   HEMOGLOBIN g/dL 11.8* 11.1*   HEMATOCRIT % 38.3 36.1   PLATELETS 10*3/mm3 275 227       Visit Vitals  /73 (BP Location: Left arm, Patient Position: Lying)   Pulse 80   Temp 97.7 °F (36.5 °C) (Oral)   Resp 16   Ht 167.6 cm (65.98\")   Wt 93.6 kg (206 lb 6.4 oz)   LMP  (LMP Unknown)   SpO2 97%   BMI 33.33 kg/m²            Medications:     No current facility-administered medications for this encounter.                                           NAME: Ludivina Ramirez  MRN: [id number]  AGE: 64 y.o.            : 1960  ADMISSION DATE: 2024  PRIMARY CARE PROVIDER: Orville Weston MD  ATTENDING PHYSICIAN: No att. providers found                       Reason for Consult:  Acute on chronic hypoxic respiratory failure    Interval History:    No acute events overnight, patient resting in bed breathing comfortably on 2 L.  States her respiratory status has improved, she is at her respiratory baseline.  She is interested in discharge home.    Review of Systems:  A full 12-point review of systems was performed and was negative except as documented in the HPI.                       Physical Exam:  General: No acute distress, cooperative  Head: Atraumatic, normocephalic, normal inspection  Eyes: EOMI, normal inspection  ENT: Mucous membranes moist, no thrush  Teeth/gums: Normal inspection  Heart: RRR, no murmur  Lungs: Diminished bilaterally, no wheezing  MSK: No edema or clubbing  GI/abdominal: Soft, nontender  Neurologic: Alert, oriented.  Cranial nerves II through XII grossly intact.           Imaging Review: "   No new imaging for review.                       Problem List:  Acute on chronic hypoxic respiratory failure  COPD  CHF  Illicit drug use  Home noninvasive, on Trelegy  Granulomatous lung disease           Recommendations:   -Continue supplemental oxygen as needed and wean as tolerated, goal O2 sat of 88-92%  -Continue current inhaled therapy.  She should continue her home inhalers on discharge  -Currently at her respiratory baseline, she will continue her home O2 of 2 L  -Was using BiPAP while admitted, she will resume her trilogy on discharge  -Pulmonary hygiene and frequent incentive spirometer    Thank you for allowing us to participate in this patient's care.  Currently back to her respiratory baseline, patient okay for discharge from pulmonary standpoint.             Cassy Renee DO  Pulmonary/Critical Care  10/3/2024  21:45 CDT

## 2024-10-08 ENCOUNTER — READMISSION MANAGEMENT (OUTPATIENT)
Dept: CALL CENTER | Facility: HOSPITAL | Age: 64
End: 2024-10-08
Payer: MEDICARE

## 2024-10-08 NOTE — OUTREACH NOTE
CHF Week 1 Survey      Flowsheet Row Responses   [facility] facility patient discharged from? [place]   Does the patient have one of the following disease processes/diagnoses(primary or secondary)? CHF   CHF Week 1 attempt successful? No   Unsuccessful attempts Attempt 1            Rosita MCINTOSH - Licensed Nurse

## 2024-10-09 ENCOUNTER — OFFICE VISIT (OUTPATIENT)
Dept: CARDIOLOGY | Facility: CLINIC | Age: 64
End: 2024-10-09
Payer: MEDICARE

## 2024-10-09 VITALS
BODY MASS INDEX: 32.78 KG/M2 | WEIGHT: 204 LBS | OXYGEN SATURATION: 96 % | DIASTOLIC BLOOD PRESSURE: 81 MMHG | HEIGHT: 66 IN | HEART RATE: 59 BPM | SYSTOLIC BLOOD PRESSURE: 126 MMHG

## 2024-10-09 DIAGNOSIS — E78.5 HYPERLIPIDEMIA LDL GOAL <70: Chronic | ICD-10-CM

## 2024-10-09 DIAGNOSIS — I25.10 CORONARY ARTERY DISEASE INVOLVING NATIVE CORONARY ARTERY OF NATIVE HEART WITHOUT ANGINA PECTORIS: Primary | ICD-10-CM

## 2024-10-09 DIAGNOSIS — E66.811 CLASS 1 OBESITY DUE TO EXCESS CALORIES WITH SERIOUS COMORBIDITY AND BODY MASS INDEX (BMI) OF 32.0 TO 32.9 IN ADULT: ICD-10-CM

## 2024-10-09 DIAGNOSIS — I50.42 CHRONIC COMBINED SYSTOLIC AND DIASTOLIC HEART FAILURE: ICD-10-CM

## 2024-10-09 DIAGNOSIS — Z95.5 STATUS POST INSERTION OF DRUG-ELUTING STENT INTO LEFT ANTERIOR DESCENDING (LAD) ARTERY FOR CORONARY ARTERY DISEASE: Chronic | ICD-10-CM

## 2024-10-09 DIAGNOSIS — I21.02 ST ELEVATION MYOCARDIAL INFARCTION INVOLVING LEFT ANTERIOR DESCENDING (LAD) CORONARY ARTERY: Chronic | ICD-10-CM

## 2024-10-09 DIAGNOSIS — J44.9 CHRONIC OBSTRUCTIVE PULMONARY DISEASE, UNSPECIFIED COPD TYPE: ICD-10-CM

## 2024-10-09 DIAGNOSIS — N18.4 STAGE 4 CHRONIC KIDNEY DISEASE: ICD-10-CM

## 2024-10-09 DIAGNOSIS — I10 ESSENTIAL HYPERTENSION: Chronic | ICD-10-CM

## 2024-10-09 DIAGNOSIS — E66.09 CLASS 1 OBESITY DUE TO EXCESS CALORIES WITH SERIOUS COMORBIDITY AND BODY MASS INDEX (BMI) OF 32.0 TO 32.9 IN ADULT: ICD-10-CM

## 2024-10-09 DIAGNOSIS — I38 VALVULAR HEART DISEASE: ICD-10-CM

## 2024-10-09 RX ORDER — SACUBITRIL AND VALSARTAN 24; 26 MG/1; MG/1
1 TABLET, FILM COATED ORAL 2 TIMES DAILY
Qty: 60 TABLET | Refills: 11 | Status: SHIPPED | OUTPATIENT
Start: 2024-10-09

## 2024-10-09 RX ORDER — SACUBITRIL AND VALSARTAN 49; 51 MG/1; MG/1
1 TABLET, FILM COATED ORAL 2 TIMES DAILY
Qty: 60 TABLET | Refills: 11 | Status: SHIPPED | OUTPATIENT
Start: 2024-10-09 | End: 2024-10-09

## 2024-10-09 NOTE — OUTREACH NOTE
CHF Week 2 Survey      Responses   Facility patient discharged from?  [place]   Does the patient have one of the following disease processes/diagnoses(primary or secondary)?  CHF   Week 2 attempt successful?  No   Unsuccessful attempts  Attempt 1          Samira Long RN   Last ordered: 3 months ago (7/5/2024) by Adi Cohen, PhD, MP     Nov 11/11/2024

## 2024-10-11 ENCOUNTER — READMISSION MANAGEMENT (OUTPATIENT)
Dept: CALL CENTER | Facility: HOSPITAL | Age: 64
End: 2024-10-11
Payer: MEDICARE

## 2024-10-11 NOTE — OUTREACH NOTE
CHF Week 1 Survey      Flowsheet Row Responses   [facility] facility patient discharged from? [place]   Does the patient have one of the following disease processes/diagnoses(primary or secondary)? CHF   CHF Week 1 attempt successful? No   Unsuccessful attempts Attempt 2            Donna CRYSTAL - Registered Nurse

## 2024-10-14 ENCOUNTER — READMISSION MANAGEMENT (OUTPATIENT)
Dept: CALL CENTER | Facility: HOSPITAL | Age: 64
End: 2024-10-14
Payer: MEDICARE

## 2024-10-14 NOTE — OUTREACH NOTE
CHF Week 1 Survey      Flowsheet Row Responses   [facility] facility patient discharged from? [place]   Does the patient have one of the following disease processes/diagnoses(primary or secondary)? CHF   CHF Week 1 attempt successful? No   Unsuccessful attempts Attempt 3   Revoke Decline to participate            Arelis RODRIGUEZ - Registered Nurse

## 2024-10-28 LAB
QT INTERVAL: 436 MS
QTC INTERVAL: 453 MS

## 2024-11-05 ENCOUNTER — OFFICE VISIT (OUTPATIENT)
Dept: PULMONOLOGY | Facility: CLINIC | Age: 64
End: 2024-11-05
Payer: MEDICARE

## 2024-11-05 VITALS
HEIGHT: 66 IN | SYSTOLIC BLOOD PRESSURE: 148 MMHG | OXYGEN SATURATION: 95 % | DIASTOLIC BLOOD PRESSURE: 94 MMHG | BODY MASS INDEX: 32.47 KG/M2 | WEIGHT: 202 LBS | HEART RATE: 74 BPM

## 2024-11-05 DIAGNOSIS — I27.20 PULMONARY HYPERTENSION: ICD-10-CM

## 2024-11-05 DIAGNOSIS — R06.02 SHORTNESS OF BREATH: ICD-10-CM

## 2024-11-05 DIAGNOSIS — G47.33 OSA TREATED WITH BIPAP: ICD-10-CM

## 2024-11-05 DIAGNOSIS — Z99.11 DEPENDENCE ON NON-INVASIVE VENTILATION: ICD-10-CM

## 2024-11-05 DIAGNOSIS — I50.42 CHRONIC COMBINED SYSTOLIC AND DIASTOLIC HEART FAILURE: ICD-10-CM

## 2024-11-05 DIAGNOSIS — Z99.81 HYPOXEMIA REQUIRING SUPPLEMENTAL OXYGEN: ICD-10-CM

## 2024-11-05 DIAGNOSIS — J30.89 NON-SEASONAL ALLERGIC RHINITIS, UNSPECIFIED TRIGGER: ICD-10-CM

## 2024-11-05 DIAGNOSIS — J44.9 CHRONIC OBSTRUCTIVE PULMONARY DISEASE, UNSPECIFIED COPD TYPE: Primary | ICD-10-CM

## 2024-11-05 DIAGNOSIS — R09.02 HYPOXEMIA REQUIRING SUPPLEMENTAL OXYGEN: ICD-10-CM

## 2024-11-05 DIAGNOSIS — F17.210 CIGARETTE SMOKER: Chronic | ICD-10-CM

## 2024-11-05 DIAGNOSIS — R91.8 LUNG NODULES: ICD-10-CM

## 2024-11-05 RX ORDER — ALBUTEROL SULFATE 90 UG/1
2 INHALANT RESPIRATORY (INHALATION) EVERY 4 HOURS PRN
Qty: 6.7 G | Refills: 5 | Status: SHIPPED | OUTPATIENT
Start: 2024-11-05

## 2024-11-05 RX ORDER — BUDESONIDE AND FORMOTEROL FUMARATE DIHYDRATE 160; 4.5 UG/1; UG/1
2 AEROSOL RESPIRATORY (INHALATION)
Qty: 1 EACH | Refills: 1 | Status: SHIPPED | OUTPATIENT
Start: 2024-11-05

## 2024-11-05 RX ORDER — IPRATROPIUM BROMIDE AND ALBUTEROL SULFATE 2.5; .5 MG/3ML; MG/3ML
3 SOLUTION RESPIRATORY (INHALATION) 4 TIMES DAILY
Qty: 360 ML | Refills: 5 | Status: SHIPPED | OUTPATIENT
Start: 2024-11-05

## 2024-11-05 NOTE — PROGRESS NOTES
"[facility]IC OUTPATIENT PROGRESS NOTE    Patient: Ludivina Ramirez  : 1960  Age: 64 y.o.  Date of Service: 2024    REASON FOR CLINIC VISIT:  Chief Complaint   Patient presents with    COPD       Subjective:    History of Present Illness:  Ludivina Ramirez is a 64 y.o. female who presents to the office today to be seen for    Diagnosis Plan   1. Chronic obstructive pulmonary disease, unspecified COPD type        2. Hypoxemia requiring supplemental oxygen        3. MARY treated with BiPAP        4. Cigarette smoker        5. Dependence on non-invasive ventilation        6. Lung nodules        7. Pulmonary hypertension        8. Shortness of breath        9. Non-seasonal allergic rhinitis, unspecified trigger        10. Chronic combined systolic and diastolic heart failure        .  Other problems per record.    History:    Patient is a very pleasant middle-aged  female was seen in the pulmonary clinic for follow-up visit.    Patient is currently living alone.  She is an active smoker and continues to smoke a few cigarettes a day.  Her last pulmonary function test done 2 years ago showed she had moderate\" severe chronic obstructive pulmonary disease and she was advised to use Symbicort and DuoNeb but she is not using it routinely and using Symbicort as needed.  She is not using oxygen regularly but has oxygen available which she uses 2 L on activity and exercise.  She uses home trilogy at night which she is using with 2 L of oxygen while sleeping.  She is using fluticasone nasal spray and Zyrtec for nasal allergy.     She is requesting some refill of the medications.  She is up to date on her vaccinations but currently she told me she is not going to take any COVID vaccinations due to the risk of side effects.  She is still smoking a few cigarettes a day and is trying to quit.  No recent hospital admissions and ER visit and urgent care visit reported.  Patient had a CT scan of the " chest done in 2024 which showed a few new lung nodules in addition to the other stable lung nodules and a follow-up CT is ordered but recently she was hospitalized in the [facility] with Salmonella food poisoning and missed her appointment and is anxious about the follow-up CT scan and a follow-up CT scan is ordered within next week.    PFT done today:  Not done today      Results for orders placed in visit on 22    Pulmonary Function Test    Narrative  Pulmonary Function Test  Performed by: Kate Ford, RRT  Authorized by: Neeta Chavez MD    Pre Drug % Predicted  FVC: 58%  FEV1: 55%  FEF 25-75%: 47%  FEV1/FVC: 75%  T%  RV: 120%  DLCO: 65%  D/VAsb: 81%         Bronchodilator therapy: Symbicort and Duoneb     Smoking Status:   Social History     Tobacco Use   Smoking Status Former    Current packs/day: 0.00    Average packs/day: 0.5 packs/day for 48.7 years (24.4 ttl pk-yrs)    Types: Cigarettes    Start date:     Quit date: 2023    Years since quittin.1    Passive exposure: Past   Smokeless Tobacco Never   Tobacco Comments    Smokes about 0.5 pack daily 4/10      Pulm Rehab: no  Sleep: yes on Trilogy and 2 LPM O2 with trilogy and during exercise and activity     Support System: lives alone    Code Status:   There are no questions and answers to display.        Review of Systems:  A complete review of systems is performed and all other systems were reviewed and negative as note above in the HPI.  Review of Systems   Constitutional:  Positive for fatigue.   HENT:  Positive for congestion, postnasal drip and sinus pressure.    Eyes: Negative.    Respiratory:  Positive for cough, chest tightness and shortness of breath.    Cardiovascular: Negative.    Gastrointestinal: Negative.    Endocrine: Negative.    Genitourinary: Negative.    Musculoskeletal:  Positive for arthralgias and back pain.   Allergic/Immunologic: Positive for environmental allergies.   Neurological:  Negative.    Hematological: Negative.    Psychiatric/Behavioral:  The patient is nervous/anxious.        CAT/ACT Score:  Not done today    Medications:  Outpatient Encounter Medications as of 11/5/2024   Medication Sig Dispense Refill    aspirin 81 MG EC tablet Take 1 tablet by mouth Daily. 90 tablet 3    budesonide-formoterol (SYMBICORT) 160-4.5 MCG/ACT inhaler Inhale 2 puffs 2 (Two) Times a Day. 1 each 1    calcitriol (ROCALTROL) 0.25 MCG capsule Take 1 capsule by mouth Daily.      carvedilol (COREG) 25 MG tablet Take 1 tablet by mouth 2 (Two) Times a Day With Meals.      cetirizine (zyrTEC) 10 MG tablet Take 1 tablet by mouth Daily. 30 tablet 1    cloNIDine (CATAPRES) 0.1 MG tablet Take 1 tablet by mouth Every 6 (Six) Hours As Needed for High Blood Pressure (SBP >160). 30 tablet 1    fluticasone (FLONASE) 50 MCG/ACT nasal spray Administer 2 sprays into the nostril(s) as directed by provider Every Morning. In each nostril 16 g 11    furosemide (LASIX) 40 MG tablet Take 1 tablet by mouth Daily. 30 tablet 1    guaiFENesin (MUCINEX) 600 MG 12 hr tablet Take 1 tablet by mouth Every 12 (Twelve) Hours As Needed for Cough.      ipratropium-albuterol (DUO-NEB) 0.5-2.5 mg/3 ml nebulizer Take 3 mL by nebulization 4 (Four) Times a Day. prn 360 mL 0    isosorbide dinitrate (ISORDIL) 40 MG tablet Take 1 tablet by mouth 2 (Two) Times a Day. 180 tablet 3    melatonin 3 MG tablet Take 2 tablets by mouth Every Night. 30 tablet 0    NIFEdipine XL (PROCARDIA XL) 90 MG 24 hr tablet Take 1 tablet by mouth Daily. 30 tablet 1    nitroglycerin (NITROSTAT) 0.4 MG SL tablet Place 1 tablet under the tongue Every 5 (Five) Minutes As Needed for Chest Pain. Take no more than 3 doses in 15 minutes.      sacubitril-valsartan (Entresto) 24-26 MG tablet Take 1 tablet by mouth 2 (Two) Times a Day. 60 tablet 11    [DISCONTINUED] OLANZapine (zyPREXA) 5 MG tablet Take 1 tablet by mouth Every Night. 30 tablet 0     No facility-administered encounter  "medications on file as of 11/5/2024.       Allergies:  Allergies   Allergen Reactions    Levaquin [Levofloxacin] Itching    Codeine Nausea And Vomiting    Sumatriptan Other (See Comments)     Headache        Immunizations:    There is no immunization history on file for this patient.    Objective:    Vitals:  /94   Pulse 74   Ht 167.6 cm (65.98\")   Wt 91.6 kg (202 lb)   LMP  (LMP Unknown)   SpO2 95% Comment: RA  BMI 32.62 kg/m²     Physical Exam:  General: Patient is a 64 y.o. pleasant elderly  female. Looks stated age. Appears to be in no acute distress.  Eyes: EOMI. PERRLA. Vision intact. No scleral icterus.  Ear, Nose, Mouth and Throat: Hearing is grossly intact. No Leukoplakia, pharyngitis, stomatitis or thrush. Swollen nasal mucosa with post nasal drop.  Neck: Range of motion of neck normal. No thyromegaly or masses. Mallampati Class 3  Respiratory: Clear to auscultation bilaterally. No use of accessory muscles. Decreased breath sounds.  Cardiovascular: Normal heart sounds. Regularly regular rhythm without murmur.  Gastrointestinal: Non tender, non distended, soft. Bowel sounds positive in all four quadrants. No organomegaly.  Skin: No obvious rashes, lesions, ulcers or large amount of bruising. No edema.   Neurological: No new motor deficits. Cranial nerves appear intact.  Psychiatric: Patient is alert and oriented to person, place and time.    Chest Imaging:    Study Result    Narrative & Impression   EXAMINATION:  CT CHEST WO CONTRAST DIAGNOSTIC-  4/3/2024 9:38 AM     HISTORY: COPD, surveillance; J44.9-Chronic obstructive pulmonary  disease, unspecified.     COMPARISON : 10/9/2023 and 10/12/2022. There are infiltrates and some  breathing motion artifact on the study on 10/9/2023.     DLP: 237.08 mGy.cm Automated dosage reduction technique was utilized to  reduce patient dosage.     TECHNIQUE: Spiral CT was performed of the chest without contrast.  Sagittal and coronal images were " reconstructed.     MEDIASTINUM, HEART AND VASCULAR STRUCTURES: There is atheromatous  calcification of the thoracic aorta and coronary arteries. There is an  LAD stent. There is mitral valve calcification. The ascending thoracic  aorta is dilated measuring 4.2 cm. The main pulmonary artery segment is  dilated measuring 3.5 cm. There are no enlarged axillary or mediastinal  lymph nodes. There is mild cardiomegaly.     LUNGS: The lungs are clear. There may be mild centrilobular emphysema.  There is no dense infiltrate or pleural effusion.     LUNG NODULES:  1. New 2 mm left upper lobe nodule image 29 series 3.  2. Stable 3 mm right lower lobe nodule image 59 series 3.  3. A 5 mm nodule adjacent to the major fissure on the right image 72  series 3 measuring about 2 mm on 10/12/2022.  4. New 3 mm left lower lobe nodule image 87 series 3 just posterior to  the major fissure.  5. New 2-3 mm right lower lobe nodule image 103 series 3.  6. New 2-3 mm left lower lobe nodule image 106 series 3.  7. There are 2 adjacent stable nodules measuring 2 and 3 mm in the left  lower lobe subpleural image 115 series 3.     UPPER ABDOMEN: There is a probable small hiatal hernia. There are no  other acute findings in the upper abdomen.     BONES: There are degenerative changes of the spine with syndesmophytes.  No acute bony abnormality is seen.        IMPRESSION:  1. Pulmonary nodules measuring up to 5 mm, some of which are new.  Fleischner Society guidelines recommends optional follow-up CT in 12  months. Since some of the nodules are new, I would recommend that this  be performed irregardless of patient risk.  2. Atheromatous disease of the thoracic aorta and coronary arteries. LAD  stent. Cardiomegaly. Dilated ascending thoracic aorta measuring 4.2 cm.  Dilated main pulmonary artery segment measuring 3.5 cm.  3. There may be mild centrilobular emphysema.  4. Probable small hiatal hernia. Other nonacute findings, as discussed.            This report was signed and finalized on 4/3/2024 1:10 PM by Dr. Jefe Beltre MD.          Assessment:  1. Chronic obstructive pulmonary disease, unspecified COPD type    2. Hypoxemia requiring supplemental oxygen    3. MARY treated with BiPAP    4. Cigarette smoker    5. Dependence on non-invasive ventilation    6. Lung nodules    7. Pulmonary hypertension    8. Shortness of breath    9. Non-seasonal allergic rhinitis, unspecified trigger    10. Chronic combined systolic and diastolic heart failure        Plan/Recommendations:    1.  I reviewed the last CT scan of the chest which showed she has stable pulmonary nodules and a few new nodules appeared but no further follow-up CT scan was done since then and annual CT scan is ordered next week.  I also ordered a PFT in 6 months time before  Visit  2. Ludivina Ramirez  reports that she quit smoking about 13 months ago.  However she admitted she is still smoking few cigarettes a day.  Her smoking use included cigarettes. She started smoking about 49 years ago. She has a 24.4 pack-year smoking history. She has been exposed to tobacco smoke. She has never used smokeless tobacco. I have educated her on the risk of diseases from using tobacco products such as cancer and COPD.   I advised her to quit and she is willing to quit. We have discussed the following method/s for tobacco cessation:  Counseling.  Together we have set a quit date for 2 weeks from today.  She will follow up with me in 6 months or sooner to check on her progress.I spent 7 minutes counseling the patient.  3.  Patient will continue using Symbicort regularly and I advised her to use Symbicort properly with 2 puffs twice a day with DuoNeb nebulizer and albuterol rescue as needed.  Prescriptions were sent to the pharmacy.  For hypoxemia and chronic respiratory failure she uses oxygen 2 L at times of activity and she will continue using oxygen 2 L with BiPAP/trilogy at night which she has started using after  her last hospitalization.  4.  She did not want to take any vaccinations and she was advised to follow-up with the primary care provider and return to pulmonary clinic for a follow-up visit in 6 months time or earlier if needed.      Follow up:  6 Months    Time Spent:  30 minutes    I appreciate the opportunity of participating in this patient's care. I would like to thank the PCP for the referral.  Please feel free to contact me with any other questions.    Neeta Chavez MD   Pulmonologist/Intensivist     Electronically signed by: Neeta Chavez MD, 11/5/2024 15:31 CST

## 2024-11-12 ENCOUNTER — LAB (OUTPATIENT)
Dept: LAB | Facility: HOSPITAL | Age: 64
End: 2024-11-12
Payer: MEDICARE

## 2024-11-12 DIAGNOSIS — I50.42 CHRONIC COMBINED SYSTOLIC AND DIASTOLIC HEART FAILURE: ICD-10-CM

## 2024-11-12 LAB
ANION GAP SERPL CALCULATED.3IONS-SCNC: 11 MMOL/L (ref 5–15)
BUN SERPL-MCNC: 46 MG/DL (ref 8–23)
BUN/CREAT SERPL: 23.4 (ref 7–25)
CALCIUM SPEC-SCNC: 9 MG/DL (ref 8.6–10.5)
CHLORIDE SERPL-SCNC: 108 MMOL/L (ref 98–107)
CO2 SERPL-SCNC: 22 MMOL/L (ref 22–29)
CREAT SERPL-MCNC: 1.97 MG/DL (ref 0.57–1)
EGFRCR SERPLBLD CKD-EPI 2021: 27.9 ML/MIN/1.73
GLUCOSE SERPL-MCNC: 104 MG/DL (ref 65–99)
POTASSIUM SERPL-SCNC: 4.7 MMOL/L (ref 3.5–5.2)
SODIUM SERPL-SCNC: 141 MMOL/L (ref 136–145)

## 2024-11-12 PROCEDURE — 36415 COLL VENOUS BLD VENIPUNCTURE: CPT

## 2024-11-12 PROCEDURE — 80048 BASIC METABOLIC PNL TOTAL CA: CPT

## 2024-11-20 RX ORDER — SACUBITRIL AND VALSARTAN 24; 26 MG/1; MG/1
1 TABLET, FILM COATED ORAL 2 TIMES DAILY
Qty: 180 TABLET | Refills: 3 | Status: SHIPPED | OUTPATIENT
Start: 2024-11-20

## 2024-11-21 RX ORDER — NITROGLYCERIN 0.4 MG/1
0.4 TABLET SUBLINGUAL
Qty: 25 TABLET | Refills: 3 | Status: SHIPPED | OUTPATIENT
Start: 2024-11-21

## 2024-11-26 ENCOUNTER — HOSPITAL ENCOUNTER (OUTPATIENT)
Dept: CT IMAGING | Facility: HOSPITAL | Age: 64
Discharge: HOME | End: 2024-11-26
Payer: MEDICARE

## 2024-11-27 ENCOUNTER — HOSPITAL ENCOUNTER (OUTPATIENT)
Dept: CT IMAGING | Facility: HOSPITAL | Age: 64
Discharge: HOME | End: 2024-11-27
Admitting: INTERNAL MEDICINE
Payer: MEDICARE

## 2024-11-27 DIAGNOSIS — R91.8 LUNG NODULES: ICD-10-CM

## 2024-11-27 PROCEDURE — 71250 CT THORAX DX C-: CPT

## 2024-12-02 ENCOUNTER — TELEPHONE (OUTPATIENT)
Dept: PULMONOLOGY | Facility: CLINIC | Age: 64
End: 2024-12-02
Payer: MEDICARE

## 2024-12-02 NOTE — TELEPHONE ENCOUNTER
----- Message from Neeta Chavez sent at 11/30/2024  3:33 PM CST -----  Please let her know she has stable lung nodules noted in the recent CT scan of the chest only 1 new nodule identified.  I will see her back in the May as scheduled.  We will talk about the further CT scanning at that time in the next visit.  Acute.

## 2024-12-02 NOTE — TELEPHONE ENCOUNTER
"Spoke with patient and relayed test results. Patient voiced understanding.  Patient states she is very upset that she has \"another new nodule\" she states she has been having \"severe pain between her shoulder blades and back since yesterday\".  I did advise patient she should contact her PCP about the pain.  "

## 2024-12-03 DIAGNOSIS — R91.1 LUNG NODULE: Primary | ICD-10-CM

## 2024-12-19 ENCOUNTER — TELEPHONE (OUTPATIENT)
Dept: GASTROENTEROLOGY | Facility: CLINIC | Age: 64
End: 2024-12-19
Payer: MEDICARE

## 2025-01-24 NOTE — PROGRESS NOTES
RT EQUIPMENT DEVICE RELATED - SKIN ASSESSMENT    Griffin Score:  Griffin Score: 23     RT Medical Equipment/Device:     NIV Mask:  Under-the-nose   size:  B    Skin Assessment:      Nares:  Intact  Mouth:  Intact    Device Skin Pressure Protection:  Skin-to skin areas padded    Nurse Notification:  Essence Bowser, RRT   Sched 2/7

## 2025-05-09 ENCOUNTER — TELEPHONE (OUTPATIENT)
Dept: PULMONOLOGY | Facility: CLINIC | Age: 65
End: 2025-05-09
Payer: MEDICARE

## 2025-05-09 NOTE — TELEPHONE ENCOUNTER
Called patient to reschedule their no show appointment that was originally scheduled on 5/5/2025 .      Unable to contact patient after trying all available phone numbers. No Show letter was mailed, which includes [facility] No Show Policy.

## 2025-06-04 RX ORDER — ISOSORBIDE DINITRATE 40 MG/1
40 TABLET ORAL 2 TIMES DAILY
Qty: 180 TABLET | Refills: 0 | Status: SHIPPED | OUTPATIENT
Start: 2025-06-04

## 2025-06-09 ENCOUNTER — TRANSCRIBE ORDERS (OUTPATIENT)
Dept: ADMINISTRATIVE | Facility: HOSPITAL | Age: 65
End: 2025-06-09
Payer: MEDICARE

## 2025-06-09 DIAGNOSIS — M81.0 SENILE OSTEOPOROSIS: Primary | ICD-10-CM

## 2025-06-11 ENCOUNTER — TRANSCRIBE ORDERS (OUTPATIENT)
Dept: ADMINISTRATIVE | Facility: HOSPITAL | Age: 65
End: 2025-06-11
Payer: MEDICARE

## 2025-06-12 ENCOUNTER — TRANSCRIBE ORDERS (OUTPATIENT)
Dept: ADMINISTRATIVE | Facility: HOSPITAL | Age: 65
End: 2025-06-12
Payer: MEDICARE

## 2025-06-12 DIAGNOSIS — Z12.31 OTHER SCREENING MAMMOGRAM: Primary | ICD-10-CM

## 2025-06-12 DIAGNOSIS — R91.1 PULMONARY NODULE: ICD-10-CM

## 2025-06-30 DIAGNOSIS — J44.9 CHRONIC OBSTRUCTIVE PULMONARY DISEASE, UNSPECIFIED COPD TYPE: Primary | ICD-10-CM

## 2025-06-30 RX ORDER — BUDESONIDE AND FORMOTEROL FUMARATE DIHYDRATE 160; 4.5 UG/1; UG/1
2 AEROSOL RESPIRATORY (INHALATION)
Qty: 3 EACH | Refills: 3 | Status: SHIPPED | OUTPATIENT
Start: 2025-06-30

## 2025-06-30 NOTE — TELEPHONE ENCOUNTER
Rx Refill Note  Requested Prescriptions     Pending Prescriptions Disp Refills    budesonide-formoterol (SYMBICORT) 160-4.5 MCG/ACT inhaler 3 each 3     Sig: Inhale 2 puffs 2 (Two) Times a Day.      Last office visit with prescribing clinician: 11/5/2024   Last telemedicine visit with prescribing clinician: Visit date not found   Next office visit with prescribing clinician: 7/8/2025                         Would you like a call back once the refill request has been completed: [] Yes [] No    If the office needs to give you a call back, can they leave a voicemail: [] Yes [] No    Kate Porter MA  06/30/25, 08:17 CDT

## 2025-07-03 ENCOUNTER — HOSPITAL ENCOUNTER (OUTPATIENT)
Dept: MAMMOGRAPHY | Facility: HOSPITAL | Age: 65
Discharge: HOME | End: 2025-07-03
Payer: MEDICARE

## 2025-07-03 ENCOUNTER — HOSPITAL ENCOUNTER (OUTPATIENT)
Dept: CT IMAGING | Facility: HOSPITAL | Age: 65
Discharge: HOME | End: 2025-07-03
Admitting: INTERNAL MEDICINE
Payer: MEDICARE

## 2025-07-03 ENCOUNTER — HOSPITAL ENCOUNTER (OUTPATIENT)
Dept: BONE DENSITY | Facility: HOSPITAL | Age: 65
Discharge: HOME | End: 2025-07-03
Payer: MEDICARE

## 2025-07-03 DIAGNOSIS — R91.1 LUNG NODULE: ICD-10-CM

## 2025-07-03 DIAGNOSIS — M81.0 SENILE OSTEOPOROSIS: ICD-10-CM

## 2025-07-03 DIAGNOSIS — Z12.31 OTHER SCREENING MAMMOGRAM: ICD-10-CM

## 2025-07-03 PROCEDURE — 77067 SCR MAMMO BI INCL CAD: CPT

## 2025-07-03 PROCEDURE — 71250 CT THORAX DX C-: CPT

## 2025-07-03 PROCEDURE — 77063 BREAST TOMOSYNTHESIS BI: CPT

## 2025-07-03 PROCEDURE — 77080 DXA BONE DENSITY AXIAL: CPT

## 2025-07-22 ENCOUNTER — TELEPHONE (OUTPATIENT)
Dept: PULMONOLOGY | Facility: CLINIC | Age: 65
End: 2025-07-22
Payer: MEDICARE

## 2025-07-22 NOTE — TELEPHONE ENCOUNTER
Patient switched to [facility] due to insurance.      [facility] needs new orders for NIV and oxygen.  [facility] will send order for NIV.  Will need new order for oxygen placed at patient's upcoming appointment.    [facility] will notify patient they need these orders before can supply patient with devices.  [facility] is aware patient cancelled appointment without a follow up appointment currently scheduled.

## 2025-08-08 ENCOUNTER — APPOINTMENT (OUTPATIENT)
Dept: GENERAL RADIOLOGY | Facility: HOSPITAL | Age: 65
DRG: 291 | End: 2025-08-08
Payer: MEDICARE

## 2025-08-08 ENCOUNTER — HOSPITAL ENCOUNTER (INPATIENT)
Facility: HOSPITAL | Age: 65
LOS: 1 days | Discharge: HOME | DRG: 291 | End: 2025-08-11
Attending: FAMILY MEDICINE | Admitting: FAMILY MEDICINE
Payer: MEDICARE

## 2025-08-08 DIAGNOSIS — R06.02 SHORTNESS OF BREATH: ICD-10-CM

## 2025-08-08 DIAGNOSIS — N18.4 CHRONIC RENAL FAILURE, STAGE 4 (SEVERE): ICD-10-CM

## 2025-08-08 DIAGNOSIS — I50.9 ACUTE CONGESTIVE HEART FAILURE, UNSPECIFIED HEART FAILURE TYPE: Primary | ICD-10-CM

## 2025-08-08 DIAGNOSIS — R79.89 ELEVATED TROPONIN: ICD-10-CM

## 2025-08-08 LAB
ALBUMIN SERPL-MCNC: 3.8 G/DL (ref 3.5–5.2)
ALBUMIN/GLOB SERPL: 1.2 G/DL
ALP SERPL-CCNC: 135 U/L (ref 39–117)
ALT SERPL W P-5'-P-CCNC: 39 U/L (ref 1–33)
ANION GAP SERPL CALCULATED.3IONS-SCNC: 16 MMOL/L (ref 5–15)
AST SERPL-CCNC: 18 U/L (ref 1–32)
ATMOSPHERIC PRESS: 754 MMHG
B PARAPERT DNA SPEC QL NAA+PROBE: NOT DETECTED
B PERT DNA SPEC QL NAA+PROBE: NOT DETECTED
BASE EXCESS BLDV CALC-SCNC: 0.1 MMOL/L (ref 0–2)
BASOPHILS # BLD AUTO: 0.07 10*3/MM3 (ref 0–0.2)
BASOPHILS NFR BLD AUTO: 0.8 % (ref 0–1.5)
BDY SITE: ABNORMAL
BILIRUB SERPL-MCNC: 1 MG/DL (ref 0–1.2)
BODY TEMPERATURE: 37
BUN SERPL-MCNC: 46.5 MG/DL (ref 8–23)
BUN/CREAT SERPL: 20.5 (ref 7–25)
C PNEUM DNA NPH QL NAA+NON-PROBE: NOT DETECTED
CALCIUM SPEC-SCNC: 8.7 MG/DL (ref 8.6–10.5)
CHLORIDE SERPL-SCNC: 103 MMOL/L (ref 98–107)
CO2 SERPL-SCNC: 23 MMOL/L (ref 22–29)
COHGB MFR BLD: 1.6 % (ref 0–5)
CREAT SERPL-MCNC: 2.27 MG/DL (ref 0.57–1)
DEPRECATED RDW RBC AUTO: 46.7 FL (ref 37–54)
EGFRCR SERPLBLD CKD-EPI 2021: 23.6 ML/MIN/1.73
EOSINOPHIL # BLD AUTO: 0.12 10*3/MM3 (ref 0–0.4)
EOSINOPHIL NFR BLD AUTO: 1.4 % (ref 0.3–6.2)
ERYTHROCYTE [DISTWIDTH] IN BLOOD BY AUTOMATED COUNT: 13.8 % (ref 12.3–15.4)
FLUAV SUBTYP SPEC NAA+PROBE: NOT DETECTED
FLUBV RNA NPH QL NAA+NON-PROBE: NOT DETECTED
GAS FLOW AIRWAY: 2 LPM
GEN 5 1HR TROPONIN T REFLEX: 57 NG/L
GLOBULIN UR ELPH-MCNC: 3.1 GM/DL
GLUCOSE SERPL-MCNC: 132 MG/DL (ref 65–99)
HADV DNA SPEC NAA+PROBE: NOT DETECTED
HCO3 BLDV-SCNC: 24.1 MMOL/L (ref 22–28)
HCOV 229E RNA SPEC QL NAA+PROBE: NOT DETECTED
HCOV HKU1 RNA SPEC QL NAA+PROBE: NOT DETECTED
HCOV NL63 RNA SPEC QL NAA+PROBE: NOT DETECTED
HCOV OC43 RNA SPEC QL NAA+PROBE: NOT DETECTED
HCT VFR BLD AUTO: 34.8 % (ref 34–46.6)
HGB BLD-MCNC: 11.3 G/DL (ref 12–15.9)
HGB BLDA-MCNC: 12.5 G/DL (ref 12–16)
HMPV RNA NPH QL NAA+NON-PROBE: NOT DETECTED
HOLD SPECIMEN: NORMAL
HOLD SPECIMEN: NORMAL
HPIV1 RNA ISLT QL NAA+PROBE: NOT DETECTED
HPIV2 RNA SPEC QL NAA+PROBE: NOT DETECTED
HPIV3 RNA NPH QL NAA+PROBE: NOT DETECTED
HPIV4 P GENE NPH QL NAA+PROBE: NOT DETECTED
IMM GRANULOCYTES # BLD AUTO: 0.05 10*3/MM3 (ref 0–0.05)
IMM GRANULOCYTES NFR BLD AUTO: 0.6 % (ref 0–0.5)
LYMPHOCYTES # BLD AUTO: 1.37 10*3/MM3 (ref 0.7–3.1)
LYMPHOCYTES NFR BLD AUTO: 16.1 % (ref 19.6–45.3)
Lab: ABNORMAL
M PNEUMO IGG SER IA-ACNC: NOT DETECTED
MCH RBC QN AUTO: 30 PG (ref 26.6–33)
MCHC RBC AUTO-ENTMCNC: 32.5 G/DL (ref 31.5–35.7)
MCV RBC AUTO: 92.3 FL (ref 79–97)
METHGB BLD QL: 0.5 % (ref 0–3)
MODALITY: ABNORMAL
MONOCYTES # BLD AUTO: 0.55 10*3/MM3 (ref 0.1–0.9)
MONOCYTES NFR BLD AUTO: 6.4 % (ref 5–12)
NEUTROPHILS NFR BLD AUTO: 6.37 10*3/MM3 (ref 1.7–7)
NEUTROPHILS NFR BLD AUTO: 74.7 % (ref 42.7–76)
NRBC BLD AUTO-RTO: 0 /100 WBC (ref 0–0.2)
NT-PROBNP SERPL-MCNC: ABNORMAL PG/ML (ref 0–900)
OXYHGB MFR BLDV: 83.2 % (ref 60–85)
PCO2 BLDV: 36 MM HG (ref 41–51)
PH BLDV: 7.43 PH UNITS (ref 7.32–7.42)
PLATELET # BLD AUTO: 198 10*3/MM3 (ref 140–450)
PMV BLD AUTO: 11.2 FL (ref 6–12)
PO2 BLDV: 48.3 MM HG (ref 27–53)
POTASSIUM BLDV-SCNC: 3.4 MMOL/L (ref 3.5–5.2)
POTASSIUM SERPL-SCNC: 3.5 MMOL/L (ref 3.5–5.2)
PROT SERPL-MCNC: 6.9 G/DL (ref 6–8.5)
RBC # BLD AUTO: 3.77 10*6/MM3 (ref 3.77–5.28)
RHINOVIRUS RNA SPEC NAA+PROBE: NOT DETECTED
RSV RNA NPH QL NAA+NON-PROBE: NOT DETECTED
SAO2 % BLDCOV: 85 % (ref 45–75)
SARS-COV-2 RNA RESP QL NAA+PROBE: NOT DETECTED
SODIUM BLDV-SCNC: 142 MMOL/L (ref 136–145)
SODIUM SERPL-SCNC: 142 MMOL/L (ref 136–145)
TROPONIN T % DELTA: -7
TROPONIN T NUMERIC DELTA: -4 NG/L
TROPONIN T SERPL HS-MCNC: 61 NG/L
VENTILATOR MODE: ABNORMAL
WBC NRBC COR # BLD AUTO: 8.53 10*3/MM3 (ref 3.4–10.8)
WHOLE BLOOD HOLD COAG: NORMAL
WHOLE BLOOD HOLD SPECIMEN: NORMAL

## 2025-08-08 PROCEDURE — 0202U NFCT DS 22 TRGT SARS-COV-2: CPT | Performed by: FAMILY MEDICINE

## 2025-08-08 PROCEDURE — G0378 HOSPITAL OBSERVATION PER HR: HCPCS

## 2025-08-08 PROCEDURE — 84484 ASSAY OF TROPONIN QUANT: CPT | Performed by: FAMILY MEDICINE

## 2025-08-08 PROCEDURE — 99285 EMERGENCY DEPT VISIT HI MDM: CPT | Performed by: FAMILY MEDICINE

## 2025-08-08 PROCEDURE — 83880 ASSAY OF NATRIURETIC PEPTIDE: CPT

## 2025-08-08 PROCEDURE — 25010000002 FUROSEMIDE PER 20 MG: Performed by: FAMILY MEDICINE

## 2025-08-08 PROCEDURE — 36415 COLL VENOUS BLD VENIPUNCTURE: CPT

## 2025-08-08 PROCEDURE — 80053 COMPREHEN METABOLIC PANEL: CPT

## 2025-08-08 PROCEDURE — 25010000002 HYDRALAZINE PER 20 MG: Performed by: FAMILY MEDICINE

## 2025-08-08 PROCEDURE — 93005 ELECTROCARDIOGRAM TRACING: CPT | Performed by: FAMILY MEDICINE

## 2025-08-08 PROCEDURE — 82820 HEMOGLOBIN-OXYGEN AFFINITY: CPT

## 2025-08-08 PROCEDURE — 71045 X-RAY EXAM CHEST 1 VIEW: CPT

## 2025-08-08 PROCEDURE — 93010 ELECTROCARDIOGRAM REPORT: CPT | Performed by: STUDENT IN AN ORGANIZED HEALTH CARE EDUCATION/TRAINING PROGRAM

## 2025-08-08 PROCEDURE — 93005 ELECTROCARDIOGRAM TRACING: CPT

## 2025-08-08 PROCEDURE — 85025 COMPLETE CBC W/AUTO DIFF WBC: CPT

## 2025-08-08 PROCEDURE — 84484 ASSAY OF TROPONIN QUANT: CPT

## 2025-08-08 PROCEDURE — 82805 BLOOD GASES W/O2 SATURATION: CPT

## 2025-08-08 RX ORDER — FLUTICASONE PROPIONATE 50 MCG
2 SPRAY, SUSPENSION (ML) NASAL DAILY
Status: DISCONTINUED | OUTPATIENT
Start: 2025-08-09 | End: 2025-08-10

## 2025-08-08 RX ORDER — SODIUM CHLORIDE 0.9 % (FLUSH) 0.9 %
10 SYRINGE (ML) INJECTION AS NEEDED
Status: DISCONTINUED | OUTPATIENT
Start: 2025-08-08 | End: 2025-08-11 | Stop reason: HOSPADM

## 2025-08-08 RX ORDER — BUDESONIDE AND FORMOTEROL FUMARATE DIHYDRATE 160; 4.5 UG/1; UG/1
2 AEROSOL RESPIRATORY (INHALATION)
Status: DISCONTINUED | OUTPATIENT
Start: 2025-08-09 | End: 2025-08-08

## 2025-08-08 RX ORDER — CALCITRIOL 0.25 UG/1
0.25 CAPSULE, LIQUID FILLED ORAL DAILY
Status: DISCONTINUED | OUTPATIENT
Start: 2025-08-09 | End: 2025-08-11 | Stop reason: HOSPADM

## 2025-08-08 RX ORDER — ALBUTEROL SULFATE 0.83 MG/ML
2.5 SOLUTION RESPIRATORY (INHALATION) EVERY 4 HOURS PRN
Status: DISCONTINUED | OUTPATIENT
Start: 2025-08-08 | End: 2025-08-11 | Stop reason: HOSPADM

## 2025-08-08 RX ORDER — BUDESONIDE AND FORMOTEROL FUMARATE DIHYDRATE 160; 4.5 UG/1; UG/1
2 AEROSOL RESPIRATORY (INHALATION)
Status: DISCONTINUED | OUTPATIENT
Start: 2025-08-09 | End: 2025-08-11 | Stop reason: HOSPADM

## 2025-08-08 RX ORDER — IPRATROPIUM BROMIDE AND ALBUTEROL SULFATE 2.5; .5 MG/3ML; MG/3ML
3 SOLUTION RESPIRATORY (INHALATION)
Status: DISCONTINUED | OUTPATIENT
Start: 2025-08-09 | End: 2025-08-11 | Stop reason: HOSPADM

## 2025-08-08 RX ORDER — CLONIDINE HYDROCHLORIDE 0.1 MG/1
0.1 TABLET ORAL 3 TIMES DAILY PRN
Status: DISCONTINUED | OUTPATIENT
Start: 2025-08-08 | End: 2025-08-08

## 2025-08-08 RX ORDER — GUAIFENESIN 600 MG/1
600 TABLET, EXTENDED RELEASE ORAL EVERY 12 HOURS PRN
Status: DISCONTINUED | OUTPATIENT
Start: 2025-08-08 | End: 2025-08-11 | Stop reason: HOSPADM

## 2025-08-08 RX ORDER — IPRATROPIUM BROMIDE AND ALBUTEROL SULFATE 2.5; .5 MG/3ML; MG/3ML
3 SOLUTION RESPIRATORY (INHALATION) 4 TIMES DAILY
Status: DISCONTINUED | OUTPATIENT
Start: 2025-08-09 | End: 2025-08-08

## 2025-08-08 RX ORDER — CARVEDILOL 25 MG/1
25 TABLET ORAL 2 TIMES DAILY WITH MEALS
Status: DISCONTINUED | OUTPATIENT
Start: 2025-08-09 | End: 2025-08-11 | Stop reason: HOSPADM

## 2025-08-08 RX ORDER — CETIRIZINE HYDROCHLORIDE 10 MG/1
10 TABLET ORAL DAILY
Status: DISCONTINUED | OUTPATIENT
Start: 2025-08-09 | End: 2025-08-11 | Stop reason: HOSPADM

## 2025-08-08 RX ORDER — ALBUTEROL SULFATE 90 UG/1
2 INHALANT RESPIRATORY (INHALATION) EVERY 4 HOURS PRN
Status: DISCONTINUED | OUTPATIENT
Start: 2025-08-08 | End: 2025-08-08 | Stop reason: CLARIF

## 2025-08-08 RX ORDER — CLONIDINE HYDROCHLORIDE 0.1 MG/1
0.1 TABLET ORAL EVERY 6 HOURS PRN
Status: DISCONTINUED | OUTPATIENT
Start: 2025-08-08 | End: 2025-08-11 | Stop reason: HOSPADM

## 2025-08-08 RX ORDER — ISOSORBIDE DINITRATE 20 MG/1
40 TABLET ORAL
Status: DISCONTINUED | OUTPATIENT
Start: 2025-08-09 | End: 2025-08-11 | Stop reason: HOSPADM

## 2025-08-08 RX ORDER — ASPIRIN 81 MG/1
81 TABLET ORAL DAILY
Status: DISCONTINUED | OUTPATIENT
Start: 2025-08-09 | End: 2025-08-11 | Stop reason: HOSPADM

## 2025-08-08 RX ORDER — SACUBITRIL AND VALSARTAN 24; 26 MG/1; MG/1
1 TABLET, FILM COATED ORAL 2 TIMES DAILY
Status: DISCONTINUED | OUTPATIENT
Start: 2025-08-09 | End: 2025-08-11 | Stop reason: HOSPADM

## 2025-08-08 RX ORDER — FUROSEMIDE 20 MG/1
40 TABLET ORAL DAILY
Status: DISCONTINUED | OUTPATIENT
Start: 2025-08-09 | End: 2025-08-11 | Stop reason: HOSPADM

## 2025-08-08 RX ORDER — HYDRALAZINE HYDROCHLORIDE 20 MG/ML
20 INJECTION INTRAMUSCULAR; INTRAVENOUS ONCE
Status: COMPLETED | OUTPATIENT
Start: 2025-08-08 | End: 2025-08-08

## 2025-08-08 RX ORDER — NITROGLYCERIN 0.4 MG/1
0.4 TABLET SUBLINGUAL
Status: DISCONTINUED | OUTPATIENT
Start: 2025-08-08 | End: 2025-08-11 | Stop reason: HOSPADM

## 2025-08-08 RX ORDER — FUROSEMIDE 10 MG/ML
80 INJECTION INTRAMUSCULAR; INTRAVENOUS ONCE
Status: COMPLETED | OUTPATIENT
Start: 2025-08-08 | End: 2025-08-08

## 2025-08-08 RX ADMIN — FUROSEMIDE 80 MG: 10 INJECTION, SOLUTION INTRAMUSCULAR; INTRAVENOUS at 22:36

## 2025-08-08 RX ADMIN — HYDRALAZINE HYDROCHLORIDE 20 MG: 20 INJECTION INTRAMUSCULAR; INTRAVENOUS at 22:34

## 2025-08-08 RX ADMIN — SACUBITRIL AND VALSARTAN 1 TABLET: 24; 26 TABLET, FILM COATED ORAL at 23:54

## 2025-08-08 RX ADMIN — Medication 2.5 MG: at 23:52

## 2025-08-08 RX ADMIN — CLONIDINE HYDROCHLORIDE 0.1 MG: 0.1 TABLET ORAL at 23:52

## 2025-08-09 ENCOUNTER — APPOINTMENT (OUTPATIENT)
Dept: CARDIOLOGY | Facility: HOSPITAL | Age: 65
DRG: 291 | End: 2025-08-09
Payer: MEDICARE

## 2025-08-09 ENCOUNTER — APPOINTMENT (OUTPATIENT)
Dept: GENERAL RADIOLOGY | Facility: HOSPITAL | Age: 65
DRG: 291 | End: 2025-08-09
Payer: MEDICARE

## 2025-08-09 PROBLEM — M25.551 RIGHT HIP PAIN: Status: ACTIVE | Noted: 2025-08-09

## 2025-08-09 LAB
ANION GAP SERPL CALCULATED.3IONS-SCNC: 16 MMOL/L (ref 5–15)
AORTIC DIMENSIONLESS INDEX: 0.88 (DI)
ASCENDING AORTA: 4.2 CM
AV MEAN PRESS GRAD SYS DOP V1V2: 11.5 MMHG
AV VMAX SYS DOP: 227 CM/SEC
BASOPHILS # BLD AUTO: 0.03 10*3/MM3 (ref 0–0.2)
BASOPHILS NFR BLD AUTO: 0.4 % (ref 0–1.5)
BH CV ECHO MEAS - AI P1/2T: 631.5 MSEC
BH CV ECHO MEAS - AO MAX PG: 20.6 MMHG
BH CV ECHO MEAS - AO ROOT DIAM: 3.3 CM
BH CV ECHO MEAS - AO V2 VTI: 43.8 CM
BH CV ECHO MEAS - AVA(I,D): 2.8 CM2
BH CV ECHO MEAS - EDV(CUBED): 138.2 ML
BH CV ECHO MEAS - EDV(MOD-SP2): 225.6 ML
BH CV ECHO MEAS - EDV(MOD-SP4): 163.1 ML
BH CV ECHO MEAS - EF(MOD-SP2): 42.7 %
BH CV ECHO MEAS - EF(MOD-SP4): 38 %
BH CV ECHO MEAS - ESV(CUBED): 57.5 ML
BH CV ECHO MEAS - ESV(MOD-SP2): 129.3 ML
BH CV ECHO MEAS - ESV(MOD-SP4): 101.1 ML
BH CV ECHO MEAS - FS: 25.3 %
BH CV ECHO MEAS - IVS/LVPW: 1.04 CM
BH CV ECHO MEAS - IVSD: 1.43 CM
BH CV ECHO MEAS - LA DIMENSION: 4.4 CM
BH CV ECHO MEAS - LAT PEAK E' VEL: 7.4 CM/SEC
BH CV ECHO MEAS - LV DIASTOLIC VOL/BSA (35-75): 77.1 CM2
BH CV ECHO MEAS - LV MASS(C)D: 308.4 GRAMS
BH CV ECHO MEAS - LV MAX PG: 13.7 MMHG
BH CV ECHO MEAS - LV MEAN PG: 8 MMHG
BH CV ECHO MEAS - LV SYSTOLIC VOL/BSA (12-30): 47.8 CM2
BH CV ECHO MEAS - LV V1 MAX: 185 CM/SEC
BH CV ECHO MEAS - LV V1 VTI: 38.4 CM
BH CV ECHO MEAS - LVIDD: 5.2 CM
BH CV ECHO MEAS - LVIDS: 3.9 CM
BH CV ECHO MEAS - LVOT AREA: 3.1 CM2
BH CV ECHO MEAS - LVOT DIAM: 2 CM
BH CV ECHO MEAS - LVPWD: 1.38 CM
BH CV ECHO MEAS - MED PEAK E' VEL: 4 CM/SEC
BH CV ECHO MEAS - MV A MAX VEL: 150 CM/SEC
BH CV ECHO MEAS - MV DEC SLOPE: 264 CM/SEC2
BH CV ECHO MEAS - MV E MAX VEL: 93.3 CM/SEC
BH CV ECHO MEAS - MV E/A: 0.62
BH CV ECHO MEAS - MV MAX PG: 9 MMHG
BH CV ECHO MEAS - MV MEAN PG: 3 MMHG
BH CV ECHO MEAS - MV P1/2T: 97.4 MSEC
BH CV ECHO MEAS - MV V2 VTI: 43.6 CM
BH CV ECHO MEAS - MVA(P1/2T): 2.26 CM2
BH CV ECHO MEAS - MVA(VTI): 2.8 CM2
BH CV ECHO MEAS - PA V2 MAX: 138 CM/SEC
BH CV ECHO MEAS - RV MAX PG: 3.6 MMHG
BH CV ECHO MEAS - RV V1 MAX: 94.3 CM/SEC
BH CV ECHO MEAS - SV(LVOT): 120.6 ML
BH CV ECHO MEAS - SV(MOD-SP2): 96.3 ML
BH CV ECHO MEAS - SV(MOD-SP4): 62 ML
BH CV ECHO MEAS - SVI(LVOT): 57 ML/M2
BH CV ECHO MEAS - SVI(MOD-BP): 37.3 ML/M2
BH CV ECHO MEAS - SVI(MOD-SP2): 45.5 ML/M2
BH CV ECHO MEAS - SVI(MOD-SP4): 29.3 ML/M2
BH CV ECHO MEAS - TAPSE (>1.6): 2.33 CM
BH CV ECHO MEASUREMENTS AVERAGE E/E' RATIO: 16.37
BH CV XLRA - RV BASE: 4.3 CM
BUN SERPL-MCNC: 53.6 MG/DL (ref 8–23)
BUN/CREAT SERPL: 23.4 (ref 7–25)
CALCIUM SPEC-SCNC: 8.8 MG/DL (ref 8.6–10.5)
CHLORIDE SERPL-SCNC: 101 MMOL/L (ref 98–107)
CO2 SERPL-SCNC: 22 MMOL/L (ref 22–29)
CREAT SERPL-MCNC: 2.29 MG/DL (ref 0.57–1)
DEPRECATED RDW RBC AUTO: 47.1 FL (ref 37–54)
EGFRCR SERPLBLD CKD-EPI 2021: 23.3 ML/MIN/1.73
EOSINOPHIL # BLD AUTO: 0 10*3/MM3 (ref 0–0.4)
EOSINOPHIL NFR BLD AUTO: 0 % (ref 0.3–6.2)
ERYTHROCYTE [DISTWIDTH] IN BLOOD BY AUTOMATED COUNT: 13.8 % (ref 12.3–15.4)
GLUCOSE SERPL-MCNC: 151 MG/DL (ref 65–99)
HCT VFR BLD AUTO: 36.7 % (ref 34–46.6)
HGB BLD-MCNC: 11.7 G/DL (ref 12–15.9)
IMM GRANULOCYTES # BLD AUTO: 0.15 10*3/MM3 (ref 0–0.05)
IMM GRANULOCYTES NFR BLD AUTO: 1.9 % (ref 0–0.5)
LEFT ATRIUM VOLUME INDEX: 42.9 ML/M2
LEFT ATRIUM VOLUME: 90.6 ML
LV EF BIPLANE MOD: 40.7 %
LYMPHOCYTES # BLD AUTO: 0.33 10*3/MM3 (ref 0.7–3.1)
LYMPHOCYTES NFR BLD AUTO: 4.2 % (ref 19.6–45.3)
MCH RBC QN AUTO: 29.5 PG (ref 26.6–33)
MCHC RBC AUTO-ENTMCNC: 31.9 G/DL (ref 31.5–35.7)
MCV RBC AUTO: 92.4 FL (ref 79–97)
MONOCYTES # BLD AUTO: 0.06 10*3/MM3 (ref 0.1–0.9)
MONOCYTES NFR BLD AUTO: 0.8 % (ref 5–12)
NEUTROPHILS NFR BLD AUTO: 7.23 10*3/MM3 (ref 1.7–7)
NEUTROPHILS NFR BLD AUTO: 92.7 % (ref 42.7–76)
NRBC BLD AUTO-RTO: 0 /100 WBC (ref 0–0.2)
PLATELET # BLD AUTO: 215 10*3/MM3 (ref 140–450)
PMV BLD AUTO: 11.8 FL (ref 6–12)
POTASSIUM SERPL-SCNC: 3.1 MMOL/L (ref 3.5–5.2)
POTASSIUM SERPL-SCNC: 4.4 MMOL/L (ref 3.5–5.2)
RBC # BLD AUTO: 3.97 10*6/MM3 (ref 3.77–5.28)
SINUS: 3.2 CM
SODIUM SERPL-SCNC: 139 MMOL/L (ref 136–145)
STJ: 3.2 CM
WBC NRBC COR # BLD AUTO: 7.8 10*3/MM3 (ref 3.4–10.8)

## 2025-08-09 PROCEDURE — 94799 UNLISTED PULMONARY SVC/PX: CPT

## 2025-08-09 PROCEDURE — 93306 TTE W/DOPPLER COMPLETE: CPT | Performed by: HOSPITALIST

## 2025-08-09 PROCEDURE — 94640 AIRWAY INHALATION TREATMENT: CPT

## 2025-08-09 PROCEDURE — G0378 HOSPITAL OBSERVATION PER HR: HCPCS

## 2025-08-09 PROCEDURE — 84132 ASSAY OF SERUM POTASSIUM: CPT | Performed by: FAMILY MEDICINE

## 2025-08-09 PROCEDURE — 94761 N-INVAS EAR/PLS OXIMETRY MLT: CPT

## 2025-08-09 PROCEDURE — 94660 CPAP INITIATION&MGMT: CPT

## 2025-08-09 PROCEDURE — 94664 DEMO&/EVAL PT USE INHALER: CPT

## 2025-08-09 PROCEDURE — 93306 TTE W/DOPPLER COMPLETE: CPT

## 2025-08-09 PROCEDURE — 73502 X-RAY EXAM HIP UNI 2-3 VIEWS: CPT

## 2025-08-09 PROCEDURE — 85025 COMPLETE CBC W/AUTO DIFF WBC: CPT | Performed by: FAMILY MEDICINE

## 2025-08-09 PROCEDURE — 80048 BASIC METABOLIC PNL TOTAL CA: CPT | Performed by: FAMILY MEDICINE

## 2025-08-09 PROCEDURE — 25510000001 PERFLUTREN 6.52 MG/ML SUSPENSION: Performed by: FAMILY MEDICINE

## 2025-08-09 RX ORDER — ISOSORBIDE DINITRATE 40 MG/1
40 TABLET ORAL 2 TIMES DAILY
COMMUNITY

## 2025-08-09 RX ORDER — POTASSIUM CHLORIDE 1500 MG/1
40 TABLET, EXTENDED RELEASE ORAL EVERY 4 HOURS
Status: COMPLETED | OUTPATIENT
Start: 2025-08-09 | End: 2025-08-09

## 2025-08-09 RX ADMIN — POTASSIUM CHLORIDE 40 MEQ: 1500 TABLET, EXTENDED RELEASE ORAL at 17:02

## 2025-08-09 RX ADMIN — BUDESONIDE AND FORMOTEROL FUMARATE DIHYDRATE 2 PUFF: 160; 4.5 AEROSOL RESPIRATORY (INHALATION) at 19:44

## 2025-08-09 RX ADMIN — ISOSORBIDE DINITRATE 40 MG: 20 TABLET ORAL at 17:02

## 2025-08-09 RX ADMIN — FUROSEMIDE 40 MG: 20 TABLET ORAL at 08:27

## 2025-08-09 RX ADMIN — POTASSIUM CHLORIDE 40 MEQ: 1500 TABLET, EXTENDED RELEASE ORAL at 08:27

## 2025-08-09 RX ADMIN — CALCITRIOL CAPSULES 0.25 MCG 0.25 MCG: 0.25 CAPSULE ORAL at 08:27

## 2025-08-09 RX ADMIN — ISOSORBIDE DINITRATE 40 MG: 20 TABLET ORAL at 08:27

## 2025-08-09 RX ADMIN — NIFEDIPINE 90 MG: 60 TABLET, EXTENDED RELEASE ORAL at 08:27

## 2025-08-09 RX ADMIN — CETIRIZINE HYDROCHLORIDE 10 MG: 10 TABLET, FILM COATED ORAL at 08:27

## 2025-08-09 RX ADMIN — BUDESONIDE AND FORMOTEROL FUMARATE DIHYDRATE 2 PUFF: 160; 4.5 AEROSOL RESPIRATORY (INHALATION) at 06:52

## 2025-08-09 RX ADMIN — CARVEDILOL 25 MG: 25 TABLET, FILM COATED ORAL at 17:02

## 2025-08-09 RX ADMIN — IPRATROPIUM BROMIDE AND ALBUTEROL SULFATE 3 ML: .5; 3 SOLUTION RESPIRATORY (INHALATION) at 06:52

## 2025-08-09 RX ADMIN — IPRATROPIUM BROMIDE AND ALBUTEROL SULFATE 3 ML: .5; 3 SOLUTION RESPIRATORY (INHALATION) at 19:44

## 2025-08-09 RX ADMIN — ASPIRIN 81 MG: 81 TABLET, COATED ORAL at 08:27

## 2025-08-09 RX ADMIN — SACUBITRIL AND VALSARTAN 1 TABLET: 24; 26 TABLET, FILM COATED ORAL at 11:01

## 2025-08-09 RX ADMIN — POTASSIUM CHLORIDE 40 MEQ: 1500 TABLET, EXTENDED RELEASE ORAL at 12:59

## 2025-08-09 RX ADMIN — GUAIFENESIN 600 MG: 600 TABLET, EXTENDED RELEASE ORAL at 11:04

## 2025-08-09 RX ADMIN — Medication 2.5 MG: at 20:38

## 2025-08-09 RX ADMIN — CARVEDILOL 25 MG: 25 TABLET, FILM COATED ORAL at 08:27

## 2025-08-09 RX ADMIN — IPRATROPIUM BROMIDE AND ALBUTEROL SULFATE 3 ML: .5; 3 SOLUTION RESPIRATORY (INHALATION) at 15:35

## 2025-08-09 RX ADMIN — IPRATROPIUM BROMIDE AND ALBUTEROL SULFATE 3 ML: .5; 3 SOLUTION RESPIRATORY (INHALATION) at 11:24

## 2025-08-09 RX ADMIN — PERFLUTREN 13.04 MG: 6.52 INJECTION, SUSPENSION INTRAVENOUS at 10:24

## 2025-08-09 RX ADMIN — SACUBITRIL AND VALSARTAN 1 TABLET: 24; 26 TABLET, FILM COATED ORAL at 20:38

## 2025-08-10 LAB
ABSOLUTE LUNG FLUID CONTENT: 32 % (ref 20–35)
ALBUMIN SERPL-MCNC: 3.4 G/DL (ref 3.5–5.2)
ALBUMIN/GLOB SERPL: 1.4 G/DL
ALP SERPL-CCNC: 105 U/L (ref 39–117)
ALT SERPL W P-5'-P-CCNC: 24 U/L (ref 1–33)
AMPHET+METHAMPHET UR QL: NEGATIVE
AMPHETAMINES UR QL: NEGATIVE
ANION GAP SERPL CALCULATED.3IONS-SCNC: 11 MMOL/L (ref 5–15)
AST SERPL-CCNC: 11 U/L (ref 1–32)
BARBITURATES UR QL SCN: NEGATIVE
BENZODIAZ UR QL SCN: NEGATIVE
BILIRUB SERPL-MCNC: 0.5 MG/DL (ref 0–1.2)
BUN SERPL-MCNC: 65.1 MG/DL (ref 8–23)
BUN/CREAT SERPL: 22.8 (ref 7–25)
BUPRENORPHINE SERPL-MCNC: NEGATIVE NG/ML
CALCIUM SPEC-SCNC: 8.4 MG/DL (ref 8.6–10.5)
CANNABINOIDS SERPL QL: NEGATIVE
CHLORIDE SERPL-SCNC: 104 MMOL/L (ref 98–107)
CO2 SERPL-SCNC: 24 MMOL/L (ref 22–29)
COCAINE UR QL: NEGATIVE
CREAT SERPL-MCNC: 2.86 MG/DL (ref 0.57–1)
EGFRCR SERPLBLD CKD-EPI 2021: 17.9 ML/MIN/1.73
FENTANYL UR-MCNC: NEGATIVE NG/ML
GLOBULIN UR ELPH-MCNC: 2.4 GM/DL
GLUCOSE SERPL-MCNC: 109 MG/DL (ref 65–99)
METHADONE UR QL SCN: NEGATIVE
OPIATES UR QL: NEGATIVE
OXYCODONE UR QL SCN: NEGATIVE
PCP UR QL SCN: NEGATIVE
POTASSIUM SERPL-SCNC: 4.5 MMOL/L (ref 3.5–5.2)
PROT SERPL-MCNC: 5.8 G/DL (ref 6–8.5)
QT INTERVAL: 380 MS
QTC INTERVAL: 459 MS
SODIUM SERPL-SCNC: 139 MMOL/L (ref 136–145)
TRICYCLICS UR QL SCN: NEGATIVE

## 2025-08-10 PROCEDURE — 99222 1ST HOSP IP/OBS MODERATE 55: CPT | Performed by: INTERNAL MEDICINE

## 2025-08-10 PROCEDURE — 94664 DEMO&/EVAL PT USE INHALER: CPT

## 2025-08-10 PROCEDURE — 94726 PLETHYSMOGRAPHY LUNG VOLUMES: CPT | Performed by: HOSPITALIST

## 2025-08-10 PROCEDURE — 80307 DRUG TEST PRSMV CHEM ANLYZR: CPT | Performed by: HOSPITALIST

## 2025-08-10 PROCEDURE — 99222 1ST HOSP IP/OBS MODERATE 55: CPT | Performed by: HOSPITALIST

## 2025-08-10 PROCEDURE — 94799 UNLISTED PULMONARY SVC/PX: CPT

## 2025-08-10 PROCEDURE — 80053 COMPREHEN METABOLIC PANEL: CPT | Performed by: FAMILY MEDICINE

## 2025-08-10 RX ADMIN — GUAIFENESIN 600 MG: 600 TABLET, EXTENDED RELEASE ORAL at 20:20

## 2025-08-10 RX ADMIN — IPRATROPIUM BROMIDE AND ALBUTEROL SULFATE 3 ML: .5; 3 SOLUTION RESPIRATORY (INHALATION) at 06:13

## 2025-08-10 RX ADMIN — IPRATROPIUM BROMIDE AND ALBUTEROL SULFATE 3 ML: .5; 3 SOLUTION RESPIRATORY (INHALATION) at 18:10

## 2025-08-10 RX ADMIN — ASPIRIN 81 MG: 81 TABLET, COATED ORAL at 08:24

## 2025-08-10 RX ADMIN — CARVEDILOL 25 MG: 25 TABLET, FILM COATED ORAL at 17:04

## 2025-08-10 RX ADMIN — FUROSEMIDE 40 MG: 20 TABLET ORAL at 08:23

## 2025-08-10 RX ADMIN — NIFEDIPINE 90 MG: 60 TABLET, EXTENDED RELEASE ORAL at 08:24

## 2025-08-10 RX ADMIN — FLUTICASONE PROPIONATE 2 SPRAY: 50 SPRAY, METERED NASAL at 08:24

## 2025-08-10 RX ADMIN — Medication 2.5 MG: at 20:18

## 2025-08-10 RX ADMIN — IPRATROPIUM BROMIDE AND ALBUTEROL SULFATE 3 ML: .5; 3 SOLUTION RESPIRATORY (INHALATION) at 14:29

## 2025-08-10 RX ADMIN — ISOSORBIDE DINITRATE 40 MG: 20 TABLET ORAL at 08:23

## 2025-08-10 RX ADMIN — CETIRIZINE HYDROCHLORIDE 10 MG: 10 TABLET, FILM COATED ORAL at 08:23

## 2025-08-10 RX ADMIN — CARVEDILOL 25 MG: 25 TABLET, FILM COATED ORAL at 08:23

## 2025-08-10 RX ADMIN — ISOSORBIDE DINITRATE 40 MG: 20 TABLET ORAL at 17:05

## 2025-08-10 RX ADMIN — CALCITRIOL CAPSULES 0.25 MCG 0.25 MCG: 0.25 CAPSULE ORAL at 08:24

## 2025-08-10 RX ADMIN — SACUBITRIL AND VALSARTAN 1 TABLET: 24; 26 TABLET, FILM COATED ORAL at 08:23

## 2025-08-10 RX ADMIN — IPRATROPIUM BROMIDE AND ALBUTEROL SULFATE 3 ML: .5; 3 SOLUTION RESPIRATORY (INHALATION) at 10:04

## 2025-08-10 RX ADMIN — BUDESONIDE AND FORMOTEROL FUMARATE DIHYDRATE 2 PUFF: 160; 4.5 AEROSOL RESPIRATORY (INHALATION) at 06:13

## 2025-08-10 RX ADMIN — Medication 10 ML: at 20:19

## 2025-08-10 RX ADMIN — BUDESONIDE AND FORMOTEROL FUMARATE DIHYDRATE 2 PUFF: 160; 4.5 AEROSOL RESPIRATORY (INHALATION) at 18:10

## 2025-08-10 RX ADMIN — SACUBITRIL AND VALSARTAN 1 TABLET: 24; 26 TABLET, FILM COATED ORAL at 20:18

## 2025-08-11 ENCOUNTER — READMISSION MANAGEMENT (OUTPATIENT)
Dept: CALL CENTER | Facility: HOSPITAL | Age: 65
End: 2025-08-11
Payer: MEDICARE

## 2025-08-11 VITALS
HEART RATE: 62 BPM | RESPIRATION RATE: 18 BRPM | SYSTOLIC BLOOD PRESSURE: 137 MMHG | BODY MASS INDEX: 36.71 KG/M2 | OXYGEN SATURATION: 93 % | DIASTOLIC BLOOD PRESSURE: 79 MMHG | WEIGHT: 228.4 LBS | HEIGHT: 66 IN | TEMPERATURE: 97.5 F

## 2025-08-11 LAB
ANION GAP SERPL CALCULATED.3IONS-SCNC: 13 MMOL/L (ref 5–15)
BUN SERPL-MCNC: 67.3 MG/DL (ref 8–23)
BUN/CREAT SERPL: 23 (ref 7–25)
CALCIUM SPEC-SCNC: 8.1 MG/DL (ref 8.6–10.5)
CHLORIDE SERPL-SCNC: 103 MMOL/L (ref 98–107)
CO2 SERPL-SCNC: 22 MMOL/L (ref 22–29)
CREAT SERPL-MCNC: 2.92 MG/DL (ref 0.57–1)
EGFRCR SERPLBLD CKD-EPI 2021: 17.4 ML/MIN/1.73
GLUCOSE SERPL-MCNC: 115 MG/DL (ref 65–99)
POTASSIUM SERPL-SCNC: 4.2 MMOL/L (ref 3.5–5.2)
SODIUM SERPL-SCNC: 138 MMOL/L (ref 136–145)

## 2025-08-11 PROCEDURE — 94799 UNLISTED PULMONARY SVC/PX: CPT

## 2025-08-11 PROCEDURE — 94761 N-INVAS EAR/PLS OXIMETRY MLT: CPT

## 2025-08-11 PROCEDURE — 94664 DEMO&/EVAL PT USE INHALER: CPT

## 2025-08-11 PROCEDURE — 80048 BASIC METABOLIC PNL TOTAL CA: CPT | Performed by: FAMILY MEDICINE

## 2025-08-11 RX ADMIN — ISOSORBIDE DINITRATE 40 MG: 20 TABLET ORAL at 08:35

## 2025-08-11 RX ADMIN — CETIRIZINE HYDROCHLORIDE 10 MG: 10 TABLET, FILM COATED ORAL at 08:35

## 2025-08-11 RX ADMIN — CARVEDILOL 25 MG: 25 TABLET, FILM COATED ORAL at 08:36

## 2025-08-11 RX ADMIN — BUDESONIDE AND FORMOTEROL FUMARATE DIHYDRATE 2 PUFF: 160; 4.5 AEROSOL RESPIRATORY (INHALATION) at 05:42

## 2025-08-11 RX ADMIN — ASPIRIN 81 MG: 81 TABLET, COATED ORAL at 08:35

## 2025-08-11 RX ADMIN — IPRATROPIUM BROMIDE AND ALBUTEROL SULFATE 3 ML: .5; 3 SOLUTION RESPIRATORY (INHALATION) at 05:37

## 2025-08-11 RX ADMIN — NIFEDIPINE 90 MG: 60 TABLET, EXTENDED RELEASE ORAL at 08:35

## 2025-08-11 RX ADMIN — CALCITRIOL CAPSULES 0.25 MCG 0.25 MCG: 0.25 CAPSULE ORAL at 08:35

## 2025-08-11 RX ADMIN — FUROSEMIDE 40 MG: 20 TABLET ORAL at 08:35

## 2025-08-11 RX ADMIN — SACUBITRIL AND VALSARTAN 1 TABLET: 24; 26 TABLET, FILM COATED ORAL at 08:35

## 2025-08-11 RX ADMIN — GUAIFENESIN 600 MG: 600 TABLET, EXTENDED RELEASE ORAL at 08:39

## 2025-08-13 ENCOUNTER — READMISSION MANAGEMENT (OUTPATIENT)
Dept: CALL CENTER | Facility: HOSPITAL | Age: 65
End: 2025-08-13
Payer: MEDICARE

## 2025-08-14 ENCOUNTER — READMISSION MANAGEMENT (OUTPATIENT)
Dept: CALL CENTER | Facility: HOSPITAL | Age: 65
End: 2025-08-14
Payer: MEDICARE

## 2025-08-18 ENCOUNTER — READMISSION MANAGEMENT (OUTPATIENT)
Dept: CALL CENTER | Facility: HOSPITAL | Age: 65
End: 2025-08-18
Payer: MEDICARE

## (undated) DEVICE — SNAP KOVER: Brand: UNBRANDED

## (undated) DEVICE — GW STARTER FXD CORE J .035 3X150CM 3MM

## (undated) DEVICE — GLV SURG SENSICARE W/ALOE PF LF 7.5 STRL

## (undated) DEVICE — MINI TREK CORONARY DILATATION CATHETER 2.0 MM X 15 MM / RAPID-EXCHANGE: Brand: MINI TREK

## (undated) DEVICE — Device

## (undated) DEVICE — COPILOT BLEEDBACK CONTROL VALVE: Brand: COPILOT

## (undated) DEVICE — MODEL BT2000 P/N 700287-012KIT CONTENTS: MANIFOLD WITH SALINE AND CONTRAST PORTS, SALINE TUBING WITH SPIKE AND HAND SYRINGE, TRANSDUCER: Brand: BT2000 AUTOMATED MANIFOLD KIT

## (undated) DEVICE — SUT MNCRYL 4/0 PS2 27IN UD MCP426H

## (undated) DEVICE — HI-TORQUE POWERTURN FLEX GUIDE WIRE W/HYDROPHILIC COATING .014" STRAIGHT TIP 190 CM: Brand: HI-TORQUE POWERTURN

## (undated) DEVICE — SUT ETHLN 3/0 FS1 30IN 669H

## (undated) DEVICE — PK TURNOVER RM ADV

## (undated) DEVICE — MODEL AT P65, P/N 701554-001KIT CONTENTS: HAND CONTROLLER, 3-WAY HIGH-PRESSURE STOPCOCK WITH ROTATING END AND PREMIUM HIGH-PRESSURE TUBING: Brand: ANGIOTOUCH® KIT

## (undated) DEVICE — SYR LUERLOK 20CC BX/50

## (undated) DEVICE — ADHS SKIN PREMIERPRO EXOFIN TOPICAL HI/VISC .5ML

## (undated) DEVICE — DEV TORQ GW HOT/PINK

## (undated) DEVICE — MYNXGRIP 6F/7F: Brand: MYNXGRIP

## (undated) DEVICE — PROXIMATE RH ROTATING HEAD SKIN STAPLERS (35 WIDE) CONTAINS 35 STAINLESS STEEL STAPLES: Brand: PROXIMATE

## (undated) DEVICE — TREK CORONARY DILATATION CATHETER 3.0 MM X 20 MM / RAPID-EXCHANGE: Brand: TREK

## (undated) DEVICE — SOL IRR NACL 0.9PCT BT 1000ML

## (undated) DEVICE — DRSNG SURESITE WNDW 4X4.5

## (undated) DEVICE — GLV SURG DERMASSURE GRN LF PF 8.0

## (undated) DEVICE — SYR SLP TP 10ML DISP

## (undated) DEVICE — INFLATION DEVICE: Brand: ENCORE™ 26

## (undated) DEVICE — GC 7F 078 JL 4: Brand: VISTA BRITE TIP

## (undated) DEVICE — INTENDED FOR TISSUE SEPARATION, AND OTHER PROCEDURES THAT REQUIRE A SHARP SURGICAL BLADE TO PUNCTURE OR CUT.: Brand: BARD-PARKER ® STAINLESS STEEL BLADES

## (undated) DEVICE — SYR PRECISIONGLIDE LL 5CC 20X1 1/2IN

## (undated) DEVICE — CATH DIAG IMPULSE M/ PK 145 5FR

## (undated) DEVICE — SOLIDIFIER LIQUI LOC PLUS 2000CC

## (undated) DEVICE — ELECTRD PAD DEFIB A/

## (undated) DEVICE — TOOL INSRT GW MTL OR PLSTC

## (undated) DEVICE — KT CATH TAL VENATRAC PALINDROM 14.5F 19CM

## (undated) DEVICE — SPNG GZ 2S 2X2 8PLY STRL PK/2

## (undated) DEVICE — PAD MINOR UNIVERSAL: Brand: MEDLINE INDUSTRIES, INC.

## (undated) DEVICE — CANN CO2/O2 NASL A/

## (undated) DEVICE — PINNACLE INTRODUCER SHEATH: Brand: PINNACLE

## (undated) DEVICE — APPL CHLORAPREP HI/LITE 26ML ORNG

## (undated) DEVICE — CVR PROB GEN PURP W ISOSILK 6X48

## (undated) DEVICE — ENDO KIT,LOURDES HOSPITAL: Brand: MEDLINE INDUSTRIES, INC.